# Patient Record
Sex: MALE | Race: WHITE | NOT HISPANIC OR LATINO | Employment: OTHER | URBAN - METROPOLITAN AREA
[De-identification: names, ages, dates, MRNs, and addresses within clinical notes are randomized per-mention and may not be internally consistent; named-entity substitution may affect disease eponyms.]

---

## 2020-06-23 ENCOUNTER — TRANSCRIBE ORDERS (OUTPATIENT)
Dept: ADMINISTRATIVE | Facility: HOSPITAL | Age: 75
End: 2020-06-23

## 2020-06-23 DIAGNOSIS — J44.9 OBSTRUCTIVE CHRONIC BRONCHITIS WITHOUT EXACERBATION (HCC): Primary | ICD-10-CM

## 2020-07-07 ENCOUNTER — OFFICE VISIT (OUTPATIENT)
Dept: INTERNAL MEDICINE CLINIC | Facility: CLINIC | Age: 75
End: 2020-07-07
Payer: MEDICARE

## 2020-07-07 VITALS
HEIGHT: 70 IN | OXYGEN SATURATION: 98 % | BODY MASS INDEX: 23.77 KG/M2 | WEIGHT: 166 LBS | HEART RATE: 80 BPM | TEMPERATURE: 98.9 F | DIASTOLIC BLOOD PRESSURE: 60 MMHG | SYSTOLIC BLOOD PRESSURE: 132 MMHG | RESPIRATION RATE: 18 BRPM

## 2020-07-07 DIAGNOSIS — I48.21 PERMANENT ATRIAL FIBRILLATION (HCC): Primary | ICD-10-CM

## 2020-07-07 DIAGNOSIS — I42.0 DILATED CARDIOMYOPATHY (HCC): ICD-10-CM

## 2020-07-07 DIAGNOSIS — I10 ESSENTIAL HYPERTENSION: ICD-10-CM

## 2020-07-07 DIAGNOSIS — E78.2 MIXED HYPERLIPIDEMIA: ICD-10-CM

## 2020-07-07 DIAGNOSIS — I34.0 NONRHEUMATIC MITRAL VALVE REGURGITATION: ICD-10-CM

## 2020-07-07 PROCEDURE — 99214 OFFICE O/P EST MOD 30 MIN: CPT | Performed by: INTERNAL MEDICINE

## 2020-07-07 PROCEDURE — 3008F BODY MASS INDEX DOCD: CPT | Performed by: INTERNAL MEDICINE

## 2020-07-07 PROCEDURE — 1160F RVW MEDS BY RX/DR IN RCRD: CPT | Performed by: INTERNAL MEDICINE

## 2020-07-07 RX ORDER — BUDESONIDE AND FORMOTEROL FUMARATE DIHYDRATE 80; 4.5 UG/1; UG/1
2 AEROSOL RESPIRATORY (INHALATION) 2 TIMES DAILY
COMMUNITY
End: 2020-08-12 | Stop reason: SDUPTHER

## 2020-07-07 RX ORDER — APIXABAN 5 MG/1
5 TABLET, FILM COATED ORAL DAILY
COMMUNITY
Start: 2020-05-18 | End: 2020-08-08 | Stop reason: HOSPADM

## 2020-07-07 RX ORDER — POTASSIUM CHLORIDE 750 MG/1
10 TABLET, FILM COATED, EXTENDED RELEASE ORAL DAILY
COMMUNITY
Start: 2020-05-18 | End: 2021-09-10 | Stop reason: SDUPTHER

## 2020-07-07 RX ORDER — TAMSULOSIN HYDROCHLORIDE 0.4 MG/1
0.4 CAPSULE ORAL DAILY
COMMUNITY
Start: 2020-05-18 | End: 2021-09-10 | Stop reason: SDUPTHER

## 2020-07-07 RX ORDER — LISINOPRIL 2.5 MG/1
2.5 TABLET ORAL DAILY
COMMUNITY
Start: 2020-05-22 | End: 2020-08-10 | Stop reason: SDUPTHER

## 2020-07-07 RX ORDER — FUROSEMIDE 40 MG/1
40 TABLET ORAL DAILY
COMMUNITY
Start: 2020-06-19 | End: 2020-08-25 | Stop reason: SDUPTHER

## 2020-07-07 NOTE — PROGRESS NOTES
Assessment/Plan:  Nonischemic cardiomyopathy  Ejection fraction is in the teens  He does not have ICD  He called his cardiologist take OTC him  Chronic atrial fibrillation  Essential hypertension  Hyperlipidemia  Mitral regurgitation  History of congestive heart failure  History of chronic obstructive lung disease his nebulizers  Benign prostatic hypertrophy on tamsulosin  Meds and labs reviewed due for blood work prescription given for the same  Will see him back after the blood work and a free sees cardiologist       Problem List Items Addressed This Visit     None      Visit Diagnoses     Permanent atrial fibrillation ([facility] )    -  Primary    Relevant Medications    metoprolol tartrate (LOPRESSOR) 25 mg tablet    Other Relevant Orders    TSH, 3rd generation with Free T4 reflex    Essential hypertension        Relevant Medications    furosemide (LASIX) 40 mg tablet    lisinopril (ZESTRIL) 2 5 mg tablet    metoprolol tartrate (LOPRESSOR) 25 mg tablet    Other Relevant Orders    CBC and differential    Comprehensive metabolic panel    UA w Reflex to Microscopic w Reflex to Culture - Clinic Collect    Dilated cardiomyopathy ([facility] )        Relevant Medications    metoprolol tartrate (LOPRESSOR) 25 mg tablet    Other Relevant Orders    HEMOGLOBIN A1C W/ EAG ESTIMATION    Nonrheumatic mitral valve regurgitation        Relevant Medications    metoprolol tartrate (LOPRESSOR) 25 mg tablet    Mixed hyperlipidemia        Relevant Orders    Lipid panel            Subjective:      Patient ID: Marbella Hsu is a 69 y o  male  Mr  Fernanda Castellanos is here for follow-up    He has history of                                                    Essential hypertension                                                     Cardiomyopathy/nonischemic                                                    Chronic atrial fibrillation                                                    Mitral regurgitation Hyperlipidemia                                                    Chronic obstructive lung disease                                                    Tobacco dependence  Has drastically cut down on smoking                                                     BPH, history of urinary retention                                                     Impaired fasting glucose  He is overall doing well with his hip he gets tired when he walks, shortness of breath when he walks  No orthopnea or PND  Occasional peripheral edema  No palpitations  No chest pains  No dysphagia or heartburn  He gets lightheaded when he gets out of bed  No falls no syncopal attacks      The following portions of the patient's history were reviewed and updated as appropriate:   He has a past medical history of Anxiety disorder, Atrial fibrillation ([facility] ), Depression, and Hepatitis C ,  does not have a problem list on file  ,   has a past surgical history that includes Cardiac catheterization (07/09/2018)  ,  family history includes Coronary artery disease in his father and mother; Diabetes in his father; Hypertension in his father and mother  ,   reports that he has been smoking  He has a 14 25 pack-year smoking history  He has never used smokeless tobacco  He reports that he does not drink alcohol or use drugs  ,  has No Known Allergies     Current Outpatient Medications   Medication Sig Dispense Refill    budesonide-formoterol (SYMBICORT) 80-4 5 MCG/ACT inhaler Inhale 2 puffs 2 (two) times a day Rinse mouth after use        ELIQUIS 5 MG Take 5 mg by mouth daily      furosemide (LASIX) 40 mg tablet Take 40 mg by mouth daily      lisinopril (ZESTRIL) 2 5 mg tablet Take 2 5 mg by mouth daily      metoprolol tartrate (LOPRESSOR) 25 mg tablet Take 25 mg by mouth daily      potassium chloride (K-DUR) 10 mEq tablet Take 10 mEq by mouth daily      tamsulosin (FLOMAX) 0 4 mg Take 0 4 mg by mouth daily       No current facility-administered medications for this visit  Review of Systems   Constitutional: Negative  HENT: Negative  Eyes: Negative  Respiratory: Negative  Cardiovascular: Negative  Gastrointestinal: Negative  Endocrine: Negative  Genitourinary: Negative  Skin: Negative  Allergic/Immunologic: Negative  Neurological: Negative  Hematological: Negative  Psychiatric/Behavioral: Negative  Objective:  Vitals:    07/07/20 1552   BP: 132/60   BP Location: Left arm   Patient Position: Sitting   Cuff Size: Standard   Pulse: 80   Resp: 18   Temp: 98 9 °F (37 2 °C)   TempSrc: Tympanic   SpO2: 98%   Weight: 75 3 kg (166 lb)   Height: 5' 10" (1 778 m)     Body mass index is 23 82 kg/m²  Physical Exam   Constitutional: He is oriented to person, place, and time  He appears well-developed and well-nourished  HENT:   Head: Normocephalic and atraumatic  Right Ear: External ear normal    Left Ear: External ear normal    Nose: Nose normal    Eyes: Pupils are equal, round, and reactive to light  Conjunctivae and EOM are normal  No scleral icterus  Neck: Normal range of motion  Neck supple  No thyromegaly present  Cardiovascular: Normal rate  Exam reveals no gallop  Murmur heard  Irregularly irregular/atrial fibrillation   Pulmonary/Chest: Effort normal  He has no rales  Diminished breath sounds   Abdominal: Soft  Bowel sounds are normal  He exhibits no mass  No hernia  Musculoskeletal: Normal range of motion  He exhibits no edema  Lymphadenopathy:     He has no cervical adenopathy  Neurological: He is alert and oriented to person, place, and time  Skin: Skin is warm and dry  Psychiatric: He has a normal mood and affect  Thought content normal    Nursing note and vitals reviewed

## 2020-08-03 ENCOUNTER — TELEPHONE (OUTPATIENT)
Dept: INTERNAL MEDICINE CLINIC | Facility: CLINIC | Age: 75
End: 2020-08-03

## 2020-08-03 ENCOUNTER — HOSPITAL ENCOUNTER (INPATIENT)
Facility: HOSPITAL | Age: 75
LOS: 5 days | Discharge: HOME HEALTH | DRG: 812 | End: 2020-08-08
Admitting: INTERNAL MEDICINE
Payer: MEDICARE

## 2020-08-03 DIAGNOSIS — D50.9 MICROCYTIC ANEMIA: ICD-10-CM

## 2020-08-03 DIAGNOSIS — I95.1 ORTHOSTATIC HYPOTENSION: ICD-10-CM

## 2020-08-03 DIAGNOSIS — F17.200 TOBACCO USE DISORDER: ICD-10-CM

## 2020-08-03 DIAGNOSIS — R53.1 WEAKNESS: ICD-10-CM

## 2020-08-03 DIAGNOSIS — I48.11 LONGSTANDING PERSISTENT ATRIAL FIBRILLATION (HCC): ICD-10-CM

## 2020-08-03 DIAGNOSIS — D64.9 ANEMIA, UNSPECIFIED TYPE: Primary | ICD-10-CM

## 2020-08-03 PROBLEM — I48.91 A-FIB (HCC): Status: ACTIVE | Noted: 2020-08-03

## 2020-08-03 PROBLEM — I42.8 NON-ISCHEMIC CARDIOMYOPATHY (HCC): Status: ACTIVE | Noted: 2020-08-03

## 2020-08-03 LAB
ABO GROUP BLD: NORMAL
ABO GROUP BLD: NORMAL
ALBUMIN SERPL BCP-MCNC: 4 G/DL (ref 3.4–4.8)
ALP SERPL-CCNC: 47.1 U/L (ref 10–129)
ALT SERPL W P-5'-P-CCNC: 12 U/L (ref 5–63)
ANION GAP SERPL CALCULATED.3IONS-SCNC: 6 MMOL/L (ref 4–13)
AST SERPL W P-5'-P-CCNC: 13 U/L (ref 15–41)
BASOPHILS # BLD AUTO: 0.05 THOUSANDS/ΜL (ref 0–0.1)
BASOPHILS NFR BLD AUTO: 1 % (ref 0–1)
BILIRUB SERPL-MCNC: 0.59 MG/DL (ref 0.3–1.2)
BLD GP AB SCN SERPL QL: NEGATIVE
BNP SERPL-MCNC: 387 PG/ML (ref 1–100)
BUN SERPL-MCNC: 17 MG/DL (ref 6–20)
CALCIUM SERPL-MCNC: 8.9 MG/DL (ref 8.4–10.2)
CHLORIDE SERPL-SCNC: 105 MMOL/L (ref 96–108)
CO2 SERPL-SCNC: 30 MMOL/L (ref 22–33)
CREAT SERPL-MCNC: 1.05 MG/DL (ref 0.5–1.2)
DIGOXIN SERPL-MCNC: <0.2 NG/ML (ref 0.8–2)
EOSINOPHIL # BLD AUTO: 0.15 THOUSAND/ΜL (ref 0–0.61)
EOSINOPHIL NFR BLD AUTO: 2 % (ref 0–6)
ERYTHROCYTE [DISTWIDTH] IN BLOOD BY AUTOMATED COUNT: 20.8 % (ref 11.6–15.1)
GFR SERPL CREATININE-BSD FRML MDRD: 71 ML/MIN/1.73SQ M
GLUCOSE SERPL-MCNC: 123 MG/DL (ref 65–140)
HCT VFR BLD AUTO: 24.9 % (ref 36.5–49.3)
HEMOCCULT STL QL IA: NEGATIVE
HGB BLD-MCNC: 6.3 G/DL (ref 12–17)
IMM GRANULOCYTES # BLD AUTO: 0.08 THOUSAND/UL (ref 0–0.2)
IMM GRANULOCYTES NFR BLD AUTO: 1 % (ref 0–2)
LYMPHOCYTES # BLD AUTO: 1.16 THOUSANDS/ΜL (ref 0.6–4.47)
LYMPHOCYTES NFR BLD AUTO: 14 % (ref 14–44)
MCH RBC QN AUTO: 17.5 PG (ref 26.8–34.3)
MCHC RBC AUTO-ENTMCNC: 25.3 G/DL (ref 31.4–37.4)
MCV RBC AUTO: 69 FL (ref 82–98)
MONOCYTES # BLD AUTO: 1 THOUSAND/ΜL (ref 0.17–1.22)
MONOCYTES NFR BLD AUTO: 12 % (ref 4–12)
NEUTROPHILS # BLD AUTO: 6.13 THOUSANDS/ΜL (ref 1.85–7.62)
NEUTS SEG NFR BLD AUTO: 70 % (ref 43–75)
PLATELET # BLD AUTO: 539 THOUSANDS/UL (ref 149–390)
PMV BLD AUTO: 9.1 FL (ref 8.9–12.7)
POTASSIUM SERPL-SCNC: 3.6 MMOL/L (ref 3.5–5)
PROT SERPL-MCNC: 6 G/DL (ref 6.4–8.3)
RBC # BLD AUTO: 3.61 MILLION/UL (ref 3.88–5.62)
RETICS # AUTO: ABNORMAL 10*3/UL (ref 14356–105094)
RETICS # CALC: 2.31 % (ref 0.37–1.87)
RH BLD: POSITIVE
RH BLD: POSITIVE
SODIUM SERPL-SCNC: 141 MMOL/L (ref 133–145)
SPECIMEN EXPIRATION DATE: NORMAL
TSH SERPL DL<=0.05 MIU/L-ACNC: 0.85 UIU/ML (ref 0.34–5.6)
WBC # BLD AUTO: 8.57 THOUSAND/UL (ref 4.31–10.16)

## 2020-08-03 PROCEDURE — 99285 EMERGENCY DEPT VISIT HI MDM: CPT

## 2020-08-03 PROCEDURE — 85025 COMPLETE CBC W/AUTO DIFF WBC: CPT | Performed by: PHYSICIAN ASSISTANT

## 2020-08-03 PROCEDURE — P9016 RBC LEUKOCYTES REDUCED: HCPCS

## 2020-08-03 PROCEDURE — 1124F ACP DISCUSS-NO DSCNMKR DOCD: CPT | Performed by: INTERNAL MEDICINE

## 2020-08-03 PROCEDURE — 86850 RBC ANTIBODY SCREEN: CPT | Performed by: PHYSICIAN ASSISTANT

## 2020-08-03 PROCEDURE — 93005 ELECTROCARDIOGRAM TRACING: CPT

## 2020-08-03 PROCEDURE — 96360 HYDRATION IV INFUSION INIT: CPT

## 2020-08-03 PROCEDURE — 83550 IRON BINDING TEST: CPT | Performed by: INTERNAL MEDICINE

## 2020-08-03 PROCEDURE — 82728 ASSAY OF FERRITIN: CPT | Performed by: INTERNAL MEDICINE

## 2020-08-03 PROCEDURE — 83880 ASSAY OF NATRIURETIC PEPTIDE: CPT | Performed by: INTERNAL MEDICINE

## 2020-08-03 PROCEDURE — 86920 COMPATIBILITY TEST SPIN: CPT

## 2020-08-03 PROCEDURE — 83540 ASSAY OF IRON: CPT | Performed by: INTERNAL MEDICINE

## 2020-08-03 PROCEDURE — 86901 BLOOD TYPING SEROLOGIC RH(D): CPT | Performed by: PHYSICIAN ASSISTANT

## 2020-08-03 PROCEDURE — 80053 COMPREHEN METABOLIC PANEL: CPT | Performed by: PHYSICIAN ASSISTANT

## 2020-08-03 PROCEDURE — 84443 ASSAY THYROID STIM HORMONE: CPT | Performed by: PHYSICIAN ASSISTANT

## 2020-08-03 PROCEDURE — 99222 1ST HOSP IP/OBS MODERATE 55: CPT | Performed by: INTERNAL MEDICINE

## 2020-08-03 PROCEDURE — 80162 ASSAY OF DIGOXIN TOTAL: CPT | Performed by: PHYSICIAN ASSISTANT

## 2020-08-03 PROCEDURE — 83625 ASSAY OF LDH ENZYMES: CPT | Performed by: INTERNAL MEDICINE

## 2020-08-03 PROCEDURE — 83615 LACTATE (LD) (LDH) ENZYME: CPT | Performed by: INTERNAL MEDICINE

## 2020-08-03 PROCEDURE — 99285 EMERGENCY DEPT VISIT HI MDM: CPT | Performed by: PHYSICIAN ASSISTANT

## 2020-08-03 PROCEDURE — G0328 FECAL BLOOD SCRN IMMUNOASSAY: HCPCS | Performed by: INTERNAL MEDICINE

## 2020-08-03 PROCEDURE — 36415 COLL VENOUS BLD VENIPUNCTURE: CPT | Performed by: PHYSICIAN ASSISTANT

## 2020-08-03 PROCEDURE — 36430 TRANSFUSION BLD/BLD COMPNT: CPT

## 2020-08-03 PROCEDURE — 30233N1 TRANSFUSION OF NONAUTOLOGOUS RED BLOOD CELLS INTO PERIPHERAL VEIN, PERCUTANEOUS APPROACH: ICD-10-PCS

## 2020-08-03 PROCEDURE — 85045 AUTOMATED RETICULOCYTE COUNT: CPT | Performed by: INTERNAL MEDICINE

## 2020-08-03 PROCEDURE — 86900 BLOOD TYPING SEROLOGIC ABO: CPT | Performed by: PHYSICIAN ASSISTANT

## 2020-08-03 RX ORDER — BUDESONIDE AND FORMOTEROL FUMARATE DIHYDRATE 80; 4.5 UG/1; UG/1
2 AEROSOL RESPIRATORY (INHALATION) 2 TIMES DAILY
Status: DISCONTINUED | OUTPATIENT
Start: 2020-08-04 | End: 2020-08-08 | Stop reason: HOSPADM

## 2020-08-03 RX ORDER — TAMSULOSIN HYDROCHLORIDE 0.4 MG/1
0.4 CAPSULE ORAL DAILY
Status: DISCONTINUED | OUTPATIENT
Start: 2020-08-04 | End: 2020-08-04

## 2020-08-03 RX ORDER — FUROSEMIDE 40 MG/1
40 TABLET ORAL DAILY
Status: DISCONTINUED | OUTPATIENT
Start: 2020-08-04 | End: 2020-08-07

## 2020-08-03 RX ORDER — POTASSIUM CHLORIDE 750 MG/1
10 TABLET, FILM COATED, EXTENDED RELEASE ORAL DAILY
Status: DISCONTINUED | OUTPATIENT
Start: 2020-08-04 | End: 2020-08-08 | Stop reason: HOSPADM

## 2020-08-03 RX ORDER — LISINOPRIL 2.5 MG/1
2.5 TABLET ORAL DAILY
Status: DISCONTINUED | OUTPATIENT
Start: 2020-08-04 | End: 2020-08-08 | Stop reason: HOSPADM

## 2020-08-03 RX ORDER — DIGOXIN 125 MCG
250 TABLET ORAL DAILY
Status: DISCONTINUED | OUTPATIENT
Start: 2020-08-04 | End: 2020-08-08 | Stop reason: HOSPADM

## 2020-08-03 RX ORDER — DIGOXIN 250 MCG
250 TABLET ORAL DAILY
COMMUNITY
End: 2020-08-10 | Stop reason: SDUPTHER

## 2020-08-03 RX ADMIN — SODIUM CHLORIDE 1000 ML: 0.9 INJECTION, SOLUTION INTRAVENOUS at 15:53

## 2020-08-03 NOTE — H&P
H&P- Jaci Jones 1945, 69 y o  male MRN: [id number]    Unit/Bed#: ED 04 Encounter: [id number]    Primary Care Provider: Ion Jerez MD   Date and time admitted to hospital: 8/3/2020  3:20 PM        A-fib [facility])  Assessment & Plan  Patient has a history of atrial fibrillation managed with Eliquis and metoprolol  - currently rate controlled hemodynamically stable  - hold Eliquis given workup for acute bleed potentially  - continue metoprolol  Non-ischemic cardiomyopathy [facility])  Assessment & Plan  History of nonischemic cardiomyopathy  Ejection fraction unclear> will request medical records  Continue metoprolol 25 mg daily and digoxin 0 25 mg daily    Microcytic anemia  Assessment & Plan  Patient presents with hemoglobin of 6 3  Baseline hemoglobin not known  Will transfuse with 1 unit of blood  Stool occult is negative  Will trend hemoglobin  Obtain recheck count, iron studies, haptoglobin, LDH, PT/INR  VTE Prophylaxis: Holding pharmacological anticoagulation due to assessment for bleed  / sequential compression device   Code Status:  DNR/DNI  POLST: POLST form is not discussed and not completed at this time  Discussion with family:  None  Anticipated Length of Stay:  Patient will be admitted on an Inpatient basis with an anticipated length of stay of  less than 2 midnights  Justification for Hospital Stay:  Correction of anemia and anemia workup  Total Time for Visit, including Counseling / Coordination of Care: 30 minutes  Greater than 50% of this total time spent on direct patient counseling and coordination of care  Chief Complaint:  Progressive Fatigue  History of Present Illness:    Jaci Jones is a 69 y o  male with a past medical history significant for CHF on Eliquis, CAD, AMI, anxiety and depression who presents with progressive fatigue and somnolence for 1 day's duration    Wife deny that he has been progressively seemed more tired a day-to-day basis but more significant as of today  Patient denies any headache blurry vision, chest pain, palpitations, abdominal pain, nausea, vomiting, weight loss, changes in appetite, bowel or bladder patterns  ED course:  Preliminary workup in the emergency department was remarkable for hemoglobin of 6 3 an hematocrit of 24 7 with platelets of 359  EKG showed no acute ischemic changes  FOBT showed no occult blood  Patient's vitals were stable  Patient to be admitted to the unit for management of hemoglobin with plan for blood transfusion-cross match revealed AB +  Digoxin levels pending  Review of Systems:    Review of Systems   Gastrointestinal: Positive for diarrhea  Patient mentions occasional loose bowel movements as of recently  All other systems reviewed and are negative  Past Medical and Surgical History:     Past Medical History:   Diagnosis Date    Anxiety disorder     Atrial fibrillation (HCC)     Coronary artery disease     Depression     Hepatitis C     screening negative in 1/2017    MI (myocardial infarction) [facility])        Past Surgical History:   Procedure Laterality Date    CARDIAC CATHETERIZATION  07/09/2018       Meds/Allergies:    Prior to Admission medications    Medication Sig Start Date End Date Taking? Authorizing Provider   budesonide-formoterol (SYMBICORT) 80-4 5 MCG/ACT inhaler Inhale 2 puffs 2 (two) times a day Rinse mouth after use     Yes Historical Provider, MD   digoxin (LANOXIN) 0 25 mg tablet Take 250 mcg by mouth daily   Yes Historical Provider, MD   ELIQUIS 5 MG Take 5 mg by mouth daily 5/18/20  Yes Historical Provider, MD   furosemide (LASIX) 40 mg tablet Take 40 mg by mouth daily 6/19/20  Yes Historical Provider, MD   lisinopril (ZESTRIL) 2 5 mg tablet Take 2 5 mg by mouth daily 5/22/20  Yes Historical Provider, MD   metoprolol tartrate (LOPRESSOR) 25 mg tablet Take 25 mg by mouth daily 6/25/20  Yes Historical Provider, MD   potassium chloride (K-DUR) 10 mEq tablet Take 10 mEq by mouth daily 5/18/20   Historical Provider, MD   tamsulosin (FLOMAX) 0 4 mg Take 0 4 mg by mouth daily 5/18/20   Historical Provider, MD     I have reviewed home medications with patient personally  Allergies: No Known Allergies    Social History:     Marital Status: /Civil Union   Occupation: unemployed  Patient Pre-hospital Living Situation: home  Patient Pre-hospital Level of Mobility: independent  Patient Pre-hospital Diet Restrictions: none  Substance Use History:   Social History     Substance and Sexual Activity   Alcohol Use Never    Frequency: Never     Social History     Tobacco Use   Smoking Status Current Every Day Smoker    Packs/day: 0 25    Years: 57 00    Pack years: 14 25   Smokeless Tobacco Never Used   Tobacco Comment    since age 17; Has history of smoking for over 54 years  He has been smoking more than packet daily in the past however he has been smokes about half a pack a day lately and stopped smoking on 7/1/2018 when he was admitted to the hospital       Social History     Substance and Sexual Activity   Drug Use Never       Family History:    non-contributory    Physical Exam:     Vitals:   Blood Pressure: 138/74 (08/03/20 1829)  Pulse: 88 (08/03/20 1829)  Temperature: 98 2 °F (36 8 °C) (08/03/20 1829)  Temp Source: Oral (08/03/20 1829)  Respirations: 18 (08/03/20 1829)  Weight - Scale: 75 3 kg (166 lb) (08/03/20 1523)  SpO2: 100 % (08/03/20 1829)    Physical Exam   Constitutional: He is oriented to person, place, and time  HENT:   Head: Normocephalic and atraumatic  Mouth/Throat: Mucous membranes are dry  Eyes: Pupils are equal, round, and reactive to light  Conjunctivae are normal    Neck: Normal range of motion  Neck supple  Cardiovascular: Normal rate, S1 normal and S2 normal  An irregular rhythm present  Pulmonary/Chest: Effort normal and breath sounds normal    Abdominal: Soft  Normal appearance     Musculoskeletal: Normal range of motion  Neurological: He is alert and oriented to person, place, and time  Skin: Skin is warm and dry  Capillary refill takes less than 2 seconds  Additional Data:     Lab Results: I have personally reviewed pertinent reports  Results from last 7 days   Lab Units 08/03/20  1552   WBC Thousand/uL 8 57   HEMOGLOBIN g/dL 6 3*   HEMATOCRIT % 24 9*   PLATELETS Thousands/uL 539*   NEUTROS PCT % 70   LYMPHS PCT % 14   MONOS PCT % 12   EOS PCT % 2     Results from last 7 days   Lab Units 08/03/20  1552   SODIUM mmol/L 141   POTASSIUM mmol/L 3 6   CHLORIDE mmol/L 105   CO2 mmol/L 30   BUN mg/dL 17   CREATININE mg/dL 1 05   ANION GAP mmol/L 6   CALCIUM mg/dL 8 9   ALBUMIN g/dL 4 0   TOTAL BILIRUBIN mg/dL 0 59   ALK PHOS U/L 47 1   ALT U/L 12   AST U/L 13*   GLUCOSE RANDOM mg/dL 123                       Imaging: I have personally reviewed pertinent reports  No orders to display       EKG, Pathology, and Other Studies Reviewed on Admission:   · EKG:  Irregularly regular rhythm  No acute ischemic changes noted  [facility]: Yes     ** Please Note: This note has been constructed using a voice recognition system   **

## 2020-08-03 NOTE — TELEPHONE ENCOUNTER
Patients wife called stating he's extremely fatigued, sleeping more than 10 hours a day  She said when he wakes up he's lethargic with slight vertigo  She was very concerned so we advised her to get him to the hospital to be examined, she understood and was calling the squad

## 2020-08-03 NOTE — ASSESSMENT & PLAN NOTE
History of nonischemic cardiomyopathy  Ejection fraction unclear> will request medical records    Continue metoprolol 25 mg daily and digoxin 0 25 mg daily

## 2020-08-03 NOTE — ED PROVIDER NOTES
History  Chief Complaint   Patient presents with    Fatigue     pt reports, "my wife thinks I've been a little more lethargic, but put me infront of an AC and i'll feel great  I am a little tired in the morning and I take a nap during the day, but I feel fine"      Pt with Past Medical History: Anxiety disorder/Depression, Atrial fibrillation (HCC) on Eliquis, Coronary artery disease, Hepatitis C, MI presents to ED c/o being more tired and lethargic, that he thinks is "from the heat", sleeping more, for which wife called 911  Pt denies pain, no fever/chills, no cough, no abd pain, no NV, no bowel changes, no BRBPR or melena  Prior to Admission Medications   Prescriptions Last Dose Informant Patient Reported? Taking? ELIQUIS 5 MG 8/3/2020 at Unknown time  Yes Yes   Sig: Take 5 mg by mouth daily   budesonide-formoterol (SYMBICORT) 80-4 5 MCG/ACT inhaler 8/3/2020 at Unknown time  Yes Yes   Sig: Inhale 2 puffs 2 (two) times a day Rinse mouth after use     digoxin (LANOXIN) 0 25 mg tablet   Yes Yes   Sig: Take 250 mcg by mouth daily   furosemide (LASIX) 40 mg tablet 8/3/2020 at Unknown time  Yes Yes   Sig: Take 40 mg by mouth daily   lisinopril (ZESTRIL) 2 5 mg tablet 8/3/2020 at Unknown time  Yes Yes   Sig: Take 2 5 mg by mouth daily   metoprolol tartrate (LOPRESSOR) 25 mg tablet 8/3/2020 at Unknown time  Yes Yes   Sig: Take 25 mg by mouth daily   potassium chloride (K-DUR) 10 mEq tablet Not Taking at Unknown time  Yes No   Sig: Take 10 mEq by mouth daily   tamsulosin (FLOMAX) 0 4 mg Not Taking at Unknown time  Yes No   Sig: Take 0 4 mg by mouth daily      Facility-Administered Medications: None             Past Surgical History:   Procedure Laterality Date    CARDIAC CATHETERIZATION  07/09/2018       Family History   Problem Relation Age of Onset    Hypertension Mother     Coronary artery disease Mother         premature    Hypertension Father     Coronary artery disease Father         premature    Diabetes Father      I have reviewed and agree with the history as documented  E-Cigarette/Vaping    E-Cigarette Use Never User      E-Cigarette/Vaping Substances    Nicotine No     THC No     CBD No     Flavoring No     Other No     Unknown No      Social History     Tobacco Use    Smoking status: Current Every Day Smoker     Packs/day: 0 25     Years: 57 00     Pack years: 14 25    Smokeless tobacco: Never Used    Tobacco comment: since age 17; Has history of smoking for over 55 years  He has been smoking more than packet daily in the past however he has been smokes about half a pack a day lately and stopped smoking on 7/1/2018 when he was admitted to the hospital     Substance Use Topics    Alcohol use: Never     Frequency: Never    Drug use: Never       Review of Systems   Constitutional: Positive for appetite change and fatigue  Negative for chills and fever  HENT: Negative for ear discharge, ear pain, hearing loss, sore throat and trouble swallowing  Eyes: Negative for pain and visual disturbance  Respiratory: Negative for cough, chest tightness and shortness of breath  Cardiovascular: Negative for chest pain and leg swelling  Gastrointestinal: Negative for abdominal pain, constipation, diarrhea, nausea and vomiting  Genitourinary: Negative for discharge, dysuria and frequency  Musculoskeletal: Negative for arthralgias and myalgias  Skin: Negative for color change and pallor  Neurological: Positive for weakness  Negative for dizziness and headaches  Psychiatric/Behavioral: Negative for behavioral problems, self-injury and sleep disturbance  Physical Exam  Physical Exam  Vitals signs and nursing note reviewed  Constitutional:       General: He is not in acute distress  Appearance: Normal appearance  He is well-developed and normal weight  He is not ill-appearing  HENT:      Head: Normocephalic and atraumatic        Right Ear: External ear normal       Left Ear: External ear normal       Mouth/Throat:      Mouth: Mucous membranes are moist       Pharynx: Oropharynx is clear  Eyes:      Conjunctiva/sclera: Conjunctivae normal    Neck:      Musculoskeletal: Normal range of motion  Cardiovascular:      Rate and Rhythm: Normal rate  Rhythm irregular  Pulmonary:      Effort: Pulmonary effort is normal  No respiratory distress  Breath sounds: Normal breath sounds  Abdominal:      General: Bowel sounds are normal       Palpations: Abdomen is soft  There is mass  Tenderness: There is no abdominal tenderness  Genitourinary:     Rectum: Normal  Guaiac result negative  Comments: Light brown/yellow stool, non bloody stool  Musculoskeletal: Normal range of motion  Right lower leg: No edema  Left lower leg: No edema  Lymphadenopathy:      Cervical: No cervical adenopathy  Skin:     General: Skin is warm and dry  Findings: No lesion or rash  Neurological:      Mental Status: He is alert and oriented to person, place, and time  Motor: No weakness     Psychiatric:         Mood and Affect: Mood normal          Behavior: Behavior normal          Vital Signs  ED Triage Vitals   Temperature Pulse Respirations Blood Pressure SpO2   08/03/20 1530 08/03/20 1523 08/03/20 1523 08/03/20 1523 08/03/20 1523   98 2 °F (36 8 °C) 58 18 114/70 96 %      Temp Source Heart Rate Source Patient Position - Orthostatic VS BP Location FiO2 (%)   08/03/20 1530 08/03/20 1523 08/03/20 1523 08/03/20 1523 --   Oral Monitor Lying Right arm       Pain Score       08/03/20 1523       No Pain           Vitals:    08/03/20 1523 08/03/20 1642   BP: 114/70 109/69   Pulse: 58 91   Patient Position - Orthostatic VS: Lying Lying         Visual Acuity      ED Medications  Medications   sodium chloride 0 9 % bolus 1,000 mL (1,000 mL Intravenous New Bag 8/3/20 1553)       Diagnostic Studies  Results Reviewed     Procedure Component Value Units Date/Time    Occult Bloood,Fecal Immunochemical [[id number]]  (Normal) Collected:  08/03/20 1625    Lab Status:  Final result Specimen:  Stool from Per Rectum Updated:  08/03/20 1705     OCCULT BLD, FECAL IMMUNOLOGICAL Negative    Narrative:         Performed by Fecal Immunochemical Test     Digoxin level [[id number]]     Lab Status:  No result Specimen:  Blood     Comprehensive metabolic panel [[id number]]  (Abnormal) Collected:  08/03/20 1552    Lab Status:  Final result Specimen:  Blood from Arm, Left Updated:  08/03/20 1624     Sodium 141 mmol/L      Potassium 3 6 mmol/L      Chloride 105 mmol/L      CO2 30 mmol/L      ANION GAP 6 mmol/L      BUN 17 mg/dL      Creatinine 1 05 mg/dL      Glucose 123 mg/dL      Calcium 8 9 mg/dL      AST 13 U/L      ALT 12 U/L      Alkaline Phosphatase 47 1 U/L      Total Protein 6 0 g/dL      Albumin 4 0 g/dL      Total Bilirubin 0 59 mg/dL      eGFR 71 ml/min/1 73sq m     Narrative:       National Kidney Disease Foundation guidelines for Chronic Kidney Disease (CKD):     Stage 1 with normal or high GFR (GFR > 90 mL/min/1 73 square meters)    Stage 2 Mild CKD (GFR = 60-89 mL/min/1 73 square meters)    Stage 3A Moderate CKD (GFR = 45-59 mL/min/1 73 square meters)    Stage 3B Moderate CKD (GFR = 30-44 mL/min/1 73 square meters)    Stage 4 Severe CKD (GFR = 15-29 mL/min/1 73 square meters)    Stage 5 End Stage CKD (GFR <15 mL/min/1 73 square meters)  Note: GFR calculation is accurate only with a steady state creatinine    CBC and differential [[id number]]  (Abnormal) Collected:  08/03/20 1552    Lab Status:  Final result Specimen:  Blood from Arm, Left Updated:  08/03/20 1618     WBC 8 57 Thousand/uL      RBC 3 61 Million/uL      Hemoglobin 6 3 g/dL      Hematocrit 24 9 %      MCV 69 fL      MCH 17 5 pg      MCHC 25 3 g/dL      RDW 20 8 %      MPV 9 1 fL      Platelets 863 Thousands/uL      Neutrophils Relative 70 %      Immat GRANS % 1 %      Lymphocytes Relative 14 %      Monocytes Relative 12 % Eosinophils Relative 2 %      Basophils Relative 1 %      Neutrophils Absolute 6 13 Thousands/µL      Immature Grans Absolute 0 08 Thousand/uL      Lymphocytes Absolute 1 16 Thousands/µL      Monocytes Absolute 1 00 Thousand/µL      Eosinophils Absolute 0 15 Thousand/µL      Basophils Absolute 0 05 Thousands/µL     TSH [[id number]]     Lab Status:  No result Specimen:  Blood     UA w Reflex to Microscopic w Reflex to Culture [[id number]]     Lab Status:  No result Specimen:  Urine                  No orders to display              Procedures  ECG 12 Lead Documentation Only    Date/Time: 8/3/2020 5:12 PM  Performed by: Priscilla Garcia PA-C  Authorized by: Priscilla Garcia PA-C     ECG reviewed by me, the ED Provider: yes    Patient location:  ED  Rate:     ECG rate:  83    ECG rate assessment: normal    Rhythm:     Rhythm: atrial fibrillation    Comments:      Afib 83, no acute ischemic changes             ED Course  ED Course as of Aug 03 1715   Mon Aug 03, 2020   1651 Spoke to [place] re admission          US AUDIT      Most Recent Value   Initial Alcohol Screen:  AUDIT-C    1  How often do you have a drink containing alcohol?  0 Filed at: 08/03/2020 1524   Audit-C Score  0 Filed at: 08/03/2020 1524                Stroke Assessment     Row Name 08/03/20 1701             NIH Stroke Scale    Interval  Baseline done at time of initial exam      Level of Consciousness (1a )  0      LOC Questions (1b )  0      LOC Commands (1c )  0      Best Gaze (2 )  0      Visual (3 )  0      Facial Palsy (4 )  0      Motor Arm, Left (5a )  0      Motor Arm, Right (5b )  0      Motor Leg, Left (6a )  0      Motor Leg, Right (6b )  0      Limb Ataxia (7 )  0      Sensory (8 )  0      Best Language (9 )  0      Dysarthria (10 )  0      Extinction and Inattention (11 ) (Formerly Neglect)  0      Total  0          JOHN/DAST-10      Most Recent Value   How many times in the past year have you       Used an illegal drug or used a prescription medication for non-medical reasons? Never Filed at: 08/03/2020 1524                                MDM      Disposition  Final diagnoses:   Anemia, unspecified type   Weakness     Time reflects when diagnosis was documented in both MDM as applicable and the Disposition within this note     Time User Action Codes Description Comment    8/3/2020  5:02 PM Franky Dhaliwal Add [D64 9] Anemia, unspecified type     8/3/2020  5:02 PM Franky Dhaliwal Add [R53 1] Weakness       ED Disposition     ED Disposition Condition Date/Time Comment    Admit Stable Mon Aug 3, 2020  5:04 PM Case was discussed with Gely Villela and the patient's admission status was agreed to be Admission Status: inpatient status to the service of Dr Gely Villela   Follow-up Information    None         Patient's Medications   Discharge Prescriptions    No medications on file     No discharge procedures on file      PDMP Review     None          ED Provider  Electronically Signed by           Cedric Talavera PA-C  08/03/20 7510

## 2020-08-03 NOTE — ASSESSMENT & PLAN NOTE
Patient presents with hemoglobin of 6 3  Baseline hemoglobin not known  Will transfuse with 1 unit of blood  Stool occult is negative  Will trend hemoglobin  Obtain recheck count, iron studies, haptoglobin, LDH, PT/INR

## 2020-08-03 NOTE — ED NOTES
First unit of RBC started at this time  Verified with Brown Rdz and Debra Hobbs RN  Vital signs documented        Rosa Thacker RN  08/03/20 0290

## 2020-08-03 NOTE — ASSESSMENT & PLAN NOTE
Patient has a history of atrial fibrillation managed with Eliquis and metoprolol  - currently rate controlled hemodynamically stable  - hold Eliquis given workup for acute bleed potentially  - continue metoprolol

## 2020-08-04 LAB
ALBUMIN SERPL BCP-MCNC: 3.6 G/DL (ref 3.4–4.8)
ALP SERPL-CCNC: 41 U/L (ref 10–129)
ALT SERPL W P-5'-P-CCNC: 15 U/L (ref 5–63)
ANION GAP SERPL CALCULATED.3IONS-SCNC: 7 MMOL/L (ref 4–13)
ANISOCYTOSIS BLD QL SMEAR: PRESENT
AST SERPL W P-5'-P-CCNC: 15 U/L (ref 15–41)
BASOPHILS # BLD MANUAL: 0 THOUSAND/UL (ref 0–0.1)
BASOPHILS NFR MAR MANUAL: 0 % (ref 0–1)
BILIRUB SERPL-MCNC: 2.84 MG/DL (ref 0.3–1.2)
BILIRUB UR QL STRIP: NEGATIVE
BUN SERPL-MCNC: 16 MG/DL (ref 6–20)
CALCIUM SERPL-MCNC: 8.4 MG/DL (ref 8.4–10.2)
CHLORIDE SERPL-SCNC: 108 MMOL/L (ref 96–108)
CLARITY UR: CLEAR
CO2 SERPL-SCNC: 26 MMOL/L (ref 22–33)
COLOR UR: YELLOW
CREAT SERPL-MCNC: 0.89 MG/DL (ref 0.5–1.2)
EOSINOPHIL # BLD MANUAL: 0.32 THOUSAND/UL (ref 0–0.4)
EOSINOPHIL NFR BLD MANUAL: 3 % (ref 0–6)
ERYTHROCYTE [DISTWIDTH] IN BLOOD BY AUTOMATED COUNT: 22.8 % (ref 11.6–15.1)
FERRITIN SERPL-MCNC: 3 NG/ML (ref 8–388)
GFR SERPL CREATININE-BSD FRML MDRD: 85 ML/MIN/1.73SQ M
GLUCOSE SERPL-MCNC: 102 MG/DL (ref 65–140)
GLUCOSE UR STRIP-MCNC: NEGATIVE MG/DL
HCT VFR BLD AUTO: 27.3 % (ref 36.5–49.3)
HGB BLD-MCNC: 7.7 G/DL (ref 12–17)
HGB UR QL STRIP.AUTO: NEGATIVE
INR PPP: 1.07 (ref 0.9–1.1)
IRON SATN MFR SERPL: 1 %
IRON SERPL-MCNC: 7 UG/DL (ref 65–175)
KETONES UR STRIP-MCNC: NEGATIVE MG/DL
LEUKOCYTE ESTERASE UR QL STRIP: NEGATIVE
LYMPHOCYTES # BLD AUTO: 1.39 THOUSAND/UL (ref 0.6–4.47)
LYMPHOCYTES # BLD AUTO: 13 % (ref 14–44)
MCH RBC QN AUTO: 20.5 PG (ref 26.8–34.3)
MCHC RBC AUTO-ENTMCNC: 28.2 G/DL (ref 31.4–37.4)
MCV RBC AUTO: 73 FL (ref 82–98)
METAMYELOCYTES NFR BLD MANUAL: 1 % (ref 0–1)
MONOCYTES # BLD AUTO: 0.53 THOUSAND/UL (ref 0–1.22)
MONOCYTES NFR BLD: 5 % (ref 4–12)
MYELOCYTES NFR BLD MANUAL: 1 % (ref 0–1)
NEUTROPHILS # BLD MANUAL: 8.12 THOUSAND/UL (ref 1.85–7.62)
NEUTS BAND NFR BLD MANUAL: 9 % (ref 0–8)
NEUTS SEG NFR BLD AUTO: 67 % (ref 43–75)
NITRITE UR QL STRIP: NEGATIVE
NRBC BLD AUTO-RTO: 2 /100 WBC (ref 0–2)
OVALOCYTES BLD QL SMEAR: PRESENT
PH UR STRIP.AUTO: 5.5 [PH]
PLATELET # BLD AUTO: 496 THOUSANDS/UL (ref 149–390)
PLATELET BLD QL SMEAR: ABNORMAL
PMV BLD AUTO: 10.2 FL (ref 8.9–12.7)
POIKILOCYTOSIS BLD QL SMEAR: PRESENT
POLYCHROMASIA BLD QL SMEAR: PRESENT
POTASSIUM SERPL-SCNC: 3.7 MMOL/L (ref 3.5–5)
PROT SERPL-MCNC: 5.4 G/DL (ref 6.4–8.3)
PROT UR STRIP-MCNC: NEGATIVE MG/DL
PROTHROMBIN TIME: 11.3 SECONDS (ref 9.5–12.1)
RBC # BLD AUTO: 3.75 MILLION/UL (ref 3.88–5.62)
RBC MORPH BLD: PRESENT
SODIUM SERPL-SCNC: 141 MMOL/L (ref 133–145)
SP GR UR STRIP.AUTO: >=1.03 (ref 1–1.03)
TIBC SERPL-MCNC: 476 UG/DL (ref 250–450)
TOTAL CELLS COUNTED SPEC: 100
UROBILINOGEN UR QL STRIP.AUTO: 0.2 E.U./DL
VARIANT LYMPHS # BLD AUTO: 1 %
WBC # BLD AUTO: 10.68 THOUSAND/UL (ref 4.31–10.16)

## 2020-08-04 PROCEDURE — 85610 PROTHROMBIN TIME: CPT | Performed by: INTERNAL MEDICINE

## 2020-08-04 PROCEDURE — 99233 SBSQ HOSP IP/OBS HIGH 50: CPT | Performed by: INTERNAL MEDICINE

## 2020-08-04 PROCEDURE — 94640 AIRWAY INHALATION TREATMENT: CPT

## 2020-08-04 PROCEDURE — 80053 COMPREHEN METABOLIC PANEL: CPT | Performed by: INTERNAL MEDICINE

## 2020-08-04 PROCEDURE — 85007 BL SMEAR W/DIFF WBC COUNT: CPT | Performed by: INTERNAL MEDICINE

## 2020-08-04 PROCEDURE — 81003 URINALYSIS AUTO W/O SCOPE: CPT | Performed by: PHYSICIAN ASSISTANT

## 2020-08-04 PROCEDURE — 97163 PT EVAL HIGH COMPLEX 45 MIN: CPT

## 2020-08-04 PROCEDURE — 94760 N-INVAS EAR/PLS OXIMETRY 1: CPT

## 2020-08-04 PROCEDURE — 94664 DEMO&/EVAL PT USE INHALER: CPT

## 2020-08-04 PROCEDURE — 97110 THERAPEUTIC EXERCISES: CPT

## 2020-08-04 PROCEDURE — 85027 COMPLETE CBC AUTOMATED: CPT | Performed by: INTERNAL MEDICINE

## 2020-08-04 RX ORDER — TAMSULOSIN HYDROCHLORIDE 0.4 MG/1
0.4 CAPSULE ORAL EVERY EVENING
Status: DISCONTINUED | OUTPATIENT
Start: 2020-08-04 | End: 2020-08-06

## 2020-08-04 RX ORDER — ACETAMINOPHEN 325 MG/1
650 TABLET ORAL EVERY 6 HOURS PRN
Status: DISCONTINUED | OUTPATIENT
Start: 2020-08-04 | End: 2020-08-08 | Stop reason: HOSPADM

## 2020-08-04 RX ORDER — POTASSIUM CHLORIDE 750 MG/1
10 TABLET, EXTENDED RELEASE ORAL ONCE
Status: COMPLETED | OUTPATIENT
Start: 2020-08-04 | End: 2020-08-04

## 2020-08-04 RX ORDER — LANOLIN ALCOHOL/MO/W.PET/CERES
400 CREAM (GRAM) TOPICAL DAILY
Status: DISCONTINUED | OUTPATIENT
Start: 2020-08-04 | End: 2020-08-08 | Stop reason: HOSPADM

## 2020-08-04 RX ORDER — FERROUS SULFATE 325(65) MG
325 TABLET ORAL
Status: DISCONTINUED | OUTPATIENT
Start: 2020-08-04 | End: 2020-08-08 | Stop reason: HOSPADM

## 2020-08-04 RX ADMIN — POTASSIUM CHLORIDE 10 MEQ: 750 TABLET, EXTENDED RELEASE ORAL at 18:08

## 2020-08-04 RX ADMIN — BUDESONIDE AND FORMOTEROL FUMARATE DIHYDRATE 2 PUFF: 80; 4.5 AEROSOL RESPIRATORY (INHALATION) at 18:18

## 2020-08-04 RX ADMIN — FERROUS SULFATE TAB 325 MG (65 MG ELEMENTAL FE) 325 MG: 325 (65 FE) TAB at 09:31

## 2020-08-04 RX ADMIN — TAMSULOSIN HYDROCHLORIDE 0.4 MG: 0.4 CAPSULE ORAL at 18:08

## 2020-08-04 RX ADMIN — LISINOPRIL 2.5 MG: 2.5 TABLET ORAL at 09:30

## 2020-08-04 RX ADMIN — ACETAMINOPHEN 650 MG: 325 TABLET, FILM COATED ORAL at 01:57

## 2020-08-04 RX ADMIN — METOPROLOL TARTRATE 25 MG: 25 TABLET, FILM COATED ORAL at 09:31

## 2020-08-04 RX ADMIN — DIGOXIN 250 MCG: 125 TABLET ORAL at 09:31

## 2020-08-04 RX ADMIN — BUDESONIDE AND FORMOTEROL FUMARATE DIHYDRATE 2 PUFF: 80; 4.5 AEROSOL RESPIRATORY (INHALATION) at 08:28

## 2020-08-04 RX ADMIN — FUROSEMIDE 40 MG: 40 TABLET ORAL at 09:32

## 2020-08-04 RX ADMIN — FOLIC ACID TAB 400 MCG 400 MCG: 400 TAB at 09:32

## 2020-08-04 RX ADMIN — NICOTINE 1 PATCH: 7 PATCH TRANSDERMAL at 09:32

## 2020-08-04 NOTE — UTILIZATION REVIEW
Initial Clinical Review    Admission: Date/Time/Statement:   Admission Orders (From admission, onward)     Ordered        08/03/20 1705  Inpatient Admission (expected length of stay for this patient Order details is greater than two midnights)  Once                   Orders Placed This Encounter   Procedures    Inpatient Admission (expected length of stay for this patient Order details is greater than two midnights)     Standing Status:   Standing     Number of Occurrences:   1     Order Specific Question:   Admitting Physician     Answer:   Laura Srivastava [[id number]]     Order Specific Question:   Level of Care     Answer:   Med Surg [16]     Order Specific Question:   Bed request comments     Answer:   Tele monitoring please     Order Specific Question:   Estimated length of stay     Answer:   More than 2 Midnights     Order Specific Question:   Certification     Answer:   I certify that inpatient services are medically necessary for this patient for a duration of greater than two midnights  See H&P and MD Progress Notes for additional information about the patient's course of treatment  ED Arrival Information     Expected Arrival Acuity Means of Arrival Escorted By Service Admission Type    - 8/3/2020 15:20 Urgent Ambulance [facility] Urgent    Arrival Complaint    -        Chief Complaint   Patient presents with    Fatigue     pt reports, "my wife thinks I've been a little more lethargic, but put me infront of an [facility] and i'll feel great  I am a little tired in the morning and I take a nap during the day, but I feel fine"      Assessment/Plan: 68 yo m with a pmh of CHF on Eliquis, Cad, AMI, anxiety  & depression  He presents to the Ed  With complaint of progressive fatigue ad somnolence x 1 day  He was found to have a Hg of 6 3 Hct 24 7 ,Plt 539  Suki Segura  EKG showed no acute changes , He is admitted inpatient  for symptomatic microcytic  anemia, with a plan for blood transfusion, 1 unit of PRBC's  FOBT showed no occult blood       Gastroenterology Consult - pending    ED Triage Vitals   Temperature Pulse Respirations Blood Pressure SpO2   08/03/20 1530 08/03/20 1523 08/03/20 1523 08/03/20 1523 08/03/20 1523   98 2 °F (36 8 °C) 58 18 114/70 96 %      Temp Source Heart Rate Source Patient Position - Orthostatic VS BP Location FiO2 (%)   08/03/20 1530 08/03/20 1523 08/03/20 1523 08/03/20 1523 --   Oral Monitor Lying Right arm       Pain Score       08/03/20 1523       No Pain          Wt Readings from Last 1 Encounters:   08/04/20 72 8 kg (160 lb 8 oz)     Additional Vital Signs:   Date/Time   Temp   Pulse   Resp   BP   SpO2   O2 Device   Patient Position - Orthostatic VS    08/04/20 0140   100 6 °F (38 1 °C)Abnormal     103   18   143/91             08/04/20 0100   100 °F (37 8 °C)   96   18   132/84             08/04/20 0040   99 9 °F (37 7 °C)   94   18   136/81             08/03/20 2338   99 8 °F (37 7 °C)                      08/03/20 2328   100 2 °F (37 9 °C)   94   18   103/57         Lying    08/03/20 2235   99 7 °F (37 6 °C)   90   18   132/66         Lying    08/03/20 2220   99 3 °F (37 4 °C)   90   18   118/62         Lying    08/03/20 2124   98 1 °F (36 7 °C)   85   18   100/56   92 %   None (Room air)       08/03/20 1942   97 8 °F (36 6 °C)   94   18   119/64   99 %   None (Room air)       08/03/20 1901   98 °F (36 7 °C)      18   137/75   98 %   None (Room air)       08/03/20 1835   98 2 °F (36 8 °C)   92   18   127/81   99 %   None (Room air)   Lying    08/03/20 1829   98 2 °F (36 8 °C)   88   18   138/74   100 %   None (Room air)   Lying    08/03/20 1814   97 7 °F (36 5 °C)   95   18   100/60   100 %   None (Room air)   Lying        Pertinent Labs/Diagnostic Test Results:       Results from last 7 days   Lab Units 08/04/20  0444 08/03/20  1552   WBC Thousand/uL 10 68* 8 57   HEMOGLOBIN g/dL 7 7* 6 3*   HEMATOCRIT % 27 3* 24 9*   PLATELETS Thousands/uL 496* 539*   NEUTROS ABS Thousands/µL  --  6 13     Results from last 7 days   Lab Units 08/03/20  1552   RETIC CT ABS  81,500   RETIC CT PCT % 2 31*     Results from last 7 days   Lab Units 08/04/20  0444 08/03/20  1552   SODIUM mmol/L 141 141   POTASSIUM mmol/L 3 7 3 6   CHLORIDE mmol/L 108 105   CO2 mmol/L 26 30   ANION GAP mmol/L 7 6   BUN mg/dL 16 17   CREATININE mg/dL 0 89 1 05   EGFR ml/min/1 73sq m 85 71   CALCIUM mg/dL 8 4 8 9     Results from last 7 days   Lab Units 08/04/20  0444 08/03/20  1552   AST U/L 15 13*   ALT U/L 15 12   ALK PHOS U/L 41 0 47 1   TOTAL PROTEIN g/dL 5 4* 6 0*   ALBUMIN g/dL 3 6 4 0   TOTAL BILIRUBIN mg/dL 2 84* 0 59     Results from last 7 days   Lab Units 08/04/20  0444 08/03/20  1552   GLUCOSE RANDOM mg/dL 102 123     Results from last 7 days   Lab Units 08/04/20  0444   PROTIME seconds 11 3   INR  1 07     Results from last 7 days   Lab Units 08/03/20  1552   TSH 3RD GENERATON uIU/mL 0 846       Results from last 7 days   Lab Units 08/03/20  1552   DIGOXIN LVL ng/mL <0 2*     Results from last 7 days   Lab Units 08/03/20  1552   BNP pg/mL 387*     Results from last 7 days   Lab Units 08/03/20  1552   FERRITIN ng/mL 3*     Results from last 7 days   Lab Units 08/04/20  0124   CLARITY UA  Clear   COLOR UA  Yellow   SPEC GRAV UA  >=1 030   PH UA  5 5   GLUCOSE UA mg/dl Negative   KETONES UA mg/dl Negative   BLOOD UA  Negative   PROTEIN UA mg/dl Negative   NITRITE UA  Negative   BILIRUBIN UA  Negative   UROBILINOGEN UA E U /dl 0 2   LEUKOCYTES UA  Negative     No radiology studies   ECG - 8/3 - Afib - no ischemic changes     ED Treatment:   Medication Administration from 08/03/2020 1520 to 08/03/2020 1854       Date/Time Order Dose Route Action     08/03/2020 1553 sodium chloride 0 9 % bolus 1,000 mL 1,000 mL Intravenous New Bag        Past Medical History:   Diagnosis Date    Anxiety disorder     Atrial fibrillation (HCC)     Coronary artery disease     Depression     Hepatitis C screening negative in 1/2017    [facility])      Present on Admission:  **None**      Admitting Diagnosis: Microcytic anemia [D50 9]  Fatigue [R53 83]  Weakness [R53 1]  Anemia, unspecified type [D64 9]  Age/Sex: 69 y o  male  Admission Orders:  Scheduled Medications:  budesonide-formoterol, 2 puff, Inhalation, BID  digoxin, 250 mcg, Oral, Daily  furosemide, 40 mg, Oral, Daily  lisinopril, 2 5 mg, Oral, Daily  metoprolol tartrate, 25 mg, Oral, Daily  nicotine, 1 patch, Transdermal, Daily  potassium chloride, 10 mEq, Oral, Daily  tamsulosin, 0 4 mg, Oral, Daily  PRBC - 1 unit on 8/3/2020    Continuous IV Infusions:     PRN Meds:  acetaminophen, 650 mg, Oral, Q6H PRN            Network Utilization Review Department  [email or web address]  ATTENTION: Please call with any questions or concerns to [phone] and carefully listen to the prompts so that you are directed to the right person  All voicemails are confidential   Negro Gann all requests for admission clinical reviews, approved or denied determinations and any other requests to dedicated fax number below belonging to the campus where the patient is receiving treatment   List of dedicated fax numbers for the Facilities:  [address] [place] [address] DENIALS (Administrative/Medical Necessity) [phone]   [address] (Maternity/NICU/Pediatrics) [phone]   [facility] [phone]   Teofilo Orourke [phone]   [facility] [phone]   Mario Flynn [phone]   [address] [phone]   [facility]  [phone]   [address] [facility] [address]  [address] [facility] [address] [facility] [phone]

## 2020-08-04 NOTE — PHYSICAL THERAPY NOTE
PT EVALUATION     08/04/20 1020   Note Type   Note type Eval/Treat   Pain Assessment   Pain Assessment Tool Pain Assessment not indicated - pt denies pain   Home Living   Type of Home Apartment  ([address] with rail;2nd floor apt)   Home Layout One level   Home Equipment Cane   Additional Comments Pt uses public transportation for groceries  Prior Function   Level of [place] Independent with ADLs and functional mobility   Lives With Spouse   Receives Help From Family   ADL Assistance Independent   Comments Pt normally does not use an AD for ambulation but has been using a cane x 1 month   Restrictions/Precautions   Other Precautions Fall Risk;Bed Alarm; Chair Alarm   General   Additional Pertinent History Pt adm with progressive fatigue and somnolence x 1 day  Family/Caregiver Present No   Cognition   Overall Cognitive Status WFL   Arousal/Participation Cooperative   Orientation Level Oriented X4   Following Commands Follows all commands and directions without difficulty   RLE Assessment   RLE Assessment WFL  (3+ to 4-/5)   LLE Assessment   LLE Assessment WFL  (3+ to 4-/5)   Bed Mobility   Supine to Sit 7  Independent   Sit to Supine 7  Independent   Transfers   Sit to Stand   (S/I)   Stand to Sit   (S/I)   Ambulation/Elevation   Gait pattern   (mild generalized unsteadiness;quick khanh)   Gait Assistance   (min A/S)   Additional items Assist x 1;Verbal cues; Tactile cues   Assistive Device SPC   Distance 80 feet with change in direction;pt also stood with urinal to urinated   Balance   Static Sitting Good   Static Standing Fair +   Dynamic Standing Fair   Ambulatory Fair -   Activity Tolerance   Activity Tolerance Patient limited by fatigue   Assessment   Problem List Decreased strength;Decreased range of motion;Decreased endurance; Impaired balance;Decreased mobility; Decreased coordination;Decreased safety awareness   Assessment Patient seen for Physical Therapy evaluation   Patient admitted with Microcytic anemia  Comorbidities affecting patient's physical performance include: A-Fib, CAD, Hep C, MI   Personal factors affecting patient at time of initial evaluation include: lives in one story house, ambulating with assistive device, stairs to enter home, inability to navigate community distances, inability to navigate level surfaces without external assistance, inability to perform dynamic tasks in community and positive fall history  Prior to admission, patient was independent with functional mobility with cane, independent with functional mobility without assistive device, independent with ADLS, living with spouse in a one level home with 13-14 steps to enter, ambulating household distance and ambulating community distances  Please find objective findings from Physical Therapy assessment regarding body systems outlined above with impairments and limitations including weakness, decreased ROM, impaired balance, decreased endurance, impaired coordination, gait deviations, decreased activity tolerance, decreased functional mobility tolerance, decreased safety awareness and fall risk  The Barthel Index was used as a functional outcome tool presenting with a score of 50 today indicating marked limitations of functional mobility and ADLS  Patient's clinical presentation is currently unstable/unpredictable as seen in patient's presentation of vital sign response, increased fall risk, new onset of impairment of functional mobility, decreased endurance and new onset of weakness  Pt would benefit from continued Physical Therapy treatment to address deficits as defined above and maximize level of functional mobility  As demonstrated by objective findings, the assigned level of complexity for this evaluation is high     Goals   Patient Goals "get stronger and go home"   [facility] Expiration Date 08/11/20   Short Term Goal #1 trans - I; pt will amb with cane functional household distances - S   Short Term Goal #2 balance with cane - F/F+ for safe mobility and to decrease fall risk; up/down full flight of steps - S with rail so pt can enter/exit his apt   LTG Expiration Date 08/18/20   Long Term Goal #1 Pt will return to independent functional mobility, home and community, levels and unlevels w/wout cane   Long Term Goal #2 balance - F+/G for safe mobility and to decrease fall risk; strength LE - 4- to 4/5   Plan   Treatment/Interventions ADL retraining;Functional transfer training;LE strengthening/ROM; Elevations; Therapeutic exercise; Endurance training;Patient/family training;Equipment eval/education; Bed mobility;Gait training; Compensatory technique education   PT Frequency 5x/wk   Recommendation   PT Discharge Recommendation Home with skilled therapy   Equipment Recommended   (pt has a cane)   Barthel Index   Feeding 10   Bathing 0   Grooming Score 0   Dressing Score 5   Bladder Score 10   Bowels Score 10   Toilet Use Score 5   Transfers (Bed/Chair) Score 10   Mobility (Level Surface) Score 0   Stairs Score 0   Barthel Index Score 48   Licensure   [place] License Number  [place]o [place] [id number]         Time In:1020  Time Out:1030  Total Time: 10 mins      S:  "I tire out easily"  O:  Pt trans sit to stand - S/I  Pt amb with cane x 60 feet with change in direction with min A/S  Pt trans stand to sit with S   A:  Pt fatigues easily and mildly unsteady with cane  Pt will cont to benefit from skilled PT services to increase pt's strength and mobility  Elen Dunham left in pt's room so pt can amb with nursing staff later today  P:  Cont per PT POC  DCP - home PT      Elida Pérez   [phone]

## 2020-08-04 NOTE — ASSESSMENT & PLAN NOTE
Patient has a history of atrial fibrillation managed with Eliquis and metoprolol  - currently rate controlled hemodynamically stable  - continue to hold Eliquis until GI evaluation  - continue metoprolol

## 2020-08-04 NOTE — ASSESSMENT & PLAN NOTE
History of nonischemic cardiomyopathy  Ejection fraction unclear  Hemodynamically stable with no signs or symptoms of fluid overload  - Continue metoprolol 25 mg daily and digoxin 0 25 mg daily  - digoxin level <0 2  Did not receive digoxin yesterday  Will received digoxin 0 25 mg x 1 po qd from today

## 2020-08-04 NOTE — CONSULTS
Consultation - GI   Michael Yo 69 y o  male MRN: [id number]  Unit/Bed#: -01 Encounter: [id number]      Assessment/Plan     Assessment:  Microcytic anemia  This may well represent slow gastrointestinal blood loss and we discussed further evaluation with outpatient colonoscopy and upper endoscopy, and capsule endoscopy if these are negative  Patient has received transfusion and there is no suggestion of hemodynamically significant active bleeding at this time  Plan:  Outpatient colonoscopy and upper endoscopy as above  History of Present Illness   Physician Requesting Consult: Naren Alegria MD  Reason for Consult / Principal Problem:  Microcytic anemia  Hx and PE limited by:   HPI: Michael Yo is a 69y o  year old male who presents with fatigue and shortness of breath was noted to have microcytic anemia with hemoglobin 7 7 and MCV 73  He has had increasing fatigue and dyspnea on exertion over the past several months with decreased exercise tolerance  Patient has seen no blood in his stool, no bruising or bleeding, no epistaxis  Has no reported history of prior anemia  No family history of gastrointestinal disease  He has no other specific complaints, mainly no nausea or vomiting, abdominal pain, change in bowel habits, unexplained weight loss, fever or chills, jaundice or rash  Has never had a colonoscopy or upper endoscopy  Consults    Review of Systems   Constitutional: Positive for activity change and fatigue  Respiratory: Positive for shortness of breath  All other systems reviewed and are negative        Historical Information   Past Medical History:   Diagnosis Date    Anxiety disorder     Atrial fibrillation (HCC)     Coronary artery disease     Depression     Hepatitis C     screening negative in 1/2017    [facility])      Past Surgical History:   Procedure Laterality Date    CARDIAC CATHETERIZATION  07/09/2018     Social History   Social History     Substance and Sexual Activity   Alcohol Use Never    Frequency: Never    Drinks per session: Patient refused    Binge frequency: Never     Social History     Substance and Sexual Activity   Drug Use Never     E-Cigarette/Vaping    E-Cigarette Use Never User      E-Cigarette/Vaping Substances    Nicotine No     THC No     CBD No     Flavoring No     Other No     Unknown No      Social History     Tobacco Use   Smoking Status Current Every Day Smoker    Packs/day: 0 25    Years: 57 00    Pack years: 14 25    Types: Cigarettes   Smokeless Tobacco Never Used   Tobacco Comment    since age 17; Has history of smoking for over 55 years  He has been smoking more than packet daily in the past however he has been smokes about half a pack a day lately and stopped smoking on 7/1/2018 when he was admitted to the hospital       Family History: non-contributory    Meds/Allergies   all current active meds have been reviewed    No Known Allergies    Objective       Intake/Output Summary (Last 24 hours) at 8/4/2020 1521  Last data filed at 8/4/2020 1401  Gross per 24 hour   Intake 1900 ml   Output 1600 ml   Net 300 ml       Invasive Devices:   Peripheral IV 08/03/20 Left Antecubital (Active)   Site Assessment Clean;Dry; Intact 08/04/20 1400   Dressing Type Transparent 08/04/20 1400   Line Status Capped 08/04/20 1400   Dressing Status Dry; Old drainage 08/04/20 1100   Dressing Intervention Dressing changed 08/04/20 1400       Peripheral IV 08/03/20 Dorsal (posterior); Right Wrist (Active)   Site Assessment Clean;Dry; Intact 08/04/20 1100   Dressing Type Transparent 08/04/20 1100   Line Status Capped 08/04/20 1100   Dressing Status Clean;Dry; Intact 08/04/20 1100       Physical Exam  Vitals signs reviewed  Constitutional:       Appearance: Normal appearance  He is normal weight  HENT:      Head: Normocephalic and atraumatic        Nose: Nose normal       Mouth/Throat:      Mouth: Mucous membranes are moist  Pharynx: Oropharynx is clear  Eyes:      Conjunctiva/sclera: Conjunctivae normal       Pupils: Pupils are equal, round, and reactive to light  Neck:      Musculoskeletal: Normal range of motion and neck supple  Cardiovascular:      Rate and Rhythm: Normal rate and regular rhythm  Abdominal:      General: Bowel sounds are normal  There is no distension  Tenderness: There is no abdominal tenderness  There is no guarding or rebound  Musculoskeletal: Normal range of motion  Skin:     General: Skin is warm and dry  Neurological:      General: No focal deficit present  Mental Status: He is alert and oriented to person, place, and time  Psychiatric:         Mood and Affect: Mood normal          Behavior: Behavior normal          Judgment: Judgment normal          Lab Results: I have personally reviewed pertinent reports  Imaging Studies: I have personally reviewed pertinent reports  EKG, Pathology, and Other Studies: I have personally reviewed pertinent reports

## 2020-08-04 NOTE — ASSESSMENT & PLAN NOTE
Patient presented a hemoglobin of 6 3  Hematocrit of 24 9  MCV of 73  FOBT negative  Status post transfusion of 2 L Blood cells last night   AB+  Reports of some mild fever status post transfusion possible transfusion reaction  Antibody screen negative  Today's hemoglobin 7 7  Hematocrit 27 3  Results from iron studies:  Fe = 7  TIBC equal 476  Ferritin equals 3  Rechecks:  2 31  LDH pending  Haptoglobin pending     - Follow-up LDH  Consider peripheral smear   - Iron replacement:  FeSO4 325 mg p o  Q d   - Folate supplemented with 400 mcg x 1 Q d   - Follow GI consult for possible sources of microcytic anemia  Unknown history as to completion of routine screening exams such as colonoscopy

## 2020-08-04 NOTE — PROGRESS NOTES
Progress Note - Marbella Hsu 1945, 69 y o  male MRN: [id number]    Unit/Bed#: -01 Encounter: [id number]    Primary Care Provider: Shawn Brizuela MD   Date and time admitted to hospital: 8/3/2020  3:20 PM        A-fib [facility])  Assessment & Plan  Patient has a history of atrial fibrillation managed with Eliquis and metoprolol  - currently rate controlled hemodynamically stable  - continue to hold Eliquis until GI evaluation  - continue metoprolol  Non-ischemic cardiomyopathy [facility])  Assessment & Plan  History of nonischemic cardiomyopathy  Ejection fraction unclear  Hemodynamically stable with no signs or symptoms of fluid overload  - Continue metoprolol 25 mg daily and digoxin 0 25 mg daily  - digoxin level <0 2  Did not receive digoxin yesterday  Will received digoxin 0 25 mg x 1 po qd from today  * Microcytic anemia  Assessment & Plan  Patient presented a hemoglobin of 6 3  Hematocrit of 24 9  MCV of 73  FOBT negative  Status post transfusion of 2 L Blood cells last night   AB+  Reports of some mild fever status post transfusion possible transfusion reaction  Antibody screen negative  Today's hemoglobin 7 7  Hematocrit 27 3  Results from iron studies:  Fe = 7  TIBC equal 476  Ferritin equals 3  Rechecks:  2 31  LDH pending  Haptoglobin pending     - Follow-up LDH  Consider peripheral smear   - Iron replacement:  FeSO4 325 mg p o  Q d   - Folate supplemented with 400 mcg x 1 Q d   - Follow GI consult for possible sources of microcytic anemia  Unknown history as to completion of routine screening exams such as colonoscopy  VTE Pharmacologic Prophylaxis:   Pharmacologic: Pharmacological anticoagulation held due to concern for acute bleed given hemoglobin and hematocrit levels  Mechanical VTE Prophylaxis in Place: Yes    Patient Centered Rounds: I have performed bedside rounds with nursing staff today      Discussions with Specialists or Other Care Team Provider:  GI consult pending  Education and Discussions with Family / Patient:  None  Time Spent for Care: 30 minutes  More than 50% of total time spent on counseling and coordination of care as described above  Current Length of Stay: 1 day(s)    Current Patient Status: Inpatient   Certification Statement: The patient will continue to require additional inpatient hospital stay due to Need for further evaluation of microcytic anemia  Discharge Plan / Estimated Discharge Date:  Patient needs to undergo are evaluation for source of microcytic anemia/estimated discharge date on artery for 2020  Code Status: Level 3 - DNAR and DNI      Subjective:   Patient seen at bedside today  Patient was resting at the time of interview  Patient reports that he feels a little dizzy since morning after receiving 2 L PRBCs  Fusion denies any headaches or blurry vision clear wound chest pain, palpitations, dyspnea, abdominal pain, changes in bowel or bladder function or generalized weakness  Objective:     Vitals:   Temp (24hrs), Av 1 °F (37 3 °C), Min:97 7 °F (36 5 °C), Max:100 6 °F (38 1 °C)    Temp:  [97 7 °F (36 5 °C)-100 6 °F (38 1 °C)] 99 4 °F (37 4 °C)  HR:  [] 100  Resp:  [18] 18  BP: (100-143)/(56-91) 138/91  SpO2:  [92 %-100 %] 98 %  Body mass index is 23 03 kg/m²  Input and Output Summary (last 24 hours): Intake/Output Summary (Last 24 hours) at 2020 1409  Last data filed at 2020 1101  Gross per 24 hour   Intake 1900 ml   Output 800 ml   Net 1100 ml       Physical Exam:     Physical Exam   Constitutional: He is oriented to person, place, and time  HENT:   Head: Normocephalic and atraumatic  Mouth/Throat: Mucous membranes are dry  Eyes: Conjunctivae are normal    Neck: Neck supple  JVD present  Cardiovascular: Normal rate and normal pulses  An irregular rhythm present  Pulmonary/Chest: Effort normal and breath sounds normal    Abdominal: Soft   Normal appearance  Musculoskeletal: Normal range of motion  Neurological: He is alert and oriented to person, place, and time  Skin: Skin is warm and dry  Additional Data:     Labs:    Results from last 7 days   Lab Units 08/04/20 0444 08/03/20  1552   WBC Thousand/uL 10 68* 8 57   HEMOGLOBIN g/dL 7 7* 6 3*   HEMATOCRIT % 27 3* 24 9*   PLATELETS Thousands/uL 496* 539*   NEUTROS PCT %  --  70   LYMPHS PCT %  --  14   LYMPHO PCT % 13*  --    MONOS PCT %  --  12   MONO PCT % 5  --    EOS PCT % 3 2     Results from last 7 days   Lab Units 08/04/20  0444   POTASSIUM mmol/L 3 7   CHLORIDE mmol/L 108   CO2 mmol/L 26   BUN mg/dL 16   CREATININE mg/dL 0 89   CALCIUM mg/dL 8 4   ALK PHOS U/L 41 0   ALT U/L 15   AST U/L 15     Results from last 7 days   Lab Units 08/04/20  0444   INR  1 07       * I Have Reviewed All Lab Data Listed Above  * Additional Pertinent Lab Tests Reviewed: All Labs For Current Hospital Admission Reviewed    Imaging:    Imaging Reports Reviewed Today Include:  None  Imaging Personally Reviewed by Myself Includes:  None  Recent Cultures (last 7 days):  None  Last 24 Hours Medication List:   acetaminophen, 650 mg, Oral, Q6H PRN, Rizwan Gibson MD  budesonide-formoterol, 2 puff, Inhalation, BID, Frankie Lemos MD  digoxin, 250 mcg, Oral, Daily, Frankie Lemos MD  ferrous sulfate, 325 mg, Oral, Daily With Breakfast, Klarissa Gonzalez MD  folic acid, 322 mcg, Oral, Daily, Klarissa Gonzalez MD  furosemide, 40 mg, Oral, Daily, Frankie Lemos MD  lisinopril, 2 5 mg, Oral, Daily, Frankie Lemos MD  metoprolol tartrate, 25 mg, Oral, Daily, Frankie Lemos MD  nicotine, 1 patch, Transdermal, Daily, Frankie Lemos MD  potassium chloride, 10 mEq, Oral, Daily, Frankie Lemos MD  tamsulosin, 0 4 mg, Oral, Daily, Frankie Lemos MD         Today, Patient Was Seen By: Klarissa Gonzalez MD    ** Please Note: Dragon 360 Dictation voice to text software may have been used in the creation of this document  **

## 2020-08-05 PROBLEM — F17.200 TOBACCO USE DISORDER: Status: ACTIVE | Noted: 2020-08-05

## 2020-08-05 LAB
ANION GAP SERPL CALCULATED.3IONS-SCNC: 5 MMOL/L (ref 4–13)
ANISOCYTOSIS BLD QL SMEAR: PRESENT
BASOPHILS # BLD MANUAL: 0.11 THOUSAND/UL (ref 0–0.1)
BASOPHILS NFR MAR MANUAL: 1 % (ref 0–1)
BUN SERPL-MCNC: 13 MG/DL (ref 6–20)
CALCIUM SERPL-MCNC: 8.8 MG/DL (ref 8.4–10.2)
CHLORIDE SERPL-SCNC: 103 MMOL/L (ref 96–108)
CO2 SERPL-SCNC: 31 MMOL/L (ref 22–33)
CREAT SERPL-MCNC: 0.92 MG/DL (ref 0.5–1.2)
DACRYOCYTES BLD QL SMEAR: PRESENT
EOSINOPHIL # BLD MANUAL: 0.34 THOUSAND/UL (ref 0–0.4)
EOSINOPHIL NFR BLD MANUAL: 3 % (ref 0–6)
ERYTHROCYTE [DISTWIDTH] IN BLOOD BY AUTOMATED COUNT: 23.2 % (ref 11.6–15.1)
GFR SERPL CREATININE-BSD FRML MDRD: 83 ML/MIN/1.73SQ M
GIANT PLATELETS BLD QL SMEAR: PRESENT
GLUCOSE SERPL-MCNC: 108 MG/DL (ref 65–140)
HCT VFR BLD AUTO: 28.5 % (ref 36.5–49.3)
HGB BLD-MCNC: 8 G/DL (ref 12–17)
LDH SERPL-CCNC: 188 IU/L (ref 121–224)
LDH1 CFR SERPL ELPH: 27 % (ref 17–32)
LDH2 CFR SERPL ELPH: 34 % (ref 25–40)
LDH3 CFR SERPL ELPH: 22 % (ref 17–27)
LDH4 CFR SERPL ELPH: 8 % (ref 5–13)
LDH5 CFR SERPL ELPH: 9 % (ref 4–20)
LYMPHOCYTES # BLD AUTO: 1.79 THOUSAND/UL (ref 0.6–4.47)
LYMPHOCYTES # BLD AUTO: 16 % (ref 14–44)
MCH RBC QN AUTO: 20.4 PG (ref 26.8–34.3)
MCHC RBC AUTO-ENTMCNC: 28.1 G/DL (ref 31.4–37.4)
MCV RBC AUTO: 73 FL (ref 82–98)
MONOCYTES # BLD AUTO: 0.56 THOUSAND/UL (ref 0–1.22)
MONOCYTES NFR BLD: 5 % (ref 4–12)
NEUTROPHILS # BLD MANUAL: 8.4 THOUSAND/UL (ref 1.85–7.62)
NEUTS SEG NFR BLD AUTO: 75 % (ref 43–75)
NRBC BLD AUTO-RTO: 1 /100 WBC (ref 0–2)
OVALOCYTES BLD QL SMEAR: PRESENT
PLATELET # BLD AUTO: 561 THOUSANDS/UL (ref 149–390)
PLATELET BLD QL SMEAR: ABNORMAL
PMV BLD AUTO: 9.6 FL (ref 8.9–12.7)
POIKILOCYTOSIS BLD QL SMEAR: PRESENT
POLYCHROMASIA BLD QL SMEAR: PRESENT
POTASSIUM SERPL-SCNC: 3.5 MMOL/L (ref 3.5–5)
RBC # BLD AUTO: 3.92 MILLION/UL (ref 3.88–5.62)
RBC MORPH BLD: PRESENT
SODIUM SERPL-SCNC: 139 MMOL/L (ref 133–145)
TOTAL CELLS COUNTED SPEC: 100
WBC # BLD AUTO: 11.2 THOUSAND/UL (ref 4.31–10.16)

## 2020-08-05 PROCEDURE — 85027 COMPLETE CBC AUTOMATED: CPT | Performed by: INTERNAL MEDICINE

## 2020-08-05 PROCEDURE — 94760 N-INVAS EAR/PLS OXIMETRY 1: CPT

## 2020-08-05 PROCEDURE — 80048 BASIC METABOLIC PNL TOTAL CA: CPT | Performed by: INTERNAL MEDICINE

## 2020-08-05 PROCEDURE — 30233N1 TRANSFUSION OF NONAUTOLOGOUS RED BLOOD CELLS INTO PERIPHERAL VEIN, PERCUTANEOUS APPROACH: ICD-10-PCS | Performed by: INTERNAL MEDICINE

## 2020-08-05 PROCEDURE — 86920 COMPATIBILITY TEST SPIN: CPT

## 2020-08-05 PROCEDURE — 99233 SBSQ HOSP IP/OBS HIGH 50: CPT | Performed by: INTERNAL MEDICINE

## 2020-08-05 PROCEDURE — 85007 BL SMEAR W/DIFF WBC COUNT: CPT | Performed by: INTERNAL MEDICINE

## 2020-08-05 PROCEDURE — P9016 RBC LEUKOCYTES REDUCED: HCPCS

## 2020-08-05 PROCEDURE — 97116 GAIT TRAINING THERAPY: CPT

## 2020-08-05 PROCEDURE — 94640 AIRWAY INHALATION TREATMENT: CPT

## 2020-08-05 RX ORDER — PANTOPRAZOLE SODIUM 40 MG/1
40 TABLET, DELAYED RELEASE ORAL
Status: DISCONTINUED | OUTPATIENT
Start: 2020-08-06 | End: 2020-08-08 | Stop reason: HOSPADM

## 2020-08-05 RX ORDER — FERROUS SULFATE 325(65) MG
325 TABLET ORAL
Qty: 30 TABLET | Refills: 0 | Status: SHIPPED | OUTPATIENT
Start: 2020-08-06 | End: 2021-09-10 | Stop reason: HOSPADM

## 2020-08-05 RX ORDER — LANOLIN ALCOHOL/MO/W.PET/CERES
400 CREAM (GRAM) TOPICAL DAILY
Qty: 30 TABLET | Refills: 0 | Status: SHIPPED | OUTPATIENT
Start: 2020-08-06 | End: 2021-09-10 | Stop reason: SDUPTHER

## 2020-08-05 RX ORDER — PANTOPRAZOLE SODIUM 40 MG/1
40 TABLET, DELAYED RELEASE ORAL 2 TIMES DAILY
Qty: 30 TABLET | Refills: 0 | Status: SHIPPED | OUTPATIENT
Start: 2020-08-05 | End: 2020-08-08 | Stop reason: SDUPTHER

## 2020-08-05 RX ADMIN — BUDESONIDE AND FORMOTEROL FUMARATE DIHYDRATE 2 PUFF: 80; 4.5 AEROSOL RESPIRATORY (INHALATION) at 17:33

## 2020-08-05 RX ADMIN — METOPROLOL TARTRATE 25 MG: 25 TABLET, FILM COATED ORAL at 08:59

## 2020-08-05 RX ADMIN — LISINOPRIL 2.5 MG: 2.5 TABLET ORAL at 08:59

## 2020-08-05 RX ADMIN — FERROUS SULFATE TAB 325 MG (65 MG ELEMENTAL FE) 325 MG: 325 (65 FE) TAB at 08:59

## 2020-08-05 RX ADMIN — FUROSEMIDE 40 MG: 40 TABLET ORAL at 08:59

## 2020-08-05 RX ADMIN — NICOTINE 1 PATCH: 7 PATCH TRANSDERMAL at 09:03

## 2020-08-05 RX ADMIN — BUDESONIDE AND FORMOTEROL FUMARATE DIHYDRATE 2 PUFF: 80; 4.5 AEROSOL RESPIRATORY (INHALATION) at 08:00

## 2020-08-05 RX ADMIN — TAMSULOSIN HYDROCHLORIDE 0.4 MG: 0.4 CAPSULE ORAL at 17:32

## 2020-08-05 RX ADMIN — DIGOXIN 250 MCG: 125 TABLET ORAL at 08:59

## 2020-08-05 RX ADMIN — APIXABAN 2.5 MG: 2.5 TABLET, FILM COATED ORAL at 17:32

## 2020-08-05 RX ADMIN — POTASSIUM CHLORIDE 10 MEQ: 750 TABLET, FILM COATED, EXTENDED RELEASE ORAL at 09:03

## 2020-08-05 RX ADMIN — FOLIC ACID TAB 400 MCG 400 MCG: 400 TAB at 08:59

## 2020-08-05 NOTE — ASSESSMENT & PLAN NOTE
Patient hemoglobin 8 0 today hematocrit 28 5  Patient continues to deny any melena or hematemesis  Patient has remained afebrile with no current issues reported  Review of symptoms unremarkable  - patient transfused 1 unit of packed red blood cells today  Will follow-up CBC in the [facility] - Patient will follow-up with Dr Gibson Harris on outpatient basis for further investigation into his source of microcytic anemia   - Patient to repeat CBC in 1 week after discharge  - Eliquis restarted:  Patient to hold Eliquis 2 days prior to planned endoscopy  - continue iron and foltate supplementation

## 2020-08-05 NOTE — ASSESSMENT & PLAN NOTE
History of nonischemic cardiomyopathy  Ejection fraction unclear  Hemodynamically stable with no signs or symptoms of fluid overload  - no signs or symptoms of fluid overload or hemodynamic compromise  - continue metoprolol and digoxin    - follow-up with cardiologist

## 2020-08-05 NOTE — PLAN OF CARE
Problem: Potential for Falls  Goal: Patient will remain free of falls  Description: INTERVENTIONS:  - Assess patient frequently for physical needs  -  Identify cognitive and physical deficits and behaviors that affect risk of falls    -  [place] fall precautions as indicated by assessment   - Educate patient/family on patient safety including physical limitations  - Instruct patient to call for assistance with activity based on assessment  - Modify environment to reduce risk of injury  - Consider OT/PT consult to assist with strengthening/mobility  Outcome: Progressing     Problem: PAIN - ADULT  Goal: Verbalizes/displays adequate comfort level or baseline comfort level  Description: Interventions:  - Encourage patient to monitor pain and request assistance  - Assess pain using appropriate pain scale  - Administer analgesics based on type and severity of pain and evaluate response  - Implement non-pharmacological measures as appropriate and evaluate response  - Consider cultural and social influences on pain and pain management  - Notify physician/advanced practitioner if interventions unsuccessful or patient reports new pain  Outcome: Progressing

## 2020-08-05 NOTE — PHYSICAL THERAPY NOTE
PHYSICAL THERAPY TREATMENT    NAME:  Fabian Philippe  DATE: 08/05/20    AGE:   69 y o  Mrn:   [id number]  Length Of Stay: 2    ADMIT DX:  Microcytic anemia [D50 9]  Fatigue [R53 83]  Weakness [R53 1]  Anemia, unspecified type [D64 9]    Past Medical History:   Diagnosis Date    Anxiety disorder     Atrial fibrillation (HCC)     Coronary artery disease     Depression     Hepatitis C     screening negative in 1/2017    MI (myocardial infarction) [facility])      Past Surgical History:   Procedure Laterality Date    CARDIAC CATHETERIZATION  07/09/2018       Performed at least 2 patient identifiers during session: Name and ID bracelet       08/05/20 0924   Pain Assessment   Pain Assessment Tool 0-10   Pain Score No Pain   Effect of Pain on Daily Activities n/a   Patient's Stated Pain Goal No pain   Hospital Pain Intervention(s) Medication (See MAR); Ambulation/increased activity   Restrictions/Precautions   Weight Bearing Precautions Per Order No   Other Precautions Pain; Fall Risk;Telemetry   General   Chart Reviewed Yes   Additional Pertinent History No significant change in status since previous session  Response to Previous Treatment Patient with no complaints from previous session  Family/Caregiver Present No   Cognition   Overall Cognitive Status WFL   Arousal/Participation Alert; Cooperative   Attention Attends with cues to redirect   Orientation Level Oriented X4   Following Commands Follows one step commands without difficulty   Subjective   Subjective "I even got up last night to try to walk by myself without the cane "   Bed Mobility   Supine to Sit 7  Independent   Transfers   Sit to Stand 5  Supervision   Additional items Impulsive;Verbal cues; Other  (SPC)   Stand to Sit 5  Supervision   Additional items Impulsive;Verbal cues; Other  (SPC)   Stand pivot 5  Supervision   Additional items Impulsive;Verbal cues; Other  [facility])   Ambulation/Elevation   Gait pattern Narrow VERNON; Decreased foot clearance; Short stride; Inconsistent khanh  (rapid khanh; shuffling of R foot with fatigue )   Gait Assistance 5  Supervision   Additional items Verbal cues   Assistive Device [facility]   Distance 110ft x1 (including change of direction)    Stair Management Assistance Not tested  (Pt refused to complete; agreeable next visit)   Balance   Static Sitting Good   Dynamic Sitting Good   Static Standing Fair   Dynamic Standing Fair   Ambulatory Fair   Endurance Deficit   Endurance Deficit Yes   Endurance Deficit Description Pt easily fatigued with ambulation over even surfaces, mild SOB post ambulation (pt attributes to wearing mask)  Activity Tolerance   Activity Tolerance Patient limited by fatigue   Assessment   Prognosis Fair   Problem List Decreased strength;Decreased endurance; Impaired balance;Decreased mobility; Decreased safety awareness   Assessment Pt cleared by JOHANNY Coronado for PT treatment today, with focus on gait training and stair negotiation  Pt presents semi-supine in bed, denies pain and is agreeable to participate in session  Pt difficult to redirect to therapy task as times as pt hyperverbal t/o session, however is cooperative and pleasant  Pt completes bed mobility independently  Transfers with SPC and S, initially with LOB posteriorly resulting on return to sit EOB, second attempt pt much more stable  Pt then ambulates 110ft with SPC and S, see above for gait details  Pt refuses to complete stair negotiation on this date, however is agreeable for next session  Pt was left seated EOB with all needs in place, RN aware of pt status  Recommend frequent bouts of in room mobility to combat hospital related deconditioning  Continue PT POC as able to address continued impairments   Recommend HHPT upon d/c    Barriers to Discharge Inaccessible home environment  (Pt has stairs to enter his home, not yet assessed )   Goals   Patient Goals "to get that test done and go home"    STG Expiration Date 08/11/20   Short Term Goal #1 STG initiated 8/4/2020: Transfers Independently; pt will ambulate household distances with SPC at S; balance with cane - F/F+ for safe mobility and to decrease fall risk; up/down full flight of steps - S with rail so pt can enter/exit his apt  LTG Expiration Date 08/18/20   Long Term Goal #1 LTGs initiated 8/4/2020: Pt will return to independent functional mobility, home and community, levels and unlevels with/without cane;balance - F+/G for safe mobility and to decrease fall risk; strength LE - 4- to 4/5    PT Treatment Day 1   Plan   Treatment/Interventions Functional transfer training;LE strengthening/ROM; Elevations; Therapeutic exercise; Endurance training;Gait training;Spoke to nursing;Spoke to case management;OT   Progress Progressing toward goals   PT Frequency 5x/wk   Recommendation   PT Discharge Recommendation Home with skilled therapy   Equipment Recommended Other (Comment)  (pt owns [facility]; recommend use of SPC )       Time in: 0900  Time out: 0924  Total treatment time: 24 minutes      Kay Chadwick, PT, DPT  8/5/2020

## 2020-08-05 NOTE — PROGRESS NOTES
Progress Note - Destiny Bush 1945, 69 y o  male MRN: [id number]    Unit/Bed#: -Jocelyn Encounter: [id number]    Primary Care Provider: Omar Vincent MD   Date and time admitted to hospital: 8/3/2020  3:20 PM        * Microcytic anemia  Assessment & Plan  Patient hemoglobin 8 0 today hematocrit 28 5  Patient continues to deny any melena or hematemesis  Patient has remained afebrile with no current issues reported  Review of symptoms unremarkable  - patient transfused 1 unit of packed red blood cells today  Will follow-up CBC in the [facility] - Patient will follow-up with Dr John Trammell on outpatient basis for further investigation into his source of microcytic anemia   - Patient to repeat CBC in 1 week after discharge  - Eliquis restarted:  Patient to hold Eliquis 2 days prior to planned endoscopy  - continue iron and foltate supplementation  A-fib [facility])  Assessment & Plan  Patient has a history of atrial fibrillation managed with Eliquis and metoprolol  - currently rate controlled hemodynamically stable  - Eliquis to restarted: Will hold 2 days prior to plan endoscopy  - continue metoprolol  Non-ischemic cardiomyopathy [facility])  Assessment & Plan  History of nonischemic cardiomyopathy  Ejection fraction unclear  Hemodynamically stable with no signs or symptoms of fluid overload  - no signs or symptoms of fluid overload or hemodynamic compromise  - continue metoprolol and digoxin  - follow-up with cardiologist     Tobacco use disorder  Assessment & Plan  Patient counseled on the adverse effects from chronic tobacco use  VTE Pharmacologic Prophylaxis:   Pharmacologic: Apixaban (Eliquis)  Mechanical VTE Prophylaxis in Place: Yes    Patient Centered Rounds: I have performed bedside rounds with nursing staff today  Discussions with Specialists or Other Care Team Provider:  Gastroenterology consult appreciated      Education and Discussions with Family / Patient:  Patient made aware of current plan for GI intervention  Time Spent for Care: 30 minutes  More than 50% of total time spent on counseling and coordination of care as described above  Current Length of Stay: 2 day(s)    Current Patient Status: Inpatient   Certification Statement: The patient will continue to require additional inpatient hospital stay due to Will re-evaluate CBC tomorrow to trend H&H in the setting of microcytic anemia in elderly male of unknown origin  Discharge Plan / Estimated Discharge Date: Will follow-up CBC in the morning/estimated discharge date 2020      Code Status: Level 3 - DNAR and DNI      Subjective:   Patient seen today at the bedside  During today's interview patient indicates his desire to have definitive answers to his source of anemia prior to leaving the hospital   Patient was made aware of the limitations with regards to this, at our facility  Patient denies any headaches, dizziness, chest pain, palpitations, shortness of breath, abdominal pain, blood in stools, hematemesis or changes in bowel or bladder patterns  Objective:     Vitals:   Temp (24hrs), Av 8 °F (36 6 °C), Min:97 5 °F (36 4 °C), Max:97 9 °F (36 6 °C)    Temp:  [97 5 °F (36 4 °C)-97 9 °F (36 6 °C)] 97 8 °F (36 6 °C)  HR:  [84-99] 84  Resp:  [18] 18  BP: (120-133)/(66-94) 124/66  SpO2:  [95 %-99 %] 97 %  Body mass index is 21 13 kg/m²  Input and Output Summary (last 24 hours): Intake/Output Summary (Last 24 hours) at 2020 1656  Last data filed at 2020 0301  Gross per 24 hour   Intake 850 ml   Output 750 ml   Net 100 ml       Physical Exam:     Physical Exam   Constitutional: He is oriented to person, place, and time  HENT:   Head: Normocephalic and atraumatic  Mouth/Throat: Mucous membranes are dry  Eyes: Conjunctivae are normal    Neck: Neck supple  Cardiovascular: Normal rate, S1 normal and S2 normal  An irregular rhythm present     Pulmonary/Chest: Effort normal and breath sounds normal    Abdominal: Soft  Normal appearance  Musculoskeletal: Normal range of motion  Neurological: He is alert and oriented to person, place, and time  Skin: Skin is warm and dry  Additional Data:     Labs:    Results from last 7 days   Lab Units 08/05/20  0524  08/03/20  1552   WBC Thousand/uL 11 20*   < > 8 57   HEMOGLOBIN g/dL 8 0*   < > 6 3*   HEMATOCRIT % 28 5*   < > 24 9*   PLATELETS Thousands/uL 561*   < > 539*   NEUTROS PCT %  --   --  70   LYMPHS PCT %  --   --  14   LYMPHO PCT % 16   < >  --    MONOS PCT %  --   --  12   MONO PCT % 5   < >  --    EOS PCT % 3   < > 2    < > = values in this interval not displayed  Results from last 7 days   Lab Units 08/05/20  0525 08/04/20  0444   POTASSIUM mmol/L 3 5 3 7   CHLORIDE mmol/L 103 108   CO2 mmol/L 31 26   BUN mg/dL 13 16   CREATININE mg/dL 0 92 0 89   CALCIUM mg/dL 8 8 8 4   ALK PHOS U/L  --  41 0   ALT U/L  --  15   AST U/L  --  15     Results from last 7 days   Lab Units 08/04/20  0444   INR  1 07       * I Have Reviewed All Lab Data Listed Above  * Additional Pertinent Lab Tests Reviewed: All Labs Within Last 24 Hours Reviewed    Imaging:    Imaging Reports Reviewed Today Include:  None  Imaging Personally Reviewed by Myself Includes:  None        Recent Cultures (last 7 days):           Last 24 Hours Medication List:   acetaminophen, 650 mg, Oral, Q6H PRN, Lilian Car MD  budesonide-formoterol, 2 puff, Inhalation, BID, Bradford Trevino MD  digoxin, 250 mcg, Oral, Daily, Bradford Trevino MD  ferrous sulfate, 325 mg, Oral, Daily With Breakfast, Enrique Colby MD  folic acid, 623 mcg, Oral, Daily, Enrique Colby MD  furosemide, 40 mg, Oral, Daily, Bradford Trevino MD  lisinopril, 2 5 mg, Oral, Daily, Bradford Trevino MD  metoprolol tartrate, 25 mg, Oral, Daily, Bradford Trevino MD  nicotine, 1 patch, Transdermal, Daily, Bradford Trevino MD  potassium chloride, 10 mEq, Oral, Daily, Bradford Trevino MD  tamsulosin, 0 4 mg, Oral, QPM, Enrique Colby, MD         Today, Patient Was Seen By: Elizabeth Lemon MD    ** Please Note: Dragon 360 Dictation voice to text software may have been used in the creation of this document   **

## 2020-08-05 NOTE — ASSESSMENT & PLAN NOTE
Patient has a history of atrial fibrillation managed with Eliquis and metoprolol  - currently rate controlled hemodynamically stable  - Eliquis to restarted: Will hold 2 days prior to plan endoscopy  - continue metoprolol

## 2020-08-05 NOTE — PLAN OF CARE
Problem: PHYSICAL THERAPY ADULT  Goal: Performs mobility at highest level of function for planned discharge setting  See evaluation for individualized goals  Description: Treatment/Interventions: Functional transfer training, LE strengthening/ROM, Elevations, Therapeutic exercise, Endurance training, Gait training, Spoke to nursing, Spoke to case management, OT  Equipment Recommended: Other (Comment)(pt owns [facility]; recommend use of SPC )       See flowsheet documentation for full assessment, interventions and recommendations  Outcome: Progressing  Note: Prognosis: Fair  Problem List: Decreased strength, Decreased endurance, Impaired balance, Decreased mobility, Decreased safety awareness  Assessment: Pt cleared by JOHANNY Pereira for PT treatment today, with focus on gait training and stair negotiation  Pt presents semi-supine in bed, denies pain and is agreeable to participate in session  Pt difficult to redirect to therapy task as times as pt hyperverbal t/o session, however is cooperative and pleasant  Pt completes bed mobility independently  Transfers with SPC and S, initially with LOB posteriorly resulting on return to sit EOB, second attempt pt much more stable  Pt then ambulates 110ft with SPC and S, see above for gait details  Pt refuses to complete stair negotiation on this date, however is agreeable for next session  Pt was left seated EOB with all needs in place, RN aware of pt status  Recommend frequent bouts of in room mobility to combat hospital related deconditioning  Continue PT POC as able to address continued impairments  Recommend HHPT upon d/c   Barriers to Discharge: Inaccessible home environment(Pt has stairs to enter his home, not yet assessed )     PT Discharge Recommendation: Home with skilled therapy          See flowsheet documentation for full assessment

## 2020-08-06 PROBLEM — I95.1 ORTHOSTATIC HYPOTENSION: Status: ACTIVE | Noted: 2020-08-06

## 2020-08-06 LAB
ANION GAP SERPL CALCULATED.3IONS-SCNC: 5 MMOL/L (ref 4–13)
ANISOCYTOSIS BLD QL SMEAR: PRESENT
BASOPHILS # BLD MANUAL: 0 THOUSAND/UL (ref 0–0.1)
BASOPHILS # BLD MANUAL: 0.26 THOUSAND/UL (ref 0–0.1)
BASOPHILS NFR MAR MANUAL: 0 % (ref 0–1)
BASOPHILS NFR MAR MANUAL: 2 % (ref 0–1)
BUN SERPL-MCNC: 14 MG/DL (ref 6–20)
CALCIUM SERPL-MCNC: 8.6 MG/DL (ref 8.4–10.2)
CHLORIDE SERPL-SCNC: 103 MMOL/L (ref 96–108)
CO2 SERPL-SCNC: 31 MMOL/L (ref 22–33)
CREAT SERPL-MCNC: 0.93 MG/DL (ref 0.5–1.2)
DACRYOCYTES BLD QL SMEAR: PRESENT
EOSINOPHIL # BLD MANUAL: 0.33 THOUSAND/UL (ref 0–0.4)
EOSINOPHIL # BLD MANUAL: 0.4 THOUSAND/UL (ref 0–0.4)
EOSINOPHIL NFR BLD MANUAL: 3 % (ref 0–6)
EOSINOPHIL NFR BLD MANUAL: 3 % (ref 0–6)
ERYTHROCYTE [DISTWIDTH] IN BLOOD BY AUTOMATED COUNT: 26 % (ref 11.6–15.1)
ERYTHROCYTE [DISTWIDTH] IN BLOOD BY AUTOMATED COUNT: 26.1 % (ref 11.6–15.1)
GFR SERPL CREATININE-BSD FRML MDRD: 82 ML/MIN/1.73SQ M
GIANT PLATELETS BLD QL SMEAR: PRESENT
GLUCOSE SERPL-MCNC: 103 MG/DL (ref 65–140)
HCT VFR BLD AUTO: 32.7 % (ref 36.5–49.3)
HCT VFR BLD AUTO: 32.8 % (ref 36.5–49.3)
HGB BLD-MCNC: 9.4 G/DL (ref 12–17)
HGB BLD-MCNC: 9.5 G/DL (ref 12–17)
HYPERCHROMIA BLD QL SMEAR: PRESENT
HYPERCHROMIA BLD QL SMEAR: PRESENT
LYMPHOCYTES # BLD AUTO: 19 % (ref 14–44)
LYMPHOCYTES # BLD AUTO: 2.32 THOUSAND/UL (ref 0.6–4.47)
LYMPHOCYTES # BLD AUTO: 2.51 THOUSAND/UL (ref 0.6–4.47)
LYMPHOCYTES # BLD AUTO: 21 % (ref 14–44)
MACROCYTES BLD QL AUTO: PRESENT
MCH RBC QN AUTO: 21.7 PG (ref 26.8–34.3)
MCH RBC QN AUTO: 21.9 PG (ref 26.8–34.3)
MCHC RBC AUTO-ENTMCNC: 28.7 G/DL (ref 31.4–37.4)
MCHC RBC AUTO-ENTMCNC: 29 G/DL (ref 31.4–37.4)
MCV RBC AUTO: 75 FL (ref 82–98)
MCV RBC AUTO: 76 FL (ref 82–98)
MICROCYTES BLD QL AUTO: PRESENT
MICROCYTES BLD QL AUTO: PRESENT
MONOCYTES # BLD AUTO: 0.66 THOUSAND/UL (ref 0–1.22)
MONOCYTES # BLD AUTO: 0.66 THOUSAND/UL (ref 0–1.22)
MONOCYTES NFR BLD: 5 % (ref 4–12)
MONOCYTES NFR BLD: 6 % (ref 4–12)
MYELOCYTES NFR BLD MANUAL: 1 % (ref 0–1)
NEUTROPHILS # BLD MANUAL: 7.73 THOUSAND/UL (ref 1.85–7.62)
NEUTROPHILS # BLD MANUAL: 9.26 THOUSAND/UL (ref 1.85–7.62)
NEUTS BAND NFR BLD MANUAL: 2 % (ref 0–8)
NEUTS BAND NFR BLD MANUAL: 3 % (ref 0–8)
NEUTS SEG NFR BLD AUTO: 67 % (ref 43–75)
NEUTS SEG NFR BLD AUTO: 68 % (ref 43–75)
OVALOCYTES BLD QL SMEAR: PRESENT
OVALOCYTES BLD QL SMEAR: PRESENT
PLATELET # BLD AUTO: 594 THOUSANDS/UL (ref 149–390)
PLATELET # BLD AUTO: 632 THOUSANDS/UL (ref 149–390)
PLATELET BLD QL SMEAR: ABNORMAL
PLATELET BLD QL SMEAR: ABNORMAL
PMV BLD AUTO: 10.2 FL (ref 8.9–12.7)
PMV BLD AUTO: 9.5 FL (ref 8.9–12.7)
POIKILOCYTOSIS BLD QL SMEAR: PRESENT
POIKILOCYTOSIS BLD QL SMEAR: PRESENT
POLYCHROMASIA BLD QL SMEAR: PRESENT
POTASSIUM SERPL-SCNC: 3.6 MMOL/L (ref 3.5–5)
RBC # BLD AUTO: 4.29 MILLION/UL (ref 3.88–5.62)
RBC # BLD AUTO: 4.37 MILLION/UL (ref 3.88–5.62)
RBC MORPH BLD: PRESENT
RBC MORPH BLD: PRESENT
SCHISTOCYTES BLD QL SMEAR: PRESENT
SODIUM SERPL-SCNC: 139 MMOL/L (ref 133–145)
TOTAL CELLS COUNTED SPEC: 100
TOTAL CELLS COUNTED SPEC: 100
WBC # BLD AUTO: 11.04 THOUSAND/UL (ref 4.31–10.16)
WBC # BLD AUTO: 13.23 THOUSAND/UL (ref 4.31–10.16)

## 2020-08-06 PROCEDURE — 99233 SBSQ HOSP IP/OBS HIGH 50: CPT | Performed by: INTERNAL MEDICINE

## 2020-08-06 PROCEDURE — 85007 BL SMEAR W/DIFF WBC COUNT: CPT | Performed by: INTERNAL MEDICINE

## 2020-08-06 PROCEDURE — 85027 COMPLETE CBC AUTOMATED: CPT | Performed by: INTERNAL MEDICINE

## 2020-08-06 PROCEDURE — 94760 N-INVAS EAR/PLS OXIMETRY 1: CPT

## 2020-08-06 PROCEDURE — 94640 AIRWAY INHALATION TREATMENT: CPT

## 2020-08-06 PROCEDURE — 80048 BASIC METABOLIC PNL TOTAL CA: CPT | Performed by: INTERNAL MEDICINE

## 2020-08-06 PROCEDURE — 97116 GAIT TRAINING THERAPY: CPT

## 2020-08-06 PROCEDURE — 97167 OT EVAL HIGH COMPLEX 60 MIN: CPT

## 2020-08-06 RX ADMIN — PANTOPRAZOLE SODIUM 40 MG: 40 TABLET, DELAYED RELEASE ORAL at 06:37

## 2020-08-06 RX ADMIN — APIXABAN 2.5 MG: 2.5 TABLET, FILM COATED ORAL at 08:26

## 2020-08-06 RX ADMIN — METOPROLOL TARTRATE 25 MG: 25 TABLET, FILM COATED ORAL at 08:26

## 2020-08-06 RX ADMIN — FUROSEMIDE 40 MG: 40 TABLET ORAL at 08:26

## 2020-08-06 RX ADMIN — POTASSIUM CHLORIDE 10 MEQ: 750 TABLET, FILM COATED, EXTENDED RELEASE ORAL at 09:18

## 2020-08-06 RX ADMIN — SODIUM CHLORIDE, SODIUM LACTATE, POTASSIUM CHLORIDE, AND CALCIUM CHLORIDE 500 ML: .6; .31; .03; .02 INJECTION, SOLUTION INTRAVENOUS at 11:37

## 2020-08-06 RX ADMIN — FOLIC ACID TAB 400 MCG 400 MCG: 400 TAB at 08:26

## 2020-08-06 RX ADMIN — NICOTINE 1 PATCH: 7 PATCH TRANSDERMAL at 08:27

## 2020-08-06 RX ADMIN — BUDESONIDE AND FORMOTEROL FUMARATE DIHYDRATE 2 PUFF: 80; 4.5 AEROSOL RESPIRATORY (INHALATION) at 09:48

## 2020-08-06 RX ADMIN — FERROUS SULFATE TAB 325 MG (65 MG ELEMENTAL FE) 325 MG: 325 (65 FE) TAB at 08:26

## 2020-08-06 RX ADMIN — APIXABAN 2.5 MG: 2.5 TABLET, FILM COATED ORAL at 17:30

## 2020-08-06 RX ADMIN — LISINOPRIL 2.5 MG: 2.5 TABLET ORAL at 08:26

## 2020-08-06 RX ADMIN — DIGOXIN 250 MCG: 125 TABLET ORAL at 08:25

## 2020-08-06 RX ADMIN — BUDESONIDE AND FORMOTEROL FUMARATE DIHYDRATE 2 PUFF: 80; 4.5 AEROSOL RESPIRATORY (INHALATION) at 17:44

## 2020-08-06 NOTE — ASSESSMENT & PLAN NOTE
Patient hemoglobin 9 5 today hematocrit 32 8  Status post transfusion of 1 L P RBC  We improve  Patient continues to deny any melena or hematemesis  Patient noted with orthostatic hypotension inhibiting discharge  - will repeat CBC this evening   - continue to trend CBC routinely until discharge  Plan for discharge:  - Patient will follow-up with Dr Emily Garcia on outpatient basis for further investigation into his source of microcytic anemia   - Patient to repeat CBC in 1 week after discharge  - Eliquis restarted:  Patient to hold Eliquis 2 days prior to planned endoscopy  - continue iron and foltate supplementation

## 2020-08-06 NOTE — SOCIAL WORK
SW notified by residents that pt is anticipated to be discharged home today with OP GI f/u for colonoscopy  SW met with pt to advise that [facility] accepts Medicare/ and will accept referral for SN/PT services  Pt in agreement with plan  IMM#2 discussed with pt  Pt gave verbal understanding, signed, and was provided a copy  Original placed in scan bin  SW to f/u with pt re any transport needs  Update: STEPHANIE advised by Attending that pt is orthostatic today  Will not be medically cleared for discharge

## 2020-08-06 NOTE — PLAN OF CARE
Problem: Potential for Falls  Goal: Patient will remain free of falls  Description: INTERVENTIONS:  - Assess patient frequently for physical needs  -  Identify cognitive and physical deficits and behaviors that affect risk of falls    -  [place] fall precautions as indicated by assessment   - Educate patient/family on patient safety including physical limitations  - Instruct patient to call for assistance with activity based on assessment  - Modify environment to reduce risk of injury  - Consider OT/PT consult to assist with strengthening/mobility  Outcome: Progressing   Call bell, frequent rounding, nursing assistance, cane  Problem: DISCHARGE PLANNING  Goal: Discharge to home or other facility with appropriate resources  Description: INTERVENTIONS:  - Identify barriers to discharge w/patient and caregiver  - Arrange for needed discharge resources and transportation as appropriate  - Identify discharge learning needs (meds, wound care, etc )  - Arrange for interpretive services to assist at discharge as needed  - Refer to Case Management Department for coordinating discharge planning if the patient needs post-hospital services based on physician/advanced practitioner order or complex needs related to functional status, cognitive ability, or social support system  Outcome: Progressing   Case management needs, discharge needs, education on AVS

## 2020-08-06 NOTE — PLAN OF CARE
Problem: PHYSICAL THERAPY ADULT  Goal: Performs mobility at highest level of function for planned discharge setting  See evaluation for individualized goals  Description: Treatment/Interventions: Functional transfer training, LE strengthening/ROM, Elevations, Therapeutic exercise, Endurance training, Gait training, Spoke to MD, Spoke to nursing, Spoke to case management, OT  Equipment Recommended: Cane(pt owns [facility])       See flowsheet documentation for full assessment, interventions and recommendations  Outcome: Progressing  Note: Prognosis: Fair  Problem List: Decreased strength, Decreased endurance, Impaired balance, Decreased mobility, Impaired judgement, Decreased safety awareness(unstable vitals )  Assessment: Pt cleared by JOHANNY Mitchell for PT treatment today, with focus on gait training and elevations training as pt has ~13 stairs to enter his 2nd floor apartment and pt with tentative d/c today  Pt presents semi-supine in bed, initially refuses to participate in therapy due to generalized fatigue and desire to sleep, however with therapist encouragement and education pt agreeable  Pt independently completes bed mobility, transfers with S and use of SPC  Pt then ambulates 80ft with SPC and S, moderately unsteady however demonstrates appropriate reactive stepping to prevent fall and regain stability  Pt ascends 9 stairs with SPC and 1HR without issue  Pt then descends 4 stairs and c/o onset of lightheadedness, pt with blank stare and decreased arousal  Therapist assisted pt to safely sit on stair, BP taken and 116/59, HR 97bpm  RN present  Pt reports lightheadedness resolved with ~4 minutes of functional seated rest  Once lightheadedness resolved, pt achieved standing with LEA and ambulated 80ft back to room with SPC and S  Upon return to room, orthostatic blood pressures taken and are as follows - Supine: BP 91/61, HR 93bpm, SPO2 91%RA  Sitting: BP 75/54, HR 93bpm, SPO2 92% RA   Standing: BP 70/39, HR 86bpm, SPO2 96% RA  Pt's functional mobility overall limited due to unstable vitals, impaired balance, and overall general deconditioning  Pt was left semi-supine in bed, bed alarm engaged and needs within reach  RN and MD made aware of orthostatic blood pressure readings  Continue to recommend HHPT services upon d/c in order to maximize functional outcomes and return to PLOF  Will continue skilled PT POC as able in the acute care setting  Barriers to Discharge: Inaccessible home environment, Decreased caregiver support     PT Discharge Recommendation: Home with skilled therapy     PT - OK to Discharge: No(See full assessment for detail )    See flowsheet documentation for full assessment

## 2020-08-06 NOTE — PHYSICAL THERAPY NOTE
PHYSICAL THERAPY TREATMENT    NAME:  Robin Eastman  DATE: 08/06/20    AGE:   69 y o  Mrn:   [id number]  Length Of Stay: 3    ADMIT DX:  Microcytic anemia [D50 9]  Fatigue [R53 83]  Weakness [R53 1]  Anemia, unspecified type [D64 9]    Past Medical History:   Diagnosis Date    Anxiety disorder     Atrial fibrillation ([address] )     Coronary artery disease     Depression     Hepatitis C     screening negative in 1/2017    MI [facility]myocardial infarction[facility])      Past Surgical History:   Procedure Laterality Date    CARDIAC CATHETERIZATION  07/09/2018 08/06/20 0943   Pain Assessment   Pain Assessment Tool 0-10   Pain Score No Pain   Effect of Pain on Daily Activities n/a   Patient's Stated Pain Goal No pain   Hospital Pain Intervention(s) Medication (See MAR); Ambulation/increased activity   Restrictions/Precautions   Weight Bearing Precautions Per Order No   Other Precautions Chair Alarm; Bed Alarm;Telemetry; Fall Risk;Pain  (+orthostatic hypotension )   General   Chart Reviewed Yes   Additional Pertinent History Pt planned for tentative d/c today  Response to Previous Treatment Patient with no complaints from previous session  Family/Caregiver Present No   Cognition   Overall Cognitive Status WFL   Arousal/Participation Alert; Cooperative   Attention Attends with cues to redirect   Orientation Level Oriented X4   Following Commands Follows one step commands without difficulty   Subjective   Subjective "I would feel more comfortable if I stay at least until tomorrow  I'd like to stay until monday though "    Bed Mobility   Rolling R 7  Independent   Rolling L 7  Independent   Supine to Sit 7  Independent   Sit to Supine 7  Independent   Transfers   Sit to Stand 5  Supervision   Additional items Impulsive; Other  (SPC)   Stand to Sit 5  Supervision   Additional items Impulsive; Other  [facility])   Ambulation/Elevation   Gait pattern Narrow VERNON; Decreased foot clearance; Inconsistent khanh; Short stride  (rapid cadance )   Gait Assistance 5  Supervision   Additional items Verbal cues; Assist x 1   Assistive Device SPC   Distance 80ft x2   Stair Management Assistance 4  Minimal assist  (SEE ASSESSMENT FOR DETAILS OF STAIR TRAINING )   Additional items Assist x 1;Verbal cues; Tactile cues   Stair Management Technique One rail L;Reciprocal;With cane   Number of Stairs 9   Balance   Static Sitting Good   Dynamic Sitting Fair   Static Standing Fair   Dynamic Standing Fair   Ambulatory Fair   Endurance Deficit   Endurance Deficit Yes   Activity Tolerance   Activity Tolerance Treatment limited secondary to medical complications (Comment)  (+orthostatic hypotension, see full assessment for detail)   Assessment   Prognosis Fair   Problem List Decreased strength;Decreased endurance; Impaired balance;Decreased mobility; Impaired judgement;Decreased safety awareness  (unstable vitals )   Assessment Pt cleared by JOHANNY Dang for PT treatment today, with focus on gait training and elevations training as pt has ~13 stairs to enter his 2nd floor apartment and pt with tentative d/c today  Pt presents semi-supine in bed, initially refuses to participate in therapy due to generalized fatigue and desire to sleep, however with therapist encouragement and education pt agreeable  Pt independently completes bed mobility, transfers with S and use of SPC  Pt then ambulates 80ft with SPC and S, moderately unsteady however demonstrates appropriate reactive stepping to prevent fall and regain stability  Pt ascends 9 stairs with SPC and 1HR without issue  Pt then descends 4 stairs and c/o onset of lightheadedness, pt with blank stare and decreased arousal  Therapist assisted pt to safely sit on stair, BP taken and 116/59, HR 97bpm  RN present  Pt reports lightheadedness resolved with ~4 minutes of functional seated rest  Once lightheadedness resolved, pt achieved standing with LEA and ambulated 80ft back to room with SPC and S   Upon return to room, orthostatic blood pressures taken and are as follows - Supine: BP 91/61, HR 93bpm, SPO2 91%RA  Sitting: BP 75/54, HR 93bpm, SPO2 92% RA  Standing: BP 70/39, HR 86bpm, SPO2 96% RA  Pt's functional mobility overall limited due to unstable vitals, impaired balance, and overall general deconditioning  Pt was left semi-supine in bed, bed alarm engaged and needs within reach  RN and MD made aware of orthostatic blood pressure readings  Continue to recommend HHPT services upon d/c in order to maximize functional outcomes and return to PLOF  Will continue skilled PT POC as able in the acute care setting  Barriers to Discharge Inaccessible home environment;Decreased caregiver support   Goals   Patient Goals "to stay another couple days here until i feel more comfortable going home "    STG Expiration Date 08/11/20   Short Term Goal #1 STG initiated 8/4/2020: Transfers Independently; pt will ambulate household distances with SPC at S; balance with cane - F/F+ for safe mobility and to decrease fall risk; up/down full flight of steps - S with rail so pt can enter/exit his apt  LTG Expiration Date 08/18/20   Long Term Goal #1 LTGs initiated 8/4/2020: Pt will return to independent functional mobility, home and community, levels and unlevels with/without cane;balance - F+/G for safe mobility and to decrease fall risk; strength LE - 4- to 4/5    PT Treatment Day 2   Plan   Treatment/Interventions Functional transfer training;LE strengthening/ROM; Elevations; Therapeutic exercise; Endurance training;Gait training;Spoke to MD;Spoke to nursing;Spoke to case management;OT   Progress Slow progress, medical status limitations   PT Frequency 5x/wk   Recommendation   PT Discharge Recommendation Home with skilled therapy   Equipment Recommended Cane  (pt owns [facility])   PT - OK to Discharge No  (See full assessment for detail )       Time in: 0916  Time out: 0943  Total treatment time: 27 minutes    Birtha Skiff, PT, DPT  8/6/2020

## 2020-08-06 NOTE — PROGRESS NOTES
Progress Note - Aishwarya Urban 1945, 69 y o  male MRN: [id number]    Unit/Bed#: -01 Encounter: [id number]    Primary Care Provider: Malena Priest MD   Date and time admitted to hospital: 8/3/2020  3:20 PM        * Microcytic anemia  Assessment & Plan  Patient hemoglobin 9 5 today hematocrit 32 8  Status post transfusion of 1 L P RBC  We improve  Patient continues to deny any melena or hematemesis  Patient noted with orthostatic hypotension inhibiting discharge  - will repeat CBC this evening   - continue to trend CBC routinely until discharge  Plan for discharge:  - Patient will follow-up with Dr Hillary Sanchez on outpatient basis for further investigation into his source of microcytic anemia   - Patient to repeat CBC in 1 week after discharge  - Eliquis restarted:  Patient to hold Eliquis 2 days prior to planned endoscopy  - continue iron and foltate supplementation  Non-ischemic cardiomyopathy [facility])  Assessment & Plan  History of nonischemic cardiomyopathy  Ejection fraction unclear  Hemodynamically stable with no signs or symptoms of fluid overload  - no signs or symptoms of fluid overload or hemodynamic compromise  - continue metoprolol and digoxin  - follow-up with cardiologist     A-fib [facility])  Assessment & Plan  Patient has a history of atrial fibrillation managed with Eliquis and metoprolol  - currently rate controlled hemodynamically stable  - Eliquis to restarted: Will hold 2 days prior to plan endoscopy  - continue metoprolol  Tobacco use disorder  Assessment & Plan  Patient counseled on the adverse effects from chronic tobacco use  VTE Pharmacologic Prophylaxis:   Pharmacologic: Apixaban (Eliquis)  Mechanical VTE Prophylaxis in Place: Yes    Patient Centered Rounds: I have performed bedside rounds with nursing staff today  Discussions with Specialists or Other Care Team Provider:  None      Education and Discussions with Family / Patient: Patient informed that based on his drop in blood pressure and dizziness, his discharge will be delayed  Time Spent for Care: 30 minutes  More than 50% of total time spent on counseling and coordination of care as described above  Current Length of Stay: 3 day(s)    Current Patient Status: Inpatient   Certification Statement: The patient will continue to require additional inpatient hospital stay due to Management of orthostatic hypotension  Discharge Plan / Estimated Discharge Date:  Patient re-evaluated tomorrow for orthostatic hypotension/estimated discharge date 8  Code Status: Level 3 - DNAR and DNI      Subjective:   Patient seen today at the bedside  Patient was asleep upon initiation of the interview  Patient reports that he did experience some dizziness during ambulation with rehabilitative services yesterday  During orthostatic vitals assessment, patient reports some dizziness requiring him to sit prior to completion  Patient denied any chest pain, palpitations, abdominal pain,nor blood in his stools or urine  Objective:     Vitals:   Temp (24hrs), Av 4 °F (36 9 °C), Min:98 2 °F (36 8 °C), Max:98 6 °F (37 °C)    Temp:  [98 2 °F (36 8 °C)-98 6 °F (37 °C)] 98 3 °F (36 8 °C)  HR:  [78-83] 80  Resp:  [16-18] 16  BP: ()/(39-73) 70/39  SpO2:  [93 %-98 %] 93 %  Body mass index is 21 13 kg/m²  Input and Output Summary (last 24 hours): Intake/Output Summary (Last 24 hours) at 2020 1725  Last data filed at 2020 1546  Gross per 24 hour   Intake 740 ml   Output 550 ml   Net 190 ml       Physical Exam:     Physical Exam   Constitutional: He is oriented to person, place, and time  HENT:   Head: Normocephalic and atraumatic  Mouth/Throat: Mucous membranes are dry  Eyes: Conjunctivae are normal    Neck: Normal range of motion  Cardiovascular: Normal rate, S1 normal and S2 normal  An irregularly irregular rhythm present     Patient reports dizziness during orthostatic blood pressure assessment  Pulmonary/Chest: Effort normal and breath sounds normal    Abdominal: Soft  Normal appearance and bowel sounds are normal    Neurological: He is alert and oriented to person, place, and time  Skin: Skin is warm and dry  Psychiatric: Mood normal    Nursing note and vitals reviewed  Additional Data:     Labs:    Results from last 7 days   Lab Units 08/06/20  0517  08/03/20  1552   WBC Thousand/uL 13 23*   < > 8 57   HEMOGLOBIN g/dL 9 5*   < > 6 3*   HEMATOCRIT % 32 8*   < > 24 9*   PLATELETS Thousands/uL 594*   < > 539*   NEUTROS PCT %  --   --  70   LYMPHS PCT %  --   --  14   LYMPHO PCT % 19   < >  --    MONOS PCT %  --   --  12   MONO PCT % 5   < >  --    EOS PCT % 3   < > 2    < > = values in this interval not displayed  Results from last 7 days   Lab Units 08/06/20 0517  08/04/20  0444   POTASSIUM mmol/L 3 6   < > 3 7   CHLORIDE mmol/L 103   < > 108   CO2 mmol/L 31   < > 26   BUN mg/dL 14   < > 16   CREATININE mg/dL 0 93   < > 0 89   CALCIUM mg/dL 8 6   < > 8 4   ALK PHOS U/L  --   --  41 0   ALT U/L  --   --  15   AST U/L  --   --  15    < > = values in this interval not displayed  Results from last 7 days   Lab Units 08/04/20  0444   INR  1 07       * I Have Reviewed All Lab Data Listed Above  * Additional Pertinent Lab Tests Reviewed: All Labs Within Last 24 Hours Reviewed    Imaging:    Imaging Reports Reviewed Today Include:  None  Imaging Personally Reviewed by Myself Includes:  None        Recent Cultures (last 7 days):           Last 24 Hours Medication List:   acetaminophen, 650 mg, Oral, Q6H PRN, Joanne Woods MD  apixaban, 2 5 mg, Oral, BID, Marycarmen Alston MD  budesonide-formoterol, 2 puff, Inhalation, BID, Candance Gala, MD  digoxin, 250 mcg, Oral, Daily, Candance Gala, MD  ferrous sulfate, 325 mg, Oral, Daily With Breakfast, Marycarmen Alston MD  folic acid, 637 mcg, Oral, Daily, Marycarmen Alston MD  furosemide, 40 mg, Oral, Daily, Sebastian Dao MD  lisinopril, 2 5 mg, Oral, Daily, Sebastian Dao MD  metoprolol tartrate, 25 mg, Oral, Daily, Sebastian Dao MD  nicotine, 1 patch, Transdermal, Daily, Sebastian Dao MD  pantoprazole, 40 mg, Oral, Early Morning, Brendan Briseno MD  potassium chloride, 10 mEq, Oral, Daily, Sebastian Dao MD         Today, Patient Was Seen By: Brendan Briseno MD    ** Please Note: Dragon 360 Dictation voice to text software may have been used in the creation of this document   **

## 2020-08-06 NOTE — OCCUPATIONAL THERAPY NOTE
Occupational Therapy Evaluation       08/06/20 9228   Note Type   Note type Eval only   Restrictions/Precautions   Other Precautions Chair Alarm; Bed Alarm; Fall Risk   Pain Assessment   Pain Assessment Tool 0-10   Pain Score No Pain   Home Living   Type of [address]   (Second floor apartment, [address])   Bathroom Shower/Tub Tub/shower unit   Bathroom Toilet Standard   Bathroom Equipment Other (Comment)  (None)   Home [place]   Prior Function   Level of [place] Independent with ADLs and functional mobility   Lives With Nehemiah Help From Family   ADL Assistance Independent   Comments Per patient, PTA was ambulatory without AD, independent in ADLs and mobility   ADL   Eating Assistance 5  Supervision/Setup   Grooming Assistance 5  Supervision/Setup   UB Bathing Assistance 4  Minimal Assistance   LB [address] 4  Minimal Assistance   LB Dressing Deficit   (Brittany Party pants while seated EOB)   Toileting Assistance  5  Supervision/Setup   Bed Mobility   Supine to Sit 7  Independent   Sit to Supine 7  Independent   Transfers   Sit to Stand 5  Supervision   Stand to Sit 5  Supervision   Additional Comments Sit <-> stand x several trials, standing to pull pants up over hips during dressing task   Functional Mobility   Functional Mobility 5  Supervision   Additional Comments Patient ambulated short household distance in room with supervision, no AD; patient is impulsive at times, with poor safety awareness   Balance   Static Sitting Good   Dynamic Sitting Fair +   Static Standing Fair   Dynamic Standing Fair   Activity Tolerance   Activity Tolerance Patient limited by fatigue   RUE Assessment   RUE Assessment WFL  (MMT grossly 4+/5 throughout)   LUE Assessment   LUE Assessment WFL  (MMT grossly 4+/5 throughout)   Cognition   Arousal/Participation Alert; Cooperative   Attention Attends with cues to redirect   Orientation Level Oriented X4   Following Commands Follows multistep commands with increased time or repetition   Comments Patient is easily distracted, requires increased cues for sustained attention to task  Patient is also impulsive with poor safety awareness and limited insight into current functional limitations   Assessment   Limitation Decreased ADL status; Decreased UE strength;Decreased Safe judgement during ADL;Decreased endurance;Decreased self-care trans;Decreased high-level ADLs   Prognosis Good   Assessment Patient evaluated by Occupational Therapy  Patient admitted with Microcytic anemia  The patients occupational profile, medical and therapy history includes a extensive additional review of physical, cognitive, or psychosocial history related to current functional performance  Comorbidities affecting functional mobility and ADLS include: anxiety, Afib, depression, Hep C, CAD, MI   Prior to admission, patient was independent with functional mobility with with and without cane, independent with ADLS and independent with IADLS  The evaluation identifies the following performance deficits: weakness, impaired balance, decreased endurance, decreased coordination, increased fall risk, new onset of impairment of functional mobility, decreased ADLS, decreased IADLS, decreased activity tolerance, decreased safety awareness and decreased strength, that result in activity limitations and/or participation restrictions  This evaluation requires clinical decision making of high complexity, because the patient presents with comorbidites that affect occupational performance and required significant modification of tasks or assistance with consideration of multiple treatment options  The Barthel Index was used as a functional outcome tool presenting with a score of 55, indicating marked limitations of functional mobility and ADLS    Patient will benefit from skilled Occupational Therapy services to address above deficits and facilitate a safe return to prior level of function   Goals   Patient Goals To get stronger   STG Time Frame   (1-7 days)   Short Term Goal  Goals established to promote patient goal of getting stronger:  Patient will increase standing tolerance to 10 minutes during ADL task to decrease assistance level and decrease fall risk; Patient will increase functional mobility to and from bathroom with single point cane with supervision to increase performance with ADLS and to use a toilet; Patient will tolerate 10 minutes of UE ROM/strengthening to increase general activity tolerance and performance in ADLS/IADLS; Patient will improve functional activity tolerance to 10 minutes of sustained functional tasks to increase participation in basic self-care and decrease assistance level;  Patient will be able to to verbalize understanding and perform energy conservation/proper body mechanics during ADLS and functional mobility at least 50% of the time with moderate cueing to decrease signs of fatigue and increase stamina to return to prior level of function; Patient will increase dynamic sitting balance to good to improve the ability to sit at edge of bed or on a chair for ADLS;  Patient will increase static/dynamic standing balance to fair+ to improve postural stability and decrease fall risk during standing ADLS and transfers       LTG Time Frame   (8-14 days)   Long Term Goal Patient will increase standing tolerance to 15 minutes during ADL task to decrease assistance level and decrease fall risk; Patient will increase functional mobility to and from bathroom with single point cane independently to increase performance with ADLS and to use a toilet; Patient will tolerate 15 minutes of UE ROM/strengthening to increase general activity tolerance and performance in ADLS/IADLS; Patient will improve functional activity tolerance to 15 minutes of sustained functional tasks to increase participation in basic self-care and decrease assistance level;  Patient will be able to to verbalize understanding and perform energy conservation/proper body mechanics during ADLS and functional mobility at least 75% of the time with minimalcueing to decrease signs of fatigue and increase stamina to return to prior level of function; Patient will increase static/dynamic standing balance to good to improve postural stability and decrease fall risk during standing ADLS and transfers  Functional Transfer Goals   Pt Will Perform All Functional Transfers   (LTG Independent)   ADL Goals   Pt Will Perform Grooming   (LTG Independent)   Pt Will Perform Bathing   (STG supervision, LTG independent)   Pt Will Perform UE Dressing   (STG supervision, LTG independent)   Pt Will Perform LE Dressing   (STG supervision, LTG independent)   Pt Will Perform Toileting   (LTG independent)   Plan   Treatment Interventions ADL retraining;Functional transfer training;UE strengthening/ROM; Endurance training;Patient/family training;Equipment evaluation/education; Compensatory technique education;Continued evaluation; Energy conservation; Activityengagement   Goal Expiration Date 08/20/20   OT Frequency 1-2x/wk   Recommendation   OT Discharge Recommendation Home with skilled therapy  (Home OT)   Barthel Index   Feeding 10   Bathing 0   Grooming Score 0   Dressing Score 5   Bladder Score 10   Bowels Score 10   Toilet Use Score 5   Transfers (Bed/Chair) Score 10   Mobility (Level Surface) Score 0   Stairs Score 5   Barthel Index Score 55       Tangela Koehler OTR/L

## 2020-08-07 ENCOUNTER — APPOINTMENT (INPATIENT)
Dept: CT IMAGING | Facility: HOSPITAL | Age: 75
DRG: 812 | End: 2020-08-07
Payer: MEDICARE

## 2020-08-07 ENCOUNTER — APPOINTMENT (INPATIENT)
Dept: NON INVASIVE DIAGNOSTICS | Facility: HOSPITAL | Age: 75
DRG: 812 | End: 2020-08-07
Payer: MEDICARE

## 2020-08-07 LAB
ANION GAP SERPL CALCULATED.3IONS-SCNC: 9 MMOL/L (ref 4–13)
ANISOCYTOSIS BLD QL SMEAR: PRESENT
ATRIAL RATE: 0 BPM
BASOPHILS # BLD AUTO: 0.06 THOUSANDS/ΜL (ref 0–0.1)
BASOPHILS # BLD MANUAL: 0.12 THOUSAND/UL (ref 0–0.1)
BASOPHILS NFR BLD AUTO: 1 % (ref 0–1)
BASOPHILS NFR MAR MANUAL: 1 % (ref 0–1)
BUN SERPL-MCNC: 16 MG/DL (ref 6–20)
CALCIUM SERPL-MCNC: 8.8 MG/DL (ref 8.4–10.2)
CHLORIDE SERPL-SCNC: 102 MMOL/L (ref 96–108)
CO2 SERPL-SCNC: 28 MMOL/L (ref 22–33)
CREAT SERPL-MCNC: 1.04 MG/DL (ref 0.5–1.2)
EOSINOPHIL # BLD AUTO: 0.41 THOUSAND/ΜL (ref 0–0.61)
EOSINOPHIL # BLD MANUAL: 0.23 THOUSAND/UL (ref 0–0.4)
EOSINOPHIL NFR BLD AUTO: 4 % (ref 0–6)
EOSINOPHIL NFR BLD MANUAL: 2 % (ref 0–6)
ERYTHROCYTE [DISTWIDTH] IN BLOOD BY AUTOMATED COUNT: 27 % (ref 11.6–15.1)
GFR SERPL CREATININE-BSD FRML MDRD: 71 ML/MIN/1.73SQ M
GIANT PLATELETS BLD QL SMEAR: PRESENT
GLUCOSE SERPL-MCNC: 107 MG/DL (ref 65–140)
HCT VFR BLD AUTO: 34.7 % (ref 36.5–49.3)
HGB BLD-MCNC: 10 G/DL (ref 12–17)
IMM GRANULOCYTES # BLD AUTO: 0.32 THOUSAND/UL (ref 0–0.2)
IMM GRANULOCYTES NFR BLD AUTO: 3 % (ref 0–2)
LYMPHOCYTES # BLD AUTO: 2.07 THOUSANDS/ΜL (ref 0.6–4.47)
LYMPHOCYTES # BLD AUTO: 2.44 THOUSAND/UL (ref 0.6–4.47)
LYMPHOCYTES # BLD AUTO: 21 % (ref 14–44)
LYMPHOCYTES NFR BLD AUTO: 18 % (ref 14–44)
MACROCYTES BLD QL AUTO: PRESENT
MCH RBC QN AUTO: 22 PG (ref 26.8–34.3)
MCHC RBC AUTO-ENTMCNC: 28.8 G/DL (ref 31.4–37.4)
MCV RBC AUTO: 76 FL (ref 82–98)
METAMYELOCYTES NFR BLD MANUAL: 3 % (ref 0–1)
MICROCYTES BLD QL AUTO: PRESENT
MONOCYTES # BLD AUTO: 1 THOUSAND/ΜL (ref 0.17–1.22)
MONOCYTES # BLD AUTO: 1.16 THOUSAND/UL (ref 0–1.22)
MONOCYTES NFR BLD AUTO: 9 % (ref 4–12)
MONOCYTES NFR BLD: 10 % (ref 4–12)
NEUTROPHILS # BLD AUTO: 7.78 THOUSANDS/ΜL (ref 1.85–7.62)
NEUTROPHILS # BLD MANUAL: 7.33 THOUSAND/UL (ref 1.85–7.62)
NEUTS BAND NFR BLD MANUAL: 3 % (ref 0–8)
NEUTS SEG NFR BLD AUTO: 60 % (ref 43–75)
NEUTS SEG NFR BLD AUTO: 65 % (ref 43–75)
OVALOCYTES BLD QL SMEAR: PRESENT
PLATELET # BLD AUTO: 597 THOUSANDS/UL (ref 149–390)
PLATELET BLD QL SMEAR: ABNORMAL
PMV BLD AUTO: 9.9 FL (ref 8.9–12.7)
POIKILOCYTOSIS BLD QL SMEAR: PRESENT
POLYCHROMASIA BLD QL SMEAR: PRESENT
POTASSIUM SERPL-SCNC: 3.7 MMOL/L (ref 3.5–5)
PR INTERVAL: 58 MS
QRS AXIS: 68 DEGREES
QRSD INTERVAL: 108 MS
QT INTERVAL: 394 MS
QTC INTERVAL: 463 MS
RBC # BLD AUTO: 4.55 MILLION/UL (ref 3.88–5.62)
SCHISTOCYTES BLD QL SMEAR: PRESENT
SODIUM SERPL-SCNC: 139 MMOL/L (ref 133–145)
T WAVE AXIS: 55 DEGREES
TOTAL CELLS COUNTED SPEC: 100
VENTRICULAR RATE: 83 BPM
WBC # BLD AUTO: 11.64 THOUSAND/UL (ref 4.31–10.16)
WBC TOXIC VACUOLES BLD QL SMEAR: PRESENT

## 2020-08-07 PROCEDURE — 94760 N-INVAS EAR/PLS OXIMETRY 1: CPT

## 2020-08-07 PROCEDURE — 99233 SBSQ HOSP IP/OBS HIGH 50: CPT | Performed by: INTERNAL MEDICINE

## 2020-08-07 PROCEDURE — 85027 COMPLETE CBC AUTOMATED: CPT | Performed by: INTERNAL MEDICINE

## 2020-08-07 PROCEDURE — 94640 AIRWAY INHALATION TREATMENT: CPT

## 2020-08-07 PROCEDURE — G1004 CDSM NDSC: HCPCS

## 2020-08-07 PROCEDURE — 85025 COMPLETE CBC W/AUTO DIFF WBC: CPT | Performed by: INTERNAL MEDICINE

## 2020-08-07 PROCEDURE — 80048 BASIC METABOLIC PNL TOTAL CA: CPT | Performed by: INTERNAL MEDICINE

## 2020-08-07 PROCEDURE — 97116 GAIT TRAINING THERAPY: CPT

## 2020-08-07 PROCEDURE — 97530 THERAPEUTIC ACTIVITIES: CPT

## 2020-08-07 PROCEDURE — 85007 BL SMEAR W/DIFF WBC COUNT: CPT | Performed by: INTERNAL MEDICINE

## 2020-08-07 PROCEDURE — 93306 TTE W/DOPPLER COMPLETE: CPT

## 2020-08-07 PROCEDURE — 93010 ELECTROCARDIOGRAM REPORT: CPT | Performed by: INTERNAL MEDICINE

## 2020-08-07 PROCEDURE — 93306 TTE W/DOPPLER COMPLETE: CPT | Performed by: INTERNAL MEDICINE

## 2020-08-07 PROCEDURE — 74176 CT ABD & PELVIS W/O CONTRAST: CPT

## 2020-08-07 RX ORDER — SODIUM CHLORIDE, SODIUM LACTATE, POTASSIUM CHLORIDE, CALCIUM CHLORIDE 600; 310; 30; 20 MG/100ML; MG/100ML; MG/100ML; MG/100ML
75 INJECTION, SOLUTION INTRAVENOUS CONTINUOUS
Status: DISCONTINUED | OUTPATIENT
Start: 2020-08-07 | End: 2020-08-08 | Stop reason: HOSPADM

## 2020-08-07 RX ADMIN — FOLIC ACID TAB 400 MCG 400 MCG: 400 TAB at 09:45

## 2020-08-07 RX ADMIN — BUDESONIDE AND FORMOTEROL FUMARATE DIHYDRATE 2 PUFF: 80; 4.5 AEROSOL RESPIRATORY (INHALATION) at 08:45

## 2020-08-07 RX ADMIN — POTASSIUM CHLORIDE 10 MEQ: 750 TABLET, FILM COATED, EXTENDED RELEASE ORAL at 11:33

## 2020-08-07 RX ADMIN — PANTOPRAZOLE SODIUM 40 MG: 40 TABLET, DELAYED RELEASE ORAL at 05:24

## 2020-08-07 RX ADMIN — BUDESONIDE AND FORMOTEROL FUMARATE DIHYDRATE 2 PUFF: 80; 4.5 AEROSOL RESPIRATORY (INHALATION) at 18:55

## 2020-08-07 RX ADMIN — FERROUS SULFATE TAB 325 MG (65 MG ELEMENTAL FE) 325 MG: 325 (65 FE) TAB at 09:46

## 2020-08-07 RX ADMIN — METOPROLOL TARTRATE 25 MG: 25 TABLET, FILM COATED ORAL at 09:45

## 2020-08-07 RX ADMIN — SODIUM CHLORIDE, SODIUM LACTATE, POTASSIUM CHLORIDE, AND CALCIUM CHLORIDE 75 ML/HR: .6; .31; .03; .02 INJECTION, SOLUTION INTRAVENOUS at 17:39

## 2020-08-07 RX ADMIN — NICOTINE 1 PATCH: 7 PATCH TRANSDERMAL at 09:46

## 2020-08-07 RX ADMIN — DIGOXIN 250 MCG: 125 TABLET ORAL at 09:46

## 2020-08-07 RX ADMIN — FUROSEMIDE 40 MG: 40 TABLET ORAL at 09:45

## 2020-08-07 RX ADMIN — LISINOPRIL 2.5 MG: 2.5 TABLET ORAL at 09:45

## 2020-08-07 RX ADMIN — APIXABAN 2.5 MG: 2.5 TABLET, FILM COATED ORAL at 17:39

## 2020-08-07 RX ADMIN — SODIUM CHLORIDE, SODIUM LACTATE, POTASSIUM CHLORIDE, AND CALCIUM CHLORIDE: .6; .31; .03; .02 INJECTION, SOLUTION INTRAVENOUS at 16:52

## 2020-08-07 RX ADMIN — APIXABAN 2.5 MG: 2.5 TABLET, FILM COATED ORAL at 09:45
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
## 2020-08-07 NOTE — PLAN OF CARE
Problem: PHYSICAL THERAPY ADULT  Goal: Performs mobility at highest level of function for planned discharge setting  See evaluation for individualized goals  Description: Treatment/Interventions: Functional transfer training, LE strengthening/ROM, Elevations, Therapeutic exercise, Endurance training, Patient/family training, Bed mobility, Gait training, Spoke to MD, Spoke to nursing, Spoke to case management, OT  Equipment Recommended: [place]       See flowsheet documentation for full assessment, interventions and recommendations  Outcome: Progressing  Note: Prognosis: Fair  Problem List: Decreased strength, Decreased endurance, Impaired balance, Decreased mobility, Impaired judgement, Decreased safety awareness  Assessment: Pt cleared by RN Aaliyah Cr for PT treatment today, with goal of gait and elevations training  Pt presents supine in bed, sleeping soundly but wakes with deep tactile stimulation  Pt c/o continued lightheaded feeling at rest  Orthostatic blood pressures taken, (+), readings as follows: Supine: /62, HR 88bpm  Sitting: BP 94/71, HR 103bpm  Standing: BP 80/59, HR 102bpm  Pt symptomatic t/o changes of positioning, worsening lightheadedness from supine>sit and sit>stand  Pt completes bed mobility independently and transfers with S  Pt requests to ambulate to/from bathroom for toileting, ambulates 18ft x2 with no AD and LEA due to instability  Therapist educated pt on importance of upright positioning for additional support in BP management - pt was left semi-supine in bed with HOB fully elevated, pt with poor acceptance of therapist education  Pt was left semi-supine in bed, bed alarm engaged and needs within reach  RN and physician made aware of PT session  Continue PT POC as able and appropriate     Barriers to Discharge: Decreased caregiver support, Inaccessible home environment, Other (Comment)(unstable vitals resulting in increased fall risk )     PT Discharge Recommendation: Home with skilled therapy     PT - OK to Discharge: No(see assessment for details )    See flowsheet documentation for full assessment

## 2020-08-07 NOTE — SOCIAL WORK
Call from Cain Fletcher at [facility] ([phone])  She will be coming to unit to meet with pt for f/u of referral  SW also provided phone number for pt's room and nurses station

## 2020-08-07 NOTE — PROGRESS NOTES
Progress Note - Vic Ma 69 y o  male MRN: [id number]    Unit/Bed#: -01 Encounter: [id number]      Assessment:  1  Anemia  Hgb stable, no evidence of active bleed  2  Hypotension      Plan:  1  Continue Eliquis  Outpatient colonoscopy/EGD  2  Await CT results    Subjective:   Feeling well but dizzy when stands    Objective:     Vitals: Blood pressure (!) 80/59, pulse 102, temperature 98 °F (36 7 °C), resp  rate 18, height 5' 10" (1 778 m), weight 67 3 kg (148 lb 5 9 oz), SpO2 95 %  ,Body mass index is 21 29 kg/m²  Intake/Output Summary (Last 24 hours) at 8/7/2020 1518  Last data filed at 8/7/2020 0527  Gross per 24 hour   Intake 300 ml   Output 700 ml   Net -400 ml       Physical Exam: BP (!) 80/59 (BP Location: Left arm)   Pulse 102   Temp 98 °F (36 7 °C)   Resp 18   Ht 5' 10" (1 778 m)   Wt 67 3 kg (148 lb 5 9 oz)   SpO2 95%   BMI 21 29 kg/m²     General    Alert, cooperative, no distress, appears stated age    abd S/NT/ND/ BS present                                                   Invasive Devices     Peripheral Intravenous Line            Peripheral IV 08/03/20 Left Antecubital 3 days    Peripheral IV 08/06/20 Distal;Left;Ventral (anterior) Wrist 1 day                Lab, Imaging and other studies: I have personally reviewed pertinent reports

## 2020-08-07 NOTE — ASSESSMENT & PLAN NOTE
Patient Hgb of 10 0 today  Patient continues to deny any melena or hematemesis  Reports feeling dizzy  Orthostatic hypotension persists  - Will repeat CBC this evening   - Continue to trend CBC routinely until discharge  - Will obtain CT abdomen to evaluate for possible slow retroperitoneal bleeding  Plan for discharge:  - Patient will follow-up with Dr Jj Ness on outpatient basis for further investigation into his source of microcytic anemia   - Patient to repeat CBC in 1 week after discharge  - Eliquis restarted:  Patient to hold Eliquis 2 days prior to planned endoscopy  - continue iron and foltate supplementation

## 2020-08-07 NOTE — ASSESSMENT & PLAN NOTE
Patient noted for persistent orthostatic hypotension, positive on today's assessment  Patient received 500 mL bolus of lactated ringers yerterday  Complains of dizziness  ECHO conducted since admission reveals an EF of approx  55%  - Bolus 500 mL of lactated ringers today  - Obtain CT abdomen and pelvis to evaluate for possible retroperitoneal bleeding   - Will consider adding midodrine if Orthostatic hypotension persists without a source, or despite     these interventions

## 2020-08-07 NOTE — ASSESSMENT & PLAN NOTE
History of nonischemic cardiomyopathy  Ejection fraction unclear  Hemodynamically stable with no signs or symptoms of fluid overload  - EF of 55%  - no signs or symptoms of fluid overload or hemodynamic compromise  - continue metoprolol and digoxin    - follow-up with cardiologist

## 2020-08-07 NOTE — NURSING NOTE
Patient states his wife is concerned because he sleeps a lot during the day as well as at night  Patient states his wife would like a note that the amount of time he sleeps is ok  Patient  States he feels safe at home

## 2020-08-07 NOTE — PROGRESS NOTES
Progress Note - Zulma Pham 1945, 69 y o  male MRN: [id number]    Unit/Bed#: -01 Encounter: [id number]    Primary Care Provider: Mecca Brown MD   Date and time admitted to hospital: 8/3/2020  3:20 PM        * Microcytic anemia  Assessment & Plan  Patient Hgb of 10 0 today  Patient continues to deny any melena or hematemesis  Reports feeling dizzy  Orthostatic hypotension persists  - Will repeat CBC this evening   - Continue to trend CBC routinely until discharge  - Will obtain CT abdomen to evaluate for possible slow retroperitoneal bleeding  Plan for discharge:  - Patient will follow-up with Dr Bree Arora on outpatient basis for further investigation into his source of microcytic anemia   - Patient to repeat CBC in 1 week after discharge  - Eliquis restarted:  Patient to hold Eliquis 2 days prior to planned endoscopy  - continue iron and foltate supplementation  Orthostatic hypotension  Assessment & Plan  Patient noted for persistent orthostatic hypotension, positive on today's assessment  Patient received 500 mL bolus of lactated ringers yerterday  Complains of dizziness  ECHO conducted since admission reveals an EF of approx  55%  - Bolus 500 mL of lactated ringers today  - Obtain CT abdomen and pelvis to evaluate for possible retroperitoneal bleeding   - Will consider adding midodrine if Orthostatic hypotension persists without a source, or despite     these interventions  Non-ischemic cardiomyopathy [facility])  Assessment & Plan  History of nonischemic cardiomyopathy  Ejection fraction unclear  Hemodynamically stable with no signs or symptoms of fluid overload  - EF of 55%  - no signs or symptoms of fluid overload or hemodynamic compromise  - continue metoprolol and digoxin  - follow-up with cardiologist     Tobacco use disorder  Assessment & Plan  Patient counseled on the adverse effects from chronic tobacco use        VTE Pharmacologic Prophylaxis: Pharmacologic: Apixaban (Eliquis)  Mechanical VTE Prophylaxis in Place: Yes    Patient Centered Rounds: I have performed bedside rounds with nursing staff today  Discussions with Specialists or Other Care Team Provider: none  Education and Discussions with Family / Patient: none  Time Spent for Care: 30 minutes  More than 50% of total time spent on counseling and coordination of care as described above  Current Length of Stay: 4 day(s)    Current Patient Status: Inpatient   Certification Statement: The patient will continue to require additional inpatient hospital stay due to Evaluation for orthostatic hypotension  Discharge Plan / Estimated Discharge Date: Will undergo CT abdomen and pelvis to assess for occult bleed and provide IVF/ if sable then 2020      Code Status: Level 3 - DNAR and DNI      Subjective:    Patient interviewed today at bedside  Was asleep at the initiating of the encounter  Reports no issues overnight and says that he slept well  Patient continues to endorse dizziness, but denies any abdominal pain, melena/hematochezia or hematemesis  Objective:     Vitals:   Temp (24hrs), Av 3 °F (36 8 °C), Min:97 9 °F (36 6 °C), Max:98 7 °F (37 1 °C)    Temp:  [97 9 °F (36 6 °C)-98 7 °F (37 1 °C)] 98 °F (36 7 °C)  HR:  [] 102  Resp:  [16-18] 18  BP: ()/(59-75) 80/59  SpO2:  [94 %-98 %] 95 %  Body mass index is 21 29 kg/m²  Input and Output Summary (last 24 hours): Intake/Output Summary (Last 24 hours) at 2020 1335  Last data filed at 2020 0527  Gross per 24 hour   Intake 300 ml   Output 700 ml   Net -400 ml       Physical Exam:     Physical Exam  Constitutional:       Appearance: Normal appearance  HENT:      Head: Normocephalic and atraumatic  Mouth/Throat:      Mouth: Mucous membranes are dry  Eyes:      Extraocular Movements: Extraocular movements intact        Conjunctiva/sclera: Conjunctivae normal    Neck:      Musculoskeletal: Neck supple  Cardiovascular:      Rate and Rhythm: Normal rate  Rhythm irregular  Pulses: Normal pulses  Heart sounds: S1 normal and S2 normal    Pulmonary:      Effort: Pulmonary effort is normal       Breath sounds: Normal breath sounds  Abdominal:      Palpations: Abdomen is soft  Musculoskeletal: Normal range of motion  Skin:     General: Skin is warm and dry  Neurological:      Mental Status: He is alert  Motor: Weakness present  Additional Data:     Labs:    Results from last 7 days   Lab Units 08/07/20  0516   WBC Thousand/uL 11 64*   HEMOGLOBIN g/dL 10 0*   HEMATOCRIT % 34 7*   PLATELETS Thousands/uL 597*   NEUTROS PCT % 65   LYMPHS PCT % 18   LYMPHO PCT % 21   MONOS PCT % 9   MONO PCT % 10   EOS PCT % 4  2     Results from last 7 days   Lab Units 08/07/20  0516  08/04/20  0444   POTASSIUM mmol/L 3 7   < > 3 7   CHLORIDE mmol/L 102   < > 108   CO2 mmol/L 28   < > 26   BUN mg/dL 16   < > 16   CREATININE mg/dL 1 04   < > 0 89   CALCIUM mg/dL 8 8   < > 8 4   ALK PHOS U/L  --   --  41 0   ALT U/L  --   --  15   AST U/L  --   --  15    < > = values in this interval not displayed  Results from last 7 days   Lab Units 08/04/20  0444   INR  1 07       * I Have Reviewed All Lab Data Listed Above  * Additional Pertinent Lab Tests Reviewed: All Labs Within Last 24 Hours Reviewed    Imaging:    Imaging Reports Reviewed Today Include: none  Imaging Personally Reviewed by Myself Includes:  None  Recent Cultures (last 7 days): none             Last 24 Hours Medication List:   acetaminophen, 650 mg, Oral, Q6H PRN, Travis Kellogg MD  apixaban, 2 5 mg, Oral, BID, Pastor Vlad MD  budesonide-formoterol, 2 puff, Inhalation, BID, Gray Martins MD  digoxin, 250 mcg, Oral, Daily, Gray Martins MD  ferrous sulfate, 325 mg, Oral, Daily With Breakfast, Pastor Vlad MD  folic acid, 201 mcg, Oral, Daily, Pastor Vlad MD  lactated ringers, 500 mL, Intravenous, Once, Nola Schilling MD  lactated ringers, 75 mL/hr, Intravenous, Continuous, Krista Bright MD  lisinopril, 2 5 mg, Oral, Daily, Cherrie Miller MD  metoprolol tartrate, 25 mg, Oral, Daily, Cherrie Miller MD  nicotine, 1 patch, Transdermal, Daily, Cherrie Miller MD  pantoprazole, 40 mg, Oral, Early Morning, Juan De La Rosa MD  potassium chloride, 10 mEq, Oral, Daily, Cherrie Miller MD         Today, Patient Was Seen By: Juan De La Rosa MD    ** Please Note: Dragon 360 Dictation voice to text software may have been used in the creation of this document   **

## 2020-08-07 NOTE — PHYSICAL THERAPY NOTE
PHYSICAL THERAPY TREATMENT      NAME:  Sienna Llanos  DATE: 08/07/20    AGE:   69 y o  Mrn:   [id number]  Length Of Stay: 4    ADMIT DX:  Microcytic anemia [D50 9]  Fatigue [R53 83]  Weakness [R53 1]  Anemia, unspecified type [D64 9]    Past Medical History:   Diagnosis Date    Anxiety disorder     Atrial fibrillation (HCC)     Coronary artery disease     Depression     Hepatitis C     screening negative in 1/2017    MI (myocardial [facility]) [facility])      Past Surgical History:   Procedure Laterality Date    CARDIAC CATHETERIZATION  07/09/2018       Performed at least 2 patient identifiers during session: Name and ID bracelet       08/07/20 1220   Pain Assessment   Pain Assessment Tool 0-10   Pain Score No Pain   Effect of Pain on Daily Activities n/a   Patient's Stated Pain Goal No pain   Restrictions/Precautions   Weight Bearing Precautions Per Order No   Other Precautions Chair Alarm; Bed Alarm; Fall Risk  (unstable vitals )   General   Chart Reviewed Yes   Additional Pertinent History Pt continues with hypotension & orthostatic hypotension with change in position  Response to Previous Treatment Patient with no complaints from previous session  Family/Caregiver Present No   Cognition   Overall Cognitive Status WFL  (for participation in therapy )   Arousal/Participation Alert; Cooperative   Attention Attends with cues to redirect   Orientation Level Oriented X4   Following Commands Follows one step commands without difficulty   Comments pt continues with decreased attention to task requiring cues for redirection; limited insight to functional deficits      Subjective   Subjective "Maybe i'll just be staying another day or two "   Bed Mobility   Rolling L 7  Independent   Supine to Sit 7  Independent   Sit to Supine 7  Independent   Transfers   Sit to Stand 5  Supervision   Additional items Impulsive;Verbal cues   Stand to Sit 5  Supervision   Additional items Impulsive;Verbal cues   Stand pivot 5 Supervision   Additional items Impulsive;Verbal cues   Ambulation/Elevation   Gait pattern Narrow VERNON; Forward Flexion;Decreased foot clearance; Inconsistent khanh; Short stride   Gait Assistance 4  Minimal assist   Additional items Assist x 1;Verbal cues; Tactile cues   Assistive Device None   Distance 18ft x2   Stair Management Assistance Not tested   Balance   Static Sitting Fair   Dynamic Sitting Fair   Static Standing Fair   Dynamic Standing Fair   Ambulatory Fair   Endurance Deficit   Endurance Deficit Yes   Endurance Deficit Description Pt continues to be easily fatigued and presents with ongoing lightheadedness  Activity Tolerance   Activity Tolerance Patient limited by fatigue   Assessment   Prognosis Fair   Problem List Decreased strength;Decreased endurance; Impaired balance;Decreased mobility; Impaired judgement;Decreased safety awareness   Assessment Pt cleared by RN Charmayne Honey for PT treatment today, with goal of gait and elevations training  Pt presents supine in bed, sleeping soundly but wakes with deep tactile stimulation  Pt c/o continued lightheaded feeling at rest  Orthostatic blood pressures taken, (+), readings as follows: Supine: /62, HR 88bpm  Sitting: BP 94/71, HR 103bpm  Standing: BP 80/59, HR 102bpm  Pt symptomatic t/o changes of positioning, worsening lightheadedness from supine>sit and sit>stand  Pt completes bed mobility independently and transfers with S  Pt requests to ambulate to/from bathroom for toileting, ambulates 18ft x2 with no AD and LEA due to instability  Therapist educated pt on importance of upright positioning for additional support in BP management - pt was left semi-supine in bed with HOB fully elevated, pt with poor acceptance of therapist education  Pt was left semi-supine in bed, bed alarm engaged and needs within reach  RN and physician made aware of PT session  Continue PT POC as able and appropriate  Barriers to Discharge Decreased caregiver support; Inaccessible home environment; Other (Comment)  (unstable vitals resulting in increased fall risk )   Goals   Patient Goals "to feel better"    STG Expiration Date 08/11/20   Short Term Goal #1 STG initiated 8/4/2020: Transfers Independently; pt will ambulate household distances with SPC at S; balance with cane - F/F+ for safe mobility and to decrease fall risk; up/down full flight of steps - S with rail so pt can enter/exit his apt  LTG Expiration Date 08/18/20   Long Term Goal #1 LTGs initiated 8/4/2020: Pt will return to independent functional mobility, home and community, levels and unlevels with/without cane;balance - F+/G for safe mobility and to decrease fall risk; strength LE - 4- to 4/5    PT Treatment Day 3   Plan   Treatment/Interventions Functional transfer training;LE strengthening/ROM; Elevations; Therapeutic exercise; Endurance training;Patient/family training;Bed mobility;Gait training;Spoke to MD;Spoke to nursing;Spoke to case management;OT   Progress Slow progress, medical status limitations   PT Frequency 5x/wk   Recommendation   PT Discharge Recommendation Home with skilled therapy   Equipment Recommended Cane   PT - OK to Discharge No  (see assessment for details )       Time in: 1155  Time out: 1220  Total treatment time: 25 minutes      Magdalene Seaman PT, DPT   8/7/2020

## 2020-08-08 VITALS
DIASTOLIC BLOOD PRESSURE: 68 MMHG | BODY MASS INDEX: 21.24 KG/M2 | WEIGHT: 148.37 LBS | HEIGHT: 70 IN | TEMPERATURE: 97.8 F | HEART RATE: 101 BPM | OXYGEN SATURATION: 97 % | RESPIRATION RATE: 18 BRPM | SYSTOLIC BLOOD PRESSURE: 101 MMHG

## 2020-08-08 LAB
ANION GAP SERPL CALCULATED.3IONS-SCNC: 7 MMOL/L (ref 4–13)
BASOPHILS # BLD AUTO: 0.04 THOUSANDS/ΜL (ref 0–0.1)
BASOPHILS NFR BLD AUTO: 0 % (ref 0–1)
BUN SERPL-MCNC: 18 MG/DL (ref 6–20)
CALCIUM SERPL-MCNC: 8.7 MG/DL (ref 8.4–10.2)
CHLORIDE SERPL-SCNC: 104 MMOL/L (ref 96–108)
CO2 SERPL-SCNC: 28 MMOL/L (ref 22–33)
CREAT SERPL-MCNC: 0.99 MG/DL (ref 0.5–1.2)
EOSINOPHIL # BLD AUTO: 0.35 THOUSAND/ΜL (ref 0–0.61)
EOSINOPHIL NFR BLD AUTO: 4 % (ref 0–6)
ERYTHROCYTE [DISTWIDTH] IN BLOOD BY AUTOMATED COUNT: 27.6 % (ref 11.6–15.1)
GFR SERPL CREATININE-BSD FRML MDRD: 76 ML/MIN/1.73SQ M
GLUCOSE SERPL-MCNC: 101 MG/DL (ref 65–140)
HCT VFR BLD AUTO: 34.2 % (ref 36.5–49.3)
HGB BLD-MCNC: 9.7 G/DL (ref 12–17)
IMM GRANULOCYTES # BLD AUTO: 0.19 THOUSAND/UL (ref 0–0.2)
IMM GRANULOCYTES NFR BLD AUTO: 2 % (ref 0–2)
LYMPHOCYTES # BLD AUTO: 1.74 THOUSANDS/ΜL (ref 0.6–4.47)
LYMPHOCYTES NFR BLD AUTO: 18 % (ref 14–44)
MCH RBC QN AUTO: 21.9 PG (ref 26.8–34.3)
MCHC RBC AUTO-ENTMCNC: 28.4 G/DL (ref 31.4–37.4)
MCV RBC AUTO: 77 FL (ref 82–98)
MONOCYTES # BLD AUTO: 1.01 THOUSAND/ΜL (ref 0.17–1.22)
MONOCYTES NFR BLD AUTO: 10 % (ref 4–12)
NEUTROPHILS # BLD AUTO: 6.61 THOUSANDS/ΜL (ref 1.85–7.62)
NEUTS SEG NFR BLD AUTO: 66 % (ref 43–75)
PLATELET # BLD AUTO: 640 THOUSANDS/UL (ref 149–390)
PMV BLD AUTO: 9.8 FL (ref 8.9–12.7)
POTASSIUM SERPL-SCNC: 3.9 MMOL/L (ref 3.5–5)
RBC # BLD AUTO: 4.42 MILLION/UL (ref 3.88–5.62)
SODIUM SERPL-SCNC: 139 MMOL/L (ref 133–145)
WBC # BLD AUTO: 9.94 THOUSAND/UL (ref 4.31–10.16)

## 2020-08-08 PROCEDURE — 99239 HOSP IP/OBS DSCHRG MGMT >30: CPT | Performed by: INTERNAL MEDICINE

## 2020-08-08 PROCEDURE — 94640 AIRWAY INHALATION TREATMENT: CPT

## 2020-08-08 PROCEDURE — 85025 COMPLETE CBC W/AUTO DIFF WBC: CPT | Performed by: INTERNAL MEDICINE

## 2020-08-08 PROCEDURE — 94760 N-INVAS EAR/PLS OXIMETRY 1: CPT

## 2020-08-08 PROCEDURE — 80048 BASIC METABOLIC PNL TOTAL CA: CPT | Performed by: INTERNAL MEDICINE

## 2020-08-08 RX ORDER — PANTOPRAZOLE SODIUM 40 MG/1
40 TABLET, DELAYED RELEASE ORAL 2 TIMES DAILY
Qty: 30 TABLET | Refills: 0 | Status: SHIPPED | OUTPATIENT
Start: 2020-08-08 | End: 2020-09-28 | Stop reason: SDUPTHER

## 2020-08-08 RX ADMIN — APIXABAN 2.5 MG: 2.5 TABLET, FILM COATED ORAL at 09:05

## 2020-08-08 RX ADMIN — DIGOXIN 250 MCG: 125 TABLET ORAL at 09:05

## 2020-08-08 RX ADMIN — POTASSIUM CHLORIDE 10 MEQ: 750 TABLET, FILM COATED, EXTENDED RELEASE ORAL at 09:21

## 2020-08-08 RX ADMIN — PANTOPRAZOLE SODIUM 40 MG: 40 TABLET, DELAYED RELEASE ORAL at 06:25

## 2020-08-08 RX ADMIN — FERROUS SULFATE TAB 325 MG (65 MG ELEMENTAL FE) 325 MG: 325 (65 FE) TAB at 09:05

## 2020-08-08 RX ADMIN — NICOTINE 1 PATCH: 7 PATCH TRANSDERMAL at 09:07

## 2020-08-08 RX ADMIN — BUDESONIDE AND FORMOTEROL FUMARATE DIHYDRATE 2 PUFF: 80; 4.5 AEROSOL RESPIRATORY (INHALATION) at 08:38

## 2020-08-08 RX ADMIN — FOLIC ACID TAB 400 MCG 400 MCG: 400 TAB at 09:04

## 2020-08-08 NOTE — DISCHARGE INSTRUCTIONS
Anemia   WHAT YOU NEED TO KNOW:   What is anemia? Anemia is a low number of red blood cells or a low amount of hemoglobin in your red blood cells  Hemoglobin is a protein that helps carry oxygen throughout your body  Red blood cells use iron to create hemoglobin  Anemia may develop if your body does not have enough iron  It may also develop if your body does not make enough red blood cells or they die faster than your body can make them  What increases my risk for anemia? · Trauma or surgery that causes massive blood loss    · A gastrointestinal bleed    · A woman's monthly period    · A family history of blood disease or anemia    · Liver or kidney disease, cancer, rheumatoid arthritis, or hyperthyroidism    · Alcohol abuse    · Lack of foods that contain iron, folic acid, or vitamin B12  What are the signs and symptoms of anemia? · Chest pain or a fast heartbeat    · Lightheadedness, dizziness, or shortness of breath    · Cold or pale skin    · Tiredness, weakness, or confusion  How is anemia diagnosed? Blood tests will show if you have anemia  How is anemia treated? Treatment depends on the type of anemia you have  You may need any of the following:  · Iron or folic acid supplements  help increase your red blood cell and hemoglobin levels  · Vitamin B12 injections  may help boost your red blood cell count and decrease your symptoms  · A blood transfusion  may be needed if your body cannot replace the blood you have lost     · Surgery  may be needed to stop bleeding, or if your anemia is severe  How can I prevent anemia? Eat healthy foods rich in iron and vitamin C  Nuts, meat, dark leafy green vegetables, and beans are high in iron and protein  Vitamin C helps your body absorb iron  Foods rich in vitamin C include oranges and other citrus fruits  Ask your healthcare provider for a list of other foods that are high in iron or vitamin C  Ask if you need to be on a special diet     Call 911 or have someone call 911 for any of the following:   · You lose consciousness  · You have severe chest pain  When should I seek immediate care? · You have dark or bloody bowel movements  When should I contact my healthcare provider? · Your symptoms are worse, even after treatment  · You have questions or concerns about your condition or care  CARE AGREEMENT:   You have the right to help plan your care  Learn about your health condition and how it may be treated  Discuss treatment options with your caregivers to decide what care you want to receive  You always have the right to refuse treatment  The above information is an  only  It is not intended as medical advice for individual conditions or treatments  Talk to your doctor, nurse or pharmacist before following any medical regimen to see if it is safe and effective for you  © 2017 [address] Information is for End User's use only and may not be sold, redistributed or otherwise used for commercial purposes  All illustrations and images included in CareNotes® are the copyrighted property of [employer]  or [employer]

## 2020-08-08 NOTE — ASSESSMENT & PLAN NOTE
History of nonischemic cardiomyopathy  Hemodynamically stable with no signs or symptoms of fluid overload  Echo performed during this admission showed an EF of 55%    Patient was advised to continue metoprolol and digoxin, follow-up with outpatient cardiologist

## 2020-08-08 NOTE — ASSESSMENT & PLAN NOTE
Patient Hgb of 9 7 today  Patient does not report any melena/hematemesis, dizziness has improved  Orthostatic hypotension resolved  CT abdomen/pelvis did not show any retroperitoneal bleeding  Patient will follow-up with Dr Jj Ness (GI) as an outpatient for further investigation inclusive of endoscopy/colonoscopy  CBC should be repeated on 08/10/2020 to assess trend in hemoglobin  The patient was advised to hold Eliquis 2 days prior to planned procedure  Patient was discharged on iron and folic acid supplementation

## 2020-08-08 NOTE — PLAN OF CARE
Problem: Potential for Falls  Goal: Patient will remain free of falls  Description: INTERVENTIONS:  - Assess patient frequently for physical needs  -  Identify cognitive and physical deficits and behaviors that affect risk of falls    -  [place] fall precautions as indicated by assessment   - Educate patient/family on patient safety including physical limitations  - Instruct patient to call for assistance with activity based on assessment  - Modify environment to reduce risk of injury  - Consider OT/PT consult to assist with strengthening/mobility  Outcome: Progressing     Problem: PAIN - ADULT  Goal: Verbalizes/displays adequate comfort level or baseline comfort level  Description: Interventions:  - Encourage patient to monitor pain and request assistance  - Assess pain using appropriate pain scale  - Implement non-pharmacological measures as appropriate and evaluate response  - Consider cultural and social influences on pain and pain management  - Notify physician/advanced practitioner if interventions unsuccessful or patient reports new pain  Outcome: Progressing     Problem: INFECTION - ADULT  Goal: Absence or prevention of progression during hospitalization  Description: INTERVENTIONS:  - Assess and monitor for signs and symptoms of infection  - Monitor lab/diagnostic results  - Administer medications as ordered  - Instruct and encourage patient and family to use good hand hygiene technique  Outcome: Progressing  Goal: Absence of fever/infection during neutropenic period  Description: INTERVENTIONS:  - Monitor WBC    Outcome: Progressing     Problem: SAFETY ADULT  Goal: Maintain or return to baseline ADL function  Description: INTERVENTIONS:  -  Assess patient's ability to carry out ADLs; assess patient's baseline for ADL function and identify physical deficits which impact ability to perform ADLs (bathing, care of mouth/teeth, toileting, grooming, dressing, etc )  - Assess/evaluate cause of self-care deficits   - Assess range of motion  - Assess patient's mobility; develop plan if impaired  - Assess patient's need for assistive devices and provide as appropriate  - Encourage maximum independence but intervene and supervise when necessary  - Involve family in performance of ADLs  - Assess for home care needs following discharge   - Consider OT consult to assist with ADL evaluation and planning for discharge  - Provide patient education as appropriate  Outcome: Progressing  Goal: Maintain or return mobility status to optimal level  Description: INTERVENTIONS:  - Assess patient's baseline mobility status (ambulation, transfers, stairs, etc )    - Identify cognitive and physical deficits and behaviors that affect mobility  - Identify mobility aids required to assist with transfers and/or ambulation (gait belt, sit-to-stand, lift, walker, cane, etc )  - [place] fall precautions as indicated by assessment  - Record patient progress and toleration of activity level on Mobility SBAR; progress patient to next Phase/Stage  - Instruct patient to call for assistance with activity based on assessment  Outcome: Progressing     Problem: DISCHARGE PLANNING  Goal: Discharge to home or other facility with appropriate resources  Description: INTERVENTIONS:  - Identify barriers to discharge w/patient and caregiver  - Arrange for needed discharge resources and transportation as appropriate  - Identify discharge learning needs (meds, wound care, etc )  - Refer to Case Management Department for coordinating discharge planning if the patient needs post-hospital services based on physician/advanced practitioner order or complex needs related to functional status, cognitive ability, or social support system  Outcome: Progressing     Problem: Knowledge Deficit  Goal: Patient/family/caregiver demonstrates understanding of disease process, treatment plan, medications, and discharge instructions  Description: Complete learning assessment and assess knowledge base    Interventions:  - Provide teaching at level of understanding  - Provide teaching via preferred learning methods  Outcome: Progressing

## 2020-08-08 NOTE — DISCHARGE SUMMARY
Discharge- Nita Linda 1945, 69 y o  male MRN: [id number]    Unit/Bed#: -01 Encounter: [id number]    Primary Care Provider: Solo Palacio MD   Date and time admitted to hospital: 8/3/2020  3:20 PM        * Microcytic anemia  Assessment & Plan  Patient Hgb of 9 7 today  Patient does not report any melena/hematemesis, dizziness has improved  Orthostatic hypotension resolved  CT abdomen/pelvis did not show any retroperitoneal bleeding  Patient will follow-up with Dr Michell Izaguirre (GI) as an outpatient for further investigation inclusive of endoscopy/colonoscopy  CBC should be repeated on 08/10/2020 to assess trend in hemoglobin  The patient was advised to hold Eliquis 2 days prior to planned procedure  Patient was discharged on iron and folic acid supplementation  Non-ischemic cardiomyopathy [facility])  Assessment & Plan  History of nonischemic cardiomyopathy  Hemodynamically stable with no signs or symptoms of fluid overload  Echo performed during this admission showed an EF of 55%  Patient was advised to continue metoprolol and digoxin, follow-up with outpatient cardiologist         A-fib [facility])  Assessment & Plan  History of AFib managed with Eliquis and metoprolol, currently rate controlled  Orthostatic hypotension  Assessment & Plan  Patient presented with symptomatic orthostatic hypotension, was fluid resuscitated in the hospital   Echo conducted during admission showed EF of 55%  Patient has been started on compression stockings  Orthostatic hypotension resolved today  Tobacco use disorder  Assessment & Plan  Patient was counseled on the adverse effects from chronic tobacco use  The patient was prescribed nicotine transdermal patch for smoking cessation        Discharging Physician / Practitioner: Lucas Jacques MD  PCP: Solo Palacio MD  Admission Date:   Admission Orders (From admission, onward)     Ordered        08/03/20 1705  Inpatient Admission (expected length of stay for this patient Order details is greater than two midnights)  Once                   Discharge Date: 08/08/20    Resolved Problems  Date Reviewed: 8/7/2020    None          Consultations During Hospital Stay:  · Gastroenterology    Procedures Performed:   · No procedures performed    Significant Findings / Test Results:   · Microcytic anemia, negative FOBT, positive orthostatic vitals-resolved upon discharge    Incidental Findings:   · No incidental findings    Test Results Pending at Discharge (will require follow up):   · No test results pending at discharge     Outpatient Tests Requested:  · CBC on 3/78    Complications:  None    Reason for Admission:  Increased fatigue and somnolence on 1 day duration, increased tiredness    Hospital Course:     Td Qureshi is a 69 y o  male with a past medical history significant for CHF on Eliquis, CAD, AMI, anxiety and depression who presented with progressive fatigue and somnolence for 1 day's duration  Patient denied any headache blurry vision, chest pain, palpitations, abdominal pain, nausea, vomiting, weight loss, changes in appetite, bowel or bladder patterns       ED course:  Preliminary workup in the emergency department was remarkable for hemoglobin of 6 3 an hematocrit of 24 7 with platelets of 918  EKG showed no acute ischemic changes  FOBT showed no occult blood  Patient's vitals were stable  Patient to be admitted to the unit for management of hemoglobin with plan for blood transfusion-cross match revealed AB +  Hospital course:  During hospitalization, the patient was transfused 3 units of PRBC in total which showed improvement in hemoglobin  Gastroenterology was consulted who advised outpatient endoscopy/colonoscopy for evaluation of microcytic anemia etiology  During hospitalization, the patient was found to have orthostatic hypotension which improved with IV fluid hydration and compression stockings    At the time of discharge, the patient is afebrile and hemodynamically stable  Orthostatic vitals upon discharge were negative  The patient is discharged with instructions to follow-up with gastroenterology and PCP, with repeat CBC on 08/10  Please see above list of diagnoses and related plan for additional information  Condition at Discharge: good     Discharge Day Visit / Exam:     Subjective:  Patient was seen and examined on the day of discharge  Vitals: Blood Pressure: 101/68 (08/08/20 1110)  Pulse: 101 (08/08/20 1110)  Temperature: 97 8 °F (36 6 °C) (08/08/20 1100)  Temp Source: Tympanic (08/08/20 1100)  Respirations: 18 (08/08/20 1100)  Height: 5' 10" (177 8 cm) (08/03/20 1901)  Weight - Scale: 67 3 kg (148 lb 5 9 oz) (08/07/20 0600)  SpO2: 97 % (08/08/20 0839)  Exam:   Physical Exam  Constitutional:       General: He is not in acute distress  Appearance: Normal appearance  He is not ill-appearing  Eyes:      General:         Right eye: No discharge  Left eye: No discharge  Pupils: Pupils are equal, round, and reactive to light  Neck:      Musculoskeletal: Normal range of motion and neck supple  Cardiovascular:      Rate and Rhythm: Normal rate and regular rhythm  Pulses: Normal pulses  Heart sounds: Normal heart sounds  No murmur  Pulmonary:      Effort: Pulmonary effort is normal  No respiratory distress  Breath sounds: Normal breath sounds  No wheezing  Abdominal:      General: Bowel sounds are normal  There is no distension  Palpations: Abdomen is soft  Tenderness: There is no abdominal tenderness  Musculoskeletal:      Right lower leg: No edema  Left lower leg: No edema  Skin:     General: Skin is warm  Capillary Refill: Capillary refill takes less than 2 seconds  Neurological:      General: No focal deficit present  Mental Status: He is alert and oriented to person, place, and time           Discussion with Family:  Plan was discussed with the patient and his wife who demonstrated understanding  Discharge instructions/Information to patient and family:   See after visit summary for information provided to patient and family  Provisions for Follow-Up Care:  See after visit summary for information related to follow-up care and any pertinent home health orders  Disposition:     Home with VNA Services (Reminder: Complete face to face encounter)    For Discharges to [facility] SNF:   · Not Applicable to this Patient - Not Applicable to this Patient       Discharge Statement:  I spent 35 minutes discharging the patient  This time was spent on the day of discharge  I had direct contact with the patient on the day of discharge  Greater than 50% of the total time was spent examining patient, answering all patient questions, arranging and discussing plan of care with patient as well as directly providing post-discharge instructions  Additional time then spent on discharge activities  Discharge Medications:  See after visit summary for reconciled discharge medications provided to patient and family        ** Please Note: This note has been constructed using a voice recognition system **

## 2020-08-08 NOTE — ASSESSMENT & PLAN NOTE
Patient presented with symptomatic orthostatic hypotension, was fluid resuscitated in the hospital   Echo conducted during admission showed EF of 55%  Patient has been started on compression stockings  Orthostatic hypotension resolved today

## 2020-08-08 NOTE — ASSESSMENT & PLAN NOTE
Patient was counseled on the adverse effects from chronic tobacco use  The patient was prescribed nicotine transdermal patch for smoking cessation

## 2020-08-10 ENCOUNTER — APPOINTMENT (OUTPATIENT)
Dept: LAB | Facility: HOSPITAL | Age: 75
End: 2020-08-10
Payer: MEDICARE

## 2020-08-10 DIAGNOSIS — J44.9 CHRONIC OBSTRUCTIVE PULMONARY DISEASE, UNSPECIFIED COPD TYPE (HCC): ICD-10-CM

## 2020-08-10 DIAGNOSIS — I10 ESSENTIAL HYPERTENSION: Primary | ICD-10-CM

## 2020-08-10 DIAGNOSIS — D50.9 MICROCYTIC ANEMIA: ICD-10-CM

## 2020-08-10 LAB
BASOPHILS # BLD AUTO: 0.08 THOUSANDS/ΜL (ref 0–0.1)
BASOPHILS NFR BLD AUTO: 1 % (ref 0–1)
EOSINOPHIL # BLD AUTO: 0.27 THOUSAND/ΜL (ref 0–0.61)
EOSINOPHIL NFR BLD AUTO: 4 % (ref 0–6)
ERYTHROCYTE [DISTWIDTH] IN BLOOD BY AUTOMATED COUNT: 28 % (ref 11.6–15.1)
HCT VFR BLD AUTO: 36.8 % (ref 36.5–49.3)
HGB BLD-MCNC: 10.3 G/DL (ref 12–17)
IMM GRANULOCYTES # BLD AUTO: 0.07 THOUSAND/UL (ref 0–0.2)
IMM GRANULOCYTES NFR BLD AUTO: 1 % (ref 0–2)
LYMPHOCYTES # BLD AUTO: 1.51 THOUSANDS/ΜL (ref 0.6–4.47)
LYMPHOCYTES NFR BLD AUTO: 19 % (ref 14–44)
MCH RBC QN AUTO: 22.2 PG (ref 26.8–34.3)
MCHC RBC AUTO-ENTMCNC: 28 G/DL (ref 31.4–37.4)
MCV RBC AUTO: 79 FL (ref 82–98)
MONOCYTES # BLD AUTO: 0.8 THOUSAND/ΜL (ref 0.17–1.22)
MONOCYTES NFR BLD AUTO: 10 % (ref 4–12)
NEUTROPHILS # BLD AUTO: 5.05 THOUSANDS/ΜL (ref 1.85–7.62)
NEUTS SEG NFR BLD AUTO: 65 % (ref 43–75)
PLATELET # BLD AUTO: 603 THOUSANDS/UL (ref 149–390)
PMV BLD AUTO: 9.6 FL (ref 8.9–12.7)
RBC # BLD AUTO: 4.65 MILLION/UL (ref 3.88–5.62)
WBC # BLD AUTO: 7.78 THOUSAND/UL (ref 4.31–10.16)

## 2020-08-10 PROCEDURE — 85025 COMPLETE CBC W/AUTO DIFF WBC: CPT

## 2020-08-10 PROCEDURE — 36415 COLL VENOUS BLD VENIPUNCTURE: CPT

## 2020-08-10 RX ORDER — BUDESONIDE AND FORMOTEROL FUMARATE DIHYDRATE 80; 4.5 UG/1; UG/1
2 AEROSOL RESPIRATORY (INHALATION) 2 TIMES DAILY
Qty: 4 INHALER | Refills: 3 | Status: CANCELLED | OUTPATIENT
Start: 2020-08-10

## 2020-08-13 RX ORDER — DIGOXIN 250 MCG
0.12 TABLET ORAL DAILY
Qty: 90 TABLET | Refills: 3 | Status: SHIPPED | OUTPATIENT
Start: 2020-08-13 | End: 2020-09-28 | Stop reason: SDUPTHER

## 2020-08-13 RX ORDER — LISINOPRIL 2.5 MG/1
2.5 TABLET ORAL DAILY
Qty: 90 TABLET | Refills: 3 | Status: SHIPPED | OUTPATIENT
Start: 2020-08-13 | End: 2020-11-10 | Stop reason: SDUPTHER

## 2020-08-18 DIAGNOSIS — I10 ESSENTIAL HYPERTENSION: ICD-10-CM

## 2020-08-18 RX ORDER — DIGOXIN 125 MCG
TABLET ORAL
Qty: 78 TABLET | Refills: 1 | Status: CANCELLED | OUTPATIENT
Start: 2020-08-18

## 2020-08-21 ENCOUNTER — TRANSITIONAL CARE MANAGEMENT (OUTPATIENT)
Dept: FAMILY MEDICINE CLINIC | Facility: CLINIC | Age: 75
End: 2020-08-21

## 2020-08-25 DIAGNOSIS — I10 ESSENTIAL HYPERTENSION: Primary | ICD-10-CM

## 2020-08-25 RX ORDER — FUROSEMIDE 40 MG/1
40 TABLET ORAL DAILY
Qty: 90 TABLET | Refills: 0 | Status: SHIPPED | OUTPATIENT
Start: 2020-08-25 | End: 2020-09-28 | Stop reason: SDUPTHER

## 2020-08-26 ENCOUNTER — TRANSITIONAL CARE MANAGEMENT (OUTPATIENT)
Dept: FAMILY MEDICINE CLINIC | Facility: CLINIC | Age: 75
End: 2020-08-26

## 2020-08-31 ENCOUNTER — OFFICE VISIT (OUTPATIENT)
Dept: FAMILY MEDICINE CLINIC | Facility: CLINIC | Age: 75
End: 2020-08-31
Payer: MEDICARE

## 2020-08-31 VITALS
OXYGEN SATURATION: 99 % | DIASTOLIC BLOOD PRESSURE: 60 MMHG | RESPIRATION RATE: 16 BRPM | BODY MASS INDEX: 23.48 KG/M2 | WEIGHT: 164 LBS | SYSTOLIC BLOOD PRESSURE: 128 MMHG | TEMPERATURE: 97 F | HEART RATE: 78 BPM | HEIGHT: 70 IN

## 2020-08-31 DIAGNOSIS — D50.0 IRON DEFICIENCY ANEMIA DUE TO CHRONIC BLOOD LOSS: ICD-10-CM

## 2020-08-31 DIAGNOSIS — I10 ESSENTIAL HYPERTENSION: Primary | ICD-10-CM

## 2020-08-31 DIAGNOSIS — J44.9 CHRONIC OBSTRUCTIVE PULMONARY DISEASE, UNSPECIFIED COPD TYPE (HCC): ICD-10-CM

## 2020-08-31 PROCEDURE — 99214 OFFICE O/P EST MOD 30 MIN: CPT | Performed by: INTERNAL MEDICINE

## 2020-08-31 PROCEDURE — 3008F BODY MASS INDEX DOCD: CPT | Performed by: INTERNAL MEDICINE

## 2020-08-31 PROCEDURE — 1160F RVW MEDS BY RX/DR IN RCRD: CPT | Performed by: INTERNAL MEDICINE

## 2020-08-31 PROCEDURE — 1111F DSCHRG MED/CURRENT MED MERGE: CPT | Performed by: INTERNAL MEDICINE

## 2020-08-31 RX ORDER — PANTOPRAZOLE SODIUM 40 MG/1
40 TABLET, DELAYED RELEASE ORAL
Qty: 30 TABLET | Refills: 5 | Status: SHIPPED | OUTPATIENT
Start: 2020-08-31 | End: 2020-09-17 | Stop reason: SDUPTHER

## 2020-08-31 RX ORDER — BUDESONIDE AND FORMOTEROL FUMARATE DIHYDRATE 80; 4.5 UG/1; UG/1
2 AEROSOL RESPIRATORY (INHALATION) 2 TIMES DAILY
COMMUNITY
End: 2020-11-23

## 2020-08-31 RX ORDER — BUDESONIDE AND FORMOTEROL FUMARATE DIHYDRATE 80; 4.5 UG/1; UG/1
2 AEROSOL RESPIRATORY (INHALATION) 2 TIMES DAILY
Qty: 1 INHALER | Refills: 0 | Status: SHIPPED | OUTPATIENT
Start: 2020-08-31 | End: 2020-11-30 | Stop reason: ALTCHOICE

## 2020-08-31 NOTE — PROGRESS NOTES
Assessment/Plan:  Post hospital follow-up  He was in a hospital because of severe anemia cause unknown he was transfused and discharged  He has history of congestive heart failure nonischemic cardiomyopathy and atrial fibrillation is on chronic anticoagulation with Eliquis  Stools were negative for occult blood so he is continuing to take Eliquis advised to get a CBC and CMP to make sure his hemoglobin is remaining stable  Will see if he is iron deficient may have to give him iron  Cardiomyopathy has resolved  Chronic atrial fibrillation rate is under control  Chronic obstructive lung disease on Symbicort  Continue that  Continue pantoprazole pending upper endoscopy findings       Problem List Items Addressed This Visit     None      Visit Diagnoses     Essential hypertension    -  Primary    Relevant Orders    Comprehensive metabolic panel    Iron deficiency anemia due to chronic blood loss        Relevant Medications    pantoprazole (PROTONIX) 40 mg tablet    Other Relevant Orders    CBC and differential    Chronic obstructive pulmonary disease, unspecified COPD type (HCC)        Relevant Medications    budesonide-formoterol (SYMBICORT) 80-4 5 MCG/ACT inhaler    budesonide-formoterol (SYMBICORT) 80-4 5 MCG/ACT inhaler            Subjective:      Patient ID: Suellen Blount is a 69 y o  male  Magda Pickett was recently in the hospital   He went to the emergency room because he had general and shortness of breath  He was noted to have hemoglobin of 6  He was transfused 2 units of blood on admission and 1 unit next day  Eight hemoglobin came about weight  She has stools were negative for occult blood  Etiology of anemia was unclear he was discharged home on pantoprazole and advised to see gastroenterologist for upper and lower endoscopy  He is feeling better since he is home  no shortness of breath  No weakness  No chest pains  No orthopnea or PND  Shortness of breath also has improved No dizziness    No lightheadedness no palpitations  No black stools or blood in stools  No joint pains, bone pains, muscle aches  History of COPD  Ki a myopathy and CHF however recent ejection fraction was 55 in the nonischemic cardiomyopathy probably was viral and has resolved  He digestion facts night at time of diagnosis of cardiomyopathy was 10%  Cardiac catheterization did not show any obstructive coronary artery disease  He was also in atrial fibrillation at the time  He is on chronic anticoagulation well Eliquis  He did not want to have any upper or lower endoscopy  However he was told there is potential that he had an ulcer or cancer in the right colon that can cause low-grade bleeding and eventually lead to anemia  He did agree eat to go for endoscopy he is going to see a gastroenterologist      The following portions of the patient's history were reviewed and updated as appropriate:   He has a past medical history of Anxiety disorder, Atrial fibrillation ([facility] [address] ), Coronary artery disease, Depression, Hepatitis C, and MI (myocardial infarction) ([facility] [address] )  ,  does not have any pertinent problems on file  ,   has a past surgical history that includes Cardiac catheterization (07/09/2018)  ,  family history includes Coronary artery disease in his father and mother; Diabetes in his father; Hypertension in his father and mother  ,   reports that he has been smoking cigarettes  He has a 14 25 pack-year smoking history  He has never used smokeless tobacco  He reports that he does not drink alcohol or use drugs  ,  has No Known Allergies     Current Outpatient Medications   Medication Sig Dispense Refill    budesonide-formoterol (SYMBICORT) 80-4 5 MCG/ACT inhaler Inhale 2 puffs 2 (two) times a day Rinse mouth after use   apixaban (ELIQUIS) 2 5 mg Take 1 tablet (2 5 mg total) by mouth 2 (two) times a day 60 tablet 1    budesonide-formoterol (SYMBICORT) 80-4 5 MCG/ACT inhaler Inhale 2 puffs 2 (two) times a day Rinse mouth after use   1 Inhaler 0    digoxin (LANOXIN) 0 25 mg tablet Take 0 5 tablets (0 125 mg total) by mouth daily 90 tablet 3    ferrous sulfate 325 (65 Fe) mg tablet Take 1 tablet (325 mg total) by mouth daily with breakfast 30 tablet 0    fluticasone-salmeterol (Advair Diskus) 100-50 mcg/dose inhaler Inhale 1 puff 2 (two) times a day Rinse mouth after use  3 each 1    folic acid (FOLVITE) 550 mcg tablet Take 1 tablet (400 mcg total) by mouth daily 30 tablet 0    furosemide (LASIX) 40 mg tablet Take 1 tablet (40 mg total) by mouth daily 90 tablet 0    lisinopril (ZESTRIL) 2 5 mg tablet Take 1 tablet (2 5 mg total) by mouth daily 90 tablet 3    metoprolol tartrate (LOPRESSOR) 25 mg tablet Take 25 mg by mouth daily      nicotine (NICODERM CQ) 7 mg/24hr TD 24 hr patch Place 1 patch on the skin daily 28 patch 0    pantoprazole (PROTONIX) 40 mg tablet Take 1 tablet (40 mg total) by mouth 2 (two) times a day 30 tablet 0    pantoprazole (PROTONIX) 40 mg tablet Take 1 tablet (40 mg total) by mouth daily before breakfast 30 tablet 5    potassium chloride (K-DUR) 10 mEq tablet Take 10 mEq by mouth daily      tamsulosin (FLOMAX) 0 4 mg Take 0 4 mg by mouth daily       No current facility-administered medications for this visit  Review of Systems   All other systems reviewed and are negative  Objective:  Vitals:    08/31/20 0848   BP: 128/60   BP Location: Left arm   Patient Position: Sitting   Cuff Size: Adult   Pulse: 78   Resp: 16   Temp: (!) 97 °F (36 1 °C)   TempSrc: Tympanic   SpO2: 99%   Weight: 74 4 kg (164 lb)   Height: 5' 10" (1 778 m)     Body mass index is 23 53 kg/m²  Physical Exam  Vitals signs and nursing note reviewed  Constitutional:       Appearance: Normal appearance  He is normal weight  HENT:      Head: Normocephalic and atraumatic  Nose: Nose normal       Mouth/Throat:      Mouth: Mucous membranes are moist    Eyes:      General: No scleral icterus       Extraocular Movements: Extraocular movements intact  Pupils: Pupils are equal, round, and reactive to light  Neck:      Musculoskeletal: Normal range of motion and neck supple  Cardiovascular:      Rate and Rhythm: Normal rate and regular rhythm  Pulses: Normal pulses  Heart sounds: Normal heart sounds  No murmur  Pulmonary:      Effort: Pulmonary effort is normal       Comments: Diminished breath sounds  Abdominal:      General: Abdomen is flat  There is no distension  Palpations: Abdomen is soft  There is no mass  Hernia: No hernia is present  Musculoskeletal: Normal range of motion  Right lower leg: No edema  Left lower leg: No edema  Skin:     General: Skin is warm and dry  Neurological:      General: No focal deficit present  Mental Status: He is alert and oriented to person, place, and time  Psychiatric:         Mood and Affect: Mood normal          Behavior: Behavior normal          TCM Call (since 7/31/2020)     Date and time call was made  8/21/2020  1:51 PM    Patient was hospitialized at  [facility]    Date of Admission  08/03/20    Date of discharge  08/08/20    Diagnosis  I42 8    Disposition  Home    Current Symptoms  Dizziness    Dizziness severity  Mild    Quality Character  Lightheadedness    Episode pattern  Daytime    How long does the episode last for  less than 2 mins    Cause  No known event    What makes the symptoms better  Rest    What makes symptoms worse  Standing    Clinical progress  Improving      TCM Call (since 7/31/2020)     Post hospital issues  None    Should patient be enrolled in anticoag monitoring? No    Scheduled for follow up?   Yes    Did you obtain your prescribed medications  Yes    Do you need help managing your prescriptions or medications  No    Is transportation to your appointment needed  No    I have advised the patient to call PCP with any new or worsening symptoms  [address] or Beny other    Support System  Partner    The type of support provided  Physical    Do you have social support  Yes, as much as I need    Are you recieving any outpatient services  No    Are you recieving home care services  No    Are you using any community resources  No    Current waiver services  No    Have you fallen in the last 12 months  No    Interperter language line needed  No    Counseling topics  Prognosis

## 2020-09-17 DIAGNOSIS — D50.0 IRON DEFICIENCY ANEMIA DUE TO CHRONIC BLOOD LOSS: ICD-10-CM

## 2020-09-17 RX ORDER — PANTOPRAZOLE SODIUM 40 MG/1
40 TABLET, DELAYED RELEASE ORAL
Qty: 30 TABLET | Refills: 5 | Status: SHIPPED | OUTPATIENT
Start: 2020-09-17 | End: 2020-09-28 | Stop reason: SDUPTHER

## 2020-09-25 DIAGNOSIS — I48.11 LONGSTANDING PERSISTENT ATRIAL FIBRILLATION (HCC): ICD-10-CM

## 2020-09-28 ENCOUNTER — OFFICE VISIT (OUTPATIENT)
Dept: FAMILY MEDICINE CLINIC | Facility: CLINIC | Age: 75
End: 2020-09-28
Payer: MEDICARE

## 2020-09-28 VITALS
HEART RATE: 72 BPM | OXYGEN SATURATION: 96 % | SYSTOLIC BLOOD PRESSURE: 110 MMHG | WEIGHT: 153 LBS | BODY MASS INDEX: 21.9 KG/M2 | RESPIRATION RATE: 18 BRPM | TEMPERATURE: 97.5 F | DIASTOLIC BLOOD PRESSURE: 60 MMHG | HEIGHT: 70 IN

## 2020-09-28 DIAGNOSIS — I10 ESSENTIAL HYPERTENSION: ICD-10-CM

## 2020-09-28 DIAGNOSIS — I48.11 LONGSTANDING PERSISTENT ATRIAL FIBRILLATION (HCC): ICD-10-CM

## 2020-09-28 DIAGNOSIS — D64.9 ANEMIA, UNSPECIFIED TYPE: ICD-10-CM

## 2020-09-28 DIAGNOSIS — D50.0 IRON DEFICIENCY ANEMIA DUE TO CHRONIC BLOOD LOSS: ICD-10-CM

## 2020-09-28 DIAGNOSIS — Z23 FLU VACCINE NEED: Primary | ICD-10-CM

## 2020-09-28 DIAGNOSIS — E78.5 DYSLIPIDEMIA: ICD-10-CM

## 2020-09-28 DIAGNOSIS — J43.9 PULMONARY EMPHYSEMA, UNSPECIFIED EMPHYSEMA TYPE (HCC): ICD-10-CM

## 2020-09-28 PROCEDURE — 99214 OFFICE O/P EST MOD 30 MIN: CPT | Performed by: INTERNAL MEDICINE

## 2020-09-28 PROCEDURE — 90662 IIV NO PRSV INCREASED AG IM: CPT

## 2020-09-28 PROCEDURE — G0008 ADMIN INFLUENZA VIRUS VAC: HCPCS

## 2020-09-28 RX ORDER — PANTOPRAZOLE SODIUM 40 MG/1
40 TABLET, DELAYED RELEASE ORAL
Qty: 30 TABLET | Refills: 5 | Status: SHIPPED | OUTPATIENT
Start: 2020-09-28 | End: 2020-11-10 | Stop reason: SDUPTHER

## 2020-09-28 RX ORDER — DIGOXIN 250 MCG
0.12 TABLET ORAL DAILY
Qty: 90 TABLET | Refills: 3 | Status: SHIPPED | OUTPATIENT
Start: 2020-09-28 | End: 2020-11-10 | Stop reason: SDUPTHER

## 2020-09-28 RX ORDER — FUROSEMIDE 40 MG/1
40 TABLET ORAL DAILY
Qty: 90 TABLET | Refills: 0 | Status: SHIPPED | OUTPATIENT
Start: 2020-09-28 | End: 2020-11-10 | Stop reason: SDUPTHER

## 2020-09-28 RX ORDER — PANTOPRAZOLE SODIUM 40 MG/1
40 TABLET, DELAYED RELEASE ORAL DAILY
Qty: 90 TABLET | Refills: 1 | Status: SHIPPED | OUTPATIENT
Start: 2020-09-28 | End: 2020-11-10 | Stop reason: SDUPTHER

## 2020-09-28 RX ORDER — IPRATROPIUM BROMIDE AND ALBUTEROL SULFATE 2.5; .5 MG/3ML; MG/3ML
3 SOLUTION RESPIRATORY (INHALATION) 4 TIMES DAILY
Qty: 1 VIAL | Refills: 30 | Status: SHIPPED | OUTPATIENT
Start: 2020-09-28 | End: 2021-09-10 | Stop reason: SDUPTHER

## 2020-09-28 NOTE — PROGRESS NOTES
Assessment/Plan:    HTN - lisinopril , well controlled ,110/70 in office     CHF - EF 55 % , on lasix , no leg edema     COPD - on duoneb , still smoking 3 cig a day , follow up with Dr Rodrigo Kwan     Afib - on metoprolol, digoxin , eliquis,check dig level      Iron def anemia - on eliquis , HB 10 3 , will repeat CBC , on ferous sulphate     GERD- on pantoprazole         No problem-specific Assessment & Plan notes found for this encounter  Diagnoses and all orders for this visit:    Flu vaccine need  -     influenza vaccine, high-dose, PF 0 7 mL (FLUZONE HIGH-DOSE)    Longstanding persistent atrial fibrillation (HCC)  -     apixaban (ELIQUIS) 5 mg; Take 1 tablet (5 mg total) by mouth 2 (two) times a day    Essential hypertension  -     furosemide (LASIX) 40 mg tablet; Take 1 tablet (40 mg total) by mouth daily  -     digoxin (LANOXIN) 0 25 mg tablet; Take 0 5 tablets (0 125 mg total) by mouth daily    Anemia, unspecified type  -     pantoprazole (PROTONIX) 40 mg tablet; Take 1 tablet (40 mg total) by mouth daily    Iron deficiency anemia due to chronic blood loss  -     pantoprazole (PROTONIX) 40 mg tablet; Take 1 tablet (40 mg total) by mouth daily before breakfast    Pulmonary emphysema, unspecified emphysema type ([facility] [address] )  -     ipratropium-albuterol (DUO-NEB) 0 5-2 5 mg/3 mL nebulizer solution; Take 1 vial (3 mL total) by nebulization 4 (four) times a day          Subjective:      Patient ID: Td Qureshi is a 69 y o  male  Here for follow up of HTN , Afib , COPD  Denied any complains        The following portions of the patient's history were reviewed and updated as appropriate: allergies, current medications, past family history, past medical history, past social history, past surgical history and problem list     Review of Systems      Objective:      /60 (BP Location: Left arm, Patient Position: Sitting, Cuff Size: Standard)   Pulse 72   Temp 97 5 °F (36 4 °C) (Tympanic)   Resp 18   Ht 5' 10" (1 778 m)   Wt 69 4 kg (153 lb)   SpO2 96%   BMI 21 95 kg/m²          Physical Exam  Constitutional:       Appearance: Normal appearance  HENT:      Head: Normocephalic and atraumatic  Nose: Nose normal    Eyes:      Conjunctiva/sclera: Conjunctivae normal       Pupils: Pupils are equal, round, and reactive to light  Neck:      Musculoskeletal: Normal range of motion and neck supple  Cardiovascular:      Rate and Rhythm: Normal rate  Rhythm irregular  Pulmonary:      Effort: Pulmonary effort is normal  No respiratory distress  Breath sounds: Normal breath sounds  No wheezing  Abdominal:      General: Abdomen is flat  Bowel sounds are normal       Palpations: Abdomen is soft  Tenderness: There is no abdominal tenderness  Musculoskeletal: Normal range of motion  General: No swelling  Right lower leg: No edema  Left lower leg: No edema  Skin:     General: Skin is warm  Capillary Refill: Capillary refill takes less than 2 seconds  Coloration: Skin is not jaundiced or pale  Findings: No erythema or rash  Neurological:      General: No focal deficit present  Mental Status: He is alert  Cranial Nerves: No cranial nerve deficit  Sensory: No sensory deficit        Deep Tendon Reflexes: Reflexes normal    Psychiatric:         Mood and Affect: Mood normal          Behavior: Behavior normal

## 2020-11-10 DIAGNOSIS — I48.11 LONGSTANDING PERSISTENT ATRIAL FIBRILLATION (HCC): ICD-10-CM

## 2020-11-10 DIAGNOSIS — D50.0 IRON DEFICIENCY ANEMIA DUE TO CHRONIC BLOOD LOSS: ICD-10-CM

## 2020-11-10 DIAGNOSIS — I10 ESSENTIAL HYPERTENSION: ICD-10-CM

## 2020-11-10 RX ORDER — LISINOPRIL 2.5 MG/1
2.5 TABLET ORAL DAILY
Qty: 90 TABLET | Refills: 3 | Status: SHIPPED | OUTPATIENT
Start: 2020-11-10 | End: 2021-09-10 | Stop reason: SDUPTHER

## 2020-11-10 RX ORDER — DIGOXIN 250 MCG
0.12 TABLET ORAL DAILY
Qty: 90 TABLET | Refills: 3 | Status: SHIPPED | OUTPATIENT
Start: 2020-11-10 | End: 2021-06-30 | Stop reason: HOSPADM

## 2020-11-10 RX ORDER — PANTOPRAZOLE SODIUM 40 MG/1
40 TABLET, DELAYED RELEASE ORAL
Qty: 90 TABLET | Refills: 3 | Status: SHIPPED | OUTPATIENT
Start: 2020-11-10 | End: 2021-09-10 | Stop reason: SDUPTHER

## 2020-11-10 RX ORDER — FUROSEMIDE 40 MG/1
40 TABLET ORAL DAILY
Qty: 90 TABLET | Refills: 3 | Status: ON HOLD | OUTPATIENT
Start: 2020-11-10 | End: 2021-06-30

## 2020-11-24 ENCOUNTER — TRANSCRIBE ORDERS (OUTPATIENT)
Dept: URGENT CARE | Facility: CLINIC | Age: 75
End: 2020-11-24

## 2020-11-24 DIAGNOSIS — Z20.822 COVID-19 RULED OUT BY LABORATORY TESTING: ICD-10-CM

## 2020-11-24 DIAGNOSIS — Z13.9 SCREENING FOR UNSPECIFIED CONDITION: Primary | ICD-10-CM

## 2020-11-24 PROCEDURE — U0003 INFECTIOUS AGENT DETECTION BY NUCLEIC ACID (DNA OR RNA); SEVERE ACUTE RESPIRATORY SYNDROME CORONAVIRUS 2 (SARS-COV-2) (CORONAVIRUS DISEASE [COVID-19]), AMPLIFIED PROBE TECHNIQUE, MAKING USE OF HIGH THROUGHPUT TECHNOLOGIES AS DESCRIBED BY CMS-2020-01-R: HCPCS | Performed by: INTERNAL MEDICINE

## 2020-11-25 LAB — SARS-COV-2 RNA SPEC QL NAA+PROBE: NOT DETECTED

## 2020-11-30 ENCOUNTER — ANESTHESIA EVENT (OUTPATIENT)
Dept: GASTROENTEROLOGY | Facility: AMBULARY SURGERY CENTER | Age: 75
End: 2020-11-30

## 2020-11-30 ENCOUNTER — HOSPITAL ENCOUNTER (OUTPATIENT)
Dept: GASTROENTEROLOGY | Facility: AMBULARY SURGERY CENTER | Age: 75
Setting detail: OUTPATIENT SURGERY
Discharge: HOME | End: 2020-11-30
Attending: INTERNAL MEDICINE | Admitting: INTERNAL MEDICINE
Payer: MEDICARE

## 2020-11-30 ENCOUNTER — ANESTHESIA (OUTPATIENT)
Dept: GASTROENTEROLOGY | Facility: AMBULARY SURGERY CENTER | Age: 75
End: 2020-11-30

## 2020-11-30 VITALS
BODY MASS INDEX: 20.99 KG/M2 | WEIGHT: 155 LBS | SYSTOLIC BLOOD PRESSURE: 122 MMHG | DIASTOLIC BLOOD PRESSURE: 73 MMHG | RESPIRATION RATE: 18 BRPM | HEIGHT: 72 IN | OXYGEN SATURATION: 97 % | HEART RATE: 60 BPM | TEMPERATURE: 97.6 F

## 2020-11-30 VITALS — HEART RATE: 56 BPM

## 2020-11-30 DIAGNOSIS — D50.9 IRON DEFICIENCY ANEMIA, UNSPECIFIED: ICD-10-CM

## 2020-11-30 DIAGNOSIS — Z20.822 COVID-19 RULED OUT BY LABORATORY TESTING: Primary | ICD-10-CM

## 2020-11-30 PROCEDURE — 88305 TISSUE EXAM BY PATHOLOGIST: CPT | Performed by: PATHOLOGY

## 2020-11-30 PROCEDURE — 88342 IMHCHEM/IMCYTCHM 1ST ANTB: CPT | Performed by: PATHOLOGY

## 2020-11-30 RX ORDER — LIDOCAINE HYDROCHLORIDE 10 MG/ML
INJECTION, SOLUTION EPIDURAL; INFILTRATION; INTRACAUDAL; PERINEURAL AS NEEDED
Status: DISCONTINUED | OUTPATIENT
Start: 2020-11-30 | End: 2020-11-30

## 2020-11-30 RX ORDER — PROPOFOL 10 MG/ML
INJECTION, EMULSION INTRAVENOUS AS NEEDED
Status: DISCONTINUED | OUTPATIENT
Start: 2020-11-30 | End: 2020-11-30

## 2020-11-30 RX ORDER — SODIUM CHLORIDE, SODIUM LACTATE, POTASSIUM CHLORIDE, CALCIUM CHLORIDE 600; 310; 30; 20 MG/100ML; MG/100ML; MG/100ML; MG/100ML
125 INJECTION, SOLUTION INTRAVENOUS CONTINUOUS
Status: DISCONTINUED | OUTPATIENT
Start: 2020-11-30 | End: 2020-12-04 | Stop reason: HOSPADM

## 2020-11-30 RX ORDER — SODIUM CHLORIDE, SODIUM LACTATE, POTASSIUM CHLORIDE, CALCIUM CHLORIDE 600; 310; 30; 20 MG/100ML; MG/100ML; MG/100ML; MG/100ML
INJECTION, SOLUTION INTRAVENOUS CONTINUOUS PRN
Status: DISCONTINUED | OUTPATIENT
Start: 2020-11-30 | End: 2020-11-30

## 2020-11-30 RX ADMIN — PROPOFOL 20 MG: 10 INJECTION, EMULSION INTRAVENOUS at 08:53

## 2020-11-30 RX ADMIN — PROPOFOL 20 MG: 10 INJECTION, EMULSION INTRAVENOUS at 08:42

## 2020-11-30 RX ADMIN — PROPOFOL 20 MG: 10 INJECTION, EMULSION INTRAVENOUS at 08:30

## 2020-11-30 RX ADMIN — PHENYLEPHRINE HYDROCHLORIDE 100 MCG: 10 INJECTION INTRAVENOUS at 08:47

## 2020-11-30 RX ADMIN — PROPOFOL 30 MG: 10 INJECTION, EMULSION INTRAVENOUS at 08:34

## 2020-11-30 RX ADMIN — PROPOFOL 20 MG: 10 INJECTION, EMULSION INTRAVENOUS at 08:20

## 2020-11-30 RX ADMIN — PROPOFOL 50 MG: 10 INJECTION, EMULSION INTRAVENOUS at 08:12

## 2020-11-30 RX ADMIN — PROPOFOL 40 MG: 10 INJECTION, EMULSION INTRAVENOUS at 08:32

## 2020-11-30 RX ADMIN — LIDOCAINE HYDROCHLORIDE 50 MG: 10 INJECTION, SOLUTION EPIDURAL; INFILTRATION; INTRACAUDAL; PERINEURAL at 08:09

## 2020-11-30 RX ADMIN — PROPOFOL 30 MG: 10 INJECTION, EMULSION INTRAVENOUS at 08:37

## 2020-11-30 RX ADMIN — SODIUM CHLORIDE, SODIUM LACTATE, POTASSIUM CHLORIDE, AND CALCIUM CHLORIDE: .6; .31; .03; .02 INJECTION, SOLUTION INTRAVENOUS at 08:07

## 2020-11-30 RX ADMIN — PROPOFOL 20 MG: 10 INJECTION, EMULSION INTRAVENOUS at 08:28

## 2020-11-30 RX ADMIN — PROPOFOL 20 MG: 10 INJECTION, EMULSION INTRAVENOUS at 08:46

## 2020-11-30 RX ADMIN — PROPOFOL 20 MG: 10 INJECTION, EMULSION INTRAVENOUS at 08:40

## 2020-11-30 RX ADMIN — PROPOFOL 20 MG: 10 INJECTION, EMULSION INTRAVENOUS at 08:18

## 2020-11-30 RX ADMIN — PROPOFOL 20 MG: 10 INJECTION, EMULSION INTRAVENOUS at 08:59

## 2020-11-30 RX ADMIN — PROPOFOL 30 MG: 10 INJECTION, EMULSION INTRAVENOUS at 08:25

## 2020-11-30 RX ADMIN — PROPOFOL 100 MG: 10 INJECTION, EMULSION INTRAVENOUS at 08:09

## 2020-11-30 RX ADMIN — PROPOFOL 20 MG: 10 INJECTION, EMULSION INTRAVENOUS at 08:44

## 2020-11-30 RX ADMIN — PROPOFOL 30 MG: 10 INJECTION, EMULSION INTRAVENOUS at 08:15

## 2020-11-30 RX ADMIN — PROPOFOL 30 MG: 10 INJECTION, EMULSION INTRAVENOUS at 08:26

## 2020-11-30 RX ADMIN — PROPOFOL 30 MG: 10 INJECTION, EMULSION INTRAVENOUS at 08:24

## 2020-11-30 RX ADMIN — PROPOFOL 20 MG: 10 INJECTION, EMULSION INTRAVENOUS at 08:50

## 2020-11-30 RX ADMIN — PROPOFOL 20 MG: 10 INJECTION, EMULSION INTRAVENOUS at 08:56

## 2021-01-29 DIAGNOSIS — A04.8 H. PYLORI INFECTION: Primary | ICD-10-CM

## 2021-01-29 RX ORDER — LANSOPRAZOLE, AMOXICILLIN, CLARITHROMYCIN 30-500-500
KIT ORAL 2 TIMES DAILY
Qty: 1 KIT | Refills: 0 | Status: SHIPPED | OUTPATIENT
Start: 2021-01-29 | End: 2021-06-30 | Stop reason: HOSPADM

## 2021-03-09 ENCOUNTER — TELEPHONE (OUTPATIENT)
Dept: GASTROENTEROLOGY | Facility: CLINIC | Age: 76
End: 2021-03-09

## 2021-03-09 NOTE — TELEPHONE ENCOUNTER
I made several attempts to reach pt with no return calls  Please send pt a registered letter  Thank You!

## 2021-04-06 DIAGNOSIS — J44.9 CHRONIC OBSTRUCTIVE PULMONARY DISEASE, UNSPECIFIED COPD TYPE (HCC): ICD-10-CM

## 2021-04-06 DIAGNOSIS — I10 ESSENTIAL HYPERTENSION: ICD-10-CM

## 2021-04-09 ENCOUNTER — TELEPHONE (OUTPATIENT)
Dept: FAMILY MEDICINE CLINIC | Facility: CLINIC | Age: 76
End: 2021-04-09

## 2021-04-29 NOTE — TELEPHONE ENCOUNTER
Called, number not working, unable to leave Richard Oleary
Called, number not working, unable to leave msg to follow up
yes

## 2021-06-28 ENCOUNTER — APPOINTMENT (EMERGENCY)
Dept: CT IMAGING | Facility: HOSPITAL | Age: 76
DRG: 683 | End: 2021-06-28
Payer: MEDICARE

## 2021-06-28 ENCOUNTER — APPOINTMENT (EMERGENCY)
Dept: RADIOLOGY | Facility: HOSPITAL | Age: 76
DRG: 683 | End: 2021-06-28
Payer: MEDICARE

## 2021-06-28 ENCOUNTER — HOSPITAL ENCOUNTER (INPATIENT)
Facility: HOSPITAL | Age: 76
LOS: 2 days | Discharge: HOME | DRG: 683 | End: 2021-06-30
Admitting: INTERNAL MEDICINE
Payer: MEDICARE

## 2021-06-28 DIAGNOSIS — R55 NEAR SYNCOPE: ICD-10-CM

## 2021-06-28 DIAGNOSIS — I48.91 ATRIAL FIBRILLATION (HCC): ICD-10-CM

## 2021-06-28 DIAGNOSIS — R42 DIZZINESS: Primary | ICD-10-CM

## 2021-06-28 DIAGNOSIS — R00.1 BRADYCARDIA: ICD-10-CM

## 2021-06-28 DIAGNOSIS — I48.21 PERMANENT ATRIAL FIBRILLATION (HCC): ICD-10-CM

## 2021-06-28 DIAGNOSIS — N17.0 ACUTE KIDNEY INJURY (AKI) WITH ACUTE TUBULAR NECROSIS (ATN) (HCC): ICD-10-CM

## 2021-06-28 DIAGNOSIS — I48.11 LONGSTANDING PERSISTENT ATRIAL FIBRILLATION (HCC): ICD-10-CM

## 2021-06-28 DIAGNOSIS — I10 ESSENTIAL HYPERTENSION: ICD-10-CM

## 2021-06-28 DIAGNOSIS — N17.9 ACUTE RENAL INJURY (HCC): ICD-10-CM

## 2021-06-28 PROBLEM — J44.9 COPD (CHRONIC OBSTRUCTIVE PULMONARY DISEASE) (HCC): Status: ACTIVE | Noted: 2021-06-28

## 2021-06-28 PROBLEM — D12.6 COLONIC ADENOMA: Status: ACTIVE | Noted: 2021-06-28

## 2021-06-28 PROBLEM — K21.9 GERD (GASTROESOPHAGEAL REFLUX DISEASE): Status: ACTIVE | Noted: 2021-06-28

## 2021-06-28 PROBLEM — I42.0 CONGESTIVE CARDIOMYOPATHY (HCC): Status: ACTIVE | Noted: 2020-08-03

## 2021-06-28 PROBLEM — R26.2 AMBULATORY DYSFUNCTION: Status: ACTIVE | Noted: 2021-06-28

## 2021-06-28 PROBLEM — E86.0 DEHYDRATION: Status: RESOLVED | Noted: 2021-06-28 | Resolved: 2021-06-28

## 2021-06-28 PROBLEM — E86.0 DEHYDRATION: Status: ACTIVE | Noted: 2021-06-28

## 2021-06-28 LAB
ALBUMIN SERPL BCP-MCNC: 3.7 G/DL (ref 3.4–4.8)
ALP SERPL-CCNC: 50.4 U/L (ref 10–129)
ALT SERPL W P-5'-P-CCNC: 7 U/L (ref 5–63)
AMMONIA PLAS-SCNC: 35.85 UMOL/L
ANION GAP SERPL CALCULATED.3IONS-SCNC: 6 MMOL/L (ref 4–13)
APTT PPP: 23 SECONDS (ref 23–31)
AST SERPL W P-5'-P-CCNC: 9 U/L (ref 15–41)
BASOPHILS # BLD AUTO: 0.04 THOUSANDS/ΜL (ref 0–0.1)
BASOPHILS NFR BLD AUTO: 0 % (ref 0–1)
BILIRUB SERPL-MCNC: 0.76 MG/DL (ref 0.3–1.2)
BUN SERPL-MCNC: 25 MG/DL (ref 6–20)
CALCIUM SERPL-MCNC: 8.5 MG/DL (ref 8.4–10.2)
CHLORIDE SERPL-SCNC: 105 MMOL/L (ref 96–108)
CO2 SERPL-SCNC: 30 MMOL/L (ref 22–33)
CREAT SERPL-MCNC: 1.86 MG/DL (ref 0.5–1.2)
DIGOXIN SERPL-MCNC: 1.8 NG/ML (ref 0.8–2)
EOSINOPHIL # BLD AUTO: 0.04 THOUSAND/ΜL (ref 0–0.61)
EOSINOPHIL NFR BLD AUTO: 0 % (ref 0–6)
ERYTHROCYTE [DISTWIDTH] IN BLOOD BY AUTOMATED COUNT: 15.2 % (ref 11.6–15.1)
GFR SERPL CREATININE-BSD FRML MDRD: 35 ML/MIN/1.73SQ M
GLUCOSE SERPL-MCNC: 127 MG/DL (ref 65–140)
HCT VFR BLD AUTO: 30.7 % (ref 36.5–49.3)
HGB BLD-MCNC: 8.8 G/DL (ref 12–17)
IMM GRANULOCYTES # BLD AUTO: 0.04 THOUSAND/UL (ref 0–0.2)
IMM GRANULOCYTES NFR BLD AUTO: 0 % (ref 0–2)
INR PPP: 1.11 (ref 0.9–1.1)
LYMPHOCYTES # BLD AUTO: 0.81 THOUSANDS/ΜL (ref 0.6–4.47)
LYMPHOCYTES NFR BLD AUTO: 9 % (ref 14–44)
MAGNESIUM SERPL-MCNC: 2 MG/DL (ref 1.6–2.6)
MCH RBC QN AUTO: 22.5 PG (ref 26.8–34.3)
MCHC RBC AUTO-ENTMCNC: 28.7 G/DL (ref 31.4–37.4)
MCV RBC AUTO: 79 FL (ref 82–98)
MONOCYTES # BLD AUTO: 0.68 THOUSAND/ΜL (ref 0.17–1.22)
MONOCYTES NFR BLD AUTO: 7 % (ref 4–12)
NEUTROPHILS # BLD AUTO: 7.66 THOUSANDS/ΜL (ref 1.85–7.62)
NEUTS SEG NFR BLD AUTO: 84 % (ref 43–75)
PLATELET # BLD AUTO: 268 THOUSANDS/UL (ref 149–390)
PMV BLD AUTO: 9.5 FL (ref 8.9–12.7)
POTASSIUM SERPL-SCNC: 3.6 MMOL/L (ref 3.5–5)
PROT SERPL-MCNC: 5.7 G/DL (ref 6.4–8.3)
PROTHROMBIN TIME: 12.5 SECONDS (ref 9.5–12.1)
RBC # BLD AUTO: 3.91 MILLION/UL (ref 3.88–5.62)
SODIUM SERPL-SCNC: 141 MMOL/L (ref 133–145)
TROPONIN I SERPL-MCNC: <0.03 NG/ML (ref 0–0.07)
WBC # BLD AUTO: 9.27 THOUSAND/UL (ref 4.31–10.16)

## 2021-06-28 PROCEDURE — 96361 HYDRATE IV INFUSION ADD-ON: CPT

## 2021-06-28 PROCEDURE — 70450 CT HEAD/BRAIN W/O DYE: CPT

## 2021-06-28 PROCEDURE — 93005 ELECTROCARDIOGRAM TRACING: CPT

## 2021-06-28 PROCEDURE — 99285 EMERGENCY DEPT VISIT HI MDM: CPT | Performed by: PHYSICIAN ASSISTANT

## 2021-06-28 PROCEDURE — G1004 CDSM NDSC: HCPCS

## 2021-06-28 PROCEDURE — 96360 HYDRATION IV INFUSION INIT: CPT

## 2021-06-28 PROCEDURE — 74177 CT ABD & PELVIS W/CONTRAST: CPT

## 2021-06-28 PROCEDURE — 80162 ASSAY OF DIGOXIN TOTAL: CPT | Performed by: PHYSICIAN ASSISTANT

## 2021-06-28 PROCEDURE — 85730 THROMBOPLASTIN TIME PARTIAL: CPT | Performed by: PHYSICIAN ASSISTANT

## 2021-06-28 PROCEDURE — 85610 PROTHROMBIN TIME: CPT | Performed by: PHYSICIAN ASSISTANT

## 2021-06-28 PROCEDURE — 71045 X-RAY EXAM CHEST 1 VIEW: CPT

## 2021-06-28 PROCEDURE — 36415 COLL VENOUS BLD VENIPUNCTURE: CPT | Performed by: PHYSICIAN ASSISTANT

## 2021-06-28 PROCEDURE — 85025 COMPLETE CBC W/AUTO DIFF WBC: CPT | Performed by: PHYSICIAN ASSISTANT

## 2021-06-28 PROCEDURE — 99285 EMERGENCY DEPT VISIT HI MDM: CPT

## 2021-06-28 PROCEDURE — 82140 ASSAY OF AMMONIA: CPT | Performed by: PHYSICIAN ASSISTANT

## 2021-06-28 PROCEDURE — 83735 ASSAY OF MAGNESIUM: CPT | Performed by: INTERNAL MEDICINE

## 2021-06-28 PROCEDURE — 84484 ASSAY OF TROPONIN QUANT: CPT | Performed by: PHYSICIAN ASSISTANT

## 2021-06-28 PROCEDURE — 80053 COMPREHEN METABOLIC PANEL: CPT | Performed by: PHYSICIAN ASSISTANT

## 2021-06-28 RX ORDER — SODIUM CHLORIDE 9 MG/ML
75 INJECTION, SOLUTION INTRAVENOUS CONTINUOUS
Status: DISPENSED | OUTPATIENT
Start: 2021-06-28 | End: 2021-06-29

## 2021-06-28 RX ORDER — LISINOPRIL 5 MG/1
2.5 TABLET ORAL DAILY
Status: CANCELLED | OUTPATIENT
Start: 2021-06-29

## 2021-06-28 RX ORDER — DIGOXIN 125 MCG
250 TABLET ORAL DAILY
Status: DISCONTINUED | OUTPATIENT
Start: 2021-06-29 | End: 2021-06-29

## 2021-06-28 RX ORDER — TAMSULOSIN HYDROCHLORIDE 0.4 MG/1
0.4 CAPSULE ORAL
Status: DISCONTINUED | OUTPATIENT
Start: 2021-06-29 | End: 2021-06-30 | Stop reason: HOSPADM

## 2021-06-28 RX ORDER — POTASSIUM CHLORIDE 750 MG/1
10 TABLET, EXTENDED RELEASE ORAL DAILY
Status: DISCONTINUED | OUTPATIENT
Start: 2021-06-29 | End: 2021-06-30 | Stop reason: HOSPADM

## 2021-06-28 RX ORDER — LEVALBUTEROL INHALATION SOLUTION 0.63 MG/3ML
0.63 SOLUTION RESPIRATORY (INHALATION) EVERY 8 HOURS PRN
Status: DISCONTINUED | OUTPATIENT
Start: 2021-06-28 | End: 2021-06-30 | Stop reason: HOSPADM

## 2021-06-28 RX ORDER — FERROUS SULFATE 325(65) MG
325 TABLET ORAL
Status: DISCONTINUED | OUTPATIENT
Start: 2021-06-29 | End: 2021-06-30 | Stop reason: HOSPADM

## 2021-06-28 RX ORDER — PANTOPRAZOLE SODIUM 40 MG/1
40 TABLET, DELAYED RELEASE ORAL
Status: DISCONTINUED | OUTPATIENT
Start: 2021-06-29 | End: 2021-06-30 | Stop reason: HOSPADM

## 2021-06-28 RX ORDER — SODIUM CHLORIDE 9 MG/ML
250 INJECTION, SOLUTION INTRAVENOUS CONTINUOUS
Status: DISCONTINUED | OUTPATIENT
Start: 2021-06-28 | End: 2021-06-28

## 2021-06-28 RX ORDER — LANOLIN ALCOHOL/MO/W.PET/CERES
400 CREAM (GRAM) TOPICAL DAILY
Status: DISCONTINUED | OUTPATIENT
Start: 2021-06-29 | End: 2021-06-30 | Stop reason: HOSPADM

## 2021-06-28 RX ADMIN — IOHEXOL 85 ML: 350 INJECTION, SOLUTION INTRAVENOUS at 19:45

## 2021-06-28 RX ADMIN — SODIUM CHLORIDE 1000 ML: 0.9 INJECTION, SOLUTION INTRAVENOUS at 18:48

## 2021-06-28 RX ADMIN — SODIUM CHLORIDE 125 ML/HR: 0.9 INJECTION, SOLUTION INTRAVENOUS at 23:25

## 2021-06-28 RX ADMIN — SODIUM CHLORIDE 250 ML/HR: 0.9 INJECTION, SOLUTION INTRAVENOUS at 19:35

## 2021-06-28 NOTE — Clinical Note
Case was discussed with ANNABELLA and the patient's admission status was agreed to be Admission Status: inpatient status to the service of Dr Joie Mcdaniel

## 2021-06-28 NOTE — ED PROVIDER NOTES
History  Chief Complaint   Patient presents with    Dizziness     pt has been outside walking for 2 hours and states he hasn't eaten in 3 days  complianing of dizziness     Pt with Past Medical History: Anxiety disorder, Atrial fibrillation on Dig and Eliquis, Coronary artery disease, Depression, Hepatitis C-screening negative in 1/2017, MI; PSH: cardiac cath  Presents to ED for further evaluation of dizziness, according wife/EMS/pt he was outside walking for 2 hours, has had decreased PO intake for the past 3 days, pt states hasn't eaten today because has no appetite, can't remember eating yesterday, report is he hasn't eaten in 3 days  Pt denies cp, no sob, no abd pain, no NVD, + decreased appetite            Prior to Admission Medications   Prescriptions Last Dose Informant Patient Reported? Taking?   amoxicillin-clarithromycin-lansoprazole (PREVPAC)   No No   Sig: Take by mouth 2 (two) times a day for 14 days Follow package directions  Hold pantoprazole while taking this medication, then may resume   apixaban (ELIQUIS) 5 mg   No No   Sig: Take 1 tablet (5 mg total) by mouth 2 (two) times a day   digoxin (LANOXIN) 0 25 mg tablet   No No   Sig: Take 0 5 tablets (0 125 mg total) by mouth daily   ferrous sulfate 325 (65 Fe) mg tablet   No No   Sig: Take 1 tablet (325 mg total) by mouth daily with breakfast   fluticasone-salmeterol (Advair Diskus) 100-50 mcg/dose inhaler   No No   Sig: Inhale 1 puff 2 (two) times a day Rinse mouth after use     folic acid (FOLVITE) 980 mcg tablet   No No   Sig: Take 1 tablet (400 mcg total) by mouth daily   furosemide (LASIX) 40 mg tablet   No No   Sig: Take 1 tablet (40 mg total) by mouth daily   ipratropium-albuterol (DUO-NEB) 0 5-2 5 mg/3 mL nebulizer solution   No No   Sig: Take 1 vial (3 mL total) by nebulization 4 (four) times a day   lisinopril (ZESTRIL) 2 5 mg tablet   No No   Sig: Take 1 tablet (2 5 mg total) by mouth daily   metoprolol tartrate (LOPRESSOR) 25 mg tablet No No   Sig: Take 1 tablet (25 mg total) by mouth 2 (two) times a day   pantoprazole (PROTONIX) 40 mg tablet   No No   Sig: Take 1 tablet (40 mg total) by mouth daily before breakfast   potassium chloride (K-DUR) 10 mEq tablet   Yes No   Sig: Take 10 mEq by mouth daily   tamsulosin (FLOMAX) 0 4 mg   Yes No   Sig: Take 0 4 mg by mouth daily      Facility-Administered Medications: None       Past Medical History:   Diagnosis Date    Anxiety disorder     Atrial fibrillation (HCC)     Coronary artery disease     Depression     Hepatitis C     screening negative in 1/2017    MI (myocardial infarction) [facility])        Past Surgical History:   Procedure Laterality Date    CARDIAC CATHETERIZATION  07/09/2018       Family History   Problem Relation Age of Onset    Hypertension Mother     Coronary artery disease Mother         premature    Hypertension Father     Coronary artery disease Father         premature    Diabetes Father      I have reviewed and agree with the history as documented  E-Cigarette/Vaping    E-Cigarette Use Never User      E-Cigarette/Vaping Substances    Nicotine No     THC No     CBD No     Flavoring No     Other No     Unknown No      Social History     Tobacco Use    Smoking status: Current Every Day Smoker     Packs/day: 0 25     Years: 57 00     Pack years: 14 25     Types: Cigarettes    Smokeless tobacco: Never Used    Tobacco comment: since age 17; Has history of smoking for over 54 years  He has been smoking more than packet daily in the past however he has been smokes about half a pack a day lately and stopped smoking on 7/1/2018 when he was admitted to the hospital     Vaping Use    Vaping Use: Never used   Substance Use Topics    Alcohol use: Never    Drug use: Never       Review of Systems   Constitutional: Positive for appetite change  Negative for activity change, chills and fever  HENT: Negative for hearing loss, sore throat and trouble swallowing      Eyes: Negative for visual disturbance  Respiratory: Negative for cough and shortness of breath  Cardiovascular: Negative for chest pain and leg swelling  Gastrointestinal: Negative for abdominal pain, diarrhea, nausea and vomiting  Genitourinary: Negative for difficulty urinating, dysuria and frequency  Musculoskeletal: Negative for arthralgias, gait problem and myalgias  Skin: Negative for color change, pallor and wound  Neurological: Positive for dizziness, weakness and light-headedness  Psychiatric/Behavioral: Negative for behavioral problems  All other systems reviewed and are negative  Physical Exam  Physical Exam  Vitals and nursing note reviewed  Constitutional:       General: He is in acute distress  Appearance: He is well-developed  HENT:      Head: Normocephalic and atraumatic  Right Ear: External ear normal       Left Ear: External ear normal       Nose: Nose normal       Mouth/Throat:      Mouth: Mucous membranes are moist       Pharynx: Oropharynx is clear  Eyes:      Conjunctiva/sclera: Conjunctivae normal    Cardiovascular:      Rate and Rhythm: Normal rate and regular rhythm  Pulmonary:      Effort: Pulmonary effort is normal  No respiratory distress  Breath sounds: Normal breath sounds  Abdominal:      General: Bowel sounds are normal       Palpations: Abdomen is soft  Tenderness: There is no abdominal tenderness  Musculoskeletal:         General: Normal range of motion  Cervical back: Normal range of motion  Right lower leg: No edema  Left lower leg: No edema  Lymphadenopathy:      Cervical: No cervical adenopathy  Skin:     General: Skin is warm and dry  Capillary Refill: Capillary refill takes less than 2 seconds  Findings: No rash  Neurological:      General: No focal deficit present  Mental Status: He is alert and oriented to person, place, and time  Motor: No weakness     Psychiatric:         Behavior: Behavior normal       Comments: Pleasant, but somewhat poor historian         Vital Signs  ED Triage Vitals [06/28/21 1814]   Temperature Pulse Respirations Blood Pressure SpO2   97 6 °F (36 4 °C) 76 16 (!) 80/41 99 %      Temp Source Heart Rate Source Patient Position - Orthostatic VS BP Location FiO2 (%)   Oral Monitor Lying Left arm --      Pain Score       --           Vitals:    06/28/21 1814 06/28/21 1924 06/28/21 1926   BP: (!) 80/41 92/61 96/62   Pulse: 76  60   Patient Position - Orthostatic VS: Lying  Lying         Visual Acuity      ED Medications  Medications   sodium chloride 0 9 % infusion (250 mL/hr Intravenous New Bag 6/28/21 1935)   sodium chloride 0 9 % bolus 1,000 mL (0 mL Intravenous Stopped 6/28/21 1935)   iohexol (OMNIPAQUE) 350 MG/ML injection (SINGLE-DOSE) 85 mL (85 mL Intravenous Given 6/28/21 1945)       Diagnostic Studies  Results Reviewed     Procedure Component Value Units Date/Time    Ammonia [[id number]] Collected: 06/28/21 2000    Lab Status: No result Specimen: Blood from Arm, Left Updated: 06/28/21 2000    Troponin I [[id number]]  (Normal) Collected: 06/28/21 1848    Lab Status: Final result Specimen: Blood from Arm, Left Updated: 06/28/21 1917     Troponin I <0 03 ng/mL     Protime-INR [[id number]]  (Abnormal) Collected: 06/28/21 1848    Lab Status: Final result Specimen: Blood from Arm, Left Updated: 06/28/21 1916     Protime 12 5 seconds      INR 1 11    Narrative:      INR Reference Ranges:  No Anticoagulant, Normal:           0 9-1 1  Standard Dose, Oral Anticoagulant:  2 0-3 0  High Dose, Oral Anticoagulant:      2 5-3 5    APTT [[id number]]  (Normal) Collected: 06/28/21 1848    Lab Status: Final result Specimen: Blood from Arm, Left Updated: 06/28/21 1916     PTT 23 seconds     Digoxin level [[id number]]  (Normal) Collected: 06/28/21 1849    Lab Status: Final result Specimen: Blood from Arm, Left Updated: 06/28/21 1913     Digoxin Lvl 1 8 ng/mL     Comprehensive metabolic panel [[id number]]  (Abnormal) Collected: 06/28/21 1848    Lab Status: Final result Specimen: Blood from Arm, Left Updated: 06/28/21 1913     Sodium 141 mmol/L      Potassium 3 6 mmol/L      Chloride 105 mmol/L      CO2 30 mmol/L      ANION GAP 6 mmol/L      BUN 25 mg/dL      Creatinine 1 86 mg/dL      Glucose 127 mg/dL      Calcium 8 5 mg/dL      AST 9 U/L      ALT 7 U/L      Alkaline Phosphatase 50 4 U/L      Total Protein 5 7 g/dL      Albumin 3 7 g/dL      Total Bilirubin 0 76 mg/dL      eGFR 35 ml/min/1 73sq m     Narrative:      [place] guidelines for Chronic Kidney Disease (CKD):     Stage 1 with normal or high GFR (GFR > 90 mL/min/1 73 square meters)    Stage 2 Mild CKD (GFR = 60-89 mL/min/1 73 square meters)    Stage 3A Moderate CKD (GFR = 45-59 mL/min/1 73 square meters)    Stage 3B Moderate CKD (GFR = 30-44 mL/min/1 73 square meters)    Stage 4 Severe CKD (GFR = 15-29 mL/min/1 73 square meters)    Stage 5 End Stage CKD (GFR <15 mL/min/1 73 square meters)  Note: GFR calculation is accurate only with a steady state creatinine    CBC and differential [[id number]]  (Abnormal) Collected: 06/28/21 1848    Lab Status: Final result Specimen: Blood from Arm, Left Updated: 06/28/21 1856     WBC 9 27 Thousand/uL      RBC 3 91 Million/uL      Hemoglobin 8 8 g/dL      Hematocrit 30 7 %      MCV 79 fL      MCH 22 5 pg      MCHC 28 7 g/dL      RDW 15 2 %      MPV 9 5 fL      Platelets 080 Thousands/uL      Neutrophils Relative 84 %      Immat GRANS % 0 %      Lymphocytes Relative 9 %      Monocytes Relative 7 %      Eosinophils Relative 0 %      Basophils Relative 0 %      Neutrophils Absolute 7 66 Thousands/µL      Immature Grans Absolute 0 04 Thousand/uL      Lymphocytes Absolute 0 81 Thousands/µL      Monocytes Absolute 0 68 Thousand/µL      Eosinophils Absolute 0 04 Thousand/µL      Basophils Absolute 0 04 Thousands/µL     UA w Reflex to Microscopic w Reflex to Culture [[id number]]     Lab Status: No result Specimen: Urine                  CT head without contrast   Final Result by Lexi Florentino MD (06/28 1937)      No acute intracranial abnormality  Workstation performed: [id number]         XR chest 1 view portable   ED Interpretation by Aparna Fulton PA-C (06/28 1900)   nad      CT abdomen pelvis with contrast    (Results Pending)              Procedures  ECG 12 Lead Documentation Only    Date/Time: 6/28/2021 6:27 PM  Performed by: Aparna Fulton PA-C  Authorized by: Aparna Fulton PA-C     Indications / Diagnosis:  Dizziness, h/o afib  ECG reviewed by me, the ED Provider: yes    Patient location:  ED  Previous ECG:     Previous ECG:  Compared to current    Comparison ECG info:  8/2020    Similarity:  Changes noted (slight st depressions inferior leads)    Comparison to cardiac monitor: Yes    Interpretation:     Interpretation: abnormal    Rate:     ECG rate:  70    ECG rate assessment: normal    Rhythm:     Rhythm: atrial fibrillation    Comments:      No acute ischemic changes             ED Course  ED Course as of Jun 28 2000 Mon Jun 28, 2021   1922 Acute renal injury    Slightly worse chronic anemia                                                MDM  Number of Diagnoses or Management Options  Diagnosis management comments: Care turned over to Lord CHANDNI/Attending Dr Suzy Dudley, pending admission       Amount and/or Complexity of Data Reviewed  Clinical lab tests: reviewed  Tests in the radiology section of CPT®: reviewed  Decide to obtain previous medical records or to obtain history from someone other than the patient: yes        Disposition  Final diagnoses:   Dizziness   Acute renal injury [facility])     Time reflects when diagnosis was documented in both MDM as applicable and the Disposition within this note     Time User Action Codes Description Comment    6/28/2021  8:00 PM Ceasar Bowden Add [R42] Dizziness     6/28/2021  8:00 PM Ceasar Bowden Add [N17 9] Acute renal injury [facility])       ED Disposition     None      Follow-up Information    None         Patient's Medications   Discharge Prescriptions    No medications on file     No discharge procedures on file      PDMP Review     None          ED Provider  Electronically Signed by           Mitzy Higgins PA-C  06/28/21 2001

## 2021-06-29 PROBLEM — R00.1 BRADYCARDIA: Status: ACTIVE | Noted: 2021-06-29

## 2021-06-29 LAB
ANION GAP SERPL CALCULATED.3IONS-SCNC: 7 MMOL/L (ref 4–13)
ATRIAL RATE: 0 BPM
BILIRUB UR QL STRIP: NEGATIVE
BUN SERPL-MCNC: 23 MG/DL (ref 6–20)
CALCIUM SERPL-MCNC: 8.5 MG/DL (ref 8.4–10.2)
CHLORIDE SERPL-SCNC: 108 MMOL/L (ref 96–108)
CLARITY UR: CLEAR
CO2 SERPL-SCNC: 27 MMOL/L (ref 22–33)
COLOR UR: YELLOW
CREAT SERPL-MCNC: 1.59 MG/DL (ref 0.5–1.2)
ERYTHROCYTE [DISTWIDTH] IN BLOOD BY AUTOMATED COUNT: 15.6 % (ref 11.6–15.1)
GFR SERPL CREATININE-BSD FRML MDRD: 42 ML/MIN/1.73SQ M
GLUCOSE SERPL-MCNC: 105 MG/DL (ref 65–140)
GLUCOSE UR STRIP-MCNC: NEGATIVE MG/DL
HCT VFR BLD AUTO: 29.1 % (ref 36.5–49.3)
HGB BLD-MCNC: 8.4 G/DL (ref 12–17)
HGB UR QL STRIP.AUTO: NEGATIVE
KETONES UR STRIP-MCNC: NEGATIVE MG/DL
LEUKOCYTE ESTERASE UR QL STRIP: NEGATIVE
MCH RBC QN AUTO: 22.5 PG (ref 26.8–34.3)
MCHC RBC AUTO-ENTMCNC: 28.9 G/DL (ref 31.4–37.4)
MCV RBC AUTO: 78 FL (ref 82–98)
NITRITE UR QL STRIP: NEGATIVE
PH UR STRIP.AUTO: 5.5 [PH]
PLATELET # BLD AUTO: 263 THOUSANDS/UL (ref 149–390)
PMV BLD AUTO: 10.9 FL (ref 8.9–12.7)
POTASSIUM SERPL-SCNC: 3.7 MMOL/L (ref 3.5–5)
PR INTERVAL: 164 MS
PROT UR STRIP-MCNC: NEGATIVE MG/DL
QRS AXIS: 72 DEGREES
QRSD INTERVAL: 95 MS
QT INTERVAL: 389 MS
QTC INTERVAL: 420 MS
RBC # BLD AUTO: 3.73 MILLION/UL (ref 3.88–5.62)
SODIUM SERPL-SCNC: 142 MMOL/L (ref 133–145)
SP GR UR STRIP.AUTO: 1.02 (ref 1–1.03)
T WAVE AXIS: 59 DEGREES
UROBILINOGEN UR QL STRIP.AUTO: 0.2 E.U./DL
VENTRICULAR RATE: 70 BPM
WBC # BLD AUTO: 9.28 THOUSAND/UL (ref 4.31–10.16)

## 2021-06-29 PROCEDURE — 80048 BASIC METABOLIC PNL TOTAL CA: CPT | Performed by: INTERNAL MEDICINE

## 2021-06-29 PROCEDURE — 99232 SBSQ HOSP IP/OBS MODERATE 35: CPT | Performed by: INTERNAL MEDICINE

## 2021-06-29 PROCEDURE — 81003 URINALYSIS AUTO W/O SCOPE: CPT | Performed by: PHYSICIAN ASSISTANT

## 2021-06-29 PROCEDURE — 97163 PT EVAL HIGH COMPLEX 45 MIN: CPT

## 2021-06-29 PROCEDURE — 93010 ELECTROCARDIOGRAM REPORT: CPT | Performed by: INTERNAL MEDICINE

## 2021-06-29 PROCEDURE — 85027 COMPLETE CBC AUTOMATED: CPT | Performed by: INTERNAL MEDICINE

## 2021-06-29 PROCEDURE — 99222 1ST HOSP IP/OBS MODERATE 55: CPT | Performed by: INTERNAL MEDICINE

## 2021-06-29 RX ADMIN — PANTOPRAZOLE SODIUM 40 MG: 40 TABLET, DELAYED RELEASE ORAL at 15:56

## 2021-06-29 RX ADMIN — PANTOPRAZOLE SODIUM 40 MG: 40 TABLET, DELAYED RELEASE ORAL at 05:36

## 2021-06-29 RX ADMIN — Medication 400 MCG: at 08:43

## 2021-06-29 RX ADMIN — APIXABAN 5 MG: 5 TABLET, FILM COATED ORAL at 18:01

## 2021-06-29 RX ADMIN — SODIUM CHLORIDE 75 ML/HR: 0.9 INJECTION, SOLUTION INTRAVENOUS at 14:17

## 2021-06-29 RX ADMIN — APIXABAN 5 MG: 5 TABLET, FILM COATED ORAL at 08:43

## 2021-06-29 RX ADMIN — SODIUM CHLORIDE 125 ML/HR: 0.9 INJECTION, SOLUTION INTRAVENOUS at 02:22

## 2021-06-29 RX ADMIN — POTASSIUM CHLORIDE 10 MEQ: 750 TABLET, EXTENDED RELEASE ORAL at 08:43

## 2021-06-29 RX ADMIN — TAMSULOSIN HYDROCHLORIDE 0.4 MG: 0.4 CAPSULE ORAL at 15:56

## 2021-06-29 RX ADMIN — FERROUS SULFATE TAB 325 MG (65 MG ELEMENTAL FE) 325 MG: 325 (65 FE) TAB at 08:43

## 2021-06-29 NOTE — CONSULTS
[facility]                                              Cardiology Consult  Kobe Hampton 70 y o  male   YOB: 1945 MRN: [id number]  Unit/Bed#: -Jocelyn Encounter: [id number]      Physician Requesting Consult: Reji Herbert MD  Reason for Consult / Principal Problem: Near syncope    Assessments  1  Near syncope  2  Chronic atrial fibrillation  · H/o BERNARDA-cardioversion in 2018, but then went back into afib soon after  3  Cardiomyopathy  · EF 10% in 2018  · LHC - 7/9/2018 - no significant CAD  · Echo - 8/7/2020 - LVEF 55%, mild-moderate MR, moderate TR, PASP 39  4  COPD/emphysema  5  Anemia  · Hb 8-10 over the past few months  6  AMbulatory dysfunction  · Uses cane    Outpatient cardiologist - Dr Hammond    Plan  Near syncope  · Bp low at presentation at 80/41, improved with IV hydration  · ECG - Afib, HR 70 bpm  · Had HERNÁN with cr of 1 86, possibly related to dehydration, which has improved slightly with hydration  · Home diuretic: lasix 40 daily  · Possibly heat syncope - related to dehydration, from being out in the hot sun for an extended period of time, while being on lasix  · Continue IV fluids - [email or web address]  · Discontinue lasix for now, and can possibly use PRN at discharge  · He does have bradycardia with HR in 40-60s, and is on digoxin+metoprolol  · Digoxin level was 1 8 at presentation  · Bradycardia may be contributing additionally  · Monitor on telemetry for now, while holding digoxin & metoprolol  · If heart rate and blood pressure improves, can consider resuming metoprolol alone tomorrow  · Consider outpatient cardiac monitor/event monitor    Chronic atrial fibrillation  · Rate controlled   · Home meds: Digoxin 0 125 mg daily, metoprolol 25 mg bid  · Digoxin and metoprolol are held due to bradycardia  · Continue anticoagulation with eliquis    ECG: Personally reviewed  Afib, rate controlled  Telemetry: Personally reviewed   Afib, HR 40-80    History of Present Illness HPI: Lita Cueto is a 70y o  year old male who presents with a near syncopal episode yesterday afternoon  He states that he [place] 2 [place] get [place], but due to certain changes in the bus route, he had to take a walk yesterday  He ate lunch around 1-2 p m , and then subsequently went out for a walk in the hot sun  He had been out walking for about an hour, when all of a sudden he got dizzy and dropped to the floor  He denies any associated chest pain, palpitations or any loss of consciousness  His wife asked him to stay there and called EMS  After being evaluated by EMS, he was brought to the ED for further evaluation  He was noted to be hypotensive at presentation blood pressure in 80/41 and was felt to be dehydrated  He has received IV fluids since presentation, and is overall doing better  He does not report any current complains of dizziness or lightheadedness, and walked to the restroom today without any symptoms        Past Medical History:   Diagnosis Date    Anxiety disorder     Atrial fibrillation (HCC)     Coronary artery disease     Depression     Hepatitis C     screening negative in 1/2017    [facility] infarction[facility]      Past Surgical History:   Procedure Laterality Date    CARDIAC CATHETERIZATION  07/09/2018     Family History   Problem Relation Age of Onset    Hypertension Mother     Coronary artery disease Mother         premature    Hypertension Father     Coronary artery disease Father         premature    Diabetes Father      Meds/Allergies   all current active meds have been reviewed and current meds:   Current Facility-Administered Medications   Medication Dose Route Frequency    apixaban (ELIQUIS) tablet 5 mg  5 mg Oral BID    ferrous sulfate tablet 325 mg  325 mg Oral Daily With Breakfast    folic acid (FOLVITE) tablet 400 mcg  400 mcg Oral Daily    levalbuterol (XOPENEX) inhalation solution 0 63 mg  0 63 mg Nebulization Q8H PRN    nicotine (NICODERM CQ) 7 mg/24hr TD 24 hr patch 1 patch  1 patch Transdermal Daily    pantoprazole (PROTONIX) EC tablet 40 mg  40 mg Oral BID AC    potassium chloride (K-DUR,KLOR-CON) CR tablet 10 mEq  10 mEq Oral Daily    sodium chloride 0 9 % infusion  75 mL/hr Intravenous Continuous    tamsulosin (FLOMAX) capsule 0 4 mg  0 4 mg Oral Daily With Dinner     Medications Prior to Admission   Medication    digoxin (LANOXIN) 0 25 mg tablet    ferrous sulfate 325 (65 Fe) mg tablet    folic acid (FOLVITE) 142 mcg tablet    lisinopril (ZESTRIL) 2 5 mg tablet    potassium chloride (K-DUR) 10 mEq tablet    tamsulosin (FLOMAX) 0 4 mg    amoxicillin-clarithromycin-lansoprazole (PREVPAC)    apixaban (ELIQUIS) 5 mg    fluticasone-salmeterol (Advair Diskus) 100-50 mcg/dose inhaler    furosemide (LASIX) 40 mg tablet    ipratropium-albuterol (DUO-NEB) 0 5-2 5 mg/3 mL nebulizer solution    metoprolol tartrate (LOPRESSOR) 25 mg tablet    pantoprazole (PROTONIX) 40 mg tablet     No Known Allergies  Social History     Socioeconomic History    Marital status: /Civil Union     Spouse name: None    Number of children: 3    Years of education: 12    Highest education level: 12th grade   Occupational History    Occupation: retired   Tobacco Use    Smoking status: Current Every Day Smoker     Packs/day: 0 25     Years: 57 00     Pack years: 14 25     Types: Cigarettes    Smokeless tobacco: Never Used    Tobacco comment: since age 17; Has history of smoking for over 55 years   He has been smoking more than packet daily in the past however he has been smokes about half a pack a day lately and stopped smoking on 7/1/2018 when he was admitted to the hospital     Vaping Use    Vaping Use: Never used   Substance and Sexual Activity    Alcohol use: Never    Drug use: Never    Sexual activity: Not Currently     Partners: Female     Birth control/protection: Condom Male   Other Topics Concern    None   Social History Narrative    History of Ultra Sound: 2016    History of Stress Test: 2018    History of ECHO: 2018    · Most recent tobacco use screenin2019      · Live alone or with others:   with others        · Diet:   Regular      · Caffeine intake: Moderate      · Guns present in home:   No      · Asbestos exposure:   No      · TB exposure:   No      · Environmental exposure:   No      · Animal exposure:   No      · Smoke alarm in home:   Yes     Social Determinants of Health     Financial Resource Strain:     Difficulty of Paying Living Expenses:    Food Insecurity:     Worried About Running Out of Food in the Last Year:     [address] in the Last Year:    Transportation Needs:     Lack of Transportation (Medical):  Lack of Transportation (Non-Medical):    Physical Activity:     Days of Exercise per Week:     Minutes of Exercise per Session:    Stress:     Feeling of Stress :    Social Connections:     Frequency of Communication with Friends and Family:     Frequency of Social Gatherings with Friends and Family:     Attends Religion Services:     Active Member of Clubs or Organizations:     Attends Club or Organization Meetings:     Marital Status:    Intimate Partner Violence:     Fear of Current or Ex-Partner:     Emotionally Abused:     Physically Abused:     Sexually Abused:          Review of Systems   All other systems reviewed and are negative        Vitals:    21 2340 21 0455 21 0553 21 0706   BP: 102/71 93/55  105/61   BP Location: Right arm Right arm  Right arm   Pulse: 86 78  63   Resp:  18  18   Temp: 97 8 °F (36 6 °C) 97 9 °F (36 6 °C)  97 6 °F (36 4 °C)   TempSrc:  Tympanic  Tympanic   SpO2:  95%  96%   Weight:   67 6 kg (149 lb 0 5 oz)    Height:         Orthostatic Blood Pressures      Most Recent Value   Blood Pressure  105/61 filed at 2021 0706   Patient Position - Orthostatic VS  Lying filed at 06/29/2021 0706        Body mass index is 20 21 kg/m²  Wt Readings from Last 5 Encounters:   06/29/21 67 6 kg (149 lb 0 5 oz)   11/30/20 70 3 kg (155 lb)   09/28/20 69 4 kg (153 lb)   08/31/20 74 4 kg (164 lb)   08/07/20 67 3 kg (148 lb 5 9 oz)     I/O last 3 completed shifts: In: 1868 8 [I V :868 8; IV Piggyback:1000]  Out: -       Physical Exam  Vitals and nursing note reviewed  Constitutional:       General: He is not in acute distress  Appearance: He is well-developed  He is not ill-appearing or diaphoretic  HENT:      Head: Normocephalic and atraumatic  Nose: No congestion  Eyes:      General: No scleral icterus  Conjunctiva/sclera: Conjunctivae normal    Neck:      Vascular: No carotid bruit or JVD  Cardiovascular:      Rate and Rhythm: Normal rate and regular rhythm  Heart sounds: Normal heart sounds  No murmur heard  No friction rub  No gallop  Pulmonary:      Effort: Pulmonary effort is normal  No respiratory distress  Breath sounds: Normal breath sounds  No wheezing or rales  Chest:      Chest wall: No tenderness  Abdominal:      General: There is no distension  Palpations: Abdomen is soft  Tenderness: There is no abdominal tenderness  Musculoskeletal:         General: No swelling, tenderness or deformity  Cervical back: Neck supple  No muscular tenderness  Right lower leg: No edema  Left lower leg: No edema  Skin:     General: Skin is warm  Neurological:      General: No focal deficit present  Mental Status: He is alert and oriented to person, place, and time  Mental status is at baseline  Psychiatric:         Mood and Affect: Mood normal          Behavior: Behavior normal          Thought Content:  Thought content normal              Labs:  Results from last 7 days   Lab Units 06/29/21  0507 06/28/21  1848   WBC Thousand/uL 9 28 9 27   HEMOGLOBIN g/dL 8 4* 8 8*   HEMATOCRIT % 29 1* 30 7*   RDW % 15 6* 15 2*   PLATELETS Thousands/uL 263 268     Results from last 7 days   Lab Units 06/29/21  0507 06/28/21  1848   POTASSIUM mmol/L 3 7 3 6   CHLORIDE mmol/L 108 105   CO2 mmol/L 27 30   MAGNESIUM mg/dL  --  2 0   BUN mg/dL 23* 25*   CREATININE mg/dL 1 59* 1 86*   CALCIUM mg/dL 8 5 8 5   AST U/L  --  9*   ALT U/L  --  7   ALK PHOS U/L  --  50 4     Results from last 7 days   Lab Units 06/28/21  1848   TROPONIN I ng/mL <0 03         Results from last 7 days   Lab Units 06/28/21  1848   INR  1 11*             Imaging: XR chest 1 view portable    Result Date: 6/29/2021  Narrative: CHEST INDICATION:   dizziness  COMPARISON:  Chest radiograph  image 8/7/2020 EXAM PERFORMED/VIEWS:  XR CHEST PORTABLE FINDINGS: Cardiomediastinal silhouette appears unremarkable  The lungs are clear  No pneumothorax or pleural effusion  Osseous structures appear within normal limits for patient age  Impression: No acute cardiopulmonary disease  Workstation performed: [id number]     CT head without contrast    Result Date: 6/28/2021  Narrative: CT BRAIN - WITHOUT CONTRAST INDICATION:   Dizziness, non-specific dizziness,  COMPARISON:  None  TECHNIQUE:  CT examination of the brain was performed  In addition to axial images, sagittal and coronal 2D reformatted images were created and submitted for interpretation  Radiation dose length product (DLP) for this visit:  766 mGy-cm   This examination, like all CT scans performed in the [facility], was performed utilizing techniques to minimize radiation dose exposure, including the use of iterative reconstruction and automated exposure control  IMAGE QUALITY:  Diagnostic  FINDINGS: PARENCHYMA:  No intracranial mass effect or midline shift  No CT signs of acute territorial infarction  No acute parenchymal hemorrhage  VENTRICLES AND EXTRA-AXIAL SPACES:  Unremarkable for patient's age  VISUALIZED ORBITS AND PARANASAL SINUSES:  Unremarkable   CALVARIUM AND EXTRACRANIAL SOFT TISSUES: Unremarkable  Impression: No acute intracranial abnormality  Workstation performed: [id number]     CT abdomen pelvis with contrast    Result Date: 6/28/2021  Narrative: CT ABDOMEN AND PELVIS WITH IV CONTRAST INDICATION:   Decreased by mouth intake and anemia  COMPARISON:  8/7/2020  TECHNIQUE:  CT examination of the abdomen and pelvis was performed  Axial, sagittal, and coronal 2D reformatted images were created from the source data and submitted for interpretation  Radiation dose length product (DLP) for this visit:  377 mGy-cm   This examination, like all CT scans performed in the [facility], was performed utilizing techniques to minimize radiation dose exposure, including the use of iterative reconstruction and automated exposure control  IV Contrast:  85 mL of iohexol (OMNIPAQUE) Enteric Contrast:  Enteric contrast was not administered  FINDINGS: ABDOMEN LOWER CHEST:  Clear lung bases  LIVER/BILIARY TREE:  Unremarkable  GALLBLADDER:  No calcified gallstones  No pericholecystic inflammatory change  SPLEEN:  Unremarkable  PANCREAS:  Unremarkable  ADRENAL GLANDS:  Unremarkable  KIDNEYS/URETERS:  Unremarkable  No hydronephrosis  STOMACH AND BOWEL:  No bowel obstruction, colitis or diverticulitis  APPENDIX:  Normal appendix  ABDOMINOPELVIC CAVITY:  No ascites  No pneumoperitoneum  No lymphadenopathy  VESSELS:  Patent portal, splenic mesenteric veins  PELVIS REPRODUCTIVE ORGANS:  Enlarged prostate  URINARY BLADDER:  Unremarkable  ABDOMINAL WALL/INGUINAL REGIONS:  Right inguinal hernia containing fat and a loop of bowel at the neck, without evidence of incarceration or obstruction  Hernia measures 5 5 x 2 6 x 2 7 cm OSSEOUS STRUCTURES:  No acute fracture or destructive osseous lesion  Impression: No evidence of acute intra-abdominal or pelvic process   Workstation performed: [id number]       Cardiac testing:   Results for orders placed during the hospital encounter of 08/03/20    Echo complete with contrast if indicated    Narrative  [employer]  [address], [address] [facility]    Transthoracic Echocardiogram  2D, M-mode, Doppler, and Color Doppler    Study date:  07-Aug-2020    Patient: Chivo Flaherty  MR number: [id number]  Account number: [de-identified]  : 1945  Age: 69 years  Gender: Male  Status: Inpatient  Location: [facility]  Height: 70 in  Weight: 148 lb  BP: 123/ 70 mmHg    Indications: Heart Failure    Diagnoses: I50 9 - Heart failure, unspecified    Sonographer:  NAN Mora  Referring Physician:  Gareth Burleson MD  Group:  [facility]  Interpreting Physician:  Miguelangel Lang MD    SUMMARY    LEFT VENTRICLE:  Systolic function was normal  Ejection fraction was estimated to be 55 %  There were no regional wall motion abnormalities  Wall thickness was at the upper limits of normal     LEFT ATRIUM:  The atrium was mildly dilated  RIGHT ATRIUM:  The atrium was mildly to moderately dilated  MITRAL VALVE:  There was mild annular calcification  There was mild to moderate regurgitation  TRICUSPID VALVE:  There was moderate regurgitation  Estimated peak PA pressure was 39 mmHg  PULMONIC VALVE:  There was mild regurgitation  HISTORY: PRIOR HISTORY: CAD, Anemia Congestive heart failure  Nonischemic cardiomyopathy  Atrial fibrillation  PROCEDURE: The study was performed in the [facility]  This was a routine study  The transthoracic approach was used  The study included complete 2D imaging, M-mode, complete spectral Doppler, and color Doppler  The heart rate was 80  bpm, at the start of the study  Images were obtained from the parasternal, apical, subcostal, and suprasternal notch acoustic windows  Image quality was adequate  LEFT VENTRICLE: Size was normal  Systolic function was normal  Ejection fraction was estimated to be 55 %  There were no regional wall motion abnormalities   Wall thickness was at the upper limits of normal  DOPPLER: Transmitral flow  pattern: atrial fibrillation  RIGHT VENTRICLE: The size was normal  Systolic function was normal  Wall thickness was normal     LEFT ATRIUM: The atrium was mildly dilated  RIGHT ATRIUM: The atrium was mildly to moderately dilated  MITRAL VALVE: There was mild annular calcification  Valve structure was normal  There was mild-moderate diffuse thickening  There was normal leaflet separation  DOPPLER: The transmitral velocity was within the normal range  There was no  evidence for stenosis  There was mild to moderate regurgitation  AORTIC VALVE: The valve was trileaflet  Leaflets exhibited mildly increased thickness, normal cuspal separation, and sclerosis  DOPPLER: Transaortic velocity was within the normal range  There was no evidence for stenosis  There was no  significant regurgitation  TRICUSPID VALVE: The valve structure was normal  There was normal leaflet separation  DOPPLER: The transtricuspid velocity was within the normal range  There was no evidence for stenosis  There was moderate regurgitation  Pulmonary artery  systolic pressure was mildly increased  Estimated peak PA pressure was 39 mmHg  PULMONIC VALVE: Leaflets exhibited normal thickness, no calcification, and normal cuspal separation  DOPPLER: The transpulmonic velocity was within the normal range  There was mild regurgitation  PERICARDIUM: There was no pericardial effusion  AORTA: The root exhibited normal size  SYSTEMIC VEINS: IVC: The inferior vena cava was normal in size   Respirophasic changes were normal     SYSTEM MEASUREMENT TABLES    2D  %FS: 34 29 %  Ao Diam: 3 52 cm  EDV(Teich): 151 99 ml  EF(Teich): 62 68 %  ESV(Teich): 56 72 ml  HR_4Ch_Q: 90 87 BPM  IVSd: 1 23 cm  LA Diam: 4 97 cm  LAAs A4C: 30 77 cm2  LAESV A-L A4C: 106 62 ml  LAESV MOD A4C: 99 72 ml  LALs A4C: 7 54 cm  LVCO_4Ch_Q: 4 61 L/min  LVEF_4Ch_Q: 56 72 %  LVIDd: 5 57 cm  LVIDs: 3 66 cm  LVLd_4Ch_Q: 7 96 cm  LVLs_4Ch_Q: 6 38 cm  LVPWd: 1 08 cm  LVSV_4Ch_Q: 50 7 ml  LVVED_4Ch_Q: 89 39 ml  LVVES_4Ch_Q: 38 69 ml  RAAs: 31 12 cm2  RAESV A-L: 112 8 ml  RAESV MOD: 108 5 ml  RALs: 7 29 cm  RVIDd: 3 86 cm  SV(Teich): 95 27 ml    CW  TR Vmax: 2 78 m/s  TR maxP 97 mmHg    MM  TAPSE: 1 68 cm    [facility]    Prepared and electronically signed by    Joanna Osgood, MD  Signed 07-Aug-2020 09:50:46    No results found for this or any previous visit  No results found for this or any previous visit  No results found for this or any previous visit

## 2021-06-29 NOTE — ASSESSMENT & PLAN NOTE
Patient presented after episode of weakness/lightheadedness when walking in [place] to whether for 2 hours  He said he was feeling a little lightheaded, short of breath than he leaned to the wall 1st and dense lead to the pavement  Patient reports history of feeling out of balance he walks with a cane  Per chart review orthostatic hypotension mentioned  He reports he did not lose consciousness, did not bumped his head  On admission with blood pressure 80/41 mm Hg  This has now improved with IV hydration  CT head was negative, CT abdomen/ pelvis was normal, chest x-ray was normal  Status post NS 1 L in ED    - fallow orthostatic vital --  negative  - hold home lisinopril, furosemide  - will continue on  cc/hour as patient has history of CHF --> will decrease to 75ml/hr

## 2021-06-29 NOTE — ASSESSMENT & PLAN NOTE
Patient presented after episode of weakness/lightheadedness when walking [place] to whether for 2 hours  He said he was feeling a little lightheaded, short of breath than he leaned to the wall 1st and dense lead to the pavement  Patient reports history of feeling out of balance he walks with a cane  Per chart review orthostatic hypotension mentioned  He reports he did not lose consciousness, did not bumped his head  On admission with blood pressure 80/41 mm Hg    This has now improved with IV hydration  CT head was negative, CT abdomen/ pelvis was normal, chest x-ray was normal  Status post NS 1 L in ED    - fallow orthostatic vital  - hold home lisinopril, furosemide  - will continue on  cc/hour as patient has history of CHF

## 2021-06-29 NOTE — ASSESSMENT & PLAN NOTE
Creatinine on admission 1 26, baseline 0 8-1 05  BUN 25  Patient does appear dehydrated on physical exam and reports poor oral intake for 3 days  Status post NS 1 L in ED    - continue IV hydration with NS 75 cc per hour

## 2021-06-29 NOTE — ASSESSMENT & PLAN NOTE
Patient ambulates with a cane at baseline  He reports this is due to unsteadiness on his feet    Per chart review history of orthostatic hypotension     - see plan for near syncope   - PT/OT

## 2021-06-29 NOTE — ASSESSMENT & PLAN NOTE
Patient with history of hospitalization for congestive heart failure in 2018  Cardiac catheterization at that time showed no obstruction in coronaries  Most likely secondary to AFib with RVR diagnosis at that movement  Last echo on 08/07/2020 showed preserved EF 91% normal diastolic function  Patient follows with cardiologist Dr Usman Jung  He is on Lasix 40 mg daily, metoprolol tartrate 25 mg daily, digoxin 250    - will hold Lasix for now in setting of HERNÁN  - cautious with hydration  - follow I/O  - cardiac diet

## 2021-06-29 NOTE — ASSESSMENT & PLAN NOTE
Atrial fibrillation diagnosed in 2018 presented with congestive heart failure  Rate is controlled on admission at 73bpm  He is on Eliquis 5 mg b i d  And metoprolol 25 mg daily  Per chart review has to take metoprolol 25 b i d  But wife reports once daily take  Also on digoxin 125 mcg daily  - hold metoprolol and digoxin in setting of bradycardia     - monitor on telemetry

## 2021-06-29 NOTE — ASSESSMENT & PLAN NOTE
· Noted to be bradycardic in low 50s on telemetry  · Digoxin level high normal at 1 8  Possible increased 2/2 HERNÁN  · Hold metoprolol and digoxin for now and continue to monitor on telemetry  Okay to give IV Lopressor PRN in case needed for rate control  · Possibly restart metoprolol tomorrow  May need event recorder or Zio patch at discharge for extended rhythm monitoring

## 2021-06-29 NOTE — PLAN OF CARE
Problem: NEUROSENSORY - ADULT  Goal: Achieves stable or improved neurological status  Description: INTERVENTIONS  - Monitor and report changes in neurological status  - Monitor vital signs such as temperature, blood pressure, glucose, and any other labs ordered   - Initiate measures to prevent increased intracranial pressure  - Monitor for seizure activity and implement precautions if appropriate      Outcome: Progressing  Goal: Remains free of injury related to seizures activity  Description: INTERVENTIONS  - Maintain airway, patient safety  and administer oxygen as ordered  - Monitor patient for seizure activity, document and report duration and description of seizure to physician/advanced practitioner  - If seizure occurs,  ensure patient safety during seizure  - Reorient patient post seizure  - Seizure pads on all 4 side rails  - Instruct patient/family to notify RN of any seizure activity including if an aura is experienced  - Instruct patient/family to call for assistance with activity based on nursing assessment  - Administer anti-seizure medications if ordered    Outcome: Progressing  Goal: Achieves maximal functionality and self care  Description: INTERVENTIONS  - Monitor swallowing and airway patency with patient fatigue and changes in neurological status  - Encourage and assist patient to increase activity and self care     - Encourage visually impaired, hearing impaired and aphasic patients to use assistive/communication devices  Outcome: Progressing     Problem: CARDIOVASCULAR - ADULT  Goal: Maintains optimal cardiac output and hemodynamic stability  Description: INTERVENTIONS:  - Monitor I/O, vital signs and rhythm  - Monitor for S/S and trends of decreased cardiac output  - Administer and titrate ordered vasoactive medications to optimize hemodynamic stability  - Assess quality of pulses, skin color and temperature  - Assess for signs of decreased coronary artery perfusion  - Instruct patient to report change in severity of symptoms  Outcome: Progressing  Goal: Absence of cardiac dysrhythmias or at baseline rhythm  Description: INTERVENTIONS:  - Continuous cardiac monitoring, vital signs, obtain 12 lead EKG if ordered  - Administer antiarrhythmic and heart rate control medications as ordered  - Monitor electrolytes and administer replacement therapy as ordered  Outcome: Progressing     Problem: RESPIRATORY - ADULT  Goal: Achieves optimal ventilation and oxygenation  Description: INTERVENTIONS:  - Assess for changes in respiratory status  - Assess for changes in mentation and behavior  - Position to facilitate oxygenation and minimize respiratory effort  - Oxygen administered by appropriate delivery if ordered  - Initiate smoking cessation education as indicated  - Encourage broncho-pulmonary hygiene including cough, deep breathe, Incentive Spirometry  - Assess the need for suctioning and aspirate as needed  - Assess and instruct to report SOB or any respiratory difficulty  - Respiratory Therapy support as indicated  Outcome: Progressing     Problem: MUSCULOSKELETAL - ADULT  Goal: Maintain or return mobility to safest level of function  Description: INTERVENTIONS:  - Assess patient's ability to carry out ADLs; assess patient's baseline for ADL function and identify physical deficits which impact ability to perform ADLs (bathing, care of mouth/teeth, toileting, grooming, dressing, etc )  - Assess/evaluate cause of self-care deficits   - Assess range of motion  - Assess patient's mobility  - Assess patient's need for assistive devices and provide as appropriate  - Encourage maximum independence but intervene and supervise when necessary  - Involve family in performance of ADLs  - Assess for home care needs following discharge   - Consider OT consult to assist with ADL evaluation and planning for discharge  - Provide patient education as appropriate  Outcome: Progressing  Goal: Maintain proper alignment of affected body part  Description: INTERVENTIONS:  - Support, maintain and protect limb and body alignment  - Provide patient/ family with appropriate education  Outcome: Progressing     Problem: Potential for Falls  Goal: Patient will remain free of falls  Description: INTERVENTIONS:  - Educate patient/family on patient safety including physical limitations  - Instruct patient to call for assistance with activity   - Consult OT/PT to assist with strengthening/mobility   - Keep Call bell within reach  - Keep bed low and locked with side rails adjusted as appropriate  - Keep care items and personal belongings within reach  - Initiate and maintain comfort rounds  - Make Fall Risk Sign visible to staff  - Offer Toileting every 2 Hours, in advance of need  - Initiate/Maintain alarm  - Obtain necessary fall risk management equipment:   - Apply yellow socks and bracelet for high fall risk patients  - Consider moving patient to room near nurses station  Outcome: Progressing     Problem: MOBILITY - ADULT  Goal: Maintain or return to baseline ADL function  Description: INTERVENTIONS:  -  Assess patient's ability to carry out ADLs; assess patient's baseline for ADL function and identify physical deficits which impact ability to perform ADLs (bathing, care of mouth/teeth, toileting, grooming, dressing, etc )  - Assess/evaluate cause of self-care deficits   - Assess range of motion  - Assess patient's mobility; develop plan if impaired  - Assess patient's need for assistive devices and provide as appropriate  - Encourage maximum independence but intervene and supervise when necessary  - Involve family in performance of ADLs  - Assess for home care needs following discharge   - Consider OT consult to assist with ADL evaluation and planning for discharge  - Provide patient education as appropriate  Outcome: Progressing  Goal: Maintains/Returns to pre admission functional level  Description: INTERVENTIONS:  - Perform BMAT or MOVE assessment daily    - Set and communicate daily mobility goal to care team and patient/family/caregiver  - Collaborate with rehabilitation services on mobility goals if consulted  - Perform Range of Motion 3 times a day  - Reposition patient every 2 hours    - Dangle patient  times a day  - Stand patient  times a day  - Ambulate patient  times a day  - Out of bed to chair  times a day   - Out of bed for meals times a day  - Out of bed for toileting  - Record patient progress and toleration of activity level   Outcome: Progressing

## 2021-06-29 NOTE — ASSESSMENT & PLAN NOTE
Chronic microcytic hypochromic anemia hemoglobin on presentation 8 8 baseline 9-10  Patient was supposed to take iron pill at home reported black stool with it  He says he stopped taking the p o   And stool was normal after that  Workup for anemia included colonoscopy in January that showed multiple colon  adenomas    - continue home ferrous sulfate 325 mg daily  - follow CBC  - outpatient follow-up with PCP  - colonoscopy as outpatient

## 2021-06-29 NOTE — ASSESSMENT & PLAN NOTE
Per chart review patient had colonoscopy in January 2021 which revealed multiple colon adenomas repeated colonoscopy was recommended in March/April    However patient did not follow-up

## 2021-06-29 NOTE — ASSESSMENT & PLAN NOTE
Patient with history of hospitalization for congestive heart failure in 2018 with EF as low as 10%    Cardiac catheterization at that time showed no obstruction in coronaries  Most likely secondary to AFib with RVR diagnosis at that movement  Last echo on 08/07/2020 showed improved EF 59% normal diastolic function  Patient follows with cardiologist Dr Alen Daniel  He is on Lasix 40 mg daily, metoprolol tartrate 25 mg daily, digoxin 125    - will hold Lasix for now in setting of HERNÁN  - cautious with hydration  - follow I/O  - cardiac diet

## 2021-06-29 NOTE — ASSESSMENT & PLAN NOTE
Currently not in exacerbation  Few scattered rales heard bilaterally  Patient reports not using inhalers at home  Patient does get short of breath with ambulating    - will provide nebulization p r n   For SOB and wheezing  - recommend outpatient follow-up with pulmonologist

## 2021-06-29 NOTE — PLAN OF CARE
Problem: NEUROSENSORY - ADULT  Goal: Achieves stable or improved neurological status  Description: INTERVENTIONS  - Monitor and report changes in neurological status  - Monitor vital signs such as temperature, blood pressure, glucose, and any other labs ordered   - Initiate measures to prevent increased intracranial pressure  - Monitor for seizure activity and implement precautions if appropriate      Outcome: Progressing     Problem: CARDIOVASCULAR - ADULT  Goal: Maintains optimal cardiac output and hemodynamic stability  Description: INTERVENTIONS:  - Monitor I/O, vital signs and rhythm  - Monitor for S/S and trends of decreased cardiac output  - Administer and titrate ordered vasoactive medications to optimize hemodynamic stability  - Assess quality of pulses, skin color and temperature  - Assess for signs of decreased coronary artery perfusion  - Instruct patient to report change in severity of symptoms  Outcome: Progressing     Problem: RESPIRATORY - ADULT  Goal: Achieves optimal ventilation and oxygenation  Description: INTERVENTIONS:  - Assess for changes in respiratory status  - Assess for changes in mentation and behavior  - Position to facilitate oxygenation and minimize respiratory effort  - Oxygen administered by appropriate delivery if ordered  - Initiate smoking cessation education as indicated  - Encourage broncho-pulmonary hygiene including cough, deep breathe, Incentive Spirometry  - Assess the need for suctioning and aspirate as needed  - Assess and instruct to report SOB or any respiratory difficulty  - Respiratory Therapy support as indicated  Outcome: Progressing     Problem: MUSCULOSKELETAL - ADULT  Goal: Maintain or return mobility to safest level of function  Description: INTERVENTIONS:  - Assess patient's ability to carry out ADLs; assess patient's baseline for ADL function and identify physical deficits which impact ability to perform ADLs (bathing, care of mouth/teeth, toileting, grooming, dressing, etc )  - Assess/evaluate cause of self-care deficits   - Assess range of motion  - Assess patient's mobility  - Assess patient's need for assistive devices and provide as appropriate  - Encourage maximum independence but intervene and supervise when necessary  - Involve family in performance of ADLs  - Assess for home care needs following discharge   - Consider OT consult to assist with ADL evaluation and planning for discharge  - Provide patient education as appropriate  Outcome: Progressing

## 2021-06-29 NOTE — ED CARE HANDOFF
Emergency Department Sign Out Note        Sign out and transfer of care from [facility]  See Separate Emergency Department note  The patient, Travis Justice, was evaluated by the previous provider for Dizziness  Workup Completed:  CBC showed worsening of chronic anemia; hemoglobin 8 8 and in, normally around 10  CMP showed acute renal injury; BUN 25, creatinine 1 86  PT 12 5 with INR 1 11  APTT within normal limits  Digoxin level within normal limits  Troponin negative  Chest x-ray showed no acute cardiopulmonary findings  CT head showed no acute intracranial abnormalities      ED Course / Workup Pending (followup):  Ammonia pending  UA pending  CT abdomen pending                                  ED Course as of Jun 28 2153   Mon Jun 28, 2021 2055 IMPRESSION:  No evidence of acute intra-abdominal or pelvic process  CT abdomen pelvis with contrast   2153 Ammonia: 35 85     Procedures  MDM  Number of Diagnoses or Management Options  Acute renal injury ([address] )  Dizziness  Diagnosis management comments: Patient is a 81-year-old male who presents to ED with complaint of dizziness may  CMP showed elevated BUN and creatinine consistent with HERNÁN  CBC showed hemoglobin 8 8, normally around 10, consistent with worsening of chronic anemia  HERNÁN likely due to decreased p o   Intake and severe dehydration  Discussed with hospitalist, Dr Pretty Ibarra, and agreed to admit for monitoring and IV fluid hydration       Amount and/or Complexity of Data Reviewed  Clinical lab tests: reviewed  Tests in the radiology section of CPT®: reviewed  Discuss the patient with other providers: yes [facility])  Independent visualization of images, tracings, or specimens: yes    Patient Progress  Patient progress: stable      Disposition  Final diagnoses:   Dizziness   Acute renal injury ([address] )     Time reflects when diagnosis was documented in both MDM as applicable and the Disposition within this note     Time User Action Codes Description Comment    6/28/2021  8:00 PM Luan Valera Add [R42] Dizziness     6/28/2021  8:00 PM Luan Valera Add [N17 9] Acute renal injury [facility])       ED Disposition     None      Follow-up Information    None       Patient's Medications   Discharge Prescriptions    No medications on file     No discharge procedures on file         ED Provider  Electronically Signed by     Tonny Gerard PA-C  06/28/21 6093

## 2021-06-29 NOTE — PROGRESS NOTES
Sarah U  68   Progress Note - Joel Maradiaga 1945, 70 y o  male MRN: [id number]  Unit/Bed#: -Jocelyn Encounter: [id number]  Primary Care Provider: Zander Vazquez MD   Date and time admitted to hospital: 6/28/2021  6:09 PM    * Near syncope  Assessment & Plan  Patient presented after episode of weakness/lightheadedness when walking in the [place] to whether for 2 hours  He said he was feeling a little lightheaded, short of breath than he leaned to the wall 1st and dense lead to the pavement  Patient reports history of feeling out of balance he walks with a cane  Per chart review orthostatic hypotension mentioned  He reports he did not lose consciousness, did not bumped his head  On admission with blood pressure 80/41 mm Hg  This has now improved with IV hydration  CT head was negative, CT abdomen/ pelvis was normal, chest x-ray was normal  Status post NS 1 L in ED    - fallow orthostatic vital --  negative  - hold home lisinopril, furosemide  - will continue on  cc/hour as patient has history of CHF --> will decrease to 75ml/hr  Bradycardia  Assessment & Plan  · Noted to be bradycardic in low 50s on telemetry  · Digoxin level high normal at 1 8  Possible increased 2/2 HERNÁN  · Hold metoprolol and digoxin for now and continue to monitor on telemetry  Okay to give IV Lopressor PRN in case needed for rate control  · Possibly restart metoprolol tomorrow  May need event recorder or Zio patch at discharge for extended rhythm monitoring  COPD (chronic obstructive pulmonary disease) ([facility] )  Assessment & Plan  Currently not in exacerbation  Few scattered rales heard bilaterally  Patient reports not using inhalers at home  Patient does get short of breath with ambulating    - will provide nebulization p r n   For SOB and wheezing  - recommend outpatient follow-up with pulmonologist    Acute kidney injury (HERNÁN) with acute tubular necrosis (ATN) ([facility] )  Assessment & Plan  Creatinine on admission 1 26, baseline 0 8-1 05  BUN 25  Patient does appear dehydrated on physical exam and reports poor oral intake for 3 days  Status post NS 1 L in ED    - continue IV hydration with NS 75 cc per hour    Ambulatory dysfunction  Assessment & Plan  Patient ambulates with a cane at baseline  He reports this is due to unsteadiness on his feet  Per chart review history of orthostatic hypotension     - see plan for near syncope   - PT/OT    Colonic adenoma  Assessment & Plan  Per chart review patient had colonoscopy in January 2021 which revealed multiple colon adenomas repeated colonoscopy was recommended in March/April  However patient did not follow-up    Tobacco use disorder  Assessment & Plan  Smokes few cigarettes a day  He is motivated    - provide nicotine patch  - provide smoking cessation education    Permanent atrial fibrillation [facility])  Assessment & Plan  Atrial fibrillation diagnosed in 2018 presented with congestive heart failure  Rate is controlled on admission at 73bpm  He is on Eliquis 5 mg b i d  And metoprolol 25 mg daily  Per chart review has to take metoprolol 25 b i d  But wife reports once daily take  Also on digoxin 125 mcg daily  - hold metoprolol and digoxin in setting of bradycardia  - monitor on telemetry      Congestive cardiomyopathy [facility])  Assessment & Plan  Patient with history of hospitalization for congestive heart failure in 2018 with EF as low as 10%    Cardiac catheterization at that time showed no obstruction in coronaries  Most likely secondary to AFib with RVR diagnosis at that movement  Last echo on 08/07/2020 showed improved EF 68% normal diastolic function  Patient follows with cardiologist Dr Marely Ness  He is on Lasix 40 mg daily, metoprolol tartrate 25 mg daily, digoxin 125    - will hold Lasix for now in setting of HERNÁN  - cautious with hydration  - follow I/O  - cardiac diet    Microcytic anemia  Assessment & Plan  Chronic microcytic hypochromic anemia hemoglobin on presentation 8 8 baseline 9-10  Patient was supposed to take iron pill at home reported black stool with it  He says he stopped taking the p o  And stool was normal after that  Workup for anemia included colonoscopy in January that showed multiple colon  adenomas    - continue home ferrous sulfate 325 mg daily  - follow CBC --> stable  - outpatient follow-up with PCP  - colonoscopy as outpatient        VTE Pharmacologic Prophylaxis:     Moderate Risk (Score 3-4) - Pharmacological DVT Prophylaxis Ordered: Apixaban (Eliquis)  Mechanical VTE Prophylaxis in Place: Yes    Patient Centered Rounds: I have performed bedside rounds with nursing staff today  Discussions with Specialists or Other Care Team Provider: Cardiology    Education and Discussions with Family / Patient: Updated  (wife) via phone  Current Length of Stay: 1 day(s)    Current Patient Status: Inpatient     Discharge Plan / Estimated Discharge Date: Anticipate discharge tomorrow to home  Code Status: Level 1 - Full Code      Subjective:   Patient was seen and examined by me this morning at bedside  Reports feeling better  Denies dizziness/lightheadedness  Walked through the hallway with PT without difficulty  Denies CP, SOB, abdominal pain, N/V/D  Objective:     Vitals:   Temp (24hrs), Av 6 °F (36 4 °C), Min:97 2 °F (36 2 °C), Max:97 9 °F (36 6 °C)    Temp:  [97 2 °F (36 2 °C)-97 9 °F (36 6 °C)] 97 2 °F (36 2 °C)  HR:  [60-86] 67  Resp:  [16-18] 18  BP: ()/(41-81) 122/74  SpO2:  [95 %-99 %] 98 %  Body mass index is 20 21 kg/m²  Input and Output Summary (last 24 hours): Intake/Output Summary (Last 24 hours) at 2021 1504  Last data filed at 2021 0222  Gross per 24 hour   Intake 1868 75 ml   Output --   Net 1868 75 ml       Physical Exam:     Physical Exam  Vitals and nursing note reviewed  Constitutional:       General: He is not in acute distress       Appearance: Normal appearance  He is not ill-appearing  HENT:      Head: Normocephalic and atraumatic  Mouth/Throat:      Mouth: Mucous membranes are moist       Pharynx: Oropharynx is clear  Eyes:      General: No scleral icterus  Conjunctiva/sclera: Conjunctivae normal    Cardiovascular:      Rate and Rhythm: Bradycardia present  Rhythm irregular  Heart sounds: Normal heart sounds  Pulmonary:      Effort: No respiratory distress  Breath sounds: No stridor  Wheezing present  No rhonchi or rales  Abdominal:      General: Abdomen is flat  Bowel sounds are normal       Palpations: Abdomen is soft  Tenderness: There is no abdominal tenderness  Musculoskeletal:      Cervical back: Normal range of motion and neck supple  Right lower leg: No edema  Left lower leg: No edema  Skin:     General: Skin is warm and dry  Neurological:      General: No focal deficit present  Mental Status: He is alert and oriented to person, place, and time     Psychiatric:         Mood and Affect: Mood normal          Behavior: Behavior normal          Additional Data:     Labs:  Results from last 7 days   Lab Units 06/29/21  0507 06/28/21  1848   WBC Thousand/uL 9 28 9 27   HEMOGLOBIN g/dL 8 4* 8 8*   HEMATOCRIT % 29 1* 30 7*   PLATELETS Thousands/uL 263 268   NEUTROS PCT %  --  84*   LYMPHS PCT %  --  9*   MONOS PCT %  --  7   EOS PCT %  --  0     Results from last 7 days   Lab Units 06/29/21  0507 06/28/21  1848   SODIUM mmol/L 142 141   POTASSIUM mmol/L 3 7 3 6   CHLORIDE mmol/L 108 105   CO2 mmol/L 27 30   BUN mg/dL 23* 25*   CREATININE mg/dL 1 59* 1 86*   ANION GAP mmol/L 7 6   CALCIUM mg/dL 8 5 8 5   ALBUMIN g/dL  --  3 7   TOTAL BILIRUBIN mg/dL  --  0 76   ALK PHOS U/L  --  50 4   ALT U/L  --  7   AST U/L  --  9*   GLUCOSE RANDOM mg/dL 105 127     Results from last 7 days   Lab Units 06/28/21  1848   INR  1 11*                   Imaging: Reviewed radiology reports from this admission including: abdominal/pelvic CT scan and CT head    Recent Cultures (last 7 days):           Lines/Drains:  Invasive Devices     Peripheral Intravenous Line            Peripheral IV 06/28/21 Distal;Left;Upper;Ventral (anterior) Arm <1 day                Telemetry: Atrial fibrillation 40-60 bpm         Last 24 Hours Medication List:   Current Facility-Administered Medications   Medication Dose Route Frequency Provider Last Rate    apixaban  5 mg Oral BID Hilda Penn MD      ferrous sulfate  325 mg Oral Daily With Breakfast Bobby Cooper MD      folic acid  450 mcg Oral Daily Bobby Cooper MD      levalbuterol  0 63 mg Nebulization Q8H PRN Bobby Cooper MD      nicotine  1 patch Transdermal Daily Bobby Cooper MD      pantoprazole  40 mg Oral BID AC Hilda Penn MD      potassium chloride  10 mEq Oral Daily Bobby Cooper MD      sodium chloride  75 mL/hr Intravenous Continuous Carey Sethi MD 75 mL/hr (06/29/21 1417)    tamsulosin  0 4 mg Oral Daily With Queenie Gonzalez MD          Today, Patient Was Seen By: Carey Sethi MD    ** Please Note: This note has been constructed using a voice recognition system   **

## 2021-06-29 NOTE — PHYSICAL THERAPY NOTE
PHYSICAL THERAPY EVALUATION  Time: 6097-5402    NAME:  Steven Rodriguez  DATE: 06/29/21    AGE:   70 y o  Mrn:   [id number]  Length Of Stay: 1    ADMIT DX:  Dizziness [R42]  Acute renal injury ([facility] [address] ) [N17 9]    Past Medical History:   Diagnosis Date    Anxiety disorder     [facility] ([email or web address] )     Coronary artery disease     Depression     Hepatitis C     screening negative in 1/2017    MI (myocardial infarction) [facility])      Past Surgical History:   Procedure Laterality Date    CARDIAC CATHETERIZATION  07/09/2018       Performed at least 2 patient identifiers during session: Name, Cali Thompson and ID bracelet        06/29/21 0847   PT Last Visit   PT Visit Date 06/29/21   Note Type   Note type Evaluation   Pain Assessment   Pain Assessment Tool 0-10   Pain Score No Pain   Effect of Pain on Daily Activities n/a   Hospital Pain Intervention(s) Ambulation/increased activity   Multiple Pain Sites No   Home Living   Type of Home Apartment  (2nd floor apartment)   Home Layout Stairs to enter with rails; Able to live on main level with bedroom/bathroom; Performs ADLs on one level  (15 KAL with HR)   Home Equipment Cane   Prior Function   Level of [place] Independent with ADLs and functional mobility  (Indep with IADLs)   Lives With Spouse   Receives Help From Family   ADL Assistance Independent   IADLs Independent   Falls in the last 6 months 1 to 4  (pt reports only syncopal episodes)   Vocational Retired   Comments Patient reports living at home with his wife in a 2nd floor apartment with 15 steps to enter  Patient reports being independent with all ADLs/IADLs and functional mobility  Patient ambulates with single-point cane for household and community distances  Patient reports using public transportation or walking to all community needs  Restrictions/Precautions   Weight Bearing Precautions Per Order No   Other Precautions Impulsive; Bed Alarm;Multiple lines;Telemetry; Fall Risk;Hard of hearing   General Additional Pertinent History Patient admitted 06/28/2021 with complaint of dizziness, decreased PO intake x3 days, ambulating outside in the heat x2 hours PTA  Dx: near syncope  Family/Caregiver Present No   Cognition   Overall Cognitive Status WFL   Arousal/Participation Alert   Orientation Level Oriented X4   Memory Within functional limits   Following Commands Follows multistep commands without difficulty   Comments Patient very impulsive, tangential at times, difficult to redirect at times  RUE Assessment   RUE Assessment WFL   LUE Assessment   LUE Assessment WFL   RLE Assessment   RLE Assessment WFL   LLE Assessment   LLE Assessment WFL   Coordination   Movements are Fluid and Coordinated 1   Sensation WFL   Light Touch   RLE Light Touch Grossly intact   LLE Light Touch Grossly intact   Bed Mobility   Supine to Sit 6  Modified independent   Additional items HOB elevated   Sit to Supine 6  Modified independent   Additional items HOB elevated   Transfers   Sit to Stand 6  Modified independent   Additional items Impulsive  (SPC)   Stand to Sit 6  Modified independent   Additional items Impulsive  (SPC)   Stand pivot 6  Modified independent   Additional items Impulsive  (SPC)   Ambulation/Elevation   Gait pattern WNL  (rapid cadance, impulsive, dec safety insight)   Gait Assistance 5  Supervision   Additional items Assist x 1;Verbal cues  (Therapist management of IV pole)   Assistive Device SPC  (Patient own SPC)   Distance 120 ft x 1 including change in direction [facility]   Stair Management Assistance Not tested  (NT d/t patient fatigue and shortness of breath)   Balance   Static Sitting Good   Dynamic Sitting Fair +   Static Standing Fair +   Dynamic Standing Fair +   Ambulatory Fair +   Endurance Deficit   Endurance Deficit Yes   Endurance Deficit Description Patient with moderate degree of shortness of breath during and after ambulation training    Patient reports this is his baseline degree of shortness of breath  Activity Tolerance   Activity Tolerance Patient limited by fatigue   Medical Staff Made Aware Spoke with [address] with JOHANNY Vargas   Assessment   Prognosis Good   Problem List Decreased endurance; Impaired balance;Decreased mobility; Decreased safety awareness   Assessment Pt seen for PT evaluation, cleared by JOHANNY Vargas, activity orders of "up with assist"  Pt admitted 6/28/2021 with complaint of dizziness and decreased p o  intake x3 days, ambulating outside x2 hours PTA, dx Near syncope  Comorbidities affecting pt's fnxl performance include: Afib, anemia, anxiety, CAD, COPD, depression, falls and MI, Hep C, colonic adenmoa  Personal factors affecting pt at time of PT IE include: ambulating with assistive device, stairs to enter home, inability to navigate community distances, positive fall history, impulsivity and limited insight into impairments  PTA, pt was independent with functional mobility with SPC, independent with ADLS, independent with IADLS and living with his wife in a 1 level home with 15 steps to enter  Pt scores 21/24 on AM-PAC objective tool w/ a standardized score of 45 55, indicating pt demonstrates functional capacity for return to home self care vs with increased supports upon d/c  The Barthel Index outcome tool was used & pt scores 80 indicating moderate limitations of fnxl mobility/ADLS  Pt is at risk of falls d/t impulsivity, history of previous falls, impaired balance, impaired insight/safety awareness, use of assistive device and ongoing medical treatment of primary diagnosis  Pt's clinical presentation is currently unstable/unpredictable seen in pt's presentation of vital sign response, increased fall risk, new onset of impairment of functional mobility, decreased endurance and new onset of weakness   This PT IE requires high complexity clinical decision making, based on multiple medical/physiological/fnxl/social factors which affect pt's performance w/ evaluation & therapist judgement for disposition recommendations  Pt will benefit from continued PT treatment in order to address impairments, decrease risk of falls, maximize independence w/ fnxl mobility, & ensure safety w/ mobility for transition to next level of care  Based on pt presentation & impairments, pt anticipated to demonstrate ability to return to home with family support upon d/c     Barriers to Discharge Inaccessible home environment  (pt has 15 KAL his home, not yet assessed)   Goals   Patient Goals "to go home tomorrow"   STG Expiration Date 07/09/21   Short Term Goal #1 Patient PT goals established in order to address patient self reported goal of "to go home tomorrow"  Pt will ambulate > 250 ft with SPC at VITO level in order to increase safety with community distance functional mobility  Pt will negotiate 15 stairs with HR assist and SPC at VITO level in order to demonstrate ability to enter/exit his home  Pt will improve AM-PAC score to >/= 24/24 in order to increase independence with mobility and decrease burden of care  Pt will improve Barthel Index score to >/= 90/100 in order to increase independence and decrease risk of falls  Pt will improve ambulatory balance to >/= GOOD grade in order to promote safety and increased independence with mobility  Pt will tolerate > 20 minutes of functional mobility training with good endurance evidenced by no significant change in vitals or shortness of breath  PT Treatment Day 0   Plan   Treatment/Interventions Elevations; Endurance training;Spoke to MD;Spoke to nursing; Patient/family training;Gait training   PT Frequency 2-3x/wk   Recommendation   PT Discharge Recommendation No rehabilitation needs   AM-PAC Basic Mobility Inpatient   Turning in Bed Without Bedrails 4   Lying on Back to Sitting on Edge of Flat Bed 3   Moving Bed to Chair 4   Standing Up From Chair 4   Walk in Room 3   Climb 3-5 Stairs 3   Basic Mobility Inpatient Raw Score 21   Basic Mobility Standardized Score 45 55   Modified Candler Scale   Modified Mariella Scale 2   Barthel Index   Feeding 10   Bathing 0   Grooming Score 5   Dressing Score 5   Bladder Score 10   Bowels Score 10   Toilet Use Score 10   Transfers (Bed/Chair) Score 15   Mobility (Level Surface) Score 10   Stairs Score 5   Barthel Index Score 80       The patient's AM-PAC Basic Mobility Inpatient Short Form Raw Score is 21, Standardized Score is 45 55  A standardized score greater than 42 9 suggests the patient may benefit from discharge to home  Please also refer to the recommendation of the Physical Therapist for safe discharge planning          Joann Flores PT, DPT   Available via [employer]  NPI # [id number]  [place] License - [id number]  [place] License - [id number]  6/29/2021

## 2021-06-29 NOTE — ASSESSMENT & PLAN NOTE
Creatinine on admission 1 26, baseline 0 8-1 05  BUN 25  Patient does appear dehydrated on physical exam and reports poor oral intake for 3 days  Status post NS 1 L in ED    - continue IV hydration with  cc per

## 2021-06-29 NOTE — ASSESSMENT & PLAN NOTE
Chronic microcytic hypochromic anemia hemoglobin on presentation 8 8 baseline 9-10  Patient was supposed to take iron pill at home reported black stool with it  He says he stopped taking the p o   And stool was normal after that  Workup for anemia included colonoscopy in January that showed multiple colon  adenomas    - continue home ferrous sulfate 325 mg daily  - follow CBC --> stable  - outpatient follow-up with PCP  - colonoscopy as outpatient

## 2021-06-29 NOTE — ASSESSMENT & PLAN NOTE
Atrial fibrillation diagnosed in 2018 presented with congestive heart failure  Rate is controlled on admission at 73bpm  He is on Eliquis 5 mg b i d  And metoprolol 25 mg daily  Per chart review has to take metoprolol 25 b i d  But wife reports once daily take      - continue home medication  - monitor on telemetry

## 2021-06-29 NOTE — ASSESSMENT & PLAN NOTE
Smokes few cigarettes a day  He is motivated    - provide nicotine patch  - provide smoking cessation education

## 2021-06-29 NOTE — H&P
Sarah U  68   H&P- Judith Tillman 1945, 70 y o  male MRN: [id number]  Unit/Bed#: -Jocelyn Encounter: [id number]  Primary Care Provider: Ashkan Spangler MD   Date and time admitted to hospital: 6/28/2021  6:09 PM    * Near syncope  Assessment & Plan  Patient presented after episode of weakness/lightheadedness when walking in the [place] to whether for 2 hours  He said he was feeling a little lightheaded, short of breath than he leaned to the wall 1st and dense lead to the pavement  Patient reports history of feeling out of balance he walks with a cane  Per chart review orthostatic hypotension mentioned  He reports he did not lose consciousness, did not bumped his head  On admission with blood pressure 80/41 mm Hg  This has now improved with IV hydration  CT head was negative, CT abdomen/ pelvis was normal, chest x-ray was normal  Status post NS 1 L in ED    - fallow orthostatic vital  - hold home lisinopril, furosemide  - will continue on  cc/hour as patient has history of CHF    Acute kidney injury (HERN[facility]N) with acute tubular necrosis (ATN) ([facility])  Assessment & Plan  Creatinine on admission 1 26, baseline 0 8-1 05  BUN 25  Patient does appear dehydrated on physical exam and reports poor oral intake for 3 days  Status post NS 1 L in ED    - continue IV hydration with  cc per hour    Permanent atrial fibrillation (HCC)  Assessment & Plan  Atrial fibrillation diagnosed in 2018 presented with congestive heart failure  Rate is controlled on admission at 73bpm  He is on Eliquis 5 mg b i d  And metoprolol 25 mg daily  Per chart review has to take metoprolol 25 b i d  But wife reports once daily take  - continue home medication  - monitor on telemetry      Microcytic anemia  Assessment & Plan  Chronic microcytic hypochromic anemia hemoglobin on presentation 8 8 baseline 9-10  Patient was supposed to take iron pill at home reported black stool with it    He says he stopped taking the p o  And stool was normal after that  Workup for anemia included colonoscopy in January that showed multiple colon  adenomas    - continue home ferrous sulfate 325 mg daily  - follow CBC  - outpatient follow-up with PCP  - colonoscopy as outpatient    Congestive cardiomyopathy [facility])  Assessment & Plan  Patient with history of hospitalization for congestive heart failure in 2018  Cardiac catheterization at that time showed no obstruction in coronaries  Most likely secondary to AFib with RVR diagnosis at that movement  Last echo on 08/07/2020 showed preserved EF 22% normal diastolic function  Patient follows with cardiologist Dr Ian Philip  He is on Lasix 40 mg daily, metoprolol tartrate 25 mg daily, digoxin 250    - will hold Lasix for now in setting of HERNÁN  - cautious with hydration  - follow I/O  - cardiac diet    COPD (chronic obstructive pulmonary disease) ([facility] [address] )  Assessment & Plan  Currently not in exacerbation  Few scattered rales heard bilaterally  Patient reports not using inhalers at home  Patient does get short of breath with ambulating    - will provide nebulization p r n  For SOB and wheezing  - recommend outpatient follow-up with pulmonologist    Ambulatory dysfunction  Assessment & Plan  Patient ambulates with a cane at baseline  He reports this is due to unsteadiness on his feet  Per chart review history of orthostatic hypotension     - see plan for near syncope   - PT/OT    Colonic adenoma  Assessment & Plan  Per chart review patient had colonoscopy in January 2021 which revealed multiple colon adenomas repeated colonoscopy was recommended in March/April    However patient did not follow-up    Tobacco use disorder  Assessment & Plan  Smokes few cigarettes a day  He is motivated    - provide nicotine patch  - provide smoking cessation education    VTE Prophylaxis: Apixaban (Eliquis)  / sequential compression device   Code Status:  Full code    Discussion with family:  Wife    Anticipated Length of Stay:  Patient will be admitted on an Inpatient basis with an anticipated length of stay of  less than 2 midnights  Justification for Hospital Stay:  HERN[place]N    Total Time for Visit, including Counseling / Coordination of Care: 30 minutes  Greater than 50% of this total time spent on direct patient counseling and coordination of care  Chief Complaint:   Lightheadedness, weakness    History of Present Illness:    Ivis Sethi is a 70 y o  male with past medical history of atrial fibrillation on Eliquis, nonischemic cardiomyopathy, COPD, anemia who was brought to ED by ambulance due to weakness and lightheadedness around 6:00 p m  He was walking with his wife on a hot weather today for about 2 hours, when he started to feel short of breath and lightheaded then very weak in his legs he had to lean to the wall and slid on the pavement  Patient reports feeling a little short of breath during the walk which he was the same measures he usually gets when walking  He did not lose consciousness or bumped his head  Patient reports he had poor oral intake for  2 days  He denies headache, visual changes, weakness, numbness, chest pain, leg swelling, abdominal pain, nausea, diarrhea, pain with urination, fever, chills  On admission was soft blood pressure 80/41 mm Hg this improved with hydration  Other vitals under control  Lab work remarkable for HERN[place]N 1 86 from baseline 1  Hemoglobin 8 8 which is at baseline  Imaging with CT head, CT abdomen/pelvis, CXR negative for acute pathology    Review of Systems:    Review of Systems   Constitutional: Positive for appetite change and fatigue  Negative for activity change, chills and fever  HENT: Negative  Eyes: Negative  Respiratory: Negative for cough, choking, chest tightness, shortness of breath and wheezing  Cardiovascular: Negative for chest pain, palpitations and leg swelling  Gastrointestinal: Negative for abdominal pain, diarrhea and nausea  Genitourinary: Negative for difficulty urinating and dysuria  Skin: Negative for rash  Neurological: Positive for light-headedness  Negative for dizziness, syncope, weakness, numbness and headaches  Hematological: Negative for adenopathy  Psychiatric/Behavioral: Negative  Negative for confusion  Past Medical and Surgical History:     Past Medical History:   Diagnosis Date    Anxiety disorder     Atrial fibrillation (HCC)     Coronary artery disease     Depression     Hepatitis C     screening negative in 1/2017    [facility])        Past Surgical History:   Procedure Laterality Date    CARDIAC CATHETERIZATION  07/09/2018       Meds/Allergies:    Prior to Admission medications    Medication Sig Start Date End Date Taking? Authorizing Provider   digoxin (LANOXIN) 0 25 mg tablet Take 0 5 tablets (0 125 mg total) by mouth daily 11/10/20  Yes Teresa Barrera MD   ferrous sulfate 325 (65 Fe) mg tablet Take 1 tablet (325 mg total) by mouth daily with breakfast 8/6/20  Yes Ramesh Pierce MD   folic acid (FOLVITE) 229 mcg tablet Take 1 tablet (400 mcg total) by mouth daily 8/6/20  Yes Ramesh Pierce MD   lisinopril (ZESTRIL) 2 5 mg tablet Take 1 tablet (2 5 mg total) by mouth daily 11/10/20  Yes Teresa Barrera MD   potassium chloride (K-DUR) 10 mEq tablet Take 10 mEq by mouth daily 5/18/20  Yes Historical Provider, MD   tamsulosin (FLOMAX) 0 4 mg Take 0 4 mg by mouth daily 5/18/20  Yes Historical Provider, MD   amoxicillin-clarithromycin-lansoprazole [facility]) Take by mouth 2 (two) times a day for 14 days Follow package directions   Hold pantoprazole while taking this medication, then may resume 1/29/21 2/12/21  Flores Coello MD   apixaban [facility]) 5 mg Take 1 tablet (5 mg total) by mouth 2 (two) times a day 11/10/20 2/8/21  Teresa Barrera MD   fluticasone-salmeterol (Advair Diskus) 100-50 mcg/dose inhaler Inhale 1 puff 2 (two) times a day Rinse mouth after use  8/13/20 11/23/20  Lico Garrison MD   furosemide (LASIX) 40 mg tablet Take 1 tablet (40 mg total) by mouth daily 11/10/20 2/8/21  Lico Garrison MD   ipratropium-albuterol (DUO-NEB) 0 5-2 5 mg/3 mL nebulizer solution Take 1 vial (3 mL total) by nebulization 4 (four) times a day  Patient not taking: Reported on 6/28/2021 9/28/20   Flaco Paez MD   metoprolol tartrate (LOPRESSOR) 25 mg tablet Take 1 tablet (25 mg total) by mouth 2 (two) times a day 11/10/20 2/8/21  Lico Garrison MD   pantoprazole (PROTONIX) 40 mg tablet Take 1 tablet (40 mg total) by mouth daily before breakfast 11/10/20 2/8/21  Lico Garrison MD     I have reviewed home medications with patient personally  Allergies: No Known Allergies    Social History:     Marital Status: /Civil Union   Occupation:  Retired  Patient Pre-hospital Living Situation:  Lives with his wife  Patient Pre-hospital Level of Mobility:  Walks with a cane  Patient Pre-hospital Diet Restrictions:  None  Substance Use History:   Social History     Substance and Sexual Activity   Alcohol Use Never     Social History     Tobacco Use   Smoking Status Current Every Day Smoker    Packs/day: 0 25    Years: 57 00    Pack years: 14 25    Types: Cigarettes   Smokeless Tobacco Never Used   Tobacco Comment    since age 17; Has history of smoking for over 54 years   He has been smoking more than packet daily in the past however he has been smokes about half a pack a day lately and stopped smoking on 7/1/2018 when he was admitted to the hospital       Social History     Substance and Sexual Activity   Drug Use Never           Physical Exam:     Vitals:   Blood Pressure: 121/81 (06/28/21 2256)  Pulse: 80 (06/28/21 2256)  Temperature: 97 6 °F (36 4 °C) (06/28/21 1814)  Temp Source: Oral (06/28/21 1814)  Respirations: 16 (06/28/21 2256)  Height: 6' (182 9 cm) (06/28/21 1814)  Weight - Scale: 68 kg (150 lb) (06/28/21 1814)  SpO2: 99 % (06/28/21 0738)    Physical Exam  Constitutional:       General: He is not in acute distress  Appearance: He is well-developed  He is not ill-appearing or toxic-appearing  HENT:      Head: Normocephalic and atraumatic  Right Ear: External ear normal       Left Ear: External ear normal       Nose: No congestion  Eyes:      Extraocular Movements: Extraocular movements intact  Conjunctiva/sclera: Conjunctivae normal       Pupils: Pupils are equal, round, and reactive to light  Neck:      Thyroid: No thyromegaly  Vascular: No carotid bruit or JVD  Cardiovascular:      Rate and Rhythm: Normal rate  Rhythm irregular  Heart sounds: Normal heart sounds  No murmur heard  No friction rub  No gallop  Pulmonary:      Effort: Pulmonary effort is normal       Breath sounds: Rhonchi present  No wheezing or rales  Abdominal:      General: Bowel sounds are normal  There is no distension  Palpations: Abdomen is soft  Tenderness: There is no abdominal tenderness  There is no guarding  Musculoskeletal:         General: No tenderness  Normal range of motion  Cervical back: Normal range of motion  No rigidity  Right lower leg: No edema  Left lower leg: No edema  Skin:     General: Skin is warm  Findings: No erythema or rash  Neurological:      General: No focal deficit present  Mental Status: He is alert and oriented to person, place, and time  Cranial Nerves: No cranial nerve deficit  Motor: Weakness (Weakness in legs bilaterally 4/5) present  Psychiatric:         Mood and Affect: Mood normal            Additional Data:     Lab Results: I have personally reviewed pertinent reports        Results from last 7 days   Lab Units 06/28/21  1848   WBC Thousand/uL 9 27   HEMOGLOBIN g/dL 8 8*   HEMATOCRIT % 30 7*   PLATELETS Thousands/uL 268   NEUTROS PCT % 84*   LYMPHS PCT % 9*   MONOS PCT % 7   EOS PCT % 0     Results from last 7 days   Lab Units 06/28/21  1848   SODIUM mmol/L 141   POTASSIUM mmol/L 3 6   CHLORIDE mmol/L 105   CO2 mmol/L 30   BUN mg/dL 25*   CREATININE mg/dL 1 86*   ANION GAP mmol/L 6   CALCIUM mg/dL 8 5   ALBUMIN g/dL 3 7   TOTAL BILIRUBIN mg/dL 0 76   ALK PHOS U/L 50 4   ALT U/L 7   AST U/L 9*   GLUCOSE RANDOM mg/dL 127     Results from last 7 days   Lab Units 06/28/21  1848   INR  1 11*                   Imaging: I have personally reviewed pertinent reports  CT abdomen pelvis with contrast   Final Result by Rachid Quiros MD (06/28 2039)      No evidence of acute intra-abdominal or pelvic process  Workstation performed: [id number]         CT head without contrast   Final Result by Melissa Prieto MD (06/28 1937)      No acute intracranial abnormality  Workstation performed: [id number]         XR chest 1 view portable   ED Interpretation by Eugene Oliva PA-C (06/28 1900)   nad          EKG, Pathology, and Other Studies Reviewed on Admission:   · EKG:  Atrial fibrillation, slight ST depression in inferior leads nonspecific stable from previous EKG    Allscripts / Epic Records Reviewed: Yes     ** Please Note: This note has been constructed using a voice recognition system   **

## 2021-06-29 NOTE — ASSESSMENT & PLAN NOTE
Patient reports poor oral 3 oral intake for 3 days    On physical exam dry mucous membranes, dry skin  Presents with HERNÁN    - continue IV

## 2021-06-29 NOTE — CASE MANAGEMENT
LOS 1 DAY  RISK OF UNPLANNED READMISSION SCORE 16  30 DAY READMISSION: NO  BUNDLE: NO    CM met with patient bedside  IMM reviewed, copy provided to patient  Patient signed copy for this CM, copy scanned into patient's EMR  Patient reports living with his wife in a 2nd floor apartment with 15 KAL  Patient reports that he uses a SPC at baseline for ambulation and reporting being IPTA with all ADLs  VNA Hx confirmed with [facility]  Patient denies any Hx of STR  Patient admits to being a current smoker  Patient admits to taking the bus to [facility] in [facility] to pan handle for money on the streets to afford cigarettes  Patient uninterested in smoking cessation material at this time  Patient admits to a Hx of anxiety, and states that he does not follow with any OP MH provider  Patient declined OP MH referral packet at this time  PCP: Dr Dudley Ocampo    Preferred Pharmacy: Patient states most of his Rx are sent to his home using [facility]  Patient states that he would use [facility] for any immediate Rx needs  Patient denies any barriers to obtaining Rx at this time  No POA identified  Decision-making would defer to patient's wife due to PA  should patient become unable to make decisions  CM reviewed name, role, discharge planning process, and encouraged follow-up with all recommended appointments and providers after discharge  Patient reports that he uses public transit for all needs  Discharge Plan: Home

## 2021-06-30 ENCOUNTER — TRANSITIONAL CARE MANAGEMENT (OUTPATIENT)
Dept: FAMILY MEDICINE CLINIC | Facility: CLINIC | Age: 76
End: 2021-06-30

## 2021-06-30 ENCOUNTER — TELEPHONE (OUTPATIENT)
Dept: FAMILY MEDICINE CLINIC | Facility: CLINIC | Age: 76
End: 2021-06-30

## 2021-06-30 VITALS
DIASTOLIC BLOOD PRESSURE: 72 MMHG | HEIGHT: 72 IN | SYSTOLIC BLOOD PRESSURE: 130 MMHG | TEMPERATURE: 96.7 F | WEIGHT: 154 LBS | HEART RATE: 70 BPM | RESPIRATION RATE: 17 BRPM | OXYGEN SATURATION: 98 % | BODY MASS INDEX: 20.86 KG/M2

## 2021-06-30 PROBLEM — R55 NEAR SYNCOPE: Status: RESOLVED | Noted: 2021-06-28 | Resolved: 2021-06-30

## 2021-06-30 PROBLEM — N17.0 ACUTE KIDNEY INJURY (AKI) WITH ACUTE TUBULAR NECROSIS (ATN) (HCC): Status: RESOLVED | Noted: 2021-06-28 | Resolved: 2021-06-30

## 2021-06-30 LAB
ANION GAP SERPL CALCULATED.3IONS-SCNC: 5 MMOL/L (ref 4–13)
ATRIAL RATE: 0 BPM
ATRIAL RATE: 0 BPM
BASOPHILS # BLD AUTO: 0.05 THOUSANDS/ΜL (ref 0–0.1)
BASOPHILS NFR BLD AUTO: 1 % (ref 0–1)
BUN SERPL-MCNC: 15 MG/DL (ref 6–20)
CALCIUM SERPL-MCNC: 8.3 MG/DL (ref 8.4–10.2)
CHLORIDE SERPL-SCNC: 110 MMOL/L (ref 96–108)
CO2 SERPL-SCNC: 28 MMOL/L (ref 22–33)
CREAT SERPL-MCNC: 1.19 MG/DL (ref 0.5–1.2)
EOSINOPHIL # BLD AUTO: 0.31 THOUSAND/ΜL (ref 0–0.61)
EOSINOPHIL NFR BLD AUTO: 4 % (ref 0–6)
ERYTHROCYTE [DISTWIDTH] IN BLOOD BY AUTOMATED COUNT: 15.5 % (ref 11.6–15.1)
GFR SERPL CREATININE-BSD FRML MDRD: 60 ML/MIN/1.73SQ M
GLUCOSE SERPL-MCNC: 108 MG/DL (ref 65–140)
HCT VFR BLD AUTO: 27.7 % (ref 36.5–49.3)
HGB BLD-MCNC: 8 G/DL (ref 12–17)
IMM GRANULOCYTES # BLD AUTO: 0.03 THOUSAND/UL (ref 0–0.2)
IMM GRANULOCYTES NFR BLD AUTO: 0 % (ref 0–2)
LYMPHOCYTES # BLD AUTO: 1.95 THOUSANDS/ΜL (ref 0.6–4.47)
LYMPHOCYTES NFR BLD AUTO: 23 % (ref 14–44)
MCH RBC QN AUTO: 22.7 PG (ref 26.8–34.3)
MCHC RBC AUTO-ENTMCNC: 28.9 G/DL (ref 31.4–37.4)
MCV RBC AUTO: 79 FL (ref 82–98)
MONOCYTES # BLD AUTO: 0.77 THOUSAND/ΜL (ref 0.17–1.22)
MONOCYTES NFR BLD AUTO: 9 % (ref 4–12)
NEUTROPHILS # BLD AUTO: 5.25 THOUSANDS/ΜL (ref 1.85–7.62)
NEUTS SEG NFR BLD AUTO: 63 % (ref 43–75)
P AXIS: -89 DEGREES
PLATELET # BLD AUTO: 251 THOUSANDS/UL (ref 149–390)
PMV BLD AUTO: 10.7 FL (ref 8.9–12.7)
POTASSIUM SERPL-SCNC: 3.9 MMOL/L (ref 3.5–5)
PR INTERVAL: 213 MS
PR INTERVAL: 252 MS
QRS AXIS: 69 DEGREES
QRS AXIS: 71 DEGREES
QRSD INTERVAL: 101 MS
QRSD INTERVAL: 105 MS
QT INTERVAL: 372 MS
QT INTERVAL: 387 MS
QTC INTERVAL: 410 MS
QTC INTERVAL: 418 MS
RBC # BLD AUTO: 3.53 MILLION/UL (ref 3.88–5.62)
SODIUM SERPL-SCNC: 143 MMOL/L (ref 133–145)
T WAVE AXIS: 59 DEGREES
T WAVE AXIS: 64 DEGREES
VENTRICULAR RATE: 70 BPM
VENTRICULAR RATE: 73 BPM
WBC # BLD AUTO: 8.36 THOUSAND/UL (ref 4.31–10.16)

## 2021-06-30 PROCEDURE — 97116 GAIT TRAINING THERAPY: CPT

## 2021-06-30 PROCEDURE — 93005 ELECTROCARDIOGRAM TRACING: CPT

## 2021-06-30 PROCEDURE — 99239 HOSP IP/OBS DSCHRG MGMT >30: CPT | Performed by: INTERNAL MEDICINE

## 2021-06-30 PROCEDURE — 85025 COMPLETE CBC W/AUTO DIFF WBC: CPT | Performed by: INTERNAL MEDICINE

## 2021-06-30 PROCEDURE — 80048 BASIC METABOLIC PNL TOTAL CA: CPT | Performed by: INTERNAL MEDICINE

## 2021-06-30 PROCEDURE — 99232 SBSQ HOSP IP/OBS MODERATE 35: CPT | Performed by: INTERNAL MEDICINE

## 2021-06-30 PROCEDURE — 93010 ELECTROCARDIOGRAM REPORT: CPT | Performed by: INTERNAL MEDICINE

## 2021-06-30 RX ORDER — METOPROLOL SUCCINATE 25 MG/1
25 TABLET, EXTENDED RELEASE ORAL DAILY
Status: DISCONTINUED | OUTPATIENT
Start: 2021-06-30 | End: 2021-06-30 | Stop reason: HOSPADM

## 2021-06-30 RX ORDER — METOPROLOL SUCCINATE 25 MG/1
25 TABLET, EXTENDED RELEASE ORAL DAILY
Qty: 30 TABLET | Refills: 0 | Status: CANCELLED | OUTPATIENT
Start: 2021-06-30 | End: 2021-07-30

## 2021-06-30 RX ORDER — FUROSEMIDE 40 MG/1
20 TABLET ORAL
Refills: 0
Start: 2021-06-30 | End: 2021-09-10 | Stop reason: SDUPTHER

## 2021-06-30 RX ORDER — METOPROLOL SUCCINATE 25 MG/1
25 TABLET, EXTENDED RELEASE ORAL DAILY
Qty: 30 TABLET | Refills: 0 | Status: SHIPPED | OUTPATIENT
Start: 2021-06-30 | End: 2021-09-10 | Stop reason: SDUPTHER

## 2021-06-30 RX ADMIN — PANTOPRAZOLE SODIUM 40 MG: 40 TABLET, DELAYED RELEASE ORAL at 05:37

## 2021-06-30 RX ADMIN — APIXABAN 5 MG: 5 TABLET, FILM COATED ORAL at 09:04

## 2021-06-30 RX ADMIN — Medication 400 MCG: at 09:04

## 2021-06-30 RX ADMIN — FERROUS SULFATE TAB 325 MG (65 MG ELEMENTAL FE) 325 MG: 325 (65 FE) TAB at 09:04

## 2021-06-30 RX ADMIN — POTASSIUM CHLORIDE 10 MEQ: 750 TABLET, EXTENDED RELEASE ORAL at 09:04

## 2021-06-30 NOTE — TELEPHONE ENCOUNTER
Completed TCM call and scheduled for 7-7-2021 at 3 pm Spoke and scheduled with wife Isabelle Tate, patient was sleeping     Jack Sims

## 2021-06-30 NOTE — DISCHARGE INSTR - AVS FIRST PAGE
· Please follow-up closely with your PCP and cardiologist as stated below  · Please go for blood work (BMP)within one week to monitor your kidney function and follow-up with your PCP for the results  Medication changes:  · STOP taking your digoxin  · Metoprolol tartrate has been changed to metoprolol succinate (Toprol XL) 25 mg daily  · Furosemide (Lasix) dose has been changed to 20 mg EVERY OTHER DAY

## 2021-06-30 NOTE — ASSESSMENT & PLAN NOTE
Currently not in exacerbation  Few scattered rales heard bilaterally  Patient reports not using inhalers at home  Patient does get short of breath with ambulating    - will provide nebulization p r n   For SOB and wheezing

## 2021-06-30 NOTE — PROGRESS NOTES
General Cardiology Progress Note   Travis Number 70 y o  male MRN: [id number]  Unit/Bed#: -01 Encounter: [id number]      Assessment:  Active Problems:    Microcytic anemia    Congestive cardiomyopathy (HCC)    Permanent atrial fibrillation (HCC)    Tobacco use disorder    Colonic adenoma    Ambulatory dysfunction    COPD (chronic obstructive pulmonary disease) (HCC)    Bradycardia      Impression:     History of recovered cardiomyopathy   Chronic atrial fibrillation with slow ventricular response in the setting of acute kidney injury, metoprolol and digoxin retention and a digit level of 1 8   Hypovolemia in the setting of heat wave and daily diuretic      Plan:     With his fully recovered left ventricular function, I doubt he will need as aggressive a diuretic regimen, suggest 20 mg furosemide every other day, and close follow-up with his primary cardiologist in 1 week   No digoxin upon discharge   Metoprolol succinate 25 mg daily for discharge   Stable from my standpoint    Subjective:   Patient seen and examined  Seems forgetful, was unsure the name of his primary cardiologist, meds have been clarified by the primary team with his wife at home  Bradycardia overnight, but heart rate 90 when standing with me  Walk down the hallway with physical therapy without lightheadedness    Review of Systems   Constitutional: Negative for diaphoresis, fever, malaise/fatigue and night sweats  Cardiovascular: Negative for chest pain, dyspnea on exertion, leg swelling, orthopnea, palpitations and syncope  Respiratory: Negative for cough, shortness of breath, sputum production and wheezing  Skin: Negative for itching and rash  Gastrointestinal: Negative for abdominal pain, change in bowel habit and diarrhea  Neurological: Negative for dizziness, focal weakness, light-headedness and weakness         Objective   Vitals: Blood pressure 113/60, pulse 60, temperature 98 9 °F (37 2 °C), temperature source Tympanic, resp  rate 17, height 6' (1 829 m), weight 69 9 kg (154 lb), SpO2 97 %  , Body mass index is 20 89 kg/m² , I/O last 3 completed shifts: In: 3750 8 [P O :840; I V :1910 8; IV Piggyback:1000]  Out: 650 [Urine:650]  I/O this shift:  In: 240 [P O :240]  Out: 300 [Urine:300]  Wt Readings from Last 3 Encounters:   06/30/21 69 9 kg (154 lb)   11/30/20 70 3 kg (155 lb)   09/28/20 69 4 kg (153 lb)       Intake/Output Summary (Last 24 hours) at 6/30/2021 1049  Last data filed at 6/30/2021 0902  Gross per 24 hour   Intake 2622 08 ml   Output 950 ml   Net 1672 08 ml     I/O last 3 completed shifts: In: 3750 8 [P O :840; I V :1910 8; IV Piggyback:1000]  Out: 650 [Urine:650]    Telemetry shows AFib, with slow ventricular response overnight    Physical Exam  Constitutional:       General: He is not in acute distress  Appearance: He is not diaphoretic  HENT:      Head: Normocephalic and atraumatic  Neck:      Vascular: No JVD  Cardiovascular:      Rate and Rhythm: Normal rate  Rhythm irregular  Heart sounds: Normal heart sounds  No murmur heard  Pulmonary:      Effort: Pulmonary effort is normal  No respiratory distress  Breath sounds: Normal breath sounds  No wheezing or rales  Abdominal:      General: Bowel sounds are normal  There is no distension  Palpations: Abdomen is soft  Tenderness: There is no abdominal tenderness  Musculoskeletal:      Cervical back: Normal range of motion and neck supple  Right lower leg: No edema  Left lower leg: No edema  Skin:     General: Skin is warm and dry  Neurological:      Mental Status: He is alert and oriented to person, place, and time           Meds/Allergies   No Known Allergies    Current Facility-Administered Medications:     apixaban (ELIQUIS) tablet 5 mg, 5 mg, Oral, BID, Mitch Sheridan MD, 5 mg at 06/30/21 0170    ferrous sulfate tablet 325 mg, 325 mg, Oral, Daily With Breakfast, Mitch Sheridan MD, 325 mg at 06/30/21 3319    folic acid (FOLVITE) tablet 400 mcg, 400 mcg, Oral, Daily, Batool Bryan MD, 400 mcg at 06/30/21 0904    levalbuterol (XOPENEX) inhalation solution 0 63 mg, 0 63 mg, Nebulization, Q8H PRN, Batool Bryan MD    nicotine (NICODERM CQ) 7 mg/24hr TD 24 hr patch 1 patch, 1 patch, Transdermal, Daily, Batool Bryan MD    pantoprazole (PROTONIX) EC tablet 40 mg, 40 mg, Oral, BID AC, Batool Bryan MD, 40 mg at 06/30/21 0537    potassium chloride (K-DUR,KLOR-CON) CR tablet 10 mEq, 10 mEq, Oral, Daily, Batool Bryan MD, 10 mEq at 06/30/21 0904    tamsulosin (FLOMAX) capsule 0 4 mg, 0 4 mg, Oral, Daily With Gabriel Newell MD, 0 4 mg at 06/29/21 1556    Laboratory Results:  Results from last 7 days   Lab Units 06/28/21  1848   TROPONIN I ng/mL <0 03       CBC with diff:   Results from last 7 days   Lab Units 06/30/21  0546 06/29/21  0507 06/28/21  1848   WBC Thousand/uL 8 36 9 28 9 27   HEMOGLOBIN g/dL 8 0* 8 4* 8 8*   HEMATOCRIT % 27 7* 29 1* 30 7*   MCV fL 79* 78* 79*   PLATELETS Thousands/uL 251 263 268   MCH pg 22 7* 22 5* 22 5*   MCHC g/dL 28 9* 28 9* 28 7*   RDW % 15 5* 15 6* 15 2*   MPV fL 10 7 10 9 9 5       CMP:  Results from last 7 days   Lab Units 06/30/21  0546 06/29/21  0507 06/28/21  1848   SODIUM mmol/L 143 142 141   POTASSIUM mmol/L 3 9 3 7 3 6   CHLORIDE mmol/L 110* 108 105   CO2 mmol/L 28 27 30   BUN mg/dL 15 23* 25*   CREATININE mg/dL 1 19 1 59* 1 86*   CALCIUM mg/dL 8 3* 8 5 8 5   AST U/L  --   --  9*   ALT U/L  --   --  7   ALK PHOS U/L  --   --  50 4   EGFR ml/min/1 73sq m 60 42 35       BMP:  Results from last 7 days   Lab Units 06/30/21  0546 06/29/21  0507 06/28/21  1848   SODIUM mmol/L 143 142 141   POTASSIUM mmol/L 3 9 3 7 3 6   CHLORIDE mmol/L 110* 108 105   CO2 mmol/L 28 27 30   BUN mg/dL 15 23* 25*   CREATININE mg/dL 1 19 1 59* 1 86*   CALCIUM mg/dL 8 3* 8 5 8 5       NT-proBNP: No results for input(s): NTBNP in the last 72 hours       Magnesium:   Results from last 7 days Lab Units 21  1848   MAGNESIUM mg/dL 2 0       Coags:   Results from last 7 days   Lab Units 21  1848   PTT seconds 23   INR  1 11*       TSH:        Hemoglobin A1C )      Lipid Profile:   No results found for: CHOL  No results found for: HDL  No results found for: LDLCALC  No results found for: LDLDIRECT  No results found for: TRIG    Cardiac testing:   EKG personally reviewed by Marbella Beaulieu MD      Results for orders placed during the hospital encounter of 20    Echo complete with contrast if indicated    [facility]  [facility]  [address] [facility], [address] [facility]    Transthoracic Echocardiogram  2D, M-mode, Doppler, and Color Doppler    Study date:  07-Aug-2020    Patient: Ray Rock  MR number: [id number]  Account number: [de-identified]  : 1945  Age: 69 years  Gender: Male  Status: Inpatient  Location: [facility]  Height: 70 in  Weight: 148 lb  BP: 123/ 70 mmHg    Indications: Heart Failure    Diagnoses: I50 9 - Heart failure, unspecified    Sonographer:  NAN Galvan  Referring Physician:  Olga Webster MD  Group:  [facility]  Interpreting Physician:  Abi Paige MD    SUMMARY    LEFT VENTRICLE:  Systolic function was normal  Ejection fraction was estimated to be 55 %  There were no regional wall motion abnormalities  Wall thickness was at the upper limits of normal     LEFT ATRIUM:  The atrium was mildly dilated  RIGHT ATRIUM:  The atrium was mildly to moderately dilated  MITRAL VALVE:  There was mild annular calcification  There was mild to moderate regurgitation  TRICUSPID VALVE:  There was moderate regurgitation  Estimated peak PA pressure was 39 mmHg  PULMONIC VALVE:  There was mild regurgitation  HISTORY: PRIOR HISTORY: CAD, Anemia Congestive heart failure  Nonischemic cardiomyopathy  Atrial fibrillation      PROCEDURE: The study was performed in the [facility]  This was a routine study  The transthoracic approach was used  The study included complete 2D imaging, M-mode, complete spectral Doppler, and color Doppler  The heart rate was 80  bpm, at the start of the study  Images were obtained from the parasternal, apical, subcostal, and suprasternal notch acoustic windows  Image quality was adequate  LEFT VENTRICLE: Size was normal  Systolic function was normal  Ejection fraction was estimated to be 55 %  There were no regional wall motion abnormalities  Wall thickness was at the upper limits of normal  DOPPLER: Transmitral flow  pattern: atrial fibrillation  RIGHT VENTRICLE: The size was normal  Systolic function was normal  Wall thickness was normal     LEFT ATRIUM: The atrium was mildly dilated  RIGHT ATRIUM: The atrium was mildly to moderately dilated  MITRAL VALVE: There was mild annular calcification  Valve structure was normal  There was mild-moderate diffuse thickening  There was normal leaflet separation  DOPPLER: The transmitral velocity was within the normal range  There was no  evidence for stenosis  There was mild to moderate regurgitation  AORTIC VALVE: The valve was trileaflet  Leaflets exhibited mildly increased thickness, normal cuspal separation, and sclerosis  DOPPLER: Transaortic velocity was within the normal range  There was no evidence for stenosis  There was no  significant regurgitation  TRICUSPID VALVE: The valve structure was normal  There was normal leaflet separation  DOPPLER: The transtricuspid velocity was within the normal range  There was no evidence for stenosis  There was moderate regurgitation  Pulmonary artery  systolic pressure was mildly increased  Estimated peak PA pressure was 39 mmHg  PULMONIC VALVE: Leaflets exhibited normal thickness, no calcification, and normal cuspal separation  DOPPLER: The transpulmonic velocity was within the normal range  There was mild regurgitation      PERICARDIUM: There was no pericardial effusion  AORTA: The root exhibited normal size  SYSTEMIC VEINS: IVC: The inferior vena cava was normal in size  Respirophasic changes were normal     SYSTEM MEASUREMENT TABLES    2D  %FS: 34 29 %  Ao Diam: 3 52 cm  EDV(Teich): 151 99 ml  EF(Teich): 62 68 %  ESV(Teich): 56 72 ml  HR_4Ch_Q: 90 87 BPM  IVSd: 1 23 cm  LA Diam: 4 97 cm  LAAs A4C: 30 77 cm2  LAESV A-L A4C: 106 62 ml  LAESV MOD A4C: 99 72 ml  LALs A4C: 7 54 cm  LVCO_4Ch_Q: 4 61 L/min  LVEF_4Ch_Q: 56 72 %  LVIDd: 5 57 cm  LVIDs: 3 66 cm  LVLd_4Ch_Q: 7 96 cm  LVLs_4Ch_Q: 6 38 cm  LVPWd: 1 08 cm  LVSV_4Ch_Q: 50 7 ml  LVVED_4Ch_Q: 89 39 ml  LVVES_4Ch_Q: 38 69 ml  RAAs: 31 12 cm2  RAESV A-L: 112 8 ml  RAESV MOD: 108 5 ml  RALs: 7 29 cm  RVIDd: 3 86 cm  SV(Teich): 95 27 ml    CW  TR Vmax: 2 78 m/s  TR maxP 97 mmHg    MM  TAPSE: 1 68 cm    [employer] Accredited [facility]    Prepared and electronically signed by    Leonila Henning MD  Signed 07-Aug-2020 09:50:46    No results found for this or any previous visit  No results found for this or any previous visit  No results found for this or any previous visit  No results found for this or any previous visit  No results found for this or any previous visit  Trude Fothergill, MD    Portions of the record may have been created with voice recognition software  Occasional wrong word or "sound a like" substitutions may have occurred due to the inherent limitations of voice recognition software  Read the chart carefully and recognize, using context, where substitutions have occurred

## 2021-06-30 NOTE — ASSESSMENT & PLAN NOTE
Atrial fibrillation diagnosed in 2018 presented with congestive heart failure  Rate is controlled on admission at 73bpm  He is on Eliquis 5 mg b i d  And metoprolol 25 mg daily  Per chart review has to take metoprolol 25 b i d  But wife reports once daily take  Also on digoxin 125 mcg daily  - hold metoprolol and digoxin in setting of bradycardia  - monitor on telemetry    6/30: Bradycardia overnight but HR increased appropriately while ambulating with PT  Will discharge on 25 mg Toprol XL daily and close follow-up with outpatient cardiologist, Dr Tony Rodriguez

## 2021-06-30 NOTE — ASSESSMENT & PLAN NOTE
Patient with history of hospitalization for congestive heart failure in 2018 with EF as low as 10%  Cardiac catheterization at that time showed no obstruction in coronaries  Most likely secondary to AFib with RVR diagnosis at that movement  Last echo on 08/07/2020 showed improved EF 59% normal diastolic function  Patient follows with cardiologist Dr Lauren Robledo  He is on Lasix 40 mg daily, metoprolol tartrate 25 mg daily, digoxin 125    - will hold Lasix for now in setting of HERNÁN  - cautious with hydration  - follow I/O  - cardiac diet    6/30 Restart Lasix at 20 mg Q48H at discharge  BMP in one week  Close follow-up with PCP

## 2021-06-30 NOTE — DISCHARGE SUMMARY
Sarah U  68   Discharge- Selam Lopez 1945, 70 y o  male MRN: [id number]  Unit/Bed#: -01 Encounter: [id number]  Primary Care Provider: Charlene Browne MD   Date and time admitted to hospital: 6/28/2021  6:09 PM    * Near syncope-resolved as of 6/30/2021  Assessment & Plan  Patient presented after episode of weakness/lightheadedness when walking in the [place] to whether for 2 hours  He said he was feeling a little lightheaded, short of breath than he leaned to the wall 1st and dense lead to the pavement  Patient reports history of feeling out of balance he walks with a cane  Per chart review orthostatic hypotension mentioned  He reports he did not lose consciousness, did not bumped his head  On admission with blood pressure 80/41 mm Hg  This has now improved with IV hydration  CT head was negative, CT abdomen/ pelvis was normal, chest x-ray was normal  Status post NS 1 L in ED    - fallow orthostatic vital --  negative  - hold home lisinopril, furosemide  - Eating and drinking well --> discontinue IVF  Bradycardia  Assessment & Plan  · Noted to be bradycardic in low 50s on telemetry  · Digoxin level high normal at 1 8  Possible increased 2/2 HERNÁN  · Hold metoprolol and digoxin for now and continue to monitor on telemetry  Okay to give IV Lopressor PRN in case needed for rate control  · Possibly restart metoprolol tomorrow  May need event recorder or Zio patch at discharge for extended rhythm monitoring  COPD (chronic obstructive pulmonary disease) ([facility] [address] )  Assessment & Plan  Currently not in exacerbation  Few scattered rales heard bilaterally  Patient reports not using inhalers at home  Patient does get short of breath with ambulating    - will provide nebulization p r n  For SOB and wheezing      Ambulatory dysfunction  Assessment & Plan  Patient ambulates with a cane at baseline  He reports this is due to unsteadiness on his feet    Per chart review history of orthostatic hypotension     - see plan for near syncope   - PT/OT    Colonic adenoma  Assessment & Plan  Per chart review patient had colonoscopy in January 2021 which revealed multiple colon adenomas repeated colonoscopy was recommended in March/April  However patient did not follow-up    Tobacco use disorder  Assessment & Plan  Smokes few cigarettes a day  He is motivated    - provide nicotine patch  - provide smoking cessation education    Permanent atrial fibrillation [facility])  Assessment & Plan  Atrial fibrillation diagnosed in 2018 presented with congestive heart failure  Rate is controlled on admission at 73bpm  He is on Eliquis 5 mg b i d  And metoprolol 25 mg daily  Per chart review has to take metoprolol 25 b i d  But wife reports once daily take  Also on digoxin 125 mcg daily  - hold metoprolol and digoxin in setting of bradycardia  - monitor on telemetry    6/30: Bradycardia overnight but HR increased appropriately while ambulating with PT  Will discharge on 25 mg Toprol XL daily and close follow-up with outpatient cardiologist, Dr Mike Hurt  Congestive cardiomyopathy [facility])  Assessment & Plan  Patient with history of hospitalization for congestive heart failure in 2018 with EF as low as 10%  Cardiac catheterization at that time showed no obstruction in coronaries  Most likely secondary to AFib with RVR diagnosis at that movement  Last echo on 08/07/2020 showed improved EF 02% normal diastolic function  Patient follows with cardiologist Dr Mike Hurt  He is on Lasix 40 mg daily, metoprolol tartrate 25 mg daily, digoxin 125    - will hold Lasix for now in setting of HERNÁN  - cautious with hydration  - follow I/O  - cardiac diet    6/30 Restart Lasix at 20 mg Q48H at discharge  BMP in one week  Close follow-up with PCP       Microcytic anemia  Assessment & Plan  Chronic microcytic hypochromic anemia hemoglobin on presentation 8 8 baseline 9-10  Patient was supposed to take iron pill at home reported black stool with it  He says he stopped taking the p o  And stool was normal after that  Workup for anemia included colonoscopy in January that showed multiple colon  adenomas    - continue home ferrous sulfate 325 mg daily  - follow CBC --> stable  - outpatient follow-up with PCP  - colonoscopy as outpatient    Acute kidney injury (HERNÁN) with acute tubular necrosis (ATN) ([facility])-resolved as of 6/30/2021  Assessment & Plan  Creatinine on admission 1 26, baseline 0 8-1 05  BUN 25  Patient does appear dehydrated on physical exam and reports poor oral intake for 3 days  Status post NS 1 L in ED    Resolved with IVF  Counseled regarding adequate p o  fluid intake and Lasix dose reduced to 20 mg Q48H on discharge  Discharging Physician / Practitioner: Liam Kohli MD  PCP: Ameya Parker MD  Admission Date:   Admission Orders (From admission, onward)     Ordered        06/28/21 2151  INPATIENT ADMISSION  Once                   Discharge Date: 06/30/21    Medical Problems     Resolved Problems  Date Reviewed: 6/28/2021        Resolved    * (Principal) Near syncope 6/30/2021     Resolved by  Liam Kohil MD    Acute kidney injury (HERNÁN) with acute tubular necrosis (ATN) ([facility] [address] ) 6/30/2021     Resolved by  Liam Kohli MD                Consultations During Hospital Stay:  · Cardiology    Procedures Performed:   · None    Significant Findings / Test Results:   · CT abdomen/pelvis 6/28/21: No evidence of acute intra-abdominal or pelvic process  · CT head 6/28/21: No acute intracranial pathology  · Chest x-ray 6/28/21: No acute cardiopulmonary disease  Incidental Findings:   · None    Test Results Pending at Discharge (will require follow up): · None     Outpatient Tests Requested:  · BMP in one week to monitor creatinine       Complications:  None    Reason for Admission: Near syncope    Hospital Course:     Macho Ness is a 70 y o  male patient with past medical history of atrial fibrillation on Eliquis, nonischemic cardiomyopathy, COPD, and anemia, who originally presented to the hospital on 6/28/2021 due to weakness and lightheadedness  He was walking with his wife in the hot weather for about 2 hours, when he started to feel short of breath and lightheaded then very weak in his legs  He had to lean to the wall and slid on the pavement  He did not lose consciousness or bumped his head  Patient reports he had poor oral intake for the past 2 days  On presentation vitals revealed a soft blood pressure 80/41 which improved with fluid bolus  Blood work was significant for HERNÁN with a creatinine of 1 86 up from a baseline of 1 00  Hemoglobin was at baseline  Imaging including CT head, CT abdomen/pelvis and chest x-ray were negative for acute pathology  Patient was admitted to the medical floor and received IV hydration  Creatinine trended down to 1 19 at time of discharge  Lisinopril and Lasix were held temporarily in setting of HERNÁN  Metoprolol and digoxin were held as well because of atrial fibrillation with slow ventricular rate noted on telemetry  Digoxin level was at the higher end of the therapeutic window at 1 8 at time of admission  Likely in setting of HERNÁN  Cardiology consult was obtained and recommendation was made to resume metoprolol succinate at 25 mg daily and to discontinue digoxin  Lasix dose was decreased to 20 mg every other day  PT worked with patient on day of discharge and HR went up appropriately while walking down the hallway  Patient is advised to follow-up closely with his outpatient cardiologist, Dr Cash Listen  On 6/30 patient was clinically and hemodynamically stable for discharge to home  He will go for BMP in one week to monitor his creatinine and follow-up with his PCP for the results and after hospitalization care  Please see above list of diagnoses and related plan for additional information       Condition at Discharge: good     Discharge Day Visit / Exam:     Subjective:  Patient was seen and examined by me at bedside  He reports feeling much improved  Denies any dizziness or lightheadedness  Feels ready to go home  Vitals: Blood Pressure: 130/72 (06/30/21 1057)  Pulse: 70 (06/30/21 1057)  Temperature: (!) 96 7 °F (35 9 °C) (06/30/21 1057)  Temp Source: Tympanic (06/30/21 1057)  Respirations: 17 (06/30/21 1057)  Height: 6' (182 9 cm) (06/28/21 1814)  Weight - Scale: 69 9 kg (154 lb) (06/30/21 0547)  SpO2: 98 % (06/30/21 1057)  Exam:   Physical Exam  Vitals and nursing note reviewed  Constitutional:       General: He is not in acute distress  Appearance: Normal appearance  He is not ill-appearing  HENT:      Head: Normocephalic and atraumatic  Mouth/Throat:      Mouth: Mucous membranes are moist       Pharynx: Oropharynx is clear  Eyes:      General: No scleral icterus  Conjunctiva/sclera: Conjunctivae normal    Cardiovascular:      Rate and Rhythm: Normal rate  Rhythm irregular  Heart sounds: Normal heart sounds  Pulmonary:      Breath sounds: Normal breath sounds  Abdominal:      General: Bowel sounds are normal       Palpations: Abdomen is soft  Tenderness: There is no abdominal tenderness  Musculoskeletal:      Cervical back: Normal range of motion and neck supple  Right lower leg: No edema  Left lower leg: No edema  Skin:     General: Skin is warm and dry  Neurological:      General: No focal deficit present  Mental Status: He is alert and oriented to person, place, and time  Psychiatric:         Mood and Affect: Mood normal        Discussion with Family: Wife was updated by phone  Discharge instructions/Information to patient and family:   See after visit summary for information provided to patient and family  Provisions for Follow-Up Care:  See after visit summary for information related to follow-up care and any pertinent home health orders        Disposition:     Home    For Discharges to [facility] SNF: · Not Applicable to this Patient - Not Applicable to this Patient    Planned Readmission: No     Discharge Statement:  I spent 25 minutes discharging the patient  This time was spent on the day of discharge  I had direct contact with the patient on the day of discharge  Greater than 50% of the total time was spent examining patient, answering all patient questions, arranging and discussing plan of care with patient as well as directly providing post-discharge instructions  Additional time then spent on discharge activities  Discharge Medications:  See after visit summary for reconciled discharge medications provided to patient and family        ** Please Note: This note has been constructed using a voice recognition system **

## 2021-06-30 NOTE — DISCHARGE INSTRUCTIONS
Acute Kidney Injury, Ambulatory Care   GENERAL INFORMATION:   Acute kidney injury  happens when your kidneys suddenly stop working correctly  Normally, the kidneys turn fluid, chemicals, and waste from your blood into urine  In acute kidney injury, your kidneys can no longer do this  In most cases, it is temporary, but it may become a chronic kidney condition  Common symptoms include the following:   · Decreased urination or dark-colored urine    · Swelling in your arms, legs, or feet     · Abdominal or low back pain    · Vomiting, diarrhea, or loss of appetite    · Fatigue     · Skin rash  Seek immediate care for the following symptoms:   · Heart beating faster than normal for you    · Sudden chest pain or trouble breathing    · Seizure  Treatment for acute kidney injury:  Treatment depends upon the cause of your acute kidney injury and how severe it is  Medicines may be given to increase blood flow to your kidneys and protect your kidneys  You may also need medicine to decrease inflammation in your kidneys  You may be given IV fluids to replenish fluids and help your heart pump blood  Dialysis may be needed to remove chemicals and waste from your blood when your kidneys cannot  Manage acute kidney injury:   · Manage other health conditions  Care for your diabetes, high blood pressure, or heart disease  These conditions increase your risk for acute kidney injury  · Talk to your healthcare provider before you take over-the-counter-medicine  NSAIDs, stomach medicine, or laxatives may harm your kidneys and increase your risk for acute kidney injury  Follow up with your healthcare provider as directed:  Write down your questions so you remember to ask them during your visits  CARE AGREEMENT:   You have the right to help plan your care  Learn about your health condition and how it may be treated  Discuss treatment options with your caregivers to decide what care you want to receive   You always have the right to refuse treatment  The above information is an  only  It is not intended as medical advice for individual conditions or treatments  Talk to your doctor, nurse or pharmacist before following any medical regimen to see if it is safe and effective for you  © 2014 [address] is for End User's use only and may not be sold, redistributed or otherwise used for commercial purposes  All illustrations and images included in CareNotes® are the copyrighted property of A D A M , Inc  or Richard Oleary

## 2021-06-30 NOTE — PHYSICAL THERAPY NOTE
PHYSICAL THERAPY TREATMENT  Time: 1040-1100    NAME:  Janice Painter  DATE: 06/30/21    AGE:   70 y o  Mrn:   [id number]  Length Of Stay: 2    ADMIT DX:  Dizziness [R42]  Acute renal injury ([facility] [address] ) [N17 9]    Past Medical History:   Diagnosis Date    Anxiety disorder     [facility]     Coronary artery disease     Depression     Hepatitis C     screening negative in 1/2017    MI [facility]myocardial infarction[facility])      Past Surgical History:   Procedure Laterality Date    CARDIAC CATHETERIZATION  07/09/2018       Performed at least 2 patient identifiers during session: Name and ID bracelet        06/30/21 1101   PT Last Visit   PT Visit Date 06/30/21   Note Type   Note Type Treatment   Pain Assessment   Pain Assessment Tool 0-10   Pain Score No Pain   Effect of Pain on Daily Activities n/a   Hospital Pain Intervention(s) Ambulation/increased activity   Multiple Pain Sites No   Restrictions/Precautions   Weight Bearing Precautions Per Order No   Other Precautions Bed Alarm; Impulsive;Hard of hearing   General   Chart Reviewed Yes   Additional Pertinent History Patient admitted 06/28/2021 with complaint of dizziness, decreased PO intake x3 days, ambulating outside in the heat x2 hours PTA  Dx: near syncope  Response to Previous Treatment Patient with no complaints from previous session     Family/Caregiver Present No   Cognition   Overall Cognitive Status WFL   Arousal/Participation Cooperative   Attention Attends with cues to redirect   Orientation Level Oriented X4   Memory Within functional limits   Following Commands Follows multistep commands without difficulty   Subjective   Subjective I'd like to lay back down and get some sleep before I go home    Bed Mobility   Supine to Sit 7  Independent   Sit to Supine 7  Independent   Transfers   Sit to Stand 6  Modified independent   Additional items Impulsive  (SPC)   Stand to Sit 6  Modified independent   Additional items Impulsive  (SPC) Ambulation/Elevation   Gait pattern WNL; Inconsistent khanh  (rapid khanh)   Gait Assistance 5  Supervision   Assistive Device Straight cane   Distance 200ft x1 including change in direction   Stair Management Assistance Not tested  (pt declines)   Balance   Static Sitting Good   Dynamic Sitting Good   Static Standing Good   Dynamic Standing Fair +   Ambulatory Fair +   Endurance Deficit   Endurance Deficit Yes   Endurance Deficit Description Patient continues with moderate degree of shortness of breath during and after ambulation training  Patient reports this is his baseline degree of shortness of breath   Activity Tolerance   Activity Tolerance Patient limited by fatigue   Medical Staff Made Aware Spoke with SLIM and CM   Nurse Made Aware Spoke with JOHANNY Bowden   Assessment   Prognosis Good   Problem List Decreased range of motion;Decreased mobility; Decreased safety awareness; Impaired hearing   Assessment Patient seen for PT treatment today per physician request   Patient presents semi supine in bed sleeping soundly, HR at 59bpm  Pt denies pain and is agreeable to participate in session  Cardiologist present during partial session, reports not being concerned about bradycardia during sleeping for this patient  Requires increased time to wake up from sleeping  Patient completes bed mobility independently and transfers at [facility] with [facility], mildly impulsive  Patient then ambulates 200 ft with SPC and supervision, rapid khanh and mildly impulsive  No evidence of instability or loss of balance on this date  During ambulation HR increased to 90-110bpm, patient with moderate degree of shortness of breath however reports this is baseline  Shortness of breath recovers quickly with functional seated rest break post ambulation    Patient denies concerns about completion of stairs negotiation upon return to home, does not wish to complete on this date as patient is planned for discharge today and will have to complete when he gets home  At end of session patient was left as found needs within reach and RN aware of pt status  Goals   Patient Goals "to go home"   [facility] Expiration Date 07/09/21   Short Term Goal #1 Patient PT goals established in order to address patient self reported goal of "to go home tomorrow"  Pt will ambulate > 250 ft with SPC at VITO level in order to increase safety with community distance functional mobility  Pt will negotiate 15 stairs with HR assist and SPC at VITO level in order to demonstrate ability to enter/exit his home  Pt will improve AM-PAC score to >/= 24/24 in order to increase independence with mobility and decrease burden of care  Pt will improve Barthel Index score to >/= 90/100 in order to increase independence and decrease risk of falls  Pt will improve ambulatory balance to >/= GOOD grade in order to promote safety and increased independence with mobility  Pt will tolerate > 20 minutes of functional mobility training with good endurance evidenced by no significant change in vitals or shortness of breath  PT Treatment Day 1   Plan   Treatment/Interventions Elevations; Endurance training;Gait training;Spoke to MD;Spoke to nursing;Spoke to case management   Progress Progressing toward goals   PT Frequency 2-3x/wk   Recommendation   PT Discharge Recommendation No rehabilitation needs   AM-PAC Basic Mobility Inpatient   Turning in Bed Without Bedrails 4   Lying on Back to Sitting on Edge of Flat Bed 4   Moving Bed to Chair 4   Standing Up From Chair 4   Walk in Room 3   Climb 3-5 Stairs 3   Basic Mobility Inpatient Raw Score 22   Basic Mobility Standardized Score 47 4       The patient's AM-PAC Basic Mobility Inpatient Short Form Raw Score is 22, Standardized Score is 47 4  A standardized score greater than 42 9 suggests the patient may benefit from discharge to home  Please also refer to the recommendation of the Physical Therapist for safe discharge planning          Kathie Hurt, PT, DPT Available via [facility] # [id number]  [place] License - [id number]  [address] [facility] - [id number]  6/30/2021

## 2021-06-30 NOTE — PLAN OF CARE
Problem: PHYSICAL THERAPY ADULT  Goal: Performs mobility at highest level of function for planned discharge setting  See evaluation for individualized goals  Description: Treatment/Interventions: Elevations, Endurance training, Spoke to MD, Spoke to nursing, Patient/family training, Gait training     See flowsheet documentation for full assessment, interventions and recommendations  Outcome: Progressing  Note: Prognosis: Good  Problem List: Decreased range of motion, Decreased mobility, Decreased safety awareness, Impaired hearing  Assessment: Patient seen for PT treatment today per physician request   Patient presents semi supine in bed sleeping soundly, HR at 59bpm  Pt denies pain and is agreeable to participate in session  Cardiologist present during partial session, reports not being concerned about bradycardia during sleeping for this patient  Requires increased time to wake up from sleeping  Patient completes bed mobility independently and transfers at [facility] with [facility], mildly impulsive  Patient then ambulates 200 ft with SPC and supervision, rapid khanh and mildly impulsive  No evidence of instability or loss of balance on this date  During ambulation HR increased to 90-110bpm, patient with moderate degree of shortness of breath however reports this is baseline  Shortness of breath recovers quickly with functional seated rest break post ambulation  Patient denies concerns about completion of stairs negotiation upon return to home, does not wish to complete on this date as patient is planned for discharge today and will have to complete when he gets home  At end of session patient was left as found needs within reach and RN aware of pt status  Barriers to Discharge: Inaccessible home environment (pt has 15 KAL his home, not yet assessed)     PT Discharge Recommendation: No rehabilitation needs     See flowsheet documentation for full assessment

## 2021-06-30 NOTE — ASSESSMENT & PLAN NOTE
Creatinine on admission 1 26, baseline 0 8-1 05  BUN 25  Patient does appear dehydrated on physical exam and reports poor oral intake for 3 days  Status post NS 1 L in ED    Resolved with IVF  Counseled regarding adequate p o  fluid intake and Lasix dose reduced to 20 mg Q48H on discharge

## 2021-06-30 NOTE — PLAN OF CARE
Problem: CARDIOVASCULAR - ADULT  Goal: Absence of cardiac dysrhythmias or at baseline rhythm  Description: INTERVENTIONS:  - Continuous cardiac monitoring, vital signs  - Administer antiarrhythmic and heart rate control medications as ordered  - Monitor electrolytes and administer replacement therapy as ordered  Outcome: Not Progressing     Problem: Potential for Falls  Goal: Patient will remain free of falls  Description: INTERVENTIONS:  - Educate patient/family on patient safety including physical limitations  - Instruct patient to call for assistance with activity   - Consult OT/PT to assist with strengthening/mobility   - Keep Call bell within reach  - Keep bed low and locked with side rails adjusted as appropriate  - Keep care items and personal belongings within reach  - Initiate and maintain comfort rounds  - Make Fall Risk Sign visible to staff  - Offer Toileting every  Hourly, in advance of need  - Initiate/Maintain bed alarm  - Apply yellow socks and bracelet for high fall risk patients  - Consider moving patient to room near nurses station  Outcome: Progressing

## 2021-06-30 NOTE — ASSESSMENT & PLAN NOTE
Patient presented after episode of weakness/lightheadedness when walking in the [place] to whether for 2 hours  He said he was feeling a little lightheaded, short of breath than he leaned to the wall 1st and dense lead to the pavement  Patient reports history of feeling out of balance he walks with a cane  Per chart review orthostatic hypotension mentioned  He reports he did not lose consciousness, did not bumped his head  On admission with blood pressure 80/41 mm Hg  This has now improved with IV hydration  CT head was negative, CT abdomen/ pelvis was normal, chest x-ray was normal  Status post NS 1 L in ED    - fallow orthostatic vital --  negative  - hold home lisinopril, furosemide  - Eating and drinking well --> discontinue IVF

## 2021-07-12 ENCOUNTER — OFFICE VISIT (OUTPATIENT)
Dept: FAMILY MEDICINE CLINIC | Facility: CLINIC | Age: 76
End: 2021-07-12
Payer: MEDICARE

## 2021-07-12 VITALS
TEMPERATURE: 98 F | OXYGEN SATURATION: 96 % | SYSTOLIC BLOOD PRESSURE: 124 MMHG | HEART RATE: 56 BPM | HEIGHT: 72 IN | RESPIRATION RATE: 16 BRPM | WEIGHT: 151 LBS | DIASTOLIC BLOOD PRESSURE: 64 MMHG | BODY MASS INDEX: 20.45 KG/M2

## 2021-07-12 DIAGNOSIS — D64.9 ANEMIA, UNSPECIFIED TYPE: Primary | ICD-10-CM

## 2021-07-12 PROCEDURE — 1124F ACP DISCUSS-NO DSCNMKR DOCD: CPT | Performed by: INTERNAL MEDICINE

## 2021-07-12 PROCEDURE — 99214 OFFICE O/P EST MOD 30 MIN: CPT | Performed by: INTERNAL MEDICINE

## 2021-07-12 NOTE — PROGRESS NOTES
Assessment/Plan:  1  Hypertension under control  2  Paroxysmal atrial fibrillation on chronic anticoagulation  3  Nonischemic cardiomyopathy has resolved ejection fraction is back to normal  4  Chronic obstructive lung disease  5  Iron deficiency anemia, will repeat CBC and iron panel  Meanwhile continue iron supplements         Problem List Items Addressed This Visit     None      Visit Diagnoses     Anemia, unspecified type    -  Primary    Relevant Orders    CBC and differential    Iron Panel (Includes Ferritin, Iron Sat%, Iron, and TIBC)            Subjective:      Patient ID: Janice Painter is a 70 y o  male  Mr Jer Eubanks is here for follow-up  He has history of                                                    Essential hypertension                                                     Cardiomyopathy/nonischemic                                                    Chronic atrial fibrillation                                                    Mitral regurgitation                                                    Hyperlipidemia                                                    Chronic obstructive lung disease                                                    Tobacco dependence  Has drastically cut down on smoking                                                     BPH, history of urinary retention                                                     Impaired fasting glucose  He is overall doing well, He has shortness of breath when he walks  No orthopnea or PND  Occasional peripheral edema  No palpitations  No chest pains  No dysphagia or heartburn  No falls, no syncopal attacks, appetite is good weight is stable  He said he had upper and lower endoscopy because of his iron deficiency anemia  We do not have any results however he said they did not find any bleeding  Spots          The following portions of the patient's history were reviewed and updated as appropriate:   Past Medical History:  He has a past medical history of Anxiety disorder, Atrial fibrillation ([facility] ), Coronary artery disease, Depression, Hepatitis C, and MI (myocardial infarction) ([facility] )  ,  _______________________________________________________________________  Medical Problems:  does not have any pertinent problems on file ,  _______________________________________________________________________  Past Surgical History:   has a past surgical history that includes Cardiac catheterization (07/09/2018)  ,  _______________________________________________________________________  Family History:  family history includes Coronary artery disease in his father and mother; Diabetes in his father; Hypertension in his father and mother ,  _______________________________________________________________________  Social History:   reports that he has been smoking cigarettes  He has a 14 25 pack-year smoking history  He has never used smokeless tobacco  He reports that he does not drink alcohol and does not use drugs  ,  _______________________________________________________________________  Allergies:  has No Known Allergies     _______________________________________________________________________  Current Outpatient Medications   Medication Sig Dispense Refill    apixaban (ELIQUIS) 5 mg Take 1 tablet (5 mg total) by mouth 2 (two) times a day 180 tablet 3    ferrous sulfate 325 (65 Fe) mg tablet Take 1 tablet (325 mg total) by mouth daily with breakfast 30 tablet 0    fluticasone-salmeterol (Advair Diskus) 100-50 mcg/dose inhaler Inhale 1 puff 2 (two) times a day Rinse mouth after use   3 each 1    folic acid (FOLVITE) 011 mcg tablet Take 1 tablet (400 mcg total) by mouth daily 30 tablet 0    furosemide (LASIX) 40 mg tablet Take 0 5 tablets (20 mg total) by mouth every other day  0    lisinopril (ZESTRIL) 2 5 mg tablet Take 1 tablet (2 5 mg total) by mouth daily 90 tablet 3    metoprolol succinate (TOPROL-XL) 25 mg 24 hr tablet Take 1 tablet (25 mg total) by mouth daily 30 tablet 0    pantoprazole (PROTONIX) 40 mg tablet Take 1 tablet (40 mg total) by mouth daily before breakfast 90 tablet 3    potassium chloride (K-DUR) 10 mEq tablet Take 10 mEq by mouth daily      tamsulosin (FLOMAX) 0 4 mg Take 0 4 mg by mouth daily      ipratropium-albuterol (DUO-NEB) 0 5-2 5 mg/3 mL nebulizer solution Take 1 vial (3 mL total) by nebulization 4 (four) times a day (Patient not taking: Reported on 6/28/2021) 1 vial 30     No current facility-administered medications for this visit      _______________________________________________________________________  Review of Systems   All other systems reviewed and are negative  Objective:  Vitals:    07/12/21 0901   BP: 124/64   BP Location: Left arm   Patient Position: Sitting   Cuff Size: Standard   Pulse: 56   Resp: 16   Temp: 98 °F (36 7 °C)   TempSrc: Tympanic   SpO2: 96%   Weight: 68 5 kg (151 lb)   Height: 6' (1 829 m)     Body mass index is 20 48 kg/m²  Physical Exam  Vitals and nursing note reviewed  Constitutional:       Appearance: Normal appearance  He is normal weight  HENT:      Head: Normocephalic  Right Ear: External ear normal       Left Ear: External ear normal       Nose: Nose normal       Mouth/Throat:      Mouth: Mucous membranes are moist    Eyes:      General: No scleral icterus  Extraocular Movements: Extraocular movements intact  Conjunctiva/sclera: Conjunctivae normal       Pupils: Pupils are equal, round, and reactive to light  Neck:      Vascular: No carotid bruit  Cardiovascular:      Rate and Rhythm: Normal rate and regular rhythm  Pulses: Normal pulses  Heart sounds: Normal heart sounds  No murmur heard  Pulmonary:      Effort: Pulmonary effort is normal       Breath sounds: Normal breath sounds  Abdominal:      General: Abdomen is flat  Bowel sounds are normal  There is no distension  Palpations: Abdomen is soft  There is no mass  Tenderness: There is no abdominal tenderness  Hernia: No hernia is present  Musculoskeletal:         General: Normal range of motion  Cervical back: Normal range of motion and neck supple  Right lower leg: No edema  Left lower leg: No edema  Lymphadenopathy:      Cervical: No cervical adenopathy  Skin:     General: Skin is warm and dry  Neurological:      General: No focal deficit present  Mental Status: He is alert and oriented to person, place, and time     Psychiatric:         Mood and Affect: Mood normal          Behavior: Behavior normal

## 2021-07-16 LAB
ATRIAL RATE: 0 BPM
PR INTERVAL: 156 MS
QRS AXIS: 72 DEGREES
QRSD INTERVAL: 98 MS
QT INTERVAL: 383 MS
QTC INTERVAL: 417 MS
T WAVE AXIS: 64 DEGREES
VENTRICULAR RATE: 71 BPM

## 2021-07-16 PROCEDURE — 93010 ELECTROCARDIOGRAM REPORT: CPT | Performed by: INTERNAL MEDICINE

## 2021-07-27 DIAGNOSIS — I48.11 LONGSTANDING PERSISTENT ATRIAL FIBRILLATION (HCC): ICD-10-CM

## 2021-07-27 RX ORDER — METOPROLOL SUCCINATE 25 MG/1
25 TABLET, EXTENDED RELEASE ORAL DAILY
Qty: 90 TABLET | Refills: 3 | Status: SHIPPED | OUTPATIENT
Start: 2021-07-27 | End: 2022-08-10 | Stop reason: SDUPTHER

## 2021-08-09 PROCEDURE — 93010 ELECTROCARDIOGRAM REPORT: CPT | Performed by: INTERNAL MEDICINE

## 2021-09-07 ENCOUNTER — APPOINTMENT (EMERGENCY)
Dept: RADIOLOGY | Facility: HOSPITAL | Age: 76
DRG: 914 | End: 2021-09-07
Payer: MEDICARE

## 2021-09-07 ENCOUNTER — APPOINTMENT (EMERGENCY)
Dept: CT IMAGING | Facility: HOSPITAL | Age: 76
DRG: 914 | End: 2021-09-07
Payer: MEDICARE

## 2021-09-07 ENCOUNTER — HOSPITAL ENCOUNTER (INPATIENT)
Facility: HOSPITAL | Age: 76
LOS: 2 days | Discharge: HOME | DRG: 914 | End: 2021-09-10
Attending: EMERGENCY MEDICINE | Admitting: INTERNAL MEDICINE
Payer: MEDICARE

## 2021-09-07 DIAGNOSIS — S00.91XA ABRASION OF HEAD: ICD-10-CM

## 2021-09-07 DIAGNOSIS — R55 SYNCOPE: Primary | ICD-10-CM
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
## 2021-09-07 DIAGNOSIS — D50.9 MICROCYTIC ANEMIA: ICD-10-CM

## 2021-09-07 PROBLEM — D75.839 THROMBOCYTOSIS: Status: ACTIVE | Noted: 2021-09-07

## 2021-09-07 LAB
ANION GAP SERPL CALCULATED.3IONS-SCNC: 7 MMOL/L (ref 4–13)
BASOPHILS # BLD AUTO: 0.03 THOUSANDS/ΜL (ref 0–0.1)
BASOPHILS NFR BLD AUTO: 0 % (ref 0–1)
BILIRUB UR QL STRIP: NEGATIVE
BUN SERPL-MCNC: 24 MG/DL (ref 6–20)
CALCIUM SERPL-MCNC: 9.3 MG/DL (ref 8.4–10.2)
CHLORIDE SERPL-SCNC: 103 MMOL/L (ref 96–108)
CLARITY UR: CLEAR
CO2 SERPL-SCNC: 31 MMOL/L (ref 22–33)
COLOR UR: ABNORMAL
CREAT SERPL-MCNC: 1.17 MG/DL (ref 0.5–1.2)
EOSINOPHIL # BLD AUTO: 0.04 THOUSAND/ΜL (ref 0–0.61)
EOSINOPHIL NFR BLD AUTO: 0 % (ref 0–6)
ERYTHROCYTE [DISTWIDTH] IN BLOOD BY AUTOMATED COUNT: 17.6 % (ref 11.6–15.1)
GFR SERPL CREATININE-BSD FRML MDRD: 61 ML/MIN/1.73SQ M
GLUCOSE SERPL-MCNC: 118 MG/DL (ref 65–140)
GLUCOSE UR STRIP-MCNC: NEGATIVE MG/DL
HCT VFR BLD AUTO: 30.2 % (ref 36.5–49.3)
HGB BLD-MCNC: 8.3 G/DL (ref 12–17)
HGB UR QL STRIP.AUTO: NEGATIVE
IMM GRANULOCYTES # BLD AUTO: 0.03 THOUSAND/UL (ref 0–0.2)
IMM GRANULOCYTES NFR BLD AUTO: 0 % (ref 0–2)
KETONES UR STRIP-MCNC: NEGATIVE MG/DL
LEUKOCYTE ESTERASE UR QL STRIP: NEGATIVE
LYMPHOCYTES # BLD AUTO: 0.91 THOUSANDS/ΜL (ref 0.6–4.47)
LYMPHOCYTES NFR BLD AUTO: 9 % (ref 14–44)
MCH RBC QN AUTO: 20.3 PG (ref 26.8–34.3)
MCHC RBC AUTO-ENTMCNC: 27.5 G/DL (ref 31.4–37.4)
MCV RBC AUTO: 74 FL (ref 82–98)
MONOCYTES # BLD AUTO: 0.79 THOUSAND/ΜL (ref 0.17–1.22)
MONOCYTES NFR BLD AUTO: 8 % (ref 4–12)
NEUTROPHILS # BLD AUTO: 8.14 THOUSANDS/ΜL (ref 1.85–7.62)
NEUTS SEG NFR BLD AUTO: 83 % (ref 43–75)
NITRITE UR QL STRIP: NEGATIVE
PH UR STRIP.AUTO: 7 [PH]
PLATELET # BLD AUTO: 521 THOUSANDS/UL (ref 149–390)
PMV BLD AUTO: 9.1 FL (ref 8.9–12.7)
POTASSIUM SERPL-SCNC: 3.9 MMOL/L (ref 3.5–5)
PROT UR STRIP-MCNC: NEGATIVE MG/DL
RBC # BLD AUTO: 4.08 MILLION/UL (ref 3.88–5.62)
SODIUM SERPL-SCNC: 141 MMOL/L (ref 133–145)
SP GR UR STRIP.AUTO: 1.01 (ref 1–1.03)
TROPONIN I SERPL-MCNC: <0.03 NG/ML (ref 0–0.07)
UROBILINOGEN UR QL STRIP.AUTO: 0.2 E.U./DL
WBC # BLD AUTO: 9.94 THOUSAND/UL (ref 4.31–10.16)

## 2021-09-07 PROCEDURE — 84484 ASSAY OF TROPONIN QUANT: CPT | Performed by: EMERGENCY MEDICINE

## 2021-09-07 PROCEDURE — 73030 X-RAY EXAM OF SHOULDER: CPT

## 2021-09-07 PROCEDURE — 93005 ELECTROCARDIOGRAM TRACING: CPT

## 2021-09-07 PROCEDURE — 80048 BASIC METABOLIC PNL TOTAL CA: CPT | Performed by: EMERGENCY MEDICINE

## 2021-09-07 PROCEDURE — 99285 EMERGENCY DEPT VISIT HI MDM: CPT | Performed by: EMERGENCY MEDICINE

## 2021-09-07 PROCEDURE — 85025 COMPLETE CBC W/AUTO DIFF WBC: CPT | Performed by: EMERGENCY MEDICINE

## 2021-09-07 PROCEDURE — 70450 CT HEAD/BRAIN W/O DYE: CPT

## 2021-09-07 PROCEDURE — 36415 COLL VENOUS BLD VENIPUNCTURE: CPT | Performed by: EMERGENCY MEDICINE

## 2021-09-07 PROCEDURE — 81003 URINALYSIS AUTO W/O SCOPE: CPT | Performed by: EMERGENCY MEDICINE

## 2021-09-07 PROCEDURE — 70480 CT ORBIT/EAR/FOSSA W/O DYE: CPT

## 2021-09-07 PROCEDURE — 71045 X-RAY EXAM CHEST 1 VIEW: CPT

## 2021-09-07 PROCEDURE — 72125 CT NECK SPINE W/O DYE: CPT

## 2021-09-07 PROCEDURE — 99219 PR INITIAL OBSERVATION CARE/DAY 50 MINUTES: CPT | Performed by: INTERNAL MEDICINE

## 2021-09-07 PROCEDURE — 99285 EMERGENCY DEPT VISIT HI MDM: CPT

## 2021-09-07 RX ORDER — SODIUM CHLORIDE 9 MG/ML
3 INJECTION INTRAVENOUS
Status: DISCONTINUED | OUTPATIENT
Start: 2021-09-07 | End: 2021-09-10 | Stop reason: HOSPADM

## 2021-09-07 RX ORDER — SODIUM CHLORIDE, SODIUM LACTATE, POTASSIUM CHLORIDE, CALCIUM CHLORIDE 600; 310; 30; 20 MG/100ML; MG/100ML; MG/100ML; MG/100ML
75 INJECTION, SOLUTION INTRAVENOUS CONTINUOUS
Status: DISCONTINUED | OUTPATIENT
Start: 2021-09-07 | End: 2021-09-09

## 2021-09-07 RX ORDER — ACETAMINOPHEN 325 MG/1
650 TABLET ORAL EVERY 6 HOURS PRN
Status: DISCONTINUED | OUTPATIENT
Start: 2021-09-07 | End: 2021-09-10 | Stop reason: HOSPADM

## 2021-09-07 RX ORDER — LANOLIN ALCOHOL/MO/W.PET/CERES
400 CREAM (GRAM) TOPICAL DAILY
Status: DISCONTINUED | OUTPATIENT
Start: 2021-09-08 | End: 2021-09-10 | Stop reason: HOSPADM

## 2021-09-07 RX ORDER — FERROUS SULFATE 325(65) MG
325 TABLET ORAL
Status: DISCONTINUED | OUTPATIENT
Start: 2021-09-08 | End: 2021-09-08

## 2021-09-07 RX ORDER — METOPROLOL SUCCINATE 25 MG/1
25 TABLET, EXTENDED RELEASE ORAL DAILY
Status: DISCONTINUED | OUTPATIENT
Start: 2021-09-08 | End: 2021-09-10 | Stop reason: HOSPADM

## 2021-09-07 RX ORDER — TAMSULOSIN HYDROCHLORIDE 0.4 MG/1
0.4 CAPSULE ORAL DAILY
Status: DISCONTINUED | OUTPATIENT
Start: 2021-09-08 | End: 2021-09-10 | Stop reason: HOSPADM

## 2021-09-07 RX ADMIN — ACETAMINOPHEN 650 MG: 325 TABLET, FILM COATED ORAL at 16:43

## 2021-09-07 RX ADMIN — APIXABAN 5 MG: 5 TABLET, FILM COATED ORAL at 17:12

## 2021-09-07 RX ADMIN — ACETAMINOPHEN 650 MG: 325 TABLET, FILM COATED ORAL at 21:09

## 2021-09-07 RX ADMIN — SODIUM CHLORIDE, SODIUM LACTATE, POTASSIUM CHLORIDE, AND CALCIUM CHLORIDE 50 ML/HR: .6; .31; .03; .02 INJECTION, SOLUTION INTRAVENOUS at 18:17

## 2021-09-07 NOTE — ASSESSMENT & PLAN NOTE
Patient presented with thrombocytosis with a platelet count of 867  And platelet of 296 today  Could be reactionary to dehydration  Will monitor closely  Recommend outpatient Hematology follow-up

## 2021-09-07 NOTE — ASSESSMENT & PLAN NOTE
Significant worsening of bilateral racoon's eyes  Orthostatic vital signs negative  Hemoglobin of 7 3 today  Given history of chronic symptomatic anemia will benefit PRBC transfusion  Will obtain a repeat CT head wo contrast   Continue gentle IV fluid hydration  PT recommended patient be discharged home with home rehab  Patient with a history of AFib, reports to have fallen with a head strike  Noted to have had a history of orthostatic hypotension  Blood pressure on presentation was 128/68 mmHg, HR= 89    CTH negative for any acute intracranial abnormality however revealed Bifrontal extracranial soft tissue swelling extending inferiorly into the superior preseptal soft tissues and lateral to the right orbit, suggestive of soft tissue contusion status post fall  No calvarial fracture identified    Denies any presyncopal events  No eye-witness documentation noted  Denies loss of bowel or bladder control  Syncope most likely orthostatic as event happened after he stood up from sitting position in the setting of possible dehydration given elevated BUN    · Will obtain orthostatic vital signs   · monitor on telemetry  · Will give gentle IV hydration- LR 75cc/hr  · Obtain 12 lead EKG stat  · PT/OT consult

## 2021-09-07 NOTE — ASSESSMENT & PLAN NOTE
Patient smokes few sticks of cigarettes a day  Reports reduction in cigarettes sticks smokes per day  Will provide nicotine patch  Tobacco cessation education provided

## 2021-09-07 NOTE — ED NOTES
Pt stood at bedside and voided in urinal without difficulty        Dieudonne Payne RN  09/07/21 4188

## 2021-09-07 NOTE — ED NOTES
Pt transported to floor on cardiac monitor by [facility] RN with all belongings  VSS on RA, Iv in RAC patent        Sowmya Montano RN  09/07/21 6049

## 2021-09-07 NOTE — H&P
INTERNAL MEDICINE RESIDENCY ADMISSION H&P     Name: Janice Painter   Age & Sex: 70 y o  male   MRN: [id number]  Unit/Bed#: -01   Encounter: [id number]  Primary Care Provider: Mike Gloria MD    Code Status: Level 1 - Full Code  Admission Status: OBSERVATION  Disposition: Patient requires Med/Surg with Telemetry    Admit to team: SLIM    ASSESSMENT/PLAN     Principal Problem:    Syncope  Active Problems:    Microcytic anemia    Permanent atrial fibrillation (HCC)    Tobacco use disorder    Thrombocytosis ([facility] )      * Syncope  Assessment & Plan  Patient with a history of AFib, reports to have fallen with a head strike  Noted to have had a history of orthostatic hypotension  Blood pressure on presentation was 128/68 mmHg, HR= 89    CTH negative for any acute intracranial abnormality however revealed Bifrontal extracranial soft tissue swelling extending inferiorly into the superior preseptal soft tissues and lateral to the right orbit, suggestive of soft tissue contusion status post fall  No calvarial fracture identified    Denies any presyncopal events  No eye-witness documentation noted  Denies loss of bowel or bladder control  Syncope most likely orthostatic as event happened after he stood up from sitting position in the setting of possible dehydration given elevated BUN    · Will obtain orthostatic vital signs   · monitor on telemetry  · Will give gentle IV hydration- LR 75cc/hr  · Obtain 12 lead EKG stat    Thrombocytosis (HCC)  Assessment & Plan  Platelet count of 756  Per review of medical record, noted to be in the 250-260 range about 2 months ago  Unclear if this is reactive vs myeloproliferative  Will provide outpatient follow-up with hematologist     Tobacco use disorder  Assessment & Plan  Patient smokes few sticks of cigarettes a day  Reports reduction in cigarettes sticks smokes per day  Will provide nicotine patch  Tobacco cessation education provided      Permanent atrial fibrillation [facility])  Assessment & Plan  Patient with a history of atrial fibrillation  Rate controlled  Takes metoprolol 25 mg daily and anticoagulated with Eliquis 5 mg b i d  Will continue home medications    Microcytic anemia  Assessment & Plan  0   Lab Value Date/Time    HGB 8 3 (L) 09/07/2021 1330    HGB 8 0 (L) 06/30/2021 0546    HGB 8 4 (L) 06/29/2021 0507    HGB 8 8 (L) 06/28/2021 1848    HGB 10 3 (L) 08/10/2020 1046    HGB 9 7 (L) 08/08/2020 0542    HGB 10 0 (L) 08/07/2020 0516    HGB 9 4 (L) 08/06/2020 1741    HGB 9 5 (L) 08/06/2020 0517    HGB 8 0 (L) 08/05/2020 0524    HGB 7 7 (L) 08/04/2020 0444    HGB 6 3 (LL) 08/03/2020 1552     Microcytic anemia  EGD and colonoscopy done 09/11/2020 - showed no active ulceration  Colonoscopy revealed polyps which were biopsied  Biopsy revealed tubular and serrated adenoma  Continue iron and folic acid supplementation      VTE Pharmacologic Prophylaxis: Reason for no pharmacologic prophylaxis On Eliquis  VTE Mechanical Prophylaxis: sequential compression device    CHIEF COMPLAINT     Syncope     HISTORY OF PRESENT ILLNESS     Steven Rodriguez is a 70 y o  male with a past medical history significant for atrial fibrillation on Eliquis, hypertension and microcytic anemia who presents following a fall  He had apparently been in normal state of health [place] [facility] [place] he sat down for a bit before getting to his destination  On rising from sitting position, he reports to have passed out however regained consciousness before he hit the ground  He denies any recollection of the event before hitting the ground  Denies any bowel or bladder incontinence  Denies jerking or shaking movements  There was no tongue or lip bites  Following the event, he reported right shoulder pain and facial pain  He denies any post event tiredness, fatigue or lethargy  He was able [place] ambulate to the bus station, catch the bus home    Reports someone must have called the EMS who subsequently picked him up and brought him to the ER for evaluation  In the ED, vital signs were stable  CT head was negative for any intracranial abnormality CT, there was notable bilateral soft tissue swelling anterior to the paramedial frontal bone  There was no fluid loculation  X-ray of the chest was unremarkable  X-ray of the left and right shoulder showed no acute fracture  CT cervical spine revealed an indeterminate C5 anterior inferior endplate fracture  CBC with anemia, and thrombocytosis  CMP was unremarkable  Troponin negative  REVIEW OF SYSTEMS     Review of Systems   Constitutional: Negative  Respiratory: Negative  Cardiovascular: Negative  Gastrointestinal: Negative  Genitourinary: Negative  Musculoskeletal: Positive for arthralgias (Rt shoulder)  Neurological: Positive for headaches  OBJECTIVE     Vitals:    21 1309 21 1325 21 1542 21 1602   BP: 123/68  144/80 139/73   BP Location: Left arm  Right arm Right arm   Pulse: 89  78 98   Resp: 18  18 18   Temp:  98 6 °F (37 °C)  97 6 °F (36 4 °C)   TempSrc:    Tympanic   SpO2: 96%  99% 100%   Weight:    66 kg (145 lb 8 1 oz)   Height:    6' (1 829 m)      Temperature:   Temp (24hrs), Av 1 °F (36 7 °C), Min:97 6 °F (36 4 °C), Max:98 6 °F (37 °C)    Temperature: 97 6 °F (36 4 °C)  Intake & Output:  I/O        07 -  0700  07 -  0700  07 -  0700    Urine (mL/kg/hr)   1200    Total Output   1200    Net   -1200               Weights:   IBW (Ideal Body Weight): 77 6 kg    Body mass index is 19 73 kg/m²  Weight (last 2 days)     Date/Time   Weight    21 1602   66 (145 5)            Physical Exam  Vitals and nursing note reviewed  Constitutional:       General: He is not in acute distress  Appearance: He is not toxic-appearing     HENT:      Head:      Comments: Notable bilateral periorbital bruising     Mouth/Throat:      Mouth: Mucous membranes are dry  Eyes:      Pupils: Pupils are equal, round, and reactive to light  Cardiovascular:      Rate and Rhythm: Normal rate  Rhythm irregular  Pulses: Normal pulses  Heart sounds: Normal heart sounds  Pulmonary:      Effort: No respiratory distress  Breath sounds: Normal breath sounds  No wheezing  Abdominal:      General: Bowel sounds are normal  There is no distension  Palpations: Abdomen is soft  Tenderness: There is no abdominal tenderness  Musculoskeletal:      Right lower leg: No edema  Left lower leg: No edema  Neurological:      General: No focal deficit present  Mental Status: He is alert and oriented to person, place, and time  Motor: No weakness  PAST MEDICAL HISTORY     Past Medical History:   Diagnosis Date    Anxiety disorder     Atrial fibrillation ([facility] )     Coronary artery disease     Depression     Hepatitis C     screening negative in 1/2017    MI (myocardial infarction) ([facility] )      PAST SURGICAL HISTORY     Past Surgical History:   Procedure Laterality Date    CARDIAC CATHETERIZATION  07/09/2018     SOCIAL & FAMILY HISTORY     Social History     Substance and Sexual Activity   Alcohol Use Never     Substance and Sexual Activity   Alcohol Use Never        Substance and Sexual Activity   Drug Use Never     Social History     Tobacco Use   Smoking Status Current Every Day Smoker    Packs/day: 0 25    Years: 57 00    Pack years: 14 25    Types: Cigarettes   Smokeless Tobacco Never Used   Tobacco Comment    since age 17; Has history of smoking for over 54 years   He has been smoking more than packet daily in the past however he has been smokes about half a pack a day lately and stopped smoking on 7/1/2018 when he was admitted to the hospital       Family History   Problem Relation Age of Onset    Hypertension Mother     Coronary artery disease Mother         premature    Hypertension Father     Coronary artery disease Father         premature    Diabetes Father      LABORATORY DATA     Labs: I have personally reviewed pertinent reports  Results from last 7 days   Lab Units 09/07/21  1330   WBC Thousand/uL 9 94   HEMOGLOBIN g/dL 8 3*   HEMATOCRIT % 30 2*   PLATELETS Thousands/uL 521*   NEUTROS PCT % 83*   MONOS PCT % 8      Results from last 7 days   Lab Units 09/07/21  1330   POTASSIUM mmol/L 3 9   CHLORIDE mmol/L 103   CO2 mmol/L 31   BUN mg/dL 24*   CREATININE mg/dL 1 17   CALCIUM mg/dL 9 3                      Results from last 7 days   Lab Units 09/07/21  1330   TROPONIN I ng/mL <0 03     Micro:  No results found for: Janus Jhon, WOUNDCULT, SPUTUMCULTUR  IMAGING & DIAGNOSTIC TESTS     Imaging: I have personally reviewed pertinent reports  CT head without contrast    Result Date: 9/7/2021  Impression: No acute intracranial abnormality  Bifrontal extracranial soft tissue swelling extending inferiorly into the superior preseptal soft tissues and lateral to the right orbit, suggestive of soft tissue contusion status post fall  No calvarial fracture identified  Workstation performed: [id number]     CT spine cervical without contrast    Result Date: 9/7/2021  Impression: There is a focal cortical irregularity identified within the anterior inferior aspect of the C5 inferior endplate, seen on sagittal imaging series 304 images 94 through 97  This is of indeterminate age and may represent an acute or chronic endplate fracture  No other fractures identified  Advanced cervical degenerative change C3-4, C4-5 and C5-6 with severe canal stenosis and moderately severe bilateral foraminal narrowing  Correlate for signs of cervical myelopathy/radiculopathy  MRI of the cervical spine may help determine the age of the C5 anterior inferior endplate fracture and would better evaluate the cervical degenerative changes with regard to cervical cord compression   This examination was marked "immediate notification" in Epic in order to begin the standard process by which the radiology reading room liaison alerts the referring practitioner  Workstation performed: [id number]     CT orbits/temporal bones/skull base wo contrast    Result Date: 9/7/2021  Impression: Bilateral soft tissue swelling anterior to the paramedian frontal bone extending inferiorly into the preseptal soft tissues of the upper lid  This is most suggestive of soft tissue contusion  No retrobulbar orbital pathology  No orbital fracture identified  Workstation performed: [id number]     EKG, Pathology, and Other Studies: I have personally reviewed pertinent reports  ALLERGIES   No Known Allergies  MEDICATIONS PRIOR TO ARRIVAL     Prior to Admission medications    Medication Sig Start Date End Date Taking? Authorizing Provider   apixaban (ELIQUIS) 5 mg Take 1 tablet (5 mg total) by mouth 2 (two) times a day 11/10/20 9/7/21 Yes Mike Gloria MD   furosemide (LASIX) 40 mg tablet Take 0 5 tablets (20 mg total) by mouth every other day 6/30/21  Yes Maritza Smith MD   lisinopril (ZESTRIL) 2 5 mg tablet Take 1 tablet (2 5 mg total) by mouth daily 11/10/20  Yes Mike Gloria MD   metoprolol succinate (Toprol XL) 25 mg 24 hr tablet Take 1 tablet (25 mg total) by mouth daily 7/27/21  Yes Mike Gloria MD   ferrous sulfate 325 (65 Fe) mg tablet Take 1 tablet (325 mg total) by mouth daily with breakfast 8/6/20   Jeffery Loyd MD   fluticasone-salmeterol (Advair Diskus) 100-50 mcg/dose inhaler Inhale 1 puff 2 (two) times a day Rinse mouth after use   8/13/20 7/12/21  Mike Gloria MD   folic acid (FOLVITE) 588 mcg tablet Take 1 tablet (400 mcg total) by mouth daily 8/6/20   Jeffery Loyd MD   ipratropium-albuterol (DUO-NEB) 0 5-2 5 mg/3 mL nebulizer solution Take 1 vial (3 mL total) by nebulization 4 (four) times a day  Patient not taking: Reported on 6/28/2021 9/28/20   Brittany Cleary MD   metoprolol succinate (TOPROL-XL) 25 mg 24 hr tablet Take 1 tablet (25 mg total) by mouth daily 6/30/21 7/30/21  Lilly Gonzalez MD   pantoprazole (PROTONIX) 40 mg tablet Take 1 tablet (40 mg total) by mouth daily before breakfast 11/10/20 7/12/21  Yanely Allred MD   potassium chloride (K-DUR) 10 mEq tablet Take 10 mEq by mouth daily 5/18/20   Historical Provider, MD   tamsulosin (FLOMAX) 0 4 mg Take 0 4 mg by mouth daily 5/18/20   Historical Provider, MD     MEDICATIONS ADMINISTERED IN LAST 24 HOURS     Medication Administration - last 24 hours from 09/06/2021 1707 to 09/07/2021 1707       Date/Time Order Dose Route Action Action by     09/07/2021 1643 acetaminophen (TYLENOL) tablet 650 mg 650 mg Oral Given Levi Gant RN        CURRENT MEDICATIONS     Current Facility-Administered Medications   Medication Dose Route Frequency Provider Last Rate    acetaminophen  650 mg Oral Q6H PRN Jaymie Hicks MD      apixaban  5 mg Oral BID Jaymie Hicks MD      [START ON 9/8/2021] ferrous sulfate  325 mg Oral Daily With Breakfast MD Claudia Peraza Lenora ON 4/0/5946] folic acid  022 mcg Oral Daily Jaymie Hicks MD      lactated ringers  50 mL/hr Intravenous Continuous Jaymie Hicks MD      [START ON 9/8/2021] metoprolol succinate  25 mg Oral Daily Jaymie Hicks MD      morphine injection  2 mg Intravenous Q6H PRN Jaymie Hicks MD      [START ON 9/8/2021] nicotine  1 patch Transdermal Daily Stan Wise MD      sodium chloride (PF)  3 mL Intravenous Q1H PRN Víctor Hwang DO      [START ON 9/8/2021] tamsulosin  0 4 mg Oral Daily Stan Wise MD       lactated ringers, 50 mL/hr      acetaminophen, 650 mg, Q6H PRN  morphine injection, 2 mg, Q6H PRN  sodium chloride (PF), 3 mL, Q1H PRN        Admission Time  I spent 45 minutes admitting the patient    This involved direct patient contact where I performed a full history and physical, reviewing previous records, and reviewing laboratory and other diagnostic studies  Portions of the record may have been created with voice recognition software  Occasional wrong word or "sound a like" substitutions may have occurred due to the inherent limitations of voice recognition software    Read the chart carefully and recognize, using context, where substitutions have occurred     ==  Elvis Snellen, MD  [address]  Internal Medicine Residency PGY-2

## 2021-09-07 NOTE — PLAN OF CARE
Problem: Potential for Falls  Goal: Patient will remain free of falls  Description: INTERVENTIONS:  - Educate patient/family on patient safety including physical limitations  - Instruct patient to call for assistance with activity   - Consult OT/PT to assist with strengthening/mobility   - Keep Call bell within reach  - Keep bed low and locked with side rails adjusted as appropriate  - Keep care items and personal belongings within reach  - Initiate and maintain comfort rounds  - Make Fall Risk Sign visible to staff  - Offer Toileting every 2 Hours, in advance of need  - Initiate/Maintain bed alarm  - Obtain necessary fall risk management equipment: bed alarm  - Apply yellow socks and bracelet for high fall risk patients  - Consider moving patient to room near nurses station  Outcome: Progressing     Problem: CARDIOVASCULAR - ADULT  Goal: Maintains optimal cardiac output and hemodynamic stability  Description: INTERVENTIONS:  - Monitor I/O, vital signs and rhythm  - Monitor for S/S and trends of decreased cardiac output  - Administer and titrate ordered vasoactive medications to optimize hemodynamic stability  - Assess quality of pulses, skin color and temperature  - Assess for signs of decreased coronary artery perfusion  - Instruct patient to report change in severity of symptoms  Outcome: Progressing  Goal: Absence of cardiac dysrhythmias or at baseline rhythm  Description: INTERVENTIONS:  - Continuous cardiac monitoring, vital signs, obtain 12 lead EKG if ordered  - Administer antiarrhythmic and heart rate control medications as ordered  - Monitor electrolytes and administer replacement therapy as ordered  Outcome: Progressing

## 2021-09-07 NOTE — ASSESSMENT & PLAN NOTE
Patient with a history of atrial fibrillation  Rate controlled  Takes metoprolol 25 mg daily and anticoagulated with Eliquis 5 mg b i d    Will continue home medications

## 2021-09-07 NOTE — ASSESSMENT & PLAN NOTE
0   Lab Value Date/Time    HGB 7 3 (L) 09/08/2021 0836    HGB 8 3 (L) 09/07/2021 1330    HGB 8 0 (L) 06/30/2021 0546    HGB 8 4 (L) 06/29/2021 0507    HGB 8 8 (L) 06/28/2021 1848    HGB 10 3 (L) 08/10/2020 1046    HGB 9 7 (L) 08/08/2020 0542    HGB 10 0 (L) 08/07/2020 0516    HGB 9 4 (L) 08/06/2020 1741    HGB 9 5 (L) 08/06/2020 0517    HGB 8 0 (L) 08/05/2020 0524    HGB 7 7 (L) 08/04/2020 0444    HGB 6 3 (LL) 08/03/2020 1552     Microcytic anemia  EGD and colonoscopy done 09/11/2020 - showed no active ulceration  Colonoscopy revealed polyps which were biopsied  Biopsy revealed tubular and serrated adenoma  · Continue twice daily iron supplementation  Continue daily folic acid supplementation  · History of transfusion in last hospitalization  · Type and screen  Transfuse 1 unit PRBC  · Consent obtained

## 2021-09-07 NOTE — ED PROVIDER NOTES
History  Chief Complaint   Patient presents with    Fall     EMS called that [place] fell [place] at home  pt doesn't remember how he fell or what happened  + for thinners     21-year-old male with previous history of atrial fibrillation on Eliquis, anxiety, hepatitis-C, myocardial infarction presents with syncope with head strike  Patient was walking downtown  Next thing he woke on the ground  Notes pain in the bilateral shoulders  Otherwise no pain  Was able get up and walk after initially lying on the ground calling for help  A bystander called EMS  Patient had left the area when EMS arrived  They were then called an hour later and went to the patient's home  Patient denies any symptoms he on shoulder pain  No chest pain shortness of breath prior to syncopal episode  Syncope  Episode history:  Single  Most recent episode: Today  Timing:  Rare  Progression:  Unchanged  Chronicity:  New  Witnessed: no    Relieved by:  None tried  Worsened by:  Nothing  Ineffective treatments:  None tried      Prior to Admission Medications   Prescriptions Last Dose Informant Patient Reported? Taking? apixaban (ELIQUIS) 5 mg 9/7/2021 at Unknown time  No Yes   Sig: Take 1 tablet (5 mg total) by mouth 2 (two) times a day   ferrous sulfate 325 (65 Fe) mg tablet 9/7/2021 at Unknown time  No Yes   Sig: Take 1 tablet (325 mg total) by mouth daily with breakfast   fluticasone-salmeterol (Advair Diskus) 100-50 mcg/dose inhaler   No No   Sig: Inhale 1 puff 2 (two) times a day Rinse mouth after use     folic acid (FOLVITE) 186 mcg tablet   No No   Sig: Take 1 tablet (400 mcg total) by mouth daily   furosemide (LASIX) 40 mg tablet 9/7/2021 at Unknown time  No Yes   Sig: Take 0 5 tablets (20 mg total) by mouth every other day   ipratropium-albuterol (DUO-NEB) 0 5-2 5 mg/3 mL nebulizer solution   No No   Sig: Take 1 vial (3 mL total) by nebulization 4 (four) times a day   Patient not taking: Reported on 6/28/2021   lisinopril (ZESTRIL) 2 5 mg tablet 9/7/2021 at Unknown time  No Yes   Sig: Take 1 tablet (2 5 mg total) by mouth daily   metoprolol succinate (TOPROL-XL) 25 mg 24 hr tablet   No No   Sig: Take 1 tablet (25 mg total) by mouth daily   metoprolol succinate (Toprol XL) 25 mg 24 hr tablet 9/7/2021 at Unknown time  No Yes   Sig: Take 1 tablet (25 mg total) by mouth daily   pantoprazole (PROTONIX) 40 mg tablet   No No   Sig: Take 1 tablet (40 mg total) by mouth daily before breakfast   potassium chloride (K-DUR) 10 mEq tablet   Yes No   Sig: Take 10 mEq by mouth daily   tamsulosin (FLOMAX) 0 4 mg   Yes No   Sig: Take 0 4 mg by mouth daily      Facility-Administered Medications: None       Past Medical History:   Diagnosis Date    Anxiety disorder     Atrial fibrillation (HCC)     Coronary artery disease     Depression     Hepatitis C     screening negative in 1/2017    [facility])        Past Surgical History:   Procedure Laterality Date    CARDIAC CATHETERIZATION  07/09/2018       Family History   Problem Relation Age of Onset    Hypertension Mother     Coronary artery disease Mother         premature    Hypertension Father     Coronary artery disease Father         premature    Diabetes Father      I have reviewed and agree with the history as documented  E-Cigarette/Vaping    E-Cigarette Use Never User      E-Cigarette/Vaping Substances    Nicotine No     THC No     CBD No     Flavoring No     Other No     Unknown No      Social History     Tobacco Use    Smoking status: Current Every Day Smoker     Packs/day: 0 25     Years: 57 00     Pack years: 14 25     Types: Cigarettes    Smokeless tobacco: Never Used    Tobacco comment: since age 17; Has history of smoking for over 2500 [employer] years   He has been smoking more than packet daily in the past however he has been smokes about half a pack a day lately and stopped smoking on 7/1/2018 when he was admitted to the hospital     Vaping Use    Vaping Use: Never used   Substance Use Topics    Alcohol use: Never    Drug use: Never       Review of Systems   Cardiovascular: Positive for syncope  Skin: Positive for wound  All other systems reviewed and are negative  Physical Exam  Physical Exam  Vitals and nursing note reviewed  Constitutional:       General: He is not in acute distress  Appearance: He is well-developed  He is not diaphoretic  HENT:      Head: Normocephalic and atraumatic  Comments: Frontal abrasion  Echymosis above right eyelid  No racoon eyes, mccormack sign, or blood behind TMs  Right Ear: External ear normal       Left Ear: External ear normal    Eyes:      Conjunctiva/sclera: Conjunctivae normal    Neck:      Vascular: No JVD  Trachea: No tracheal deviation  Cardiovascular:      Rate and Rhythm: Normal rate and regular rhythm  Heart sounds: Normal heart sounds  No murmur heard  Pulmonary:      Effort: No respiratory distress  Breath sounds: Normal breath sounds  No stridor  No wheezing or rales  Abdominal:      General: Bowel sounds are normal  There is no distension  Palpations: Abdomen is soft  There is no mass  Tenderness: There is no abdominal tenderness  There is no guarding or rebound  Genitourinary:     Comments: Deferred  Musculoskeletal:         General: No tenderness or deformity  Skin:     General: Skin is warm and dry  Capillary Refill: Capillary refill takes less than 2 seconds  Coloration: Skin is not pale  Findings: No erythema or rash  Comments: Abrasion b/l LE  Neurological:      Motor: No abnormal muscle tone  Coordination: Coordination normal    Psychiatric:         Behavior: Behavior normal          Thought Content:  Thought content normal          Judgment: Judgment normal          Vital Signs  ED Triage Vitals   Temperature Pulse Respirations Blood Pressure SpO2 09/07/21 1325 09/07/21 1309 09/07/21 1309 09/07/21 1309 09/07/21 1309   98 6 °F (37 °C) 89 18 123/68 96 %      Temp Source Heart Rate Source Patient Position - Orthostatic VS BP Location FiO2 (%)   09/07/21 1602 09/07/21 1309 09/07/21 1309 09/07/21 1309 --   Tympanic Monitor Lying Left arm       Pain Score       09/07/21 1608       6           Vitals:    09/07/21 1309 09/07/21 1542 09/07/21 1602   BP: 123/68 144/80 139/73   Pulse: 89 78 98   Patient Position - Orthostatic VS: Lying Lying Lying         Visual Acuity      ED Medications  Medications   sodium chloride (PF) 0 9 % injection 3 mL (has no administration in time range)   apixaban (ELIQUIS) tablet 5 mg (5 mg Oral Given 9/7/21 1712)   ferrous sulfate tablet 325 mg (has no administration in time range)   folic acid (FOLVITE) tablet 400 mcg (has no administration in time range)   tamsulosin (FLOMAX) capsule 0 4 mg (has no administration in time range)   nicotine (NICODERM CQ) 7 mg/24hr TD 24 hr patch 1 patch (has no administration in time range)   metoprolol succinate (TOPROL-XL) 24 hr tablet 25 mg (has no administration in time range)   acetaminophen (TYLENOL) tablet 650 mg (650 mg Oral Given 9/7/21 1643)   morphine injection 2 mg (has no administration in time range)   lactated ringers infusion (has no administration in time range)       Diagnostic Studies  Results Reviewed     Procedure Component Value Units Date/Time    Troponin I [[id number]]  (Normal) Collected: 09/07/21 1330    Lab Status: Final result Specimen: Blood from Arm, Right Updated: 09/07/21 1357     Troponin I <0 03 ng/mL     Basic metabolic panel [[id number]]  (Abnormal) Collected: 09/07/21 1330    Lab Status: Final result Specimen: Blood from Arm, Right Updated: 09/07/21 1354     Sodium 141 mmol/L      Potassium 3 9 mmol/L      Chloride 103 mmol/L      CO2 31 mmol/L      ANION GAP 7 mmol/L      BUN 24 mg/dL      Creatinine 1 17 mg/dL      Glucose 118 mg/dL      Calcium 9 3 mg/dL      eGFR 61 ml/min/1 73sq m     Narrative:      National Kid[employer] Disease Foundation guidelines for Chronic Kidney Disease (CKD):     Stage 1 with normal or high GFR (GFR > 90 mL/min/1 73 square meters)    Stage 2 Mild CKD (GFR = 60-89 mL/min/1 73 square meters)    Stage 3A Moderate CKD (GFR = 45-59 mL/min/1 73 square meters)    Stage 3B Moderate CKD (GFR = 30-44 mL/min/1 73 square meters)    Stage 4 Severe CKD (GFR = 15-29 mL/min/1 73 square meters)    Stage 5 End Stage CKD (GFR <15 mL/min/1 73 square meters)  Note: GFR calculation is accurate only with a steady state creatinine    CBC and differential [[id number]]  (Abnormal) Collected: 09/07/21 1330    Lab Status: Final result Specimen: Blood from Arm, Right Updated: 09/07/21 1349     WBC 9 94 Thousand/uL      RBC 4 08 Million/uL      Hemoglobin 8 3 g/dL      Hematocrit 30 2 %      MCV 74 fL      MCH 20 3 pg      MCHC 27 5 g/dL      RDW 17 6 %      MPV 9 1 fL      Platelets 414 Thousands/uL      Neutrophils Relative 83 %      Immat GRANS % 0 %      Lymphocytes Relative 9 %      Monocytes Relative 8 %      Eosinophils Relative 0 %      Basophils Relative 0 %      Neutrophils Absolute 8 14 Thousands/µL      Immature Grans Absolute 0 03 Thousand/uL      Lymphocytes Absolute 0 91 Thousands/µL      Monocytes Absolute 0 79 Thousand/µL      Eosinophils Absolute 0 04 Thousand/µL      Basophils Absolute 0 03 Thousands/µL     UA w Reflex to Microscopic w Reflex to Culture [[id number]]  (Abnormal) Collected: 09/07/21 1331    Lab Status: Final result Specimen: Urine, Clean Catch Updated: 09/07/21 1342     Color, UA Straw     Clarity, UA Clear     Specific Gravity, UA 1 015     pH, UA 7 0     Leukocytes, UA Negative     Nitrite, UA Negative     Protein, UA Negative mg/dl      Glucose, UA Negative mg/dl      Ketones, UA Negative mg/dl      Urobilinogen, UA 0 2 E U /dl      Bilirubin, UA Negative     Blood, UA Negative                 X-ray chest 1 view portable   ED Interpretation by Juan Antonio Spangler DO (09/07 1456)   No acute cardiopulmonary process  Final Result by Kentrell Mack MD (09/07 1654)      No acute cardiopulmonary disease  Workstation performed: [id number]         XR shoulder 2+ views RIGHT   ED Interpretation by Juan Antonio Spangler DO (09/07 1455)   No fracture  No dislocation  Final Result by Kentrell Mack MD (09/07 1656)      No acute osseous abnormality  Workstation performed: [id number]         XR shoulder 2+ views LEFT   ED Interpretation by Juan Antonio Spangler DO (09/07 1455)   No fracture  No dislocation  Final Result by Kentrell Mack MD (09/07 1654)      No acute osseous abnormality  Workstation performed: [id number]         CT orbits/temporal bones/skull base wo contrast   Final Result by Ita Ayon DO (09/07 1358)      Bilateral soft tissue swelling anterior to the paramedian frontal bone extending inferiorly into the preseptal soft tissues of the upper lid  This is most suggestive of soft tissue contusion  No retrobulbar orbital pathology  No orbital fracture identified  Workstation performed: [id number]         CT spine cervical without contrast   Final Result by Ita Ayon DO (09/07 1408)      There is a focal cortical irregularity identified within the anterior inferior aspect of the C5 inferior endplate, seen on sagittal imaging series 304 images 94 through 97  This is of indeterminate age and may represent an acute or chronic endplate    fracture  No other fractures identified  Advanced cervical degenerative change C3-4, C4-5 and C5-6 with severe canal stenosis and moderately severe bilateral foraminal narrowing  Correlate for signs of cervical myelopathy/radiculopathy  MRI of the cervical spine may help determine the age of the C5 anterior inferior endplate fracture and would better evaluate the cervical degenerative changes with regard to cervical cord compression  This examination was marked "immediate notification" in Epic in order to begin the standard process by which the radiology reading room liaison alerts the referring practitioner  Workstation performed: [id number]         CT head without contrast   Final Result by Cesia Jiménez DO (09/07 1400)      No acute intracranial abnormality  Bifrontal extracranial soft tissue swelling extending inferiorly into the superior preseptal soft tissues and lateral to the right orbit, suggestive of soft tissue contusion status post fall  No calvarial fracture identified  Workstation performed: [id number]                    Procedures  Procedures         ED Course  ED Course as of Sep 07 1748   Tue Sep 07, 2021   1442 At baseline     Hemoglobin(!): 8 3   1442 New thrombocytosis     Platelet Count(!): 218                             SBIRT 20yo+      Most Recent Value   SBIRT (23 yo +)   In order to provide better care to our patients, we are screening all of our patients for alcohol and drug use  Would it be okay to ask you these screening questions? No Filed at: 09/07/2021 1315                    MDM  Number of Diagnoses or Management Options  Abrasion of head: new and requires workup  Fall: new and requires workup  Syncope: new and requires workup  Diagnosis management comments: 71 y/o male presents with syncopal episode  Does not recall the event  Rohini Heidy to the ground  Will evaluate for ACS, arrhythmia, electrolyte abnormalities, intracranial bleed, cervical spine fracture  C-spine states chronic versus acute fracture of an endplate  No pain on palpation of the cervical spine  Not acute fracture  Patient also complains of bilateral shoulder pain  Will evaluate for fracture  Without fracture or dislocation per my read  Collar cleared by me  Will admit patient for syncopal episode         Amount and/or Complexity of Data Reviewed  Clinical lab tests: ordered and reviewed  Tests in the radiology section of CPT®: ordered and reviewed  Review and summarize past medical records: yes  Discuss the patient with other providers: yes    Risk of Complications, Morbidity, and/or Mortality  Presenting problems: high  Diagnostic procedures: high  Management options: high    Patient Progress  Patient progress: stable      Disposition  Final diagnoses:   Syncope   Fall   Abrasion of head     Time reflects when diagnosis was documented in both MDM as applicable and the Disposition within this note     Time User Action Codes Description Comment    9/7/2021  3:28 PM Shahana Stake Add [R55] Syncope     9/7/2021  3:28 PM Shahana Stake Add Rashida [place]  XXXA] Fall     9/7/2021  3:28 PM Marcos Hwang Husbands Add [V12 609S,  F50 89,  L84] Abrasions and calluses on knuckles due to self-induced vomiting     9/7/2021  3:28 PM Shaun Hwang Lobe [X57 065D,  F50 89,  L84] Abrasions and calluses on knuckles due to self-induced vomiting     9/7/2021  3:28 PM Marcos Hwang Husbands Add [S00 91XA] Abrasion of head       ED Disposition     ED Disposition Condition Date/Time Comment    Admit Stable Tue Sep 7, 2021  3:28 PM Case was discussed with ephraim and the patient's admission status was agreed to be Admission Status: observation status to the service of Dr James Irwin           Follow-up Information    None         Current Discharge Medication List      CONTINUE these medications which have NOT CHANGED    Details   apixaban (ELIQUIS) 5 mg Take 1 tablet (5 mg total) by mouth 2 (two) times a day  Qty: 180 tablet, Refills: 3    Associated Diagnoses: Longstanding persistent atrial fibrillation (HCC)      ferrous sulfate 325 (65 Fe) mg tablet Take 1 tablet (325 mg total) by mouth daily with breakfast  Qty: 30 tablet, Refills: 0    Associated Diagnoses: Microcytic anemia      furosemide (LASIX) 40 mg tablet Take 0 5 tablets (20 mg total) by mouth every other day  Refills: 0    Associated Diagnoses: Essential hypertension      lisinopril (ZESTRIL) 2 5 mg tablet Take 1 tablet (2 5 mg total) by mouth daily  Qty: 90 tablet, Refills: 3    Associated Diagnoses: Essential hypertension      metoprolol succinate (Toprol XL) 25 mg 24 hr tablet Take 1 tablet (25 mg total) by mouth daily  Qty: 90 tablet, Refills: 3    Associated Diagnoses: Longstanding persistent atrial fibrillation (HCC)      fluticasone-salmeterol (Advair Diskus) 100-50 mcg/dose inhaler Inhale 1 puff 2 (two) times a day Rinse mouth after use  Qty: 3 each, Refills: 1    Comments: Substitution to a formulary equivalent within the same pharmaceutical class is authorized  Associated Diagnoses: Essential hypertension; Chronic obstructive pulmonary disease, unspecified COPD type ([facility])      folic acid (FOLVITE) 594 mcg tablet Take 1 tablet (400 mcg total) by mouth daily  Qty: 30 tablet, Refills: 0    Associated Diagnoses: Microcytic anemia      ipratropium-albuterol (DUO-NEB) 0 5-2 5 mg/3 mL nebulizer solution Take 1 vial (3 mL total) by nebulization 4 (four) times a day  Qty: 1 vial, Refills: 30    Associated Diagnoses: Pulmonary emphysema, unspecified emphysema type ([facility])      pantoprazole (PROTONIX) 40 mg tablet Take 1 tablet (40 mg total) by mouth daily before breakfast  Qty: 90 tablet, Refills: 3    Associated Diagnoses: Iron deficiency anemia due to chronic blood loss      potassium chloride (K-DUR) 10 mEq tablet Take 10 mEq by mouth daily      tamsulosin (FLOMAX) 0 4 mg Take 0 4 mg by mouth daily           No discharge procedures on file      PDMP Review     None          ED Provider  Electronically Signed by           Soraya Amador DO  09/07/21 2408

## 2021-09-08 ENCOUNTER — APPOINTMENT (OUTPATIENT)
Dept: CT IMAGING | Facility: HOSPITAL | Age: 76
DRG: 914 | End: 2021-09-08
Payer: MEDICARE

## 2021-09-08 PROBLEM — S09.90XA HEAD TRAUMA: Status: ACTIVE | Noted: 2021-09-08

## 2021-09-08 LAB
ABO GROUP BLD: NORMAL
ANION GAP SERPL CALCULATED.3IONS-SCNC: 7 MMOL/L (ref 4–13)
ATRIAL RATE: 0 BPM
ATRIAL RATE: 160 BPM
BASOPHILS # BLD AUTO: 0.04 THOUSANDS/ΜL (ref 0–0.1)
BASOPHILS NFR BLD AUTO: 1 % (ref 0–1)
BLD GP AB SCN SERPL QL: NEGATIVE
BUN SERPL-MCNC: 20 MG/DL (ref 6–20)
CALCIUM SERPL-MCNC: 8.5 MG/DL (ref 8.4–10.2)
CHLORIDE SERPL-SCNC: 105 MMOL/L (ref 96–108)
CO2 SERPL-SCNC: 30 MMOL/L (ref 22–33)
CREAT SERPL-MCNC: 1.08 MG/DL (ref 0.5–1.2)
EOSINOPHIL # BLD AUTO: 0.13 THOUSAND/ΜL (ref 0–0.61)
EOSINOPHIL NFR BLD AUTO: 2 % (ref 0–6)
ERYTHROCYTE [DISTWIDTH] IN BLOOD BY AUTOMATED COUNT: 17.5 % (ref 11.6–15.1)
GFR SERPL CREATININE-BSD FRML MDRD: 68 ML/MIN/1.73SQ M
GLUCOSE P FAST SERPL-MCNC: 105 MG/DL (ref 70–105)
GLUCOSE SERPL-MCNC: 105 MG/DL (ref 65–140)
HCT VFR BLD AUTO: 26 % (ref 36.5–49.3)
HGB BLD-MCNC: 7.3 G/DL (ref 12–17)
IMM GRANULOCYTES # BLD AUTO: 0.02 THOUSAND/UL (ref 0–0.2)
IMM GRANULOCYTES NFR BLD AUTO: 0 % (ref 0–2)
LYMPHOCYTES # BLD AUTO: 1.45 THOUSANDS/ΜL (ref 0.6–4.47)
LYMPHOCYTES NFR BLD AUTO: 17 % (ref 14–44)
MCH RBC QN AUTO: 20.9 PG (ref 26.8–34.3)
MCHC RBC AUTO-ENTMCNC: 28.1 G/DL (ref 31.4–37.4)
MCV RBC AUTO: 75 FL (ref 82–98)
MONOCYTES # BLD AUTO: 1.01 THOUSAND/ΜL (ref 0.17–1.22)
MONOCYTES NFR BLD AUTO: 12 % (ref 4–12)
NEUTROPHILS # BLD AUTO: 5.86 THOUSANDS/ΜL (ref 1.85–7.62)
NEUTS SEG NFR BLD AUTO: 68 % (ref 43–75)
P AXIS: 0 DEGREES
PLATELET # BLD AUTO: 441 THOUSANDS/UL (ref 149–390)
PMV BLD AUTO: 10.7 FL (ref 8.9–12.7)
POTASSIUM SERPL-SCNC: 3.7 MMOL/L (ref 3.5–5)
PR INTERVAL: 111 MS
PR INTERVAL: 145 MS
QRS AXIS: 64 DEGREES
QRS AXIS: 65 DEGREES
QRSD INTERVAL: 100 MS
QRSD INTERVAL: 115 MS
QT INTERVAL: 366 MS
QT INTERVAL: 373 MS
QTC INTERVAL: 433 MS
QTC INTERVAL: 477 MS
RBC # BLD AUTO: 3.49 MILLION/UL (ref 3.88–5.62)
RH BLD: POSITIVE
SODIUM SERPL-SCNC: 142 MMOL/L (ref 133–145)
SPECIMEN EXPIRATION DATE: NORMAL
T WAVE AXIS: -32 DEGREES
T WAVE AXIS: 57 DEGREES
VENTRICULAR RATE: 102 BPM
VENTRICULAR RATE: 81 BPM
WBC # BLD AUTO: 8.51 THOUSAND/UL (ref 4.31–10.16)

## 2021-09-08 PROCEDURE — 70450 CT HEAD/BRAIN W/O DYE: CPT

## 2021-09-08 PROCEDURE — 86920 COMPATIBILITY TEST SPIN: CPT

## 2021-09-08 PROCEDURE — 97116 GAIT TRAINING THERAPY: CPT

## 2021-09-08 PROCEDURE — 86850 RBC ANTIBODY SCREEN: CPT | Performed by: INTERNAL MEDICINE

## 2021-09-08 PROCEDURE — 97163 PT EVAL HIGH COMPLEX 45 MIN: CPT

## 2021-09-08 PROCEDURE — P9016 RBC LEUKOCYTES REDUCED: HCPCS

## 2021-09-08 PROCEDURE — 86900 BLOOD TYPING SEROLOGIC ABO: CPT | Performed by: INTERNAL MEDICINE

## 2021-09-08 PROCEDURE — 93010 ELECTROCARDIOGRAM REPORT: CPT | Performed by: INTERNAL MEDICINE

## 2021-09-08 PROCEDURE — G1004 CDSM NDSC: HCPCS

## 2021-09-08 PROCEDURE — 86901 BLOOD TYPING SEROLOGIC RH(D): CPT | Performed by: INTERNAL MEDICINE

## 2021-09-08 PROCEDURE — 85025 COMPLETE CBC W/AUTO DIFF WBC: CPT | Performed by: INTERNAL MEDICINE

## 2021-09-08 PROCEDURE — 30233N1 TRANSFUSION OF NONAUTOLOGOUS RED BLOOD CELLS INTO PERIPHERAL VEIN, PERCUTANEOUS APPROACH: ICD-10-PCS | Performed by: INTERNAL MEDICINE

## 2021-09-08 PROCEDURE — 80048 BASIC METABOLIC PNL TOTAL CA: CPT | Performed by: INTERNAL MEDICINE

## 2021-09-08 PROCEDURE — 99232 SBSQ HOSP IP/OBS MODERATE 35: CPT | Performed by: INTERNAL MEDICINE

## 2021-09-08 RX ORDER — FERROUS SULFATE 325(65) MG
325 TABLET ORAL 2 TIMES DAILY WITH MEALS
Status: DISCONTINUED | OUTPATIENT
Start: 2021-09-08 | End: 2021-09-10 | Stop reason: HOSPADM

## 2021-09-08 RX ORDER — FERROUS SULFATE TAB EC 324 MG (65 MG FE EQUIVALENT) 324 (65 FE) MG
324 TABLET DELAYED RESPONSE ORAL
Qty: 180 TABLET | Refills: 0 | Status: CANCELLED | OUTPATIENT
Start: 2021-09-08 | End: 2021-12-07

## 2021-09-08 RX ADMIN — SODIUM CHLORIDE, SODIUM LACTATE, POTASSIUM CHLORIDE, AND CALCIUM CHLORIDE 75 ML/HR: .6; .31; .03; .02 INJECTION, SOLUTION INTRAVENOUS at 09:26

## 2021-09-08 RX ADMIN — ACETAMINOPHEN 650 MG: 325 TABLET, FILM COATED ORAL at 20:03

## 2021-09-08 RX ADMIN — TAMSULOSIN HYDROCHLORIDE 0.4 MG: 0.4 CAPSULE ORAL at 09:25

## 2021-09-08 RX ADMIN — FERROUS SULFATE TAB 325 MG (65 MG ELEMENTAL FE) 325 MG: 325 (65 FE) TAB at 16:02

## 2021-09-08 RX ADMIN — FERROUS SULFATE TAB 325 MG (65 MG ELEMENTAL FE) 325 MG: 325 (65 FE) TAB at 09:25

## 2021-09-08 RX ADMIN — FOLIC ACID TAB 400 MCG 400 MCG: 400 TAB at 09:25

## 2021-09-08 RX ADMIN — METOPROLOL SUCCINATE 25 MG: 25 TABLET, EXTENDED RELEASE ORAL at 09:25

## 2021-09-08 RX ADMIN — APIXABAN 5 MG: 5 TABLET, FILM COATED ORAL at 09:25

## 2021-09-08 RX ADMIN — APIXABAN 5 MG: 5 TABLET, FILM COATED ORAL at 16:02

## 2021-09-08 NOTE — ASSESSMENT & PLAN NOTE
Head trauma sustained following a fall  Patient takes anticoagulation  CT head done on presentation showed soft tissue contusion  On exam today, significant bilateral periorbital swelling noted which happens to be increased from yesterday  Hemoglobin dropped from 8 3-7 3  At risk for SDH  Orthostatic were negative however still appears symptomatic  Will obtain a stat CT head  Type and screen  Will give patient 1 unit PRBC  Obtain transfusion consent

## 2021-09-08 NOTE — PROGRESS NOTES
Sarah U  68   Progress Note - Marlee Snare 1945, 70 y o  male MRN: [id number]  Unit/Bed#: -01 Encounter: [id number]  Primary Care Provider: Mateus Mars MD   Date and time admitted to hospital: 9/7/2021  1:06 PM    * Syncope  Assessment & Plan  Significant worsening of bilateral racoon's eyes  Orthostatic vital signs negative  Hemoglobin of 7 3 today  Given history of chronic symptomatic anemia will benefit PRBC transfusion  Will obtain a repeat CT head wo contrast  -      Repeat CT head shows improvement in soft tissue swelling  Continue gentle IV fluid hydration  PT recommended patient be discharged home with home rehab  Patient with a history of AFib, reports to have fallen with a head strike  Noted to have had a history of orthostatic hypotension  Blood pressure on presentation was 128/68 mmHg, HR= 89    CTH negative for any acute intracranial abnormality however revealed Bifrontal extracranial soft tissue swelling extending inferiorly into the superior preseptal soft tissues and lateral to the right orbit, suggestive of soft tissue contusion status post fall  No calvarial fracture identified    Denies any presyncopal events  No eye-witness documentation noted  Denies loss of bowel or bladder control  Syncope most likely orthostatic as event happened after he stood up from sitting position in the setting of possible dehydration given elevated BUN    · Will obtain orthostatic vital signs   · monitor on telemetry  · Will give gentle IV hydration- LR 75cc/hr  · Obtain 12 lead EKG stat  · PT/OT consult  Head trauma  Assessment & Plan  Head trauma sustained following a fall  Patient takes anticoagulation  CT head done on presentation showed soft tissue contusion  On exam today, significant bilateral periorbital swelling noted which happens to be increased from yesterday  Hemoglobin dropped from 8 3-7 3  At risk for SDH    Orthostatic were negative however still appears symptomatic  Will obtain a stat CT head -   Repeat CTH showed no intracranial bleeds  Reports improvement in soft tissue swelling  Type and screen  Will give patient 1 unit PRBC  Obtain transfusion consent  Thrombocytosis ([facility] [address] )  Assessment & Plan  Patient presented with thrombocytosis with a platelet count of 958  And platelet of 291 today  Could be reactionary to dehydration  Will monitor closely  Recommend outpatient Hematology follow-up  Tobacco use disorder  Assessment & Plan  Patient smokes few sticks of cigarettes a day  Reports reduction in cigarettes sticks smokes per day  Will provide nicotine patch  Tobacco cessation education provided  Permanent atrial fibrillation [facility])  Assessment & Plan  Patient with a history of atrial fibrillation  Rate controlled  Takes metoprolol 25 mg daily and anticoagulated with Eliquis 5 mg b i d  Will continue home medications    Symptomatic anemia  Assessment & Plan  0   Lab Value Date/Time    HGB 7 3 (L) 09/08/2021 0836    HGB 8 3 (L) 09/07/2021 1330    HGB 8 0 (L) 06/30/2021 0546    HGB 8 4 (L) 06/29/2021 0507    HGB 8 8 (L) 06/28/2021 1848    HGB 10 3 (L) 08/10/2020 1046    HGB 9 7 (L) 08/08/2020 0542    HGB 10 0 (L) 08/07/2020 0516    HGB 9 4 (L) 08/06/2020 1741    HGB 9 5 (L) 08/06/2020 0517    HGB 8 0 (L) 08/05/2020 0524    HGB 7 7 (L) 08/04/2020 0444    HGB 6 3 (LL) 08/03/2020 1552     Microcytic anemia  EGD and colonoscopy done 09/11/2020 - showed no active ulceration  Colonoscopy revealed polyps which were biopsied  Biopsy revealed tubular and serrated adenoma  · Continue twice daily iron supplementation  Continue daily folic acid supplementation  · History of transfusion in last hospitalization  · Type and screen  Transfuse 1 unit PRBC  · Consent obtained  VTE Pharmacologic Prophylaxis:   VTE Score: 2 High Risk (Score >/= 5) - Pharmacological DVT Prophylaxis Ordered: Apixaban (Eliquis)   Sequential Compression Devices Ordered  Mechanical VTE Prophylaxis in Place: Yes    Patient Centered Rounds: I have performed bedside rounds with nursing staff today  Discussions with Specialists or Other Care Team Provider: Attending    Education and Discussions with Family / Patient: Patient    Current Length of Stay: 0 day(s)    Current Patient Status: Inpatient     Discharge Plan / Estimated Discharge Date: Anticipate discharge tomorrow to home  Code Status: Level 1 - Full Code      Subjective:   Patient seen and examined at bedside  Reports mild pain in his left knee on right shoulder  However pain is much improved compared to yesterday  Significantly in increased bilateral periorbital swelling    Will obtain a stat CTH for increased perioperative bruising and risks for subdural hematoma  Patient with Afib - at increase risk for thromboembolic event however will continue anticoagulation for now  Objective:     Vitals:   Temp (24hrs), Av 6 °F (37 °C), Min:97 6 °F (36 4 °C), Max:99 4 °F (37 4 °C)    Temp:  [97 6 °F (36 4 °C)-99 4 °F (37 4 °C)] 99 °F (37 2 °C)  HR:  [] 87  Resp:  [18] 18  BP: ()/(57-81) 114/72  SpO2:  [95 %-100 %] 95 %  Body mass index is 19 73 kg/m²  Input and Output Summary (last 24 hours): Intake/Output Summary (Last 24 hours) at 2021 1515  Last data filed at 2021 1434  Gross per 24 hour   Intake 1000 ml   Output 1300 ml   Net -300 ml       Physical Exam:     Physical Exam  Vitals and nursing note reviewed  Constitutional:       General: He is not in acute distress  Appearance: He is not toxic-appearing  HENT:      Head:      Comments: Bilaterally puffy periorbital swelling  Minimal palpable tenderness  Right Ear: Ear canal normal       Left Ear: Ear canal normal       Mouth/Throat:      Mouth: Mucous membranes are moist    Cardiovascular:      Rate and Rhythm: Normal rate  Rhythm irregular  Pulses: Normal pulses        Heart sounds: Normal heart sounds  No murmur heard  Pulmonary:      Effort: No respiratory distress  Breath sounds: Normal breath sounds  No wheezing  Abdominal:      General: Bowel sounds are normal  There is no distension  Palpations: Abdomen is soft  Tenderness: There is no abdominal tenderness  Musculoskeletal:      Right lower leg: No edema  Left lower leg: No edema  Skin:     Capillary Refill: Capillary refill takes less than 2 seconds  Coloration: Skin is pale  Skin is not jaundiced  Neurological:      General: No focal deficit present  Mental Status: He is alert and oriented to person, place, and time  Mental status is at baseline  Motor: No weakness            Additional Data:     Labs:  Results from last 7 days   Lab Units 09/08/21  0836   WBC Thousand/uL 8 51   HEMOGLOBIN g/dL 7 3*   HEMATOCRIT % 26 0*   PLATELETS Thousands/uL 441*   NEUTROS PCT % 68   LYMPHS PCT % 17   MONOS PCT % 12   EOS PCT % 2     Results from last 7 days   Lab Units 09/08/21  0550   SODIUM mmol/L 142   POTASSIUM mmol/L 3 7   CHLORIDE mmol/L 105   CO2 mmol/L 30   BUN mg/dL 20   CREATININE mg/dL 1 08   ANION GAP mmol/L 7   CALCIUM mg/dL 8 5   GLUCOSE RANDOM mg/dL 105                       Imaging: Reviewed radiology reports from this admission including: CT head and xray(s)    Recent Cultures (last 7 days):           Lines/Drains:  Invasive Devices     Peripheral Intravenous Line            Peripheral IV 09/07/21 Right Antecubital 1 day                Telemetry:        Last 24 Hours Medication List:   Current Facility-Administered Medications   Medication Dose Route Frequency Provider Last Rate    acetaminophen  650 mg Oral Q6H PRN Jayme Gardner MD      apixaban  5 mg Oral BID Jayme Gardner MD      ferrous sulfate  325 mg Oral BID With Meals Jayme Garnder MD      folic acid  759 mcg Oral Daily Stan Wise MD      lactated ringers  75 mL/hr Intravenous Continuous Stan MD Billie 75 mL/hr (09/08/21 3598)    metoprolol succinate  25 mg Oral Daily Stan Wise MD      morphine injection  2 mg Intravenous Q6H PRN Oly Christianson MD      nicotine  1 patch Transdermal Daily Stan Wise MD      sodium chloride (PF)  3 mL Intravenous Q1H PRN Sarah Hwang DO      tamsulosin  0 4 mg Oral Daily Oly Christianson MD          Today, Patient Was Seen By: Oly Christianson MD    ** Please Note: This note has been constructed using a voice recognition system   **

## 2021-09-08 NOTE — ASSESSMENT & PLAN NOTE
0   Lab Value Date/Time    HGB 8 5 (L) 09/09/2021 0900    HGB 7 3 (L) 09/08/2021 0836    HGB 8 3 (L) 09/07/2021 1330    HGB 8 0 (L) 06/30/2021 0546    HGB 8 4 (L) 06/29/2021 0507    HGB 8 8 (L) 06/28/2021 1848    HGB 10 3 (L) 08/10/2020 1046    HGB 9 7 (L) 08/08/2020 0542    HGB 10 0 (L) 08/07/2020 0516    HGB 9 4 (L) 08/06/2020 1741    HGB 9 5 (L) 08/06/2020 0517    HGB 8 0 (L) 08/05/2020 0524    HGB 7 7 (L) 08/04/2020 0444    HGB 6 3 (LL) 08/03/2020 1552     Microcytic anemia  EGD and colonoscopy done 09/11/2020 - showed no active ulceration  Colonoscopy revealed polyps which were biopsied  Biopsy revealed tubular and serrated adenoma  · Continue twice daily iron supplementation  Continue daily folic acid supplementation  · History of transfusion in last hospitalization  · Type and screen  Transfuse 1 unit PRBC  · Consent obtained    · Posttransfusion HGB of 8 5

## 2021-09-08 NOTE — ASSESSMENT & PLAN NOTE
Head trauma sustained following a fall  Patient takes anticoagulation  CT head done on presentation showed soft tissue contusion  On exam today, significant bilateral periorbital swelling noted which happens to be increased from yesterday  Hemoglobin dropped from 8 3-7 3  At risk for SDH  Orthostatic were negative however still appears symptomatic  Will obtain a stat CT head -   Repeat CTH showed no intracranial bleeds  Reports improvement in soft tissue swelling      Status post 1 unit PRBC transfused on 09/08/ 2021

## 2021-09-08 NOTE — CASE MANAGEMENT
LOS: 0 days   Bundle: No  Readmission: No  Unplanned Readmission Score: N/A    Patient admitted to [facility] for Syncope today  CM Met with patient today to introduce CM, review role, complete initial assessment and discuss DCP  Lives [place]: [place] [phone]  Lives with: Wife   Home Type & Entry: Apartment   DME: uses cane and back brace from [facility] [employer]   ADLs: independent   VNA history:No  STR history: No  Pharmacy Preference & Coverage: [facility] patient preferred [facility]  According to patient he has prescription insurance and can normally afford prescriptions  Mental Health/Drug/ETOH history: None  Dialysis history: No  POA/AD/LW: No and patient denies wanting information about advanced directives at this time  Income/Employment: SSI  Transportation: Patient needs Lyft at DC  PCP: Karyn LANIER  Transport Home: Lyft ride     DCP: Home with no needs  CM spoke about PT home with home health recommendation with patient at this time  Patient stated he does not believe he needs home health  CM advised patient of risks associated with no follow PT recommendation  Patient verbalized understanding and stated he would like to go home without home PT  CM reviewed discharge planning process including the following: identifying caregivers at home, preference for d/c planning needs,   availability of treatment team to discuss questions or concerns patient and/or family may have regarding diagnosis, plan of care, old or new medications and discharge planning   CM will continue to follow for care coordination and update assessment as appropriate

## 2021-09-08 NOTE — PHYSICAL THERAPY NOTE
PHYSICAL THERAPY EVALUATION & TREATMENT   TIME: 1648-7166    NAME:  Brendon Haji  DATE: 09/08/21    AGE:   70 y o  Mrn:   [id number]  Length Of Stay: 0    ADMIT DX:  Head pain [R51 9]  Syncope [R55]  Abrasion of head [S00 91XA]    Past Medical History:   Diagnosis Date    Anxiety disorder     Atrial fibrillation (HCC)     Coronary artery disease     Depression     Hepatitis C     screening negative in 1/2017    MI [facility]ocardial infarction[facility])      Past Surgical History:   Procedure Laterality Date    CARDIAC CATHETERIZATION  07/09/2018       Performed at least 2 patient identifiers during session: Name, Srinath Palacio and ID [email or web address]        09/08/21 1103   PT Last Visit   PT Visit Date 09/08/21   Note Type   Note type Evaluation  (& treatment)   Pain Assessment   Pain Assessment Tool 0-10   Pain Score 3   Pain Location/Orientation Orientation: Bilateral;Location: Head   Pain Radiating Towards non radiating   Pain Onset/Description Onset: Ongoing; Descriptor: Headache   Effect of Pain on Daily Activities limits activity tolerance   Patient's Stated Pain Goal No pain   Hospital Pain Intervention(s) Ambulation/increased activity;Repositioned   Multiple Pain Sites No   Home Living   Type of Home Apartment   Home Layout Stairs to enter with rails; One level;Performs ADLs on one level; Able to live on main level with bedroom/bathroom  (15 KAL with HR, then single level living)   [address] [facility]   Prior Function   Level of [place] Independent with ADLs and functional mobility  (Independent with IADLs)   Lives With Spouse   Receives Help From Family   ADL Assistance Independent   IADLs Independent   Falls in the last 6 months 1 to 4   Vocational Retired   Comments Patient reports living at home with his wife in a 2nd floor apartment with 15 steps to enter  Patient reports being independent with all ADLs/IADLs and functional mobility    Patient ambulates with single-point cane PRN for household and community distances  Patient reports using public transportation or walking to all community needs  Restrictions/Precautions   Weight Bearing Precautions Per Order No   Other Precautions Chair Alarm; Bed Alarm;Telemetry;Multiple lines; Fall Risk;Pain;Hard of hearing   General   Additional Pertinent History Pt admitted under observation 9/7/21 s/p fall in community with +headstrike, decreased memory of events, low hgb  Dx: Syncope  Family/Caregiver Present No   Cognition   Overall Cognitive Status WFL   Arousal/Participation Cooperative   Orientation Level Oriented X4   Memory Decreased recall of recent events   Following Commands Follows multistep commands with increased time or repetition   RUE Assessment   RUE Assessment WFL   LUE Assessment   LUE Assessment WFL   RLE Assessment   RLE Assessment WFL   LLE Assessment   LLE Assessment WFL   Coordination   Movements are Fluid and Coordinated 1   Sensation WFL   Light Touch   RLE Light Touch Grossly intact   LLE Light Touch Grossly intact   Bed Mobility   Supine to Sit 5  Supervision   Additional items Assist x 1; Increased time required;Verbal cues   Sit to Supine 5  Supervision   Additional items Assist x 1; Increased time required;Verbal cues   Transfers   Sit to Stand 5  Supervision   Additional items Assist x 1; Increased time required;Verbal cues   Stand to Sit 5  Supervision   Additional items Assist x 1;Verbal cues   Ambulation/Elevation   Gait pattern Decreased foot clearance  (+instability, pathway deviation)   Gait Assistance 4  Minimal assist   Additional items Assist x 1   Assistive Device None   Distance 5ft fwd and back   Stair Management Assistance Not tested   Balance   Static Sitting Good   Dynamic Sitting Fair +   Static Standing Fair   Dynamic Standing Fair -   Ambulatory Fair -   Endurance Deficit   Endurance Deficit Yes   Endurance Deficit Description Orthostatic BPs taken  Negative results, but pt symptomatic between each change in position   Supine: BP 121/69, MAP 89, HR93  Sitting: /73, MAP 89,   Standing: /57, MAP 75,   Activity Tolerance   Activity Tolerance Patient limited by fatigue; Other (Comment)  (dizziness & instability)   Medical Staff Made Aware Spoke with SLIM and CM in pt rounds   Nurse Made Aware Spoke with JOHANNY Ortiz pre/post session   Assessment   Prognosis Good   Problem List Decreased endurance; Impaired balance;Decreased mobility; Decreased safety awareness; Impaired hearing;Pain;Decreased skin integrity   Assessment Pt seen for PT evaluation, cleared by JOHANNY Ortiz  Pt admitted 9/7/2021 s/p fall with headstrike, decreased recall of event, dx Syncope  Comorbidities affecting pt's fnxl performance include: Afib, anxiety, CAD, depression, falls and MI, hepatitis C  Personal factors affecting pt at time of PT IE include: stairs to enter home, inability to ambulate household distances, limited home support, positive fall history and limited insight into impairments  PTA, pt was independent with functional mobility with SPC prn, independent with ADLS, independent with IADLS and living with his wife in a 1 level home with 15 steps to enter  Pt scores 20/24 on AM-PAC objective tool w/ a standardized score of 43 99, indicating pt demonstrates functional capacity for return to home self care vs with increased supports upon d/c  The Barthel Index outcome tool was used & pt scores 65 indicating moderate limitations of fnxl mobility/ADLS  Pt is at risk of falls d/t impulsivity, h/o falls, impaired balance, impaired insight/safety awareness, varying levels of pain , acuity of medical illness and ongoing medical treatment of primary dx  Pt's clinical presentation is currently unstable/unpredictable seen in pt's presentation of vital sign response, changing level of pain, increased fall risk, new onset of impairment of functional mobility, decreased endurance and new onset of weakness   This PT IE requires high complexity clinical decision making, based on multiple medical/physiological/fnxl/social factors which affect pt's performance w/ evaluation & therapist judgement for disposition recommendations  Pt will benefit from continued PT treatment in order to address impairments, decrease risk of falls, maximize independence w/ fnxl mobility, & ensure safety w/ mobility for transition to next level of care  Based on pt presentation & impairments, pt would most appropriately benefit from return to home with home health PT services upon d/c  Pt anticipated to make significant improvement in functional mobility with medical optimization  Barriers to Discharge Inaccessible home environment;Decreased caregiver support   Goals   Patient Goals "to feel better and go home tomorrow"   STG Expiration Date 09/18/21   Short Term Goal #1 Patient PT goals established in order to address patient self reported goal of "to go home tomorrow"  Pt will ambulate > 250 ft with SPC at VITO level in order to increase safety with community distance functional mobility  Pt will negotiate 15 stairs with HR assist and SPC at VITO level in order to demonstrate ability to enter/exit his home  Pt will improve AM-PAC score to >/= 24/24 in order to increase independence with mobility and decrease burden of care  Pt will improve Barthel Index score to >/= 75/100 in order to increase independence and decrease risk of falls  Pt will improve ambulatory balance to >/= GOOD grade in order to promote safety and increased independence with mobility  Pt will tolerate > 20 minutes of functional mobility training with good endurance evidenced by no significant change in vitals or shortness of breath  PT Treatment Day 1   Plan   Treatment/Interventions Functional transfer training;Elevations;LE strengthening/ROM; Endurance training;Patient/family training;Gait training;Spoke to MD;Spoke to nursing;Spoke to case management   PT Frequency Other (Comment)  (3-5x/wk)   Recommendation   PT Discharge Recommendation Home with home health rehabilitation   Additional Comments PHYSICAL THERAPY TREATMENT NOTE:    TIME IN: 1053   TIME OUT: 1103   TOTAL TREATMENT TIME: 10 minutes  Pt is agreeable to participate in a trial of further ambulation after IE  Pt transfers with S, requires ~10seconds of steadying prior to ambulation  Pt then ambulates 30ft with no AD and LEA, pt with significant pathway deviation, downward gaze to floor, anterior body lean, +instability  Further ambulation deferred at this time for pt safety due to onset of dizziness  At end of session pt was left semi-supine in bed with bed alarm engaged and needs within reach, care returned to RN  Recommend pt have hospital staff member with him at all times of ambulation during remainder of his stay  Recommend return to home with home health PT services once medically cleared for d/c from the acute care setting  Pt's functional status anticipated to improve with medical optimization  Will continue skilled PT POC as able and appropriate to address functional impairments and progress towards therapy goals  AM-PAC Basic Mobility Inpatient   Turning in Bed Without Bedrails 4   Lying on Back to Sitting on Edge of Flat Bed 4   Moving Bed to Chair 3   Standing Up From Chair 3   Walk in Room 3   Climb 3-5 Stairs 3   Basic Mobility Inpatient Raw Score 20   Basic Mobility Standardized Score 43 99   Modified [place] Scale   Modified [place] Scale 4   Barthel Index   Feeding 10   Bathing 0   Grooming Score 5   Dressing Score 10   Bladder Score 10   Bowels Score 10   Toilet Use Score 5   Transfers (Bed/Chair) Score 10   Mobility (Level Surface) Score 0   Stairs Score 5   Barthel Index Score 65       The patient's AM-PAC Basic Mobility Inpatient Short Form Raw Score is 20, Standardized Score is 43 99  A standardized score greater than 42 9 suggests the patient may benefit from discharge to home   Please also refer to the recommendation of the Physical Therapist for safe discharge planning          Joshua Armas PT, DPT   Available via [employer]  [facility] # [id number]  [place] License - [id number]  1/5/3221

## 2021-09-08 NOTE — PLAN OF CARE
Problem: CARDIOVASCULAR - ADULT  Goal: Maintains optimal cardiac output and hemodynamic stability  Description: INTERVENTIONS:  - Monitor I/O, vital signs and rhythm  - Monitor for S/S and trends of decreased cardiac output  - Administer and titrate ordered vasoactive medications to optimize hemodynamic stability  - Assess quality of pulses, skin color and temperature  - Assess for signs of decreased coronary artery perfusion  - Instruct patient to report change in severity of symptoms  Outcome: Progressing  Goal: Absence of cardiac dysrhythmias or at baseline rhythm  Description: INTERVENTIONS:  - Continuous cardiac monitoring, vital signs, obtain 12 lead EKG if ordered  - Administer antiarrhythmic and heart rate control medications as ordered  - Monitor electrolytes and administer replacement therapy as ordered  Outcome: Progressing     Problem: Potential for Falls  Goal: Patient will remain free of falls  Description: INTERVENTIONS:  - Educate patient/family on patient safety including physical limitations  - Instruct patient to call for assistance with activity   - Consult OT/PT to assist with strengthening/mobility   - Keep Call bell within reach  - Keep bed low and locked with side rails adjusted as appropriate  - Keep care items and personal belongings within reach  - Initiate and maintain comfort rounds  - Make Fall Risk Sign visible to staff  - Offer Toileting every 1 Hours, in advance of need  - Initiate/Maintain bedalarm  - Obtain necessary fall risk management equipment: fall mat  - Apply yellow socks and bracelet for high fall risk patients  - Consider moving patient to room near nurses station  Outcome: Progressing

## 2021-09-08 NOTE — PLAN OF CARE
Problem: PHYSICAL THERAPY ADULT  Goal: Performs mobility at highest level of function for planned discharge setting  See evaluation for individualized goals  Description: Treatment/Interventions: Functional transfer training, Elevations, LE strengthening/ROM, Endurance training, Patient/family training, Gait training, Spoke to MD, Spoke to nursing, Spoke to case management     See flowsheet documentation for full assessment, interventions and recommendations  Outcome: Progressing  Note: Prognosis: Good  Problem List: Decreased endurance, Impaired balance, Decreased mobility, Decreased safety awareness, Impaired hearing, Pain, Decreased skin integrity  Assessment: Pt seen for PT evaluation, cleared by JOHANNY Ruffin  Pt admitted 9/7/2021 s/p fall with headstrike, decreased recall of event, dx Syncope  Comorbidities affecting pt's fnxl performance include: Afib, anxiety, CAD, depression, falls and MI, hepatitis C  Personal factors affecting pt at time of PT IE include: stairs to enter home, inability to ambulate household distances, limited home support, positive fall history and limited insight into impairments  PTA, pt was independent with functional mobility with SPC prn, independent with ADLS, independent with IADLS and living with his wife in a 1 level home with 15 steps to enter  Pt scores 20/24 on AM-PAC objective tool w/ a standardized score of 43 99, indicating pt demonstrates functional capacity for return to home self care vs with increased supports upon d/c  The Barthel Index outcome tool was used & pt scores 65 indicating moderate limitations of fnxl mobility/ADLS  Pt is at risk of falls d/t impulsivity, h/o falls, impaired balance, impaired insight/safety awareness, varying levels of pain , acuity of medical illness and ongoing medical treatment of primary dx   Pt's clinical presentation is currently unstable/unpredictable seen in pt's presentation of vital sign response, changing level of pain, increased fall risk, new onset of impairment of functional mobility, decreased endurance and new onset of weakness  This PT IE requires high complexity clinical decision making, based on multiple medical/physiological/fnxl/social factors which affect pt's performance w/ evaluation & therapist judgement for disposition recommendations  Pt will benefit from continued PT treatment in order to address impairments, decrease risk of falls, maximize independence w/ fnxl mobility, & ensure safety w/ mobility for transition to next level of care  Based on pt presentation & impairments, pt would most appropriately benefit from return to home with home health PT services upon d/c  Pt anticipated to make significant improvement in functional mobility with medical optimization  Barriers to Discharge: Inaccessible home environment, Decreased caregiver support        PT Discharge Recommendation: Home with home health rehabilitation     See flowsheet documentation for full assessment

## 2021-09-08 NOTE — ASSESSMENT & PLAN NOTE
Patient presented with thrombocytosis with a platelet count of 659  And platelet of 114 today  Could be reactionary to dehydration  Will monitor closely  Recommend outpatient Hematology follow-up

## 2021-09-08 NOTE — ASSESSMENT & PLAN NOTE
Significant worsening of bilateral racoon's eyes  Orthostatic vital signs negative  Hemoglobin of 7 3 today  Given history of chronic symptomatic anemia will benefit PRBC transfusion  Will obtain a repeat CT head wo contrast  -      Repeat CT head shows improvement in soft tissue swelling  Continue gentle IV fluid hydration  PT recommended patient be discharged home with home rehab  Patient with a history of AFib, reports to have fallen with a head strike  Noted to have had a history of orthostatic hypotension  Blood pressure on presentation was 128/68 mmHg, HR= 89    CTH negative for any acute intracranial abnormality however revealed Bifrontal extracranial soft tissue swelling extending inferiorly into the superior preseptal soft tissues and lateral to the right orbit, suggestive of soft tissue contusion status post fall  No calvarial fracture identified    Denies any presyncopal events  No eye-witness documentation noted  Denies loss of bowel or bladder control  Syncope most likely orthostatic as event happened after he stood up from sitting position in the setting of possible dehydration given elevated BUN    · Will obtain orthostatic vital signs   · monitor on telemetry  · Will give gentle IV hydration- NS 75cc/hr  · Obtain 12 lead EKG stat - -rate controlled AFib  · PT/OT consult

## 2021-09-09 LAB
ABO GROUP BLD BPU: NORMAL
BPU ID: NORMAL
CROSSMATCH: NORMAL
HCT VFR BLD AUTO: 30 % (ref 36.5–49.3)
HGB BLD-MCNC: 8.5 G/DL (ref 12–17)
UNIT DISPENSE STATUS: NORMAL
UNIT PRODUCT CODE: NORMAL
UNIT PRODUCT VOLUME: 350 ML
UNIT RH: NORMAL

## 2021-09-09 PROCEDURE — 85014 HEMATOCRIT: CPT | Performed by: INTERNAL MEDICINE

## 2021-09-09 PROCEDURE — 85018 HEMOGLOBIN: CPT | Performed by: INTERNAL MEDICINE

## 2021-09-09 PROCEDURE — 99232 SBSQ HOSP IP/OBS MODERATE 35: CPT | Performed by: INTERNAL MEDICINE

## 2021-09-09 PROCEDURE — 97116 GAIT TRAINING THERAPY: CPT

## 2021-09-09 RX ORDER — SODIUM CHLORIDE 9 MG/ML
100 INJECTION, SOLUTION INTRAVENOUS CONTINUOUS
Status: DISPENSED | OUTPATIENT
Start: 2021-09-09 | End: 2021-09-10

## 2021-09-09 RX ORDER — FERROUS SULFATE 325(65) MG
325 TABLET ORAL 2 TIMES DAILY WITH MEALS
Qty: 180 TABLET | Refills: 0 | Status: SHIPPED | OUTPATIENT
Start: 2021-09-09 | End: 2021-09-10 | Stop reason: HOSPADM

## 2021-09-09 RX ORDER — DOCUSATE SODIUM 100 MG/1
100 CAPSULE, LIQUID FILLED ORAL 2 TIMES DAILY
Qty: 180 CAPSULE | Refills: 0 | Status: SHIPPED | OUTPATIENT
Start: 2021-09-09 | End: 2021-09-10 | Stop reason: SDUPTHER

## 2021-09-09 RX ADMIN — APIXABAN 5 MG: 5 TABLET, FILM COATED ORAL at 17:32

## 2021-09-09 RX ADMIN — SODIUM CHLORIDE, SODIUM LACTATE, POTASSIUM CHLORIDE, AND CALCIUM CHLORIDE 75 ML/HR: .6; .31; .03; .02 INJECTION, SOLUTION INTRAVENOUS at 02:54

## 2021-09-09 RX ADMIN — SODIUM CHLORIDE 100 ML/HR: 0.9 INJECTION, SOLUTION INTRAVENOUS at 12:53

## 2021-09-09 RX ADMIN — FERROUS SULFATE TAB 325 MG (65 MG ELEMENTAL FE) 325 MG: 325 (65 FE) TAB at 17:32

## 2021-09-09 RX ADMIN — FERROUS SULFATE TAB 325 MG (65 MG ELEMENTAL FE) 325 MG: 325 (65 FE) TAB at 09:17

## 2021-09-09 RX ADMIN — APIXABAN 5 MG: 5 TABLET, FILM COATED ORAL at 09:11

## 2021-09-09 RX ADMIN — TAMSULOSIN HYDROCHLORIDE 0.4 MG: 0.4 CAPSULE ORAL at 09:10

## 2021-09-09 RX ADMIN — FOLIC ACID TAB 400 MCG 400 MCG: 400 TAB at 09:10

## 2021-09-09 RX ADMIN — ACETAMINOPHEN 650 MG: 325 TABLET, FILM COATED ORAL at 20:18

## 2021-09-09 RX ADMIN — SODIUM CHLORIDE 100 ML/HR: 0.9 INJECTION, SOLUTION INTRAVENOUS at 22:31

## 2021-09-09 NOTE — PROGRESS NOTES
Sarah U  68   Progress Note - Sherren Adie 1945, 70 y o  male MRN: [id number]  Unit/Bed#: -Jocelyn Encounter: [id number]  Primary Care Provider: Margarito Gant MD   Date and time admitted to hospital: 9/7/2021  1:06 PM    * Syncope  Assessment & Plan  Significant worsening of bilateral racoon's eyes  Orthostatic vital signs negative  Hemoglobin of 7 3 today  Given history of chronic symptomatic anemia will benefit PRBC transfusion  Will obtain a repeat CT head wo contrast  -      Repeat CT head shows improvement in soft tissue swelling  Continue gentle IV fluid hydration  PT recommended patient be discharged home with home rehab  Patient with a history of AFib, reports to have fallen with a head strike  Noted to have had a history of orthostatic hypotension  Blood pressure on presentation was 128/68 mmHg, HR= 89    CTH negative for any acute intracranial abnormality however revealed Bifrontal extracranial soft tissue swelling extending inferiorly into the superior preseptal soft tissues and lateral to the right orbit, suggestive of soft tissue contusion status post fall  No calvarial fracture identified    Denies any presyncopal events  No eye-witness documentation noted  Denies loss of bowel or bladder control  Syncope most likely orthostatic as event happened after he stood up from sitting position in the setting of possible dehydration given elevated BUN    · Will obtain orthostatic vital signs   · monitor on telemetry  · Will give gentle IV hydration- NS 75cc/hr  · Obtain 12 lead EKG stat - -rate controlled AFib  · PT/OT consult  Head trauma  Assessment & Plan  Head trauma sustained following a fall  Patient takes anticoagulation  CT head done on presentation showed soft tissue contusion  On exam today, significant bilateral periorbital swelling noted which happens to be increased from yesterday  Hemoglobin dropped from 8 3-7 3    At risk for SDH   Orthostatic were negative however still appears symptomatic  Will obtain a stat CT head -   Repeat CTH showed no intracranial bleeds  Reports improvement in soft tissue swelling  Status post 1 unit PRBC transfused on 09/08/ 2021    Thrombocytosis [facility])  Assessment & Plan  Patient presented with thrombocytosis with a platelet count of 181  And platelet of 125 today  Could be reactionary to dehydration  Will monitor closely  Recommend outpatient Hematology follow-up  Tobacco use disorder  Assessment & Plan  Patient smokes few sticks of cigarettes a day  Reports reduction in cigarettes sticks smokes per day  Will provide nicotine patch  Tobacco cessation education provided  Permanent atrial fibrillation [facility])  Assessment & Plan  Patient with a history of atrial fibrillation  Rate controlled  Takes metoprolol 25 mg daily and anticoagulated with Eliquis 5 mg b i d  Will continue home medications    Symptomatic anemia  Assessment & Plan  0   Lab Value Date/Time    HGB 8 5 (L) 09/09/2021 0900    HGB 7 3 (L) 09/08/2021 0836    HGB 8 3 (L) 09/07/2021 1330    HGB 8 0 (L) 06/30/2021 0546    HGB 8 4 (L) 06/29/2021 0507    HGB 8 8 (L) 06/28/2021 1848    HGB 10 3 (L) 08/10/2020 1046    HGB 9 7 (L) 08/08/2020 0542    HGB 10 0 (L) 08/07/2020 0516    HGB 9 4 (L) 08/06/2020 1741    HGB 9 5 (L) 08/06/2020 0517    HGB 8 0 (L) 08/05/2020 0524    HGB 7 7 (L) 08/04/2020 0444    HGB 6 3 (LL) 08/03/2020 1552     Microcytic anemia  EGD and colonoscopy done 09/11/2020 - showed no active ulceration  Colonoscopy revealed polyps which were biopsied  Biopsy revealed tubular and serrated adenoma  · Continue twice daily iron supplementation  Continue daily folic acid supplementation  · History of transfusion in last hospitalization  · Type and screen  Transfuse 1 unit PRBC  · Consent obtained    · Posttransfusion HGB of 8 5      VTE Pharmacologic Prophylaxis:   VTE Score: 2 High Risk (Score >/= 5) - Pharmacological DVT Prophylaxis Ordered: Apixaban (Eliquis)  Sequential Compression Devices Ordered  Mechanical VTE Prophylaxis in Place: Yes    Patient Centered Rounds: I have performed bedside rounds with nursing staff today  Discussions with Specialists or Other Care Team Provider: Attending    Education and Discussions with Family / Patient: Patient    Current Length of Stay: 1 day(s)    Current Patient Status: Inpatient     Discharge Plan / Estimated Discharge Date: Anticipate discharge tomorrow to home  Code Status: Level 1 - Full Code      Subjective:   Patient seen and examined at bedside  Denies any overnight complaints  Status post 1 unit PRBCs transfused overnight  On physical therapy, noted to be a bit unsteady  Will continue IV hydration  Possible discharge tomorrow  Objective:     Vitals:   Temp (24hrs), Av 9 °F (37 2 °C), Min:97 3 °F (36 3 °C), Max:99 4 °F (37 4 °C)    Temp:  [97 3 °F (36 3 °C)-99 4 °F (37 4 °C)] 97 3 °F (36 3 °C)  HR:  [] 84  Resp:  [18] 18  BP: (108-131)/(68-89) 108/75  SpO2:  [95 %-98 %] 96 %  Body mass index is 19 73 kg/m²  Input and Output Summary (last 24 hours): Intake/Output Summary (Last 24 hours) at 2021 1404  Last data filed at 2021 1253  Gross per 24 hour   Intake 986 ml   Output 970 ml   Net 16 ml       Physical Exam:     Physical Exam  Vitals and nursing note reviewed  Constitutional:       General: He is not in acute distress  Appearance: He is not toxic-appearing  HENT:      Head:      Comments: Bilaterally puffy periorbital swelling  Minimal palpable tenderness  Right Ear: Ear canal normal       Left Ear: Ear canal normal       Mouth/Throat:      Mouth: Mucous membranes are moist    Cardiovascular:      Rate and Rhythm: Normal rate  Rhythm irregular  Pulses: Normal pulses  Heart sounds: Normal heart sounds  No murmur heard  Pulmonary:      Effort: No respiratory distress  Breath sounds: Normal breath sounds  No wheezing  Abdominal:      General: Bowel sounds are normal  There is no distension  Palpations: Abdomen is soft  Tenderness: There is no abdominal tenderness  Musculoskeletal:      Right lower leg: No edema  Left lower leg: No edema  Skin:     Capillary Refill: Capillary refill takes less than 2 seconds  Coloration: Skin is pale  Skin is not jaundiced  Neurological:      General: No focal deficit present  Mental Status: He is alert and oriented to person, place, and time  Mental status is at baseline  Motor: No weakness            Additional Data:     Labs:  Results from last 7 days   Lab Units 09/09/21  0900 09/08/21  0836   WBC Thousand/uL  --  8 51   HEMOGLOBIN g/dL 8 5* 7 3*   HEMATOCRIT % 30 0* 26 0*   PLATELETS Thousands/uL  --  441*   NEUTROS PCT %  --  68   LYMPHS PCT %  --  17   MONOS PCT %  --  12   EOS PCT %  --  2     Results from last 7 days   Lab Units 09/08/21  0550   SODIUM mmol/L 142   POTASSIUM mmol/L 3 7   CHLORIDE mmol/L 105   CO2 mmol/L 30   BUN mg/dL 20   CREATININE mg/dL 1 08   ANION GAP mmol/L 7   CALCIUM mg/dL 8 5   GLUCOSE RANDOM mg/dL 105                       Imaging: Reviewed radiology reports from this admission including: CT head and xray(s)    Recent Cultures (last 7 days):           Lines/Drains:  Invasive Devices     Peripheral Intravenous Line            Peripheral IV 09/07/21 Right Antecubital 2 days                Telemetry:        Last 24 Hours Medication List:   Current Facility-Administered Medications   Medication Dose Route Frequency Provider Last Rate    acetaminophen  650 mg Oral Q6H PRN Roberto Carlos Conley MD      apixaban  5 mg Oral BID Roberto Carlos Conley MD      ferrous sulfate  325 mg Oral BID With Meals Roberto Carlos Conley MD      folic acid  113 mcg Oral Daily Roberto Carlos Conley MD      metoprolol succinate  25 mg Oral Daily Roberto Carlos Conley MD      morphine injection  2 mg Intravenous Q6H PRN MD Yue Collins nicotine  1 patch Transdermal Daily Stan Wise MD      sodium chloride (PF)  3 mL Intravenous Q1H PRN Víctor Hwang DO      sodium chloride  100 mL/hr Intravenous Continuous Javi Sterling  mL/hr (09/09/21 1253)    tamsulosin  0 4 mg Oral Daily Javi Sterling MD          Today, Patient Was Seen By: Javi Sterling MD    ** Please Note: This note has been constructed using a voice recognition system   **

## 2021-09-09 NOTE — CASE MANAGEMENT
BENNY informed by SLIM during 11am rounds that patient is medically cleared for DCl  CM called patient and confirmed his address prior to arranging Lyft  Patient confirmed he is going to home address listed on file  BENNY then called [facility] and spoke with Ed to arrange a Lyft for patient for 2pm per patient request  BENNY then informed Nurse Leola Moreno of 2pm  time via [employer]  At 12:32pm  CM informed by Nurse Leola Moreno that patient DC is being cancelled  CM called [facility] and spoke with Marion Christine to cancel Lyft for patient  Marion Christine stated she did not see the Lyft request on her end and put CM on hold so Ed could cancel the the Lyft request  After 10 minute hold CM called [facility] again and spoke with Ed who cancelled the Lyft request for patient  Nurse Leola Moreno notified via [address]      DCP: Home with no needs per patient request

## 2021-09-09 NOTE — PHYSICAL THERAPY NOTE
PHYSICAL THERAPY TREATMENT  TIME: 4173-1155    NAME:  Dannielle Sim  DATE: 09/09/21    AGE:   70 y o  Mrn:   [id number]  Length Of Stay: 1    ADMIT DX:  Head pain [R51 9]  Syncope [R55]  Abrasion of head [S00 91XA]    Past Medical History:   Diagnosis Date    Anxiety disorder     Atrial fibrillation ([address] )     Coronary artery disease     Depression     Hepatitis C     screening negative in 1/2017    [facility])      Past Surgical History:   Procedure Laterality Date    CARDIAC CATHETERIZATION  07/09/2018       Performed at least 2 patient identifiers during session: Name and ID steve        09/09/21 1231   PT Last Visit   PT Visit Date 09/09/21   Note Type   Note Type Treatment   Pain Assessment   Pain Assessment Tool 0-10   Pain Score 4   Pain Location/Orientation Location: Head   Pain Onset/Description Descriptor: Headache   Effect of Pain on Daily Activities limits comfort   Patient's Stated Pain Goal No pain   Hospital Pain Intervention(s) Ambulation/increased activity   Multiple Pain Sites No   Restrictions/Precautions   Weight Bearing Precautions Per Order No   Other Precautions Fall Risk;Hard of hearing;Pain; Chair Alarm; Bed Alarm   General   Chart Reviewed Yes   Additional Pertinent History Pt admitted under observation 9/7/21 s/p fall in community with +headstrike, decreased memory of events, low hgb  Dx: Syncope  Pt planned for d/c today however after speaking with physician pt will stay 1 additional day  Response to Previous Treatment Patient reporting fatigue but able to participate     Family/Caregiver Present No   Cognition   Overall Cognitive Status WFL   Arousal/Participation Cooperative   Attention Attends with cues to redirect   Orientation Level Oriented X4   Memory Decreased recall of recent events   Following Commands Follows multistep commands with increased time or repetition  (due to hearing impairment)   Subjective   Subjective "I just don't feel ready to go home today  One more day I think "   Bed Mobility   Supine to Sit 7  Independent   Sit to Supine 7  Independent   Transfers   Sit to Stand 5  Supervision   Additional items Assist x 1; Impulsive;Verbal cues   Stand to Sit 5  Supervision   Additional items Assist x 1; Impulsive;Verbal cues   Ambulation/Elevation   Gait pattern Narrow VERNON; Decreased foot clearance; Inconsistent khanh  (significant pathway deviation/marked sway)   Gait Assistance 4  Minimal assist  (variable S to [facility])   Additional items Assist x 1   Assistive Device Straight cane   Distance 116ft x2 with functional seated rest break required between due to lightheadedness/dizziness and instability  Stair Management Assistance Not tested  (stair training deferred due to pt onset of symptoms)   Balance   Static Sitting Good   Dynamic Sitting Fair +   Static Standing Fair   Dynamic Standing Fair -   Ambulatory Fair -   Endurance Deficit   Endurance Deficit Yes   Endurance Deficit Description after 116ft ambulation in sitting due to severe lightheadedness: /89, HR 96  In standing after rest: /78,   At end of session in sitting: /86,   Activity Tolerance   Activity Tolerance Treatment limited secondary to medical complications (Comment)  (dizziness/lightheadedness/instability)   Medical Staff Made Aware Spoke with [address] [place] [address] with JOHANNY Lee   Assessment   Prognosis Good   Problem List Decreased strength;Decreased endurance; Impaired balance;Decreased mobility; Impaired judgement;Decreased safety awareness; Impaired hearing;Pain;Decreased skin integrity   Assessment Pt seen for PT treatment today with focus on gait training and stair training  Pt presents semi-supine in bed, reports tentative d/c home today, pt refusing home care services  Pt independently completes bed mobility, transfers with [facility] and S due to impulsivity   Pt then ambulates 116ft x2 trials with SPC and variable S-LEA, functional seated rest break between trials due to onset of severe lightheadedness and dizziness (vitals stable)  During ambulation, pt with marked pathway deviation, inconsistent cadance, multiple episodes of instability requiring therapist assistance to regain stability  Elevations assessment deferred at this time for pt and therapist safety  At end of session pt was left semi-supine in bed with bed alarm engaged, RN and MD made aware of pt's status  Continue to recommend return to home with HHPT services  Pt demonstrating improved activity tolerance as compared to previous session however continues to be limited due to instability/lightheadedness, placing him at a high risk of falls and hospital readmission d/t falls  Barriers to Discharge Inaccessible home environment;Decreased caregiver support  (pt has [address] his apartment)   Goals   Patient Goals "to feel better and go home tomorrow"   STG Expiration Date 09/18/21   Short Term Goal #1 Patient PT goals established in order to address patient self reported goal of "to go home tomorrow"  Pt will ambulate > 250 ft with SPC at VITO level in order to increase safety with community distance functional mobility  Pt will negotiate 15 stairs with HR assist and SPC at VITO level in order to demonstrate ability to enter/exit his home  Pt will improve AM-PAC score to >/= 24/24 in order to increase independence with mobility and decrease burden of care  Pt will improve Barthel Index score to >/= 75/100 in order to increase independence and decrease risk of falls  Pt will improve ambulatory balance to >/= GOOD grade in order to promote safety and increased independence with mobility  Pt will tolerate > 20 minutes of functional mobility training with good endurance evidenced by no significant change in vitals or shortness of breath  PT Treatment Day 2   Plan   Treatment/Interventions Elevations; Therapeutic exercise; Endurance training;LE strengthening/ROM; Patient/family training;Gait training;Spoke to nursing;Spoke to MD   Progress Slow progress, medical status limitations   PT Frequency Other (Comment)  (3-5x/wk)   Recommendation   PT Discharge Recommendation Home with home health rehabilitation   Nitesh [address]   Turning in Bed Without Bedrails 4   Lying on Back to Sitting on Edge of Flat Bed 4   Moving Bed to Chair 3   Standing Up From Chair 3   Walk in Room 3   Climb 3-5 Stairs 3   Basic Mobility Inpatient Raw Score 20   Basic Mobility Standardized Score 43 99       The patient's AM-PAC Basic Mobility Inpatient Short Form Raw Score is 20, Standardized Score is 43 99  A standardized score greater than 42 9 suggests the patient may benefit from discharge to home  Please also refer to the recommendation of the Physical Therapist for safe discharge planning          Elena Johnson, PT, DPT   Available via [employer]  [facility] # [id number]  [place] License - [id number]  2/0/3461

## 2021-09-09 NOTE — DISCHARGE SUMMARY
Sarah U  68   Discharge- [facility] [place] 1945, 70 y o  male MRN: [id number]  Unit/Bed#: -01 Encounter: [id number]  Primary Care Provider: Lupe Chakraborty MD   Date and time admitted to hospital: 9/7/2021  1:06 PM    * Syncope  Assessment & Plan  Significant worsening of bilateral racoon's eyes  Orthostatic vital signs negative  Hemoglobin of 7 3 today  Given history of chronic symptomatic anemia will benefit PRBC transfusion  Will obtain a repeat CT head wo contrast  -      Repeat CT head shows improvement in soft tissue swelling  Continue gentle IV fluid hydration  PT recommended patient be discharged home with home rehab  Patient with a history of AFib, reports to have fallen with a head strike  Noted to have had a history of orthostatic hypotension  Blood pressure on presentation was 128/68 mmHg, HR= 89    CTH negative for any acute intracranial abnormality however revealed Bifrontal extracranial soft tissue swelling extending inferiorly into the superior preseptal soft tissues and lateral to the right orbit, suggestive of soft tissue contusion status post fall  No calvarial fracture identified    Denies any presyncopal events  No eye-witness documentation noted  Denies loss of bowel or bladder control  Syncope most likely orthostatic as event happened after he stood up from sitting position in the setting of possible dehydration given elevated BUN    · Will obtain orthostatic vital signs   · monitor on telemetry  · Will give gentle IV hydration- NS 75cc/hr  · Obtain 12 lead EKG stat - -rate controlled AFib  · PT/OT consult  Head trauma  Assessment & Plan  Head trauma sustained following a fall  Patient takes anticoagulation  CT head done on presentation showed soft tissue contusion  On exam today, significant bilateral periorbital swelling noted which happens to be increased from yesterday  Hemoglobin dropped from 8 3-7 3    At risk for SDH   Orthostatic were negative however still appears symptomatic  Will obtain a stat CT head -   Repeat CTH showed no intracranial bleeds  Reports improvement in soft tissue swelling  Status post 1 unit PRBC transfused on 09/08/ 2021    Thrombocytosis [facility])  Assessment & Plan  Patient presented with thrombocytosis with a platelet count of 727  And platelet of 610 today  Could be reactionary to dehydration  Will monitor closely  Recommend outpatient Hematology follow-up  Tobacco use disorder  Assessment & Plan  Patient smokes few sticks of cigarettes a day  Reports reduction in cigarettes sticks smokes per day  Will provide nicotine patch  Tobacco cessation education provided  Permanent atrial fibrillation [facility])  Assessment & Plan  Patient with a history of atrial fibrillation  Rate controlled  Takes metoprolol 25 mg daily and anticoagulated with Eliquis 5 mg b i d  Will continue home medications    Symptomatic anemia  Assessment & Plan  0   Lab Value Date/Time    HGB 8 5 (L) 09/09/2021 0900    HGB 7 3 (L) 09/08/2021 0836    HGB 8 3 (L) 09/07/2021 1330    HGB 8 0 (L) 06/30/2021 0546    HGB 8 4 (L) 06/29/2021 0507    HGB 8 8 (L) 06/28/2021 1848    HGB 10 3 (L) 08/10/2020 1046    HGB 9 7 (L) 08/08/2020 0542    HGB 10 0 (L) 08/07/2020 0516    HGB 9 4 (L) 08/06/2020 1741    HGB 9 5 (L) 08/06/2020 0517    HGB 8 0 (L) 08/05/2020 0524    HGB 7 7 (L) 08/04/2020 0444    HGB 6 3 (LL) 08/03/2020 1552     Microcytic anemia  EGD and colonoscopy done 09/11/2020 - showed no active ulceration  Colonoscopy revealed polyps which were biopsied  Biopsy revealed tubular and serrated adenoma  · Continue twice daily iron supplementation  Continue daily folic acid supplementation  · History of transfusion in last hospitalization  · Type and screen  Transfuse 1 unit PRBC  · Consent obtained    · Posttransfusion HGB of 8 5    Discharging Resident Physician: Reinaldo Cr MD  Attending: No att  providers found  PCP: Lupis Lopez MD  Admission Date: 9/7/2021  Admission Date:   Admission Orders (From admission, onward)     Ordered        09/08/21 1358  Inpatient Admission  Once         09/07/21 1529  Place in Observation  Once                   Discharge Date: 09/10/21    Disposition:     Other: Home with home PT    Reason for Admission:  Syncope    Consultations During Hospital Stay:  · Physical therapy    Procedures Performed:     No orders of the defined types were placed in this encounter  Diagnosis:    Medical Problems     Resolved Problems  Date Reviewed: 9/10/2021    None                Principal Problem:    Syncope  Active Problems:    Symptomatic anemia    Permanent atrial fibrillation (HCC)    Tobacco use disorder    Thrombocytosis (HCC)    Head trauma      Significant Findings / Test Results:     · None    Incidental Findings:   · None     Test Results Pending at Discharge (will require follow up): · None     Outpatient Tests Requested:  · None    Complications:  None    Hospital Course:     Dain Calvin is a 70 y o  male patient who originally presented to the hospital on 9/7/2021 due to syncope  CT head on presentation was negative for any acute intracranial abnormality  He sustained periorbital bruising  Due to concern for rebleeding/SDH repeat CTH was done which was negative  Anticoagulation was not discontinued  Telemetry monitor was unremarkable  Noted to be symptomaticaly anemic  Received 1 unit PRBC with posttransfusion HGB of 8 5  PT/OT recommended patient be discharged home with home rehab  The time of discharge, patient appears hemodynamically and clinically stable  He does not report dizziness at this time  He reported not to be taking his iron pills  Iron pills b i d  Ordered and patient advised to take as recommended  Advised to follow-up with his PCP regarding recent hospitalization      Condition at Discharge: stable     Discharge Day Visit / Exam:     Subjective: Patient seen and examined by me at bedside  Reports no new complaints  Feels much better when compared to presentation status  Denies cough, nausea, vomiting, dysuria and leg swelling or pain  Vitals: Blood Pressure: 125/80 (09/10/21 0950)  Pulse: 90 (09/10/21 0950)  Temperature: 97 6 °F (36 4 °C) (09/10/21 0739)  Temp Source: Oral (09/10/21 0739)  Respirations: 15 (09/10/21 0739)  Height: 6' (182 9 cm) (09/07/21 1602)  Weight - Scale: 66 kg (145 lb 8 1 oz) (09/07/21 1602)  SpO2: 97 % (09/10/21 0950)  Exam:   Physical Exam  Vitals reviewed  Constitutional:       General: He is not in acute distress  Appearance: He is not toxic-appearing  HENT:      Head:      Comments: Bilateral periorbital bruising     Mouth/Throat:      Mouth: Mucous membranes are moist    Cardiovascular:      Rate and Rhythm: Normal rate  Rhythm irregular  Pulses: Normal pulses  Heart sounds: No murmur heard  No gallop  Pulmonary:      Effort: Pulmonary effort is normal  No respiratory distress  Breath sounds: Normal breath sounds  No wheezing  Abdominal:      General: Bowel sounds are normal  There is no distension  Palpations: Abdomen is soft  Tenderness: There is no abdominal tenderness  Musculoskeletal:      Right lower leg: No edema  Left lower leg: No edema  Skin:     Coloration: Skin is not jaundiced  Findings: Bruising present  Neurological:      General: No focal deficit present  Mental Status: He is alert and oriented to person, place, and time  Mental status is at baseline  Psychiatric:         Mood and Affect: Mood normal          Behavior: Behavior normal        Discussion with Family:  Talked to patient  All questions/concerns were answered  agrees with the plan  Discharge instructions/Information to patient and family:   See after visit summary for information provided to patient and family        Provisions for Follow-Up Care:  See after visit summary for information related to follow-up care and any pertinent home health orders  Planned Readmission:  No     Discharge Medications:  See after visit summary for reconciled discharge medications provided to patient and family  Discharge Statement :  I spent 25 minutes discharging the patient  This time was spent on the day of discharge  I had direct contact with the patient on the day of discharge  Additional documentation is required if more than 30 minutes were spent on discharge           ** Please Note: This note has been constructed using a voice recognition system **

## 2021-09-09 NOTE — INCIDENTAL FINDINGS
The following findings require follow up:  Radiographic finding   Finding:      CT spine cervical without contrast: There is a focal cortical irregularity identified within the anterior inferior aspect of the C5 inferior endplate, seen on sagittal imaging series 304 images 94 through 97  This is of indeterminate age and may represent an acute or chronic endplate , fracture , No other fractures identified , Advanced cervical degenerative change C3-4, C4-5 and C5-6 with severe canal stenosis and moderately severe bilateral foraminal narrowing  Correlate for signs of cervical myelopathy/radiculopathy , MRI of the cervical spine may help determine the age of the C5 anterior inferior endplate fracture and would better evaluate the cervical degenerative changes with regard to cervical cord compression     Follow up required:  PCP/ Ortho   Follow up should be done within 1 week(s)    Please notify the following clinician to assist with the follow up:   Dr HAYS

## 2021-09-09 NOTE — PLAN OF CARE
Problem: PHYSICAL THERAPY ADULT  Goal: Performs mobility at highest level of function for planned discharge setting  See evaluation for individualized goals  Description: Treatment/Interventions: Functional transfer training, Elevations, LE strengthening/ROM, Endurance training, Patient/family training, Gait training, Spoke to MD, Spoke to nursing, Spoke to case management     See flowsheet documentation for full assessment, interventions and recommendations  Outcome: Progressing  Note: Prognosis: Good  Problem List: Decreased strength, Decreased endurance, Impaired balance, Decreased mobility, Impaired judgement, Decreased safety awareness, Impaired hearing, Pain, Decreased skin integrity  Assessment: Pt seen for PT treatment today with focus on gait training and stair training  Pt presents semi-supine in bed, reports tentative d/c home today, pt refusing home care services  Pt independently completes bed mobility, transfers with [facility] and S due to impulsivity  Pt then ambulates 116ft x2 trials with SPC and variable S-LEA, functional seated rest break between trials due to onset of severe lightheadedness and dizziness (vitals stable)  During ambulation, pt with marked pathway deviation, inconsistent cadance, multiple episodes of instability requiring therapist assistance to regain stability  Elevations assessment deferred at this time for pt and therapist safety  At end of session pt was left semi-supine in bed with bed alarm engaged, RN and MD made aware of pt's status  Continue to recommend return to home with HHPT services  Pt demonstrating improved activity tolerance as compared to previous session however continues to be limited due to instability/lightheadedness, placing him at a high risk of falls and hospital readmission d/t falls     Barriers to Discharge: Inaccessible home environment, Decreased caregiver support (pt has [address] his apartment)     PT Discharge Recommendation: Home with home health rehabilitation     See flowsheet documentation for full assessment

## 2021-09-09 NOTE — DISCHARGE INSTR - AVS FIRST PAGE
DISCHARGE INSTRUCTIONS   1  Follow up with Dr Sujit Sheffield MD in one week  in regards to recent hospitalization  2  Take medications regularly   Please take iron tablets twice daily for the next 3 months  3  Come back to the ER if symptoms recur or worsen   4  Activity as tolerated  5   Diet :  Regular

## 2021-09-09 NOTE — PLAN OF CARE
Problem: Potential for Falls  Goal: Patient will remain free of falls  Description: INTERVENTIONS:  - Educate patient/family on patient safety including physical limitations  - Instruct patient to call for assistance with activity   - Consult OT/PT to assist with strengthening/mobility   - Keep Call bell within reach  - Keep bed low and locked with side rails adjusted as appropriate  - Keep care items and personal belongings within reach  - Initiate and maintain comfort rounds  - Make Fall Risk Sign visible to staff  - Initiate/Maintain bed alarm  - Apply yellow socks and bracelet for high fall risk patients  - Consider moving patient to room near nurses station  Outcome: Progressing

## 2021-09-10 ENCOUNTER — TRANSITIONAL CARE MANAGEMENT (OUTPATIENT)
Dept: FAMILY MEDICINE CLINIC | Facility: CLINIC | Age: 76
End: 2021-09-10

## 2021-09-10 VITALS
WEIGHT: 145.5 LBS | TEMPERATURE: 97.6 F | SYSTOLIC BLOOD PRESSURE: 125 MMHG | BODY MASS INDEX: 19.71 KG/M2 | DIASTOLIC BLOOD PRESSURE: 80 MMHG | OXYGEN SATURATION: 97 % | RESPIRATION RATE: 15 BRPM | HEIGHT: 72 IN | HEART RATE: 90 BPM

## 2021-09-10 DIAGNOSIS — J44.9 CHRONIC OBSTRUCTIVE PULMONARY DISEASE, UNSPECIFIED COPD TYPE (HCC): ICD-10-CM

## 2021-09-10 DIAGNOSIS — D50.9 MICROCYTIC ANEMIA: ICD-10-CM

## 2021-09-10 DIAGNOSIS — R26.2 AMBULATORY DYSFUNCTION: ICD-10-CM

## 2021-09-10 DIAGNOSIS — I42.0 CONGESTIVE CARDIOMYOPATHY (HCC): Primary | ICD-10-CM

## 2021-09-10 DIAGNOSIS — D50.0 IRON DEFICIENCY ANEMIA DUE TO CHRONIC BLOOD LOSS: ICD-10-CM

## 2021-09-10 DIAGNOSIS — I48.11 LONGSTANDING PERSISTENT ATRIAL FIBRILLATION (HCC): ICD-10-CM

## 2021-09-10 DIAGNOSIS — I10 ESSENTIAL HYPERTENSION: ICD-10-CM

## 2021-09-10 DIAGNOSIS — J43.9 PULMONARY EMPHYSEMA, UNSPECIFIED EMPHYSEMA TYPE (HCC): ICD-10-CM

## 2021-09-10 PROCEDURE — 97116 GAIT TRAINING THERAPY: CPT

## 2021-09-10 PROCEDURE — 99239 HOSP IP/OBS DSCHRG MGMT >30: CPT | Performed by: INTERNAL MEDICINE

## 2021-09-10 RX ORDER — FUROSEMIDE 40 MG/1
20 TABLET ORAL
Qty: 30 TABLET | Refills: 0 | Status: SHIPPED | OUTPATIENT
Start: 2021-09-10 | End: 2021-11-22

## 2021-09-10 RX ORDER — FERROUS SULFATE TAB EC 324 MG (65 MG FE EQUIVALENT) 324 (65 FE) MG
324 TABLET DELAYED RESPONSE ORAL
Qty: 180 TABLET | Refills: 0 | Status: SHIPPED | OUTPATIENT
Start: 2021-09-10 | End: 2022-04-27 | Stop reason: SDUPTHER

## 2021-09-10 RX ORDER — LISINOPRIL 2.5 MG/1
2.5 TABLET ORAL DAILY
Qty: 90 TABLET | Refills: 3 | Status: SHIPPED | OUTPATIENT
Start: 2021-09-10 | End: 2022-03-04 | Stop reason: HOSPADM

## 2021-09-10 RX ORDER — POTASSIUM CHLORIDE 750 MG/1
10 TABLET, FILM COATED, EXTENDED RELEASE ORAL DAILY
Qty: 30 TABLET | Refills: 2 | Status: SHIPPED | OUTPATIENT
Start: 2021-09-10 | End: 2022-02-22

## 2021-09-10 RX ORDER — TAMSULOSIN HYDROCHLORIDE 0.4 MG/1
0.4 CAPSULE ORAL DAILY
Qty: 30 CAPSULE | Refills: 2 | Status: SHIPPED | OUTPATIENT
Start: 2021-09-10

## 2021-09-10 RX ORDER — FERROUS SULFATE TAB EC 324 MG (65 MG FE EQUIVALENT) 324 (65 FE) MG
324 TABLET DELAYED RESPONSE ORAL
Qty: 180 TABLET | Refills: 0 | Status: SHIPPED | OUTPATIENT
Start: 2021-09-10 | End: 2021-09-10 | Stop reason: SDUPTHER

## 2021-09-10 RX ORDER — IPRATROPIUM BROMIDE AND ALBUTEROL SULFATE 2.5; .5 MG/3ML; MG/3ML
3 SOLUTION RESPIRATORY (INHALATION) 4 TIMES DAILY
Qty: 120 ML | Refills: 2 | Status: SHIPPED | OUTPATIENT
Start: 2021-09-10 | End: 2021-10-12 | Stop reason: SDUPTHER

## 2021-09-10 RX ORDER — DOCUSATE SODIUM 100 MG/1
100 CAPSULE, LIQUID FILLED ORAL 2 TIMES DAILY
Qty: 180 CAPSULE | Refills: 0 | Status: SHIPPED | OUTPATIENT
Start: 2021-09-10 | End: 2022-08-09

## 2021-09-10 RX ORDER — PANTOPRAZOLE SODIUM 40 MG/1
40 TABLET, DELAYED RELEASE ORAL
Qty: 90 TABLET | Refills: 3 | Status: SHIPPED | OUTPATIENT
Start: 2021-09-10 | End: 2022-08-02

## 2021-09-10 RX ORDER — LANOLIN ALCOHOL/MO/W.PET/CERES
400 CREAM (GRAM) TOPICAL DAILY
Qty: 30 TABLET | Refills: 0 | Status: SHIPPED | OUTPATIENT
Start: 2021-09-10

## 2021-09-10 RX ADMIN — FOLIC ACID TAB 400 MCG 400 MCG: 400 TAB at 08:28

## 2021-09-10 RX ADMIN — TAMSULOSIN HYDROCHLORIDE 0.4 MG: 0.4 CAPSULE ORAL at 08:28

## 2021-09-10 RX ADMIN — METOPROLOL SUCCINATE 25 MG: 25 TABLET, EXTENDED RELEASE ORAL at 08:28

## 2021-09-10 RX ADMIN — FERROUS SULFATE TAB 325 MG (65 MG ELEMENTAL FE) 325 MG: 325 (65 FE) TAB at 08:28

## 2021-09-10 RX ADMIN — APIXABAN 5 MG: 5 TABLET, FILM COATED ORAL at 08:28

## 2021-09-10 NOTE — PLAN OF CARE
Problem: PHYSICAL THERAPY ADULT  Goal: Performs mobility at highest level of function for planned discharge setting  See evaluation for individualized goals  Description: Treatment/Interventions: Functional transfer training, Elevations, LE strengthening/ROM, Endurance training, Patient/family training, Gait training, Spoke to MD, Spoke to nursing, Spoke to case management     See flowsheet documentation for full assessment, interventions and recommendations  Outcome: Adequate for Discharge  Note: Prognosis: Good  Problem List: Decreased endurance, Impaired balance, Decreased mobility, Impaired judgement, Decreased safety awareness, Impaired hearing, Decreased skin integrity  Assessment: Pt seen for PT treatment today with focus on gait training and stair negotiation as pt planned for d/c home today  Pt presents sleeping soundly, woke with moderate verbal/tactile stimuli  Pt denies pain  Pt completes bed mobility and transfers independently, ambulates 260ft x1 with SPC and S  Pt declines stair negotiation as pt reports "I'll have to do them when I get home, I might as well save my energy " Pt demonstrates continued marked pathway deviation, however no overt LOB noted on this date  Pt impulsive with ambulation, demonstrating a decreased level of safety awareness, however appears to be baseline  Post ambulation training: /80, HR 90bpm, SPO2 97% RA  At end of session pt was left as found, needs within reach  Spoke with MD and RN regarding pt progress and recommendations  Continue to recommend return to home with HHPT upon d/c  If pt remains admitted, next session plan for stair training  Barriers to Discharge: Inaccessible home environment, Decreased caregiver support (pt has 15 [address] his apartment)     PT Discharge Recommendation: Home with home health rehabilitation     See flowsheet documentation for full assessment

## 2021-09-10 NOTE — PHYSICAL THERAPY NOTE
PHYSICAL THERAPY TREATMENT  TIME: 8737-9421    NAME:  Janice Painter  DATE: 09/10/21    AGE:   70 y o  Mrn:   [id number]  Length Of Stay: 2    ADMIT DX:  Head pain [R51 9]  Syncope [R55]  Abrasion of head [S00 91XA]    Past Medical History:   Diagnosis Date    Anxiety disorder     Atrial fibrillation (HCC)     Coronary artery disease     Depression     Hepatitis C     screening negative in 1/2017    MI (myocardial infarction) [facility])      Past Surgical History:   Procedure Laterality Date    CARDIAC CATHETERIZATION  07/09/2018       Performed at least 2 patient identifiers during session: Name and ID [email or web address]        09/10/21 0956   PT Last Visit   PT Visit Date 09/10/21   Note Type   Note Type Treatment   Pain Assessment   Pain Assessment Tool 0-10   Pain Score No Pain   Restrictions/Precautions   Weight Bearing Precautions Per Order No   Other Precautions Fall Risk;Hard of hearing; Impulsive   General   Chart Reviewed Yes   Additional Pertinent History Pt admitted under observation 9/7/21 s/p fall in community with +headstrike, decreased memory of events, low hgb  Dx: Syncope  Response to Previous Treatment Patient with no complaints from previous session  Family/Caregiver Present No   Cognition   Overall Cognitive Status WFL   Arousal/Participation Cooperative   Attention Attends with cues to redirect   Orientation Level Oriented X4   Memory Decreased recall of recent events   Following Commands Follows multistep commands with increased time or repetition  (due to hearing impairment)   Subjective   Subjective "I feel better but still a littleeeee dizzy "   Bed Mobility   Rolling R 7  Independent   Supine to Sit 7  Independent   Sit to Supine 7  Independent   Transfers   Sit to Stand 7  Independent   Stand to Sit 7  Independent   Stand pivot 7  Independent   Ambulation/Elevation   Gait pattern Narrow VERNON; Decreased foot clearance; Inconsistent khanh  (marked pathway deviation, impulsive)   Gait Assistance 6  Modified independent   Additional items Assist x 1;Verbal cues   Assistive Device Straight cane   Distance 260ft x1   Stair Management Assistance Not tested  (pt declined)   Balance   Static Sitting Good   Dynamic Sitting Good   Static Standing Fair +   Dynamic Standing Fair +   Ambulatory Fair +   Endurance Deficit   Endurance Deficit Yes   Endurance Deficit Description After ambulation training: /80, HR 90, SPO2 97% RA  Activity Tolerance   Activity Tolerance Patient limited by fatigue   Medical Staff Made Aware Spoke with SLIM and CM   Nurse Made Aware Spoke with JOHANNY Downs Records pre/post session  Assessment   Prognosis Good   Problem List Decreased endurance; Impaired balance;Decreased mobility; Impaired judgement;Decreased safety awareness; Impaired hearing;Decreased skin integrity   Assessment Pt seen for PT treatment today with focus on gait training and stair negotiation as pt planned for d/c home today  Pt presents sleeping soundly, woke with moderate verbal/tactile stimuli  Pt denies pain  Pt completes bed mobility and transfers independently, ambulates 260ft x1 with SPC and S  Pt declines stair negotiation as pt reports "I'll have to do them when I get home, I might as well save my energy " Pt demonstrates continued marked pathway deviation, however no overt LOB noted on this date  Pt impulsive with ambulation, demonstrating a decreased level of safety awareness, however appears to be baseline  Post ambulation training: /80, HR 90bpm, SPO2 97% RA  At end of session pt was left as found, needs within reach  Spoke with MD and RN regarding pt progress and recommendations  Continue to recommend return to home with HHPT upon d/c  If pt remains admitted, next session plan for stair training      Goals   Patient Goals "to feel better and go home tomorrow"   STG Expiration Date 09/18/21   Short Term Goal #1 Patient PT goals established in order to address patient self reported goal of "to go home tomorrow"  Pt will ambulate > 250 ft with SPC at VITO level in order to increase safety with community distance functional mobility  Pt will negotiate 15 stairs with HR assist and SPC at VITO level in order to demonstrate ability to enter/exit his home  Pt will improve AM-PAC score to >/= 24/24 in order to increase independence with mobility and decrease burden of care  Pt will improve Barthel Index score to >/= 75/100 in order to increase independence and decrease risk of falls  Pt will improve ambulatory balance to >/= GOOD grade in order to promote safety and increased independence with mobility  Pt will tolerate > 20 minutes of functional mobility training with good endurance evidenced by no significant change in vitals or shortness of breath  PT Treatment Day 3   Plan   Treatment/Interventions Elevations;Gait training;Spoke to MD;Spoke to nursing;Spoke to case management   Progress Progressing toward goals   PT Frequency Other (Comment)  (3-5x/wk)   Recommendation   PT Discharge Recommendation Home with home health rehabilitation   Alanna 8 in Bed Without Bedrails 4   Lying on Back to Sitting on Edge of Flat Bed 4   Moving Bed to Chair 4   Standing Up From Chair 4   Walk in Room 3   Climb 3-5 Stairs 3   Basic Mobility Inpatient Raw Score 22   Basic Mobility Standardized Score 47 4       The patient's AM-PAC Basic Mobility Inpatient Short Form Raw Score is 22, Standardized Score is 47 4  A standardized score greater than 42 9 suggests the patient may benefit from discharge to home  Please also refer to the recommendation of the Physical Therapist for safe discharge planning          Anayeli Garcia, PT, DPT   Available via [employer]  NPI # [id number]  [place] License - [id number]  2/87/2994

## 2021-09-10 NOTE — DISCHARGE INSTRUCTIONS
Anemia   WHAT YOU NEED TO KNOW:   Anemia is a low number of red blood cells or a low amount of hemoglobin in your red blood cells  Hemoglobin is a protein that helps carry oxygen throughout your body  Red blood cells use iron to create hemoglobin  Anemia may develop if your body does not have enough iron  It may also develop if your body does not make enough red blood cells or they die faster than your body can make them  DISCHARGE INSTRUCTIONS:   Call 911 or have someone call 911 for any of the following:   · You lose consciousness  · You have severe chest pain  Seek care immediately if:   · You have dark or bloody bowel movements  Contact your healthcare provider if:   · Your symptoms are worse, even after treatment  · You have questions or concerns about your condition or care  Medicines:   · Iron or folic acid supplements  help increase your red blood cell and hemoglobin levels  · Vitamin B12 injections  may help boost your red blood cell level and decrease your symptoms  Ask your healthcare provider how to inject B12  · Take your medicine as directed  Contact your healthcare provider if you think your medicine is not helping or if you have side effects  Tell him of her if you are allergic to any medicine  Keep a list of the medicines, vitamins, and herbs you take  Include the amounts, and when and why you take them  Bring the list or the pill bottles to follow-up visits  Carry your medicine list with you in case of an emergency  Prevent anemia:  Eat healthy foods rich in iron and vitamin C  Nuts, meat, dark leafy green vegetables, and beans are high in iron and protein  Vitamin C helps your body absorb iron  Foods rich in vitamin C include oranges and other citrus fruits  Ask your healthcare provider for a list of other foods that are high in iron or vitamin C  Ask if you need to be on a special diet          Follow up with your healthcare provider as directed:  Write down your questions so you remember to ask them during your visits  © Copyright [employer] 2021 Information is for End User's use only and may not be sold, redistributed or otherwise used for commercial purposes  All illustrations and images included in CareNotes® are the copyrighted property of A D A M , Inc  or Antonia Bowden  The above information is an  only  It is not intended as medical advice for individual conditions or treatments  Talk to your doctor, nurse or pharmacist before following any medical regimen to see if it is safe and effective for you  Syncope   WHAT YOU NEED TO KNOW:   Syncope is also called fainting or passing out  Syncope is a sudden, temporary loss of consciousness, followed by a fall from a standing or sitting position  Syncope ranges from not serious to a sign of a more serious condition that needs to be treated  You can control some health conditions that cause syncope  Your healthcare providers can help you create a plan to manage syncope and prevent episodes  DISCHARGE INSTRUCTIONS:   Seek care immediately if:   · You are bleeding because you hit your head when you fainted  · You suddenly have double vision, difficulty speaking, numbness, and cannot move your arms or legs  · You have chest pain and trouble breathing  · You vomit blood or material that looks like coffee grounds  · You see blood in your bowel movement  Contact your healthcare provider if:   · You have new or worsening symptoms  · You have another syncope episode  · You have a headache, fast heartbeat, or feel too dizzy to stand up  · You have questions or concerns about your condition or care  Medicines:   · Medicines  may be needed to help your heart pump strongly and regularly  Your healthcare provider may also make changes to any medicines that are causing syncope  · Take your medicine as directed    Contact your healthcare provider if you think your medicine is not helping or if you have side effects  Tell him or her if you are allergic to any medicine  Keep a list of the medicines, vitamins, and herbs you take  Include the amounts, and when and why you take them  Bring the list or the pill bottles to follow-up visits  Carry your medicine list with you in case of an emergency  Follow up with your healthcare provider as directed:  Write down your questions so you remember to ask them during your visits  Manage syncope:   · Keep a record of your syncope episodes  Include your symptoms and your activity before and after the episode  The record can help your healthcare provider find the cause of your syncope and help you manage episodes  · Sit or lie down when needed  This includes when you feel dizzy, your throat is getting tight, and your vision changes  Raise your legs above the level of your heart  · Take slow, deep breaths if you start to breathe faster with anxiety or fear  This can help decrease dizziness and the feeling that you might faint  · Check your blood pressure often  This is important if you take medicine to lower your blood pressure  Check your blood pressure when you are lying down and when you are standing  Ask how often to check during the day  Keep a record of your blood pressure numbers  Your healthcare provider may use the record to help plan your treatment  Prevent a syncope episode:   · Move slowly and let yourself get used to one position before you move to another position  This is very important when you change from a lying or sitting position to a standing position  Take some deep breaths before you stand up from a lying position  Stand up slowly  Sudden movements may cause a fainting spell  Sit on the side of the bed or couch for a few minutes before you stand up  If you are on bedrest, try to be upright for about 2 hours each day, or as directed  Do not lock your legs if you are standing for a long period of time   Move your legs and bend your knees to keep blood flowing  · Follow your healthcare provider's recommendations  Your provider may  recommend that you drink more liquids to prevent dehydration  You may also need to have more salt to keep your blood pressure from dropping too low and causing syncope  Your provider will tell you how much liquid and sodium to have each day  He or she will also tell you how much physical activity is safe for you  This will depend on what is causing your syncope  · Watch for signs of low blood sugar  These include hunger, nervousness, sweating, and fast or fluttery heartbeats  Talk with your healthcare provider about ways to keep your blood sugar level steady  · Do not strain if you are constipated  You may faint if you strain to have a bowel movement  Walking is the best way to get your bowels moving  Eat foods high in fiber to make it easier to have a bowel movement  Good examples are high-fiber cereals, beans, vegetables, and whole-grain breads  Prune juice may help make bowel movements softer  · Be careful in hot weather  Heat can cause a syncope episode  Limit activity done outside on hot days  Physical activity in hot weather can lead to dehydration  This can cause an episode  © Copyright [employer] 2021 Information is for End User's use only and may not be sold, redistributed or otherwise used for commercial purposes  All illustrations and images included in CareNotes® are the copyrighted property of A [employer] Inc  or [facility] Gianni French   The above information is an  only  It is not intended as medical advice for individual conditions or treatments  Talk to your doctor, nurse or pharmacist before following any medical regimen to see if it is safe and effective for you  Iron Supplements (By mouth)   Treats low blood iron or anemia by helping your body make red blood cells     Brand Name(s): Kiera Lee, Beef/Iron/Wine, Bifera, BiferaRx, Corvite FE, Duofer, EZFE 200, Enfamil Shawn-In-Sol, Fe-20, Femcon Fe, Feosol, Feosol [facility], [facility]   There may be other brand names for this medicine  When This Medicine Should Not Be Used: You should not use this medicine if you have had an allergic reaction to iron supplements, or if you have a condition called hemachromatosis (iron overload disease) or hemosiderosis (iron in the lungs), or any type of anemia that is not caused by iron deficiency  How to Use This Medicine:   Liquid Filled Capsule, Coated Tablet, Tablet, Capsule, Chewable Tablet, Liquid, Long Acting Capsule, Long Acting Tablet  · Your doctor will tell you how much of this medicine to take and how often  Do not take more medicine or take it more often than your doctor tells you to  Carefully follow your doctor's instructions about any special diet  · It is best to take this medicine on an empty stomach, 1 hour before or 2 hours after a meal  Take the medicine with a full glass or water or fruit juice  If the medicine upsets your stomach, you may take it with food  · The chewable tablet must be chewed or crushed before you swallow it  · Measure the oral liquid medicine with a marked measuring spoon or medicine cup  · The oral liquid may stain your teeth  These stains can be prevented by mixing the medicine with water or other liquids (such as fruit juice, tomato juice), and drinking the medicine with a straw  To remove any iron stains, brush your teeth with baking soda or peroxide  If a dose is missed:   · If you miss a dose or forget to take your medicine, take it as soon as you can  If it is almost time for your next dose, wait until then to take the medicine and skip the missed dose  · Do not use extra medicine to make up for a missed dose  How to Store and Dispose of This Medicine:   · Store the medicine at room temperature, away from heat, moisture, and direct light    · Keep all medicine away from children, and never share your medicine with anyone  Drugs and Foods to Avoid:   Ask your doctor or pharmacist before using any other medicine, including over-the-counter medicines, vitamins, and herbal products  · Do not take iron supplements by mouth if you are also receiving iron injections  · Make sure your doctor knows if you are also using phenytoin (Dilantin®), acetohydroxamic acid (Lithostat®), or antibiotics such as demeclocycline, doxycycline (Vibramycin®), Cipro®, Levaquin®, minocycline, moxifloxacin (Avelox®), Tequin®, or tetracycline  · Tell your doctor if you are using antacids (such as Maalox® or Mylanta®)  · Avoid the following foods, or eat them in small amounts at least 1 hour before or 2 hours after taking your iron: eggs, milk, cheese, yogurt, tea or coffee, whole-grain cereals, and breads  Warnings While Using This Medicine:   · Make sure your doctor knows if you are pregnant or breastfeeding, or if you have stomach or intestinal problems, an active infection, diabetes, porphyria, or other medical problems  · Make sure any doctor or dentist who treats you knows that you are using this medicine  Iron may affect the results of certain medical tests  · Iron can cause your stools to be darker in color  This is normal and is not a cause for concern  Possible Side Effects While Using This Medicine:   Call your doctor right away if you notice any of these side effects:  · Bloody diarrhea  · Bluish-colored lips, hands, or fingernails  · Chest pain  · Fever  · Pale or clammy skin  · Severe or continuing stomach cramps, vomiting (with or without blood)  · Shallow breathing, weakness, weak but fast heartbeat  If you notice these less serious side effects, talk with your doctor:   · Constipation, diarrhea, nausea  · Dark-colored urine  · Leg cramps  If you notice other side effects that you think are caused by this medicine, tell your doctor  Call your doctor for medical advice about side effects   You may report side effects to FDA at 3-402-FDA-2917  © Copyright [address] 2021 Information is for End User's use only and may not be sold, redistributed or otherwise used for commercial purposes  The above information is an  only  It is not intended as medical advice for individual conditions or treatments  Talk to your doctor, nurse or pharmacist before following any medical regimen to see if it is safe and effective for you

## 2021-09-16 ENCOUNTER — OFFICE VISIT (OUTPATIENT)
Dept: FAMILY MEDICINE CLINIC | Facility: CLINIC | Age: 76
End: 2021-09-16
Payer: MEDICARE

## 2021-09-16 VITALS
SYSTOLIC BLOOD PRESSURE: 122 MMHG | HEART RATE: 88 BPM | OXYGEN SATURATION: 96 % | TEMPERATURE: 98.9 F | BODY MASS INDEX: 19.23 KG/M2 | WEIGHT: 142 LBS | DIASTOLIC BLOOD PRESSURE: 78 MMHG | HEIGHT: 72 IN | RESPIRATION RATE: 16 BRPM

## 2021-09-16 DIAGNOSIS — D50.0 IRON DEFICIENCY ANEMIA DUE TO CHRONIC BLOOD LOSS: Primary | ICD-10-CM

## 2021-09-16 PROCEDURE — 99496 TRANSJ CARE MGMT HIGH F2F 7D: CPT | Performed by: INTERNAL MEDICINE

## 2021-09-19 NOTE — PROGRESS NOTES
Assessment/Plan:  1 post hospital visit  Had a fall, felt to be syncopal   No orthostasis  BUN was slightly elevated suggesting he may have has been some volume depletion  He was also severely anemic requiring blood transfusion  Cardiac workup did not show any new issues he has history of cardiomyopathy and chronic atrial fibrillation  2  Chronic atrial fibrillation  3  Cardiomyopathy  4  Mitral regurgitation  5  Chronic obstructive lung disease  6  BPH  7  Nicotine dependence she was counseled about it is working on stopping it  8  Iron deficiency anemia  Continue iron supple  Advised to Follow-up with Gastroenterology   be will repeat CBC  Hospital records were reviewed       Problem List Items Addressed This Visit     None            Subjective:      Patient ID: Neville López is a 70 y o  male  Urmila Pope is here for follow-up post hospital visit  He was in the hospital because of fall well to be syncope  He said [place] when he June 2 left to all why it person walking in front as he turned he fell down  He has not sure as to what happened  He passed out and was brought to the hospital   He had head trauma with orbital swelling  CT scan of the brain did not show any intracranial hemorrhage  He is on chronic anticoagulation because of his atrial fibrillation  CT scan was repeated during hospitalization to make sure there is no delayed bleeding  That was negative   he was noted to have iron deficiency anemia hemoglobin was down to 7  He was therefore transfused unit of RBCs  He had upper and lower endoscopy which was negative  He was suggested to have a shoulder endoscopy as outpatient   he is feeling better now no headaches or dizziness  No ear nose throat eye problems  No chest pains or shortness of breath  No orthopnea or PND  No palpitation  No nausea vomiting diarrhea abdominal pain dysphagia or heartburn    No history of black stools are blood in stools  No dysuria or hematuria  No joint pains bone pains or muscle aches   he has chronic obstructive lung disease  He is still smoking given though he is cutting back on cigarettes hoping to get off completely      The following portions of the patient's history were reviewed and updated as appropriate:   Past Medical History:  He has a past medical history of Anxiety disorder, Atrial fibrillation ([facility] ), Coronary artery disease, Depression, Hepatitis C, and MI (myocardial infarction) ([facility] )  ,  _______________________________________________________________________  Medical Problems:  does not have any pertinent problems on file ,  _______________________________________________________________________  Past Surgical History:   has a past surgical history that includes Cardiac catheterization (07/09/2018)  ,  _______________________________________________________________________  Family History:  family history includes Coronary artery disease in his father and mother; Diabetes in his father; Hypertension in his father and mother ,  _______________________________________________________________________  Social History:   reports that he has been smoking cigarettes  He has a 14 25 pack-year smoking history  He has never used smokeless tobacco  He reports that he does not drink alcohol and does not use drugs  ,  _______________________________________________________________________  Allergies:  has No Known Allergies     _______________________________________________________________________  Current Outpatient Medications   Medication Sig Dispense Refill    apixaban (ELIQUIS) 5 mg Take 1 tablet (5 mg total) by mouth 2 (two) times a day 180 tablet 3    docusate sodium (COLACE) 100 mg capsule Take 1 capsule (100 mg total) by mouth 2 (two) times a day 180 capsule 0    ferrous sulfate 324 (65 Fe) mg Take 1 tablet (324 mg total) by mouth 2 (two) times a day before meals 180 tablet 0    fluticasone-salmeterol (Advair Diskus) 100-50 mcg/dose inhaler Inhale 1 puff 2 (two) times a day Rinse mouth after use  60 blister 2    folic acid (FOLVITE) 388 mcg tablet Take 1 tablet (400 mcg total) by mouth daily 30 tablet 0    furosemide (LASIX) 40 mg tablet Take 0 5 tablets (20 mg total) by mouth every other day 30 tablet 0    ipratropium-albuterol (DUO-NEB) 0 5-2 5 mg/3 mL nebulizer solution Take 3 mL by nebulization 4 (four) times a day 120 mL 2    lisinopril (ZESTRIL) 2 5 mg tablet Take 1 tablet (2 5 mg total) by mouth daily 90 tablet 3    metoprolol succinate (Toprol XL) 25 mg 24 hr tablet Take 1 tablet (25 mg total) by mouth daily 90 tablet 3    pantoprazole (PROTONIX) 40 mg tablet Take 1 tablet (40 mg total) by mouth daily before breakfast 90 tablet 3    potassium chloride (Klor-Con) 10 mEq tablet Take 1 tablet (10 mEq total) by mouth daily 30 tablet 2    tamsulosin (FLOMAX) 0 4 mg Take 1 capsule (0 4 mg total) by mouth daily 30 capsule 2     No current facility-administered medications for this visit      _______________________________________________________________________  Review of Systems   All other systems reviewed and are negative  Objective:  Vitals:    09/16/21 1455   BP: 122/78   BP Location: Left arm   Patient Position: Sitting   Cuff Size: Standard   Pulse: 88   Resp: 16   Temp: 98 9 °F (37 2 °C)   TempSrc: Tympanic   SpO2: 96%   Weight: 64 4 kg (142 lb)   Height: 6' (1 829 m)     Body mass index is 19 26 kg/m²  Physical Exam  Vitals and nursing note reviewed  Constitutional:       General: He is not in acute distress  Appearance: Normal appearance  HENT:      Head: Normocephalic and atraumatic  Right Ear: External ear normal       Left Ear: External ear normal       Nose: Nose normal       Mouth/Throat:      Mouth: Mucous membranes are moist    Eyes:      General: No scleral icterus  Extraocular Movements: Extraocular movements intact        Conjunctiva/sclera: Conjunctivae normal       Pupils: Pupils are equal, round, and reactive to light  Neck:      Vascular: No carotid bruit  Cardiovascular:      Rate and Rhythm: Normal rate  Rhythm irregular  Pulses: Normal pulses  Heart sounds: Normal heart sounds  Pulmonary:      Effort: Pulmonary effort is normal       Breath sounds: Normal breath sounds  Abdominal:      General: Bowel sounds are normal       Palpations: Abdomen is soft  There is no mass  Tenderness: There is no abdominal tenderness  Hernia: No hernia is present  Musculoskeletal:         General: Normal range of motion  Cervical back: Normal range of motion and neck supple  Right lower leg: No edema  Left lower leg: No edema  Lymphadenopathy:      Cervical: No cervical adenopathy  Skin:     General: Skin is warm and dry  Capillary Refill: Capillary refill takes 2 to 3 seconds  Findings: No rash  Neurological:      General: No focal deficit present  Mental Status: He is alert and oriented to person, place, and time  Cranial Nerves: No cranial nerve deficit  Sensory: No sensory deficit  Motor: No weakness        Coordination: Coordination normal       Gait: Gait normal       Deep Tendon Reflexes: Reflexes normal    Psychiatric:         Mood and Affect: Mood normal          Behavior: Behavior normal

## 2021-10-12 ENCOUNTER — OFFICE VISIT (OUTPATIENT)
Dept: FAMILY MEDICINE CLINIC | Facility: CLINIC | Age: 76
End: 2021-10-12
Payer: MEDICARE

## 2021-10-12 VITALS
HEIGHT: 72 IN | SYSTOLIC BLOOD PRESSURE: 128 MMHG | WEIGHT: 144 LBS | OXYGEN SATURATION: 97 % | BODY MASS INDEX: 19.5 KG/M2 | HEART RATE: 74 BPM | DIASTOLIC BLOOD PRESSURE: 74 MMHG | TEMPERATURE: 98.9 F | RESPIRATION RATE: 18 BRPM

## 2021-10-12 DIAGNOSIS — J43.9 PULMONARY EMPHYSEMA, UNSPECIFIED EMPHYSEMA TYPE (HCC): ICD-10-CM

## 2021-10-12 DIAGNOSIS — Z12.5 PROSTATE CANCER SCREENING: Primary | ICD-10-CM

## 2021-10-12 DIAGNOSIS — D50.0 IRON DEFICIENCY ANEMIA DUE TO CHRONIC BLOOD LOSS: ICD-10-CM

## 2021-10-12 DIAGNOSIS — Z00.00 MEDICARE ANNUAL WELLNESS VISIT, SUBSEQUENT: ICD-10-CM

## 2021-10-12 DIAGNOSIS — D64.9 ANEMIA, UNSPECIFIED TYPE: ICD-10-CM

## 2021-10-12 DIAGNOSIS — Z23 FLU VACCINE NEED: ICD-10-CM

## 2021-10-12 DIAGNOSIS — J44.9 CHRONIC OBSTRUCTIVE PULMONARY DISEASE, UNSPECIFIED COPD TYPE (HCC): ICD-10-CM

## 2021-10-12 PROCEDURE — G0008 ADMIN INFLUENZA VIRUS VAC: HCPCS

## 2021-10-12 PROCEDURE — G0439 PPPS, SUBSEQ VISIT: HCPCS | Performed by: INTERNAL MEDICINE

## 2021-10-12 PROCEDURE — 99214 OFFICE O/P EST MOD 30 MIN: CPT | Performed by: INTERNAL MEDICINE

## 2021-10-12 PROCEDURE — 90662 IIV NO PRSV INCREASED AG IM: CPT

## 2021-10-12 RX ORDER — IPRATROPIUM BROMIDE AND ALBUTEROL SULFATE 2.5; .5 MG/3ML; MG/3ML
3 SOLUTION RESPIRATORY (INHALATION) 4 TIMES DAILY
Qty: 120 ML | Refills: 2 | Status: SHIPPED | OUTPATIENT
Start: 2021-10-12 | End: 2021-12-06 | Stop reason: SDUPTHER

## 2021-10-14 ENCOUNTER — APPOINTMENT (EMERGENCY)
Dept: CT IMAGING | Facility: HOSPITAL | Age: 76
End: 2021-10-14
Payer: MEDICARE

## 2021-10-14 ENCOUNTER — HOSPITAL ENCOUNTER (EMERGENCY)
Facility: HOSPITAL | Age: 76
Discharge: HOME | End: 2021-10-14
Attending: EMERGENCY MEDICINE | Admitting: EMERGENCY MEDICINE
Payer: MEDICARE

## 2021-10-14 ENCOUNTER — APPOINTMENT (EMERGENCY)
Dept: RADIOLOGY | Facility: HOSPITAL | Age: 76
End: 2021-10-14
Payer: MEDICARE

## 2021-10-14 VITALS
DIASTOLIC BLOOD PRESSURE: 100 MMHG | HEART RATE: 99 BPM | SYSTOLIC BLOOD PRESSURE: 134 MMHG | OXYGEN SATURATION: 97 % | TEMPERATURE: 97.4 F | RESPIRATION RATE: 20 BRPM

## 2021-10-14 DIAGNOSIS — S82.092A PATELLAR SLEEVE FRACTURE, LEFT, CLOSED, INITIAL ENCOUNTER: Primary | ICD-10-CM

## 2021-10-14 PROCEDURE — 73564 X-RAY EXAM KNEE 4 OR MORE: CPT

## 2021-10-14 PROCEDURE — 99285 EMERGENCY DEPT VISIT HI MDM: CPT | Performed by: EMERGENCY MEDICINE

## 2021-10-14 PROCEDURE — 99284 EMERGENCY DEPT VISIT MOD MDM: CPT

## 2021-10-14 PROCEDURE — 73700 CT LOWER EXTREMITY W/O DYE: CPT

## 2021-10-14 RX ORDER — TRAMADOL HYDROCHLORIDE 50 MG/1
50 TABLET ORAL EVERY 6 HOURS PRN
Qty: 20 TABLET | Refills: 0 | Status: SHIPPED | OUTPATIENT
Start: 2021-10-14 | End: 2021-10-19

## 2021-10-14 RX ORDER — IBUPROFEN 400 MG/1
400 TABLET ORAL EVERY 6 HOURS PRN
Qty: 20 TABLET | Refills: 0 | Status: SHIPPED | OUTPATIENT
Start: 2021-10-14 | End: 2022-02-06 | Stop reason: HOSPADM

## 2021-10-14 RX ORDER — IBUPROFEN 600 MG/1
600 TABLET ORAL ONCE
Status: COMPLETED | OUTPATIENT
Start: 2021-10-14 | End: 2021-10-14

## 2021-10-14 RX ADMIN — IBUPROFEN 600 MG: 600 TABLET ORAL at 08:44

## 2021-10-21 ENCOUNTER — TELEPHONE (OUTPATIENT)
Dept: OBGYN CLINIC | Facility: MEDICAL CENTER | Age: 76
End: 2021-10-21

## 2021-10-30 ENCOUNTER — HOSPITAL ENCOUNTER (EMERGENCY)
Facility: HOSPITAL | Age: 76
Discharge: HOME | End: 2021-10-30
Attending: STUDENT IN AN ORGANIZED HEALTH CARE EDUCATION/TRAINING PROGRAM | Admitting: STUDENT IN AN ORGANIZED HEALTH CARE EDUCATION/TRAINING PROGRAM
Payer: MEDICARE

## 2021-10-30 ENCOUNTER — APPOINTMENT (EMERGENCY)
Dept: RADIOLOGY | Facility: HOSPITAL | Age: 76
End: 2021-10-30
Payer: MEDICARE

## 2021-10-30 VITALS
TEMPERATURE: 97.7 F | SYSTOLIC BLOOD PRESSURE: 113 MMHG | HEIGHT: 72 IN | RESPIRATION RATE: 16 BRPM | HEART RATE: 89 BPM | WEIGHT: 145.5 LBS | OXYGEN SATURATION: 100 % | DIASTOLIC BLOOD PRESSURE: 71 MMHG | BODY MASS INDEX: 19.71 KG/M2

## 2021-10-30 DIAGNOSIS — S82.042A: Primary | ICD-10-CM

## 2021-10-30 PROCEDURE — 99284 EMERGENCY DEPT VISIT MOD MDM: CPT | Performed by: STUDENT IN AN ORGANIZED HEALTH CARE EDUCATION/TRAINING PROGRAM

## 2021-10-30 PROCEDURE — 73564 X-RAY EXAM KNEE 4 OR MORE: CPT

## 2021-10-30 PROCEDURE — 99284 EMERGENCY DEPT VISIT MOD MDM: CPT

## 2021-10-30 RX ORDER — ACETAMINOPHEN 325 MG/1
650 TABLET ORAL ONCE
Status: COMPLETED | OUTPATIENT
Start: 2021-10-30 | End: 2021-10-30

## 2021-10-30 RX ORDER — IBUPROFEN 600 MG/1
600 TABLET ORAL ONCE
Status: COMPLETED | OUTPATIENT
Start: 2021-10-30 | End: 2021-10-30

## 2021-10-30 RX ADMIN — ACETAMINOPHEN 650 MG: 325 TABLET, FILM COATED ORAL at 15:09

## 2021-10-30 RX ADMIN — IBUPROFEN 600 MG: 600 TABLET ORAL at 15:10

## 2021-11-08 ENCOUNTER — OFFICE VISIT (OUTPATIENT)
Dept: OBGYN CLINIC | Facility: CLINIC | Age: 76
End: 2021-11-08
Payer: MEDICARE

## 2021-11-08 ENCOUNTER — APPOINTMENT (OUTPATIENT)
Dept: RADIOLOGY | Facility: AMBULARY SURGERY CENTER | Age: 76
End: 2021-11-08
Attending: ORTHOPAEDIC SURGERY
Payer: MEDICARE

## 2021-11-08 VITALS — BODY MASS INDEX: 19.64 KG/M2 | HEIGHT: 72 IN | WEIGHT: 145 LBS

## 2021-11-08 DIAGNOSIS — S82.045A CLOSED NONDISPLACED COMMINUTED FRACTURE OF LEFT PATELLA, INITIAL ENCOUNTER: ICD-10-CM

## 2021-11-08 DIAGNOSIS — S82.045A CLOSED NONDISPLACED COMMINUTED FRACTURE OF LEFT PATELLA, INITIAL ENCOUNTER: Primary | ICD-10-CM

## 2021-11-08 PROCEDURE — 73560 X-RAY EXAM OF KNEE 1 OR 2: CPT

## 2021-11-08 PROCEDURE — 99204 OFFICE O/P NEW MOD 45 MIN: CPT | Performed by: ORTHOPAEDIC SURGERY

## 2021-11-20 DIAGNOSIS — I10 ESSENTIAL HYPERTENSION: ICD-10-CM

## 2021-11-22 RX ORDER — FUROSEMIDE 40 MG/1
TABLET ORAL
Qty: 30 TABLET | Refills: 3 | Status: SHIPPED | OUTPATIENT
Start: 2021-11-22 | End: 2022-03-04 | Stop reason: HOSPADM

## 2021-12-06 DIAGNOSIS — J43.9 PULMONARY EMPHYSEMA, UNSPECIFIED EMPHYSEMA TYPE (HCC): ICD-10-CM

## 2021-12-06 RX ORDER — IPRATROPIUM BROMIDE AND ALBUTEROL SULFATE 2.5; .5 MG/3ML; MG/3ML
3 SOLUTION RESPIRATORY (INHALATION) 4 TIMES DAILY
Qty: 120 ML | Refills: 2 | Status: SHIPPED | OUTPATIENT
Start: 2021-12-06 | End: 2022-01-13 | Stop reason: SDUPTHER

## 2021-12-09 ENCOUNTER — APPOINTMENT (OUTPATIENT)
Dept: RADIOLOGY | Facility: AMBULARY SURGERY CENTER | Age: 76
End: 2021-12-09
Attending: ORTHOPAEDIC SURGERY
Payer: MEDICARE

## 2021-12-09 ENCOUNTER — OFFICE VISIT (OUTPATIENT)
Dept: OBGYN CLINIC | Facility: CLINIC | Age: 76
End: 2021-12-09
Payer: MEDICARE

## 2021-12-09 VITALS — BODY MASS INDEX: 19.64 KG/M2 | HEIGHT: 72 IN | WEIGHT: 145 LBS

## 2021-12-09 DIAGNOSIS — S82.045D CLOSED NONDISPLACED COMMINUTED FRACTURE OF LEFT PATELLA WITH ROUTINE HEALING, SUBSEQUENT ENCOUNTER: Primary | ICD-10-CM

## 2021-12-09 DIAGNOSIS — S82.045A CLOSED NONDISPLACED COMMINUTED FRACTURE OF LEFT PATELLA, INITIAL ENCOUNTER: ICD-10-CM

## 2021-12-09 PROCEDURE — 99213 OFFICE O/P EST LOW 20 MIN: CPT | Performed by: ORTHOPAEDIC SURGERY

## 2021-12-09 PROCEDURE — 73560 X-RAY EXAM OF KNEE 1 OR 2: CPT

## 2022-01-13 ENCOUNTER — OFFICE VISIT (OUTPATIENT)
Dept: FAMILY MEDICINE CLINIC | Facility: CLINIC | Age: 77
End: 2022-01-13
Payer: MEDICARE

## 2022-01-13 VITALS
SYSTOLIC BLOOD PRESSURE: 130 MMHG | TEMPERATURE: 97 F | HEART RATE: 100 BPM | BODY MASS INDEX: 22.7 KG/M2 | WEIGHT: 167.6 LBS | RESPIRATION RATE: 16 BRPM | DIASTOLIC BLOOD PRESSURE: 80 MMHG | HEIGHT: 72 IN | OXYGEN SATURATION: 97 %

## 2022-01-13 DIAGNOSIS — I42.0 DILATED CARDIOMYOPATHY (HCC): ICD-10-CM

## 2022-01-13 DIAGNOSIS — D47.3 ESSENTIAL (HEMORRHAGIC) THROMBOCYTHEMIA (HCC): Primary | ICD-10-CM

## 2022-01-13 DIAGNOSIS — I48.21 PERMANENT ATRIAL FIBRILLATION (HCC): ICD-10-CM

## 2022-01-13 DIAGNOSIS — I10 ESSENTIAL HYPERTENSION: ICD-10-CM

## 2022-01-13 DIAGNOSIS — J43.9 PULMONARY EMPHYSEMA, UNSPECIFIED EMPHYSEMA TYPE (HCC): ICD-10-CM

## 2022-01-13 PROCEDURE — 99214 OFFICE O/P EST MOD 30 MIN: CPT | Performed by: INTERNAL MEDICINE

## 2022-01-13 RX ORDER — IPRATROPIUM BROMIDE AND ALBUTEROL SULFATE 2.5; .5 MG/3ML; MG/3ML
3 SOLUTION RESPIRATORY (INHALATION) 4 TIMES DAILY
Qty: 360 ML | Refills: 2 | Status: SHIPPED | OUTPATIENT
Start: 2022-01-13 | End: 2022-04-13

## 2022-01-13 NOTE — PROGRESS NOTES
Assessment/Plan:  1  Chronic obstructive lung disease appears stable however unfortunately he is still smoking  2  Nicotine dependence was counseled does not want any aids  3  Nonischemic cardiomyopathy stable no CHF  4  Chronic atrial fibrillation rate under control  5  Anemia, normochromic normocytic  He has not had a repeat CBC if he is still anemic will need a Hematology consult  Endoscopies were normal no  bleeding ulcers or colonic pathology  6  Essential hypertension under control  7  Hyperlipidemia continue statin  8 mitral regurgitation  9  BPH continue tamsulosin  10  Impaired fasting glucose  Meds and labs reviewed           Problem List Items Addressed This Visit        Respiratory    COPD (chronic obstructive pulmonary disease) ([facility] )    Relevant Medications    ipratropium-albuterol (DUO-NEB) 0 5-2 5 mg/3 mL nebulizer solution       Cardiovascular and Mediastinum    Permanent atrial fibrillation ([facility] )    Relevant Orders    Comprehensive metabolic panel       Hematopoietic and Hemostatic    Essential (hemorrhagic) thrombocythemia ([facility] ) - Primary      Other Visit Diagnoses     Dilated cardiomyopathy ([facility] )        Relevant Orders    Comprehensive metabolic panel    Essential hypertension        Relevant Orders    Comprehensive metabolic panel            Subjective:      Patient ID: Rick Guzmán is a 78 y o  male  Mr  Melani Diop is here for follow-up  He has history of                                                    Essential hypertension                                                     Cardiomyopathy/nonischemic                                                    Chronic atrial fibrillation                                                    Mitral regurgitation                                                    Hyperlipidemia                                                    Chronic obstructive lung disease                                                    Tobacco dependence    Has drastically cut down on smoking                                                     BPH, history of urinary retention                                                     Impaired fasting glucose  He is overall doing well, He has shortness of breath when he walks  No orthopnea or PND  Occasional peripheral edema  No palpitations  No chest pains  No dysphagia or heartburn  no syncopal attacks, appetite is good weight is stable  He had a fall and fracture left patella  he is still using a brace  He has stop smoking but started again  He said he has was very upset when his wife was admitted to the hospital  that made him go back to smoking   He said nebulizer is helping is COPD well  He has severe anemia upper and lower endoscopy was unremarkable  He was supposed to go for back for CBC but he has not done so advised to repeat CBC if he still anemic we may have to get a Hematology consult he is on iron and folic acid       The following portions of the patient's history were reviewed and updated as appropriate:   Past Medical History:  He has a past medical history of Anxiety disorder, Atrial fibrillation ([facility] ), Coronary artery disease, Depression, Hepatitis C, and MI (myocardial infarction) ([facility] )  ,  _______________________________________________________________________  Medical Problems:  does not have any pertinent problems on file ,  _______________________________________________________________________  Past Surgical History:   has a past surgical history that includes Cardiac catheterization (07/09/2018)  ,  _______________________________________________________________________  Family History:  family history includes Coronary artery disease in his father and mother; Diabetes in his father; Hypertension in his father and mother ,  _______________________________________________________________________  Social History:   reports that he has been smoking cigarettes  He has a 28 50 pack-year smoking history   He has never used smokeless tobacco  He reports that he does not drink alcohol and does not use drugs  ,  _______________________________________________________________________  Allergies:  has No Known Allergies     _______________________________________________________________________  Current Outpatient Medications   Medication Sig Dispense Refill    apixaban (ELIQUIS) 5 mg Take 1 tablet (5 mg total) by mouth 2 (two) times a day 180 tablet 3    docusate sodium (COLACE) 100 mg capsule Take 1 capsule (100 mg total) by mouth 2 (two) times a day 180 capsule 0    ferrous sulfate 324 (65 Fe) mg Take 1 tablet (324 mg total) by mouth 2 (two) times a day before meals 180 tablet 0    fluticasone-salmeterol (Advair Diskus) 100-50 mcg/dose inhaler Inhale 1 puff 2 (two) times a day Rinse mouth after use  60 blister 2    folic acid (FOLVITE) 225 mcg tablet Take 1 tablet (400 mcg total) by mouth daily 30 tablet 0    furosemide (LASIX) 40 mg tablet TAKE ONE-HALF (1/2) TABLET EVERY OTHER DAY 30 tablet 3    ibuprofen (MOTRIN) 400 mg tablet Take 1 tablet (400 mg total) by mouth every 6 (six) hours as needed for mild pain for up to 5 days 20 tablet 0    ipratropium-albuterol (DUO-NEB) 0 5-2 5 mg/3 mL nebulizer solution Take 3 mL by nebulization 4 (four) times a day 360 mL 2    lisinopril (ZESTRIL) 2 5 mg tablet Take 1 tablet (2 5 mg total) by mouth daily 90 tablet 3    metoprolol succinate (Toprol XL) 25 mg 24 hr tablet Take 1 tablet (25 mg total) by mouth daily 90 tablet 3    potassium chloride (Klor-Con) 10 mEq tablet Take 1 tablet (10 mEq total) by mouth daily 30 tablet 2    tamsulosin (FLOMAX) 0 4 mg Take 1 capsule (0 4 mg total) by mouth daily 30 capsule 2    pantoprazole (PROTONIX) 40 mg tablet Take 1 tablet (40 mg total) by mouth daily before breakfast (Patient not taking: Reported on 10/14/2021) 90 tablet 3     No current facility-administered medications for this visit  _______________________________________________________________________  Review of Systems   All other systems reviewed and are negative  Objective:  Vitals:    01/13/22 0912   BP: 130/80   BP Location: Left arm   Patient Position: Sitting   Cuff Size: Standard   Pulse: 100   Resp: 16   Temp: (!) 97 °F (36 1 °C)   TempSrc: Temporal   SpO2: 97%   Weight: 76 kg (167 lb 9 6 oz)   Height: 6' (1 829 m)     Body mass index is 22 73 kg/m²  Physical Exam  Vitals and nursing note reviewed  Constitutional:       General: He is not in acute distress  Appearance: Normal appearance  HENT:      Head: Normocephalic and atraumatic  Nose: Nose normal       Mouth/Throat:      Mouth: Mucous membranes are moist    Eyes:      General: No scleral icterus  Extraocular Movements: Extraocular movements intact  Conjunctiva/sclera: Conjunctivae normal       Pupils: Pupils are equal, round, and reactive to light  Neck:      Vascular: No carotid bruit  Cardiovascular:      Rate and Rhythm: Normal rate and regular rhythm  Pulses: Normal pulses  Heart sounds: Normal heart sounds  Pulmonary:      Effort: Pulmonary effort is normal       Breath sounds: Normal breath sounds  Abdominal:      General: Bowel sounds are normal       Palpations: Abdomen is soft  There is no mass  Tenderness: There is no abdominal tenderness  Hernia: No hernia is present  Musculoskeletal:         General: Normal range of motion  Cervical back: Normal range of motion and neck supple  Right lower leg: No edema  Left lower leg: No edema  Lymphadenopathy:      Cervical: No cervical adenopathy  Skin:     General: Skin is warm and dry  Capillary Refill: Capillary refill takes 2 to 3 seconds  Findings: No rash  Neurological:      General: No focal deficit present  Mental Status: He is alert and oriented to person, place, and time     Psychiatric:         Mood and Affect: Mood normal          Behavior: Behavior normal

## 2022-01-31 ENCOUNTER — HOSPITAL ENCOUNTER (INPATIENT)
Facility: HOSPITAL | Age: 77
LOS: 6 days | Discharge: HOME HEALTH | DRG: 812 | End: 2022-02-06
Attending: EMERGENCY MEDICINE | Admitting: INTERNAL MEDICINE
Payer: MEDICARE

## 2022-01-31 ENCOUNTER — APPOINTMENT (EMERGENCY)
Dept: CT IMAGING | Facility: HOSPITAL | Age: 77
DRG: 812 | End: 2022-01-31
Payer: MEDICARE

## 2022-01-31 ENCOUNTER — HOSPITAL ENCOUNTER (EMERGENCY)
Facility: HOSPITAL | Age: 77
Discharge: HOME | DRG: 812 | End: 2022-01-31
Attending: INTERNAL MEDICINE | Admitting: INTERNAL MEDICINE
Payer: MEDICARE

## 2022-01-31 VITALS
HEART RATE: 118 BPM | SYSTOLIC BLOOD PRESSURE: 98 MMHG | TEMPERATURE: 98.3 F | RESPIRATION RATE: 18 BRPM | HEIGHT: 72 IN | DIASTOLIC BLOOD PRESSURE: 60 MMHG | OXYGEN SATURATION: 100 % | BODY MASS INDEX: 22.35 KG/M2 | WEIGHT: 165 LBS

## 2022-01-31 DIAGNOSIS — R42 DIZZINESS: ICD-10-CM

## 2022-01-31 DIAGNOSIS — I48.11 LONGSTANDING PERSISTENT ATRIAL FIBRILLATION (HCC): ICD-10-CM

## 2022-01-31 DIAGNOSIS — R33.9 URINARY RETENTION: ICD-10-CM

## 2022-01-31 DIAGNOSIS — R26.2 AMBULATORY DYSFUNCTION: ICD-10-CM

## 2022-01-31 DIAGNOSIS — R93.3 ABNORMAL COLONOSCOPY: ICD-10-CM

## 2022-01-31 DIAGNOSIS — D64.9 ANEMIA: Primary | ICD-10-CM

## 2022-01-31 DIAGNOSIS — R33.9 URINARY RETENTION: Primary | ICD-10-CM

## 2022-01-31 DIAGNOSIS — D50.9 MICROCYTIC ANEMIA: ICD-10-CM

## 2022-01-31 DIAGNOSIS — K29.70 HELICOBACTER PYLORI GASTRITIS: ICD-10-CM

## 2022-01-31 DIAGNOSIS — B96.81 HELICOBACTER PYLORI GASTRITIS: ICD-10-CM

## 2022-01-31 DIAGNOSIS — N17.9 AKI (ACUTE KIDNEY INJURY) (HCC): ICD-10-CM

## 2022-01-31 LAB
2HR DELTA HS TROPONIN: 1 NG/L
ABO GROUP BLD: NORMAL
ALBUMIN SERPL BCP-MCNC: 3.7 G/DL (ref 3.4–4.8)
ALP SERPL-CCNC: 83.8 U/L (ref 10–129)
ALT SERPL W P-5'-P-CCNC: 9 U/L (ref 5–63)
ANION GAP SERPL CALCULATED.3IONS-SCNC: 7 MMOL/L (ref 4–13)
AST SERPL W P-5'-P-CCNC: 14 U/L (ref 15–41)
BACTERIA UR QL AUTO: ABNORMAL /HPF
BASOPHILS # BLD AUTO: 0.03 THOUSANDS/ΜL (ref 0–0.1)
BASOPHILS NFR BLD AUTO: 0 % (ref 0–1)
BILIRUB SERPL-MCNC: 1.04 MG/DL (ref 0.3–1.2)
BILIRUB UR QL STRIP: NEGATIVE
BLD GP AB SCN SERPL QL: NEGATIVE
BUN SERPL-MCNC: 13 MG/DL (ref 6–20)
CALCIUM SERPL-MCNC: 8.7 MG/DL (ref 8.4–10.2)
CARDIAC TROPONIN I PNL SERPL HS: 10 NG/L
CARDIAC TROPONIN I PNL SERPL HS: 11 NG/L
CHLORIDE SERPL-SCNC: 101 MMOL/L (ref 96–108)
CLARITY UR: CLEAR
CO2 SERPL-SCNC: 29 MMOL/L (ref 22–33)
COLOR UR: YELLOW
CREAT SERPL-MCNC: 1.46 MG/DL (ref 0.5–1.2)
EOSINOPHIL # BLD AUTO: 0.12 THOUSAND/ΜL (ref 0–0.61)
EOSINOPHIL NFR BLD AUTO: 1 % (ref 0–6)
ERYTHROCYTE [DISTWIDTH] IN BLOOD BY AUTOMATED COUNT: 19.4 % (ref 11.6–15.1)
ETHANOL SERPL-MCNC: <10 MG/DL
GFR SERPL CREATININE-BSD FRML MDRD: 46 ML/MIN/1.73SQ M
GLUCOSE SERPL-MCNC: 92 MG/DL (ref 65–140)
GLUCOSE SERPL-MCNC: 94 MG/DL (ref 65–140)
GLUCOSE UR STRIP-MCNC: NEGATIVE MG/DL
HCT VFR BLD AUTO: 23.8 % (ref 36.5–49.3)
HEMOCCULT STL QL IA: NEGATIVE
HGB BLD-MCNC: 6.4 G/DL (ref 12–17)
HGB UR QL STRIP.AUTO: ABNORMAL
IMM GRANULOCYTES # BLD AUTO: 0.03 THOUSAND/UL (ref 0–0.2)
IMM GRANULOCYTES NFR BLD AUTO: 0 % (ref 0–2)
KETONES UR STRIP-MCNC: NEGATIVE MG/DL
LEUKOCYTE ESTERASE UR QL STRIP: NEGATIVE
LYMPHOCYTES # BLD AUTO: 0.77 THOUSANDS/ΜL (ref 0.6–4.47)
LYMPHOCYTES NFR BLD AUTO: 7 % (ref 14–44)
MCH RBC QN AUTO: 17.9 PG (ref 26.8–34.3)
MCHC RBC AUTO-ENTMCNC: 26.9 G/DL (ref 31.4–37.4)
MCV RBC AUTO: 67 FL (ref 82–98)
MONOCYTES # BLD AUTO: 1.04 THOUSAND/ΜL (ref 0.17–1.22)
MONOCYTES NFR BLD AUTO: 9 % (ref 4–12)
NEUTROPHILS # BLD AUTO: 9.4 THOUSANDS/ΜL (ref 1.85–7.62)
NEUTS SEG NFR BLD AUTO: 83 % (ref 43–75)
NITRITE UR QL STRIP: NEGATIVE
NON-SQ EPI CELLS URNS QL MICRO: ABNORMAL /HPF
NRBC BLD AUTO-RTO: 0 /100 WBCS
PH UR STRIP.AUTO: 5.5 [PH]
PLATELET # BLD AUTO: 301 THOUSANDS/UL (ref 149–390)
PMV BLD AUTO: 9 FL (ref 8.9–12.7)
POTASSIUM SERPL-SCNC: 3.9 MMOL/L (ref 3.5–5)
PROT SERPL-MCNC: 6.2 G/DL (ref 6.4–8.3)
PROT UR STRIP-MCNC: NEGATIVE MG/DL
RBC # BLD AUTO: 3.57 MILLION/UL (ref 3.88–5.62)
RBC #/AREA URNS AUTO: ABNORMAL /HPF
RH BLD: POSITIVE
SODIUM SERPL-SCNC: 137 MMOL/L (ref 133–145)
SP GR UR STRIP.AUTO: 1.01 (ref 1–1.03)
SPECIMEN EXPIRATION DATE: NORMAL
UROBILINOGEN UR QL STRIP.AUTO: 0.2 E.U./DL
WBC # BLD AUTO: 11.39 THOUSAND/UL (ref 4.31–10.16)
WBC #/AREA URNS AUTO: ABNORMAL /HPF

## 2022-01-31 PROCEDURE — 85025 COMPLETE CBC W/AUTO DIFF WBC: CPT | Performed by: EMERGENCY MEDICINE

## 2022-01-31 PROCEDURE — 86850 RBC ANTIBODY SCREEN: CPT | Performed by: EMERGENCY MEDICINE

## 2022-01-31 PROCEDURE — 36415 COLL VENOUS BLD VENIPUNCTURE: CPT | Performed by: EMERGENCY MEDICINE

## 2022-01-31 PROCEDURE — 82728 ASSAY OF FERRITIN: CPT

## 2022-01-31 PROCEDURE — 83550 IRON BINDING TEST: CPT

## 2022-01-31 PROCEDURE — 99285 EMERGENCY DEPT VISIT HI MDM: CPT

## 2022-01-31 PROCEDURE — G0328 FECAL BLOOD SCRN IMMUNOASSAY: HCPCS | Performed by: EMERGENCY MEDICINE

## 2022-01-31 PROCEDURE — 70450 CT HEAD/BRAIN W/O DYE: CPT

## 2022-01-31 PROCEDURE — G1004 CDSM NDSC: HCPCS

## 2022-01-31 PROCEDURE — 81001 URINALYSIS AUTO W/SCOPE: CPT | Performed by: INTERNAL MEDICINE

## 2022-01-31 PROCEDURE — 82272 OCCULT BLD FECES 1-3 TESTS: CPT

## 2022-01-31 PROCEDURE — 86901 BLOOD TYPING SEROLOGIC RH(D): CPT | Performed by: EMERGENCY MEDICINE

## 2022-01-31 PROCEDURE — 84484 ASSAY OF TROPONIN QUANT: CPT | Performed by: EMERGENCY MEDICINE

## 2022-01-31 PROCEDURE — 82948 REAGENT STRIP/BLOOD GLUCOSE: CPT

## 2022-01-31 PROCEDURE — P9016 RBC LEUKOCYTES REDUCED: HCPCS

## 2022-01-31 PROCEDURE — 99291 CRITICAL CARE FIRST HOUR: CPT | Performed by: EMERGENCY MEDICINE

## 2022-01-31 PROCEDURE — 93005 ELECTROCARDIOGRAM TRACING: CPT

## 2022-01-31 PROCEDURE — 82607 VITAMIN B-12: CPT

## 2022-01-31 PROCEDURE — 30233N1 TRANSFUSION OF NONAUTOLOGOUS RED BLOOD CELLS INTO PERIPHERAL VEIN, PERCUTANEOUS APPROACH: ICD-10-PCS | Performed by: INTERNAL MEDICINE

## 2022-01-31 PROCEDURE — 99223 1ST HOSP IP/OBS HIGH 75: CPT

## 2022-01-31 PROCEDURE — 86920 COMPATIBILITY TEST SPIN: CPT

## 2022-01-31 PROCEDURE — 80053 COMPREHEN METABOLIC PANEL: CPT | Performed by: EMERGENCY MEDICINE

## 2022-01-31 PROCEDURE — 82077 ASSAY SPEC XCP UR&BREATH IA: CPT | Performed by: EMERGENCY MEDICINE

## 2022-01-31 PROCEDURE — 99284 EMERGENCY DEPT VISIT MOD MDM: CPT | Performed by: EMERGENCY MEDICINE

## 2022-01-31 PROCEDURE — 86900 BLOOD TYPING SEROLOGIC ABO: CPT | Performed by: EMERGENCY MEDICINE

## 2022-01-31 PROCEDURE — 82746 ASSAY OF FOLIC ACID SERUM: CPT

## 2022-01-31 PROCEDURE — 83540 ASSAY OF IRON: CPT

## 2022-01-31 PROCEDURE — 99283 EMERGENCY DEPT VISIT LOW MDM: CPT

## 2022-01-31 RX ORDER — DOCUSATE SODIUM 100 MG/1
100 CAPSULE, LIQUID FILLED ORAL 2 TIMES DAILY
Status: DISCONTINUED | OUTPATIENT
Start: 2022-02-01 | End: 2022-02-06 | Stop reason: HOSPADM

## 2022-01-31 RX ORDER — METOPROLOL SUCCINATE 25 MG/1
25 TABLET, EXTENDED RELEASE ORAL DAILY
Status: DISCONTINUED | OUTPATIENT
Start: 2022-01-31 | End: 2022-02-06 | Stop reason: HOSPADM

## 2022-01-31 RX ORDER — TAMSULOSIN HYDROCHLORIDE 0.4 MG/1
0.4 CAPSULE ORAL DAILY
Status: DISCONTINUED | OUTPATIENT
Start: 2022-02-01 | End: 2022-02-06 | Stop reason: HOSPADM

## 2022-01-31 RX ORDER — METOPROLOL SUCCINATE 25 MG/1
25 TABLET, EXTENDED RELEASE ORAL DAILY
Status: DISCONTINUED | OUTPATIENT
Start: 2022-02-01 | End: 2022-01-31

## 2022-01-31 RX ORDER — FERROUS SULFATE 325(65) MG
325 TABLET ORAL
Status: DISCONTINUED | OUTPATIENT
Start: 2022-02-01 | End: 2022-02-02

## 2022-01-31 RX ORDER — LANOLIN ALCOHOL/MO/W.PET/CERES
400 CREAM (GRAM) TOPICAL DAILY
Status: DISCONTINUED | OUTPATIENT
Start: 2022-02-01 | End: 2022-02-06 | Stop reason: HOSPADM

## 2022-01-31 NOTE — DISCHARGE INSTRUCTIONS
Follow up with urology associates     Don't remove catheter until you see the urologist   Labs Reviewed   UA W REFLEX TO MICROSCOPIC WITH REFLEX TO CULTURE - Abnormal       Result Value Ref Range Status    Color, UA Yellow  Yellow Final    Clarity, UA Clear  Clear Final    Specific Gravity, UA 1 015  1 001 - 1 030 Final    pH, UA 5 5  5 0, 5 5, 6 0, 6 5, 7 0, 7 5, 8 0 Final    Leukocytes, UA Negative  Negative Final    Nitrite, UA Negative  Negative Final    Protein, UA Negative  Negative, Interference- unable to analyze mg/dl Final    Glucose, UA Negative  Negative mg/dl Final    Ketones, UA Negative  Negative mg/dl Final    Urobilinogen, UA 0 2  0 2, 1 0 E U /dl E U /dl Final    Bilirubin, UA Negative  Negative Final    Blood, UA Trace-Intact (*) Negative Final   URINE MICROSCOPIC - Abnormal    RBC, UA 4-10 (*) None Seen, 0-1, 1-2, 2-4, 0-5 /hpf Final    WBC, UA 1-2  None Seen, 0-1, 1-2, 0-5, 2-4 /hpf Final    Epithelial Cells Occasional  None Seen, Occasional /hpf Final    Bacteria, UA Occasional  None Seen, Occasional /hpf Final

## 2022-01-31 NOTE — ED NOTES
Pt given leg bag  Instructed on Cloud care, pt returned demonstration and verbalized understanding  Told if he has any question or concerns after DC, he is welcome to call ROXIE Crooks RN  01/31/22 7757

## 2022-01-31 NOTE — ED PROVIDER NOTES
History  Chief Complaint   Patient presents with    Urinary Retention     arrived via [place] [address] [facility] with reports of inablity to urinate when he "wants to"      This is a 78years old came for having urinary tension  Patient stated that he cannot pass urine and all what he passes this morning is dribbling of urine  Patient sometimes having urinary incontinence  Patient has history of BPH and he is on Flomax  Patient has history of CAD and he is on Eliquis  Patient has history of COPD and is on medication for it  Patient denies any abdominal pain  Patient has no nausea vomiting  Patient has no fever  Patient stated that he has urinary catheter in the past before  Prior to Admission Medications   Prescriptions Last Dose Informant Patient Reported? Taking? apixaban (ELIQUIS) 5 mg 1/31/2022 at Unknown time  No Yes   Sig: Take 1 tablet (5 mg total) by mouth 2 (two) times a day   docusate sodium (COLACE) 100 mg capsule   No No   Sig: Take 1 capsule (100 mg total) by mouth 2 (two) times a day   ferrous sulfate 324 (65 Fe) mg   No No   Sig: Take 1 tablet (324 mg total) by mouth 2 (two) times a day before meals   fluticasone-salmeterol (Advair Diskus) 100-50 mcg/dose inhaler   No No   Sig: Inhale 1 puff 2 (two) times a day Rinse mouth after use     folic acid (FOLVITE) 576 mcg tablet   No No   Sig: Take 1 tablet (400 mcg total) by mouth daily   furosemide (LASIX) 40 mg tablet   No No   Sig: TAKE ONE-HALF (1/2) TABLET EVERY OTHER DAY   ibuprofen (MOTRIN) 400 mg tablet   No No   Sig: Take 1 tablet (400 mg total) by mouth every 6 (six) hours as needed for mild pain for up to 5 days   ipratropium-albuterol (DUO-NEB) 0 5-2 5 mg/3 mL nebulizer solution   No No   Sig: Take 3 mL by nebulization 4 (four) times a day   lisinopril (ZESTRIL) 2 5 mg tablet 1/31/2022 at Unknown time  No Yes   Sig: Take 1 tablet (2 5 mg total) by mouth daily   metoprolol succinate (Toprol XL) 25 mg 24 hr tablet   No No   Sig: Take 1 tablet (25 mg total) by mouth daily   pantoprazole (PROTONIX) 40 mg tablet   No No   Sig: Take 1 tablet (40 mg total) by mouth daily before breakfast   Patient not taking: Reported on 10/14/2021   potassium chloride (Klor-Con) 10 mEq tablet   No No   Sig: Take 1 tablet (10 mEq total) by mouth daily   tamsulosin (FLOMAX) 0 4 mg 1/31/2022 at Unknown time  No Yes   Sig: Take 1 capsule (0 4 mg total) by mouth daily      Facility-Administered Medications: None       Past Medical History:   Diagnosis Date    Anxiety disorder     Atrial fibrillation (HCC)     Coronary artery disease     Depression     Hepatitis C     screening negative in 1/2017    [facility]        Past Surgical History:   Procedure Laterality Date    CARDIAC CATHETERIZATION  07/09/2018       Family History   Problem Relation Age of Onset    Hypertension Mother     Coronary artery disease Mother         premature    Hypertension Father     Coronary artery disease Father         premature    Diabetes Father      I have reviewed and agree with the history as documented  E-Cigarette/Vaping    E-Cigarette Use Never User      E-Cigarette/Vaping Substances    Nicotine No     THC No     CBD No     Flavoring No     Other No     Unknown No      Social History     Tobacco Use    Smoking status: Current Every Day Smoker     Packs/day: 0 50     Years: 57 00     Pack years: 28 50     Types: Cigarettes    Smokeless tobacco: Never Used    Tobacco comment: since age 17; Has history of smoking for over 54 years  He has been smoking more than packet daily in the past however he has been smokes about half a pack a day lately and stopped smoking on 7/1/2018 when he was admitted to the hospital     Vaping Use    Vaping Use: Never used   Substance Use Topics    Alcohol use: Never    Drug use: Never       Review of Systems   Constitutional: Negative for diaphoresis, fatigue and fever     HENT: Negative for sinus pressure, sinus pain, sneezing, sore throat, tinnitus and trouble swallowing  Respiratory: Negative for cough, chest tightness and shortness of breath  Cardiovascular: Negative for chest pain, palpitations and leg swelling  Gastrointestinal: Negative for abdominal pain, diarrhea, nausea and vomiting  Genitourinary: Positive for decreased urine volume and difficulty urinating  Negative for dysuria, flank pain, hematuria, penile discharge, penile pain, penile swelling, scrotal swelling, testicular pain and urgency  Musculoskeletal: Negative for arthralgias, back pain, gait problem, neck pain and neck stiffness  Skin: Negative for color change, pallor and rash  Neurological: Negative for dizziness, light-headedness and headaches  Hematological: Negative for adenopathy  Does not bruise/bleed easily  Psychiatric/Behavioral: Negative for agitation and behavioral problems  Physical Exam  Physical Exam  Vitals and nursing note reviewed  Constitutional:       General: He is not in acute distress  Appearance: Normal appearance  He is well-developed  He is not ill-appearing, toxic-appearing or diaphoretic  HENT:      Head: Normocephalic and atraumatic  Right Ear: Ear canal normal       Left Ear: Ear canal normal       Nose: Nose normal  No congestion or rhinorrhea  Mouth/Throat:      Mouth: Mucous membranes are moist       Pharynx: No oropharyngeal exudate or posterior oropharyngeal erythema  Eyes:      Extraocular Movements: Extraocular movements intact  Pupils: Pupils are equal, round, and reactive to light  Neck:      Vascular: No carotid bruit  Cardiovascular:      Rate and Rhythm: Normal rate and regular rhythm  Heart sounds: Normal heart sounds  No murmur heard  No friction rub  No gallop  Pulmonary:      Effort: Pulmonary effort is normal  No respiratory distress  Breath sounds: Normal breath sounds  No stridor  No rhonchi  Chest:      Chest wall: No tenderness  Abdominal:      General: Bowel sounds are normal  There is no distension  Palpations: Abdomen is soft  There is no mass  Tenderness: There is no abdominal tenderness  There is no right CVA tenderness, left CVA tenderness, guarding or rebound  Hernia: No hernia is present  Comments: Has suprapubic fullness, up to umblicus  Genitourinary:     Penis: Normal        Testes: Normal    Musculoskeletal:         General: No swelling, tenderness, deformity or signs of injury  Normal range of motion  Cervical back: Normal range of motion and neck supple  No rigidity or tenderness  Right lower leg: No edema  Left lower leg: No edema  Lymphadenopathy:      Cervical: No cervical adenopathy  Skin:     General: Skin is warm and dry  Neurological:      Mental Status: He is alert and oriented to person, place, and time     Psychiatric:         Behavior: Behavior normal          Vital Signs  ED Triage Vitals   Temperature Pulse Respirations Blood Pressure SpO2   01/31/22 1016 01/31/22 1016 01/31/22 1016 01/31/22 1016 01/31/22 1016   98 3 °F (36 8 °C) (!) 118 18 98/60 100 %      Temp Source Heart Rate Source Patient Position - Orthostatic VS BP Location FiO2 (%)   01/31/22 1016 -- -- -- --   Oral          Pain Score       01/31/22 1014       No Pain           Vitals:    01/31/22 1016   BP: 98/60   Pulse: (!) 118         Visual Acuity      ED Medications  Medications - No data to display    Diagnostic Studies  Results Reviewed     Procedure Component Value Units Date/Time    Urine Microscopic [[id number]]  (Abnormal) Collected: 01/31/22 1034    Lab Status: Final result Specimen: Urine, Indwelling Cloud Catheter Updated: 01/31/22 1107     RBC, UA 4-10 /hpf      WBC, UA 1-2 /hpf      Epithelial Cells Occasional /hpf      Bacteria, UA Occasional /hpf     UA w Reflex to Microscopic w Reflex to Culture [[id number]]  (Abnormal) Collected: 01/31/22 1034    Lab Status: Final result Specimen: Urine, Indwelling Cloud Catheter Updated: 01/31/22 1039     Color, UA Yellow     Clarity, UA Clear     Specific Gravity, UA 1 015     pH, UA 5 5     Leukocytes, UA Negative     Nitrite, UA Negative     Protein, UA Negative mg/dl      Glucose, UA Negative mg/dl      Ketones, UA Negative mg/dl      Urobilinogen, UA 0 2 E U /dl      Bilirubin, UA Negative     Blood, UA Trace-Intact                 No orders to display              Procedures  Procedures         ED Course  ED Course as of 02/01/22 0907   [facility] Jan 31, 2022   1054 Bladder scan done and shows urine >1000cc   1055 Cloud's catheter inserted and 1300cc of clear urine comes out  MDM  Number of Diagnoses or Management Options  Diagnosis management comments: A this is a 78years old came for having urinary retention  Patient has what looks like a bulge ROM is from the suprapubic area all the way to the umbilical area  Bladder scan shows more than 1000 cc of urine  Cloud catheter inserted and about 1300 cc of clear urine comes out  Patient has history of BPH and is on Flomax  Patient discharged home to continue his Flomax and follow-up with urologist   We give him any move on with the urologist for follow-up  All question concerns of the patient addressed     Patient felt much better after the castor  Patient is going to be discharged on a leg bag  UA was done and came back negative         Amount and/or Complexity of Data Reviewed  Clinical lab tests: ordered and reviewed    Risk of Complications, Morbidity, and/or Mortality  Presenting problems: moderate  Diagnostic procedures: moderate  Management options: low        Disposition  Final diagnoses:   Urinary retention     Time reflects when diagnosis was documented in both MDM as applicable and the Disposition within this note     Time User Action Codes Description Comment    1/31/2022 11:15 AM Nati Rowe Add [R33 9] Urinary retention       ED Disposition     ED Disposition Condition Date/Time Comment    Discharge Stable Mon Jan 31, 2022 11:15 AM Robin Eastman discharge to home/self care              Follow-up Information     Follow up With Specialties Details Why Contact Info    [facility] In 2 days  Fernanda Lewis [address] [place], [place] [address] [address]  [phone]            Discharge Medication List as of 1/31/2022 11:18 AM      CONTINUE these medications which have NOT CHANGED    Details   apixaban (ELIQUIS) 5 mg Take 1 tablet (5 mg total) by mouth 2 (two) times a day, Starting Fri 9/10/2021, Until Mon 1/31/2022, Normal      lisinopril (ZESTRIL) 2 5 mg tablet Take 1 tablet (2 5 mg total) by mouth daily, Starting Fri 9/10/2021, Normal      tamsulosin (FLOMAX) 0 4 mg Take 1 capsule (0 4 mg total) by mouth daily, Starting Fri 9/10/2021, Normal      docusate sodium (COLACE) 100 mg capsule Take 1 capsule (100 mg total) by mouth 2 (two) times a day, Starting Fri 9/10/2021, Until Thu 1/13/2022, Normal      ferrous sulfate 324 (65 Fe) mg Take 1 tablet (324 mg total) by mouth 2 (two) times a day before meals, Starting Fri 9/10/2021, Until Thu 1/13/2022, Normal      fluticasone-salmeterol (Advair Diskus) 100-50 mcg/dose inhaler Inhale 1 puff 2 (two) times a day Rinse mouth after use , Starting Fri 9/10/2021, Until Thu 6/83/4867, Normal      folic acid (FOLVITE) 686 mcg tablet Take 1 tablet (400 mcg total) by mouth daily, Starting Fri 9/10/2021, Normal      furosemide (LASIX) 40 mg tablet TAKE ONE-HALF (1/2) TABLET EVERY OTHER DAY, Normal      ibuprofen (MOTRIN) 400 mg tablet Take 1 tablet (400 mg total) by mouth every 6 (six) hours as needed for mild pain for up to 5 days, Starting Thu 10/14/2021, Until Thu 1/13/2022 at 2359, Normal      ipratropium-albuterol (DUO-NEB) 0 5-2 5 mg/3 mL nebulizer solution Take 3 mL by nebulization 4 (four) times a day, Starting u 1/13/2022, Until Wed 4/13/2022, Normal      metoprolol succinate (Toprol XL) 25 mg 24 hr tablet Take 1 tablet (25 mg total) by mouth daily, Starting Tue 7/27/2021, Normal      pantoprazole (PROTONIX) 40 mg tablet Take 1 tablet (40 mg total) by mouth daily before breakfast, Starting Fri 9/10/2021, Until Thu 12/9/2021, Normal      potassium chloride (Klor-Con) 10 mEq tablet Take 1 tablet (10 mEq total) by mouth daily, Starting Fri 9/10/2021, Normal             No discharge procedures on file      PDMP Review     None          ED Provider  Electronically Signed by           Phyllis Quinonez MD  02/01/22 1849

## 2022-01-31 NOTE — ED NOTES
Patient educated on use and drainage of miller bag, able to demonstrate procedure        Shelley Delgado RN  01/31/22 8459

## 2022-01-31 NOTE — ED PROVIDER NOTES
History  Chief Complaint   Patient presents with    Urinary Catheter Problem     brought in by EMS, catheter bag fell off, "I feel weak and dizzy is there any way you can admit me?"     This is a 56-year-old male who presents to the emergency department complaining of his urinary catheter falling off  Patient was found to be in urinary retention this morning and was given a leg bag and an overnight bag  He states he attempted to change the bag but could not figure out how to put the bag back onto the catheter  He states it has been leaking since then  He also states that he is very dizzy  He states when he gets up to stand he begins to be dizzy  As he is walking he has morbid dizzy  He has had no chest pain or trouble breathing  He denies weakness in his arms or his legs  He denies any recent fever  He denies a cough  Prior to Admission Medications   Prescriptions Last Dose Informant Patient Reported? Taking? apixaban (ELIQUIS) 5 mg   No No   Sig: Take 1 tablet (5 mg total) by mouth 2 (two) times a day   docusate sodium (COLACE) 100 mg capsule   No No   Sig: Take 1 capsule (100 mg total) by mouth 2 (two) times a day   ferrous sulfate 324 (65 Fe) mg   No No   Sig: Take 1 tablet (324 mg total) by mouth 2 (two) times a day before meals   fluticasone-salmeterol (Advair Diskus) 100-50 mcg/dose inhaler   No No   Sig: Inhale 1 puff 2 (two) times a day Rinse mouth after use     folic acid (FOLVITE) 345 mcg tablet   No No   Sig: Take 1 tablet (400 mcg total) by mouth daily   furosemide (LASIX) 40 mg tablet   No No   Sig: TAKE ONE-HALF (1/2) TABLET EVERY OTHER DAY   ibuprofen (MOTRIN) 400 mg tablet   No No   Sig: Take 1 tablet (400 mg total) by mouth every 6 (six) hours as needed for mild pain for up to 5 days   ipratropium-albuterol (DUO-NEB) 0 5-2 5 mg/3 mL nebulizer solution   No No   Sig: Take 3 mL by nebulization 4 (four) times a day   lisinopril (ZESTRIL) 2 5 mg tablet   No No   Sig: Take 1 tablet (2 5 mg total) by mouth daily   metoprolol succinate (Toprol XL) 25 mg 24 hr tablet   No No   Sig: Take 1 tablet (25 mg total) by mouth daily   pantoprazole (PROTONIX) 40 mg tablet   No No   Sig: Take 1 tablet (40 mg total) by mouth daily before breakfast   Patient not taking: Reported on 10/14/2021   potassium chloride (Klor-Con) 10 mEq tablet   No No   Sig: Take 1 tablet (10 mEq total) by mouth daily   tamsulosin (FLOMAX) 0 4 mg   No No   Sig: Take 1 capsule (0 4 mg total) by mouth daily      Facility-Administered Medications: None       Past Medical History:   Diagnosis Date    Anxiety disorder     Atrial fibrillation (HCC)     Coronary artery disease     Depression     Hepatitis C     screening negative in 1/2017    [facility] ([facility] infarction[facility])        Past Surgical History:   Procedure Laterality Date    CARDIAC CATHETERIZATION  07/09/2018       Family History   Problem Relation Age of Onset    Hypertension Mother     Coronary artery disease Mother         premature    Hypertension Father     Coronary artery disease Father         premature    Diabetes Father      I have reviewed and agree with the history as documented  E-Cigarette/Vaping    E-Cigarette Use Never User      E-Cigarette/Vaping Substances    Nicotine No     THC No     CBD No     Flavoring No     Other No     Unknown No      Social History     Tobacco Use    Smoking status: Current Every Day Smoker     Packs/day: 0 50     Years: 57 00     Pack years: 28 50     Types: Cigarettes    Smokeless tobacco: Never Used    Tobacco comment: since age 17; Has history of smoking for over 54 years   He has been smoking more than packet daily in the past however he has been smokes about half a pack a day lately and stopped smoking on 7/1/2018 when he was admitted to the hospital     Vaping Use    Vaping Use: Never used   Substance Use Topics    Alcohol use: Never    Drug use: Never       Review of Systems   All other systems reviewed and are negative        Physical Exam  Physical Exam  Constitutional:  Vital signs reviewed, patient appears nontoxic, no acute distress  Eyes: Pupils equal round reactive to light and accommodation, extraocular muscles intact  HEENT: trachea midline, no JVD, moist mucous membranes  Respiratory: lung sounds clear throughout, no rhonchi, no rales  Cardiovascular: regular rate rhythm, no murmurs or rubs  Abdomen: soft, nontender, nondistended, no rebound or guarding  Back: no CVA tenderness, normal inspection  :  Cloud in place, with no bag attached, leaking urine  Extremities: no edema, pulses equal in all 4 extremities  Neuro: awake, alert, oriented, no focal weakness  Skin: warm, dry, intact, no rashes noted    Vital Signs  ED Triage Vitals   Temperature Pulse Respirations Blood Pressure SpO2   01/31/22 1832 01/31/22 1834 01/31/22 1834 01/31/22 1834 01/31/22 1834   98 2 °F (36 8 °C) (!) 112 20 125/66 100 %      Temp Source Heart Rate Source Patient Position - Orthostatic VS BP Location FiO2 (%)   01/31/22 1832 01/31/22 1834 01/31/22 1834 01/31/22 1834 --   Oral Monitor Sitting Right arm       Pain Score       01/31/22 1832       No Pain           Vitals:    01/31/22 1834 01/31/22 1919 01/31/22 2110 01/31/22 2133   BP: 125/66 98/64 131/86 113/77   Pulse: (!) 112 105 (!) 124 (!) 114   Patient Position - Orthostatic VS: Sitting Lying           Visual Acuity      ED Medications  Medications - No data to display    Diagnostic Studies  Results Reviewed     Procedure Component Value Units Date/Time    HS Troponin I 2hr [[id number]] Collected: 01/31/22 2141    Lab Status: No result Specimen: Blood from Arm, Left     Occult Bloood,Fecal Immunochemical [[id number]]  (Normal) Collected: 01/31/22 2003    Lab Status: Final result Specimen: Stool from Per Rectum Updated: 01/31/22 2014     OCCULT BLD, FECAL IMMUNOLOGICAL Negative    Narrative:        Performed by Fecal Immunochemical Test     HS Troponin I 4hr [[id number]] Lab Status: No result Specimen: Blood     HS Troponin 0hr (reflex protocol) [[id number]]  (Normal) Collected: 01/31/22 1921    Lab Status: Final result Specimen: Blood from Arm, Left Updated: 01/31/22 1952     hs TnI 0hr 10 ng/L     Comprehensive metabolic panel [[id number]]  (Abnormal) Collected: 01/31/22 1921    Lab Status: Final result Specimen: Blood from Arm, Left Updated: 01/31/22 1950     Sodium 137 mmol/L      Potassium 3 9 mmol/L      Chloride 101 mmol/L      CO2 29 mmol/L      ANION GAP 7 mmol/L      BUN 13 mg/dL      Creatinine 1 46 mg/dL      Glucose 94 mg/dL      Calcium 8 7 mg/dL      AST 14 U/L      ALT 9 U/L      Alkaline Phosphatase 83 8 U/L      Total Protein 6 2 g/dL      Albumin 3 7 g/dL      Total Bilirubin 1 04 mg/dL      eGFR 46 ml/min/1 73sq m     Narrative:      [place] guidelines for Chronic Kidney Disease (CKD):     Stage 1 with normal or high GFR (GFR > 90 mL/min/1 73 square meters)    Stage 2 Mild CKD (GFR = 60-89 mL/min/1 73 square meters)    Stage 3A Moderate CKD (GFR = 45-59 mL/min/1 73 square meters)    Stage 3B Moderate CKD (GFR = 30-44 mL/min/1 73 square meters)    Stage 4 Severe CKD (GFR = 15-29 mL/min/1 73 square meters)    Stage 5 End Stage CKD (GFR <15 mL/min/1 73 square meters)  Note: GFR calculation is accurate only with a steady state creatinine    Ethanol [[id number]]  (Normal) Collected: 01/31/22 1921    Lab Status: Final result Specimen: Blood from Arm, Left Updated: 01/31/22 1950     Ethanol Lvl <10 mg/dL     CBC and differential [[id number]]  (Abnormal) Collected: 01/31/22 1921    Lab Status: Final result Specimen: Blood from Arm, Left Updated: 01/31/22 1934     WBC 11 39 Thousand/uL      RBC 3 57 Million/uL      Hemoglobin 6 4 g/dL      Hematocrit 23 8 %      MCV 67 fL      MCH 17 9 pg      MCHC 26 9 g/dL      RDW 19 4 %      MPV 9 0 fL      Platelets 335 Thousands/uL      nRBC 0 /100 WBCs      Neutrophils Relative 83 %      Immat GRANS % 0 %      Lymphocytes Relative 7 %      Monocytes Relative 9 %      Eosinophils Relative 1 %      Basophils Relative 0 %      Neutrophils Absolute 9 40 Thousands/µL      Immature Grans Absolute 0 03 Thousand/uL      Lymphocytes Absolute 0 77 Thousands/µL      Monocytes Absolute 1 04 Thousand/µL      Eosinophils Absolute 0 12 Thousand/µL      Basophils Absolute 0 03 Thousands/µL     Fingerstick Glucose (POCT) [[id number]]  (Normal) Collected: 01/31/22 1917    Lab Status: Final result Updated: 01/31/22 1919     POC Glucose 92 mg/dl                  CT head without contrast   Final Result by Yomi Sandhu MD (01/31 2021)      No acute intracranial abnormality                    Workstation performed: [id number]                    Procedures  ECG 12 Lead Documentation Only    Date/Time: 1/31/2022 9:35 PM  Performed by: Génesis Nichols DO  Authorized by: Génesis Nichols DO     ECG reviewed by me, the ED Provider: yes    Patient location:  ED  Comments:      AFib, rate 111, irregular ME, normal QTC, no STEMI  CriticalCare Time  Performed by: Génesis Nichols DO  Authorized by: Génesis Nichols DO     Critical care provider statement:     Critical care time (minutes):  30    Critical care time was exclusive of:  Separately billable procedures and treating other patients and teaching time    Critical care was necessary to treat or prevent imminent or life-threatening deterioration of the following conditions:  Circulatory failure    Critical care was time spent personally by me on the following activities:  Obtaining history from patient or surrogate, development of treatment plan with patient or surrogate, discussions with consultants, evaluation of patient's response to treatment, examination of patient, ordering and performing treatments and interventions, ordering and review of laboratory studies, ordering and review of radiographic studies, re-evaluation of patient's condition and review of old charts  Comments: Acute symptomatic anemia causing tachycardia and requiring transfusion of packed red blood cells  ED Course                                             MDM  Number of Diagnoses or Management Options  HERNÁN (acute kidney injury) ([facility] )  Anemia  Urinary retention  Diagnosis management comments: This is a 79-year-old male presents to the emergency department complaining of dizziness as well as resolved urinary retention due to Cloud catheter  The patient had education done on his Cloud catheter  For the patient's dizziness I considered CVA, anemia, electrolyte abnormality  These and other diagnoses were considered  Amount and/or Complexity of Data Reviewed  Clinical lab tests: reviewed and ordered  Tests in the radiology section of CPT®: reviewed and ordered  Decide to obtain previous medical records or to obtain history from someone other than the patient: yes  Review and summarize past medical records: yes  Discuss the patient with other providers: yes (Slim)        Disposition  Final diagnoses:   Anemia   HERNÁN (acute kidney injury) ([facility] )   Urinary retention     Time reflects when diagnosis was documented in both MDM as applicable and the Disposition within this note     Time User Action Codes Description Comment    1/31/2022  8:14 PM Zohreh Shy Add [D64 9] Anemia     1/31/2022  8:14 PM Zohreh Shy Add [N17 9] HERNÁN (acute kidney injury) ([facility] )     1/31/2022  8:14 PM Zohreh Shy Add [R33 9] Urinary retention       ED Disposition     ED Disposition Condition Date/Time Comment    Admit Stable Mon Jan 31, 2022  8:13 PM Case was discussed with Baltazar Rivero and the patient's admission status was agreed to be Admission Status: inpatient status to the service of Dr Anthony Dinh   Follow-up Information    None         Patient's Medications   Discharge Prescriptions    No medications on file       No discharge procedures on file      PDMP Review     None          ED Provider  Electronically Signed by           Eunice Pandey,   01/31/22 0485

## 2022-02-01 PROBLEM — R93.3 ABNORMAL COLONOSCOPY: Status: ACTIVE | Noted: 2022-02-01

## 2022-02-01 LAB
4HR DELTA HS TROPONIN: 0 NG/L
ANION GAP SERPL CALCULATED.3IONS-SCNC: 6 MMOL/L (ref 4–13)
ATRIAL RATE: 111 BPM
BASOPHILS # BLD AUTO: 0.05 THOUSANDS/ΜL (ref 0–0.1)
BASOPHILS NFR BLD AUTO: 1 % (ref 0–1)
BUN SERPL-MCNC: 14 MG/DL (ref 6–20)
CALCIUM SERPL-MCNC: 8.2 MG/DL (ref 8.4–10.2)
CARDIAC TROPONIN I PNL SERPL HS: 10 NG/L
CHLORIDE SERPL-SCNC: 103 MMOL/L (ref 96–108)
CO2 SERPL-SCNC: 29 MMOL/L (ref 22–33)
CREAT SERPL-MCNC: 1.4 MG/DL (ref 0.5–1.2)
EOSINOPHIL # BLD AUTO: 0.43 THOUSAND/ΜL (ref 0–0.61)
EOSINOPHIL NFR BLD AUTO: 5 % (ref 0–6)
ERYTHROCYTE [DISTWIDTH] IN BLOOD BY AUTOMATED COUNT: 20.4 % (ref 11.6–15.1)
FERRITIN SERPL-MCNC: 5 NG/ML (ref 8–388)
FOLATE SERPL-MCNC: >20 NG/ML (ref 3.1–17.5)
GFR SERPL CREATININE-BSD FRML MDRD: 49 ML/MIN/1.73SQ M
GLUCOSE SERPL-MCNC: 92 MG/DL (ref 65–140)
HCT VFR BLD AUTO: 24.5 % (ref 36.5–49.3)
HGB BLD-MCNC: 7 G/DL (ref 12–17)
IMM GRANULOCYTES # BLD AUTO: 0.03 THOUSAND/UL (ref 0–0.2)
IMM GRANULOCYTES NFR BLD AUTO: 0 % (ref 0–2)
IRON SATN MFR SERPL: 3 % (ref 20–50)
IRON SERPL-MCNC: 13 UG/DL (ref 65–175)
LYMPHOCYTES # BLD AUTO: 1.29 THOUSANDS/ΜL (ref 0.6–4.47)
LYMPHOCYTES NFR BLD AUTO: 13 % (ref 14–44)
MCH RBC QN AUTO: 19.4 PG (ref 26.8–34.3)
MCHC RBC AUTO-ENTMCNC: 28.6 G/DL (ref 31.4–37.4)
MCV RBC AUTO: 68 FL (ref 82–98)
MONOCYTES # BLD AUTO: 1.03 THOUSAND/ΜL (ref 0.17–1.22)
MONOCYTES NFR BLD AUTO: 11 % (ref 4–12)
NEUTROPHILS # BLD AUTO: 6.79 THOUSANDS/ΜL (ref 1.85–7.62)
NEUTS SEG NFR BLD AUTO: 70 % (ref 43–75)
NRBC BLD AUTO-RTO: 0 /100 WBCS
PLATELET # BLD AUTO: 287 THOUSANDS/UL (ref 149–390)
PMV BLD AUTO: 9.4 FL (ref 8.9–12.7)
POTASSIUM SERPL-SCNC: 3.9 MMOL/L (ref 3.5–5)
PR INTERVAL: 133 MS
QRS AXIS: 72 DEGREES
QRSD INTERVAL: 102 MS
QT INTERVAL: 330 MS
QTC INTERVAL: 449 MS
RBC # BLD AUTO: 3.6 MILLION/UL (ref 3.88–5.62)
SODIUM SERPL-SCNC: 138 MMOL/L (ref 133–145)
T WAVE AXIS: 60 DEGREES
TIBC SERPL-MCNC: 458 UG/DL (ref 250–450)
VENTRICULAR RATE: 111 BPM
VIT B12 SERPL-MCNC: 250 PG/ML (ref 100–900)
WBC # BLD AUTO: 9.62 THOUSAND/UL (ref 4.31–10.16)

## 2022-02-01 PROCEDURE — 85025 COMPLETE CBC W/AUTO DIFF WBC: CPT

## 2022-02-01 PROCEDURE — P9016 RBC LEUKOCYTES REDUCED: HCPCS

## 2022-02-01 PROCEDURE — 36415 COLL VENOUS BLD VENIPUNCTURE: CPT

## 2022-02-01 PROCEDURE — 93010 ELECTROCARDIOGRAM REPORT: CPT | Performed by: INTERNAL MEDICINE

## 2022-02-01 PROCEDURE — 97163 PT EVAL HIGH COMPLEX 45 MIN: CPT

## 2022-02-01 PROCEDURE — 80048 BASIC METABOLIC PNL TOTAL CA: CPT

## 2022-02-01 PROCEDURE — 99232 SBSQ HOSP IP/OBS MODERATE 35: CPT | Performed by: INTERNAL MEDICINE

## 2022-02-01 RX ORDER — ACETAMINOPHEN 325 MG/1
650 TABLET ORAL EVERY 6 HOURS PRN
Status: DISCONTINUED | OUTPATIENT
Start: 2022-02-01 | End: 2022-02-06 | Stop reason: HOSPADM

## 2022-02-01 RX ADMIN — APIXABAN 5 MG: 5 TABLET, FILM COATED ORAL at 18:25

## 2022-02-01 RX ADMIN — APIXABAN 5 MG: 5 TABLET, FILM COATED ORAL at 11:30

## 2022-02-01 RX ADMIN — FOLIC ACID TAB 400 MCG 400 MCG: 400 TAB at 11:30

## 2022-02-01 RX ADMIN — IRON SUCROSE 200 MG: 20 INJECTION, SOLUTION INTRAVENOUS at 13:27

## 2022-02-01 RX ADMIN — ACETAMINOPHEN 650 MG: 325 TABLET, FILM COATED ORAL at 04:09

## 2022-02-01 RX ADMIN — DOCUSATE SODIUM 100 MG: 100 CAPSULE, LIQUID FILLED ORAL at 11:30

## 2022-02-01 RX ADMIN — METOPROLOL SUCCINATE 25 MG: 25 TABLET, EXTENDED RELEASE ORAL at 11:30

## 2022-02-01 RX ADMIN — TAMSULOSIN HYDROCHLORIDE 0.4 MG: 0.4 CAPSULE ORAL at 11:30

## 2022-02-01 RX ADMIN — FERROUS SULFATE TAB 325 MG (65 MG ELEMENTAL FE) 325 MG: 325 (65 FE) TAB at 11:30

## 2022-02-01 RX ADMIN — METOPROLOL SUCCINATE 25 MG: 25 TABLET, EXTENDED RELEASE ORAL at 01:51

## 2022-02-01 RX ADMIN — DOCUSATE SODIUM 100 MG: 100 CAPSULE, LIQUID FILLED ORAL at 18:25

## 2022-02-01 NOTE — ASSESSMENT & PLAN NOTE
· Patient uses canes and braces for ambulation due to patella fracture  · Follows with Ortho Dr Caity Diaz  · Fall precautions  · PT/OT consult

## 2022-02-01 NOTE — H&P
Sarah U  68   H&P- Mónica Duke 1945, 78 y o  male MRN: [id number]  Unit/Bed#: ED 14 Encounter: [id number]  Primary Care Provider: Sade Barriga MD   Date and time admitted to hospital: 1/31/2022  6:33 PM    * Symptomatic anemia  Assessment & Plan  · Presents complaining of dizziness with ambulating and at rest   Denies associated chest pain, shortness of breath, weakness  Denies melena, hematochezia, hematemesis  FOBT negative  · Hgb 6 4  · Received 1 unit PRBCs in ED  · Continue to trend  · Baseline hemoglobin appears 7-8 due to iron deficiency in the past    · Check iron panel, folate, B12    Dizziness  Assessment & Plan  · Complains of dizziness starting this afternoon when standing up from table  Denies any loss of consciousness, head strike, syncope, chest pain, palpitation, weakness, dysarthria,   · Likely secondary to anemia  · Check orthostatics, has history of orthostasis in past  · Fall precautions, OOB with assistance   · Tele  · PT/OT    HERNÁN (acute kidney injury) ([facility] )  Assessment & Plan  · Cr 1 46  · Likely multifactorial prerenal/post renal in setting of acute urinary retention treated this morning with placement of Cloud catheter  · Received 1 unit PRBCs in ED  · Recheck in a m    Ambulatory dysfunction  Assessment & Plan  · Patient uses canes and braces for ambulation due to patella fracture  · Follows with Ortho Dr Sebastian Giles  · Fall precautions  · PT/OT consult    Permanent atrial fibrillation [facility])  Assessment & Plan  · Noted be in Afib with a rate of 111  Give home metoprolol now   · AC:  Eliquis  · Rate control:  Metoprolol    Urinary retention  Assessment & Plan  · Evaluated here earlier this morning and diagnosed with urinary retention  Sent home with Cloud catheter  Reports back to ER initially for full issues with changing Cloud bag    Patient was provided with education understands how to change bag   · I&Os  · Continue Flomax    VTE Pharmacologic Prophylaxis: VTE Score: 2 continue Eliquis  Code Status: Level 1 - Full Code   Discussion with family: Patient declined call to   Anticipated Length of Stay: Patient will be admitted on an inpatient basis with an anticipated length of stay of greater than 2 midnights secondary to Dizziness, symptomatic anemia, HERNÁN  Total Time for Visit, including Counseling / Coordination of Care: 60 minutes Greater than 50% of this total time spent on direct patient counseling and coordination of care  Chief Complaint:  Dizziness    History of Present Illness:  Chris Brock is a 78 y o  male with a PMH of CAD, atrial fibrillation on Eliquis  who presents with complaints of Cloud catheter bag dysfunction and dizziness  Patient was evaluated earlier today in the ED for acute urinary retention  Had a Cloud catheter placed and was discharged  Patient states that in the day he had difficulty changing the bag and needed assistance  UA obtained this a m  Without infection  Patient also complains of dizziness that started this afternoon when standing up from sitting at his table playing cards  Denies any chest pain, palpitations, syncope, loss of consciousness, head strike  He denies any stroke-like symptoms  Reports no changes in appetite  States he does drink a lot of soda and does not drink as much water as he should    In ED CT head negative for intracranial abnormality  Patient anemic with a hemoglobin of 6 4, baseline appears between 7-8  Consent obtained and 1 unit PRBCs given in ED  He denies any symptoms of active bleeding  FOBT negative  Creatinine 1 46  Review of Systems:  Review of Systems   Constitutional: Negative for appetite change, chills, fatigue and fever  HENT: Negative for congestion  Eyes: Negative for visual disturbance  Respiratory: Negative for apnea, chest tightness, shortness of breath and wheezing      Cardiovascular: Negative for chest pain, palpitations and leg swelling  Gastrointestinal: Negative for abdominal distention, diarrhea, nausea and vomiting  Endocrine: Negative for polyuria  Genitourinary: Negative for difficulty urinating, dysuria, hematuria and urgency  Musculoskeletal: Positive for gait problem  Skin: Negative for rash  Neurological: Positive for dizziness  Negative for syncope, facial asymmetry, weakness, light-headedness, numbness and headaches  Psychiatric/Behavioral: Negative for sleep disturbance  Past Medical and Surgical History:   Past Medical History:   Diagnosis Date    Anxiety disorder     Atrial fibrillation (HCC)     Coronary artery disease     Depression     Hepatitis C     screening negative in 1/2017    [facility]        Past Surgical History:   Procedure Laterality Date    CARDIAC CATHETERIZATION  07/09/2018       Meds/Allergies:  Prior to Admission medications    Medication Sig Start Date End Date Taking? Authorizing Provider   apixaban (ELIQUIS) 5 mg Take 1 tablet (5 mg total) by mouth 2 (two) times a day 9/10/21 1/31/22  Kolby Mtz DO   docusate sodium (COLACE) 100 mg capsule Take 1 capsule (100 mg total) by mouth 2 (two) times a day 9/10/21 1/13/22  Kolby Mtz DO   ferrous sulfate 324 (65 Fe) mg Take 1 tablet (324 mg total) by mouth 2 (two) times a day before meals 9/10/21 1/13/22  Kolby Mtz DO   fluticasone-salmeterol (Advair Diskus) 100-50 mcg/dose inhaler Inhale 1 puff 2 (two) times a day Rinse mouth after use   9/10/21 1/13/22  Kolby Mtz DO   folic acid (FOLVITE) 784 mcg tablet Take 1 tablet (400 mcg total) by mouth daily 9/10/21   Kolby Mtz DO   furosemide (LASIX) 40 mg tablet TAKE ONE-HALF (1/2) TABLET EVERY OTHER DAY 11/22/21   Kolby Mtz DO   ibuprofen (MOTRIN) 400 mg tablet Take 1 tablet (400 mg total) by mouth every 6 (six) hours as needed for mild pain for up to 5 days 10/14/21 1/13/22  Tejas Hilton MD   ipratropium-albuterol (Eladio Barry) 0 5-2 5 mg/3 mL nebulizer solution Take 3 mL by nebulization 4 (four) times a day 1/13/22 4/13/22  Malorie Driscoll MD   lisinopril (ZESTRIL) 2 5 mg tablet Take 1 tablet (2 5 mg total) by mouth daily 9/10/21   Belkis Abbasi DO   metoprolol succinate (Toprol XL) 25 mg 24 hr tablet Take 1 tablet (25 mg total) by mouth daily 7/27/21   Malorie Driscoll MD   pantoprazole (PROTONIX) 40 mg tablet Take 1 tablet (40 mg total) by mouth daily before breakfast  Patient not taking: Reported on 10/14/2021 9/10/21 12/9/21  Belkis Abbasi DO   potassium chloride (Klor-Con) 10 mEq tablet Take 1 tablet (10 mEq total) by mouth daily 9/10/21   Belkis Abbasi DO   tamsulosin (FLOMAX) 0 4 mg Take 1 capsule (0 4 mg total) by mouth daily 9/10/21   Belkis Abbasi DO     I have reviewed home medications with patient personally  Allergies: No Known Allergies    Social History:  Marital Status: /Civil Union   Occupation:   Patient Pre-hospital Living Situation: Home  Patient Pre-hospital Level of Mobility: walks with cane  Patient Pre-hospital Diet Restrictions:   Substance Use History:   Social History     Substance and Sexual Activity   Alcohol Use Never     Social History     Tobacco Use   Smoking Status Current Every Day Smoker    Packs/day: 0 50    Years: 57 00    Pack years: 28 50    Types: Cigarettes   Smokeless Tobacco Never Used   Tobacco Comment    since age 17; Has history of smoking for over 54 years   He has been smoking more than packet daily in the past however he has been smokes about half a pack a day lately and stopped smoking on 7/1/2018 when he was admitted to the hospital       Social History     Substance and Sexual Activity   Drug Use Never       Family History:  Family History   Problem Relation Age of Onset    Hypertension Mother     Coronary artery disease Mother         premature    Hypertension Father     Coronary artery disease Father         premature    Diabetes Father Physical Exam:     Vitals:   Blood Pressure: 121/82 (01/31/22 2337)  Pulse: (!) 121 (01/31/22 2337)  Temperature: 98 8 °F (37 1 °C) (01/31/22 2226)  Temp Source: Oral (01/31/22 2226)  Respirations: 16 (01/31/22 2337)  Height: 6' (182 9 cm) (01/31/22 1834)  Weight - Scale: 74 8 kg (165 lb) (01/31/22 1834)  SpO2: 98 % (01/31/22 2337)    Physical Exam  Vitals and nursing note reviewed  Constitutional:       General: He is not in acute distress  Appearance: Normal appearance  He is not toxic-appearing  HENT:      Head: Normocephalic and atraumatic  Mouth/Throat:      Mouth: Mucous membranes are dry  Eyes:      Extraocular Movements: Extraocular movements intact  Pupils: Pupils are equal, round, and reactive to light  Comments: Conjunctiva pale   Cardiovascular:      Rate and Rhythm: Tachycardia present  Rhythm irregular  Heart sounds: Normal heart sounds  No murmur heard  Comments: Tachycardic, irregularly irregular  Pulmonary:      Effort: Pulmonary effort is normal  No respiratory distress  Breath sounds: Normal breath sounds  No wheezing or rales  Abdominal:      General: Abdomen is flat  Bowel sounds are normal  There is no distension  Palpations: Abdomen is soft  Tenderness: There is no abdominal tenderness  Musculoskeletal:      Right lower leg: Edema (Trace) present  Left lower leg: Edema ( trace) present  Skin:     General: Skin is warm and dry  Neurological:      General: No focal deficit present  Mental Status: He is alert and oriented to person, place, and time  Cranial Nerves: Cranial nerves are intact  No cranial nerve deficit, dysarthria or facial asymmetry        Coordination: Finger-Nose-Finger Test normal    Psychiatric:         Mood and Affect: Mood normal          Behavior: Behavior normal           Additional Data:     Lab Results:  Results from last 7 days   Lab Units 01/31/22  1921   WBC Thousand/uL 11 39*   HEMOGLOBIN g/dL 6 4*   HEMATOCRIT % 23 8*   PLATELETS Thousands/uL 301   NEUTROS PCT % 83*   LYMPHS PCT % 7*   MONOS PCT % 9   EOS PCT % 1     Results from last 7 days   Lab Units 01/31/22  1921   SODIUM mmol/L 137   POTASSIUM mmol/L 3 9   CHLORIDE mmol/L 101   CO2 mmol/L 29   BUN mg/dL 13   CREATININE mg/dL 1 46*   ANION GAP mmol/L 7   CALCIUM mg/dL 8 7   ALBUMIN g/dL 3 7   TOTAL BILIRUBIN mg/dL 1 04   ALK PHOS U/L 83 8   ALT U/L 9   AST U/L 14*   GLUCOSE RANDOM mg/dL 94         Results from last 7 days   Lab Units 01/31/22  1917   POC GLUCOSE mg/dl 92               Imaging: Reviewed radiology reports from this admission including: CT head  CT head without contrast   Final Result by Yina Young MD (01/31 2021)      No acute intracranial abnormality  Workstation performed: [id number]             EKG and Other Studies Reviewed on Admission:   · EKG: Atrial fibrillation    ** Please Note: This note has been constructed using a voice recognition system   **

## 2022-02-01 NOTE — ASSESSMENT & PLAN NOTE
· Cr 1 46  · Likely multifactorial prerenal/post renal in setting of acute urinary retention treated this morning with placement of Cloud catheter  · Received 1 unit PRBCs in ED  · Recheck in a m

## 2022-02-01 NOTE — ASSESSMENT & PLAN NOTE
· Had EGD/colonoscopy with Dr Queta Shook in September 2020  · Multiple adenomatous polyps were removed  Needed repeat colonoscopy in 3 months for removal of remaining polyps  However patient never followed up  Pathology came back as tubular adenomas, no high grade dysplasia or malignancy  Biopsies for EGD came back positive for H  Pylori  AB script for eradication was send to pharmacy however patient did not complete the treatment  · FOBT negative on admission and per history no S&S of acute GI bleed  However chronic bleeding focus in GI tract causing his chronic iron deficiency anemia is likely  · Will need close outpatient follow-up with GI   · Will give script for H  Pylori eradication course at discharge  · GI Following:  · Plan for colonoscopy on Today 2/4  Eliquis is on hold and clear liquid diet with NPO at midnight

## 2022-02-01 NOTE — PROGRESS NOTES
INTERNAL MEDICINE RESIDENCY PROGRESS NOTE     Name: Nita Linda   Age & Sex: 78 y o  male   MRN: [id number]  Unit/Bed#: -01   Encounter: [id number]    PATIENT INFORMATION     Name: Nita Linda   Age & Sex: 78 y o  male   MRN: [id number] Stay Days: 1    ASSESSMENT/PLAN     Principal Problem:    Symptomatic anemia  Active Problems:    Permanent atrial fibrillation [facility])    Ambulatory dysfunction    Dizziness    HERNÁN (acute kidney injury) ([facility] )    Urinary retention    Abnormal colonoscopy      * Symptomatic anemia  Assessment & Plan  · Presents complaining of dizziness with ambulating and at rest   Denies associated chest pain, shortness of breath, weakness  Denies melena, hematochezia, hematemesis  FOBT negative  · Hgb 6 4  · Received 1 unit PRBCs in ED  · Continue to trend  · Baseline hemoglobin appears 7-8 due to iron deficiency in the past    · Check iron panel, folate, B12    2/1: Hb improved to 7 0 after 1 unit pRBC  No signs of active bleeding  Will give 1 additional unit to get Hb closer to 8 because patient has CAD history  In addition will give IV Venofer since patient had significant iron deficiency in the past and has not been compliant with his iron supplement  Follow iron panel results  Abnormal colonoscopy  Assessment & Plan  · Had EGD/colonoscopy with Dr Michell Izaguirre in September 2020  · Multiple adenomatous polyps were removed  Needed repeat colonoscopy in 3 months for removal of remaining polyps  However patient never followed up  Pathology came back as tubular adenomas, no high grade dysplasia or malignancy  Biopsies for EGD came back positive for H  Pylori  AB script for eradication was send to pharmacy however patient did not complete the treatment  Plan:  · FOBT negative on admission and per history no S&S of acute GI bleed  However chronic bleeding focus in GI tract causing his chronic iron deficiency anemia is likely   He will need close outpatient follow-up with GI for repeat colonoscopy  Urinary retention  Assessment & Plan  · Evaluated here earlier this morning and diagnosed with urinary retention  Sent home with Cloud catheter  Reports back to ER initially for full issues with changing Cloud bag  Patient was provided with education understands how to change bag   · I&Os  · Continue Flomax    HERNÁN (acute kidney injury) ([facility] )  Assessment & Plan  · Cr 1 46  · Likely multifactorial prerenal/post renal in setting of acute urinary retention treated this morning with placement of Cloud catheter  · Received 1 unit PRBCs in ED  · Recheck in a m --> 1 40    2/1: Will give another unit for symptomatic anemia  Encourage oral hydration  Recheck tomorrow in AM      Dizziness  Assessment & Plan  · Complains of dizziness starting this afternoon when standing up from table  Denies any loss of consciousness, head strike, syncope, chest pain, palpitation, weakness, dysarthria,   · Likely secondary to anemia  · Check orthostatics, has history of orthostasis in past --> negative  · Fall precautions, OOB with assistance   · Tele  · PT/OT    Ambulatory dysfunction  Assessment & Plan  · Patient uses canes and braces for ambulation due to patella fracture  · Follows with Ortho Dr Finley Primer  · Fall precautions  · PT/OT consult    Permanent atrial fibrillation [facility])  Assessment & Plan  · Noted be in Afib with a rate of 111  Give home metoprolol now   · AC:  Eliquis  · Rate control:  Metoprolol    2/1: HR improved - currently in 90s        Disposition: Continue inpatient management for symptomatic anemia and HERNÁN  SUBJECTIVE     Patient seen and examined  No acute events overnight  No signs or symptoms of acute bleeding  States that he stopped his iron supplement months ago because he didn't like that it made his stools dark  States that he had a colonoscopy a year ago and thought that it was normal  Never followed up with GI afterwards       OBJECTIVE     Vitals:    02/01/22 0004 02/01/22 0149 22 0728 22 0859   BP: 125/74 118/79 106/59 125/72   BP Location: Right arm Right arm Right arm Right arm   Pulse:  98 95 96   Resp:  16     Temp:    97 5 °F (36 4 °C)   TempSrc:    Tympanic   SpO2:  98% 98% 99%   Weight:    74 7 kg (164 lb 10 9 oz)   Height:    6' (1 829 m)      Temperature:   Temp (24hrs), Av 4 °F (36 9 °C), Min:97 5 °F (36 4 °C), Max:98 8 °F (37 1 °C)    Temperature: 97 5 °F (36 4 °C)  Intake & Output:  I/O        07 07 07 07 07 07    Blood  350     Total Intake(mL/kg)  350 (4 7)     Net  +350                Weights:   IBW (Ideal Body Weight): 77 6 kg    Body mass index is 22 34 kg/m²  Weight (last 2 days)     Date/Time Weight    22 0859 74 7 (164 68)    22 1834 74 8 (165)        Physical Exam  Vitals and nursing note reviewed  Constitutional:       General: He is not in acute distress  Appearance: Normal appearance  He is not toxic-appearing  HENT:      Head: Normocephalic and atraumatic  Mouth/Throat:      Mouth: Mucous membranes are dry  Eyes:      General: No scleral icterus  Conjunctiva/sclera: Conjunctivae normal       Comments: Conjunctiva pale   Cardiovascular:      Rate and Rhythm: Normal rate  Rhythm irregular  Heart sounds: Normal heart sounds  No murmur heard  Comments: Tachycardic, irregularly irregular  Pulmonary:      Effort: Pulmonary effort is normal  No respiratory distress  Breath sounds: Normal breath sounds  No wheezing or rales  Abdominal:      General: Abdomen is flat  Bowel sounds are normal  There is no distension  Palpations: Abdomen is soft  Tenderness: There is no abdominal tenderness  There is no guarding or rebound  Musculoskeletal:      Cervical back: Normal range of motion and neck supple  Right lower leg: Edema (Trace) present  Left lower leg: Edema ( trace) present  Skin:     General: Skin is warm and dry     Neurological: General: No focal deficit present  Mental Status: He is alert and oriented to person, place, and time  Cranial Nerves: Cranial nerves are intact  No cranial nerve deficit, dysarthria or facial asymmetry  Coordination: Finger-Nose-Finger Test normal    Psychiatric:         Mood and Affect: Mood normal          Behavior: Behavior normal        LABORATORY DATA     Labs: I have personally reviewed pertinent reports  Results from last 7 days   Lab Units 02/01/22  0621 01/31/22  1921   WBC Thousand/uL 9 62 11 39*   HEMOGLOBIN g/dL 7 0* 6 4*   HEMATOCRIT % 24 5* 23 8*   PLATELETS Thousands/uL 287 301   NEUTROS PCT % 70 83*   MONOS PCT % 11 9      Results from last 7 days   Lab Units 02/01/22  0621 01/31/22  1921   POTASSIUM mmol/L 3 9 3 9   CHLORIDE mmol/L 103 101   CO2 mmol/L 29 29   BUN mg/dL 14 13   CREATININE mg/dL 1 40* 1 46*   CALCIUM mg/dL 8 2* 8 7   ALK PHOS U/L  --  83 8   ALT U/L  --  9   AST U/L  --  14*                            IMAGING & DIAGNOSTIC TESTING     Radiology Results: I have personally reviewed pertinent reports  CT head without contrast    Result Date: 1/31/2022  Impression: No acute intracranial abnormality  Workstation performed: [id number]     Other Diagnostic Testing: I have personally reviewed pertinent reports      ACTIVE MEDICATIONS     Current Facility-Administered Medications   Medication Dose Route Frequency    acetaminophen (TYLENOL) tablet 650 mg  650 mg Oral Q6H PRN    apixaban (ELIQUIS) tablet 5 mg  5 mg Oral BID    docusate sodium (COLACE) capsule 100 mg  100 mg Oral BID    ferrous sulfate tablet 325 mg  325 mg Oral Daily With Breakfast    folic acid (FOLVITE) tablet 400 mcg  400 mcg Oral Daily    iron sucrose (VENOFER) 200 mg in sodium chloride 0 9 % 100 mL IVPB  200 mg Intravenous Daily    metoprolol succinate (TOPROL-XL) 24 hr tablet 25 mg  25 mg Oral Daily    tamsulosin (FLOMAX) capsule 0 4 mg  0 4 mg Oral Daily       VTE Pharmacologic Prophylaxis: Reason for no pharmacologic prophylaxis on eliquis  VTE Mechanical Prophylaxis: sequential compression device    Portions of the record may have been created with voice recognition software  Occasional wrong word or "sound a like" substitutions may have occurred due to the inherent limitations of voice recognition software    Read the chart carefully and recognize, using context, where substitutions have occurred   ==  Arya Herring MD  [address]  Internal Medicine Residency PGY-3

## 2022-02-01 NOTE — ASSESSMENT & PLAN NOTE
· Cr 1 46 on admission  · Likely multifactorial prerenal/post renal in setting of acute urinary retention treated this morning with placement of Cloud catheter  · Baseline appears to be 0 8-1 1  · Most recent crt   Normalized at 0 99 today  · Received 1 unit PRBCs in ED  · S/P 2 units PRBCs for anemia  ·  S/P IV hydration   · Avoid/Limit nephrotoxins  · Avoid hypotension and fluctuations in BP  · Continue to monitor renal funciton

## 2022-02-01 NOTE — PHYSICAL THERAPY NOTE
PHYSICAL THERAPY EVALUATION    NAME:  Rockne Riedel  DATE: 02/01/22    AGE:   78 y o  Mrn:   [id number]  Length Of Stay: 1    ADMIT DX:  Urinary retention [R33 9]  Anemia [D64 9]  HERNÁN (acute kidney injury) ([facility] ) [N17 9]  Problem with urinary catheter ([facility] ) [C66  9XXA]    Past Medical History:   Diagnosis Date    Anxiety disorder     Atrial fibrillation (HCC)     Coronary artery disease     Depression     Hepatitis C     screening negative in 1/2017    MI [facility]myocardial infarction) [facility])      Past Surgical History:   Procedure Laterality Date    CARDIAC CATHETERIZATION  07/09/2018       Performed at least 2 patient identifiers during session: Name, [place] [facility] and ID bracelet        02/01/22 1032   PT Last Visit   PT Visit Date 02/01/22   Note Type   Note type Evaluation   Pain Assessment   Pain Assessment Tool 0-10   Pain Score No Pain   Effect of Pain on Daily Activities n/a   Hospital Pain Intervention(s) Repositioned; Ambulation/increased activity   Multiple Pain Sites No   Restrictions/Precautions   Weight Bearing Precautions Per Order Yes   LLE Weight Bearing Per Order WBAT  (w/ hinged knee brace in ext, s/p L patellar fx 10/14/2021)   Braces or Orthoses Other (Comment)  (L hinged knee brace)   Other Precautions Chair Alarm; Bed Alarm;Telemetry; Fall Risk;Hard of hearing;Visual impairment   Home Living   Type of Home Apartment   Home Layout Two level;Performs ADLs on one level; Able to live on main level with bedroom/bathroom;Stairs to enter with rails  (15 KAL w/ HR, single level living, has loft but does not use)   [address] [facility]   Prior Function   Level of [place] Independent with ADLs and functional mobility  (Independent with IADLs)   Lives With Spouse   Receives Help From Family   ADL Assistance Independent   IADLs Independent   Falls in the last 6 months 1 to 4   Vocational Retired   Comments Patient reports living at home with his wife in a 2nd floor apartment with 15 steps to enter   Patient reports being fully independent with all ADLs/IADLs and functional mobility  Patient ambulates with [facility] for household and community distances  Patient reports using public transportation or walking to all community needs  Pt reports frequently going out downtown to [place] in order to make extra money  General   Additional Pertinent History 12/9/2021 Dr Dominick Sanchez: "WBAT in T ROM brace locked in extension  Begin slow range of motion from 0-30 degrees active assist and passive to increase gradually by 10° each week as tolerated no strengthening yet  Family/Caregiver Present No   Cognition   Overall Cognitive Status WFL   Arousal/Participation Cooperative   Orientation Level Oriented X4   Memory Decreased recall of precautions   Following Commands Follows multistep commands with increased time or repetition   Comments Pt very impulsive with lack of insight to deficits, needs increased cues for safety  Subjective   Subjective "He told me I need to start not wearing the brace so that I don't rely on it all the time "   RUE Assessment   RUE Assessment WFL   LUE Assessment   LUE Assessment WFL   RLE Assessment   RLE Assessment WFL   LLE Assessment   LLE Assessment WFL  (at least 3/5 MMT t/o observed functionally, no resistance)   Vision-Basic Assessment   Current Vision Wears glasses all the time   Coordination   Movements are Fluid and Coordinated 1   Sensation WFL   Light Touch   RLE Light Touch Grossly intact   LLE Light Touch Grossly intact   Self Selected Walking Speed   SSWS Trial 1 (Seconds) 4 8 Seconds   SSWS Trial 2 (Seconds) 5 1 Seconds   SSWS Trial 3 (Seconds) 4 9 Seconds   SSWS Average Score (m/sec) 1 2 m/sec   Bed Mobility   Supine to Sit 6  Modified independent   Additional items HOB elevated   Sit to Supine 6  Modified independent   Additional items HOB elevated   Transfers   Sit to Stand 5  Supervision   Additional items Assist x 1; Impulsive;Verbal cues   Stand to Sit 5  Supervision   Additional items Assist x 1; Impulsive;Verbal cues   Ambulation/Elevation   Gait pattern Antalgic;Decreased foot clearance  (rapid cadance, LOB x4 towards R side)   Gait Assistance 4  Minimal assist  (S with LEA during LOBs)   Additional items Assist x 1;Verbal cues; Tactile cues   Assistive Device Straight cane   Distance 110ft x1   Stair Management Assistance Not tested   Ambulation/Elevation Additional Comments PATIENT REFUSES TO WEAR L HINGED KNEE BRACE FOR AMBULATION DESPITE ORTHO NOTES FROM 12/9/2021 INDICATING CONTINUED WEAR  Balance   Static Sitting Normal   Dynamic Sitting Good   Static Standing Fair +   Dynamic Standing Fair -   Ambulatory Fair -   Endurance Deficit   Endurance Deficit Yes   Activity Tolerance   Activity Tolerance Other (Comment)  (limited by SOB with ambulation, poor insight to deficits)   Medical Staff Made Aware Spoke with SLIM and CM   Nurse Made Aware Spoke with JOHANNY Villa pre/post session   Assessment   Prognosis Good   Problem List Decreased range of motion;Decreased endurance; Impaired balance;Decreased mobility;Orthopedic restrictions;Pain; Impaired judgement;Decreased safety awareness; Impaired vision; Impaired hearing   Assessment Pt seen for PT evaluation, cleared by JOHANNY Villa, activity orders of "up with assistance"  Pt admitted 1/31/2022 w/ c/o dizziness and urinary retention, issues with urinary catheter, dx Microcytic anemia  Comorbidities affecting pt's fnxl performance include: Afib, anxiety, CAD, falls, MI, HepC  Personal factors affecting pt include: ambulating with assistive device, stairs to enter home, inability to navigate level surfaces without external assistance, positive fall history, impulsivity and limited insight into impairments  PTA, pt was independent with functional mobility with SPC, independent with ADLS, independent with IADLS and living with his spouse in a 1 level home with 15 steps to enter   Pt scores 20/24 on AM-PAC objective tool w/ a standardized score of 43 99, indicating pt demonstrates functional capacity for return to home self care vs with increased supports upon d/c  Barthel Index outcome tool was used & pt scores 60 indicating moderate limitations of fnxl mobility/ADLS  Pt is at risk of falls d/t multiple comorbidities, impulsivity, h/o falls, impaired balance, impaired insight/safety awareness, use of ambulatory aid and acuity of medical illness  Pt's clinical presentation is currently unstable/unpredictable seen in pt's presentation of vital sign response, changing level of pain, varying levels of cognitive performance, increased fall risk, new onset of impairment of functional mobility and decreased endurance  PT IE requires high complexity clinical decision making, based on multiple factors which affect pt's performance w/ evaluation & therapist judgement for disposition recommendations  Pt will benefit from continued PT treatment in order to address impairments, decrease risk of falls, maximize independence w/ fnxl mobility, & ensure safety w/ mobility for transition to next level of care  Based on pt presentation & impairments, pt would most appropriately benefit from outpatient PT services  Goals   Patient Goals "to get out of here and go home"   [facility] Expiration Date 02/11/22   Short Term Goal #1 Patient PT goals established in order to address patient self reported goal of "to get out of here and go home"   Pt will: complete all transfers at rudy level with [facility] in order to increase safety with functional mobility; ambulate >150ft with SPC at rudy level in order to increase safety with household and short community functional mobility; negotiate 10-15 stairs with HR assist and S in order to access his home; improve ambulatory balance to >/= good grade in order to promote safety and increased independence with mobility; improve AM-PAC score to >/= 24/24 in order to increase independence with mobility and decrease burden of care; improve Barthel Index score to >/= 70/100 in order to increase independence and decrease risk of falls  PT Treatment Day 0   Plan   Treatment/Interventions Functional transfer training;Elevations; Patient/family training;Gait training;Spoke to nursing;Spoke to case management   PT Frequency 2-3x/wk   Recommendation   PT Discharge Recommendation Home with outpatient rehabilitation   AM-PAC Basic Mobility Inpatient   Turning in Bed Without Bedrails 4   Lying on Back to Sitting on Edge of Flat Bed 4   Moving Bed to Chair 3   Standing Up From Chair 3   Walk in Room 3   Climb 3-5 Stairs 3   Basic Mobility Inpatient Raw Score 20   Basic Mobility Standardized Score 43 99   Highest Level Of Mobility   JH-HLM Goal 6: Walk 10 steps or more   JH-HLM Highest Level of Mobility 7: Walk 25 feet or more   JH-HLM Goal Achieved Yes   Modified [place] Scale   Modified Mariella Scale 4   Barthel Index   Feeding 10   Bathing 0   Grooming Score 5   Dressing Score 5   Bladder Score 0   Bowels Score 10   Toilet Use Score 5   Transfers (Bed/Chair) Score 10   Mobility (Level Surface) Score 10   Stairs Score 5   Barthel Index Score 60   End of Consult   Patient Position at End of Consult Supine;Bed/Chair alarm activated; All needs within reach   End of Consult Comments Based on patient's [facility] Level of Mobility scores today, patient currently has a goal of JH-HLM Levels: 7: WALK 25 FEET OR MORE, to be completed with RN staffing each shift, in order to improve overall activity tolerance and mobility, combat hospital related deconditioning, and maximize outcomes for d/c from the acute care setting  The patient's AM-PAC Basic Mobility Inpatient Short Form Raw Score is 20  A Raw score of greater than 16 suggests the patient may benefit from discharge to home  Please also refer to the recommendation of the Physical Therapist for safe discharge planning          Gilda Trevino, PT, DPT   Available via [employer]  I # [id number]  [place] License - [id number]  8/6/5557 Skin slightly pale; incisions in groin with sutures, clean dry intact and nontender without erythema.

## 2022-02-01 NOTE — ASSESSMENT & PLAN NOTE
· Patient presented with complaints of dizziness with ambulating and at rest   Denies associated chest pain, shortness of breath, weakness  Denies melena, hematochezia, hematemesis  · FOBT negative  · Hgb 6 4  · S/P 1 unit PRBCs in ED  · Baseline hemoglobin appears 7-8 due to iron deficiency in the past    · (2/1): Hgb dropped to 7 0  S/P 2 units PRBCs  · Continue IV Venofer   · Follow iron panel results --> consistent with iron deficiency anemia with iron of 13, TIBC of 458     · Hgb remians stable with no active signs of bleeding  · GI following,  · Plan to Inpatient Colonoscopy today 2/4

## 2022-02-01 NOTE — ASSESSMENT & PLAN NOTE
· Complains of dizziness starting this afternoon when standing up from table    Denies any loss of consciousness, head strike, syncope, chest pain, palpitation, weakness, dysarthria,   · Likely secondary to anemia  · Check orthostatics, has history of orthostasis in past  · Fall precautions, OOB with assistance   · Tele  · PT/OT

## 2022-02-01 NOTE — ED NOTES
Blood transfusion order clarified with provider  Per Dr Landry Hope, hold 2nd unit RBCs unless active bleeding develops        Siomara Kohler RN  02/01/22 0676

## 2022-02-01 NOTE — ASSESSMENT & PLAN NOTE
· Complains of dizziness starting this afternoon when standing up from table    Denies any loss of consciousness, head strike, syncope, chest pain, palpitation, weakness, dysarthria,   · Likely secondary to anemia  · Check orthostatics, has history of orthostasis in past --> negative  · Fall precautions, OOB with assistance   · S/P IV hydration  · Continue NURYS stockings  · PT/OT: recommending STR  · Continue fall precautions

## 2022-02-01 NOTE — ASSESSMENT & PLAN NOTE
· Evaluated here earlier this morning and diagnosed with urinary retention  Sent home with Cloud catheter  Reports back to ER initially for full issues with changing Cloud bag  Patient was provided with education understands how to change bag   · I&Os  · Continue Flomax  · Will need outpatient follow-up with urology

## 2022-02-01 NOTE — ASSESSMENT & PLAN NOTE
· Chronic, Stable  · Noted be in Afib with HR of 111     · AC:  Eliquis  · Rate control:  Metoprolol

## 2022-02-01 NOTE — PLAN OF CARE
Problem: Potential for Falls  Goal: Patient will remain free of falls  Description: INTERVENTIONS:  - Educate patient/family on patient safety including physical limitations  - Instruct patient to call for assistance with activity   - Consult OT/PT to assist with strengthening/mobility   - Keep Call bell within reach  - Keep bed low and locked with side rails adjusted as appropriate  - Keep care items and personal belongings within reach  - Initiate and maintain comfort rounds    - Offer Toileting every 2 Hours, in advance of need  - Initiate/Maintain bed alarm  - Apply yellow socks and bracelet for high fall risk patients  - Consider moving patient to room near nurses station  Outcome: Progressing     Problem: MOBILITY - ADULT  Goal: Maintain or return to baseline ADL function  Description: INTERVENTIONS:  -  Assess patient's ability to carry out ADLs; assess patient's baseline for ADL function and identify physical deficits which impact ability to perform ADLs (bathing, care of mouth/teeth, toileting, grooming, dressing, etc )  - Assess/evaluate cause of self-care deficits   - Assess range of motion  - Assess patient's mobility; develop plan if impaired  - Assess patient's need for assistive devices and provide as appropriate  - Encourage maximum independence but intervene and supervise when necessary  - Involve family in performance of ADLs  - Assess for home care needs following discharge   - Consider OT consult to assist with ADL evaluation and planning for discharge  - Provide patient education as appropriate  Outcome: Progressing

## 2022-02-01 NOTE — ASSESSMENT & PLAN NOTE
· Noted be in Afib with a rate of 111   Give home metoprolol now   · AC:  Eliquis  · Rate control:  Metoprolol

## 2022-02-01 NOTE — ASSESSMENT & PLAN NOTE
· Presents complaining of dizziness with ambulating and at rest   Denies associated chest pain, shortness of breath, weakness  Denies melena, hematochezia, hematemesis    FOBT negative  · Hgb 6 4  · Received 1 unit PRBCs in ED  · Continue to trend  · Baseline hemoglobin appears 7-8 due to iron deficiency in the past    · Check iron panel, folate, B12

## 2022-02-01 NOTE — ASSESSMENT & PLAN NOTE
· Patient uses canes and braces for ambulation due to patella fracture  · Follows with Ortho Dr Bishop Kwon  · Fall precautions  · PT/OT evaluated: Recommending STR; patient is amiable if facility would be close to home (does not want to go to [facility])

## 2022-02-01 NOTE — ASSESSMENT & PLAN NOTE
· Evaluated here earlier this morning and diagnosed with urinary retention  Sent home with Cloud catheter  Reports back to ER initially for full issues with changing Cloud bag    Patient was provided with education understands how to change bag   · I&Os  · Continue Flomax

## 2022-02-02 LAB
ABO GROUP BLD BPU: NORMAL
ABO GROUP BLD BPU: NORMAL
ANION GAP SERPL CALCULATED.3IONS-SCNC: 9 MMOL/L (ref 4–13)
BPU ID: NORMAL
BPU ID: NORMAL
BUN SERPL-MCNC: 17 MG/DL (ref 6–20)
CALCIUM SERPL-MCNC: 8.4 MG/DL (ref 8.4–10.2)
CHLORIDE SERPL-SCNC: 102 MMOL/L (ref 96–108)
CO2 SERPL-SCNC: 27 MMOL/L (ref 22–33)
CREAT SERPL-MCNC: 1.22 MG/DL (ref 0.5–1.2)
CROSSMATCH: NORMAL
CROSSMATCH: NORMAL
ERYTHROCYTE [DISTWIDTH] IN BLOOD BY AUTOMATED COUNT: 21.4 % (ref 11.6–15.1)
GFR SERPL CREATININE-BSD FRML MDRD: 58 ML/MIN/1.73SQ M
GLUCOSE SERPL-MCNC: 82 MG/DL (ref 65–140)
HCT VFR BLD AUTO: 29.6 % (ref 36.5–49.3)
HGB BLD-MCNC: 8.2 G/DL (ref 12–17)
MCH RBC QN AUTO: 19.9 PG (ref 26.8–34.3)
MCHC RBC AUTO-ENTMCNC: 27.7 G/DL (ref 31.4–37.4)
MCV RBC AUTO: 72 FL (ref 82–98)
PLATELET # BLD AUTO: 290 THOUSANDS/UL (ref 149–390)
PMV BLD AUTO: 9.5 FL (ref 8.9–12.7)
POTASSIUM SERPL-SCNC: 4.2 MMOL/L (ref 3.5–5)
RBC # BLD AUTO: 4.13 MILLION/UL (ref 3.88–5.62)
SODIUM SERPL-SCNC: 138 MMOL/L (ref 133–145)
UNIT DISPENSE STATUS: NORMAL
UNIT DISPENSE STATUS: NORMAL
UNIT PRODUCT CODE: NORMAL
UNIT PRODUCT CODE: NORMAL
UNIT PRODUCT VOLUME: 300 ML
UNIT PRODUCT VOLUME: 350 ML
UNIT RH: NORMAL
UNIT RH: NORMAL
WBC # BLD AUTO: 9.82 THOUSAND/UL (ref 4.31–10.16)

## 2022-02-02 PROCEDURE — 85027 COMPLETE CBC AUTOMATED: CPT | Performed by: INTERNAL MEDICINE

## 2022-02-02 PROCEDURE — 97530 THERAPEUTIC ACTIVITIES: CPT

## 2022-02-02 PROCEDURE — 99233 SBSQ HOSP IP/OBS HIGH 50: CPT | Performed by: INTERNAL MEDICINE

## 2022-02-02 PROCEDURE — 97116 GAIT TRAINING THERAPY: CPT

## 2022-02-02 PROCEDURE — 80048 BASIC METABOLIC PNL TOTAL CA: CPT | Performed by: INTERNAL MEDICINE

## 2022-02-02 PROCEDURE — 99222 1ST HOSP IP/OBS MODERATE 55: CPT | Performed by: INTERNAL MEDICINE

## 2022-02-02 RX ORDER — SODIUM CHLORIDE 9 MG/ML
75 INJECTION, SOLUTION INTRAVENOUS CONTINUOUS
Status: DISCONTINUED | OUTPATIENT
Start: 2022-02-02 | End: 2022-02-04

## 2022-02-02 RX ADMIN — DOCUSATE SODIUM 100 MG: 100 CAPSULE, LIQUID FILLED ORAL at 19:02

## 2022-02-02 RX ADMIN — FOLIC ACID TAB 400 MCG 400 MCG: 400 TAB at 09:59

## 2022-02-02 RX ADMIN — METOPROLOL SUCCINATE 25 MG: 25 TABLET, EXTENDED RELEASE ORAL at 09:59

## 2022-02-02 RX ADMIN — APIXABAN 5 MG: 5 TABLET, FILM COATED ORAL at 09:59

## 2022-02-02 RX ADMIN — TAMSULOSIN HYDROCHLORIDE 0.4 MG: 0.4 CAPSULE ORAL at 09:59

## 2022-02-02 RX ADMIN — SODIUM CHLORIDE 75 ML/HR: 0.9 INJECTION, SOLUTION INTRAVENOUS at 14:09

## 2022-02-02 RX ADMIN — IRON SUCROSE 200 MG: 20 INJECTION, SOLUTION INTRAVENOUS at 09:59

## 2022-02-02 RX ADMIN — DOCUSATE SODIUM 100 MG: 100 CAPSULE, LIQUID FILLED ORAL at 09:59

## 2022-02-02 RX ADMIN — APIXABAN 5 MG: 5 TABLET, FILM COATED ORAL at 19:02

## 2022-02-02 RX ADMIN — FERROUS SULFATE TAB 325 MG (65 MG ELEMENTAL FE) 325 MG: 325 (65 FE) TAB at 09:59

## 2022-02-02 NOTE — PLAN OF CARE
Problem: PHYSICAL THERAPY ADULT  Goal: Performs mobility at highest level of function for planned discharge setting  See evaluation for individualized goals  Description: Treatment/Interventions: Functional transfer training,Elevations,Patient/family training,Gait training,Spoke to nursing,Spoke to case management     See flowsheet documentation for full assessment, interventions and recommendations  Outcome: Not Progressing  Note: Prognosis: Fair  Problem List: Decreased range of motion,Decreased endurance,Impaired balance,Decreased mobility,Impaired judgement,Decreased safety awareness,Impaired hearing,Impaired vision,Pain,Orthopedic restrictions  Assessment: Pt seen for PT treatment today with focus on gait training and stair negotiation  Pt reports plan for possible d/c today, confirmed with SLIM  Pt requires S and significantly increased time for bed mobility, MODA for transfers, MODA for in room distance mobility with SPC  Mobility tolerance severely limited due to dizziness/lightheadedness with changes in position  Pt very unsteady and displays a very impaired level of insight regarding deficits  Orthostatic BP readings taken, see vitals flowsheet for readings  Therapist with significant concerns regarding pt safety with mobility for d/c  Pt is at a high risk of falls and hospital readmission due to falls  Pt has a h/o multiple falls with resultant injury  Despite lack of safety with mobility, pt reports he will still be going into community and risk falling, therefore pt will not qualify for home care PT, however is unsafe for self transportation and mobility to OPPT  Recommend post acute STR for safety and to maximize indep and tolerance of mobility  PT Discharge Recommendation: Post acute rehabilitation services (pt unsafe for return to home at current level of mobility)     See flowsheet documentation for full assessment

## 2022-02-02 NOTE — CASE MANAGEMENT
Case Management Assessment & Discharge Planning Note    Patient name Suellen Blount  Location /-10 MRN [id number]  : 1945 Date 2022       Current Admission Date: 2022  Current Admission Diagnosis:Symptomatic anemia   Patient Active Problem List    Diagnosis Date Noted    Abnormal colonoscopy 2022    Dizziness 2022    HERNÁN (acute kidney injury) ([facility] [address] ) 2022    Urinary retention 2022    Essential (hemorrhagic) thrombocythemia ([facility] [address] ) 2022    Closed nondisplaced comminuted fracture of left patella 2021    Head trauma 2021    Thrombocytosis 2021    Bradycardia 2021    Syncope 2021    Colonic adenoma 2021    Ambulatory dysfunction 2021    COPD (chronic obstructive pulmonary disease) ([facility] [address] ) 2021    Orthostatic hypotension 2020    Tobacco use disorder 2020    Symptomatic anemia 2020    Congestive cardiomyopathy ([facility] [address] [facility] ) 2020    Permanent atrial fibrillation ([facility] [address] ) 2020      LOS (days): 2  Geometric Mean LOS (GMLOS) (days): 2 70  Days to GMLOS:1 2     OBJECTIVE:    Risk of Unplanned Readmission Score: 18         Current admission status: Inpatient       Preferred Pharmacy:   [address]  Phone: [phone] Fax: [address] [place] [address] 20, [address] [address]  Phone: [phone] Fax: [phone]    Primary Care Provider: Bard Joel MD    Primary Insurance: MEDICARE  Secondary Insurance:  FOR LIFE    ASSESSMENT:    Patient Information  Admitted from[de-identified] Home  Mental Status: Alert  During Assessment patient was accompanied by: Not accompanied during assessment  Assessment information provided by[de-identified] Patient  Primary Caregiver: Self  Support Systems: Spouse/significant Hollis 15 of Residence: [address] [facility] [address] do you live in?: [facility] entry access options   Select all that apply : Stairs  Number of steps to enter home : One Flight  Do the steps have railings?: Yes  Type of Current Residence: [address] [place] home  Upon entering residence, is there a bedroom on the main floor (no further steps)?: Yes  Upon entering residence, is there a bathroom on the main floor (no further steps)?: Yes  In the last 12 months, was there a time when you were not able to pay the mortgage or rent on time?: No  In the last 12 months, how many places have you lived?: 1  In the last 12 months, was there a time when you did not have a steady place to sleep or slept in a shelter (including now)?: No  Homeless/housing insecurity resource given?: No  Living Arrangements: Lives w/ Spouse/significant other  Is patient a ?: No    Activities of Daily Living Prior to Admission  Functional Status: Independent  Completes ADLs independently?: Yes  Ambulates independently?: Yes  Does patient use assisted devices?: Yes  Assisted Devices (DME) used: Straight Cane  Does patient currently own DME?: Yes  What DME does the patient currently own?: Straight Cane  Does patient have a history of Outpatient Therapy (PT/OT)?: No  Does the patient have a history of Short-Term Rehab?: No  Does patient have a history of HHC?: No  Does patient currently have [address]?: No    Patient Information Continued  Income Source: Pension/USP  Does patient have prescription coverage?: Yes  Within the past 12 months, you worried that your food would run out before you got the money to buy more : Never true  Within the past 12 months, the food you bought just didnt last and you didnt have money to get more : Never true  Food insecurity resource given?: No  Does patient receive dialysis treatments?: No  Does patient have a history of substance abuse?: No  Does patient have a history of Mental Health Diagnosis?: No    PHQ 2/9 Screening Reviewed PHQ 2/9 Depression Screening Score?: No    Means of Transportation  Means of Transport to Appts[de-identified] [employer] - Bus  In the past 12 months, has lack of transportation kept you from medical appointments or from getting medications?: No  In the past 12 months, has lack of transportation kept you from meetings, work, or from getting things needed for daily living?: No  Was application for public transport provided?: No    DISCHARGE DETAILS:    Discharge planning discussed with[de-identified] Patient  Freedom of Choice: Yes  Comments - Freedom of Choice: Patient chose is to return home @ D/C    CM contacted family/caregiver?: No- see comments (DECLINED)  Were Treatment Team discharge recommendations reviewed with patient/caregiver?: Yes  Did patient/caregiver verbalize understanding of patient care needs?: Yes  Were patient/caregiver advised of the risks associated with not following Treatment Team discharge recommendations?: Yes    Requested [address] [place] [facility]         Is the patient interested in [place] [address] at discharge?: No    DME Referral Provided  Referral made for DME?: No    Treatment Team Recommendation: Home  Discharge Destination Plan[de-identified] Home  Transport at Discharge : Ride Share ([address] @ D/C )

## 2022-02-02 NOTE — CONSULTS
Consultation - Gastroenterology   Zulma Pham 78 y o  male MRN: [id number]  Unit/Bed#: -01 Encounter: [id number]        Inpatient consult to gastroenterology  Consult performed by: GABRIELA Quintana  Consult ordered by: Segundo Miller MD          Reason for Consult / Principal Problem:     Anemia/abnormal colonoscopy      ASSESSMENT AND PLAN:        1  Symptomatic iron deficiency anemia  2  H pylori gastritis  3  History of tubular adenoma/sessile serrated colon polyps  This is a 78 y o  male with history of COPD, BPH,  AFib on Eliquis, CAD, tobacco use,  iron deficiency anemia, and L patellar fracture in October who presented to the hospital 1/31 due to complaint of dizziness with ambulation and at rest, and was found to have hemoglobin of 6 4 with previous baseline around 8 and evidence of HERNÁN on labs  Hemeoccult was negative and he didn't appear to have any active GI blood loss so Eliquis was continued  He is s/p 2 units of blood Hgb now stable at 8 2  Previous workup for anemia in November 2020 w/ EGD/Colonoscopy showed H pylori gastritis and multiple large colon polyps requiring clips/tattoo, not all were removed, pt was recommended to repeat colonoscopy in 3 months but never followed up and H pylori never treated  He was on iron supplementation in the past, but stopped taking this because his stool turned dark  Symptomatic anemia may be secondary to occult GI bleeding from H pylori/NSAID induced PUD, AVMs, or lesion      -Given patient's history of poor follow-up, and need for anticoagulation for history of atrial fibrillation will tentatively plan for EGD/colonoscopy on Friday  Clear liquid diet ordered for tomorrow   -Please hold Eliquis tomorrow   -Hold oral iron supplementation for now, as this can obscure visualization with colonoscopy  -Monitor CBC  -Transfuse blood products as needed  -Further recommendations pending course, he will need treatment for H pylori   Discussed importance of GI follow up  Thank you for consultation  Case will be discussed with Dr Vazquez and final recommendations will be made by him     ______________________________________________________________________    HPI:  Nita Linda is a 78 y o  male with history of COPD, BPH,  AFib on Eliquis, CAD, tobacco use,  iron deficiency anemia, and L patellar fracture in October who presented to the hospital 1/31 due to complaint of dizziness with ambulation and at rest, and was found to have hemoglobin of 6 4 with previous baseline around 8 and evidence of HERNÁN  In the ED was tachycardic with variable blood pressures, SBP   Hemoccult was negative  He was transfused 2 units of blood improvement to 8 2 this morning and kidney function is improving as well with IV hydration  He had GI workup for iron deficiency anemia in November 2020 and EGD/colonoscopy was notable for hiatal hernia, H pylori gastritis, 3 ascending colon polyps removed with snare cautery and hemostasis clips and area marked with tattoo, 2 medium transverse colon polyps removed with cold snare, and single large transverse colon polyp removed with endoscopic mucosal resection and subsequent mucosal defect clipped  Multiple additional polyps were not removed  He also had evidence of left-sided diverticulosis and internal hemorrhoids  Ascending colon biopsies consistent with tubular adenoma and sessile serrated adenoma, no high-grade dysplasia or carcinoma identified  Transverse colon polyps consistent with tubular adenoma, again with no high-grade dysplasia or carcinoma identified  It looks like our office tried to reach out to the patient several times and ended up sending a certified letter regarding biopsy results and need for H pylori treatment and repeat colonoscopy, but he did not follow up  Prevpac was sent but he never took it  He had been on oral iron supplementation twice daily at home, but stopped it because it turned his stools black    He moves bowels every 3 days without straining or pushing, denies use stool softeners or laxatives  Denies any heartburn, nausea/vomiting, abdominal pain dysphagia  He does not take anything for reflux  He reports he ibuprofen for a couple weeks when he broke his knee, however has not been on NSAIDs recently  Denies family history of colon cancer, or unintended weight loss  REVIEW OF SYSTEMS:    CONSTITUTIONAL: Denies any fever, chills, rigors, and weight loss  HEENT: No earache or tinnitus  Denies hearing loss or visual disturbances  CARDIOVASCULAR: No chest pain or palpitations  RESPIRATORY: Denies any cough, hemoptysis, shortness of breath or dyspnea on exertion  GASTROINTESTINAL: As noted in the History of Present Illness  GENITOURINARY: No problems with urination  Denies any hematuria or dysuria  NEUROLOGIC: No dizziness or vertigo, denies headaches  MUSCULOSKELETAL: Denies any muscle or joint pain  SKIN: Denies skin rashes or itching  ENDOCRINE: Denies excessive thirst  Denies intolerance to heat or cold  PSYCHOSOCIAL: Denies depression or anxiety  Denies any recent memory loss  Historical Information   Past Medical History:   Diagnosis Date    Anxiety disorder     Atrial fibrillation ([facility] [address] )     Coronary artery disease     Depression     Hepatitis C     screening negative in 1/2017    [facility])      Past Surgical History:   Procedure Laterality Date    CARDIAC CATHETERIZATION  07/09/2018     Social History   Social History     Substance and Sexual Activity   Alcohol Use Never     Social History     Substance and Sexual Activity   Drug Use Never     Social History     Tobacco Use   Smoking Status Current Every Day Smoker    Packs/day: 0 50    Years: 57 00    Pack years: 28 50    Types: Cigarettes   Smokeless Tobacco Never Used   Tobacco Comment    since age 17; Has history of smoking for over 54 years   He has been smoking more than packet daily in the past however he has been smokes about half a pack a day lately and stopped smoking on 7/1/2018 when he was admitted to the hospital       Family History   Problem Relation Age of Onset    Hypertension Mother     Coronary artery disease Mother         premature    Hypertension Father     Coronary artery disease Father         premature    Diabetes Father        Meds/Allergies     Medications Prior to Admission   Medication    apixaban (ELIQUIS) 5 mg    docusate sodium (COLACE) 100 mg capsule    fluticasone-salmeterol (Advair Diskus) 100-50 mcg/dose inhaler    ipratropium-albuterol (DUO-NEB) 0 5-2 5 mg/3 mL nebulizer solution    lisinopril (ZESTRIL) 2 5 mg tablet    metoprolol succinate (Toprol XL) 25 mg 24 hr tablet    potassium chloride (Klor-Con) 10 mEq tablet    tamsulosin (FLOMAX) 0 4 mg    ferrous sulfate 324 (65 Fe) mg    folic acid (FOLVITE) 596 mcg tablet    furosemide (LASIX) 40 mg tablet    ibuprofen (MOTRIN) 400 mg tablet    pantoprazole (PROTONIX) 40 mg tablet     Current Facility-Administered Medications   Medication Dose Route Frequency    acetaminophen (TYLENOL) tablet 650 mg  650 mg Oral Q6H PRN    apixaban (ELIQUIS) tablet 5 mg  5 mg Oral BID    docusate sodium (COLACE) capsule 100 mg  100 mg Oral BID    ferrous sulfate tablet 325 mg  325 mg Oral Daily With Breakfast    folic acid (FOLVITE) tablet 400 mcg  400 mcg Oral Daily    iron sucrose (VENOFER) 200 mg in sodium chloride 0 9 % 100 mL IVPB  200 mg Intravenous Daily    metoprolol succinate (TOPROL-XL) 24 hr tablet 25 mg  25 mg Oral Daily    sodium chloride 0 9 % infusion  75 mL/hr Intravenous Continuous    tamsulosin (FLOMAX) capsule 0 4 mg  0 4 mg Oral Daily       No Known Allergies        Objective     Blood pressure 124/82, pulse 103, temperature 98 3 °F (36 8 °C), temperature source Tympanic, resp  rate 18, height 6' (1 829 m), weight 74 7 kg (164 lb 10 9 oz), SpO2 97 %   Body mass index is 22 34 kg/m²  Intake/Output Summary (Last 24 hours) at 2/2/2022 1317  Last data filed at 2/2/2022 0400  Gross per 24 hour   Intake 868 75 ml   Output 1100 ml   Net -231 25 ml         PHYSICAL EXAM:      General Appearance:   Alert, cooperative, no distress   HEENT:   Normocephalic, atraumatic, anicteric  Neck:  Supple, symmetrical, trachea midline   Lungs:   Clear to auscultation bilaterally; no rales, rhonchi or wheezing; respirations unlabored    Heart[de-identified]   Regular rate and rhythm; no murmur, rub, or gallop     Abdomen:   Soft, non-tender, non-distended; normal bowel sounds; no masses, no organomegaly    Genitalia:   Deferred    Rectal:   Deferred    Extremities:  No cyanosis, clubbing or edema    Pulses:  2+ and symmetric all extremities    Skin:  No jaundice, rashes, or lesions    Lymph nodes:  No palpable cervical lymphadenopathy        Lab Results:   Admission on 01/31/2022   Component Date Value    WBC 01/31/2022 11 39*    RBC 01/31/2022 3 57*    Hemoglobin 01/31/2022 6 4*    Hematocrit 01/31/2022 23 8*    MCV 01/31/2022 67*    MCH 01/31/2022 17 9*    MCHC 01/31/2022 26 9*    RDW 01/31/2022 19 4*    MPV 01/31/2022 9 0     Platelets 70/37/7290 301     nRBC 01/31/2022 0     Neutrophils Relative 01/31/2022 83*    Immat GRANS % 01/31/2022 0     Lymphocytes Relative 01/31/2022 7*    Monocytes Relative 01/31/2022 9     Eosinophils Relative 01/31/2022 1     Basophils Relative 01/31/2022 0     Neutrophils Absolute 01/31/2022 9 40*    Immature Grans Absolute 01/31/2022 0 03     Lymphocytes Absolute 01/31/2022 0 77     Monocytes Absolute 01/31/2022 1 04     Eosinophils Absolute 01/31/2022 0 12     Basophils Absolute 01/31/2022 0 03     Sodium 01/31/2022 137     Potassium 01/31/2022 3 9     Chloride 01/31/2022 101     CO2 01/31/2022 29     ANION GAP 01/31/2022 7     BUN 01/31/2022 13     Creatinine 01/31/2022 1 46*    Glucose 01/31/2022 94     Calcium 01/31/2022 8 7     AST 01/31/2022 14*    ALT 01/31/2022 9     Alkaline Phosphatase 01/31/2022 83 8     Total Protein 01/31/2022 6 2*    Albumin 01/31/2022 3 7     Total Bilirubin 01/31/2022 1 04     eGFR 01/31/2022 46     hs TnI 0hr 01/31/2022 10     Ethanol Lvl 01/31/2022 <10     POC Glucose 01/31/2022 92     ABO Grouping 01/31/2022 AB     Rh Factor 01/31/2022 Positive     Antibody Screen 01/31/2022 Negative     Specimen Expiration Date 01/31/2022 [id number]     OCCULT BLD, FECAL IMMUNO* 01/31/2022 Negative     hs TnI 2hr 01/31/2022 11     Delta 2hr hsTnI 01/31/2022 1     hs TnI 4hr 01/31/2022 10     Delta 4hr hsTnI 01/31/2022 0     Unit Product Code 02/02/2022 [id number]     Unit Number 02/02/2022 [id number]     Unit ABO 02/02/2022 B     Unit [facility] 02/02/2022 POS     Crossmatch 02/02/2022 Compatible     Unit Dispense Status 02/02/2022 Presumed Trans     Unit Product Volume 02/02/2022 300     Unit Product Code 02/02/2022 [id number]     Unit Number 02/02/2022 [id number]     Unit ABO 02/02/2022 A     Unit [facility] 02/02/2022 POS     Crossmatch 02/02/2022 Compatible     Unit Dispense Status 02/02/2022 Presumed Trans     Unit Product Volume 02/02/2022 350     Vitamin B-12 01/31/2022 250     Folate 01/31/2022 >20 0*    Iron Saturation 01/31/2022 3*    TIBC 01/31/2022 458*    Iron 01/31/2022 13*    Ferritin 01/31/2022 5*    Sodium 02/01/2022 138     Potassium 02/01/2022 3 9     Chloride 02/01/2022 103     CO2 02/01/2022 29     ANION GAP 02/01/2022 6     BUN 02/01/2022 14     Creatinine 02/01/2022 1 40*    Glucose 02/01/2022 92     Calcium 02/01/2022 8 2*    eGFR 02/01/2022 49     WBC 02/01/2022 9 62     RBC 02/01/2022 3 60*    Hemoglobin 02/01/2022 7 0*    Hematocrit 02/01/2022 24 5*    MCV 02/01/2022 68*    MCH 02/01/2022 19 4*    MCHC 02/01/2022 28 6*    RDW 02/01/2022 20 4*    MPV 02/01/2022 9 4     Platelets 90/68/0101 287     nRBC 02/01/2022 0     Neutrophils Relative 02/01/2022 70     Immat GRANS % 02/01/2022 0     Lymphocytes Relative 02/01/2022 13*    Monocytes Relative 02/01/2022 11     Eosinophils Relative 02/01/2022 5     Basophils Relative 02/01/2022 1     Neutrophils Absolute 02/01/2022 6 79     Immature Grans Absolute 02/01/2022 0 03     Lymphocytes Absolute 02/01/2022 1 29     Monocytes Absolute 02/01/2022 1 03     Eosinophils Absolute 02/01/2022 0 43     Basophils Absolute 02/01/2022 0 05     Ventricular Rate 01/31/2022 111     Atrial Rate 01/31/2022 111     CA Interval 01/31/2022 133     QRSD Interval 01/31/2022 102     QT Interval 01/31/2022 330     QTC Interval 01/31/2022 449     QRS Axis 01/31/2022 72     T Wave Axis 01/31/2022 60     Sodium 02/02/2022 138     Potassium 02/02/2022 4 2     Chloride 02/02/2022 102     CO2 02/02/2022 27     ANION GAP 02/02/2022 9     BUN 02/02/2022 17     Creatinine 02/02/2022 1 22*    Glucose 02/02/2022 82     Calcium 02/02/2022 8 4     eGFR 02/02/2022 58     WBC 02/02/2022 9 82     RBC 02/02/2022 4 13     Hemoglobin 02/02/2022 8 2*    Hematocrit 02/02/2022 29 6*    MCV 02/02/2022 72*    MCH 02/02/2022 19 9*    MCHC 02/02/2022 27 7*    RDW 02/02/2022 21 4*    Platelets 37/88/2489 290     MPV 02/02/2022 9 5        Imaging Studies: I have personally reviewed pertinent imaging studies

## 2022-02-02 NOTE — CASE MANAGEMENT
Case Management Discharge Planning Note    Patient name Fabian Philippe  Location /-61 MRN [id number]  : 1945 Date 2022       Current Admission Date: 2022  Current Admission Diagnosis:Symptomatic anemia   Patient Active Problem List    Diagnosis Date Noted    Abnormal colonoscopy 2022    Dizziness 2022    HERNÁN (acute kidney injury) ([facility] [address] ) 2022    Urinary retention 2022    Essential (hemorrhagic) thrombocythemia ([facility] [address] ) 2022    Closed nondisplaced comminuted fracture of left patella 2021    Head trauma 2021    Thrombocytosis 2021    Bradycardia 2021    Syncope 2021    Colonic adenoma 2021    Ambulatory dysfunction 2021    COPD (chronic obstructive pulmonary disease) ([facility] [address] ) 2021    Orthostatic hypotension 2020    Tobacco use disorder 2020    Symptomatic anemia 2020    Congestive cardiomyopathy ([facility] [address] ) 2020    Permanent atrial fibrillation ([facility] [address] ) 2020      LOS (days): 2  Geometric Mean LOS (GMLOS) (days): 2 70  Days to GMLOS:1     OBJECTIVE:  Risk of Unplanned Readmission Score: 18         Current admission status: Inpatient   Preferred Pharmacy:   [address]  [address] [place] [address]  Phone: [phone] Fax: [address] [place] [address] 20, [address] [address]  Phone: [phone] Fax: [phone]    Primary Care Provider: Librado Ovalle MD    Primary Insurance: MEDICARE  Secondary Insurance:  FOR LIFE    DISCHARGE DETAILS:    Discharge planning discussed with[de-identified] Patient in person, wife by phone  Freedom of Choice: Yes  Comments - Freedom of Choice: Patient choosing to discharge home with VNA    CM contacted family/caregiver?: Yes  Were Treatment Team discharge recommendations reviewed with patient/caregiver?: Yes  Did patient/caregiver verbalize understanding of patient care needs?: Yes       Contacts  Patient Contacts: Gume Temple  Relationship to Patient[de-identified] Family  Contact Method: Phone  Phone Number: [phone]  Reason/Outcome: Continuity of [address]         Is the patient interested in [facility] Hughes at discharge?: Yes  [address] requested[de-identified] [address] [facility] [place] Name[de-identified] Other ([address] [place] Provider[de-identified] PCP  Home Health Services Needed[de-identified] COPD Management,Evaluate Functional Status and Safety,Gait/ADL Training,Strengthening/Theraputic Exercises to Improve Function,Urinary Incontinence Catheter Management  Homebound Criteria Met[de-identified] Uses an Assist Device (i e  cane, walker, etc),Requires the Assistance of Another Person for Safe Ambulation or to Leave the Home  Supporting Clincal Findings[de-identified] Fatigues Easliy in Short Distances,Limited Endurance    DME Referral Provided  Referral made for DME?: No (PATIENT REFUSING WALKER)    Other Referral/Resources/Interventions Provided:  Interventions: [facility]  Referral Comments: VNA REFERRALS OPENED VIA [employer]  Would you like to participate in our [facility] service program?  : No - Declined    Treatment Team Recommendation: Short Term Rehab (REFUSING STR PLACEMENT )  Discharge Destination Plan[de-identified] Home with [email or web address] (REFERRALS PENDING )      Additional Comments: CM s/w patient bedside to review STR recommendation  Patient adamantly refusing stating he is agreeable to return home with VNA services  CM s/w wife, Racquel Baker, by phone to review patient's needs for D/C  [facility] stating she is agreeable to discharge home with VNA services  No preference identified by patient or [facility] for VNA provider  Referrals pending at this time

## 2022-02-02 NOTE — PROGRESS NOTES
INTERNAL MEDICINE RESIDENCY PROGRESS NOTE     Name: Cecilia Frederick   Age & Sex: 78 y o  male   MRN: [id number]  Unit/Bed#: -01   Encounter: [id number]    PATIENT INFORMATION     Name: Cecilia Frederick   Age & Sex: 78 y o  male   MRN: [id number]  Hospital Stay Days: 2    ASSESSMENT/PLAN     Principal Problem:    Symptomatic anemia  Active Problems:    Permanent atrial fibrillation [facility])    Ambulatory dysfunction    Dizziness    HERNÁN (acute kidney injury) ([facility] )    Urinary retention    Abnormal colonoscopy      * Symptomatic anemia  Assessment & Plan  · Presents complaining of dizziness with ambulating and at rest   Denies associated chest pain, shortness of breath, weakness  Denies melena, hematochezia, hematemesis  FOBT negative  · Hgb 6 4  · Received 1 unit PRBCs in ED  · Continue to trend  · Baseline hemoglobin appears 7-8 due to iron deficiency in the past    · Check iron panel, folate, B12    2/1: Hb improved to 7 0 after 1 unit pRBC  No signs of active bleeding  Will give 1 additional unit to get Hb closer to 8 because patient has CAD history  In addition will give IV Venofer since patient had significant iron deficiency in the past and has not been compliant with his iron supplement  Follow iron panel results --> consistent with iron deficiency anemia with iron of 13, TIBC of 458  2/2: Hb improved to 8 2  No signs of active bleeding  Patient is still lightheaded when standing up  Repeat orthostats do show some drop in BP after standing for 3 minutes but jensen not meet criteria for orthostatic hypotension  HR is mildly elevated and patient appears dry on exam  Will give gentle hydration with NS at 75 ml/hr and give Chaim stockings  Abnormal colonoscopy  Assessment & Plan  · Had EGD/colonoscopy with Dr Radha Espitia in September 2020  · Multiple adenomatous polyps were removed  Needed repeat colonoscopy in 3 months for removal of remaining polyps  However patient never followed up   Pathology came back as tubular adenomas, no high grade dysplasia or malignancy  Biopsies for EGD came back positive for H  Pylori  AB script for eradication was send to pharmacy however patient did not complete the treatment  Plan:  · FOBT negative on admission and per history no S&S of acute GI bleed  However chronic bleeding focus in GI tract causing his chronic iron deficiency anemia is likely  He will need close outpatient follow-up with GI for repeat colonoscopy  · Will give script for H  Pylori eradication course at discharge  2/2: Will get inpatient GI consultation  Urinary retention  Assessment & Plan  · Evaluated here earlier this morning and diagnosed with urinary retention  Sent home with Cloud catheter  Reports back to ER initially for full issues with changing Cloud bag  Patient was provided with education understands how to change bag   · I&Os  · Continue Flomax    HERNÁN (acute kidney injury) ([facility] )  Assessment & Plan  · Cr 1 46  · Likely multifactorial prerenal/post renal in setting of acute urinary retention treated this morning with placement of Cloud catheter  · Received 1 unit PRBCs in ED  · Recheck in a m --> 1 40    2/1: Will give another unit for symptomatic anemia  Encourage oral hydration  Recheck tomorrow in AM    2/2: creatinine improved to 1 22  Continue to monitor  Dizziness  Assessment & Plan  · Complains of dizziness starting this afternoon when standing up from table  Denies any loss of consciousness, head strike, syncope, chest pain, palpitation, weakness, dysarthria,   · Likely secondary to anemia  · Check orthostatics, has history of orthostasis in past --> negative  · Fall precautions, OOB with assistance   · Tele  · PT/OT    2/2: see under symptomatic anemia  Will give gentle IVF today and monitor       Ambulatory dysfunction  Assessment & Plan  · Patient uses canes and braces for ambulation due to patella fracture  · Follows with Ortho Dr Pavan Roberts  · Fall precautions  · PT/OT consult    Permanent atrial fibrillation ([facility] )  Assessment & Plan  · Noted be in Afib with a rate of 111  Give home metoprolol now   · AC:  Eliquis  · Rate control:  Metoprolol    2/1: HR improved - currently in 90s  2/2: HR acceptable 90-100s  Disposition: Continue inpatient management for IV hydration and GI consultation  Patient may need STR  SUBJECTIVE     Patient seen and examined  No acute events overnight  Alert and oriented x3 but has difficulty staying on target during conversation  States that he still gets dizzy when standing up quickly  Denies any bloody stools, N/V/D, or abdominal discomfort  Denies CP, SOB, or palpitations  OBJECTIVE     Vitals:    22 1015 22 1018 22 1021 22 1026   BP: 140/85 133/84 129/83 124/82   BP Location: Right arm Right arm Right arm Right arm   Pulse: 95 97 93 103   Resp:       Temp:       TempSrc:       SpO2:       Weight:       Height:          Temperature:   Temp (24hrs), Av 7 °F (36 5 °C), Min:97 °F (36 1 °C), Max:98 3 °F (36 8 °C)    Temperature: 98 3 °F (36 8 °C)  Intake & Output:  I/O        0701   0700  0701   0700  0701  / 0700    P  O   400     Blood 350 468 8     Total Intake(mL/kg) 350 (4 7) 868 8 (11 6)     Urine (mL/kg/hr)  1100 (0 6)     Stool  0     Total Output  1100     Net +350 -231 3            Unmeasured Stool Occurrence  1 x         Weights:   IBW (Ideal Body Weight): 77 6 kg    Body mass index is 22 34 kg/m²  Weight (last 2 days)     Date/Time Weight    22 0859 74 7 (164 68)    22 1834 74 8 (165)        Physical Exam  Vitals reviewed  Constitutional:       General: He is not in acute distress  Appearance: Normal appearance  He is not ill-appearing, toxic-appearing or diaphoretic  HENT:      Head: Normocephalic and atraumatic  Mouth/Throat:      Mouth: Mucous membranes are dry  Pharynx: Oropharynx is clear  Eyes:      General: No scleral icterus  Conjunctiva/sclera: Conjunctivae normal       Comments: Conjunctiva pale   Cardiovascular:      Rate and Rhythm: Normal rate  Rhythm irregular  Heart sounds: Normal heart sounds  No murmur heard  No friction rub  No gallop  Comments: Tachycardic, irregularly irregular  Pulmonary:      Effort: Pulmonary effort is normal  No respiratory distress  Breath sounds: Normal breath sounds  No wheezing, rhonchi or rales  Abdominal:      General: Abdomen is flat  Bowel sounds are normal  There is no distension  Palpations: Abdomen is soft  Tenderness: There is no abdominal tenderness  There is no guarding or rebound  Musculoskeletal:      Cervical back: Normal range of motion and neck supple  Right lower leg: No edema  Left lower leg: No edema  Skin:     General: Skin is warm and dry  Neurological:      General: No focal deficit present  Mental Status: He is alert and oriented to person, place, and time  Cranial Nerves: Cranial nerves are intact  No dysarthria or facial asymmetry  Coordination: Finger-Nose-Finger Test normal    Psychiatric:         Mood and Affect: Mood normal          Behavior: Behavior normal        LABORATORY DATA     Labs: I have personally reviewed pertinent reports    Results from last 7 days   Lab Units 02/02/22 0454 02/01/22 0621 01/31/22  1921   WBC Thousand/uL 9 82 9 62 11 39*   HEMOGLOBIN g/dL 8 2* 7 0* 6 4*   HEMATOCRIT % 29 6* 24 5* 23 8*   PLATELETS Thousands/uL 290 287 301   NEUTROS PCT %  --  70 83*   MONOS PCT %  --  11 9      Results from last 7 days   Lab Units 02/02/22  0454 02/01/22 0621 01/31/22  1921   POTASSIUM mmol/L 4 2 3 9 3 9   CHLORIDE mmol/L 102 103 101   CO2 mmol/L 27 29 29   BUN mg/dL 17 14 13   CREATININE mg/dL 1 22* 1 40* 1 46*   CALCIUM mg/dL 8 4 8 2* 8 7   ALK PHOS U/L  --   --  83 8   ALT U/L  --   --  9   AST U/L  --   --  14*                            IMAGING & DIAGNOSTIC TESTING     Radiology Results: I have personally reviewed pertinent reports  CT head without contrast    Result Date: 1/31/2022  Impression: No acute intracranial abnormality  Workstation performed: [id number]     Other Diagnostic Testing: I have personally reviewed pertinent reports  ACTIVE MEDICATIONS     Current Facility-Administered Medications   Medication Dose Route Frequency    acetaminophen (TYLENOL) tablet 650 mg  650 mg Oral Q6H PRN    apixaban (ELIQUIS) tablet 5 mg  5 mg Oral BID    docusate sodium (COLACE) capsule 100 mg  100 mg Oral BID    ferrous sulfate tablet 325 mg  325 mg Oral Daily With Breakfast    folic acid (FOLVITE) tablet 400 mcg  400 mcg Oral Daily    iron sucrose (VENOFER) 200 mg in sodium chloride 0 9 % 100 mL IVPB  200 mg Intravenous Daily    metoprolol succinate (TOPROL-XL) 24 hr tablet 25 mg  25 mg Oral Daily    sodium chloride 0 9 % infusion  75 mL/hr Intravenous Continuous    tamsulosin (FLOMAX) capsule 0 4 mg  0 4 mg Oral Daily       VTE Pharmacologic Prophylaxis: Reason for no pharmacologic prophylaxis on eliquis  VTE Mechanical Prophylaxis: sequential compression device    Portions of the record may have been created with voice recognition software  Occasional wrong word or "sound a like" substitutions may have occurred due to the inherent limitations of voice recognition software    Read the chart carefully and recognize, using context, where substitutions have occurred   ==  Brittaney Valentino MD  [address]  Internal Medicine Residency PGY-3

## 2022-02-02 NOTE — H&P (VIEW-ONLY)
Consultation - Gastroenterology   Fabian Philippe 78 y o  male MRN: [id number]  Unit/Bed#: -01 Encounter: [id number]        Inpatient consult to gastroenterology  Consult performed by: GABRIELA Rueda  Consult ordered by: Jimena Guerra MD          Reason for Consult / Principal Problem:     Anemia/abnormal colonoscopy      ASSESSMENT AND PLAN:        1  Symptomatic iron deficiency anemia  2  H pylori gastritis  3  History of tubular adenoma/sessile serrated colon polyps  This is a 78 y o  male with history of COPD, BPH,  AFib on Eliquis, CAD, tobacco use,  iron deficiency anemia, and L patellar fracture in October who presented to the hospital 1/31 due to complaint of dizziness with ambulation and at rest, and was found to have hemoglobin of 6 4 with previous baseline around 8 and evidence of HERNÁN on labs  Hemeoccult was negative and he didn't appear to have any active GI blood loss so Eliquis was continued  He is s/p 2 units of blood Hgb now stable at 8 2  Previous workup for anemia in November 2020 w/ EGD/Colonoscopy showed H pylori gastritis and multiple large colon polyps requiring clips/tattoo, not all were removed, pt was recommended to repeat colonoscopy in 3 months but never followed up and H pylori never treated  He was on iron supplementation in the past, but stopped taking this because his stool turned dark  Symptomatic anemia may be secondary to occult GI bleeding from H pylori/NSAID induced PUD, AVMs, or lesion      -Given patient's history of poor follow-up, and need for anticoagulation for history of atrial fibrillation will tentatively plan for EGD/colonoscopy on Friday  Clear liquid diet ordered for tomorrow   -Please hold Eliquis tomorrow   -Hold oral iron supplementation for now, as this can obscure visualization with colonoscopy  -Monitor CBC  -Transfuse blood products as needed  -Further recommendations pending course, he will need treatment for H pylori   Discussed importance of GI follow up  Thank you for consultation  Case will be discussed with Dr Nisha Elliott and final recommendations will be made by him     ______________________________________________________________________    HPI:  Mirza Proctor is a 78 y o  male with history of COPD, BPH,  AFib on Eliquis, CAD, tobacco use,  iron deficiency anemia, and L patellar fracture in October who presented to the hospital 1/31 due to complaint of dizziness with ambulation and at rest, and was found to have hemoglobin of 6 4 with previous baseline around 8 and evidence of HERNÁN  In the ED was tachycardic with variable blood pressures, SBP   Hemoccult was negative  He was transfused 2 units of blood improvement to 8 2 this morning and kidney function is improving as well with IV hydration  He had GI workup for iron deficiency anemia in November 2020 and EGD/colonoscopy was notable for hiatal hernia, H pylori gastritis, 3 ascending colon polyps removed with snare cautery and hemostasis clips and area marked with tattoo, 2 medium transverse colon polyps removed with cold snare, and single large transverse colon polyp removed with endoscopic mucosal resection and subsequent mucosal defect clipped  Multiple additional polyps were not removed  He also had evidence of left-sided diverticulosis and internal hemorrhoids  Ascending colon biopsies consistent with tubular adenoma and sessile serrated adenoma, no high-grade dysplasia or carcinoma identified  Transverse colon polyps consistent with tubular adenoma, again with no high-grade dysplasia or carcinoma identified  It looks like our office tried to reach out to the patient several times and ended up sending a certified letter regarding biopsy results and need for H pylori treatment and repeat colonoscopy, but he did not follow up  Prevpac was sent but he never took it  He had been on oral iron supplementation twice daily at home, but stopped it because it turned his stools black    He moves bowels every 3 days without straining or pushing, denies use stool softeners or laxatives  Denies any heartburn, nausea/vomiting, abdominal pain dysphagia  He does not take anything for reflux  He reports he ibuprofen for a couple weeks when he broke his knee, however has not been on NSAIDs recently  Denies family history of colon cancer, or unintended weight loss  REVIEW OF SYSTEMS:    CONSTITUTIONAL: Denies any fever, chills, rigors, and weight loss  HEENT: No earache or tinnitus  Denies hearing loss or visual disturbances  CARDIOVASCULAR: No chest pain or palpitations  RESPIRATORY: Denies any cough, hemoptysis, shortness of breath or dyspnea on exertion  GASTROINTESTINAL: As noted in the History of Present Illness  GENITOURINARY: No problems with urination  Denies any hematuria or dysuria  NEUROLOGIC: No dizziness or vertigo, denies headaches  MUSCULOSKELETAL: Denies any muscle or joint pain  SKIN: Denies skin rashes or itching  ENDOCRINE: Denies excessive thirst  Denies intolerance to heat or cold  PSYCHOSOCIAL: Denies depression or anxiety  Denies any recent memory loss  Historical Information   Past Medical History:   Diagnosis Date    Anxiety disorder     Atrial fibrillation ([facility] )     Coronary artery disease     Depression     Hepatitis C     screening negative in 1/2017    [facility]ocardial [facility])      Past Surgical History:   Procedure Laterality Date    CARDIAC CATHETERIZATION  07/09/2018     Social History   Social History     Substance and Sexual Activity   Alcohol Use Never     Social History     Substance and Sexual Activity   Drug Use Never     Social History     Tobacco Use   Smoking Status Current Every Day Smoker    Packs/day: 0 50    Years: 57 00    Pack years: 28 50    Types: Cigarettes   Smokeless Tobacco Never Used   Tobacco Comment    since age 17; Has history of smoking for over 54 years   He has been smoking more than packet daily in the past however he has been smokes about half a pack a day lately and stopped smoking on 7/1/2018 when he was admitted to the hospital       Family History   Problem Relation Age of Onset    Hypertension Mother     Coronary artery disease Mother         premature    Hypertension Father     Coronary artery disease Father         premature    Diabetes Father        Meds/Allergies     Medications Prior to Admission   Medication    apixaban (ELIQUIS) 5 mg    docusate sodium (COLACE) 100 mg capsule    fluticasone-salmeterol (Advair Diskus) 100-50 mcg/dose inhaler    ipratropium-albuterol (DUO-NEB) 0 5-2 5 mg/3 mL nebulizer solution    lisinopril (ZESTRIL) 2 5 mg tablet    metoprolol succinate (Toprol XL) 25 mg 24 hr tablet    potassium chloride (Klor-Con) 10 mEq tablet    tamsulosin (FLOMAX) 0 4 mg    ferrous sulfate 324 (65 Fe) mg    folic acid (FOLVITE) 939 mcg tablet    furosemide (LASIX) 40 mg tablet    ibuprofen (MOTRIN) 400 mg tablet    pantoprazole (PROTONIX) 40 mg tablet     Current Facility-Administered Medications   Medication Dose Route Frequency    acetaminophen (TYLENOL) tablet 650 mg  650 mg Oral Q6H PRN    apixaban (ELIQUIS) tablet 5 mg  5 mg Oral BID    docusate sodium (COLACE) capsule 100 mg  100 mg Oral BID    ferrous sulfate tablet 325 mg  325 mg Oral Daily With Breakfast    folic acid (FOLVITE) tablet 400 mcg  400 mcg Oral Daily    iron sucrose (VENOFER) 200 mg in sodium chloride 0 9 % 100 mL IVPB  200 mg Intravenous Daily    metoprolol succinate (TOPROL-XL) 24 hr tablet 25 mg  25 mg Oral Daily    sodium chloride 0 9 % infusion  75 mL/hr Intravenous Continuous    tamsulosin (FLOMAX) capsule 0 4 mg  0 4 mg Oral Daily       No Known Allergies        Objective     Blood pressure 124/82, pulse 103, temperature 98 3 °F (36 8 °C), temperature source Tympanic, resp  rate 18, height 6' (1 829 m), weight 74 7 kg (164 lb 10 9 oz), SpO2 97 %   Body mass index is 22 34 kg/m²  Intake/Output Summary (Last 24 hours) at 2/2/2022 1317  Last data filed at 2/2/2022 0400  Gross per 24 hour   Intake 868 75 ml   Output 1100 ml   Net -231 25 ml         PHYSICAL EXAM:      General Appearance:   Alert, cooperative, no distress   HEENT:   Normocephalic, atraumatic, anicteric  Neck:  Supple, symmetrical, trachea midline   Lungs:   Clear to auscultation bilaterally; no rales, rhonchi or wheezing; respirations unlabored    Heart[de-identified]   Regular rate and rhythm; no murmur, rub, or gallop     Abdomen:   Soft, non-tender, non-distended; normal bowel sounds; no masses, no organomegaly    Genitalia:   Deferred    Rectal:   Deferred    Extremities:  No cyanosis, clubbing or edema    Pulses:  2+ and symmetric all extremities    Skin:  No jaundice, rashes, or lesions    Lymph nodes:  No palpable cervical lymphadenopathy        Lab Results:   Admission on 01/31/2022   Component Date Value    WBC 01/31/2022 11 39*    RBC 01/31/2022 3 57*    Hemoglobin 01/31/2022 6 4*    Hematocrit 01/31/2022 23 8*    MCV 01/31/2022 67*    MCH 01/31/2022 17 9*    MCHC 01/31/2022 26 9*    RDW 01/31/2022 19 4*    MPV 01/31/2022 9 0     Platelets 14/28/4840 301     nRBC 01/31/2022 0     Neutrophils Relative 01/31/2022 83*    Immat GRANS % 01/31/2022 0     Lymphocytes Relative 01/31/2022 7*    Monocytes Relative 01/31/2022 9     Eosinophils Relative 01/31/2022 1     Basophils Relative 01/31/2022 0     Neutrophils Absolute 01/31/2022 9 40*    Immature Grans Absolute 01/31/2022 0 03     Lymphocytes Absolute 01/31/2022 0 77     Monocytes Absolute 01/31/2022 1 04     Eosinophils Absolute 01/31/2022 0 12     Basophils Absolute 01/31/2022 0 03     Sodium 01/31/2022 137     Potassium 01/31/2022 3 9     Chloride 01/31/2022 101     CO2 01/31/2022 29     ANION GAP 01/31/2022 7     BUN 01/31/2022 13     Creatinine 01/31/2022 1 46*    Glucose 01/31/2022 94     Calcium 01/31/2022 8 7     AST 01/31/2022 14*    ALT 01/31/2022 9     Alkaline Phosphatase 01/31/2022 83 8     Total Protein 01/31/2022 6 2*    Albumin 01/31/2022 3 7     Total Bilirubin 01/31/2022 1 04     eGFR 01/31/2022 46     hs TnI 0hr 01/31/2022 10     Ethanol Lvl 01/31/2022 <10     POC Glucose 01/31/2022 92     ABO Grouping 01/31/2022 AB     Rh Factor 01/31/2022 Positive     Antibody Screen 01/31/2022 Negative     Specimen Expiration Date 01/31/2022 [id number]     OCCULT BLD, FECAL IMMUNO* 01/31/2022 Negative     hs TnI 2hr 01/31/2022 11     Delta 2hr hsTnI 01/31/2022 1     hs TnI 4hr 01/31/2022 10     Delta 4hr hsTnI 01/31/2022 0     Unit Product Code 02/02/2022 [id number]     Unit Number 02/02/2022 [id number]     Unit ABO 02/02/2022 B     Unit [facility] 02/02/2022 POS     Crossmatch 02/02/2022 Compatible     Unit Dispense Status 02/02/2022 Presumed Trans     Unit Product Volume 02/02/2022 300     Unit Product Code 02/02/2022 [id number]     Unit Number 02/02/2022 [id number]     Unit ABO 02/02/2022 A     Unit [facility] 02/02/2022 POS     Crossmatch 02/02/2022 Compatible     Unit Dispense Status 02/02/2022 Presumed Trans     Unit Product Volume 02/02/2022 350     Vitamin B-12 01/31/2022 250     Folate 01/31/2022 >20 0*    Iron Saturation 01/31/2022 3*    TIBC 01/31/2022 458*    Iron 01/31/2022 13*    Ferritin 01/31/2022 5*    Sodium 02/01/2022 138     Potassium 02/01/2022 3 9     Chloride 02/01/2022 103     CO2 02/01/2022 29     ANION GAP 02/01/2022 6     BUN 02/01/2022 14     Creatinine 02/01/2022 1 40*    Glucose 02/01/2022 92     Calcium 02/01/2022 8 2*    eGFR 02/01/2022 49     WBC 02/01/2022 9 62     RBC 02/01/2022 3 60*    Hemoglobin 02/01/2022 7 0*    Hematocrit 02/01/2022 24 5*    MCV 02/01/2022 68*    MCH 02/01/2022 19 4*    MCHC 02/01/2022 28 6*    RDW 02/01/2022 20 4*    MPV 02/01/2022 9 4     Platelets 03/54/9753 287     nRBC 02/01/2022 0     Neutrophils Relative 02/01/2022 70     Immat GRANS % 02/01/2022 0     Lymphocytes Relative 02/01/2022 13*    Monocytes Relative 02/01/2022 11     Eosinophils Relative 02/01/2022 5     Basophils Relative 02/01/2022 1     Neutrophils Absolute 02/01/2022 6 79     Immature Grans Absolute 02/01/2022 0 03     Lymphocytes Absolute 02/01/2022 1 29     Monocytes Absolute 02/01/2022 1 03     Eosinophils Absolute 02/01/2022 0 43     Basophils Absolute 02/01/2022 0 05     Ventricular Rate 01/31/2022 111     Atrial Rate 01/31/2022 111     OH Interval 01/31/2022 133     QRSD Interval 01/31/2022 102     QT Interval 01/31/2022 330     QTC Interval 01/31/2022 449     QRS Axis 01/31/2022 72     T Wave Axis 01/31/2022 60     Sodium 02/02/2022 138     Potassium 02/02/2022 4 2     Chloride 02/02/2022 102     CO2 02/02/2022 27     ANION GAP 02/02/2022 9     BUN 02/02/2022 17     Creatinine 02/02/2022 1 22*    Glucose 02/02/2022 82     Calcium 02/02/2022 8 4     eGFR 02/02/2022 58     WBC 02/02/2022 9 82     RBC 02/02/2022 4 13     Hemoglobin 02/02/2022 8 2*    Hematocrit 02/02/2022 29 6*    MCV 02/02/2022 72*    MCH 02/02/2022 19 9*    MCHC 02/02/2022 27 7*    RDW 02/02/2022 21 4*    Platelets 08/88/2422 290     MPV 02/02/2022 9 5        Imaging Studies: I have personally reviewed pertinent imaging studies

## 2022-02-02 NOTE — PHYSICAL THERAPY NOTE
PHYSICAL THERAPY TREATMENT    NAME:  Jaci Jones  DATE: 02/02/22    AGE:   78 y o  Mrn:   [id number]  Length Of Stay: 2    ADMIT DX:  Urinary retention [R33 9]  Anemia [D64 9]  HERNÁN (acute kidney injury) ([facility] [address] ) [N17 9]  Problem with urinary catheter ([facility] [address] ) [Y45  9XXA]    Past Medical History:   Diagnosis Date    Anxiety disorder     Atrial fibrillation (HCC)     Coronary artery disease     Depression     Hepatitis C     screening negative in 1/2017    MI (myocardial infarction) [facility])      Past Surgical History:   Procedure Laterality Date    CARDIAC CATHETERIZATION  07/09/2018       Performed at least 2 patient identifiers during session: Name and ID bracelet        02/02/22 1040   PT Last Visit   PT Visit Date 02/02/22   Note Type   Note Type Treatment   Pain Assessment   Pain Assessment Tool 0-10   Pain Score No Pain   Hospital Pain Intervention(s) Ambulation/increased activity;Repositioned   Multiple Pain Sites No   Restrictions/Precautions   Weight Bearing Precautions Per Order Yes   LLE Weight Bearing Per Order WBAT  (w/ hinged knee brace in ext, s/p L patellar fx 10/14/2021)   Braces or Orthoses Other (Comment)  (L hinged knee brace)   Other Precautions Impulsive; Chair Alarm; Bed Alarm; Fall Risk;Hard of hearing;Visual impairment;Multiple lines   General   Chart Reviewed Yes   Additional Pertinent History 12/9/2021 Dr Everett Liter: "WBAT in T ROM brace locked in extension  Begin slow range of motion from 0-30 degrees active assist and passive to increase gradually by 10° each week as tolerated no strengthening yet  Response to Previous Treatment Patient with no complaints from previous session     Family/Caregiver Present No   Cognition   Overall Cognitive Status WFL  (poor insight to deficits, impulsive)   Arousal/Participation Cooperative   Attention Difficulty attending to directions   Orientation Level Oriented to person;Oriented to place;Oriented to situation;Oriented to time   Memory Decreased recall of precautions   Following Commands Follows multistep commands with increased time or repetition   Comments Pt very impulsive with lack of insight to deficits, needs increased cues for safety  Subjective   Subjective "I'm supposed to go home today but I don't know when "   Bed Mobility   Supine to Sit 5  Supervision   Additional items Assist x 1; Increased time required   Sit to Supine 5  Supervision   Additional items Assist x 1; Increased time required   Transfers   Sit to Stand 3  Moderate assistance   Additional items Assist x 1; Increased time required; Impulsive;Verbal cues  (impulsive to initiate, increased time to complete)   Stand to Sit 4  Minimal assistance   Additional items Assist x 1; Impulsive;Verbal cues   Stand pivot 4  Minimal assistance   Additional items Assist x 1; Impulsive;Verbal cues  [facility])   Ambulation/Elevation   Gait pattern Antalgic;Decreased foot clearance  (SIGNIFICANT instability)   Gait Assistance 3  Moderate assist   Additional items Assist x 1;Verbal cues; Tactile cues   Assistive Device Straight cane   Distance 22ft x1 - additional ambulation deferred for pt safety d/t instability and lightheadedness   Stair Management Assistance Not tested   Ambulation/Elevation Additional Comments Pt continues to refuse to wear L hinged knee brace for ambulation, despite therapist education  Balance   Static Sitting Good   Dynamic Sitting Fair   Static Standing Fair -   Dynamic Standing Poor +   Ambulatory Poor +   Endurance Deficit   Endurance Deficit Yes   Activity Tolerance   Activity Tolerance Patient limited by fatigue; Other (Comment)  (poor insight to deficits, imparied balance, SOB)   Medical Staff Made Aware Spoke with SLIM and CM   Nurse Made Aware Spoke with JOHANNY Lambert pre/post session   Assessment   Prognosis Fair   Problem List Decreased range of motion;Decreased endurance; Impaired balance;Decreased mobility; Impaired judgement;Decreased safety awareness; Impaired hearing; Impaired vision;Pain;Orthopedic restrictions   Assessment Pt seen for PT treatment today with focus on gait training and stair negotiation  Pt reports plan for possible d/c today, confirmed with SLIM  Pt requires S and significantly increased time for bed mobility, MODA for transfers, MODA for in room distance mobility with SPC  Mobility tolerance severely limited due to dizziness/lightheadedness with changes in position  Pt very unsteady and displays a very impaired level of insight regarding deficits  Orthostatic BP readings taken, see vitals flowsheet for readings  Therapist with significant concerns regarding pt safety with mobility for d/c  Pt is at a high risk of falls and hospital readmission due to falls  Pt has a h/o multiple falls with resultant injury  Despite lack of safety with mobility, pt reports he will still be going into community and risk falling, therefore pt will not qualify for home care PT, however is unsafe for self transportation and mobility to OPPT  Recommend post acute STR for safety and to maximize indep and tolerance of mobility  Goals   Patient Goals "to get out of here and go home"   [facility] Expiration Date 02/11/22   Short Term Goal #1 Patient PT goals established in order to address patient self reported goal of "to get out of here and go home"  Pt will: complete all transfers at rudy level with [facility] in order to increase safety with functional mobility; ambulate >150ft with SPC at rudy level in order to increase safety with household and short community functional mobility; negotiate 10-15 stairs with HR assist and S in order to access his home; improve ambulatory balance to >/= good grade in order to promote safety and increased independence with mobility; improve AM-PAC score to >/= 24/24 in order to increase independence with mobility and decrease burden of care; improve Barthel Index score to >/= 70/100 in order to increase independence and decrease risk of falls     PT Treatment Day 1 Plan   Treatment/Interventions Functional transfer training;Elevations; Endurance training;Patient/family training;Bed mobility;Gait training;Spoke to MD;Spoke to nursing;Spoke to case management;OT   Progress Slow progress, medical status limitations   PT Frequency 2-3x/wk   Recommendation   PT Discharge Recommendation Post acute rehabilitation services  (pt unsafe for return to home at current level of mobility)   AM-PAC Basic Mobility Inpatient   Turning in Bed Without Bedrails 4   Lying on Back to Sitting on Edge of Flat Bed 3   Moving Bed to Chair 2   Standing Up From Chair 2   Walk in Room 2   Climb 3-5 Stairs 2   Basic Mobility Inpatient Raw Score 15   Basic Mobility Standardized Score 36 97   Highest Level Of Mobility   -HL Goal 4: Move to chair/commode   JH-HLM Highest Level of Mobility 6: Walk 10 steps or more   -HL Goal Achieved Yes   Education   Education Provided Mobility training;Assistive device   Patient Explanation/teachback used; Reinforcement needed  (poor acceptance/carry over of education)   End of Consult   Patient Position at End of Consult Supine;Bed/Chair alarm activated; All needs within reach   End of Consult Comments Based on patient's [facility] Level of Mobility scores today, patient currently has a goal of -[facility] Levels: 6: WALK 10 STEPS OR MORE, to be completed with RN staffing each shift, in order to improve overall activity tolerance and mobility, combat hospital related deconditioning, and maximize outcomes for d/c from the acute care setting  The patient's AM-PAC Basic Mobility Inpatient Short Form Raw Score is 15  A Raw score of less than or equal to 16 suggests the patient may benefit from discharge to post-acute rehabilitation services  Please also refer to the recommendation of the Physical Therapist for safe discharge planning        Ana María Lopez, PT, DPT   Available via [employer]  [facility] # [id number]  [place] License - [id number]  9/9/8707

## 2022-02-03 LAB
ANION GAP SERPL CALCULATED.3IONS-SCNC: 8 MMOL/L (ref 4–13)
BUN SERPL-MCNC: 17 MG/DL (ref 6–20)
CALCIUM SERPL-MCNC: 8.5 MG/DL (ref 8.4–10.2)
CHLORIDE SERPL-SCNC: 103 MMOL/L (ref 96–108)
CO2 SERPL-SCNC: 27 MMOL/L (ref 22–33)
CREAT SERPL-MCNC: 1.17 MG/DL (ref 0.5–1.2)
DME PARACHUTE DELIVERY DATE ACTUAL: NORMAL
DME PARACHUTE DELIVERY DATE REQUESTED: NORMAL
DME PARACHUTE ITEM DESCRIPTION: NORMAL
DME PARACHUTE ORDER STATUS: NORMAL
DME PARACHUTE SUPPLIER NAME: NORMAL
DME PARACHUTE SUPPLIER PHONE: NORMAL
ERYTHROCYTE [DISTWIDTH] IN BLOOD BY AUTOMATED COUNT: 22.8 % (ref 11.6–15.1)
GFR SERPL CREATININE-BSD FRML MDRD: 61 ML/MIN/1.73SQ M
GLUCOSE SERPL-MCNC: 77 MG/DL (ref 65–140)
HCT VFR BLD AUTO: 29.9 % (ref 36.5–49.3)
HGB BLD-MCNC: 8.2 G/DL (ref 12–17)
MCH RBC QN AUTO: 20 PG (ref 26.8–34.3)
MCHC RBC AUTO-ENTMCNC: 27.4 G/DL (ref 31.4–37.4)
MCV RBC AUTO: 73 FL (ref 82–98)
PLATELET # BLD AUTO: 292 THOUSANDS/UL (ref 149–390)
PMV BLD AUTO: 9.8 FL (ref 8.9–12.7)
POTASSIUM SERPL-SCNC: 3.9 MMOL/L (ref 3.5–5)
RBC # BLD AUTO: 4.11 MILLION/UL (ref 3.88–5.62)
SODIUM SERPL-SCNC: 138 MMOL/L (ref 133–145)
WBC # BLD AUTO: 11.34 THOUSAND/UL (ref 4.31–10.16)

## 2022-02-03 PROCEDURE — 94664 DEMO&/EVAL PT USE INHALER: CPT

## 2022-02-03 PROCEDURE — 94640 AIRWAY INHALATION TREATMENT: CPT

## 2022-02-03 PROCEDURE — 94760 N-INVAS EAR/PLS OXIMETRY 1: CPT

## 2022-02-03 PROCEDURE — 85027 COMPLETE CBC AUTOMATED: CPT | Performed by: INTERNAL MEDICINE

## 2022-02-03 PROCEDURE — 97116 GAIT TRAINING THERAPY: CPT

## 2022-02-03 PROCEDURE — 80048 BASIC METABOLIC PNL TOTAL CA: CPT | Performed by: INTERNAL MEDICINE

## 2022-02-03 PROCEDURE — 99232 SBSQ HOSP IP/OBS MODERATE 35: CPT | Performed by: INTERNAL MEDICINE

## 2022-02-03 PROCEDURE — 97530 THERAPEUTIC ACTIVITIES: CPT

## 2022-02-03 PROCEDURE — 99232 SBSQ HOSP IP/OBS MODERATE 35: CPT | Performed by: NURSE PRACTITIONER

## 2022-02-03 RX ORDER — IPRATROPIUM BROMIDE AND ALBUTEROL SULFATE 2.5; .5 MG/3ML; MG/3ML
3 SOLUTION RESPIRATORY (INHALATION) 4 TIMES DAILY
Status: DISCONTINUED | OUTPATIENT
Start: 2022-02-03 | End: 2022-02-03

## 2022-02-03 RX ORDER — LEVALBUTEROL 1.25 MG/.5ML
1.25 SOLUTION, CONCENTRATE RESPIRATORY (INHALATION)
Status: DISCONTINUED | OUTPATIENT
Start: 2022-02-03 | End: 2022-02-06 | Stop reason: HOSPADM

## 2022-02-03 RX ORDER — ALBUTEROL SULFATE 2.5 MG/3ML
2.5 SOLUTION RESPIRATORY (INHALATION) EVERY 4 HOURS PRN
Status: DISCONTINUED | OUTPATIENT
Start: 2022-02-03 | End: 2022-02-06 | Stop reason: HOSPADM

## 2022-02-03 RX ADMIN — DOCUSATE SODIUM 100 MG: 100 CAPSULE, LIQUID FILLED ORAL at 09:22

## 2022-02-03 RX ADMIN — METOPROLOL SUCCINATE 25 MG: 25 TABLET, EXTENDED RELEASE ORAL at 09:22

## 2022-02-03 RX ADMIN — IRON SUCROSE 200 MG: 20 INJECTION, SOLUTION INTRAVENOUS at 09:22

## 2022-02-03 RX ADMIN — IPRATROPIUM BROMIDE AND ALBUTEROL SULFATE 3 ML: 2.5; .5 SOLUTION RESPIRATORY (INHALATION) at 15:17

## 2022-02-03 RX ADMIN — IPRATROPIUM BROMIDE 0.5 MG: 0.5 SOLUTION RESPIRATORY (INHALATION) at 20:04

## 2022-02-03 RX ADMIN — TAMSULOSIN HYDROCHLORIDE 0.4 MG: 0.4 CAPSULE ORAL at 09:22

## 2022-02-03 RX ADMIN — FOLIC ACID TAB 400 MCG 400 MCG: 400 TAB at 09:22

## 2022-02-03 RX ADMIN — LEVALBUTEROL HYDROCHLORIDE 1.25 MG: 1.25 SOLUTION, CONCENTRATE RESPIRATORY (INHALATION) at 20:04

## 2022-02-03 RX ADMIN — BISACODYL 20 MG: 5 TABLET, COATED ORAL at 17:33

## 2022-02-03 RX ADMIN — POLYETHYLENE GLYCOL 3350, SODIUM SULFATE ANHYDROUS, SODIUM BICARBONATE, SODIUM CHLORIDE, POTASSIUM CHLORIDE 4000 ML: 236; 22.74; 6.74; 5.86; 2.97 POWDER, FOR SOLUTION ORAL at 17:33

## 2022-02-03 NOTE — PROGRESS NOTES
INTERNAL MEDICINE RESIDENCY PROGRESS NOTE     Name: Aishwarya Urban   Age & Sex: 78 y o  male   MRN: [id number]  Unit/Bed#: -01   Encounter: [id number]    PATIENT INFORMATION     Name: Aishwarya Urban   Age & Sex: 78 y o  male   MRN: [id number] Stay Days: 3    ASSESSMENT/PLAN     Principal Problem:    Symptomatic anemia  Active Problems:    Permanent atrial fibrillation [facility])    Ambulatory dysfunction    Dizziness    HERNÁN ([facility]) ([facility] )    Urinary retention    Abnormal colonoscopy      * Symptomatic anemia  Assessment & Plan  · Presents complaining of dizziness with ambulating and at rest   Denies associated chest pain, shortness of breath, weakness  Denies melena, hematochezia, hematemesis  FOBT negative  · Hgb 6 4  · Received 1 unit PRBCs in ED  · Continue to trend  · Baseline hemoglobin appears 7-8 due to iron deficiency in the past    · Check iron panel, folate, B12    2/1: Hb improved to 7 0 after 1 unit pRBC  No signs of active bleeding  Will give 1 additional unit to get Hb closer to 8 because patient has CAD history  In addition will give IV Venofer since patient had significant iron deficiency in the past and has not been compliant with his iron supplement  Follow iron panel results --> consistent with iron deficiency anemia with iron of 13, TIBC of 458  2/2: Hb improved to 8 2  No signs of active bleeding  Patient is still lightheaded when standing up  Repeat orthostats do show some drop in BP after standing for 3 minutes but jensen not meet criteria for orthostatic hypotension  HR is mildly elevated and patient appears dry on exam  Will give gentle hydration with NS at 75 ml/hr and give Chaim stockings  2/3: Hb stable at 8 2  Patient continues to be unsteady when walking with PT and is amiable for rehab  Abnormal colonoscopy  Assessment & Plan  · Had EGD/colonoscopy with Dr Hillary Sanchez in September 2020  · Multiple adenomatous polyps were removed   Needed repeat colonoscopy in 3 months for removal of remaining polyps  However patient never followed up  Pathology came back as tubular adenomas, no high grade dysplasia or malignancy  Biopsies for EGD came back positive for H  Pylori  AB script for eradication was send to pharmacy however patient did not complete the treatment  Plan:  · FOBT negative on admission and per history no S&S of acute GI bleed  However chronic bleeding focus in GI tract causing his chronic iron deficiency anemia is likely  He will need close outpatient follow-up with GI for repeat colonoscopy  · Will give script for H  Pylori eradication course at discharge  2/2: Will get inpatient GI consultation  2/3: GI input noted - plan for colonoscopy on Friday  Eliquis is on hold and clear liquid diet today, NPO after midnight  Urinary retention  Assessment & Plan  · Evaluated here earlier this morning and diagnosed with urinary retention  Sent home with Cloud catheter  Reports back to ER initially for full issues with changing Cloud bag  Patient was provided with education understands how to change bag   · I&Os  · Continue Flomax  · Will need outpatient follow-up with urology  HERNÁN (acute kidney injury) ([facility] [address] )  Assessment & Plan  · Cr 1 46  · Likely multifactorial prerenal/post renal in setting of acute urinary retention treated this morning with placement of Cloud catheter  · Received 1 unit PRBCs in ED  · Recheck in a m --> 1 40    2/1: Will give another unit for symptomatic anemia  Encourage oral hydration  Recheck tomorrow in AM    2/2: creatinine improved to 1 22  Continue to monitor  2/3: creatinine further improved to 1  17  Dizziness  Assessment & Plan  · Complains of dizziness starting this afternoon when standing up from table    Denies any loss of consciousness, head strike, syncope, chest pain, palpitation, weakness, dysarthria,   · Likely secondary to anemia  · Check orthostatics, has history of orthostasis in past --> negative  · Fall precautions, OOB with assistance   · Tele  · PT/OT    : see under symptomatic anemia  Will give gentle IVF today and monitor  Ambulatory dysfunction  Assessment & Plan  · Patient uses canes and braces for ambulation due to patella fracture  · Follows with Ortho Dr Bishop Kwon  · Fall precautions  · PT/OT consult --> recommending rehab; patient is amiable if facility would be close to home (does not want to go to [facility])  Permanent atrial fibrillation ([address] )  Assessment & Plan  · Noted be in Afib with a rate of 111  Give home metoprolol now   · AC:  Eliquis  · Rate control:  Metoprolol    : HR improved - currently in 90s  : HR acceptable 90-100s  Disposition: Continue inpatient management for symptomatic iron deficiency anemia and plan for colonoscopy tomorrow  SUBJECTIVE     Patient seen and examined  No acute events overnight  Had normal BM last night  Was again unsteady on his feet with PT this morning  Will try using a walker next time  No new acute complaints  OBJECTIVE     Vitals:    22 1021 22 1026 22 2300 22 0739   BP: 129/83 124/82 131/68 130/71   BP Location: Right arm Right arm Right arm Left arm   Pulse: 93 103 93 95   Resp:   18 18   Temp:   (!) 97 °F (36 1 °C) 98 9 °F (37 2 °C)   TempSrc:   Tympanic Tympanic   SpO2:   96% 97%   Weight:       Height:          Temperature:   Temp (24hrs), Av °F (36 7 °C), Min:97 °F (36 1 °C), Max:98 9 °F (37 2 °C)    Temperature: 98 9 °F (37 2 °C)  Intake & Output:  I/O        0701  / 0700 / 0701   0700 / 0701  / 0700    P  O  400      Blood 468 8      Total Intake(mL/kg) 868 8 (11 6)      Urine (mL/kg/hr) 1100 (0 6) 600 (0 3) 550 (1 2)    Stool 0      Total Output 1100 600 550    Net -231 3 -600 -550           Unmeasured Stool Occurrence 1 x          Weights:   IBW (Ideal Body Weight): 77 6 kg    Body mass index is 22 34 kg/m²    Weight (last 2 days)     Date/Time Weight 02/01/22 0859 74 7 (164 68)        Physical Exam  Vitals reviewed  Constitutional:       General: He is not in acute distress  Appearance: Normal appearance  He is not ill-appearing, toxic-appearing or diaphoretic  HENT:      Mouth/Throat:      Mouth: Mucous membranes are dry  Pharynx: Oropharynx is clear  Eyes:      General: No scleral icterus  Conjunctiva/sclera: Conjunctivae normal    Cardiovascular:      Rate and Rhythm: Normal rate  Rhythm irregular  Heart sounds: Normal heart sounds  Pulmonary:      Effort: No respiratory distress  Breath sounds: Normal breath sounds  No wheezing, rhonchi or rales  Musculoskeletal:      Cervical back: Normal range of motion and neck supple  Neurological:      Mental Status: He is alert  LABORATORY DATA     Labs: I have personally reviewed pertinent reports  Results from last 7 days   Lab Units 02/03/22 0447 02/02/22 0454 02/01/22 0621 01/31/22 1921 01/31/22  1921   WBC Thousand/uL 11 34* 9 82 9 62   < > 11 39*   HEMOGLOBIN g/dL 8 2* 8 2* 7 0*   < > 6 4*   HEMATOCRIT % 29 9* 29 6* 24 5*   < > 23 8*   PLATELETS Thousands/uL 292 290 287   < > 301   NEUTROS PCT %  --   --  70  --  83*   MONOS PCT %  --   --  11  --  9    < > = values in this interval not displayed  Results from last 7 days   Lab Units 02/03/22 0447 02/02/22 0454 02/01/22 0621 01/31/22 1921 01/31/22  1921   POTASSIUM mmol/L 3 9 4 2 3 9   < > 3 9   CHLORIDE mmol/L 103 102 103   < > 101   CO2 mmol/L 27 27 29   < > 29   BUN mg/dL 17 17 14   < > 13   CREATININE mg/dL 1 17 1 22* 1 40*   < > 1 46*   CALCIUM mg/dL 8 5 8 4 8 2*   < > 8 7   ALK PHOS U/L  --   --   --   --  83 8   ALT U/L  --   --   --   --  9   AST U/L  --   --   --   --  14*    < > = values in this interval not displayed  IMAGING & DIAGNOSTIC TESTING     Radiology Results: I have personally reviewed pertinent reports    CT head without contrast    Result Date: 1/31/2022  Impression: No acute intracranial abnormality  Workstation performed: [id number]     Other Diagnostic Testing: I have personally reviewed pertinent reports  ACTIVE MEDICATIONS     Current Facility-Administered Medications   Medication Dose Route Frequency    acetaminophen (TYLENOL) tablet 650 mg  650 mg Oral Q6H PRN    bisacodyl (DULCOLAX) EC tablet 20 mg  20 mg Oral Once    docusate sodium (COLACE) capsule 100 mg  100 mg Oral BID    folic acid (FOLVITE) tablet 400 mcg  400 mcg Oral Daily    metoprolol succinate (TOPROL-XL) 24 hr tablet 25 mg  25 mg Oral Daily    polyethylene glycol (GOLYTELY) bowel prep 4,000 mL  4,000 mL Oral Once    sodium chloride 0 9 % infusion  75 mL/hr Intravenous Continuous    tamsulosin (FLOMAX) capsule 0 4 mg  0 4 mg Oral Daily       VTE Pharmacologic Prophylaxis: Reason for no pharmacologic prophylaxis Eliquis on hold for colonoscopy  VTE Mechanical Prophylaxis: sequential compression device    Portions of the record may have been created with voice recognition software  Occasional wrong word or "sound a like" substitutions may have occurred due to the inherent limitations of voice recognition software    Read the chart carefully and recognize, using context, where substitutions have occurred   ==  Anyi Weiss MD  [address]  Internal Medicine Residency PGY-3

## 2022-02-03 NOTE — PLAN OF CARE
Problem: PHYSICAL THERAPY ADULT  Goal: Performs mobility at highest level of function for planned discharge setting  See evaluation for individualized goals  Description: Treatment/Interventions: Functional transfer training,Elevations,Patient/family training,Gait training,Spoke to nursing,Spoke to case management     See flowsheet documentation for full assessment, interventions and recommendations  Outcome: Progressing  Note: Prognosis: Fair  Problem List: Decreased range of motion,Decreased endurance,Impaired balance,Decreased mobility,Decreased cognition,Impaired judgement,Decreased safety awareness,Orthopedic restrictions,Pain  Assessment: Pt seen for PT treatment today with focus on gait training  Day prior pt was recommended for STR as pt unable to tolerate mobility training, severely limited due to dizziness  Pt with improved status today, c/o slight lightheadedness with changes in position and downward gaze while standing, however today it resolves more quickly whereas day prior it did not resolve  Pt needing increased time between changes in positioning  Pt also needing max redirection to therapy tasks as pt tangential and hyperverbal  Therapist provided extensive education regarding safety with mobility and AD/DME use  Pt appears to now be open to use of RW, will discuss with CM  Pt continues to display instability and impulsivity with ambulation, needing max cues for safety  At end of session pt was left semi-supine in bed, all needs in place and lines intact, care returned to RN  Pt displaying improved mobility today, is agreeable for RW use, recommend return to home with HHPT and RW  PT Discharge Recommendation: Home with home health rehabilitation    See flowsheet documentation for full assessment

## 2022-02-03 NOTE — PHYSICAL THERAPY NOTE
PHYSICAL THERAPY TREATMENT    NAME:  Lynnette De Leon  DATE: 02/03/22    AGE:   78 y o  Mrn:   [id number]  Length Of Stay: 3    ADMIT DX:  Urinary retention [R33 9]  Anemia [D64 9]  HERNÁN (acute kidney injury) ([facility] ) [N17 9]  Problem with urinary catheter ([facility] ) [Z41  9XXA]    Past Medical History:   Diagnosis Date    Anxiety disorder     Atrial fibrillation (HCC)     Coronary artery disease     Depression     Hepatitis C     screening negative in 1/2017    MI (myocardial infarction) [facility])      Past Surgical History:   Procedure Laterality Date    CARDIAC CATHETERIZATION  07/09/2018       Performed at least 2 patient identifiers during session: Name, Crystal Fraser and ID bracelet        02/03/22 0908   PT Last Visit   PT Visit Date 02/03/22   Note Type   Note Type Treatment   Pain Assessment   Pain Assessment Tool 0-10   Pain Score No Pain   Restrictions/Precautions   Weight Bearing Precautions Per Order Yes   LLE Weight Bearing Per Order WBAT  (w/ hinged knee brace in ext, s/p L patellar fx 10/14/2021)   Braces or Orthoses Other (Comment)  (L hinged knee brace)   Other Precautions Impulsive;Cognitive; Bed Alarm; Chair Alarm;Multiple lines; Fall Risk;Pain;Hard of hearing;Visual impairment   General   Chart Reviewed Yes   Additional Pertinent History 12/9/2021 Dr Romana Prose: "WBAT in T ROM brace locked in extension  Begin slow range of motion from 0-30 degrees active assist and passive to increase gradually by 10° each week as tolerated no strengthening yet  Response to Previous Treatment Patient with no complaints from previous session     Family/Caregiver Present No   Cognition   Overall Cognitive Status WFL  (poor insight to deficits, impulsive)   Arousal/Participation Cooperative   Attention Difficulty attending to directions  (high degree of redirection to task needed)   Orientation Level Oriented to person;Oriented to place;Oriented to situation  (intermittently confused, states he stopped at home yesterday)   Memory Decreased recall of recent events;Decreased recall of precautions   Following Commands Follows one step commands with increased time or repetition   Comments Pt very impulsive with lack of insight to deficits, needs increased cues for safety  Subjective   Subjective "I stopped by the house yesterday, I went home to drop off my clothes  I have to tell the wife to stop at the store and get that stuff I need to drink for my colonoscopy "   Bed Mobility   Supine to Sit 5  Supervision   Additional items Assist x 1;HOB elevated; Bedrails; Increased time required;Verbal cues   Sit to Supine 5  Supervision   Additional items Increased time required   Transfers   Sit to Stand 5  Supervision   Additional items Assist x 1;Verbal cues  (impulsive to initiate, increased time to complete)   Stand to Sit 5  Supervision   Additional items Assist x 1; Impulsive   Additional Comments Pt impulsive with all mobility, needs cues for hand placement as pt pulling up on RW  Ambulation/Elevation   Gait pattern Decreased foot clearance;Decreased heel strike;Decreased toe off  (decreased R knee flexion during swing; +instability)   Gait Assistance 4  Minimal assist   Additional items Assist x 1;Verbal cues   Assistive Device Rolling walker   Distance 140ft x1   Stair Management Assistance Not tested  (pt declines due to fatigue )   Ambulation/Elevation Additional Comments Pt agreeable to use of RW for ambulation on this date, after extensive education for safety  Pt demonstrates improved stability with use of RW however does remains slightly unsteady henrik during turning  Continues to require extensive cues for safety during ambulation  Pt with rapid cadance     Balance   Static Sitting Good   Dynamic Sitting Fair   Static Standing Fair  (w/ support on RW)   Dynamic Standing Fair -  (w/ support on RW)   Ambulatory Fair -  (w/ support on RW)   Endurance Deficit   Endurance Deficit Yes   Activity Tolerance   Activity Tolerance Patient limited by fatigue; Other (Comment)  (poor insight to deficits, imparied balance, SOB)   Medical Staff Made Aware Spoke with SLIM and CM   Nurse Made Aware Spoke with JOHANNY Singleton pre/post session   Assessment   Prognosis Fair   Problem List Decreased range of motion;Decreased endurance; Impaired balance;Decreased mobility; Decreased cognition; Impaired judgement;Decreased safety awareness;Orthopedic restrictions;Pain   Assessment Pt seen for PT treatment today with focus on gait training  Day prior pt was recommended for STR as pt unable to tolerate mobility training, severely limited due to dizziness  Pt with improved status today, c/o slight lightheadedness with changes in position and downward gaze while standing, however today it resolves more quickly whereas day prior it did not resolve  Pt needing increased time between changes in positioning  Pt also needing max redirection to therapy tasks as pt tangential and hyperverbal  Therapist provided extensive education regarding safety with mobility and AD/DME use  Pt appears to now be open to use of RW, will discuss with CM  Pt continues to display instability and impulsivity with ambulation, needing max cues for safety  At end of session pt was left semi-supine in bed, all needs in place and lines intact, care returned to RN  Pt displaying improved mobility today, is agreeable for RW use, recommend return to home with HHPT and RW  Goals   Patient Goals "to get out of here and go home"   STG Expiration Date 02/11/22   Short Term Goal #1 Patient PT goals established in order to address patient self reported goal of "to get out of here and go home"   Pt will: complete all transfers at rudy level with [facility] in order to increase safety with functional mobility; ambulate >150ft with SPC at rudy level in order to increase safety with household and short community functional mobility; negotiate 10-15 stairs with HR assist and S in order to access his home; improve ambulatory balance to >/= good grade in order to promote safety and increased independence with mobility; improve AM-PAC score to >/= 24/24 in order to increase independence with mobility and decrease burden of care; improve Barthel Index score to >/= 70/100 in order to increase independence and decrease risk of falls  PT Treatment Day 2   Plan   Treatment/Interventions Functional transfer training;Elevations; Endurance training;Patient/family training;Equipment eval/education; Bed mobility;Gait training;Spoke to MD;Spoke to nursing;Spoke to case management   Progress Progressing toward goals   PT Frequency 2-3x/wk   Recommendation   PT Discharge Recommendation Home with home health rehabilitation   Equipment Recommended [place] walker   Change/add to [employer]? No   AM-PAC Basic Mobility Inpatient   Turning in Bed Without Bedrails 4   Lying on Back to Sitting on Edge of Flat Bed 3   Moving Bed to Chair 3   Standing Up From Chair 3   Walk in Room 3   Climb 3-5 Stairs 2   Basic Mobility Inpatient Raw Score 18   Basic Mobility Standardized Score 41 05   Highest Level Of Mobility   JH-HLM Goal 6: Walk 10 steps or more   JH-HLM Highest Level of Mobility 7: Walk 25 feet or more   JH-HLM Goal Achieved Yes   Education   Education Provided Mobility training;Assistive device   Patient Explanation/teachback used;Demonstrates verbal understanding;Reinforcement needed   End of Consult   Patient Position at End of Consult Supine;Bed/Chair alarm activated; All needs within reach   End of Consult Comments Based on patient's [facility] Level of Mobility scores today, patient currently has a goal of JH-HLM Levels: 6: WALK 10 STEPS OR MORE, to be completed with RN staffing each shift, in order to improve overall activity tolerance and mobility, combat hospital related deconditioning, and maximize outcomes for d/c from the acute care setting          The patient's AM-PAC Basic Mobility Inpatient Short Form Raw Score is 18  A Raw score of greater than 16 suggests the patient may benefit from discharge to home  Please also refer to the recommendation of the Physical Therapist for safe discharge planning          Merari Law PT, DPT   Available via [employer]  [facility] # [id number]  [place] License - [id number]  2/5/2937

## 2022-02-03 NOTE — CASE MANAGEMENT
Case Management Discharge Planning Note    Patient name Rick Guzmán  Location /-73 MRN [id number]  : 1945 Date 2/3/2022       Current Admission Date: 2022  Current Admission Diagnosis:Symptomatic anemia   Patient Active Problem List    Diagnosis Date Noted    Abnormal colonoscopy 2022    Dizziness 2022    [facility]N [facility] 2022    Urinary retention 2022    Essential (hemorrhagic) thrombocythemia ([facility] ) 2022    Closed nondisplaced comminuted fracture of left patella 2021    Head trauma 2021    Thrombocytosis 2021    Bradycardia 2021    Syncope 2021    Colonic adenoma 2021    Ambulatory dysfunction 2021    COPD (chronic obstructive pulmonary disease) ([facility] [address] ) 2021    Orthostatic hypotension 2020    Tobacco use disorder 2020    Symptomatic anemia 2020    Congestive cardiomyopathy ([facility] ) 2020    Permanent atrial fibrillation ([facility] ) 2020      LOS (days): 3  Geometric Mean LOS (GMLOS) (days): 2 70  Days to GMLOS:0 1     OBJECTIVE:  Risk of Unplanned Readmission Score: 16         Current admission status: Inpatient   Preferred Pharmacy:   [address]  [address] [place] [address]  Phone: [phone] Fax: [phone] [facility] [place] [address]  Phone: [phone] Fax: [phone]    Primary Care Provider: Christy Cruz MD    Primary Insurance: MEDICARE  Secondary Insurance:  FOR LIFE    DISCHARGE DETAILS:       Freedom of Choice: Yes  Comments - Freedom of Choice: Chose [employer] as DME provider  [address] SHAYLEEA chosen as [facility] provider                  Contacts  Patient Contacts: Cheryle Rubio  Contact Method: Phone  Phone Number: [phone]    DME Referral Provided  Referral made for DME?: Yes (Per PT, patient agreeable to RW  Ordered via [place] and delivered bedside )  DME referral completed for the following items[de-identified] Rodgers Cranker  DME Supplier Name[de-identified] [employer]    Other Referral/Resources/Interventions Provided:  Interventions: DME,C  Referral Comments: [facility] CONFIRMED FOR [facility] UPDATED  IMM Given (Date):: 02/03/22  IMM Given to[de-identified] Family   IMM reviewed with patient's wife, patient's wife agrees with discharge determination

## 2022-02-03 NOTE — PROGRESS NOTES
Progress Note- Pat Oscar 78 y o  male MRN: [id number]    Unit/Bed#: -01 Encounter: [id number]      Assessment and Plan: This is a 78 y o  male with history of COPD, BPH,  AFib on Eliquis, CAD, tobacco use,  iron deficiency anemia, and L patellar fracture in October who presented to the hospital 1/31 due to complaint of dizziness with ambulation and at rest, and was found to have hemoglobin of 6 4 with previous baseline around 8 and evidence of HERNÁN on labs  Hemeoccult was negative and he didn't appear to have any active GI blood loss so Eliquis was continued  He is s/p 2 units of blood Hgb now stable at 8 2      1  Symptomatic iron deficiency anemia  2  H pylori gastritis  3  History of tubular adenoma/sessile serrated colon polyps  Previous workup for anemia in November 2020 w/ EGD/Colonoscopy showed H pylori gastritis and multiple large colon polyps requiring clips/tattoo, not all were removed, and pt was recommended to repeat colonoscopy in 3 months but never followed up and H pylori never treated  He was on iron supplementation in the past, but stopped taking this because his stool turned dark  Symptomatic anemia may be secondary to occult GI bleed, malabsorption, bone marrow suppression  Hgb remains stable at 8 2 today and patient reports black bowel movement yesterday and he was recently on iron       -Given patient's history of poor follow-up, and need for anticoagulation for history of atrial fibrillation will plan for EGD/colonoscopy tomorrow while inpatient  Prep and procedure explained  -continue clear liquid diet today, NPO past midnight    -Eliquis on hold for procedure, last dose 2/2 PM  -continue to hold oral iron supplementation for now, as this can obscure visualization with colonoscopy  -Monitor CBC and transfuse blood products as needed  -Further recommendations pending course, he will need treatment for H pylori if still positive   Discussed importance of GI follow up after hospitalization and pt stated understanding     ______________________________________________________________________    Subjective:     Reports black bowel movement yesterday  Hemoglobin stable today  Denies any abdominal pain, heartburn, nausea/vomiting, or bright red blood in his stool  Medication Administration - last 24 hours from 02/02/2022 0910 to 02/03/2022 7319       Date/Time Order Dose Route Action Action by     02/02/2022 1902 apixaban (ELIQUIS) tablet 5 mg 5 mg Oral Given Allison Spearing, RN     02/02/2022 6498 apixaban (ELIQUIS) tablet 5 mg 5 mg Oral Given Allison Spearing, RN     02/02/2022 1902 docusate sodium (COLACE) capsule 100 mg 100 mg Oral Given Allison Spearing, RN     02/02/2022 1034 docusate sodium (COLACE) capsule 100 mg 100 mg Oral Given Allison Spearing, RN     02/02/2022 7816 ferrous sulfate tablet 325 mg 325 mg Oral Given Allison Spearing, RN     15/78/7209 7988 folic acid (FOLVITE) tablet 400 mcg 400 mcg Oral Given Allison Spearing, RN     02/02/2022 7361 tamsulosin (FLOMAX) capsule 0 4 mg 0 4 mg Oral Given Allison Spearing, RN     02/02/2022 2634 metoprolol succinate (TOPROL-XL) 24 hr tablet 25 mg 25 mg Oral Given Allison Spearing, RN     02/02/2022 0664 iron sucrose (VENOFER) 200 mg in sodium chloride 0 9 % 100 mL IVPB 200 mg Intravenous [address] Allison Spearing, RN     02/02/2022 1409 sodium chloride 0 9 % infusion 75 mL/hr Intravenous New Bag Allison Spearing, RN          Objective:     Vitals: Blood pressure 130/71, pulse 95, temperature 98 9 °F (37 2 °C), temperature source Tympanic, resp  rate 18, height 6' (1 829 m), weight 74 7 kg (164 lb 10 9 oz), SpO2 97 %  ,Body mass index is 22 34 kg/m²        Intake/Output Summary (Last 24 hours) at 2/3/2022 0910  Last data filed at 2/3/2022 0300  Gross per 24 hour   Intake --   Output 600 ml   Net -600 ml       Physical Exam:   General Appearance: Awake and alert, in no acute distress  Abdomen: Soft, non-tender, non-distended; bowel sounds normal; no masses or no organomegaly    Invasive Devices  Report    Peripheral Intravenous Line            Peripheral IV 01/31/22 Left Antecubital 2 days    Peripheral IV 01/31/22 Left Arm 2 days          Drain            Urethral Catheter Straight-tip 18 Fr  2 days                Lab Results:  Admission on 01/31/2022   Component Date Value    WBC 01/31/2022 11 39*    RBC 01/31/2022 3 57*    Hemoglobin 01/31/2022 6 4*    Hematocrit 01/31/2022 23 8*    MCV 01/31/2022 67*    MCH 01/31/2022 17 9*    MCHC 01/31/2022 26 9*    RDW 01/31/2022 19 4*    MPV 01/31/2022 9 0     Platelets 06/57/1394 301     nRBC 01/31/2022 0     Neutrophils Relative 01/31/2022 83*    Immat GRANS % 01/31/2022 0     Lymphocytes Relative 01/31/2022 7*    Monocytes Relative 01/31/2022 9     Eosinophils Relative 01/31/2022 1     Basophils Relative 01/31/2022 0     Neutrophils Absolute 01/31/2022 9 40*    Immature Grans Absolute 01/31/2022 0 03     Lymphocytes Absolute 01/31/2022 0 77     Monocytes Absolute 01/31/2022 1 04     Eosinophils Absolute 01/31/2022 0 12     Basophils Absolute 01/31/2022 0 03     Sodium 01/31/2022 137     Potassium 01/31/2022 3 9     Chloride 01/31/2022 101     CO2 01/31/2022 29     ANION GAP 01/31/2022 7     BUN 01/31/2022 13     Creatinine 01/31/2022 1 46*    Glucose 01/31/2022 94     Calcium 01/31/2022 8 7     AST 01/31/2022 14*    ALT 01/31/2022 9     Alkaline Phosphatase 01/31/2022 83 8     Total Protein 01/31/2022 6 2*    Albumin 01/31/2022 3 7     Total Bilirubin 01/31/2022 1 04     eGFR 01/31/2022 46     hs TnI 0hr 01/31/2022 10     Ethanol Lvl 01/31/2022 <10     POC Glucose 01/31/2022 92     ABO Grouping 01/31/2022 AB     Rh Factor 01/31/2022 Positive     Antibody Screen 01/31/2022 Negative     Specimen Expiration Date 01/31/2022 [id number]     OCCULT BLD, FECAL IMMUNO* 01/31/2022 Negative     hs TnI 2hr 01/31/2022 11     Delta 2hr hsTnI 01/31/2022 1     hs TnI 4hr 01/31/2022 10     Delta 4hr hsTnI 01/31/2022 0     Unit Product Code 02/02/2022 [id number]     Unit Number 02/02/2022 [id number]     Unit ABO 02/02/2022 B     Unit [facility] 02/02/2022 POS     Crossmatch 02/02/2022 Compatible     Unit Dispense Status 02/02/2022 Presumed Trans     Unit Product Volume 02/02/2022 300     Unit Product Code 02/02/2022 [id number]     Unit Number 02/02/2022 [id number]     Unit ABO 02/02/2022 A     Unit [facility] 02/02/2022 POS     Crossmatch 02/02/2022 Compatible     Unit Dispense Status 02/02/2022 Presumed Trans     Unit Product Volume 02/02/2022 350     Vitamin B-12 01/31/2022 250     Folate 01/31/2022 >20 0*    Iron Saturation 01/31/2022 3*    TIBC 01/31/2022 458*    Iron 01/31/2022 13*    Ferritin 01/31/2022 5*    Sodium 02/01/2022 138     Potassium 02/01/2022 3 9     Chloride 02/01/2022 103     CO2 02/01/2022 29     ANION GAP 02/01/2022 6     BUN 02/01/2022 14     Creatinine 02/01/2022 1 40*    Glucose 02/01/2022 92     Calcium 02/01/2022 8 2*    eGFR 02/01/2022 49     WBC 02/01/2022 9 62     RBC 02/01/2022 3 60*    Hemoglobin 02/01/2022 7 0*    Hematocrit 02/01/2022 24 5*    MCV 02/01/2022 68*    MCH 02/01/2022 19 4*    MCHC 02/01/2022 28 6*    RDW 02/01/2022 20 4*    MPV 02/01/2022 9 4     Platelets 56/67/9132 287     nRBC 02/01/2022 0     Neutrophils Relative 02/01/2022 70     Immat GRANS % 02/01/2022 0     Lymphocytes Relative 02/01/2022 13*    Monocytes Relative 02/01/2022 11     Eosinophils Relative 02/01/2022 5     Basophils Relative 02/01/2022 1     Neutrophils Absolute 02/01/2022 6 79     Immature Grans Absolute 02/01/2022 0 03     Lymphocytes Absolute 02/01/2022 1 29     Monocytes Absolute 02/01/2022 1 03     Eosinophils Absolute 02/01/2022 0 43     Basophils Absolute 02/01/2022 0 05     Ventricular Rate 01/31/2022 111     Atrial Rate 01/31/2022 111     WV Interval 01/31/2022 133     QRSD Interval 01/31/2022 102     QT Interval 01/31/2022 330     QTC Interval 01/31/2022 449  QRS Axis 01/31/2022 72     T Wave Axis 01/31/2022 60     Sodium 02/02/2022 138     Potassium 02/02/2022 4 2     Chloride 02/02/2022 102     CO2 02/02/2022 27     ANION GAP 02/02/2022 9     BUN 02/02/2022 17     Creatinine 02/02/2022 1 22*    Glucose 02/02/2022 82     Calcium 02/02/2022 8 4     eGFR 02/02/2022 58     WBC 02/02/2022 9 82     RBC 02/02/2022 4 13     Hemoglobin 02/02/2022 8 2*    Hematocrit 02/02/2022 29 6*    MCV 02/02/2022 72*    MCH 02/02/2022 19 9*    MCHC 02/02/2022 27 7*    RDW 02/02/2022 21 4*    Platelets 48/78/6891 290     MPV 02/02/2022 9 5     Sodium 02/03/2022 138     Potassium 02/03/2022 3 9     Chloride 02/03/2022 103     CO2 02/03/2022 27     ANION GAP 02/03/2022 8     BUN 02/03/2022 17     Creatinine 02/03/2022 1 17     Glucose 02/03/2022 77     Calcium 02/03/2022 8 5     eGFR 02/03/2022 61     WBC 02/03/2022 11 34*    RBC 02/03/2022 4 11     Hemoglobin 02/03/2022 8 2*    Hematocrit 02/03/2022 29 9*    MCV 02/03/2022 73*    MCH 02/03/2022 20 0*    MCHC 02/03/2022 27 4*    RDW 02/03/2022 22 8*    Platelets 42/68/4280 292     MPV 02/03/2022 9 8        Imaging Studies: I have personally reviewed pertinent imaging studies

## 2022-02-04 ENCOUNTER — ANESTHESIA EVENT (INPATIENT)
Dept: GASTROENTEROLOGY | Facility: HOSPITAL | Age: 77
DRG: 812 | End: 2022-02-04
Payer: MEDICARE

## 2022-02-04 ENCOUNTER — APPOINTMENT (INPATIENT)
Dept: GASTROENTEROLOGY | Facility: HOSPITAL | Age: 77
DRG: 812 | End: 2022-02-04
Payer: MEDICARE

## 2022-02-04 ENCOUNTER — ANESTHESIA (INPATIENT)
Dept: GASTROENTEROLOGY | Facility: HOSPITAL | Age: 77
DRG: 812 | End: 2022-02-04
Payer: MEDICARE

## 2022-02-04 LAB
ANION GAP SERPL CALCULATED.3IONS-SCNC: 6 MMOL/L (ref 4–13)
ATRIAL RATE: 111 BPM
BUN SERPL-MCNC: 9 MG/DL (ref 6–20)
CALCIUM SERPL-MCNC: 8.3 MG/DL (ref 8.4–10.2)
CHLORIDE SERPL-SCNC: 104 MMOL/L (ref 96–108)
CO2 SERPL-SCNC: 29 MMOL/L (ref 22–33)
CREAT SERPL-MCNC: 0.99 MG/DL (ref 0.5–1.2)
ERYTHROCYTE [DISTWIDTH] IN BLOOD BY AUTOMATED COUNT: 24.2 % (ref 11.6–15.1)
GFR SERPL CREATININE-BSD FRML MDRD: 74 ML/MIN/1.73SQ M
GLUCOSE SERPL-MCNC: 88 MG/DL (ref 65–140)
HCT VFR BLD AUTO: 30.6 % (ref 36.5–49.3)
HGB BLD-MCNC: 8.4 G/DL (ref 12–17)
MCH RBC QN AUTO: 20.4 PG (ref 26.8–34.3)
MCHC RBC AUTO-ENTMCNC: 27.5 G/DL (ref 31.4–37.4)
MCV RBC AUTO: 75 FL (ref 82–98)
PLATELET # BLD AUTO: 282 THOUSANDS/UL (ref 149–390)
PMV BLD AUTO: 9.5 FL (ref 8.9–12.7)
POTASSIUM SERPL-SCNC: 3.6 MMOL/L (ref 3.5–5)
PR INTERVAL: 133 MS
QRS AXIS: 72 DEGREES
QRSD INTERVAL: 102 MS
QT INTERVAL: 330 MS
QTC INTERVAL: 449 MS
RBC # BLD AUTO: 4.11 MILLION/UL (ref 3.88–5.62)
SODIUM SERPL-SCNC: 139 MMOL/L (ref 133–145)
T WAVE AXIS: 60 DEGREES
VENTRICULAR RATE: 111 BPM
WBC # BLD AUTO: 8.87 THOUSAND/UL (ref 4.31–10.16)

## 2022-02-04 PROCEDURE — 85027 COMPLETE CBC AUTOMATED: CPT | Performed by: INTERNAL MEDICINE

## 2022-02-04 PROCEDURE — 88305 TISSUE EXAM BY PATHOLOGIST: CPT | Performed by: PATHOLOGY

## 2022-02-04 PROCEDURE — 0DBM8ZZ EXCISION OF DESCENDING COLON, VIA NATURAL OR ARTIFICIAL OPENING ENDOSCOPIC: ICD-10-PCS | Performed by: INTERNAL MEDICINE

## 2022-02-04 PROCEDURE — 0DB78ZX EXCISION OF STOMACH, PYLORUS, VIA NATURAL OR ARTIFICIAL OPENING ENDOSCOPIC, DIAGNOSTIC: ICD-10-PCS | Performed by: INTERNAL MEDICINE

## 2022-02-04 PROCEDURE — 0DBL8ZZ EXCISION OF TRANSVERSE COLON, VIA NATURAL OR ARTIFICIAL OPENING ENDOSCOPIC: ICD-10-PCS | Performed by: INTERNAL MEDICINE

## 2022-02-04 PROCEDURE — 80048 BASIC METABOLIC PNL TOTAL CA: CPT | Performed by: INTERNAL MEDICINE

## 2022-02-04 PROCEDURE — 94640 AIRWAY INHALATION TREATMENT: CPT

## 2022-02-04 PROCEDURE — 99232 SBSQ HOSP IP/OBS MODERATE 35: CPT

## 2022-02-04 PROCEDURE — 93010 ELECTROCARDIOGRAM REPORT: CPT | Performed by: INTERNAL MEDICINE

## 2022-02-04 PROCEDURE — 45385 COLONOSCOPY W/LESION REMOVAL: CPT | Performed by: INTERNAL MEDICINE

## 2022-02-04 PROCEDURE — 43239 EGD BIOPSY SINGLE/MULTIPLE: CPT | Performed by: INTERNAL MEDICINE

## 2022-02-04 PROCEDURE — 0DB38ZX EXCISION OF LOWER ESOPHAGUS, VIA NATURAL OR ARTIFICIAL OPENING ENDOSCOPIC, DIAGNOSTIC: ICD-10-PCS | Performed by: INTERNAL MEDICINE

## 2022-02-04 PROCEDURE — 94760 N-INVAS EAR/PLS OXIMETRY 1: CPT

## 2022-02-04 RX ORDER — EPINEPHRINE 0.1 MG/ML
SYRINGE (ML) INJECTION CODE/TRAUMA/SEDATION MEDICATION
Status: COMPLETED | OUTPATIENT
Start: 2022-02-04 | End: 2022-02-04

## 2022-02-04 RX ORDER — PROPOFOL 10 MG/ML
INJECTION, EMULSION INTRAVENOUS AS NEEDED
Status: DISCONTINUED | OUTPATIENT
Start: 2022-02-04 | End: 2022-02-04

## 2022-02-04 RX ORDER — LIDOCAINE HYDROCHLORIDE 10 MG/ML
INJECTION, SOLUTION EPIDURAL; INFILTRATION; INTRACAUDAL; PERINEURAL AS NEEDED
Status: DISCONTINUED | OUTPATIENT
Start: 2022-02-04 | End: 2022-02-04

## 2022-02-04 RX ORDER — SODIUM CHLORIDE, SODIUM LACTATE, POTASSIUM CHLORIDE, CALCIUM CHLORIDE 600; 310; 30; 20 MG/100ML; MG/100ML; MG/100ML; MG/100ML
INJECTION, SOLUTION INTRAVENOUS CONTINUOUS PRN
Status: DISCONTINUED | OUTPATIENT
Start: 2022-02-04 | End: 2022-02-04

## 2022-02-04 RX ADMIN — PROPOFOL 20 MG: 10 INJECTION, EMULSION INTRAVENOUS at 12:28

## 2022-02-04 RX ADMIN — PROPOFOL 20 MG: 10 INJECTION, EMULSION INTRAVENOUS at 12:11

## 2022-02-04 RX ADMIN — PROPOFOL 20 MG: 10 INJECTION, EMULSION INTRAVENOUS at 12:19

## 2022-02-04 RX ADMIN — SODIUM CHLORIDE, SODIUM LACTATE, POTASSIUM CHLORIDE, AND CALCIUM CHLORIDE: .6; .31; .03; .02 INJECTION, SOLUTION INTRAVENOUS at 11:42

## 2022-02-04 RX ADMIN — PROPOFOL 20 MG: 10 INJECTION, EMULSION INTRAVENOUS at 12:04

## 2022-02-04 RX ADMIN — PROPOFOL 20 MG: 10 INJECTION, EMULSION INTRAVENOUS at 12:26

## 2022-02-04 RX ADMIN — LEVALBUTEROL HYDROCHLORIDE 1.25 MG: 1.25 SOLUTION, CONCENTRATE RESPIRATORY (INHALATION) at 07:32

## 2022-02-04 RX ADMIN — IPRATROPIUM BROMIDE 0.5 MG: 0.5 SOLUTION RESPIRATORY (INHALATION) at 19:21

## 2022-02-04 RX ADMIN — IPRATROPIUM BROMIDE 0.5 MG: 0.5 SOLUTION RESPIRATORY (INHALATION) at 07:32

## 2022-02-04 RX ADMIN — TAMSULOSIN HYDROCHLORIDE 0.4 MG: 0.4 CAPSULE ORAL at 14:08

## 2022-02-04 RX ADMIN — PROPOFOL 100 MG: 10 INJECTION, EMULSION INTRAVENOUS at 11:47

## 2022-02-04 RX ADMIN — PROPOFOL 20 MG: 10 INJECTION, EMULSION INTRAVENOUS at 12:47

## 2022-02-04 RX ADMIN — PROPOFOL 20 MG: 10 INJECTION, EMULSION INTRAVENOUS at 12:15

## 2022-02-04 RX ADMIN — PROPOFOL 20 MG: 10 INJECTION, EMULSION INTRAVENOUS at 12:02

## 2022-02-04 RX ADMIN — LEVALBUTEROL HYDROCHLORIDE 1.25 MG: 1.25 SOLUTION, CONCENTRATE RESPIRATORY (INHALATION) at 19:21

## 2022-02-04 RX ADMIN — PROPOFOL 20 MG: 10 INJECTION, EMULSION INTRAVENOUS at 12:43

## 2022-02-04 RX ADMIN — FOLIC ACID TAB 400 MCG 400 MCG: 400 TAB at 14:08

## 2022-02-04 RX ADMIN — PROPOFOL 20 MG: 10 INJECTION, EMULSION INTRAVENOUS at 12:24

## 2022-02-04 RX ADMIN — PROPOFOL 40 MG: 10 INJECTION, EMULSION INTRAVENOUS at 11:50

## 2022-02-04 RX ADMIN — PROPOFOL 40 MG: 10 INJECTION, EMULSION INTRAVENOUS at 11:59

## 2022-02-04 RX ADMIN — DOCUSATE SODIUM 100 MG: 100 CAPSULE, LIQUID FILLED ORAL at 17:42

## 2022-02-04 RX ADMIN — PROPOFOL 20 MG: 10 INJECTION, EMULSION INTRAVENOUS at 12:08

## 2022-02-04 RX ADMIN — EPINEPHRINE 0.02 MG: 0.1 INJECTION, SOLUTION ENDOTRACHEAL; INTRACARDIAC; INTRAVENOUS at 12:44

## 2022-02-04 RX ADMIN — PROPOFOL 20 MG: 10 INJECTION, EMULSION INTRAVENOUS at 12:17

## 2022-02-04 RX ADMIN — PROPOFOL 20 MG: 10 INJECTION, EMULSION INTRAVENOUS at 12:06

## 2022-02-04 RX ADMIN — LIDOCAINE HYDROCHLORIDE 50 MG: 10 INJECTION, SOLUTION EPIDURAL; INFILTRATION; INTRACAUDAL; PERINEURAL at 11:47

## 2022-02-04 RX ADMIN — METOPROLOL SUCCINATE 25 MG: 25 TABLET, EXTENDED RELEASE ORAL at 09:35

## 2022-02-04 RX ADMIN — PROPOFOL 20 MG: 10 INJECTION, EMULSION INTRAVENOUS at 12:55

## 2022-02-04 RX ADMIN — PROPOFOL 20 MG: 10 INJECTION, EMULSION INTRAVENOUS at 12:39

## 2022-02-04 RX ADMIN — PROPOFOL 40 MG: 10 INJECTION, EMULSION INTRAVENOUS at 11:53

## 2022-02-04 RX ADMIN — PROPOFOL 20 MG: 10 INJECTION, EMULSION INTRAVENOUS at 12:51

## 2022-02-04 RX ADMIN — PROPOFOL 20 MG: 10 INJECTION, EMULSION INTRAVENOUS at 12:31

## 2022-02-04 RX ADMIN — PROPOFOL 40 MG: 10 INJECTION, EMULSION INTRAVENOUS at 11:56

## 2022-02-04 RX ADMIN — PROPOFOL 20 MG: 10 INJECTION, EMULSION INTRAVENOUS at 12:13

## 2022-02-04 RX ADMIN — PROPOFOL 20 MG: 10 INJECTION, EMULSION INTRAVENOUS at 12:35

## 2022-02-04 RX ADMIN — PROPOFOL 20 MG: 10 INJECTION, EMULSION INTRAVENOUS at 12:22

## 2022-02-04 NOTE — ANESTHESIA PREPROCEDURE EVALUATION
Procedure:  EGD  COLONOSCOPY    Relevant Problems   CARDIO   (+) Permanent atrial fibrillation (HCC)      /RENAL   (+) HERNÁN (acute kidney injury) ([facility] )      HEMATOLOGY   (+) Symptomatic anemia      PULMONARY   (+) COPD (chronic obstructive pulmonary disease) ([facility])        Physical Exam    Airway    Mallampati score: III  TM Distance: >3 FB  Neck ROM: full     Dental   upper dentures and lower dentures,     Cardiovascular  Rhythm: regular, Rate: normal, Murmur, Cardiovascular exam normal    Pulmonary  Pulmonary exam normal Breath sounds clear to auscultation, No wheezes,     Other Findings  EF=55%, mild-moderate MR & TR  Chronic atrial fibrillation  History of MI "3 years ago"        Anesthesia Plan  ASA Score- 3     Anesthesia Type- IV sedation with anesthesia with ASA Monitors  Additional Monitors:   Airway Plan:           Plan Factors-    Chart reviewed  EKG reviewed  Imaging results reviewed  Existing labs reviewed  Patient is a current smoker  Patient instructed to abstain from smoking on day of procedure  Patient did not smoke on day of surgery  There is medical exclusion for perioperative obstructive sleep apnea risk education  Induction- intravenous  Postoperative Plan-     Informed Consent- Anesthetic plan and risks discussed with patient  I personally reviewed this patient with the CRNA  Discussed and agreed on the Anesthesia Plan with the SEBASTIAN Antoine

## 2022-02-04 NOTE — PLAN OF CARE
Problem: Potential for Falls  Goal: Patient will remain free of falls  Description: INTERVENTIONS:  - Educate patient/family on patient safety including physical limitations  - Instruct patient to call for assistance with activity   - Consult OT/PT to assist with strengthening/mobility   - Keep Call bell within reach  - Keep bed low and locked with side rails adjusted as appropriate  - Keep care items and personal belongings within reach  - Initiate and maintain comfort rounds  - Make Fall Risk Sign visible to staff  - Offer Toileting every 3 Hours, in advance of need  - Initiate/Maintain bed alarm  - Obtain necessary fall risk management equipment:   - Apply yellow socks and bracelet for high fall risk patients  - Consider moving patient to room near nurses station  2/4/2022 1150 by Veronica Tadeo RN  Outcome: Progressing  2/4/2022 1149 by Veronica Tadeo RN  Outcome: Progressing  2/4/2022 1149 by Veronica Tadeo RN  Outcome: Progressing     Problem: MOBILITY - ADULT  Goal: Maintain or return to baseline ADL function  Description: INTERVENTIONS:  -  Assess patient's ability to carry out ADLs; assess patient's baseline for ADL function and identify physical deficits which impact ability to perform ADLs (bathing, care of mouth/teeth, toileting, grooming, dressing, etc )  - Assess/evaluate cause of self-care deficits   - Assess range of motion  - Assess patient's mobility; develop plan if impaired  - Assess patient's need for assistive devices and provide as appropriate  - Encourage maximum independence but intervene and supervise when necessary  - Involve family in performance of ADLs  - Assess for home care needs following discharge   - Consider OT consult to assist with ADL evaluation and planning for discharge  - Provide patient education as appropriate  2/4/2022 1150 by Veronica Tadeo RN  Outcome: Progressing  2/4/2022 1149 by Veronica Tadeo RN  Outcome: Progressing  2/4/2022 1149 by Travis Perdue JOHANNY Camp  Outcome: Progressing     Problem: HEMATOLOGIC - ADULT  Goal: Maintains hematologic stability  Description: INTERVENTIONS  - Assess for signs and symptoms of bleeding or hemorrhage  - Monitor labs  - Administer supportive blood products/factors as ordered and appropriate  Outcome: Progressing     Problem: SAFETY ADULT  Goal: Patient will remain free of falls  Description: INTERVENTIONS:  - Educate patient/family on patient safety including physical limitations  - Instruct patient to call for assistance with activity   - Consult OT/PT to assist with strengthening/mobility   - Keep Call bell within reach  - Keep bed low and locked with side rails adjusted as appropriate  - Keep care items and personal belongings within reach  - Initiate and maintain comfort rounds  - Make Fall Risk Sign visible to staff  - Offer Toileting every   3 Hours, in advance of need  - Initiate/Maintain bed alarm  - Obtain necessary fall risk management equipment:   - Apply yellow socks and bracelet for high fall risk patients  - Consider moving patient to room near nurses station  2/4/2022 1150 by Steven Perdue RN  Outcome: Progressing  2/4/2022 1149 by Steven Perdue RN  Outcome: Progressing  2/4/2022 1149 by Steven Perdue RN  Outcome: Progressing     Problem: DISCHARGE PLANNING  Goal: Discharge to home or other facility with appropriate resources  Description: INTERVENTIONS:  - Identify barriers to discharge w/patient and caregiver  - Arrange for needed discharge resources and transportation as appropriate  - Identify discharge learning needs (meds, wound care, etc )  - Arrange for interpretive services to assist at discharge as needed  - Refer to Case Management Department for coordinating discharge planning if the patient needs post-hospital services based on physician/advanced practitioner order or complex needs related to functional status, cognitive ability, or social support system  Outcome: Progressing

## 2022-02-04 NOTE — ANESTHESIA POSTPROCEDURE EVALUATION
Post-Op Assessment Note    CV Status:  Stable  Pain Score: 0    Pain management: adequate     Mental Status:  Alert and awake   Hydration Status:  Euvolemic   PONV Controlled:  Controlled   Airway Patency:  Patent      Post Op Vitals Reviewed: Yes      Staff: CRNA         No complications documented      BP (P) 111/67 (02/04/22 1304)    Temp (P) 98 3 °F (36 8 °C) (02/04/22 1304)    Pulse (P) 96 (02/04/22 1304)   Resp (P) 16 (02/04/22 1304)    SpO2 (P) 100 % (02/04/22 1304)

## 2022-02-04 NOTE — PROGRESS NOTES
[address]  Progress Note - Jaci Jones 1945, 78 y o  male MRN: [id number]  Unit/Bed#: -Jocelyn Encounter: [id number]  Primary Care Provider: Ion Jerez MD   Date and time admitted to hospital: 1/31/2022  6:33 PM    * Symptomatic anemia  Assessment & Plan  · Patient presented with complaints of dizziness with ambulating and at rest   Denies associated chest pain, shortness of breath, weakness  Denies melena, hematochezia, hematemesis  · FOBT negative  · Hgb 6 4  · S/P 1 unit PRBCs in ED  · Baseline hemoglobin appears 7-8 due to iron deficiency in the past    · (2/1): Hgb dropped to 7 0  S/P 2 units PRBCs  · Continue IV Venofer   · Follow iron panel results --> consistent with iron deficiency anemia with iron of 13, TIBC of 458  · Hgb remians stable with no active signs of bleeding  · GI following,  · Plan to Inpatient Colonoscopy today 2/4    HERNÁN (acute kidney injury) ([address] )  Assessment & Plan  · Cr 1 46 on admission  · Likely multifactorial prerenal/post renal in setting of acute urinary retention treated this morning with placement of Cloud catheter  · Baseline appears to be 0 8-1 1  · Most recent crt  Normalized at 0 99 today  · Received 1 unit PRBCs in ED  · S/P 2 units PRBCs for anemia  ·  S/P IV hydration   · Avoid/Limit nephrotoxins  · Avoid hypotension and fluctuations in BP  · Continue to monitor renal funciton      Abnormal colonoscopy  Assessment & Plan  · Had EGD/colonoscopy with Dr Nadege Saini in September 2020  · Multiple adenomatous polyps were removed  Needed repeat colonoscopy in 3 months for removal of remaining polyps  However patient never followed up  Pathology came back as tubular adenomas, no high grade dysplasia or malignancy  Biopsies for EGD came back positive for H  Pylori  AB script for eradication was send to pharmacy however patient did not complete the treatment  · FOBT negative on admission and per history no S&S of acute GI bleed  However chronic bleeding focus in GI tract causing his chronic iron deficiency anemia is likely  · Will need close outpatient follow-up with GI   · Will give script for H  Pylori eradication course at discharge  · GI Following:  · Plan for colonoscopy on Today 2/4  Eliquis is on hold and clear liquid diet with NPO at midnight  Urinary retention  Assessment & Plan  · Evaluated here earlier this morning and diagnosed with urinary retention  Sent home with Cloud catheter  Reports back to ER initially for full issues with changing Cloud bag  Patient was provided with education understands how to change bag   · I&Os  · Continue Flomax  · Will need outpatient follow-up with urology  Dizziness  Assessment & Plan  · Complains of dizziness starting this afternoon when standing up from table  Denies any loss of consciousness, head strike, syncope, chest pain, palpitation, weakness, dysarthria,   · Likely secondary to anemia  · Check orthostatics, has history of orthostasis in past --> negative  · Fall precautions, OOB with assistance   · S/P IV hydration  · Continue NURYS stockings  · PT/OT: recommending STR  · Continue fall precautions      Ambulatory dysfunction  Assessment & Plan  · Patient uses canes and braces for ambulation due to patella fracture  · Follows with Ortho Dr Marguerite Rich  · Fall precautions  · PT/OT evaluated: Recommending STR; patient is amiable if facility would be close to home (does not want to go to [facility])  Permanent atrial fibrillation (HCC)  Assessment & Plan  · Chronic, Stable  · Noted be in Afib with HR of 111  · AC:  Eliquis  · Rate control:  Metoprolol          VTE Pharmacologic Prophylaxis: VTE Score: 2 Eliquis on hold due to EGD today 2/4  Patient Centered Rounds: I performed bedside rounds with nursing staff today    Discussions with Specialists or Other Care Team Provider:  Gastroenterology    Education and Discussions with Family / Patient: Updated  (wife) via phone  Time Spent for Care: 30 minutes  More than 50% of total time spent on counseling and coordination of care as described above  Current Length of Stay: 4 day(s)  Current Patient Status: Inpatient   Certification Statement: The patient will continue to require additional inpatient hospital stay due to Requiring EGD and potential placement to the [facility]  Discharge Plan: Anticipate discharge in 24-48 hrs to rehab facility  Code Status: Level 1 - Full Code    Subjective:   Patient states he feels all right  Patient refusing to go home  The patient denies any active chest pain, worsening shortness of breath, abdominal pain, nausea, vomiting, or diarrhea    Objective:     Vitals:   Temp (24hrs), Av 5 °F (36 4 °C), Min:97 3 °F (36 3 °C), Max:97 7 °F (36 5 °C)    Temp:  [97 3 °F (36 3 °C)-97 7 °F (36 5 °C)] 97 7 °F (36 5 °C)  HR:  [] 108  Resp:  [18-20] 20  BP: (122-145)/(74-96) 145/74  SpO2:  [95 %-98 %] 96 %  Body mass index is 22 34 kg/m²  Input and Output Summary (last 24 hours):   No intake or output data in the 24 hours ending 22 1029    Physical Exam:   Physical Exam  Vitals and nursing note reviewed  Constitutional:       General: He is not in acute distress  Appearance: Normal appearance  He is ill-appearing (Chronically)  HENT:      Head: Normocephalic  Nose: Nose normal  No congestion  Mouth/Throat:      Mouth: Mucous membranes are moist       Pharynx: Oropharynx is clear  Cardiovascular:      Rate and Rhythm: Normal rate  Rhythm irregular  Pulses: Normal pulses  Pulmonary:      Effort: Pulmonary effort is normal  No respiratory distress  Breath sounds: Decreased breath sounds present  Abdominal:      General: Bowel sounds are normal  There is no distension  Palpations: Abdomen is soft  Tenderness: There is no abdominal tenderness  Musculoskeletal:         General: Normal range of motion        Cervical back: Normal range of motion  Right lower leg: No edema  Left lower leg: No edema  Skin:     General: Skin is warm and dry  Capillary Refill: Capillary refill takes less than 2 seconds  Neurological:      Mental Status: He is alert and oriented to person, place, and time  Mental status is at baseline  Motor: Weakness (B/L LE) present  Psychiatric:         Mood and Affect: Mood normal          Speech: Speech normal          Behavior: Behavior normal  Behavior is cooperative  Thought Content: Thought content normal          Judgment: Judgment is impulsive  Additional Data:     Labs:  Results from last 7 days   Lab Units 02/04/22  0729 02/02/22  0454 02/01/22  0621   WBC Thousand/uL 8 87   < > 9 62   HEMOGLOBIN g/dL 8 4*   < > 7 0*   HEMATOCRIT % 30 6*   < > 24 5*   PLATELETS Thousands/uL 282   < > 287   NEUTROS PCT %  --   --  70   LYMPHS PCT %  --   --  13*   MONOS PCT %  --   --  11   EOS PCT %  --   --  5    < > = values in this interval not displayed  Results from last 7 days   Lab Units 02/04/22  0729 02/01/22  0621 01/31/22  1921   SODIUM mmol/L 139   < > 137   POTASSIUM mmol/L 3 6   < > 3 9   CHLORIDE mmol/L 104   < > 101   CO2 mmol/L 29   < > 29   BUN mg/dL 9   < > 13   CREATININE mg/dL 0 99   < > 1 46*   ANION GAP mmol/L 6   < > 7   CALCIUM mg/dL 8 3*   < > 8 7   ALBUMIN g/dL  --   --  3 7   TOTAL BILIRUBIN mg/dL  --   --  1 04   ALK PHOS U/L  --   --  83 8   ALT U/L  --   --  9   AST U/L  --   --  14*   GLUCOSE RANDOM mg/dL 88   < > 94    < > = values in this interval not displayed           Results from last 7 days   Lab Units 01/31/22  1917   POC GLUCOSE mg/dl 92               Lines/Drains:  Invasive Devices  Report    Peripheral Intravenous Line            Peripheral IV 02/03/22 Right;Ventral (anterior) Forearm <1 day          Drain            Urethral Catheter Straight-tip 18 Fr  3 days              Urinary Catheter:  Goal for removal: N/A - Chronic Cloud               Imaging: No pertinent imaging reviewed  Recent Cultures (last 7 days):         Last 24 Hours Medication List:   Current Facility-Administered Medications   Medication Dose Route Frequency Provider Last Rate    acetaminophen  650 mg Oral Q6H PRN Yaritza Blackman PA-C      albuterol  2 5 mg Nebulization Q4H PRN Harris Aranda MD      docusate sodium  100 mg Oral BID Yaritza Blackman PA-C      folic acid  899 mcg Oral Daily Yaritza Blackman PA-C      ipratropium  0 5 mg Nebulization TID Harris Aranda MD      levalbuterol  1 25 mg Nebulization TID Harris Aranda MD      metoprolol succinate  25 mg Oral Daily Yaritza Blackman PA-C      sodium chloride  75 mL/hr Intravenous Continuous Gera Andersen MD 75 mL/hr (02/02/22 1409)    tamsulosin  0 4 mg Oral Daily Yaritza Blackman PA-C          Today, Patient Was Seen By: GABRIELA Ricketts    **Please Note: This note may have been constructed using a voice recognition system  **

## 2022-02-04 NOTE — INTERVAL H&P NOTE
H&P reviewed  After examining the patient I find no changes in the patients condition since the H&P had been written      Vitals:    02/04/22 1045   BP: 121/75   Pulse: 104   Resp: 15   Temp: 98 3 °F (36 8 °C)   SpO2: 95%

## 2022-02-05 LAB
ANION GAP SERPL CALCULATED.3IONS-SCNC: 8 MMOL/L (ref 4–13)
BASOPHILS # BLD AUTO: 0.05 THOUSANDS/ΜL (ref 0–0.1)
BASOPHILS NFR BLD AUTO: 0 % (ref 0–1)
BUN SERPL-MCNC: 5 MG/DL (ref 6–20)
CALCIUM SERPL-MCNC: 8.5 MG/DL (ref 8.4–10.2)
CHLORIDE SERPL-SCNC: 104 MMOL/L (ref 96–108)
CO2 SERPL-SCNC: 26 MMOL/L (ref 22–33)
CREAT SERPL-MCNC: 1.02 MG/DL (ref 0.5–1.2)
EOSINOPHIL # BLD AUTO: 0.12 THOUSAND/ΜL (ref 0–0.61)
EOSINOPHIL NFR BLD AUTO: 1 % (ref 0–6)
ERYTHROCYTE [DISTWIDTH] IN BLOOD BY AUTOMATED COUNT: 25.5 % (ref 11.6–15.1)
GFR SERPL CREATININE-BSD FRML MDRD: 72 ML/MIN/1.73SQ M
GLUCOSE SERPL-MCNC: 94 MG/DL (ref 65–140)
HCT VFR BLD AUTO: 32.5 % (ref 36.5–49.3)
HGB BLD-MCNC: 9 G/DL (ref 12–17)
IMM GRANULOCYTES # BLD AUTO: 0.1 THOUSAND/UL (ref 0–0.2)
IMM GRANULOCYTES NFR BLD AUTO: 1 % (ref 0–2)
LYMPHOCYTES # BLD AUTO: 1.11 THOUSANDS/ΜL (ref 0.6–4.47)
LYMPHOCYTES NFR BLD AUTO: 8 % (ref 14–44)
MCH RBC QN AUTO: 20.6 PG (ref 26.8–34.3)
MCHC RBC AUTO-ENTMCNC: 27.7 G/DL (ref 31.4–37.4)
MCV RBC AUTO: 75 FL (ref 82–98)
MONOCYTES # BLD AUTO: 1.02 THOUSAND/ΜL (ref 0.17–1.22)
MONOCYTES NFR BLD AUTO: 7 % (ref 4–12)
NEUTROPHILS # BLD AUTO: 11.67 THOUSANDS/ΜL (ref 1.85–7.62)
NEUTS SEG NFR BLD AUTO: 83 % (ref 43–75)
NRBC BLD AUTO-RTO: 0 /100 WBCS
PLATELET # BLD AUTO: 288 THOUSANDS/UL (ref 149–390)
PMV BLD AUTO: 9.1 FL (ref 8.9–12.7)
POTASSIUM SERPL-SCNC: 4.1 MMOL/L (ref 3.5–5)
RBC # BLD AUTO: 4.36 MILLION/UL (ref 3.88–5.62)
SODIUM SERPL-SCNC: 138 MMOL/L (ref 133–145)
WBC # BLD AUTO: 14.07 THOUSAND/UL (ref 4.31–10.16)

## 2022-02-05 PROCEDURE — 99232 SBSQ HOSP IP/OBS MODERATE 35: CPT

## 2022-02-05 PROCEDURE — 80048 BASIC METABOLIC PNL TOTAL CA: CPT

## 2022-02-05 PROCEDURE — 85025 COMPLETE CBC W/AUTO DIFF WBC: CPT

## 2022-02-05 PROCEDURE — 94640 AIRWAY INHALATION TREATMENT: CPT

## 2022-02-05 PROCEDURE — 94760 N-INVAS EAR/PLS OXIMETRY 1: CPT

## 2022-02-05 RX ORDER — ONDANSETRON 2 MG/ML
4 INJECTION INTRAMUSCULAR; INTRAVENOUS EVERY 6 HOURS PRN
Status: DISCONTINUED | OUTPATIENT
Start: 2022-02-05 | End: 2022-02-06 | Stop reason: HOSPADM

## 2022-02-05 RX ADMIN — LEVALBUTEROL HYDROCHLORIDE 1.25 MG: 1.25 SOLUTION, CONCENTRATE RESPIRATORY (INHALATION) at 20:48

## 2022-02-05 RX ADMIN — LEVALBUTEROL HYDROCHLORIDE 1.25 MG: 1.25 SOLUTION, CONCENTRATE RESPIRATORY (INHALATION) at 14:19

## 2022-02-05 RX ADMIN — IPRATROPIUM BROMIDE 0.5 MG: 0.5 SOLUTION RESPIRATORY (INHALATION) at 07:43

## 2022-02-05 RX ADMIN — DOCUSATE SODIUM 100 MG: 100 CAPSULE, LIQUID FILLED ORAL at 09:08

## 2022-02-05 RX ADMIN — IPRATROPIUM BROMIDE 0.5 MG: 0.5 SOLUTION RESPIRATORY (INHALATION) at 14:19

## 2022-02-05 RX ADMIN — LEVALBUTEROL HYDROCHLORIDE 1.25 MG: 1.25 SOLUTION, CONCENTRATE RESPIRATORY (INHALATION) at 07:43

## 2022-02-05 RX ADMIN — TAMSULOSIN HYDROCHLORIDE 0.4 MG: 0.4 CAPSULE ORAL at 09:08

## 2022-02-05 RX ADMIN — FOLIC ACID TAB 400 MCG 400 MCG: 400 TAB at 09:08

## 2022-02-05 RX ADMIN — IPRATROPIUM BROMIDE 0.5 MG: 0.5 SOLUTION RESPIRATORY (INHALATION) at 20:48

## 2022-02-05 RX ADMIN — ONDANSETRON 4 MG: 2 INJECTION INTRAMUSCULAR; INTRAVENOUS at 17:45

## 2022-02-05 RX ADMIN — METOPROLOL SUCCINATE 25 MG: 25 TABLET, EXTENDED RELEASE ORAL at 09:08

## 2022-02-05 NOTE — NURSING NOTE
Patient rang call bell and upon entering room noticed patient vomited a large amount of red emesis  Patient reported eating red jello, ham and cheese sandwich, and soda  Patient is reporting feeling lightheaded at this time  VSS  Dr Eugene Gutierrez at bedside and discharge cancelled at this time  To administer Zofran and will continue to monitor

## 2022-02-05 NOTE — ASSESSMENT & PLAN NOTE
· Cr 1 46 on admission  · Likely multifactorial prerenal/post renal in setting of acute urinary retention treated this morning with placement of Cloud catheter  · Baseline appears to be 0 8-1 1  · Creatinine back to baseline  Most recent  · Received 1 unit PRBCs in ED  · S/P 2 units PRBCs for anemia  · S/P IV hydration   · Home lisinopril and furosemide held in the setting of HERNÁN  · Can restart home medications upon discharge  · Recommend repeat BMP within 1 week of discharge

## 2022-02-05 NOTE — DISCHARGE SUMMARY
[address]  Discharge- Murriel Gamma 1945, 78 y o  male MRN: [id number]  Unit/Bed#: -01 Encounter: [id number]  Primary Care Provider: Siomara Isidro MD   Date and time admitted to hospital: 1/31/2022  6:33 PM    * Symptomatic anemia  Assessment & Plan  · Patient presented with complaints of dizziness with ambulating and at rest   Denies associated chest pain, shortness of breath, weakness  Denies melena, hematochezia, hematemesis  · FOBT negative  · Hgb 6 4  · S/P 1 unit PRBCs in ED  · Baseline hemoglobin appears 7-8 due to iron deficiency in the past    · (2/1): Hgb dropped to 7 0  S/P 2 units PRBCs  · Continue IV Venofer while admitted  Will restart oral supplementation upon discharge   · Follow iron panel results --> consistent with iron deficiency anemia with iron of 13, TIBC of 458  · S/P colonoscopy from 02/04  · Hgb remians stable with no active signs of bleeding  · Discussed with GI, okay to resume Eliquis sooner than 3 days  Patient with higher risk of bleeding  · Patient will be restarted on Eliquis tomorrow morning 2/6  · GI following:  · S/p colonoscopy from 02/04  Patient with identified polyps , samples were sent for biopsy  Plan to hold oral anticoagulation for 3 days post procedure  HERNÁN (acute kidney injury) ([facility] [address] )  Assessment & Plan  · Cr 1 46 on admission  · Likely multifactorial prerenal/post renal in setting of acute urinary retention treated this morning with placement of Cloud catheter  · Baseline appears to be 0 8-1 1  · Creatinine back to baseline  Most recent  · Received 1 unit PRBCs in ED  · S/P 2 units PRBCs for anemia  · S/P IV hydration   · Home lisinopril and furosemide held in the setting of HERNNÁ  · Can restart home medications upon discharge  · Recommend repeat BMP within 1 week of discharge  Abnormal colonoscopy  Assessment & Plan  · Had EGD/colonoscopy with Dr Nora Reyes in September 2020     · Multiple adenomatous polyps were removed  Needed repeat colonoscopy in 3 months for removal of remaining polyps  However patient never followed up  Pathology came back as tubular adenomas, no high grade dysplasia or malignancy  Biopsies for EGD came back positive for H  Pylori  AB script for eradication was send to pharmacy however patient did not complete the treatment  · FOBT negative on admission and per history no S&S of acute GI bleed  However chronic bleeding focus in GI tract causing his chronic iron deficiency anemia is likely  · Will need close outpatient follow-up with GI   · Discussed with GI via TT, will wait for colonoscopy results prior to prescribing H  Pylori treatment  GI will follow up with tissue results  · GI Following:  · S/p colonoscopy from 02/04  Patient with identified polyps, samples sent for biopsy  Oral anticoagulation will be held postprocedure for 3 days  Patient will need to restart Eliquis on Monday 2/7  Patient will need outpatient follow-up with Gastroenterology  Urinary retention  Assessment & Plan  · Evaluated here earlier this morning and diagnosed with urinary retention  Sent home with Cloud catheter  Reports back to ER initially for full issues with changing Cloud bag  Patient was provided with education understands how to change bag   · I&Os  · Continue home medication:  · Flomax  · Will need outpatient follow-up with urology  Dizziness  Assessment & Plan  · Complains of dizziness starting this afternoon when standing up from table  Denies any loss of consciousness, head strike, syncope, chest pain, palpitation, weakness, dysarthria,   · Likely secondary to anemia vs Afib RVR  · Orthostatics --> negative  · OOB with assistance   · S/P IV hydration  · Continue NURYS stockings  · PT/OT: recommending STR  Patient refusing  Plan for home today 2/5 with [facility]        Ambulatory dysfunction  Assessment & Plan  · Patient uses canes and braces for ambulation due to patella fracture  · Follows with Ortho Dr Pavan Roberts  · Fall precautions  · PT/OT evaluated: Recommending STR; patient refusing STR  Patient will be discharged home with home health care  Permanent atrial fibrillation (HCC)  Assessment & Plan  · Chronic, Stable  · Continue home medication:  · Metoprolol  · Plan to restart home A/C with Eliquis on Sunday 2/6      Medical Problems             Resolved Problems  Date Reviewed: 2/4/2022    None              Discharging Physician / Practitioner: GABRIELA Gonzalez  PCP: Katie Paige MD  Admission Date:   Admission Orders (From admission, onward)     Ordered        01/31/22 2036  Inpatient Admission  Once                      Discharge Date: 2/5/2022    Consultations During Hospital Stay:  · Gastroenterology    Procedures Performed:   · (2/4):  Colonoscopy    Significant Findings / Test Results:   CT head without contrast   Final Result by Shannan Proctor MD (01/31 2021)      No acute intracranial abnormality  Workstation performed: [id number]             Incidental Findings:   · None    Test Results Pending at Discharge (will require follow up): · Follow-up biopsy results from colonoscopy  Outpatient Tests Requested:  · Patient will need outpatient follow-up with PCP  · Patient will need outpatient follow-up with Gastroenterology    Complications:  None    Reason for Admission:  Symptomatic anemia    Hospital Course:   Keyshawn Caceres is a 78 y o  male patient who originally presented to the hospital on 1/31/2022 due to symptomatic anemia  Patient with PMH of CAD, AFib on Eliquis, anemia, and urinary retention with chronic Cloud catheter who presented to the ED for Cloud catheter bag dysfunction and dizziness  Patient was evaluated in the ED earlier today for urinary retention work, urinary catheter was placed and patient was discharged home  Patient returned to the ED after having difficulty changing Cloud catheter bag while at home    UA on admission with negative  Patient also complained of dizziness  CT head was negative  Patient was found to be anemic with HGB 6 4  Patient had persistent anemia during admission receiving a total of 3 PRBCs during hospital stay  FOBT was negative  Gastroenterology was consulted  Patient was previously admitted in September 2021 and underwent GI workup resulting in colonoscopy and polyp removal   Patient was supposed to follow-up outpatient for repeat colonoscopy and polyp removal but patient never followed up  After discussion, patient remained admitted for repeat colonoscopy on 02/04  During procedure, multiple polyps were identified and samples sent for biopsy  GI instructed to hold anticoagulation for 3 days following procedure  Discussed with GI today 2/5, Okay to start  a/C sooner, but patient may be high risk for bleeding  Patient will resume home Eliquis on 02/06  Patient will need outpatient follow-up with Gastroenterology  Patient also need follow-up for tissue results  Patient will also need outpatient follow-up with PCP  During admission, patient had elevation in his creatinine  Patient received IV hydration with crt  normalizing  Plan to resume home medications upon discharge  Recommending repeat BMP within 1 week of discharge  Patient will need follow-up with PCP for evaluation of renal function and electrolytes  Please see above list of diagnoses and related plan for additional information  Condition at Discharge: stable    Discharge Day Visit / Exam:   Subjective:  Patient states he is feeling a little dizzy today  Patient denies any active chest pain, worsening shortness of breath, abdominal pain, nausea, vomiting, or diarrhea      Vitals: Blood Pressure: 138/76 (02/05/22 0804)  Pulse: (!) 115 (02/05/22 0804)  Temperature: 98 5 °F (36 9 °C) (02/05/22 0804)  Temp Source: Oral (02/05/22 0804)  Respirations: 18 (02/05/22 0804)  Height: 6' (182 9 cm) (02/04/22 1045)  Weight - Scale: 74 4 kg (164 lb) (02/04/22 1045)  SpO2: 95 % (02/05/22 0804)  Exam:   Physical Exam  Vitals and nursing note reviewed  Constitutional:       General: He is not in acute distress  Appearance: He is ill-appearing (Chronically)  HENT:      Head: Normocephalic  Nose: Nose normal  No congestion  Mouth/Throat:      Mouth: Mucous membranes are moist       Pharynx: Oropharynx is clear  Cardiovascular:      Rate and Rhythm: Tachycardia present  Rhythm irregular  Pulses: Normal pulses  Heart sounds: Normal heart sounds  No murmur heard  Pulmonary:      Effort: Pulmonary effort is normal  No respiratory distress  Breath sounds: Decreased breath sounds present  Abdominal:      General: Bowel sounds are normal  There is no distension  Palpations: Abdomen is soft  Tenderness: There is no abdominal tenderness  Musculoskeletal:         General: Normal range of motion  Cervical back: Normal range of motion  Right lower leg: No edema  Left lower leg: No edema  Skin:     General: Skin is warm and dry  Capillary Refill: Capillary refill takes less than 2 seconds  Neurological:      General: No focal deficit present  Mental Status: He is alert and oriented to person, place, and time  Mental status is at baseline  Motor: Weakness (B/L LE) present  Psychiatric:         Mood and Affect: Mood is anxious  Speech: Speech normal          Behavior: Behavior is cooperative  Judgment: Judgment normal          Discussion with Family: Attempted to update  (wife) via phone  Left voicemail  Discharge instructions/Information to patient and family:   See after visit summary for information provided to patient and family  Provisions for Follow-Up Care:  See after visit summary for information related to follow-up care and any pertinent home health orders         Disposition:   Home with [facility]NA Services (Reminder: Complete face to face encounter)    Planned Readmission:  No     Discharge Statement:  I spent 45 minutes discharging the patient  This time was spent on the day of discharge  I had direct contact with the patient on the day of discharge  Greater than 50% of the total time was spent examining patient, answering all patient questions, arranging and discussing plan of care with patient as well as directly providing post-discharge instructions  Additional time then spent on discharge activities  Discharge Medications:  See after visit summary for reconciled discharge medications provided to patient and/or family        **Please Note: This note may have been constructed using a voice recognition system**

## 2022-02-05 NOTE — ASSESSMENT & PLAN NOTE
· Evaluated here earlier this morning and diagnosed with urinary retention  Sent home with Cloud catheter  Reports back to ER initially for full issues with changing Cloud bag  Patient was provided with education understands how to change bag   · I&Os  · Continue home medication:  · Flomax  · Will need outpatient follow-up with urology

## 2022-02-05 NOTE — ASSESSMENT & PLAN NOTE
· Complains of dizziness starting this afternoon when standing up from table  Denies any loss of consciousness, head strike, syncope, chest pain, palpitation, weakness, dysarthria,   · Likely secondary to anemia vs Afib RVR  · Orthostatics --> negative  · OOB with assistance   · S/P IV hydration  · Continue NURYS stockings  · PT/OT: recommending STR  Patient refusing  Plan for home today 2/5 with [facility]

## 2022-02-05 NOTE — ASSESSMENT & PLAN NOTE
· Patient presented with complaints of dizziness with ambulating and at rest   Denies associated chest pain, shortness of breath, weakness  Denies melena, hematochezia, hematemesis  · FOBT negative  · Hgb 6 4  · S/P 1 unit PRBCs in ED  · Baseline hemoglobin appears 7-8 due to iron deficiency in the past    · (2/1): Hgb dropped to 7 0  S/P 2 units PRBCs  · Continue IV Venofer while admitted  Will restart oral supplementation upon discharge   · Follow iron panel results --> consistent with iron deficiency anemia with iron of 13, TIBC of 458  · S/P colonoscopy from 02/04  · Hgb remians stable with no active signs of bleeding  · Discussed with GI, okay to resume Eliquis sooner than 3 days  Patient with higher risk of bleeding  · Patient will be restarted on Eliquis tomorrow morning 2/6  · GI following:  · S/p colonoscopy from 02/04  Patient with identified polyps , samples were sent for biopsy  Plan to hold oral anticoagulation for 3 days post procedure

## 2022-02-05 NOTE — CASE MANAGEMENT
Case Management Discharge Planning Note    Patient name Natanael Cruz  Location /-50 MRN [id number]  : 1945 Date 2022       Current Admission Date: 2022  Current Admission Diagnosis:Symptomatic anemia   Patient Active Problem List    Diagnosis Date Noted    Abnormal colonoscopy 2022    Dizziness 2022    HERNÁN (acute kidney injury) ([facility] [address] ) 2022    Urinary retention 2022    Essential (hemorrhagic) thrombocythemia ([facility] [address] ) 2022    Closed nondisplaced comminuted fracture of left patella 2021    Head trauma 2021    Thrombocytosis 2021    Bradycardia 2021    Syncope 2021    Colonic adenoma 2021    Ambulatory dysfunction 2021    COPD (chronic obstructive pulmonary disease) ([facility] [address] ) 2021    Orthostatic hypotension 2020    Tobacco use disorder 2020    Symptomatic anemia 2020    Congestive cardiomyopathy ([facility] [address] ) 2020    Permanent atrial fibrillation ([facility] [address] ) 2020      LOS (days): 5  Geometric Mean LOS (GMLOS) (days): 2 70  Days to GMLOS:-2     OBJECTIVE:  Risk of Unplanned Readmission Score: 18         Current admission status: Inpatient   Preferred Pharmacy:   [facility] [address] [place] [address]  Phone: [phone] Fax: [address] [place] [address] 20, [address] [address]  Phone: [phone] Fax: [phone]    Primary Care Provider: Malorie Driscoll MD    Primary Insurance: MEDICARE  Secondary Insurance:  FOR LIFE    DISCHARGE DETAILS:    Transport at Discharge : Wheelchair [facility]     Number/Name of Dispatcher: SLETS  Transported by [employer] and Unit #): TBD  ETA of Transport (Date): 22  ETA of Transport (Time):  (TBD)    SW notified by unit nurse that pt is now requesting a w/c [facility]       Request submitted through [facility]  Awaiting confirmation of pickup time

## 2022-02-05 NOTE — PLAN OF CARE
Problem: GENITOURINARY - ADULT  Goal: Absence of urinary retention  Description: INTERVENTIONS:  - Assess patients ability to void and empty bladder  - Monitor I/O  - Bladder scan as needed  - Discuss with physician/AP medications to alleviate retention as needed  - Discuss catheterization for long term situations as appropriate  Outcome: Progressing     Problem: GENITOURINARY - ADULT  Goal: Urinary catheter remains patent  Description: INTERVENTIONS:  - Assess patency of urinary catheter  - If patient has a chronic miller, consider changing catheter if non-functioning  - Follow guidelines for intermittent irrigation of non-functioning urinary catheter  Outcome: Progressing     Problem: HEMATOLOGIC - ADULT  Goal: Maintains hematologic stability  Description: INTERVENTIONS  - Assess for signs and symptoms of bleeding or hemorrhage  - Monitor labs  - Administer supportive blood products/factors as ordered and appropriate  Outcome: Progressing     Problem: PAIN - ADULT  Goal: Verbalizes/displays adequate comfort level or baseline comfort level  Description: Interventions:  - Encourage patient to monitor pain and request assistance  - Assess pain using appropriate pain scale  - Administer analgesics based on type and severity of pain and evaluate response  - Implement non-pharmacological measures as appropriate and evaluate response  - Consider cultural and social influences on pain and pain management  - Notify physician/advanced practitioner if interventions unsuccessful or patient reports new pain  Outcome: Progressing

## 2022-02-05 NOTE — ASSESSMENT & PLAN NOTE
· Patient uses canes and braces for ambulation due to patella fracture  · Follows with Ortho Dr Faizan Fyae  · Fall precautions  · PT/OT evaluated: Recommending STR; patient refusing STR  Patient will be discharged home with home health care

## 2022-02-05 NOTE — ASSESSMENT & PLAN NOTE
· Chronic, Stable  · Continue home medication:  · Metoprolol  · Plan to restart home A/C with Eliquis on Sunday 2/6

## 2022-02-05 NOTE — DISCHARGE INSTRUCTIONS
Anemia   WHAT YOU NEED TO KNOW:   Anemia is a low number of red blood cells or a low amount of hemoglobin in your red blood cells  Hemoglobin is a protein that helps carry oxygen throughout your body  Red blood cells use iron to create hemoglobin  Anemia may develop if your body does not have enough iron  It may also develop if your body does not make enough red blood cells or they die faster than your body can make them  DISCHARGE INSTRUCTIONS:   Call 911 or have someone call 911 for any of the following:   · You lose consciousness  · You have severe chest pain  Seek care immediately if:   · You have dark or bloody bowel movements  Contact your healthcare provider if:   · Your symptoms are worse, even after treatment  · You have questions or concerns about your condition or care  Medicines:   · Iron or folic acid supplements  help increase your red blood cell and hemoglobin levels  · Vitamin B12 injections  may help boost your red blood cell level and decrease your symptoms  Ask your healthcare provider how to inject B12  · Take your medicine as directed  Contact your healthcare provider if you think your medicine is not helping or if you have side effects  Tell him of her if you are allergic to any medicine  Keep a list of the medicines, vitamins, and herbs you take  Include the amounts, and when and why you take them  Bring the list or the pill bottles to follow-up visits  Carry your medicine list with you in case of an emergency  Prevent anemia:  Eat healthy foods rich in iron and vitamin C  Nuts, meat, dark leafy green vegetables, and beans are high in iron and protein  Vitamin C helps your body absorb iron  Foods rich in vitamin C include oranges and other citrus fruits  Ask your healthcare provider for a list of other foods that are high in iron or vitamin C  Ask if you need to be on a special diet            Follow up with your doctor as directed:  Write down your questions so you remember to ask them during your visits  © Copyright [employer] 2021 Information is for End User's use only and may not be sold, redistributed or otherwise used for commercial purposes  All illustrations and images included in CareNotes® are the copyrighted property of A D A M , Inc  or Antonia Bowden  The above information is an  only  It is not intended as medical advice for individual conditions or treatments  Talk to your doctor, nurse or pharmacist before following any medical regimen to see if it is safe and effective for you  Colorectal Polyps   WHAT YOU NEED TO KNOW:   Colorectal polyps are small growths of tissue in the lining of the colon and rectum  Most polyps are hyperplastic polyps and are usually benign (noncancerous)  Certain types of polyps, called adenomatous polyps, may turn into cancer  DISCHARGE INSTRUCTIONS:   Follow up with your healthcare provider or gastroenterologist as directed: You may need to return for more tests, such as another colonoscopy  Write down your questions so you remember to ask them during your visits  Reduce your risk for colorectal polyps:   · Eat a variety of healthy foods:  Healthy foods include fruit, vegetables, whole-grain breads, low-fat dairy products, beans, lean meat, and fish  Ask if you need to be on a special diet  · Maintain a healthy weight:  Ask your healthcare provider if you need to lose weight and how much you need to lose  Ask for help with a weight loss program     · Exercise:  Begin to exercise slowly and do more as you get stronger  Talk with your healthcare provider before you start an exercise program      · Limit alcohol:  Your risk for polyps increases the more you drink  · Do not smoke: If you smoke, it is never too late to quit  Ask for information about how to stop      For support and more information:   · Magalis Renae (NDDIC)  [address] [place] [address]  Phone: 5- 429 - [phone]  Web Address: www digestive  niddk nih gov    Contact your healthcare provider or gastroenterologist if:   · You have a fever  · You have chills, a cough, or feel weak and achy  · You have abdominal pain that does not go away or gets worse after you take medicine  · Your abdomen is swollen  · You are losing weight without trying  · You have questions or concerns about your condition or care  Seek care immediately or call 911 if:   · You have sudden shortness of breath  · You have a fast heart rate, fast breathing, or are too dizzy to stand up  · You have severe abdominal pain  · You see blood in your bowel movement  © Copyright [address] Information is for End User's use only and may not be sold, redistributed or otherwise used for commercial purposes  All illustrations and images included in CareNotes® are the copyrighted property of A D A M , Inc  or [employer]   The above information is an  only  It is not intended as medical advice for individual conditions or treatments  Talk to your doctor, nurse or pharmacist before following any medical regimen to see if it is safe and effective for you  GI (Gastrointestinal) Soft Diet   WHAT YOU NEED TO KNOW:   A GI soft diet is prescribed by your healthcare provider to allow your intestines (bowels) to heal  Your bowels may need it before a procedure, after surgery, or because of a medical condition  The food in a GI soft diet keeps your bowels from working too hard  The diet gives you nutrition while allowing your bowels time to heal or rest    DISCHARGE INSTRUCTIONS:   Foods to eat on a GI soft diet:  Your healthcare provider may tell you to keep your fiber intake to less than 10 grams each day  · Grains:  Choose grains that have less than 2 grams of fiber in each serving  Examples include the following:     ? Cream of wheat and finely ground grits    ?  Dry cereal made from rice     ? White bread, white pasta, and white rice    ? Crackers, bagels, and rolls made from white or refined flour    · Fruits and vegetables:      ? Canned and well-cooked fruit without skins or seeds, and juice without pulp    ? Ripe bananas and soft melon    ? Canned and well-cooked vegetables without skins or seeds, and strained vegetable juice    ? Potatoes without skin    · Dairy:     ? Cow's milk, lactose-free milk, soy milk, and rice milk    ? Cottage cheese and yogurt without nuts, fruit, or granola    · Protein:      ? Eggs, fish, and tender, well-cooked poultry (such as chicken and turkey) and beef     ? Tofu and smooth peanut butter    Foods to avoid: The following foods are hard to digest:  · Breads, cereals, crackers, and pasta made with whole wheat or whole grains (such as whole oats)    · [employer]ozano [employer], wild rice, quinoa, kasha, and barley    · All fresh fruit with skin, except banana and melons     · Dried fruits and fruit juice with pulp    · Canned and fresh pineapple    · Raw vegetables    · Nuts, seeds, and popcorn    · Beans, nuts, peas, and lentils    · Tough meats    · Coconut and avocado    What else you need to know:  A GI soft diet can decrease the amount of bowel movements you have  Drink liquids as directed to avoid constipation  Ask how much liquid to drink each day and which liquids are best for you  Do not  drink liquids with your meal  Wait until 30 minutes after your meal to drink liquids  © Copyright [employer] 2021 Information is for End User's use only and may not be sold, redistributed or otherwise used for commercial purposes  All illustrations and images included in CareNotes® are the copyrighted property of [employer]  or [facility] Gianni French   The above information is an  only  It is not intended as medical advice for individual conditions or treatments   Talk to your doctor, nurse or pharmacist before following any medical regimen to see if it is safe and effective for you

## 2022-02-05 NOTE — ASSESSMENT & PLAN NOTE
· Had EGD/colonoscopy with Dr Queta Shook in September 2020  · Multiple adenomatous polyps were removed  Needed repeat colonoscopy in 3 months for removal of remaining polyps  However patient never followed up  Pathology came back as tubular adenomas, no high grade dysplasia or malignancy  Biopsies for EGD came back positive for H  Pylori  AB script for eradication was send to pharmacy however patient did not complete the treatment  · FOBT negative on admission and per history no S&S of acute GI bleed  However chronic bleeding focus in GI tract causing his chronic iron deficiency anemia is likely  · Will need close outpatient follow-up with GI   · Discussed with GI via TT, will wait for colonoscopy results prior to prescribing H  Pylori treatment  GI will follow up with tissue results  · GI Following:  · S/p colonoscopy from 02/04  Patient with identified polyps, samples sent for biopsy  Oral anticoagulation will be held postprocedure for 3 days  Patient will need to restart Eliquis on Monday 2/7  Patient will need outpatient follow-up with Gastroenterology

## 2022-02-06 VITALS
HEART RATE: 93 BPM | TEMPERATURE: 98.2 F | DIASTOLIC BLOOD PRESSURE: 86 MMHG | HEIGHT: 72 IN | WEIGHT: 164 LBS | OXYGEN SATURATION: 100 % | BODY MASS INDEX: 22.21 KG/M2 | RESPIRATION RATE: 16 BRPM | SYSTOLIC BLOOD PRESSURE: 124 MMHG

## 2022-02-06 PROCEDURE — 94760 N-INVAS EAR/PLS OXIMETRY 1: CPT

## 2022-02-06 PROCEDURE — 99239 HOSP IP/OBS DSCHRG MGMT >30: CPT

## 2022-02-06 PROCEDURE — 94640 AIRWAY INHALATION TREATMENT: CPT

## 2022-02-06 RX ADMIN — FOLIC ACID TAB 400 MCG 400 MCG: 400 TAB at 08:38

## 2022-02-06 RX ADMIN — TAMSULOSIN HYDROCHLORIDE 0.4 MG: 0.4 CAPSULE ORAL at 08:38

## 2022-02-06 RX ADMIN — DOCUSATE SODIUM 100 MG: 100 CAPSULE, LIQUID FILLED ORAL at 08:38

## 2022-02-06 RX ADMIN — LEVALBUTEROL HYDROCHLORIDE 1.25 MG: 1.25 SOLUTION, CONCENTRATE RESPIRATORY (INHALATION) at 14:18

## 2022-02-06 RX ADMIN — IPRATROPIUM BROMIDE 0.5 MG: 0.5 SOLUTION RESPIRATORY (INHALATION) at 14:18

## 2022-02-06 RX ADMIN — IPRATROPIUM BROMIDE 0.5 MG: 0.5 SOLUTION RESPIRATORY (INHALATION) at 07:47

## 2022-02-06 RX ADMIN — LEVALBUTEROL HYDROCHLORIDE 1.25 MG: 1.25 SOLUTION, CONCENTRATE RESPIRATORY (INHALATION) at 07:47

## 2022-02-06 NOTE — NURSING NOTE
Patient and patient's wife Harini Jean-Baptiste educated on indwelling catheter care  Both verbalized understanding of education  Patient will follow up with urology outpatient  Mikey called for the patient to transport him home

## 2022-02-06 NOTE — ASSESSMENT & PLAN NOTE
· Evaluated here earlier this morning and diagnosed with urinary retention  Sent home with Cloud catheter  Reports back to ER initially for full issues with changing Cloud bag  Patient was provided with education understands how to change bag   · Patient will be discharged home with chronic Cloud  · Continue home medication:  · Flomax  · Will need outpatient follow-up with urology

## 2022-02-06 NOTE — ASSESSMENT & PLAN NOTE
· Patient uses canes and braces for ambulation due to patella fracture  · Follows with Ortho Dr Kandi Campos  · Fall precautions  · PT/OT evaluated: Recommending STR; patient refusing STR  Patient will be discharged home with home health care

## 2022-02-06 NOTE — PLAN OF CARE
Problem: Potential for Falls  Goal: Patient will remain free of falls  Description: INTERVENTIONS:  - Educate patient/family on patient safety including physical limitations  - Instruct patient to call for assistance with activity   - Consult OT/PT to assist with strengthening/mobility   - Keep Call bell within reach  - Keep bed low and locked with side rails adjusted as appropriate  - Keep care items and personal belongings within reach  - Initiate and maintain comfort rounds  - Make Fall Risk Sign visible to staff  - Offer Toileting every 2 Hours, in advance of need  - Initiate/Maintain bed alarm  - Obtain necessary fall risk management equipment:   - Apply yellow socks and bracelet for high fall risk patients  - Consider moving patient to room near nurses station  Outcome: Progressing     Problem: MOBILITY - ADULT  Goal: Maintain or return to baseline ADL function  Description: INTERVENTIONS:  -  Assess patient's ability to carry out ADLs; assess patient's baseline for ADL function and identify physical deficits which impact ability to perform ADLs (bathing, care of mouth/teeth, toileting, grooming, dressing, etc )  - Assess/evaluate cause of self-care deficits   - Assess range of motion  - Assess patient's mobility; develop plan if impaired  - Assess patient's need for assistive devices and provide as appropriate  - Encourage maximum independence but intervene and supervise when necessary  - Involve family in performance of ADLs  - Assess for home care needs following discharge   - Consider OT consult to assist with ADL evaluation and planning for discharge  - Provide patient education as appropriate  Outcome: Progressing  Goal: Maintains/Returns to pre admission functional level  Description: INTERVENTIONS:  - Perform BMAT or MOVE assessment daily    - Set and communicate daily mobility goal to care team and patient/family/caregiver     - Collaborate with rehabilitation services on mobility goals if consulted  - Perform Range of Motion 2 times a day  - Reposition patient every 2 hours    - Dangle patient 2 times a day  - Stand patient 2 times a day  - Ambulate patient 2 times a day  - Out of bed to chair 2 times a day   - Out of bed for meals 2 times a day  - Out of bed for toileting  - Record patient progress and toleration of activity level   Outcome: Progressing     Problem: GENITOURINARY - ADULT  Goal: Absence of urinary retention  Description: INTERVENTIONS:  - Assess patients ability to void and empty bladder  - Monitor I/O  - Bladder scan as needed  - Discuss with physician/AP medications to alleviate retention as needed  - Discuss catheterization for long term situations as appropriate  Outcome: Progressing  Goal: Urinary catheter remains patent  Description: INTERVENTIONS:  - Assess patency of urinary catheter  - If patient has a chronic miller, consider changing catheter if non-functioning  - Follow guidelines for intermittent irrigation of non-functioning urinary catheter  Outcome: Progressing     Problem: HEMATOLOGIC - ADULT  Goal: Maintains hematologic stability  Description: INTERVENTIONS  - Assess for signs and symptoms of bleeding or hemorrhage  - Monitor labs  - Administer supportive blood products/factors as ordered and appropriate  Outcome: Progressing     Problem: PAIN - ADULT  Goal: Verbalizes/displays adequate comfort level or baseline comfort level  Description: Interventions:  - Encourage patient to monitor pain and request assistance  - Assess pain using appropriate pain scale  - Administer analgesics based on type and severity of pain and evaluate response  - Implement non-pharmacological measures as appropriate and evaluate response  - Consider cultural and social influences on pain and pain management  - Notify physician/advanced practitioner if interventions unsuccessful or patient reports new pain  Outcome: Progressing     Problem: SAFETY ADULT  Goal: Patient will remain free of falls  Description: INTERVENTIONS:  - Educate patient/family on patient safety including physical limitations  - Instruct patient to call for assistance with activity   - Consult OT/PT to assist with strengthening/mobility   - Keep Call bell within reach  - Keep bed low and locked with side rails adjusted as appropriate  - Keep care items and personal belongings within reach  - Initiate and maintain comfort rounds  - Make Fall Risk Sign visible to staff  - Offer Toileting every 2 Hours, in advance of need  - Initiate/Maintain bed alarm  - Obtain necessary fall risk management equipment:   - Apply yellow socks and bracelet for high fall risk patients  - Consider moving patient to room near nurses station  Outcome: Progressing  Goal: Maintain or return to baseline ADL function  Description: INTERVENTIONS:  -  Assess patient's ability to carry out ADLs; assess patient's baseline for ADL function and identify physical deficits which impact ability to perform ADLs (bathing, care of mouth/teeth, toileting, grooming, dressing, etc )  - Assess/evaluate cause of self-care deficits   - Assess range of motion  - Assess patient's mobility; develop plan if impaired  - Assess patient's need for assistive devices and provide as appropriate  - Encourage maximum independence but intervene and supervise when necessary  - Involve family in performance of ADLs  - Assess for home care needs following discharge   - Consider OT consult to assist with ADL evaluation and planning for discharge  - Provide patient education as appropriate  Outcome: Progressing  Goal: Maintains/Returns to pre admission functional level  Description: INTERVENTIONS:  - Perform BMAT or MOVE assessment daily    - Set and communicate daily mobility goal to care team and patient/family/caregiver  - Collaborate with rehabilitation services on mobility goals if consulted  - Perform Range of Motion 2 times a day  - Reposition patient every 2 hours    - Dangle patient 2 times a day  - Stand patient 2 times a day  - Ambulate patient 2 times a day  - Out of bed to chair 2 times a day   - Out of bed for meals 2 times a day  - Out of bed for toileting  - Record patient progress and toleration of activity level   Outcome: Progressing     Problem: DISCHARGE PLANNING  Goal: Discharge to home or other facility with appropriate resources  Description: INTERVENTIONS:  - Identify barriers to discharge w/patient and caregiver  - Arrange for needed discharge resources and transportation as appropriate  - Identify discharge learning needs (meds, wound care, etc )  - Arrange for interpretive services to assist at discharge as needed  - Refer to Case Management Department for coordinating discharge planning if the patient needs post-hospital services based on physician/advanced practitioner order or complex needs related to functional status, cognitive ability, or social support system  Outcome: Progressing     Problem: Prexisting or High Potential for Compromised Skin Integrity  Goal: Skin integrity is maintained or improved  Description: INTERVENTIONS:  - Identify patients at risk for skin breakdown  - Assess and monitor skin integrity  - Assess and monitor nutrition and hydration status  - Monitor labs   - Assess for incontinence   - Turn and reposition patient  - Assist with mobility/ambulation  - Relieve pressure over bony prominences  - Avoid friction and shearing  - Provide appropriate hygiene as needed including keeping skin clean and dry  - Evaluate need for skin moisturizer/barrier cream  - Collaborate with interdisciplinary team   - Patient/family teaching  - Consider wound care consult   Outcome: Progressing

## 2022-02-06 NOTE — PROGRESS NOTES
Sarah U  68   Progress Note - Aishwarya Urban 1945, 78 y o  male MRN: [id number]  Unit/Bed#: -Jocelyn Encounter: [id number]  Primary Care Provider: Malena Priest MD   Date and time admitted to hospital: 1/31/2022  6:33 PM    * Symptomatic anemia  Assessment & Plan  Patient with anticipated discharge yesterday 2/5  Patient unable to safely return home secondary to nausea/vomting and unable to tolerate diet  Patient with improvement in symptoms  Patient will be D/C'd home today 2/6  · Patient presented with complaints of dizziness with ambulating and at rest   Denies associated chest pain, shortness of breath, weakness  Denies melena, hematochezia, hematemesis  · FOBT negative  · Hgb 6 4  · S/P 1 unit PRBCs in ED  · Baseline hemoglobin appears 7-8 due to iron deficiency in the past    · (2/1): Hgb dropped to 7 0  S/P 2 units PRBCs  · Continue IV Venofer while admitted  Will restart oral supplementation upon discharge   · Follow iron panel results --> consistent with iron deficiency anemia with iron of 13, TIBC of 458  · S/P colonoscopy from 02/04  · Hgb remians stable with no active signs of bleeding  · Discussed with GI, okay to resume Eliquis sooner than 3 days  Patient with higher risk of bleeding  · Patient will be restarted on Eliquis 2/6  · GI following:  · S/p colonoscopy from 02/04  Patient with identified polyps , samples were sent for biopsy  Plan to hold oral anticoagulation for 3 days post procedure  HERN[place]N (acute kidney injury) ([facility] [address] )  Assessment & Plan  · Cr 1 46 on admission  · Likely multifactorial prerenal/post renal in setting of acute urinary retention treated this morning with placement of Cloud catheter  · Baseline appears to be 0 8-1 1  · Creatinine back to baseline  Most recent  · Received 1 unit PRBCs in ED  · S/P 2 units PRBCs for anemia  · S/P IV hydration   · Home lisinopril and furosemide held in the setting of HERNÁN    · Restart home medications upon discharge  · Recommend repeat BMP within 1 week of discharge  Abnormal colonoscopy  Assessment & Plan  · Had EGD/colonoscopy with Dr Cr Yañez in September 2020  · Multiple adenomatous polyps were removed  Needed repeat colonoscopy in 3 months for removal of remaining polyps  However patient never followed up  Pathology came back as tubular adenomas, no high grade dysplasia or malignancy  Biopsies for EGD came back positive for H  Pylori  AB script for eradication was send to pharmacy however patient did not complete the treatment  · FOBT negative on admission and per history no S&S of acute GI bleed  However chronic bleeding focus in GI tract causing his chronic iron deficiency anemia is likely  · Will need close outpatient follow-up with GI   · Discussed with GI via TT, will wait for colonoscopy results prior to prescribing H  Pylori treatment  · GI will follow up with tissue results  · GI Following:  · S/p colonoscopy from 02/04  Patient with identified polyps, samples sent for biopsy  Oral anticoagulation will be held postprocedure for 3 days  Patient will need to restart Eliquis on Monday 2/7  Patient will need outpatient follow-up with Gastroenterology  Urinary retention  Assessment & Plan  · Evaluated here earlier this morning and diagnosed with urinary retention  Sent home with Cloud catheter  Reports back to ER initially for full issues with changing Cloud bag  Patient was provided with education understands how to change bag   · Patient will be discharged home with chronic Cloud  · Continue home medication:  · Flomax  · Will need outpatient follow-up with urology  Dizziness  Assessment & Plan  · Complains of dizziness starting this afternoon when standing up from table    Denies any loss of consciousness, head strike, syncope, chest pain, palpitation, weakness, dysarthria,   · Likely secondary to anemia vs Afib RVR  · Orthostatics --> negative  · OOB with assistance   · S/P IV hydration  · Continue NURYS stockings  · PT/OT: recommending STR  Patient refusing  Patient will be discharged home today  with Gregory Hughes      Ambulatory dysfunction  Assessment & Plan  · Patient uses canes and braces for ambulation due to patella fracture  · Follows with Ortho Dr Luis Eduardo Zarco  · Fall precautions  · PT/OT evaluated: Recommending STR; patient refusing STR  Patient will be discharged home with home health care  Permanent atrial fibrillation (HCC)  Assessment & Plan  · Chronic, Stable  · Continue home medication:  · Metoprolol  · A/C with Eliquis restarted           VTE Pharmacologic Prophylaxis: VTE Score: 2 Restarted on home Eliquis today     Patient Centered Rounds: I performed bedside rounds with nursing staff today  Discussions with Specialists or Other Care Team Provider:  None    Education and Discussions with Family / Patient: Updated  (wife) via phone  Time Spent for Care: 30 minutes  More than 50% of total time spent on counseling and coordination of care as described above  Current Length of Stay: 6 day(s)  Current Patient Status: Inpatient   Certification Statement: Patient with anticipated discharge yesterday  patient unable to return home at that time  Plan for discharge home today  with [facility]  Discharge Plan: Patient will be discharged home today     Code Status: Level 1 - Full Code    Subjective:   Patient stated he slept well  Patient denies any acute complaints  Patient no longer experiencing nausea/vomiting  Patient able to tolerate lunch  Patient denies any active chest pain, worsening shortness of breath, abdominal pain, or diarrhea  Objective:     Vitals:   Temp (24hrs), Av 8 °F (37 1 °C), Min:98 6 °F (37 °C), Max:99 °F (37 2 °C)    Temp:  [98 6 °F (37 °C)-99 °F (37 2 °C)] 98 6 °F (37 °C)  HR:  [] 98  Resp:  [16] 16  BP: (105-127)/(71-79) 105/71  SpO2:  [90 %-99 %] 93 %  Body mass index is 22 24 kg/m²  Input and Output Summary (last 24 hours): Intake/Output Summary (Last 24 hours) at 2/6/2022 1342  Last data filed at 2/6/2022 0900  Gross per 24 hour   Intake 980 ml   Output 1150 ml   Net -170 ml       Physical Exam:   Physical Exam  Vitals and nursing note reviewed  Constitutional:       General: He is not in acute distress  Appearance: Normal appearance  He is normal weight  HENT:      Head: Normocephalic  Nose: Nose normal  No congestion  Mouth/Throat:      Mouth: Mucous membranes are moist       Pharynx: Oropharynx is clear  Eyes:      Pupils: Pupils are equal, round, and reactive to light  Cardiovascular:      Rate and Rhythm: Normal rate  Rhythm irregular  Pulses: Normal pulses  Heart sounds: Normal heart sounds  No murmur heard  Pulmonary:      Effort: Pulmonary effort is normal  No respiratory distress  Breath sounds: Normal breath sounds  Abdominal:      General: Abdomen is flat  Bowel sounds are normal  There is no distension  Palpations: Abdomen is soft  Tenderness: There is no abdominal tenderness  Genitourinary:     Comments: Cloud intact  Urine yellow/clear  Musculoskeletal:         General: No swelling  Normal range of motion  Cervical back: Normal range of motion  No tenderness  Right lower leg: No edema  Left lower leg: No edema  Skin:     General: Skin is warm and dry  Capillary Refill: Capillary refill takes less than 2 seconds  Neurological:      General: No focal deficit present  Mental Status: He is alert and oriented to person, place, and time  Mental status is at baseline  Psychiatric:         Attention and Perception: Attention normal          Mood and Affect: Mood normal  Affect is flat  Speech: Speech normal          Behavior: Behavior is cooperative  Thought Content:  Thought content normal          Judgment: Judgment normal           Additional Data:     Labs:  Results from last 7 days   Lab Units 02/05/22  0554   WBC Thousand/uL 14 07*   HEMOGLOBIN g/dL 9 0*   HEMATOCRIT % 32 5*   PLATELETS Thousands/uL 288   NEUTROS PCT % 83*   LYMPHS PCT % 8*   MONOS PCT % 7   EOS PCT % 1     Results from last 7 days   Lab Units 02/05/22  0554 02/01/22  0621 01/31/22  1921   SODIUM mmol/L 138   < > 137   POTASSIUM mmol/L 4 1   < > 3 9   CHLORIDE mmol/L 104   < > 101   CO2 mmol/L 26   < > 29   BUN mg/dL 5*   < > 13   CREATININE mg/dL 1 02   < > 1 46*   ANION GAP mmol/L 8   < > 7   CALCIUM mg/dL 8 5   < > 8 7   ALBUMIN g/dL  --   --  3 7   TOTAL BILIRUBIN mg/dL  --   --  1 04   ALK PHOS U/L  --   --  83 8   ALT U/L  --   --  9   AST U/L  --   --  14*   GLUCOSE RANDOM mg/dL 94   < > 94    < > = values in this interval not displayed  Results from last 7 days   Lab Units 01/31/22  1917   POC GLUCOSE mg/dl 92               Lines/Drains:  Invasive Devices  Report    Peripheral Intravenous Line            Peripheral IV 02/03/22 Right;Ventral (anterior) Forearm 3 days          Drain            Urethral Catheter Straight-tip 18 Fr  6 days              Urinary Catheter:  Goal for removal: N/A- Discharging with Cloud               Imaging: No pertinent imaging reviewed      Recent Cultures (last 7 days):         Last 24 Hours Medication List:   Current Facility-Administered Medications   Medication Dose Route Frequency Provider Last Rate    acetaminophen  650 mg Oral Q6H PRN Yaritza Phillips PA-C      albuterol  2 5 mg Nebulization Q4H PRN Shereen Zelaya MD      docusate sodium  100 mg Oral BID Yaritza Phillips PA-C      folic acid  008 mcg Oral Daily Yaritza Phillips PA-C      ipratropium  0 5 mg Nebulization TID Shereen Zelaya MD      levalbuterol  1 25 mg Nebulization TID Shereen Zelaya MD      metoprolol succinate  25 mg Oral Daily Yaritza Phillips PA-C      ondansetron  4 mg Intravenous Q6H PRN Shereen Zelaya MD      tamsulosin  0 4 mg Oral Daily Para DENVER Carroll          Today, Patient Was Seen By: GABRIELA Mack    **Please Note: This note may have been constructed using a voice recognition system  **

## 2022-02-06 NOTE — ASSESSMENT & PLAN NOTE
Patient with anticipated discharge yesterday 2/5  Patient unable to safely return home secondary to nausea/vomting and unable to tolerate diet  Patient with improvement in symptoms  Patient will be D/C'd home today 2/6  · Patient presented with complaints of dizziness with ambulating and at rest   Denies associated chest pain, shortness of breath, weakness  Denies melena, hematochezia, hematemesis  · FOBT negative  · Hgb 6 4  · S/P 1 unit PRBCs in ED  · Baseline hemoglobin appears 7-8 due to iron deficiency in the past    · (2/1): Hgb dropped to 7 0  S/P 2 units PRBCs  · Continue IV Venofer while admitted  Will restart oral supplementation upon discharge   · Follow iron panel results --> consistent with iron deficiency anemia with iron of 13, TIBC of 458  · S/P colonoscopy from 02/04  · Hgb remians stable with no active signs of bleeding  · Discussed with GI, okay to resume Eliquis sooner than 3 days  Patient with higher risk of bleeding  · Patient will be restarted on Eliquis 2/6  · GI following:  · S/p colonoscopy from 02/04  Patient with identified polyps , samples were sent for biopsy  Plan to hold oral anticoagulation for 3 days post procedure

## 2022-02-06 NOTE — ASSESSMENT & PLAN NOTE
· Chronic, Stable  · Continue home medication:  · Metoprolol  · A/C with Eliquis restarted Sunday 2/6

## 2022-02-06 NOTE — ASSESSMENT & PLAN NOTE
· Had EGD/colonoscopy with Dr John Trammell in September 2020  · Multiple adenomatous polyps were removed  Needed repeat colonoscopy in 3 months for removal of remaining polyps  However patient never followed up  Pathology came back as tubular adenomas, no high grade dysplasia or malignancy  Biopsies for EGD came back positive for H  Pylori  AB script for eradication was send to pharmacy however patient did not complete the treatment  · FOBT negative on admission and per history no S&S of acute GI bleed  However chronic bleeding focus in GI tract causing his chronic iron deficiency anemia is likely  · Will need close outpatient follow-up with GI   · Discussed with GI via TT, will wait for colonoscopy results prior to prescribing H  Pylori treatment  · GI will follow up with tissue results  · GI Following:  · S/p colonoscopy from 02/04  Patient with identified polyps, samples sent for biopsy  Oral anticoagulation will be held postprocedure for 3 days  Patient will need to restart Eliquis on Monday 2/7  Patient will need outpatient follow-up with Gastroenterology

## 2022-02-06 NOTE — ASSESSMENT & PLAN NOTE
· Complains of dizziness starting this afternoon when standing up from table  Denies any loss of consciousness, head strike, syncope, chest pain, palpitation, weakness, dysarthria,   · Likely secondary to anemia vs Afib RVR  · Orthostatics --> negative  · OOB with assistance   · S/P IV hydration  · Continue NURYS stockings  · PT/OT: recommending STR  Patient refusing    Patient will be discharged home today 2/6 with Gregory Hughes

## 2022-02-06 NOTE — ASSESSMENT & PLAN NOTE
· Cr 1 46 on admission  · Likely multifactorial prerenal/post renal in setting of acute urinary retention treated this morning with placement of Cloud catheter  · Baseline appears to be 0 8-1 1  · Creatinine back to baseline  Most recent  · Received 1 unit PRBCs in ED  · S/P 2 units PRBCs for anemia  · S/P IV hydration   · Home lisinopril and furosemide held in the setting of HERNÁN  · Restart home medications upon discharge  · Recommend repeat BMP within 1 week of discharge

## 2022-02-06 NOTE — PLAN OF CARE
Problem: DISCHARGE PLANNING  Goal: Discharge to home or other facility with appropriate resources  Description: INTERVENTIONS:  - Identify barriers to discharge w/patient and caregiver  - Arrange for needed discharge resources and transportation as appropriate  - Identify discharge learning needs (meds, wound care, etc )  - Arrange for interpretive services to assist at discharge as needed  - Refer to Case Management Department for coordinating discharge planning if the patient needs post-hospital services based on physician/advanced practitioner order or complex needs related to functional status, cognitive ability, or social support system  Outcome: Progressing     Problem: MOBILITY - ADULT  Goal: Maintain or return to baseline ADL function  Description: INTERVENTIONS:  -  Assess patient's ability to carry out ADLs; assess patient's baseline for ADL function and identify physical deficits which impact ability to perform ADLs (bathing, care of mouth/teeth, toileting, grooming, dressing, etc )  - Assess/evaluate cause of self-care deficits   - Assess range of motion  - Assess patient's mobility; develop plan if impaired  - Assess patient's need for assistive devices and provide as appropriate  - Encourage maximum independence but intervene and supervise when necessary  - Involve family in performance of ADLs  - Assess for home care needs following discharge   - Consider OT consult to assist with ADL evaluation and planning for discharge  - Provide patient education as appropriate  Outcome: Progressing

## 2022-02-07 ENCOUNTER — TRANSITIONAL CARE MANAGEMENT (OUTPATIENT)
Dept: FAMILY MEDICINE CLINIC | Facility: CLINIC | Age: 77
End: 2022-02-07

## 2022-02-09 NOTE — RESULT ENCOUNTER NOTE
Please inform patient that their biopsies were benign    EGD in 1 year to repeat biopsies of the gastric antrum due to intestinal metaplasia, repeat colonoscopy 3 months to remove any remaining polyps

## 2022-02-16 ENCOUNTER — TELEPHONE (OUTPATIENT)
Dept: GASTROENTEROLOGY | Facility: CLINIC | Age: 77
End: 2022-02-16

## 2022-02-16 NOTE — TELEPHONE ENCOUNTER
Called and spoke to pt  He will call back when he is ready to schedule   He has other appts to take care of first

## 2022-02-17 ENCOUNTER — TELEPHONE (OUTPATIENT)
Dept: FAMILY MEDICINE CLINIC | Facility: CLINIC | Age: 77
End: 2022-02-17

## 2022-02-17 ENCOUNTER — HOSPITAL ENCOUNTER (EMERGENCY)
Facility: HOSPITAL | Age: 77
Discharge: HOME | End: 2022-02-17
Attending: EMERGENCY MEDICINE | Admitting: EMERGENCY MEDICINE
Payer: MEDICARE

## 2022-02-17 VITALS
TEMPERATURE: 98.7 F | SYSTOLIC BLOOD PRESSURE: 115 MMHG | HEART RATE: 87 BPM | DIASTOLIC BLOOD PRESSURE: 81 MMHG | RESPIRATION RATE: 18 BRPM | OXYGEN SATURATION: 100 %

## 2022-02-17 DIAGNOSIS — I48.20 CHRONIC ATRIAL FIBRILLATION (HCC): Primary | ICD-10-CM

## 2022-02-17 LAB
ALBUMIN SERPL BCP-MCNC: 3.8 G/DL (ref 3.4–4.8)
ALP SERPL-CCNC: 74.3 U/L (ref 10–129)
ALT SERPL W P-5'-P-CCNC: 9 U/L (ref 5–63)
ANION GAP SERPL CALCULATED.3IONS-SCNC: 5 MMOL/L (ref 4–13)
AST SERPL W P-5'-P-CCNC: 16 U/L (ref 15–41)
BASOPHILS # BLD AUTO: 0.08 THOUSANDS/ΜL (ref 0–0.1)
BASOPHILS NFR BLD AUTO: 1 % (ref 0–1)
BILIRUB SERPL-MCNC: 0.46 MG/DL (ref 0.3–1.2)
BUN SERPL-MCNC: 16 MG/DL (ref 6–20)
CALCIUM SERPL-MCNC: 9.2 MG/DL (ref 8.4–10.2)
CHLORIDE SERPL-SCNC: 102 MMOL/L (ref 96–108)
CO2 SERPL-SCNC: 31 MMOL/L (ref 22–33)
CREAT SERPL-MCNC: 1.26 MG/DL (ref 0.5–1.2)
EOSINOPHIL # BLD AUTO: 0.11 THOUSAND/ΜL (ref 0–0.61)
EOSINOPHIL NFR BLD AUTO: 1 % (ref 0–6)
ERYTHROCYTE [DISTWIDTH] IN BLOOD BY AUTOMATED COUNT: 27 % (ref 11.6–15.1)
GFR SERPL CREATININE-BSD FRML MDRD: 55 ML/MIN/1.73SQ M
GLUCOSE SERPL-MCNC: 98 MG/DL (ref 65–140)
HCT VFR BLD AUTO: 35.5 % (ref 36.5–49.3)
HGB BLD-MCNC: 9.9 G/DL (ref 12–17)
IMM GRANULOCYTES # BLD AUTO: 0.24 THOUSAND/UL (ref 0–0.2)
IMM GRANULOCYTES NFR BLD AUTO: 2 % (ref 0–2)
LYMPHOCYTES # BLD AUTO: 1.26 THOUSANDS/ΜL (ref 0.6–4.47)
LYMPHOCYTES NFR BLD AUTO: 10 % (ref 14–44)
MAGNESIUM SERPL-MCNC: 2.4 MG/DL (ref 1.6–2.6)
MCH RBC QN AUTO: 21.5 PG (ref 26.8–34.3)
MCHC RBC AUTO-ENTMCNC: 27.9 G/DL (ref 31.4–37.4)
MCV RBC AUTO: 77 FL (ref 82–98)
MONOCYTES # BLD AUTO: 0.75 THOUSAND/ΜL (ref 0.17–1.22)
MONOCYTES NFR BLD AUTO: 6 % (ref 4–12)
NEUTROPHILS # BLD AUTO: 9.64 THOUSANDS/ΜL (ref 1.85–7.62)
NEUTS SEG NFR BLD AUTO: 80 % (ref 43–75)
NRBC BLD AUTO-RTO: 0 /100 WBCS
PLATELET # BLD AUTO: 811 THOUSANDS/UL (ref 149–390)
PMV BLD AUTO: 9.3 FL (ref 8.9–12.7)
POTASSIUM SERPL-SCNC: 4.8 MMOL/L (ref 3.5–5)
PROT SERPL-MCNC: 7.4 G/DL (ref 6.4–8.3)
RBC # BLD AUTO: 4.61 MILLION/UL (ref 3.88–5.62)
SODIUM SERPL-SCNC: 138 MMOL/L (ref 133–145)
WBC # BLD AUTO: 12.08 THOUSAND/UL (ref 4.31–10.16)

## 2022-02-17 PROCEDURE — 93005 ELECTROCARDIOGRAM TRACING: CPT

## 2022-02-17 PROCEDURE — 99285 EMERGENCY DEPT VISIT HI MDM: CPT

## 2022-02-17 PROCEDURE — 85025 COMPLETE CBC W/AUTO DIFF WBC: CPT | Performed by: PHYSICIAN ASSISTANT

## 2022-02-17 PROCEDURE — 80053 COMPREHEN METABOLIC PANEL: CPT | Performed by: PHYSICIAN ASSISTANT

## 2022-02-17 PROCEDURE — 36415 COLL VENOUS BLD VENIPUNCTURE: CPT | Performed by: PHYSICIAN ASSISTANT

## 2022-02-17 PROCEDURE — 99285 EMERGENCY DEPT VISIT HI MDM: CPT | Performed by: PHYSICIAN ASSISTANT

## 2022-02-17 PROCEDURE — 83735 ASSAY OF MAGNESIUM: CPT | Performed by: PHYSICIAN ASSISTANT

## 2022-02-17 NOTE — ED PROVIDER NOTES
History  Chief Complaint   Patient presents with    Atrial Fibrillation     pt presents with new onset rapid afib, no prior hx of same     Pt with Past Medical History: Anxiety disorder, Atrial fibrillation on Eliquis, Coronary artery disease, Depression, Hepatitis C, CAD with MI  Past Surgical History: CARDIAC CATHETERIZATION-07/09/2018  Presents to ED for evaluation of "irregular heart beat per patient", states at 1400 today his PT came to house and noted irregular heart beat, pt thought that was new for him so wanted to come to ED for evaluation  Pt concerned and wants to be admitted to find out the cause of his Afib, because he thought it was new  Pt has no complaints, no fever, no cp, no sob, no LE edema, no diaphoresis  Old records reviewed and pt has chronic Afib          Prior to Admission Medications   Prescriptions Last Dose Informant Patient Reported? Taking? apixaban (ELIQUIS) 5 mg   No No   Sig: Take 1 tablet (5 mg total) by mouth 2 (two) times a day   docusate sodium (COLACE) 100 mg capsule   No No   Sig: Take 1 capsule (100 mg total) by mouth 2 (two) times a day   ferrous sulfate 324 (65 Fe) mg   No No   Sig: Take 1 tablet (324 mg total) by mouth 2 (two) times a day before meals   Patient not taking: Reported on 2/1/2022    fluticasone-salmeterol (Advair Diskus) 100-50 mcg/dose inhaler   No No   Sig: Inhale 1 puff 2 (two) times a day Rinse mouth after use     folic acid (FOLVITE) 069 mcg tablet   No No   Sig: Take 1 tablet (400 mcg total) by mouth daily   Patient not taking: Reported on 2/1/2022    furosemide (LASIX) 40 mg tablet   No No   Sig: TAKE ONE-HALF (1/2) TABLET EVERY OTHER DAY   ipratropium-albuterol (DUO-NEB) 0 5-2 5 mg/3 mL nebulizer solution   No No   Sig: Take 3 mL by nebulization 4 (four) times a day   lisinopril (ZESTRIL) 2 5 mg tablet   No No   Sig: Take 1 tablet (2 5 mg total) by mouth daily   metoprolol succinate (Toprol XL) 25 mg 24 hr tablet   No No   Sig: Take 1 tablet (25 mg total) by mouth daily   pantoprazole (PROTONIX) 40 mg tablet   No No   Sig: Take 1 tablet (40 mg total) by mouth daily before breakfast   Patient not taking: Reported on 10/14/2021   potassium chloride (Klor-Con) 10 mEq tablet   No No   Sig: Take 1 tablet (10 mEq total) by mouth daily   tamsulosin (FLOMAX) 0 4 mg   No No   Sig: Take 1 capsule (0 4 mg total) by mouth daily      Facility-Administered Medications: None       Past Medical History:   Diagnosis Date    Anxiety disorder     Atrial fibrillation (HCC)     Coronary artery disease     Depression     Hepatitis C     screening negative in 1/2017    MI [facility] infarction[facility]        Past Surgical History:   Procedure Laterality Date    CARDIAC CATHETERIZATION  07/09/2018       Family History   Problem Relation Age of Onset    Hypertension Mother     Coronary artery disease Mother         premature    Hypertension Father     Coronary artery disease Father         premature    Diabetes Father      I have reviewed and agree with the history as documented  E-Cigarette/Vaping    E-Cigarette Use Never User      E-Cigarette/Vaping Substances    Nicotine No     THC No     CBD No     Flavoring No     Other No     Unknown No      Social History     Tobacco Use    Smoking status: Current Every Day Smoker     Packs/day: 0 50     Years: 57 00     Pack years: 28 50     Types: Cigarettes    Smokeless tobacco: Never Used    Tobacco comment: since age 17; Has history of smoking for over 54 years  He has been smoking more than packet daily in the past however he has been smokes about half a pack a day lately and stopped smoking on 7/1/2018 when he was admitted to the hospital     Vaping Use    Vaping Use: Never used   Substance Use Topics    Alcohol use: Never    Drug use: Never       Review of Systems   Constitutional: Negative for chills and fever  HENT: Negative for hearing loss and sore throat      Respiratory: Negative for cough and shortness of breath  Cardiovascular: Negative for chest pain, palpitations and leg swelling  Gastrointestinal: Negative for abdominal pain, nausea and vomiting  Genitourinary: Negative for dysuria and frequency  Musculoskeletal: Negative for arthralgias and myalgias  Skin: Negative for color change and pallor  Neurological: Negative for dizziness, weakness and headaches  Psychiatric/Behavioral: Negative for behavioral problems  All other systems reviewed and are negative  Physical Exam  Physical Exam  Vitals and nursing note reviewed  Constitutional:       General: He is not in acute distress  Appearance: Normal appearance  He is well-developed  He is not ill-appearing  HENT:      Head: Normocephalic and atraumatic  Right Ear: External ear normal       Left Ear: External ear normal       Nose: Nose normal       Mouth/Throat:      Mouth: Mucous membranes are moist       Pharynx: Oropharynx is clear  Eyes:      Conjunctiva/sclera: Conjunctivae normal    Cardiovascular:      Rate and Rhythm: Tachycardia present  Rhythm irregular  Pulmonary:      Effort: Pulmonary effort is normal  No respiratory distress  Breath sounds: Normal breath sounds  Abdominal:      General: Bowel sounds are normal       Palpations: Abdomen is soft  Musculoskeletal:         General: Normal range of motion  Cervical back: Normal range of motion  Right lower leg: No edema  Left lower leg: No edema  Lymphadenopathy:      Cervical: No cervical adenopathy  Skin:     General: Skin is warm and dry  Findings: No rash  Neurological:      General: No focal deficit present  Mental Status: He is alert and oriented to person, place, and time  Motor: No weakness     Psychiatric:         Behavior: Behavior normal          Vital Signs  ED Triage Vitals   Temperature Pulse Respirations Blood Pressure SpO2   02/17/22 1516 02/17/22 1449 02/17/22 1449 02/17/22 1449 02/17/22 1449 98 7 °F (37 1 °C) (!) 108 16 103/59 100 %      Temp Source Heart Rate Source Patient Position - Orthostatic VS BP Location FiO2 (%)   02/17/22 1516 02/17/22 1449 02/17/22 1449 02/17/22 1449 --   Oral Monitor Sitting Left arm       Pain Score       --                  Vitals:    02/17/22 1449 02/17/22 1614   BP: 103/59 115/81   Pulse: (!) 108 87   Patient Position - Orthostatic VS: Sitting Sitting         Visual Acuity      ED Medications  Medications - No data to display    Diagnostic Studies  Results Reviewed     Procedure Component Value Units Date/Time    Comprehensive metabolic panel [[id number]]  (Abnormal) Collected: 02/17/22 1531    Lab Status: Final result Specimen: Blood from Arm, Right Updated: 02/17/22 1608     Sodium 138 mmol/L      Potassium 4 8 mmol/L      Chloride 102 mmol/L      CO2 31 mmol/L      ANION GAP 5 mmol/L      BUN 16 mg/dL      Creatinine 1 26 mg/dL      Glucose 98 mg/dL      Calcium 9 2 mg/dL      AST 16 U/L      ALT 9 U/L      Alkaline Phosphatase 74 3 U/L      Total Protein 7 4 g/dL      Albumin 3 8 g/dL      Total Bilirubin 0 46 mg/dL      eGFR 55 ml/min/1 73sq m     Narrative:      [place] guidelines for Chronic Kidney Disease (CKD):     Stage 1 with normal or high GFR (GFR > 90 mL/min/1 73 square meters)    Stage 2 Mild CKD (GFR = 60-89 mL/min/1 73 square meters)    Stage 3A Moderate CKD (GFR = 45-59 mL/min/1 73 square meters)    Stage 3B Moderate CKD (GFR = 30-44 mL/min/1 73 square meters)    Stage 4 Severe CKD (GFR = 15-29 mL/min/1 73 square meters)    Stage 5 End Stage CKD (GFR <15 mL/min/1 73 square meters)  Note: GFR calculation is accurate only with a steady state creatinine    Magnesium [[id number]]  (Normal) Collected: 02/17/22 1531    Lab Status: Final result Specimen: Blood from Arm, Right Updated: 02/17/22 1608     Magnesium 2 4 mg/dL     CBC and differential [[id number]]  (Abnormal) Collected: 02/17/22 1531    Lab Status: Final result Specimen: Blood from Arm, Right Updated: 02/17/22 1548     WBC 12 08 Thousand/uL      RBC 4 61 Million/uL      Hemoglobin 9 9 g/dL      Hematocrit 35 5 %      MCV 77 fL      MCH 21 5 pg      MCHC 27 9 g/dL      RDW 27 0 %      MPV 9 3 fL      Platelets 015 Thousands/uL      nRBC 0 /100 WBCs      Neutrophils Relative 80 %      Immat GRANS % 2 %      Lymphocytes Relative 10 %      Monocytes Relative 6 %      Eosinophils Relative 1 %      Basophils Relative 1 %      Neutrophils Absolute 9 64 Thousands/µL      Immature Grans Absolute 0 24 Thousand/uL      Lymphocytes Absolute 1 26 Thousands/µL      Monocytes Absolute 0 75 Thousand/µL      Eosinophils Absolute 0 11 Thousand/µL      Basophils Absolute 0 08 Thousands/µL                  No orders to display              Procedures  ECG 12 Lead Documentation Only    Date/Time: 2/17/2022 3:37 PM  Performed by: Chana Jimenez PA-C  Authorized by: Chana Jimenez PA-C     Indications / Diagnosis:  Tachy  ECG reviewed by me, the ED Provider: yes    Patient location:  ED  Previous ECG:     Previous ECG:  Compared to current    Similarity:  No change    Comparison to cardiac monitor: Yes    Interpretation:     Interpretation: abnormal    Rate:     ECG rate:  94    ECG rate assessment: normal    Rhythm:     Rhythm: atrial fibrillation    Comments:      No acute ischemic changes             ED Course  ED Course as of 02/17/22 1702   Thu Feb 17, 2022   1627 Labs wnl, slight increase in Cr, but stable  Pt remained stable throughout ED evaluation with no persistent RVR during visit, VSS, stable for DC  Educated pt on his Chronic AFIB                                             MDM  Number of Diagnoses or Management Options  Diagnosis management comments: Old records reviewed, pt noted to have several years of chronic AFIB    Labs wnl, pt stable for dc       Amount and/or Complexity of Data Reviewed  Clinical lab tests: ordered and reviewed  Review and summarize past medical records: yes        Disposition  Final diagnoses:   Chronic atrial fibrillation ([facility] [address] )     Time reflects when diagnosis was documented in both MDM as applicable and the Disposition within this note     Time User Action Codes Description Comment    2/17/2022  5:00 PM Juice Pallas Add [I48 20] Chronic atrial fibrillation [facility])       ED Disposition     ED Disposition Condition Date/Time Comment    Discharge Stable Thu Feb 17, 2022  5:00 PM Robert Mainhelena discharge to home/self care  Follow-up Information     Follow up With Specialties Details Why New Yue Riddle MD Family Medicine  As needed [address]  [place] [address] [place]  [phone]            Patient's Medications   Discharge Prescriptions    No medications on file       No discharge procedures on file      PDMP Review     None          ED Provider  Electronically Signed by           Nati Orellana PA-C  02/17/22 1639

## 2022-02-17 NOTE — DISCHARGE INSTRUCTIONS
YOU HAVE CHRONIC A-FIB  CONTINUE WILL ALL YOUR MEDICATION AS DIRECTED    Follow-up with your provider as needed

## 2022-02-17 NOTE — TELEPHONE ENCOUNTER
Physical therapy call the patient is having irregular heart rate     Per kalli  Patient advised to go to er

## 2022-02-21 DIAGNOSIS — D50.9 MICROCYTIC ANEMIA: ICD-10-CM

## 2022-02-22 RX ORDER — POTASSIUM CHLORIDE 750 MG/1
TABLET, FILM COATED, EXTENDED RELEASE ORAL
Qty: 30 TABLET | Refills: 11 | Status: SHIPPED | OUTPATIENT
Start: 2022-02-22 | End: 2022-02-23 | Stop reason: SDUPTHER

## 2022-02-23 DIAGNOSIS — D50.9 MICROCYTIC ANEMIA: ICD-10-CM

## 2022-02-23 RX ORDER — POTASSIUM CHLORIDE 750 MG/1
10 TABLET, FILM COATED, EXTENDED RELEASE ORAL DAILY
Qty: 90 TABLET | Refills: 1 | Status: SHIPPED | OUTPATIENT
Start: 2022-02-23 | End: 2022-06-01

## 2022-02-28 ENCOUNTER — TELEPHONE (OUTPATIENT)
Dept: FAMILY MEDICINE CLINIC | Facility: CLINIC | Age: 77
End: 2022-02-28

## 2022-02-28 ENCOUNTER — HOSPITAL ENCOUNTER (INPATIENT)
Facility: HOSPITAL | Age: 77
LOS: 3 days | Discharge: HOME HEALTH | DRG: 698 | End: 2022-03-04
Attending: EMERGENCY MEDICINE | Admitting: INTERNAL MEDICINE
Payer: MEDICARE

## 2022-02-28 ENCOUNTER — APPOINTMENT (EMERGENCY)
Dept: CT IMAGING | Facility: HOSPITAL | Age: 77
DRG: 698 | End: 2022-02-28
Payer: MEDICARE

## 2022-02-28 ENCOUNTER — APPOINTMENT (EMERGENCY)
Dept: RADIOLOGY | Facility: HOSPITAL | Age: 77
DRG: 698 | End: 2022-02-28
Payer: MEDICARE

## 2022-02-28 DIAGNOSIS — A41.9 SEPSIS SECONDARY TO UTI (HCC): ICD-10-CM

## 2022-02-28 DIAGNOSIS — R33.9 URINARY RETENTION: ICD-10-CM

## 2022-02-28 DIAGNOSIS — E87.5 HYPERKALEMIA: ICD-10-CM

## 2022-02-28 DIAGNOSIS — R74.01 TRANSAMINITIS: ICD-10-CM

## 2022-02-28 DIAGNOSIS — R53.1 GENERALIZED WEAKNESS: ICD-10-CM

## 2022-02-28 DIAGNOSIS — E44.0 MODERATE PROTEIN-CALORIE MALNUTRITION (HCC): ICD-10-CM

## 2022-02-28 DIAGNOSIS — J44.9 COPD (CHRONIC OBSTRUCTIVE PULMONARY DISEASE) (HCC): ICD-10-CM

## 2022-02-28 DIAGNOSIS — N39.0 SEPSIS SECONDARY TO UTI (HCC): ICD-10-CM

## 2022-02-28 DIAGNOSIS — R55 SYNCOPE: Primary | ICD-10-CM

## 2022-02-28 PROBLEM — D50.9 IRON DEFICIENCY ANEMIA: Status: ACTIVE | Noted: 2022-02-28

## 2022-02-28 LAB
ALBUMIN SERPL BCP-MCNC: 4.1 G/DL (ref 3.4–4.8)
ALP SERPL-CCNC: 62.6 U/L (ref 10–129)
ALT SERPL W P-5'-P-CCNC: 9 U/L (ref 5–63)
ANION GAP SERPL CALCULATED.3IONS-SCNC: 11 MMOL/L (ref 4–13)
AST SERPL W P-5'-P-CCNC: 21 U/L (ref 15–41)
BACTERIA UR QL AUTO: ABNORMAL /HPF
BASOPHILS # BLD AUTO: 0.07 THOUSANDS/ΜL (ref 0–0.1)
BASOPHILS NFR BLD AUTO: 1 % (ref 0–1)
BILIRUB SERPL-MCNC: 1.27 MG/DL (ref 0.3–1.2)
BILIRUB UR QL STRIP: NEGATIVE
BUN SERPL-MCNC: 31 MG/DL (ref 6–20)
CALCIUM SERPL-MCNC: 9.5 MG/DL (ref 8.4–10.2)
CARDIAC TROPONIN I PNL SERPL HS: 13 NG/L (ref 8–18)
CHLORIDE SERPL-SCNC: 98 MMOL/L (ref 96–108)
CK SERPL-CCNC: 68.1 U/L (ref 49–397)
CLARITY UR: CLEAR
CO2 SERPL-SCNC: 26 MMOL/L (ref 22–33)
COLOR UR: YELLOW
CREAT SERPL-MCNC: 1.66 MG/DL (ref 0.5–1.2)
EOSINOPHIL # BLD AUTO: 0.05 THOUSAND/ΜL (ref 0–0.61)
EOSINOPHIL NFR BLD AUTO: 1 % (ref 0–6)
ERYTHROCYTE [DISTWIDTH] IN BLOOD BY AUTOMATED COUNT: 27.9 % (ref 11.6–15.1)
GFR SERPL CREATININE-BSD FRML MDRD: 39 ML/MIN/1.73SQ M
GLUCOSE SERPL-MCNC: 90 MG/DL (ref 65–140)
GLUCOSE UR STRIP-MCNC: NEGATIVE MG/DL
HCT VFR BLD AUTO: 38.9 % (ref 36.5–49.3)
HGB BLD-MCNC: 11.7 G/DL (ref 12–17)
HGB UR QL STRIP.AUTO: ABNORMAL
IMM GRANULOCYTES # BLD AUTO: 0.08 THOUSAND/UL (ref 0–0.2)
IMM GRANULOCYTES NFR BLD AUTO: 1 % (ref 0–2)
KETONES UR STRIP-MCNC: NEGATIVE MG/DL
LEUKOCYTE ESTERASE UR QL STRIP: ABNORMAL
LYMPHOCYTES # BLD AUTO: 0.93 THOUSANDS/ΜL (ref 0.6–4.47)
LYMPHOCYTES NFR BLD AUTO: 11 % (ref 14–44)
MCH RBC QN AUTO: 22.7 PG (ref 26.8–34.3)
MCHC RBC AUTO-ENTMCNC: 30.1 G/DL (ref 31.4–37.4)
MCV RBC AUTO: 76 FL (ref 82–98)
MONOCYTES # BLD AUTO: 0.5 THOUSAND/ΜL (ref 0.17–1.22)
MONOCYTES NFR BLD AUTO: 6 % (ref 4–12)
NEUTROPHILS # BLD AUTO: 6.95 THOUSANDS/ΜL (ref 1.85–7.62)
NEUTS SEG NFR BLD AUTO: 80 % (ref 43–75)
NITRITE UR QL STRIP: NEGATIVE
NON-SQ EPI CELLS URNS QL MICRO: ABNORMAL /HPF
NRBC BLD AUTO-RTO: 0 /100 WBCS
PH UR STRIP.AUTO: 5 [PH]
PLATELET # BLD AUTO: 382 THOUSANDS/UL (ref 149–390)
PMV BLD AUTO: 9.6 FL (ref 8.9–12.7)
POTASSIUM SERPL-SCNC: 5.2 MMOL/L (ref 3.5–5)
PROT SERPL-MCNC: 7.2 G/DL (ref 6.4–8.3)
PROT UR STRIP-MCNC: NEGATIVE MG/DL
RBC # BLD AUTO: 5.15 MILLION/UL (ref 3.88–5.62)
RBC #/AREA URNS AUTO: ABNORMAL /HPF
SODIUM SERPL-SCNC: 135 MMOL/L (ref 133–145)
SP GR UR STRIP.AUTO: 1.01 (ref 1–1.03)
TSH SERPL DL<=0.05 MIU/L-ACNC: 1.18 UIU/ML (ref 0.34–5.6)
UROBILINOGEN UR QL STRIP.AUTO: 0.2 E.U./DL
WBC # BLD AUTO: 8.58 THOUSAND/UL (ref 4.31–10.16)
WBC #/AREA URNS AUTO: ABNORMAL /HPF

## 2022-02-28 PROCEDURE — 36415 COLL VENOUS BLD VENIPUNCTURE: CPT | Performed by: INTERNAL MEDICINE

## 2022-02-28 PROCEDURE — 99285 EMERGENCY DEPT VISIT HI MDM: CPT | Performed by: INTERNAL MEDICINE

## 2022-02-28 PROCEDURE — G1004 CDSM NDSC: HCPCS

## 2022-02-28 PROCEDURE — 99285 EMERGENCY DEPT VISIT HI MDM: CPT

## 2022-02-28 PROCEDURE — 82550 ASSAY OF CK (CPK): CPT | Performed by: INTERNAL MEDICINE

## 2022-02-28 PROCEDURE — 96361 HYDRATE IV INFUSION ADD-ON: CPT

## 2022-02-28 PROCEDURE — 99219 PR INITIAL OBSERVATION CARE/DAY 50 MINUTES: CPT | Performed by: INTERNAL MEDICINE

## 2022-02-28 PROCEDURE — 70450 CT HEAD/BRAIN W/O DYE: CPT

## 2022-02-28 PROCEDURE — 96360 HYDRATION IV INFUSION INIT: CPT

## 2022-02-28 PROCEDURE — 84484 ASSAY OF TROPONIN QUANT: CPT | Performed by: INTERNAL MEDICINE

## 2022-02-28 PROCEDURE — 81001 URINALYSIS AUTO W/SCOPE: CPT | Performed by: INTERNAL MEDICINE

## 2022-02-28 PROCEDURE — 85025 COMPLETE CBC W/AUTO DIFF WBC: CPT | Performed by: INTERNAL MEDICINE

## 2022-02-28 PROCEDURE — 94760 N-INVAS EAR/PLS OXIMETRY 1: CPT

## 2022-02-28 PROCEDURE — 84443 ASSAY THYROID STIM HORMONE: CPT | Performed by: INTERNAL MEDICINE

## 2022-02-28 PROCEDURE — 71045 X-RAY EXAM CHEST 1 VIEW: CPT

## 2022-02-28 PROCEDURE — 80053 COMPREHEN METABOLIC PANEL: CPT | Performed by: INTERNAL MEDICINE

## 2022-02-28 PROCEDURE — 93005 ELECTROCARDIOGRAM TRACING: CPT

## 2022-02-28 PROCEDURE — 94664 DEMO&/EVAL PT USE INHALER: CPT

## 2022-02-28 PROCEDURE — 81003 URINALYSIS AUTO W/O SCOPE: CPT | Performed by: INTERNAL MEDICINE

## 2022-02-28 PROCEDURE — 94640 AIRWAY INHALATION TREATMENT: CPT

## 2022-02-28 RX ORDER — SODIUM POLYSTYRENE SULFONATE 4.1 MEQ/G
15 POWDER, FOR SUSPENSION ORAL; RECTAL ONCE
Status: DISCONTINUED | OUTPATIENT
Start: 2022-02-28 | End: 2022-03-04 | Stop reason: HOSPADM

## 2022-02-28 RX ORDER — IPRATROPIUM BROMIDE AND ALBUTEROL SULFATE 2.5; .5 MG/3ML; MG/3ML
3 SOLUTION RESPIRATORY (INHALATION)
Status: DISCONTINUED | OUTPATIENT
Start: 2022-02-28 | End: 2022-02-28

## 2022-02-28 RX ORDER — METOPROLOL SUCCINATE 25 MG/1
25 TABLET, EXTENDED RELEASE ORAL DAILY
Status: DISCONTINUED | OUTPATIENT
Start: 2022-02-28 | End: 2022-03-04 | Stop reason: HOSPADM

## 2022-02-28 RX ORDER — SODIUM CHLORIDE 9 MG/ML
100 INJECTION, SOLUTION INTRAVENOUS CONTINUOUS
Status: DISCONTINUED | OUTPATIENT
Start: 2022-02-28 | End: 2022-03-03

## 2022-02-28 RX ORDER — PANTOPRAZOLE SODIUM 40 MG/1
40 TABLET, DELAYED RELEASE ORAL
Status: DISCONTINUED | OUTPATIENT
Start: 2022-03-01 | End: 2022-03-04 | Stop reason: HOSPADM

## 2022-02-28 RX ORDER — FERROUS SULFATE 325(65) MG
325 TABLET ORAL
Status: DISCONTINUED | OUTPATIENT
Start: 2022-03-01 | End: 2022-03-04 | Stop reason: HOSPADM

## 2022-02-28 RX ORDER — TAMSULOSIN HYDROCHLORIDE 0.4 MG/1
0.4 CAPSULE ORAL DAILY
Status: DISCONTINUED | OUTPATIENT
Start: 2022-02-28 | End: 2022-03-01

## 2022-02-28 RX ORDER — DOCUSATE SODIUM 100 MG/1
100 CAPSULE, LIQUID FILLED ORAL 2 TIMES DAILY
Status: DISCONTINUED | OUTPATIENT
Start: 2022-02-28 | End: 2022-03-04 | Stop reason: HOSPADM

## 2022-02-28 RX ORDER — LANOLIN ALCOHOL/MO/W.PET/CERES
400 CREAM (GRAM) TOPICAL DAILY
Status: DISCONTINUED | OUTPATIENT
Start: 2022-02-28 | End: 2022-03-04 | Stop reason: HOSPADM

## 2022-02-28 RX ORDER — LEVALBUTEROL 1.25 MG/.5ML
1.25 SOLUTION, CONCENTRATE RESPIRATORY (INHALATION)
Status: DISCONTINUED | OUTPATIENT
Start: 2022-03-01 | End: 2022-03-04 | Stop reason: HOSPADM

## 2022-02-28 RX ORDER — FLUTICASONE FUROATE AND VILANTEROL 200; 25 UG/1; UG/1
1 POWDER RESPIRATORY (INHALATION)
Status: DISCONTINUED | OUTPATIENT
Start: 2022-03-01 | End: 2022-03-04 | Stop reason: HOSPADM

## 2022-02-28 RX ADMIN — DOCUSATE SODIUM 100 MG: 100 CAPSULE, LIQUID FILLED ORAL at 16:42

## 2022-02-28 RX ADMIN — SODIUM CHLORIDE 75 ML/HR: 9 INJECTION, SOLUTION INTRAVENOUS at 23:39

## 2022-02-28 RX ADMIN — NICOTINE 1 PATCH: 7 PATCH, EXTENDED RELEASE TRANSDERMAL at 15:29

## 2022-02-28 RX ADMIN — METOPROLOL SUCCINATE 25 MG: 25 TABLET, EXTENDED RELEASE ORAL at 15:35

## 2022-02-28 RX ADMIN — APIXABAN 5 MG: 5 TABLET, FILM COATED ORAL at 16:42

## 2022-02-28 RX ADMIN — IPRATROPIUM BROMIDE AND ALBUTEROL SULFATE 3 ML: 2.5; .5 SOLUTION RESPIRATORY (INHALATION) at 17:00

## 2022-02-28 RX ADMIN — TAMSULOSIN HYDROCHLORIDE 0.4 MG: 0.4 CAPSULE ORAL at 15:35

## 2022-02-28 RX ADMIN — FOLIC ACID TAB 400 MCG 400 MCG: 400 TAB at 15:35

## 2022-02-28 RX ADMIN — SODIUM CHLORIDE 125 ML/HR: 9 INJECTION, SOLUTION INTRAVENOUS at 11:58

## 2022-02-28 NOTE — ED PROVIDER NOTES
History  Chief Complaint   Patient presents with    Weakness - Generalized     pt reports ongoing weakness and ambulatory dysfunction at home; pt home care nurse reported to  EMS slide to the ground fall, denies head strike     A this is a 78years old came for having generalized weakness  Patient stated that today he felt weaker than before and he became dizzy and when he tried to stand up he found himself on the floor  The home care nurse call 911 and brought him to the ER  Patient stated that this weakness is going on for 2 weeks  Patient denies any headache numbness of extremities or slurred speech  Patient has no chest pain, SOB, abdominal pain  Patient has a Cloud's catheter as he stated that he cannot pass urine by himself and he has this for years  Patient has no fever  Patient denies any nausea vomiting diarrhea  Patient has history of anemia, cardiomyopathy, CHF, MRI, hepatitis  Patient is not sure whether he has loss of consciousness  Patient stated that he was too weak to stand up or walk  No other complaints  Prior to Admission Medications   Prescriptions Last Dose Informant Patient Reported? Taking? apixaban (ELIQUIS) 5 mg   No No   Sig: Take 1 tablet (5 mg total) by mouth 2 (two) times a day   docusate sodium (COLACE) 100 mg capsule   No No   Sig: Take 1 capsule (100 mg total) by mouth 2 (two) times a day   ferrous sulfate 324 (65 Fe) mg   No No   Sig: Take 1 tablet (324 mg total) by mouth 2 (two) times a day before meals   Patient not taking: Reported on 2/1/2022    fluticasone-salmeterol (Advair Diskus) 100-50 mcg/dose inhaler   No No   Sig: Inhale 1 puff 2 (two) times a day Rinse mouth after use     folic acid (FOLVITE) 836 mcg tablet   No No   Sig: Take 1 tablet (400 mcg total) by mouth daily   Patient not taking: Reported on 2/1/2022    furosemide (LASIX) 40 mg tablet   No No   Sig: TAKE ONE-HALF (1/2) TABLET EVERY OTHER DAY   ipratropium-albuterol (DUO-NEB) 0 5-2 5 mg/3 mL nebulizer solution   No No   Sig: Take 3 mL by nebulization 4 (four) times a day   lisinopril (ZESTRIL) 2 5 mg tablet   No No   Sig: Take 1 tablet (2 5 mg total) by mouth daily   metoprolol succinate (Toprol XL) 25 mg 24 hr tablet   No No   Sig: Take 1 tablet (25 mg total) by mouth daily   pantoprazole (PROTONIX) 40 mg tablet   No No   Sig: Take 1 tablet (40 mg total) by mouth daily before breakfast   Patient not taking: Reported on 10/14/2021   potassium chloride (Klor-Con) 10 mEq tablet   No No   Sig: Take 1 tablet (10 mEq total) by mouth daily   tamsulosin (FLOMAX) 0 4 mg   No No   Sig: Take 1 capsule (0 4 mg total) by mouth daily      Facility-Administered Medications: None       Past Medical History:   Diagnosis Date    Anxiety disorder     Atrial fibrillation (HCC)     Coronary artery disease     Depression     Hepatitis C     screening negative in 1/2017    MI [facility] infarction[facility])        Past Surgical History:   Procedure Laterality Date    CARDIAC CATHETERIZATION  07/09/2018       Family History   Problem Relation Age of Onset    Hypertension Mother     Coronary artery disease Mother         premature    Hypertension Father     Coronary artery disease Father         premature    Diabetes Father      I have reviewed and agree with the history as documented  E-Cigarette/Vaping    E-Cigarette Use Never User      E-Cigarette/Vaping Substances    Nicotine No     THC No     CBD No     Flavoring No     Other No     Unknown No      Social History     Tobacco Use    Smoking status: Current Every Day Smoker     Packs/day: 0 50     Years: 57 00     Pack years: 28 50     Types: Cigarettes    Smokeless tobacco: Never Used    Tobacco comment: since age 17; Has history of smoking for over 54 years   He has been smoking more than packet daily in the past however he has been smokes about half a pack a day lately and stopped smoking on 7/1/2018 when he was admitted to the hospital     Vaping Use    Vaping Use: Never used   Substance Use Topics    Alcohol use: Never    Drug use: Never       Review of Systems   Constitutional: Positive for fatigue  Negative for diaphoresis and fever  HENT: Negative for congestion, dental problem, ear pain, facial swelling, mouth sores, nosebleeds, sinus pressure, sinus pain, sneezing, sore throat, tinnitus and trouble swallowing  Eyes: Negative for visual disturbance  Respiratory: Negative for cough, chest tightness, shortness of breath and wheezing  Cardiovascular: Negative for chest pain, palpitations and leg swelling  Gastrointestinal: Negative for abdominal pain, diarrhea, nausea and vomiting  Genitourinary: Negative for decreased urine volume, difficulty urinating, dysuria, flank pain, hematuria, testicular pain and urgency  Musculoskeletal: Negative for arthralgias, back pain, gait problem, neck pain and neck stiffness  Skin: Negative for color change, pallor and rash  Neurological: Positive for weakness  Negative for dizziness, tremors, seizures, light-headedness, numbness and headaches  Hematological: Negative for adenopathy  Does not bruise/bleed easily  Psychiatric/Behavioral: Negative for agitation and behavioral problems  Physical Exam  Physical Exam  Vitals and nursing note reviewed  Constitutional:       General: He is not in acute distress  Appearance: Normal appearance  He is well-developed  He is not ill-appearing, toxic-appearing or diaphoretic  HENT:      Head: Normocephalic and atraumatic  Right Ear: Ear canal normal       Left Ear: Ear canal normal       Nose: Nose normal  No congestion or rhinorrhea  Mouth/Throat:      Mouth: Mucous membranes are moist       Pharynx: No oropharyngeal exudate or posterior oropharyngeal erythema  Eyes:      Extraocular Movements: Extraocular movements intact  Conjunctiva/sclera: Conjunctivae normal       Pupils: Pupils are equal, round, and reactive to light  Neck:      Vascular: No carotid bruit  Cardiovascular:      Rate and Rhythm: Normal rate and regular rhythm  Heart sounds: Normal heart sounds  No murmur heard  No friction rub  No gallop  Pulmonary:      Effort: Pulmonary effort is normal  No respiratory distress  Breath sounds: Normal breath sounds  No wheezing, rhonchi or rales  Chest:      Chest wall: No tenderness  Abdominal:      General: Bowel sounds are normal  There is no distension  Palpations: Abdomen is soft  There is no mass  Tenderness: There is no abdominal tenderness  There is no right CVA tenderness, left CVA tenderness, guarding or rebound  Hernia: No hernia is present  Genitourinary:     Comments: Has miller's catheter which changed today by health care nurse  Musculoskeletal:         General: No swelling, tenderness, deformity or signs of injury  Normal range of motion  Cervical back: Normal range of motion and neck supple  No rigidity or tenderness  Right lower leg: No edema  Left lower leg: No edema  Lymphadenopathy:      Cervical: No cervical adenopathy  Skin:     General: Skin is warm and dry  Capillary Refill: Capillary refill takes less than 2 seconds  Coloration: Skin is not jaundiced or pale  Findings: No bruising, erythema, lesion or rash  Neurological:      General: No focal deficit present  Mental Status: He is alert and oriented to person, place, and time     Psychiatric:         Mood and Affect: Mood normal          Behavior: Behavior normal          Vital Signs  ED Triage Vitals   Temperature Pulse Respirations Blood Pressure SpO2   02/28/22 1146 02/28/22 1133 02/28/22 1133 02/28/22 1133 02/28/22 1133   97 7 °F (36 5 °C) 92 20 104/57 100 %      Temp Source Heart Rate Source Patient Position - Orthostatic VS BP Location FiO2 (%)   02/28/22 1146 02/28/22 1133 02/28/22 1133 02/28/22 1133 --   Oral Monitor Lying Left arm       Pain Score       02/28/22 1133 No Pain           Vitals:    03/01/22 0748 03/01/22 1340 03/01/22 1345 03/01/22 1428   BP: 109/69 (!) 88/57 (!) 86/65 101/68   Pulse: 102 97 (!) 106 103   Patient Position - Orthostatic VS: Lying Lying - Orthostatic VS Sitting - Orthostatic VS Lying         Visual Acuity      ED Medications  Medications   sodium chloride 0 9 % infusion (75 mL/hr Intravenous New Bag 2/28/22 1469)   sodium polystyrene (KAYEXALATE) powder 15 g ( Oral Canceled Entry 2/28/22 1531)   nicotine (NICODERM CQ) 7 mg/24hr TD 24 hr patch 1 patch (1 patch Transdermal Medication Applied 3/1/22 0825)   apixaban (ELIQUIS) tablet 5 mg (5 mg Oral Given 3/1/22 0825)   docusate sodium (COLACE) capsule 100 mg (100 mg Oral Given 3/1/22 0825)   ferrous sulfate tablet 325 mg (325 mg Oral Given 3/1/22 0825)   fluticasone-vilanterol (BREO ELLIPTA) 200-25 MCG/INH inhaler 1 puff (1 puff Inhalation Given 7/8/22 1086)   folic acid (FOLVITE) tablet 400 mcg (400 mcg Oral Given 3/1/22 0825)   metoprolol succinate (TOPROL-XL) 24 hr tablet 25 mg (25 mg Oral Given 3/1/22 0825)   pantoprazole (PROTONIX) EC tablet 40 mg (40 mg Oral Given 3/1/22 0825)   levalbuterol (XOPENEX) inhalation solution 1 25 mg (1 25 mg Nebulization Given 3/1/22 1350)   ipratropium (ATROVENT) 0 02 % inhalation solution 0 5 mg (0 5 mg Nebulization Given 3/1/22 1350)   cefepime (MAXIPIME) IVPB (premix in dextrose) 2,000 mg 50 mL (has no administration in time range)   tamsulosin (FLOMAX) capsule 0 4 mg (has no administration in time range)   sodium chloride 0 9 % bolus 1,000 mL (1,000 mL Intravenous New Bag 3/1/22 1215)       Diagnostic Studies  Results Reviewed     Procedure Component Value Units Date/Time    CBC and differential [[id number]]  (Abnormal) Collected: 03/01/22 0515    Lab Status: Final result Specimen: Blood from Arm, Right Updated: 03/01/22 0742     WBC 14 92 Thousand/uL      RBC 4 79 Million/uL      Hemoglobin 11 3 g/dL      Hematocrit 37 4 %      MCV 78 fL      MCH 23 6 pg MCHC 30 2 g/dL      RDW 28 6 %      MPV 10 4 fL      Platelets 007 Thousands/uL      nRBC 0 /100 WBCs      Neutrophils Relative 92 %      Immat GRANS % 0 %      Lymphocytes Relative 3 %      Monocytes Relative 5 %      Eosinophils Relative 0 %      Basophils Relative 0 %      Neutrophils Absolute 13 69 Thousands/µL      Immature Grans Absolute 0 06 Thousand/uL      Lymphocytes Absolute 0 40 Thousands/µL      Monocytes Absolute 0 75 Thousand/µL      Eosinophils Absolute 0 01 Thousand/µL      Basophils Absolute 0 01 Thousands/µL     Narrative: This is an appended report  These results have been appended to a previously verified report      Basic metabolic panel [[id number]]  (Abnormal) Collected: 03/01/22 0509    Lab Status: Final result Specimen: Blood from Arm, Right Updated: 03/01/22 0254     Sodium 139 mmol/L      Potassium 3 9 mmol/L      Chloride 104 mmol/L      CO2 24 mmol/L      ANION GAP 11 mmol/L      BUN 26 mg/dL      Creatinine 1 27 mg/dL      Glucose 121 mg/dL      Glucose, Fasting 121 mg/dL      Calcium 8 7 mg/dL      eGFR 55 ml/min/1 73sq m     Narrative:      [place] guidelines for Chronic Kidney Disease (CKD):     Stage 1 with normal or high GFR (GFR > 90 mL/min/1 73 square meters)    Stage 2 Mild CKD (GFR = 60-89 mL/min/1 73 square meters)    Stage 3A Moderate CKD (GFR = 45-59 mL/min/1 73 square meters)    Stage 3B Moderate CKD (GFR = 30-44 mL/min/1 73 square meters)    Stage 4 Severe CKD (GFR = 15-29 mL/min/1 73 square meters)    Stage 5 End Stage CKD (GFR <15 mL/min/1 73 square meters)  Note: GFR calculation is accurate only with a steady state creatinine    TSH, 3rd generation with Free T4 reflex [[id number]]  (Normal) Collected: 02/28/22 1157    Lab Status: Final result Specimen: Blood from Arm, Left Updated: 02/28/22 1504     TSH 3RD GENERATON 1 178 uIU/mL     Narrative:      Patients undergoing fluorescein dye angiography may retain small amounts of fluorescein in the body for 48-72 hours post procedure  Samples containing fluorescein can produce falsely depressed TSH values  If the patient had this procedure,a specimen should be resubmitted post fluorescein clearance        CK (with reflex to MB) [[id number]]  (Normal) Collected: 02/28/22 1157    Lab Status: Final result Specimen: Blood from Arm, Left Updated: 02/28/22 1452     Total CK 68 1 U/L     Urine Microscopic [[id number]]  (Abnormal) Collected: 02/28/22 1242    Lab Status: Final result Specimen: Urine, Clean Catch Updated: 02/28/22 1254     RBC, UA 0-1 /hpf      WBC, UA 0-5 /hpf      Epithelial Cells None Seen /hpf      Bacteria, UA Moderate /hpf     UA w Reflex to Microscopic w Reflex to Culture [[id number]]  (Abnormal) Collected: 02/28/22 1242    Lab Status: Final result Specimen: Urine, Clean Catch Updated: 02/28/22 1249     Color, UA Yellow     Clarity, UA Clear     Specific Gravity, UA 1 015     pH, UA 5 0     Leukocytes, UA 2+     Nitrite, UA Negative     Protein, UA Negative mg/dl      Glucose, UA Negative mg/dl      Ketones, UA Negative mg/dl      Urobilinogen, UA 0 2 E U /dl      Bilirubin, UA Negative     Blood, UA 1+    Comprehensive metabolic panel [[id number]]  (Abnormal) Collected: 02/28/22 1157    Lab Status: Final result Specimen: Blood from Arm, Left Updated: 02/28/22 1230     Sodium 135 mmol/L      Potassium 5 2 mmol/L      Chloride 98 mmol/L      CO2 26 mmol/L      ANION GAP 11 mmol/L      BUN 31 mg/dL      Creatinine 1 66 mg/dL      Glucose 90 mg/dL      Calcium 9 5 mg/dL      AST 21 U/L      ALT 9 U/L      Alkaline Phosphatase 62 6 U/L      Total Protein 7 2 g/dL      Albumin 4 1 g/dL      Total Bilirubin 1 27 mg/dL      eGFR 39 ml/min/1 73sq m     Narrative:      [place] guidelines for Chronic Kidney Disease (CKD):     Stage 1 with normal or high GFR (GFR > 90 mL/min/1 73 square meters)    Stage 2 Mild CKD (GFR = 60-89 mL/min/1 73 square meters)    Stage 3A Moderate CKD (GFR = 45-59 mL/min/1 73 square meters)    Stage 3B Moderate CKD (GFR = 30-44 mL/min/1 73 square meters)    Stage 4 Severe CKD (GFR = 15-29 mL/min/1 73 square meters)    Stage 5 End Stage CKD (GFR <15 mL/min/1 73 square meters)  Note: GFR calculation is accurate only with a steady state creatinine    High Sensitivity Troponin I Random [[id number]]  (Normal) Collected: 02/28/22 1157    Lab Status: Final result Specimen: Blood from Arm, Left Updated: 02/28/22 1229     HS TnI random 13 ng/L     CBC and differential [[id number]]  (Abnormal) Collected: 02/28/22 1157    Lab Status: Final result Specimen: Blood from Arm, Left Updated: 02/28/22 1203     WBC 8 58 Thousand/uL      RBC 5 15 Million/uL      Hemoglobin 11 7 g/dL      Hematocrit 38 9 %      MCV 76 fL      MCH 22 7 pg      MCHC 30 1 g/dL      RDW 27 9 %      MPV 9 6 fL      Platelets 049 Thousands/uL      nRBC 0 /100 WBCs      Neutrophils Relative 80 %      Immat GRANS % 1 %      Lymphocytes Relative 11 %      Monocytes Relative 6 %      Eosinophils Relative 1 %      Basophils Relative 1 %      Neutrophils Absolute 6 95 Thousands/µL      Immature Grans Absolute 0 08 Thousand/uL      Lymphocytes Absolute 0 93 Thousands/µL      Monocytes Absolute 0 50 Thousand/µL      Eosinophils Absolute 0 05 Thousand/µL      Basophils Absolute 0 07 Thousands/µL                  CT head wo contrast   Final Result by Gume Smith MD (02/28 1311)      No acute intracranial abnormality  Workstation performed: [id number]         XR chest portable   Final Result by Ellen Ordoñez MD (02/28 2944)   No acute cardiopulmonary disease        Findings are stable            Workstation performed: [id number]                    Procedures  Procedures         ED Course  ED Course as of 03/01/22 1509   Mon Feb 28, 2022   1330 Ekg; A fib RATE 83/min, no ischemic changes                                             MDM  Number of Diagnoses or Management Options  Diagnosis management comments: A this is a 78years old came for having generalized weakness patient has weakness for 2 weeks  And to today he try to stand up and walk and he found himself on the floor  Patient denies any chest pain, SOB, abdominal pain, nausea vomiting diarrhea  Patient stated that he just feel weak and today he passed out  Physical exam on him is negative  Patient has history of AFib  Labs reviewed and he has some renal insufficiency and high potassium  Case discussed with hospitalist we are going to admit him for observation for syncope and generalized weakness         Amount and/or Complexity of Data Reviewed  Clinical lab tests: ordered and reviewed  Tests in the radiology section of CPT®: ordered and reviewed    Risk of Complications, Morbidity, and/or Mortality  Presenting problems: moderate  Management options: low        Disposition  Final diagnoses:   Syncope   Generalized weakness     Time reflects when diagnosis was documented in both MDM as applicable and the Disposition within this note     Time User Action Codes Description Comment    2/28/2022  1:36 PM [facility] [R55] Syncope     2/28/2022  1:36 PM Radha Kim Add [R53 1] Generalized weakness       ED Disposition     ED Disposition Condition Date/Time Comment    Admit Stable Mon Feb 28, 2022  1:35 PM Case was discussed with Js Cano and the patient's admission status was agreed to be admited for observationto the service of Dr Salvador Lowry    None         Current Discharge Medication List      CONTINUE these medications which have NOT CHANGED    Details   apixaban (ELIQUIS) 5 mg Take 1 tablet (5 mg total) by mouth 2 (two) times a day  Qty: 180 tablet, Refills: 0    Associated Diagnoses: Longstanding persistent atrial fibrillation (HCC)      docusate sodium (COLACE) 100 mg capsule Take 1 capsule (100 mg total) by mouth 2 (two) times a day  Qty: 180 capsule, Refills: 0    Associated Diagnoses: Microcytic anemia      ferrous sulfate 324 (65 Fe) mg Take 1 tablet (324 mg total) by mouth 2 (two) times a day before meals  Qty: 180 tablet, Refills: 0    Associated Diagnoses: Microcytic anemia      fluticasone-salmeterol (Advair Diskus) 100-50 mcg/dose inhaler Inhale 1 puff 2 (two) times a day Rinse mouth after use  Qty: 60 blister, Refills: 2    Comments: Substitution to a formulary equivalent within the same pharmaceutical class is authorized    Associated Diagnoses: Essential hypertension; Chronic obstructive pulmonary disease, unspecified COPD type (HCC)      folic acid (FOLVITE) 617 mcg tablet Take 1 tablet (400 mcg total) by mouth daily  Qty: 30 tablet, Refills: 0    Associated Diagnoses: Microcytic anemia      furosemide (LASIX) 40 mg tablet TAKE ONE-HALF (1/2) TABLET EVERY OTHER DAY  Qty: 30 tablet, Refills: 3    Associated Diagnoses: Essential hypertension      ipratropium-albuterol (DUO-NEB) 0 5-2 5 mg/3 mL nebulizer solution Take 3 mL by nebulization 4 (four) times a day  Qty: 360 mL, Refills: 2    Associated Diagnoses: Pulmonary emphysema, unspecified emphysema type ([facility])      lisinopril (ZESTRIL) 2 5 mg tablet Take 1 tablet (2 5 mg total) by mouth daily  Qty: 90 tablet, Refills: 3    Associated Diagnoses: Essential hypertension      metoprolol succinate (Toprol XL) 25 mg 24 hr tablet Take 1 tablet (25 mg total) by mouth daily  Qty: 90 tablet, Refills: 3    Associated Diagnoses: Longstanding persistent atrial fibrillation (HCC)      pantoprazole (PROTONIX) 40 mg tablet Take 1 tablet (40 mg total) by mouth daily before breakfast  Qty: 90 tablet, Refills: 3    Associated Diagnoses: Iron deficiency anemia due to chronic blood loss      potassium chloride (Klor-Con) 10 mEq tablet Take 1 tablet (10 mEq total) by mouth daily  Qty: 90 tablet, Refills: 1    Associated Diagnoses: Microcytic anemia      tamsulosin (FLOMAX) 0 4 mg Take 1 capsule (0 4 mg total) by mouth daily  Qty: 30 capsule, Refills: 2    Associated Diagnoses: Ambulatory dysfunction             No discharge procedures on file      PDMP Review     None          ED Provider  Electronically Signed by           Elen Gutierrez MD  03/01/22 MD [place]en  03/01/22 5525

## 2022-02-28 NOTE — ASSESSMENT & PLAN NOTE
-history of chronic urinary retention on Cloud catheter, as per note review secondary to BPH  -patient is on Flomax 0 4 mg daily  -outpatient follow-up with Urology

## 2022-02-28 NOTE — ASSESSMENT & PLAN NOTE
-elevated potassium 5 2 likely secondary to Ace inhibitors versus Fidel   -given Kayexalate on the ED  -no acute changes on the electrocardiogram  -no plan for treatment at this point  -follow-up potassium in the a m   -follow CK

## 2022-02-28 NOTE — ASSESSMENT & PLAN NOTE
-patient has history of generalized weakness with ambulatory dysfunction, given the option to go to skilled nursing facilities in the past but patient refuse  -patient reports a mechanical fall today, unclear if he had a syncopal episode or not    He call EMS due to this generalized weakness and ambulatory dysfunction  -CT head done due to patient being on anticoagulation was unremarkable with no signs of intracranial bleeding  -continue fall precautions  -check orthostatic vital signs  -check echocardiogram  -will do PT/OT evaluation this admission and follow recommendations

## 2022-02-28 NOTE — H&P
[facility] [address]  H&P- Rick Guzmán 1945, 78 y o  male MRN: [id number]  Unit/Bed#: YOSI Encounter: [id number]  Primary Care Provider: Christy Cruz MD   Date and time admitted to hospital: 2/28/2022 11:28 AM    * HERNÁN (acute kidney injury) [facility])  Assessment & Plan  -patient baseline creatinine around 1 1, currently 1 6  -several admissions for acute kidney injury  -this could be prerenal versus postrenal in the view of chronic urinary retention  Plan  -continue urinary retention protocol  -follow BMP in the a m   -no history of CHF, continue gentle IV hydration   -monitor urine output  -avoid hypotension  -avoid nephrotoxic drugs including NSAIDs, hold diuretics , ACEI  -continue Cloud catheter, consider voiding trial tomorrow, it fails will consider consulting urology  -patient needs to follow up outpatient with urology    Iron deficiency anemia likely secondary to GI bleeding  Assessment & Plan  -patient was recently evaluated on February this year for symptomatic lower GI bleeding, he underwent EGD and colonoscopy  Multiple polyps were removed and were benign    Patient was recommended to follow with GI for after further colonoscopy in 3 months and an EGD in 1 year   -today patient hemoglobin 11 7 which seems to be patient's baseline  -no signs of GI bleeding  -hemodynamically stable  -anticoagulation was resumed 3 days after colonoscopy  -continue iron replacement    Hyperkalemia  Assessment & Plan  -elevated potassium 5 2 likely secondary to Ace inhibitors versus Hernán   -given Kayexalate on the ED  -no acute changes on the electrocardiogram  -no plan for treatment at this point  -follow-up potassium in the a m   -follow CK    Urinary retention  Assessment & Plan  -history of chronic urinary retention on Cloud catheter, as per note review secondary to BPH  -patient is on Flomax 0 4 mg daily  -outpatient follow-up with Urology    COPD (chronic obstructive pulmonary disease) [facility])  Assessment & Plan  -Currently not in exacerbation, patient continues to smoke  -Few scattered rales heard bilaterally  -Patient reports not using inhalers at home  Patient does get short of breath with ambulating   -will provide nebulization p r n  For SOB and wheezing  -continue nicotine past replacement    Ambulatory dysfunction  Assessment & Plan  -patient has history of generalized weakness with ambulatory dysfunction, given the option to go to skilled nursing facilities in the past but patient refuse  -patient reports a mechanical fall today, unclear if he had a syncopal episode or not  He call EMS due to this generalized weakness and ambulatory dysfunction  -CT head done due to patient being on anticoagulation was unremarkable with no signs of intracranial bleeding  -continue fall precautions  -check orthostatic vital signs  -check echocardiogram  -will do PT/OT evaluation this admission and follow recommendations    Permanent atrial fibrillation [facility])  Assessment & Plan  -currently well controlled on metoprolol 25 mg daily  -on apixaban 5 mg b i d  Emergency Contacts: Extended Emergency Contact Information  Primary Emergency Contact: Leela Moura  Address: [address] [place] [address] Phone: [phone]  Mobile Phone: [phone]  Relation: Spouse      VTE Prophylaxis: Apixaban (Eliquis)  / sequential compression device   Code Status:  Level 1  POLST: There is no POLST form on file for this patient (pre-hospital)    Discussion with family:  Patient reports that he lives with the wife but there is no phone the like a contact any family members at this point  Anticipated Length of Stay:  Patient will be admitted on an Observation basis with an anticipated length of stay of   2 midnights  Justification for Hospital Stay:  Acute kidney injury    Total Time for Visit, including Counseling / Coordination of Care: 30 minutes    Greater than 50% of this total time spent on direct patient counseling and coordination of care  Chief Complaint:   Generalized weakness    History of Present Illness:    Jasmin Merritt is a 78 y o  male with history of GI bleeding, status post recent transfusion in February 2022, permanent atrial fibrillation on anticoagulation, urinary retention suspected to be BPH on tamsulosin, COPD not on home oxygen, tobacco dependence, iron-deficiency anemia, idiopathic thrombocytosis, who presents with a chief complaint of generalized weakness, ambulatory dysfunction a mechanical fall  Patient reports that this morning he was in his usual state of health when all the sudden he stood up from the couch and had a mechanical fall, he is unclear of what happened, he is unsure if he lost consciousness or not  He reports that before the episode he was feeling at baseline  Denies any chest pain, shortness of breath, palpitations, change in mental status, slurred speech, focal weakness, vision changes, striking his head, no nauseous, vomiting, neck pain or muscle pain  On the ED patient was found to have a mild HERNÁN reason why patient will be placed on observation  Review of Systems:    Review of Systems   Constitutional: Negative for fatigue, fever and unexpected weight change  HENT: Negative for congestion  Eyes: Negative for visual disturbance  Respiratory: Negative for shortness of breath and wheezing  Cardiovascular: Negative for chest pain, palpitations and leg swelling  Gastrointestinal: Negative for diarrhea, nausea and vomiting  Genitourinary: Positive for decreased urine volume  Negative for dysuria, flank pain, frequency and urgency  Patient has an indwelling catheter draining clear urine  Musculoskeletal: Negative for arthralgias, back pain and myalgias  Skin: Negative for color change  Neurological: Negative for dizziness, syncope, weakness, light-headedness, numbness and headaches  Psychiatric/Behavioral: Negative for confusion  Past Medical and Surgical History:     Past Medical History:   Diagnosis Date    Anxiety disorder     Atrial fibrillation (HCC)     Coronary artery disease     Depression     Hepatitis C     screening negative in 1/2017    MI (myocardial infarction) [facility])        Past Surgical History:   Procedure Laterality Date    CARDIAC CATHETERIZATION  07/09/2018       Meds/Allergies:    Prior to Admission medications    Medication Sig Start Date End Date Taking? Authorizing Provider   apixaban (ELIQUIS) 5 mg Take 1 tablet (5 mg total) by mouth 2 (two) times a day 2/6/22 5/7/22  GABRIELA Marcus   docusate sodium (COLACE) 100 mg capsule Take 1 capsule (100 mg total) by mouth 2 (two) times a day 9/10/21 2/1/22  Daren Churchill DO   ferrous sulfate 324 (65 Fe) mg Take 1 tablet (324 mg total) by mouth 2 (two) times a day before meals  Patient not taking: Reported on 2/1/2022  9/10/21 1/13/22  Daren Churchill DO   fluticasone-salmeterol (Advair Diskus) 100-50 mcg/dose inhaler Inhale 1 puff 2 (two) times a day Rinse mouth after use   9/10/21 2/1/22  Daren Churchill DO   folic acid (FOLVITE) 426 mcg tablet Take 1 tablet (400 mcg total) by mouth daily  Patient not taking: Reported on 2/1/2022  9/10/21   Daren Churchill DO   furosemide (LASIX) 40 mg tablet TAKE ONE-HALF (1/2) TABLET EVERY OTHER DAY 11/22/21   Daren Churchill,    ipratropium-albuterol (DUO-NEB) 0 5-2 5 mg/3 mL nebulizer solution Take 3 mL by nebulization 4 (four) times a day 1/13/22 4/13/22  Bam Cuevas MD   lisinopril (ZESTRIL) 2 5 mg tablet Take 1 tablet (2 5 mg total) by mouth daily 9/10/21   Daren Churchill,    metoprolol succinate (Toprol XL) 25 mg 24 hr tablet Take 1 tablet (25 mg total) by mouth daily 7/27/21   Bam Cuevas MD   pantoprazole (PROTONIX) 40 mg tablet Take 1 tablet (40 mg total) by mouth daily before breakfast  Patient not taking: Reported on 10/14/2021 9/10/21 12/9/21 Sherren Rasp, DO   potassium chloride (Klor-Con) 10 mEq tablet Take 1 tablet (10 mEq total) by mouth daily 2/23/22 5/24/22  Sweta Diaz MD   tamsulosin [facility]) 0 4 mg Take 1 capsule (0 4 mg total) by mouth daily 9/10/21   Sherren Rasp, DO     I have reviewed home medications with patient personally  Allergies: No Known Allergies    Social History:     Marital Status: /Civil Union   Occupation:  Retired   Patient Pre-hospital Living Situation:  Lives in a single family house with the wife  Patient Pre-hospital Level of Mobility:  Walks with a cane  Patient Pre-hospital Diet Restrictions:  Known  Substance Use History:  Smoker  Social History     Substance and Sexual Activity   Alcohol Use Never     Social History     Tobacco Use   Smoking Status Current Every Day Smoker    Packs/day: 0 50    Years: 57 00    Pack years: 28 50    Types: Cigarettes   Smokeless Tobacco Never Used   Tobacco Comment    since age 17; Has history of smoking for over 54 years  He has been smoking more than packet daily in the past however he has been smokes about half a pack a day lately and stopped smoking on 7/1/2018 when he was admitted to the hospital       Social History     Substance and Sexual Activity   Drug Use Never       Family History:    Family History   Problem Relation Age of Onset    Hypertension Mother     Coronary artery disease Mother         premature    Hypertension Father     Coronary artery disease Father         premature    Diabetes Father         Physical Exam:     Vitals:   Blood Pressure: 104/57 (02/28/22 1133)  Pulse: 92 (02/28/22 1133)  Temperature: 97 7 °F (36 5 °C) (02/28/22 1146)  Temp Source: Oral (02/28/22 1146)  Respirations: 20 (02/28/22 1133)  Height: 6' (182 9 cm) (02/28/22 1133)  Weight - Scale: 67 1 kg (147 lb 14 9 oz) (02/28/22 1133)  SpO2: 100 % (02/28/22 1133)    Physical Exam  Constitutional:       General: He is not in acute distress    HENT:      Head: Normocephalic and atraumatic  Eyes:      Extraocular Movements: Extraocular movements intact  Pupils: Pupils are equal, round, and reactive to light  Cardiovascular:      Rate and Rhythm: Normal rate  Rhythm irregular  Pulses: Normal pulses  Heart sounds: Normal heart sounds  No murmur heard  Pulmonary:      Effort: Pulmonary effort is normal       Breath sounds: Normal breath sounds  Abdominal:      General: Abdomen is flat  Bowel sounds are normal       Palpations: Abdomen is soft  Musculoskeletal:         General: No signs of injury  Normal range of motion  Cervical back: Normal range of motion  Right lower leg: No edema  Left lower leg: No edema  Skin:     General: Skin is warm  Capillary Refill: Capillary refill takes less than 2 seconds  Neurological:      General: No focal deficit present  Mental Status: He is alert and oriented to person, place, and time  Mental status is at baseline  Cranial Nerves: No cranial nerve deficit  Sensory: No sensory deficit  Motor: No weakness  Psychiatric:         Mood and Affect: Mood normal            Additional Data:     Lab Results: I have personally reviewed pertinent reports  Results from last 7 days   Lab Units 02/28/22  1157   WBC Thousand/uL 8 58   HEMOGLOBIN g/dL 11 7*   HEMATOCRIT % 38 9   PLATELETS Thousands/uL 382   NEUTROS PCT % 80*   LYMPHS PCT % 11*   MONOS PCT % 6   EOS PCT % 1     Results from last 7 days   Lab Units 02/28/22  1157   SODIUM mmol/L 135   POTASSIUM mmol/L 5 2*   CHLORIDE mmol/L 98   CO2 mmol/L 26   BUN mg/dL 31*   CREATININE mg/dL 1 66*   ANION GAP mmol/L 11   CALCIUM mg/dL 9 5   ALBUMIN g/dL 4 1   TOTAL BILIRUBIN mg/dL 1 27*   ALK PHOS U/L 62 6   ALT U/L 9   AST U/L 21   GLUCOSE RANDOM mg/dL 90                       Imaging: I have personally reviewed pertinent reports        CT head wo contrast   Final Result by Kassie Oliveira MD (02/28 1311)      No acute intracranial abnormality  Workstation performed: [id number]         XR chest portable   Final Result by Kelsey Guzmán MD (02/28 1414)   No acute cardiopulmonary disease  Findings are stable            Workstation performed: [id number]               Allscripts / Epic Records Reviewed: Yes     ** Please Note: This note has been constructed using a voice recognition system   **

## 2022-02-28 NOTE — ASSESSMENT & PLAN NOTE
-patient was recently evaluated on February this year for symptomatic lower GI bleeding, he underwent EGD and colonoscopy  Multiple polyps were removed and were benign    Patient was recommended to follow with GI for after further colonoscopy in 3 months and an EGD in 1 year   -today patient hemoglobin 11 7 which seems to be patient's baseline  -no signs of GI bleeding  -hemodynamically stable  -anticoagulation was resumed 3 days after colonoscopy  -continue iron replacement

## 2022-02-28 NOTE — RESPIRATORY THERAPY NOTE
RT Protocol Note  Tea Saini 78 y o  male MRN: [id number]  Unit/Bed#: -01 Encounter: [id number]    Assessment    Principal Problem:    HERNÁN (acute kidney injury) [facility])  Active Problems:    Permanent atrial fibrillation (HCC)    Ambulatory dysfunction    COPD (chronic obstructive pulmonary disease) ([facility] )    Urinary retention    Hyperkalemia    Iron deficiency anemia likely secondary to GI bleeding      Home Pulmonary Medications:  Advair  Duoneb       Past Medical History:   Diagnosis Date    Anxiety disorder     Atrial fibrillation (HCC)     Coronary artery disease     Depression     Hepatitis C     screening negative in 2017    MI (myocardial infarction) ([facility] )      Social History     Socioeconomic History    Marital status: /Civil Union     Spouse name: None    Number of children: 3    Years of education: 15    Highest education level: 12th grade   Occupational History    Occupation: retired   Tobacco Use    Smoking status: Current Every Day Smoker     Packs/day: 0 50     Years: 57 00     Pack years: 28 50     Types: Cigarettes    Smokeless tobacco: Never Used    Tobacco comment: since age 17; Has history of smoking for over 54 years  He has been smoking more than packet daily in the past however he has been smokes about half a pack a day lately and stopped smoking on 2018 when he was admitted to the hospital     Vaping Use    Vaping Use: Never used   Substance and Sexual Activity    Alcohol use: Never    Drug use: Never    Sexual activity: Not Currently     Partners: Female     Birth control/protection: Condom Male   Other Topics Concern    None   Social History Narrative    History of Ultra Sound: 2016    History of Stress Test: 2018    History of ECHO: 2018    · Most recent tobacco use screenin2019      · Live alone or with others:   with others        · Diet:   Regular      · Caffeine intake:    Moderate      · Guns present in home:   No · Asbestos exposure:   No      · TB exposure:   No      · Environmental exposure:   No      · Animal exposure:   No      · Smoke alarm in home:   Yes     Social Determinants of Health     Financial Resource Strain: Not on file   Food Insecurity: No Food Insecurity    Worried About Running Out of Food in the Last Year: Never true    Arnel of Food in the Last Year: Never true   Transportation Needs: No Transportation Needs    Lack of Transportation (Medical): No    Lack of Transportation (Non-Medical): No   Physical Activity: Not on file   Stress: Not on file   Social Connections: Not on file   Intimate Partner Violence: Not on file   Housing Stability: Low Risk     Unable to Pay for Housing in the Last Year: No    Number of Places Lived in the Last Year: 1    Unstable Housing in the Last Year: No       Subjective         Objective    Physical Exam:   Assessment Type: During-treatment  General Appearance: Alert,Awake  Respiratory Pattern: Normal  Chest Assessment: Chest expansion symmetrical  Bilateral Breath Sounds: Diminished  O2 Device: RA    Vitals:  Blood pressure 132/86, pulse 80, temperature (!) 96 4 °F (35 8 °C), temperature source Tympanic, resp  rate 18, height 6' (1 829 m), weight 67 2 kg (148 lb 2 4 oz), SpO2 99 %  Imaging and other studies: I have personally reviewed pertinent reports  O2 Device: RA     Plan    Respiratory Plan: Home Bronchodilator Patient pathway        Resp Comments: (P) Assessed pt per protocol  Pt has a COPD history  Pt is on RA and not in any respiratory distress  Breath sounds was diminished  Pt is not here for his COPD  Pt takes Advair and Duoneb at home  Will keep pt on home regimen  Respiratory to follow

## 2022-02-28 NOTE — ASSESSMENT & PLAN NOTE
-patient baseline creatinine around 1 1, currently 1 6  -several admissions for acute kidney injury  -this could be prerenal versus postrenal in the view of chronic urinary retention  Plan  -continue urinary retention protocol  -follow BMP in the a m   -no history of CHF, continue gentle IV hydration   -monitor urine output  -avoid hypotension  -avoid nephrotoxic drugs including NSAIDs, hold diuretics , ACEI  -continue Cloud catheter, consider voiding trial tomorrow, it fails will consider consulting urology  -patient needs to follow up outpatient with urology

## 2022-02-28 NOTE — ASSESSMENT & PLAN NOTE
-Currently not in exacerbation, patient continues to smoke  -Few scattered rales heard bilaterally  -Patient reports not using inhalers at home  Patient does get short of breath with ambulating   -will provide nebulization p r n   For SOB and wheezing  -continue nicotine past replacement

## 2022-02-28 NOTE — TELEPHONE ENCOUNTER
Edgar Koch from [facility] called to let you know that they sent Ann Irizarry to the hospital after 3 falls in the house

## 2022-03-01 PROBLEM — N39.0 SEPSIS SECONDARY TO UTI (HCC): Status: ACTIVE | Noted: 2022-03-01

## 2022-03-01 PROBLEM — E87.5 HYPERKALEMIA: Status: RESOLVED | Noted: 2022-02-28 | Resolved: 2022-03-01

## 2022-03-01 PROBLEM — A41.9 SEPSIS SECONDARY TO UTI (HCC): Status: ACTIVE | Noted: 2022-03-01

## 2022-03-01 LAB
ANION GAP SERPL CALCULATED.3IONS-SCNC: 11 MMOL/L (ref 4–13)
ATRIAL RATE: 106 BPM
ATRIAL RATE: 204 BPM
BACTERIA UR QL AUTO: ABNORMAL /HPF
BASOPHILS # BLD AUTO: 0.01 THOUSANDS/ΜL (ref 0–0.1)
BASOPHILS NFR BLD AUTO: 0 % (ref 0–1)
BILIRUB UR QL STRIP: ABNORMAL
BUN SERPL-MCNC: 26 MG/DL (ref 6–20)
CALCIUM SERPL-MCNC: 8.7 MG/DL (ref 8.4–10.2)
CHLORIDE SERPL-SCNC: 104 MMOL/L (ref 96–108)
CLARITY UR: CLEAR
CO2 SERPL-SCNC: 24 MMOL/L (ref 22–33)
COLOR UR: ABNORMAL
CREAT SERPL-MCNC: 1.27 MG/DL (ref 0.5–1.2)
EOSINOPHIL # BLD AUTO: 0.01 THOUSAND/ΜL (ref 0–0.61)
EOSINOPHIL NFR BLD AUTO: 0 % (ref 0–6)
ERYTHROCYTE [DISTWIDTH] IN BLOOD BY AUTOMATED COUNT: 28.6 % (ref 11.6–15.1)
GFR SERPL CREATININE-BSD FRML MDRD: 55 ML/MIN/1.73SQ M
GLUCOSE P FAST SERPL-MCNC: 121 MG/DL (ref 70–105)
GLUCOSE SERPL-MCNC: 121 MG/DL (ref 65–140)
GLUCOSE UR STRIP-MCNC: NEGATIVE MG/DL
HCT VFR BLD AUTO: 37.4 % (ref 36.5–49.3)
HGB BLD-MCNC: 11.3 G/DL (ref 12–17)
HGB UR QL STRIP.AUTO: ABNORMAL
IMM GRANULOCYTES # BLD AUTO: 0.06 THOUSAND/UL (ref 0–0.2)
IMM GRANULOCYTES NFR BLD AUTO: 0 % (ref 0–2)
KETONES UR STRIP-MCNC: NEGATIVE MG/DL
LACTATE SERPL-SCNC: 1.3 MMOL/L (ref 0.5–2)
LACTATE SERPL-SCNC: 2.4 MMOL/L (ref 0.5–2)
LACTATE SERPL-SCNC: 2.9 MMOL/L (ref 0.5–2)
LEUKOCYTE ESTERASE UR QL STRIP: ABNORMAL
LYMPHOCYTES # BLD AUTO: 0.4 THOUSANDS/ΜL (ref 0.6–4.47)
LYMPHOCYTES NFR BLD AUTO: 3 % (ref 14–44)
MCH RBC QN AUTO: 23.6 PG (ref 26.8–34.3)
MCHC RBC AUTO-ENTMCNC: 30.2 G/DL (ref 31.4–37.4)
MCV RBC AUTO: 78 FL (ref 82–98)
MONOCYTES # BLD AUTO: 0.75 THOUSAND/ΜL (ref 0.17–1.22)
MONOCYTES NFR BLD AUTO: 5 % (ref 4–12)
NEUTROPHILS # BLD AUTO: 13.69 THOUSANDS/ΜL (ref 1.85–7.62)
NEUTS SEG NFR BLD AUTO: 92 % (ref 43–75)
NITRITE UR QL STRIP: NEGATIVE
NON-SQ EPI CELLS URNS QL MICRO: ABNORMAL /HPF
NRBC BLD AUTO-RTO: 0 /100 WBCS
PH UR STRIP.AUTO: 6 [PH]
PLATELET # BLD AUTO: 326 THOUSANDS/UL (ref 149–390)
PMV BLD AUTO: 10.4 FL (ref 8.9–12.7)
POTASSIUM SERPL-SCNC: 3.9 MMOL/L (ref 3.5–5)
PR INTERVAL: 133 MS
PR INTERVAL: 140 MS
PROCALCITONIN SERPL-MCNC: 1.5 NG/ML
PROT UR STRIP-MCNC: ABNORMAL MG/DL
QRS AXIS: 22 DEGREES
QRS AXIS: 71 DEGREES
QRSD INTERVAL: 94 MS
QRSD INTERVAL: 95 MS
QT INTERVAL: 371 MS
QT INTERVAL: 390 MS
QTC INTERVAL: 459 MS
QTC INTERVAL: 464 MS
RBC # BLD AUTO: 4.79 MILLION/UL (ref 3.88–5.62)
RBC #/AREA URNS AUTO: ABNORMAL /HPF
SODIUM SERPL-SCNC: 139 MMOL/L (ref 133–145)
SP GR UR STRIP.AUTO: 1.02 (ref 1–1.03)
T WAVE AXIS: 67 DEGREES
T WAVE AXIS: 78 DEGREES
UROBILINOGEN UR QL STRIP.AUTO: >=8 E.U./DL
VENTRICULAR RATE: 83 BPM
VENTRICULAR RATE: 94 BPM
WBC # BLD AUTO: 14.92 THOUSAND/UL (ref 4.31–10.16)
WBC #/AREA URNS AUTO: ABNORMAL /HPF

## 2022-03-01 PROCEDURE — 87077 CULTURE AEROBIC IDENTIFY: CPT | Performed by: INTERNAL MEDICINE

## 2022-03-01 PROCEDURE — 93010 ELECTROCARDIOGRAM REPORT: CPT | Performed by: INTERNAL MEDICINE

## 2022-03-01 PROCEDURE — 97163 PT EVAL HIGH COMPLEX 45 MIN: CPT

## 2022-03-01 PROCEDURE — 99232 SBSQ HOSP IP/OBS MODERATE 35: CPT | Performed by: INTERNAL MEDICINE

## 2022-03-01 PROCEDURE — 87186 SC STD MICRODIL/AGAR DIL: CPT | Performed by: INTERNAL MEDICINE

## 2022-03-01 PROCEDURE — 87040 BLOOD CULTURE FOR BACTERIA: CPT | Performed by: INTERNAL MEDICINE

## 2022-03-01 PROCEDURE — 97167 OT EVAL HIGH COMPLEX 60 MIN: CPT

## 2022-03-01 PROCEDURE — 84145 PROCALCITONIN (PCT): CPT | Performed by: INTERNAL MEDICINE

## 2022-03-01 PROCEDURE — 94760 N-INVAS EAR/PLS OXIMETRY 1: CPT

## 2022-03-01 PROCEDURE — 87086 URINE CULTURE/COLONY COUNT: CPT | Performed by: INTERNAL MEDICINE

## 2022-03-01 PROCEDURE — 83605 ASSAY OF LACTIC ACID: CPT | Performed by: INTERNAL MEDICINE

## 2022-03-01 PROCEDURE — 94640 AIRWAY INHALATION TREATMENT: CPT

## 2022-03-01 PROCEDURE — 80048 BASIC METABOLIC PNL TOTAL CA: CPT | Performed by: INTERNAL MEDICINE

## 2022-03-01 PROCEDURE — 85025 COMPLETE CBC W/AUTO DIFF WBC: CPT | Performed by: INTERNAL MEDICINE

## 2022-03-01 PROCEDURE — 81001 URINALYSIS AUTO W/SCOPE: CPT | Performed by: INTERNAL MEDICINE

## 2022-03-01 RX ORDER — CEFTRIAXONE 1 G/50ML
1000 INJECTION, SOLUTION INTRAVENOUS EVERY 24 HOURS
Status: DISCONTINUED | OUTPATIENT
Start: 2022-03-01 | End: 2022-03-01

## 2022-03-01 RX ORDER — TAMSULOSIN HYDROCHLORIDE 0.4 MG/1
0.4 CAPSULE ORAL
Status: DISCONTINUED | OUTPATIENT
Start: 2022-03-01 | End: 2022-03-02

## 2022-03-01 RX ORDER — CEFEPIME HYDROCHLORIDE 2 G/50ML
2000 INJECTION, SOLUTION INTRAVENOUS EVERY 12 HOURS
Status: DISCONTINUED | OUTPATIENT
Start: 2022-03-01 | End: 2022-03-04 | Stop reason: HOSPADM

## 2022-03-01 RX ADMIN — CEFEPIME HYDROCHLORIDE 2000 MG: 2 INJECTION, SOLUTION INTRAVENOUS at 22:39

## 2022-03-01 RX ADMIN — CEFTRIAXONE 1000 MG: 1 INJECTION, SOLUTION INTRAVENOUS at 10:51

## 2022-03-01 RX ADMIN — LEVALBUTEROL HYDROCHLORIDE 1.25 MG: 1.25 SOLUTION, CONCENTRATE RESPIRATORY (INHALATION) at 13:50

## 2022-03-01 RX ADMIN — TAMSULOSIN HYDROCHLORIDE 0.4 MG: 0.4 CAPSULE ORAL at 16:49

## 2022-03-01 RX ADMIN — TAMSULOSIN HYDROCHLORIDE 0.4 MG: 0.4 CAPSULE ORAL at 08:25

## 2022-03-01 RX ADMIN — LEVALBUTEROL HYDROCHLORIDE 1.25 MG: 1.25 SOLUTION, CONCENTRATE RESPIRATORY (INHALATION) at 07:42

## 2022-03-01 RX ADMIN — IPRATROPIUM BROMIDE 0.5 MG: 0.5 SOLUTION RESPIRATORY (INHALATION) at 07:42

## 2022-03-01 RX ADMIN — NICOTINE 1 PATCH: 7 PATCH, EXTENDED RELEASE TRANSDERMAL at 08:25

## 2022-03-01 RX ADMIN — FLUTICASONE FUROATE AND VILANTEROL TRIFENATATE 1 PUFF: 200; 25 POWDER RESPIRATORY (INHALATION) at 07:53

## 2022-03-01 RX ADMIN — FOLIC ACID TAB 400 MCG 400 MCG: 400 TAB at 08:25

## 2022-03-01 RX ADMIN — PANTOPRAZOLE SODIUM 40 MG: 40 TABLET, DELAYED RELEASE ORAL at 08:25

## 2022-03-01 RX ADMIN — IPRATROPIUM BROMIDE 0.5 MG: 0.5 SOLUTION RESPIRATORY (INHALATION) at 13:50

## 2022-03-01 RX ADMIN — DOCUSATE SODIUM 100 MG: 100 CAPSULE, LIQUID FILLED ORAL at 08:25

## 2022-03-01 RX ADMIN — DOCUSATE SODIUM 100 MG: 100 CAPSULE, LIQUID FILLED ORAL at 16:49

## 2022-03-01 RX ADMIN — APIXABAN 5 MG: 5 TABLET, FILM COATED ORAL at 08:25

## 2022-03-01 RX ADMIN — SODIUM CHLORIDE, SODIUM LACTATE, POTASSIUM CHLORIDE, AND CALCIUM CHLORIDE 500 ML: .6; .31; .03; .02 INJECTION, SOLUTION INTRAVENOUS at 18:00

## 2022-03-01 RX ADMIN — METOPROLOL SUCCINATE 25 MG: 25 TABLET, EXTENDED RELEASE ORAL at 08:25

## 2022-03-01 RX ADMIN — SODIUM CHLORIDE 1000 ML: 0.9 INJECTION, SOLUTION INTRAVENOUS at 12:15

## 2022-03-01 RX ADMIN — APIXABAN 5 MG: 5 TABLET, FILM COATED ORAL at 16:49

## 2022-03-01 RX ADMIN — FERROUS SULFATE TAB 325 MG (65 MG ELEMENTAL FE) 325 MG: 325 (65 FE) TAB at 08:25

## 2022-03-01 NOTE — ASSESSMENT & PLAN NOTE
-patient has history of generalized weakness with ambulatory dysfunction, given the option to go to skilled nursing facilities in the past but patient refuse  -patient reports a mechanical fall today, unclear if he had a syncopal episode or not    He call EMS due to this generalized weakness and ambulatory dysfunction  -CT head done due to patient being on anticoagulation was unremarkable with no signs of intracranial bleeding  -continue fall precautions  -check orthostatic vital signs--> negative  -check echocardiogram  -will do PT/OT evaluation this admission and follow recommendations

## 2022-03-01 NOTE — PLAN OF CARE
Problem: Potential for Falls  Goal: Patient will remain free of falls  Description: INTERVENTIONS:  - Educate patient/family on patient safety including physical limitations  - Instruct patient to call for assistance with activity   - Consult OT/PT to assist with strengthening/mobility   - Keep Call bell within reach  - Keep bed low and locked with side rails adjusted as appropriate  - Keep care items and personal belongings within reach  - Initiate and maintain comfort rounds  - Make Fall Risk Sign visible to staff  - Offer Toileting every 2 Hours, in advance of need  - Initiate/Maintain bed alarm  Problem: MOBILITY - ADULT  Goal: Maintain or return to baseline ADL function  Description: INTERVENTIONS:  -  Assess patient's ability to carry out ADLs; assess patient's baseline for ADL function and identify physical deficits which impact ability to perform ADLs (bathing, care of mouth/teeth, toileting, grooming, dressing, etc )  - Assess/evaluate cause of self-care deficits   - Assess range of motion  - Assess patient's mobility; develop plan if impaired  - Assess patient's need for assistive devices and provide as appropriate  - Encourage maximum independence but intervene and supervise when necessary  - Involve family in performance of ADLs  - Assess for home care needs following discharge   - Consider OT consult to assist with ADL evaluation and planning for discharge  - Provide patient education as appropriate  Outcome: Progressing     - Apply yellow socks and bracelet for high fall risk patients  - Consider moving patient to room near nurses station  Outcome: Progressing

## 2022-03-01 NOTE — ASSESSMENT & PLAN NOTE
-history of chronic urinary retention on Cloud catheter, as per note review secondary to BPH  -patient is on Flomax 0 4 mg daily  -outpatient follow-up with Urology  -3/1 voiding trial

## 2022-03-01 NOTE — PLAN OF CARE
Problem: OCCUPATIONAL THERAPY ADULT  Goal: Performs self-care activities at highest level of function for planned discharge setting  See evaluation for individualized goals  Description: Treatment Interventions: ADL retraining,Functional transfer training,Patient/family training,Endurance training,UE strengthening/ROM,Energy conservation,Equipment evaluation/education          See flowsheet documentation for full assessment, interventions and recommendations  Note: Limitation: Decreased ADL status,Decreased UE strength,Decreased Safe judgement during ADL,Decreased endurance,Decreased self-care trans,Decreased high-level ADLs  Prognosis: Fair  Assessment: Patient is a 78 y o  male seen for OT evaluation at [address] [place] [address] following admission on 2/28/2022  s/p HERNÁN (acute kidney injury) ([facility] )  Comorbidities impacting functional performance include: Permanent A-FIB, COPD, Urinary retention, hyperkalemia, iron deficiency anemia likely secondary to GI bleeding, congestive cardiomyopathy, Hx of clsed nondisplaced comminuted fracture of L patella with routine healing, Dizziness, tobacco use  Patient presents with active orders for OT eval and treat and Up with assistance   Performed at least 2 patient identifiers during session including name and wristband  Pt reports needing assistance with ADLs/ IADLs, lives in apartment with 10 vs 15 KAL  SPC vs RW used at baseline, utilizes public transportation Upon initial evaluation, patient requires supervision/set up for UB ADLs, mod assistance for LB ADLs, unable to assess bed or functional mobility at this time d/t hypotension; will continue to assess  Based on functional eval, patient presents A&Ox4 with impaired  attention, impaired  safety awareness, and intact  short term and long term memory   Occupational performance is affected by the following deficits:decreased tolerance for positional changes,  decreased muscular strength , decreased standing tolerance for self care tasks , decreased dynamic balance impacting functional reach, impaired safety awareness  and (+) pain  Based on these findings, functional performance deficits, and assessment findings, pt has been identified as a high complexity evaluation  Personal factors impacting performance at the time of evaluation include: decreased (+) Hx of falls , steps to enter home, decreased ADL independence , decreased IADL independence, High fall risk  and health management  Patient would benefit from OT services to maximize level of functional independence and safety in self-care and transfers  Occupational areas to address include:bathing/showering, toileting, dressing , eating , personal hygiene/grooming , bed mobility , functional mobility, transfer to all surfaces and fall prevention   From OT standpoint, recommendation at time of D/C would be post-acute rehabilitation  pending further mobility        OT Discharge Recommendation: Post acute rehabilitation services (pending further mobility )  OT - OK to Discharge: Yes (once medically clear)     Brynn Houston OT

## 2022-03-01 NOTE — PROGRESS NOTES
Pt cristinaer  Scanned per protocol, prior to scan pt was asked to turn on his back, this writer noted a distention on his right pubic area, palpated it was soft non tender, when placed on his back, distention went away and bladder scan performed for 217 ml  SLIM made aware, straight cath to be completed

## 2022-03-01 NOTE — PROGRESS NOTES
Pt has orders to remove miller and begin voiding trial   Miller remove with no complaints from patient  Bladder scan prior to to miller discontinue   bladder scan 14    Will continue to monitor  Call parra in hand

## 2022-03-01 NOTE — OCCUPATIONAL THERAPY NOTE
Occupational Therapy Evaluation      Rick Guzmán    3/1/2022    Patient Active Problem List   Diagnosis    Symptomatic anemia    Congestive cardiomyopathy (HCC)    Permanent atrial fibrillation ([facility] )    Tobacco use disorder    Orthostatic hypotension    Syncope    Colonic adenoma    Ambulatory dysfunction    COPD (chronic obstructive pulmonary disease) (HCC)    Bradycardia    Thrombocytosis    Head trauma    Closed nondisplaced comminuted fracture of left patella    Essential (hemorrhagic) thrombocythemia ([facility] )    Dizziness    [facility]    Urinary retention    Abnormal colonoscopy    Hyperkalemia    Iron deficiency anemia likely secondary to GI bleeding       Past Medical History:   Diagnosis Date    Anxiety disorder     Atrial fibrillation (HCC)     Coronary artery disease     Depression     Hepatitis C     screening negative in 1/2017    MI [facility])        Past Surgical History:   Procedure Laterality Date    CARDIAC CATHETERIZATION  07/09/2018 03/01/22 0830   OT Last Visit   OT Visit Date 03/01/22   Note Type   Note type Evaluation   Restrictions/Precautions   Weight Bearing Precautions Per Order Yes   LLE Weight Bearing Per Order WBAT  (Per Dr Zena Schilder note 12/9; patella fx c routine healing)   Braces or Orthoses LE Braces  (none at bedside, T ROM brace per EMR (unkown duration))   Pain Assessment   Pain Assessment Tool Mehta-Baker FACES   Mehta-Baker FACES Pain Rating 4   Pain Location/Orientation Location: Abdomen  (bladder region )   Pain Radiating Towards non radiating    Pain Onset/Description Onset: Ongoing; Descriptor: Pressure   Effect of Pain on Daily Activities comfort, bed mobility    Hospital Pain Intervention(s) Repositioned   Multiple Pain Sites No   Home Living   Type of Home House   Home Layout Two level;Performs ADLs on one level; Able to live on main level with bedroom/bathroom;Stairs to enter with rails  (10 vs 15 KAL c HR  does not utilize upstairs loft area )   Bathroom Shower/Tub Tub/shower unit  (spongebathing recently d/t weakness )   Bathroom Toilet Standard   Bathroom Equipment   (no DME at baseline )   [address] Walker;Cane  (RW vs SPC at baseline )   Additional Comments Pt reports him and his wife are currently sleeping on couch; loft space upstairs is intended bedroom, inaccessable d/t full flight of steep steps  Prior Function   Level of [place] Independent with ADLs and functional mobility; Needs assistance with IADLs   Lives With Spouse   Receives Help From Family; Other (Comment)  (spouse, HHPT)   ADL Assistance Needs assistance  (wife manages chronic miller, A with LB dressing )   IADLs Needs assistance  (wife completes laundry, groceries, cooking, cleaning  )   Falls in the last 6 months >10  (11 per pt NOT including 3 prior to admission )   Vocational Retired   Comments Pt reports using taxis and public transportation, pt and wife do not drive  Pt has significant h/o falls  Reports d/t weakness and dizziness  *Pt reports always falling to R side, unable to evaluate balance further at this time d/t hypotension  Wife assists with managing chronic miller and miller line through pants  Pt reports walking long distances into town "just on Sunday"     Lifestyle   Autonomy Pt reports needing assistance with ADLs/ IADLs, lives in apartment with 10 vs 15 KAL  SPC vs RW used at baseline, utilizes public transportation      Reciprocal Relationships spouse    Service to Others retired    Intrinsic Gratification enjoys watching football ([employer] fan)    ADL   Eating Assistance 5  [address] [facility] 5  Supervision/Setup   [address] 5  [address] [place] [address]  3  Moderate Assistance   [address] 5  [address] [facility] [address] 3  Moderate [address]  3  Moderate Assistance Toileting Deficit   (chronic miller )   Functional Assistance 5  Supervision/Setup   Additional Comments Pt limited by generalized weakness, decrased activity tolerance, pain, and dizziness/lightheadedness    Bed Mobility   Supine to Sit Unable to assess   Sit to Supine Unable to assess   Additional Comments Semi supine BP: 92/57, pt reporting dizziness resting in bed  Deferred further mobility at this time d/t suspected orthostatic hypotension  Dr Georgina Monge present at bedside verbalizing to defer mobility  Will continue to reassess as BP improves    Transfers   Sit to Stand Unable to assess   Stand to Sit Unable to assess   Balance   Static Sitting   (Unable to assess safely at this time )   Activity Tolerance   Activity Tolerance Treatment limited secondary to medical complications (Comment)  (dizziness, hypotension )   Kristie Arms Dr Georgina Monge present during session, made aware of D/C recs  Nurse Made Aware Communication with JOHANNY Trejo pre/post session  RUE Assessment   RUE Assessment WFL  (MMT 3+/5 )   LUE Assessment   LUE Assessment WFL  (MMT 3+/5 )   Sensation   Light Touch Partial deficits in the RLE;Partial deficits in the LLE  (in B feet per pt )   Cognition   Overall Cognitive Status [facility]   Arousal/Participation Cooperative   Attention Attends with cues to redirect   Orientation Level Oriented X4   Memory Within functional limits   Following Commands Follows one step commands without difficulty   Comments Pt agreeable to OT session  Assessment   Limitation Decreased ADL status; Decreased UE strength;Decreased Safe judgement during ADL;Decreased endurance;Decreased self-care trans;Decreased high-level ADLs   Prognosis Fair   Assessment Patient is a 78 y o  male seen for OT evaluation at [address] [place] [address] following admission on 2/28/2022  s/p HERNÁN (acute kidney injury) ([facility] )   Comorbidities impacting functional performance include: Permanent A-FIB, COPD, Urinary retention, hyperkalemia, iron deficiency anemia likely secondary to GI bleeding, congestive cardiomyopathy, Hx of clsed nondisplaced comminuted fracture of L patella with routine healing, Dizziness, tobacco use  Patient presents with active orders for OT eval and treat and Up with assistance   Performed at least 2 patient identifiers during session including name and wristband  Pt reports needing assistance with ADLs/ IADLs, lives in apartment with 10 vs 15 KAL  SPC vs RW used at baseline, utilizes public transportation Upon initial evaluation, patient requires supervision/set up for UB ADLs, mod assistance for LB ADLs, unable to assess bed or functional mobility at this time d/t hypotension; will continue to assess  Based on functional eval, patient presents A&Ox4 with impaired  attention, impaired  safety awareness, and intact  short term and long term memory  Occupational performance is affected by the following deficits:decreased tolerance for positional changes,  decreased muscular strength , decreased standing tolerance for self care tasks , decreased dynamic balance impacting functional reach, impaired safety awareness  and (+) pain  Based on these findings, functional performance deficits, and assessment findings, pt has been identified as a high complexity evaluation  Personal factors impacting performance at the time of evaluation include: decreased (+) Hx of falls , steps to enter home, decreased ADL independence , decreased IADL independence, High fall risk  and health management  Patient would benefit from OT services to maximize level of functional independence and safety in self-care and transfers  Occupational areas to address include:bathing/showering, toileting, dressing , eating , personal hygiene/grooming , bed mobility , functional mobility, transfer to all surfaces and fall prevention   From OT standpoint, recommendation at time of D/C would be post-acute rehabilitation  pending further mobility      Goals   Patient Goals to get stronger and be walking again   Plan   Treatment Interventions ADL retraining;Functional transfer training;Patient/family training; Endurance training;UE strengthening/ROM; Energy conservation;Equipment evaluation/education   Goal Expiration Date 03/11/22   OT Treatment Day 0   OT Frequency 3-5x/wk   Recommendation   OT Discharge Recommendation Post acute rehabilitation services  (pending further mobility )   OT - OK to Discharge Yes  (once medically clear)   Additional Comments  Pt resting in bed at end of session with bed alarm on and call button in reach, all needs mret    AM-PAC Daily Activity Inpatient   Lower Body Dressing 2   Bathing 2   Toileting 2   Upper Body Dressing 3   Grooming 3   Eating 4   Daily Activity Raw Score 16   Daily Activity Standardized Score (Calc for Raw Score >=11) 35 96   AM-PAC Applied Cognition Inpatient   Following a Speech/Presentation 3   Understanding Ordinary Conversation 3   Taking Medications 3   Remembering Where Things Are Placed or Put Away 3   Remembering List of 4-5 Errands 3   Taking Care of Complicated Tasks 3   Applied Cognition Raw Score 18   Applied Cognition Standardized Score 38 07   Barthel Index   Feeding 10   Bathing 0   Grooming Score 0   Dressing Score 5   Bladder Score 0  (chronic miller )   Bowels Score 10   Toilet Use Score 5   Transfers (Bed/Chair) Score 0  (unable to assess )   Mobility (Level Surface) Score 0  (DNT )   Pt will complete UB ADLs Independent  with use of AE as needed for increased ADL independence within 10 days  Pt will complete LB ADLs Min A  with use of AE as needed for increased ADL independence within 10 days  Pt will complete toileting Min A  with use of DME for increased ADL independence within 10 days  Pt will demonstrate proper body mechanics to complete transfers and functional mobility with Supervision and use of AD for increased safety and functional mobility within 10 days       Pt will demonstrate standing tolerance of 5 min with Supervision and use of AD for increased endurance and activity tolerance within 10 days  Pt will demonstrate OOB sitting tolerance of 2-4 hr/day for increased activity tolerance and engagement within 10 days  Pt will participate in 10 min of BUE therapeutic exercise to increase strength, ROM, and endurance during ADL/IADLs within 10 days  Pt will attend to treatment task or activity for 4 min minutes without need for redirection to improve activity engagement within 10 days  Pt will participate in ongoing cognitive assessments to assist with safe D/C planning and recommendations  Pt will demonstrate proper body mechanics and fall prevention strategies during 100% of tx sessions for increased safety awareness during ADL/IADLs       Pt will verbalize and demonstrate understanding of energy conservation/deep breathing techniques for increased activity tolerance and endurance during meaningful activities       Lida Hua, OT

## 2022-03-01 NOTE — ASSESSMENT & PLAN NOTE
-patient developed fever over night, he was tachycardic and with soft blood pressures  -UA showed bacteriuria  -lactic acid 2 9  Plan  -given patient is a bolus of 1 L IV fluids, last echo showed an EF of 55%  -after that continue gentle IV fluid hydration  -follow urine culture, blood cultures and repeated lactic acid  -continue treatment with cefepime and tailor accordingly

## 2022-03-01 NOTE — ASSESSMENT & PLAN NOTE
-patient baseline creatinine around 1 1, currently 1 6--> 1 27, improving  -several admissions for acute kidney injury  -this could be prerenal versus postrenal in the view of chronic urinary retention  Plan  -continue urinary retention protocol  -follow BMP in the a m   -no history of CHF, continue gentle IV hydration   -monitor urine output  -avoid hypotension  -avoid nephrotoxic drugs including NSAIDs, hold diuretics , ACEI  -voiding trial on 03/01  -patient needs to follow up outpatient with urology

## 2022-03-01 NOTE — PLAN OF CARE
Problem: Potential for Falls  Goal: Patient will remain free of falls  Description: INTERVENTIONS:  - Educate patient/family on patient safety including physical limitations  - Instruct patient to call for assistance with activity   - Consult OT/PT to assist with strengthening/mobility   - Keep Call bell within reach  - Keep bed low and locked with side rails adjusted as appropriate  - Keep care items and personal belongings within reach  - Initiate and maintain comfort rounds  - Make Fall Risk Sign visible to staff  - Offer Toileting every 2 Hours, in advance of need  - Initiate/Maintain bed alarm  - Obtain necessary fall risk management equipment:   - Apply yellow socks and bracelet for high fall risk patients  - Consider moving patient to room near nurses station  Outcome: Progressing     Problem: MOBILITY - ADULT  Goal: Maintain or return to baseline ADL function  Description: INTERVENTIONS:  -  Assess patient's ability to carry out ADLs; assess patient's baseline for ADL function and identify physical deficits which impact ability to perform ADLs (bathing, care of mouth/teeth, toileting, grooming, dressing, etc )  - Assess/evaluate cause of self-care deficits   - Assess range of motion  - Assess patient's mobility; develop plan if impaired  - Assess patient's need for assistive devices and provide as appropriate  - Encourage maximum independence but intervene and supervise when necessary  - Involve family in performance of ADLs  - Assess for home care needs following discharge   - Consider OT consult to assist with ADL evaluation and planning for discharge  - Provide patient education as appropriate  Outcome: Progressing  Goal: Maintains/Returns to pre admission functional level  Description: INTERVENTIONS:  - Perform BMAT or MOVE assessment daily    - Set and communicate daily mobility goal to care team and patient/family/caregiver     - Collaborate with rehabilitation services on mobility goals if consulted  - Perform Range of Motion 2 times a day  - Reposition patient every 2 hours  - Dangle patient 2 times a day  - Stand patient 2 times a day  - Ambulate patient 2 times a day  - Out of bed to chair 2 times a day   - Out of bed for meals 2 times a day  - Out of bed for toileting  - Record patient progress and toleration of activity level   Outcome: Progressing     Problem: Nutrition/Hydration-ADULT  Goal: Nutrient/Hydration intake appropriate for improving, restoring or maintaining nutritional needs  Description: Monitor and assess patient's nutrition/hydration status for malnutrition  Collaborate with interdisciplinary team and initiate plan and interventions as ordered  Monitor patient's weight and dietary intake as ordered or per policy  Utilize nutrition screening tool and intervene as necessary  Determine patient's food preferences and provide high-protein, high-caloric foods as appropriate       INTERVENTIONS:  - Monitor oral intake, urinary output, labs, and treatment plans  - Assess nutrition and hydration status and recommend course of action  - Evaluate amount of meals eaten  - Assist patient with eating if necessary   - Allow adequate time for meals  - Recommend/ encourage appropriate diets, oral nutritional supplements, and vitamin/mineral supplements  - Order, calculate, and assess calorie counts as needed  - Recommend, monitor, and adjust tube feedings and TPN/PPN based on assessed needs  - Assess need for intravenous fluids  - Provide specific nutrition/hydration education as appropriate  - Include patient/family/caregiver in decisions related to nutrition  Outcome: Progressing

## 2022-03-01 NOTE — PHYSICAL THERAPY NOTE
PHYSICAL THERAPY EVALUATION    NAME:  Rick Guzmán  DATE: 03/01/22    AGE:   78 y o  Mrn:   [id number]  Length Of Stay: 0    ADMIT DX:  Syncope [R55]  Weakness [R53 1]  Generalized weakness [R53 1]    Past Medical History:   Diagnosis Date    Anxiety disorder     Atrial fibrillation (HCC)     Coronary artery disease     Depression     Hepatitis C     screening negative in 1/2017    MI (myocardial infarction) [facility])      Past Surgical History:   Procedure Laterality Date    CARDIAC CATHETERIZATION  07/09/2018       Performed at least 2 patient identifiers during session: Name, Leonidas Prasad and ID bracelet        03/01/22 1350   PT Last Visit   PT Visit Date 03/01/22   Note Type   Note type Evaluation   Pain Assessment   Pain Assessment Tool 0-10   Pain Score No Pain   Effect of Pain on Daily Activities n/a   Hospital Pain Intervention(s) Repositioned; Ambulation/increased activity   Multiple Pain Sites No   Restrictions/Precautions   Weight Bearing Precautions Per Order Yes   LLE Weight Bearing Per Order WBAT  (Per Dr Zena Schilder note 12/9; patella fx w/ routine healing)   Braces or Orthoses LE Braces  (none at bedside, hinged knee brace for L knee per EMR)   Other Precautions Chair Alarm; Bed Alarm;Telemetry;Multiple lines; Fall Risk  (+hypotension)   Home Living   Type of Home Apartment   Home Layout Two level;Stairs to enter with rails  (2 level apartment, 10-15 KAL with HR, able to have FFSU)   Bathroom Shower/Tub Tub/shower unit  (sponge bathing recently d/t weakness )   Bathroom Toilet Standard   Bathroom Accessibility Accessible   Home Equipment Walker;Cane  (uses SPC at baseline, despite prev PT rec of use of RW)   Additional Comments Pt reports him and his wife are currently sleeping on couch; loft space upstairs is intended bedroom, inaccessable d/t full flight of steep steps  Prior Function   Level of [place] Independent with ADLs and functional mobility; Needs assistance with IADLs   Lives With Spouse   Receives Help From Family;Home health  (HHPT 2x/wk)   ADL Assistance Needs assistance   IADLs Needs assistance   Falls in the last 6 months >10   Vocational Retired   Comments Patient ambulates with [facility] for household and community distances  Patient reports using public transportation or walking to all community needs, states recently he has only gone out of the home "less than a handful" of times  Pt reports his wife cares for his chronic miller  General   Additional Pertinent History 12/9/2021 Dr Romana Prose: "WBAT in T ROM brace locked in extension  Begin slow range of motion from 0-30 degrees active assist and passive to increase gradually by 10° each week as tolerated no strengthening yet "   Family/Caregiver Present No   Cognition   Overall Cognitive Status WFL   Arousal/Participation Cooperative   Attention Attends with cues to redirect   Orientation Level Oriented to person;Oriented to place; Disoriented to time;Disoriented to situation   Memory Decreased recall of recent events;Decreased short term memory;Decreased recall of precautions   Following Commands Follows one step commands with increased time or repetition   Subjective   Subjective "I don't think I want to try walking at all today "   RUE Assessment   RUE Assessment WFL   LUE Assessment   LUE Assessment WFL   RLE Assessment   RLE Assessment X   Strength RLE   RLE Overall Strength 3+/5   LLE Assessment   LLE Assessment X   Strength LLE   LLE Overall Strength 3+/5   Vision-Basic Assessment   Current Vision Wears glasses all the time   Visual History Glaucoma   Coordination   Movements are Fluid and Coordinated 0   Coordination and Movement Description Coordination limited due to symptoms of hypotension  Sensation WFL   Light Touch   RLE Light Touch Grossly intact   LLE Light Touch Grossly intact   Bed Mobility   Rolling R 3  Moderate assistance   Additional items Assist x 1;Bedrails; Increased time required;Verbal cues   Supine to Sit 3 Moderate assistance   Additional items Assist x 1;HOB elevated; Bedrails; Increased time required;Verbal cues   Sit to Supine 4  Minimal assistance   Additional items Assist x 1; Increased time required;LE management   Transfers   Sit to Stand Unable to assess   Stand to Sit Unable to assess   Ambulation/Elevation   Gait pattern Not tested; Not appropriate  (NT d/t hypotension)   Stair Management Assistance Not tested   Balance   Static Sitting Poor   Dynamic Sitting Poor -   Endurance Deficit   Endurance Deficit Yes   Activity Tolerance   Activity Tolerance Treatment limited secondary to medical complications (Comment)  (symptomatic hypotension)   Medical Staff Made Aware Spoke with SLIM   Nurse Made Aware Spoke with JOHANNY Murguia pre/post session   Assessment   Prognosis Fair   Problem List Decreased strength;Decreased endurance; Impaired balance;Decreased mobility; Decreased safety awareness; Impaired judgement   Assessment Pt seen for PT evaluation, cleared by JOHANNY Murguia  PT consult received for mobility assessment & discharge needs  Pt admitted 2/28/2022 w/ c/o syncope, s/p fall, ambulatory dysfunction, dx Sepsis secondary to UTI ([facility] [address] )  Comorbidities affecting pt's fnxl performance include: falls, chronic miller, anemia, cardiomyopathy, CHF, hepatitis, anxiety, Afib, MI, depression, CAD  Personal factors affecting pt include: lives in 2 story house, ambulating with assistive device, stairs to enter home, inability to ambulate household distances, limited home support, positive fall history, impulsivity, limited insight into impairments and inability to perform ADLS  PTA, pt was independent with functional mobility with SPC, independent with ADLS, requiring assist for IADLS and living with his wife in a 2 level home with 10-15 steps to enter  AM-PAC objective tool in combination w/ Barthel Index outcome tool were used to assist in determining pt safety w/ mobility/ADLs & appropriate d/c recommendations, see above for scores  Pt is at risk of falls d/t multiple comorbidities, impulsivity, h/o falls, impaired balance, impaired insight/safety awareness, use of ambulatory aid, acuity of medical illness, ongoing medical treatment of primary dx and orthostatic blood pressures  Pt's clinical presentation is currently unstable/unpredictable seen in pt's presentation of vital sign response, changing level of pain, increased fall risk, new onset of impairment of functional mobility, decreased endurance and new onset of weakness  PT IE requires high complexity clinical decision making, based on multiple factors which affect pt's performance w/ evaluation & therapist judgement for d/c recommendations  Pt will benefit from continued PT treatment in order to address impairments, decrease risk of falls, maximize independence w/ fnxl mobility, & ensure safety w/ mobility for transition to next level of care  Based on pt presentation & impairments, pt would most appropriately benefit from post acute STR  Pt with multiple recent hospitalizations for similar/same issues, pt with inability to safely care for self at home at current level of functional mobility  Barriers to Discharge Decreased caregiver support; Inaccessible home environment   Goals   Patient Goals "to feel better and walk better"   [facility] Expiration Date 03/11/22   Short Term Goal #1 Patient PT goals established in order to address patient self reported goal of "to feel better and walk better"   Pt will: complete all bed mobility independently in order to promote increased OOB functional mobility to improve overall activity tolerance; complete all transfers with LRAD at rudy level in order to increase safety with functional mobility; ambulate >100ft with LRAD and S in order to increase safety with household and in facility functional mobility; negotiate 3-5 stairs with HR assist and S in order to facilitate progression towards negotiation of full flight of steps; improve B LE strength to >/= 4/5 MMT t/o in order to increase safety with functional mobility and decrease risk of falls; demonstrate understanding and independence with LE strengthening HEP; improve static standing balance to >/= good grade in order to promote safety and increased independence with mobility; tolerate >3hrs OOB in upright position, in order to improve muscular endurance and respiratory status; improve AM-PAC score to >/= 16/24 in order to increase independence with mobility and decrease burden of care; improve Barthel Index score to >/= 45/100 in order to increase independence and decrease risk of falls  PT Treatment Day 0   Plan   Treatment/Interventions Functional transfer training;LE strengthening/ROM; Elevations; Therapeutic exercise; Endurance training;Patient/family training;Equipment eval/education; Bed mobility;Gait training;Spoke to MD;Spoke to nursing   PT Frequency 3-5x/wk   Recommendation   PT Discharge Recommendation Post acute rehabilitation services   AM-PAC Basic Mobility Inpatient   Turning in Bed Without Bedrails 3   Lying on Back to Sitting on Edge of Flat Bed 2   Moving Bed to Chair 2   Standing Up From Chair 2   Walk in Room 1   Climb 3-5 Stairs 1   Basic Mobility Inpatient Raw Score 11   Basic Mobility Standardized Score 30 25   Highest Level Of Mobility   -[facility] Goal 4: Move to chair/commode   -[facility] Highest Level of Mobility 3: Sit at edge of bed   -[facility] Goal Achieved No  (d/t medical complications)   Modified [place]ella Scale   Modified [place] Scale 4   Barthel Index   Feeding 10   Bathing 0   Grooming Score 0   Dressing Score 5   Bladder Score 0   Bowels Score 10   Toilet Use Score 5   Transfers (Bed/Chair) Score 5   Mobility (Level Surface) Score 0   Stairs Score 0   Barthel Index Score 35   End of Consult   Patient Position at End of Consult Supine;Bed/Chair alarm activated; All needs within reach   End of Consult Comments Based on patient's [facility] Level of Mobility scores today, patient currently has a goal of -[facility] Levels: 4: MOVE TO CHAIR/COMMODE, to be completed with RN staffing each shift, in order to improve overall activity tolerance and mobility, combat hospital related deconditioning, and maximize outcomes for d/c from the acute care setting  The patient's AM-PAC Basic Mobility Inpatient Short Form Raw Score is 11  A Raw score of less than or equal to 16 suggests the patient may benefit from discharge to post-acute rehabilitation services  Please also refer to the recommendation of the Physical Therapist for safe discharge planning          Valentino Fordyce, PT, DPT   Available via [employer]  [facility] # [id number]  [place] License - [id number]  5/3/3403

## 2022-03-01 NOTE — PROGRESS NOTES
[address]  Progress Note - Sienna Llanos 1945, 78 y o  male MRN: [id number]  Unit/Bed#: -01 Encounter: [id number]  Primary Care Provider: Junior Hager MD   Date and time admitted to hospital: 2/28/2022 11:28 AM    HERNÁN (acute kidney injury) [facility])  Assessment & Plan  -patient baseline creatinine around 1 1, currently 1 6--> 1 27, improving  -several admissions for acute kidney injury  -this could be prerenal versus postrenal in the view of chronic urinary retention  Plan  -continue urinary retention protocol  -follow BMP in the a m   -no history of CHF, continue gentle IV hydration   -monitor urine output  -avoid hypotension  -avoid nephrotoxic drugs including NSAIDs, hold diuretics , ACEI  -voiding trial on 03/01  -patient needs to follow up outpatient with urology    * Sepsis secondary to UTI [facility])  Assessment & Plan  -patient developed fever over night, he was tachycardic and with soft blood pressures  -UA showed bacteriuria  -lactic acid 2 9  Plan  -given patient is a bolus of 1 L IV fluids, last echo showed an EF of 55%  -after that continue gentle IV fluid hydration  -follow urine culture, blood cultures and repeated lactic acid  -continue treatment with cefepime and tailor accordingly    Iron deficiency anemia likely secondary to GI bleeding  Assessment & Plan  -patient was recently evaluated on February this year for symptomatic lower GI bleeding, he underwent EGD and colonoscopy  Multiple polyps were removed and were benign    Patient was recommended to follow with GI for after further colonoscopy in 3 months and an EGD in 1 year   -today patient hemoglobin 11 7 which seems to be patient's baseline  -no signs of GI bleeding  -hemodynamically stable  -anticoagulation was resumed 3 days after colonoscopy  -continue iron replacement    Urinary retention  Assessment & Plan  -history of chronic urinary retention on Cloud catheter, as per note review secondary to BPH  -patient is on Flomax 0 4 mg daily  -outpatient follow-up with Urology  -3/1 voiding trial    COPD (chronic obstructive pulmonary disease) ([facility] [address] )  Assessment & Plan  -Currently not in exacerbation, patient continues to smoke  -Few scattered rales heard bilaterally  -Patient reports not using inhalers at home  Patient does get short of breath with ambulating   -will provide nebulization p r n  For SOB and wheezing  -continue nicotine past replacement    Ambulatory dysfunction  Assessment & Plan  -patient has history of generalized weakness with ambulatory dysfunction, given the option to go to skilled nursing facilities in the past but patient refuse  -patient reports a mechanical fall today, unclear if he had a syncopal episode or not  He call EMS due to this generalized weakness and ambulatory dysfunction  -CT head done due to patient being on anticoagulation was unremarkable with no signs of intracranial bleeding  -continue fall precautions  -check orthostatic vital signs--> negative  -check echocardiogram  -will do PT/OT evaluation this admission and follow recommendations    Permanent atrial fibrillation [facility])  Assessment & Plan  -currently well controlled on metoprolol 25 mg daily  -on apixaban 5 mg b i d  Hyperkalemia-resolved as of 3/1/2022  Assessment & Plan  -elevated potassium 5 2 likely secondary to Ace inhibitors versus Fidel   -given Kayexalate on the ED  -no acute changes on the electrocardiogram  -no plan for treatment at this point  -follow-up potassium in the a m   -follow CK          VTE Pharmacologic Prophylaxis:     Low Risk (Score 0-2) - Encourage Ambulation  Mechanical VTE Prophylaxis in Place: Yes    Patient Centered Rounds: I have performed bedside rounds with nursing staff today  Current Length of Stay: 0 day(s)    Current Patient Status: Inpatient     Discharge Plan / Estimated Discharge Date: Anticipate discharge in 48 hrs to rehab facility      Code Status: Level 1 - Full Code      Subjective:   Overnight patient developed fever and chills, denies any abdominal pain, nausea vomiting or diarrhea  No shortness of breath, palpitations, chest pain or cough  Objective:     Vitals:   Temp (24hrs), Av 6 °F (37 °C), Min:96 4 °F (35 8 °C), Max:100 7 °F (38 2 °C)    Temp:  [96 4 °F (35 8 °C)-100 7 °F (38 2 °C)] 100 7 °F (38 2 °C)  HR:  [] 102  Resp:  [18-20] 20  BP: (109-132)/(69-86) 109/69  SpO2:  [95 %-100 %] 100 %  Body mass index is 20 09 kg/m²  Input and Output Summary (last 24 hours): Intake/Output Summary (Last 24 hours) at 3/1/2022 1251  Last data filed at 3/1/2022 1247  Gross per 24 hour   Intake 1240 ml   Output 1275 ml   Net -35 ml       Physical Exam:     Physical Exam  Constitutional:       General: He is not in acute distress  Appearance: Normal appearance  HENT:      Head: Normocephalic and atraumatic  Cardiovascular:      Rate and Rhythm: Normal rate and regular rhythm  Pulses: Normal pulses  Heart sounds: Normal heart sounds  Pulmonary:      Effort: Pulmonary effort is normal  No respiratory distress  Breath sounds: Normal breath sounds  Abdominal:      General: Abdomen is flat  Bowel sounds are normal       Palpations: Abdomen is soft  Musculoskeletal:         General: Normal range of motion  Cervical back: Normal range of motion  Skin:     General: Skin is warm  Capillary Refill: Capillary refill takes less than 2 seconds  Neurological:      General: No focal deficit present  Mental Status: He is alert and oriented to person, place, and time  Mental status is at baseline     Psychiatric:         Mood and Affect: Mood normal           Additional Data:     Labs:  Results from last 7 days   Lab Units 22  0515   WBC Thousand/uL 14 92*   HEMOGLOBIN g/dL 11 3*   HEMATOCRIT % 37 4   PLATELETS Thousands/uL 326   NEUTROS PCT % 92*   LYMPHS PCT % 3*   MONOS PCT % 5   EOS PCT % 0     Results from last 7 days Lab Units 03/01/22  0509 02/28/22  1157 02/28/22  1157   SODIUM mmol/L 139   < > 135   POTASSIUM mmol/L 3 9   < > 5 2*   CHLORIDE mmol/L 104   < > 98   CO2 mmol/L 24   < > 26   BUN mg/dL 26*   < > 31*   CREATININE mg/dL 1 27*   < > 1 66*   ANION GAP mmol/L 11   < > 11   CALCIUM mg/dL 8 7   < > 9 5   ALBUMIN g/dL  --   --  4 1   TOTAL BILIRUBIN mg/dL  --   --  1 27*   ALK PHOS U/L  --   --  62 6   ALT U/L  --   --  9   AST U/L  --   --  21   GLUCOSE RANDOM mg/dL 121   < > 90    < > = values in this interval not displayed  Results from last 7 days   Lab Units 03/01/22  1017   LACTIC ACID mmol/L 2 9*   PROCALCITONIN ng/ml 1 50*       Imaging: No pertinent imaging reviewed      Recent Cultures (last 7 days):           Lines/Drains:  Invasive Devices  Report    Peripheral Intravenous Line            Peripheral IV 02/28/22 Left Antecubital 1 day          Drain            Urethral Catheter Straight-tip 18 Fr  29 days                Telemetry:   Telemetry Orders (From admission, onward)             24 Hour Telemetry Monitoring  Continuous x 24 Hours (Te[place])        References:    Telemetry Guidelines   Question:  Reason for 24 Hour Telemetry  Answer:  Metabolic/Electrolyte Disturbance with High Probability of Dysrhythmia (K level <3 or >6, or KCL infusion >=10mEq/hr)                    Last 24 Hours Medication List:   Current Facility-Administered Medications   Medication Dose Route Frequency Provider Last Rate    apixaban  5 mg Oral BID Mirta Ahmadi MD      cefepime  2,000 mg Intravenous Q12H Mirta Ahmadi MD      docusate sodium  100 mg Oral BID Mirta Ahmadi MD      ferrous sulfate  325 mg Oral Daily With Breakfast Mirta Ahmadi MD      fluticasone-vilanterol  1 puff Inhalation Daily Mirta Ahmadi MD      folic acid  200 mcg Oral Daily Mirta Ahmadi MD      ipratropium  0 5 mg Nebulization TID Kizzy Zamora MD      levalbuterol  1 25 mg Nebulization TID Kizzy Zamora MD  metoprolol succinate  25 mg Oral Daily Ugo Lima MD      nicotine  1 patch Transdermal Daily Ugo Lima MD      pantoprazole  40 mg Oral Daily Before Breakfast Ugo Lima MD      sodium chloride  1,000 mL Intravenous Once Ugo Lima MD 1,000 mL (03/01/22 1215)    sodium chloride  75 mL/hr Intravenous Continuous Ugo Lima MD 75 mL/hr (02/28/22 8239)    sodium polystyrene  15 g Oral Once Charles Ceballos MD      tamsulosin  0 4 mg Oral Daily With Carla Buchanan MD          Today, Patient Was Seen By: Ugo Lima MD    ** Please Note: This note has been constructed using a voice recognition system   **

## 2022-03-02 ENCOUNTER — APPOINTMENT (INPATIENT)
Dept: ULTRASOUND IMAGING | Facility: HOSPITAL | Age: 77
DRG: 698 | End: 2022-03-02
Payer: MEDICARE

## 2022-03-02 PROBLEM — K83.09 CHOLANGITIS: Status: ACTIVE | Noted: 2022-03-02

## 2022-03-02 LAB
ALBUMIN SERPL BCP-MCNC: 3 G/DL (ref 3.5–5)
ALP SERPL-CCNC: 173 U/L (ref 34–104)
ALT SERPL W P-5'-P-CCNC: 193 U/L (ref 7–52)
ANION GAP SERPL CALCULATED.3IONS-SCNC: 8 MMOL/L (ref 4–13)
AST SERPL W P-5'-P-CCNC: 190 U/L (ref 13–39)
BACTERIA UR CULT: ABNORMAL
BACTERIA UR CULT: NORMAL
BASOPHILS # BLD AUTO: 0.03 THOUSANDS/ΜL (ref 0–0.1)
BASOPHILS NFR BLD AUTO: 0 % (ref 0–1)
BILIRUB DIRECT SERPL-MCNC: 2.14 MG/DL (ref 0–0.2)
BILIRUB SERPL-MCNC: 3.03 MG/DL (ref 0.2–1)
BUN SERPL-MCNC: 20 MG/DL (ref 5–25)
CALCIUM SERPL-MCNC: 8.1 MG/DL (ref 8.4–10.2)
CHLORIDE SERPL-SCNC: 106 MMOL/L (ref 96–108)
CO2 SERPL-SCNC: 22 MMOL/L (ref 21–32)
CREAT SERPL-MCNC: 1.2 MG/DL (ref 0.6–1.3)
EOSINOPHIL # BLD AUTO: 0.07 THOUSAND/ΜL (ref 0–0.61)
EOSINOPHIL NFR BLD AUTO: 1 % (ref 0–6)
ERYTHROCYTE [DISTWIDTH] IN BLOOD BY AUTOMATED COUNT: 28.5 % (ref 11.6–15.1)
GFR SERPL CREATININE-BSD FRML MDRD: 59 ML/MIN/1.73SQ M
GLUCOSE SERPL-MCNC: 100 MG/DL (ref 65–140)
HCT VFR BLD AUTO: 34.5 % (ref 36.5–49.3)
HGB BLD-MCNC: 9.9 G/DL (ref 12–17)
IMM GRANULOCYTES # BLD AUTO: 0.04 THOUSAND/UL (ref 0–0.2)
IMM GRANULOCYTES NFR BLD AUTO: 0 % (ref 0–2)
LYMPHOCYTES # BLD AUTO: 0.82 THOUSANDS/ΜL (ref 0.6–4.47)
LYMPHOCYTES NFR BLD AUTO: 8 % (ref 14–44)
MCH RBC QN AUTO: 23.3 PG (ref 26.8–34.3)
MCHC RBC AUTO-ENTMCNC: 28.7 G/DL (ref 31.4–37.4)
MCV RBC AUTO: 81 FL (ref 82–98)
MONOCYTES # BLD AUTO: 0.66 THOUSAND/ΜL (ref 0.17–1.22)
MONOCYTES NFR BLD AUTO: 7 % (ref 4–12)
NEUTROPHILS # BLD AUTO: 8.52 THOUSANDS/ΜL (ref 1.85–7.62)
NEUTS SEG NFR BLD AUTO: 84 % (ref 43–75)
NRBC BLD AUTO-RTO: 0 /100 WBCS
PLATELET # BLD AUTO: 274 THOUSANDS/UL (ref 149–390)
PMV BLD AUTO: 11 FL (ref 8.9–12.7)
POTASSIUM SERPL-SCNC: 4.1 MMOL/L (ref 3.5–5.3)
PROCALCITONIN SERPL-MCNC: 0.88 NG/ML
PROT SERPL-MCNC: 5.5 G/DL (ref 6.4–8.4)
RBC # BLD AUTO: 4.24 MILLION/UL (ref 3.88–5.62)
SODIUM SERPL-SCNC: 136 MMOL/L (ref 135–147)
WBC # BLD AUTO: 10.14 THOUSAND/UL (ref 4.31–10.16)

## 2022-03-02 PROCEDURE — 86704 HEP B CORE ANTIBODY TOTAL: CPT | Performed by: INTERNAL MEDICINE

## 2022-03-02 PROCEDURE — 84145 PROCALCITONIN (PCT): CPT | Performed by: INTERNAL MEDICINE

## 2022-03-02 PROCEDURE — 94640 AIRWAY INHALATION TREATMENT: CPT

## 2022-03-02 PROCEDURE — 94760 N-INVAS EAR/PLS OXIMETRY 1: CPT

## 2022-03-02 PROCEDURE — 80074 ACUTE HEPATITIS PANEL: CPT | Performed by: INTERNAL MEDICINE

## 2022-03-02 PROCEDURE — 80076 HEPATIC FUNCTION PANEL: CPT | Performed by: INTERNAL MEDICINE

## 2022-03-02 PROCEDURE — 87040 BLOOD CULTURE FOR BACTERIA: CPT | Performed by: INTERNAL MEDICINE

## 2022-03-02 PROCEDURE — 99222 1ST HOSP IP/OBS MODERATE 55: CPT | Performed by: INTERNAL MEDICINE

## 2022-03-02 PROCEDURE — 80048 BASIC METABOLIC PNL TOTAL CA: CPT | Performed by: INTERNAL MEDICINE

## 2022-03-02 PROCEDURE — 84154 ASSAY OF PSA FREE: CPT | Performed by: INTERNAL MEDICINE

## 2022-03-02 PROCEDURE — 86705 HEP B CORE ANTIBODY IGM: CPT | Performed by: INTERNAL MEDICINE

## 2022-03-02 PROCEDURE — 85025 COMPLETE CBC W/AUTO DIFF WBC: CPT | Performed by: INTERNAL MEDICINE

## 2022-03-02 PROCEDURE — 0T9B70Z DRAINAGE OF BLADDER WITH DRAINAGE DEVICE, VIA NATURAL OR ARTIFICIAL OPENING: ICD-10-PCS | Performed by: INTERNAL MEDICINE

## 2022-03-02 PROCEDURE — 99232 SBSQ HOSP IP/OBS MODERATE 35: CPT | Performed by: INTERNAL MEDICINE

## 2022-03-02 PROCEDURE — 84153 ASSAY OF PSA TOTAL: CPT | Performed by: INTERNAL MEDICINE

## 2022-03-02 PROCEDURE — 76705 ECHO EXAM OF ABDOMEN: CPT

## 2022-03-02 PROCEDURE — 86803 HEPATITIS C AB TEST: CPT | Performed by: INTERNAL MEDICINE

## 2022-03-02 PROCEDURE — 87340 HEPATITIS B SURFACE AG IA: CPT | Performed by: INTERNAL MEDICINE

## 2022-03-02 RX ORDER — POLYETHYLENE GLYCOL 3350 17 G/17G
17 POWDER, FOR SOLUTION ORAL DAILY PRN
Status: DISCONTINUED | OUTPATIENT
Start: 2022-03-02 | End: 2022-03-04 | Stop reason: HOSPADM

## 2022-03-02 RX ADMIN — METRONIDAZOLE 500 MG: 500 INJECTION, SOLUTION INTRAVENOUS at 09:10

## 2022-03-02 RX ADMIN — IPRATROPIUM BROMIDE 0.5 MG: 0.5 SOLUTION RESPIRATORY (INHALATION) at 19:32

## 2022-03-02 RX ADMIN — NICOTINE 1 PATCH: 7 PATCH, EXTENDED RELEASE TRANSDERMAL at 08:50

## 2022-03-02 RX ADMIN — METOPROLOL SUCCINATE 25 MG: 25 TABLET, EXTENDED RELEASE ORAL at 08:50

## 2022-03-02 RX ADMIN — SODIUM CHLORIDE 100 ML/HR: 9 INJECTION, SOLUTION INTRAVENOUS at 09:10

## 2022-03-02 RX ADMIN — APIXABAN 5 MG: 5 TABLET, FILM COATED ORAL at 08:50

## 2022-03-02 RX ADMIN — IPRATROPIUM BROMIDE 0.5 MG: 0.5 SOLUTION RESPIRATORY (INHALATION) at 08:30

## 2022-03-02 RX ADMIN — FERROUS SULFATE TAB 325 MG (65 MG ELEMENTAL FE) 325 MG: 325 (65 FE) TAB at 08:50

## 2022-03-02 RX ADMIN — FOLIC ACID TAB 400 MCG 400 MCG: 400 TAB at 08:50

## 2022-03-02 RX ADMIN — DOCUSATE SODIUM 100 MG: 100 CAPSULE, LIQUID FILLED ORAL at 08:50

## 2022-03-02 RX ADMIN — CEFEPIME HYDROCHLORIDE 2000 MG: 2 INJECTION, SOLUTION INTRAVENOUS at 23:30

## 2022-03-02 RX ADMIN — PANTOPRAZOLE SODIUM 40 MG: 40 TABLET, DELAYED RELEASE ORAL at 08:50

## 2022-03-02 RX ADMIN — CEFEPIME HYDROCHLORIDE 2000 MG: 2 INJECTION, SOLUTION INTRAVENOUS at 13:34

## 2022-03-02 RX ADMIN — LEVALBUTEROL HYDROCHLORIDE 1.25 MG: 1.25 SOLUTION, CONCENTRATE RESPIRATORY (INHALATION) at 08:30

## 2022-03-02 RX ADMIN — LEVALBUTEROL HYDROCHLORIDE 1.25 MG: 1.25 SOLUTION, CONCENTRATE RESPIRATORY (INHALATION) at 14:10

## 2022-03-02 RX ADMIN — LEVALBUTEROL HYDROCHLORIDE 1.25 MG: 1.25 SOLUTION, CONCENTRATE RESPIRATORY (INHALATION) at 19:32

## 2022-03-02 RX ADMIN — FLUTICASONE FUROATE AND VILANTEROL TRIFENATATE 1 PUFF: 200; 25 POWDER RESPIRATORY (INHALATION) at 08:30

## 2022-03-02 RX ADMIN — SODIUM CHLORIDE 100 ML/HR: 9 INJECTION, SOLUTION INTRAVENOUS at 20:10

## 2022-03-02 RX ADMIN — IPRATROPIUM BROMIDE 0.5 MG: 0.5 SOLUTION RESPIRATORY (INHALATION) at 14:10

## 2022-03-02 RX ADMIN — METRONIDAZOLE 500 MG: 500 INJECTION, SOLUTION INTRAVENOUS at 18:01

## 2022-03-02 NOTE — PROGRESS NOTES
[facility]  Progress Note - Td Hernandez 1945, 78 y o  male MRN: [id number]  Unit/Bed#: -01 Encounter: [id number]  Primary Care Provider: Brendan Del Rosario MD   Date and time admitted to hospital: 2/28/2022 11:28 AM    HERNÁN (acute kidney injury) [facility])  Assessment & Plan  -patient baseline creatinine around 1 1, currently 1 6--> 1 27, improving  -several admissions for acute kidney injury  -this could be prerenal versus postrenal in the view of chronic urinary retention  Plan  -continue urinary retention protocol  -follow BMP in the a m   -no history of CHF, continue gentle IV hydration   -monitor urine output  -avoid hypotension  -avoid nephrotoxic drugs including NSAIDs, hold diuretics , ACEI  -voiding trial on 03/01  -patient needs to follow up outpatient with urology    * Possible cholangitis  Assessment & Plan  -suspected cholangitis in the view of LFTs showing hyperbilirubinemia direct, transaminitis,cholestatic , patient being septic   -place patient NPO  -followed GI consult  -continue cefepime and Flagyl  -over the right upper quadrant ultrasound with Dopplers  -follow-up attic panel acute and chronic    Sepsis secondary to UTI (HCC)/bacteremia  Assessment & Plan  -patient developed fever over night, he was tachycardic and with soft blood pressures  -UA showed bacteriuria  -lactic acid 2 9  Plan  -given patient is a bolus of 1 L IV fluids, last echo showed an EF of 55%  -after that continue gentle IV fluid hydration  -follow urine culture--> pending   -blood cultures -->1/2 GNR  -normal lactic acid  -continue treatment with cefepime/flagyl and tailor accordingly    Iron deficiency anemia likely secondary to GI bleeding  Assessment & Plan  -patient was recently evaluated on February this year for symptomatic lower GI bleeding, he underwent EGD and colonoscopy  Multiple polyps were removed and were benign    Patient was recommended to follow with GI for after further colonoscopy in 3 months and an EGD in 1 year   -today patient hemoglobin 11 7 which seems to be patient's baseline  -no signs of GI bleeding  -hemodynamically stable  -anticoagulation was resumed 3 days after colonoscopy  -continue iron replacement outpatient    Urinary retention  Assessment & Plan  -history of chronic urinary retention on Miller catheter, as per note review secondary to BPH  -patient is on Flomax 0 4 mg daily  -outpatient follow-up with Urology  -3/1 voiding trial-->failed-->back on miller    COPD (chronic obstructive pulmonary disease) ([facility] [address] )  Assessment & Plan  -Currently not in exacerbation, patient continues to smoke  -Few scattered rales heard bilaterally  -Patient reports not using inhalers at home  Patient does get short of breath with ambulating   -will provide nebulization p r n  For SOB and wheezing  -continue nicotine past replacement    Ambulatory dysfunction  Assessment & Plan  -patient has history of generalized weakness with ambulatory dysfunction, given the option to go to skilled nursing facilities in the past but patient refuse  -patient reports a mechanical fall today, unclear if he had a syncopal episode or not    He call EMS due to this generalized weakness and ambulatory dysfunction  -CT head done due to patient being on anticoagulation was unremarkable with no signs of intracranial bleeding  -continue fall precautions  -check orthostatic vital signs--> negative  -check echocardiogram  -will do PT/OT evaluation this admission and follow recommendations    Permanent atrial fibrillation [facility])  Assessment & Plan  -currently well controlled on metoprolol 25 mg daily  -on apixaban 5 mg b i d  on hold    Hyperkalemia-resolved as of 3/1/2022  Assessment & Plan  -elevated potassium 5 2 likely secondary to Ace inhibitors versus Fidel   -given Kayexalate on the ED  -no acute changes on the electrocardiogram  -no plan for treatment at this point  -follow-up potassium in the a m   -follow CK      VTE Pharmacologic Prophylaxis:     Moderate Risk (Score 3-4) - Pharmacological DVT Prophylaxis Contraindicated  Sequential Compression Devices Ordered  Mechanical VTE Prophylaxis in Place: Yes    Patient Centered Rounds: I have performed bedside rounds with nursing staff today  Discussions with Specialists or Other Care Team Provider: Gi        Current Length of Stay: 1 day(s)    Current Patient Status: Inpatient     Discharge Plan / Estimated Discharge Date: Anticipate discharge in 48-72 hrs to rehab facility  Code Status: Level 1 - Full Code      Subjective:   No acute complains, overnight urinary retention noticed straight cath twice  Bcak on miller catheter    Objective:     Vitals:   Temp (24hrs), Av 4 °F (36 9 °C), Min:98 °F (36 7 °C), Max:98 6 °F (37 °C)    Temp:  [98 °F (36 7 °C)-98 6 °F (37 °C)] 98 3 °F (36 8 °C)  HR:  [] 103  Resp:  [16-20] 18  BP: ()/(57-86) 121/86  SpO2:  [95 %-100 %] 98 %  Body mass index is 20 09 kg/m²  Input and Output Summary (last 24 hours): Intake/Output Summary (Last 24 hours) at 3/2/2022 1014  Last data filed at 3/1/2022 1901  Gross per 24 hour   Intake 1240 ml   Output 248 ml   Net 992 ml       Physical Exam:     Physical Exam  Constitutional:       General: He is not in acute distress  HENT:      Head: Normocephalic and atraumatic  Cardiovascular:      Rate and Rhythm: Normal rate  Rhythm irregular  Pulses: Normal pulses  Heart sounds: Normal heart sounds  Pulmonary:      Effort: Pulmonary effort is normal  No respiratory distress  Breath sounds: Normal breath sounds  Abdominal:      General: Abdomen is flat  Bowel sounds are normal  There is no distension  Palpations: Abdomen is soft  Tenderness: There is abdominal tenderness  There is no guarding or rebound  Musculoskeletal:         General: Normal range of motion  Cervical back: Normal range of motion  Right lower leg: No edema        Left lower leg: No edema  Skin:     General: Skin is warm and dry  Capillary Refill: Capillary refill takes less than 2 seconds  Coloration: Skin is jaundiced  Neurological:      General: No focal deficit present  Mental Status: He is alert and oriented to person, place, and time  Mental status is at baseline  Psychiatric:         Mood and Affect: Mood normal           Additional Data:     Labs:  Results from last 7 days   Lab Units 22  0503   WBC Thousand/uL 10 14   HEMOGLOBIN g/dL 9 9*   HEMATOCRIT % 34 5*   PLATELETS Thousands/uL 274   NEUTROS PCT % 84*   LYMPHS PCT % 8*   MONOS PCT % 7   EOS PCT % 1     Results from last 7 days   Lab Units 22  0503   SODIUM mmol/L 136   POTASSIUM mmol/L 4 1   CHLORIDE mmol/L 106   CO2 mmol/L 22   BUN mg/dL 20   CREATININE mg/dL 1 20   ANION GAP mmol/L 8   CALCIUM mg/dL 8 1*   ALBUMIN g/dL 3 0*   TOTAL BILIRUBIN mg/dL 3 03*   ALK PHOS U/L 173*   ALT U/L 193*   AST U/L 190*   GLUCOSE RANDOM mg/dL 100                 Results from last 7 days   Lab Units 22  0503 22  2257 22  1554 22  1017   LACTIC ACID mmol/L  --  1 3 2 4* 2 9*   PROCALCITONIN ng/ml 0 88*  --   --  1 50*       Imaging: No pertinent imaging reviewed  Recent Cultures (last 7 days):     Results from last 7 days   Lab Units 22  1014   BLOOD CULTURE  Received in Microbiology Lab  Culture in Progress     GRAM STAIN RESULT  Gram negative rods*       Lines/Drains:  Invasive Devices  Report    Peripheral Intravenous Line            Peripheral IV 22 Left Antecubital 1 day                Telemetry:   Telemetry Orders (From admission, onward)             24 Hour Telemetry Monitoring  Continuous x 24 Hours (Telem)           References:    Telemetry Guidelines   Question:  Reason for 24 Hour Telemetry  Answer:  Metabolic/Electrolyte Disturbance with High Probability of Dysrhythmia (K level <3 or >6, or KCL infusion >=10mEq/hr)                    Last 24 Hours Medication List:   Current Facility-Administered Medications   Medication Dose Route Frequency Provider Last Rate    cefepime  2,000 mg Intravenous Q12H Vlad Encarnacion MD 2,000 mg (03/01/22 2239)    docusate sodium  100 mg Oral BID Vlad Encarnacion MD      ferrous sulfate  325 mg Oral Daily With Breakfast Vlad Encarnacion MD      fluticasone-vilanterol  1 puff Inhalation Daily Vlad Encarnacion MD      folic acid  213 mcg Oral Daily Vlad Encarnacion MD      ipratropium  0 5 mg Nebulization TID Dante Luna MD      levalbuterol  1 25 mg Nebulization TID Dante Luna MD      metoprolol succinate  25 mg Oral Daily Vlad Encarnacion MD      metroNIDAZOLE  500 mg Intravenous Q8H Brigette Gutierrez  mg (03/02/22 0910)    nicotine  1 patch Transdermal Daily Vlad Encarnacion MD      pantoprazole  40 mg Oral Daily Before Breakfast Vlad Encarnacion MD      polyethylene glycol  17 g Oral Daily PRN Yaritza Blackman PA-C      sodium chloride  100 mL/hr Intravenous Continuous Roberto Rodrigues  mL/hr (03/02/22 0910)    sodium polystyrene  15 g Oral Once Vida Cortés MD      tamsulosin  0 4 mg Oral Daily With Wale Laura MD          Today, Patient Was Seen By: Vlad Encarnacion MD    ** Please Note: This note has been constructed using a voice recognition system   **

## 2022-03-02 NOTE — CASE MANAGEMENT
Case Management Assessment & Discharge Planning Note    Patient name Keyshawn Caceres  Location /-23 MRN [id number]  : 1945 Date 3/2/2022       Current Admission Date: 2022  Current Admission Diagnosis:Possible cholangitis   Patient Active Problem List    Diagnosis Date Noted    Possible cholangitis 2022    Sepsis secondary to UTI (HCC)/bacteremia 2022    Iron deficiency anemia likely secondary to GI bleeding 2022    Abnormal colonoscopy 2022    Dizziness 2022    HERNÁN (acute kidney injury) ([facility] [address] ) 2022    Urinary retention 2022    Essential (hemorrhagic) thrombocythemia ([facility] [address] ) 2022    Closed nondisplaced comminuted fracture of left patella 2021    Head trauma 2021    Thrombocytosis 2021    Bradycardia 2021    Syncope 2021    Colonic adenoma 2021    Ambulatory dysfunction 2021    COPD (chronic obstructive pulmonary disease) ([facility] [address] ) 2021    Orthostatic hypotension 2020    Tobacco use disorder 2020    Symptomatic anemia 2020    Congestive cardiomyopathy ([facility] [address] ) 2020    Permanent atrial fibrillation ([facility] [address] ) 2020      LOS (days): 1  Geometric Mean LOS (GMLOS) (days): 4 80  Days to GMLOS:3 6     OBJECTIVE:  PATIENT READMITTED TO HOSPITAL  Risk of Unplanned Readmission Score: 26         Current admission status: Inpatient       Preferred Pharmacy:   [facility] Macias [address] [place] [address]  [place] [address]  Phone: [phone] Fax: [phone] [facility] [place] [facility] [place] [address]  Phone: [phone] Fax: [phone]    Primary Care Provider: Katie Paige MD    Primary Insurance: MEDICARE  Secondary Insurance:  FOR LIFE    ASSESSMENT:  Active Health Care Agents    There are no active Health Care Agents on file  Readmission Root Cause  30 Day Readmission: Yes  Who directed you to return to the hospital?: Family  Did you understand whom to contact if you had questions or problems?: Yes  Did you get your prescriptions before you left the hospital?: Yes,No  Reason[de-identified] Not preferred pharmacy  Were you able to get your prescriptions filled when you left the hospital?: Yes  Did you take your medications as prescribed?: Yes  Were you able to get to your follow-up appointments?: Yes  During previous admission, was a post-acute recommendation made?: Yes  What post-acute resources were offered?: [facility] ([address])  Patient was readmitted due to: syncope  Action Plan: on previous admission pt home with [facility], pt to d/c to STR this admission    Patient Information  Admitted from[de-identified] Home  Mental Status: Alert  During Assessment patient was accompanied by: Not accompanied during assessment  Assessment information provided by[de-identified] Patient  Primary Caregiver: Self  Support Systems: Spouse/significant other  Home entry access options   Select all that apply : Stairs  Number of steps to enter home : One Flight  Type of Current Residence: Apartment  Floor Level: 2  Upon entering residence, is there a bedroom on the main floor (no further steps)?: Yes  Upon entering residence, is there a bathroom on the main floor (no further steps)?: Yes  In the last 12 months, was there a time when you were not able to pay the mortgage or rent on time?: No  In the last 12 months, how many places have you lived?: 1  In the last 12 months, was there a time when you did not have a steady place to sleep or slept in a shelter (including now)?: No  Living Arrangements: Lives w/ Spouse/significant other    Activities of Daily Living Prior to Admission  Functional Status: Independent  Completes ADLs independently?: Yes  Ambulates independently?: Yes  Does patient use assisted devices?: Yes  Assisted Devices (DME) used: Straight Cane  Does patient currently own DME?: Yes  What DME does the patient currently own?: Straight Cane  Does patient have a history of Outpatient Therapy (PT/OT)?: No  Does the patient have a history of Short-Term Rehab?: No  Does patient have a history of HHC?: Yes  Does patient currently have [facility]?: Yes ([address])    [facility]  Type of Current Home Care Services: [facility][de-identified] Other (please enter name in comment) ([address])  [facility] Provider[de-identified] PCP    Patient Information Continued  Does patient receive dialysis treatments?: No         Means of Transportation  Means of Transport to Appts[de-identified] Family transport  In the past 12 months, has lack of transportation kept you from medical appointments or from getting medications?: No  In the past 12 months, has lack of transportation kept you from meetings, work, or from getting things needed for daily living?: No  Was application for public transport provided?: No        DISCHARGE DETAILS:    Discharge planning discussed with[de-identified] patient  Freedom of Choice: Yes  Comments - Freedom of Choice: no preference for STR - blanket referral sent  CM contacted family/caregiver?: No- see comments (patient declined call to spouse )  Were Treatment Team discharge recommendations reviewed with patient/caregiver?: Yes  Did patient/caregiver verbalize understanding of patient care needs?: N/A- going to facility  Were patient/caregiver advised of the risks associated with not following Treatment Team discharge recommendations?: Yes    Contacts  Patient Contacts: Maria Del Rosario Kruger (Spouse)  Relationship to Patient[de-identified] Family  Contact Method: Phone  Phone Number: [phone] (K)  Reason/Outcome: Emergency Contact              Other Referral/Resources/Interventions Provided:  Referral Comments: Patient readmitted due to syncope, possible cholangitis  GI consulted, EGD and colonoscopy  PT/OT rcmd STR  CM met with pt at bedside to discuss d/c planning   Pt lives with spouse in an apartment on the second level, one flight steps to floor, no elevator access  Pt reports independent with ADLs, utilizes a cane to ambulate, and spouse provides transport to appointments  On previous d/c pt set up with [facility], referral sent via ecin for [facility] to follow  Pt does not currently have healthcare POA or living will, declined further information when offered  Discussed PT/OT rcmd for STR, pt relayed no preference for facility and is open to STR  [place] referral sent via ecin, awaiting responses  Pt declined call to spouse when offered re: d/c plan update  CM to follow up with pt re: STR options      Would you like to participate in our [address] service program?  : No - Declined

## 2022-03-02 NOTE — ASSESSMENT & PLAN NOTE
-patient developed fever over night, he was tachycardic and with soft blood pressures  -UA showed bacteriuria  -lactic acid 2 9  Plan  -given patient is a bolus of 1 L IV fluids, last echo showed an EF of 55%  -after that continue gentle IV fluid hydration  -follow urine culture--> pending   -blood cultures -->1/2 GNR  -normal lactic acid  -continue treatment with cefepime/flagyl and tailor accordingly

## 2022-03-02 NOTE — PLAN OF CARE
Problem: GENITOURINARY - ADULT  Goal: Maintains or returns to baseline urinary function  Description: INTERVENTIONS:  - Assess urinary function  - Encourage oral fluids to ensure adequate hydration if ordered  - Administer IV fluids as ordered to ensure adequate hydration  - Administer ordered medications as needed  - Offer frequent toileting  - Follow urinary retention protocol if ordered  Outcome: Progressing  Goal: Absence of urinary retention  Description: INTERVENTIONS:  - Assess patients ability to void and empty bladder  - Monitor I/O  - Bladder scan as needed  - Discuss with physician/AP medications to alleviate retention as needed  - Discuss catheterization for long term situations as appropriate  Outcome: Progressing  Goal: Urinary catheter remains patent  Description: INTERVENTIONS:  - Assess patency of urinary catheter  - If patient has a chronic miller, consider changing catheter if non-functioning  - Follow guidelines for intermittent irrigation of non-functioning urinary catheter  Outcome: Progressing    Pt is aaox4  Pt retaining urine  Bladder scanned >725ml in bladder  900 drained when miller placed per MD order and urinary retention protocol  Urine is dark brown  Pt is denying any pain or distress  Pt is resting in bed and NPO for ultrasound  Blood cultures and labs sent down for pt  Bed is locked and in the lowest position and call bell in reach

## 2022-03-02 NOTE — ASSESSMENT & PLAN NOTE
-suspected cholangitis in the view of LFTs showing hyperbilirubinemia direct, transaminitis,cholestatic , patient being septic   -place patient NPO  -followed GI consult  -continue cefepime and Flagyl  -over the right upper quadrant ultrasound with Dopplers  -follow-up attic panel acute and chronic

## 2022-03-02 NOTE — ASSESSMENT & PLAN NOTE
-patient was recently evaluated on February this year for symptomatic lower GI bleeding, he underwent EGD and colonoscopy  Multiple polyps were removed and were benign    Patient was recommended to follow with GI for after further colonoscopy in 3 months and an EGD in 1 year   -today patient hemoglobin 11 7 which seems to be patient's baseline  -no signs of GI bleeding  -hemodynamically stable  -anticoagulation was resumed 3 days after colonoscopy  -continue iron replacement outpatient

## 2022-03-02 NOTE — ASSESSMENT & PLAN NOTE
-history of chronic urinary retention on Miller catheter, as per note review secondary to BPH  -patient is on Flomax 0 4 mg daily  -outpatient follow-up with Urology  -3/1 voiding trial-->failed-->back on miller

## 2022-03-02 NOTE — CONSULTS
Consultation -[facility]   Marbella Hsu 78 y o  male MRN: [id number]    Unit/Bed#: -01 Encounter: [id number]      Physician Requesting Consult: Dr Mark Sy    Reason for Consult / Principal Problem:  Transaminitis     HPI:   This is a 78 y o  male with history of GI bleeding, status post recent transfusion in February 2022, permanent atrial fibrillation on anticoagulation, urinary retention suspected to be BPH on tamsulosin, COPD not on home oxygen, tobacco dependence, iron-deficiency anemia, idiopathic thrombocytosis, who presents with a chief complaint of generalized weakness, ambulatory dysfunction and mechanical fall    Patient reports that on day of admission he was in his usual state of health when all the sudden he stood up from the couch and had a mechanical fall, he is unclear of what happened, he is unsure if he lost consciousness or not  Patient was recently seen by us for iron-deficiency anemia and recently had EGD and colonoscopy and multiple polyps were removed, and recommended to have colonoscopy in 3 months an EGD in 1 year and anticoagulation was resumed 3 days after colonoscopy  Patient now noted to have elevated LFTs, bilirubin now noted to be 3 with elevated direct bilirubin along with alkaline phosphatase of 173 AST of 190 and ALT of 193  Ultrasound of right upper quadrant was ordered which has not yet been done  Patient offers no significant complaints of abdominal pain  Does have indwelling urinary catheter for past month due to urinary retention  Denies any history of liver disease in the past   Does report he drank heavily in the past but had stopped for a long time  Patient denies any history of cholecystectomy  CT was done in June of last year which did reveal unremarkable liver and no calcified gallstones or pericholecystic inflammatory change  Does report some nausea last night        Allergies: No Known Allergies    Medications:  Current Facility-Administered Medications:     cefepime (MAXIPIME) IVPB (premix in dextrose) 2,000 mg 50 mL, 2,000 mg, Intravenous, Q12H, Mc Hendrickson MD, Last Rate: 100 mL/hr at 03/01/22 2239, 2,000 mg at 03/01/22 2239    docusate sodium (COLACE) capsule 100 mg, 100 mg, Oral, BID, Mc Hendrickson MD, 100 mg at 03/02/22 9219    ferrous sulfate tablet 325 mg, 325 mg, Oral, Daily With Breakfast, Mc Hendrickson MD, 325 mg at 03/02/22 0850    fluticasone-vilanterol (BREO ELLIPTA) 200-25 MCG/INH inhaler 1 puff, 1 puff, Inhalation, Daily, Mc Hendrickson MD, 1 puff at 50/72/89 2589    folic acid (FOLVITE) tablet 400 mcg, 400 mcg, Oral, Daily, Mc Hendrickson MD, 400 mcg at 03/02/22 0850    ipratropium (ATROVENT) 0 02 % inhalation solution 0 5 mg, 0 5 mg, Nebulization, TID, Luisana Hearn MD, 0 5 mg at 03/02/22 0830    levalbuterol (Tonna Lute) inhalation solution 1 25 mg, 1 25 mg, Nebulization, TID, Luisana Hearn MD, 1 25 mg at 03/02/22 0830    metoprolol succinate (TOPROL-XL) 24 hr tablet 25 mg, 25 mg, Oral, Daily, Mc Hendrickson MD, 25 mg at 03/02/22 0850    metroNIDAZOLE (FLAGYL) IVPB (premix) 500 mg 100 mL, 500 mg, Intravenous, Q8H, Roberto Rodrigues MD, Last Rate: 200 mL/hr at 03/02/22 0910, 500 mg at 03/02/22 0910    nicotine (NICODERM CQ) 7 mg/24hr TD 24 hr patch 1 patch, 1 patch, Transdermal, Daily, Mc Hendrickson MD, 1 patch at 03/02/22 0850    pantoprazole (PROTONIX) EC tablet 40 mg, 40 mg, Oral, Daily Before Breakfast, Mc Hendrickson MD, 40 mg at 03/02/22 0850    polyethylene glycol (MIRALAX) packet 17 g, 17 g, Oral, Daily PRN, Jeremy Washburn PA-C    sodium chloride 0 9 % infusion, 100 mL/hr, Intravenous, Continuous, Roberto Rodrigues MD, Last Rate: 100 mL/hr at 03/02/22 0910, 100 mL/hr at 03/02/22 0910    sodium polystyrene (KAYEXALATE) powder 15 g, 15 g, Oral, Once, Anita Mata MD    tamsulosin (FLOMAX) capsule 0 4 mg, 0 4 mg, Oral, Daily With Julia Yepez MD, 0 4 mg at 22 1649    Past Medical history:  Past Medical History:   Diagnosis Date    Anxiety disorder     Atrial fibrillation (HCC)     Coronary artery disease     Depression     Hepatitis C     screening negative in 2017    MI (myocardial infarction[facility])        Past Surgical History:   Past Surgical History:   Procedure Laterality Date    CARDIAC CATHETERIZATION  2018       Family History:   Family History   Problem Relation Age of Onset    Hypertension Mother     Coronary artery disease Mother         premature    Hypertension Father     Coronary artery disease Father         premature    Diabetes Father        Social history:   Social History     Socioeconomic History    Marital status: /Civil Union     Spouse name: Not on file    Number of children: 3    Years of education: 15    Highest education level: 12th grade   Occupational History    Occupation: retired   Tobacco Use    Smoking status: Current Every Day Smoker     Packs/day: 0 50     Years: 57 00     Pack years: 28 50     Types: Cigarettes    Smokeless tobacco: Never Used    Tobacco comment: since age 17; Has history of smoking for over 54 years  He has been smoking more than packet daily in the past however he has been smokes about half a pack a day lately and stopped smoking on 2018 when he was admitted to the hospital     Vaping Use    Vaping Use: Never used   Substance and Sexual Activity    Alcohol use: Never    Drug use: Never    Sexual activity: Not Currently     Partners: Female     Birth control/protection: Condom Male   Other Topics Concern    Not on file   Social History Narrative    History of Ultra Sound: 2016    History of Stress Test: 2018    History of ECHO: 2018    · Most recent tobacco use screenin2019      · Live alone or with others:   with others        · Diet:   Regular      · Caffeine intake:    Moderate      · Guns present in home:   No      · Asbestos exposure: No      · TB exposure:   No      · Environmental exposure:   No      · Animal exposure:   No      · Smoke alarm in home:   Yes     Social Determinants of Health     Financial Resource Strain: Not on file   Food Insecurity: No Food Insecurity    Worried About Running Out of Food in the Last Year: Never true    Arnel of Food in the Last Year: Never true   Transportation Needs: No Transportation Needs    Lack of Transportation (Medical): No    Lack of Transportation (Non-Medical):  No   Physical Activity: Not on file   Stress: Not on file   Social Connections: Not on file   Intimate Partner Violence: Not on file   Housing Stability: Low Risk     Unable to Pay for Housing in the Last Year: No    Number of Places Lived in the Last Year: 1    Unstable Housing in the Last Year: No       Review of Systems: All other systems were reviewed and were negative, otherwise please refer to HPI    Physical Exam: /86 (BP Location: Right arm)   Pulse 103   Temp 98 3 °F (36 8 °C) (Oral)   Resp 18   Ht 6' (1 829 m)   Wt 67 2 kg (148 lb 2 4 oz)   SpO2 98%   BMI 20 09 kg/m²     General Appearance:    Alert, cooperative, no distress, appears stated age   Head:    Normocephalic, without obvious abnormality, atraumatic   Eyes:    +scleral icterus           Mouth:  Mucosa moist   Neck:   Supple, symmetrical, trachea midline, no thyromegaly       Lungs:     Clear to auscultation bilaterally, respirations unlabored       Heart:    Regular rate and rhythm, S1 and S2 normal, no murmur, rub   or gallop     Abdomen:     Soft, some diffuse nonspecific abdominal tenderness to palpation, bowel sounds active all four quadrants,     no masses, no organomegaly   Genitalia:   deferred   Rectal:   deferred   Extremities:   Extremities normal,no cyanosis or edema       Skin:   + palmar erythema, telangiectasias noted on anterior thorax, and skin icteric,        Neurologic:   Grossly intact, no focal deficit           Lab Results: Recent Results (from the past 24 hour(s))   Lactic acid 2 Hours    Collection Time: 03/01/22  3:54 PM   Result Value Ref Range    LACTIC ACID 2 4 (HH) 0 5 - 2 0 mmol/L   UA w Reflex to Microscopic w Reflex to Culture    Collection Time: 03/01/22  6:56 PM    Specimen: Urine, Clean Catch   Result Value Ref Range    Color, UA [place] (A) Yellow    Clarity, UA Clear Clear    Specific [place], UA 1 025 1 001 - 1 030    pH, UA 6 0 5 0, 5 5, 6 0, 6 5, 7 0, 7 5, 8 0    Leukocytes, UA 2+ (A) Negative    Nitrite, UA Negative Negative    Protein, UA Trace (A) Negative, Interference- unable to analyze mg/dl    Glucose, UA Negative Negative mg/dl    Ketones, UA Negative Negative mg/dl    Urobilinogen, UA >=8 0 (A) 0 2, 1 0 E U /dl E U /dl    Bilirubin, UA 3+ (A) Negative    Blood, UA Trace-Intact (A) Negative   Urine Microscopic    Collection Time: 03/01/22  6:56 PM   Result Value Ref Range    RBC, UA None Seen None Seen, 0-1, 1-2, 2-4, 0-5 /hpf    WBC, UA 30-50 (A) None Seen, 0-1, 1-2, 0-5, 2-4 /hpf    Epithelial Cells Occasional None Seen, Occasional /hpf    Bacteria, UA Occasional None Seen, Occasional /hpf   Lactic acid, plasma    Collection Time: 03/01/22 10:57 PM   Result Value Ref Range    LACTIC ACID 1 3 0 5 - 2 0 mmol/L   Procalcitonin Reflex    Collection Time: 03/02/22  5:03 AM   Result Value Ref Range    Procalcitonin 0 88 (H) <=0 25 ng/ml   CBC and differential    Collection Time: 03/02/22  5:03 AM   Result Value Ref Range    WBC 10 14 4 31 - 10 16 Thousand/uL    RBC 4 24 3 88 - 5 62 Million/uL    Hemoglobin 9 9 (L) 12 0 - 17 0 g/dL    Hematocrit 34 5 (L) 36 5 - 49 3 %    MCV 81 (L) 82 - 98 fL    MCH 23 3 (L) 26 8 - 34 3 pg    MCHC 28 7 (L) 31 4 - 37 4 g/dL    RDW 28 5 (H) 11 6 - 15 1 %    MPV 11 0 8 9 - 12 7 fL    Platelets 820 845 - 025 Thousands/uL    nRBC 0 /100 WBCs    Neutrophils Relative 84 (H) 43 - 75 %    Immat GRANS % 0 0 - 2 %    Lymphocytes Relative 8 (L) 14 - 44 %    Monocytes Relative 7 4 - 12 % Eosinophils Relative 1 0 - 6 %    Basophils Relative 0 0 - 1 %    Neutrophils Absolute 8 52 (H) 1 85 - 7 62 Thousands/µL    Immature Grans Absolute 0 04 0 00 - 0 20 Thousand/uL    Lymphocytes Absolute 0 82 0 60 - 4 47 Thousands/µL    Monocytes Absolute 0 66 0 17 - 1 22 Thousand/µL    Eosinophils Absolute 0 07 0 00 - 0 61 Thousand/µL    Basophils Absolute 0 03 0 00 - 0 10 Thousands/µL   Basic metabolic panel    Collection Time: 03/02/22  5:03 AM   Result Value Ref Range    Sodium 136 135 - 147 mmol/L    Potassium 4 1 3 5 - 5 3 mmol/L    Chloride 106 96 - 108 mmol/L    CO2 22 21 - 32 mmol/L    ANION GAP 8 4 - 13 mmol/L    BUN 20 5 - 25 mg/dL    Creatinine 1 20 0 60 - 1 30 mg/dL    Glucose 100 65 - 140 mg/dL    Calcium 8 1 (L) 8 4 - 10 2 mg/dL    eGFR 59 ml/min/1 73sq m   Hepatic function panel    Collection Time: 03/02/22  5:03 AM   Result Value Ref Range    Total Bilirubin 3 03 (H) 0 20 - 1 00 mg/dL    Bilirubin, Direct 2 14 (H) 0 00 - 0 20 mg/dL    Alkaline Phosphatase 173 (H) 34 - 104 U/L     (H) 13 - 39 U/L     (H) 7 - 52 U/L    Total Protein 5 5 (L) 6 4 - 8 4 g/dL    Albumin 3 0 (L) 3 5 - 5 0 g/dL       Imaging Studies: EGD    Result Date: 2/4/2022  Narrative: [facility] [address] [facility] [place] [id number] [phone] DATE OF SERVICE: 2/04/22 PHYSICIAN(S): Ellie Palafox MD Proceduralist INDICATION: Microcytic anemia, Helicobacter pylori gastritis POST-OP DIAGNOSIS: See the impression below  PREPROCEDURE: Informed consent was obtained for the procedure, including sedation  Risks of perforation, hemorrhage, adverse drug reaction and aspiration were discussed  The patient was placed in the left lateral decubitus position  Patient was explained about the risks and benefits of the procedure  Risks including but not limited to bleeding, infection, and perforation were explained in detail  Also explained about less than 100% sensitivity with the exam and other alternatives  DETAILS OF PROCEDURE: Patient was taken to the procedure room where a time out was performed to confirm correct patient and correct procedure  The patient underwent monitored anesthesia care, which was administered by an anesthesia professional  The patient's blood pressure, heart rate, level of consciousness, respirations and oxygen were monitored throughout the procedure  The scope was advanced to the second part of the duodenum  Retroflexion was performed in the fundus  The patient experienced no blood loss  The procedure was not difficult  The patient tolerated the procedure well  There were no apparent complications  ANESTHESIA INFORMATION: ASA: III Anesthesia Type: IV Sedation with Anesthesia MEDICATIONS: No administrations occurring from 1141 to 1154 on 02/04/22 FINDINGS: Erythematous mucosa in the antrum; performed cold forceps biopsy Hiatal hernia - GE junction 45 cm from the incisors, diaphragmatic impression 47 cm from the incisors Irregular Z-line; performed cold forceps biopsy The remainder of a detailed exam otherwise unremarkable SPECIMENS: ID Type Source Tests Collected by Time Destination 1 : antrum Tissue Stomach TISSUE EXAM Marbella Xiong MD 2/4/2022 11:53 AM  2 : lower esoph Tissue Esophagus TISSUE EXAM Marbella Xiong MD 2/4/2022 11:54 AM      Impression: Hiatal hernia, a mild irregularity of the squamocolumnar junction, mild antritis RECOMMENDATION: There is no recommended follow-up for this procedure  Marbella Xiong MD     Colonoscopy    Addendum Date: 2/4/2022 Addendum:   [facility] [phone] [facility] OF SERVICE: 2/04/22 PHYSICIAN(S): Marbella Xiong MD Proceduralist INDICATION: Microcytic anemia, Abnormal colonoscopy Colonoscopy performed for a diagnostic indication  POST-OP DIAGNOSIS: See the impression below  HISTORY: Prior colonoscopy: Less than 3 years ago   It is being repeated at an interval of less than 3 years because:  Polyps remained after last colonoscopy BOWEL PREPARATION: Golytely/Colyte/Trilyte; Miralax/Dulcolax PREPROCEDURE: Informed consent was obtained for the procedure, including sedation  Risks including but not limited to bleeding, infection, perforation, adverse drug reaction and aspiration were explained in detail  Also explained about less than 100% sensitivity with the exam and other alternatives  The patient was placed in the left lateral decubitus position  DETAILS OF PROCEDURE: Patient was taken to the procedure room where a time out was performed to confirm correct patient and correct procedure  The patient underwent monitored anesthesia care, which was administered by an anesthesia professional  The patient's blood pressure, heart rate, level of consciousness, oxygen and respirations were monitored throughout the procedure  A digital rectal exam was performed  The scope was introduced through the anus and advanced to the terminal ileum  Retroflexion was performed in the rectum  The quality of bowel preparation was evaluated using the [place] Bowel Preparation Scale with scores of: right colon = 2, transverse colon = 2, left colon = 2  The total BBPS score was 6  Bowel prep was adequate  The patient experienced no blood loss  The procedure was not difficult  The patient tolerated the procedure well  There were no apparent complications  ANESTHESIA INFORMATION: ASA: III Anesthesia Type: IV Sedation with Anesthesia MEDICATIONS: EPINEPHrine (ADRENALIN) injection 0 02 mg (Totals for administrations occurring from 1141 to 1300 on 02/04/22) FINDINGS: Rectal exam no masses Protruding, internal hemorrhoids Diverticula in the descending colon and sigmoid colon Two polyps measuring from 10 mm up to 12 mm in the ascending colon; completely removed en bloc by hot snare and retrieved specimen; placed 2 clips successfully   One on each polypectomy site 1 on each polypectomy site Two polyps measuring from 8 mm up to 12 mm in the proximal transverse colon; completely removed by cold snare and retrieved specimen; completely removed by hot snare and retrieved specimen; placed 2 clips successfully  Larger polyp hot snare small polyp cold snare clip applied to each 7 mm polyp in the descending colon; completely removed en bloc by cold snare and retrieved specimen 18 mm pedunculated polyp; completely removed en bloc by hot snare and retrieved specimen; placed MRI conditional clips  2 cc of epi prior to hot snare, polyp removed small remnant was left and removed with snare and edges were fulgurated then 2 hemostatic clips were applied Several smaller polyps less than a cm remain most of which were in the transverse colon EVENTS: Procedure Events Event Event Time ENDO CECUM REACHED 2/4/2022 12:06 PM ENDO SCOPE OUT TIME 2/4/2022 12:59 PM SPECIMENS: ID Type Source Tests Collected by Time Destination 1 : antrum Tissue Stomach TISSUE EXAM Mode Mejia MD 2/4/2022 11:53 AM  2 : lower esoph Tissue Esophagus TISSUE EXAM Mode Mejia MD 2/4/2022 11:54 AM  3 : descending Tissue Polyp, Colorectal TISSUE EXAM Mode Mejia MD 2/4/2022 12:08 PM  4 : ascending x2  Tissue Polyp, Colorectal TISSUE EXAM Mode Mejia MD 2/4/2022 12:09 PM  5 : proximal transverse x 2  Tissue Polyp, Colorectal TISSUE EXAM Mode Mejia MD 2/4/2022 12:25 PM  6 : descending Tissue Polyp, Colorectal TISSUE EXAM Mode Mejia MD 2/4/2022 12:51 PM  EQUIPMENT: Colonoscope - IMPRESSION: Six colon polyps removed some small polyps remain less than 1 cm, diverticulosis RECOMMENDATION: Repeat colonoscopy in 3 months due to remove remaining polyps and check polypectomy sites   Hold Eliquis 3 days if possible otherwise start tomorrow but risk of bleeding  Mode Mejia MD     Result Date: 2/4/2022  Narrative: [facility] [address] [facility] [address] [id number] Gauri Fernandes 92 OF SERVICE: 2/04/22 PHYSICIAN(S): Mode Mejia MD Proceduralist INDICATION: Microcytic anemia, Abnormal colonoscopy Colonoscopy performed for a diagnostic indication  POST-OP DIAGNOSIS: See the impression below  HISTORY: Prior colonoscopy: Less than 3 years ago  It is being repeated at an interval of less than 3 years because:  Polyps remained after last colonoscopy BOWEL PREPARATION: Golytely/Colyte/Trilyte; Miralax/Dulcolax PREPROCEDURE: Informed consent was obtained for the procedure, including sedation  Risks including but not limited to bleeding, infection, perforation, adverse drug reaction and aspiration were explained in detail  Also explained about less than 100% sensitivity with the exam and other alternatives  The patient was placed in the left lateral decubitus position  DETAILS OF PROCEDURE: Patient was taken to the procedure room where a time out was performed to confirm correct patient and correct procedure  The patient underwent monitored anesthesia care, which was administered by an anesthesia professional  The patient's blood pressure, heart rate, level of consciousness, oxygen and respirations were monitored throughout the procedure  A digital rectal exam was performed  The scope was introduced through the anus and advanced to the terminal ileum  Retroflexion was performed in the rectum  The quality of bowel preparation was evaluated using the [facility] Bowel Preparation Scale with scores of: right colon = 2, transverse colon = 2, left colon = 2  The total BBPS score was 6  Bowel prep was adequate  The patient experienced no blood loss  The procedure was not difficult  The patient tolerated the procedure well  There were no apparent complications   ANESTHESIA INFORMATION: ASA: III Anesthesia Type: IV Sedation with Anesthesia MEDICATIONS: EPINEPHrine (ADRENALIN) injection 0 02 mg (Totals for administrations occurring from 1141 to 1300 on 02/04/22) FINDINGS: Rectal exam no masses Protruding, internal hemorrhoids Diverticula in the descending colon and sigmoid colon Two polyps measuring from 10 mm up to 12 mm in the ascending colon; completely removed en bloc by hot snare and retrieved specimen; placed 2 clips successfully  One on each polypectomy site 1 on each polypectomy site Two polyps measuring from 8 mm up to 12 mm in the proximal transverse colon; completely removed by cold snare and retrieved specimen; completely removed by hot snare and retrieved specimen; placed 2 clips successfully  Larger polyp hot snare small polyp cold snare clip applied to each 7 mm polyp in the descending colon; completely removed en bloc by cold snare and retrieved specimen 18 mm pedunculated polyp; completely removed en bloc by hot snare and retrieved specimen; placed MRI conditional clips  2 cc of epi prior to hot snare, polyp removed small remnant was left and removed with snare and edges were fulgurated then 2 hemostatic clips were applied Several smaller polyps less than a cm remain most of which were in the transverse colon EVENTS: Procedure Events Event Event Time ENDO CECUM REACHED 2/4/2022 12:06 PM ENDO SCOPE OUT TIME 2/4/2022 12:59 PM SPECIMENS: ID Type Source Tests Collected by Time Destination 1 : antrum Tissue Stomach TISSUE EXAM Yan Maynard MD 2/4/2022 11:53 AM  2 : lower esoph Tissue Esophagus TISSUE EXAM Yan Maynard MD 2/4/2022 11:54 AM  3 : descending Tissue Polyp, Colorectal TISSUE EXAM Yan Maynard MD 2/4/2022 12:08 PM  4 : ascending x2  Tissue Polyp, Colorectal TISSUE EXAM Yan Maynard MD 2/4/2022 12:09 PM  5 : proximal transverse x 2  Tissue Polyp, Colorectal TISSUE EXAM Yan Maynard MD 2/4/2022 12:25 PM  6 : descending Tissue Polyp, Colorectal TISSUE EXAM Yan Maynard MD 2/4/2022 12:51 PM  EQUIPMENT: Colonoscope -     Impression: Six colon polyps removed some small polyps remain less than 1 cm RECOMMENDATION: Repeat colonoscopy in 3 months due to remove remaining polyps and check polypectomy sites    Yan Maynard MD     XR chest portable    Result Date: 2/28/2022  Narrative: CHEST INDICATION: dizziness  COMPARISON:  9/7/2021 EXAM PERFORMED/VIEWS:  XR CHEST PORTABLE Images: 2 FINDINGS: Cardiomediastinal silhouette appears unremarkable  The lungs are clear  No pneumothorax or pleural effusion  Osseous structures appear within normal limits for patient age  Impression: No acute cardiopulmonary disease  Findings are stable Workstation performed: [id number]     CT head wo contrast    Result Date: 2/28/2022  Narrative: CT BRAIN - WITHOUT CONTRAST INDICATION:   Dizziness  COMPARISON:  1/31/2022 TECHNIQUE:  CT examination of the brain was performed  In addition to axial images, sagittal and coronal 2D reformatted images were created and submitted for interpretation  Radiation dose length product (DLP) for this visit:  758 9 mGy-cm   This examination, like all CT scans performed in the [facility], was performed utilizing techniques to minimize radiation dose exposure, including the use of iterative reconstruction and automated exposure control  IMAGE QUALITY:  Diagnostic  FINDINGS: PARENCHYMA: Decreased attenuation is noted in periventricular and subcortical white matter demonstrating an appearance that is statistically most likely to represent mild microangiopathic change  No CT signs of acute infarction  No intracranial mass, mass effect or midline shift  No acute parenchymal hemorrhage  VENTRICLES AND EXTRA-AXIAL SPACES:  Normal for the patient's age  VISUALIZED ORBITS AND PARANASAL SINUSES:  Unremarkable  CALVARIUM AND EXTRACRANIAL SOFT TISSUES:  Normal      Impression: No acute intracranial abnormality  Workstation performed: [id number]     CT head without contrast    Result Date: 1/31/2022  Narrative: CT BRAIN - WITHOUT CONTRAST INDICATION:   Vertigo, central dizzy  COMPARISON:  CT scan 9/8/2021 TECHNIQUE:  CT examination of the brain was performed  In addition to axial images, sagittal and coronal 2D reformatted images were created and submitted for interpretation   Radiation dose length product (DLP) for this visit:  1030 2 mGy-cm   This examination, like all CT scans performed in the [facility], was performed utilizing techniques to minimize radiation dose exposure, including the use of iterative  reconstruction and automated exposure control  IMAGE QUALITY:  Diagnostic  FINDINGS: PARENCHYMA:  No intracranial mass, mass effect or midline shift  No CT signs of acute infarction  No acute parenchymal hemorrhage  VENTRICLES AND EXTRA-AXIAL SPACES:  Normal for the patient's age  VISUALIZED ORBITS AND PARANASAL SINUSES:  No acute abnormality involving the orbits  Mild scattered sinus mucosal thickening is noted  No fluid levels are seen  CALVARIUM AND EXTRACRANIAL SOFT TISSUES:  Normal      Impression: No acute intracranial abnormality  Workstation performed: [id number]       Assessment/Plan: This is a 24-year-old male who presents with ambulatory dysfunction and generalized weakness and was noted to have HERNÁN on admission and now noted to have transaminitis  Patient became septic yesterday and was noted to be hypotensive and blood cultures are growing Gram-negative rods  Patient does have Cloud catheter and currently is being treated for UTI as UA did show bacteriuria  Patient has ultrasound ordered to evaluate for any biliary abnormality but unfortunately this has not yet been done  Must rule out any biliary source of Gram-negative bacteremia/cholangitis  Continue cefepime and Flagyl  If no biliary findings are found, could be related to sepsis induced cholestasis  Patient did have some hypotension but current LFT pattern not consistent with ischemic hepatopathy  We will continue to trend  May have some underlying liver disease as evidenced by exam showing palmar erythema and telangiectasias and we will wait for Pt/ INR, no thrombocytopenia noted or findings consistent with liver disease on prior CT  Hepatitis panel also ordered   We will make further recommendations once abdominal imaging has been obtained  As for iron deficiency anemia recent GI workup as above and will need repeat colonoscopy in 3 months due to remaining polyps, EGD in 1 year due to intestinal metaplasia noted in antrum on biopsy  Thank you for the consultation  Case will be discussed with Dr Maurice Colorado

## 2022-03-02 NOTE — ASSESSMENT & PLAN NOTE
-patient baseline creatinine around 1 1, currently 1 6--> 1 27, improving  -several admissions for acute kidney injury  -this could be prerenal versus postrenal in the view of chronic urinary retention  Plan  -continue urinary retention protocol  -follow BMP in the a m   -no history of CHF, continue gentle IV hydration   -monitor urine output  -avoid hypotension  -avoid nephrotoxic drugs including NSAIDs, hold diuretics , ACEI  -voiding trial on 03/01  -patient needs to follow up outpatient with urology Statement Selected

## 2022-03-02 NOTE — PLAN OF CARE
Problem: GENITOURINARY - ADULT  Goal: Maintains or returns to baseline urinary function  Description: INTERVENTIONS:  - Assess urinary function  - Encourage oral fluids to ensure adequate hydration if ordered  - Administer IV fluids as ordered to ensure adequate hydration  - Administer ordered medications as needed  - Offer frequent toileting  - Follow urinary retention protocol if ordered  3/1/2022 2317 by Debra Bates RN  Outcome: Progressing  3/1/2022 2317 by Debra Bates RN  Outcome: Progressing

## 2022-03-02 NOTE — NURSING NOTE
Pts bladder scan was 309 PA made aware ,bladder scan to be repeated at 9am and miller to be inserted if needed

## 2022-03-03 PROBLEM — N17.9 AKI (ACUTE KIDNEY INJURY) (HCC): Status: RESOLVED | Noted: 2022-01-31 | Resolved: 2022-03-03

## 2022-03-03 PROBLEM — E44.0 MODERATE PROTEIN-CALORIE MALNUTRITION (HCC): Status: ACTIVE | Noted: 2022-03-03

## 2022-03-03 LAB
ALBUMIN SERPL BCP-MCNC: 2.9 G/DL (ref 3.5–5)
ALP SERPL-CCNC: 164 U/L (ref 34–104)
ALT SERPL W P-5'-P-CCNC: 126 U/L (ref 7–52)
ANION GAP SERPL CALCULATED.3IONS-SCNC: 8 MMOL/L (ref 4–13)
AST SERPL W P-5'-P-CCNC: 71 U/L (ref 13–39)
BASOPHILS # BLD AUTO: 0.03 THOUSANDS/ΜL (ref 0–0.1)
BASOPHILS NFR BLD AUTO: 0 % (ref 0–1)
BILIRUB DIRECT SERPL-MCNC: 0.97 MG/DL (ref 0–0.2)
BILIRUB SERPL-MCNC: 1.61 MG/DL (ref 0.2–1)
BUN SERPL-MCNC: 16 MG/DL (ref 5–25)
CALCIUM ALBUM COR SERPL-MCNC: 8.8 MG/DL (ref 8.3–10.1)
CALCIUM SERPL-MCNC: 7.9 MG/DL (ref 8.4–10.2)
CHLORIDE SERPL-SCNC: 107 MMOL/L (ref 96–108)
CO2 SERPL-SCNC: 22 MMOL/L (ref 21–32)
CREAT SERPL-MCNC: 0.99 MG/DL (ref 0.6–1.3)
EOSINOPHIL # BLD AUTO: 0.27 THOUSAND/ΜL (ref 0–0.61)
EOSINOPHIL NFR BLD AUTO: 3 % (ref 0–6)
ERYTHROCYTE [DISTWIDTH] IN BLOOD BY AUTOMATED COUNT: 28.2 % (ref 11.6–15.1)
GFR SERPL CREATININE-BSD FRML MDRD: 74 ML/MIN/1.73SQ M
GLUCOSE SERPL-MCNC: 131 MG/DL (ref 65–140)
HAV IGM SER QL: NORMAL
HBV CORE AB SER QL: NORMAL
HBV CORE IGM SER QL: NORMAL
HBV CORE IGM SER QL: NORMAL
HBV SURFACE AG SER QL: NORMAL
HBV SURFACE AG SER QL: NORMAL
HCT VFR BLD AUTO: 31.9 % (ref 36.5–49.3)
HCV AB SER QL: NORMAL
HCV AB SER QL: NORMAL
HGB BLD-MCNC: 9.5 G/DL (ref 12–17)
IMM GRANULOCYTES # BLD AUTO: 0.05 THOUSAND/UL (ref 0–0.2)
IMM GRANULOCYTES NFR BLD AUTO: 1 % (ref 0–2)
INR PPP: 1.34 (ref 0.84–1.19)
LYMPHOCYTES # BLD AUTO: 0.53 THOUSANDS/ΜL (ref 0.6–4.47)
LYMPHOCYTES NFR BLD AUTO: 6 % (ref 14–44)
MCH RBC QN AUTO: 23.6 PG (ref 26.8–34.3)
MCHC RBC AUTO-ENTMCNC: 29.8 G/DL (ref 31.4–37.4)
MCV RBC AUTO: 79 FL (ref 82–98)
MONOCYTES # BLD AUTO: 0.52 THOUSAND/ΜL (ref 0.17–1.22)
MONOCYTES NFR BLD AUTO: 6 % (ref 4–12)
NEUTROPHILS # BLD AUTO: 8.09 THOUSANDS/ΜL (ref 1.85–7.62)
NEUTS SEG NFR BLD AUTO: 84 % (ref 43–75)
NRBC BLD AUTO-RTO: 0 /100 WBCS
PLATELET # BLD AUTO: 223 THOUSANDS/UL (ref 149–390)
POTASSIUM SERPL-SCNC: 4 MMOL/L (ref 3.5–5.3)
PROT SERPL-MCNC: 5.3 G/DL (ref 6.4–8.4)
PROTHROMBIN TIME: 16.4 SECONDS (ref 11.6–14.5)
RBC # BLD AUTO: 4.03 MILLION/UL (ref 3.88–5.62)
SODIUM SERPL-SCNC: 137 MMOL/L (ref 135–147)
WBC # BLD AUTO: 9.49 THOUSAND/UL (ref 4.31–10.16)

## 2022-03-03 PROCEDURE — 85025 COMPLETE CBC W/AUTO DIFF WBC: CPT | Performed by: INTERNAL MEDICINE

## 2022-03-03 PROCEDURE — 82248 BILIRUBIN DIRECT: CPT | Performed by: PHYSICIAN ASSISTANT

## 2022-03-03 PROCEDURE — 94760 N-INVAS EAR/PLS OXIMETRY 1: CPT

## 2022-03-03 PROCEDURE — 99232 SBSQ HOSP IP/OBS MODERATE 35: CPT | Performed by: INTERNAL MEDICINE

## 2022-03-03 PROCEDURE — 94640 AIRWAY INHALATION TREATMENT: CPT

## 2022-03-03 PROCEDURE — 80053 COMPREHEN METABOLIC PANEL: CPT | Performed by: INTERNAL MEDICINE

## 2022-03-03 PROCEDURE — 85610 PROTHROMBIN TIME: CPT | Performed by: INTERNAL MEDICINE

## 2022-03-03 RX ADMIN — LEVALBUTEROL HYDROCHLORIDE 1.25 MG: 1.25 SOLUTION, CONCENTRATE RESPIRATORY (INHALATION) at 08:24

## 2022-03-03 RX ADMIN — FOLIC ACID TAB 400 MCG 400 MCG: 400 TAB at 10:27

## 2022-03-03 RX ADMIN — FERROUS SULFATE TAB 325 MG (65 MG ELEMENTAL FE) 325 MG: 325 (65 FE) TAB at 10:31

## 2022-03-03 RX ADMIN — DOCUSATE SODIUM 100 MG: 100 CAPSULE, LIQUID FILLED ORAL at 10:27

## 2022-03-03 RX ADMIN — PANTOPRAZOLE SODIUM 40 MG: 40 TABLET, DELAYED RELEASE ORAL at 10:34

## 2022-03-03 RX ADMIN — METRONIDAZOLE 500 MG: 500 INJECTION, SOLUTION INTRAVENOUS at 00:46

## 2022-03-03 RX ADMIN — IPRATROPIUM BROMIDE 0.5 MG: 0.5 SOLUTION RESPIRATORY (INHALATION) at 19:54

## 2022-03-03 RX ADMIN — METOPROLOL SUCCINATE 25 MG: 25 TABLET, EXTENDED RELEASE ORAL at 10:27

## 2022-03-03 RX ADMIN — APIXABAN 5 MG: 5 TABLET, FILM COATED ORAL at 10:31

## 2022-03-03 RX ADMIN — FLUTICASONE FUROATE AND VILANTEROL TRIFENATATE 1 PUFF: 200; 25 POWDER RESPIRATORY (INHALATION) at 08:24

## 2022-03-03 RX ADMIN — NICOTINE 1 PATCH: 7 PATCH, EXTENDED RELEASE TRANSDERMAL at 10:32

## 2022-03-03 RX ADMIN — CEFEPIME HYDROCHLORIDE 2000 MG: 2 INJECTION, SOLUTION INTRAVENOUS at 10:25

## 2022-03-03 RX ADMIN — DOCUSATE SODIUM 100 MG: 100 CAPSULE, LIQUID FILLED ORAL at 16:52

## 2022-03-03 RX ADMIN — LEVALBUTEROL HYDROCHLORIDE 1.25 MG: 1.25 SOLUTION, CONCENTRATE RESPIRATORY (INHALATION) at 19:54

## 2022-03-03 RX ADMIN — APIXABAN 5 MG: 5 TABLET, FILM COATED ORAL at 16:52

## 2022-03-03 RX ADMIN — LEVALBUTEROL HYDROCHLORIDE 1.25 MG: 1.25 SOLUTION, CONCENTRATE RESPIRATORY (INHALATION) at 14:20

## 2022-03-03 RX ADMIN — IPRATROPIUM BROMIDE 0.5 MG: 0.5 SOLUTION RESPIRATORY (INHALATION) at 08:24

## 2022-03-03 RX ADMIN — IPRATROPIUM BROMIDE 0.5 MG: 0.5 SOLUTION RESPIRATORY (INHALATION) at 14:19

## 2022-03-03 NOTE — PLAN OF CARE
Problem: MOBILITY - ADULT  Goal: Maintain or return to baseline ADL function  Description: INTERVENTIONS:  -  Assess patient's ability to carry out ADLs; assess patient's baseline for ADL function and identify physical deficits which impact ability to perform ADLs (bathing, care of mouth/teeth, toileting, grooming, dressing, etc )  - Assess/evaluate cause of self-care deficits   - Assess range of motion  - Assess patient's mobility; develop plan if impaired  - Assess patient's need for assistive devices and provide as appropriate  - Encourage maximum independence but intervene and supervise when necessary  - Involve family in performance of ADLs  - Assess for home care needs following discharge   - Consider OT consult to assist with ADL evaluation and planning for discharge  - Provide patient education as appropriate  Outcome: Progressing     Problem: Nutrition/Hydration-ADULT  Goal: Nutrient/Hydration intake appropriate for improving, restoring or maintaining nutritional needs  Description: Monitor and assess patient's nutrition/hydration status for malnutrition  Collaborate with interdisciplinary team and initiate plan and interventions as ordered  Monitor patient's weight and dietary intake as ordered or per policy  Utilize nutrition screening tool and intervene as necessary  Determine patient's food preferences and provide high-protein, high-caloric foods as appropriate       INTERVENTIONS:  - Monitor oral intake, urinary output, labs, and treatment plans  - Assess nutrition and hydration status and recommend course of action  - Evaluate amount of meals eaten  - Assist patient with eating if necessary   - Allow adequate time for meals  - Recommend/ encourage appropriate diets, oral nutritional supplements, and vitamin/mineral supplements  - Order, calculate, and assess calorie counts as needed  - Recommend, monitor, and adjust tube feedings and TPN/PPN based on assessed needs  - Assess need for intravenous fluids  - Provide specific nutrition/hydration education as appropriate  - Include patient/family/caregiver in decisions related to nutrition  Outcome: Progressing     Problem: GENITOURINARY - ADULT  Goal: Maintains or returns to baseline urinary function  Description: INTERVENTIONS:  - Assess urinary function  - Encourage oral fluids to ensure adequate hydration if ordered  - Administer IV fluids as ordered to ensure adequate hydration  - Administer ordered medications as needed  - Offer frequent toileting  - Follow urinary retention protocol if ordered  Outcome: Progressing

## 2022-03-03 NOTE — ASSESSMENT & PLAN NOTE
-patient baseline creatinine around 1 1, currently 1 6--> 1 27, improving--> resolved  -several admissions for acute kidney injury  -this could be prerenal versus postrenal in the view of chronic urinary retention  Plan  -continue urinary retention protocol  -follow BMP in the a m   -no history of CHF, continue gentle IV hydration   -monitor urine output  -avoid hypotension  -avoid nephrotoxic drugs including NSAIDs, hold diuretics , ACEI  -voiding trial on 03/01  -patient needs to follow up outpatient with urology

## 2022-03-03 NOTE — PROGRESS NOTES
[facility]  Progress Note - Marbella Hsu 1945, 78 y o  male MRN: [id number]  Unit/Bed#: -01 Encounter: [id number]  Primary Care Provider: Shawn Brizuela MD   Date and time admitted to hospital: 2/28/2022 11:28 AM    * Cholestatic on LFT, cholangitis ruled out  Assessment & Plan  -suspected cholangitis in the view of LFTs showing hyperbilirubinemia direct, transaminitis,cholestatic , patient being septic   -ultrasound of the liver showed gallbladder stones but no signs of cholecystitis, cholangitis, normal liver and   flow   -cholestatic could be related to patient's sepsis, transaminitis, bilirubin improving on CMP  Plan  -followed GI consult  -continue cefepime and Flagyl  DC flagyl  -follow-up attic panel acute and chronic  -advance diet as tolerated    HERNÁN (acute kidney injury) (HCC)-resolved as of 3/3/2022  Assessment & Plan  -patient baseline creatinine around 1 1, currently 1 6--> 1 27, improving--> resolved  -several admissions for acute kidney injury  -this could be prerenal versus postrenal in the view of chronic urinary retention  Plan  -continue urinary retention protocol  -follow BMP in the a m   -no history of CHF, continue gentle IV hydration   -monitor urine output  -avoid hypotension  -avoid nephrotoxic drugs including NSAIDs, hold diuretics , ACEI  -voiding trial on 03/01  -patient needs to follow up outpatient with urology    Sepsis secondary to UTI (HCC)/bacteremia  Assessment & Plan  -patient developed fever over night, he was tachycardic and with soft blood pressures  -UA showed bacteriuria  -urine culture growing Gram-negative enteric bacteria  -blood cultures 1/2 grow Gram-negative rods    New blood cultures showing no growth  -lactic acid 2 9--> normal  Plan  -discontinue IV fluids  -follow urine culture--> pending sensitivity  -continue treatment with cefepime since patient had an indwelling catheter, catheter replaced during hospitalization  Iron deficiency anemia likely secondary to GI bleeding  Assessment & Plan  -patient was recently evaluated on February this year for symptomatic lower GI bleeding, he underwent EGD and colonoscopy  Multiple polyps were removed and were benign  Patient was recommended to follow with GI for after further colonoscopy in 3 months and an EGD in 1 year   -today patient hemoglobin 11 7 which seems to be patient's baseline  -no signs of GI bleeding  -hemodynamically stable  -continue iron replacement outpatient    Urinary retention  Assessment & Plan  -history of chronic urinary retention on Cloud catheter, as per note review secondary to BPH  -fail  voiding trial in this admission  -continue indwelling catheter and follow-up with Urology as an outpatient      COPD (chronic obstructive pulmonary disease) ([facility] [address] )  Assessment & Plan  -Currently not in exacerbation, patient continues to smoke  -Few scattered rales heard bilaterally  -Patient reports not using inhalers at home  Patient does get short of breath with ambulating   -will provide nebulization p r n  For SOB and wheezing  -continue nicotine past replacement    Ambulatory dysfunction  Assessment & Plan  -patient has history of generalized weakness with ambulatory dysfunction, given the option to go to skilled nursing facilities in the past but patient refuse  -patient reports a mechanical fall today, unclear if he had a syncopal episode or not  He call EMS due to this generalized weakness and ambulatory dysfunction  -CT head done due to patient being on anticoagulation was unremarkable with no signs of intracranial bleeding  -continue fall precautions  -check orthostatic vital signs--> negative  -will do PT/OT evaluation this admission and follow recommendations--> Rehab    Permanent atrial fibrillation [facility])  Assessment & Plan  -currently well controlled on metoprolol 25 mg daily  -on apixaban 5 mg b i d       Hyperkalemia-resolved as of 3/1/2022  Assessment & Plan  -elevated potassium 5 2 likely secondary to Ace inhibitors versus Fidel   -given Kayexalate on the ED  -no acute changes on the electrocardiogram  -no plan for treatment at this point  -follow-up potassium in the a m  VTE Pharmacologic Prophylaxis:     Moderate Risk (Score 3-4) - Pharmacological DVT Prophylaxis Ordered: Apixaban (Eliquis)  Mechanical VTE Prophylaxis in Place: Yes    Patient Centered Rounds: I have performed bedside rounds with nursing staff today  Discussions with Specialists or Other Care Team Provider: GI        Current Length of Stay: 2 day(s)    Current Patient Status: Inpatient     Discharge Plan / Estimated Discharge Date: Anticipate discharge tomorrow to rehab facility  Code Status: Level 1 - Full Code      Subjective:   No acute complains, no overnight events  Unfortunately patient failed voiding trial and although probably the reason why patient is having a new urinary tract infection today is related to the indwelling catheter , the risk of him having acute kidney injury due to urinary retention overweight the risk of him having further urinary infections for the Cloud catheter  Objective:     Vitals:   Temp (24hrs), Av 5 °F (36 4 °C), Min:97 1 °F (36 2 °C), Max:98 4 °F (36 9 °C)    Temp:  [97 1 °F (36 2 °C)-98 4 °F (36 9 °C)] 98 4 °F (36 9 °C)  HR:  [] 95  Resp:  [17-18] 18  BP: (121-154)/(76-93) 133/82  SpO2:  [97 %-100 %] 100 %  Body mass index is 20 09 kg/m²  Input and Output Summary (last 24 hours): Intake/Output Summary (Last 24 hours) at 3/3/2022 1048  Last data filed at 3/3/2022 0601  Gross per 24 hour   Intake 985 ml   Output 600 ml   Net 385 ml       Physical Exam:     Physical Exam  Cardiovascular:      Rate and Rhythm: Normal rate  Pulses: Normal pulses  Pulmonary:      Effort: Pulmonary effort is normal    Abdominal:      General: Abdomen is flat   Bowel sounds are normal       Palpations: Abdomen is soft    Genitourinary:     Comments: Cloud draining clean urine  Musculoskeletal:         General: Normal range of motion  Cervical back: Normal range of motion  Right lower leg: No edema  Left lower leg: No edema  Skin:     General: Skin is warm  Capillary Refill: Capillary refill takes less than 2 seconds  Neurological:      General: No focal deficit present  Mental Status: He is alert and oriented to person, place, and time  Mental status is at baseline  Psychiatric:         Mood and Affect: Mood normal           Additional Data:     Labs:  Results from last 7 days   Lab Units 03/03/22  0426   WBC Thousand/uL 9 49   HEMOGLOBIN g/dL 9 5*   HEMATOCRIT % 31 9*   PLATELETS Thousands/uL 223   NEUTROS PCT % 84*   LYMPHS PCT % 6*   MONOS PCT % 6   EOS PCT % 3     Results from last 7 days   Lab Units 03/03/22  0426   SODIUM mmol/L 137   POTASSIUM mmol/L 4 0   CHLORIDE mmol/L 107   CO2 mmol/L 22   BUN mg/dL 16   CREATININE mg/dL 0 99   ANION GAP mmol/L 8   CALCIUM mg/dL 7 9*   ALBUMIN g/dL 2 9*   TOTAL BILIRUBIN mg/dL 1 61*   ALK PHOS U/L 164*   ALT U/L 126*   AST U/L 71*   GLUCOSE RANDOM mg/dL 131     Results from last 7 days   Lab Units 03/03/22  0426   INR  1 34*             Results from last 7 days   Lab Units 03/02/22  0503 03/01/22  2257 03/01/22  1554 03/01/22  1017   LACTIC ACID mmol/L  --  1 3 2 4* 2 9*   PROCALCITONIN ng/ml 0 88*  --   --  1 50*       Imaging: Reviewed radiology reports from this admission including: ultrasound(s)    Recent Cultures (last 7 days):     Results from last 7 days   Lab Units 03/02/22  0903 03/02/22  0901 03/01/22  1856 03/01/22  1014   BLOOD CULTURE  Received in Microbiology Lab  Culture in Progress  Received in Microbiology Lab  Culture in Progress  --  Klebsiella-Enterobacter  group*  No Growth at 24 hrs     GRAM STAIN RESULT   --   --   --  Gram negative rods*   URINE CULTURE   --   --  <10,000 cfu/ml Gram Negative Pramod Enteric Like*  No Growth <1000 cfu/mL  --        Lines/Drains:  Invasive Devices  Report    Peripheral Intravenous Line            Peripheral IV 02/28/22 Left Antecubital 2 days          Drain            Urethral Catheter 18 Fr  <1 day                Telemetry:        Last 24 Hours Medication List:   Current Facility-Administered Medications   Medication Dose Route Frequency Provider Last Rate    apixaban  5 mg Oral BID Nicolette Russ MD      cefepime  2,000 mg Intravenous Q12H Nicolette Russ MD 2,000 mg (03/03/22 1025)    docusate sodium  100 mg Oral BID Nicolette Russ MD      ferrous sulfate  325 mg Oral Daily With Breakfast Nicolette Russ MD      fluticasone-vilanterol  1 puff Inhalation Daily Nicolette Russ MD      folic acid  124 mcg Oral Daily Nicolette Russ MD      ipratropium  0 5 mg Nebulization TID Nelida Sandy MD      levalbuterol  1 25 mg Nebulization TID Nelida Sandy MD      metoprolol succinate  25 mg Oral Daily Nicolette Russ MD      nicotine  1 patch Transdermal Daily Nicolette Russ MD      pantoprazole  40 mg Oral Daily Before Breakfast Nicolette Russ MD      polyethylene glycol  17 g Oral Daily PRN Yaritza Marin PA-C      sodium polystyrene  15 g Oral Once Loly Leung MD          Today, Patient Was Seen By: Nicolette Russ MD    ** Please Note: This note has been constructed using a voice recognition system   **

## 2022-03-03 NOTE — CASE MANAGEMENT
Case Management Discharge Planning Note    Patient name Vandana Mason  Location /-06 MRN [id number]  : 1945 Date 3/3/2022       Current Admission Date: 2022  Current Admission Diagnosis:Cholestatic on LFT, cholangitis ruled out   Patient Active Problem List    Diagnosis Date Noted    Moderate protein-calorie malnutrition ([facility] [address] ) 2022    Cholestatic on LFT, cholangitis ruled out 2022    Sepsis secondary to UTI (HCC)/bacteremia 2022    Iron deficiency anemia likely secondary to GI bleeding 2022    Abnormal colonoscopy 2022    Dizziness 2022    Urinary retention 2022    Essential (hemorrhagic) thrombocythemia ([facility] [address] ) 2022    Closed nondisplaced comminuted fracture of left patella 2021    Head trauma 2021    Thrombocytosis 2021    Bradycardia 2021    Syncope 2021    Colonic adenoma 2021    Ambulatory dysfunction 2021    [facility] 2021    Orthostatic hypotension 2020    Tobacco use disorder 2020    Symptomatic anemia 2020    Congestive cardiomyopathy ([facility] ) 2020    Permanent atrial fibrillation ([facility] ) 2020      LOS (days): 2  Geometric Mean LOS (GMLOS) (days): 4 80  Days to GMLOS:2 7     OBJECTIVE:  Risk of Unplanned Readmission Score: 29         Current admission status: Inpatient   Preferred Pharmacy:   [facility] [address] [place] [address] [address] [address] [facility] [address]  [address] [place] [address]  Phone: [phone] Fax: [phone] [facility][note 3][address] [place] [address]  Phone: [phone] Fax: [phone]    Primary Care Provider: Omar Vincent MD    Primary Insurance: MEDICARE  Secondary Insurance:  FOR LIFE    DISCHARGE DETAILS:    Discharge planning discussed with[de-identified] patient  Freedom of Choice: Yes  Comments - Freedom of Choice: patient now refusing STR, pt is current with [address] - set to resume upon d/c  CM contacted family/caregiver?: No- see comments (pt declined call to spouse)  Were Treatment Team discharge recommendations reviewed with patient/caregiver?: Yes  Did patient/caregiver verbalize understanding of patient care needs?: Yes  Were patient/caregiver advised of the risks associated with not following Treatment Team discharge recommendations?: Yes    Contacts  Patient Contacts: Brianne Garrison (Spouse)  Relationship to Patient[de-identified] Family  Contact Method: Phone  Phone Number: [phone]  Reason/Outcome: Emergency [address]         Is the patient interested in [facility] at discharge?: Yes  [address] requested[de-identified] [address] Name[de-identified] Other ([address] [place] [address])  [facility] Provider[de-identified] PCP  Home Health Services Needed[de-identified] Evaluate Functional Status and Safety,Gait/ADL Training,Strengthening/Theraputic Exercises to Improve Function  Homebound Criteria Met[de-identified] Requires the Assistance of Another Person for Safe Ambulation or to Leave the Home,Uses an Assist Device (i e  cane, walker, etc)  Supporting Clincal Findings[de-identified] Limited Endurance,Fatigues Easliy in Short Distances    DME Referral Provided  Referral made for DME?: No    Other Referral/Resources/Interventions Provided:  Referral Comments: CM met with patient at bedside to discuss STR options  Pt now refusing STR, safety risks were addressed, pt aware and relayed wants to return home with [facility]  Pt [facility] agency made aware via ecin pt likely d/c tomorrow  Pt relayed would need transport home upon d/c  Pt declined call to spouse as phone is currently still not working  IMM reviewed with patient, copy provided      Would you like to participate in our [address] service program?  : No - Declined    Treatment Team Recommendation: Short Term Rehab  Discharge Destination Plan[de-identified] Home with [address] Given (Date):: 03/03/22  IMM Given to[de-identified] Patient

## 2022-03-03 NOTE — ASSESSMENT & PLAN NOTE
-patient developed fever over night, he was tachycardic and with soft blood pressures  -UA showed bacteriuria  -urine culture growing Gram-negative enteric bacteria  -blood cultures 1/2 grow Gram-negative rods  New blood cultures showing no growth  -lactic acid 2 9--> normal  Plan  -discontinue IV fluids  -follow urine culture--> pending sensitivity  -continue treatment with cefepime since patient had an indwelling catheter, catheter replaced during hospitalization

## 2022-03-03 NOTE — MALNUTRITION/BMI
This medical record reflects one or more clinical indicators suggestive of malnutrition and/or morbid obesity  Malnutrition Findings:   Adult Malnutrition type: Acute illness  Adult Degree of Malnutrition: Malnutrition of moderate degree (r/t lower appetite with inadequate protein and calorie intake as evidenced by wt decrease of 11 6% x 1 5 mo and muscle wasting (moderate-clavicle)  Will treat with nutrition therapy & oral nutritional supplements )  Malnutrition Characteristics: Muscle loss,Weight loss  Documentation of % meal eaten would be appreciated  BMI Findings: Body mass index is 20 09 kg/m²  See Nutrition note dated 03/03/22 for additional details  Completed nutrition assessment is viewable in the nutrition documentation

## 2022-03-03 NOTE — ASSESSMENT & PLAN NOTE
-patient was recently evaluated on February this year for symptomatic lower GI bleeding, he underwent EGD and colonoscopy  Multiple polyps were removed and were benign    Patient was recommended to follow with GI for after further colonoscopy in 3 months and an EGD in 1 year   -today patient hemoglobin 11 7 which seems to be patient's baseline  -no signs of GI bleeding  -hemodynamically stable  -continue iron replacement outpatient

## 2022-03-03 NOTE — PROGRESS NOTES
Progress Note - Suellen Blount 78 y o  male MRN: [id number]    Unit/Bed#: -01 Encounter: [id number]        Assessment/Plan: This is a 81-year-old male who presents with ambulatory dysfunction and generalized weakness and was noted to have HERNÁN on admission and now noted to have transaminitis  Patient became septic yesterday and was noted to be hypotensive and blood cultures are Klebsiella/Enterobacter and urine culture did show gram negative enteric like rods, likely sepsis related to UTI  Patient does have indwelling Cloud catheter  Patient had ultrasound ordered to evaluate for any biliary abnormality which showed gallbladder stones and sludge without evidence of acute cholecystitis  No biliary ductal dilatation  Continue cefepime and Flagyl  LFTs have all improved today  Suspect LFT elevation could be related to sepsis induced cholestasis  Patient did have some hypotension but current LFT pattern not consistent with ischemic hepatopathy  Hepatitis panel also ordered and pending  LFTs currently are improving but if upward trend then MRCP may need to be considered  As for iron deficiency anemia, recent GI workup as above and will need repeat colonoscopy in 3 months due to remaining polyps, EGD in 1 year due to intestinal metaplasia noted in antrum on biopsy    Subjective:   Patient is lying in bed  He offers no complaints  Denies any abdominal pain  Talking to dietitian and plan is to increase diet      Objective:     Vitals: /82 (BP Location: Right arm)   Pulse 95   Temp 98 4 °F (36 9 °C) (Oral)   Resp 18   Ht 6' (1 829 m)   Wt 67 2 kg (148 lb 2 4 oz)   SpO2 100%   BMI 20 09 kg/m²       Physical Exam:  Gen-alert no acute distress  Abd-positive bowel sounds soft nontender nondistended, no rebound rigidity guarding       Lab, Imaging and other studies:   Recent Results (from the past 72 hour(s))   CBC and differential    Collection Time: 02/28/22 11:57 AM   Result Value Ref Range WBC 8 58 4 31 - 10 16 Thousand/uL    RBC 5 15 3 88 - 5 62 Million/uL    Hemoglobin 11 7 (L) 12 0 - 17 0 g/dL    Hematocrit 38 9 36 5 - 49 3 %    MCV 76 (L) 82 - 98 fL    MCH 22 7 (L) 26 8 - 34 3 pg    MCHC 30 1 (L) 31 4 - 37 4 g/dL    RDW 27 9 (H) 11 6 - 15 1 %    MPV 9 6 8 9 - 12 7 fL    Platelets 074 427 - 322 Thousands/uL    nRBC 0 /100 WBCs    Neutrophils Relative 80 (H) 43 - 75 %    Immat GRANS % 1 0 - 2 %    Lymphocytes Relative 11 (L) 14 - 44 %    Monocytes Relative 6 4 - 12 %    Eosinophils Relative 1 0 - 6 %    Basophils Relative 1 0 - 1 %    Neutrophils Absolute 6 95 1 85 - 7 62 Thousands/µL    Immature Grans Absolute 0 08 0 00 - 0 20 Thousand/uL    Lymphocytes Absolute 0 93 0 60 - 4 47 Thousands/µL    Monocytes Absolute 0 50 0 17 - 1 22 Thousand/µL    Eosinophils Absolute 0 05 0 00 - 0 61 Thousand/µL    Basophils Absolute 0 07 0 00 - 0 10 Thousands/µL   Comprehensive metabolic panel    Collection Time: 02/28/22 11:57 AM   Result Value Ref Range    Sodium 135 133 - 145 mmol/L    Potassium 5 2 (H) 3 5 - 5 0 mmol/L    Chloride 98 96 - 108 mmol/L    CO2 26 22 - 33 mmol/L    ANION GAP 11 4 - 13 mmol/L    BUN 31 (H) 6 - 20 mg/dL    Creatinine 1 66 (H) 0 50 - 1 20 mg/dL    Glucose 90 65 - 140 mg/dL    Calcium 9 5 8 4 - 10 2 mg/dL    AST 21 15 - 41 U/L    ALT 9 5 - 63 U/L    Alkaline Phosphatase 62 6 10 - 129 U/L    Total Protein 7 2 6 4 - 8 3 g/dL    Albumin 4 1 3 4 - 4 8 g/dL    Total Bilirubin 1 27 (H) 0 30 - 1 20 mg/dL    eGFR 39 ml/min/1 73sq m   High Sensitivity Troponin I Random    Collection Time: 02/28/22 11:57 AM   Result Value Ref Range    HS TnI random 13 8 - 18 ng/L   CK (with reflex to MB)    Collection Time: 02/28/22 11:57 AM   Result Value Ref Range    Total CK 68 1 49 - 397 U/L   TSH, 3rd generation with Free T4 reflex    Collection Time: 02/28/22 11:57 AM   Result Value Ref Range    TSH 3RD GENERATON 1 178 0 340 - 5 600 uIU/mL   ECG 12 lead    Collection Time: 02/28/22 12:23 PM   Result Value Ref Range    Ventricular Rate 83 BPM    Atrial Rate 106 BPM    AZ Interval 133 ms    QRSD Interval 95 ms    QT Interval 390 ms    QTC Interval 459 ms    P Axis  degrees    QRS Axis 71 degrees    T Wave Axis 67 degrees   UA w Reflex to Microscopic w Reflex to Culture    Collection Time: 02/28/22 12:42 PM    Specimen: Urine, Clean Catch   Result Value Ref Range    Color, UA Yellow Yellow    Clarity, UA Clear Clear    Specific Gravity, UA 1 015 1 001 - 1 030    pH, UA 5 0 5 0, 5 5, 6 0, 6 5, 7 0, 7 5, 8 0    Leukocytes, UA 2+ (A) Negative    Nitrite, UA Negative Negative    Protein, UA Negative Negative, Interference- unable to analyze mg/dl    Glucose, UA Negative Negative mg/dl    Ketones, UA Negative Negative mg/dl    Urobilinogen, UA 0 2 0 2, 1 0 E U /dl E U /dl    Bilirubin, UA Negative Negative    Blood, UA 1+ (A) Negative   Urine Microscopic    Collection Time: 02/28/22 12:42 PM   Result Value Ref Range    RBC, UA 0-1 None Seen, 0-1, 1-2, 2-4, 0-5 /hpf    WBC, UA 0-5 None Seen, 0-1, 1-2, 0-5, 2-4 /hpf    Epithelial Cells None Seen None Seen, Occasional /hpf    Bacteria, UA Moderate (A) None Seen, Occasional /hpf   Basic metabolic panel    Collection Time: 03/01/22  5:09 AM   Result Value Ref Range    Sodium 139 133 - 145 mmol/L    Potassium 3 9 3 5 - 5 0 mmol/L    Chloride 104 96 - 108 mmol/L    CO2 24 22 - 33 mmol/L    ANION GAP 11 4 - 13 mmol/L    BUN 26 (H) 6 - 20 mg/dL    Creatinine 1 27 (H) 0 50 - 1 20 mg/dL    Glucose 121 65 - 140 mg/dL    Glucose, Fasting 121 (H) 70 - 105 mg/dL    Calcium 8 7 8 4 - 10 2 mg/dL    eGFR 55 ml/min/1 73sq m   CBC and differential    Collection Time: 03/01/22  5:15 AM   Result Value Ref Range    WBC 14 92 (H) 4 31 - 10 16 Thousand/uL    RBC 4 79 3 88 - 5 62 Million/uL    Hemoglobin 11 3 (L) 12 0 - 17 0 g/dL    Hematocrit 37 4 36 5 - 49 3 %    MCV 78 (L) 82 - 98 fL    MCH 23 6 (L) 26 8 - 34 3 pg    MCHC 30 2 (L) 31 4 - 37 4 g/dL    RDW 28 6 (H) 11 6 - 15 1 %    MPV 10 4 8 9 - 12 7 fL    Platelets 383 965 - 624 Thousands/uL    nRBC 0 /100 WBCs    Neutrophils Relative 92 (H) 43 - 75 %    Immat GRANS % 0 0 - 2 %    Lymphocytes Relative 3 (L) 14 - 44 %    Monocytes Relative 5 4 - 12 %    Eosinophils Relative 0 0 - 6 %    Basophils Relative 0 0 - 1 %    Neutrophils Absolute 13 69 (H) 1 85 - 7 62 Thousands/µL    Immature Grans Absolute 0 06 0 00 - 0 20 Thousand/uL    Lymphocytes Absolute 0 40 (L) 0 60 - 4 47 Thousands/µL    Monocytes Absolute 0 75 0 17 - 1 22 Thousand/µL    Eosinophils Absolute 0 01 0 00 - 0 61 Thousand/µL    Basophils Absolute 0 01 0 00 - 0 10 Thousands/µL   Blood culture    Collection Time: 03/01/22 10:14 AM    Specimen: Arm, Left; Blood   Result Value Ref Range    Blood Culture No Growth at 24 hrs      Blood culture    Collection Time: 03/01/22 10:14 AM    Specimen: Arm, Right; Blood   Result Value Ref Range    Blood Culture Klebsiella-Enterobacter  group (A)     Gram Stain Result Gram negative rods (A)    Lactic acid, plasma    Collection Time: 03/01/22 10:17 AM   Result Value Ref Range    LACTIC ACID 2 9 (HH) 0 5 - 2 0 mmol/L   Procalcitonin with AM Reflex    Collection Time: 03/01/22 10:17 AM   Result Value Ref Range    Procalcitonin 1 50 (H) <=0 25 ng/ml   Lactic acid 2 Hours    Collection Time: 03/01/22  3:54 PM   Result Value Ref Range    LACTIC ACID 2 4 (HH) 0 5 - 2 0 mmol/L   Urine culture    Collection Time: 03/01/22  6:56 PM    Specimen: Urine, Clean Catch   Result Value Ref Range    Urine Culture <10,000 cfu/ml Gram Negative Pramod Enteric Like (A)    UA w Reflex to Microscopic w Reflex to Culture    Collection Time: 03/01/22  6:56 PM    Specimen: Urine, Clean Catch   Result Value Ref Range    Color, UA Daya (A) Yellow    Clarity, UA Clear Clear    Specific [place], UA 1 025 1 001 - 1 030    pH, UA 6 0 5 0, 5 5, 6 0, 6 5, 7 0, 7 5, 8 0    Leukocytes, UA 2+ (A) Negative    Nitrite, UA Negative Negative    Protein, UA Trace (A) Negative, Interference- unable to analyze mg/dl    Glucose, UA Negative Negative mg/dl    Ketones, UA Negative Negative mg/dl    Urobilinogen, UA >=8 0 (A) 0 2, 1 0 E U /dl E U /dl    Bilirubin, UA 3+ (A) Negative    Blood, UA Trace-Intact (A) Negative   Urine Microscopic    Collection Time: 03/01/22  6:56 PM   Result Value Ref Range    RBC, UA None Seen None Seen, 0-1, 1-2, 2-4, 0-5 /hpf    WBC, UA 30-50 (A) None Seen, 0-1, 1-2, 0-5, 2-4 /hpf    Epithelial Cells Occasional None Seen, Occasional /hpf    Bacteria, UA Occasional None Seen, Occasional /hpf   Urine culture    Collection Time: 03/01/22  6:56 PM    Specimen: Urine, Clean Catch   Result Value Ref Range    Urine Culture No Growth <1000 cfu/mL    Lactic acid, plasma    Collection Time: 03/01/22 10:57 PM   Result Value Ref Range    LACTIC ACID 1 3 0 5 - 2 0 mmol/L   Procalcitonin Reflex    Collection Time: 03/02/22  5:03 AM   Result Value Ref Range    Procalcitonin 0 88 (H) <=0 25 ng/ml   CBC and differential    Collection Time: 03/02/22  5:03 AM   Result Value Ref Range    WBC 10 14 4 31 - 10 16 Thousand/uL    RBC 4 24 3 88 - 5 62 Million/uL    Hemoglobin 9 9 (L) 12 0 - 17 0 g/dL    Hematocrit 34 5 (L) 36 5 - 49 3 %    MCV 81 (L) 82 - 98 fL    MCH 23 3 (L) 26 8 - 34 3 pg    MCHC 28 7 (L) 31 4 - 37 4 g/dL    RDW 28 5 (H) 11 6 - 15 1 %    MPV 11 0 8 9 - 12 7 fL    Platelets 631 828 - 980 Thousands/uL    nRBC 0 /100 WBCs    Neutrophils Relative 84 (H) 43 - 75 %    Immat GRANS % 0 0 - 2 %    Lymphocytes Relative 8 (L) 14 - 44 %    Monocytes Relative 7 4 - 12 %    Eosinophils Relative 1 0 - 6 %    Basophils Relative 0 0 - 1 %    Neutrophils Absolute 8 52 (H) 1 85 - 7 62 Thousands/µL    Immature Grans Absolute 0 04 0 00 - 0 20 Thousand/uL    Lymphocytes Absolute 0 82 0 60 - 4 47 Thousands/µL    Monocytes Absolute 0 66 0 17 - 1 22 Thousand/µL    Eosinophils Absolute 0 07 0 00 - 0 61 Thousand/µL    Basophils Absolute 0 03 0 00 - 0 10 Thousands/µL   Basic metabolic panel    Collection Time: 03/02/22 5:03 AM   Result Value Ref Range    Sodium 136 135 - 147 mmol/L    Potassium 4 1 3 5 - 5 3 mmol/L    Chloride 106 96 - 108 mmol/L    CO2 22 21 - 32 mmol/L    ANION GAP 8 4 - 13 mmol/L    BUN 20 5 - 25 mg/dL    Creatinine 1 20 0 60 - 1 30 mg/dL    Glucose 100 65 - 140 mg/dL    Calcium 8 1 (L) 8 4 - 10 2 mg/dL    eGFR 59 ml/min/1 73sq m   Hepatic function panel    Collection Time: 03/02/22  5:03 AM   Result Value Ref Range    Total Bilirubin 3 03 (H) 0 20 - 1 00 mg/dL    Bilirubin, Direct 2 14 (H) 0 00 - 0 20 mg/dL    Alkaline Phosphatase 173 (H) 34 - 104 U/L     (H) 13 - 39 U/L     (H) 7 - 52 U/L    Total Protein 5 5 (L) 6 4 - 8 4 g/dL    Albumin 3 0 (L) 3 5 - 5 0 g/dL   Blood culture    Collection Time: 03/02/22  9:01 AM    Specimen: Arm, Left; Blood   Result Value Ref Range    Blood Culture Received in Microbiology Lab  Culture in Progress  Blood culture    Collection Time: 03/02/22  9:03 AM    Specimen: Arm, Right; Blood   Result Value Ref Range    Blood Culture Received in Microbiology Lab  Culture in Progress      Protime-INR    Collection Time: 03/03/22  4:26 AM   Result Value Ref Range    Protime 16 4 (H) 11 6 - 14 5 seconds    INR 1 34 (H) 0 84 - 1 19   Comprehensive metabolic panel    Collection Time: 03/03/22  4:26 AM   Result Value Ref Range    Sodium 137 135 - 147 mmol/L    Potassium 4 0 3 5 - 5 3 mmol/L    Chloride 107 96 - 108 mmol/L    CO2 22 21 - 32 mmol/L    ANION GAP 8 4 - 13 mmol/L    BUN 16 5 - 25 mg/dL    Creatinine 0 99 0 60 - 1 30 mg/dL    Glucose 131 65 - 140 mg/dL    Calcium 7 9 (L) 8 4 - 10 2 mg/dL    Corrected Calcium 8 8 8 3 - 10 1 mg/dL    AST 71 (H) 13 - 39 U/L     (H) 7 - 52 U/L    Alkaline Phosphatase 164 (H) 34 - 104 U/L    Total Protein 5 3 (L) 6 4 - 8 4 g/dL    Albumin 2 9 (L) 3 5 - 5 0 g/dL    Total Bilirubin 1 61 (H) 0 20 - 1 00 mg/dL    eGFR 74 ml/min/1 73sq m   CBC and differential    Collection Time: 03/03/22  4:26 AM   Result Value Ref Range    WBC 9 49 4 31 - 10 16 Thousand/uL    RBC 4 03 3 88 - 5 62 Million/uL    Hemoglobin 9 5 (L) 12 0 - 17 0 g/dL    Hematocrit 31 9 (L) 36 5 - 49 3 %    MCV 79 (L) 82 - 98 fL    MCH 23 6 (L) 26 8 - 34 3 pg    MCHC 29 8 (L) 31 4 - 37 4 g/dL    RDW 28 2 (H) 11 6 - 15 1 %    Platelets 423 185 - 154 Thousands/uL    nRBC 0 /100 WBCs    Neutrophils Relative 84 (H) 43 - 75 %    Immat GRANS % 1 0 - 2 %    Lymphocytes Relative 6 (L) 14 - 44 %    Monocytes Relative 6 4 - 12 %    Eosinophils Relative 3 0 - 6 %    Basophils Relative 0 0 - 1 %    Neutrophils Absolute 8 09 (H) 1 85 - 7 62 Thousands/µL    Immature Grans Absolute 0 05 0 00 - 0 20 Thousand/uL    Lymphocytes Absolute 0 53 (L) 0 60 - 4 47 Thousands/µL    Monocytes Absolute 0 52 0 17 - 1 22 Thousand/µL    Eosinophils Absolute 0 27 0 00 - 0 61 Thousand/µL    Basophils Absolute 0 03 0 00 - 0 10 Thousands/µL   Bilirubin, direct    Collection Time: 03/03/22  4:26 AM   Result Value Ref Range    Bilirubin, Direct 0 97 (H) 0 00 - 0 20 mg/dL

## 2022-03-03 NOTE — PLAN OF CARE
Problem: Potential for Falls  Goal: Patient will remain free of falls  Description: INTERVENTIONS:  - Educate patient/family on patient safety including physical limitations  - Instruct patient to call for assistance with activity   - Consult OT/PT to assist with strengthening/mobility   - Keep Call bell within reach  - Keep bed low and locked with side rails adjusted as appropriate  - Keep care items and personal belongings within reach  - Initiate and maintain comfort rounds  - Make Fall Risk Sign visible to staff  - Offer Toileting every   - Initiate/Maintain bed alarm  - Obtain necessary fall risk management equipment  - Apply yellow socks and bracelet for high fall risk patients  - Consider moving patient to room near nurses station  Outcome: Progressing     Problem: MOBILITY - ADULT  Goal: Maintain or return to baseline ADL function  Description: INTERVENTIONS:  -  Assess patient's ability to carry out ADLs; assess patient's baseline for ADL function and identify physical deficits which impact ability to perform ADLs (bathing, care of mouth/teeth, toileting, grooming, dressing, etc )  - Assess/evaluate cause of self-care deficits   - Assess range of motion  - Assess patient's mobility; develop plan if impaired  - Assess patient's need for assistive devices and provide as appropriate  - Encourage maximum independence but intervene and supervise when necessary  - Involve family in performance of ADLs  - Assess for home care needs following discharge   - Consider OT consult to assist with ADL evaluation and planning for discharge  - Provide patient education as appropriate  Outcome: Progressing  Goal: Maintains/Returns to pre admission functional level  Description: INTERVENTIONS:  - Perform BMAT or MOVE assessment daily    - Set and communicate daily mobility goal to care team and patient/family/caregiver     - Collaborate with rehabilitation services on mobility goals if consulted    - Reposition patient every 2 hours  - Out of bed for meals 2 times a day  - Out of bed for toileting  - Record patient progress and toleration of activity level   Outcome: Progressing     Problem: Nutrition/Hydration-ADULT  Goal: Nutrient/Hydration intake appropriate for improving, restoring or maintaining nutritional needs  Description: Monitor and assess patient's nutrition/hydration status for malnutrition  Collaborate with interdisciplinary team and initiate plan and interventions as ordered  Monitor patient's weight and dietary intake as ordered or per policy  Utilize nutrition screening tool and intervene as necessary  Determine patient's food preferences and provide high-protein, high-caloric foods as appropriate       INTERVENTIONS:  - Monitor oral intake, urinary output, labs, and treatment plans  - Assess nutrition and hydration status and recommend course of action  - Evaluate amount of meals eaten  - Assist patient with eating if necessary   - Allow adequate time for meals  - Recommend/ encourage appropriate diets, oral nutritional supplements, and vitamin/mineral supplements  - Order, calculate, and assess calorie counts as needed  - Recommend, monitor, and adjust tube feedings and TPN/PPN based on assessed needs  - Assess need for intravenous fluids  - Provide specific nutrition/hydration education as appropriate  - Include patient/family/caregiver in decisions related to nutrition  Outcome: Progressing     Problem: GENITOURINARY - ADULT  Goal: Maintains or returns to baseline urinary function  Description: INTERVENTIONS:  - Assess urinary function  - Encourage oral fluids to ensure adequate hydration if ordered  - Administer IV fluids as ordered to ensure adequate hydration  - Administer ordered medications as needed  - Offer frequent toileting  - Follow urinary retention protocol if ordered  Outcome: Progressing  Goal: Absence of urinary retention  Description: INTERVENTIONS:  - Assess patients ability to void and empty bladder  - Monitor I/O  - Bladder scan as needed  - Discuss with physician/AP medications to alleviate retention as needed  - Discuss catheterization for long term situations as appropriate  Outcome: Progressing  Goal: Urinary catheter remains patent  Description: INTERVENTIONS:  - Assess patency of urinary catheter  - If patient has a chronic miller, consider changing catheter if non-functioning  - Follow guidelines for intermittent irrigation of non-functioning urinary catheter  Outcome: Progressing

## 2022-03-03 NOTE — ASSESSMENT & PLAN NOTE
-suspected cholangitis in the view of LFTs showing hyperbilirubinemia direct, transaminitis,cholestatic , patient being septic   -ultrasound of the liver showed gallbladder stones but no signs of cholecystitis, cholangitis, normal liver and   flow   -cholestatic could be related to patient's sepsis, transaminitis, bilirubin improving on CMP  Plan  -followed GI consult  -continue cefepime and Flagyl  DC flagyl  -follow-up attic panel acute and chronic  -advance diet as tolerated

## 2022-03-03 NOTE — ASSESSMENT & PLAN NOTE
-patient has history of generalized weakness with ambulatory dysfunction, given the option to go to skilled nursing facilities in the past but patient refuse  -patient reports a mechanical fall today, unclear if he had a syncopal episode or not    He call EMS due to this generalized weakness and ambulatory dysfunction  -CT head done due to patient being on anticoagulation was unremarkable with no signs of intracranial bleeding  -continue fall precautions  -check orthostatic vital signs--> negative  -will do PT/OT evaluation this admission and follow recommendations--> Rehab

## 2022-03-03 NOTE — ASSESSMENT & PLAN NOTE
-history of chronic urinary retention on Cloud catheter, as per note review secondary to BPH  -fail  voiding trial in this admission  -continue indwelling catheter and follow-up with Urology as an outpatient

## 2022-03-04 VITALS
OXYGEN SATURATION: 100 % | HEART RATE: 87 BPM | RESPIRATION RATE: 16 BRPM | DIASTOLIC BLOOD PRESSURE: 87 MMHG | SYSTOLIC BLOOD PRESSURE: 134 MMHG | WEIGHT: 148.15 LBS | BODY MASS INDEX: 20.07 KG/M2 | TEMPERATURE: 98.1 F | HEIGHT: 72 IN

## 2022-03-04 PROBLEM — R17 CHOLESTATIC JAUNDICE: Status: ACTIVE | Noted: 2022-03-02

## 2022-03-04 LAB
ALBUMIN SERPL BCP-MCNC: 2.9 G/DL (ref 3.5–5)
ALP SERPL-CCNC: 163 U/L (ref 34–104)
ALT SERPL W P-5'-P-CCNC: 89 U/L (ref 7–52)
ANION GAP SERPL CALCULATED.3IONS-SCNC: 7 MMOL/L (ref 4–13)
AST SERPL W P-5'-P-CCNC: 25 U/L (ref 13–39)
BACTERIA BLD CULT: ABNORMAL
BASOPHILS # BLD AUTO: 0.05 THOUSANDS/ΜL (ref 0–0.1)
BASOPHILS NFR BLD AUTO: 1 % (ref 0–1)
BILIRUB SERPL-MCNC: 1 MG/DL (ref 0.2–1)
BUN SERPL-MCNC: 10 MG/DL (ref 5–25)
CALCIUM ALBUM COR SERPL-MCNC: 9 MG/DL (ref 8.3–10.1)
CALCIUM SERPL-MCNC: 8.1 MG/DL (ref 8.4–10.2)
CHLORIDE SERPL-SCNC: 108 MMOL/L (ref 96–108)
CO2 SERPL-SCNC: 22 MMOL/L (ref 21–32)
CREAT SERPL-MCNC: 0.95 MG/DL (ref 0.6–1.3)
EOSINOPHIL # BLD AUTO: 0.6 THOUSAND/ΜL (ref 0–0.61)
EOSINOPHIL NFR BLD AUTO: 7 % (ref 0–6)
ERYTHROCYTE [DISTWIDTH] IN BLOOD BY AUTOMATED COUNT: 28.4 % (ref 11.6–15.1)
GFR SERPL CREATININE-BSD FRML MDRD: 78 ML/MIN/1.73SQ M
GLUCOSE SERPL-MCNC: 120 MG/DL (ref 65–140)
GRAM STN SPEC: ABNORMAL
HCT VFR BLD AUTO: 33.9 % (ref 36.5–49.3)
HGB BLD-MCNC: 9.7 G/DL (ref 12–17)
IMM GRANULOCYTES # BLD AUTO: 0.06 THOUSAND/UL (ref 0–0.2)
IMM GRANULOCYTES NFR BLD AUTO: 1 % (ref 0–2)
LYMPHOCYTES # BLD AUTO: 0.72 THOUSANDS/ΜL (ref 0.6–4.47)
LYMPHOCYTES NFR BLD AUTO: 8 % (ref 14–44)
MCH RBC QN AUTO: 23.4 PG (ref 26.8–34.3)
MCHC RBC AUTO-ENTMCNC: 28.6 G/DL (ref 31.4–37.4)
MCV RBC AUTO: 82 FL (ref 82–98)
MONOCYTES # BLD AUTO: 0.58 THOUSAND/ΜL (ref 0.17–1.22)
MONOCYTES NFR BLD AUTO: 7 % (ref 4–12)
NEUTROPHILS # BLD AUTO: 6.98 THOUSANDS/ΜL (ref 1.85–7.62)
NEUTS SEG NFR BLD AUTO: 76 % (ref 43–75)
NRBC BLD AUTO-RTO: 0 /100 WBCS
PLATELET # BLD AUTO: 267 THOUSANDS/UL (ref 149–390)
POTASSIUM SERPL-SCNC: 4.1 MMOL/L (ref 3.5–5.3)
PROT SERPL-MCNC: 5.5 G/DL (ref 6.4–8.4)
PSA FREE MFR SERPL: 13.5 %
PSA FREE SERPL-MCNC: 0.5 NG/ML
PSA SERPL-MCNC: 3.7 NG/ML (ref 0–4)
RBC # BLD AUTO: 4.15 MILLION/UL (ref 3.88–5.62)
SODIUM SERPL-SCNC: 137 MMOL/L (ref 135–147)
WBC # BLD AUTO: 8.99 THOUSAND/UL (ref 4.31–10.16)

## 2022-03-04 PROCEDURE — 80053 COMPREHEN METABOLIC PANEL: CPT | Performed by: INTERNAL MEDICINE

## 2022-03-04 PROCEDURE — 99232 SBSQ HOSP IP/OBS MODERATE 35: CPT | Performed by: INTERNAL MEDICINE

## 2022-03-04 PROCEDURE — 85025 COMPLETE CBC W/AUTO DIFF WBC: CPT | Performed by: INTERNAL MEDICINE

## 2022-03-04 PROCEDURE — 94760 N-INVAS EAR/PLS OXIMETRY 1: CPT

## 2022-03-04 PROCEDURE — 99239 HOSP IP/OBS DSCHRG MGMT >30: CPT | Performed by: INTERNAL MEDICINE

## 2022-03-04 PROCEDURE — 94640 AIRWAY INHALATION TREATMENT: CPT

## 2022-03-04 RX ORDER — CEFPODOXIME PROXETIL 200 MG/1
200 TABLET, FILM COATED ORAL 2 TIMES DAILY
Qty: 14 TABLET | Refills: 0 | Status: SHIPPED | OUTPATIENT
Start: 2022-03-04 | End: 2022-03-11

## 2022-03-04 RX ADMIN — CEFEPIME HYDROCHLORIDE 2000 MG: 2 INJECTION, SOLUTION INTRAVENOUS at 00:18

## 2022-03-04 RX ADMIN — NICOTINE 1 PATCH: 7 PATCH, EXTENDED RELEASE TRANSDERMAL at 09:42

## 2022-03-04 RX ADMIN — FERROUS SULFATE TAB 325 MG (65 MG ELEMENTAL FE) 325 MG: 325 (65 FE) TAB at 09:39

## 2022-03-04 RX ADMIN — FOLIC ACID TAB 400 MCG 400 MCG: 400 TAB at 09:39

## 2022-03-04 RX ADMIN — PANTOPRAZOLE SODIUM 40 MG: 40 TABLET, DELAYED RELEASE ORAL at 09:39

## 2022-03-04 RX ADMIN — METOPROLOL SUCCINATE 25 MG: 25 TABLET, EXTENDED RELEASE ORAL at 09:40

## 2022-03-04 RX ADMIN — LEVALBUTEROL HYDROCHLORIDE 1.25 MG: 1.25 SOLUTION, CONCENTRATE RESPIRATORY (INHALATION) at 08:44

## 2022-03-04 RX ADMIN — APIXABAN 5 MG: 5 TABLET, FILM COATED ORAL at 09:39

## 2022-03-04 RX ADMIN — IPRATROPIUM BROMIDE 0.5 MG: 0.5 SOLUTION RESPIRATORY (INHALATION) at 08:44

## 2022-03-04 RX ADMIN — FLUTICASONE FUROATE AND VILANTEROL TRIFENATATE 1 PUFF: 200; 25 POWDER RESPIRATORY (INHALATION) at 08:44

## 2022-03-04 RX ADMIN — DOCUSATE SODIUM 100 MG: 100 CAPSULE, LIQUID FILLED ORAL at 09:39

## 2022-03-04 NOTE — PLAN OF CARE
Problem: MOBILITY - ADULT  Goal: Maintain or return to baseline ADL function  Description: INTERVENTIONS:  -  Assess patient's ability to carry out ADLs; assess patient's baseline for ADL function and identify physical deficits which impact ability to perform ADLs (bathing, care of mouth/teeth, toileting, grooming, dressing, etc )  - Assess/evaluate cause of self-care deficits   - Assess range of motion  - Assess patient's mobility; develop plan if impaired  - Assess patient's need for assistive devices and provide as appropriate  - Encourage maximum independence but intervene and supervise when necessary  - Involve family in performance of ADLs  - Assess for home care needs following discharge   - Consider OT consult to assist with ADL evaluation and planning for discharge  - Provide patient education as appropriate  Outcome: Progressing     Problem: GENITOURINARY - ADULT  Goal: Absence of urinary retention  Description: INTERVENTIONS:  - Assess patients ability to void and empty bladder  - Monitor I/O  - Bladder scan as needed  - Discuss with physician/AP medications to alleviate retention as needed  - Discuss catheterization for long term situations as appropriate  Outcome: Progressing  Goal: Urinary catheter remains patent  Description: INTERVENTIONS:  - Assess patency of urinary catheter  - If patient has a chronic miller, consider changing catheter if non-functioning  - Follow guidelines for intermittent irrigation of non-functioning urinary catheter  Outcome: Progressing

## 2022-03-04 NOTE — NURSING NOTE
Pt discharged home  Iv removed prior to discharge  AVS given and explained to pt  Pt verbalized understanding

## 2022-03-04 NOTE — ASSESSMENT & PLAN NOTE
-patient developed fever over night, he was tachycardic and with soft blood pressures  -UA showed bacteriuria  -urine culture growing Gram-negative enteric bacteria  -blood cultures 2/2 grow Gram-negative rods  -->klebsiella  -UC-->GNR  -lactic acid 2 9--> normal  Plan  -follow urine/blood  culture--> pansensitive  -continue treatment with cefepime since patient had an indwelling catheter, catheter replaced during hospitalization

## 2022-03-04 NOTE — PLAN OF CARE
Problem: Potential for Falls  Goal: Patient will remain free of falls  Description: INTERVENTIONS:  - Educate patient/family on patient safety including physical limitations  - Instruct patient to call for assistance with activity   - Consult OT/PT to assist with strengthening/mobility   - Keep Call bell within reach  - Keep bed low and locked with side rails adjusted as appropriate  - Keep care items and personal belongings within reach  - Initiate and maintain comfort rounds  - Make Fall Risk Sign visible to staff  - Offer Toileting every 2 Hours, in advance of need  - Initiate/Maintain alarm  - Obtain necessary fall risk management equipment  - Apply yellow socks and bracelet for high fall risk patients  - Consider moving patient to room near nurses station  Outcome: Progressing     Problem: MOBILITY - ADULT  Goal: Maintain or return to baseline ADL function  Description: INTERVENTIONS:  -  Assess patient's ability to carry out ADLs; assess patient's baseline for ADL function and identify physical deficits which impact ability to perform ADLs (bathing, care of mouth/teeth, toileting, grooming, dressing, etc )  - Assess/evaluate cause of self-care deficits   - Assess range of motion  - Assess patient's mobility; develop plan if impaired  - Assess patient's need for assistive devices and provide as appropriate  - Encourage maximum independence but intervene and supervise when necessary  - Involve family in performance of ADLs  - Assess for home care needs following discharge   - Consider OT consult to assist with ADL evaluation and planning for discharge  - Provide patient education as appropriate  Outcome: Progressing  Goal: Maintains/Returns to pre admission functional level  Description: INTERVENTIONS:  - Perform BMAT or MOVE assessment daily    - Set and communicate daily mobility goal to care team and patient/family/caregiver     - Collaborate with rehabilitation services on mobility goals if consulted  - Perform Range of Motion 2 times a day  - Reposition patient every 2 hours  - Dangle patient 2 times a day  - Stand patient 2 times a day  - Ambulate patient 2 times a day  - Out of bed to chair 2 times a day   - Out of bed for meals 2 times a day  - Out of bed for toileting  - Record patient progress and toleration of activity level   Outcome: Progressing     Problem: Nutrition/Hydration-ADULT  Goal: Nutrient/Hydration intake appropriate for improving, restoring or maintaining nutritional needs  Description: Monitor and assess patient's nutrition/hydration status for malnutrition  Collaborate with interdisciplinary team and initiate plan and interventions as ordered  Monitor patient's weight and dietary intake as ordered or per policy  Utilize nutrition screening tool and intervene as necessary  Determine patient's food preferences and provide high-protein, high-caloric foods as appropriate       INTERVENTIONS:  - Monitor oral intake, urinary output, labs, and treatment plans  - Assess nutrition and hydration status and recommend course of action  - Evaluate amount of meals eaten  - Assist patient with eating if necessary   - Allow adequate time for meals  - Recommend/ encourage appropriate diets, oral nutritional supplements, and vitamin/mineral supplements  - Order, calculate, and assess calorie counts as needed  - Recommend, monitor, and adjust tube feedings and TPN/PPN based on assessed needs  - Assess need for intravenous fluids  - Provide specific nutrition/hydration education as appropriate  - Include patient/family/caregiver in decisions related to nutrition  Outcome: Progressing     Problem: GENITOURINARY - ADULT  Goal: Maintains or returns to baseline urinary function  Description: INTERVENTIONS:  - Assess urinary function  - Encourage oral fluids to ensure adequate hydration if ordered  - Administer IV fluids as ordered to ensure adequate hydration  - Administer ordered medications as needed  - Offer frequent toileting  - Follow urinary retention protocol if ordered  Outcome: Progressing  Goal: Absence of urinary retention  Description: INTERVENTIONS:  - Assess patients ability to void and empty bladder  - Monitor I/O  - Bladder scan as needed  - Discuss with physician/AP medications to alleviate retention as needed  - Discuss catheterization for long term situations as appropriate  Outcome: Progressing  Goal: Urinary catheter remains patent  Description: INTERVENTIONS:  - Assess patency of urinary catheter  - If patient has a chronic miller, consider changing catheter if non-functioning  - Follow guidelines for intermittent irrigation of non-functioning urinary catheter  Outcome: Progressing

## 2022-03-04 NOTE — CASE MANAGEMENT
Case Management Discharge Planning Note    Patient name Rick Guzmán  Location /-71 MRN [id number]  : 1945 Date 3/4/2022       Current Admission Date: 2022  Current Admission Diagnosis:Cholestatic jaundice   Patient Active Problem List    Diagnosis Date Noted    Moderate protein-calorie malnutrition ([facility] ) 2022    Cholestatic jaundice 2022    Sepsis secondary to UTI (HCC)/bacteremia 2022    Iron deficiency anemia likely secondary to GI bleeding 2022    Abnormal colonoscopy 2022    Dizziness 2022    Urinary retention 2022    Essential (hemorrhagic) thrombocythemia ([facility] [address] ) 2022    Closed nondisplaced comminuted fracture of left patella 2021    Head trauma 2021    Thrombocytosis 2021    Bradycardia 2021    Syncope 2021    Colonic adenoma 2021    Ambulatory dysfunction 2021    COPD (chronic obstructive pulmonary disease) ([facility] [address] ) 2021    Orthostatic hypotension 2020    Tobacco use disorder 2020    Symptomatic anemia 2020    Congestive cardiomyopathy ([facility] [address] ) 2020    Permanent atrial fibrillation ([facility] [address] ) 2020      LOS (days): 3  Geometric Mean LOS (GMLOS) (days): 4 80  Days to GMLOS:1 7     OBJECTIVE:  Risk of Unplanned Readmission Score: 32         Current admission status: Inpatient   [facility] [address] [place] [address]  Phone: [phone] Fax: [phone] [place] [address]  Phone: [phone] Fax: [phone]    Primary Care Provider: Christy Curz MD    Primary Insurance: MEDICARE  Secondary Insurance:  FOR LIFE    DISCHARGE DETAILS:    Comments - Freedom of Choice: Pt was provided with a Lyft to assist with his transportation to the home environment today upon d/c  CM provided Pt with [facility] information in the event she should need it in the further  Angela Grider

## 2022-03-04 NOTE — DISCHARGE INSTR - AVS FIRST PAGE
Please follow-up with your primary care physician in regard this admission  Please follow-up with your urology for your Cloud catheter exchanged    Please follow-up with your gastroenterologist for your colonoscopy  Please continue taking your antibiotics for 7 more days  Please come back to emergency department if her symptoms get worse

## 2022-03-04 NOTE — ASSESSMENT & PLAN NOTE
-history of chronic urinary retention on Cloud catheter, as per note review secondary to BPH  -fail  voiding trial in this admission  -continue indwelling catheter and follow-up with Urology as an outpatient, although indwelling catheter increases the risk for UTI at this point the benefits overweigh the rest of urinary retention and kidney injury

## 2022-03-04 NOTE — ASSESSMENT & PLAN NOTE
-elevated potassium 5 2 likely secondary to Ace inhibitors versus Fidel   -given Kayexalate on the ED  -no acute changes on the electrocardiogram  -no plan for treatment at this point  -follow-up potassium in the a m

## 2022-03-04 NOTE — ASSESSMENT & PLAN NOTE
Malnutrition Findings:   Adult Malnutrition type: Acute illness  Adult Degree of Malnutrition: Malnutrition of moderate degree (r/t lower appetite with inadequate protein and calorie intake as evidenced by wt decrease of 11 6% x 1 5 mo and muscle wasting (moderate-clavicle)  Will treat with nutrition therapy & oral nutritional supplements )    BMI Findings: Body mass index is 20 09 kg/m²
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
## 2022-03-04 NOTE — DISCHARGE SUMMARY
Jeysonpa U  68   Discharge- Matthew Members 1945, 78 y o  male MRN: [id number]  Unit/Bed#: -Jocelyn Encounter: [id number]  Primary Care Provider: Glo Rubin MD   Date and time admitted to hospital: 2/28/2022 11:28 AM    * Cholestatic jaundice  Assessment & Plan  -suspected cholangitis in the view of LFTs showing hyperbilirubinemia direct, transaminitis,cholestatic , patient being septic  RULE OUT ultrasound of the liver showed gallbladder stones but no signs of cholecystitis, cholangitis, normal liver and flow  -negative acute and chronic hepatitis panel  -cholestatic could be related to patient's sepsis, transaminitis, bilirubin improving on CMP      HERNÁN (acute kidney injury) (HCC)-resolved as of 3/3/2022  Assessment & Plan  -patient baseline creatinine around 1 1, currently 1 6--> 1 27, improving--> resolved  -several admissions for acute kidney injury  -this could be prerenal versus postrenal in the view of chronic urinary retention  Plan  -continue urinary retention protocol  -follow BMP in the a m   -no history of CHF, continue gentle IV hydration   -monitor urine output  -avoid hypotension  -avoid nephrotoxic drugs including NSAIDs, hold diuretics , ACEI  -voiding trial on 03/01  -patient needs to follow up outpatient with urology    Sepsis secondary to UTI (HCC)/bacteremia  Assessment & Plan  -patient developed fever over night, he was tachycardic and with soft blood pressures  -UA showed bacteriuria  -urine culture growing Gram-negative enteric bacteria  -blood cultures 2/2 grow Gram-negative rods  -->klebsiella  -UC-->GNR  -lactic acid 2 9--> normal  Plan  -follow urine/blood  culture--> pansensitive  -continue treatment with cefepime since patient had an indwelling catheter, catheter replaced during hospitalization      Moderate protein-calorie malnutrition ([address] )  Assessment & Plan  Malnutrition Findings:   Adult Malnutrition type: Acute illness  Adult Degree of Malnutrition: Malnutrition of moderate degree (r/t lower appetite with inadequate protein and calorie intake as evidenced by wt decrease of 11 6% x 1 5 mo and muscle wasting (moderate-clavicle)  Will treat with nutrition therapy & oral nutritional supplements )    BMI Findings: Body mass index is 20 09 kg/m²  Iron deficiency anemia likely secondary to GI bleeding  Assessment & Plan  -patient was recently evaluated on February this year for symptomatic lower GI bleeding, he underwent EGD and colonoscopy  Multiple polyps were removed and were benign  Patient was recommended to follow with GI for after further colonoscopy in 3 months and an EGD in 1 year   -today patient hemoglobin 11 7 which seems to be patient's baseline  -no signs of GI bleeding  -hemodynamically stable  -continue iron replacement outpatient    Urinary retention  Assessment & Plan  -history of chronic urinary retention on Cloud catheter, as per note review secondary to BPH  -fail  voiding trial in this admission  -continue indwelling catheter and follow-up with Urology as an outpatient, although indwelling catheter increases the risk for UTI at this point the benefits overweigh the rest of urinary retention and kidney injury  COPD (chronic obstructive pulmonary disease) ([facility])  Assessment & Plan  -Currently not in exacerbation, patient continues to smoke  -Few scattered rales heard bilaterally  -Patient reports not using inhalers at home  Patient does get short of breath with ambulating   -will provide nebulization p r n  For SOB and wheezing  -continue nicotine past replacement    Ambulatory dysfunction  Assessment & Plan  -patient has history of generalized weakness with ambulatory dysfunction, given the option to go to skilled nursing facilities in the past but patient refuse  -patient reports a mechanical fall today, unclear if he had a syncopal episode or not    He call EMS due to this generalized weakness and ambulatory dysfunction  -CT head done due to patient being on anticoagulation was unremarkable with no signs of intracranial bleeding  -continue fall precautions  -check orthostatic vital signs--> negative  -will do PT/OT evaluation this admission and follow recommendations--> Rehab    Permanent atrial fibrillation [facility])  Assessment & Plan  -currently well controlled on metoprolol 25 mg daily  -on apixaban 5 mg b i d  Hyperkalemia-resolved as of 3/1/2022  Assessment & Plan  -elevated potassium 5 2 likely secondary to Ace inhibitors versus Hernán   -given Kayexalate on the ED  -no acute changes on the electrocardiogram  -no plan for treatment at this point  -follow-up potassium in the a m  Discharging Resident: Jax Roth MD  Attending: Tee Segovia MD  PCP: Rina Degroot MD  Admission Date: 2/28/2022  Discharge Date: 03/04/22    Disposition:      Other: home VNA refused rehab  Diet Recommendations at Discharge:  Diet -        Diet Orders   (From admission, onward)             Start     Ordered    03/03/22 1349  Diet Regular; Regular House; Sodium 2 GM  Diet effective now        References:    Nutrtion Support Algorithm Enteral vs  Parenteral   Question Answer Comment   Diet Type Regular    Regular Regular House    Other Restriction(s): Sodium 2 GM    RD to adjust diet per protocol? Yes        03/03/22 1348              Fluid Restriction - No Fluid Restriction at Discharge  Hospital Course:     Rosalia Dockery is a 78 y o  male with history of GI bleeding, status post recent transfusion in February 2022, permanent atrial fibrillation on anticoagulation, urinary retention suspected to be BPH on tamsulosin, COPD not on home oxygen, tobacco dependence, iron-deficiency anemia, idiopathic thrombocytosis, who presents with a chief complaint of generalized weakness, ambulatory dysfunction a mechanical fall      During ED evaluation patient was found to be on HERNÁN, there improved quickly with IV fluid hydration, was most likely prerenal in the view of poor oral intake  Patient also had and UTI likely secondary to the indwelling catheter that he was to follow with Urology outpatient and unfortunately patient due to personal issues could not follow-up  Cloud catheter was replaced, voiding trial was done unfortunately patient continues to fail  He was recommended to follow up outpatient with Urology and all the referrals were given  For this patient UTI he grow Klebsiella which was pansensitive  He also had a bacteremia, new blood cultures showed no further growth  Patient was treated with cefepime and switched to [facility] on discharge  During hospitalization patient had an episode of cholestatic this, he was evaluated by GI for possible cholangitis  Ultrasound showed no signs of cholangitis, and cholestasis resolved once sepsis resolves as well  So was likely secondary to sespsis  He was recommended to follow-up outpatient with Gastroenterology  Transaminitis, bilirubin were all between normal limits on discharge  Hepatic panel was also negative  He will need a colonoscopy as an outpatient    Patient chronic comorbidities were well managed during hospitalization without complications  PT/OT recommended rehab but patient refuse so he will go home with VNA  At discharge he was hemodynamically stable another corresponding follow-up on recommendations were given                      Condition at Discharge: good     Discharge Day Visit / Exam:     Subjective:  No acute complaints, no overnight events  Vitals: Blood Pressure: 134/87 (03/04/22 0722)  Pulse: 87 (03/04/22 0722)  Temperature: 98 1 °F (36 7 °C) (03/04/22 0722)  Temp Source: Tympanic (03/04/22 0722)  Respirations: 16 (03/04/22 0722)  Height: 6' (182 9 cm) (02/28/22 1501)  Weight - Scale: 67 2 kg (148 lb 2 4 oz) (02/28/22 1501)  SpO2: 94 % (03/04/22 0722)  Exam:   Physical Exam  HENT:      Head: Normocephalic and atraumatic  Cardiovascular:      Rate and Rhythm: Normal rate and regular rhythm  Pulses: Normal pulses  Heart sounds: Normal heart sounds  Pulmonary:      Effort: Pulmonary effort is normal       Breath sounds: Normal breath sounds  Abdominal:      General: Abdomen is flat  Bowel sounds are normal       Palpations: Abdomen is soft  Musculoskeletal:         General: Normal range of motion  Cervical back: Normal range of motion  Right lower leg: No edema  Left lower leg: No edema  Skin:     General: Skin is warm  Capillary Refill: Capillary refill takes less than 2 seconds  Neurological:      General: No focal deficit present  Mental Status: He is alert and oriented to person, place, and time  Mental status is at baseline  Psychiatric:         Mood and Affect: Mood normal        Discharge instructions/Information to patient and family:   See after visit summary section titled Discharge Instructions for information provided to patient and family             ** Please Note: This note has been constructed using a voice recognition system **

## 2022-03-04 NOTE — DISCHARGE INSTRUCTIONS
Acute Kidney Injury, Ambulatory Care   GENERAL INFORMATION:   Acute kidney injury  happens when your kidneys suddenly stop working correctly  Normally, the kidneys turn fluid, chemicals, and waste from your blood into urine  In acute kidney injury, your kidneys can no longer do this  In most cases, it is temporary, but it may become a chronic kidney condition  Common symptoms include the following:   · Decreased urination or dark-colored urine    · Swelling in your arms, legs, or feet     · Abdominal or low back pain    · Vomiting, diarrhea, or loss of appetite    · Fatigue     · Skin rash  Seek immediate care for the following symptoms:   · Heart beating faster than normal for you    · Sudden chest pain or trouble breathing    · Seizure  Treatment for acute kidney injury:  Treatment depends upon the cause of your acute kidney injury and how severe it is  Medicines may be given to increase blood flow to your kidneys and protect your kidneys  You may also need medicine to decrease inflammation in your kidneys  You may be given IV fluids to replenish fluids and help your heart pump blood  Dialysis may be needed to remove chemicals and waste from your blood when your kidneys cannot  Manage acute kidney injury:   · Manage other health conditions  Care for your diabetes, high blood pressure, or heart disease  These conditions increase your risk for acute kidney injury  · Talk to your healthcare provider before you take over-the-counter-medicine  NSAIDs, stomach medicine, or laxatives may harm your kidneys and increase your risk for acute kidney injury  Follow up with your healthcare provider as directed:  Write down your questions so you remember to ask them during your visits  CARE AGREEMENT:   You have the right to help plan your care  Learn about your health condition and how it may be treated  Discuss treatment options with your caregivers to decide what care you want to receive   You always have the right to refuse treatment  The above information is an  only  It is not intended as medical advice for individual conditions or treatments  Talk to your doctor, nurse or pharmacist before following any medical regimen to see if it is safe and effective for you  © 2014 [address] is for End User's use only and may not be sold, redistributed or otherwise used for commercial purposes  All illustrations and images included in CareNotes® are the copyrighted property of [employer]  or [facility]  Anemia   AMBULATORY CARE:   Anemia  is a low number of red blood cells or a low amount of hemoglobin in your red blood cells  Hemoglobin is a protein that helps carry oxygen throughout your body  Red blood cells use iron to create hemoglobin  Anemia may develop if your body does not have enough iron  It may also develop if your body does not make enough red blood cells or they die faster than your body can make them  Common symptoms include the following:   · Chest pain or a fast heartbeat    · Lightheadedness, dizziness, or shortness of breath    · Cold or pale skin    · Tiredness, weakness, or confusion    Call your local emergency number (911 in the [address] [place] [address],[address]), or have someone call if:   · You lose consciousness  · You have severe chest pain  Seek care immediately if:   · You have dark or bloody bowel movements  Call your doctor if:   · Your symptoms are worse, even after treatment  · You have questions or concerns about your condition or care  Treatment for anemia  may include any of the following:  · Iron or folic acid supplements  help increase your red blood cell and hemoglobin levels  · Vitamin B12 injections  may help boost your red blood cell level and decrease your symptoms  Ask your healthcare provider how to inject B12  Prevent anemia:  Eat healthy foods rich in iron and vitamin C    Nuts, meat, dark leafy green vegetables, and beans are high in iron and protein  Vitamin C helps your body absorb iron  Foods rich in vitamin C include oranges and other citrus fruits  Ask your healthcare provider for a list of other foods that are high in iron or vitamin C  Ask if you need to be on a special diet  Follow up with your doctor as directed:  Write down your questions so you remember to ask them during your visits  © Copyright [employer] 2022 Information is for End User's use only and may not be sold, redistributed or otherwise used for commercial purposes  All illustrations and images included in CareNotes® are the copyrighted property of A D A M [employer]  or [employer]   The above information is an  only  It is not intended as medical advice for individual conditions or treatments  Talk to your doctor, nurse or pharmacist before following any medical regimen to see if it is safe and effective for you  Bladder Management Program After Spinal Cord Injury   WHAT YOU NEED TO KNOW:   What do I need to know about a bladder management program?  After a spinal cord injury, you may have trouble controlling urine or emptying your bladder  A bladder management program helps you control when and how you empty your bladder  It will also help prevent bladder and kidney infections  A bladder management program includes medicines, devices to empty your bladder, and scheduled bladder care  Why is a bladder management program important? An overfilled bladder can cause autonomic dysreflexia (AD)  AD is a medical emergency that causes very high blood pressure  AD is more likely to happen if you have a spinal cord injury at or above T7 or T8  What do I need to know about medicines and bladder management? Medicines can help prevent leakage of urine and help you control urination  Medicines can also help prevent bladder spasms  What devices can help me empty my bladder? There are several devices you can use to empty your bladder  Healthcare providers will help you find the device that is best for you  You may need someone to help you use any of the following:  · Intermittent self-catheterization  means you will place a tube into your bladder several times each day to drain your urine  Your healthcare provider will teach you how to catheterize your bladder  · A Cloud catheter  is a tube that stays in your bladder and drains your urine  The catheter is placed through your urethra and into your bladder  The catheter drains your urine into a bag  · A suprapubic catheter  is a tube that drains your urine through a surgically made stoma (created opening) in your abdomen  The opening ends in your bladder  The catheter drains urine into a bag  · An external condom catheter  is put over a man's penis  The tip of the condom catheter is connected to a tube  The tube drains urine into a bag  How often should I empty my bladder? Your healthcare provider will help you develop a bladder schedule  Your bladder schedule may depend on which devices you use to empty your bladder  It is important to urinate often to decrease your risk for AD, infection, and accidents  What else should I do while on a bladder management program?   · Drink liquids as directed  Ask your healthcare provider how much liquid to drink each day and which liquids are best for you  Do not drink coffee, tea, or alcohol  These liquids may cause dehydration or make it difficult to control your urine  · Monitor your intake and output as directed  Write down how much liquid you drink each day and how much you urinate  This will help your healthcare provider know if your program is working  · Prevent skin breakdown from urine leakage  If you leak urine or have accidents, your skin is at risk for breakdown and infection  Use pads, guards, or drip collectors as directed to help protect your skin   You can also use creams or ointments on your skin to decrease irritation  Ask your healthcare provider where to buy these products  When should I seek immediate care? You have any of the following symptoms of autonomic dysreflexia:  · You have sudden changes in your vision  · Your heart is beating faster than usual and you suddenly feel anxious  · Your skin above the spinal cord injury is red and sweaty  · You have a severe headache  · You stop urinating  When should I contact my healthcare provider? · You have a fever or chills  · You have blood in your urine  · You are urinating less than usual     · You have pain in your back  · You have questions or concerns about your condition or care  CARE AGREEMENT:   You have the right to help plan your care  Learn about your health condition and how it may be treated  Discuss treatment options with your healthcare providers to decide what care you want to receive  You always have the right to refuse treatment  The above information is an  only  It is not intended as medical advice for individual conditions or treatments  Talk to your doctor, nurse or pharmacist before following any medical regimen to see if it is safe and effective for you  © Copyright [employer] 2022 Information is for End User's use only and may not be sold, redistributed or otherwise used for commercial purposes  All illustrations and images included in CareNotes® are the copyrighted property of A D A M , Inc  or [address] [place], [place] [address]    COPD (Chronic Obstructive Pulmonary Disease)   WHAT YOU NEED TO KNOW:   COPD (chronic obstructive pulmonary disease) can get worse quickly  Your healthcare providers will help you create a care plan to use at home  The plan will give directions on how to prevent or manage shortness of breath  Your family members or anyone who cares for you will also get directions to help you         DISCHARGE INSTRUCTIONS:   Call your local emergency number (026 in the [address] [place] [address],[address]) if:   · You feel lightheaded, short of breath, and have chest pain  Return to the emergency department if:   · You cough up blood  · You are confused, dizzy, or feel faint  · Your arm or leg feels warm, tender, and painful  It may look swollen and red  Call your doctor if:   · You have increased shortness of breath  · You need more medicine than usual to control your symptoms  · You are coughing or wheezing more than usual     · You are coughing up more mucus, or it has a new color or odor  · You gain more than 3 pounds in a week  · You have a fever, a runny or stuffy nose, and a sore throat, or other cold or flu symptoms  · Your skin, lips, or nails start to turn blue  · You have swelling in your legs or ankles  · You are very tired or weak for more than a day  · You notice changes in your mood, or changes in your ability to think or concentrate  · You have questions or concerns about your condition or care  Medicines:   · Short-acting bronchodilators  may be called rescue inhalers or relievers  They relieve sudden, severe symptoms and start to work right away  · Long-acting bronchodilators  may be called controllers  This medicine helps open the airways over time, and is used to decrease and prevent breathing problems  Long-acting bronchodilators should not be used to treat sudden, severe symptoms, such as trouble breathing  · Antibiotics may be given for up to 5 days to treat a bacterial infection during an exacerbation  · Take your medicine as directed  Contact your healthcare provider if you think your medicine is not helping or if you have side effects  Tell him or her if you are allergic to any medicine  Keep a list of the medicines, vitamins, and herbs you take  Include the amounts, and when and why you take them  Bring the list or the pill bottles to follow-up visits  Carry your medicine list with you in case of an emergency      Help make breathing easier:   · Use pursed-lip breathing any time you feel short of breath  Take a deep breath in through your nose  Slowly breathe out through your mouth with your lips pursed  Try to take 2 times as long to breathe out as to breathe in  This helps you get rid of as much air from your lungs as possible  You can also practice this breathing pattern while you bend, lift, climb stairs, or exercise  It slows down your breathing and helps move more air in and out of your lungs  · Avoid anything that makes your symptoms worse  Stay out of high altitudes and places with high humidity  Stay inside, or cover your mouth and nose with a scarf when you are outside in cold weather  Stay inside on days when air pollution or pollen counts are high  Do not use aerosol sprays such as deodorant, bug spray, and hairspray  · Exercise as directed  Your healthcare provider may recommend at least 20 minutes of exercise each day to help increase your energy and decrease shortness of breath  Talk to your provider about the best exercise plan for you  Manage COPD and help prevent exacerbations:  COPD is a serious condition that gets worse over time  A COPD exacerbation means your symptoms suddenly get worse  It is important to prevent exacerbations  An exacerbation can cause more lung damage  COPD cannot be cured, but you can take action to feel better and prevent exacerbations:  · Do not smoke  Nicotine and other chemicals in cigarettes and cigars can cause lung damage and make your COPD worse  Ask your healthcare provider for information if you currently smoke and need help to quit  E-cigarettes or smokeless tobacco still contain nicotine  Talk to your healthcare provider before you use these products  · Avoid secondhand smoke  This is smoke another person exhales  Even if you have never smoked or have quit, it is important to avoid secondhand smoke  This smoke can also cause lung damage or trigger an exacerbation      · Go to pulmonary rehabilitation (rehab) if directed  Rehab is a program run by specialists who help you learn to manage COPD  Examples include a pulmonologist (lung specialist), dietitian, or exercise therapist  The specialists will help you make a plan to avoid triggers that cause an exacerbation  · Take your medicines as directed  Refill your medicines before you are out so that you do not miss a dose  Ask your healthcare provider if you have any questions on how to take your medicines  · Protect yourself from germs  Germs can get into your lungs and cause an infection  An infection in your lungs can create more mucus and make it harder to breathe  An infection can also create swelling in your airway and prevent air from getting in  You can decrease your risk for infection by doing the following:         ? Wash your hands often with soap and water  Carry germ-killing gel with you  You can use the gel to clean your hands when soap and water are not available  ? Do not touch your eyes, nose, or mouth unless you have washed your hands first     ? Always cover your mouth when you cough  Cough into a tissue or your shirtsleeve so you do not spread germs from your hands  ? Try to avoid people who have a cold or the flu  If you are sick, stay away from others as much as possible  ? Ask about vaccines you may need  Influenza (the flu), pneumonia, and COVID-19 can become life-threatening for a person who has COPD  Get a yearly flu vaccine as soon as recommended, usually in September or October  The pneumonia vaccine may be given every 5 years, or as directed  COVID-19 vaccines are available in shots given in 1 or 2 doses  Your healthcare provider can tell you if you should also get other vaccines, and when to get them  · Drink liquids as directed  You may need to drink more liquid than usual  Liquid will help to keep your air passages moist and help you cough up mucus   Ask how much liquid to drink each day and which liquids are best for you  Follow up with your doctor as directed: You may need more tests  Your doctor may refer you to a specialist, depending on your needs  Some specialist services may be available through your pulmonary rehab program  Write down your questions so you remember to ask them during your visits  © [employer] 2022 Information is for End User's use only and may not be sold, redistributed or otherwise used for commercial purposes  All illustrations and images included in CareNotes® are the copyrighted property of A D A M [employer] Inc  or [facility] Gianni French   The above information is an  only  It is not intended as medical advice for individual conditions or treatments  Talk to your doctor, nurse or pharmacist before following any medical regimen to see if it is safe and effective for you  Catheter-associated Urinary Tract Infection   WHAT YOU NEED TO KNOW:   A catheter-associated urinary tract infection (CAUTI) is an infection caused by an indwelling urinary catheter  An indwelling urinary catheter is a thin, flexible tube that is inserted into the bladder  It is left in place to drain urine  The infection may travel along the catheter and into the bladder or kidneys  DISCHARGE INSTRUCTIONS:   Return to the emergency department if:   · You have severe pain in your lower back or abdomen  · You have blood in your urine  · You stop urinating, or you urinate much less than usual     Contact your healthcare provider if:   · You have a fever  · Your symptoms do not improve or get worse  · You have questions or concerns about your condition or care  Medicines: You may need any of the following:  · Antibiotics  help treat an infection caused by bacteria  · Antifungals  help treat an infection caused by fungus  · Acetaminophen  decreases pain and fever  It is available without a doctor's order  Ask how much to take and how often to take it  Follow directions  Read the labels of all other medicines you are using to see if they also contain acetaminophen, or ask your doctor or pharmacist  Acetaminophen can cause liver damage if not taken correctly  Do not use more than 4 grams (4,000 milligrams) total of acetaminophen in one day  · NSAIDs , such as ibuprofen, help decrease swelling, pain, and fever  This medicine is available with or without a doctor's order  NSAIDs can cause stomach bleeding or kidney problems in certain people  If you take blood thinner medicine, always ask your healthcare provider if NSAIDs are safe for you  Always read the medicine label and follow directions  · Take your medicine as directed  Contact your healthcare provider if you think your medicine is not helping or if you have side effects  Tell him of her if you are allergic to any medicine  Keep a list of the medicines, vitamins, and herbs you take  Include the amounts, and when and why you take them  Bring the list or the pill bottles to follow-up visits  Carry your medicine list with you in case of an emergency  Self-care:   · Drink fluids as directed  Fluids may help your kidneys and bladder get rid of the infection  · Keep the catheter area clean  Clean your skin around the catheter as directed  Shower once a day  Do not take baths or go in hot tubs until your infection is gone  · Do not have sex  until your healthcare provider says it is okay  Sex may delay healing or cause another UTI  Prevent another CAUTI:   · Wash your hands before and after you use the bathroom or touch the catheter  Wash your hands to prevent the spread of infection to your urinary tract  · Clean all parts of your catheter as directed  Keep your catheter tubing clean  Do not place the catheter on the ground  Do not allow the drainage spout to touch the toilet  Use an alcohol swab to clean the end of drainage spout as directed       · Keep the drainage bag below your waist   This may prevent urine from moving back into your bladder, which can cause an infection  · Empty the urine bag as directed  This may prevent urine from flowing back into your bladder  · Women should wipe front to back  after a bowel movement  This may prevent germs from getting into the urinary tract  · Keep the catheter secured to your leg as directed  Use tape or a special catheter luque to prevent your catheter from being pulled  This may also prevent kinks that could cause the urine to move back into the bladder  Follow up with your healthcare provider as directed:  Write down your questions so you remember to ask them during your visits  © Copyright [employer] 2022 Information is for End User's use only and may not be sold, redistributed or otherwise used for commercial purposes  All illustrations and images included in CareNotes® are the copyrighted property of A [employer] A M [employer]  or Antonia French   The above information is an  only  It is not intended as medical advice for individual conditions or treatments  Talk to your doctor, nurse or pharmacist before following any medical regimen to see if it is safe and effective for you  Catheter-associated Urinary Tract Infection   WHAT YOU NEED TO KNOW:   A catheter-associated urinary tract infection (CAUTI) is an infection caused by an indwelling urinary catheter  An indwelling urinary catheter is a thin, flexible tube that is inserted into the bladder  It is left in place to drain urine  The infection may travel along the catheter and into the bladder or kidneys  DISCHARGE INSTRUCTIONS:   Return to the emergency department if:   · You have severe pain in your lower back or abdomen  · You have blood in your urine  · You stop urinating, or you urinate much less than usual     Contact your healthcare provider if:   · You have a fever  · Your symptoms do not improve or get worse       · You have questions or concerns about your condition or care  Medicines: You may need any of the following:  · Antibiotics  help treat an infection caused by bacteria  · Antifungals  help treat an infection caused by fungus  · Acetaminophen  decreases pain and fever  It is available without a doctor's order  Ask how much to take and how often to take it  Follow directions  Read the labels of all other medicines you are using to see if they also contain acetaminophen, or ask your doctor or pharmacist  Acetaminophen can cause liver damage if not taken correctly  Do not use more than 4 grams (4,000 milligrams) total of acetaminophen in one day  · NSAIDs , such as ibuprofen, help decrease swelling, pain, and fever  This medicine is available with or without a doctor's order  NSAIDs can cause stomach bleeding or kidney problems in certain people  If you take blood thinner medicine, always ask your healthcare provider if NSAIDs are safe for you  Always read the medicine label and follow directions  · Take your medicine as directed  Contact your healthcare provider if you think your medicine is not helping or if you have side effects  Tell him of her if you are allergic to any medicine  Keep a list of the medicines, vitamins, and herbs you take  Include the amounts, and when and why you take them  Bring the list or the pill bottles to follow-up visits  Carry your medicine list with you in case of an emergency  Self-care:   · Drink fluids as directed  Fluids may help your kidneys and bladder get rid of the infection  · Keep the catheter area clean  Clean your skin around the catheter as directed  Shower once a day  Do not take baths or go in hot tubs until your infection is gone  · Do not have sex  until your healthcare provider says it is okay  Sex may delay healing or cause another UTI  Prevent another CAUTI:   · Wash your hands before and after you use the bathroom or touch the catheter    Wash your hands to prevent the spread of infection to your urinary tract  · Clean all parts of your catheter as directed  Keep your catheter tubing clean  Do not place the catheter on the ground  Do not allow the drainage spout to touch the toilet  Use an alcohol swab to clean the end of drainage spout as directed  · Keep the drainage bag below your waist   This may prevent urine from moving back into your bladder, which can cause an infection  · Empty the urine bag as directed  This may prevent urine from flowing back into your bladder  · Women should wipe front to back  after a bowel movement  This may prevent germs from getting into the urinary tract  · Keep the catheter secured to your leg as directed  Use tape or a special catheter luque to prevent your catheter from being pulled  This may also prevent kinks that could cause the urine to move back into the bladder  Follow up with your healthcare provider as directed:  Write down your questions so you remember to ask them during your visits  © [employer] 2022 Information is for End User's use only and may not be sold, redistributed or otherwise used for commercial purposes  All illustrations and images included in CareNotes® are the copyrighted property of A D A M , Inc  or [address] [place], [place] [address]rommel   The above information is an  only  It is not intended as medical advice for individual conditions or treatments  Talk to your doctor, nurse or pharmacist before following any medical regimen to see if it is safe and effective for you  Chronic Bronchitis   WHAT YOU NEED TO KNOW:   Chronic bronchitis is a long-term swelling and irritation in the air passages in your lungs  The irritation may damage your lungs  The lung damage often gets worse over time, and it is usually permanent  Chronic bronchitis is part of a group of lung diseases called chronic obstructive pulmonary disease (COPD)     DISCHARGE INSTRUCTIONS:   Call 911 for any of the following:   · You have new chest pain or tightness  · You become confused, dizzy, or feel like you may faint  · You have a new or increased gray or blue tint to your nail beds, fingers, or lips  Return to the emergency department if:   · You become tired easily from trying to get enough breath  · The amount or color of your sputum changes, or your sputum becomes too hard to cough up  Contact your healthcare provider if:   · You have a fever  · You use your inhalers more often than usual      · You have new or increased swelling in your legs, ankles, or abdomen  · You run out of breath easily when you talk or do your usual exercise or activities  · You have questions or concerns about your condition or care  Medicines: You may  need any of the following:  · Inhalers  help you breathe easier and cough less  An inhaler gives your medicine in a mist form so that you can breathe it into your lungs  Ask your healthcare provider to show you how to use your inhaler correctly  · Steroids  help decrease inflammation in your airway so that you can breathe easier  You may need to go home on oxygen: You may need extra oxygen if your blood oxygen level is lower than it should be  You will be instructed on how to use oxygen in your home  How to use an inhaler:   · Shake the inhaler well to make sure you get the correct amount of medicine per puff  Remove the cover from your inhaler's mouthpiece  If you use a spacer, connect your inhaler to the flat end of the spacer  · Breathe out as much air from your lungs as you can  Put the mouthpiece in your mouth past your front teeth and rest it on the top of your tongue  Do not block the mouthpiece opening with your tongue  · Breathe in through your mouth at a slow and steady rate  As you do this, press the inhaler to release the puff of medicine  Finish breathing in slowly and deeply as you inhale the medicine   When your lungs are full, hold your breath for 10 seconds  Then breathe out slowly through puckered lips or through your nose  · If you need to take more puffs, wait at least 1 minute between each puff  · Rinse your mouth with water after you use the inhaler  This may keep you from getting a mouth infection or irritation  · Follow the instructions that come with your inhaler to clean it  You should clean your inhaler at least once a week  Ways to help you breathe better:   · Take deep breaths and cough  10 times each hour  This will decrease your risk for a lung infection  Take a deep breath and hold it for as long as you can  Let the air out and then cough strongly  Deep breaths help open your airway  You may be given an incentive spirometer to help you take deep breaths  Put the plastic piece in your mouth and take a slow, deep breath  Then let the air out and cough  Repeat these steps 10 times every hour  · Pursed-lip breathing  can be used any time you feel short of breath  Pursed-lip breathing can be especially helpful before you start an activity:     ? Breathe in through your nose  Use the muscles in your abdomen to help fill your lungs with air  ? Slowly breathe out through your mouth with your lips slightly puckered  You should make a quiet hissing sound as you breathe out  ? Try to take twice as long to breathe out as it did to breathe in  This helps you get rid of as much air from your lungs as possible  ? Repeat this exercise several times  Once you are used to doing pursed-lip breathing, you can do it any time you need more air  · Diaphragmatic breathing  can help strengthen some of the muscles you use to breathe:     ? Place one hand on your stomach just below your ribs  Place your other hand in the middle of your chest over your breastbone  ? Breathe in slowly through your nose, as deeply as you can     ? Breathe out slowly through pursed lips   As you breathe out, tighten the muscles in your stomach  Use your hand to gently push in and up while tightening the muscles  ? Diaphragmatic breathing takes practice  You may need to practice this many times a day  Slowly increase the amount of time you spend during each practice session  Self-care:   · Sleep with your upper body raised  This will help you breathe easier  You can use foam wedges or elevate the head of your bed  Many devices are available to help raise your upper body while in bed  Use a device that will tilt your whole body, or bend your body at the waist  The device should not bend your body at the upper back or neck  · Prevent the spread of germs:      ? Wash your hands often with soap and water  Carry germ-killing gel with you  You can use the gel to clean your hands when soap and water are not available  ? Do not touch your eyes, nose, or mouth unless you have washed your hands first      ? Always cover your mouth when you cough  Cough into a tissue or your shirtsleeve so you do not spread germs from your hands  ? Try to avoid people who have a cold or the flu  If you are sick, stay away from others as much as possible  · Do not smoke  Nicotine and other substances can cause lung damage  Do not use e-cigarettes or smokeless tobacco without first talking to your healthcare provider  They still contain nicotine  Ask your healthcare provider for information if you currently smoke and need help to quit  · Avoid lung irritants  Stay out of high altitudes and places with high humidity  Stay inside, or cover your mouth and nose with a scarf when you are outside during cold weather  Stay inside on days when air pollution or pollen counts are high  Do not use aerosol sprays such as deodorant, bug spray, and hair spray  · Drink more liquids  This will help to keep your air passages moist and help you cough up mucus   Ask how much liquid to drink each day and which liquids are best for you     · Ask your healthcare provider about the flu and pneumonia vaccines  All adults should get the flu (influenza) vaccine as soon as recommended each year, usually in September or October  The pneumonia vaccine is given to adults aged 72 or older to prevent pneumococcal disease, such as pneumonia  Adults aged 23 to 59 years who are at high risk for pneumococcal disease also should get the pneumococcal vaccine  It may need to be repeated 1 or 5 years later  Follow up with your healthcare provider as directed:  Write down your questions so you remember to ask them during your visits  © Copyright [employer] 2022 Information is for End User's use only and may not be sold, redistributed or otherwise used for commercial purposes  All illustrations and images included in CareNotes® are the copyrighted property of A D A M , Inc  or [facility] [employer]   The above information is an  only  It is not intended as medical advice for individual conditions or treatments  Talk to your doctor, nurse or pharmacist before following any medical regimen to see if it is safe and effective for you  Catheter-associated Urinary Tract Infection   Rudolph TL: Aging and Geriatric Urology  In: Ignacia Kurtz LR, Jose Luis AW, et al, eds  [place] [address] Urology, 11th ed  [address], [place], [place], 2016  Rosalia Pond LE: Urinary tract infections in the older adult  Clin Geriatr Med 2016; 32(3):523-538  Centers for Disease Control and Prevention: FAQs about Catheter-associated Urinary Tract Infection  Centers for Disease Control and Prevention  [place], [address] [place] [address],[address]  Available from URL: [employer]  As accessed 2015-09-08  Matta A: Urinary Tract Infection (UTI) in Females  In: Arnoldo ABDI, ed  The 5 Minute Clinical Consult Standard 2015, 23rd ed  [address] [facility] [place], [place], 2014  Lucy  & Fields SA: Urinary Tract Infection (UTI) in Males  In: Arnoldo ABDI, ed   The 5 Minute Clinical Consult Standard 2015, 23rd ed  [address] [facility] [place], [place], 2014  [facility] JW: Urinary tract infections in the elderly population  Jana Harvey Pharmacother 2011; 7(6):172-450  Eleuterio SWANSON: Catheter-associated Urinary Tract Infections in the ICU: Implications for Clinical Practice  In: Marshall Portillo, & Cristian's: Critical Care, 4th ed  [address] [facility] [place], [place], 2009  © Copyright [employer] 2022 Information is for End User's use only and may not be sold, redistributed or otherwise used for commercial purposes  All illustrations and images included in CareNotes® are the copyrighted property of A D A M , Inc  or Antonia French   The above information is an  only  It is not intended as medical advice for individual conditions or treatments  Talk to your doctor, nurse or pharmacist before following any medical regimen to see if it is safe and effective for you

## 2022-03-04 NOTE — PROGRESS NOTES
Progress Note - Dom Zuleta 78 y o  male MRN: [id number]    Unit/Bed#: -01 Encounter: [id number]        Assessment/Plan: This is a 65-year-old male who presents with ambulatory dysfunction and generalized weakness and was noted to have HERNÁN on admission and now noted to have transaminitis   Patient had developed sepsis and was noted to be hypotensive and blood cultures are positive for Klebsiella and urine culture did show gram negative enteric like rods, likely sepsis was related to UTI   Patient does have indwelling Cloud catheter which was exchanged   Patient had ultrasound ordered to evaluate for any biliary abnormality which showed gallbladder stones and sludge without evidence of acute cholecystitis   No biliary ductal dilatation  Continue cefepime and Flagyl   LFTs have all continued to improve today  Suspect LFT elevation could be related to sepsis induced cholestasis and hypotension  Hepatitis panel also ordered and was negative  As for iron deficiency anemia, recent GI workup as above and will need repeat colonoscopy in 3 months due to remaining polyps, EGD in 1 year due to intestinal metaplasia noted in antrum on biopsy  Patient can follow with us as an outpatient  LFT should be followed to assure normalization        Subjective:   Patient is lying in bed  He is doing well and denies any complaints and plan is for discharge today  Explained to patient that he will need follow-up with us as an outpatient      Objective:     Vitals: /87 (BP Location: Right arm)   Pulse 87   Temp 98 1 °F (36 7 °C) (Tympanic)   Resp 16   Ht 6' (1 829 m)   Wt 67 2 kg (148 lb 2 4 oz)   SpO2 100%   BMI 20 09 kg/m²       Physical Exam:  Gen-alert no acute distress  Abd-positive bowel sounds nontender nondistended no rebound rigidity or guarding       Lab, Imaging and other studies:   Recent Results (from the past 72 hour(s))   Blood culture    Collection Time: 03/01/22 10:14 AM    Specimen: Arm, Left; Blood   Result Value Ref Range    Gram Stain Result Gram negative rods (A)    Blood culture    Collection Time: 03/01/22 10:14 AM    Specimen: Arm, Right; Blood   Result Value Ref Range    Blood Culture Klebsiella variicola (A)     Gram Stain Result Gram negative rods (A)        Susceptibility    Klebsiella variicola - MARIXA     ZID Performed Yes       Amoxicillin + Clavulanate <=8/4 Susceptible ug/ml     Ampicillin ($$) 16 00 Resistant ug/ml     Ampicillin + Sulbactam ($) <=4/2 Susceptible ug/ml     Aztreonam ($$$)  <=4 Susceptible ug/ml     Cefazolin ($) <=2 00 Susceptible ug/ml     Ciprofloxacin ($)  <=0 25 Susceptible ug/ml     Gentamicin ($$) <=2 Susceptible ug/ml     Levofloxacin ($) <=0 50 Susceptible ug/ml     Tetracycline <=4 Susceptible ug/ml     Tobramycin ($) <=2 Susceptible ug/ml     Trimethoprim + Sulfamethoxazole ($$$) 1/19 Susceptible ug/ml   Lactic acid, plasma    Collection Time: 03/01/22 10:17 AM   Result Value Ref Range    LACTIC ACID 2 9 (HH) 0 5 - 2 0 mmol/L   Procalcitonin with AM Reflex    Collection Time: 03/01/22 10:17 AM   Result Value Ref Range    Procalcitonin 1 50 (H) <=0 25 ng/ml   Lactic acid 2 Hours    Collection Time: 03/01/22  3:54 PM   Result Value Ref Range    LACTIC ACID 2 4 (HH) 0 5 - 2 0 mmol/L   Urine culture    Collection Time: 03/01/22  6:56 PM    Specimen: Urine, Clean Catch   Result Value Ref Range    Urine Culture <10,000 cfu/ml Gram Negative Pramod Enteric Like (A)    UA w Reflex to Microscopic w Reflex to Culture    Collection Time: 03/01/22  6:56 PM    Specimen: Urine, Clean Catch   Result Value Ref Range    Color, UA Daya (A) Yellow    Clarity, UA Clear Clear    Specific [place], UA 1 025 1 001 - 1 030    pH, UA 6 0 5 0, 5 5, 6 0, 6 5, 7 0, 7 5, 8 0    Leukocytes, UA 2+ (A) Negative    Nitrite, UA Negative Negative    Protein, UA Trace (A) Negative, Interference- unable to analyze mg/dl    Glucose, UA Negative Negative mg/dl    Ketones, UA Negative Negative mg/dl Urobilinogen, UA >=8 0 (A) 0 2, 1 0 E U /dl E U /dl    Bilirubin, UA 3+ (A) Negative    Blood, UA Trace-Intact (A) Negative   Urine Microscopic    Collection Time: 03/01/22  6:56 PM   Result Value Ref Range    RBC, UA None Seen None Seen, 0-1, 1-2, 2-4, 0-5 /hpf    WBC, UA 30-50 (A) None Seen, 0-1, 1-2, 0-5, 2-4 /hpf    Epithelial Cells Occasional None Seen, Occasional /hpf    Bacteria, UA Occasional None Seen, Occasional /hpf   Urine culture    Collection Time: 03/01/22  6:56 PM    Specimen: Urine, Clean Catch   Result Value Ref Range    Urine Culture No Growth <1000 cfu/mL    Lactic acid, plasma    Collection Time: 03/01/22 10:57 PM   Result Value Ref Range    LACTIC ACID 1 3 0 5 - 2 0 mmol/L   Procalcitonin Reflex    Collection Time: 03/02/22  5:03 AM   Result Value Ref Range    Procalcitonin 0 88 (H) <=0 25 ng/ml   CBC and differential    Collection Time: 03/02/22  5:03 AM   Result Value Ref Range    WBC 10 14 4 31 - 10 16 Thousand/uL    RBC 4 24 3 88 - 5 62 Million/uL    Hemoglobin 9 9 (L) 12 0 - 17 0 g/dL    Hematocrit 34 5 (L) 36 5 - 49 3 %    MCV 81 (L) 82 - 98 fL    MCH 23 3 (L) 26 8 - 34 3 pg    MCHC 28 7 (L) 31 4 - 37 4 g/dL    RDW 28 5 (H) 11 6 - 15 1 %    MPV 11 0 8 9 - 12 7 fL    Platelets 376 305 - 995 Thousands/uL    nRBC 0 /100 WBCs    Neutrophils Relative 84 (H) 43 - 75 %    Immat GRANS % 0 0 - 2 %    Lymphocytes Relative 8 (L) 14 - 44 %    Monocytes Relative 7 4 - 12 %    Eosinophils Relative 1 0 - 6 %    Basophils Relative 0 0 - 1 %    Neutrophils Absolute 8 52 (H) 1 85 - 7 62 Thousands/µL    Immature Grans Absolute 0 04 0 00 - 0 20 Thousand/uL    Lymphocytes Absolute 0 82 0 60 - 4 47 Thousands/µL    Monocytes Absolute 0 66 0 17 - 1 22 Thousand/µL    Eosinophils Absolute 0 07 0 00 - 0 61 Thousand/µL    Basophils Absolute 0 03 0 00 - 0 10 Thousands/µL   Basic metabolic panel    Collection Time: 03/02/22  5:03 AM   Result Value Ref Range    Sodium 136 135 - 147 mmol/L    Potassium 4 1 3 5 - 5 3 mmol/L    Chloride 106 96 - 108 mmol/L    CO2 22 21 - 32 mmol/L    ANION GAP 8 4 - 13 mmol/L    BUN 20 5 - 25 mg/dL    Creatinine 1 20 0 60 - 1 30 mg/dL    Glucose 100 65 - 140 mg/dL    Calcium 8 1 (L) 8 4 - 10 2 mg/dL    eGFR 59 ml/min/1 73sq m   Hepatic function panel    Collection Time: 03/02/22  5:03 AM   Result Value Ref Range    Total Bilirubin 3 03 (H) 0 20 - 1 00 mg/dL    Bilirubin, Direct 2 14 (H) 0 00 - 0 20 mg/dL    Alkaline Phosphatase 173 (H) 34 - 104 U/L     (H) 13 - 39 U/L     (H) 7 - 52 U/L    Total Protein 5 5 (L) 6 4 - 8 4 g/dL    Albumin 3 0 (L) 3 5 - 5 0 g/dL   Blood culture    Collection Time: 03/02/22  9:01 AM    Specimen: Arm, Left; Blood   Result Value Ref Range    Blood Culture No Growth at 24 hrs  Blood culture    Collection Time: 03/02/22  9:03 AM    Specimen: Arm, Right; Blood   Result Value Ref Range    Blood Culture No Growth at 24 hrs      PSA, total and free    Collection Time: 03/02/22  9:05 AM   Result Value Ref Range    Prostate Specific Antigen Total 3 7 0 0 - 4 0 ng/mL    PSA, Free 0 50 N/A ng/mL    PSA, Free Pct 13 5 %   Chronic Hepatitis Panel    Collection Time: 03/02/22  1:38 PM   Result Value Ref Range    Hepatitis B Surface Ag Non-reactive Non-reactive, NonReactive - Confirmed    Hepatitis C Ab Non-reactive Non-reactive    Hep B C IgM Non-reactive Non-reactive    Hep B Core Total Ab Non-reactive Non-reactive   Hepatitis panel, acute    Collection Time: 03/02/22  1:38 PM   Result Value Ref Range    Hepatitis B Surface Ag Non-reactive Non-reactive, NonReactive - Confirmed    Hep A IgM Non-reactive Non-reactive, Equivocal-Suggest Recollect    Hepatitis C Ab Non-reactive Non-reactive    Hep B C IgM Non-reactive Non-reactive   Protime-INR    Collection Time: 03/03/22  4:26 AM   Result Value Ref Range    Protime 16 4 (H) 11 6 - 14 5 seconds    INR 1 34 (H) 0 84 - 1 19   Comprehensive metabolic panel    Collection Time: 03/03/22  4:26 AM   Result Value Ref Range Sodium 137 135 - 147 mmol/L    Potassium 4 0 3 5 - 5 3 mmol/L    Chloride 107 96 - 108 mmol/L    CO2 22 21 - 32 mmol/L    ANION GAP 8 4 - 13 mmol/L    BUN 16 5 - 25 mg/dL    Creatinine 0 99 0 60 - 1 30 mg/dL    Glucose 131 65 - 140 mg/dL    Calcium 7 9 (L) 8 4 - 10 2 mg/dL    Corrected Calcium 8 8 8 3 - 10 1 mg/dL    AST 71 (H) 13 - 39 U/L     (H) 7 - 52 U/L    Alkaline Phosphatase 164 (H) 34 - 104 U/L    Total Protein 5 3 (L) 6 4 - 8 4 g/dL    Albumin 2 9 (L) 3 5 - 5 0 g/dL    Total Bilirubin 1 61 (H) 0 20 - 1 00 mg/dL    eGFR 74 ml/min/1 73sq m   CBC and differential    Collection Time: 03/03/22  4:26 AM   Result Value Ref Range    WBC 9 49 4 31 - 10 16 Thousand/uL    RBC 4 03 3 88 - 5 62 Million/uL    Hemoglobin 9 5 (L) 12 0 - 17 0 g/dL    Hematocrit 31 9 (L) 36 5 - 49 3 %    MCV 79 (L) 82 - 98 fL    MCH 23 6 (L) 26 8 - 34 3 pg    MCHC 29 8 (L) 31 4 - 37 4 g/dL    RDW 28 2 (H) 11 6 - 15 1 %    Platelets 841 374 - 349 Thousands/uL    nRBC 0 /100 WBCs    Neutrophils Relative 84 (H) 43 - 75 %    Immat GRANS % 1 0 - 2 %    Lymphocytes Relative 6 (L) 14 - 44 %    Monocytes Relative 6 4 - 12 %    Eosinophils Relative 3 0 - 6 %    Basophils Relative 0 0 - 1 %    Neutrophils Absolute 8 09 (H) 1 85 - 7 62 Thousands/µL    Immature Grans Absolute 0 05 0 00 - 0 20 Thousand/uL    Lymphocytes Absolute 0 53 (L) 0 60 - 4 47 Thousands/µL    Monocytes Absolute 0 52 0 17 - 1 22 Thousand/µL    Eosinophils Absolute 0 27 0 00 - 0 61 Thousand/µL    Basophils Absolute 0 03 0 00 - 0 10 Thousands/µL   Bilirubin, direct    Collection Time: 03/03/22  4:26 AM   Result Value Ref Range    Bilirubin, Direct 0 97 (H) 0 00 - 0 20 mg/dL   CBC and differential    Collection Time: 03/04/22  5:28 AM   Result Value Ref Range    WBC 8 99 4 31 - 10 16 Thousand/uL    RBC 4 15 3 88 - 5 62 Million/uL    Hemoglobin 9 7 (L) 12 0 - 17 0 g/dL    Hematocrit 33 9 (L) 36 5 - 49 3 %    MCV 82 82 - 98 fL    MCH 23 4 (L) 26 8 - 34 3 pg    MCHC 28 6 (L) 31 4 - 37 4 g/dL    RDW 28 4 (H) 11 6 - 15 1 %    Platelets 532 851 - 893 Thousands/uL    nRBC 0 /100 WBCs    Neutrophils Relative 76 (H) 43 - 75 %    Immat GRANS % 1 0 - 2 %    Lymphocytes Relative 8 (L) 14 - 44 %    Monocytes Relative 7 4 - 12 %    Eosinophils Relative 7 (H) 0 - 6 %    Basophils Relative 1 0 - 1 %    Neutrophils Absolute 6 98 1 85 - 7 62 Thousands/µL    Immature Grans Absolute 0 06 0 00 - 0 20 Thousand/uL    Lymphocytes Absolute 0 72 0 60 - 4 47 Thousands/µL    Monocytes Absolute 0 58 0 17 - 1 22 Thousand/µL    Eosinophils Absolute 0 60 0 00 - 0 61 Thousand/µL    Basophils Absolute 0 05 0 00 - 0 10 Thousands/µL   Comprehensive metabolic panel    Collection Time: 03/04/22  5:28 AM   Result Value Ref Range    Sodium 137 135 - 147 mmol/L    Potassium 4 1 3 5 - 5 3 mmol/L    Chloride 108 96 - 108 mmol/L    CO2 22 21 - 32 mmol/L    ANION GAP 7 4 - 13 mmol/L    BUN 10 5 - 25 mg/dL    Creatinine 0 95 0 60 - 1 30 mg/dL    Glucose 120 65 - 140 mg/dL    Calcium 8 1 (L) 8 4 - 10 2 mg/dL    Corrected Calcium 9 0 8 3 - 10 1 mg/dL    AST 25 13 - 39 U/L    ALT 89 (H) 7 - 52 U/L    Alkaline Phosphatase 163 (H) 34 - 104 U/L    Total Protein 5 5 (L) 6 4 - 8 4 g/dL    Albumin 2 9 (L) 3 5 - 5 0 g/dL    Total Bilirubin 1 00 0 20 - 1 00 mg/dL    eGFR 78 ml/min/1 73sq m

## 2022-03-04 NOTE — ASSESSMENT & PLAN NOTE
-suspected cholangitis in the view of LFTs showing hyperbilirubinemia direct, transaminitis,cholestatic , patient being septic  RULE OUT ultrasound of the liver showed gallbladder stones but no signs of cholecystitis, cholangitis, normal liver and flow  -negative acute and chronic hepatitis panel  -cholestatic could be related to patient's sepsis, transaminitis, bilirubin improving on CMP

## 2022-03-05 LAB
BACTERIA BLD CULT: ABNORMAL
GRAM STN SPEC: ABNORMAL

## 2022-03-07 LAB
BACTERIA BLD CULT: NORMAL
BACTERIA BLD CULT: NORMAL

## 2022-03-14 ENCOUNTER — TELEPHONE (OUTPATIENT)
Dept: FAMILY MEDICINE CLINIC | Facility: CLINIC | Age: 77
End: 2022-03-14

## 2022-03-14 NOTE — TELEPHONE ENCOUNTER
[facility] - pt has reported a fall without injury - this was just a courtesy call

## 2022-03-15 ENCOUNTER — TELEPHONE (OUTPATIENT)
Dept: FAMILY MEDICINE CLINIC | Facility: CLINIC | Age: 77
End: 2022-03-15

## 2022-03-15 ENCOUNTER — TRANSITIONAL CARE MANAGEMENT (OUTPATIENT)
Dept: FAMILY MEDICINE CLINIC | Facility: CLINIC | Age: 77
End: 2022-03-15

## 2022-03-15 NOTE — TELEPHONE ENCOUNTER
CALLED AND LEFT MESSAGE ON VOICEMAIL TO CALL OFFICE TO COMPLETE TCM CALL AND SCHEDULE FOLLOW UP  Delmis Feliz

## 2022-03-21 ENCOUNTER — OFFICE VISIT (OUTPATIENT)
Dept: FAMILY MEDICINE CLINIC | Facility: CLINIC | Age: 77
End: 2022-03-21
Payer: MEDICARE

## 2022-03-21 VITALS
HEART RATE: 72 BPM | WEIGHT: 154.2 LBS | RESPIRATION RATE: 16 BRPM | BODY MASS INDEX: 20.88 KG/M2 | TEMPERATURE: 97.6 F | OXYGEN SATURATION: 98 % | DIASTOLIC BLOOD PRESSURE: 70 MMHG | SYSTOLIC BLOOD PRESSURE: 120 MMHG | HEIGHT: 72 IN

## 2022-03-21 DIAGNOSIS — Z12.2 SCREENING FOR LUNG CANCER: ICD-10-CM

## 2022-03-21 DIAGNOSIS — Z76.89 ENCOUNTER FOR SUPPORT AND COORDINATION OF TRANSITION OF CARE: ICD-10-CM

## 2022-03-21 DIAGNOSIS — I48.21 PERMANENT ATRIAL FIBRILLATION (HCC): Primary | ICD-10-CM

## 2022-03-21 DIAGNOSIS — J44.9 CHRONIC OBSTRUCTIVE PULMONARY DISEASE, UNSPECIFIED COPD TYPE (HCC): ICD-10-CM

## 2022-03-21 PROCEDURE — 99214 OFFICE O/P EST MOD 30 MIN: CPT | Performed by: INTERNAL MEDICINE

## 2022-03-21 NOTE — PROGRESS NOTES
Assessment/Plan:         Problem List Items Addressed This Visit        Respiratory    COPD (chronic obstructive pulmonary disease) ([facility] )       Cardiovascular and Mediastinum    Permanent atrial fibrillation (HCC) - Primary    Relevant Orders    CBC and differential    Comprehensive metabolic panel       Other    Encounter for support and coordination of transition of care     hospital record reviewed  Was in with sepsis with likely a UTI with ?gallstone cholangitis  lft better  Feeling better  Has f/u appt with urologist for urinary retention with cath  Other Visit Diagnoses     Screening for lung cancer                Subjective:      Patient ID: Destiny Bush is a 78 y o  male  1  TCM- patient was admitted with sepsis due to urinary tract infection with elevated liver enzymes  Finished antibiotics  He is now back to his baseline  No diarrhea  No fever or chills  No abdominal pain  No lightheadedness  No chest pain  He does have mild dyspnea on exertion from chronic smoking with COPD  No falls  He is eating better  2  afib- no palpitations  No black stool or blood in the stool  In January he was evaluated for GI bleeding  His hemoglobin has been stable around 9 7  No syncope  No increased fatigue  3  htn      The following portions of the patient's history were reviewed and updated as appropriate:   Past Medical History:  He has a past medical history of Anxiety disorder, Atrial fibrillation ([facility] ), Coronary artery disease, Depression, Hepatitis C, and MI (myocardial infarction) ([facility] )  ,  _______________________________________________________________________  Medical Problems:  does not have any pertinent problems on file ,  _______________________________________________________________________  Past Surgical History:   has a past surgical history that includes Cardiac catheterization (07/09/2018)  ,  _______________________________________________________________________  [facility] History:  family history includes Coronary artery disease in his father and mother; Diabetes in his father; Hypertension in his father and mother ,  _______________________________________________________________________  Social History:   reports that he has been smoking cigarettes  He has a 28 50 pack-year smoking history  He has never used smokeless tobacco  He reports that he does not drink alcohol and does not use drugs  ,  _______________________________________________________________________  Allergies:  has No Known Allergies     _______________________________________________________________________  Current Outpatient Medications   Medication Sig Dispense Refill    apixaban (ELIQUIS) 5 mg Take 1 tablet (5 mg total) by mouth 2 (two) times a day 180 tablet 0    docusate sodium (COLACE) 100 mg capsule Take 1 capsule (100 mg total) by mouth 2 (two) times a day 180 capsule 0    fluticasone-salmeterol (Advair Diskus) 100-50 mcg/dose inhaler Inhale 1 puff 2 (two) times a day Rinse mouth after use   60 blister 2    ipratropium-albuterol (DUO-NEB) 0 5-2 5 mg/3 mL nebulizer solution Take 3 mL by nebulization 4 (four) times a day 360 mL 2    metoprolol succinate (Toprol XL) 25 mg 24 hr tablet Take 1 tablet (25 mg total) by mouth daily 90 tablet 3    potassium chloride (Klor-Con) 10 mEq tablet Take 1 tablet (10 mEq total) by mouth daily 90 tablet 1    tamsulosin (FLOMAX) 0 4 mg Take 1 capsule (0 4 mg total) by mouth daily 30 capsule 2    ferrous sulfate 324 (65 Fe) mg Take 1 tablet (324 mg total) by mouth 2 (two) times a day before meals (Patient not taking: Reported on 2/1/2022 ) 908 tablet 0    folic acid (FOLVITE) 384 mcg tablet Take 1 tablet (400 mcg total) by mouth daily (Patient not taking: Reported on 2/1/2022 ) 30 tablet 0    nicotine (NICODERM CQ) 7 mg/24hr TD 24 hr patch Place 1 patch on the skin daily (Patient not taking: Reported on 3/21/2022 ) 28 patch 0    pantoprazole (PROTONIX) 40 mg tablet Take 1 tablet (40 mg total) by mouth daily before breakfast (Patient not taking: Reported on 10/14/2021) 90 tablet 3     No current facility-administered medications for this visit      _______________________________________________________________________  Review of Systems   Constitutional: Negative for chills, fatigue and fever  HENT: Negative for congestion, nosebleeds and rhinorrhea  Eyes: Negative for discharge and visual disturbance  Respiratory: Negative for cough, chest tightness, shortness of breath and wheezing  Cardiovascular: Negative for chest pain  Gastrointestinal: Negative for abdominal distention, abdominal pain, constipation, diarrhea and vomiting  Endocrine: Negative for polydipsia and polyuria  Genitourinary: Positive for difficulty urinating  Negative for frequency and hematuria  Musculoskeletal: Negative for arthralgias, back pain and myalgias  Skin: Negative for rash  Allergic/Immunologic: Negative for environmental allergies  Neurological: Negative for dizziness, syncope, weakness, light-headedness and headaches  Hematological: Negative  Psychiatric/Behavioral: Negative for agitation, confusion, decreased concentration, dysphoric mood and suicidal ideas  The patient is not nervous/anxious  All other systems reviewed and are negative  Objective:  Vitals:    03/21/22 1320   BP: 120/70   BP Location: Left arm   Patient Position: Sitting   Cuff Size: Standard   Pulse: 72   Resp: 16   Temp: 97 6 °F (36 4 °C)   TempSrc: Temporal   SpO2: 98%   Weight: 69 9 kg (154 lb 3 2 oz)   Height: 6' (1 829 m)     Body mass index is 20 91 kg/m²  Physical Exam  Vitals and nursing note reviewed  Constitutional:       General: He is not in acute distress  Appearance: Normal appearance  He is well-developed  HENT:      Head: Normocephalic and atraumatic  Eyes:      General:         Right eye: No discharge  Left eye: No discharge     Neck:      Thyroid: No thyromegaly  Cardiovascular:      Rate and Rhythm: Normal rate and regular rhythm  Heart sounds: Normal heart sounds  No murmur heard  Pulmonary:      Effort: Pulmonary effort is normal       Breath sounds: No wheezing or rhonchi  Abdominal:      General: Bowel sounds are normal  There is no distension  Palpations: Abdomen is soft  Tenderness: There is no abdominal tenderness  Musculoskeletal:         General: No swelling  Cervical back: Neck supple  Right lower leg: No edema  Left lower leg: No edema  Lymphadenopathy:      Cervical: No cervical adenopathy  Skin:     General: Skin is warm  Capillary Refill: Capillary refill takes less than 2 seconds  Findings: No erythema  Neurological:      General: No focal deficit present  Mental Status: He is alert and oriented to person, place, and time  Psychiatric:         Mood and Affect: Mood normal          Behavior: Behavior normal          Thought Content:  Thought content normal

## 2022-03-21 NOTE — ASSESSMENT & PLAN NOTE
hospital record reviewed  Was in with sepsis with likely a UTI with ?gallstone cholangitis  lft better  Feeling better  Has f/u appt with urologist for urinary retention with cath

## 2022-03-22 ENCOUNTER — TELEPHONE (OUTPATIENT)
Dept: FAMILY MEDICINE CLINIC | Facility: CLINIC | Age: 77
End: 2022-03-22

## 2022-03-22 NOTE — TELEPHONE ENCOUNTER
Rahul from [facility] stated that Mr Merrill Salguero is in need of a new neubulizer  Sent to [facility] or where ever it goes

## 2022-03-25 NOTE — TELEPHONE ENCOUNTER
PLACED ORDER AND NOTIFIED OFFICE PATIENT HAD RECEIVED NEBULIZER IN 2020 INSURANCE WILL NOT PAY FOR ANOTHER ONE, TRIED TO CALL PATIENT SEVERAL TIMES TO FIND OUT WHY HE NEEDS AND TO LET HIM KNOW ORDER WAS PLACED AND IS NOT TAKING CALLS AT THIS TIME  [facility] ALSO HAVE BEEN CALLING PATIENT AND GETTING SAME MESSAGE     Anni Smith

## 2022-04-21 ENCOUNTER — HOSPITAL ENCOUNTER (EMERGENCY)
Facility: HOSPITAL | Age: 77
Discharge: HOME | End: 2022-04-21
Attending: EMERGENCY MEDICINE
Payer: MEDICARE

## 2022-04-21 VITALS
SYSTOLIC BLOOD PRESSURE: 137 MMHG | TEMPERATURE: 98.2 F | RESPIRATION RATE: 20 BRPM | HEART RATE: 95 BPM | DIASTOLIC BLOOD PRESSURE: 79 MMHG | OXYGEN SATURATION: 99 %

## 2022-04-21 DIAGNOSIS — T83.9XXA PROBLEM WITH URINARY CATHETER (HCC): Primary | ICD-10-CM

## 2022-04-21 PROCEDURE — 99283 EMERGENCY DEPT VISIT LOW MDM: CPT

## 2022-04-21 PROCEDURE — 99284 EMERGENCY DEPT VISIT MOD MDM: CPT | Performed by: PHYSICIAN ASSISTANT

## 2022-04-21 NOTE — DISCHARGE INSTRUCTIONS
Please return to the emergency department for worsening symptoms including chest pain, shortness of breath, dizziness, lightheadedness, fever greater than 103, severe pain, inability to walk, blood in catheter, catheter is not draining, catheter is clogged, fainting episodes, etc  Please follow-up with your family practice provider as soon as possible  Please follow-up with urology as soon as possible  They will be able to do a definitive treatment for your catheter issue

## 2022-04-21 NOTE — ED PROVIDER NOTES
History  Chief Complaint   Patient presents with    Urinary Catheter Problem     pt states he has a chronic miller and was told to follow up with urology and never did  Pt states the catheter is draining but it is leaking from his penis  This is a 72-year-old male with past medical history significant for atrial fibrillation on Eliquis and urinary retention with chronic Miller catheter presenting to the emergency department today for difficulties with his urinary catheter  He notes it was replaced earlier today by home health  Since then, the patient notes he has been experiencing some leaking from a different hole of his penis  He denies any pain, dysuria, or hematuria  He notes the Miller catheter has normal flow of urine; he denies any obstructions  He denies any abdominal pain, nausea, vomiting, diarrhea, or constipation  He has not yet followed up with urology  He denies any fever or chills  He is concerned that there is urine leaking out of his penis and not going into his Miller catheter bag  The patient denies other complaints at this time  History provided by:  Patient   used: No    Urinary Catheter Problem  Location:  Penis  Severity:  Mild  Onset quality:  Sudden  Duration: Since this AM   Timing:  Sporadic  Progression:  Unchanged  Chronicity:  New  Relieved by:  None Tried  Worsened by:  Nothing  Ineffective treatments:  None Tried  Associated symptoms: no abdominal pain, no chest pain, no congestion, no cough, no diarrhea, no ear pain, no fatigue, no fever, no headaches, no loss of consciousness, no myalgias, no nausea, no rash, no rhinorrhea, no shortness of breath, no sore throat, no vomiting and no wheezing        Prior to Admission Medications   Prescriptions Last Dose Informant Patient Reported? Taking?    apixaban (ELIQUIS) 5 mg   No No   Sig: Take 1 tablet (5 mg total) by mouth 2 (two) times a day   docusate sodium (COLACE) 100 mg capsule   No No   Sig: Take 1 capsule (100 mg total) by mouth 2 (two) times a day   ferrous sulfate 324 (65 Fe) mg   No No   Sig: Take 1 tablet (324 mg total) by mouth 2 (two) times a day before meals   Patient not taking: Reported on 2/1/2022    fluticasone-salmeterol (Advair Diskus) 100-50 mcg/dose inhaler   No No   Sig: Inhale 1 puff 2 (two) times a day Rinse mouth after use  folic acid (FOLVITE) 328 mcg tablet   No No   Sig: Take 1 tablet (400 mcg total) by mouth daily   Patient not taking: Reported on 2/1/2022    metoprolol succinate (Toprol XL) 25 mg 24 hr tablet   No No   Sig: Take 1 tablet (25 mg total) by mouth daily   nicotine (NICODERM CQ) 7 mg/24hr TD 24 hr patch   No No   Sig: Place 1 patch on the skin daily   Patient not taking: Reported on 3/21/2022    pantoprazole (PROTONIX) 40 mg tablet   No No   Sig: Take 1 tablet (40 mg total) by mouth daily before breakfast   Patient not taking: Reported on 10/14/2021   potassium chloride (Klor-Con) 10 mEq tablet   No No   Sig: Take 1 tablet (10 mEq total) by mouth daily   tamsulosin (FLOMAX) 0 4 mg   No No   Sig: Take 1 capsule (0 4 mg total) by mouth daily      Facility-Administered Medications: None       Past Medical History:   Diagnosis Date    Anxiety disorder     Atrial fibrillation (HCC)     Coronary artery disease     Depression     Hepatitis C     screening negative in 1/2017    MI (myocardial infarction) [facility])        Past Surgical History:   Procedure Laterality Date    CARDIAC CATHETERIZATION  07/09/2018       Family History   Problem Relation Age of Onset    Hypertension Mother     Coronary artery disease Mother         premature    Hypertension Father     Coronary artery disease Father         premature    Diabetes Father      I have reviewed and agree with the history as documented      E-Cigarette/Vaping    E-Cigarette Use Never User      E-Cigarette/Vaping Substances    Nicotine Yes     THC No     CBD No     Flavoring No     Other No     Unknown No Social History     Tobacco Use    Smoking status: Current Every Day Smoker     Packs/day: 0 50     Years: 57 00     Pack years: 28 50     Types: Cigarettes    Smokeless tobacco: Never Used    Tobacco comment: since age 17; Has history of smoking for over 54 years  He has been smoking more than packet daily in the past however he has been smokes about half a pack a day lately and stopped smoking on 7/1/2018 when he was admitted to the hospital     Vaping Use    Vaping Use: Never used   Substance Use Topics    Alcohol use: Never    Drug use: Never       Review of Systems   Constitutional: Negative for appetite change, chills, diaphoresis, fatigue and fever  HENT: Negative for congestion, ear pain, rhinorrhea and sore throat  Eyes: Negative for visual disturbance  Respiratory: Negative for cough, chest tightness, shortness of breath and wheezing  Cardiovascular: Negative for chest pain and palpitations  Gastrointestinal: Negative for abdominal pain, constipation, diarrhea, nausea and vomiting  Genitourinary: Positive for difficulty urinating  Negative for dysuria and urgency  Musculoskeletal: Negative for myalgias  Skin: Negative for rash  Neurological: Negative for dizziness, seizures, loss of consciousness, syncope, weakness, light-headedness, numbness and headaches  Psychiatric/Behavioral: Negative for confusion  All other systems reviewed and are negative  Physical Exam  Physical Exam  Vitals and nursing note reviewed  Constitutional:       General: He is not in acute distress  Appearance: Normal appearance  He is normal weight  He is not ill-appearing, toxic-appearing or diaphoretic  HENT:      Head: Normocephalic and atraumatic  Nose: Nose normal  No congestion or rhinorrhea  Mouth/Throat:      Mouth: Mucous membranes are moist       Pharynx: No oropharyngeal exudate or posterior oropharyngeal erythema  Eyes:      General: No scleral icterus          Right eye: No discharge  Left eye: No discharge  Conjunctiva/sclera: Conjunctivae normal    Cardiovascular:      Rate and Rhythm: Normal rate and regular rhythm  Pulses: Normal pulses  Heart sounds: Normal heart sounds  No murmur heard  No friction rub  No gallop  Pulmonary:      Effort: Pulmonary effort is normal  No respiratory distress  Breath sounds: Normal breath sounds  No stridor  No wheezing, rhonchi or rales  Chest:      Chest wall: No tenderness  Abdominal:      General: Abdomen is flat  There is no distension  Palpations: Abdomen is soft  Tenderness: There is no abdominal tenderness  There is no right CVA tenderness, left CVA tenderness, guarding or rebound  Comments: Soft, nontender, nondistended, and without organomegaly   Genitourinary:     Comments: The patient has hypospadias  Urinary catheter appears to be in place and is draining clear urine; no clots or obstructions  Urine is without red-tinge  No penile discharge  Musculoskeletal:         General: Normal range of motion  Cervical back: Normal range of motion  No rigidity  Right lower leg: No edema  Left lower leg: No edema  Skin:     General: Skin is warm and dry  Capillary Refill: Capillary refill takes less than 2 seconds  Coloration: Skin is not jaundiced or pale  Neurological:      General: No focal deficit present  Mental Status: He is alert and oriented to person, place, and time  Mental status is at baseline     Psychiatric:         Mood and Affect: Mood normal          Behavior: Behavior normal          Vital Signs  ED Triage Vitals   Temperature Pulse Respirations Blood Pressure SpO2   04/21/22 1530 04/21/22 1530 04/21/22 1530 04/21/22 1530 04/21/22 1530   98 2 °F (36 8 °C) (!) 120 20 155/93 100 %      Temp Source Heart Rate Source Patient Position - Orthostatic VS BP Location FiO2 (%)   04/21/22 1530 04/21/22 1633 04/21/22 1530 04/21/22 1530 --   Oral Monitor Lying Right arm       Pain Score       --                  Vitals:    04/21/22 1530 04/21/22 1633   BP: 155/93 137/79   Pulse: (!) 120 95   Patient Position - Orthostatic VS: Lying Sitting         Visual Acuity      ED Medications  Medications - No data to display    Diagnostic Studies  Results Reviewed     None                 No orders to display              Procedures  Procedures         ED Course  ED Course as of 04/21/22 1908   Thu Apr 21, 2022   1549 TT sent to Fernanda Adorno with urology  Will continue to monitor  1606 Bladder scan shows 11 mL - urinary catheter currently draining clear yellow urine  [address] with [facility] Do Barclay  with urology  Explained patient's symptoms  Sounds like patient may have acquired hypospadias; patient will likely require suprapubic catheter placement outpatient  Will continue to monitor  SBIRT 22yo+      Most Recent Value   SBIRT (22 yo +)    In order to provide better care to our patients, we are screening all of our patients for alcohol and drug use  Would it be okay to ask you these screening questions? No Filed at: 04/21/2022 1605                    MDM  Number of Diagnoses or Management Options  Problem with urinary catheter [facility]): new and requires workup  Diagnosis management comments: This is a 61-year-old male presenting to the emergency department today for urine leaking around his urinary catheter  Catheter was replaced earlier today by home health  Patient has not yet followed up with urology  Patient has a chronic Cloud catheter  He denies dysuria, hematuria, or abdominal pain  Vital signs are stable  Physical exam shows acquired hypospadias; urine does not appear to be leaking around the catheter at this time  Urine appears to be clear, without red tinge, and appears to be draining appropriately  Patient denies other complaints  I discussed the patient with Fernanda Adorno with urology    She notes that this sounds like acquired hypospadias  He will require suprapubic catheter placement but this can be done outpatient as patient is currently without any symptoms  I discussed this with the patient who agrees  The patient is stable for discharge at this time  Recommend PCP and urology follow-up as soon as possible  Stressed the importance of following up with urology  Cloud care instructions were discussed to the patient by nursing staff  Strict return precautions were given  Recommend PCP follow-up as soon as possible  The patient verifies understanding and agrees to the plan at this time  All questions answered to the patient's satisfaction  Amount and/or Complexity of Data Reviewed  Review and summarize past medical records: yes  Discuss the patient with other providers: yes Tico NAPOLES    Patient Progress  Patient progress: stable      Disposition  Final diagnoses:   Problem with urinary catheter [facility])     Time reflects when diagnosis was documented in both MDM as applicable and the Disposition within this note     Time User Action Codes Description Comment    4/21/2022  4:39 PM Shereen Daly Add [Q54 9] Hypospadias in male     4/21/2022  4:39 PM Shameka Mccauley  9XXA] Problem with urinary catheter ([address] )     4/21/2022  4:40 PM West Mccauley Modify [T83  9XXA] Problem with urinary catheter ([address] )     4/21/2022  4:40 PM West Mccauley Remove [Q54 9] Hypospadias in male       ED Disposition     ED Disposition Condition Date/Time Comment    Discharge Stable Thu Apr 21, 2022 2428 Amari Carbajal discharge to home/self care              Follow-up Information     Follow up With Specialties Details Why Contact Info Additional [address] For Urology [facility] Follow up in 10 day(s) Service contact patient/caregiver with hospital follow-up appointment date and time once discharged [address] [place] [address] [facility] [place], [address] [place], [place], 169 [facility]    Yao Hernandez MD Urology Call   [address] [facility] [address]  [phone]       Irlanda Sandoval MD Family Medicine Schedule an appointment as soon as possible for a visit   [address] [facility]  20 [facility]  [id number] [place] [address] [address] [facility] Se[address]       [address] Emergency Department Emergency Medicine Go to  If symptoms worsen [address]15 [facility] [address]  [address] [place] [address] Emergency Department, [address], [address] [place] [address] Rd          Discharge Medication List as of 4/21/2022  4:41 PM      CONTINUE these medications which have NOT CHANGED    Details   apixaban (ELIQUIS) 5 mg Take 1 tablet (5 mg total) by mouth 2 (two) times a day, Starting Sun 2/6/2022, Until Sat 5/7/2022, Normal      docusate sodium (COLACE) 100 mg capsule Take 1 capsule (100 mg total) by mouth 2 (two) times a day, Starting Fri 9/10/2021, Until Mon 3/21/2022, Normal      ferrous sulfate 324 (65 Fe) mg Take 1 tablet (324 mg total) by mouth 2 (two) times a day before meals, Starting Fri 9/10/2021, Until Thu 1/13/2022, Normal      fluticasone-salmeterol (Advair Diskus) 100-50 mcg/dose inhaler Inhale 1 puff 2 (two) times a day Rinse mouth after use , Starting Fri 9/10/2021, Until Mon 9/919/91/5385, Normal      folic acid (FOLVITE) 611 mcg tablet Take 1 tablet (400 mcg total) by mouth daily, Starting Fri 9/10/2021, Normal      metoprolol succinate (Toprol XL) 25 mg 24 hr tablet Take 1 tablet (25 mg total) by mouth daily, Starting Tue 7/27/2021, Normal      nicotine (NICODERM CQ) 7 mg/24hr TD 24 hr patch Place 1 patch on the skin daily, Starting Fri 3/4/2022, Normal      pantoprazole (PROTONIX) 40 mg tablet Take 1 tablet (40 mg total) by mouth daily before breakfast, Starting Fri 9/10/2021, Until Thu 12/9/2021, Normal      potassium chloride (Klor-Con) 10 mEq tablet Take 1 tablet (10 mEq total) by mouth daily, Starting Wed 2/23/2022, Until Tue 5/24/2022, Normal      tamsulosin (FLOMAX) 0 4 mg Take 1 capsule (0 4 mg total) by mouth daily, Starting Fri 9/10/2021, Normal             No discharge procedures on file      PDMP Review     None          ED Provider  Electronically Signed by           Steffany Chopra PA-C  04/21/22 1921

## 2022-04-27 DIAGNOSIS — D50.9 MICROCYTIC ANEMIA: ICD-10-CM

## 2022-04-29 RX ORDER — FERROUS SULFATE TAB EC 324 MG (65 MG FE EQUIVALENT) 324 (65 FE) MG
324 TABLET DELAYED RESPONSE ORAL
Qty: 180 TABLET | Refills: 2 | Status: SHIPPED | OUTPATIENT
Start: 2022-04-29 | End: 2022-07-28

## 2022-05-24 ENCOUNTER — OFFICE VISIT (OUTPATIENT)
Dept: FAMILY MEDICINE CLINIC | Facility: CLINIC | Age: 77
End: 2022-05-24
Payer: MEDICARE

## 2022-05-24 VITALS
RESPIRATION RATE: 17 BRPM | WEIGHT: 159 LBS | OXYGEN SATURATION: 98 % | SYSTOLIC BLOOD PRESSURE: 142 MMHG | HEIGHT: 72 IN | BODY MASS INDEX: 21.54 KG/M2 | DIASTOLIC BLOOD PRESSURE: 80 MMHG | HEART RATE: 101 BPM | TEMPERATURE: 98.4 F

## 2022-05-24 DIAGNOSIS — R33.9 URINARY RETENTION: ICD-10-CM

## 2022-05-24 DIAGNOSIS — D50.9 IRON DEFICIENCY ANEMIA, UNSPECIFIED IRON DEFICIENCY ANEMIA TYPE: ICD-10-CM

## 2022-05-24 DIAGNOSIS — I48.21 PERMANENT ATRIAL FIBRILLATION (HCC): Primary | ICD-10-CM

## 2022-05-24 DIAGNOSIS — R09.81 NASAL CONGESTION: ICD-10-CM

## 2022-05-24 DIAGNOSIS — J44.9 CHRONIC OBSTRUCTIVE PULMONARY DISEASE, UNSPECIFIED COPD TYPE (HCC): ICD-10-CM

## 2022-05-24 DIAGNOSIS — R26.2 AMBULATORY DYSFUNCTION: ICD-10-CM

## 2022-05-24 PROCEDURE — 99214 OFFICE O/P EST MOD 30 MIN: CPT | Performed by: INTERNAL MEDICINE

## 2022-05-24 RX ORDER — IPRATROPIUM BROMIDE AND ALBUTEROL SULFATE 2.5; .5 MG/3ML; MG/3ML
SOLUTION RESPIRATORY (INHALATION)
COMMUNITY
Start: 2022-05-22 | End: 2022-06-07

## 2022-05-24 RX ORDER — AZELASTINE 1 MG/ML
1 SPRAY, METERED NASAL 2 TIMES DAILY
Qty: 90 ML | Refills: 3 | Status: SHIPPED | OUTPATIENT
Start: 2022-05-24

## 2022-05-24 NOTE — PROGRESS NOTES
BMI Counseling: Body mass index is 21 56 kg/m²  The BMI is above normal  Nutrition recommendations include decreasing portion sizes, decreasing fast food intake, consuming healthier snacks, limiting drinks that contain sugar, moderation in carbohydrate intake and reducing intake of cholesterol  Exercise recommendations include exercising 3-5 times per week and strength training exercises  Rationale for BMI follow-up plan is due to patient being overweight or obese  Depression Screening and Follow-up Plan: Patient was screened for depression during today's encounter  They screened negative with a PHQ-2 score of 0  Falls Plan of Care: balance, strength, and gait training instructions were provided  Assessment/Plan:         Problem List Items Addressed This Visit        Respiratory    COPD (chronic obstructive pulmonary disease) ([facility] )    Relevant Medications    ipratropium-albuterol (DUO-NEB) 0 5-2 5 mg/3 mL nebulizer solution       Cardiovascular and Mediastinum    Permanent atrial fibrillation ([facility] ) - Primary     vr controlled  Continue eliquis  Watch for any bleeding           Relevant Orders    Basic metabolic panel       Genitourinary    Urinary retention     Has chronic miller  Seeing urologist  Lucia Hoffmann  On flomax  Other    Iron deficiency anemia likely secondary to GI bleeding     Recent hgb 12  4  monitor           Relevant Orders    CBC and differential    Basic metabolic panel            Subjective:      Patient ID: Alfred Ramachandran is a 78 y o  male  1  afib- no palpitations  No lightheadedness or syncope  No chest pain or increased dyspnea  No black stool or blood in the stool  No increased fatigue  2  Anemia- he was treated with IV iron and blood transfusion  Recent hemoglobin is 12 4  Feeling more energetic  Eating okay  No black stool or blood in the stool  3  chf-has a history of cardiomyopathy  On diuretics  Weight and symptoms are stable  No orthopnea  No leg edema  No lightheadedness or chest pain  4  Nasal stuffiness and congestion  Clear discharge with some sneezing  No sinus pressure  The following portions of the patient's history were reviewed and updated as appropriate:   Past Medical History:  He has a past medical history of Anxiety disorder, Atrial fibrillation ([facility] ), Coronary artery disease, Depression, Hepatitis C, and MI (myocardial infarction) ([facility] )  ,  _______________________________________________________________________  Medical Problems:  does not have any pertinent problems on file ,  _______________________________________________________________________  Past Surgical History:   has a past surgical history that includes Cardiac catheterization (07/09/2018)  ,  _______________________________________________________________________  Family History:  family history includes Coronary artery disease in his father and mother; Diabetes in his father; Hypertension in his father and mother ,  _______________________________________________________________________  Social History:   reports that he has been smoking cigarettes  He has a 28 50 pack-year smoking history  He has never used smokeless tobacco  He reports that he does not drink alcohol and does not use drugs  ,  _______________________________________________________________________  Allergies:  has No Known Allergies     _______________________________________________________________________  Current Outpatient Medications   Medication Sig Dispense Refill    apixaban (ELIQUIS) 5 mg Take 1 tablet (5 mg total) by mouth 2 (two) times a day 180 tablet 0    docusate sodium (COLACE) 100 mg capsule Take 1 capsule (100 mg total) by mouth 2 (two) times a day 180 capsule 0    ferrous sulfate 324 (65 Fe) mg Take 1 tablet (324 mg total) by mouth 2 (two) times a day before meals 180 tablet 2    fluticasone-salmeterol (Advair Diskus) 100-50 mcg/dose inhaler Inhale 1 puff 2 (two) times a day Rinse mouth after use  60 blister 2    folic acid (FOLVITE) 895 mcg tablet Take 1 tablet (400 mcg total) by mouth daily (Patient not taking: Reported on 2/1/2022 ) 30 tablet 0    ipratropium-albuterol (DUO-NEB) 0 5-2 5 mg/3 mL nebulizer solution       metoprolol succinate (Toprol XL) 25 mg 24 hr tablet Take 1 tablet (25 mg total) by mouth daily 90 tablet 3    nicotine (NICODERM CQ) 7 mg/24hr TD 24 hr patch Place 1 patch on the skin daily (Patient not taking: Reported on 3/21/2022 ) 28 patch 0    pantoprazole (PROTONIX) 40 mg tablet Take 1 tablet (40 mg total) by mouth daily before breakfast (Patient not taking: Reported on 10/14/2021) 90 tablet 3    potassium chloride (Klor-Con) 10 mEq tablet Take 1 tablet (10 mEq total) by mouth daily 90 tablet 1    tamsulosin (FLOMAX) 0 4 mg Take 1 capsule (0 4 mg total) by mouth daily 30 capsule 2     No current facility-administered medications for this visit      _______________________________________________________________________  Review of Systems   Constitutional: Negative for chills, fatigue and fever  HENT: Negative for congestion, nosebleeds and rhinorrhea  Eyes: Negative for discharge and visual disturbance  Respiratory: Positive for shortness of breath (chronic- mild)  Negative for cough, chest tightness and wheezing  Cardiovascular: Negative for chest pain and leg swelling  Gastrointestinal: Negative for abdominal distention, abdominal pain, constipation, diarrhea and vomiting  Endocrine: Negative for polydipsia and polyuria  Genitourinary: Positive for difficulty urinating  Negative for frequency and hematuria  Musculoskeletal: Negative for arthralgias, back pain and myalgias  Skin: Negative for rash  Allergic/Immunologic: Negative for environmental allergies  Neurological: Negative for dizziness, syncope, light-headedness and headaches  Hematological: Negative      Psychiatric/Behavioral: Negative for agitation, confusion, decreased concentration, dysphoric mood, hallucinations and suicidal ideas  The patient is not nervous/anxious  All other systems reviewed and are negative  Objective:  Vitals:    05/24/22 1035   BP: 142/80   BP Location: Left arm   Patient Position: Sitting   Cuff Size: Standard   Pulse: 101   Resp: 17   Temp: 98 4 °F (36 9 °C)   TempSrc: Temporal   SpO2: 98%   Weight: 72 1 kg (159 lb)   Height: 6' (1 829 m)     Body mass index is 21 56 kg/m²  Physical Exam  Vitals and nursing note reviewed  Constitutional:       General: He is not in acute distress  Appearance: Normal appearance  He is well-developed  He is not ill-appearing  HENT:      Head: Normocephalic and atraumatic  Nose: Congestion (nasal) present  Eyes:      General:         Right eye: No discharge  Left eye: No discharge  Neck:      Thyroid: No thyromegaly  Cardiovascular:      Rate and Rhythm: Normal rate  Rhythm irregular  Pulses: Normal pulses  Heart sounds: Normal heart sounds  No murmur heard  Pulmonary:      Effort: Pulmonary effort is normal       Breath sounds: Normal breath sounds  No wheezing or rhonchi  Abdominal:      General: Bowel sounds are normal  There is no distension  Palpations: Abdomen is soft  Tenderness: There is no abdominal tenderness  Musculoskeletal:         General: No swelling or tenderness  Cervical back: Neck supple  Right lower leg: No edema  Left lower leg: No edema  Lymphadenopathy:      Cervical: No cervical adenopathy  Skin:     General: Skin is warm  Findings: No erythema  Neurological:      Mental Status: He is alert and oriented to person, place, and time  Gait: Gait abnormal    Psychiatric:         Mood and Affect: Mood normal          Behavior: Behavior normal          Thought Content:  Thought content normal

## 2022-06-01 DIAGNOSIS — D50.9 MICROCYTIC ANEMIA: ICD-10-CM

## 2022-06-01 RX ORDER — POTASSIUM CHLORIDE 750 MG/1
TABLET, FILM COATED, EXTENDED RELEASE ORAL
Qty: 90 TABLET | Refills: 3 | Status: SHIPPED | OUTPATIENT
Start: 2022-06-01

## 2022-06-07 DIAGNOSIS — J44.9 CHRONIC OBSTRUCTIVE PULMONARY DISEASE, UNSPECIFIED COPD TYPE (HCC): Primary | ICD-10-CM

## 2022-06-07 RX ORDER — IPRATROPIUM BROMIDE AND ALBUTEROL SULFATE 2.5; .5 MG/3ML; MG/3ML
SOLUTION RESPIRATORY (INHALATION)
Qty: 60 ML | Refills: 11 | Status: SHIPPED | OUTPATIENT
Start: 2022-06-07

## 2022-06-20 ENCOUNTER — TELEPHONE (OUTPATIENT)
Dept: FAMILY MEDICINE CLINIC | Facility: CLINIC | Age: 77
End: 2022-06-20

## 2022-06-20 NOTE — TELEPHONE ENCOUNTER
[facility] called they need to verify quanity on the ipratropium albuteral   It comes in a pack of 90 ml  The order was for 60 ml   Can they give the [phone]   Ref  [id number]

## 2022-06-22 NOTE — TELEPHONE ENCOUNTER
Called and spoke to Nate Lane at [facility] and will dispense 360 vial with 3 refills  Will be a 90 day supply     Nishant Linda

## 2022-07-07 ENCOUNTER — TELEPHONE (OUTPATIENT)
Dept: FAMILY MEDICINE CLINIC | Facility: CLINIC | Age: 77
End: 2022-07-07

## 2022-07-07 NOTE — TELEPHONE ENCOUNTER
Bruna Rinne from [address] [place] [address] called and left msg stating that pt is in need of a script sent to [facility] for a new neublizer and the tubing if  you have any questions call Bruna Rinne

## 2022-07-26 ENCOUNTER — OFFICE VISIT (OUTPATIENT)
Dept: UROLOGY | Facility: CLINIC | Age: 77
End: 2022-07-26
Payer: MEDICARE

## 2022-07-26 VITALS
WEIGHT: 147.4 LBS | HEART RATE: 90 BPM | BODY MASS INDEX: 19.96 KG/M2 | DIASTOLIC BLOOD PRESSURE: 82 MMHG | SYSTOLIC BLOOD PRESSURE: 136 MMHG | OXYGEN SATURATION: 97 % | HEIGHT: 72 IN

## 2022-07-26 DIAGNOSIS — K40.90 NON-RECURRENT UNILATERAL INGUINAL HERNIA WITHOUT OBSTRUCTION OR GANGRENE: Primary | ICD-10-CM

## 2022-07-26 DIAGNOSIS — R33.9 URINARY RETENTION: ICD-10-CM

## 2022-07-26 PROCEDURE — 99204 OFFICE O/P NEW MOD 45 MIN: CPT | Performed by: PHYSICIAN ASSISTANT

## 2022-07-26 PROCEDURE — 51702 INSERT TEMP BLADDER CATH: CPT

## 2022-07-26 NOTE — PROGRESS NOTES
1  Non-recurrent unilateral inguinal hernia without obstruction or gangrene  Ambulatory Referral to General Surgery   2  Urinary retention  Bladder catheterization         Assessment and plan:       1  Urinary retention  2  Urethral meatal erosion  - patient would have a difficult SPT placement this time given his inguinal hernia  Patient denies ever having evaluation of bladder outlet obstruction and recommend proceeding with cystoscopy and transrectal ultrasound  Continue routine catheter changes in the meantime    3  Right inguinal hernia  -general surgery referral    Dinora Hernandez PA-C      Chief Complaint     Urinary retention    History of Present Illness     Keyshawn Caceres is a 78 y o  male presenting for consultation of urinary retention  In ER 4/21/22 with catheter related issues  Noted to have meatal erosion secondary to catheter  Patient poor historian and unable to determine how long he has had a miller for as well as when the last exchange was performed  He does note transportation difficulties  Patient denies any pain from miller  He was not aware of his right inguinal hernia nor his meatal erosion  Denies any prior urologic surveillance or surgical intervention  Last PSA 3 7 (3/2/22)    Medical comorbidies include MI, CAD, COPD, cardiomyopathy, afib,   CT abdomen and pelvis 6/28/21 showing right inguinal hernia, no  abnormalities  Laboratory     Lab Results   Component Value Date    CREATININE 0 95 03/04/2022       Lab Results   Component Value Date    PSA 3 7 03/02/2022       Review of Systems     Review of Systems   Constitutional: Negative for activity change, appetite change, chills, diaphoresis, fatigue, fever and unexpected weight change  Respiratory: Negative for chest tightness and shortness of breath  Cardiovascular: Negative for chest pain, palpitations and leg swelling     Gastrointestinal: Negative for abdominal distention, abdominal pain, constipation, diarrhea, nausea and vomiting  Genitourinary:        Urinary retention with indwelling miller catheter   Musculoskeletal: Negative for back pain, gait problem and myalgias  Skin: Negative for color change, pallor, rash and wound  Psychiatric/Behavioral: Negative for behavioral problems  The patient is not nervous/anxious  Allergies     No Known Allergies    Physical Exam     Physical Exam  Constitutional:       General: He is not in acute distress  Appearance: He is normal weight  He is not ill-appearing, toxic-appearing or diaphoretic  HENT:      Head: Normocephalic and atraumatic  Eyes:      General:         Right eye: No discharge  Left eye: No discharge  Conjunctiva/sclera: Conjunctivae normal    Pulmonary:      Effort: Pulmonary effort is normal  No respiratory distress  Genitourinary:     Comments: Moderate meatal erosion down through midshaft  Large right inguinal hernia  Musculoskeletal:         General: No swelling or tenderness  Normal range of motion  Skin:     General: Skin is warm and dry  Coloration: Skin is not jaundiced or pale  Neurological:      General: No focal deficit present  Mental Status: He is alert and oriented to person, place, and time  Psychiatric:         Mood and Affect: Mood normal          Behavior: Behavior normal          Thought Content: Thought content normal            Vital Signs     There were no vitals filed for this visit  Current Medications       Current Outpatient Medications:     apixaban (ELIQUIS) 5 mg, Take 1 tablet (5 mg total) by mouth 2 (two) times a day, Disp: 180 tablet, Rfl: 0    azelastine (ASTELIN) 0 1 % nasal spray, 1 spray into each nostril in the morning and 1 spray in the evening   Use in each nostril as directed , Disp: 90 mL, Rfl: 3    docusate sodium (COLACE) 100 mg capsule, Take 1 capsule (100 mg total) by mouth 2 (two) times a day, Disp: 180 capsule, Rfl: 0    ferrous sulfate 324 (65 Fe) mg, Take 1 tablet (324 mg total) by mouth 2 (two) times a day before meals, Disp: 180 tablet, Rfl: 2    fluticasone-salmeterol (Advair Diskus) 100-50 mcg/dose inhaler, Inhale 1 puff 2 (two) times a day Rinse mouth after use , Disp: 60 blister, Rfl: 2    folic acid (FOLVITE) 825 mcg tablet, Take 1 tablet (400 mcg total) by mouth daily (Patient not taking: Reported on 2/1/2022 ), Disp: 30 tablet, Rfl: 0    ipratropium-albuterol (DUO-NEB) 0 5-2 5 mg/3 mL nebulizer solution, INHALE CONTENTS OF 1 VIAL VIA NEBULIZER FOUR TIMES A DAY, Disp: 60 mL, Rfl: 11    metoprolol succinate (Toprol XL) 25 mg 24 hr tablet, Take 1 tablet (25 mg total) by mouth daily, Disp: 90 tablet, Rfl: 3    nicotine (NICODERM CQ) 7 mg/24hr TD 24 hr patch, Place 1 patch on the skin daily (Patient not taking: Reported on 3/21/2022 ), Disp: 28 patch, Rfl: 0    pantoprazole (PROTONIX) 40 mg tablet, Take 1 tablet (40 mg total) by mouth daily before breakfast (Patient not taking: Reported on 10/14/2021), Disp: 90 tablet, Rfl: 3    potassium chloride (Klor-Con) 10 mEq tablet, TAKE 1 TABLET DAILY, Disp: 90 tablet, Rfl: 3    tamsulosin (FLOMAX) 0 4 mg, Take 1 capsule (0 4 mg total) by mouth daily, Disp: 30 capsule, Rfl: 2      Active Problems     Patient Active Problem List   Diagnosis    Symptomatic anemia    Congestive cardiomyopathy (HCC)    Permanent atrial fibrillation (HCC)    Tobacco use disorder    Orthostatic hypotension    Syncope    Colonic adenoma    Ambulatory dysfunction    COPD (chronic obstructive pulmonary disease) (HCC)    Bradycardia    Thrombocytosis    Head trauma    Closed nondisplaced comminuted fracture of left patella    Essential (hemorrhagic) thrombocythemia (HCC)    Dizziness    Urinary retention    Abnormal colonoscopy    Iron deficiency anemia likely secondary to GI bleeding    Sepsis secondary to UTI (HCC)/bacteremia    Cholestatic jaundice    Moderate protein-calorie malnutrition ([facility] )    Encounter for support and coordination of transition of care    Nasal congestion         Past Medical History     Past Medical History:   Diagnosis Date    Anxiety disorder     Atrial fibrillation (HCC)     Coronary artery disease     Depression     Hepatitis C     screening negative in 1/2017    MI [facility])          Surgical History     Past Surgical History:   Procedure Laterality Date    CARDIAC CATHETERIZATION  07/09/2018         Family History     Family History   Problem Relation Age of Onset    Hypertension Mother     Coronary artery disease Mother         premature    Hypertension Father     Coronary artery disease Father         premature    Diabetes Father          Social History     Social History       Radiology

## 2022-07-26 NOTE — PROGRESS NOTES
7/26/2022  Rosalia Dockery is a 78 y o  male  [id number]    Diagnosis:      Patient presents for routine Cloud catheter change managed by our office    Plan:  Per Deysi Ritter  Patient instructed to call with any questions or concerns in the meantime  Orders Placed This Encounter   Procedures    Ambulatory Referral to General Surgery        Vitals:    07/26/22 0749   BP: 136/82   Pulse: 90   SpO2: 97%   Weight: 66 9 kg (147 lb 6 4 oz)   Height: 6' (1 829 m)       Patient's existing Cloud catheter was removed after deflation of an intact balloon  Procedure:    Universal Protocol:  Consent: Verbal consent obtained  Risks and benefits: risks, benefits and alternatives were discussed  Consent given by: patient  Patient understanding: patient states understanding of the procedure being performed  Patient identity confirmed: verbally with patient      Bladder catheterization    Date/Time: 7/26/2022 9:13 AM  Performed by: Celina Carlton RN  Authorized by: Amadou Bahena PA-C     Patient location:  Bedside  Consent:     Consent given by:  Patient  Universal protocol:     Procedure explained and questions answered to patient or proxy's satisfaction: yes      Patient identity confirmed:  Verbally with patient  Pre-procedure details:     Procedure purpose:  Therapeutic    Preparation: Patient was prepped and draped in usual sterile fashion    Anesthesia (see MAR for exact dosages): Anesthesia method:  None  Procedure details:     Bladder irrigation: no      Catheter insertion:  Indwelling    Approach: natural orifice      Catheter type:  Coude, Cloud and latex    Catheter size:  18 Fr    Number of attempts:  1    Successful placement: yes    Post-procedure details:     Patient tolerance of procedure: Tolerated well, no immediate complications  Comments:      Attached to leg bag  Patient was provided with extra stat locks, leg and drainage bags   He is following up per Marquis Mckee Noam Taylor

## 2022-07-28 ENCOUNTER — TELEPHONE (OUTPATIENT)
Dept: FAMILY MEDICINE CLINIC | Facility: CLINIC | Age: 77
End: 2022-07-28

## 2022-07-28 NOTE — TELEPHONE ENCOUNTER
Michelle Whalen called and left msg stating that Tori Murrell is in need of a new nebulizer , he stated that it is so old that he cannot even see a name on it

## 2022-07-31 PROCEDURE — 52000 CYSTOURETHROSCOPY: CPT | Performed by: UROLOGY

## 2022-07-31 NOTE — PROGRESS NOTES
Cystoscopy     Date/Time 7/31/2022 7:46 PM     Performed by  Maricruz Tyler MD     Authorized by Maricruz Tyler MD          Procedure Details:  Procedure type: cystoscopy      Office Cystoscopy Procedure Note    Indication:    Chronic indwelling Cloud catheter    Informed consent   The risks, benefits, complications, treatment options, and expected outcomes were discussed with the patient  The patient concurred with the proposed plan and provided informed consent  Anesthesia  Lidocaine jelly 2%    Procedure  The patient was placed in the supineposition, was prepped and draped in the usual manner using sterile technique, and 2% lidocaine jelly instilled into the urethra  A 17 F flexible cystoscope was then inserted into the urethra and the urethra and bladder carefully examined  The following findings were noted:    Findings:  Urethra:  Normal  Prostate:  Hyperplasia  Bladder:  Large capacity, somewhat floppy bladder  Significant debris is noted which somewhat limits visualization however on thorough cystoscopy there are no papillary tumors or signs of neoplasm  Ureteral orifices:  Normal  Other findings:  None     Cloud catheters exchanged at the conclusion of the study  Specimens: None                 Complications:    None; patient tolerated the procedure well           Disposition: To home after 30 minute observation             Condition: Stable

## 2022-08-01 ENCOUNTER — PROCEDURE VISIT (OUTPATIENT)
Dept: UROLOGY | Facility: CLINIC | Age: 77
End: 2022-08-01
Payer: MEDICARE

## 2022-08-01 VITALS
DIASTOLIC BLOOD PRESSURE: 82 MMHG | SYSTOLIC BLOOD PRESSURE: 136 MMHG | BODY MASS INDEX: 19.64 KG/M2 | HEIGHT: 72 IN | WEIGHT: 145 LBS

## 2022-08-01 DIAGNOSIS — R33.9 URINARY RETENTION: Primary | ICD-10-CM

## 2022-08-01 PROCEDURE — 99213 OFFICE O/P EST LOW 20 MIN: CPT | Performed by: UROLOGY

## 2022-08-02 ENCOUNTER — TELEPHONE (OUTPATIENT)
Dept: OTHER | Facility: OTHER | Age: 77
End: 2022-08-02

## 2022-08-02 DIAGNOSIS — D50.0 IRON DEFICIENCY ANEMIA DUE TO CHRONIC BLOOD LOSS: ICD-10-CM

## 2022-08-02 RX ORDER — PANTOPRAZOLE SODIUM 40 MG/1
TABLET, DELAYED RELEASE ORAL
Qty: 90 TABLET | Refills: 3 | Status: SHIPPED | OUTPATIENT
Start: 2022-08-02 | End: 2022-10-25

## 2022-08-04 ENCOUNTER — CONSULT (OUTPATIENT)
Dept: SURGERY | Facility: CLINIC | Age: 77
End: 2022-08-04
Payer: MEDICARE

## 2022-08-04 VITALS
SYSTOLIC BLOOD PRESSURE: 133 MMHG | DIASTOLIC BLOOD PRESSURE: 83 MMHG | HEIGHT: 72 IN | WEIGHT: 150 LBS | BODY MASS INDEX: 20.32 KG/M2 | HEART RATE: 92 BPM

## 2022-08-04 DIAGNOSIS — K40.91 RECURRENT RIGHT INGUINAL HERNIA: ICD-10-CM

## 2022-08-04 PROCEDURE — 99204 OFFICE O/P NEW MOD 45 MIN: CPT | Performed by: SURGERY

## 2022-08-04 NOTE — LETTER
August 4, 2022     Driss Rushtanner, [address]    Patient: Feng Salmeron   YOB: 1945   Date of Visit: 8/4/2022       Dear Dr Chacha Rubio: Thank you for referring Vipin Yanez to me for evaluation  Below are my notes for this consultation  If you have questions, please do not hesitate to call me  I look forward to following your patient along with you  Sincerely,        Jewell Henry DO        CC: MD Jewell Mullins DO  8/4/2022  1:49 PM  Sign when Signing Visit  Assessment/Plan:    Diagnoses and all orders for this visit:    Recurrent right inguinal hernia  -     Ambulatory Referral to General Surgery    Risks and benefits of a right inguinal hernia repair were discussed with him including potential for spermatic cord injury, bowel injury, or pain issues and he agrees to proceed    Subjective:      Patient ID: Feng Salmeron is a 78 y o  male  Patient presents for right inguinal hernia consult  States he has had a bulge RLQ for 6 months  Denies pain  Does not limit his activities  States he had a laparoscopic bilateral inguinal hernia repair approximately 8 years ago  Over the last 6 months he is noticed a right inguinal bulge  Wears a chronic Cloud catheter for urinary incontinence  Also states an MI in the past   It looks like he had a cardiac catheterization in 2018  Presently on Eliquis  The following portions of the patient's history were reviewed and updated as appropriate:     He  has a past medical history of Anxiety disorder, Atrial fibrillation ([address] ), Coronary artery disease, Depression, Hepatitis C, and MI (myocardial infarction) ([facility] )  He  has a past surgical history that includes Cardiac catheterization (07/09/2018)  His family history includes Coronary artery disease in his father and mother; Diabetes in his father; Hypertension in his father and mother    He  reports that he has been smoking cigarettes  He has a 28 50 pack-year smoking history  He has never used smokeless tobacco  He reports that he does not drink alcohol and does not use drugs  Current Outpatient Medications   Medication Sig Dispense Refill    apixaban (ELIQUIS) 5 mg Take 1 tablet (5 mg total) by mouth 2 (two) times a day 180 tablet 0    azelastine (ASTELIN) 0 1 % nasal spray 1 spray into each nostril in the morning and 1 spray in the evening  Use in each nostril as directed  90 mL 3    docusate sodium (COLACE) 100 mg capsule Take 1 capsule (100 mg total) by mouth 2 (two) times a day 180 capsule 0    ferrous sulfate 324 (65 Fe) mg Take 1 tablet (324 mg total) by mouth 2 (two) times a day before meals 180 tablet 2    fluticasone-salmeterol (Advair Diskus) 100-50 mcg/dose inhaler Inhale 1 puff 2 (two) times a day Rinse mouth after use  60 blister 2    folic acid (FOLVITE) 410 mcg tablet Take 1 tablet (400 mcg total) by mouth daily 30 tablet 0    ipratropium-albuterol (DUO-NEB) 0 5-2 5 mg/3 mL nebulizer solution INHALE CONTENTS OF 1 VIAL VIA NEBULIZER FOUR TIMES A DAY 60 mL 11    metoprolol succinate (Toprol XL) 25 mg 24 hr tablet Take 1 tablet (25 mg total) by mouth daily 90 tablet 3    nicotine (NICODERM CQ) 7 mg/24hr TD 24 hr patch Place 1 patch on the skin daily 28 patch 0    pantoprazole (PROTONIX) 40 mg tablet TAKE 1 TABLET DAILY BEFORE BREAKFAST 90 tablet 3    potassium chloride (Klor-Con) 10 mEq tablet TAKE 1 TABLET DAILY 90 tablet 3    tamsulosin (FLOMAX) 0 4 mg Take 1 capsule (0 4 mg total) by mouth daily 30 capsule 2     No current facility-administered medications for this visit  He has No Known Allergies       Review of Systems   Genitourinary: Positive for frequency  All other systems reviewed and are negative          Objective:      /83   Pulse 92   Ht 6' (1 829 m)   Wt 68 kg (150 lb)   BMI 20 34 kg/m²          Physical Exam  Constitutional:       General: He is not in acute distress  HENT:      Head: Atraumatic  Mouth/Throat:      Mouth: Mucous membranes are moist    Eyes:      Extraocular Movements: Extraocular movements intact  Cardiovascular:      Rate and Rhythm: Normal rate  Pulmonary:      Effort: Pulmonary effort is normal    Abdominal:      General: Abdomen is flat  Palpations: Abdomen is soft  Hernia: A hernia (Recurrent right inguinal hernia noted  No signs of a left inguinal hernia ) is present  Genitourinary:     Comments: Chronic Cloud catheter in place  Musculoskeletal:      Cervical back: Normal range of motion  Neurological:      Mental Status: He is alert         extremities:  No edema

## 2022-08-04 NOTE — TELEPHONE ENCOUNTER
Called and spoke to [facility]) informed was trying to get a hold of them for delivery of new nebulizer  I replaced order     Pauline Abel

## 2022-08-04 NOTE — PROGRESS NOTES
Assessment/Plan:    Diagnoses and all orders for this visit:    Recurrent right inguinal hernia  -     Ambulatory Referral to General Surgery    Risks and benefits of a right inguinal hernia repair were discussed with him including potential for spermatic cord injury, bowel injury, or pain issues and he agrees to proceed    Subjective:      Patient ID: Rosalia Dockery is a 78 y o  male  Patient presents for right inguinal hernia consult  States he has had a bulge RLQ for 6 months  Denies pain  Does not limit his activities  States he had a laparoscopic bilateral inguinal hernia repair approximately 8 years ago  Over the last 6 months he is noticed a right inguinal bulge  Wears a chronic Cloud catheter for urinary incontinence  Also states an MI in the past   It looks like he had a cardiac catheterization in 2018  Presently on Eliquis  The following portions of the patient's history were reviewed and updated as appropriate:     He  has a past medical history of Anxiety disorder, Atrial fibrillation ([facility] [address] ), Coronary artery disease, Depression, Hepatitis C, and MI (myocardial infarction) ([facility] [address] )  He  has a past surgical history that includes Cardiac catheterization (07/09/2018)  His family history includes Coronary artery disease in his father and mother; Diabetes in his father; Hypertension in his father and mother  He  reports that he has been smoking cigarettes  He has a 28 50 pack-year smoking history  He has never used smokeless tobacco  He reports that he does not drink alcohol and does not use drugs  Current Outpatient Medications   Medication Sig Dispense Refill    apixaban (ELIQUIS) 5 mg Take 1 tablet (5 mg total) by mouth 2 (two) times a day 180 tablet 0    azelastine (ASTELIN) 0 1 % nasal spray 1 spray into each nostril in the morning and 1 spray in the evening  Use in each nostril as directed   90 mL 3    docusate sodium (COLACE) 100 mg capsule Take 1 capsule (100 mg total) by mouth 2 (two) times a day 180 capsule 0    ferrous sulfate 324 (65 Fe) mg Take 1 tablet (324 mg total) by mouth 2 (two) times a day before meals 180 tablet 2    fluticasone-salmeterol (Advair Diskus) 100-50 mcg/dose inhaler Inhale 1 puff 2 (two) times a day Rinse mouth after use  60 blister 2    folic acid (FOLVITE) 602 mcg tablet Take 1 tablet (400 mcg total) by mouth daily 30 tablet 0    ipratropium-albuterol (DUO-NEB) 0 5-2 5 mg/3 mL nebulizer solution INHALE CONTENTS OF 1 VIAL VIA NEBULIZER FOUR TIMES A DAY 60 mL 11    metoprolol succinate (Toprol XL) 25 mg 24 hr tablet Take 1 tablet (25 mg total) by mouth daily 90 tablet 3    nicotine (NICODERM CQ) 7 mg/24hr TD 24 hr patch Place 1 patch on the skin daily 28 patch 0    pantoprazole (PROTONIX) 40 mg tablet TAKE 1 TABLET DAILY BEFORE BREAKFAST 90 tablet 3    potassium chloride (Klor-Con) 10 mEq tablet TAKE 1 TABLET DAILY 90 tablet 3    tamsulosin (FLOMAX) 0 4 mg Take 1 capsule (0 4 mg total) by mouth daily 30 capsule 2     No current facility-administered medications for this visit  He has No Known Allergies       Review of Systems   Genitourinary: Positive for frequency  All other systems reviewed and are negative  Objective:      /83   Pulse 92   Ht 6' (1 829 m)   Wt 68 kg (150 lb)   BMI 20 34 kg/m²          Physical Exam  Constitutional:       General: He is not in acute distress  HENT:      Head: Atraumatic  Mouth/Throat:      Mouth: Mucous membranes are moist    Eyes:      Extraocular Movements: Extraocular movements intact  Cardiovascular:      Rate and Rhythm: Normal rate  Pulmonary:      Effort: Pulmonary effort is normal    Abdominal:      General: Abdomen is flat  Palpations: Abdomen is soft  Hernia: A hernia (Recurrent right inguinal hernia noted  No signs of a left inguinal hernia ) is present     Genitourinary:     Comments: Chronic Cloud catheter in place  Musculoskeletal:      Cervical back: Normal range of motion  Neurological:      Mental Status: He is alert         extremities:  No edema

## 2022-08-04 NOTE — H&P (VIEW-ONLY)
Assessment/Plan:    Diagnoses and all orders for this visit:    Recurrent right inguinal hernia  -     Ambulatory Referral to General Surgery    Risks and benefits of a right inguinal hernia repair were discussed with him including potential for spermatic cord injury, bowel injury, or pain issues and he agrees to proceed    Subjective:      Patient ID: Dominic Burr is a 78 y o  male  Patient presents for right inguinal hernia consult  States he has had a bulge RLQ for 6 months  Denies pain  Does not limit his activities  States he had a laparoscopic bilateral inguinal hernia repair approximately 8 years ago  Over the last 6 months he is noticed a right inguinal bulge  Wears a chronic Cloud catheter for urinary incontinence  Also states an MI in the past   It looks like he had a cardiac catheterization in 2018  Presently on Eliquis  The following portions of the patient's history were reviewed and updated as appropriate:     He  has a past medical history of Anxiety disorder, Atrial fibrillation ([facility] [address] ), Coronary artery disease, Depression, Hepatitis C, and MI (myocardial infarction) ([facility] [address] )  He  has a past surgical history that includes Cardiac catheterization (07/09/2018)  His family history includes Coronary artery disease in his father and mother; Diabetes in his father; Hypertension in his father and mother  He  reports that he has been smoking cigarettes  He has a 28 50 pack-year smoking history  He has never used smokeless tobacco  He reports that he does not drink alcohol and does not use drugs  Current Outpatient Medications   Medication Sig Dispense Refill    apixaban (ELIQUIS) 5 mg Take 1 tablet (5 mg total) by mouth 2 (two) times a day 180 tablet 0    azelastine (ASTELIN) 0 1 % nasal spray 1 spray into each nostril in the morning and 1 spray in the evening  Use in each nostril as directed   90 mL 3    docusate sodium (COLACE) 100 mg capsule Take 1 capsule (100 mg total) by mouth 2 (two) times a day 180 capsule 0    ferrous sulfate 324 (65 Fe) mg Take 1 tablet (324 mg total) by mouth 2 (two) times a day before meals 180 tablet 2    fluticasone-salmeterol (Advair Diskus) 100-50 mcg/dose inhaler Inhale 1 puff 2 (two) times a day Rinse mouth after use  60 blister 2    folic acid (FOLVITE) 321 mcg tablet Take 1 tablet (400 mcg total) by mouth daily 30 tablet 0    ipratropium-albuterol (DUO-NEB) 0 5-2 5 mg/3 mL nebulizer solution INHALE CONTENTS OF 1 VIAL VIA NEBULIZER FOUR TIMES A DAY 60 mL 11    metoprolol succinate (Toprol XL) 25 mg 24 hr tablet Take 1 tablet (25 mg total) by mouth daily 90 tablet 3    nicotine (NICODERM CQ) 7 mg/24hr TD 24 hr patch Place 1 patch on the skin daily 28 patch 0    pantoprazole (PROTONIX) 40 mg tablet TAKE 1 TABLET DAILY BEFORE BREAKFAST 90 tablet 3    potassium chloride (Klor-Con) 10 mEq tablet TAKE 1 TABLET DAILY 90 tablet 3    tamsulosin (FLOMAX) 0 4 mg Take 1 capsule (0 4 mg total) by mouth daily 30 capsule 2     No current facility-administered medications for this visit  He has No Known Allergies       Review of Systems   Genitourinary: Positive for frequency  All other systems reviewed and are negative  Objective:      /83   Pulse 92   Ht 6' (1 829 m)   Wt 68 kg (150 lb)   BMI 20 34 kg/m²          Physical Exam  Constitutional:       General: He is not in acute distress  HENT:      Head: Atraumatic  Mouth/Throat:      Mouth: Mucous membranes are moist    Eyes:      Extraocular Movements: Extraocular movements intact  Cardiovascular:      Rate and Rhythm: Normal rate  Pulmonary:      Effort: Pulmonary effort is normal    Abdominal:      General: Abdomen is flat  Palpations: Abdomen is soft  Hernia: A hernia (Recurrent right inguinal hernia noted  No signs of a left inguinal hernia ) is present     Genitourinary:     Comments: Chronic Cloud catheter in place  Musculoskeletal:      Cervical back: Normal range of motion  Neurological:      Mental Status: He is alert         extremities:  No edema

## 2022-08-05 ENCOUNTER — TELEPHONE (OUTPATIENT)
Dept: UROLOGY | Facility: CLINIC | Age: 77
End: 2022-08-05

## 2022-08-05 NOTE — TELEPHONE ENCOUNTER
According to patient there was a misunderstanding   He knows how to care for his catheter and has no questions

## 2022-08-05 NOTE — TELEPHONE ENCOUNTER
Called and spoke to [facility]) inform to call [phone] to schedule delivery of nebulizer, company has been trying to reach patient and not able to reach    Jackiese Marvin

## 2022-08-09 ENCOUNTER — TELEPHONE (OUTPATIENT)
Dept: FAMILY MEDICINE CLINIC | Facility: CLINIC | Age: 77
End: 2022-08-09

## 2022-08-09 ENCOUNTER — ANESTHESIA EVENT (OUTPATIENT)
Dept: PERIOP | Facility: HOSPITAL | Age: 77
End: 2022-08-09
Payer: MEDICARE

## 2022-08-09 NOTE — TELEPHONE ENCOUNTER
yoselyn from [facility] called she called the patient to review instructions for his upcoming surgery she felt he was a poor historian  Not sure about which medications he should be taking and if he needs refills   Someone should review his medication with him

## 2022-08-09 NOTE — PRE-PROCEDURE INSTRUCTIONS
Pre-Surgery Instructions:   Medication Instructions    apixaban (ELIQUIS) 5 mg inst by Dr Rina Hayden to hold eliquis 2 days before    azelastine (ASTELIN) 0 1 % nasal spray Take day of surgery   docusate sodium (COLACE) 100 mg capsule Take day of surgery   fluticasone-salmeterol (Advair Diskus) 100-50 mcg/dose inhaler Take day of surgery   folic acid (FOLVITE) 100 mcg tablet Stop taking 7 days prior to surgery   ipratropium-albuterol (DUO-NEB) 0 5-2 5 mg/3 mL nebulizer solution Take day of surgery   pantoprazole (PROTONIX) 40 mg tablet Take day of surgery   potassium chloride (Klor-Con) 10 mEq tablet Hold day of surgery   tamsulosin (FLOMAX) 0 4 mg    Metoprolol 25 mg po Take night before surgery   INSTRUCTED TO CONTACT PCP TO CLARIFY & to take on am w/ sip water if able to get medication      All pre-op instructions reviewed as per [facility] w/ pt verb understanding  Inst NPO post MN night before sx except sips water w/ meds am of sx (protonix)  Also instructed to contact his PCP to clarify his medications, specifically his metoprolol , as he had no knowledge of being on this medicine yet it is in his med list  Stated he would contact Dr Mireille Gandhi office - I also contacted the office w/ same information  Inst pt he should be taking his metoprolol on morning of sx if  confirms with him & he is able to get the medicine from his pharmacy  Inst to use inhalers as he normally does  Inst not to smoke cigarettes morning of surgery  Instructed to avoid all ASA/NSAIDs and OTC Vit/Supp from now until after surgery per anesthesia guidelines  Tylenol ok prn  Received pre-op showering instructions and CHG  from surgeon office, reviewed process at time of call  Current hospital visitor & masking policy reviewed   Reinforced that if he intends to take taxi home, he must have an adult person accompanying him Inst will receive phone call w/ time to report on DOS btwn 2-8 pm afternoon/evening before surgery from hospital nursing staff  Pt verbalized an understanding of all instructions reviewed and offers no concerns at this time  Also instructed to contact his PCP to clarify his medications, specifically his metoprolol , as he had no knowledge of being on this medicine yet it is in his med list  Stated he would contact Dr Fredy Guerrero office - I also contacted the office w/ same information  Inst pt he should be taking his metoprolol if Dr confirms with him & he is able to get the medicine from his pharmacy  ADDENDUM: Reviewed w/ wife Syed Dubon patient medication instructions including taking metoprolol as directed with only a sip of water if able to get medicine today

## 2022-08-10 ENCOUNTER — APPOINTMENT (OUTPATIENT)
Dept: LAB | Facility: HOSPITAL | Age: 77
End: 2022-08-10
Attending: SURGERY
Payer: MEDICARE

## 2022-08-10 ENCOUNTER — TELEPHONE (OUTPATIENT)
Dept: FAMILY MEDICINE CLINIC | Facility: CLINIC | Age: 77
End: 2022-08-10

## 2022-08-10 ENCOUNTER — LAB (OUTPATIENT)
Dept: LAB | Facility: HOSPITAL | Age: 77
End: 2022-08-10
Attending: SURGERY
Payer: MEDICARE

## 2022-08-10 DIAGNOSIS — K40.91 RECURRENT RIGHT INGUINAL HERNIA: ICD-10-CM

## 2022-08-10 DIAGNOSIS — I48.11 LONGSTANDING PERSISTENT ATRIAL FIBRILLATION (HCC): ICD-10-CM

## 2022-08-10 LAB
ALBUMIN SERPL BCP-MCNC: 4 G/DL (ref 3.5–5)
ALP SERPL-CCNC: 74 U/L (ref 34–104)
ALT SERPL W P-5'-P-CCNC: 15 U/L (ref 7–52)
ANION GAP SERPL CALCULATED.3IONS-SCNC: 7 MMOL/L (ref 4–13)
AST SERPL W P-5'-P-CCNC: 14 U/L (ref 13–39)
BILIRUB SERPL-MCNC: 0.77 MG/DL (ref 0.2–1)
BUN SERPL-MCNC: 14 MG/DL (ref 5–25)
CALCIUM SERPL-MCNC: 9.2 MG/DL (ref 8.4–10.2)
CHLORIDE SERPL-SCNC: 104 MMOL/L (ref 96–108)
CO2 SERPL-SCNC: 28 MMOL/L (ref 21–32)
CREAT SERPL-MCNC: 0.98 MG/DL (ref 0.6–1.3)
ERYTHROCYTE [DISTWIDTH] IN BLOOD BY AUTOMATED COUNT: 16.2 % (ref 11.6–15.1)
GFR SERPL CREATININE-BSD FRML MDRD: 75 ML/MIN/1.73SQ M
GLUCOSE P FAST SERPL-MCNC: 99 MG/DL (ref 65–99)
HCT VFR BLD AUTO: 42.4 % (ref 36.5–49.3)
HGB BLD-MCNC: 13.3 G/DL (ref 12–17)
MCH RBC QN AUTO: 28.7 PG (ref 26.8–34.3)
MCHC RBC AUTO-ENTMCNC: 31.4 G/DL (ref 31.4–37.4)
MCV RBC AUTO: 91 FL (ref 82–98)
PLATELET # BLD AUTO: 194 THOUSANDS/UL (ref 149–390)
PMV BLD AUTO: 10.4 FL (ref 8.9–12.7)
POTASSIUM SERPL-SCNC: 4 MMOL/L (ref 3.5–5.3)
PROT SERPL-MCNC: 6.7 G/DL (ref 6.4–8.4)
RBC # BLD AUTO: 4.64 MILLION/UL (ref 3.88–5.62)
SODIUM SERPL-SCNC: 139 MMOL/L (ref 135–147)
WBC # BLD AUTO: 6.27 THOUSAND/UL (ref 4.31–10.16)

## 2022-08-10 PROCEDURE — 85027 COMPLETE CBC AUTOMATED: CPT

## 2022-08-10 PROCEDURE — 93005 ELECTROCARDIOGRAM TRACING: CPT

## 2022-08-10 PROCEDURE — 36415 COLL VENOUS BLD VENIPUNCTURE: CPT

## 2022-08-10 PROCEDURE — 80053 COMPREHEN METABOLIC PANEL: CPT

## 2022-08-10 RX ORDER — METOPROLOL SUCCINATE 25 MG/1
25 TABLET, EXTENDED RELEASE ORAL DAILY
Qty: 30 TABLET | Refills: 0 | Status: SHIPPED | OUTPATIENT
Start: 2022-08-10 | End: 2022-08-15 | Stop reason: SDUPTHER

## 2022-08-10 RX ORDER — METOPROLOL SUCCINATE 25 MG/1
25 TABLET, EXTENDED RELEASE ORAL DAILY
Qty: 90 TABLET | Refills: 3 | Status: SHIPPED | OUTPATIENT
Start: 2022-08-10 | End: 2022-08-10 | Stop reason: SDUPTHER

## 2022-08-10 RX ORDER — CEFAZOLIN SODIUM 1 G/50ML
1000 SOLUTION INTRAVENOUS ONCE
Status: COMPLETED | OUTPATIENT
Start: 2022-08-11 | End: 2022-08-11

## 2022-08-10 NOTE — ANESTHESIA PREPROCEDURE EVALUATION
Procedure:  REPAIR HERNIA INGUINAL (Right Groin)    Relevant Problems   CARDIO   (+) Permanent atrial fibrillation (HCC)      HEMATOLOGY   (+) Iron deficiency anemia likely secondary to GI bleeding   (+) Symptomatic anemia      PULMONARY   (+) COPD (chronic obstructive pulmonary disease) (HCC)      Cardiovascular and Mediastinum   (+) Congestive cardiomyopathy (HCC)   (+) Orthostatic hypotension      Digestive   (+) Cholestatic jaundice   (+) Colonic adenoma      Musculoskeletal and Integument   (+) Closed nondisplaced comminuted fracture of left patella      Genitourinary   (+) Urinary retention      Hematopoietic and Hemostatic   (+) Essential (hemorrhagic) thrombocythemia (HCC)   (+) Thrombocytosis      Other   (+) Ambulatory dysfunction   (+) Dizziness   (+) Head trauma   (+) Moderate protein-calorie malnutrition (HCC)   (+) Nasal congestion   (+) Recurrent right inguinal hernia   (+) Sepsis secondary to UTI (HCC)/bacteremia   (+) Syncope   (+) Tobacco use disorder      Lab Results   Component Value Date    SODIUM 139 08/10/2022    K 4 0 08/10/2022     08/10/2022    CO2 28 08/10/2022    AGAP 7 08/10/2022    BUN 14 08/10/2022    CREATININE 0 98 08/10/2022    GLUC 120 03/04/2022    GLUF 99 08/10/2022    CALCIUM 9 2 08/10/2022    AST 14 08/10/2022    ALT 15 08/10/2022    ALKPHOS 74 08/10/2022    TP 6 7 08/10/2022    TBILI 0 77 08/10/2022    EGFR 75 08/10/2022     Lab Results   Component Value Date    WBC 6 27 08/10/2022    HGB 13 3 08/10/2022    HCT 42 4 08/10/2022    MCV 91 08/10/2022     08/10/2022     2020 TTE     LEFT VENTRICLE: Size was normal  Systolic function was normal  Ejection fraction was estimated to be 55 %  There were no regional wall motion abnormalities   Wall thickness was at the upper limits of normal  DOPPLER: Transmitral flow  pattern: atrial fibrillation      RIGHT VENTRICLE: The size was normal  Systolic function was normal  Wall thickness was normal      LEFT ATRIUM: The atrium was mildly dilated      RIGHT ATRIUM: The atrium was mildly to moderately dilated      MITRAL VALVE: There was mild annular calcification  Valve structure was normal  There was mild-moderate diffuse thickening  There was normal leaflet separation  DOPPLER: The transmitral velocity was within the normal range  There was no  evidence for stenosis  There was mild to moderate regurgitation      AORTIC VALVE: The valve was trileaflet  Leaflets exhibited mildly increased thickness, normal cuspal separation, and sclerosis  DOPPLER: Transaortic velocity was within the normal range  There was no evidence for stenosis  There was no  significant regurgitation      TRICUSPID VALVE: The valve structure was normal  There was normal leaflet separation  DOPPLER: The transtricuspid velocity was within the normal range  There was no evidence for stenosis  There was moderate regurgitation  Pulmonary artery  systolic pressure was mildly increased  Estimated peak PA pressure was 39 mmHg      PULMONIC VALVE: Leaflets exhibited normal thickness, no calcification, and normal cuspal separation  DOPPLER: The transpulmonic velocity was within the normal range  There was mild regurgitation      PERICARDIUM: There was no pericardial effusion      AORTA: The root exhibited normal size      SYSTEMIC VEINS: IVC: The inferior vena cava was normal in size  Respirophasic changes were normal        Physical Exam    Airway       Dental       Cardiovascular      Pulmonary      Other Findings        Anesthesia Plan  ASA Score- 3     Anesthesia Type- general with ASA Monitors  Additional Monitors:   Airway Plan: LMA  Plan Factors-Exercise tolerance (METS): >4 METS  Chart reviewed  EKG reviewed  Imaging results reviewed  Existing labs reviewed  Patient summary reviewed  Induction- intravenous  Postoperative Plan-     Informed Consent- Anesthetic plan and risks discussed with patient    I personally reviewed this patient with the CRNA  Discussed and agreed on the Anesthesia Plan with the CRNA  Angela Grider

## 2022-08-10 NOTE — TELEPHONE ENCOUNTER
PT CALLED AND STATED THAT HE IS IN NEED OF HIS   METOPROLOL SUCCINATE sent into the pharmacy he stated that he needs today

## 2022-08-11 ENCOUNTER — ANESTHESIA (OUTPATIENT)
Dept: PERIOP | Facility: HOSPITAL | Age: 77
End: 2022-08-11
Payer: MEDICARE

## 2022-08-11 ENCOUNTER — HOSPITAL ENCOUNTER (OUTPATIENT)
Facility: HOSPITAL | Age: 77
Setting detail: OUTPATIENT SURGERY
Discharge: HOME | End: 2022-08-11
Attending: SURGERY | Admitting: SURGERY
Payer: MEDICARE

## 2022-08-11 VITALS
HEART RATE: 94 BPM | SYSTOLIC BLOOD PRESSURE: 138 MMHG | HEIGHT: 72 IN | BODY MASS INDEX: 20.32 KG/M2 | RESPIRATION RATE: 18 BRPM | TEMPERATURE: 97 F | WEIGHT: 150 LBS | DIASTOLIC BLOOD PRESSURE: 93 MMHG | OXYGEN SATURATION: 97 %

## 2022-08-11 DIAGNOSIS — K40.91 RECURRENT RIGHT INGUINAL HERNIA: Primary | ICD-10-CM

## 2022-08-11 PROCEDURE — C1781 MESH (IMPLANTABLE): HCPCS | Performed by: SURGERY

## 2022-08-11 PROCEDURE — 49505 PRP I/HERN INIT REDUC >5 YR: CPT | Performed by: SURGERY

## 2022-08-11 DEVICE — MODIFIED ONFLEX MESH
Type: IMPLANTABLE DEVICE | Site: INGUINAL | Status: FUNCTIONAL
Brand: MODIFIED ONFLEX MESH

## 2022-08-11 DEVICE — BARD MESH PERFIX PLUG, LARGE
Type: IMPLANTABLE DEVICE | Site: INGUINAL | Status: FUNCTIONAL
Brand: BARD MESH PERFIX PLUG

## 2022-08-11 RX ORDER — HYDROMORPHONE HCL IN WATER/PF 6 MG/30 ML
0.2 PATIENT CONTROLLED ANALGESIA SYRINGE INTRAVENOUS
Status: DISCONTINUED | OUTPATIENT
Start: 2022-08-11 | End: 2022-08-11 | Stop reason: HOSPADM

## 2022-08-11 RX ORDER — SODIUM CHLORIDE, SODIUM LACTATE, POTASSIUM CHLORIDE, CALCIUM CHLORIDE 600; 310; 30; 20 MG/100ML; MG/100ML; MG/100ML; MG/100ML
50 INJECTION, SOLUTION INTRAVENOUS CONTINUOUS
Status: CANCELLED | OUTPATIENT
Start: 2022-08-11

## 2022-08-11 RX ORDER — FENTANYL CITRATE 50 UG/ML
INJECTION, SOLUTION INTRAMUSCULAR; INTRAVENOUS AS NEEDED
Status: DISCONTINUED | OUTPATIENT
Start: 2022-08-11 | End: 2022-08-11

## 2022-08-11 RX ORDER — PROPOFOL 10 MG/ML
INJECTION, EMULSION INTRAVENOUS CONTINUOUS PRN
Status: DISCONTINUED | OUTPATIENT
Start: 2022-08-11 | End: 2022-08-11

## 2022-08-11 RX ORDER — SODIUM CHLORIDE, SODIUM LACTATE, POTASSIUM CHLORIDE, CALCIUM CHLORIDE 600; 310; 30; 20 MG/100ML; MG/100ML; MG/100ML; MG/100ML
125 INJECTION, SOLUTION INTRAVENOUS CONTINUOUS
Status: DISCONTINUED | OUTPATIENT
Start: 2022-08-11 | End: 2022-08-11 | Stop reason: HOSPADM

## 2022-08-11 RX ORDER — ONDANSETRON 2 MG/ML
INJECTION INTRAMUSCULAR; INTRAVENOUS AS NEEDED
Status: DISCONTINUED | OUTPATIENT
Start: 2022-08-11 | End: 2022-08-11

## 2022-08-11 RX ORDER — ONDANSETRON 2 MG/ML
4 INJECTION INTRAMUSCULAR; INTRAVENOUS EVERY 4 HOURS PRN
Status: DISCONTINUED | OUTPATIENT
Start: 2022-08-11 | End: 2022-08-11 | Stop reason: HOSPADM

## 2022-08-11 RX ORDER — MORPHINE SULFATE 10 MG/ML
4 INJECTION, SOLUTION INTRAMUSCULAR; INTRAVENOUS
Status: DISCONTINUED | OUTPATIENT
Start: 2022-08-11 | End: 2022-08-11 | Stop reason: HOSPADM

## 2022-08-11 RX ORDER — SODIUM CHLORIDE, SODIUM LACTATE, POTASSIUM CHLORIDE, CALCIUM CHLORIDE 600; 310; 30; 20 MG/100ML; MG/100ML; MG/100ML; MG/100ML
INJECTION, SOLUTION INTRAVENOUS CONTINUOUS PRN
Status: DISCONTINUED | OUTPATIENT
Start: 2022-08-11 | End: 2022-08-11

## 2022-08-11 RX ORDER — PROPOFOL 10 MG/ML
INJECTION, EMULSION INTRAVENOUS AS NEEDED
Status: DISCONTINUED | OUTPATIENT
Start: 2022-08-11 | End: 2022-08-11

## 2022-08-11 RX ORDER — BUPIVACAINE HYDROCHLORIDE AND EPINEPHRINE 2.5; 5 MG/ML; UG/ML
INJECTION, SOLUTION EPIDURAL; INFILTRATION; INTRACAUDAL; PERINEURAL AS NEEDED
Status: DISCONTINUED | OUTPATIENT
Start: 2022-08-11 | End: 2022-08-11 | Stop reason: HOSPADM

## 2022-08-11 RX ORDER — OXYCODONE HYDROCHLORIDE 5 MG/1
5 TABLET ORAL EVERY 4 HOURS PRN
Qty: 10 TABLET | Refills: 0 | Status: SHIPPED | OUTPATIENT
Start: 2022-08-11 | End: 2022-08-21

## 2022-08-11 RX ORDER — DEXAMETHASONE SODIUM PHOSPHATE 10 MG/ML
INJECTION, SOLUTION INTRAMUSCULAR; INTRAVENOUS AS NEEDED
Status: DISCONTINUED | OUTPATIENT
Start: 2022-08-11 | End: 2022-08-11

## 2022-08-11 RX ORDER — OXYCODONE HYDROCHLORIDE 5 MG/1
5 TABLET ORAL EVERY 4 HOURS PRN
Status: DISCONTINUED | OUTPATIENT
Start: 2022-08-11 | End: 2022-08-11 | Stop reason: HOSPADM

## 2022-08-11 RX ORDER — FENTANYL CITRATE/PF 50 MCG/ML
25 SYRINGE (ML) INJECTION
Status: DISCONTINUED | OUTPATIENT
Start: 2022-08-11 | End: 2022-08-11 | Stop reason: HOSPADM

## 2022-08-11 RX ORDER — HYDROMORPHONE HCL/PF 1 MG/ML
SYRINGE (ML) INJECTION AS NEEDED
Status: DISCONTINUED | OUTPATIENT
Start: 2022-08-11 | End: 2022-08-11

## 2022-08-11 RX ORDER — ONDANSETRON 2 MG/ML
4 INJECTION INTRAMUSCULAR; INTRAVENOUS ONCE AS NEEDED
Status: DISCONTINUED | OUTPATIENT
Start: 2022-08-11 | End: 2022-08-11 | Stop reason: HOSPADM

## 2022-08-11 RX ORDER — LIDOCAINE HYDROCHLORIDE 10 MG/ML
INJECTION, SOLUTION EPIDURAL; INFILTRATION; INTRACAUDAL; PERINEURAL AS NEEDED
Status: DISCONTINUED | OUTPATIENT
Start: 2022-08-11 | End: 2022-08-11

## 2022-08-11 RX ADMIN — PHENYLEPHRINE HYDROCHLORIDE 40 MCG/MIN: 10 INJECTION INTRAVENOUS at 13:27

## 2022-08-11 RX ADMIN — PHENYLEPHRINE HYDROCHLORIDE 50 MCG/MIN: 10 INJECTION INTRAVENOUS at 13:20

## 2022-08-11 RX ADMIN — DEXAMETHASONE SODIUM PHOSPHATE 10 MG: 10 INJECTION, SOLUTION INTRAMUSCULAR; INTRAVENOUS at 13:25

## 2022-08-11 RX ADMIN — FENTANYL CITRATE 50 MCG: 50 INJECTION, SOLUTION INTRAMUSCULAR; INTRAVENOUS at 13:52

## 2022-08-11 RX ADMIN — PROPOFOL 100 MG: 10 INJECTION, EMULSION INTRAVENOUS at 13:20

## 2022-08-11 RX ADMIN — HYDROMORPHONE HYDROCHLORIDE 0.5 MG: 1 INJECTION, SOLUTION INTRAMUSCULAR; INTRAVENOUS; SUBCUTANEOUS at 14:06

## 2022-08-11 RX ADMIN — PROPOFOL 80 MCG/KG/MIN: 10 INJECTION, EMULSION INTRAVENOUS at 13:20

## 2022-08-11 RX ADMIN — PROPOFOL 20 MG: 10 INJECTION, EMULSION INTRAVENOUS at 13:22

## 2022-08-11 RX ADMIN — SODIUM CHLORIDE, SODIUM LACTATE, POTASSIUM CHLORIDE, AND CALCIUM CHLORIDE: .6; .31; .03; .02 INJECTION, SOLUTION INTRAVENOUS at 14:24

## 2022-08-11 RX ADMIN — ONDANSETRON 4 MG: 2 INJECTION INTRAMUSCULAR; INTRAVENOUS at 13:25

## 2022-08-11 RX ADMIN — FENTANYL CITRATE 50 MCG: 50 INJECTION, SOLUTION INTRAMUSCULAR; INTRAVENOUS at 13:16

## 2022-08-11 RX ADMIN — CEFAZOLIN SODIUM 1000 MG: 1 SOLUTION INTRAVENOUS at 13:20

## 2022-08-11 RX ADMIN — LIDOCAINE HYDROCHLORIDE 50 MG: 10 INJECTION, SOLUTION EPIDURAL; INFILTRATION; INTRACAUDAL at 13:20

## 2022-08-11 RX ADMIN — SODIUM CHLORIDE, SODIUM LACTATE, POTASSIUM CHLORIDE, AND CALCIUM CHLORIDE: .6; .31; .03; .02 INJECTION, SOLUTION INTRAVENOUS at 12:41

## 2022-08-11 NOTE — DISCHARGE INSTRUCTIONS
Please call the office when you leave to schedule an appointment to be seen in 2-3 weeks    Activity:    Do not lift more than 25 lb for 4 weeks post-operatively                 Walking is encouraged  Normal daily activities including climbing steps are okay  Do not engage in strenuous activity or contact sports for 4-6 weeks post-operatively    Return to work:   Return to work to be discussed at first post-operative visit    Diet:   You may return to your normal heart healthy diet    Wound Care: Your wound is closed with skin glue  It is okay to shower  Wash incision gently with soap and water and pat dry  Do not soak incisions in bath water or swim for two weeks  Do not apply any creams or ointments  Apply ice as needed to the wound    Pain Medication:   Please take as directed  No driving while taking narcotic pain medications    Other:  If you have questions after discharge please call the office      If you have increased pain, fever >101 5, increased drainage, redness or a bad smell at your surgery site, please call us immediately or come directly to the Emergency Room

## 2022-08-11 NOTE — ANESTHESIA POSTPROCEDURE EVALUATION
Post-Op Assessment Note    CV Status:  Stable    Pain management: adequate     Mental Status:  Lethargic and sleepy   Hydration Status:  Stable   PONV Controlled:  None   Airway Patency:  Patent   Two or more mitigation strategies used for obstructive sleep apnea   Post Op Vitals Reviewed: Yes      Staff: CRNA         No complications documented      BP  120/79   Temp   97 2   Pulse  77   Resp   15   SpO2   99

## 2022-08-11 NOTE — INTERVAL H&P NOTE
H&P reviewed  After examining the patient I find no changes in the patients condition since the H&P had been written      Vitals:    08/11/22 1215   BP: (!) 133/102   Pulse: 70   Resp: 18   Temp: 97 5 °F (36 4 °C)   SpO2: 98%

## 2022-08-11 NOTE — OP NOTE
OPERATIVE REPORT  PATIENT NAME: Td Qureshi    :  1945  MRN: [id number]  Pt Location: AN OR ROOM 01    SURGERY DATE: 2022    Surgeon(s) and Role:     * Neal Castrejon DO - Primary     * Roberta Banks MD - Assisting    Preop Diagnosis:  Recurrent right inguinal hernia [K40 91]    Post-Op Diagnosis Codes:     * Recurrent right inguinal hernia [K40 91], pantaloon    Procedure(s) (LRB):  REPAIR HERNIA INGUINAL (Right) with medium on flex mesh as well as large plug and patch    Specimen(s):  * No specimens in log *    Estimated Blood Loss:   Minimal    Drains:  Urethral Catheter 18 Fr  (Active)   Number of days: 162       Anesthesia Type:   General/local    Operative Indications:  Recurrent right inguinal hernia [K40 91]    Operative Findings:  Large defect of the floor the inguinal canal   Repaired with a medium on flex mesh Fair amount of scar from his previous repair  Separate indirect defect was noted after placement of the on flex mesh requiring placement of a plug  ASA 3  Wound class 2  Height 72 in weight 68 kg/150 lb  BMI 20    Complications:   None    Procedure and Technique:  Patient was brought the operative suite and identified by visualization, conversation, by armband  Sequential compression pumps were placed  Was given Ancef perioperatively  Chronic indwelling Cloud catheter was kept out of the field  Right groin areas prepped and draped in a sterile fashion  Time-out was performed and was assured that the prep was dry  Local was instilled over the right inguinal canal   Oblique skin incision was made in the subcutaneous tissues were dissected with hot cautery down to the external oblique fascia  We encountered a fair amount of scar and identifying the external oblique fascia  This was carefully taken off the surface of the external oblique fascia to identify the external ring    Opened up the external oblique fascia through the external ring to level E up above the internal ring  Again more scar was encountered along with Ethibond sutures from his previous repair  Spermatic cord was identified and encircled with a Penrose drain  Large direct inguinal hernia was noted  The floor was very thickened  This was carefully dissected out  Could certainly also feel an indirect defect  The hernia sac was scored get me into the preperitoneal space to direct defect  4 x 4 sponge was used to help develop this area  Fair amount of scar was encountered around the internal ring from the inside and therefore I could not get adequate mesh from the on flex mesh to cover the indirect defect  The preperitoneal component of the medium on flex mesh was then deployed in the direct defect  Tails were anchored to each side with 2-0 Vicryl  Did close the defect on top of this mesh with 2-0 Vicryl  Indirect defect was still noted therefore large plug was placed  Inner leaves were anchored each side with 2-0 Vicryl suture  Onlay mesh was placed on the floor the canal like a repeat 2-0 Vicryl to the pubic tubercle, shelving edge common co joint tendon  I cut from the superior aspect of the onlay mesh down the cord structures  The result 2 tails were brought around the cord and anchored to themselves as well as the underlying transversalis tissues with 2-0 Vicryl  Excess mesh was trimmed and the tails were tucked under the external oblique fascia  Copious irrigation was carried out local was instilled  External oblique fascia was reapproximated on top of the cord structures with 3-0 Vicryl suture  Abida's was reapproximated using simple 2-0 Vicryl  Skin was closing 4 Monocryl in a subcuticular fashion  Wounds were washed and dried  Sterile skin glue was applied  Was assured the testicles in the scrotum at the completion the case  Awake in the operating room and returned to the recovery area in stable condition having tolerated the procedure well     I was present for the entire procedure    Patient Disposition:  PACU       SIGNATURE: Katie Mosqueda DO  DATE: August 11, 2022  TIME: 2:17 PM

## 2022-08-15 DIAGNOSIS — I48.11 LONGSTANDING PERSISTENT ATRIAL FIBRILLATION (HCC): ICD-10-CM

## 2022-08-15 RX ORDER — METOPROLOL SUCCINATE 25 MG/1
25 TABLET, EXTENDED RELEASE ORAL DAILY
Qty: 90 TABLET | Refills: 3 | Status: SHIPPED | OUTPATIENT
Start: 2022-08-15 | End: 2022-11-13

## 2022-08-17 LAB
ATRIAL RATE: 394 BPM
QRS AXIS: -22 DEGREES
QRSD INTERVAL: 96 MS
QT INTERVAL: 402 MS
QTC INTERVAL: 458 MS
T WAVE AXIS: 21 DEGREES
VENTRICULAR RATE: 78 BPM

## 2022-08-17 PROCEDURE — 93010 ELECTROCARDIOGRAM REPORT: CPT | Performed by: INTERNAL MEDICINE

## 2022-08-22 ENCOUNTER — TELEPHONE (OUTPATIENT)
Dept: OTHER | Facility: OTHER | Age: 77
End: 2022-08-22

## 2022-08-22 NOTE — TELEPHONE ENCOUNTER
Spoke with Tyree Wallace from [facility] [place] and reviewed Dr Malaika Rodrigues office notes from 8/1/22:       2  Chronic indwelling Cloud catheter  -appears to have been present for quite some time due to acquired meatal stenosis  - given the long-term nature of his Cloud catheter dependence I do not believe the patient would be urinate volitionally even following a bladder outlet procedure and I recommended maintaining his Cloud catheter     3  Urethral erosion  - asymptomatic     4  Right inguinal hernia, recurrent  - scheduled for general surgery consultation next week  - given the indwelling Cloud catheter and urethral erosion, I would recommend initiating Bactrim 3 days prior to herniorrhaphy in order to sterilize his urine and minimize the risk of infectious complications     3  Pertinent comorbidities: Atrial fibrillation, COPD, congestive cardiomyopathy        Patient will continue with Cloud catheter exchanges performed by his home health aide  I believe he is doing a good job        I do not recommend a bladder outlet procedure for this patient  I do not recommend a suprapubic tube at the present time        However after he recovers from his hernia repair if he has any difficulties with his urethral meatal erosion or trouble with Cloud catheter exchanges, suprapubic tube placement with Interventional Radiology could be considered at that time  Tyree Wallace verbalized understanding as he was not familiar with patients having chronic Cloud catheters  Explained to Tyree Wallace we have many patients with chronic Cloud catheters  Tyree Wallace was thankful for follow up phone call

## 2022-08-22 NOTE — TELEPHONE ENCOUNTER
Fabio Kuhn from [facility] would like to know if it is correct that the patient should be seen in 6 months on 2/1/2023  He states the patient should be seen monthly  If the patient is seen on 2/1 then he would have his catheter in for almost a year   Please call Jv Garcia to discuss and determine if the patient should be seen sooner

## 2022-08-26 DIAGNOSIS — R26.2 AMBULATORY DYSFUNCTION: ICD-10-CM

## 2022-08-26 DIAGNOSIS — I10 ESSENTIAL HYPERTENSION: ICD-10-CM

## 2022-08-26 DIAGNOSIS — J44.9 CHRONIC OBSTRUCTIVE PULMONARY DISEASE, UNSPECIFIED COPD TYPE (HCC): ICD-10-CM

## 2022-08-26 RX ORDER — FLUTICASONE PROPIONATE AND SALMETEROL 50; 100 UG/1; UG/1
POWDER RESPIRATORY (INHALATION)
Qty: 60 BLISTER | Refills: 11 | Status: SHIPPED | OUTPATIENT
Start: 2022-08-26

## 2022-08-26 RX ORDER — TAMSULOSIN HYDROCHLORIDE 0.4 MG/1
CAPSULE ORAL
Qty: 30 CAPSULE | Refills: 11 | Status: SHIPPED | OUTPATIENT
Start: 2022-08-26

## 2022-09-01 ENCOUNTER — OFFICE VISIT (OUTPATIENT)
Dept: SURGERY | Facility: CLINIC | Age: 77
End: 2022-09-01

## 2022-09-01 DIAGNOSIS — K40.91 RECURRENT RIGHT INGUINAL HERNIA: Primary | ICD-10-CM

## 2022-09-01 PROCEDURE — 99024 POSTOP FOLLOW-UP VISIT: CPT | Performed by: SURGERY

## 2022-09-01 NOTE — PROGRESS NOTES
Assessment/Plan:    Diagnoses and all orders for this visit:    Recurrent right inguinal hernia    Status post repair of recurrent right inguinal hernia  Doing quite well  Healing ridge as expected  And resume typical activities  Postoperative restrictions reviewed  All questions answered  ______________________________________________________  HPI: Patient presents post operatively  Right inguinal hernia repair 8/11/2022  ROS:  General ROS: negative for - chills, fatigue, fever or night sweats, weight loss  Respiratory ROS: no cough, shortness of breath, or wheezing  Cardiovascular ROS: no chest pain or dyspnea on exertion  Genito-Urinary ROS: no dysuria, trouble voiding, or hematuria  Musculoskeletal ROS: negative for - gait disturbance, joint pain or muscle pain  Neurological ROS: no TIA or stroke symptoms  GI ROS: see HPI  Skin ROS: no new rashes or lesions   Lymphatic ROS: no new adenopathy noted by pt     GYN ROS: see HPI, no new GYN history or bleeding noted  Psy ROS: no new mental or behavioral disturbances         Patient Active Problem List   Diagnosis    Symptomatic anemia    Congestive cardiomyopathy (HCC)    Permanent atrial fibrillation (HCC)    Tobacco use disorder    Orthostatic hypotension    Syncope    Colonic adenoma    Ambulatory dysfunction    COPD (chronic obstructive pulmonary disease) (HCC)    Bradycardia    Thrombocytosis    Head trauma    Closed nondisplaced comminuted fracture of left patella    Essential (hemorrhagic) thrombocythemia (HCC)    Dizziness    Urinary retention    Abnormal colonoscopy    Iron deficiency anemia likely secondary to GI bleeding    Sepsis secondary to UTI (HCC)/bacteremia    Cholestatic jaundice    Moderate protein-calorie malnutrition ([facility] [address] )    Encounter for support and coordination of transition of care    Nasal congestion    Recurrent right inguinal hernia       Allergies:  Patient has no known allergies  Current Outpatient Medications:     Advair Diskus 100-50 MCG/ACT inhaler, USE 1 INHALATION TWICE A DAY (RINSE MOUTH AFTER USE), Disp: 60 blister, Rfl: 11    apixaban (ELIQUIS) 5 mg, Take 1 tablet (5 mg total) by mouth 2 (two) times a day, Disp: 180 tablet, Rfl: 0    azelastine (ASTELIN) 0 1 % nasal spray, 1 spray into each nostril in the morning and 1 spray in the evening   Use in each nostril as directed , Disp: 90 mL, Rfl: 3    docusate sodium (COLACE) 100 mg capsule, Take 1 capsule (100 mg total) by mouth 2 (two) times a day, Disp: 180 capsule, Rfl: 0    ferrous sulfate 324 (65 Fe) mg, Take 1 tablet (324 mg total) by mouth 2 (two) times a day before meals, Disp: 180 tablet, Rfl: 2    folic acid (FOLVITE) 319 mcg tablet, Take 1 tablet (400 mcg total) by mouth daily, Disp: 30 tablet, Rfl: 0    ipratropium-albuterol (DUO-NEB) 0 5-2 5 mg/3 mL nebulizer solution, INHALE CONTENTS OF 1 VIAL VIA NEBULIZER FOUR TIMES A DAY, Disp: 60 mL, Rfl: 11    metoprolol succinate (Toprol XL) 25 mg 24 hr tablet, Take 1 tablet (25 mg total) by mouth daily, Disp: 90 tablet, Rfl: 3    nicotine (NICODERM CQ) 7 mg/24hr TD 24 hr patch, Place 1 patch on the skin daily, Disp: 28 patch, Rfl: 0    pantoprazole (PROTONIX) 40 mg tablet, TAKE 1 TABLET DAILY BEFORE BREAKFAST, Disp: 90 tablet, Rfl: 3    potassium chloride (Klor-Con) 10 mEq tablet, TAKE 1 TABLET DAILY, Disp: 90 tablet, Rfl: 3    tamsulosin (FLOMAX) 0 4 mg, TAKE 1 CAPSULE DAILY, Disp: 30 capsule, Rfl: 11    Past Medical History:   Diagnosis Date    Anxiety disorder     Atrial fibrillation (HCC)     Coronary artery disease     Depression     Cloud catheter in place     Hepatitis C     screening negative in 1/2017    Hypertension     MI (myocardial infarction) ([facility] [address] )     Use of cane as ambulatory aid        Past Surgical History:   Procedure Laterality Date    CARDIAC CATHETERIZATION  07/09/2018    COLONOSCOPY      [place] REPAIR ING HERNIA,5+Y/O,REDUCIBL Right 8/11/2022    Procedure: REPAIR HERNIA INGUINAL;  Surgeon: Lester Phalen Conron, DO;  [facility];  Service: General    TONSILLECTOMY         Family History   Problem Relation Age of Onset    Hypertension Mother     Coronary artery disease Mother         premature    Hypertension Father     Coronary artery disease Father         premature    Diabetes Father         reports that he has been smoking cigarettes  He has a 28 50 pack-year smoking history  He has never used smokeless tobacco  He reports that he does not drink alcohol and does not use drugs  PHYSICAL EXAM    There were no vitals taken for this visit      General: normal, cooperative, no distress  Abdominal: soft, nondistended or nontender  Incision: clean, dry, and intact and healing well      Brant Easton DO    Date: 9/1/2022 Time: 3:14 PM

## 2022-09-02 ENCOUNTER — TELEPHONE (OUTPATIENT)
Dept: OTHER | Facility: OTHER | Age: 77
End: 2022-09-02

## 2022-09-02 NOTE — TELEPHONE ENCOUNTER
Patient states that he want's to make an appt to "see when he can have his catheter taken out"    "I don't really know off hand how long I've had this catheter"    Patient states he would like to speak with Dr Beryle [place] about getting his catheter removed and he wants an appt

## 2022-09-02 NOTE — TELEPHONE ENCOUNTER
Spoke with Charles Velasquez from [employer] and reviewed Dr Conchita Fowler office visit notes from 22 with him clearly statin  Chronic indwelling Cloud catheter  -appears to have been present for quite some time due to acquired meatal stenosis  - given the long-term nature of his Cloud catheter dependence I do not believe the patient would be urinate volitionally even following a bladder outlet procedure and I recommended maintaining his Cloud catheter    Charles Velasquez verbalized understanding and felt obligated on patient's behalf to call office  Informed Charles Velasquez we will call patient to inform him of this also  Spoke with patient and explained the need for catheter  He verbalized understanding and was agreeable with plan  He is scheduled to return here for f/u 23

## 2022-09-02 NOTE — TELEPHONE ENCOUNTER
Amy Crouch from [employer] is calling in regards to pt's angela  Pt has miller exchange next week however states he wants it removed and not replaced  Amy Crouch is requesting advice in regards to pt's request  Please call Amy Crouch to advise

## 2022-09-06 ENCOUNTER — TELEPHONE (OUTPATIENT)
Dept: FAMILY MEDICINE CLINIC | Facility: CLINIC | Age: 77
End: 2022-09-06

## 2022-09-24 NOTE — TELEPHONE ENCOUNTER
Can you order new nebulizer machine? Diagnosis: Diabetic ketoacidosis without coma associated with type 1 diabetes mellitus [8676959]   Admitting Provider:: ROMEO SIDDIQUI [[id number]]   Future Attending Provider: ROMEO SIDDIQUI [[id number]]   Reason for IP Medical Treatment  (Clinical interventions that can only be accomplished in the IP setting? ) :: DKA   Estimated Length of Stay:: 3-4 midnights   I certify that Inpatient services for greater than or equal to 2 midnights are medically necessary:: Yes   Plans for Post-Acute care--if anticipated (pick the single best option):: B. Discharge home with medical equipment

## 2022-09-28 ENCOUNTER — HOSPITAL ENCOUNTER (INPATIENT)
Facility: HOSPITAL | Age: 77
LOS: 4 days | Discharge: NURSING, REHAB OR LONG-TERM CARE | DRG: 041 | End: 2022-10-02
Attending: EMERGENCY MEDICINE | Admitting: SURGERY
Payer: MEDICARE

## 2022-09-28 ENCOUNTER — APPOINTMENT (OUTPATIENT)
Dept: RADIOLOGY | Facility: HOSPITAL | Age: 77
End: 2022-09-28
Payer: MEDICARE

## 2022-09-28 ENCOUNTER — HOSPITAL ENCOUNTER (EMERGENCY)
Facility: HOSPITAL | Age: 77
End: 2022-09-28
Attending: EMERGENCY MEDICINE
Payer: MEDICARE

## 2022-09-28 ENCOUNTER — APPOINTMENT (EMERGENCY)
Dept: CT IMAGING | Facility: HOSPITAL | Age: 77
End: 2022-09-28
Payer: MEDICARE

## 2022-09-28 VITALS
RESPIRATION RATE: 18 BRPM | SYSTOLIC BLOOD PRESSURE: 150 MMHG | DIASTOLIC BLOOD PRESSURE: 99 MMHG | WEIGHT: 150 LBS | BODY MASS INDEX: 20.32 KG/M2 | HEART RATE: 63 BPM | OXYGEN SATURATION: 98 % | TEMPERATURE: 98.2 F | HEIGHT: 72 IN

## 2022-09-28 DIAGNOSIS — S06.5XAA SDH (SUBDURAL HEMATOMA): Primary | ICD-10-CM

## 2022-09-28 DIAGNOSIS — I48.21 PERMANENT ATRIAL FIBRILLATION (HCC): ICD-10-CM

## 2022-09-28 DIAGNOSIS — R55 SYNCOPE, UNSPECIFIED SYNCOPE TYPE: ICD-10-CM

## 2022-09-28 DIAGNOSIS — S06.5XAA SUBDURAL HEMATOMA: Primary | ICD-10-CM

## 2022-09-28 DIAGNOSIS — R42 DIZZINESS: ICD-10-CM

## 2022-09-28 LAB
2HR DELTA HS TROPONIN: -1 NG/L
ALBUMIN SERPL BCP-MCNC: 4 G/DL (ref 3.5–5)
ALP SERPL-CCNC: 78 U/L (ref 34–104)
ALT SERPL W P-5'-P-CCNC: 8 U/L (ref 7–52)
ANION GAP SERPL CALCULATED.3IONS-SCNC: 8 MMOL/L (ref 4–13)
AST SERPL W P-5'-P-CCNC: 11 U/L (ref 13–39)
BASOPHILS # BLD AUTO: 0.04 THOUSANDS/ΜL (ref 0–0.1)
BASOPHILS NFR BLD AUTO: 1 % (ref 0–1)
BILIRUB SERPL-MCNC: 0.83 MG/DL (ref 0.2–1)
BUN SERPL-MCNC: 18 MG/DL (ref 5–25)
CALCIUM SERPL-MCNC: 9.4 MG/DL (ref 8.4–10.2)
CARDIAC TROPONIN I PNL SERPL HS: 5 NG/L
CARDIAC TROPONIN I PNL SERPL HS: 6 NG/L
CHLORIDE SERPL-SCNC: 104 MMOL/L (ref 96–108)
CO2 SERPL-SCNC: 26 MMOL/L (ref 21–32)
CREAT SERPL-MCNC: 0.92 MG/DL (ref 0.6–1.3)
EOSINOPHIL # BLD AUTO: 0.17 THOUSAND/ΜL (ref 0–0.61)
EOSINOPHIL NFR BLD AUTO: 3 % (ref 0–6)
ERYTHROCYTE [DISTWIDTH] IN BLOOD BY AUTOMATED COUNT: 14.9 % (ref 11.6–15.1)
GFR SERPL CREATININE-BSD FRML MDRD: 81 ML/MIN/1.73SQ M
GLUCOSE SERPL-MCNC: 95 MG/DL (ref 65–140)
HCT VFR BLD AUTO: 42.5 % (ref 36.5–49.3)
HGB BLD-MCNC: 13.7 G/DL (ref 12–17)
IMM GRANULOCYTES # BLD AUTO: 0.03 THOUSAND/UL (ref 0–0.2)
IMM GRANULOCYTES NFR BLD AUTO: 1 % (ref 0–2)
LYMPHOCYTES # BLD AUTO: 1.12 THOUSANDS/ΜL (ref 0.6–4.47)
LYMPHOCYTES NFR BLD AUTO: 19 % (ref 14–44)
MCH RBC QN AUTO: 29.3 PG (ref 26.8–34.3)
MCHC RBC AUTO-ENTMCNC: 32.2 G/DL (ref 31.4–37.4)
MCV RBC AUTO: 91 FL (ref 82–98)
MONOCYTES # BLD AUTO: 0.45 THOUSAND/ΜL (ref 0.17–1.22)
MONOCYTES NFR BLD AUTO: 8 % (ref 4–12)
NEUTROPHILS # BLD AUTO: 4.1 THOUSANDS/ΜL (ref 1.85–7.62)
NEUTS SEG NFR BLD AUTO: 68 % (ref 43–75)
NRBC BLD AUTO-RTO: 0 /100 WBCS
PLATELET # BLD AUTO: 224 THOUSANDS/UL (ref 149–390)
PMV BLD AUTO: 9.7 FL (ref 8.9–12.7)
POTASSIUM SERPL-SCNC: 4.3 MMOL/L (ref 3.5–5.3)
PROT SERPL-MCNC: 6.9 G/DL (ref 6.4–8.4)
RBC # BLD AUTO: 4.67 MILLION/UL (ref 3.88–5.62)
SODIUM SERPL-SCNC: 138 MMOL/L (ref 135–147)
WBC # BLD AUTO: 5.91 THOUSAND/UL (ref 4.31–10.16)

## 2022-09-28 PROCEDURE — 36415 COLL VENOUS BLD VENIPUNCTURE: CPT | Performed by: EMERGENCY MEDICINE

## 2022-09-28 PROCEDURE — 70498 CT ANGIOGRAPHY NECK: CPT

## 2022-09-28 PROCEDURE — 99285 EMERGENCY DEPT VISIT HI MDM: CPT

## 2022-09-28 PROCEDURE — 85025 COMPLETE CBC W/AUTO DIFF WBC: CPT | Performed by: EMERGENCY MEDICINE

## 2022-09-28 PROCEDURE — 96374 THER/PROPH/DIAG INJ IV PUSH: CPT

## 2022-09-28 PROCEDURE — 84484 ASSAY OF TROPONIN QUANT: CPT | Performed by: EMERGENCY MEDICINE

## 2022-09-28 PROCEDURE — 99223 1ST HOSP IP/OBS HIGH 75: CPT | Performed by: SURGERY

## 2022-09-28 PROCEDURE — 99291 CRITICAL CARE FIRST HOUR: CPT | Performed by: EMERGENCY MEDICINE

## 2022-09-28 PROCEDURE — 80053 COMPREHEN METABOLIC PANEL: CPT | Performed by: EMERGENCY MEDICINE

## 2022-09-28 PROCEDURE — 71045 X-RAY EXAM CHEST 1 VIEW: CPT

## 2022-09-28 PROCEDURE — G1004 CDSM NDSC: HCPCS

## 2022-09-28 PROCEDURE — 93005 ELECTROCARDIOGRAM TRACING: CPT

## 2022-09-28 PROCEDURE — 70496 CT ANGIOGRAPHY HEAD: CPT

## 2022-09-28 RX ORDER — IPRATROPIUM BROMIDE AND ALBUTEROL SULFATE 2.5; .5 MG/3ML; MG/3ML
3 SOLUTION RESPIRATORY (INHALATION) 4 TIMES DAILY
Status: DISCONTINUED | OUTPATIENT
Start: 2022-09-28 | End: 2022-09-29

## 2022-09-28 RX ORDER — METOPROLOL SUCCINATE 25 MG/1
25 TABLET, EXTENDED RELEASE ORAL DAILY
Status: DISCONTINUED | OUTPATIENT
Start: 2022-09-29 | End: 2022-10-02 | Stop reason: HOSPADM

## 2022-09-28 RX ORDER — TAMSULOSIN HYDROCHLORIDE 0.4 MG/1
0.4 CAPSULE ORAL DAILY
Status: DISCONTINUED | OUTPATIENT
Start: 2022-09-29 | End: 2022-10-02 | Stop reason: HOSPADM

## 2022-09-28 RX ORDER — NICOTINE 21 MG/24HR
1 PATCH, TRANSDERMAL 24 HOURS TRANSDERMAL DAILY
Status: DISCONTINUED | OUTPATIENT
Start: 2022-09-29 | End: 2022-10-02 | Stop reason: HOSPADM

## 2022-09-28 RX ORDER — DIAZEPAM 5 MG/1
5 TABLET ORAL ONCE
Status: COMPLETED | OUTPATIENT
Start: 2022-09-28 | End: 2022-09-28

## 2022-09-28 RX ORDER — ONDANSETRON 2 MG/ML
4 INJECTION INTRAMUSCULAR; INTRAVENOUS EVERY 6 HOURS PRN
Status: DISCONTINUED | OUTPATIENT
Start: 2022-09-28 | End: 2022-10-02 | Stop reason: HOSPADM

## 2022-09-28 RX ORDER — LEVETIRACETAM 500 MG/1
500 TABLET ORAL 2 TIMES DAILY
Status: DISCONTINUED | OUTPATIENT
Start: 2022-09-28 | End: 2022-10-02 | Stop reason: HOSPADM

## 2022-09-28 RX ORDER — PANTOPRAZOLE SODIUM 40 MG/1
40 TABLET, DELAYED RELEASE ORAL
Status: DISCONTINUED | OUTPATIENT
Start: 2022-09-29 | End: 2022-10-02 | Stop reason: HOSPADM

## 2022-09-28 RX ORDER — MECLIZINE HYDROCHLORIDE 25 MG/1
25 TABLET ORAL ONCE
Status: COMPLETED | OUTPATIENT
Start: 2022-09-28 | End: 2022-09-28

## 2022-09-28 RX ADMIN — DIAZEPAM 5 MG: 5 TABLET ORAL at 13:24

## 2022-09-28 RX ADMIN — MECLIZINE HYDROCHLORIDE 25 MG: 25 TABLET ORAL at 11:37

## 2022-09-28 RX ADMIN — IOHEXOL 100 ML: 350 INJECTION, SOLUTION INTRAVENOUS at 12:58

## 2022-09-28 RX ADMIN — Medication 500 UNITS: at 14:17

## 2022-09-28 RX ADMIN — LEVETIRACETAM 500 MG: 500 TABLET, FILM COATED ORAL at 21:28

## 2022-09-28 NOTE — H&P
H&P - Trauma   Jalen Evans 78 y o  male MRN: [id number]  Unit/Bed#: ED 02 Encounter: [id number]    Trauma Alert: Evaluation; trauma team notified at 4:59 pm via text   Model of Arrival: Ambulance    Trauma Team: Attending Lesley Palomares and Residents Gurwinder Brewer  Consultants:     Neurosurgery: routine consult; Epic consult order placed; Assessment/Plan   Active Problems / Assessment:   78 y o  M with PMHx of Afib on Eliquis, who presents with an acute vs subacute SDH in setting of recent syncopal event       Plan:   Patient is neurologically intact, GCS 15  He received [facility] at [facility] ED for Eliquis reversal      Trauma admission - SD2  HOT protocol  Neurosurgery consult    [address]  Prn analgesia  Syncope workup - echo ordered  PT/OT  Case management   Mechanical DVT ppx       History of Present Illness     Chief Complaint: I passed out   Mechanism:Fall     HPI:    Jalen Evans is a 78 y o  male PMHx of of HTN, Afib on Eliquis, CAD s/p PCI who initially presented to the [facility] emergency department after syncopal event  Patient states he got up this morning and walked to his kitchen to make his coffee and breakfast   He then felt lightheaded and passed out falling forward and hitting his head on a door frame  He did not fall to the ground  EMS was called he was transported to the [facility] emergency department for evaluation  Workup revealed a right sided subdural hematoma  He was given [facility] for his history of taking Eliquis for atrial fibrillation  Patient was transferred to [facility] ED for trauma evaluation and management  Upon arrival, he denies any active complaints  He denies headache, weakness, numbness, tingling or any other complaints at this time  Review of Systems   Constitutional: Negative for chills and fever  HENT: Negative  Eyes: Negative for visual disturbance  Respiratory: Negative for chest tightness and shortness of breath  Cardiovascular: Negative for chest pain and leg swelling  Gastrointestinal: Negative for abdominal distention, abdominal pain, diarrhea, nausea and vomiting  Endocrine: Negative  Genitourinary: Negative for difficulty urinating  Musculoskeletal: Negative for back pain and neck pain  Skin: Negative for wound  Allergic/Immunologic: Negative  Neurological: Negative for weakness, numbness and headaches  Hematological: Negative  Psychiatric/Behavioral: Negative  12-point, complete review of systems was reviewed and negative except as stated above  Historical Information     Past Medical History:   Diagnosis Date    Anxiety disorder     Atrial fibrillation ([facility] )     Coronary artery disease     Depression     Cloud catheter in place     Hepatitis C     screening negative in 1/2017    Hypertension     MI (myocardial infarction) ([facility] )     Use of cane as ambulatory aid      Past Surgical History:   Procedure Laterality Date    CARDIAC CATHETERIZATION  07/09/2018    COLONOSCOPY      [place] REPAIR ING HERNIA,5+Y/O,REDUCIBL Right 8/11/2022    Procedure: REPAIR HERNIA INGUINAL;  Surgeon: Cecil Encarnacion DO;  [facility];  Service: General    TONSILLECTOMY          Social History     Tobacco Use    Smoking status: Current Every Day Smoker     Packs/day: 0 50     Years: 57 00     Pack years: 28 50     Types: Cigarettes    Smokeless tobacco: Never Used    Tobacco comment: since age 17; Has history of smoking for over 54 years   He has been smoking more than packet daily in the past however he has been smokes about half a pack a day lately and stopped smoking on 7/1/2018 when he was admitted to the hospital     Vaping Use    Vaping Use: Never used   Substance Use Topics    Alcohol use: Never    Drug use: Never     Immunization History   Administered Date(s) Administered    COVID-19 MODERNA VACC 0 5 ML IM 04/21/2021, 05/19/2021    INFLUENZA 09/05/2014, 04/05/2016, 11/27/2018    Influenza, high dose seasonal 0 7 mL 09/28/2020, 10/12/2021    Pneumococcal Conjugate 13-Valent 10/27/2015, 04/05/2016    Pneumococcal Polysaccharide PPV23 10/18/2013    Tdap 08/15/2018     Last Tetanus: 2018  Family History: Non-contributory    1  Before the illness or injury that brought you to the Emergency, did you need someone to help you on a regular basis? 0=No   2  Since the illness or injury that brought you to the Emergency, have you needed more help than usual to take care of yourself? 0=No   3  Have you been hospitalized for one or more nights during the past 6 months (excluding a stay in the Emergency Department)? 0=No   4  In general, do you see well? 0=Yes   5  In general, do you have serious problems with your memory? 0=No   6  Do you take more than three different medications everyday? 1=Yes   TOTAL   1     Did you order a geriatric consult if the score was 2 or greater?: no     Meds/Allergies   current meds:   Current Facility-Administered Medications   Medication Dose Route Frequency    [START ON 9/29/2022] fluticasone-vilanterol (BREO ELLIPTA) 100-25 mcg/inh inhaler 1 puff  1 puff Inhalation Daily    ipratropium-albuterol (DUO-NEB) 0 5-2 5 mg/3 mL inhalation solution 3 mL  3 mL Nebulization 4x Daily    levETIRAcetam (KEPPRA) tablet 500 mg  500 mg Oral BID    [START ON 9/29/2022] metoprolol succinate (TOPROL-XL) 24 hr tablet 25 mg  25 mg Oral Daily    [START ON 9/29/2022] nicotine (NICODERM CQ) 14 mg/24hr TD 24 hr patch 1 patch  1 patch Transdermal Daily    ondansetron (ZOFRAN) injection 4 mg  4 mg Intravenous Q6H PRN    [START ON 9/29/2022] pantoprazole (PROTONIX) EC tablet 40 mg  40 mg Oral Early Morning    [START ON 9/29/2022] tamsulosin (FLOMAX) capsule 0 4 mg  0 4 mg Oral Daily     Allergies have not been reviewed;   No Known Allergies    Objective   Initial Vitals:   Temperature: 97 5 °F (36 4 °C) (09/28/22 1700)  Pulse: 85 (09/28/22 1700)  Respirations: 18 (09/28/22 1700)  Blood Pressure: 159/89 (09/28/22 1700)    Primary Survey:   Airway:        Status: patent;        Pre-hospital Interventions: none        Hospital Interventions: none  Breathing:        Pre-hospital Interventions: none       Effort: normal       Right breath sounds: normal       Left breath sounds: normal  Circulation:        Rhythm: irregular       Rate: regular   Right Pulses Left Pulses    R radial: 2+  R femoral: 2+  R pedal: 2+     L radial: 2+  L femoral: 2+  L pedal: 2+       Disability:        GCS: Eye: 4; Verbal: 5 Motor: 6 Total: 15       Right Pupil: 3 mm;  round;  reactive         Left Pupil:  3 mm;  round;  reactive      R Motor Strength L Motor Strength    R : 5/5  R dorsiflex: 5/5  R plantarflex: 5/5 L : 5/5  L dorsiflex: 5/5  L plantarflex: 5/5        Sensory:  No sensory deficit  Exposure:       Completed: Yes      Secondary Survey:  Physical Exam  Vitals and nursing note reviewed  Constitutional:       Appearance: Normal appearance  HENT:      Head: Normocephalic  Right Ear: External ear normal       Left Ear: External ear normal       Nose: Nose normal       Mouth/Throat:      Mouth: Mucous membranes are moist    Eyes:      Extraocular Movements: Extraocular movements intact  Conjunctiva/sclera: Conjunctivae normal       Pupils: Pupils are equal, round, and reactive to light  Cardiovascular:      Rate and Rhythm: Normal rate  Pulses: Normal pulses  Heart sounds: No murmur heard  Pulmonary:      Effort: Pulmonary effort is normal  No respiratory distress  Breath sounds: Normal breath sounds  Abdominal:      General: There is no distension  Palpations: Abdomen is soft  Tenderness: There is no abdominal tenderness  There is no guarding or rebound  Genitourinary:     Comments: Cloud catheter in place   Musculoskeletal:      Cervical back: Normal range of motion and neck supple  No tenderness  Right lower leg: No edema  Left lower leg: No edema  Skin:     General: Skin is warm and dry  Capillary Refill: Capillary refill takes less than 2 seconds  Neurological:      General: No focal deficit present  Mental Status: He is alert and oriented to person, place, and time  GCS: GCS eye subscore is 4  GCS verbal subscore is 5  GCS motor subscore is 6  Cranial Nerves: Cranial nerves are intact  No cranial nerve deficit  Sensory: Sensation is intact  Motor: Motor function is intact  No weakness  Psychiatric:         Mood and Affect: Mood normal          Behavior: Behavior normal          Invasive Devices  Report    Peripheral Intravenous Line  Duration           Peripheral IV 09/28/22 Left Antecubital <1 day          Drain  Duration           Urethral Catheter 18 Fr  210 days              Lab Results:   BMP/CMP:   Lab Results   Component Value Date    SODIUM 138 09/28/2022    K 4 3 09/28/2022     09/28/2022    CO2 26 09/28/2022    BUN 18 09/28/2022    CREATININE 0 92 09/28/2022    CALCIUM 9 4 09/28/2022    AST 11 (L) 09/28/2022    ALT 8 09/28/2022    ALKPHOS 78 09/28/2022    EGFR 81 09/28/2022   , CBC:   Lab Results   Component Value Date    WBC 5 91 09/28/2022    HGB 13 7 09/28/2022    HCT 42 5 09/28/2022    MCV 91 09/28/2022     09/28/2022    MCH 29 3 09/28/2022    MCHC 32 2 09/28/2022    RDW 14 9 09/28/2022    MPV 9 7 09/28/2022    NRBC 0 09/28/2022    and Coagulation: No results found for: PT, INR, APTT    Imaging Results: I have personally reviewed pertinent reports  Chest Xray(s): negative for acute findings   FAST exam(s): N/A   CT Scan(s): positive for acute findings: 9/28 CTA H&N: Right subdural hematoma that is likely subacute  Slight localized mass effect  No shift or herniation  No acute territorial infarct  No significant stenosis of the cervical carotid or vertebral arteries  No significant intracranial stenosis, large vessel occlusion or aneurysm     Additional Xray(s): N/A     Other Studies: N/A    Code Status: Prior  Advance Directive and Living Will:      Power of :    POLST:    I have spent 30 minutes with Patient and family today in which greater than 50% of this time was spent in counseling/coordination of care regarding Diagnostic results, Prognosis, Risks and benefits of tx options, Intructions for management, Patient and family education, Importance of tx compliance, Risk factor reductions and Impressions

## 2022-09-28 NOTE — EMTALA/ACUTE CARE TRANSFER
[facility] EMERGENCY DEPARTMENT  [address] [place] [address] [address]  Dept: [phone]      EMTALA TRANSFER CONSENT    NAME Robert Madrigal                                         1945                              MRN [id number]    I have been informed of my rights regarding examination, treatment, and transfer   by Dr Juice Flores MD    Benefits: Specialized equipment and/or services available at the receiving facility (Include comment)________________________ (Trauma)    Risks: Potential for delay in receiving treatment, Potential deterioration of medical condition, Loss of IV, Increased discomfort during transfer, Possible worsening of condition or death during transfer      Consent for Transfer:  I acknowledge that my medical condition has been evaluated and explained to me by the emergency department physician or other qualified medical person and/or my attending physician, who has recommended that I be transferred to the service of  Accepting Physician: Doc Patch at [address] Name, [address] : [address] [facility], [address] [facility]  The above potential benefits of such transfer, the potential risks associated with such transfer, and the probable risks of not being transferred have been explained to me, and I fully understand them  The doctor has explained that, in my case, the benefits of transfer outweigh the risks  I agree to be transferred  I authorize the performance of emergency medical procedures and treatments upon me in both transit and upon arrival at the receiving facility  Additionally, I authorize the release of any and all medical records to the receiving facility and request they be transported with me, if possible  I understand that the safest mode of transportation during a medical emergency is an ambulance and that the Hospital advocates the use of this mode of transport   Risks of traveling to the receiving facility by car, including absence of medical control, life sustaining equipment, such as oxygen, and medical personnel has been explained to me and I fully understand them  (JOSE CORRECT BOX BELOW)  [  ]  I consent to the stated transfer and to be transported by ambulance/helicopter  [  ]  I consent to the stated transfer, but refuse transportation by ambulance and accept full responsibility for my transportation by car  I understand the risks of non-ambulance transfers and I exonerate the Hospital and its staff from any deterioration in my condition that results from this refusal     X___________________________________________    DATE  22  TIME________  Signature of patient or legally responsible individual signing on patient behalf           RELATIONSHIP TO PATIENT_________________________          Provider Certification    NAME Negrita Brunson                                        [facility] 1945                              MRN [id number]    A medical screening exam was performed on the above named patient  Based on the examination:    Condition Necessitating Transfer The primary encounter diagnosis was Subdural hematoma  A diagnosis of Dizziness was also pertinent to this visit      Patient Condition: The patient has been stabilized such that within reasonable medical probability, no material deterioration of the patient condition or the condition of the unborn child(dee) is likely to result from the transfer    Reason for Transfer: Level of Care needed not available at this facility    Transfer Requirements: [facility] [place]   · Space available and qualified personnel available for treatment as acknowledged by    · Agreed to accept transfer and to provide appropriate medical treatment as acknowledged by       [place]  · Appropriate medical records of the examination and treatment of the patient are provided at the time of transfer   [address], [address] _______  · Transfer will be performed by qualified personnel from    and appropriate transfer equipment as required, including the use of necessary and appropriate life support measures  Provider Certification: I have examined the patient and explained the following risks and benefits of being transferred/refusing transfer to the patient/family:  General risk, such as traffic hazards, adverse weather conditions, rough terrain or turbulence, possible failure of equipment (including vehicle or aircraft), or consequences of actions of persons outside the control of the transport personnel, Unanticipated needs of medical equipment and personnel during transport, Risk of worsening condition, The possibility of a transport vehicle being unavailable      Based on these reasonable risks and benefits to the patient and/or the unborn child(dee), and based upon the information available at the time of the patients examination, I certify that the medical benefits reasonably to be expected from the provision of appropriate medical treatments at another medical facility outweigh the increasing risks, if any, to the individuals medical condition, and in the case of labor to the unborn child, from effecting the transfer      X____________________________________________ DATE 09/28/22        TIME_______      ORIGINAL - SEND TO MEDICAL RECORDS   COPY - SEND WITH PATIENT DURING TRANSFER

## 2022-09-28 NOTE — ED PROVIDER NOTES
History  Chief Complaint   Patient presents with    Dizziness     Arrived via [facility] and [facility] with c/o dizziness that started this am       62-year-old male with history of AFib on Eliquis, CAD, hypertension who presents for evaluation of dizziness  Patient reports that upon awakening this morning, he had very mild vertiginous dizziness that he describes as a room spinning sensation as if I was on a merry-go-round    The dizziness was very minimal until he sat down at the kitchen table and attempted to stand at which time his symptoms acutely worsened  The dizziness is improved with lying still and worsened with any sort of movement  He he had difficulty ambulating because of the significant dizziness  He has never had symptoms like this before  He notes that he has felt short of breath today as well  This is not unusual for him, however, he does not have it every day  He does not feel short of breath at this moment  He otherwise denies headache, vision changes, focal weakness, chest pain, abdominal pain, nausea/vomiting, fever  He did not take any medications prior to arrival for his symptoms  Prior to Admission Medications   Prescriptions Last Dose Informant Patient Reported? Taking? Advair Diskus 100-50 MCG/ACT inhaler   No No   Sig: USE 1 INHALATION TWICE A DAY (RINSE MOUTH AFTER USE)   apixaban (ELIQUIS) 5 mg  Self No No   Sig: Take 1 tablet (5 mg total) by mouth 2 (two) times a day   azelastine (ASTELIN) 0 1 % nasal spray  Self No No   Si spray into each nostril in the morning and 1 spray in the evening  Use in each nostril as directed     docusate sodium (COLACE) 100 mg capsule  Self No No   Sig: Take 1 capsule (100 mg total) by mouth 2 (two) times a day   ferrous sulfate 324 (65 Fe) mg  Self No No   Sig: Take 1 tablet (324 mg total) by mouth 2 (two) times a day before meals   folic acid (FOLVITE) 240 mcg tablet  Self No No   Sig: Take 1 tablet (400 mcg total) by mouth daily ipratropium-albuterol (DUO-NEB) 0 5-2 5 mg/3 mL nebulizer solution  Self No No   Sig: INHALE CONTENTS OF 1 VIAL VIA NEBULIZER FOUR TIMES A DAY   metoprolol succinate (Toprol XL) 25 mg 24 hr tablet   No No   Sig: Take 1 tablet (25 mg total) by mouth daily   nicotine (NICODERM CQ) 7 mg/24hr TD 24 hr patch  Self No No   Sig: Place 1 patch on the skin daily   pantoprazole (PROTONIX) 40 mg tablet   No No   Sig: TAKE 1 TABLET DAILY BEFORE BREAKFAST   potassium chloride (Klor-Con) 10 mEq tablet  Self No No   Sig: TAKE 1 TABLET DAILY   tamsulosin (FLOMAX) 0 4 mg   No No   Sig: TAKE 1 CAPSULE DAILY      Facility-Administered Medications: None       Past Medical History:   Diagnosis Date    Anxiety disorder     Atrial fibrillation (HCC)     Coronary artery disease     Depression     Cloud catheter in place     Hepatitis C     screening negative in 1/2017    Hypertension     MI [facility]myocardial infarction) ([facility] [address] )     Use of cane as ambulatory aid        Past Surgical History:   Procedure Laterality Date    CARDIAC CATHETERIZATION  07/09/2018    COLONOSCOPY      WA REPAIR ING HERNIA,5+Y/O,REDUCIBL Right 8/11/2022    Procedure: REPAIR HERNIA INGUINAL;  Surgeon: Giovanny Encarnacion DO;  Location: [facility];  Service: General    TONSILLECTOMY         Family History   Problem Relation Age of Onset    Hypertension Mother     Coronary artery disease Mother         premature    Hypertension Father     Coronary artery disease Father         premature    Diabetes Father      I have reviewed and agree with the history as documented      E-Cigarette/Vaping    E-Cigarette Use Never User      E-Cigarette/Vaping Substances    Nicotine Yes     THC No     CBD No     Flavoring No     Other No     Unknown No      Social History     Tobacco Use    Smoking status: Current Every Day Smoker     Packs/day: 0 50     Years: 57 00     Pack years: 28 50     Types: Cigarettes    Smokeless tobacco: Never Used    Tobacco comment: since age 17; Has history of smoking for over 55 years  He has been smoking more than packet daily in the past however he has been smokes about half a pack a day lately and stopped smoking on 7/1/2018 when he was admitted to the hospital     Vaping Use    Vaping Use: Never used   Substance Use Topics    Alcohol use: Never    Drug use: Never       Review of Systems   Constitutional: Negative for chills and fever  HENT: Negative for congestion and sore throat  Eyes: Negative for visual disturbance  Respiratory: Positive for shortness of breath  Negative for cough and chest tightness  Cardiovascular: Negative for chest pain and leg swelling  Gastrointestinal: Negative for abdominal pain, diarrhea, nausea and vomiting  Genitourinary: Negative for difficulty urinating and flank pain  Musculoskeletal: Positive for gait problem  Negative for back pain  Skin: Negative for pallor and rash  Neurological: Positive for dizziness  Negative for syncope, weakness, light-headedness and headaches  All other systems reviewed and are negative  Physical Exam  Physical Exam  Vitals and nursing note reviewed  Constitutional:       General: He is not in acute distress  Appearance: He is not ill-appearing  HENT:      Head: Normocephalic and atraumatic  Nose: Nose normal       Mouth/Throat:      Mouth: Mucous membranes are moist    Eyes:      Extraocular Movements: Extraocular movements intact  Pupils: Pupils are equal, round, and reactive to light  Cardiovascular:      Rate and Rhythm: Normal rate  Rhythm irregular  Heart sounds: No murmur heard  No friction rub  No gallop  Pulmonary:      Effort: Pulmonary effort is normal       Breath sounds: Normal breath sounds  No wheezing, rhonchi or rales  Abdominal:      General: There is no distension  Palpations: Abdomen is soft  Tenderness: There is no abdominal tenderness     Musculoskeletal:         General: No swelling or tenderness  Normal range of motion  Cervical back: Normal range of motion and neck supple  Skin:     General: Skin is warm and dry  Coloration: Skin is not pale  Findings: No rash  Neurological:      General: No focal deficit present  Mental Status: He is alert and oriented to person, place, and time  Comments: Cranial nerves 2-12 intact  5/5 strength and sensation intact all 4 extremities  Speech normal   No nystagmus noted  Finger-to-nose normal   No drift     Psychiatric:         Behavior: Behavior normal          Vital Signs  ED Triage Vitals [09/28/22 1122]   Temperature Pulse Respirations Blood Pressure SpO2   98 2 °F (36 8 °C) 101 20 155/88 96 %      Temp Source Heart Rate Source Patient Position - Orthostatic VS BP Location FiO2 (%)   Oral -- -- -- --      Pain Score       No Pain           Vitals:    09/28/22 1215 09/28/22 1330 09/28/22 1430 09/28/22 1450   BP: 135/90 143/90 142/95 150/99   Pulse: 77 81 93 63         Visual Acuity      ED Medications  Medications   meclizine (ANTIVERT) tablet 25 mg (25 mg Oral Given 9/28/22 1137)   iohexol (OMNIPAQUE) 350 MG/ML injection (SINGLE-DOSE) 100 mL (100 mL Intravenous Given 9/28/22 1258)   diazepam (VALIUM) tablet 5 mg (5 mg Oral Given 9/28/22 1324)   prothrombin complex concentrate (human) (Kcentra) 2,000 Units (500 Units Intravenous Given 9/28/22 1417)   prothrombin complex concentrate (human) (Kcentra) 500 Units IV **ADS Override Pull** (500 Units  Given 9/28/22 1413)   prothrombin complex concentrate (human) (Kcentra) 500 Units IV **ADS Override Pull** (500 Units  Given 9/28/22 1413)   prothrombin complex concentrate (human) (Kcentra) 500 Units IV **ADS Override Pull** (500 Units  Given 9/28/22 1412)       Diagnostic Studies  Results Reviewed     Procedure Component Value Units Date/Time    HS Troponin I 2hr [[id number]]  (Normal) Collected: 09/28/22 1327    Lab Status: Final result Specimen: Blood from Arm, Left Updated: 09/28/22 1359     hs TnI 2hr 5 ng/L      Delta 2hr hsTnI -1 ng/L     HS Troponin 0hr (reflex protocol) [[id number]]  (Normal) Collected: 09/28/22 1134    Lab Status: Final result Specimen: Blood from Arm, Left Updated: 09/28/22 1209     hs TnI 0hr 6 ng/L     Comprehensive metabolic panel [[id number]]  (Abnormal) Collected: 09/28/22 1134    Lab Status: Final result Specimen: Blood from Arm, Left Updated: 09/28/22 1204     Sodium 138 mmol/L      Potassium 4 3 mmol/L      Chloride 104 mmol/L      CO2 26 mmol/L      ANION GAP 8 mmol/L      BUN 18 mg/dL      Creatinine 0 92 mg/dL      Glucose 95 mg/dL      Calcium 9 4 mg/dL      AST 11 U/L      ALT 8 U/L      Alkaline Phosphatase 78 U/L      Total Protein 6 9 g/dL      Albumin 4 0 g/dL      Total Bilirubin 0 83 mg/dL      eGFR 81 ml/min/1 73sq m     Narrative:      [place] guidelines for Chronic Kidney Disease (CKD):     Stage 1 with normal or high GFR (GFR > 90 mL/min/1 73 square meters)    Stage 2 Mild CKD (GFR = 60-89 mL/min/1 73 square meters)    Stage 3A Moderate CKD (GFR = 45-59 mL/min/1 73 square meters)    Stage 3B Moderate CKD (GFR = 30-44 mL/min/1 73 square meters)    Stage 4 Severe CKD (GFR = 15-29 mL/min/1 73 square meters)    Stage 5 End Stage CKD (GFR <15 mL/min/1 73 square meters)  Note: GFR calculation is accurate only with a steady state creatinine    CBC and differential [[id number]] Collected: 09/28/22 1134    Lab Status: Final result Specimen: Blood from Arm, Left Updated: 09/28/22 1140     WBC 5 91 Thousand/uL      RBC 4 67 Million/uL      Hemoglobin 13 7 g/dL      Hematocrit 42 5 %      MCV 91 fL      MCH 29 3 pg      MCHC 32 2 g/dL      RDW 14 9 %      MPV 9 7 fL      Platelets 692 Thousands/uL      nRBC 0 /100 WBCs      Neutrophils Relative 68 %      Immat GRANS % 1 %      Lymphocytes Relative 19 %      Monocytes Relative 8 %      Eosinophils Relative 3 %      Basophils Relative 1 %      Neutrophils Absolute 4 10 Thousands/µL Immature Grans Absolute 0 03 Thousand/uL      Lymphocytes Absolute 1 12 Thousands/µL      Monocytes Absolute 0 45 Thousand/µL      Eosinophils Absolute 0 17 Thousand/µL      Basophils Absolute 0 04 Thousands/µL                  CTA head and neck with and without contrast   Final Result by John Ha MD (09/28 0172)   Right subdural hematoma that is likely subacute  Slight localized mass effect  No shift or herniation  No acute territorial infarct  No significant stenosis of the cervical carotid or vertebral arteries  No significant intracranial stenosis, large vessel occlusion or aneurysm  I personally discussed this study with Albaro Martinez on 9/28/2022 at 1:43 PM                Workstation performed: [id number]         XR chest 1 view portable   ED Interpretation by Leeann Pandey MD (09/28 7104)   No acute cardiopulmonary abnormality                   Procedures  CriticalCare Time  Performed by: Leeann Pandey MD  Authorized by: Leeann Pandey MD     Critical care provider statement:     Critical care time (minutes):  31    Critical care start time:  9/28/2022 11:20 AM    Critical care end time:  9/28/2022 11:51 AM    Critical care time was exclusive of:  Separately billable procedures and treating other patients and teaching time    Critical care was necessary to treat or prevent imminent or life-threatening deterioration of the following conditions:  CNS failure or compromise and trauma    Critical care was time spent personally by me on the following activities:  Blood draw for specimens, obtaining history from patient or surrogate, development of treatment plan with patient or surrogate, discussions with consultants, examination of patient, ordering and performing treatments and interventions, ordering and review of radiographic studies, ordering and review of laboratory studies and re-evaluation of patient's condition    I assumed direction of critical care for this patient from another provider in my specialty: no               ED Course  ED Course as of 09/28/22 2117   Wed Sep 28, 2022   1139 Procedure Note: EKG  Date/Time: 09/28/22 11:22 AM   Interpreted by: Corrinne Solid  Indications / Diagnosis: dizziness  ECG reviewed by me, the ED Provider: yes   The EKG demonstrates:  Rhythm: rate 80, afib rate controlled  Intervals: normal intervals  Axis: normal axis  QRS/Blocks: normal QRS  ST Changes: No acute ST Changes, no STD/KAL       1146 CBC and differential   1218 Comprehensive metabolic panel(!)   8792 hs TnI 0hr: 6   1341 R subdural subacute  Minimal mass effect no midline shift  26 S/w Dr Matta Shock from [facility] trauma  Reverse w/ Durene Sails  Accepted for transfer  SBIRT 22yo+    Flowsheet Row Most Recent Value   SBIRT (25 yo +)    In order to provide better care to our patients, we are screening all of our patients for alcohol and drug use  Would it be okay to ask you these screening questions? No Filed at: 09/28/2022 1124                MDM  Number of Diagnoses or Management Options  Dizziness: new and requires workup  Subdural hematoma: new and requires workup  Diagnosis management comments: 74-year-old male who presents for evaluation of dizziness  Vital stable  Will obtain labs, CTA head and neck  Will treat symptomatically for vertigo with meclizine  Labs unremarkable  CT showing a right-sided subdural hematoma  On further questioning, patient admits to falling this morning  Discussed with Trauma Service, recommend reversing with Kcentra  Patient transferred to hospitals          Amount and/or Complexity of Data Reviewed  Clinical lab tests: ordered and reviewed  Tests in the radiology section of CPT®: reviewed and ordered  Review and summarize past medical records: yes  Discuss the patient with other providers: yes    Risk of Complications, Morbidity, and/or Mortality  Presenting problems: high  Diagnostic procedures: low  Management options: moderate    Patient Progress  Patient progress: stable      Disposition  Final diagnoses:   Subdural hematoma   Dizziness     Time reflects when diagnosis was documented in both MDM as applicable and the Disposition within this note     Time User Action Codes Description Comment    9/28/2022  1:57 PM Candelario Gathers Add [S06 [id number]] Subdural hematoma     9/28/2022  1:57 PM Candelario Gathers Add [R42] Dizziness       ED Disposition     ED Disposition   Transfer to Another Facility-In Network    Condition   --    Date/Time   Wed Sep 28, 2022  1:57 PM    Comment   Rockne Riedel should be transferred out to [facility]             MD Documentation    [address] Most Recent Value   Patient Condition The patient has been stabilized such that within reasonable medical probability, no material deterioration of the patient condition or the condition of the unborn child(dee) is likely to result from the transfer   Reason for Transfer Level of Care needed not available at this facility   Benefits of Transfer Specialized equipment and/or services available at the receiving facility (Include comment)________________________  [Trauma]   Risks of Transfer Potential for delay in receiving treatment, Potential deterioration of medical condition, Loss of IV, Increased discomfort during transfer, Possible worsening of condition or death during transfer   Accepting Physician [address] [place] [address] Name, [address]   Transported by [employer] and Unit #) [employer]   Sending MD [facility]   Provider Certification General risk, such as traffic hazards, adverse weather conditions, rough terrain or turbulence, possible failure of equipment (including vehicle or aircraft), or consequences of actions of persons outside the control of the transport personnel, Unanticipated needs of medical equipment and personnel during transport, Risk of worsening condition, The possibility of a transport vehicle being unavailable      RN Documentation    [address] Name, [address] Assignment ED   Transport Mode Ambulance   [facility]   Level of Care Advanced life support   Patient Belongings Disposition Sent with patient   Transfer Date 09/28/22   Transfer Time 1600      Follow-up Information    None         Discharge Medication List as of 9/28/2022  4:27 PM      CONTINUE these medications which have NOT CHANGED    Details   Advair Diskus 100-50 MCG/ACT inhaler USE 1 INHALATION TWICE A DAY (RINSE MOUTH AFTER USE), Normal      apixaban (ELIQUIS) 5 mg Take 1 tablet (5 mg total) by mouth 2 (two) times a day, Starting Sun 2/6/2022, Until Tue 8/9/2022, Normal      azelastine (ASTELIN) 0 1 % nasal spray 1 spray into each nostril in the morning and 1 spray in the evening   Use in each nostril as directed , Starting Tue 5/24/2022, Normal      docusate sodium (COLACE) 100 mg capsule Take 1 capsule (100 mg total) by mouth 2 (two) times a day, Starting Fri 9/10/2021, Until Tue 8/9/2022, Normal      ferrous sulfate 324 (65 Fe) mg Take 1 tablet (324 mg total) by mouth 2 (two) times a day before meals, Starting Fri 4/29/2022, Until u 0/86/7976, Normal      folic acid (FOLVITE) 725 mcg tablet Take 1 tablet (400 mcg total) by mouth daily, Starting Fri 9/10/2021, Normal      ipratropium-albuterol (DUO-NEB) 0 5-2 5 mg/3 mL nebulizer solution INHALE CONTENTS OF 1 VIAL VIA NEBULIZER FOUR TIMES A DAY, Normal      metoprolol succinate (Toprol XL) 25 mg 24 hr tablet Take 1 tablet (25 mg total) by mouth daily, Starting Mon 8/15/2022, Until Sun 11/13/2022, Normal      nicotine (NICODERM CQ) 7 mg/24hr TD 24 hr patch Place 1 patch on the skin daily, Starting Fri 3/4/2022, Normal      pantoprazole (PROTONIX) 40 mg tablet TAKE 1 TABLET DAILY BEFORE BREAKFAST, Normal      potassium chloride (Klor-Con) 10 mEq tablet TAKE 1 TABLET DAILY, Normal      tamsulosin (FLOMAX) 0 4 mg TAKE 1 CAPSULE DAILY, Normal             No discharge procedures on file      PDMP Review     None          ED Provider  Electronically Signed by           Henrry Boss MD  09/28/22 7135

## 2022-09-28 NOTE — ED NOTES
Patients wife Behzad Salmon called as per pt's request  Behzad iWlson states understanding and is aware that patient is being transferred to [facility] for evaluation  Behzad Wilson expresses that patient is often "confused" and repeats himself and asks questions over and over  Patient is also hard of hearing        Shakira Cunningham RN  09/28/22 6029

## 2022-09-29 ENCOUNTER — APPOINTMENT (INPATIENT)
Dept: RADIOLOGY | Facility: HOSPITAL | Age: 77
DRG: 041 | End: 2022-09-29
Payer: MEDICARE

## 2022-09-29 ENCOUNTER — TELEPHONE (OUTPATIENT)
Dept: NEUROSURGERY | Facility: CLINIC | Age: 77
End: 2022-09-29

## 2022-09-29 ENCOUNTER — APPOINTMENT (INPATIENT)
Dept: NON INVASIVE DIAGNOSTICS | Facility: HOSPITAL | Age: 77
DRG: 041 | End: 2022-09-29
Payer: MEDICARE

## 2022-09-29 PROBLEM — S06.5XAA SDH (SUBDURAL HEMATOMA): Status: ACTIVE | Noted: 2022-09-29

## 2022-09-29 LAB
ANION GAP SERPL CALCULATED.3IONS-SCNC: 4 MMOL/L (ref 4–13)
ATRIAL RATE: 131 BPM
ATRIAL RATE: 97 BPM
BASOPHILS # BLD AUTO: 0.07 THOUSANDS/ΜL (ref 0–0.1)
BASOPHILS NFR BLD AUTO: 1 % (ref 0–1)
BUN SERPL-MCNC: 16 MG/DL (ref 5–25)
CALCIUM SERPL-MCNC: 8.5 MG/DL (ref 8.3–10.1)
CHLORIDE SERPL-SCNC: 108 MMOL/L (ref 96–108)
CO2 SERPL-SCNC: 26 MMOL/L (ref 21–32)
CREAT SERPL-MCNC: 0.9 MG/DL (ref 0.6–1.3)
EOSINOPHIL # BLD AUTO: 0.32 THOUSAND/ΜL (ref 0–0.61)
EOSINOPHIL NFR BLD AUTO: 4 % (ref 0–6)
ERYTHROCYTE [DISTWIDTH] IN BLOOD BY AUTOMATED COUNT: 14.9 % (ref 11.6–15.1)
GFR SERPL CREATININE-BSD FRML MDRD: 83 ML/MIN/1.73SQ M
GLUCOSE SERPL-MCNC: 74 MG/DL (ref 65–140)
HCT VFR BLD AUTO: 42.5 % (ref 36.5–49.3)
HGB BLD-MCNC: 13 G/DL (ref 12–17)
IMM GRANULOCYTES # BLD AUTO: 0.03 THOUSAND/UL (ref 0–0.2)
IMM GRANULOCYTES NFR BLD AUTO: 0 % (ref 0–2)
LYMPHOCYTES # BLD AUTO: 1.69 THOUSANDS/ΜL (ref 0.6–4.47)
LYMPHOCYTES NFR BLD AUTO: 21 % (ref 14–44)
MCH RBC QN AUTO: 28.4 PG (ref 26.8–34.3)
MCHC RBC AUTO-ENTMCNC: 30.6 G/DL (ref 31.4–37.4)
MCV RBC AUTO: 93 FL (ref 82–98)
MONOCYTES # BLD AUTO: 0.65 THOUSAND/ΜL (ref 0.17–1.22)
MONOCYTES NFR BLD AUTO: 8 % (ref 4–12)
NEUTROPHILS # BLD AUTO: 5.13 THOUSANDS/ΜL (ref 1.85–7.62)
NEUTS SEG NFR BLD AUTO: 66 % (ref 43–75)
NRBC BLD AUTO-RTO: 0 /100 WBCS
PLATELET # BLD AUTO: 246 THOUSANDS/UL (ref 149–390)
PMV BLD AUTO: 10.6 FL (ref 8.9–12.7)
POTASSIUM SERPL-SCNC: 3.8 MMOL/L (ref 3.5–5.3)
QRS AXIS: -36 DEGREES
QRS AXIS: -49 DEGREES
QRSD INTERVAL: 94 MS
QRSD INTERVAL: 96 MS
QT INTERVAL: 422 MS
QT INTERVAL: 422 MS
QTC INTERVAL: 464 MS
QTC INTERVAL: 474 MS
RBC # BLD AUTO: 4.58 MILLION/UL (ref 3.88–5.62)
SODIUM SERPL-SCNC: 138 MMOL/L (ref 135–147)
T WAVE AXIS: 11 DEGREES
T WAVE AXIS: 73 DEGREES
VENTRICULAR RATE: 73 BPM
VENTRICULAR RATE: 76 BPM
WBC # BLD AUTO: 7.89 THOUSAND/UL (ref 4.31–10.16)

## 2022-09-29 PROCEDURE — 85025 COMPLETE CBC W/AUTO DIFF WBC: CPT | Performed by: STUDENT IN AN ORGANIZED HEALTH CARE EDUCATION/TRAINING PROGRAM

## 2022-09-29 PROCEDURE — G1004 CDSM NDSC: HCPCS

## 2022-09-29 PROCEDURE — 99232 SBSQ HOSP IP/OBS MODERATE 35: CPT | Performed by: EMERGENCY MEDICINE

## 2022-09-29 PROCEDURE — 94760 N-INVAS EAR/PLS OXIMETRY 1: CPT | Performed by: SOCIAL WORKER

## 2022-09-29 PROCEDURE — 93005 ELECTROCARDIOGRAM TRACING: CPT

## 2022-09-29 PROCEDURE — 97163 PT EVAL HIGH COMPLEX 45 MIN: CPT

## 2022-09-29 PROCEDURE — 97166 OT EVAL MOD COMPLEX 45 MIN: CPT

## 2022-09-29 PROCEDURE — 94640 AIRWAY INHALATION TREATMENT: CPT | Performed by: SOCIAL WORKER

## 2022-09-29 PROCEDURE — 80048 BASIC METABOLIC PNL TOTAL CA: CPT | Performed by: STUDENT IN AN ORGANIZED HEALTH CARE EDUCATION/TRAINING PROGRAM

## 2022-09-29 PROCEDURE — 70450 CT HEAD/BRAIN W/O DYE: CPT

## 2022-09-29 PROCEDURE — 94760 N-INVAS EAR/PLS OXIMETRY 1: CPT

## 2022-09-29 PROCEDURE — 99222 1ST HOSP IP/OBS MODERATE 55: CPT | Performed by: INTERNAL MEDICINE

## 2022-09-29 PROCEDURE — 93010 ELECTROCARDIOGRAM REPORT: CPT | Performed by: INTERNAL MEDICINE

## 2022-09-29 PROCEDURE — 94640 AIRWAY INHALATION TREATMENT: CPT

## 2022-09-29 RX ORDER — ACETAMINOPHEN 325 MG/1
975 TABLET ORAL EVERY 6 HOURS PRN
Status: DISCONTINUED | OUTPATIENT
Start: 2022-09-29 | End: 2022-10-02 | Stop reason: HOSPADM

## 2022-09-29 RX ORDER — HEPARIN SODIUM 5000 [USP'U]/ML
5000 INJECTION, SOLUTION INTRAVENOUS; SUBCUTANEOUS EVERY 8 HOURS SCHEDULED
Status: DISCONTINUED | OUTPATIENT
Start: 2022-09-29 | End: 2022-10-02 | Stop reason: HOSPADM

## 2022-09-29 RX ORDER — LEVALBUTEROL 1.25 MG/.5ML
1.25 SOLUTION, CONCENTRATE RESPIRATORY (INHALATION)
Status: DISCONTINUED | OUTPATIENT
Start: 2022-09-29 | End: 2022-10-02 | Stop reason: HOSPADM

## 2022-09-29 RX ORDER — ALBUTEROL SULFATE 2.5 MG/3ML
2.5 SOLUTION RESPIRATORY (INHALATION) EVERY 6 HOURS PRN
Status: DISCONTINUED | OUTPATIENT
Start: 2022-09-29 | End: 2022-10-02 | Stop reason: HOSPADM

## 2022-09-29 RX ADMIN — TAMSULOSIN HYDROCHLORIDE 0.4 MG: 0.4 CAPSULE ORAL at 08:58

## 2022-09-29 RX ADMIN — LEVALBUTEROL HYDROCHLORIDE 1.25 MG: 1.25 SOLUTION, CONCENTRATE RESPIRATORY (INHALATION) at 19:10

## 2022-09-29 RX ADMIN — LEVETIRACETAM 500 MG: 500 TABLET, FILM COATED ORAL at 08:58

## 2022-09-29 RX ADMIN — IPRATROPIUM BROMIDE AND ALBUTEROL SULFATE 3 ML: 2.5; .5 SOLUTION RESPIRATORY (INHALATION) at 07:51

## 2022-09-29 RX ADMIN — FLUTICASONE FUROATE AND VILANTEROL TRIFENATATE 1 PUFF: 100; 25 POWDER RESPIRATORY (INHALATION) at 08:56

## 2022-09-29 RX ADMIN — IPRATROPIUM BROMIDE 0.5 MG: 0.5 SOLUTION RESPIRATORY (INHALATION) at 13:40

## 2022-09-29 RX ADMIN — LEVALBUTEROL HYDROCHLORIDE 1.25 MG: 1.25 SOLUTION, CONCENTRATE RESPIRATORY (INHALATION) at 13:40

## 2022-09-29 RX ADMIN — PANTOPRAZOLE SODIUM 40 MG: 40 TABLET, DELAYED RELEASE ORAL at 05:14

## 2022-09-29 RX ADMIN — METOPROLOL SUCCINATE 25 MG: 25 TABLET, EXTENDED RELEASE ORAL at 08:58

## 2022-09-29 RX ADMIN — IPRATROPIUM BROMIDE 0.5 MG: 0.5 SOLUTION RESPIRATORY (INHALATION) at 19:10

## 2022-09-29 RX ADMIN — HEPARIN SODIUM 5000 UNITS: 5000 INJECTION INTRAVENOUS; SUBCUTANEOUS at 20:45

## 2022-09-29 RX ADMIN — LEVETIRACETAM 500 MG: 500 TABLET, FILM COATED ORAL at 17:58

## 2022-09-29 NOTE — ASSESSMENT & PLAN NOTE
- had syncopal event with fall  - given [facility] prior to transfer  - GCS 15, no focal deficits  - Neurosurgery consult  - q1 hr neuro checks  - PT/OT  - pain control

## 2022-09-29 NOTE — CONSULTS
Reason for Consult / Principal Problem: pauses    Physician Requesting Consult:  Todd Portillo DO    Cardiologist: none      Assessment and Plan      Current Problem List   Active Problems:    Permanent atrial fibrillation (HCC)    Syncope    SDH (subdural hematoma)    Assessment/Plan: This is 61-year-old male with past medical history of permanent AFib who initially presented to the trauma team for an episode of syncope  Patient was found to have right subdural hematoma likely subacute  S/p [facility]  EP was consulted for pauses on tele in setting of AFib  # sinus pause   # chronic Afib- on Toprol-XL and Eliquis at home  #SDH on presentation  #chronic miller in place     Plan:    · Sinus pauses on tele not significant enough for any further intervention although his hx of passing out at home as per wife is concerning  · Plan for loop placement tomorrow  No need for NPO  · The nature of his fall is likely orthostatic hypotension based on history  · On Toprol XL 25 mg daily  · Can restart Eliquis once okay from Neurosurgery and primary team for his chronic Afib  Subjective     CC: sinus pauses    HPI: Suellen Jose Alejandro 78y o  year old male with past medical history of permanent AFib who presents with fall and syncope  Patient woke up in the morning and went to make a breakfast in the kitchen  He was sitting on a chair when he tried to stood up and felt dizzy  Patient went to bathroom regardless although had more symptoms and he passed out  Patient denied any chest pain or palpitations  Denies having any episodes like this in the past   EMS was called and patient was brought to ER  On imaging, patient was found to have right-sided subdural hematoma likely subacute  Patient received [facility] and his anticoagulation is on hold  Neurosurgery was also consulted  On tele, patient was found to have sinus pauses for which EP was consulted  On my exam, patient is alert and ordered x3    Denies any chest pain or palpitations  Family History: non-contributory  Historical Information   Past Medical History:   Diagnosis Date    Anxiety disorder     [facility] )     Coronary artery disease     Depression     Cloud catheter in place     Hepatitis C     screening negative in 1/2017    Hypertension     [facility]     Use of cane as ambulatory aid      Past Surgical History:   Procedure Laterality Date    CARDIAC CATHETERIZATION  07/09/2018    COLONOSCOPY      NH REPAIR ING HERNIA,5+Y/O,REDUCIBL Right 8/11/2022    Procedure: REPAIR HERNIA INGUINAL;  Surgeon: Elinor Encarnacion DO;  [facility];  Service: General    TONSILLECTOMY       Social History   Social History     Substance and Sexual Activity   Alcohol Use Never     Social History     Substance and Sexual Activity   Drug Use Never     Social History     Tobacco Use   Smoking Status Current Every Day Smoker    Packs/day: 3 00    Years: 57 00    Pack years: 171 00    Types: Cigarettes   Smokeless Tobacco Never Used   Tobacco Comment    since age 17; Has history of smoking for over 54 years  He has been smoking more than packet daily in the past however he has been smokes about half a pack a day lately and stopped smoking on 7/1/2018 when he was admitted to the hospital       Family History:   Family History   Problem Relation Age of Onset    Hypertension Mother     Coronary artery disease Mother         premature    Hypertension Father     Coronary artery disease Father         premature    Diabetes Father        Review of Systems:  Review of Systems   Constitutional: Negative for activity change, chills, diaphoresis, fatigue and fever  HENT: Negative for congestion, rhinorrhea, sinus pressure and sinus pain  Respiratory: Negative for apnea, cough, shortness of breath and stridor  Cardiovascular: Negative for chest pain, palpitations and leg swelling     Gastrointestinal: Negative for abdominal distention, abdominal pain, blood in stool, constipation and diarrhea  Endocrine: Negative for cold intolerance, heat intolerance and polyuria  Genitourinary: Negative for difficulty urinating  Musculoskeletal: Negative for arthralgias, back pain, joint swelling and myalgias  Skin: Negative for color change, pallor, rash and wound  Neurological: Negative for dizziness, seizures, syncope, light-headedness, numbness and headaches  Psychiatric/Behavioral: Negative for agitation and hallucinations  The patient is not nervous/anxious and is not hyperactive              Scheduled Meds:  Current Facility-Administered Medications   Medication Dose Route Frequency Provider Last Rate    acetaminophen  975 mg Oral Q6H PRN Flavio Brady MD      albuterol  2 5 mg Nebulization Q6H PRN Trey Blum, DO      fluticasone-vilanterol  1 puff Inhalation Daily Erika Weber, DO      ipratropium  0 5 mg Nebulization TID Trey Blum, DO      levalbuterol  1 25 mg Nebulization TID Trey Blum, DO      levETIRAcetam  500 mg Oral BID Erika Weber, DO      metoprolol succinate  25 mg Oral Daily Modesto Joyce, DO      nicotine  1 patch Transdermal Daily Erika Lopes, DO      ondansetron  4 mg Intravenous Q6H PRN Modesto Joyce, DO      pantoprazole  40 mg Oral Early Morning Modesto Joyce, DO      tamsulosin  0 4 mg Oral Daily Erika Weber, DO       Continuous Infusions:   PRN Meds:   acetaminophen    albuterol    ondansetron  all current active meds have been reviewed    No Known Allergies    Objective   Vitals: Temp (24hrs), Av 8 °F (36 6 °C), Min:97 5 °F (36 4 °C), Max:98 °F (36 7 °C)  Current: Temperature: 98 °F (36 7 °C)  Patient Vitals for the past 24 hrs:   BP Temp Temp src Pulse Resp SpO2 Height Weight   22 1036 103/76 98 °F (36 7 °C) -- 73 16 96 % -- --   22 0851 139/98 97 9 °F (36 6 °C) -- 85 16 91 % -- --   22 0755 -- -- -- -- -- 98 % -- --   09/29/22 0400 106/67 97 7 °F (36 5 °C) Oral 74 18 93 % -- --   09/28/22 2333 128/84 97 5 °F (36 4 °C) -- 80 -- 97 % -- --   09/28/22 2022 134/89 97 9 °F (36 6 °C) Oral 92 18 97 % 6' (1 829 m) 68 kg (150 lb)   09/28/22 2000 141/93 -- -- 100 20 95 % -- --   09/28/22 1900 157/82 -- -- 100 20 92 % -- --   09/28/22 1800 (!) 163/118 -- -- 67 18 98 % -- --   09/28/22 1700 159/89 97 5 °F (36 4 °C) -- 85 18 98 % -- --    Body mass index is 20 34 kg/m²  Orthostatic Blood Pressures    Flowsheet Row Most Recent Value   Blood Pressure 103/76 filed at 09/29/2022 1036   Patient Position - Orthostatic VS Lying filed at 09/29/2022 0400              Invasive Devices  Report    Drain  Duration           Urethral Catheter 18 Fr  211 days                Physical Exam:  Physical Exam  Constitutional:       General: He is not in acute distress  Appearance: He is well-developed  He is not diaphoretic  HENT:      Head: Normocephalic and atraumatic  Nose: Nose normal       Mouth/Throat:      Pharynx: No oropharyngeal exudate  Eyes:      General: No scleral icterus  Right eye: No discharge  Left eye: No discharge  Cardiovascular:      Rate and Rhythm: Normal rate  Rhythm irregular  Heart sounds: Normal heart sounds  No murmur heard  No friction rub  No gallop  Pulmonary:      Effort: Pulmonary effort is normal  No respiratory distress  Breath sounds: No stridor  No wheezing or rales  Abdominal:      General: Bowel sounds are normal  There is no distension  Palpations: Abdomen is soft  Tenderness: There is no abdominal tenderness  There is no guarding  Musculoskeletal:         General: Normal range of motion  Cervical back: Normal range of motion and neck supple  Skin:     General: Skin is warm  Findings: No erythema  Neurological:      Mental Status: He is alert and oriented to person, place, and time               Lab Results:   Results from last 7 days   Lab Units 09/29/22  0440 09/28/22  1134   WBC Thousand/uL 7 89 5 91   HEMOGLOBIN g/dL 13 0 13 7   HEMATOCRIT % 42 5 42 5   PLATELETS Thousands/uL 246 224   NEUTROS PCT % 66 68   MONOS PCT % 8 8      Results from last 7 days   Lab Units 09/29/22  0440 09/28/22  1134   SODIUM mmol/L 138 138   POTASSIUM mmol/L 3 8 4 3   CHLORIDE mmol/L 108 104   CO2 mmol/L 26 26   BUN mg/dL 16 18   CREATININE mg/dL 0 90 0 92   CALCIUM mg/dL 8 5 9 4   ALK PHOS U/L  --  78   ALT U/L  --  8   AST U/L  --  11*   EGFR ml/min/1 73sq m 83 81                 No results found for: PHART, EML4IIJ, PO2ART, INT3HQG, B3QOFNQS, BEART, SOURCE  No components found for: HIV1X2  Lab Results   Component Value Date    HEPAIGM Non-reactive 03/02/2022    HEPBIGM Non-reactive 03/02/2022    HEPBIGM Non-reactive 03/02/2022    HEPBCAB Non-reactive 03/02/2022    HEPCAB Non-reactive 03/02/2022    HEPCAB Non-reactive 03/02/2022     No results found for: SPEP, UPEP No results found for: HGBA1C  No results found for: CHOL No results found for: HDL No results found for: LDLCALC No results found for: TRIG  No components found for: PROCAL          Imaging: I have personally reviewed pertinent reports

## 2022-09-29 NOTE — PLAN OF CARE
Problem: OCCUPATIONAL THERAPY ADULT  Goal: Performs self-care activities at highest level of function for planned discharge setting  See evaluation for individualized goals  Description: Treatment Interventions: ADL retraining, Functional transfer training, Endurance training, Cognitive reorientation, Patient/family training, Compensatory technique education, Continued evaluation, Energy conservation, Activityengagement, Equipment evaluation/education          See flowsheet documentation for full assessment, interventions and recommendations  Note: Limitation: Decreased ADL status, Decreased Safe judgement during ADL, Decreased endurance, Decreased self-care trans, Decreased high-level ADLs  Prognosis: Fair  Assessment: Pt is a 77 y/o male seen for OT eval s/p adm to [facility] as a transfer from [facility] after syncopal event  Pt is dx'd w/ right sided subdural hematoma  Pt  has a past medical history of Anxiety disorder, Atrial fibrillation ([facility] ), Coronary artery disease, Depression, Cloud catheter in place, Hepatitis C, Hypertension, MI (myocardial infarction) ([facility] ), and Use of cane as ambulatory aid  Pt with active OT orders and up with assistance  orders  Pt lives with his spouse in 2nd floor apt w/ FFOS to enter  Pt was I w/  ADLS and IADLS, does not drive, & required use of DME PTA including SPC  Pt is currently demonstrating the following occupational deficits: S UB ADLS, Min A LB ADLS, transfers and functional mobility w/ RW  These deficits that are impacting pt's baseline areas of occupation are a result of the following impairments: endurance, activity tolerance, functional mobility, forward functional reach, balance, functional standing tolerance, decreased I w/ ADLS/IADLS, decreased safety awareness, decreased insight into deficits and coordination deficits   The following Occupational Performance Areas to address include: bathing/shower, toilet hygiene, dressing, medication management, socialization, health maintenance, functional mobility and clothing managementRecommend inpatient rehab  upon D/C   Pt to continue to benefit from acute immediate OT services to address the following goals 3-5x/week to  w/in 10-14 days:  Recommendation: (S) Physiatry Consult  OT Discharge Recommendation: Post acute rehabilitation services   Zoey Katz MS, OTR/L

## 2022-09-29 NOTE — PLAN OF CARE
Problem: PHYSICAL THERAPY ADULT  Goal: Performs mobility at highest level of function for planned discharge setting  See evaluation for individualized goals  Description: Treatment/Interventions: Functional transfer training, LE strengthening/ROM, Elevations, Therapeutic exercise, Endurance training, Patient/family training, Equipment eval/education, Bed mobility, Gait training, Spoke to nursing, OT  Equipment Recommended: Alexei Quinonez       See flowsheet documentation for full assessment, interventions and recommendations  Note: Prognosis: Good  Problem List: Decreased strength, Decreased endurance, Impaired balance, Decreased mobility, Decreased safety awareness  Assessment: Pt is 78 y o  male seen for PT evaluation s/p admit to Counts include 234 beds at the [facility] on 9/28/2022  Two pt identifiers were used to confirm  Pt presented s/p syncope with headstrike  Patient originally presented at [facility] and was transferred to [facility] for further medical management/ evaluation  Pt was admitted with a primary dx of:  Subdural hematoma, syncope, and other active problems including permanent AFib  PT now consulted for assessment of mobility and d/c needs  Pt with Up in chair orders  Pts current co morbidities affecting treatment include: Anxiety disorder, AFib, CAD, depression, HTN, mi, and personal factors including steps to enter apartment  Pts current clinical presentation is Unstable/ Unpredictable (high complexity) due to Ongoing medical management for primary dx, Increased reliance on more restrictive AD compared to baseline, Decreased activity tolerance compared to baseline, Fall risk, Increased assistance needed from caregiver at current time, Continuous pulse oximetry monitoring     Upon evaluation, pt currently is requiring Supervision for bed mobility; Min Ax1 for transfers and Min Ax1 for ambulation w/ RW   Pt presents at PT eval functioning below baseline and currently w/ overall mobility deficits 2* to: BLE weakness, impaired balance, decreased endurance, gait deviations, decreased activity tolerance compared to baseline, decreased safety awareness, fall risk  Pt currently at a fall risk 2* to impairments listed above  Based on the aforementioned PT evaluation, pt will continue to benefit from skilled Acute PT interventions to address stated impairments; to maximize functional mobility; for ongoing pt/ family training; and DME needs  At conclusion of PT session pt returned back in chair with phone and call bell within reach  Pt denies any further questions at this time  PT is currently recommending Rehab  PT will continue to follow during hospital stay  See flowsheet documentation for full assessment

## 2022-09-29 NOTE — OCCUPATIONAL THERAPY NOTE
Occupational Therapy Evaluation     Patient Name: Jalen Evans  JSMRP'G Date: 9/29/2022  Problem List  Active Problems:    Permanent atrial fibrillation ([facility] [address] )    Syncope    SDH (subdural hematoma)    Past Medical History  Past Medical History:   Diagnosis Date    Anxiety disorder     Atrial fibrillation ([facility] )     Coronary artery disease     Depression     Cloud catheter in place     Hepatitis C     screening negative in 1/2017    Hypertension     MI (myocardial infarction) [facility])     Use of cane as ambulatory aid      Past Surgical History  Past Surgical History:   Procedure Laterality Date    CARDIAC CATHETERIZATION  07/09/2018    COLONOSCOPY      [place] REPAIR ING HERNIA,5+Y/O,REDUCIBL Right 8/11/2022    Procedure: REPAIR HERNIA INGUINAL;  Surgeon: Sana Encarnacion DO;  [facility];  Service: General    TONSILLECTOMY               09/29/22 0923   OT Last Visit   OT Visit Date 09/29/22   Note Type   Note type Evaluation   Restrictions/Precautions   Weight Bearing Precautions Per Order No   Other Precautions Cognitive; Chair Alarm; Bed Alarm;Multiple lines;Telemetry; Fall Risk;Pain   Pain Assessment   Pain Assessment Tool 0-10   Pain Score No Pain   Home Living   Type of Home Apartment   Home Layout One level  (FFOS to enter)   Bathroom Shower/Tub Tub/shower unit   Bathroom Toilet Standard   Bathroom Equipment Other (Comment)  (denies any)   Home Equipment Cane;Walker  (primarily uses [facility])   Additional Comments Pt reports living w/ his spouse in 2nd floor apt w/ FFOS to enter   Prior Function   Level of [place] Independent with ADLs and functional mobility   Lives With Spouse   Receives Help From Family   ADL Assistance Independent   IADLs Independent   Falls in the last 6 months 1 to 4  (2)   Vocational Retired   Comments (-) ; spouse can transport   Lifestyle   Autonomy I w/ ADLS/IADLS, Mod I w/ cane for transfers and functional mobility PTA   Reciprocal Relationships Pt lives w/ his spouse Service to Others Retired; 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)   Intrinsic Gratification Likes to read   Psychosocial   Psychosocial (WDL) WDL   ADL   Eating Assistance 7  Independent   Grooming Assistance 7  [address]  Supervision/Setup   LB [address] 4  [address]  [address]  4  [address] 4  Minimal Assistance   Functional Deficit Steadying;Verbal cueing;Supervision/safety; Increased time to complete   Bed Mobility   Supine to Sit 5  Supervision   Additional items HOB elevated; Increased time required;Verbal cues   Sit to Supine Unable to assess   Additional Comments Pt sat EOB w/ Fair sitting balance/trunk control   Transfers   Sit to Stand 4  Minimal assistance   Additional items Assist x 1; Increased time required;Verbal cues   Stand to Sit 4  Minimal assistance   Additional items Assist x 1; Increased time required;Verbal cues   Additional Comments RW for support in standing   Functional Mobility   Functional Mobility 4  Minimal assistance   Additional Comments Pt ambulated short household distance w/ Min A x1; HHA used   Additional items Rolling walker   Balance   Static Sitting Fair   Dynamic Sitting Fair -   Static Standing Poor +   Dynamic Standing Poor +   Ambulatory Poor +   Activity Tolerance   Activity Tolerance Patient limited by fatigue   Medical Staff Made Aware PTHailey   Nurse Made Aware yes   RUE Assessment   RUE Assessment WFL   LUE Assessment   LUE Assessment WFL   Hand Function   Gross Motor Coordination Functional  (slowed dysmetria bilaterally; appeared to be more related to decreased understanding of task vs  true coordination impairment)   Fine Motor Coordination Functional   Sensation   Light Touch No apparent deficits   Proprioception   Proprioception No apparent deficits   Vision-Basic Assessment   Current Vision Wears glasses all the time   Vision - Complex Assessment   Ocular Range of Motion WFL   Head Position WDL   Tracking Able to track stimulus in all quads without difficulty   Acuity Able to read clock/calendar on wall without difficulty; Able to read employee name badge without difficulty   Cognition   Overall Cognitive Status WFL   Arousal/Participation Responsive; Cooperative   Attention Attends with cues to redirect   Orientation Level Oriented X4   Memory Decreased recall of precautions   Following Commands Follows one step commands without difficulty   Comments Pt is pleasant and cooperative; at times slow to respond and appears to have some decreased insight into deficits/safety awareness   Assessment   Limitation Decreased ADL status; Decreased Safe judgement during ADL;Decreased endurance;Decreased self-care trans;Decreased high-level ADLs   Prognosis Fair   Assessment Pt is a 77 y/o male seen for OT eval s/p adm to [facility] a transfer [facility] after syncopal event  Pt is dx'd w/ right sided subdural hematoma  Pt  has a past medical history of Anxiety disorder, Atrial fibrillation ([facility] [address] ), Coronary artery disease, Depression, Cloud catheter in place, Hepatitis C, Hypertension, MI (myocardial infarction) ([facility] [address] ), and Use of cane as ambulatory aid  Pt with active OT orders and up with assistance  orders  Pt lives with his spouse in 2nd floor apt w/ FFOS to enter  Pt was I w/  ADLS and IADLS, does not drive, & required use of DME PTA including SPC  Pt is currently demonstrating the following occupational deficits: S UB ADLS, Min A LB ADLS, transfers and functional mobility w/ RW  These deficits that are impacting pt's baseline areas of occupation are a result of the following impairments: endurance, activity tolerance, functional mobility, forward functional reach, balance, functional standing tolerance, decreased I w/ ADLS/IADLS, decreased safety awareness, decreased insight into deficits and coordination deficits  The following Occupational Performance Areas to address include: bathing/shower, toilet hygiene, dressing, medication management, socialization, health maintenance, functional mobility and clothing managementRecommend inpatient rehab  upon D/C  Pt to continue to benefit from acute immediate OT services to address the following goals 3-5x/week to  w/in 10-14 days:   Goals   Patient Goals to eat breakfast   LTG Time Frame 10-   Long Term Goal #1 see below listed goals   Plan   Treatment Interventions ADL retraining;Functional transfer training; Endurance training;Cognitive reorientation;Patient/family training; Compensatory technique education;Continued evaluation; Energy conservation; Activityengagement;Equipment evaluation/education   Goal Expiration Date 10/13/22   OT Frequency 3-5x/wk   Recommendation   Recommendation (S)  Physiatry Consult   OT Discharge Recommendation Post acute rehabilitation services   Additional Comments  The patient's raw score on the AM-PAC Daily Activity inpatient short form is 21, standardized score is 44 27, greater than 39 4  Patients at this level are likely to benefit from discharge to post-acute rehabilitation services  Please refer to the recommendation of the Occupational Therapist for safe discharge planning     Additional Comments 2 Pt seen as a co-evaluation due to the patient's co-morbidities, clinically unstable presentation, and present impairments which are a regression from the patient's baseline   AM-PAC Daily Activity Inpatient   Lower Body Dressing 3   Bathing 3   Toileting 3   Upper Body Dressing 4   Grooming 4   Eating 4   Daily Activity Raw Score 21   Daily Activity Standardized Score (Calc for Raw Score >=11) 44 27   AM-PAC Applied Cognition Inpatient   Following a Speech/Presentation 3   Understanding Ordinary Conversation 4   Taking Medications 4   Remembering Where Things Are Placed or Put Away 4   Remembering List of 4-5 Errands 4   Taking Care of Complicated Tasks 3   Applied Cognition Raw Score 22   Applied Cognition Standardized Score 47 83      GOALS    1) Pt will improve activity tolerance to G for 30 min txment sessions for increase engagement in functional tasks  2) Pt will complete UB/LB dressing/self care w/ mod I using adaptive device and DME as needed  3) Pt will complete bathing w/ Mod I w/ use of AE and DME as needed  4) Pt will complete toileting w/ mod I w/ G hygiene/thoroughness using DME as needed  5) Pt will improve functional transfers to Mod I on/off all surfaces using DME as needed w/ G balance/safety   6) Pt will improve functional mobility during ADL/IADL/leisure tasks to Mod I using DME as needed w/ G balance/safety   7) Pt will participate in simulated IADL management task to increase independence to Mod I w/ G safety and endurance  8) Pt will be attentive 100% of the time during ongoing cognitive assessment w/ G participation to assist w/ safe d/c planning/recommendations  9) Pt will demonstrate G carryover of pt/caregiver education and training as appropriate w/o cues w/ good tolerance to increase safety during functional tasks  10) Pt will demonstrate 100% carryover of energy conservation techniques t/o functional I/ADL/leisure tasks w/o cues s/p skilled education to increase endurance during functional tasks     Rivka Tilley MS, OTR/L

## 2022-09-29 NOTE — PLAN OF CARE
Problem: INFECTION - ADULT  Goal: Absence or prevention of progression during hospitalization  Description: INTERVENTIONS:  - Assess and monitor for signs and symptoms of infection  - Monitor lab/diagnostic results  - Monitor all insertion sites, i e  indwelling lines, tubes, and drains  - Monitor endotracheal if appropriate and nasal secretions for changes in amount and color  - Stanton appropriate cooling/warming therapies per order  - Administer medications as ordered  - Instruct and encourage patient and family to use good hand hygiene technique  - Identify and instruct in appropriate isolation precautions for identified infection/condition  Outcome: Progressing  Goal: Absence of fever/infection during neutropenic period  Description: INTERVENTIONS:  - Monitor WBC    Outcome: Progressing     Problem: SAFETY ADULT  Goal: Patient will remain free of falls  Description: INTERVENTIONS:  - Educate patient/family on patient safety including physical limitations  - Instruct patient to call for assistance with activity   - Consult OT/PT to assist with strengthening/mobility   - Keep Call bell within reach  - Keep bed low and locked with side rails adjusted as appropriate  - Keep care items and personal belongings within reach  - Initiate and maintain comfort rounds  - Make Fall Risk Sign visible to staff  - Offer Toileting every  Hours, in advance of need  - Initiate/Maintain alarm  - Obtain necessary fall risk management equipment:   - Apply yellow socks and bracelet for high fall risk patients  - Consider moving patient to room near nurses station  Outcome: Progressing  Goal: Maintain or return to baseline ADL function  Description: INTERVENTIONS:  -  Assess patient's ability to carry out ADLs; assess patient's baseline for ADL function and identify physical deficits which impact ability to perform ADLs (bathing, care of mouth/teeth, toileting, grooming, dressing, etc )  - Assess/evaluate cause of self-care deficits   - Assess range of motion  - Assess patient's mobility; develop plan if impaired  - Assess patient's need for assistive devices and provide as appropriate  - Encourage maximum independence but intervene and supervise when necessary  - Involve family in performance of ADLs  - Assess for home care needs following discharge   - Consider OT consult to assist with ADL evaluation and planning for discharge  - Provide patient education as appropriate  Outcome: Progressing  Goal: Maintains/Returns to pre admission functional level  Description: INTERVENTIONS:  - Perform BMAT or MOVE assessment daily    - Set and communicate daily mobility goal to care team and patient/family/caregiver  - Collaborate with rehabilitation services on mobility goals if consulted  - Perform Range of Motion  times a day  - Reposition patient every  hours    - Dangle patient  times a day  - Stand patient  times a day  - Ambulate patient times a day  - Out of bed to chair  times a day   - Out of bed for meals  times a day  - Out of bed for toileting  - Record patient progress and toleration of activity level   Outcome: Progressing     Problem: MOBILITY - ADULT  Goal: Maintain or return to baseline ADL function  Description: INTERVENTIONS:  -  Assess patient's ability to carry out ADLs; assess patient's baseline for ADL function and identify physical deficits which impact ability to perform ADLs (bathing, care of mouth/teeth, toileting, grooming, dressing, etc )  - Assess/evaluate cause of self-care deficits   - Assess range of motion  - Assess patient's mobility; develop plan if impaired  - Assess patient's need for assistive devices and provide as appropriate  - Encourage maximum independence but intervene and supervise when necessary  - Involve family in performance of ADLs  - Assess for home care needs following discharge   - Consider OT consult to assist with ADL evaluation and planning for discharge  - Provide patient education as appropriate  Outcome: Progressing  Goal: Maintains/Returns to pre admission functional level  Description: INTERVENTIONS:  - Perform BMAT or MOVE assessment daily    - Set and communicate daily mobility goal to care team and patient/family/caregiver  - Collaborate with rehabilitation services on mobility goals if consulted  - Perform Range of Motion  times a day  - Reposition patient every  hours    - Dangle patient  times a day  - Stand patient  times a day  - Ambulate patient  times a day  - Out of bed to chair  times a day   - Out of bed for meals  times a day  - Out of bed for toileting  - Record patient progress and toleration of activity level   Outcome: Progressing

## 2022-09-29 NOTE — ASSESSMENT & PLAN NOTE
Right convexity SDH   Patient presents after feeling dizzy and lightheaded   Admits to hitting doorway 9/28, fall landing on right with unclear head trauma 3 weeks ago and striking head on brick wall 2/2 fall early this summer   On Eliquis for Afib s/p Kcentra    Imaging:    CTA head and neck w/wo 9/28/22: Right subdural hematoma that is likely subacute  Slight localized mass effect  No shift or herniation  No acute territorial infarct  No significant stenosis of the cervical carotid or vertebral arteries  No significant intracranial stenosis, large vessel occlusion or aneurysm  Plan:    Continue to monitor neurological exam   STAT CT head with any neurological decline including a drop in [address] of 2 points within 1 hours   Repeat CT head today for stability    Hold all AP/AC   AED for sz ppx per primary - Keppra x 1 wk   Mobilize PT/OT   DVT PPX: SCD  Defer pharmacological DVT PPX until stable CT head obtained   No surgical intervention indicated at this juncture  Neurosurgery will plan for follow-up in 2 weeks with repeat CT head 2-3 days prior to visit  Call with questions/concerns

## 2022-09-29 NOTE — RESPIRATORY THERAPY NOTE
RT Protocol Note  Zluma Pham 78 y o  male MRN: [id number]  Unit/Bed#: [facility] 603-01 Encounter: [id number]    Assessment    Active Problems:    Permanent atrial fibrillation (HCC)    Syncope    SDH (subdural hematoma)      Home Pulmonary Medications:  Albuterol/atrovent       Past Medical History:   Diagnosis Date    Anxiety disorder     Atrial fibrillation (HCC)     Coronary artery disease     Depression     Cloud catheter in place     Hepatitis C     screening negative in 2017    Hypertension     MI (myocardial infarction) ([facility] [address] )     Use of cane as ambulatory aid      Social History     Socioeconomic History    Marital status: /Civil Union     Spouse name: None    Number of children: 3    Years of education: 12    Highest education level: 12th grade   Occupational History    Occupation: retired   Tobacco Use    Smoking status: Current Every Day Smoker     Packs/day: 3 00     Years: 57 00     Pack years: 171 00     Types: Cigarettes    Smokeless tobacco: Never Used    Tobacco comment: since age 17; Has history of smoking for over 54 years  He has been smoking more than packet daily in the past however he has been smokes about half a pack a day lately and stopped smoking on 2018 when he was admitted to the hospital     Vaping Use    Vaping Use: Never used   Substance and Sexual Activity    Alcohol use: Never    Drug use: Never    Sexual activity: Not Currently     Partners: Female     Birth control/protection: Condom Male   Other Topics Concern    None   Social History Narrative    History of Ultra Sound: 2016    History of Stress Test: 2018    History of ECHO: 2018    · Most recent tobacco use screenin2019      · Live alone or with others:   with others        · Diet:   Regular      · Caffeine intake:    Moderate      · Guns present in home:   No      · Asbestos exposure:   No      · TB exposure:   No      · Environmental exposure:   No      · Animal exposure:   No      · Smoke alarm in home:   Yes     Social Determinants of Health     Financial Resource Strain: Not on file   Food Insecurity: No Food Insecurity    Worried About [employer] [employer] in the Last Year: Never true    Ran Out of Food in the Last Year: Never true   Transportation Needs: No Transportation Needs    Lack of Transportation (Medical): No    Lack of Transportation (Non-Medical): No   Physical Activity: Not on file   Stress: Not on file   Social Connections: Not on file   Intimate Partner Violence: Not on file   Housing Stability: Low Risk     Unable to Pay for Housing in the Last Year: No    Number of Places Lived in the Last Year: 1    Unstable Housing in the Last Year: No       Subjective         Objective    Physical Exam:   Assessment Type: Pre-treatment  General Appearance: Awake, Alert  Respiratory Pattern: Normal  Chest Assessment: Chest expansion symmetrical  Bilateral Breath Sounds: Clear, Diminished    Vitals:  Blood pressure 106/67, pulse 74, temperature 97 7 °F (36 5 °C), temperature source Oral, resp  rate 18, height 6' (1 829 m), weight 68 kg (150 lb), SpO2 98 %  Imaging and other studies: I have personally reviewed pertinent reports  Plan    Respiratory Plan: Home Bronchodilator Patient pathway        Resp Comments: pt admitted post fall  pt has hx COPD and takes udns @ home qid  d/c duo neb and order xopenex/atrovent tid    albuterol udn prn per protocol

## 2022-09-29 NOTE — ASSESSMENT & PLAN NOTE
- echo pending  - several 2 second pauses on telemetry overnight  - continue to monitor on telemetry  - EP consult

## 2022-09-29 NOTE — PLAN OF CARE
Problem: INFECTION - ADULT  Goal: Absence or prevention of progression during hospitalization  Description: INTERVENTIONS:  - Assess and monitor for signs and symptoms of infection  - Monitor lab/diagnostic results  - Monitor all insertion sites, i e  indwelling lines, tubes, and drains  - Monitor endotracheal if appropriate and nasal secretions for changes in amount and color  - [place] appropriate cooling/warming therapies per order  - Administer medications as ordered  - Instruct and encourage patient and family to use good hand hygiene technique  - Identify and instruct in appropriate isolation precautions for identified infection/condition  Outcome: Progressing  Goal: Absence of fever/infection during neutropenic period  Description: INTERVENTIONS:  - Monitor WBC    Outcome: Progressing     Problem: SAFETY ADULT  Goal: Patient will remain free of falls  Description: INTERVENTIONS:  - Educate patient/family on patient safety including physical limitations  - Instruct patient to call for assistance with activity   - Consult OT/PT to assist with strengthening/mobility   - Keep Call bell within reach  - Keep bed low and locked with side rails adjusted as appropriate  - Keep care items and personal belongings within reach  - Initiate and maintain comfort rounds  - Make Fall Risk Sign visible to staff  - Offer Toileting every 2 Hours, in advance of need  - Initiate/Maintain bed alarm  - Obtain necessary fall risk management equipment: bed alarm  - Apply yellow socks and bracelet for high fall risk patients  - Consider moving patient to room near nurses station  Outcome: Progressing  Goal: Maintain or return to baseline ADL function  Description: INTERVENTIONS:  -  Assess patient's ability to carry out ADLs; assess patient's baseline for ADL function and identify physical deficits which impact ability to perform ADLs (bathing, care of mouth/teeth, toileting, grooming, dressing, etc )  - Assess/evaluate cause of self-care deficits   - Assess range of motion  - Assess patient's mobility; develop plan if impaired  - Assess patient's need for assistive devices and provide as appropriate  - Encourage maximum independence but intervene and supervise when necessary  - Involve family in performance of ADLs  - Assess for home care needs following discharge   - Consider OT consult to assist with ADL evaluation and planning for discharge  - Provide patient education as appropriate  Outcome: Progressing  Goal: Maintains/Returns to pre admission functional level  Description: INTERVENTIONS:  - Perform BMAT or MOVE assessment daily    - Set and communicate daily mobility goal to care team and patient/family/caregiver  - Collaborate with rehabilitation services on mobility goals if consulted  - Perform Range of Motion 3 times a day  - Reposition patient every 2 hours    - Dangle patient 3 times a day  - Stand patient 3 times a day  - Ambulate patient 3 times a day  - Out of bed to chair 3 times a day   - Out of bed for meals 3 times a day  - Out of bed for toileting  - Record patient progress and toleration of activity level   Outcome: Progressing

## 2022-09-29 NOTE — ASSESSMENT & PLAN NOTE
Patient was on Eliquis and metoprolol prior to admission    Hold Eliquis and all AP/AC until cleared to resume  Minimum 2 weeks

## 2022-09-29 NOTE — DISCHARGE INSTRUCTIONS
Neurosurgery discharge instructions following traumatic head bleed:     Do not take any blood thinning medications (ie  No Advil  No motrin  No ibuprofen  No Aleve  No Aspirin  No fishoil  No heparin  No antiplatelet / no anticoagulation medication)  Refrain from activity that increases chance of trauma to head or falls  Recommend you take fall precaution  No strenuous activity or sports  Return to hospital Emergency Room if you experience worsening / new headache, nausea/vomiting, speech/vision change, seizure, confusion / mental status change, weakness, or other neurological changes  Follow-up as scheduled with a repeat CT head without contrast to be completed 2-3 days prior to visit  Prescription has been entered electronically  Please call  to schedule  LOOP RECORDER INSTRUCTIONS:  LOOP RECORDER INSTRUCTIONS:  - Keep loop recorder incision dry for one week  Do not use lotions, powders, creams, or ointments on incision      - Remove outer bandage 24-48 hours after procedure  There is waterproof glue present over the incision  This should keep the incision dry  Avoid picking at the glue or peeling it off  The glue will come off in its own time      - Because the glue is waterproof, it is safe to shower if the glue remains on over the incision  It is ok to let the soap and water gently run over the incision  Avoid direct exposure to the stream of water  Do not soak the incision  Do not scrub  Pat dry  - If the glue comes off in less that 7 days, place a waterproof bandage over the incision before showering     - If present, leave underlying steri-strips in place  They will either fall off on their own or will be removed at the 2 week follow up appointment  If present, leave stitches in place  They will be removed at the 2 week follow up appointment  - Please call the office if you notice redness, swelling, bleeding, or drainage from incision or if you develop fevers        Cardiac Loop Recorder Insertion      WHAT YOU SHOULD KNOW:    A cardiac loop recorder is a device used to diagnose heart rhythm problems, such as a fast or irregular heartbeat  It is implanted in your left chest, just under the skin  The device records a pattern of your heart's rhythm, called an EKG  Your device records automatic EKGs, depending on how your caregiver programs it  You may also receive a handheld controller  You press a button on the controller when you have symptoms, such as dizziness, lightheadedness, or palpitations  The device will record an EKG at that moment  The recording can help your caregiver see if your symptoms may be caused by heart rhythm problems  Your caregiver will remove the device after it has collected enough data  You may need the device for up to 3 years  The procedure to remove the device is similar to the procedure used to implant it  AFTER YOU LEAVE:    Follow up with your cardiologist as directed: You will need to present to the office in 2 weeks  A nurse at the device clinic will check your incision and remove any stitches or steri strips that may be present  They may also program your device settings again  They will retrieve data from the device every 1 to 3 months with a monitor held over your skin  You may be able to transmit data from your device from home as well  You will do this by calling a number provided by the device clinic or as they have instructed you  Ask for information about this process  Write down your questions so you remember to ask them during your visits  Wound care: Keep loop recorder incision dry for one week  Do not use lotions, powders, creams, or ointments on incision  Remove outer bandage 24-48 hours after procedure  There is waterproof glue present over the incision  This should keep the incision dry  Avoid picking at the glue or peeling it off  The glue will come off in its own time   Because the glue is waterproof, it is safe to shower if the glue remains on over the incision  It is ok to let the soap and water gently run over the incision  Avoid direct exposure to the stream of water  Do not soak the incision  Do not scrub  Pat dry  If the glue comes off in less that 7 days, place a waterproof bandage over the incision before showering  If present, leave underlying steri-strips in place  They will either fall off on their own or will be removed at the 2 week follow up appointment  If present, leave stitches in place  They will be removed at the 2 week follow up appointment  Please call the office if you notice redness, swelling, bleeding, or drainage from incision or if you develop fevers  Return to activity: If you received anesthesia, you will not be able to drive for 24 hours  Otherwise, most people can return to normal activities soon after the procedure  Your cardiologist may want to know if your work involves electrical current or high-voltage equipment  Ask about other electrical items that could interfere with your cardiac loop recorder  Contact your cardiologist if:   You have a fever or chills  Your wound is red, swollen, or draining pus  You have questions or concerns about your condition or care  Seek care immediately or call 911 if: You feel weak, dizzy, or faint  You lose consciousness  © 2014 [address] [employer] is for End User's use only and may not be sold, redistributed or otherwise used for commercial purposes  All illustrations and images included in CareNotes® are the copyrighted property of [employer]  or Richard Oleary  The above information is an  only  It is not intended as medical advice for individual conditions or treatments  Talk to your doctor, nurse or pharmacist before following any medical regimen to see if it is safe and effective for you

## 2022-09-29 NOTE — CONSULTS
Tripp Liriano 1945, 78 y o  male MRN: [id number]  Unit/Bed#: [facility] 603-01 Encounter: [id number]  Primary Care Provider: Kathleen Crane MD   Date and time admitted to hospital: 9/28/2022  4:54 PM    Inpatient consult to Neurosurgery  Consult performed by: Marybeth Albert PA-C  Consult ordered by: Roman Arnold DO        Patient seen and examined 9/29/2022 1010    SDH (subdural hematoma)  Assessment & Plan  Right convexity SDH   Patient presents after feeling dizzy and lightheaded   Admits to hitting doorway 9/28, fall landing on right with unclear head trauma 3 weeks ago and striking head on brick wall 2/2 fall early this summer   On Eliquis for Afib s/p Kcentra    Imaging:    CTA head and neck w/wo 9/28/22: Right subdural hematoma that is likely subacute  Slight localized mass effect  No shift or herniation  No acute territorial infarct  No significant stenosis of the cervical carotid or vertebral arteries  No significant intracranial stenosis, large vessel occlusion or aneurysm  Plan:    Continue to monitor neurological exam   STAT CT head with any neurological decline including a drop in [address] of 2 points within 1 hours   Repeat CT head today for stability    Hold all AP/AC   AED for sz ppx per primary - Keppra x 1 wk   Mobilize PT/OT   DVT PPX: SCD  Defer pharmacological DVT PPX until stable CT head obtained   No surgical intervention indicated at this juncture  Neurosurgery will plan for follow-up in 2 weeks with repeat CT head 2-3 days prior to visit  Call with questions/concerns  Syncope  Assessment & Plan  Work-up per primary team     Permanent atrial fibrillation [facility])  Assessment & Plan  Patient was on Eliquis and metoprolol prior to admission    Hold Eliquis and all AP/AC until cleared to resume  Minimum 2 weeks         History of Present Illness     HPI: Td Qureshi is a 78 y o  male with PMH including atrial fibrillation on Eliquis, CAD with history of MI, hypertension and anxiety/depression who presents following a syncopal event  Patient states he felt okay when he woke in the morning but upon going to the bathroom felt lightheaded dizzy and struck the door frame  He states ongoing dizziness and lightheadedness it standing or sitting but resolved when lying flat  EMS was called and patient was taken the [place] emergency room  Workup included CT head which demonstrated right-sided subdural hematoma 1st patient was transferred to [facility] for ongoing care  He was given Kcentra given use of Eliquis  Admits to trip and fall approximately three weeks ago striking his right side  No definite head trauma at that time  Endorses significant fall into fork wall striking his head earlier in the summer  Denies any headaches, vision or speech changes  Admits to intermittent mild headaches which he relates to his glasses which have not increased in severity or frequency  Denies any weakness or numbness  Endorses intermittent palpitations  Denies any chest pain, shortness breath, nausea or vomiting  Review of Systems   Constitutional: Negative for activity change, fatigue and fever  HENT: Negative for trouble swallowing and voice change  Eyes: Negative for photophobia and visual disturbance  Respiratory: Negative for cough and shortness of breath  Cardiovascular: Positive for palpitations (intermittent)  Negative for chest pain and leg swelling  Gastrointestinal: Negative for abdominal pain, nausea and vomiting  Genitourinary: Negative for decreased urine volume and difficulty urinating  Musculoskeletal: Negative for back pain, gait problem and neck pain  Skin: Negative for pallor, rash and wound  Neurological: Negative for dizziness, tremors, seizures, speech difficulty, weakness, light-headedness, numbness and headaches     Psychiatric/Behavioral: Negative for agitation and confusion  Historical Information   Past Medical History:   Diagnosis Date    Anxiety disorder     Atrial fibrillation ([facility] )     Coronary artery disease     Depression     Cloud catheter in place     Hepatitis C     screening negative in 1/2017    Hypertension     MI (myocardial infarction) ([facility] )     Use of cane as ambulatory aid      Past Surgical History:   Procedure Laterality Date    CARDIAC CATHETERIZATION  07/09/2018    COLONOSCOPY      FL REPAIR ING HERNIA,5+Y/O,REDUCIBL Right 8/11/2022    Procedure: REPAIR HERNIA INGUINAL;  Surgeon: Sana Encarnacion DO;  Location: [facility];  Service: General    TONSILLECTOMY       Social History     Substance and Sexual Activity   Alcohol Use Never     Social History     Substance and Sexual Activity   Drug Use Never     Social History     Tobacco Use   Smoking Status Current Every Day Smoker    Packs/day: 3 00    Years: 57 00    Pack years: 171 00    Types: Cigarettes   Smokeless Tobacco Never Used   Tobacco Comment    since age 17; Has history of smoking for over 54 years   He has been smoking more than packet daily in the past however he has been smokes about half a pack a day lately and stopped smoking on 7/1/2018 when he was admitted to the hospital       Family History   Problem Relation Age of Onset    Hypertension Mother     Coronary artery disease Mother         premature    Hypertension Father     Coronary artery disease Father         premature    Diabetes Father        Meds/Allergies   all current active meds have been reviewed, current meds:   Current Facility-Administered Medications   Medication Dose Route Frequency    acetaminophen (TYLENOL) tablet 975 mg  975 mg Oral Q6H PRN    albuterol inhalation solution 2 5 mg  2 5 mg Nebulization Q6H PRN    fluticasone-vilanterol (BREO ELLIPTA) 100-25 mcg/inh inhaler 1 puff  1 puff Inhalation Daily    ipratropium (ATROVENT) 0 02 % inhalation solution 0 5 mg  0 5 mg Nebulization TID    levalbuterol (XOPENEX) inhalation solution 1 25 mg  1 25 mg Nebulization TID    levETIRAcetam (KEPPRA) tablet 500 mg  500 mg Oral BID    metoprolol succinate (TOPROL-XL) 24 hr tablet 25 mg  25 mg Oral Daily    nicotine (NICODERM CQ) 14 mg/24hr TD 24 hr patch 1 patch  1 patch Transdermal Daily    ondansetron (ZOFRAN) injection 4 mg  4 mg Intravenous Q6H PRN    pantoprazole (PROTONIX) EC tablet 40 mg  40 mg Oral Early Morning    tamsulosin (FLOMAX) capsule 0 4 mg  0 4 mg Oral Daily    and PTA meds:   Prior to Admission Medications   Prescriptions Last Dose Informant Patient Reported? Taking? Advair Diskus 100-50 MCG/ACT inhaler 2022 at Unknown time  No Yes   Sig: USE 1 INHALATION TWICE A DAY (RINSE MOUTH AFTER USE)   apixaban (ELIQUIS) 5 mg 2022 at Unknown time Self No Yes   Sig: Take 1 tablet (5 mg total) by mouth 2 (two) times a day   azelastine (ASTELIN) 0 1 % nasal spray  Self No No   Si spray into each nostril in the morning and 1 spray in the evening  Use in each nostril as directed     Patient taking differently: 1 spray into each nostril 2 (two) times a day as needed Use in each nostril as directed   docusate sodium (COLACE) 100 mg capsule 2022 at Unknown time Self No Yes   Sig: Take 1 capsule (100 mg total) by mouth 2 (two) times a day   ferrous sulfate 324 (65 Fe) mg 2022 at Unknown time Self No Yes   Sig: Take 1 tablet (324 mg total) by mouth 2 (two) times a day before meals   folic acid (FOLVITE) 450 mcg tablet  Self No No   Sig: Take 1 tablet (400 mcg total) by mouth daily   ipratropium-albuterol (DUO-NEB) 0 5-2 5 mg/3 mL nebulizer solution 2022 at Unknown time Self No Yes   Sig: INHALE CONTENTS OF 1 VIAL VIA NEBULIZER FOUR TIMES A DAY   metoprolol succinate (Toprol XL) 25 mg 24 hr tablet 2022 at Unknown time  No Yes   Sig: Take 1 tablet (25 mg total) by mouth daily   nicotine (NICODERM CQ) 7 mg/24hr TD 24 hr patch Not Taking at Unknown time Self No No   Sig: Place 1 patch on the skin daily   Patient not taking: Reported on 9/29/2022   pantoprazole (PROTONIX) 40 mg tablet 9/28/2022 at Unknown time  No Yes   Sig: TAKE 1 TABLET DAILY BEFORE BREAKFAST   potassium chloride (Klor-Con) 10 mEq tablet 9/28/2022 at Unknown time Self No Yes   Sig: TAKE 1 TABLET DAILY   tamsulosin (FLOMAX) 0 4 mg 9/28/2022 at Unknown time  No Yes   Sig: TAKE 1 CAPSULE DAILY      Facility-Administered Medications: None     No Known Allergies    Objective   I/O       09/27 0701  09/28 0700 09/28 0701 09/29 0700 09/29 0701 09/30 0700    Urine (mL/kg/hr)  550     Total Output  550     Net  -550                  Physical Exam  Constitutional:       General: He is not in acute distress  Appearance: Normal appearance  He is well-developed  He is not ill-appearing  HENT:      Head: Normocephalic and atraumatic  Right Ear: External ear normal       Left Ear: External ear normal       Mouth/Throat:      Mouth: Mucous membranes are moist    Eyes:      General: No scleral icterus  Right eye: No discharge  Left eye: No discharge  Extraocular Movements: Extraocular movements intact and EOM normal       Conjunctiva/sclera: Conjunctivae normal       Pupils: Pupils are equal, round, and reactive to light  Cardiovascular:      Rate and Rhythm: Normal rate  Pulmonary:      Effort: Pulmonary effort is normal  No respiratory distress  Abdominal:      General: There is no distension  Palpations: Abdomen is soft  Tenderness: There is no abdominal tenderness  Musculoskeletal:         General: No tenderness  Cervical back: Normal range of motion and neck supple  Skin:     General: Skin is warm and dry  Neurological:      Mental Status: He is alert  Coordination: Finger-Nose-Finger Test abnormal (slightly abnl b/l )        Deep Tendon Reflexes: Strength normal    Psychiatric:         Mood and Affect: Mood normal          Speech: Speech normal  Behavior: Behavior normal          Thought Content: Thought content normal          Judgment: Judgment normal        Neurologic Exam     Mental Status   Follows 3 step commands  Attention: normal  Concentration: normal    Speech: speech is normal   Level of consciousness: alert  Knowledge: good  Able to perform simple calculations  Normal comprehension  Cranial Nerves     CN III, IV, VI   Pupils are equal, round, and reactive to light  Extraocular motions are normal    Nystagmus: none   Upgaze: normal  Conjugate gaze: present    CN V   Facial sensation intact  CN VII   Facial expression full, symmetric  CN VIII   Hearing: impaired    CN XI   Right trapezius strength: normal  Left trapezius strength: normal    CN XII   Tongue: not atrophic  Fasciculations: absent  Tongue deviation: none    Motor Exam   Muscle bulk: normal  Overall muscle tone: normal  Right arm pronator drift: absent (downward drift)  Left arm pronator drift: absent    Strength   Strength 5/5 throughout  Sensory Exam   Light touch normal      Gait, Coordination, and Reflexes     Gait  Gait: (deferred)    Coordination   Finger to nose coordination: abnormal (slightly abnl b/l )    Tremor   Resting tremor: absent  Intention tremor: absent  Action tremor: absent        Vitals:Blood pressure 103/76, pulse 73, temperature 97 9 °F (36 6 °C), resp  rate 16, height 6' (1 829 m), weight 68 kg (150 lb), SpO2 96 %  ,Body mass index is 20 34 kg/m²       Lab Results:   Results from last 7 days   Lab Units 09/29/22  0440 09/28/22  1134   WBC Thousand/uL 7 89 5 91   HEMOGLOBIN g/dL 13 0 13 7   HEMATOCRIT % 42 5 42 5   PLATELETS Thousands/uL 246 224   NEUTROS PCT % 66 68   MONOS PCT % 8 8     Results from last 7 days   Lab Units 09/29/22  0440 09/28/22  1134   POTASSIUM mmol/L 3 8 4 3   CHLORIDE mmol/L 108 104   CO2 mmol/L 26 26   BUN mg/dL 16 18   CREATININE mg/dL 0 90 0 92   CALCIUM mg/dL 8 5 9 4   ALK PHOS U/L  --  78   ALT U/L  --  8 AST U/L  --  11*                 No results found for: TROPONINT  ABG:No results found for: PHART, OST2CIQ, PO2ART, XWU0GNV, R3PLZSRI, BEART, SOURCE    Imaging Studies: I have personally reviewed pertinent reports  and I have personally reviewed pertinent films in PACS    CTA head and neck with and without contrast    Result Date: 9/28/2022  Impression: Right subdural hematoma that is likely subacute  Slight localized mass effect  No shift or herniation  No acute territorial infarct  No significant stenosis of the cervical carotid or vertebral arteries  No significant intracranial stenosis, large vessel occlusion or aneurysm  I personally discussed this study with Domenic Kwan on 9/28/2022 at 1:43 PM  Workstation performed: [id number]       EKG, Pathology, and Other Studies: none    VTE Prophylaxis: Sequential compression device (Venodyne)  and Reason for no pharmacologic prophylaxis SDH    Code Status: Level 1 - Full Code  Advance Directive and Living Will:      Power of :    POLST:      Counseling / Coordination of Care  I spent 20 minutes with the patient

## 2022-09-29 NOTE — PROGRESS NOTES
[address] [facility]  Progress Note - Chris Pack 1945, 78 y o  male MRN: [id number]  [facility] Encounter: [id number]  Primary Care Provider: Emerson Henley MD   Date and time admitted to hospital: 9/28/2022  4:54 PM    SDH (subdural hematoma)  Assessment & Plan  - had syncopal event with fall  - given [facility] prior to transfer  - GCS 15, no focal deficits  - Neurosurgery consult  - q1 hr neuro checks  - PT/OT  - pain control      Syncope  Assessment & Plan  - echo pending  - several 2 second pauses on telemetry overnight  - continue to monitor on telemetry  - EP consult     Permanent atrial fibrillation (HCC)  Assessment & Plan  - Hold eliquis  - s/p [facility] for SDH      TERTIARY TRAUMA SURVEY NOTE    Prophylaxis: Sequential compression device Zollie Dunlap)     Disposition: continue inpatient management, pending neurosurgery and cardiology evaluation    Code status:  Level 1 - Full Code    Consultants: Neurosurgery, cardiology      SUBJECTIVE:     Transfer from: [facility]  Mechanism of Injury:Fall    Chief Complaint: syncope    HPI/Last 24 hour events: Per Dr Cr Robert HPI, "78 y o  male PMHx of of HTN, Afib on Eliquis, CAD s/p PCI who initially presented to the [place] emergency department after syncopal event  Patient states he got up this morning and walked to his kitchen to make his coffee and breakfast   He then felt lightheaded and passed out falling forward and hitting his head on a door frame  He did not fall to the ground  EMS was called he was transported to the [place] emergency department for evaluation  Workup revealed a right sided subdural hematoma  He was given [facility] for his history of taking Eliquis for atrial fibrillation  Patient was transferred to [facility] ED for trauma evaluation and management  Upon arrival, he denies any active complaints    He denies headache, weakness, numbness, tingling or any other complaints at this time "    Patient had several sinus pauses overnight on telemetry, asymptomatic during these episodes  Patient denies any symptoms currently  Denies numbness or weakness in his extremities  Denies nausea or vomiting        Active medications:           Current Facility-Administered Medications:     acetaminophen (TYLENOL) tablet 975 mg, 975 mg, Oral, Q6H PRN    albuterol inhalation solution 2 5 mg, 2 5 mg, Nebulization, Q6H PRN    fluticasone-vilanterol (BREO ELLIPTA) 100-25 mcg/inh inhaler 1 puff, 1 puff, Inhalation, Daily, 1 puff at 09/29/22 0856    ipratropium (ATROVENT) 0 02 % inhalation solution 0 5 mg, 0 5 mg, Nebulization, TID    levalbuterol (XOPENEX) inhalation solution 1 25 mg, 1 25 mg, Nebulization, TID    levETIRAcetam (KEPPRA) tablet 500 mg, 500 mg, Oral, BID, 500 mg at 09/29/22 0858    metoprolol succinate (TOPROL-XL) 24 hr tablet 25 mg, 25 mg, Oral, Daily, 25 mg at 09/29/22 0858    nicotine (NICODERM CQ) 14 mg/24hr TD 24 hr patch 1 patch, 1 patch, Transdermal, Daily    ondansetron (ZOFRAN) injection 4 mg, 4 mg, Intravenous, Q6H PRN    pantoprazole (PROTONIX) EC tablet 40 mg, 40 mg, Oral, Early Morning, 40 mg at 09/29/22 0514    tamsulosin (FLOMAX) capsule 0 4 mg, 0 4 mg, Oral, Daily      OBJECTIVE:     Vitals:   Vitals:    09/29/22 0851   BP: 139/98   Pulse: 85   Resp: 16   Temp: 97 9 °F (36 6 °C)   SpO2: 91%       Physical Exam:   GENERAL APPEARANCE: No acute distress  NEURO: GCS15, strength 5/5 in all four extremities  Sensation intact  Cranial nerves 2-12 intact  HEENT: normocephalic, atruamatic  CV: regular rate and rhythm  LUNGS: clear to auscultation bilaterally  GI: soft, non-tender, non-distended  : deferred  MSK: no edema  SKIN: warm and well perfused      1  Before the illness or injury that brought you to the Emergency, did you need someone to help you on a regular basis? 0=No   2  Since the illness or injury that brought you to the Emergency, have you needed more help than usual to take care of yourself? 0=No   3  Have you been hospitalized for one or more nights during the past 6 months (excluding a stay in the Emergency Department)? 0=No   4  In general, do you see well? 0=Yes   5  In general, do you have serious problems with your memory? 0=No   6  Do you take more than three different medications everyday? 1=Yes   TOTAL   1     Did you order a geriatric consult if the score was 2 or greater?: n/a                I/O:   I/O       09/27 0701 09/28 0700 09/28 0701 09/29 0700 09/29 0701 09/30 0700    Urine (mL/kg/hr)  550     Total Output  550     Net  -550                  Invasive Devices: Invasive Devices  Report    Peripheral Intravenous Line  Duration           Peripheral IV 09/28/22 Left Antecubital <1 day          Drain  Duration           Urethral Catheter 18 Fr  210 days                  Imaging:   CTA head and neck with and without contrast    Result Date: 9/28/2022  Impression: Right subdural hematoma that is likely subacute  Slight localized mass effect  No shift or herniation  No acute territorial infarct  No significant stenosis of the cervical carotid or vertebral arteries  No significant intracranial stenosis, large vessel occlusion or aneurysm  I personally discussed this study with Thomas Born on 9/28/2022 at 1:43 PM  Workstation performed: [id number]     XR chest 1 view portable    Result Date: 9/29/2022  Impression: No acute cardiopulmonary disease   Workstation performed: [id number]       Labs:   CBC:   Lab Results   Component Value Date    WBC 7 89 09/29/2022    HGB 13 0 09/29/2022    HCT 42 5 09/29/2022    MCV 93 09/29/2022     09/29/2022    MCH 28 4 09/29/2022    MCHC 30 6 (L) 09/29/2022    RDW 14 9 09/29/2022    MPV 10 6 09/29/2022    NRBC 0 09/29/2022     CMP:   Lab Results   Component Value Date     09/29/2022    CO2 26 09/29/2022    BUN 16 09/29/2022    CREATININE 0 90 09/29/2022    CALCIUM 8 5 09/29/2022    AST 11 (L) 09/28/2022    ALT 8 09/28/2022    ALKPHOS 78 09/28/2022    EGFR 83 09/29/2022     Coagulation: No results found for: PT, INR, APTT

## 2022-09-29 NOTE — PHYSICAL THERAPY NOTE
PHYSICAL THERAPY EVALUATION  NAME:  Zulma Pham  DATE: 09/29/22    AGE:   78 y o  Mrn:   [id number]  ADMIT DX:  SDH (subdural hematoma) [S06 [id number]]    Past Medical History:   Diagnosis Date    Anxiety disorder     Atrial fibrillation (HCC)     Coronary artery disease     Depression     Cloud catheter in place     Hepatitis C     screening negative in 1/2017    Hypertension     MI (myocardial infarction) [facility])     Use of cane as ambulatory aid        Past Surgical History:   Procedure Laterality Date    CARDIAC CATHETERIZATION  07/09/2018    COLONOSCOPY      OH REPAIR ING HERNIA,5+Y/O,REDUCIBL Right 8/11/2022    Procedure: REPAIR HERNIA INGUINAL;  Surgeon: Darius Encarnacion DO;  Location: [facility];  Service: General    TONSILLECTOMY         Length Of Stay: 1    PHYSICAL THERAPY EVALUATION:        09/29/22 0925   Note Type   Note type Evaluation   Pain Assessment   Pain Assessment Tool 0-10   Pain Score No Pain   Restrictions/Precautions   Weight Bearing Precautions Per Order No   Other Precautions Cognitive; Chair Alarm; Bed Alarm; Fall Risk   Home Living   Type of Home Apartment  (2nd floor apartment)   Home Layout One level;Stairs to enter with rails  (full flight of KAL apartment)   Home Equipment Walker;Cane   Additional Comments Patient reports living with spouse who is able to provide assistance if needed   Prior Function   Level of [place] Independent with ADLs and functional mobility   Lives With Spouse   Receives Help From Family   ADL Assistance Independent   Falls in the last 6 months 1 to 4   Comments Patient reports use of a single-point cane for ambulation prior to admission   General   Family/Caregiver Present No   Cognition   Overall Cognitive Status WFL   Arousal/Participation Alert   Orientation Level Oriented to person;Oriented to time;Oriented to situation;Oriented to place   Memory Decreased recall of recent events   Following Commands Follows one step commands with increased time or repetition   RUE Assessment   RUE Assessment WFL   LUE Assessment   LUE Assessment WFL   RLE Assessment   RLE Assessment WFL   Strength RLE   RLE Overall Strength 4/5   LLE Assessment   LLE Assessment WFL   Strength LLE   LLE Overall Strength 4/5   Bed Mobility   Supine to Sit 5  Supervision   Additional items Increased time required;Verbal cues   Transfers   Sit to Stand 4  Minimal assistance   Additional items Assist x 1; Increased time required;Verbal cues   Stand to Sit 4  Minimal assistance   Additional items Assist x 1; Increased time required;Verbal cues   Additional Comments Cues needed for hand placement during transfers   Ambulation/Elevation   Gait pattern Short stride; Foward flexed; Inconsistent khanh  (LOB noted x2  min Arequired to correct)   Gait Assistance 4  Minimal assist   Additional items Assist x 1   Assistive Device Rolling walker   Distance 55ft x 2   Stair Management Assistance 4  Minimal assist   Additional items Assist x 1   Stair Management Technique Two rails   Number of Stairs 3   Balance   Static Sitting Fair -   Static Standing Poor +   Ambulatory Poor +   Endurance Deficit   Endurance Deficit Yes   Endurance Deficit Description fatigue   Activity Tolerance   Activity Tolerance Patient limited by fatigue   Medical Staff Made Aware Patrick Reardon, OT; OT present for co evaluation due to patient's current medical presentation   Nurse Made Aware Patient appropriate to be seen and mobilized per nursing   Assessment   Prognosis Good   Problem List Decreased strength;Decreased endurance; Impaired balance;Decreased mobility; Decreased safety awareness   Assessment Pt is 78 y o  male seen for PT evaluation s/p admit to [facility] on 9/28/2022  Two pt identifiers were used to confirm  Pt presented s/p syncope with headstrike  Patient originally presented at [facility] and was transferred to [facility] for further medical management/ evaluation   Pt was admitted with a primary dx of:  Subdural hematoma, syncope, and other active problems including permanent AFib  PT now consulted for assessment of mobility and d/c needs  Pt with Up in chair orders  Pts current co morbidities affecting treatment include: Anxiety disorder, AFib, CAD, depression, HTN, mi, and personal factors including steps to enter apartment  Pts current clinical presentation is Unstable/ Unpredictable (high complexity) due to Ongoing medical management for primary dx, Increased reliance on more restrictive AD compared to baseline, Decreased activity tolerance compared to baseline, Fall risk, Increased assistance needed from caregiver at current time, Continuous pulse oximetry monitoring     Upon evaluation, pt currently is requiring Supervision for bed mobility; Min Ax1 for transfers and Min Ax1 for ambulation w/ RW  Pt presents at PT eval functioning below baseline and currently w/ overall mobility deficits 2* to: BLE weakness, impaired balance, decreased endurance, gait deviations, decreased activity tolerance compared to baseline, decreased safety awareness, fall risk  Pt currently at a fall risk 2* to impairments listed above  Based on the aforementioned PT evaluation, pt will continue to benefit from skilled Acute PT interventions to address stated impairments; to maximize functional mobility; for ongoing pt/ family training; and DME needs  At conclusion of PT session pt returned back in chair with phone and call bell within reach  Pt denies any further questions at this time  PT is currently recommending Rehab  PT will continue to follow during hospital stay  Goals   Patient Goals " to eat breakfast"   STG Expiration Date 10/09/22   Short Term Goal #1 In 10 days pt will complete: 1) Bed mobility skills with mod I to increase safety and independence as well as decrease caregiver burden  2) Functional transfers with mod I to promote increased independence, safety, and QOL   3) Ambulate 300' using least restrictive AD with mod I without LOB and stable vitals so that pt can negotiate previous living environment safely and promote independence with functional mobility and return to PLOF  4) Stair training up/ down 10 step/s using rail/s with mod I so that pt can enter/negotiate previous living environment safely and decrease fall risk  5) Improve balance grades by 1/2 grade to increase safety with all mobility and decrease fall risk  6) Improve BLE strength by 1/2 grade to help increase overall functional mobility and decrease fall risk  Plan   Treatment/Interventions Functional transfer training;LE strengthening/ROM; Elevations; Therapeutic exercise; Endurance training;Patient/family training;Equipment eval/education; Bed mobility;Gait training;Spoke to nursing;OT   PT Frequency Other (Comment)  (3-6x a week)   Recommendation   Equipment Recommended [facility] Recommended Wheeled walker   AM-PAC Basic Mobility Inpatient   Turning in Bed Without Bedrails 4   Lying on Back to Sitting on Edge of Flat Bed 4   Moving Bed to Chair 3   Standing Up From Chair 3   Walk in Room 3   Climb 3-5 Stairs 2   Basic Mobility Inpatient Raw Score 19   Basic Mobility Standardized Score 42 48   Highest Level Of Mobility   JH-HLM Goal 6: Walk 10 steps or more   JH-HLM Achieved 7: Walk 25 feet or more   Modified Is[place] Scale   Modified Isanti Scale 4   Barthel Index   Feeding 10   Bathing 0   Grooming Score 5   Dressing Score 5   Bladder Score 10   Bowels Score 10   Toilet Use Score 10   Transfers (Bed/Chair) Score 10   Mobility (Level Surface) Score 10   Stairs Score 5   Barthel Index Score 75   Portions of the documentation may have been created using voice recognition software  Occasional wrong word or sound alike substitutions may have occurred due to the inherent limitations of the voice recognition software  Read the chart carefully and recognize, using context, where substitutions have occurred      Thomas Sheriff DPT

## 2022-09-30 ENCOUNTER — APPOINTMENT (INPATIENT)
Dept: NON INVASIVE DIAGNOSTICS | Facility: HOSPITAL | Age: 77
DRG: 041 | End: 2022-09-30
Payer: MEDICARE

## 2022-09-30 LAB
AORTIC ROOT: 3.4 CM
APICAL FOUR CHAMBER EJECTION FRACTION: 56 %
ERYTHROCYTE [DISTWIDTH] IN BLOOD BY AUTOMATED COUNT: 14.9 % (ref 11.6–15.1)
FRACTIONAL SHORTENING: 33 % (ref 28–44)
HCT VFR BLD AUTO: 44 % (ref 36.5–49.3)
HGB BLD-MCNC: 13.4 G/DL (ref 12–17)
INTERVENTRICULAR SEPTUM IN DIASTOLE (PARASTERNAL SHORT AXIS VIEW): 1.1 CM
INTERVENTRICULAR SEPTUM: 1.1 CM (ref 0.6–1.1)
LAAS-AP2: 33.5 CM2
LAAS-AP4: 28.9 CM2
LEFT ATRIUM AREA SYSTOLE SINGLE PLANE A4C: 25.6 CM2
LEFT ATRIUM SIZE: 4.7 CM
LEFT INTERNAL DIMENSION IN SYSTOLE: 3.8 CM (ref 2.1–4)
LEFT VENTRICULAR INTERNAL DIMENSION IN DIASTOLE: 5.7 CM (ref 3.5–6)
LEFT VENTRICULAR POSTERIOR WALL IN END DIASTOLE: 1.1 CM
LEFT VENTRICULAR STROKE VOLUME: 99 ML
LVSV (TEICH): 99 ML
MCH RBC QN AUTO: 28.3 PG (ref 26.8–34.3)
MCHC RBC AUTO-ENTMCNC: 30.5 G/DL (ref 31.4–37.4)
MCV RBC AUTO: 93 FL (ref 82–98)
MV E'TISSUE VEL-SEP: 10 CM/S
PLATELET # BLD AUTO: 271 THOUSANDS/UL (ref 149–390)
PMV BLD AUTO: 10.3 FL (ref 8.9–12.7)
PULMONARY REGURGITATION LATE DIASTOLIC VELOCITY: 0.02 M/S
RA PRESSURE ESTIMATED: 10 MMHG
RBC # BLD AUTO: 4.73 MILLION/UL (ref 3.88–5.62)
RIGHT ATRIUM AREA SYSTOLE A4C: 22.7 CM2
RIGHT VENTRICLE ID DIMENSION: 3.6 CM
RV PSP: 32 MMHG
SL CV LEFT ATRIUM LENGTH A2C: 7.1 CM
SL CV LV EF: 50
SL CV PED ECHO LEFT VENTRICLE DIASTOLIC VOLUME (MOD BIPLANE) 2D: 162 ML
SL CV PED ECHO LEFT VENTRICLE SYSTOLIC VOLUME (MOD BIPLANE) 2D: 63 ML
TR MAX PG: 22 MMHG
TR PEAK VELOCITY: 2.4 M/S
TRICUSPID VALVE PEAK REGURGITATION VELOCITY: 2.35 M/S
WBC # BLD AUTO: 7.95 THOUSAND/UL (ref 4.31–10.16)

## 2022-09-30 PROCEDURE — 33285 INSJ SUBQ CAR RHYTHM MNTR: CPT | Performed by: INTERNAL MEDICINE

## 2022-09-30 PROCEDURE — 99232 SBSQ HOSP IP/OBS MODERATE 35: CPT | Performed by: EMERGENCY MEDICINE

## 2022-09-30 PROCEDURE — C1764 EVENT RECORDER, CARDIAC: HCPCS | Performed by: INTERNAL MEDICINE

## 2022-09-30 PROCEDURE — 85027 COMPLETE CBC AUTOMATED: CPT

## 2022-09-30 PROCEDURE — 94760 N-INVAS EAR/PLS OXIMETRY 1: CPT

## 2022-09-30 PROCEDURE — 93306 TTE W/DOPPLER COMPLETE: CPT

## 2022-09-30 PROCEDURE — 33285 INSJ SUBQ CAR RHYTHM MNTR: CPT | Performed by: PHYSICIAN ASSISTANT

## 2022-09-30 PROCEDURE — 93306 TTE W/DOPPLER COMPLETE: CPT | Performed by: INTERNAL MEDICINE

## 2022-09-30 PROCEDURE — 0JH632Z INSERTION OF MONITORING DEVICE INTO CHEST SUBCUTANEOUS TISSUE AND FASCIA, PERCUTANEOUS APPROACH: ICD-10-PCS | Performed by: INTERNAL MEDICINE

## 2022-09-30 PROCEDURE — 94640 AIRWAY INHALATION TREATMENT: CPT

## 2022-09-30 PROCEDURE — 97535 SELF CARE MNGMENT TRAINING: CPT

## 2022-09-30 DEVICE — LOOP RECORDER REVEAL LINQ II SYS DEVICE ONLY: Type: IMPLANTABLE DEVICE | Status: FUNCTIONAL

## 2022-09-30 RX ORDER — LIDOCAINE HYDROCHLORIDE AND EPINEPHRINE 10; 10 MG/ML; UG/ML
INJECTION, SOLUTION INFILTRATION; PERINEURAL AS NEEDED
Status: DISCONTINUED | OUTPATIENT
Start: 2022-09-30 | End: 2022-09-30 | Stop reason: HOSPADM

## 2022-09-30 RX ADMIN — FLUTICASONE FUROATE AND VILANTEROL TRIFENATATE 1 PUFF: 100; 25 POWDER RESPIRATORY (INHALATION) at 09:25

## 2022-09-30 RX ADMIN — LEVALBUTEROL HYDROCHLORIDE 1.25 MG: 1.25 SOLUTION, CONCENTRATE RESPIRATORY (INHALATION) at 14:10

## 2022-09-30 RX ADMIN — IPRATROPIUM BROMIDE 0.5 MG: 0.5 SOLUTION RESPIRATORY (INHALATION) at 19:25

## 2022-09-30 RX ADMIN — LEVALBUTEROL HYDROCHLORIDE 1.25 MG: 1.25 SOLUTION, CONCENTRATE RESPIRATORY (INHALATION) at 07:30

## 2022-09-30 RX ADMIN — HEPARIN SODIUM 5000 UNITS: 5000 INJECTION INTRAVENOUS; SUBCUTANEOUS at 14:06

## 2022-09-30 RX ADMIN — LEVETIRACETAM 500 MG: 500 TABLET, FILM COATED ORAL at 09:25

## 2022-09-30 RX ADMIN — LEVALBUTEROL HYDROCHLORIDE 1.25 MG: 1.25 SOLUTION, CONCENTRATE RESPIRATORY (INHALATION) at 19:25

## 2022-09-30 RX ADMIN — TAMSULOSIN HYDROCHLORIDE 0.4 MG: 0.4 CAPSULE ORAL at 09:26

## 2022-09-30 RX ADMIN — HEPARIN SODIUM 5000 UNITS: 5000 INJECTION INTRAVENOUS; SUBCUTANEOUS at 06:05

## 2022-09-30 RX ADMIN — NICOTINE 1 PATCH: 14 PATCH, EXTENDED RELEASE TRANSDERMAL at 09:25

## 2022-09-30 RX ADMIN — HEPARIN SODIUM 5000 UNITS: 5000 INJECTION INTRAVENOUS; SUBCUTANEOUS at 22:53

## 2022-09-30 RX ADMIN — IPRATROPIUM BROMIDE 0.5 MG: 0.5 SOLUTION RESPIRATORY (INHALATION) at 14:09

## 2022-09-30 RX ADMIN — LEVETIRACETAM 500 MG: 500 TABLET, FILM COATED ORAL at 17:40

## 2022-09-30 RX ADMIN — IPRATROPIUM BROMIDE 0.5 MG: 0.5 SOLUTION RESPIRATORY (INHALATION) at 07:30

## 2022-09-30 RX ADMIN — PANTOPRAZOLE SODIUM 40 MG: 40 TABLET, DELAYED RELEASE ORAL at 06:05

## 2022-09-30 RX ADMIN — METOPROLOL SUCCINATE 25 MG: 25 TABLET, EXTENDED RELEASE ORAL at 09:25

## 2022-09-30 NOTE — TELEPHONE ENCOUNTER
[facility]20[phone] TRANSFERRED TO A VOICEMAIL, LEFT MESSAGE ON MACHINE FOR CALL BACK TO CONFIRM 10/17/2022 APT AND SCHEDULE CT HEAD          10/04/2022-PT DISCHARGED TO [facility]    09/30/2022-PT STILL IN HOSPITAL

## 2022-09-30 NOTE — PLAN OF CARE
Problem: OCCUPATIONAL THERAPY ADULT  Goal: Performs self-care activities at highest level of function for planned discharge setting  See evaluation for individualized goals  Description: Treatment Interventions: ADL retraining, Functional transfer training, Endurance training, Cognitive reorientation, Patient/family training, Compensatory technique education, Continued evaluation, Energy conservation, Activityengagement, Equipment evaluation/education          See flowsheet documentation for full assessment, interventions and recommendations  Outcome: Progressing  Note: Limitation: Decreased ADL status, Decreased Safe judgement during ADL, Decreased endurance, Decreased self-care trans, Decreased high-level ADLs  Prognosis: Fair  Assessment: pt participated in am ot session and was seen focusing on adls, bed mobility, functional transfers  pt was able to perform adls with sba ub and min asst lb  pt requries min asst balance and transfers  pt offered c/o dizziness initially with standing pt requires min asst for  transfers  sba ub adls and min asst lb self care and toileting    Recommendation: (S) Physiatry Consult  OT Discharge Recommendation: Post acute rehabilitation services     April A Storm

## 2022-09-30 NOTE — OCCUPATIONAL THERAPY NOTE
Occupational Therapy Treatment Note:      09/30/22 1020   OT Last Visit   OT Visit Date 09/30/22   Note Type   Note Type Treatment   Pain Assessment   Pain Assessment Tool 0-10   Pain Score No Pain   Bed Mobility   Supine to Sit 5  Supervision   Transfers   Sit to Stand 4  Minimal assistance   Stand to Sit 4  Minimal assistance   Cognition   Arousal/Participation Alert; Cooperative   Attention Attends with cues to redirect   Memory Decreased recall of precautions   Following Commands Follows multistep commands with increased time or repetition   Comments min cues for safety and carryover of newly insturcted techniques   Activity Tolerance   Activity Tolerance Patient tolerated treatment well   Assessment   Assessment pt participated in am ot session and was seen focusing on adls, bed mobility, functional transfers  pt was able to perform adls with sba ub and min asst lb  pt requries min asst balance and transfers  pt offered c/o dizziness initially with standing pt requires min asst for  transfers  sba ub adls and min asst lb self care and toileting  Plan   Treatment Interventions ADL retraining;Functional transfer training; Endurance training;Patient/family training; Activityengagement   Goal Expiration Date 10/13/22   OT Treatment Day 1   OT Frequency 3-5x/wk   Recommendation   OT Discharge Recommendation Post acute rehabilitation services   AM-PAC Daily Activity Inpatient   Lower Body Dressing 3   Bathing 3   Toileting 3   Upper Body Dressing 4   Grooming 4   Eating 4   Daily Activity Raw Score 21   Daily Activity Standardized Score (Calc for Raw Score >=11) 44 27   AM-PAC Applied Cognition Inpatient   Following a Speech/Presentation 3   Understanding Ordinary Conversation 4   Taking Medications 3   Remembering Where Things Are Placed or Put Away 4   Remembering List of 4-5 Errands 4   Taking Care of Complicated Tasks 3   Applied Cognition Raw Score 21   Applied Cognition Standardized Score 44 3   April A Storm

## 2022-09-30 NOTE — PLAN OF CARE
Problem: INFECTION - ADULT  Goal: Absence or prevention of progression during hospitalization  Description: INTERVENTIONS:  - Assess and monitor for signs and symptoms of infection  - Monitor lab/diagnostic results  - Monitor all insertion sites, i e  indwelling lines, tubes, and drains  - Monitor endotracheal if appropriate and nasal secretions for changes in amount and color  - [place] appropriate cooling/warming therapies per order  - Administer medications as ordered  - Instruct and encourage patient and family to use good hand hygiene technique  - Identify and instruct in appropriate isolation precautions for identified infection/condition  Outcome: Progressing  Goal: Absence of fever/infection during neutropenic period  Description: INTERVENTIONS:  - Monitor WBC    Outcome: Progressing     Problem: SAFETY ADULT  Goal: Patient will remain free of falls  Description: INTERVENTIONS:  - Educate patient/family on patient safety including physical limitations  - Instruct patient to call for assistance with activity   - Consult OT/PT to assist with strengthening/mobility   - Keep Call bell within reach  - Keep bed low and locked with side rails adjusted as appropriate  - Keep care items and personal belongings within reach  - Initiate and maintain comfort rounds  - Make Fall Risk Sign visible to staff  - Offer Toileting every 2 Hours, in advance of need  - Initiate/Maintain bed alarm  - Obtain necessary fall risk management equipment: bed alarm  - Apply yellow socks and bracelet for high fall risk patients  - Consider moving patient to room near nurses station  Outcome: Progressing  Goal: Maintain or return to baseline ADL function  Description: INTERVENTIONS:  -  Assess patient's ability to carry out ADLs; assess patient's baseline for ADL function and identify physical deficits which impact ability to perform ADLs (bathing, care of mouth/teeth, toileting, grooming, dressing, etc )  - Assess/evaluate cause of self-care deficits   - Assess range of motion  - Assess patient's mobility; develop plan if impaired  - Assess patient's need for assistive devices and provide as appropriate  - Encourage maximum independence but intervene and supervise when necessary  - Involve family in performance of ADLs  - Assess for home care needs following discharge   - Consider OT consult to assist with ADL evaluation and planning for discharge  - Provide patient education as appropriate  Outcome: Progressing  Goal: Maintains/Returns to pre admission functional level  Description: INTERVENTIONS:  - Perform BMAT or MOVE assessment daily    - Set and communicate daily mobility goal to care team and patient/family/caregiver  - Collaborate with rehabilitation services on mobility goals if consulted  - Perform Range of Motion 3 times a day  - Reposition patient every 2 hours    - Dangle patient 3 times a day  - Stand patient 3 times a day  - Ambulate patient 3 times a day  - Out of bed to chair 3 times a day   - Out of bed for meals 3 times a day  - Out of bed for toileting  - Record patient progress and toleration of activity level   Outcome: Progressing     Problem: MOBILITY - ADULT  Goal: Maintain or return to baseline ADL function  Description: INTERVENTIONS:  -  Assess patient's ability to carry out ADLs; assess patient's baseline for ADL function and identify physical deficits which impact ability to perform ADLs (bathing, care of mouth/teeth, toileting, grooming, dressing, etc )  - Assess/evaluate cause of self-care deficits   - Assess range of motion  - Assess patient's mobility; develop plan if impaired  - Assess patient's need for assistive devices and provide as appropriate  - Encourage maximum independence but intervene and supervise when necessary  - Involve family in performance of ADLs  - Assess for home care needs following discharge   - Consider OT consult to assist with ADL evaluation and planning for discharge  - Provide patient education as appropriate  Outcome: Progressing  Goal: Maintains/Returns to pre admission functional level  Description: INTERVENTIONS:  - Perform BMAT or MOVE assessment daily    - Set and communicate daily mobility goal to care team and patient/family/caregiver  - Collaborate with rehabilitation services on mobility goals if consulted  - Perform Range of Motion 3 times a day  - Reposition patient every 2 hours    - Dangle patient 3 times a day  - Stand patient 3 times a day  - Ambulate patient 3 times a day  - Out of bed to chair 3 times a day   - Out of bed for meals 3 times a day  - Out of bed for toileting  - Record patient progress and toleration of activity level   Outcome: Progressing     Problem: Prexisting or High Potential for Compromised Skin Integrity  Goal: Skin integrity is maintained or improved  Description: INTERVENTIONS:  - Identify patients at risk for skin breakdown  - Assess and monitor skin integrity  - Assess and monitor nutrition and hydration status  - Monitor labs   - Assess for incontinence   - Turn and reposition patient  - Assist with mobility/ambulation  - Relieve pressure over bony prominences  - Avoid friction and shearing  - Provide appropriate hygiene as needed including keeping skin clean and dry  - Evaluate need for skin moisturizer/barrier cream  - Collaborate with interdisciplinary team   - Patient/family teaching  - Consider wound care consult   Outcome: Progressing     Problem: Potential for Falls  Goal: Patient will remain free of falls  Description: INTERVENTIONS:  - Educate patient/family on patient safety including physical limitations  - Instruct patient to call for assistance with activity   - Consult OT/PT to assist with strengthening/mobility   - Keep Call bell within reach  - Keep bed low and locked with side rails adjusted as appropriate  - Keep care items and personal belongings within reach  - Initiate and maintain comfort rounds  - Make Fall Risk Sign visible to staff  - Offer Toileting every 2 Hours, in advance of need  - Initiate/Maintain bed alarm  - Obtain necessary fall risk management equipment: bed alarm  - Apply yellow socks and bracelet for high fall risk patients  - Consider moving patient to room near nurses station  Outcome: Progressing     Problem: Nutrition/Hydration-ADULT  Goal: Nutrient/Hydration intake appropriate for improving, restoring or maintaining nutritional needs  Description: Monitor and assess patient's nutrition/hydration status for malnutrition  Collaborate with interdisciplinary team and initiate plan and interventions as ordered  Monitor patient's weight and dietary intake as ordered or per policy  Utilize nutrition screening tool and intervene as necessary  Determine patient's food preferences and provide high-protein, high-caloric foods as appropriate       INTERVENTIONS:  - Monitor oral intake, urinary output, labs, and treatment plans  - Assess nutrition and hydration status and recommend course of action  - Evaluate amount of meals eaten  - Assist patient with eating if necessary   - Allow adequate time for meals  - Recommend/ encourage appropriate diets, oral nutritional supplements, and vitamin/mineral supplements  - Order, calculate, and assess calorie counts as needed  - Recommend, monitor, and adjust tube feedings and TPN/PPN based on assessed needs  - Assess need for intravenous fluids  - Provide specific nutrition/hydration education as appropriate  - Include patient/family/caregiver in decisions related to nutrition  Outcome: Progressing

## 2022-09-30 NOTE — ASSESSMENT & PLAN NOTE
- had syncopal event with fall  - given [address] prior to transfer  - GCS 15, no focal deficits  - Neurosurgery consult  - stable head CT 9/29  - PT/OT  - pain control

## 2022-09-30 NOTE — PROGRESS NOTES
[facility]  Progress Note - Fabby Dillard 1945, 78 y o  male MRN: [id number]  Unit/Bed#: [facility] 603-01 Encounter: [id number]  Primary Care Provider: Ofe Bray MD   Date and time admitted to hospital: 9/28/2022  4:54 PM    SDH (subdural hematoma)  Assessment & Plan  - had syncopal event with fall  - given [facility] prior to transfer  - GCS 15, no focal deficits  - Neurosurgery consult  - stable head CT 9/29  - PT/OT  - pain control      Syncope  Assessment & Plan  - echo pending  - continue to monitor on telemetry  - EP consult  - Loop recorder placement today    Permanent atrial fibrillation ([address] )  Assessment & Plan  - Hold eliquis  - s/p [facility] for SDH        Disposition: PT recommending rehab, pending placement    SUBJECTIVE:  Chief Complaint: I'm tired    Subjective: Alem Grijalva  Patient without complaint this morning  Aware he is having loop recorder placed for syncopal episodes       OBJECTIVE:   Vitals:   Temp:  [97 9 °F (36 6 °C)-98 2 °F (36 8 °C)] 98 2 °F (36 8 °C)  HR:  [73-89] 80  Resp:  [16] 16  BP: ()/(56-78) 117/74    Intake/Output:  I/O       09/28 0701  09/29 0700 09/29 0701  09/30 0700 09/30 0701  10/01 0700    Urine (mL/kg/hr) 550 600 (0 4)     Total Output 550 600     Net -550 -600                 Nutrition: Diet Regular; Regular House  GI Proph/Bowel Reg: n/a  VTE Prophylaxis:Sequential compression device (Venodyne)  and Heparin     Physical Exam:   GENERAL APPEARANCE: NAD  NEURO: No focal deficits  HEENT: NC/AT  CV: RRR, well perfused  LUNGS: normal WOB, CTAB  GI: soft, NT/ND  : deferred  MSK: no peripheral edema  SKIN: warm, dry    Invasive Devices  Report    Peripheral Intravenous Line  Duration           Peripheral IV 09/29/22 Right;Ventral (anterior) Forearm <1 day          Drain  Duration           Urethral Catheter 18 Fr  211 days                      Lab Results: BMP/CMP: No results found for: SODIUM, K, CL, CO2, ANIONGAP, BUN, CREATININE, GLUCOSE, CALCIUM, AST, ALT, ALKPHOS, PROT, BILITOT, EGFR and CBC: No results found for: WBC, HGB, HCT, MCV, PLT, ADJUSTEDWBC, MCH, MCHC, RDW, MPV, NRBC  Imaging/EKG Studies: I have personally reviewed pertinent reports

## 2022-09-30 NOTE — PLAN OF CARE
Problem: INFECTION - ADULT  Goal: Absence or prevention of progression during hospitalization  Description: INTERVENTIONS:  - Assess and monitor for signs and symptoms of infection  - Monitor lab/diagnostic results  - Monitor all insertion sites, i e  indwelling lines, tubes, and drains  - Monitor endotracheal if appropriate and nasal secretions for changes in amount and color  - [place] appropriate cooling/warming therapies per order  - Administer medications as ordered  - Instruct and encourage patient and family to use good hand hygiene technique  - Identify and instruct in appropriate isolation precautions for identified infection/condition  9/30/2022 1314 by Elijah Dockery RN  Outcome: Progressing  9/30/2022 1313 by Elijah Dockery RN  Outcome: Progressing  Goal: Absence of fever/infection during neutropenic period  Description: INTERVENTIONS:  - Monitor WBC    9/30/2022 1314 by Elijah Dockery RN  Outcome: Progressing  9/30/2022 1313 by Elijah Dockery RN  Outcome: Progressing     Problem: SAFETY ADULT  Goal: Patient will remain free of falls  Description: INTERVENTIONS:  - Educate patient/family on patient safety including physical limitations  - Instruct patient to call for assistance with activity   - Consult OT/PT to assist with strengthening/mobility   - Keep Call bell within reach  - Keep bed low and locked with side rails adjusted as appropriate  - Keep care items and personal belongings within reach  - Initiate and maintain comfort rounds  - Make Fall Risk Sign visible to staff  - Apply yellow socks and bracelet for high fall risk patients  - Consider moving patient to room near nurses station  9/30/2022 1314 by Elijah Dockery RN  Outcome: Progressing  9/30/2022 1313 by Elijah Dockery RN  Outcome: Progressing  Goal: Maintain or return to baseline ADL function  Description: INTERVENTIONS:  -  Assess patient's ability to carry out ADLs; assess patient's baseline for ADL function and identify physical deficits which impact ability to perform ADLs (bathing, care of mouth/teeth, toileting, grooming, dressing, etc )  - Assess/evaluate cause of self-care deficits   - Assess range of motion  - Assess patient's mobility; develop plan if impaired  - Assess patient's need for assistive devices and provide as appropriate  - Encourage maximum independence but intervene and supervise when necessary  - Involve family in performance of ADLs  - Assess for home care needs following discharge   - Consider OT consult to assist with ADL evaluation and planning for discharge  - Provide patient education as appropriate  9/30/2022 1314 by Saravanan Ambriz RN  Outcome: Progressing  9/30/2022 1313 by Saravanan Ambriz RN  Outcome: Progressing  Goal: Maintains/Returns to pre admission functional level  Description: INTERVENTIONS:  - Perform BMAT or MOVE assessment daily    - Set and communicate daily mobility goal to care team and patient/family/caregiver  - Collaborate with rehabilitation services on mobility goals if consulted  - Perform Range of Motion 3 times a day  - Reposition patient every 2 hours    - Dangle patient 3 times a day  - Stand patient 3 times a day  - Ambulate patient 3 times a day  - Out of bed to chair 3 times a day   - Out of bed for meals 3 times a day  - Out of bed for toileting  - Record patient progress and toleration of activity level   9/30/2022 1314 by Saravanan Ambriz RN  Outcome: Progressing  9/30/2022 1313 by Saravanan Ambriz RN  Outcome: Progressing     Problem: MOBILITY - ADULT  Goal: Maintain or return to baseline ADL function  Description: INTERVENTIONS:  -  Assess patient's ability to carry out ADLs; assess patient's baseline for ADL function and identify physical deficits which impact ability to perform ADLs (bathing, care of mouth/teeth, toileting, grooming, dressing, etc )  - Assess/evaluate cause of self-care deficits   - Assess range of motion  - Assess patient's mobility; develop plan if impaired  - Assess patient's need for assistive devices and provide as appropriate  - Encourage maximum independence but intervene and supervise when necessary  - Involve family in performance of ADLs  - Assess for home care needs following discharge   - Consider OT consult to assist with ADL evaluation and planning for discharge  - Provide patient education as appropriate  9/30/2022 1314 by Evelina French RN  Outcome: Progressing  9/30/2022 1313 by Evelina French RN  Outcome: Progressing  Goal: Maintains/Returns to pre admission functional level  Description: INTERVENTIONS:  - Perform BMAT or MOVE assessment daily    - Set and communicate daily mobility goal to care team and patient/family/caregiver  - Collaborate with rehabilitation services on mobility goals if consulted  - Perform Range of Motion 3 times a day  - Reposition patient every 2 hours    - Dangle patient 3 times a day  - Stand patient 3 times a day  - Ambulate patient 3 times a day  - Out of bed to chair 3 times a day   - Out of bed for meals 3 times a day  - Out of bed for toileting  - Record patient progress and toleration of activity level   9/30/2022 1314 by Evelina French RN  Outcome: Progressing  9/30/2022 1313 by Evelina French RN  Outcome: Progressing     Problem: Prexisting or High Potential for Compromised Skin Integrity  Goal: Skin integrity is maintained or improved  Description: INTERVENTIONS:  - Identify patients at risk for skin breakdown  - Assess and monitor skin integrity  - Assess and monitor nutrition and hydration status  - Monitor labs   - Assess for incontinence   - Turn and reposition patient  - Assist with mobility/ambulation  - Relieve pressure over bony prominences  - Avoid friction and shearing  - Provide appropriate hygiene as needed including keeping skin clean and dry  - Evaluate need for skin moisturizer/barrier cream  - Collaborate with interdisciplinary team   - Patient/family teaching  - Consider wound care consult   9/30/2022 1314 by Joi Argueta RN  Outcome: Progressing  9/30/2022 1313 by Joi Argueta RN  Outcome: Progressing     Problem: Potential for Falls  Goal: Patient will remain free of falls  Description: INTERVENTIONS:  - Educate patient/family on patient safety including physical limitations  - Instruct patient to call for assistance with activity   - Consult OT/PT to assist with strengthening/mobility   - Keep Call bell within reach  - Keep bed low and locked with side rails adjusted as appropriate  - Keep care items and personal belongings within reach  - Initiate and maintain comfort rounds  - Make Fall Risk Sign visible to staff  - Apply yellow socks and bracelet for high fall risk patients  - Consider moving patient to room near nurses station  9/30/2022 1314 by Joi Argueta RN  Outcome: Progressing  9/30/2022 1313 by Joi Argueta RN  Outcome: Progressing     Problem: Nutrition/Hydration-ADULT  Goal: Nutrient/Hydration intake appropriate for improving, restoring or maintaining nutritional needs  Description: Monitor and assess patient's nutrition/hydration status for malnutrition  Collaborate with interdisciplinary team and initiate plan and interventions as ordered  Monitor patient's weight and dietary intake as ordered or per policy  Utilize nutrition screening tool and intervene as necessary  Determine patient's food preferences and provide high-protein, high-caloric foods as appropriate       INTERVENTIONS:  - Monitor oral intake, urinary output, labs, and treatment plans  - Assess nutrition and hydration status and recommend course of action  - Evaluate amount of meals eaten  - Assist patient with eating if necessary   - Allow adequate time for meals  - Recommend/ encourage appropriate diets, oral nutritional supplements, and vitamin/mineral supplements  - Order, calculate, and assess calorie counts as needed  - Recommend, monitor, and adjust tube feedings and TPN/PPN based on assessed needs  - Assess need for intravenous fluids  - Provide specific nutrition/hydration education as appropriate  - Include patient/family/caregiver in decisions related to nutrition  9/30/2022 1314 by Gigi Douglas RN  Outcome: Progressing  9/30/2022 1313 by Gigi Douglas RN  Outcome: Progressing     Problem: NEUROSENSORY - ADULT  Goal: Achieves stable or improved neurological status  Description: INTERVENTIONS  - Monitor and report changes in neurological status  - Monitor vital signs such as temperature, blood pressure, glucose, and any other labs ordered   - Initiate measures to prevent increased intracranial pressure  - Monitor for seizure activity and implement precautions if appropriate      Outcome: Progressing  Goal: Remains free of injury related to seizures activity  Description: INTERVENTIONS  - Maintain airway, patient safety  and administer oxygen as ordered  - Monitor patient for seizure activity, document and report duration and description of seizure to physician/advanced practitioner  - If seizure occurs,  ensure patient safety during seizure  - Reorient patient post seizure  - Seizure pads on all 4 side rails  - Instruct patient/family to notify RN of any seizure activity including if an aura is experienced  - Instruct patient/family to call for assistance with activity based on nursing assessment  - Administer anti-seizure medications if ordered    Outcome: Progressing  Goal: Achieves maximal functionality and self care  Description: INTERVENTIONS  - Monitor swallowing and airway patency with patient fatigue and changes in neurological status  - Encourage and assist patient to increase activity and self care     - Encourage visually impaired, hearing impaired and aphasic patients to use assistive/communication devices  Outcome: Progressing     Problem: CARDIOVASCULAR - ADULT  Goal: Maintains optimal cardiac output and hemodynamic stability  Description: INTERVENTIONS:  - Monitor I/O, vital signs and rhythm  - Monitor for S/S and trends of decreased cardiac output  - Administer and titrate ordered vasoactive medications to optimize hemodynamic stability  - Assess quality of pulses, skin color and temperature  - Assess for signs of decreased coronary artery perfusion  - Instruct patient to report change in severity of symptoms  Outcome: Progressing  Goal: Absence of cardiac dysrhythmias or at baseline rhythm  Description: INTERVENTIONS:  - Continuous cardiac monitoring, vital signs, obtain 12 lead EKG if ordered  - Administer antiarrhythmic and heart rate control medications as ordered  - Monitor electrolytes and administer replacement therapy as ordered  Outcome: Progressing

## 2022-09-30 NOTE — QUICK NOTE
Patient underwent loop recorder implant today, bedside  Patient tolerated procedure well  There were no complications  Incision is dressed with tegaderm  Device instructions given  Follow up appointment made  Patient has no further EP needs at this time  Please reach out with any questions

## 2022-10-01 PROCEDURE — 99232 SBSQ HOSP IP/OBS MODERATE 35: CPT | Performed by: SURGERY

## 2022-10-01 PROCEDURE — 97535 SELF CARE MNGMENT TRAINING: CPT

## 2022-10-01 PROCEDURE — 97530 THERAPEUTIC ACTIVITIES: CPT

## 2022-10-01 PROCEDURE — 97116 GAIT TRAINING THERAPY: CPT

## 2022-10-01 PROCEDURE — 97112 NEUROMUSCULAR REEDUCATION: CPT

## 2022-10-01 PROCEDURE — 94760 N-INVAS EAR/PLS OXIMETRY 1: CPT

## 2022-10-01 PROCEDURE — 94640 AIRWAY INHALATION TREATMENT: CPT

## 2022-10-01 RX ORDER — LEVETIRACETAM 500 MG/1
500 TABLET ORAL EVERY 12 HOURS SCHEDULED
Qty: 8 TABLET | Refills: 0 | Status: SHIPPED | OUTPATIENT
Start: 2022-10-01 | End: 2022-10-18 | Stop reason: ALTCHOICE

## 2022-10-01 RX ADMIN — LEVALBUTEROL HYDROCHLORIDE 1.25 MG: 1.25 SOLUTION, CONCENTRATE RESPIRATORY (INHALATION) at 18:50

## 2022-10-01 RX ADMIN — IPRATROPIUM BROMIDE 0.5 MG: 0.5 SOLUTION RESPIRATORY (INHALATION) at 13:36

## 2022-10-01 RX ADMIN — HEPARIN SODIUM 5000 UNITS: 5000 INJECTION INTRAVENOUS; SUBCUTANEOUS at 21:00

## 2022-10-01 RX ADMIN — TAMSULOSIN HYDROCHLORIDE 0.4 MG: 0.4 CAPSULE ORAL at 08:30

## 2022-10-01 RX ADMIN — LEVALBUTEROL HYDROCHLORIDE 1.25 MG: 1.25 SOLUTION, CONCENTRATE RESPIRATORY (INHALATION) at 13:36

## 2022-10-01 RX ADMIN — PANTOPRAZOLE SODIUM 40 MG: 40 TABLET, DELAYED RELEASE ORAL at 05:21

## 2022-10-01 RX ADMIN — HEPARIN SODIUM 5000 UNITS: 5000 INJECTION INTRAVENOUS; SUBCUTANEOUS at 13:20

## 2022-10-01 RX ADMIN — NICOTINE 1 PATCH: 14 PATCH, EXTENDED RELEASE TRANSDERMAL at 08:32

## 2022-10-01 RX ADMIN — IPRATROPIUM BROMIDE 0.5 MG: 0.5 SOLUTION RESPIRATORY (INHALATION) at 07:26

## 2022-10-01 RX ADMIN — FLUTICASONE FUROATE AND VILANTEROL TRIFENATATE 1 PUFF: 100; 25 POWDER RESPIRATORY (INHALATION) at 08:32

## 2022-10-01 RX ADMIN — HEPARIN SODIUM 5000 UNITS: 5000 INJECTION INTRAVENOUS; SUBCUTANEOUS at 05:21

## 2022-10-01 RX ADMIN — METOPROLOL SUCCINATE 25 MG: 25 TABLET, EXTENDED RELEASE ORAL at 08:31

## 2022-10-01 RX ADMIN — LEVETIRACETAM 500 MG: 500 TABLET, FILM COATED ORAL at 08:30

## 2022-10-01 RX ADMIN — IPRATROPIUM BROMIDE 0.5 MG: 0.5 SOLUTION RESPIRATORY (INHALATION) at 18:50

## 2022-10-01 RX ADMIN — LEVETIRACETAM 500 MG: 500 TABLET, FILM COATED ORAL at 17:11

## 2022-10-01 RX ADMIN — LEVALBUTEROL HYDROCHLORIDE 1.25 MG: 1.25 SOLUTION, CONCENTRATE RESPIRATORY (INHALATION) at 07:26

## 2022-10-01 NOTE — PROGRESS NOTES
[facility]  Progress Note - Aishwarya Urban 1945, 78 y o  male MRN: [id number]  Unit/Bed#: [facility] 603-01 Encounter: [id number]  Primary Care Provider: Lexi Whitehead MD   Date and time admitted to hospital: 9/28/2022  4:54 PM    Syncope  Assessment & Plan  - echo pending  - continue to monitor on telemetry  - EP consult  - Loop recorder placement 9/30    Permanent atrial fibrillation ([address] )  Assessment & Plan  - Hold eliquis  - s/p [facility] for SDH    * SDH (subdural hematoma)  Assessment & Plan  - had syncopal event with fall  - given [facility] prior to transfer  - GCS 15, no focal deficits  - 9/28 CTA H&N: Right subdural hematoma that is likely subacute  Slight localized mass effect  No shift or herniation  No acute territorial infarct  No significant stenosis of the cervical carotid or vertebral arteries  No significant intracranial stenosis, large vessel occlusion or aneurysm  - Neurosurgery consult  - 9/29 Repeat Head CT: stable  - PT/OT: recommending rehab  - pain control            Disposition: d/c home with home health, pending CM    SUBJECTIVE:  Chief Complaint: tired    Subjective: No acute events overnight  VSS  Neuro exam continues to be unchanged  Patient understands the recommendation is for rehab placement, though he is requesting to be discharged home with the assistance of his wife instead  Denies fevers, chills, HA, CP, SOB, N/V  Pain well controlled  No other complaints at this time  OBJECTIVE:   Vitals:   Temp:  [97 2 °F (36 2 °C)-98 2 °F (36 8 °C)] 97 6 °F (36 4 °C)  HR:  [67-96] 96  Resp:  [16] 16  BP: (117-126)/(68-81) 123/80    Intake/Output:  I/O       09/29 0701  09/30 0700 09/30 0701  10/01 0700 10/01 0701  10/02 0700    P  O   960     Total Intake(mL/kg)  960 (14 1)     Urine (mL/kg/hr) 600 (0 4) 1150 (0 7)     Total Output 600 1150     Net -600 -190                 Nutrition: Diet Regular; Regular House  GI Proph/Bowel Reg: pantoprazole  VTE Prophylaxis:Heparin     Physical Exam:   GENERAL APPEARANCE: NAD, resting comfortably in bed  NEURO: AAOx3, PRASHANT, sensation intact to light touch to musculocutaneous, axillary, median, radial, ulnar, sural, saphenous, tibial, SPN, DPN nerve distributions  HEENT: Normocephalic, moist mucous membranes, sclera non icteric  CV: normal rate, warm and well perfused, cap refill 2 sec  LUNGS: normal work of breathing, no respiratory distress or accessory muscle usage, satting at 98% on room air  GI: NTTP 4 quadrants + epigastrium  : deferred  MSK: moves all extremities equally  SKIN: warm, dry    Invasive Devices  Report    Peripheral Intravenous Line  Duration           Peripheral IV 09/29/22 Right;Ventral (anterior) Forearm 1 day          Drain  Duration           Urethral Catheter 18 Fr  212 days                      Lab Results: Results: I have personally reviewed all pertinent laboratory/tests results No new labs in interim  Imaging/EKG Studies: I have personally reviewed pertinent reports  No new studies or imaging in interim    Other Studies: None    John Parada MD

## 2022-10-01 NOTE — PHYSICAL THERAPY NOTE
Physical Therapy Progress Note     10/01/22 1100   PT Last Visit   PT Visit Date 10/01/22   Note Type   Note Type Treatment   Pain Assessment   Pain Assessment Tool 0-10   Pain Score No Pain   Restrictions/Precautions   Other Precautions Cognitive; Bed Alarm; Chair Alarm; Fall Risk  (Alarm active post session )   Subjective   Subjective The patient was expressive about sports, and he notes that his dizziness is better  He reports fear of continued dizziness, but that he wants to return home  He declined ambulating at this time as he does not want to do any of that now  Bed Mobility   Supine to Sit 5  Supervision   Additional items Increased time required   Sit to Supine 5  Supervision   Additional items Increased time required   Transfers   Sit to Stand 5  Supervision   Additional items Increased time required; Other  (Excessively slow )   Stand to Sit 5  Supervision   Additional items Increased time required; Other  (Excessively slow )   Ambulation/Elevation   Gait pattern Excessively slow; Short stride;Decreased foot clearance   Gait Assistance 4  Minimal assist   Additional items Assist x 1;Verbal cues   Assistive Device Rolling walker   Distance 10 feet x 2  Balance   Static Sitting Fair   Dynamic Sitting Fair -   Static Standing Fair -   Ambulatory Poor +   Activity Tolerance   Activity Tolerance Patient tolerated treatment well;Patient limited by fatigue   Assessment   Prognosis Good   Problem List Decreased strength;Decreased endurance; Impaired balance;Decreased mobility; Decreased safety awareness   Assessment The patient deferred further ambulation today, but he does attest to improved dizziness  Functionally he takes extensive time for mobility due to his fear of increased dizziness as well as to falls  The patient requires frequent redirection as he perseverates on sports on the television which is maintained at a very loud level and he declined muting it during the session   Educated the patient about further mobility, but he declined at this time  He was returned to bed as he did not want to sit up in the chair at this time  Alarm active post session  Goals   Patient Goals To go home  STG Expiration Date 10/09/22   PT Treatment Day 1   Plan   Treatment/Interventions Functional transfer training;LE strengthening/ROM; Therapeutic exercise; Endurance training;Patient/family training;Cognitive reorientation; Bed mobility;Gait training;Elevations   Progress Slow progress, multiple refusals   PT Frequency   (3-6x a week )   Recommendation   PT Discharge Recommendation   (Pt  refusing rehab at this time )   Equipment Recommended [place] walker   [address] [place] [address] Mobility Inpatient   Turning in Bed Without Bedrails 4   Lying on Back to Sitting on Edge of Flat Bed 3   Moving Bed to Chair 3   Standing Up From Chair 3   Walk in Room 3   Climb 3-5 Stairs 3   Basic Mobility Inpatient Raw Score 19   Basic Mobility Standardized Score 42 48   Highest Level Of Mobility   JH-HLM Goal 6: Walk 10 steps or more   JH-HLM Achieved 6: Walk 10 steps or more       An AM-PAC Basic Mobility standardized score less than 40 78 suggests the patient may benefit from discharge to post-acute rehab services      Padmini Schmidt

## 2022-10-01 NOTE — PLAN OF CARE
Problem: PHYSICAL THERAPY ADULT  Goal: Performs mobility at highest level of function for planned discharge setting  See evaluation for individualized goals  Description: Treatment/Interventions: Functional transfer training, LE strengthening/ROM, Elevations, Therapeutic exercise, Endurance training, Patient/family training, Equipment eval/education, Bed mobility, Gait training, Spoke to nursing, OT  Equipment Recommended: Irlanda Smith       See flowsheet documentation for full assessment, interventions and recommendations  Outcome: Not Progressing  Note: Prognosis: Good  Problem List: Decreased strength, Decreased endurance, Impaired balance, Decreased mobility, Decreased safety awareness  Assessment: The patient deferred further ambulation today, but he does attest to improved dizziness  Functionally he takes extensive time for mobility due to his fear of increased dizziness as well as to falls  The patient requires frequent redirection as he perseverates on sports on the television which is maintained at a very loud level and he declined muting it during the session  Educated the patient about further mobility, but he declined at this time  He was returned to bed as he did not want to sit up in the chair at this time  Alarm active post session  PT Discharge Recommendation:  (Pt  refusing rehab at this time )    See flowsheet documentation for full assessment

## 2022-10-01 NOTE — PLAN OF CARE
Problem: OCCUPATIONAL THERAPY ADULT  Goal: Performs self-care activities at highest level of function for planned discharge setting  See evaluation for individualized goals  Description: Treatment Interventions: ADL retraining, Functional transfer training, Endurance training, Cognitive reorientation, Patient/family training, Compensatory technique education, Continued evaluation, Energy conservation, Activityengagement, Equipment evaluation/education          See flowsheet documentation for full assessment, interventions and recommendations  Outcome: Progressing  Note: Limitation: Decreased ADL status, Decreased Safe judgement during ADL, Decreased endurance, Decreased self-care trans, Decreased high-level ADLs  Prognosis: Fair  Assessment: pt particpated in am ot session and was seen focusing on bed mobility, sitting tolerence eob and functional trnasfers  pt is very talkative primarily about baseball and sports  pt apperas somewhat [place] c loud tv and loud voice  pt demosntrates fair safety and insight and moved very cautiously going from supine to sit and sit to stand  pt without b/p changes with supine and then sin ing  pt was agreeable to get into recliner chair for noon meal and was encouraged to walk with staff at later time  this session pt was noted to move slowly cautiously to avoid dizziness  pt did report dizziness with turning head when in bed etc  pt is recommended to persure in pt rehab  he is hopeful to return home but he states wife wants him to go to rehab  currently feel pt's dizziness impacts independence and safety  will follow focusing on goals from ie  pt intends to see how he feels next tx date  pt has not performed functional mobility with this therapist since ie   refused p t  earlier today and has not walked  Recommendation: (S) Physiatry Consult  OT Discharge Recommendation: Post acute rehabilitation services     April A Storm

## 2022-10-01 NOTE — OCCUPATIONAL THERAPY NOTE
Occupational Therapy Treatment Note:      10/01/22 1200   OT Last Visit   OT Visit Date 10/01/22   Note Type   Note Type Treatment   Pain Assessment   Pain Assessment Tool 0-10   Pain Score No Pain   Transfers   Sit to Stand 4  Minimal assistance   Additional items   (slow, cautious and guarded )   Stand to Sit 4  Minimal assistance   Cognition   Arousal/Participation Alert   Attention Attends with cues to redirect   Memory Decreased recall of precautions   Following Commands Follows one step commands without difficulty   Comments mod vc's for safety and handplacement with rw  Activity Tolerance   Activity Tolerance Patient tolerated treatment well   Assessment   Assessment pt particpated in am ot session and was seen focusing on bed mobility, sitting tolerence eob and functional trnasfers  pt is very talkative primarily about baseball and sports  pt apperas somewhat [place] c loud tv and loud voice  pt demosntrates fair safety and insight and moved very cautiously going from supine to sit and sit to stand  pt without b/p changes with supine and then sin ing  pt was agreeable to get into recliner chair for noon meal and was encouraged to walk with staff at later time  this session pt was noted to move slowly cautiously to avoid dizziness  pt did report dizziness with turning head when in bed etc  pt is recommended to persure in pt rehab  he is hopeful to return home but he states wife wants him to go to rehab  currently feel pt's dizziness impacts independence and safety  will follow focusing on goals from ie  pt intends to see how he feels next tx date  pt has not performed functional mobility with this therapist since ie   refused p t  earlier today and has not walked   Plan   Treatment Interventions ADL retraining;Functional transfer training   Goal Expiration Date 10/13/22   OT Treatment Day 2   OT Frequency 3-5x/wk   Recommendation   OT Discharge Recommendation Post acute rehabilitation services   April A Storm

## 2022-10-01 NOTE — INCIDENTAL FINDINGS
The following findings require follow up:  Radiographic finding   Finding: Small left maxillary sinus retention cyst  Severe degenerative spondylosis from C3-4 to C5-C6 with severe canal and foraminal stenosis due to disc osteophyte complexes, facet and uncovertebral hypertrophy  3 mm right upper lobe nodule is stable compared with chest CT from 9/18/2018  Emphysematous changes and apical scarring  Mild groundglass opacity  Follow up required: PCP   Follow up should be done within 2 week(s)    Please notify the following clinician to assist with the follow up:   Dr Bharat Cortes MD - PCP    The above outlined incidental findings were discussed with the patient who communicated understanding via the teach back method  He understands that he needs to follow up regarding these incidental findings within 2 weeks with his primary care physician listed above      Adwoa Katz MD

## 2022-10-01 NOTE — DISCHARGE SUMMARY
Discharge Summary - Fabian Philippe 78 y o  male MRN: [id number]    [facility] Encounter: [id number]    Admission Date:   Admission Orders (From admission, onward)     Ordered        09/28/22 1730  Inpatient Admission  Once                        Admitting Diagnosis: SDH (subdural hematoma) [S06  5XAA]    HPI: Per Dr Ulisses Moreno on 9/28, "Fabian Philippe is a 78 y o  male PMHx of of HTN, Afib on Eliquis, CAD s/p PCI who initially presented to the [facility] emergency department after syncopal event  Patient states he got up this morning and walked to his kitchen to make his coffee and breakfast   He then felt lightheaded and passed out falling forward and hitting his head on a door frame  He did not fall to the ground  EMS was called he was transported to the [facility] emergency department for evaluation  Workup revealed a right sided subdural hematoma  He was given [facility] for his history of taking Eliquis for atrial fibrillation  Patient was transferred to [facility] ED for trauma evaluation and management  Upon arrival, he denies any active complaints  He denies headache, weakness, numbness, tingling or any other complaints at this time "      Procedures Performed:   Orders Placed This Encounter   Procedures    Cardiac ep lab eps/ablations       Summary of Hospital Course:  Patient is a 72-year-old male with past medical history of hypertension, atrial fibrillation on Eliquis, CAD status post PCI who originally presented to the ED stating [place] following a fall secondary to a syncopal event  Within the [address] [place] [address] in ED, workup revealed a small right-sided subdural hematoma  The patient was then given [facility] for his history of taking Eliquis  He was then transferred to [facility] for higher level of care  Upon presentation, patient was found to be neurologically intact with a GCS of 15  Neurosurgery was consulted  Their recommendations were no surgical intervention or followed    His neurological exam was closely followed, which remained a GCS of 15 throughout the hospital stay  Repeat head CT demonstrated stable subdural hematoma  Neurosurgery recommends Keppra for 1 week for seizure prophylaxis  Upon receiving stable repeat CT, DVT prophylaxis was initiated  Patient was seen and evaluated by Physical therapy and Occupational therapy, for which they recommended rehabilitation  He was discharged to [facility] on 10/2/22  Significant Findings, Care, Treatment and Services Provided:  See summary of hospital course    Complications:  None    Discharge Diagnosis: Subdural hematoma    Medical Problems             Resolved Problems  Date Reviewed: 10/1/2022   None                 Condition at Discharge: good         Discharge instructions/Information to patient and family:   See after visit summary for information provided to patient and family  Provisions for Follow-Up Care:  See after visit summary for information related to follow-up care and any pertinent home health orders  PCP: Sam Llanes MD    Disposition: Home    Planned Readmission: No      Discharge Statement   I spent 25 minutes discharging the patient  This time was spent on the day of discharge  I had direct contact with the patient on the day of discharge  Additional documentation is required if more than 30 minutes were spent on discharge  Discharge Medications:  See after visit summary for reconciled discharge medications provided to patient and family         Chris Wood MD

## 2022-10-01 NOTE — ASSESSMENT & PLAN NOTE
- had syncopal event with fall  - given [facility] prior to transfer  - GCS 15, no focal deficits  - 9/28 CTA H&N: Right subdural hematoma that is likely subacute  Slight localized mass effect  No shift or herniation  No acute territorial infarct  No significant stenosis of the cervical carotid or vertebral arteries  No significant intracranial stenosis, large vessel occlusion or aneurysm    - Neurosurgery consult  - 9/29 Repeat Head CT: stable  - PT/OT: recommending rehab  - pain control

## 2022-10-01 NOTE — CASE MANAGEMENT
Case Management Assessment & Discharge Planning Note    Patient name Aishwarya Urban  Location [facility] 603/PPHP 925-67 MRN [id number]  : 1945 Date 10/1/2022       Current Admission Date: 2022  Current Admission Diagnosis:SDH (subdural hematoma)   Patient Active Problem List    Diagnosis Date Noted    SDH (subdural hematoma) 2022    Recurrent right inguinal hernia 2022    Nasal congestion 2022    Encounter for support and coordination of transition of care 2022    Moderate protein-calorie malnutrition ([facility] ) 2022    Cholestatic jaundice 2022    Sepsis secondary to UTI (HCC)/bacteremia 2022    Iron deficiency anemia likely secondary to GI bleeding 2022    Abnormal colonoscopy 2022    Dizziness 2022    Urinary retention 2022    Essential (hemorrhagic) thrombocythemia ([facility] ) 2022    Closed nondisplaced comminuted fracture of left patella 2021    Head trauma 2021    Thrombocytosis 2021    Bradycardia 2021    Syncope 2021    Colonic adenoma 2021    Ambulatory dysfunction 2021    COPD (chronic obstructive pulmonary disease) ([facility] ) 2021    Orthostatic hypotension 2020    Tobacco use disorder 2020    Symptomatic anemia 2020    Congestive cardiomyopathy ([facility] ) 2020    Permanent atrial fibrillation ([facility] ) 2020      LOS (days): 3  Geometric Mean LOS (GMLOS) (days): 2 10  Days to GMLOS:-0 8     OBJECTIVE:    Risk of Unplanned Readmission Score: 14 47     Current admission status: Inpatient    Preferred Pharmacy:   [address] [place] [address], [address] [place] [address]  Phone: [phone] Fax: [place], [place] - [address] [place] [id number]  Phone: [phone] Fax: [phone]    Primary Care Provider: Lexi Whitehead MD    Primary Insurance: MEDICARE  Secondary Insurance:  FOR LIFE    ASSESSMENT:  Sun 26 [employer]     Fani Moura [address] Representative - Spouse   Primary Phone: [phone] (Mobile)               Advance Directives  Primary Contact: Debbi Peabody (Spouse)    Readmission Root Cause  30 Day Readmission: No    Patient Information  Admitted from[de-identified] Home  Mental Status: Alert  During Assessment patient was accompanied by: Not accompanied during assessment  Assessment information provided by[de-identified] Patient  Primary Caregiver: Self  Support Systems: Spouse/significant other  Home entry access options  Select all that apply : Stairs  Number of steps to enter home : One Flight  Type of Current Residence: Apartment (Second floor apartment with one flight of step to enter   1st floor set-up upon entry )  Upon entering residence, is there a bedroom on the main floor (no further steps)?: Yes  Upon entering residence, is there a bathroom on the main floor (no further steps)?: Yes  Living Arrangements: Lives w/ Spouse/significant other    Activities of Daily Living Prior to Admission  Functional Status: Independent  Completes ADLs independently?: Yes  Ambulates independently?: Yes  Does patient use assisted devices?: Yes  Assisted Devices (DME) used: Straight Cane  Does patient currently own DME?: Yes  What DME does the patient currently own?: Elizabeth Banuelos  Does patient have a history of Outpatient Therapy (PT/OT)?: No  Does the patient have a history of Short-Term Rehab?: No  Does patient currently have [facility]?: No    Patient Information Continued  Does patient have prescription coverage?: Yes  Does patient have a history of substance abuse?: No  Does patient have a history of Mental Health Diagnosis?: No    Means of Transportation  Means of Transport to [facility][de-identified] [address] (pt reports to have a Senior bus pass, and uses public transportation to and from all appointments )        DISCHARGE DETAILS:  Discharge planning discussed with[de-identified] pt at bedside and spouse Ryann Hoffmann [address] [facility]  [place]iolet of Choice: Yes  Comments - Freedom of Choice: Therapy recommendations  for STR discussed with patient and pts spouse, both interested in STR at this time and requested referrals to SNF's in the [facility]  CM contacted family/caregiver?: Yes  Were Treatment Team discharge recommendations reviewed with patient/caregiver?: Yes  Did patient/caregiver verbalize understanding of patient care needs?: Yes  Were patient/caregiver advised of the risks associated with not following Treatment Team discharge recommendations?: Yes    Contacts  Patient Contacts: Bridgette Alexandre (Spouse)  Relationship to Patient[de-identified] Family  Contact Method: Phone  Phone Number: [phone]  Reason/Outcome: Discharge Planning, Referral    Other Referral/Resources/Interventions Provided:  Interventions: Short Term Rehab  Referral Comments: Referral's placed via Brynn Cameron to local SNF's in the [facility] as requested by pt and pts spouse  Per rounds with trauma team, pt is medically cleared for d/c pending placement  CM team will follow for final determination        Treatment Team Recommendation: Short Term Rehab  Discharge Destination Plan[de-identified] Short Term Rehab

## 2022-10-01 NOTE — PLAN OF CARE
Problem: INFECTION - ADULT  Goal: Absence or prevention of progression during hospitalization  Description: INTERVENTIONS:  - Assess and monitor for signs and symptoms of infection  - Monitor lab/diagnostic results  - Monitor all insertion sites, i e  indwelling lines, tubes, and drains  - Monitor endotracheal if appropriate and nasal secretions for changes in amount and color  - [place] appropriate cooling/warming therapies per order  - Administer medications as ordered  - Instruct and encourage patient and family to use good hand hygiene technique  - Identify and instruct in appropriate isolation precautions for identified infection/condition  Outcome: Progressing  Goal: Absence of fever/infection during neutropenic period  Description: INTERVENTIONS:  - Monitor WBC    Outcome: Progressing     Problem: SAFETY ADULT  Goal: Patient will remain free of falls  Description: INTERVENTIONS:  - Educate patient/family on patient safety including physical limitations  - Instruct patient to call for assistance with activity   - Consult OT/PT to assist with strengthening/mobility   - Keep Call bell within reach  - Keep bed low and locked with side rails adjusted as appropriate  - Keep care items and personal belongings within reach  - Initiate and maintain comfort rounds  - Make Fall Risk Sign visible to staff  - Offer Toileting every  Hours, in advance of need  - Initiate/Maintain alarm  - Obtain necessary fall risk management equipment:   - Apply yellow socks and bracelet for high fall risk patients  - Consider moving patient to room near nurses station  Outcome: Progressing  Goal: Maintain or return to baseline ADL function  Description: INTERVENTIONS:  -  Assess patient's ability to carry out ADLs; assess patient's baseline for ADL function and identify physical deficits which impact ability to perform ADLs (bathing, care of mouth/teeth, toileting, grooming, dressing, etc )  - Assess/evaluate cause of self-care deficits   - Assess range of motion  - Assess patient's mobility; develop plan if impaired  - Assess patient's need for assistive devices and provide as appropriate  - Encourage maximum independence but intervene and supervise when necessary  - Involve family in performance of ADLs  - Assess for home care needs following discharge   - Consider OT consult to assist with ADL evaluation and planning for discharge  - Provide patient education as appropriate  Outcome: Progressing  Goal: Maintains/Returns to pre admission functional level  Description: INTERVENTIONS:  - Perform BMAT or MOVE assessment daily    - Set and communicate daily mobility goal to care team and patient/family/caregiver  - Collaborate with rehabilitation services on mobility goals if consulted  - Perform Range of Motion  times a day  - Reposition patient every  hours    - Dangle patient  times a day  - Stand patient  times a day  - Ambulate patient  times a day  - Out of bed to chair  times a day   - Out of bed for meals  times a day  - Out of bed for toileting  - Record patient progress and toleration of activity level   Outcome: Progressing     Problem: MOBILITY - ADULT  Goal: Maintain or return to baseline ADL function  Description: INTERVENTIONS:  -  Assess patient's ability to carry out ADLs; assess patient's baseline for ADL function and identify physical deficits which impact ability to perform ADLs (bathing, care of mouth/teeth, toileting, grooming, dressing, etc )  - Assess/evaluate cause of self-care deficits   - Assess range of motion  - Assess patient's mobility; develop plan if impaired  - Assess patient's need for assistive devices and provide as appropriate  - Encourage maximum independence but intervene and supervise when necessary  - Involve family in performance of ADLs  - Assess for home care needs following discharge   - Consider OT consult to assist with ADL evaluation and planning for discharge  - Provide patient education as appropriate  Outcome: Progressing  Goal: Maintains/Returns to pre admission functional level  Description: INTERVENTIONS:  - Perform BMAT or MOVE assessment daily    - Set and communicate daily mobility goal to care team and patient/family/caregiver  - Collaborate with rehabilitation services on mobility goals if consulted  - Perform Range of Motion  times a day  - Reposition patient every  hours    - Dangle patient  times a day  - Stand patient  times a day  - Ambulate patient  times a day  - Out of bed to chair  times a day   - Out of bed for meals  times a day  - Out of bed for toileting  - Record patient progress and toleration of activity level   Outcome: Progressing     Problem: Prexisting or High Potential for Compromised Skin Integrity  Goal: Skin integrity is maintained or improved  Description: INTERVENTIONS:  - Identify patients at risk for skin breakdown  - Assess and monitor skin integrity  - Assess and monitor nutrition and hydration status  - Monitor labs   - Assess for incontinence   - Turn and reposition patient  - Assist with mobility/ambulation  - Relieve pressure over bony prominences  - Avoid friction and shearing  - Provide appropriate hygiene as needed including keeping skin clean and dry  - Evaluate need for skin moisturizer/barrier cream  - Collaborate with interdisciplinary team   - Patient/family teaching  - Consider wound care consult   Outcome: Progressing     Problem: Potential for Falls  Goal: Patient will remain free of falls  Description: INTERVENTIONS:  - Educate patient/family on patient safety including physical limitations  - Instruct patient to call for assistance with activity   - Consult OT/PT to assist with strengthening/mobility   - Keep Call bell within reach  - Keep bed low and locked with side rails adjusted as appropriate  - Keep care items and personal belongings within reach  - Initiate and maintain comfort rounds  - Make Fall Risk Sign visible to staff  - Offer Toileting every  Hours, in advance of need  - Initiate/Maintain alarm  - Obtain necessary fall risk management equipment:  - Apply yellow socks and bracelet for high fall risk patients  - Consider moving patient to room near nurses station  Outcome: Progressing     Problem: Nutrition/Hydration-ADULT  Goal: Nutrient/Hydration intake appropriate for improving, restoring or maintaining nutritional needs  Description: Monitor and assess patient's nutrition/hydration status for malnutrition  Collaborate with interdisciplinary team and initiate plan and interventions as ordered  Monitor patient's weight and dietary intake as ordered or per policy  Utilize nutrition screening tool and intervene as necessary  Determine patient's food preferences and provide high-protein, high-caloric foods as appropriate       INTERVENTIONS:  - Monitor oral intake, urinary output, labs, and treatment plans  - Assess nutrition and hydration status and recommend course of action  - Evaluate amount of meals eaten  - Assist patient with eating if necessary   - Allow adequate time for meals  - Recommend/ encourage appropriate diets, oral nutritional supplements, and vitamin/mineral supplements  - Order, calculate, and assess calorie counts as needed  - Recommend, monitor, and adjust tube feedings and TPN/PPN based on assessed needs  - Assess need for intravenous fluids  - Provide specific nutrition/hydration education as appropriate  - Include patient/family/caregiver in decisions related to nutrition  Outcome: Progressing     Problem: NEUROSENSORY - ADULT  Goal: Achieves stable or improved neurological status  Description: INTERVENTIONS  - Monitor and report changes in neurological status  - Monitor vital signs such as temperature, blood pressure, glucose, and any other labs ordered   - Initiate measures to prevent increased intracranial pressure  - Monitor for seizure activity and implement precautions if appropriate      Outcome: Progressing  Goal: Remains free of injury related to seizures activity  Description: INTERVENTIONS  - Maintain airway, patient safety  and administer oxygen as ordered  - Monitor patient for seizure activity, document and report duration and description of seizure to physician/advanced practitioner  - If seizure occurs,  ensure patient safety during seizure  - Reorient patient post seizure  - Seizure pads on all 4 side rails  - Instruct patient/family to notify RN of any seizure activity including if an aura is experienced  - Instruct patient/family to call for assistance with activity based on nursing assessment  - Administer anti-seizure medications if ordered    Outcome: Progressing  Goal: Achieves maximal functionality and self care  Description: INTERVENTIONS  - Monitor swallowing and airway patency with patient fatigue and changes in neurological status  - Encourage and assist patient to increase activity and self care     - Encourage visually impaired, hearing impaired and aphasic patients to use assistive/communication devices  Outcome: Progressing     Problem: CARDIOVASCULAR - ADULT  Goal: Maintains optimal cardiac output and hemodynamic stability  Description: INTERVENTIONS:  - Monitor I/O, vital signs and rhythm  - Monitor for S/S and trends of decreased cardiac output  - Administer and titrate ordered vasoactive medications to optimize hemodynamic stability  - Assess quality of pulses, skin color and temperature  - Assess for signs of decreased coronary artery perfusion  - Instruct patient to report change in severity of symptoms  Outcome: Progressing  Goal: Absence of cardiac dysrhythmias or at baseline rhythm  Description: INTERVENTIONS:  - Continuous cardiac monitoring, vital signs, obtain 12 lead EKG if ordered  - Administer antiarrhythmic and heart rate control medications as ordered  - Monitor electrolytes and administer replacement therapy as ordered  Outcome: Progressing

## 2022-10-02 ENCOUNTER — NURSING HOME VISIT (OUTPATIENT)
Dept: GERIATRICS | Facility: OTHER | Age: 77
End: 2022-10-02
Payer: MEDICARE

## 2022-10-02 VITALS
HEART RATE: 82 BPM | TEMPERATURE: 97.3 F | WEIGHT: 150 LBS | OXYGEN SATURATION: 97 % | SYSTOLIC BLOOD PRESSURE: 141 MMHG | HEIGHT: 72 IN | BODY MASS INDEX: 20.32 KG/M2 | DIASTOLIC BLOOD PRESSURE: 82 MMHG | RESPIRATION RATE: 16 BRPM

## 2022-10-02 DIAGNOSIS — S06.5XAA SDH (SUBDURAL HEMATOMA): Primary | ICD-10-CM

## 2022-10-02 LAB
FLUAV RNA RESP QL NAA+PROBE: NEGATIVE
FLUBV RNA RESP QL NAA+PROBE: NEGATIVE
RSV RNA RESP QL NAA+PROBE: NEGATIVE
SARS-COV-2 RNA RESP QL NAA+PROBE: NEGATIVE

## 2022-10-02 PROCEDURE — 0241U HB NFCT DS VIR RESP RNA 4 TRGT: CPT | Performed by: SURGERY

## 2022-10-02 PROCEDURE — 97112 NEUROMUSCULAR REEDUCATION: CPT

## 2022-10-02 PROCEDURE — 99306 1ST NF CARE HIGH MDM 50: CPT | Performed by: INTERNAL MEDICINE

## 2022-10-02 PROCEDURE — 99024 POSTOP FOLLOW-UP VISIT: CPT | Performed by: SURGERY

## 2022-10-02 PROCEDURE — 97116 GAIT TRAINING THERAPY: CPT

## 2022-10-02 PROCEDURE — NC001 PR NO CHARGE: Performed by: SURGERY

## 2022-10-02 PROCEDURE — 94760 N-INVAS EAR/PLS OXIMETRY 1: CPT

## 2022-10-02 PROCEDURE — 97530 THERAPEUTIC ACTIVITIES: CPT

## 2022-10-02 PROCEDURE — 94640 AIRWAY INHALATION TREATMENT: CPT

## 2022-10-02 RX ADMIN — LEVALBUTEROL HYDROCHLORIDE 1.25 MG: 1.25 SOLUTION, CONCENTRATE RESPIRATORY (INHALATION) at 07:16

## 2022-10-02 RX ADMIN — METOPROLOL SUCCINATE 25 MG: 25 TABLET, EXTENDED RELEASE ORAL at 08:46

## 2022-10-02 RX ADMIN — FLUTICASONE FUROATE AND VILANTEROL TRIFENATATE 1 PUFF: 100; 25 POWDER RESPIRATORY (INHALATION) at 08:44

## 2022-10-02 RX ADMIN — NICOTINE 1 PATCH: 14 PATCH, EXTENDED RELEASE TRANSDERMAL at 08:43

## 2022-10-02 RX ADMIN — PANTOPRAZOLE SODIUM 40 MG: 40 TABLET, DELAYED RELEASE ORAL at 06:18

## 2022-10-02 RX ADMIN — HEPARIN SODIUM 5000 UNITS: 5000 INJECTION INTRAVENOUS; SUBCUTANEOUS at 06:18

## 2022-10-02 RX ADMIN — TAMSULOSIN HYDROCHLORIDE 0.4 MG: 0.4 CAPSULE ORAL at 08:44

## 2022-10-02 RX ADMIN — LEVETIRACETAM 500 MG: 500 TABLET, FILM COATED ORAL at 08:46

## 2022-10-02 RX ADMIN — IPRATROPIUM BROMIDE 0.5 MG: 0.5 SOLUTION RESPIRATORY (INHALATION) at 07:16

## 2022-10-02 NOTE — PLAN OF CARE
Problem: PHYSICAL THERAPY ADULT  Goal: Performs mobility at highest level of function for planned discharge setting  See evaluation for individualized goals  Description: Treatment/Interventions: Functional transfer training, LE strengthening/ROM, Elevations, Therapeutic exercise, Endurance training, Patient/family training, Equipment eval/education, Bed mobility, Gait training, Spoke to nursing, OT  Equipment Recommended: Alex Richardson       See flowsheet documentation for full assessment, interventions and recommendations  Outcome: Progressing  Note: Prognosis: Good  Problem List: Impaired balance, Decreased mobility, Decreased endurance, Decreased safety awareness  Assessment: The patient continues to remain limited due to dizziness with mobility  He performs all movements exceptionally slow to reduce his dizziness  This translates to a significantly reduced gait velocity which is demonstrative of an increased risk of falls  He declined further ambulation as he wanted to watch sports on television, and he did not want to feel worse from being dizzy  He was able ot stand for prolonged time without any demonstrable fatigue  PT Discharge Recommendation: Post acute rehabilitation services    See flowsheet documentation for full assessment

## 2022-10-02 NOTE — ASSESSMENT & PLAN NOTE
- had syncopal event with fall  - given [facility] prior to transfer  - GCS 15, no focal deficits  - 9/28 CTA H&N: Right subdural hematoma that is likely subacute  Slight localized mass effect  No shift or herniation  No acute territorial infarct  No significant stenosis of the cervical carotid or vertebral arteries  No significant intracranial stenosis, large vessel occlusion or aneurysm    - Neurosurgery consult  - 9/29 Repeat Head CT: stable  - PT/OT: recommending rehab  - pain control

## 2022-10-02 NOTE — PHYSICAL THERAPY NOTE
Physical Therapy Progress Note     10/02/22 1200   PT Last Visit   PT Visit Date 10/02/22   Note Type   Note Type Treatment   Pain Assessment   Pain Assessment Tool 0-10   Pain Score No Pain   Restrictions/Precautions   Other Precautions Cognitive; Chair Alarm; Bed Alarm; Fall Risk  (Alarm active post session )   Subjective   Subjective The patient requesting to go to the bathroom  He notes continued dizziness with movement  Bed Mobility   Supine to Sit 5  Supervision   Additional items Increased time required;Verbal cues   Sit to Supine 5  Supervision   Additional items Increased time required;Verbal cues   Transfers   Sit to Stand 4  Minimal assistance   Additional items Assist x 1; Increased time required   Stand to Sit 4  Minimal assistance   Additional items Assist x 1;Verbal cues; Increased time required   Ambulation/Elevation   Gait pattern Excessively slow; Short stride; Inconsistent khanh;Decreased foot clearance   Gait Assistance 4  Minimal assist   Additional items Assist x 1;Verbal cues   Assistive Device Rolling walker   Distance 10 feet x 2  Balance   Static Sitting Fair   Dynamic Sitting Fair   Static Standing Fair -   Ambulatory Poor +   Activity Tolerance   Activity Tolerance Patient tolerated treatment well   Assessment   Prognosis Good   Problem List Impaired balance;Decreased mobility; Decreased endurance;Decreased safety awareness   Assessment The patient continues to remain limited due to dizziness with mobility  He performs all movements exceptionally slow to reduce his dizziness  This translates to a significantly reduced gait velocity which is demonstrative of an increased risk of falls  He declined further ambulation as he wanted to watch sports on television, and he did not want to feel worse from being dizzy  He was able ot stand for prolonged time without any demonstrable fatigue  Goals   Patient Goals To get better     STG Expiration Date 10/09/22   PT Treatment Day 2   Plan Treatment/Interventions Functional transfer training;LE strengthening/ROM; Elevations; Therapeutic exercise; Endurance training;Patient/family training;Bed mobility;Gait training   Progress Progressing toward goals   PT Frequency   (3-6x a week )   Recommendation   PT Discharge Recommendation Post acute rehabilitation services   Equipment Recommended [facility] Recommended Wheeled walker   AM-PAC Basic Mobility Inpatient   Turning in Bed Without Bedrails 4   Lying on Back to Sitting on Edge of Flat Bed 3   Moving Bed to Chair 3   Standing Up From Chair 3   Walk in Room 3   Climb 3-5 Stairs 3   Basic Mobility Inpatient Raw Score 19   Basic Mobility Standardized Score 42 48   Highest Level Of Mobility   JH-HLM Goal 6: Walk 10 steps or more   JH-HLM Achieved 6: Walk 10 steps or more       An AM-PAC Basic Mobility standardized score less than 40 78 suggests the patient may benefit from discharge to post-acute rehab services      Akanksha Newton

## 2022-10-02 NOTE — CASE MANAGEMENT
Case Management Discharge Planning Note    Patient name Georgene Romberg  Location [address] 603/PPHP 927-55 MRN [id number]  : 1945 Date 10/2/2022       Current Admission Date: 2022  Current Admission Diagnosis:SDH (subdural hematoma)   Patient Active Problem List    Diagnosis Date Noted    SDH (subdural hematoma) 2022    Recurrent right inguinal hernia 2022    Nasal congestion 2022    Encounter for support and coordination of transition of care 2022    Moderate protein-calorie malnutrition ([address] ) 2022    Cholestatic jaundice 2022    Sepsis secondary to UTI (HCC)/bacteremia 2022    Iron deficiency anemia likely secondary to GI bleeding 2022    Abnormal colonoscopy 2022    Dizziness 2022    Urinary retention 2022    Essential (hemorrhagic) thrombocythemia ([facility] [address] ) 2022    Closed nondisplaced comminuted fracture of left patella 2021    Head trauma 2021    Thrombocytosis 2021    Bradycardia 2021    Syncope 2021    Colonic adenoma 2021    Ambulatory dysfunction 2021    COPD (chronic obstructive pulmonary disease) ([facility] [address] ) 2021    Orthostatic hypotension 2020    Tobacco use disorder 2020    Symptomatic anemia 2020    Congestive cardiomyopathy ([facility] [address] ) 2020    Permanent atrial fibrillation ([facility] [address] ) 2020      LOS (days): 4  Geometric Mean LOS (GMLOS) (days): 2 10  Days to GMLOS:-1 6     OBJECTIVE:  Risk of Unplanned Readmission Score: 14 81         Current admission status: Inpatient   Preferred Pharmacy:   [facility] , [address] [address]  Phone: [phone] Fax: [place], [place] - [address] [facility]  [address] [facility]  [address] [place] [address]  Phone: [phone] Fax: [phone]    Primary Care Provider: Fernanda Francisco MD    Primary Insurance: MEDICARE  Secondary Insurance:  FOR LIFE    DISCHARGE DETAILS:    Contacts  Patient Contacts: Lico Jacob (Spouse)  Relationship to Patient[de-identified] Family  Contact Method: Phone  Phone Number: [phone]  Reason/Outcome: Discharge Planning, Referral VM left to wife in order to update on DCP  Other Referral/Resources/Interventions Provided:  Interventions: Short Term Rehab  Referral Comments: Per communication with trauma team pt is cleared for d/c today pending accepting bed  Per [facility] communication pt accepted for admission to [facility] with available bed today  CM informed pt of same who requested to accept bed at [facility]  Covid requested prior to transfer  Covid test ordered and pending  Transport requested for 12pm p/u  CM will follow for confirmed transport time  Treatment Team Recommendation: Short Term Rehab  Discharge Destination Plan[de-identified] Short Term Rehab  Transport at Discharge : [facility]  Dispatcher Contacted: Yes  Number/Name of Dispatcher: Round Trip  Transported by [employer] and Unit #):  SLETS  ETA of Transport (Date): 10/02/22  ETA of Transport (Time):  (request made for 12pm  CM will follow for confirmed transport time )    Accepting Facility Name, [address] : [facility]  Receiving Facility/Agency Phone Number: [phone]

## 2022-10-02 NOTE — CASE MANAGEMENT
Case Management Discharge Planning Note    Patient name Avani Frausto  [facility] [address] [facility] [id number]  : 1945 Date 10/2/2022       Current Admission Date: 2022  Current Admission Diagnosis:SDH (subdural hematoma)   Patient Active Problem List    Diagnosis Date Noted    SDH (subdural hematoma) 2022    Recurrent right inguinal hernia 2022    Nasal congestion 2022    Encounter for support and coordination of transition of care 2022    Moderate protein-calorie malnutrition ([facility] [address] [facility] ) 2022    Cholestatic jaundice 2022    Sepsis secondary to UTI (HCC)/bacteremia 2022    Iron deficiency anemia likely secondary to GI bleeding 2022    Abnormal colonoscopy 2022    Dizziness 2022    Urinary retention 2022    Essential (hemorrhagic) thrombocythemia ([facility] [address] [facility] ) 2022    Closed nondisplaced comminuted fracture of left patella 2021    Head trauma 2021    Thrombocytosis 2021    Bradycardia 2021    Syncope 2021    Colonic adenoma 2021    Ambulatory dysfunction 2021    COPD (chronic obstructive pulmonary disease) ([facility] [address] [facility] ) 2021    Orthostatic hypotension 2020    Tobacco use disorder 2020    Symptomatic anemia 2020    Congestive cardiomyopathy ([facility] ) 2020    Permanent atrial fibrillation ([facility] ) 2020      LOS (days): 4  Geometric Mean LOS (GMLOS) (days): 2 10  Days to GMLOS:-1 6     OBJECTIVE:  Risk of Unplanned Readmission Score: 14 81         Current admission status: Inpatient   Preferred Pharmacy:   [facility] , [address] [place] [address]  Phone: [phone] Fax: [place], [place] - [address] [place] [address]  [address] [place] [address]  Phone: [phone] Fax: [phone]    Primary Care Provider: Anthoney Seip, MD    Primary Insurance: MEDICARE  Secondary Insurance:  FOR LIFE    DISCHARGE DETAILS:    Treatment Team Recommendation: Short Term Rehab  Discharge Destination Plan[de-identified] Short Term Rehab  Transport at Discharge : [facility]  Dispatcher Contacted: Yes  Number/Name of Dispatcher: Round Trip  Transported by [employer] and Unit #): SLEIRVIN  ETA of Transport (Date): 10/02/22  ETA of Transport (Time): 1230     IMM Given (Date):: 10/02/22  IMM Given to[de-identified] Patient    Accepting Facility Name, [address] : [address] [facility]  Receiving Facility/Agency Phone Number: [phone]  Facility/Agency Fax Number: [phone]     Pt, SLIM, bedside RN, and Lorena Wang informed of transport time  D/c envelope at charge desk

## 2022-10-02 NOTE — ASSESSMENT & PLAN NOTE
History of recent syncope episode leading to head trauma  Per records patient felt lightheaded, falling forward hitting his head on the door knob while he was walking to the kitchen to make morning coffee  CT head revealed right subdural hematoma  Patient was evaluated by neurosurgical service  Conservative treatment was recommended  CT head revealed stable findings  CTA was negative for any significant stenosis of cervical carotid or vertebral arteries Patient was started by a week for seizure prophylaxis  Heart Eliquis was discontinued for now  Continue PT OT    Follow-up with neuro surgical service

## 2022-10-02 NOTE — PROGRESS NOTES
[facility] [address], [address] [place], [place] [address]    Nursing Home Admission    NAME: Zulma Pham  AGE: 78 y o  SEX: male [id number]      [facility]    Patients care was coordinated with nursing facility staff  Recent vitals, labs and updated medications were reviewed on [facility]  Past Medical, surgical, social, medication and allergy history and patients previous records reviewed  Assessment/Plan:    SDH (subdural hematoma)    History of recent syncope episode leading to head trauma  Per records patient felt lightheaded, falling forward hitting his head on the door knob while he was walking to the kitchen to make morning coffee  CT head revealed right subdural hematoma  Patient was evaluated by neurosurgical service  Conservative treatment was recommended  CT head revealed stable findings  CTA was negative for any significant stenosis of cervical carotid or vertebral arteries Patient was started by a week for seizure prophylaxis  Heart Eliquis was discontinued for now  Continue PT OT  Follow-up with neuro surgical service      Atrial fibrillation:    Heart rate stable on metoprolol  She was recently taken off of Eliquis status internal hematoma  Per records patient underwent loop recorder in pain on 09/30  Blood pressure was noted to be borderline low today  Other readings have been stable  Will continue to monitor    Syncope:  Patient was recently hospitalized with a syncope episode  Tele monitor revealed sinus pauses not significant enough for any acute intervention  Patient underwent loop recorder placement on 09/30  There was a question of orthostatic hypotension causing syncope  Follow-up with cardiology service  Check orthostatic blood pressure    GERD:  Stable on pantoprazole    BPH:  Patient has a chronic Cloud in place    Continue tamsulosin    COPD:  Stable on nebulizer treatments and Advair    Anemia; Recent hemoglobin was stable at 13   Decreased iron supplements to once daily    Chief Complaint     Subdural hematoma, recent fall    HPI       Patient is a 78 y o  male with past medical history significant for hypertension, AFib on Eliquis, CAD status post PCI, anxiety, hypertension and depression  Patient was hospitalized on 09/28/2022 with a syncope episode  Per records patient felt lightheaded and passed out falling forward and hitting his head on a door frame while walking to the kitchen to make his coffee  EMS was called  Workup revealed right-sided subdural hematoma  Patient denied any focal weakness headache numbness or tingling  Patient was evaluated by neurosurgical service  Conservative treatment was recommended  Repeat CT head demonstrated stable subdural hematoma  Patient was started on Keppra for a week for seizure prophylaxis  Etiology of syncope was unclear  There was a question of orthostatic hypotension  Tele revealed sinus pauses not significant enough for intervention  Patient underwent loop recorder placement on 09/30/2022  Patient was seen by PT OT services and subsequently discharged to [facility] rehab where he is being seen for post hospital admission  At the time of my evaluation patient is doing okay  Past Medical History:   Diagnosis Date    Anxiety disorder     Atrial fibrillation (HCC)     Coronary artery disease     Depression     Cloud catheter in place     Hepatitis C     screening negative in 1/2017    Hypertension     MI (myocardial infarction) ([facility] [address] )     Use of cane as ambulatory aid        Past Surgical History:   Procedure Laterality Date    CARDIAC CATHETERIZATION  07/09/2018    CARDIAC ELECTROPHYSIOLOGY PROCEDURE N/A 9/30/2022    Procedure: Cardiac loop recorder implant;  Surgeon: Ana Rizo MD;  Location: [facility] CARDIAC CATH [facility];   Service: Cardiology    COLONOSCOPY      HI REPAIR ING HERNIA,5+Y/O,REDUCIBL Right 8/11/2022 Procedure: REPAIR HERNIA INGUINAL;  Surgeon: Ling Encarnacion DO;  Location: [facility] Main OR;  Service: General    TONSILLECTOMY         Social History     Tobacco Use   Smoking Status Current Every Day Smoker    Packs/day: 3 00    Years: 57 00    Pack years: 171 00    Types: Cigarettes   Smokeless Tobacco Never Used   Tobacco Comment    since age 17; Has history of smoking for over 54 years  He has been smoking more than packet daily in the past however he has been smokes about half a pack a day lately and stopped smoking on 7/1/2018 when he was admitted to the hospital            Family History   Problem Relation Age of Onset    Hypertension Mother     Coronary artery disease Mother         premature    Hypertension Father     Coronary artery disease Father         premature    Diabetes Father         No Known Allergies       Current Outpatient Medications:     Advair Diskus 100-50 MCG/ACT inhaler, USE 1 INHALATION TWICE A DAY (RINSE MOUTH AFTER USE), Disp: 60 blister, Rfl: 11    azelastine (ASTELIN) 0 1 % nasal spray, 1 spray into each nostril in the morning and 1 spray in the evening  Use in each nostril as directed   (Patient taking differently: 1 spray into each nostril 2 (two) times a day as needed Use in each nostril as directed), Disp: 90 mL, Rfl: 3    docusate sodium (COLACE) 100 mg capsule, Take 1 capsule (100 mg total) by mouth 2 (two) times a day, Disp: 180 capsule, Rfl: 0    ferrous sulfate 324 (65 Fe) mg, Take 1 tablet (324 mg total) by mouth 2 (two) times a day before meals, Disp: 180 tablet, Rfl: 2    folic acid (FOLVITE) 448 mcg tablet, Take 1 tablet (400 mcg total) by mouth daily, Disp: 30 tablet, Rfl: 0    ipratropium-albuterol (DUO-NEB) 0 5-2 5 mg/3 mL nebulizer solution, INHALE CONTENTS OF 1 VIAL VIA NEBULIZER FOUR TIMES A DAY, Disp: 60 mL, Rfl: 11    levETIRAcetam (KEPPRA) 500 mg tablet, Take 1 tablet (500 mg total) by mouth every 12 (twelve) hours for 8 doses, Disp: 8 tablet, Rfl: 0    metoprolol succinate (Toprol XL) 25 mg 24 hr tablet, Take 1 tablet (25 mg total) by mouth daily, Disp: 90 tablet, Rfl: 3    nicotine (NICODERM CQ) 7 mg/24hr TD 24 hr patch, Place 1 patch on the skin daily (Patient not taking: Reported on 9/29/2022), Disp: 28 patch, Rfl: 0    pantoprazole (PROTONIX) 40 mg tablet, TAKE 1 TABLET DAILY BEFORE BREAKFAST, Disp: 90 tablet, Rfl: 3    potassium chloride (Klor-Con) 10 mEq tablet, TAKE 1 TABLET DAILY, Disp: 90 tablet, Rfl: 3    tamsulosin (FLOMAX) 0 4 mg, TAKE 1 CAPSULE DAILY, Disp: 30 capsule, Rfl: 11  No current facility-administered medications for this visit  Updated list was reviewed in [address] [place] [address] system of facility  Vitals:    10/03/22 0907   BP: 95/60   Pulse: 68   Resp: 18   Temp: 98 1 °F (36 7 °C)   SpO2: 94%       Vital signs were reviewed in point click care    Review of Systems   Constitutional: Negative for chills, fatigue and fever  HENT: Negative for nosebleeds and rhinorrhea  Eyes: Negative for discharge and redness  Respiratory: Negative for cough, chest tightness, shortness of breath, wheezing and stridor  Cardiovascular: Negative for chest pain and leg swelling  Gastrointestinal: Negative for abdominal distention, abdominal pain, diarrhea and vomiting  Genitourinary: Negative for dysuria, flank pain and hematuria  Musculoskeletal: Positive for gait problem  Negative for arthralgias and back pain  Skin: Negative for pallor  Neurological: Positive for dizziness (Occasional), syncope (History of recent syncope episode leading to head trauma) and weakness (Generalized)  Negative for tremors, seizures and headaches  Psychiatric/Behavioral: Negative for agitation, behavioral problems and confusion  Physical Exam  Constitutional:       General: He is not in acute distress  HENT:      Head: Normocephalic  Eyes:      General: No scleral icterus  Right eye: No discharge           Left eye: No discharge  Cardiovascular:      Rate and Rhythm: Normal rate and regular rhythm  Pulmonary:      Breath sounds: No wheezing, rhonchi or rales  Abdominal:      General: There is no distension  Palpations: Abdomen is soft  Tenderness: There is no abdominal tenderness  Musculoskeletal:      Cervical back: Neck supple  Right lower leg: No edema  Left lower leg: No edema  Skin:     Coloration: Skin is not jaundiced  Comments: Dressing noted over loop recorder site  Neurological:      General: No focal deficit present  Mental Status: He is oriented to person, place, and time  Cranial Nerves: No cranial nerve deficit  Psychiatric:         Mood and Affect: Mood normal          Behavior: Behavior normal            Diagnostic Data       Recent labs and imaging studies were reviewed    Lab Results   Component Value Date    WBC 7 95 09/30/2022    HGB 13 4 09/30/2022    HCT 44 0 09/30/2022    MCV 93 09/30/2022     09/30/2022        Lab Results   Component Value Date    SODIUM 138 09/29/2022    K 3 8 09/29/2022     09/29/2022    CO2 26 09/29/2022    BUN 16 09/29/2022    CREATININE 0 90 09/29/2022    GLUC 74 09/29/2022    CALCIUM 8 5 09/29/2022       Code Status:      Full code               This note was electronically signed by Dr Deonna Sosa

## 2022-10-02 NOTE — PROGRESS NOTES
[facility]  Progress Note - Sienna Poll 1945, 78 y o  male MRN: [id number]  Unit/Bed#: [facility] 603-01 Encounter: [id number]  Primary Care Provider: Carli Blackburn MD   Date and time admitted to hospital: 9/28/2022  4:54 PM    Syncope  Assessment & Plan  - echo pending  - continue to monitor on telemetry  - EP consult  - Loop recorder placement 9/30    Permanent atrial fibrillation ([address] )  Assessment & Plan  - Hold eliquis  - s/p [facility] for SDH    * SDH (subdural hematoma)  Assessment & Plan  - had syncopal event with fall  - given [facility] prior to transfer  - GCS 15, no focal deficits  - 9/28 CTA H&N: Right subdural hematoma that is likely subacute  Slight localized mass effect  No shift or herniation  No acute territorial infarct  No significant stenosis of the cervical carotid or vertebral arteries  No significant intracranial stenosis, large vessel occlusion or aneurysm  - Neurosurgery consult  - 9/29 Repeat Head CT: stable  - PT/OT: recommending rehab  - pain control            Disposition: D/c to [facility] today  SUBJECTIVE:  Chief Complaint: No complaints    Subjective: No acute events overnight  Patient denies pain this morning  OBJECTIVE:   Vitals:   Temp:  [96 2 °F (35 7 °C)-98 2 °F (36 8 °C)] 97 3 °F (36 3 °C)  HR:  [82-88] 82  Resp:  [16-18] 16  BP: (117-141)/(73-89) 141/82    Intake/Output:  I/O       09/30 0701  10/01 0700 10/01 0701  10/02 0700 10/02 0701  10/03 0700    P  O  960 240     Total Intake(mL/kg) 960 (14 1) 240 (3 5)     Urine (mL/kg/hr) 1150 (0 7) 1075 (0 7)     Total Output 1150 1075     Net -190 -835                 Nutrition: Diet Regular; Regular House  GI Proph/Bowel Reg: Protonix  VTE Prophylaxis:Heparin     Physical Exam:   NAD, alert and oriented x3  Normocephalic  Moist mucous membranes   Norm resp effort on room air   Regular rate  Abd soft, NT/ND  No calf tenderness or peripheral edema  CN grossly intact, GCS15  Skin is warm and dry      Invasive Devices  Report    Peripheral Intravenous Line  Duration           Peripheral IV 09/29/22 Right;Ventral (anterior) Forearm 2 days          Drain  Duration           Urethral Catheter 18 Fr  213 days                      Lab Results: BMP/CMP: No results found for: SODIUM, K, CL, CO2, ANIONGAP, BUN, CREATININE, GLUCOSE, CALCIUM, AST, ALT, ALKPHOS, PROT, BILITOT, EGFR and CBC: No results found for: WBC, HGB, HCT, MCV, PLT, ADJUSTEDWBC, MCH, MCHC, RDW, MPV, NRBC  Imaging/EKG Studies: I have personally reviewed pertinent reports       Other Studies: N/A

## 2022-10-03 ENCOUNTER — TELEPHONE (OUTPATIENT)
Dept: FAMILY MEDICINE CLINIC | Facility: CLINIC | Age: 77
End: 2022-10-03

## 2022-10-03 VITALS
BODY MASS INDEX: 19.8 KG/M2 | DIASTOLIC BLOOD PRESSURE: 60 MMHG | WEIGHT: 146 LBS | TEMPERATURE: 98.1 F | HEART RATE: 68 BPM | OXYGEN SATURATION: 94 % | SYSTOLIC BLOOD PRESSURE: 95 MMHG | RESPIRATION RATE: 18 BRPM

## 2022-10-03 LAB
ATRIAL RATE: 144 BPM
PR INTERVAL: 64 MS
QRS AXIS: 46 DEGREES
QRSD INTERVAL: 97 MS
QT INTERVAL: 418 MS
QTC INTERVAL: 483 MS
T WAVE AXIS: 62 DEGREES
VENTRICULAR RATE: 80 BPM

## 2022-10-03 PROCEDURE — 93010 ELECTROCARDIOGRAM REPORT: CPT | Performed by: INTERNAL MEDICINE

## 2022-10-03 NOTE — TELEPHONE ENCOUNTER
Pt wife called in stating pt had an apt on 9/28 but she had canceled it because she she took him to the ER  [facility]  Pt wife stated he will be staying there for about 2 to 3 weeks  I explained to her that pt should be seen here once he is discharged!

## 2022-10-05 ENCOUNTER — NURSING HOME VISIT (OUTPATIENT)
Dept: GERIATRICS | Facility: OTHER | Age: 77
End: 2022-10-05
Payer: MEDICARE

## 2022-10-05 ENCOUNTER — NURSING HOME VISIT (OUTPATIENT)
Dept: WOUND CARE | Facility: HOSPITAL | Age: 77
End: 2022-10-05
Payer: MEDICARE

## 2022-10-05 ENCOUNTER — OFFICE VISIT (OUTPATIENT)
Dept: CARDIOLOGY CLINIC | Facility: SKILLED NURSING FACILITY | Age: 77
End: 2022-10-05
Payer: MEDICARE

## 2022-10-05 VITALS
TEMPERATURE: 97.3 F | HEART RATE: 82 BPM | BODY MASS INDEX: 19.75 KG/M2 | SYSTOLIC BLOOD PRESSURE: 137 MMHG | OXYGEN SATURATION: 96 % | DIASTOLIC BLOOD PRESSURE: 75 MMHG | RESPIRATION RATE: 18 BRPM | WEIGHT: 145.6 LBS

## 2022-10-05 DIAGNOSIS — Z95.818 STATUS POST PLACEMENT OF IMPLANTABLE LOOP RECORDER: Primary | ICD-10-CM

## 2022-10-05 DIAGNOSIS — R26.2 AMBULATORY DYSFUNCTION: ICD-10-CM

## 2022-10-05 DIAGNOSIS — S06.5XAA SDH (SUBDURAL HEMATOMA): Primary | ICD-10-CM

## 2022-10-05 DIAGNOSIS — I48.20 CHRONIC ATRIAL FIBRILLATION (HCC): ICD-10-CM

## 2022-10-05 DIAGNOSIS — T14.8XXA SURGICAL WOUND PRESENT: Primary | ICD-10-CM

## 2022-10-05 DIAGNOSIS — R63.6 UNDERWEIGHT: ICD-10-CM

## 2022-10-05 DIAGNOSIS — R55 SYNCOPE, UNSPECIFIED SYNCOPE TYPE: ICD-10-CM

## 2022-10-05 PROCEDURE — 99304 1ST NF CARE SF/LOW MDM 25: CPT | Performed by: NURSE PRACTITIONER

## 2022-10-05 PROCEDURE — 99309 SBSQ NF CARE MODERATE MDM 30: CPT | Performed by: INTERNAL MEDICINE

## 2022-10-05 NOTE — ASSESSMENT & PLAN NOTE
S/p Cardiac loop recorder implant (N/A Chest) on 9/30/2022  - surgical incision is sutured, intact, with no obvious sign of infection  - Leave [facility]ILKE  -Follow up on 10/13/2022 for device check  - Patient will be included in the weekly wound round as per request of the facility

## 2022-10-05 NOTE — PROGRESS NOTES
[address] [facility]NA[facility]   [facility]       Patient ID: Sienna Llanos is a 78 y o  male Date of Birth 1945     Location of Service: [address] [place] [address]    Performed wound round with: Wound team     Chief Complaint : Chest    Wound Instructions:  Chest  Leave SILKE  Monitor for any drainage or sign of infection    Allergies  Patient has no known allergies  Assessment & Plan:  1  Surgical wound present  Assessment & Plan:  S/p Cardiac loop recorder implant (N/A Chest) on 9/30/2022  - surgical incision is sutured, intact, with no obvious sign of infection  - Leave SILKE  -Follow up on 10/13/2022 for device check  - Patient will be included in the weekly wound round as per request of the facility  2  Ambulatory dysfunction  Assessment & Plan:  On STR             Subjective:   10/5/2022This is a 78 y o , male referred to our service for wound/ skin alterations on chest Patient have a complex medical history including but not limited to Atrial fibrillation ([address] ), Coronary artery disease, Depression, Cloud catheter in place, Hepatitis C, Hypertension, MI (myocardial infarction) ([address] ), and Use of cane as ambulatory aid    Patient was referred by Senior Care Team  Patient was seen in collaboration with the facility wound team      Wound History:   As per medical record review, patient had Cardiac loop recorder implant on 9/30/2022  Received patient in bed, seems comfortable  Denies pain  No significant issues related to the wound  Patient have a device check appointment on 10/13/2022  Review of Systems   Constitutional: Negative  HENT: Negative  Eyes: Negative  Respiratory: Negative  Cardiovascular: Negative  Gastrointestinal: Negative  Endocrine: Negative  Genitourinary: Negative  Musculoskeletal: Positive for gait problem  Skin: Positive for wound  See HPI   Hematological: Negative  Psychiatric/Behavioral: Negative      All other systems reviewed and are negative  Objective:    Physical Exam  Constitutional:       Appearance: Normal appearance  HENT:      Head: Normocephalic and atraumatic  Nose: Nose normal       Mouth/Throat:      Mouth: Mucous membranes are moist    Eyes:      Conjunctiva/sclera: Conjunctivae normal    Cardiovascular:      Rate and Rhythm: Normal rate  Pulmonary:      Effort: Pulmonary effort is normal    Abdominal:      Tenderness: There is no abdominal tenderness  Genitourinary:     Comments: continent  Musculoskeletal:      Cervical back: Normal range of motion  Comments: LROM   Skin:     Findings: Lesion present  Comments: Chest - surgical incision is 0 5 x 0 1 cm , sutured, no drainage, intact, approximated, with no obvious sign of infection   Neurological:      Mental Status: He is alert  Gait: Gait abnormal    Psychiatric:         Mood and Affect: Mood normal          Behavior: Behavior normal               Procedures           Patient's care was coordinated with nursing facility staff  Recent vitals, labs and updated medications were reviewed on EMR or chart system of facility  Past Medical, surgical, social, medication and allergy history and patient's previous records were reviewed and updated as appropriate: Most up-to date information is available in the facility EMR where the patient is currently admitted      Patient Active Problem List   Diagnosis    Symptomatic anemia    Congestive cardiomyopathy (HCC)    Permanent atrial fibrillation (HCC)    Tobacco use disorder    Orthostatic hypotension    Syncope    Colonic adenoma    Ambulatory dysfunction    COPD (chronic obstructive pulmonary disease) (HCC)    Bradycardia    Thrombocytosis    Head trauma    Closed nondisplaced comminuted fracture of left patella    Essential (hemorrhagic) thrombocythemia ([address] )    Dizziness    Urinary retention    Abnormal colonoscopy    Iron deficiency anemia likely secondary to GI bleeding    Sepsis secondary to UTI (HCC)/bacteremia    Cholestatic jaundice    Moderate protein-calorie malnutrition ([facility] )    Encounter for support and coordination of transition of care    Nasal congestion    Recurrent right inguinal hernia    SDH (subdural hematoma)    Surgical wound present     Past Medical History:   Diagnosis Date    Anxiety disorder     Atrial fibrillation (HCC)     Coronary artery disease     Depression     Cloud catheter in place     Hepatitis C     screening negative in 1/2017    Hypertension     MI (myocardial infarction) ([facility] )     Use of cane as ambulatory aid      Past Surgical History:   Procedure Laterality Date    CARDIAC CATHETERIZATION  07/09/2018    CARDIAC ELECTROPHYSIOLOGY PROCEDURE N/A 9/30/2022    Procedure: Cardiac loop recorder implant;  Surgeon: Amirah Urban MD;  [facility]: [facility]; Service: Cardiology    COLONOSCOPY      ME REPAIR ING HERNIA,5+Y/O,REDUCIBL Right 8/11/2022    Procedure: REPAIR HERNIA INGUINAL;  Surgeon: Niesha Encarnacion DO;  [facility];  Service: General    TONSILLECTOMY       Social History     Socioeconomic History    Marital status: /Civil Union     Spouse name: None    Number of children: 3    Years of education: 12    Highest education level: 12th grade   Occupational History    Occupation: retired   Tobacco Use    Smoking status: Current Every Day Smoker     Packs/day: 3 00     Years: 57 00     Pack years: 171 00     Types: Cigarettes    Smokeless tobacco: Never Used    Tobacco comment: since age 17; Has history of smoking for over 54 years   He has been smoking more than packet daily in the past however he has been smokes about half a pack a day lately and stopped smoking on 7/1/2018 when he was admitted to the hospital     Vaping Use    Vaping Use: Never used   Substance and Sexual Activity    Alcohol use: Never    Drug use: Never    Sexual activity: Not Currently     Partners: Female     Birth control/protection: Condom Male   Other Topics Concern    None   Social History Narrative    History of Ultra Sound: 2016    History of Stress Test: 2018    History of ECHO: 2018    · Most recent tobacco use screenin2019      · Live alone or with others:   with others        · Diet:   Regular      · Caffeine intake: Moderate      · Guns present in home:   No      · Asbestos exposure:   No      · TB exposure:   No      · Environmental exposure:   No      · Animal exposure:   No      · Smoke alarm in home:   Yes     Social Determinants of Health     Financial Resource Strain: Not on file   Food Insecurity: No Food Insecurity    Worried About Running Out of Food in the Last Year: Never true    Arnel of Food in the Last Year: Never true   Transportation Needs: No Transportation Needs    Lack of Transportation (Medical): No    Lack of Transportation (Non-Medical): No   Physical Activity: Not on file   Stress: Not on file   Social Connections: Not on file   Intimate Partner Violence: Not on file   Housing Stability: Low Risk     Unable to Pay for Housing in the Last Year: No    Number of Places Lived in the Last Year: 1    Unstable Housing in the Last Year: No        Current Outpatient Medications:     Advair Diskus 100-50 MCG/ACT inhaler, USE 1 INHALATION TWICE A DAY (RINSE MOUTH AFTER USE), Disp: 60 blister, Rfl: 11    azelastine (ASTELIN) 0 1 % nasal spray, 1 spray into each nostril in the morning and 1 spray in the evening  Use in each nostril as directed   (Patient taking differently: 1 spray into each nostril 2 (two) times a day as needed Use in each nostril as directed), Disp: 90 mL, Rfl: 3    docusate sodium (COLACE) 100 mg capsule, Take 1 capsule (100 mg total) by mouth 2 (two) times a day, Disp: 180 capsule, Rfl: 0    ferrous sulfate 324 (65 Fe) mg, Take 1 tablet (324 mg total) by mouth 2 (two) times a day before meals, Disp: 180 tablet, Rfl: 2    folic acid (FOLVITE) 400 mcg tablet, Take 1 tablet (400 mcg total) by mouth daily, Disp: 30 tablet, Rfl: 0    ipratropium-albuterol (DUO-NEB) 0 5-2 5 mg/3 mL nebulizer solution, INHALE CONTENTS OF 1 VIAL VIA NEBULIZER FOUR TIMES A DAY, Disp: 60 mL, Rfl: 11    levETIRAcetam (KEPPRA) 500 mg tablet, Take 1 tablet (500 mg total) by mouth every 12 (twelve) hours for 8 doses, Disp: 8 tablet, Rfl: 0    metoprolol succinate (Toprol XL) 25 mg 24 hr tablet, Take 1 tablet (25 mg total) by mouth daily, Disp: 90 tablet, Rfl: 3    nicotine (NICODERM CQ) 7 mg/24hr TD 24 hr patch, Place 1 patch on the skin daily (Patient not taking: Reported on 9/29/2022), Disp: 28 patch, Rfl: 0    pantoprazole (PROTONIX) 40 mg tablet, TAKE 1 TABLET DAILY BEFORE BREAKFAST, Disp: 90 tablet, Rfl: 3    potassium chloride (Klor-Con) 10 mEq tablet, TAKE 1 TABLET DAILY, Disp: 90 tablet, Rfl: 3    tamsulosin (FLOMAX) 0 4 mg, TAKE 1 CAPSULE DAILY, Disp: 30 capsule, Rfl: 11  Family History   Problem Relation Age of Onset    Hypertension Mother     Coronary artery disease Mother         premature    Hypertension Father     Coronary artery disease Father         premature    Diabetes Father               Coordination of Care: Wound team aware of the treatment plan  Facility nurse will provide wound treatment and monitor the wound for any changes  Patient / Staff education : Patient / Staff was given education on sign of infection and pressure ulcer prevention  Patient/ Staff verbalized understanding     Follow up :  Next week    Voice-recognition software may have been used in the preparation of this document  Occasional wrong word or "sound-alike" substitutions may have occurred due to the inherent limitations of voice recognition software  Interpretation should be guided by context        Elijah Bell

## 2022-10-05 NOTE — PROGRESS NOTES
[address]  [address]     Progress Note  Code SNF 31     Patient Location     [facility]    Reason for visit     Follow-up subdural hematoma, AFib, recent syncope, GERD, BPH, COPD,    Patients care was coordinated with nursing facility staff  Recent vitals, labs and updated medications were reviewed on [employer] of facility  Problem List Items Addressed This Visit        Nervous and Auditory    SDH (subdural hematoma) - Primary     Patient was recently hospitalized with a syncope episode leading to head trauma CT head revealed right subdural hematoma  Conservative treatment was recommended by neurosurgical service  Repeat CT revealed stable findings  CTA was negative for any significant stenosis of cervical, carotid or vertebral arteries  Patient was started on Keppra for  seizure prophylaxis  Possibly can be discontinued soon  Await neurosurgical recommendation  Eliquis was discontinued  Follow-up with neuro surgical service  Patient overall remains stable  No focal changes  Continue PT OT               Atrial fibrillation:  Heart rate stable on metoprolol 25 mg daily  Patient had been on digoxin previously later discontinued due to episodes of bradycardia  Eliquis was discontinued recently due to subdural hematoma    Syncopal:  History of recent syncope episode  Telemonitor revealed sinus pauses not significant enough for any PPM placement however patient has history of bradycardia in the past   Patient underwent loop recorder placement on 09/30  Follow-up with cardiology service  Patient has an appointment with device clinic on 10/13/2022  Monitor orthostatic blood pressure  Loop recorder surgical site has healed well  Patient will need suture removal soon      GERD:  Stable on pantoprazole    COPD:  Stable on Advair and nebulizer treatments    Anemia hemoglobin was stable at 13 on 10/03/2022   Iron supplements to be decreased to once daily  Patient additionally remains on folic acid    HPI         Patient is being seen for a follow-up visit today  He is doing okay at present  Denies any active complaints  There has been no recurrence of syncope  Patient denies anydyspnea chest pain GI or  complaints  Surgical incision over the chest is healing well with sutures intact    Review of Systems   Respiratory: Negative for chest tightness and shortness of breath  Cardiovascular: Negative for chest pain and leg swelling  Gastrointestinal: Negative for abdominal pain and vomiting  Genitourinary: Negative for dysuria and flank pain  Neurological: Positive for weakness  Negative for seizures  Psychiatric/Behavioral: Negative for confusion  Past Medical History:   Diagnosis Date    Anxiety disorder     Atrial fibrillation (HCC)     Coronary artery disease     Depression     Cloud catheter in place     Hepatitis C     screening negative in 1/2017    Hypertension     [facility] infarction) [facility] )     Use of cane as ambulatory aid        Past Surgical History:   Procedure Laterality Date    CARDIAC CATHETERIZATION  07/09/2018    CARDIAC ELECTROPHYSIOLOGY PROCEDURE N/A 9/30/2022    Procedure: Cardiac loop recorder implant;  Surgeon: Mathew Powers MD;  [facility]; Service: Cardiology    COLONOSCOPY      FL REPAIR ING HERNIA,5+Y/O,REDUCIBL Right 8/11/2022    Procedure: REPAIR HERNIA INGUINAL;  Surgeon: Cipriano Encarnacion DO;  [facility];  Service: General    TONSILLECTOMY         Social History     Tobacco Use   Smoking Status Current Every Day Smoker    Packs/day: 3 00    Years: 57 00    Pack years: 171 00    Types: Cigarettes   Smokeless Tobacco Never Used   Tobacco Comment    since age 17; Has history of smoking for over 54 years   He has been smoking more than packet daily in the past however he has been smokes about half a pack a day lately and stopped smoking on 7/1/2018 when he was admitted to the hospital         Family History   Problem Relation Age of Onset    Hypertension Mother     Coronary artery disease Mother         premature    Hypertension Father     Coronary artery disease Father         premature    Diabetes Father         No Known Allergies      Current Outpatient Medications:     Advair Diskus 100-50 MCG/ACT inhaler, USE 1 INHALATION TWICE A DAY (RINSE MOUTH AFTER USE), Disp: 60 blister, Rfl: 11    azelastine (ASTELIN) 0 1 % nasal spray, 1 spray into each nostril in the morning and 1 spray in the evening  Use in each nostril as directed   (Patient taking differently: 1 spray into each nostril 2 (two) times a day as needed Use in each nostril as directed), Disp: 90 mL, Rfl: 3    docusate sodium (COLACE) 100 mg capsule, Take 1 capsule (100 mg total) by mouth 2 (two) times a day, Disp: 180 capsule, Rfl: 0    ferrous sulfate 324 (65 Fe) mg, Take 1 tablet (324 mg total) by mouth 2 (two) times a day before meals, Disp: 180 tablet, Rfl: 2    folic acid (FOLVITE) 649 mcg tablet, Take 1 tablet (400 mcg total) by mouth daily, Disp: 30 tablet, Rfl: 0    ipratropium-albuterol (DUO-NEB) 0 5-2 5 mg/3 mL nebulizer solution, INHALE CONTENTS OF 1 VIAL VIA NEBULIZER FOUR TIMES A DAY, Disp: 60 mL, Rfl: 11    levETIRAcetam (KEPPRA) 500 mg tablet, Take 1 tablet (500 mg total) by mouth every 12 (twelve) hours for 8 doses, Disp: 8 tablet, Rfl: 0    metoprolol succinate (Toprol XL) 25 mg 24 hr tablet, Take 1 tablet (25 mg total) by mouth daily, Disp: 90 tablet, Rfl: 3    nicotine (NICODERM CQ) 7 mg/24hr TD 24 hr patch, Place 1 patch on the skin daily (Patient not taking: Reported on 9/29/2022), Disp: 28 patch, Rfl: 0    pantoprazole (PROTONIX) 40 mg tablet, TAKE 1 TABLET DAILY BEFORE BREAKFAST, Disp: 90 tablet, Rfl: 3    potassium chloride (Klor-Con) 10 mEq tablet, TAKE 1 TABLET DAILY, Disp: 90 tablet, Rfl: 3    tamsulosin (FLOMAX) 0 4 mg, TAKE 1 CAPSULE DAILY, Disp: 30 capsule, Rfl: 11    Updated list was reviewed in [facility] of facility  Vitals:    10/05/22 1951   BP: 137/75   Pulse: 82   Resp: 18   Temp: (!) 97 3 °F (36 3 °C)   SpO2: 96%       Physical Exam  HENT:      Head: Normocephalic and atraumatic  Eyes:      General:         Right eye: No discharge  Left eye: No discharge  Cardiovascular:      Rate and Rhythm: Normal rate and regular rhythm  Heart sounds: Murmur heard  Pulmonary:      Breath sounds: No wheezing or rales  Abdominal:      General: There is no distension  Palpations: Abdomen is soft  Tenderness: There is no abdominal tenderness  There is no guarding  Musculoskeletal:      Right lower leg: No edema  Left lower leg: No edema  Skin:     Coloration: Skin is not jaundiced  Comments: Small incision over the chest at the site of loop recorder  healing well with sutures intact   Neurological:      General: No focal deficit present  Motor: Weakness present  Psychiatric:         Behavior: Behavior normal          Diagnostic Data:    Recent labs were reviewed    Labs done on 10/03/2022 revealed BUN of 22, creatinine 0 95, sodium 142 potassium 4 9  Hemoglobin 13, WBC count 6 7, platelet count 095      This note was electronically signed by Dr Aaron Linares

## 2022-10-05 NOTE — PROGRESS NOTES
[facility] [place] [address] [address]  [facility] [address]  Phone#  [phone]  Fax#  [phone]                                               Cardiology Nursing Home Visit - Follow up  Rockne Riedel, 78 y o  male  YOB: 1945  MRN: [id number] Encounter: [id number]      PCP - Bharat Cortes MD  Outpatient cardiologist - [facility]    No chief complaint on file        Assessment  S/p loop recorder implantation  Recurrent syncope  Subdural hematoma  Chronic atrial fibrillation  H/o BERNARDA-cardioversion in 2018, but then went back into afib soon after  Cardiomyopathy  EF 10% in 2018  LHC - 7/9/2018 - no significant CAD  Echo - 8/7/2020 - LVEF 55%, mild-moderate MR, moderate TR, PASP 39  COPD/emphysema  AMbulatory dysfunction  Uses cane     Outpatient cardiologist - Ben Ivey (but has not followed up since 9/2020)    Plan  Recurrent syncope, S/p loop recorder implantation  Possibly orthostatic or vasovagal v bradycardia-mediated  He also had bradycardia while at the hospital and as a result there were concerns about tachy-efren contributing to this and he underwent a loop recorder implantation  No further episodes of dizziness since then  He has previously had near-syncope and syncope as well without any clear explanation, but did have hypotension and bradycardia in June 2021 as well when he was at [facility] on loop recorder  He has an appointment with the device clinic on 10/13/2022, and arrangements will be made for travelling to the same    Chronic atrial fibrillation, subdural hematoma  He was previously on digoxin plus metoprolol, but after his episode in June 2021 with bradycardia and hypotension, he was taken off digoxin, and metoprolol was reduced to 25 mg daily  During recent hospitalization at [facility], he did have Afib with bradycardia and dropped beats, but after EP evaluation it was not felt to be significant enough to need a pacemaker implantation --> loop recorder implanted instead  Continue metoprolol succinate 25 mg daily, while monitoring for bradycardia on loop recorder  Stroke prophylaxis  Was on elqiuis previously, currently on hold - due to subdural hematoma after his recent fall related to syncope  Resume anticoagulation, when acceptable from neurosurgical standpoint      No results found for this visit on 10/05/22  No orders of the defined types were placed in this encounter  Return in about 6 weeks (around 11/16/2022), or if symptoms worsen or fail to improve  History of Present Illness   78 y o  male is currently admitted to the [facility] after recent discharge from [facility] on 9/28/22  We are currently consulted to evaluate and assist with management of her cardiac problems  He has a known history of chronic atrial fibrillation, cardiomyopathy, and had recently presented to the hospital after a syncopal episode  When he woke up in the morning, he initially had vertiginous symptoms  He subsequently developed more dizziness after sitting down at the kitchen table and when he attempted to stand up he got severely dizzy and then passed out  He was admitted at [facility], and was evaluated by EP  There was concerns for bradycardia, but he was felt to not meet criteria for pacemaker implantation, and as a result a loop recorder was implanted into instead  Of note, he has had on and off symptoms of dizziness over the past year, and even had a near syncopal episode last year in June 2021  He had presented to [facility] after this, and I saw him during this admission  At that time, he had bradycardia and hypotension at presentation  He received hydration and reduction in his rate control therapy        Historical Information   Past Medical History:   Diagnosis Date    Anxiety disorder     Atrial fibrillation (HCC)     Coronary artery disease     Depression     Cloud catheter in place  Hepatitis C     screening negative in 1/2017    Hypertension     [facility]     Use of cane as ambulatory aid      Past Surgical History:   Procedure Laterality Date    CARDIAC CATHETERIZATION  07/09/2018    CARDIAC ELECTROPHYSIOLOGY PROCEDURE N/A 9/30/2022    Procedure: Cardiac loop recorder implant;  Surgeon: Seth Sousa MD;  Location: [facility]; Service: Cardiology    COLONOSCOPY      VT REPAIR ING HERNIA,5+Y/O,REDUCIBL Right 8/11/2022    Procedure: REPAIR HERNIA INGUINAL;  Surgeon: Sana Encarnacion DO;  Location: [facility];  Service: General    TONSILLECTOMY       Family History   Problem Relation Age of Onset    Hypertension Mother     Coronary artery disease Mother         premature    Hypertension Father     Coronary artery disease Father         premature    Diabetes Father      Current Outpatient Medications on File Prior to Visit   Medication Sig Dispense Refill    Advair Diskus 100-50 MCG/ACT inhaler USE 1 INHALATION TWICE A DAY (RINSE MOUTH AFTER USE) 60 blister 11    azelastine (ASTELIN) 0 1 % nasal spray 1 spray into each nostril in the morning and 1 spray in the evening  Use in each nostril as directed   (Patient taking differently: 1 spray into each nostril 2 (two) times a day as needed Use in each nostril as directed) 90 mL 3    docusate sodium (COLACE) 100 mg capsule Take 1 capsule (100 mg total) by mouth 2 (two) times a day 180 capsule 0    ferrous sulfate 324 (65 Fe) mg Take 1 tablet (324 mg total) by mouth 2 (two) times a day before meals 983 tablet 2    folic acid (FOLVITE) 348 mcg tablet Take 1 tablet (400 mcg total) by mouth daily 30 tablet 0    ipratropium-albuterol (DUO-NEB) 0 5-2 5 mg/3 mL nebulizer solution INHALE CONTENTS OF 1 VIAL VIA NEBULIZER FOUR TIMES A DAY 60 mL 11    levETIRAcetam (KEPPRA) 500 mg tablet Take 1 tablet (500 mg total) by mouth every 12 (twelve) hours for 8 doses 8 tablet 0    metoprolol succinate (Toprol XL) 25 mg 24 hr tablet Take 1 tablet (25 mg total) by mouth daily 90 tablet 3    nicotine (NICODERM CQ) 7 mg/24hr TD 24 hr patch Place 1 patch on the skin daily (Patient not taking: Reported on 2022) 28 patch 0    pantoprazole (PROTONIX) 40 mg tablet TAKE 1 TABLET DAILY BEFORE BREAKFAST 90 tablet 3    potassium chloride (Klor-Con) 10 mEq tablet TAKE 1 TABLET DAILY 90 tablet 3    tamsulosin (FLOMAX) 0 4 mg TAKE 1 CAPSULE DAILY 30 capsule 11     No current facility-administered medications on file prior to visit  No Known Allergies  Social History     Socioeconomic History    Marital status: /Civil Union     Spouse name: None    Number of children: 3    Years of education: 12    Highest education level: 12th grade   Occupational History    Occupation: retired   Tobacco Use    Smoking status: Current Every Day Smoker     Packs/day: 3 00     Years: 57 00     Pack years: 171 00     Types: Cigarettes    Smokeless tobacco: Never Used    Tobacco comment: since age 17; Has history of smoking for over 54 years  He has been smoking more than packet daily in the past however he has been smokes about half a pack a day lately and stopped smoking on 2018 when he was admitted to the hospital     Vaping Use    Vaping Use: Never used   Substance and Sexual Activity    Alcohol use: Never    Drug use: Never    Sexual activity: Not Currently     Partners: Female     Birth control/protection: Condom Male   Other Topics Concern    None   Social History Narrative    History of Ultra Sound: 2016    History of Stress Test: 2018    History of ECHO: 2018    · Most recent tobacco use screenin2019      · Live alone or with others:   with others        · Diet:   Regular      · Caffeine intake: Moderate      · Guns present in home:   No      · Asbestos exposure:   No      · TB exposure:   No      · Environmental exposure:   No      · Animal exposure:   No      · Smoke alarm in home:    Yes Social Determinants of Health     Financial Resource Strain: Not on file   Food Insecurity: No Food Insecurity    Worried About Running Out of Food in the Last Year: Never true    Arnel of Food in the Last Year: Never true   Transportation Needs: No Transportation Needs    Lack of Transportation (Medical): No    Lack of Transportation (Non-Medical): No   Physical Activity: Not on file   Stress: Not on file   Social Connections: Not on file   Intimate Partner Violence: Not on file   Housing Stability: Low Risk     Unable to Pay for Housing in the Last Year: No    Number of Places Lived in the Last Year: 1    Unstable Housing in the Last Year: No        Review of Systems   All other systems reviewed and are negative  Vitals: There were no vitals filed for this visit  BMI - There is no height or weight on file to calculate BMI  Wt Readings from Last 7 Encounters:   10/03/22 66 2 kg (146 lb)   09/30/22 68 kg (150 lb)   09/28/22 68 kg (150 lb)   08/09/22 68 kg (150 lb)   08/04/22 68 kg (150 lb)   08/01/22 65 8 kg (145 lb)   07/26/22 66 9 kg (147 lb 6 4 oz)       Physical Exam  Vitals and nursing note reviewed  Constitutional:       General: He is not in acute distress  Appearance: He is well-developed  He is not ill-appearing or diaphoretic  HENT:      Head: Normocephalic and atraumatic  Nose: No congestion  Eyes:      General: No scleral icterus  Conjunctiva/sclera: Conjunctivae normal    Neck:      Vascular: No carotid bruit or JVD  Cardiovascular:      Rate and Rhythm: Normal rate  Rhythm irregular  Heart sounds: Normal heart sounds  No murmur heard  No friction rub  No gallop  Comments: Left sided chest wall incision site clean, dry, no tenderness or redness  Suture noted to be intact  Pulmonary:      Effort: Pulmonary effort is normal  No respiratory distress  Breath sounds: Normal breath sounds  No wheezing or rales     Chest:      Chest wall: No tenderness  Abdominal:      General: There is no distension  Palpations: Abdomen is soft  Tenderness: There is no abdominal tenderness  Musculoskeletal:         General: No swelling, tenderness or deformity  Cervical back: Neck supple  No muscular tenderness  Right lower leg: No edema  Left lower leg: No edema  Skin:     General: Skin is warm  Neurological:      General: No focal deficit present  Mental Status: He is alert and oriented to person, place, and time  Mental status is at baseline  Psychiatric:         Mood and Affect: Mood normal          Behavior: Behavior normal          Thought Content: Thought content normal            Labs:        CBC:   Lab Results   Component Value Date    WBC 7 95 09/30/2022    RBC 4 73 09/30/2022    HGB 13 4 09/30/2022    HCT 44 0 09/30/2022    MCV 93 09/30/2022     09/30/2022    RDW 14 9 09/30/2022       CMP:   Lab Results   Component Value Date    K 3 8 09/29/2022     09/29/2022    CO2 26 09/29/2022    BUN 16 09/29/2022    CREATININE 0 90 09/29/2022    EGFR 83 09/29/2022    CALCIUM 8 5 09/29/2022    AST 11 (L) 09/28/2022    ALT 8 09/28/2022    ALKPHOS 78 09/28/2022       Magnesium:  Lab Results   Component Value Date    MG 2 4 02/17/2022       Lipid Profile:   No results found for: CHOL, HDL, TRIG, LDLCALC    Thyroid Studies:   Lab Results   Component Value Date    VZI3DCCAGANE 1 178 02/28/2022       A1c:  No components found for: HGA1C    INR:  Lab Results   Component Value Date    INR 1 34 (H) 03/03/2022    INR 1 11 (H) 06/28/2021    INR 1 07 08/04/2020   5    Imaging: CTA head and neck with and without contrast    Result Date: 9/28/2022  Narrative: CTA NECK AND BRAIN WITH AND WITHOUT CONTRAST INDICATION: Dizziness, non-specific New onset dizziness COMPARISON:   None   TECHNIQUE:  Routine CT imaging of the Brain without contrast   Post contrast imaging was performed after administration of iodinated contrast through the neck and brain  Post contrast axial 0 625 mm images timed to opacify the arterial system  3D rendering was performed on an independent workstation  MIP reconstructions performed  Coronal reconstructions were performed of the noncontrast portion of the brain  Radiation dose length product (DLP) for this visit:  1161 mGy-cm   This examination, like all CT scans performed in the [facility], was performed utilizing techniques to minimize radiation dose exposure, including the use of iterative reconstruction and automated exposure control  IV Contrast:  100 mL of iohexol (OMNIPAQUE)  IMAGE QUALITY:   Diagnostic FINDINGS: NONCONTRAST BRAIN PARENCHYMA: Right hemispheric subdural hematoma measuring up to 8 mm along the frontoparietal convexity that is iso to mildly hypodense and favored to be subacute  Slight localized mass effect on subjacent frontal and parietal convexity     No shift or herniation  No acute infarct  Stable diminished attenuation in the periventricular and subcortical white matter due to chronic microangiopathy  VENTRICLES:  Normal for the patient's age  VISUALIZED ORBITS AND PARANASAL SINUSES:  Orbits are unremarkable  Left maxillary sinus retention cyst  CERVICAL VASCULATURE AORTIC ARCH AND GREAT VESSELS:  Mild atherosclerotic disease of the arch, proximal great vessels and visualized subclavian vessels  No significant stenosis  RIGHT VERTEBRAL ARTERY CERVICAL SEGMENT:  Normal origin  The vessel is normal in caliber throughout the neck  LEFT VERTEBRAL ARTERY CERVICAL SEGMENT:  Normal origin  The vessel is normal in caliber throughout the neck  RIGHT EXTRACRANIAL CAROTID SEGMENT:  Normal caliber common carotid artery  Normal bifurcation and cervical internal carotid artery  No stenosis or dissection  Cervical ICA is tortuous  LEFT EXTRACRANIAL CAROTID SEGMENT:  Normal caliber common carotid artery  Minimal calcified plaque bifurcation  No stenosis or dissection    Cervical ICA is tortuous  NASCET criteria was used to determine the degree of internal carotid artery diameter stenosis  INTRACRANIAL VASCULATURE INTERNAL CAROTID ARTERIES:  Normal enhancement of the intracranial portions of the internal carotid arteries  Normal ophthalmic artery origins  Normal ICA terminus  ANTERIOR CIRCULATION:  Symmetric A1 segments and anterior cerebral arteries with normal enhancement  Normal anterior communicating artery  MIDDLE CEREBRAL ARTERY CIRCULATION:  M1 segment and middle cerebral artery branches demonstrate normal enhancement bilaterally  DISTAL VERTEBRAL ARTERIES:  Normal distal vertebral arteries  Posterior inferior cerebellar artery origins are normal  Normal vertebral basilar junction  BASILAR ARTERY:  Basilar artery is normal in caliber  Normal superior cerebellar arteries  POSTERIOR CEREBRAL ARTERIES: Both posterior cerebral arteries arises from the basilar tip  Both arteries demonstrate normal enhancement  VENOUS STRUCTURES:  Normal  NON VASCULAR ANATOMY BONY STRUCTURES:  No acute osseous abnormality  Severe degenerative spondylosis from C3-4 to C5-C6 with severe canal and foraminal stenosis due to disc osteophyte complexes, facet and uncovertebral hypertrophy  SOFT TISSUES OF THE NECK:  Unremarkable  THORACIC INLET:  3 mm right upper lobe nodule on image 20 of series 601 in retrospect is stable compared with chest CT from 9/18/2018  Emphysematous changes and apical scarring  Mild groundglass opacity     Impression: Right subdural hematoma that is likely subacute  Slight localized mass effect  No shift or herniation  No acute territorial infarct  No significant stenosis of the cervical carotid or vertebral arteries  No significant intracranial stenosis, large vessel occlusion or aneurysm    I personally discussed this study with Daphne Curtis on 9/28/2022 at 1:43 PM  Workstation performed: [id number]     XR chest 1 view portable    Result Date: 9/29/2022  Narrative: CHEST INDICATION:   shortness of breath  COMPARISON:  Chest x-ray from 2/28/2022  EXAM PERFORMED/VIEWS:  XR CHEST PORTABLE FINDINGS: Cardiomediastinal silhouette appears unremarkable  The lungs are clear  No pneumothorax or pleural effusion  Osseous structures appear within normal limits for patient age  Impression: No acute cardiopulmonary disease  Workstation performed: [id number]     CT head wo contrast    Result Date: 9/29/2022  Narrative: CT BRAIN - WITHOUT CONTRAST INDICATION:   Subdural hemorrhage follow-up SDH  COMPARISON:  CT performed yesterday  TECHNIQUE:  CT examination of the brain was performed  In addition to axial images, sagittal and coronal 2D reformatted images were created and submitted for interpretation  Radiation dose length product (DLP) for this visit:  880 23 mGy-cm   This examination, like all CT scans performed in the [facility], was performed utilizing techniques to minimize radiation dose exposure, including the use of iterative  reconstruction and automated exposure control  IMAGE QUALITY:  Diagnostic  FINDINGS: PARENCHYMA: Stable right subdural hematoma measuring up to 8 mm along the frontoparietal convexity  Subdural is mostly isodense to mildly hypodense with tiny hyperattenuating focus along the parietal convexity, unchanged     Stable slight localized mass effect  No shift or herniation  No acute infarct  No new hemorrhage  Stable diminished attenuation in the periventricular and subcortical white matter due to chronic microangiopathy  VENTRICLES:  No hydrocephalus  Orbits are unremarkable  Small retention cyst in the left maxillary sinus  VISUALIZED ORBITS AND PARANASAL SINUSES:  Unremarkable  CALVARIUM AND EXTRACRANIAL SOFT TISSUES:  Normal      Impression: Stable right hemispheric subdural hematoma with slight localized mass effect  No shift or herniation  No new hemorrhage    Chronic microangiopathy   Workstation performed: [id number]     Cardiac ep lab eps/ablations    Result Date: 10/1/2022  Narrative: This is from 35th of September I did not see the patient myself I did not oversee the procedure Signature is for administrative purpose Placement of Cardiac Event Recorder H&P was reviewed  Patient was examined and history was reviewed  No change in patient's condition since H&P was completed  The pre- operative diagnosis: Syncope, unspecified syncope type [R55]Permanent atrial fibrillation (HCC) [I48 21] Postoperative diagnosis: Syncope, unspecified syncope type [R55]Permanent atrial fibrillation ([address] ) [I48 21] Procedure: Placement of cardiac event recorder Surgeon: Vel Chavis PA-C Assistants - none Specimens - none Estimated blood loss - none Findings - none Complications - none Anesthesia - local lidocaine 10cc by myself Details of the device MedFastnote Reveal Linq Description of procedure: The patient was seen before the procedure  The details of the procedure, including risks and benefits, were explained to the patient  The patient was agreeable to proceed and appropriate consent was signed  Time out was done  The patient was prepped and draped in a sterile fasion  Local lidocaine was infiltrated subcutaneously, in the left 3rd intercostal space, 2 cm lateral to the left sternal edge  Using the stab knife, an incision was made  Thereafter, the  was inserted subcutaneously and rotated to form a pocket  The pocket was created along the long axis of the heart, in the 3rd intercostal space region  Thereafter, the Plunger was used to deploy the loop recorder  The Plunger was disengaged and removed  The  was pulled back  Pressure was held and hemostasis was obtained  Pocket closed with interrupted 4-0 ethilon and surgical glue  Sterile dressing was placed and covered with a water resistant band aid  Summary of the procedure: Linq implanted successfully and patient tolerated the procedure well       Cardiac EP device report    Result Date: 10/5/2022  Narrative: Magali Naranjo TRANSMISSION: LOOP: AF: PRESENTING RHYTHM SHOWING AF W/ RVR, PT CANNOT TAKE AC DUE TO RIGHT SUBDURAL HEMATOMA  PT TAKES METOPROLOL SUCC  PT HAS SITE CHECK 10/13/22~     Echo complete w/ contrast if indicated    Result Date: 2022  Narrative: Sheng Brownors  Left Ventricle: Left ventricular cavity size is normal  Wall thickness is mildly increased  There is mild concentric hypertrophy  The left ventricular ejection fraction is 50%  Systolic function is low normal  There is mild global hypokinesis    Right Ventricle: Right ventricular cavity size is normal  Systolic function is normal    Left Atrium: The atrium is moderately dilated    Right Atrium: The atrium is mildly dilated    Mitral Valve: There is mild thickening of the anterior leaflet and posterior leaflet involving the leaflet from base to margin  There is mild annular calcification  There is systolic bowing of the anterior and posterior leaflets  There is mild to moderate regurgitation with a centrally directed jet    Tricuspid Valve: There is moderate regurgitation  The tricuspid valve regurgitation jet is central    Pulmonic Valve: There is mild regurgitation         Cardiac testing:   Results for orders placed during the hospital encounter of 20    Echo complete with contrast if indicated    Narrative  [address]  [facility], [address] [place] [address]    Transthoracic Echocardiogram  2D, M-mode, Doppler, and Color Doppler    Study date:  07-Aug-2020    Patient: Marquis Torres  MR number: [id number]  Account number: [de-identified]  : 1945  Age: 69 years  Gender: Male  Status: Inpatient  Location: [facility]  Height: 70 in  Weight: 148 lb  BP: 123/ 70 mmHg    Indications: Heart Failure    Diagnoses: I50 9 - Heart failure, unspecified    Sonographer:  NAN Hoang  Referring Physician:  Margarito Keith MD  Group:  [facility]  Interpreting Physician:  Effie Jackson MD    SUMMARY    LEFT VENTRICLE:  Systolic function was normal  Ejection fraction was estimated to be 55 %  There were no regional wall motion abnormalities  Wall thickness was at the upper limits of normal     LEFT ATRIUM:  The atrium was mildly dilated  RIGHT ATRIUM:  The atrium was mildly to moderately dilated  MITRAL VALVE:  There was mild annular calcification  There was mild to moderate regurgitation  TRICUSPID VALVE:  There was moderate regurgitation  Estimated peak PA pressure was 39 mmHg  PULMONIC VALVE:  There was mild regurgitation  HISTORY: PRIOR HISTORY: CAD, Anemia Congestive heart failure  Nonischemic cardiomyopathy  Atrial fibrillation  PROCEDURE: The study was performed in the [facility]  This was a routine study  The transthoracic approach was used  The study included complete 2D imaging, M-mode, complete spectral Doppler, and color Doppler  The heart rate was 80  bpm, at the start of the study  Images were obtained from the parasternal, apical, subcostal, and suprasternal notch acoustic windows  Image quality was adequate  LEFT VENTRICLE: Size was normal  Systolic function was normal  Ejection fraction was estimated to be 55 %  There were no regional wall motion abnormalities  Wall thickness was at the upper limits of normal  DOPPLER: Transmitral flow  pattern: atrial fibrillation  RIGHT VENTRICLE: The size was normal  Systolic function was normal  Wall thickness was normal     LEFT ATRIUM: The atrium was mildly dilated  RIGHT ATRIUM: The atrium was mildly to moderately dilated  MITRAL VALVE: There was mild annular calcification  Valve structure was normal  There was mild-moderate diffuse thickening  There was normal leaflet separation  DOPPLER: The transmitral velocity was within the normal range  There was no  evidence for stenosis  There was mild to moderate regurgitation  AORTIC VALVE: The valve was trileaflet   Leaflets exhibited mildly increased thickness, normal cuspal separation, and sclerosis  DOPPLER: Transaortic velocity was within the normal range  There was no evidence for stenosis  There was no  significant regurgitation  TRICUSPID VALVE: The valve structure was normal  There was normal leaflet separation  DOPPLER: The transtricuspid velocity was within the normal range  There was no evidence for stenosis  There was moderate regurgitation  Pulmonary artery  systolic pressure was mildly increased  Estimated peak PA pressure was 39 mmHg  PULMONIC VALVE: Leaflets exhibited normal thickness, no calcification, and normal cuspal separation  DOPPLER: The transpulmonic velocity was within the normal range  There was mild regurgitation  PERICARDIUM: There was no pericardial effusion  AORTA: The root exhibited normal size  SYSTEMIC VEINS: IVC: The inferior vena cava was normal in size  Respirophasic changes were normal     SYSTEM MEASUREMENT TABLES    2D  %FS: 34 29 %  Ao Diam: 3 52 cm  EDV(Teich): 151 99 ml  EF(Teich): 62 68 %  ESV(Teich): 56 72 ml  HR_4Ch_Q: 90 87 BPM  IVSd: 1 23 cm  LA Diam: 4 97 cm  LAAs A4C: 30 77 cm2  LAESV A-L A4C: 106 62 ml  LAESV MOD A4C: 99 72 ml  LALs A4C: 7 54 cm  LVCO_4Ch_Q: 4 61 L/min  LVEF_4Ch_Q: 56 72 %  LVIDd: 5 57 cm  LVIDs: 3 66 cm  LVLd_4Ch_Q: 7 96 cm  LVLs_4Ch_Q: 6 38 cm  LVPWd: 1 08 cm  LVSV_4Ch_Q: 50 7 ml  LVVED_4Ch_Q: 89 39 ml  LVVES_4Ch_Q: 38 69 ml  RAAs: 31 12 cm2  RAESV A-L: 112 8 ml  RAESV MOD: 108 5 ml  RALs: 7 29 cm  RVIDd: 3 86 cm  SV(Teich): 95 27 ml    CW  TR Vmax: 2 78 m/s  TR maxP 97 mmHg    MM  TAPSE: 1 68 cm    [employer] Accredited Echocardiography Laboratory    Prepared and electronically signed by    Marlen Miranda MD  Signed 07-Aug-2020 09:50:46    No results found for this or any previous visit  No results found for this or any previous visit  No results found for this or any previous visit        Cardiac EP device report  NON-BILLABLE CARELINK TRANSMISSION: LOOP: AF: PRESENTING RHYTHM SHOWING AF W/ RVR, PT CANNOT TAKE AC DUE TO RIGHT SUBDURAL HEMATOMA  PT TAKES METOPROLOL SUCC   PT HAS SITE CHECK 10/13/22~NORIS

## 2022-10-06 NOTE — ASSESSMENT & PLAN NOTE
Patient was recently hospitalized with a syncope episode leading to head trauma CT head revealed right subdural hematoma  Conservative treatment was recommended by neurosurgical service  Repeat CT revealed stable findings  CTA was negative for any significant stenosis of cervical, carotid or vertebral arteries  Patient was started on Keppra for  seizure prophylaxis  Possibly can be discontinued soon  Await neurosurgical recommendation  Eliquis was discontinued  Follow-up with neuro surgical service  Patient overall remains stable  No focal changes    Continue PT OT

## 2022-10-07 ENCOUNTER — NURSING HOME VISIT (OUTPATIENT)
Dept: GERIATRICS | Facility: OTHER | Age: 77
End: 2022-10-07
Payer: MEDICARE

## 2022-10-07 VITALS
TEMPERATURE: 97.7 F | OXYGEN SATURATION: 97 % | RESPIRATION RATE: 18 BRPM | DIASTOLIC BLOOD PRESSURE: 74 MMHG | BODY MASS INDEX: 19.75 KG/M2 | HEART RATE: 78 BPM | WEIGHT: 145.6 LBS | SYSTOLIC BLOOD PRESSURE: 130 MMHG

## 2022-10-07 DIAGNOSIS — S06.5XAA SDH (SUBDURAL HEMATOMA): Primary | ICD-10-CM

## 2022-10-07 DIAGNOSIS — R26.2 AMBULATORY DYSFUNCTION: ICD-10-CM

## 2022-10-07 DIAGNOSIS — I48.21 PERMANENT ATRIAL FIBRILLATION (HCC): ICD-10-CM

## 2022-10-07 DIAGNOSIS — T14.8XXA SURGICAL WOUND PRESENT: ICD-10-CM

## 2022-10-07 DIAGNOSIS — R33.9 URINARY RETENTION: ICD-10-CM

## 2022-10-07 PROCEDURE — 99309 SBSQ NF CARE MODERATE MDM 30: CPT

## 2022-10-07 NOTE — ASSESSMENT & PLAN NOTE
· History AFib  · Due to recent fall with subdural hematoma Eliquis is on hold  · Heart rate stable in the 70's-80's  · Patient denies palpitations/chest pain  · Continue metoprolol 25 mg daily  · Loop recorder placed during recent hospitalization  · Site is clean dry and intact with no signs of infection, sutures are intact    · Follow-up appointment with Cardiology 10/13/2022  · Continue to monitor heart rate

## 2022-10-07 NOTE — ASSESSMENT & PLAN NOTE
· Patient recently admitted to hospital for syncopal episode with head strike  · CT showed small right-sided subdural hematoma  · Repeat CT showed subdural hematoma stable  · Neurology recommended Keppra x1 week for seizure prophylaxis  Course completed 10/05/2022  · Eliquis discontinued  · Patient remains stable  · Follow-up neurology 10/16/2022  · Recommended follow-up with Neurosurgery in 2 weeks  Will need repeat CT 2-3 days prior to appointment    · Continue PT and OT  · Fall precautions

## 2022-10-07 NOTE — ASSESSMENT & PLAN NOTE
· History of BPH  · Patient has chronic Cloud  · Draining clear yellow urine  · Urology following  · Monitor urine output

## 2022-10-07 NOTE — ASSESSMENT & PLAN NOTE
· Loop recorder placed during last hospitalization, 09/30/2022  · Sutures are intact with no signs of infection  · Open to air  · Device check follow-up on 10/13/2022  · Wound care following

## 2022-10-07 NOTE — PROGRESS NOTES
[facility]  [address]  [phone]  Halina Stepan  Code 31 (STR)          NAME: Td Qureshi  AGE: 78 y o  SEX: male CODE STATUS: CPR    DATE OF ENCOUNTER: 10/7/2022    Assessment and Plan     1  SDH (subdural hematoma)  Assessment & Plan:  · Patient recently admitted to hospital for syncopal episode with head strike  · CT showed small right-sided subdural hematoma  · Repeat CT showed subdural hematoma stable  · Neurology recommended Keppra x1 week for seizure prophylaxis  Course completed 10/05/2022  · Eliquis discontinued  · Patient remains stable  · Follow-up neurology 10/16/2022  · Recommended follow-up with Neurosurgery in 2 weeks  Will need repeat CT 2-3 days prior to appointment  · Continue PT and OT  · Fall precautions      2  Permanent atrial fibrillation (HCC)  Assessment & Plan:  · History AFib  · Due to recent fall with subdural hematoma Eliquis is on hold  · Heart rate stable in the 70's-80's  · Patient denies palpitations/chest pain  · Continue metoprolol 25 mg daily  · Loop recorder placed during recent hospitalization  · Site is clean dry and intact with no signs of infection, sutures are intact  · Follow-up appointment with Cardiology 10/13/2022  · Continue to monitor heart rate      3  Ambulatory dysfunction  Assessment & Plan:  Multifactorial  Continue PT/OT  Fall Precautions  Ensure adequate nutrition/hydration   Monitor CBC/BMP    following for d/c planning       4  Urinary retention  Assessment & Plan:  · History of BPH  · Patient has chronic Cloud  · Draining clear yellow urine  · Urology following  · Monitor urine output      5   Surgical wound present  Assessment & Plan:  · Loop recorder placed during last hospitalization, 09/30/2022  · Sutures are intact with no signs of infection  · Open to air  · Device check follow-up on 10/13/2022  · Wound care following         All medications and routine orders were reviewed and updated as needed  Chief Complaint     STR follow up visit  Patient's care was coordinated with nursing facility staff  Recent vitals, labs, and updated medications were review on [facility] system in facility  Past Medical and Surgica History      Past Medical History:   Diagnosis Date    Anxiety disorder     Atrial fibrillation (HCC)     Coronary artery disease     Depression     Cloud catheter in place     Hepatitis C     screening negative in 1/2017    Hypertension     MI [facility]myocardial infarction) ([facility] [address] )     Use of cane as ambulatory aid      Past Surgical History:   Procedure Laterality Date    CARDIAC CATHETERIZATION  07/09/2018    CARDIAC ELECTROPHYSIOLOGY PROCEDURE N/A 9/30/2022    Procedure: Cardiac loop recorder implant;  Surgeon: Khushi Peng MD;  [facility]; Service: Cardiology    COLONOSCOPY      IL REPAIR ING HERNIA,5+Y/O,REDUCIBL Right 8/11/2022    Procedure: REPAIR HERNIA INGUINAL;  Surgeon: Andrew Encarnacion DO;  [facility]: [facility];  Service: General    TONSILLECTOMY       No Known Allergies       History of Present Illness     TREVOR Smith is a 78year old male, he is a STR patient of [facility] since 10/02/2022  Past Medical Hx including but not limited to BPH with chronic Cloud, AFib, COPD, anxiety, depression, CAD  He was seen in collaboration with nursing for medical mgmt and STR follow up  Hospital course  Patient was admitted to the hospital 09/28/2022 following recent syncopal episode at home with head strike  CT of head revealed small right-sided subdural hematoma  Neurology recommended Keppra dosing x1 week for seizure prophylaxis  Completed 10/05/2022  With recorder placed 09/30/2022 initially showing AFib with RVR  Patient will follow-up with Cardiology for device check 10/13/2022  Patient will follow up with Neurology 10/16/2022    It is recommended patient follow up with Neurosurgery in 2 weeks, patient will need repeat CT scan in 2-3 days prior to appointment  Rehab course  Seen and examined at bedside today  Patient is a reliable historian  He is sitting in bed and is alert and oriented x4, denies any pain, and does not appear to be in any distress at this time  His Miller catheter is intact and is draining clear yellow urine  Denies CP/SOB/N/V/D  Denies lightheadedness, dizziness, headaches, vision changes  Patient states they are eating well and staying hydrated  Denies any bowel or bladder issues  Per review of SNF records, Patient is eating 3 meals per day, consuming  %  Last documented BM 10/04/2022  No concerns from nursing at this time  The patient's allergies, past medical, surgical, social and family history were reviewed and unchanged  Review of Systems     Review of Systems   Constitutional: Negative  Negative for activity change, appetite change, chills, fatigue and fever  HENT: Negative  Negative for congestion, sneezing and sore throat  Eyes: Negative  Respiratory: Negative  Negative for cough, chest tightness, shortness of breath and wheezing  Cardiovascular: Negative  Negative for chest pain, palpitations and leg swelling  Gastrointestinal: Negative  Negative for abdominal distention, abdominal pain, constipation, diarrhea, nausea and vomiting  Endocrine: Negative  Genitourinary: Positive for difficulty urinating  Negative for decreased urine volume and hematuria  Chronic miller   Musculoskeletal: Negative  Skin: Negative  Allergic/Immunologic: Negative  Neurological: Negative for dizziness, seizures, syncope, speech difficulty, weakness, light-headedness, numbness and headaches  Hematological: Negative  Psychiatric/Behavioral: Negative  Objective     Vitals:   Vitals:    10/07/22 1648   BP: 130/74   Pulse: 78   Resp: 18   Temp: 97 7 °F (36 5 °C)   SpO2: 97%         Physical Exam  Constitutional:       General: He is not in acute distress  Appearance: Normal appearance  He is normal weight  He is not ill-appearing  HENT:      Head: Normocephalic and atraumatic  Nose: No congestion  Mouth/Throat:      Mouth: Mucous membranes are moist    Eyes:      Conjunctiva/sclera: Conjunctivae normal       Pupils: Pupils are equal, round, and reactive to light  Cardiovascular:      Rate and Rhythm: Normal rate  Rhythm irregular  Pulses: Normal pulses  Heart sounds: Normal heart sounds  Pulmonary:      Effort: Pulmonary effort is normal  No respiratory distress  Breath sounds: Normal breath sounds  No wheezing  Abdominal:      General: Abdomen is flat  Bowel sounds are normal  There is no distension  Palpations: Abdomen is soft  Tenderness: There is no abdominal tenderness  Musculoskeletal:      Right lower leg: No edema  Left lower leg: No edema  Skin:     General: Skin is warm  Capillary Refill: Capillary refill takes 2 to 3 seconds  Neurological:      General: No focal deficit present  Mental Status: He is alert and oriented to person, place, and time  Motor: No weakness  Gait: Gait normal    Psychiatric:         Mood and Affect: Mood normal          Behavior: Behavior normal          Pertinent Laboratory/Diagnostic Studies:   Reviewed in facility chart-stable      Current Medications   Medications reviewed and updated see facility [facility] for details  Current Outpatient Medications:     Advair Diskus 100-50 MCG/ACT inhaler, USE 1 INHALATION TWICE A DAY (RINSE MOUTH AFTER USE), Disp: 60 blister, Rfl: 11    azelastine (ASTELIN) 0 1 % nasal spray, 1 spray into each nostril in the morning and 1 spray in the evening  Use in each nostril as directed   (Patient taking differently: 1 spray into each nostril 2 (two) times a day as needed Use in each nostril as directed), Disp: 90 mL, Rfl: 3    docusate sodium (COLACE) 100 mg capsule, Take 1 capsule (100 mg total) by mouth 2 (two) times a day, Disp: 180 capsule, Rfl: 0    ferrous sulfate 324 (65 Fe) mg, Take 1 tablet (324 mg total) by mouth 2 (two) times a day before meals, Disp: 180 tablet, Rfl: 2    folic acid (FOLVITE) 775 mcg tablet, Take 1 tablet (400 mcg total) by mouth daily, Disp: 30 tablet, Rfl: 0    ipratropium-albuterol (DUO-NEB) 0 5-2 5 mg/3 mL nebulizer solution, INHALE CONTENTS OF 1 VIAL VIA NEBULIZER FOUR TIMES A DAY, Disp: 60 mL, Rfl: 11    levETIRAcetam (KEPPRA) 500 mg tablet, Take 1 tablet (500 mg total) by mouth every 12 (twelve) hours for 8 doses, Disp: 8 tablet, Rfl: 0    metoprolol succinate (Toprol XL) 25 mg 24 hr tablet, Take 1 tablet (25 mg total) by mouth daily, Disp: 90 tablet, Rfl: 3    nicotine (NICODERM CQ) 7 mg/24hr TD 24 hr patch, Place 1 patch on the skin daily (Patient not taking: Reported on 9/29/2022), Disp: 28 patch, Rfl: 0    pantoprazole (PROTONIX) 40 mg tablet, TAKE 1 TABLET DAILY BEFORE BREAKFAST, Disp: 90 tablet, Rfl: 3    potassium chloride (Klor-Con) 10 mEq tablet, TAKE 1 TABLET DAILY, Disp: 90 tablet, Rfl: 3    tamsulosin (FLOMAX) 0 4 mg, TAKE 1 CAPSULE DAILY, Disp: 30 capsule, Rfl: 11     Plan discussed with Dr Theadore Carrel Dr Theadore Carrel noted agreement with assessment and plan  Please note:  Voice-recognition software may have been used in the preparation of this document  Occasional wrong word or "sound-alike" substitutions may have occurred due to the inherent limitations of voice recognition software  Interpretation should be guided by context           Flaquita Cloud  10/7/2022  5:27 PM

## 2022-10-11 ENCOUNTER — NURSING HOME VISIT (OUTPATIENT)
Dept: GERIATRICS | Facility: OTHER | Age: 77
End: 2022-10-11
Payer: MEDICARE

## 2022-10-11 VITALS
RESPIRATION RATE: 18 BRPM | OXYGEN SATURATION: 95 % | DIASTOLIC BLOOD PRESSURE: 78 MMHG | BODY MASS INDEX: 18.66 KG/M2 | HEART RATE: 76 BPM | WEIGHT: 137.6 LBS | SYSTOLIC BLOOD PRESSURE: 130 MMHG | TEMPERATURE: 97.6 F

## 2022-10-11 DIAGNOSIS — R26.2 AMBULATORY DYSFUNCTION: ICD-10-CM

## 2022-10-11 DIAGNOSIS — T14.8XXA SURGICAL WOUND PRESENT: ICD-10-CM

## 2022-10-11 DIAGNOSIS — I48.21 PERMANENT ATRIAL FIBRILLATION (HCC): ICD-10-CM

## 2022-10-11 DIAGNOSIS — S06.5XAA SDH (SUBDURAL HEMATOMA): Primary | ICD-10-CM

## 2022-10-11 DIAGNOSIS — R33.9 URINARY RETENTION: ICD-10-CM

## 2022-10-11 PROBLEM — N39.0 SEPSIS SECONDARY TO UTI (HCC): Status: RESOLVED | Noted: 2022-03-01 | Resolved: 2022-10-11

## 2022-10-11 PROBLEM — A41.9 SEPSIS SECONDARY TO UTI (HCC): Status: RESOLVED | Noted: 2022-03-01 | Resolved: 2022-10-11

## 2022-10-11 PROCEDURE — 99309 SBSQ NF CARE MODERATE MDM 30: CPT

## 2022-10-11 NOTE — PROGRESS NOTES
[facility]  [address]  [phone]  [facility]  Code 31 (STR)          NAME: Td Qureshi  AGE: 78 y o  SEX: male CODE STATUS: CPR    DATE OF ENCOUNTER: 10/11/2022    Assessment and Plan     1  SDH (subdural hematoma)  Assessment & Plan:  · Patient recently admitted to hospital for syncopal episode with head strike  · CT showed small right-sided subdural hematoma  · Repeat CT showed subdural hematoma stable  · Neurology recommended Keppra x1 week for seizure prophylaxis  Course completed 10/05/2022  · Eliquis discontinued  · Patient remains stable  · Follow-up neurology 10/16/2022  · Recommended follow-up with Neurosurgery in 2 weeks  Will need repeat CT 2-3 days prior to appointment  · Continue PT and OT  · Fall precautions      2  Urinary retention  Assessment & Plan:  · History of BPH  · Patient has chronic Cloud  · Draining clear yellow urine  · Urology following  · Monitor urine output      3  Ambulatory dysfunction  Assessment & Plan:  Multifactorial  Continue PT/OT  Fall Precautions  Ensure adequate nutrition/hydration   Monitor CBC/BMP    following for d/c planning       4  Surgical wound present  Assessment & Plan:  · Loop recorder placed during last hospitalization, 09/30/2022  · Sutures are intact with no signs of infection  · Open to air  · Device check follow-up on 10/13/2022  · Wound care following      5  Permanent atrial fibrillation (HCC)  Assessment & Plan:  · History AFib  · Due to recent fall with subdural hematoma Eliquis is on hold  · Heart rate stable in the 70's-80's  · Patient denies palpitations/chest pain  · Continue metoprolol 25 mg daily  · Loop recorder placed during recent hospitalization  · Site is clean dry and intact with no signs of infection, sutures are intact    · Follow-up appointment with Cardiology 10/13/2022  · Continue to monitor heart rate         All medications and routine orders were reviewed and updated as needed  Chief Complaint     STR follow up visit  Patient's care was coordinated with nursing facility staff  Recent vitals, labs, and updated medications were review on [facility] system in facility  Past Medical and Surgica History      Past Medical History:   Diagnosis Date   • Anxiety disorder    • Atrial fibrillation ([address] )    • Coronary artery disease    • Depression    • Cloud catheter in place    • Hepatitis C     screening negative in 1/2017   • Hypertension    • MI (myocardial infarction) [facility])    • Use of cane as ambulatory aid      Past Surgical History:   Procedure Laterality Date   • CARDIAC CATHETERIZATION  07/09/2018   • CARDIAC ELECTROPHYSIOLOGY PROCEDURE N/A 9/30/2022    Procedure: Cardiac loop recorder implant;  Surgeon: Phyllis Young MD;  [facility]; Service: Cardiology   • COLONOSCOPY     • IA REPAIR ING HERNIA,5+Y/O,REDUCIBL Right 8/11/2022    Procedure: REPAIR HERNIA INGUINAL;  Surgeon: Cecil Encarnacion DO;  [facility]: [facility];  Service: General   • TONSILLECTOMY       No Known Allergies       History of Present Illness     HPI    Noah Carmichael is a 72-year-old male, he is a STR patient of [facility] since 10/02/2022  Past Medical Hx including but not limited to BPH with chronic Cloud, AFib, COPD, anxiety, depression, CAD  He was seen in collaboration with nursing for medical management and STR follow up  Hospital course  Patient was admitted to the hospital 09/28/2022 following recent syncopal episode at home with head strike  CT of head revealed small right-sided subdural hematoma  Neurology recommended Keppra dosing x1 week for seizure prophylaxis  Completed 10/05/2022  No seizure activity noted  With loop recorder placed 09/30/2022 initially showing AFib with RVR  Site CDI, sutures intact  Patient will follow-up with Cardiology for device check 10/13/2022  Patient will follow up with Neurology 10/16/2022   It is recommended patient follow up with Neurosurgery in 2 weeks, patient will need repeat CT scan in 2-3 days prior to appointment  Rehab course  Seen and examined at bedside today  Patient is a reliable historian  He is sitting in bed and is alert and oriented x4, denies any pain, and does not appear to be in any distress at this time  His Cloud catheter is intact and is draining clear yellow urine  Denies CP/SOB/N/V/D  Denies lightheadedness, dizziness, headaches, vision changes  Patient states they are eating well and staying hydrated  Denies any bowel or bladder issues  Per review of SNF records, Patient is eating 3 meals per day, consuming  %  Last documented BM 10/10//2022  No concerns from nursing at this time  The patient's allergies, past medical, surgical, social and family history were reviewed and unchanged  Review of Systems     Review of Systems   Constitutional: Negative  Negative for activity change, appetite change and fever  HENT: Negative  Negative for congestion  Eyes: Negative  Respiratory: Negative  Negative for cough, chest tightness, shortness of breath and wheezing  Cardiovascular: Negative  Negative for chest pain, palpitations and leg swelling  Gastrointestinal: Negative  Negative for abdominal distention, abdominal pain, constipation, diarrhea, nausea and vomiting  Endocrine: Negative  Genitourinary: Positive for difficulty urinating  Cloud CYU   Musculoskeletal: Negative  Negative for gait problem  Skin: Negative  Allergic/Immunologic: Negative  Neurological: Negative for dizziness, syncope, weakness, light-headedness, numbness and headaches  Hematological: Negative  Psychiatric/Behavioral: Negative  Negative for dysphoric mood  Objective     Vitals:   Vitals:    10/11/22 1208   BP: 130/78   Pulse: 76   Resp: 18   Temp: 97 6 °F (36 4 °C)   SpO2: 95%         Physical Exam  Vitals and nursing note reviewed     Constitutional:       General: He is not in acute distress  Appearance: Normal appearance  He is not ill-appearing  HENT:      Head: Normocephalic and atraumatic  Nose: Nose normal  No congestion  Mouth/Throat:      Mouth: Mucous membranes are moist    Eyes:      Conjunctiva/sclera: Conjunctivae normal       Pupils: Pupils are equal, round, and reactive to light  Cardiovascular:      Rate and Rhythm: Normal rate and regular rhythm  Pulses: Normal pulses  Heart sounds: Normal heart sounds  Pulmonary:      Effort: Pulmonary effort is normal  No respiratory distress  Breath sounds: Normal breath sounds  No wheezing  Abdominal:      General: Abdomen is flat  Bowel sounds are normal  There is no distension  Palpations: Abdomen is soft  Tenderness: There is no abdominal tenderness  Musculoskeletal:      Right lower leg: No edema  Left lower leg: No edema  Skin:     General: Skin is warm and dry  Capillary Refill: Capillary refill takes 2 to 3 seconds  Neurological:      Mental Status: He is alert and oriented to person, place, and time  Motor: No weakness  Psychiatric:         Mood and Affect: Mood normal          Behavior: Behavior normal          Pertinent Laboratory/Diagnostic Studies:   Reviewed in facility chart-stable      Current Medications   Medications reviewed and updated see facility [facility] for details  Current Outpatient Medications:   •  Advair Diskus 100-50 MCG/ACT inhaler, USE 1 INHALATION TWICE A DAY (RINSE MOUTH AFTER USE), Disp: 60 blister, Rfl: 11  •  azelastine (ASTELIN) 0 1 % nasal spray, 1 spray into each nostril in the morning and 1 spray in the evening  Use in each nostril as directed   (Patient taking differently: 1 spray into each nostril 2 (two) times a day as needed Use in each nostril as directed), Disp: 90 mL, Rfl: 3  •  docusate sodium (COLACE) 100 mg capsule, Take 1 capsule (100 mg total) by mouth 2 (two) times a day, Disp: 180 capsule, Rfl: 0  •  ferrous sulfate 324 (65 Fe) mg, Take 1 tablet (324 mg total) by mouth 2 (two) times a day before meals, Disp: 180 tablet, Rfl: 2  •  folic acid (FOLVITE) 594 mcg tablet, Take 1 tablet (400 mcg total) by mouth daily, Disp: 30 tablet, Rfl: 0  •  ipratropium-albuterol (DUO-NEB) 0 5-2 5 mg/3 mL nebulizer solution, INHALE CONTENTS OF 1 VIAL VIA NEBULIZER FOUR TIMES A DAY, Disp: 60 mL, Rfl: 11  •  levETIRAcetam (KEPPRA) 500 mg tablet, Take 1 tablet (500 mg total) by mouth every 12 (twelve) hours for 8 doses, Disp: 8 tablet, Rfl: 0  •  metoprolol succinate (Toprol XL) 25 mg 24 hr tablet, Take 1 tablet (25 mg total) by mouth daily, Disp: 90 tablet, Rfl: 3  •  nicotine (NICODERM CQ) 7 mg/24hr TD 24 hr patch, Place 1 patch on the skin daily (Patient not taking: Reported on 9/29/2022), Disp: 28 patch, Rfl: 0  •  pantoprazole (PROTONIX) 40 mg tablet, TAKE 1 TABLET DAILY BEFORE BREAKFAST, Disp: 90 tablet, Rfl: 3  •  potassium chloride (Klor-Con) 10 mEq tablet, TAKE 1 TABLET DAILY, Disp: 90 tablet, Rfl: 3  •  tamsulosin (FLOMAX) 0 4 mg, TAKE 1 CAPSULE DAILY, Disp: 30 capsule, Rfl: 11     Plan discussed with Dr Alena Junior noted agreement with assessment and plan  Please note:  Voice-recognition software may have been used in the preparation of this document  Occasional wrong word or "sound-alike" substitutions may have occurred due to the inherent limitations of voice recognition software  Interpretation should be guided by context           Fazal Cloud  10/11/2022  12:22 PM

## 2022-10-12 ENCOUNTER — NURSING HOME VISIT (OUTPATIENT)
Dept: WOUND CARE | Facility: HOSPITAL | Age: 77
End: 2022-10-12
Payer: MEDICARE

## 2022-10-12 DIAGNOSIS — R26.2 AMBULATORY DYSFUNCTION: ICD-10-CM

## 2022-10-12 DIAGNOSIS — T14.8XXA SURGICAL WOUND PRESENT: Primary | ICD-10-CM

## 2022-10-12 PROCEDURE — 99307 SBSQ NF CARE SF MDM 10: CPT | Performed by: NURSE PRACTITIONER

## 2022-10-12 NOTE — PROGRESS NOTES
[address] [facility]NAG[facility]   [facility]       Patient ID: Rosalia Dockery is a 78 y o  male Date of Birth 1945     Location of Service: [address] [place] [address]    Performed wound round with: Wound team     Chief Complaint : Chest    Wound Instructions:  Chest  Leave SILKE  Monitor for any drainage or sign of infection    Allergies  Patient has no known allergies  Assessment & Plan:  1  Surgical wound present  Assessment & Plan:  S/p Cardiac loop recorder implant (N/A Chest) on 9/30/2022  - surgical incision is sutured, intact, with no obvious sign of infection  - Leave SILKE  -Follow up on 10/13/2022 for device check  - Patient will be included in the weekly wound round as per request of the facility  2  Ambulatory dysfunction  Assessment & Plan:  On STR             Subjective:   10/5/2022This is a 78 y o , male referred to our service for wound/ skin alterations on chest Patient have a complex medical history including but not limited to Atrial fibrillation ([address] ), Coronary artery disease, Depression, Cloud catheter in place, Hepatitis C, Hypertension, MI (myocardial infarction) ([address] ), and Use of cane as ambulatory aid    Patient was referred by Senior Care Team  Patient was seen in collaboration with the facility wound team      Wound History:   As per medical record review, patient had Cardiac loop recorder implant on 9/30/2022  Received patient in bed, seems comfortable  Denies pain  No significant issues related to the wound  Patient have a device check appointment on 10/13/2022     10/12/2022 Follow up for wound on the chest   Received patient, not in distress  Facility staff did not report any significant issues related to the wound  As per patient, he have a follow up appointment on Thursday  Review of Systems   Constitutional: Negative  HENT: Negative  Eyes: Negative  Respiratory: Negative  Cardiovascular: Negative  Gastrointestinal: Negative  Endocrine: Negative  Genitourinary: Negative  Musculoskeletal: Positive for gait problem  Skin: Positive for wound  See HPI   Hematological: Negative  Psychiatric/Behavioral: Negative  All other systems reviewed and are negative  Objective:    Physical Exam  Constitutional:       Appearance: Normal appearance  HENT:      Head: Normocephalic and atraumatic  Nose: Nose normal       Mouth/Throat:      Mouth: Mucous membranes are moist    Eyes:      Conjunctiva/sclera: Conjunctivae normal    Pulmonary:      Effort: Pulmonary effort is normal    Abdominal:      Tenderness: There is no abdominal tenderness  Genitourinary:     Comments: continent  Musculoskeletal:      Cervical back: Normal range of motion  Comments: LROM   Skin:     Findings: Lesion present  Comments: Chest - surgical incision is 0 5 x 0 1 cm , sutured, no drainage, intact, approximated, with no obvious sign of infection   Neurological:      Mental Status: He is alert  Gait: Gait abnormal    Psychiatric:         Mood and Affect: Mood normal          Behavior: Behavior normal               Procedures           Patient's care was coordinated with nursing facility staff  Recent vitals, labs and updated medications were reviewed on EMR or chart system of facility  Past Medical, surgical, social, medication and allergy history and patient's previous records were reviewed and updated as appropriate: Most up-to date information is available in the facility EMR where the patient is currently admitted      Patient Active Problem List   Diagnosis   • Symptomatic anemia   • Congestive cardiomyopathy (HCC)   • Permanent atrial fibrillation (HCC)   • Tobacco use disorder   • Orthostatic hypotension   • Syncope   • Colonic adenoma   • Ambulatory dysfunction   • COPD (chronic obstructive pulmonary disease) (HCC)   • Bradycardia   • Thrombocytosis   • Head trauma   • Closed nondisplaced comminuted fracture of left patella   • Essential (hemorrhagic) thrombocythemia (HCC)   • Dizziness   • Urinary retention   • Abnormal colonoscopy   • Iron deficiency anemia likely secondary to GI bleeding   • Cholestatic jaundice   • Moderate protein-calorie malnutrition (HCC)   • Encounter for support and coordination of transition of care   • Nasal congestion   • Recurrent right inguinal hernia   • SDH (subdural hematoma)   • Surgical wound present     Past Medical History:   Diagnosis Date   • Anxiety disorder    • Atrial fibrillation ([address] )    • Coronary artery disease    • Depression    • Cloud catheter in place    • Hepatitis C     screening negative in 1/2017   • Hypertension    • MI (myocardial infarction) [facility])    • Use of cane as ambulatory aid      Past Surgical History:   Procedure Laterality Date   • CARDIAC CATHETERIZATION  07/09/2018   • CARDIAC ELECTROPHYSIOLOGY PROCEDURE N/A 9/30/2022    Procedure: Cardiac loop recorder implant;  Surgeon: Rosie Cole MD;  [facility]: [facility]; Service: Cardiology   • COLONOSCOPY     • DC REPAIR ING HERNIA,5+Y/O,REDUCIBL Right 8/11/2022    Procedure: REPAIR HERNIA INGUINAL;  Surgeon: Lavell Encarnacion DO;  Location: [facility];  Service: General   • TONSILLECTOMY       Social History     Socioeconomic History   • Marital status: /Civil Union     Spouse name: None   • Number of children: 3   • Years of education: 12   • Highest education level: 12th grade   Occupational History   • Occupation: retired   Tobacco Use   • Smoking status: Current Every Day Smoker     Packs/day: 3 00     Years: 57 00     Pack years: 171 00     Types: Cigarettes   • Smokeless tobacco: Never Used   • Tobacco comment: since age 17; Has history of smoking for over 54 years   He has been smoking more than packet daily in the past however he has been smokes about half a pack a day lately and stopped smoking on 7/1/2018 when he was admitted to the hospital     Vaping Use   • Vaping Use: Never used Substance and Sexual Activity   • Alcohol use: Never   • Drug use: Never   • Sexual activity: Not Currently     Partners: Female     Birth control/protection: Condom Male   Other Topics Concern   • None   Social History Narrative    History of Ultra Sound: 2016    History of Stress Test: 2018    History of ECHO: 2018    · Most recent tobacco use screenin2019      · Live alone or with others:   with others        · Diet:   Regular      · Caffeine intake: Moderate      · Guns present in home:   No      · Asbestos exposure:   No      · TB exposure:   No      · Environmental exposure:   No      · Animal exposure:   No      · Smoke alarm in home:   Yes     Social Determinants of Health     Financial Resource Strain: Not on file   Food Insecurity: No Food Insecurity   • Worried About Running Out of Food in the Last Year: Never true   • Ran Out of Food in the Last Year: Never true   Transportation Needs: No Transportation Needs   • Lack of Transportation (Medical): No   • Lack of Transportation (Non-Medical): No   Physical Activity: Not on file   Stress: Not on file   Social Connections: Not on file   Intimate Partner Violence: Not on file   Housing Stability: Low Risk    • Unable to Pay for Housing in the Last Year: No   • Number of Places Lived in the Last Year: 1   • Unstable Housing in the Last Year: No        Current Outpatient Medications:   •  Advair Diskus 100-50 MCG/ACT inhaler, USE 1 INHALATION TWICE A DAY (RINSE MOUTH AFTER USE), Disp: 60 blister, Rfl: 11  •  azelastine (ASTELIN) 0 1 % nasal spray, 1 spray into each nostril in the morning and 1 spray in the evening  Use in each nostril as directed   (Patient taking differently: 1 spray into each nostril 2 (two) times a day as needed Use in each nostril as directed), Disp: 90 mL, Rfl: 3  •  docusate sodium (COLACE) 100 mg capsule, Take 1 capsule (100 mg total) by mouth 2 (two) times a day, Disp: 180 capsule, Rfl: 0  •  ferrous sulfate 324 (65 Fe) mg, Take 1 tablet (324 mg total) by mouth 2 (two) times a day before meals, Disp: 180 tablet, Rfl: 2  •  folic acid (FOLVITE) 830 mcg tablet, Take 1 tablet (400 mcg total) by mouth daily, Disp: 30 tablet, Rfl: 0  •  ipratropium-albuterol (DUO-NEB) 0 5-2 5 mg/3 mL nebulizer solution, INHALE CONTENTS OF 1 VIAL VIA NEBULIZER FOUR TIMES A DAY, Disp: 60 mL, Rfl: 11  •  levETIRAcetam (KEPPRA) 500 mg tablet, Take 1 tablet (500 mg total) by mouth every 12 (twelve) hours for 8 doses, Disp: 8 tablet, Rfl: 0  •  metoprolol succinate (Toprol XL) 25 mg 24 hr tablet, Take 1 tablet (25 mg total) by mouth daily, Disp: 90 tablet, Rfl: 3  •  nicotine (NICODERM CQ) 7 mg/24hr TD 24 hr patch, Place 1 patch on the skin daily (Patient not taking: Reported on 9/29/2022), Disp: 28 patch, Rfl: 0  •  pantoprazole (PROTONIX) 40 mg tablet, TAKE 1 TABLET DAILY BEFORE BREAKFAST, Disp: 90 tablet, Rfl: 3  •  potassium chloride (Klor-Con) 10 mEq tablet, TAKE 1 TABLET DAILY, Disp: 90 tablet, Rfl: 3  •  tamsulosin (FLOMAX) 0 4 mg, TAKE 1 CAPSULE DAILY, Disp: 30 capsule, Rfl: 11  Family History   Problem Relation Age of Onset   • Hypertension Mother    • Coronary artery disease Mother         premature   • Hypertension Father    • Coronary artery disease Father         premature   • Diabetes Father               Coordination of Care: Wound team aware of the treatment plan  Facility nurse will provide wound treatment and monitor the wound for any changes  Patient / Staff education : Patient / Staff was given education on sign of infection and pressure ulcer prevention  Patient/ Staff verbalized understanding     Follow up :  Next week    Voice-recognition software may have been used in the preparation of this document  Occasional wrong word or "sound-alike" substitutions may have occurred due to the inherent limitations of voice recognition software  Interpretation should be guided by nii Raymond

## 2022-10-13 ENCOUNTER — NURSING HOME VISIT (OUTPATIENT)
Dept: GERIATRICS | Facility: OTHER | Age: 77
End: 2022-10-13
Payer: MEDICARE

## 2022-10-13 VITALS
WEIGHT: 137.6 LBS | OXYGEN SATURATION: 94 % | DIASTOLIC BLOOD PRESSURE: 74 MMHG | HEART RATE: 80 BPM | BODY MASS INDEX: 18.66 KG/M2 | RESPIRATION RATE: 18 BRPM | SYSTOLIC BLOOD PRESSURE: 132 MMHG | TEMPERATURE: 97.3 F

## 2022-10-13 DIAGNOSIS — S06.5XAA SDH (SUBDURAL HEMATOMA): Primary | ICD-10-CM

## 2022-10-13 DIAGNOSIS — T14.8XXA SURGICAL WOUND PRESENT: ICD-10-CM

## 2022-10-13 DIAGNOSIS — R26.2 AMBULATORY DYSFUNCTION: ICD-10-CM

## 2022-10-13 DIAGNOSIS — R33.9 URINARY RETENTION: ICD-10-CM

## 2022-10-13 DIAGNOSIS — I48.21 PERMANENT ATRIAL FIBRILLATION (HCC): ICD-10-CM

## 2022-10-13 DIAGNOSIS — F17.200 TOBACCO USE DISORDER: ICD-10-CM

## 2022-10-13 PROCEDURE — 99309 SBSQ NF CARE MODERATE MDM 30: CPT

## 2022-10-13 NOTE — ASSESSMENT & PLAN NOTE
· History of BPH  · Patient has chronic Cloud  · Draining clear yellow urine  · Urology following  · Monitor urine output  · Continue Tamsulosin 0 4 mg daily

## 2022-10-13 NOTE — ASSESSMENT & PLAN NOTE
· History AFib  · Due to recent fall with subdural hematoma Eliquis is on hold  · Heart rate stable in the 70's-80's  · Patient denies palpitations/chest pain  · Continue metoprolol 25 mg daily  · Loop recorder placed during recent hospitalization  · Site is clean dry and intact with no signs of infection, sutures are intact    · Follow-up appointment with Cardiology 10/14/2022  · Continue to monitor heart rate

## 2022-10-13 NOTE — PROGRESS NOTES
[facility]  [address]  [phone]  James Bilis  Code 31 (STR)          NAME: Tea Saini  AGE: 78 y o  SEX: male CODE STATUS: CPR    DATE OF ENCOUNTER: 10/13/2022    Assessment and Plan     1  SDH (subdural hematoma)  Assessment & Plan:  · Patient recently admitted to hospital for syncopal episode with head strike  · CT showed small right-sided subdural hematoma  · Repeat CT showed subdural hematoma stable  · Neurology recommended Keppra x1 week for seizure prophylaxis  Course completed 10/05/2022  · Eliquis discontinued  · Patient remains stable  · Follow-up neurology 10/16/2022  · Recommended follow-up with Neurosurgery in 2 weeks  Will need repeat CT 2-3 days prior to appointment  · Continue PT and OT  · Fall precautions      2  Permanent atrial fibrillation (HCC)  Assessment & Plan:  · History AFib  · Due to recent fall with subdural hematoma Eliquis is on hold  · Heart rate stable in the 70's-80's  · Patient denies palpitations/chest pain  · Continue metoprolol 25 mg daily  · Loop recorder placed during recent hospitalization  · Site is clean dry and intact with no signs of infection, sutures are intact  · Follow-up appointment with Cardiology 10/14/2022  · Continue to monitor heart rate      3  Urinary retention  Assessment & Plan:  · History of BPH  · Patient has chronic Cloud  · Draining clear yellow urine  · Urology following  · Monitor urine output  · Continue Tamsulosin 0 4 mg daily      4  Tobacco use disorder  Assessment & Plan:  · Patient current smoker  · Reports 1/2-1 ppd  · Indicates desire to quit  · States the patch is working  · Continue nicotine patch 7 mg daily      5  Surgical wound present  Assessment & Plan:  · Loop recorder placed during last hospitalization, 09/30/2022  · Sutures are intact with no signs of infection  · Open to air  · Device check follow-up on 10/14/2022  · Wound care following      6   Ambulatory dysfunction  Assessment & Plan:  Multifactorial  Recent syncopal episode  Continue PT/OT  Fall/safety  Precautions  Ensure adequate nutrition/hydration   Monitor CBC/BMP    following for d/c planning          All medications and routine orders were reviewed and updated as needed  Chief Complaint     STR follow up visit  Patient's care was coordinated with nursing facility staff  Recent vitals, labs, and updated medications were review on [facility] system in facility  Past Medical and Surgica History      Past Medical History:   Diagnosis Date   • Anxiety disorder    • Atrial fibrillation ([address] )    • Coronary artery disease    • Depression    • Cloud catheter in place    • Hepatitis C     screening negative in 1/2017   • Hypertension    • MI (myocardial infarction) [facility])    • Use of cane as ambulatory aid      Past Surgical History:   Procedure Laterality Date   • CARDIAC CATHETERIZATION  07/09/2018   • CARDIAC ELECTROPHYSIOLOGY PROCEDURE N/A 9/30/2022    Procedure: Cardiac loop recorder implant;  Surgeon: Lm Petty MD;  [facility]; Service: Cardiology   • COLONOSCOPY     • SC REPAIR ING HERNIA,5+Y/O,REDUCIBL Right 8/11/2022    Procedure: REPAIR HERNIA INGUINAL;  Surgeon: Ling Encarnacion DO;  Location: [facility];  Service: General   • TONSILLECTOMY       No Known Allergies       History of Present Illness     TREVOR Mauricio is a 79-year-old male, he is a STR patient of [facility] since 10/02/2022  Past Medical Hx including but not limited to BPH with chronic Cloud, AFib, COPD, anxiety, depression, CAD  He was seen in collaboration with nursing for medical management and STR follow up  Hospital course  Patient was admitted to the hospital 09/28/2022 following recent syncopal episode at home with head strike  CT of head revealed small right-sided subdural hematoma  Neurology recommended Keppra dosing x1 week for seizure prophylaxis  Completed 10/05/2022   No seizure activity noted  Loop recorder placed 09/30/2022 initially showing AFib with RVR  Site CDI, sutures intact  Patient will follow-up with Cardiology for device check 10/14/2022  Patient will follow up with Neurology 10/16/2022  It is recommended patient follow up with Neurosurgery in 2 weeks, patient will need repeat CT scan in 2-3 days prior to appointment  Rehab course  Seen and examined at bedside today  Patient is a reliable historian  He is sitting in bed and is alert and oriented x4, denies any pain, and does not appear to be in any distress at this time  His Miller catheter is intact and is draining clear yellow urine  Denies CP/SOB/N/V/D  Denies lightheadedness, dizziness, headaches, vision changes  Patient states they are eating well and staying hydrated  Denies any bowel or bladder issues  Per review of SNF records, Patient is eating 3 meals per day, consuming  %  Last documented BM 10/11//2022  No concerns from nursing at this time  The patient's allergies, past medical, surgical, social and family history were reviewed and unchanged  Review of Systems     Review of Systems   Constitutional: Negative  Negative for activity change, appetite change, fatigue and fever  HENT: Negative  Negative for congestion  Eyes: Negative  Respiratory: Negative  Negative for cough, chest tightness, shortness of breath and wheezing  Cardiovascular: Negative  Negative for chest pain, palpitations and leg swelling  Gastrointestinal: Negative  Negative for abdominal distention, abdominal pain, constipation, diarrhea, nausea and vomiting  Endocrine: Negative  Genitourinary: Positive for difficulty urinating  Chronic miller catheter   Musculoskeletal: Negative  Skin: Negative  Allergic/Immunologic: Negative  Neurological: Negative  Negative for dizziness, syncope, weakness, light-headedness, numbness and headaches  Hematological: Negative      Psychiatric/Behavioral: Negative  Negative for dysphoric mood  Objective     Vitals:   Vitals:    10/13/22 1123   BP: 132/74   Pulse: 80   Resp: 18   Temp: (!) 97 3 °F (36 3 °C)   SpO2: 94%         Physical Exam  Vitals and nursing note reviewed  Constitutional:       General: He is not in acute distress  Appearance: Normal appearance  He is not ill-appearing  HENT:      Head: Normocephalic and atraumatic  Nose: Nose normal  No congestion  Mouth/Throat:      Mouth: Mucous membranes are moist       Pharynx: Oropharynx is clear  Eyes:      Conjunctiva/sclera: Conjunctivae normal       Pupils: Pupils are equal, round, and reactive to light  Cardiovascular:      Rate and Rhythm: Normal rate  Pulses: Normal pulses  Heart sounds: Normal heart sounds  Pulmonary:      Effort: Pulmonary effort is normal  No respiratory distress  Breath sounds: No wheezing  Abdominal:      General: Abdomen is flat  Bowel sounds are normal  There is no distension  Palpations: Abdomen is soft  Tenderness: There is no abdominal tenderness  Genitourinary:     Comments: Chronic miller catheter  Musculoskeletal:      Right lower leg: No edema  Left lower leg: No edema  Skin:     General: Skin is warm and dry  Capillary Refill: Capillary refill takes 2 to 3 seconds  Neurological:      Mental Status: He is alert and oriented to person, place, and time  Psychiatric:         Mood and Affect: Mood normal          Behavior: Behavior normal          Pertinent Laboratory/Diagnostic Studies:   Reviewed in facility chart-stable      Current Medications   Medications reviewed and updated see facility [facility] for details  Current Outpatient Medications:   •  Advair Diskus 100-50 MCG/ACT inhaler, USE 1 INHALATION TWICE A DAY (RINSE MOUTH AFTER USE), Disp: 60 blister, Rfl: 11  •  azelastine (ASTELIN) 0 1 % nasal spray, 1 spray into each nostril in the morning and 1 spray in the evening   Use in each nostril as directed  (Patient taking differently: 1 spray into each nostril 2 (two) times a day as needed Use in each nostril as directed), Disp: 90 mL, Rfl: 3  •  docusate sodium (COLACE) 100 mg capsule, Take 1 capsule (100 mg total) by mouth 2 (two) times a day, Disp: 180 capsule, Rfl: 0  •  ferrous sulfate 324 (65 Fe) mg, Take 1 tablet (324 mg total) by mouth 2 (two) times a day before meals, Disp: 180 tablet, Rfl: 2  •  folic acid (FOLVITE) 214 mcg tablet, Take 1 tablet (400 mcg total) by mouth daily, Disp: 30 tablet, Rfl: 0  •  ipratropium-albuterol (DUO-NEB) 0 5-2 5 mg/3 mL nebulizer solution, INHALE CONTENTS OF 1 VIAL VIA NEBULIZER FOUR TIMES A DAY, Disp: 60 mL, Rfl: 11  •  levETIRAcetam (KEPPRA) 500 mg tablet, Take 1 tablet (500 mg total) by mouth every 12 (twelve) hours for 8 doses, Disp: 8 tablet, Rfl: 0  •  metoprolol succinate (Toprol XL) 25 mg 24 hr tablet, Take 1 tablet (25 mg total) by mouth daily, Disp: 90 tablet, Rfl: 3  •  nicotine (NICODERM CQ) 7 mg/24hr TD 24 hr patch, Place 1 patch on the skin daily (Patient not taking: Reported on 9/29/2022), Disp: 28 patch, Rfl: 0  •  pantoprazole (PROTONIX) 40 mg tablet, TAKE 1 TABLET DAILY BEFORE BREAKFAST, Disp: 90 tablet, Rfl: 3  •  potassium chloride (Klor-Con) 10 mEq tablet, TAKE 1 TABLET DAILY, Disp: 90 tablet, Rfl: 3  •  tamsulosin (FLOMAX) 0 4 mg, TAKE 1 CAPSULE DAILY, Disp: 30 capsule, Rfl: 11     Plan discussed with Dr Jennifer Hebert noted agreement with assessment and plan  Please note:  Voice-recognition software may have been used in the preparation of this document  Occasional wrong word or "sound-alike" substitutions may have occurred due to the inherent limitations of voice recognition software  Interpretation should be guided by context           Heidy Cloud  10/13/2022  11:32 AM

## 2022-10-13 NOTE — ASSESSMENT & PLAN NOTE
· Loop recorder placed during last hospitalization, 09/30/2022  · Sutures are intact with no signs of infection  · Open to air  · Device check follow-up on 10/14/2022  · Wound care following

## 2022-10-13 NOTE — ASSESSMENT & PLAN NOTE
· Patient current smoker  · Reports 1/2-1 ppd  · Indicates desire to quit  · States the patch is working  · Continue nicotine patch 7 mg daily

## 2022-10-13 NOTE — ASSESSMENT & PLAN NOTE
Multifactorial  Recent syncopal episode  Continue PT/OT  Fall/safety  Precautions  Ensure adequate nutrition/hydration   Monitor CBC/BMP    following for d/c planning

## 2022-10-14 ENCOUNTER — IN-CLINIC DEVICE VISIT (OUTPATIENT)
Dept: CARDIOLOGY CLINIC | Facility: CLINIC | Age: 77
End: 2022-10-14
Payer: MEDICARE

## 2022-10-14 DIAGNOSIS — Z95.818 PRESENCE OF CARDIAC DEVICE: Primary | ICD-10-CM

## 2022-10-14 PROCEDURE — 93291 INTERROG DEV EVAL SCRMS IP: CPT | Performed by: INTERNAL MEDICINE

## 2022-10-14 NOTE — PROGRESS NOTES
Results for orders placed or performed in visit on 10/14/22   Cardiac EP device report    Narrative    MDT [facility] INTERROGATED IN THE [place] OFFICE  BATTERY VOLTAGE ADEQUATE  158 AF EPISODES DETECTED (90%  OF TIME) LONGEST <9 HOURS  PATIENT IS ON METOPROLOL SUCC- NO AC'S-Was on elqiuis previously, currently on hold - due to subdural hematoma after his recent fall related to syncope  WOUND CHECK: INCISION CLEAN AND DRY WITH EDGES APPROXIMATED; SUTURES REMOVED; WOUND CARE AND RESTRICTIONS REVIEWED WITH PATIENT  ---FIGUEROA

## 2022-10-18 ENCOUNTER — NURSING HOME VISIT (OUTPATIENT)
Dept: GERIATRICS | Facility: OTHER | Age: 77
End: 2022-10-18
Payer: MEDICARE

## 2022-10-18 ENCOUNTER — HOSPITAL ENCOUNTER (OUTPATIENT)
Dept: CT IMAGING | Facility: HOSPITAL | Age: 77
Discharge: HOME | End: 2022-10-18
Payer: MEDICARE

## 2022-10-18 VITALS
OXYGEN SATURATION: 96 % | RESPIRATION RATE: 18 BRPM | HEART RATE: 83 BPM | SYSTOLIC BLOOD PRESSURE: 137 MMHG | TEMPERATURE: 97.1 F | DIASTOLIC BLOOD PRESSURE: 78 MMHG

## 2022-10-18 DIAGNOSIS — R33.9 URINARY RETENTION: ICD-10-CM

## 2022-10-18 DIAGNOSIS — F17.200 TOBACCO USE DISORDER: ICD-10-CM

## 2022-10-18 DIAGNOSIS — J44.9 COPD (CHRONIC OBSTRUCTIVE PULMONARY DISEASE) (HCC): ICD-10-CM

## 2022-10-18 DIAGNOSIS — D50.0 IRON DEFICIENCY ANEMIA DUE TO CHRONIC BLOOD LOSS: ICD-10-CM

## 2022-10-18 DIAGNOSIS — I48.21 PERMANENT ATRIAL FIBRILLATION (HCC): ICD-10-CM

## 2022-10-18 DIAGNOSIS — D50.9 MICROCYTIC ANEMIA: ICD-10-CM

## 2022-10-18 DIAGNOSIS — S06.5XAA SDH (SUBDURAL HEMATOMA): ICD-10-CM

## 2022-10-18 DIAGNOSIS — R09.81 NASAL CONGESTION: ICD-10-CM

## 2022-10-18 DIAGNOSIS — I10 ESSENTIAL HYPERTENSION: ICD-10-CM

## 2022-10-18 DIAGNOSIS — J44.9 CHRONIC OBSTRUCTIVE PULMONARY DISEASE, UNSPECIFIED COPD TYPE (HCC): ICD-10-CM

## 2022-10-18 DIAGNOSIS — R26.2 AMBULATORY DYSFUNCTION: Primary | ICD-10-CM

## 2022-10-18 PROCEDURE — G1004 CDSM NDSC: HCPCS

## 2022-10-18 PROCEDURE — 70450 CT HEAD/BRAIN W/O DYE: CPT

## 2022-10-18 PROCEDURE — 99316 NF DSCHRG MGMT 30 MIN+: CPT

## 2022-10-18 NOTE — ASSESSMENT & PLAN NOTE
· Patient recently admitted to hospital for syncopal episode with head strike  · CT showed small right-sided subdural hematoma  · Repeat CT showed subdural hematoma stable  · Neurology recommended Keppra x1 week for seizure prophylaxis  Course completed 10/05/2022  · Eliquis discontinued  · Patient remains stable  · Follow-up neurology 10/16/2022  · Recommended follow-up with Neurosurgery in 2 weeks     · Repeat CT today  · Continue PT and OT  · Fall/ safety precautions

## 2022-10-18 NOTE — PROGRESS NOTES
[facility]  Małachshawn Lombardi 79  [phone] [id number]: [facility]  Code 31    NAME: Matthew Laird  AGE: 78 y o  SEX: male   CODE STATUS: CPR    DATE OF ADMISSION: 10/2/2022   DATE OF DISCHARGE: 10/19/2022   DISCHARGE DISPOSITION: Stable for discharge to home with family support and home health PT/OT/SN services  Reason for Admission: Patient was admitted to [address] [place] [address] for rehabilitation after hospitalization for syncopal episode with injury  Past Medical and Surgical History:   Past Medical History:   Diagnosis Date   • Anxiety disorder    • Atrial fibrillation [facility])    • Coronary artery disease    • Depression    • Cloud catheter in place    • Hepatitis C     screening negative in 1/2017   • Hypertension    • MI (myocardial infarction) [facility])    • Use of cane as ambulatory aid       Past Surgical History:   Procedure Laterality Date   • CARDIAC CATHETERIZATION  07/09/2018   • CARDIAC ELECTROPHYSIOLOGY PROCEDURE N/A 9/30/2022    Procedure: Cardiac loop recorder implant;  Surgeon: Elmer Harris MD;  Location: [facility]ARD[facility]; Service: Cardiology   • COLONOSCOPY     • VA REPAIR ING HERNIA,5+Y/O,REDUCIBL Right 8/11/2022    Procedure: REPAIR HERNIA INGUINAL;  Surgeon: Jessica Encarnacion DO;  Location: [facility] Main [facility];  Service: General   • TONSILLECTOMY         Course of stay:   TREVOR Alaniz Seen is a 70-year-old male, he is a STR patient of [address] [place] [address] SNF since 10/02/2022  Past Medical Hx including but not limited to BPH with chronic Cloud, AFib, COPD, anxiety, depression, CAD  He was seen in collaboration with nursing for medical management and STR follow up  Patient will be discharged to home 10/19/2022  Hospital course  Patient was admitted to the hospital 09/28/2022 following recent syncopal episode at home with head strike  CT of head revealed small right-sided subdural hematoma  Neurology recommended Keppra dosing x1 week for seizure prophylaxis  Completed 10/05/2022  No seizure activity noted  Loop recorder placed 09/30/2022 initially showing AFib with RVR  Site CDI  Patient will follow-up with Cardiology for device check as recommended  Patient will follow up with Neurology 10/16/2022  It is recommended patient follow up with Neurosurgery in 2 weeks, patient having repeat CT today at 1 pm     Rehab course  Seen and examined at bedside today  Patient is a reliable historian  He is sitting in bed and is alert and oriented x4, denies any pain, and does not appear to be in any distress at this time  His Miller catheter is intact and is draining clear yellow urine  Denies CP/SOB/N/V/D  Denies lightheadedness, dizziness, headaches, vision changes  Patient states they are eating well and staying hydrated  Denies any bowel or bladder issues  Per review of SNF records, Patient is eating 3 meals per day, consuming  %  Last documented BM 10/18//2022  No concerns from nursing at this time  During the patients stay at [facility], he received skilled nursing care, PT, OT, social service support, dietician support, and medical management  Pt scheduled to be discharged on 10/19/2022   ROS:  Review of Systems   Constitutional: Negative  Negative for activity change, appetite change and fever  HENT: Negative  Negative for congestion  Eyes: Negative  Respiratory: Negative  Negative for cough, shortness of breath and wheezing  Cardiovascular: Negative  Negative for chest pain, palpitations and leg swelling  Gastrointestinal: Negative  Negative for abdominal distention, abdominal pain, constipation, diarrhea, nausea and vomiting  Endocrine: Negative  Genitourinary: Positive for difficulty urinating  Negative for dysuria, frequency and hematuria  Chronic miller   Musculoskeletal: Negative  Skin: Negative  Allergic/Immunologic: Negative  Neurological: Negative    Negative for dizziness, syncope, weakness, light-headedness, numbness and headaches  Hematological: Negative  Psychiatric/Behavioral: Negative  PHYSICAL EXAM:  VITALS:   Vitals:    10/18/22 1341   BP: 137/78   Pulse: 83   Resp: 18   Temp: (!) 97 1 °F (36 2 °C)   SpO2: 96%        Physical Exam  Vitals and nursing note reviewed  Constitutional:       General: He is not in acute distress  Appearance: Normal appearance  He is normal weight  He is not ill-appearing  HENT:      Head: Normocephalic and atraumatic  Nose: Nose normal  No congestion  Mouth/Throat:      Mouth: Mucous membranes are moist    Eyes:      Conjunctiva/sclera: Conjunctivae normal       Pupils: Pupils are equal, round, and reactive to light  Cardiovascular:      Rate and Rhythm: Normal rate and regular rhythm  Pulses: Normal pulses  Heart sounds: Normal heart sounds  Pulmonary:      Effort: Pulmonary effort is normal  No respiratory distress  Breath sounds: No wheezing  Abdominal:      General: Bowel sounds are normal  There is no distension  Palpations: Abdomen is soft  Tenderness: There is no abdominal tenderness  Genitourinary:     Comments: Chronic miller  Musculoskeletal:      Right lower leg: No edema  Left lower leg: No edema  Skin:     General: Skin is warm  Capillary Refill: Capillary refill takes 2 to 3 seconds  Neurological:      Mental Status: He is alert and oriented to person, place, and time  Psychiatric:         Mood and Affect: Mood normal          Behavior: Behavior normal          Admission Diagnoses:   1  Ambulatory dysfunction  Assessment & Plan:  Multifactorial  Recent syncopal episode  Continue PT/OT  Fall/safety  Precautions  Ensure adequate nutrition/hydration   Monitor CBC/BMP    following for d/c planning       2   Urinary retention  Assessment & Plan:  · History of BPH  · Patient has chronic Miller  · Draining clear yellow urine  · Urology following  · Monitor urine output  · Continue Tamsulosin 0 4 mg daily      3  Tobacco use disorder  Assessment & Plan:  · Patient current smoker  · Reports 1/2-1 ppd  · Indicates desire to quit  · States the patch is working  · Continue nicotine patch 7 mg daily      4  SDH (subdural hematoma)  Assessment & Plan:  · Patient recently admitted to hospital for syncopal episode with head strike  · CT showed small right-sided subdural hematoma  · Repeat CT showed subdural hematoma stable  · Neurology recommended Keppra x1 week for seizure prophylaxis  Course completed 10/05/2022  · Eliquis discontinued  · Patient remains stable  · Follow-up neurology 10/16/2022  · Recommended follow-up with Neurosurgery in 2 weeks  · Repeat CT today  · Continue PT and OT  · Fall/ safety precautions      5  Permanent atrial fibrillation (HCC)  Assessment & Plan:  · History AFib  · Due to recent fall with subdural hematoma Eliquis is on hold  · Heart rate stable in the 70's-80's  · Patient denies palpitations/chest pain  · Continue metoprolol 25 mg daily  · Loop recorder placed during recent hospitalization  · Site is clean dry and intact with no signs of infection  · Follow-up with cardiology outpatient  · Continue to monitor heart rate      6  Essential hypertension    7  Chronic obstructive pulmonary disease, unspecified COPD type ([facility] [address] )    8  Nasal congestion    9  Symptomatic anemia    10  COPD (chronic obstructive pulmonary disease) ([facility])  -     nicotine (NICODERM CQ) 7 mg/24hr TD 24 hr patch; Place 1 patch on the skin daily    11   Iron deficiency anemia due to chronic blood loss       Follow-up Recommendations:    • Outpatient Follow up with PCP in the next 2 weeks  • Follow up with cardiology, loop recorder  • Follow up with neurosurgery after repeat CT, evaluation of SDH  • Home Health PT/OT/SN services     Labs and testing performed during stay:  Glucose 92  Bun 22  Creatinine 0 95  Na 142  K 4 9  Cl 109  CO2 29  CA 8 8  H/H 13/39 7  Plt 234    Discharge Medications: See discharge medication list which was reviewed and signed  Current Outpatient Medications:   •  Advair Diskus 100-50 MCG/ACT inhaler, USE 1 INHALATION TWICE A DAY (RINSE MOUTH AFTER USE), Disp: 60 blister, Rfl: 11  •  azelastine (ASTELIN) 0 1 % nasal spray, 1 spray into each nostril in the morning and 1 spray in the evening  Use in each nostril as directed  (Patient taking differently: 1 spray into each nostril 2 (two) times a day as needed Use in each nostril as directed), Disp: 90 mL, Rfl: 3  •  ferrous sulfate 324 (65 Fe) mg, Take 1 tablet (324 mg total) by mouth 2 (two) times a day before meals, Disp: 180 tablet, Rfl: 2  •  folic acid (FOLVITE) 194 mcg tablet, Take 1 tablet (400 mcg total) by mouth daily, Disp: 30 tablet, Rfl: 0  •  ipratropium-albuterol (DUO-NEB) 0 5-2 5 mg/3 mL nebulizer solution, INHALE CONTENTS OF 1 VIAL VIA NEBULIZER FOUR TIMES A DAY, Disp: 60 mL, Rfl: 11  •  metoprolol succinate (Toprol XL) 25 mg 24 hr tablet, Take 1 tablet (25 mg total) by mouth daily, Disp: 90 tablet, Rfl: 3  •  nicotine (NICODERM CQ) 7 mg/24hr TD 24 hr patch, Place 1 patch on the skin daily, Disp: 14 patch, Rfl: 0  •  pantoprazole (PROTONIX) 40 mg tablet, TAKE 1 TABLET DAILY BEFORE BREAKFAST, Disp: 90 tablet, Rfl: 3  •  potassium chloride (Klor-Con) 10 mEq tablet, TAKE 1 TABLET DAILY, Disp: 90 tablet, Rfl: 3  •  tamsulosin (FLOMAX) 0 4 mg, TAKE 1 CAPSULE DAILY, Disp: 30 capsule, Rfl: 11  •  docusate sodium (COLACE) 100 mg capsule, Take 1 capsule (100 mg total) by mouth 2 (two) times a day, Disp: 180 capsule, Rfl: 0     Discussion with patient/family and further instructions:  -Fall precautions  -Aspiration precautions  -Bleeding precautions  -Monitor for signs/symptoms of infection  -Medication list was reviewed and signed  -DME form was completed    Status at time of discharge: Stable     Billing based on time  Time spent on unit, 40 minutes  Time spent counseling pt on debility/condition, 30 minutes        Please note: Voice-recognition software may have been used in the preparation of this document  Occasional wrong word or "sound-alike" substitutions may have occurred due to the inherent limitations of voice recognition software  Interpretation should be guided by [employer]  10/18/2022

## 2022-10-18 NOTE — LETTER
October 18, 2022     Narayan Rota, [address]  [facility] [place]  [address] [address]    Patient: Robin Eastman   YOB: 1945   Date of Visit: 10/18/2022       Dear Dr Rose Steve: Thank you for referring Mathew Daugherty to me for evaluation  Below are my notes for this consultation  If you have questions, please do not hesitate to call me  I look forward to following your patient along with you  Sincerely,        GABRIELA Gregg        CC: No Recipients  Dora Kavitha, [address]  10/18/2022  3:52 PM  Incomplete  [facility]  [address]  [phone]  [facility]: [facility]  Code 31    NAME: Robin Eastman  AGE: 78 y o  SEX: male   CODE STATUS: CPR    DATE OF ADMISSION: 10/2/2022   DATE OF DISCHARGE: 10/19/2022   DISCHARGE DISPOSITION: Stable for discharge to home with family support and home health PT/OT/SN services  Reason for Admission: Patient was admitted to [facility] for rehabilitation after hospitalization for syncopal episode with injury  Past Medical and Surgical History:   Past Medical History:   Diagnosis Date   • Anxiety disorder    • Atrial fibrillation [facility])    • Coronary artery disease    • Depression    • Cloud catheter in place    • Hepatitis C     screening negative in 1/2017   • Hypertension    • MI (myocardial infarction) [facility])    • Use of cane as ambulatory aid       Past Surgical History:   Procedure Laterality Date   • CARDIAC CATHETERIZATION  07/09/2018   • CARDIAC ELECTROPHYSIOLOGY PROCEDURE N/A 9/30/2022    Procedure: Cardiac loop recorder implant;  Surgeon: Yaneth Brooks MD;  Location: [facility] CARDIAC CATH LAB;   Service: Cardiology   • COLONOSCOPY     • CO REPAIR ING HERNIA,5+Y/O,REDUCIBL Right 8/11/2022    Procedure: REPAIR HERNIA INGUINAL;  Surgeon: Liban Encarnacion DO;  Location: [facility] Main OR;  Service: General   • TONSILLECTOMY         Course of stay:   HPI   Mathew Daugherty is a 30-year-old male, he is a STR patient of [facility] since 10/02/2022  Past Medical Hx including but not limited to BPH with chronic Cloud, AFib, COPD, anxiety, depression, CAD  He was seen in collaboration with nursing for medical management and STR follow up  Patient will be discharged to home 10/19/2022  Hospital course  Patient was admitted to the hospital 09/28/2022 following recent syncopal episode at home with head strike  CT of head revealed small right-sided subdural hematoma  Neurology recommended Keppra dosing x1 week for seizure prophylaxis  Completed 10/05/2022  No seizure activity noted  Loop recorder placed 09/30/2022 initially showing AFib with RVR  Site CDI  Patient will follow-up with Cardiology for device check as recommended  Patient will follow up with Neurology 10/16/2022  It is recommended patient follow up with Neurosurgery in 2 weeks, patient having repeat CT today at 1 pm     Rehab course  Seen and examined at bedside today  Patient is a reliable historian  He is sitting in bed and is alert and oriented x4, denies any pain, and does not appear to be in any distress at this time  His Cloud catheter is intact and is draining clear yellow urine  Denies CP/SOB/N/V/D  Denies lightheadedness, dizziness, headaches, vision changes  Patient states they are eating well and staying hydrated  Denies any bowel or bladder issues  Per review of SNF records, Patient is eating 3 meals per day, consuming  %  Last documented BM 10/18//2022  No concerns from nursing at this time  During the patients stay at [facility], he received skilled nursing care, PT, OT, social service support, dietician support, and medical management  Pt scheduled to be discharged on 10/19/2022   ROS:  Review of Systems   Constitutional: Negative  Negative for activity change, appetite change and fever  HENT: Negative  Negative for congestion  Eyes: Negative  Respiratory: Negative    Negative for cough, shortness of breath and wheezing  Cardiovascular: Negative  Negative for chest pain, palpitations and leg swelling  Gastrointestinal: Negative  Negative for abdominal distention, abdominal pain, constipation, diarrhea, nausea and vomiting  Endocrine: Negative  Genitourinary: Positive for difficulty urinating  Negative for dysuria, frequency and hematuria  Chronic miller   Musculoskeletal: Negative  Skin: Negative  Allergic/Immunologic: Negative  Neurological: Negative  Negative for dizziness, syncope, weakness, light-headedness, numbness and headaches  Hematological: Negative  Psychiatric/Behavioral: Negative  PHYSICAL EXAM:  VITALS:   Vitals:    10/18/22 1341   BP: 137/78   Pulse: 83   Resp: 18   Temp: (!) 97 1 °F (36 2 °C)   SpO2: 96%        Physical Exam  Vitals and nursing note reviewed  Constitutional:       General: He is not in acute distress  Appearance: Normal appearance  He is normal weight  He is not ill-appearing  HENT:      Head: Normocephalic and atraumatic  Nose: Nose normal  No congestion  Mouth/Throat:      Mouth: Mucous membranes are moist    Eyes:      Conjunctiva/sclera: Conjunctivae normal       Pupils: Pupils are equal, round, and reactive to light  Cardiovascular:      Rate and Rhythm: Normal rate and regular rhythm  Pulses: Normal pulses  Heart sounds: Normal heart sounds  Pulmonary:      Effort: Pulmonary effort is normal  No respiratory distress  Breath sounds: No wheezing  Abdominal:      General: Bowel sounds are normal  There is no distension  Palpations: Abdomen is soft  Tenderness: There is no abdominal tenderness  Genitourinary:     Comments: Chronic miller  Musculoskeletal:      Right lower leg: No edema  Left lower leg: No edema  Skin:     General: Skin is warm  Capillary Refill: Capillary refill takes 2 to 3 seconds     Neurological:      Mental Status: He is alert and oriented to person, place, and time  Psychiatric:         Mood and Affect: Mood normal          Behavior: Behavior normal          Admission Diagnoses:   1  Ambulatory dysfunction  Assessment & Plan:  Multifactorial  Recent syncopal episode  Continue PT/OT  Fall/safety  Precautions  Ensure adequate nutrition/hydration   Monitor CBC/BMP    following for d/c planning       2  Urinary retention  Assessment & Plan:  · History of BPH  · Patient has chronic Cloud  · Draining clear yellow urine  · Urology following  · Monitor urine output  · Continue Tamsulosin 0 4 mg daily      3  Tobacco use disorder  Assessment & Plan:  · Patient current smoker  · Reports 1/2-1 ppd  · Indicates desire to quit  · States the patch is working  · Continue nicotine patch 7 mg daily      4  SDH (subdural hematoma)  Assessment & Plan:  · Patient recently admitted to hospital for syncopal episode with head strike  · CT showed small right-sided subdural hematoma  · Repeat CT showed subdural hematoma stable  · Neurology recommended Keppra x1 week for seizure prophylaxis  Course completed 10/05/2022  · Eliquis discontinued  · Patient remains stable  · Follow-up neurology 10/16/2022  · Recommended follow-up with Neurosurgery in 2 weeks  · Repeat CT today  · Continue PT and OT  · Fall/ safety precautions      5  Permanent atrial fibrillation (HCC)  Assessment & Plan:  · History AFib  · Due to recent fall with subdural hematoma Eliquis is on hold  · Heart rate stable in the 70's-80's  · Patient denies palpitations/chest pain  · Continue metoprolol 25 mg daily  · Loop recorder placed during recent hospitalization  · Site is clean dry and intact with no signs of infection  · Follow-up with cardiology outpatient  · Continue to monitor heart rate      6  Essential hypertension    7  Chronic obstructive pulmonary disease, unspecified COPD type ([facility] [address] )    8  Nasal congestion    9  Symptomatic anemia    10   COPD (chronic obstructive pulmonary disease) ([facility] )  -     nicotine (NICODERM CQ) 7 mg/24hr TD 24 hr patch; Place 1 patch on the skin daily    11  Iron deficiency anemia due to chronic blood loss       Follow-up Recommendations:    • Outpatient Follow up with PCP in the next 2 weeks  • Follow up with cardiology, loop recorder  • Follow up with neurosurgery after repeat CT, evaluation of SDH  • Home Health PT/OT/SN services     Labs and testing performed during stay:  Glucose 92  Bun 22  Creatinine 0 95  Na 142  K 4 9  Cl 109  CO2 29  CA 8 8  H/H 13/39 7  Plt 234    Discharge Medications: See discharge medication list which was reviewed and signed  Current Outpatient Medications:   •  Advair Diskus 100-50 MCG/ACT inhaler, USE 1 INHALATION TWICE A DAY (RINSE MOUTH AFTER USE), Disp: 60 blister, Rfl: 11  •  azelastine (ASTELIN) 0 1 % nasal spray, 1 spray into each nostril in the morning and 1 spray in the evening  Use in each nostril as directed   (Patient taking differently: 1 spray into each nostril 2 (two) times a day as needed Use in each nostril as directed), Disp: 90 mL, Rfl: 3  •  ferrous sulfate 324 (65 Fe) mg, Take 1 tablet (324 mg total) by mouth 2 (two) times a day before meals, Disp: 180 tablet, Rfl: 2  •  folic acid (FOLVITE) 109 mcg tablet, Take 1 tablet (400 mcg total) by mouth daily, Disp: 30 tablet, Rfl: 0  •  ipratropium-albuterol (DUO-NEB) 0 5-2 5 mg/3 mL nebulizer solution, INHALE CONTENTS OF 1 VIAL VIA NEBULIZER FOUR TIMES A DAY, Disp: 60 mL, Rfl: 11  •  metoprolol succinate (Toprol XL) 25 mg 24 hr tablet, Take 1 tablet (25 mg total) by mouth daily, Disp: 90 tablet, Rfl: 3  •  nicotine (NICODERM CQ) 7 mg/24hr TD 24 hr patch, Place 1 patch on the skin daily, Disp: 14 patch, Rfl: 0  •  pantoprazole (PROTONIX) 40 mg tablet, TAKE 1 TABLET DAILY BEFORE BREAKFAST, Disp: 90 tablet, Rfl: 3  •  potassium chloride (Klor-Con) 10 mEq tablet, TAKE 1 TABLET DAILY, Disp: 90 tablet, Rfl: 3  •  tamsulosin (FLOMAX) 0 4 mg, TAKE 1 CAPSULE DAILY, Disp: 30 capsule, Rfl: 11  •  docusate sodium (COLACE) 100 mg capsule, Take 1 capsule (100 mg total) by mouth 2 (two) times a day, Disp: 180 capsule, Rfl: 0     Discussion with patient/family and further instructions:  -Fall precautions  -Aspiration precautions  -Bleeding precautions  -Monitor for signs/symptoms of infection  -Medication list was reviewed and signed  -DME form was completed    Status at time of discharge: Stable     Billing based on time  Time spent on unit, 40 minutes  Time spent counseling pt on debility/condition, 30 minutes  Please note:  Voice-recognition software may have been used in the preparation of this document  Occasional wrong word or "sound-alike" substitutions may have occurred due to the inherent limitations of voice recognition software  Interpretation should be guided by context  [facility]  10/18/2022     [address] [place] [address]  10/18/2022  2:01 PM  Sign when Signing Visit  [facility]  [address]  [phone]  [address] [place] [address] St: [facility]  Code 31    NAME: Nita Linda  AGE: 78 y o  SEX: male   CODE STATUS: CPR    DATE OF ADMISSION: 10/2/2022   DATE OF DISCHARGE: 10/19/2022   DISCHARGE DISPOSITION: Stable for discharge to home with family support and home health PT/OT/SN services  Reason for Admission: Patient was admitted to [facility] for rehabilitation after hospitalization for syncopal episode with injury      Past Medical and Surgical History:   Past Medical History:   Diagnosis Date   • Anxiety disorder    • Atrial fibrillation [facility])    • Coronary artery disease    • Depression    • Cloud catheter in place    • Hepatitis C     screening negative in 1/2017   • Hypertension    • MI (myocardial infarction) [facility])    • Use of cane as ambulatory aid       Past Surgical History:   Procedure Laterality Date   • CARDIAC CATHETERIZATION  07/09/2018   • CARDIAC ELECTROPHYSIOLOGY PROCEDURE N/A 9/30/2022    Procedure: Cardiac loop recorder implant;  Surgeon: Rolfe Angelucci, MD;  Location: [facility] CARDIAC CATH LAB; Service: Cardiology   • COLONOSCOPY     • IL REPAIR ING HERNIA,5+Y/O,REDUCIBL Right 8/11/2022    Procedure: REPAIR HERNIA INGUINAL;  Surgeon: Mega Encarnacion DO;  Location: [facility] Main OR;  Service: General   • TONSILLECTOMY         Course of stay:   HPI   Ryan Yee is a 72-year-old male, he is a STR patient of [address] [place] [address] since 10/02/2022  Past Medical Hx including but not limited to BPH with chronic Cloud, AFib, COPD, anxiety, depression, CAD  He was seen in collaboration with nursing for medical management and STR follow up  Patient will be discharged to home 10/19/2022  Hospital course  Patient was admitted to the hospital 09/28/2022 following recent syncopal episode at home with head strike  CT of head revealed small right-sided subdural hematoma  Neurology recommended Keppra dosing x1 week for seizure prophylaxis  Completed 10/05/2022  No seizure activity noted  Loop recorder placed 09/30/2022 initially showing AFib with RVR  Site CDI  Patient will follow-up with Cardiology for device check as recommended  Patient will follow up with Neurology 10/16/2022  It is recommended patient follow up with Neurosurgery in 2 weeks, patient having repeat CT today at 1 pm     Rehab course  Seen and examined at bedside today  Patient is a reliable historian  He is sitting in bed and is alert and oriented x4, denies any pain, and does not appear to be in any distress at this time  His Cloud catheter is intact and is draining clear yellow urine  Denies CP/SOB/N/V/D  Denies lightheadedness, dizziness, headaches, vision changes  Patient states they are eating well and staying hydrated  Denies any bowel or bladder issues  Per review of SNF records, Patient is eating 3 meals per day, consuming  %  Last documented BM 10/18//2022  No concerns from nursing at this time    During the patients stay at [facility], he received skilled nursing care, PT, OT, social service support, dietician support, and medical management  Pt scheduled to be discharged on 10/19/2022   ROS:  Review of Systems   Constitutional: Negative  Negative for activity change, appetite change and fever  HENT: Negative  Negative for congestion  Eyes: Negative  Respiratory: Negative  Negative for cough, shortness of breath and wheezing  Cardiovascular: Negative  Negative for chest pain, palpitations and leg swelling  Gastrointestinal: Negative  Negative for abdominal distention, abdominal pain, constipation, diarrhea, nausea and vomiting  Endocrine: Negative  Genitourinary: Positive for difficulty urinating  Negative for dysuria, frequency and hematuria  Chronic miller   Musculoskeletal: Negative  Skin: Negative  Allergic/Immunologic: Negative  Neurological: Negative  Negative for dizziness, syncope, weakness, light-headedness, numbness and headaches  Hematological: Negative  Psychiatric/Behavioral: Negative  PHYSICAL EXAM:  VITALS:   Vitals:    10/18/22 1341   BP: 137/78   Pulse: 83   Resp: 18   Temp: (!) 97 1 °F (36 2 °C)   SpO2: 96%        Physical Exam  Vitals and nursing note reviewed  Constitutional:       General: He is not in acute distress  Appearance: Normal appearance  He is normal weight  He is not ill-appearing  HENT:      Head: Normocephalic and atraumatic  Nose: Nose normal  No congestion  Mouth/Throat:      Mouth: Mucous membranes are moist    Eyes:      Conjunctiva/sclera: Conjunctivae normal       Pupils: Pupils are equal, round, and reactive to light  Cardiovascular:      Rate and Rhythm: Normal rate and regular rhythm  Pulses: Normal pulses  Heart sounds: Normal heart sounds  Pulmonary:      Effort: Pulmonary effort is normal  No respiratory distress  Breath sounds: No wheezing     Abdominal:      General: Bowel sounds are normal  There is no distension  Palpations: Abdomen is soft  Tenderness: There is no abdominal tenderness  Genitourinary:     Comments: Chronic miller  Musculoskeletal:      Right lower leg: No edema  Left lower leg: No edema  Skin:     General: Skin is warm  Capillary Refill: Capillary refill takes 2 to 3 seconds  Neurological:      Mental Status: He is alert and oriented to person, place, and time  Psychiatric:         Mood and Affect: Mood normal          Behavior: Behavior normal          Admission Diagnoses:   1  Ambulatory dysfunction  Assessment & Plan:  Multifactorial  Recent syncopal episode  Continue PT/OT  Fall/safety  Precautions  Ensure adequate nutrition/hydration   Monitor CBC/BMP    following for d/c planning       2  Urinary retention  Assessment & Plan:  · History of BPH  · Patient has chronic Miller  · Draining clear yellow urine  · Urology following  · Monitor urine output  · Continue Tamsulosin 0 4 mg daily      3  Tobacco use disorder  Assessment & Plan:  · Patient current smoker  · Reports 1/2-1 ppd  · Indicates desire to quit  · States the patch is working  · Continue nicotine patch 7 mg daily      4  SDH (subdural hematoma)  Assessment & Plan:  · Patient recently admitted to hospital for syncopal episode with head strike  · CT showed small right-sided subdural hematoma  · Repeat CT showed subdural hematoma stable  · Neurology recommended Keppra x1 week for seizure prophylaxis  Course completed 10/05/2022  · Eliquis discontinued  · Patient remains stable  · Follow-up neurology 10/16/2022  · Recommended follow-up with Neurosurgery in 2 weeks  · Repeat CT today  · Continue PT and OT  · Fall/ safety precautions      5   Permanent atrial fibrillation (HCC)  Assessment & Plan:  · History AFib  · Due to recent fall with subdural hematoma Eliquis is on hold  · Heart rate stable in the 70's-80's  · Patient denies palpitations/chest pain  · Continue metoprolol 25 mg daily  · Loop recorder placed during recent hospitalization  · Site is clean dry and intact with no signs of infection  · Follow-up with cardiology outpatient  · Continue to monitor heart rate         Follow-up Recommendations:    • Outpatient Follow up with PCP in the next 2 weeks  • Follow up with cardiology, loop recorder  • Follow up with neurosurgery after repeat CT, evaluation of SDH  • Home Health PT/OT/SN services     Labs and testing performed during stay:  Glucose 92  Bun 22  Creatinine 0 95  Na 142  K 4 9  Cl 109  CO2 29  CA 8 8  H/H 13/39 7  Plt 234    Discharge Medications: See discharge medication list which was reviewed and signed  Current Outpatient Medications:   •  Advair Diskus 100-50 MCG/ACT inhaler, USE 1 INHALATION TWICE A DAY (RINSE MOUTH AFTER USE), Disp: 60 blister, Rfl: 11  •  azelastine (ASTELIN) 0 1 % nasal spray, 1 spray into each nostril in the morning and 1 spray in the evening  Use in each nostril as directed   (Patient taking differently: 1 spray into each nostril 2 (two) times a day as needed Use in each nostril as directed), Disp: 90 mL, Rfl: 3  •  docusate sodium (COLACE) 100 mg capsule, Take 1 capsule (100 mg total) by mouth 2 (two) times a day, Disp: 180 capsule, Rfl: 0  •  ferrous sulfate 324 (65 Fe) mg, Take 1 tablet (324 mg total) by mouth 2 (two) times a day before meals, Disp: 180 tablet, Rfl: 2  •  folic acid (FOLVITE) 287 mcg tablet, Take 1 tablet (400 mcg total) by mouth daily, Disp: 30 tablet, Rfl: 0  •  ipratropium-albuterol (DUO-NEB) 0 5-2 5 mg/3 mL nebulizer solution, INHALE CONTENTS OF 1 VIAL VIA NEBULIZER FOUR TIMES A DAY, Disp: 60 mL, Rfl: 11  •  levETIRAcetam (KEPPRA) 500 mg tablet, Take 1 tablet (500 mg total) by mouth every 12 (twelve) hours for 8 doses, Disp: 8 tablet, Rfl: 0  •  metoprolol succinate (Toprol XL) 25 mg 24 hr tablet, Take 1 tablet (25 mg total) by mouth daily, Disp: 90 tablet, Rfl: 3  •  nicotine (NICODERM CQ) 7 mg/24hr TD 24 hr patch, Place 1 patch on the skin daily (Patient not taking: Reported on 9/29/2022), Disp: 28 patch, Rfl: 0  •  pantoprazole (PROTONIX) 40 mg tablet, TAKE 1 TABLET DAILY BEFORE BREAKFAST, Disp: 90 tablet, Rfl: 3  •  potassium chloride (Klor-Con) 10 mEq tablet, TAKE 1 TABLET DAILY, Disp: 90 tablet, Rfl: 3  •  tamsulosin (FLOMAX) 0 4 mg, TAKE 1 CAPSULE DAILY, Disp: 30 capsule, Rfl: 11     Discussion with patient/family and further instructions:  -Fall precautions  -Aspiration precautions  -Bleeding precautions  -Monitor for signs/symptoms of infection  -Medication list was reviewed and signed  -DME form was completed    Status at time of discharge: Stable         Billing based on time  Time spent on unit, 40 minutes  Time spent counseling pt on debility/condition, 30 minutes  Please note:  Voice-recognition software may have been used in the preparation of this document  Occasional wrong word or "sound-alike" substitutions may have occurred due to the inherent limitations of voice recognition software  Interpretation should be guided by context          [facility]  10/18/2022

## 2022-10-18 NOTE — ASSESSMENT & PLAN NOTE
· History AFib  · Due to recent fall with subdural hematoma Eliquis is on hold  · Heart rate stable in the 70's-80's  · Patient denies palpitations/chest pain  · Continue metoprolol 25 mg daily  · Loop recorder placed during recent hospitalization  · Site is clean dry and intact with no signs of infection  · Follow-up with cardiology outpatient  · Continue to monitor heart rate

## 2022-10-21 ENCOUNTER — TELEPHONE (OUTPATIENT)
Dept: UROLOGY | Facility: AMBULATORY SURGERY CENTER | Age: 77
End: 2022-10-21

## 2022-10-21 NOTE — TELEPHONE ENCOUNTER
Patient's wife calling to see if a hospital follow up is needed  Patient currently scheduled on 2/1/23 with Shanelle Cuevas in [place] for 6 month follow up  She stated the patient just got home on 10/19/22 and has been in since 9/28/22      She is requesting a call back to find out if a sooner appointment is needed at [phone]

## 2022-10-21 NOTE — TELEPHONE ENCOUNTER
Patient last seen in urology by Dr Darvin Awan for urinary retention with cystoscopy 8/1/22  He was advised at that time to maintain chronic Cloud catheter  His catheters have been changed and managed at home by [facility]  Patient was recently inpatient 9/28/22 to 10/2/22 due to syncopal episode resulting in subdural hematoma  No clear mention in hospital notes whether or not patient's Cloud catheter has been exchanged  He was discharged from [facility] and did STR at [facility] until 10/19/22  Will patient need sooner follow up from a urologic standpoint? He is currently scheduled to f/u here Feb 2023

## 2022-10-25 ENCOUNTER — OFFICE VISIT (OUTPATIENT)
Dept: FAMILY MEDICINE CLINIC | Facility: CLINIC | Age: 77
End: 2022-10-25
Payer: MEDICARE

## 2022-10-25 VITALS
BODY MASS INDEX: 20.45 KG/M2 | DIASTOLIC BLOOD PRESSURE: 80 MMHG | TEMPERATURE: 97.8 F | HEIGHT: 72 IN | SYSTOLIC BLOOD PRESSURE: 130 MMHG | WEIGHT: 151 LBS | OXYGEN SATURATION: 93 % | HEART RATE: 83 BPM

## 2022-10-25 DIAGNOSIS — F17.200 TOBACCO USE DISORDER: ICD-10-CM

## 2022-10-25 DIAGNOSIS — S06.5XAA SDH (SUBDURAL HEMATOMA): Primary | ICD-10-CM

## 2022-10-25 DIAGNOSIS — J44.9 CHRONIC OBSTRUCTIVE PULMONARY DISEASE, UNSPECIFIED COPD TYPE (HCC): ICD-10-CM

## 2022-10-25 DIAGNOSIS — I48.21 PERMANENT ATRIAL FIBRILLATION (HCC): ICD-10-CM

## 2022-10-25 DIAGNOSIS — F17.200 SMOKER: ICD-10-CM

## 2022-10-25 DIAGNOSIS — D50.9 MICROCYTIC ANEMIA: ICD-10-CM

## 2022-10-25 PROCEDURE — 99214 OFFICE O/P EST MOD 30 MIN: CPT

## 2022-10-25 RX ORDER — FERROUS SULFATE TAB EC 324 MG (65 MG FE EQUIVALENT) 324 (65 FE) MG
324 TABLET DELAYED RESPONSE ORAL
Qty: 180 TABLET | Refills: 2 | Status: SHIPPED | OUTPATIENT
Start: 2022-10-25 | End: 2023-01-23

## 2022-10-25 RX ORDER — NICOTINE 21 MG/24HR
1 PATCH, TRANSDERMAL 24 HOURS TRANSDERMAL EVERY 24 HOURS
Qty: 14 PATCH | Refills: 0 | Status: SHIPPED | OUTPATIENT
Start: 2022-10-25

## 2022-10-25 RX ORDER — OMEPRAZOLE 20 MG/1
20 CAPSULE, DELAYED RELEASE ORAL DAILY
COMMUNITY

## 2022-10-25 NOTE — ASSESSMENT & PLAN NOTE
Patient was admitted on 9/28/22 for subdural hematoma found on CT  Subdural hematoma has been resolving on repeat CT    Eliquis on hold as per Neurosurgery advise

## 2022-10-25 NOTE — ASSESSMENT & PLAN NOTE
Patient with recent hospital admission for SDH with loop recorder placement  Currently not on Eliquis secondary to SDH  Patient is continuing with metoprolol succinate 25 mg daily    Ventricular rate under control

## 2022-10-25 NOTE — TELEPHONE ENCOUNTER
Attempted calling patient's mobile phone and there continues to be no answer, no voicemail  Attempted calling wife's number and it said again "your call cannot be completed at this time   Try your call again later"

## 2022-10-25 NOTE — PROGRESS NOTES
Name: Fang Baird      : 1945      MRN: [id number]  Encounter Provider: Swathi Jacobo MD  Encounter Date: 10/25/2022   Encounter department: [address] [place] [address]     1  SDH (subdural hematoma)  Assessment & Plan:  Patient was admitted on 22 for subdural hematoma found on CT  Subdural hematoma has been resolving on repeat CT  Will continue to hold Eliquis at this time  2  Permanent atrial fibrillation [facility])  Assessment & Plan:  Patient with recent hospital admission for SDH with loop recorder placement  Currently not on Eliquis secondary to SDH  Patient is continuing with metoprolol succinate 25 mg daily  Will re-evaluate for resuming Eliquis vs consideration of watchman procedure  3  Symptomatic anemia  Assessment & Plan:  Most recent hemoglobin 13 4  Continue with ferrous sulfate 324  Orders:  -     ferrous sulfate 324 (65 Fe) mg; Take 1 tablet (324 mg total) by mouth 2 (two) times a day before meals    4  Chronic obstructive pulmonary disease, unspecified COPD type ([facility] [address] )  -     nicotine (NICODERM CQ) 7 mg/24hr TD 24 hr patch; Place 1 patch on the skin daily Start after 14 mg patch is over    5  Tobacco use disorder  Assessment & Plan:  Patient expressed desire to stop smoking  He was given nicotine patches on discharge from inpatient rehab and wishes to continue using these  6  Smoker  -     nicotine (NICODERM CQ) 14 mg/24hr TD 24 hr patch; Place 1 patch on the skin every 24 hours         Subjective      Mr Sarbjit Corey is a 72-year-old male past medical history of AFib, hypertension, coronary artery disease, who was recently admitted on 2022 for syncopal episode while on Eliquis  He was subsequently found to have subdural hematoma  Patient remained alert and oriented with GCS of 15 throughout his stay  Eliquis was held and he was given prophylactic Keppra for seizure prevention for 1 week which he completed    Loop recorder device was implanted during this stay  Patient was discharged to [facility] for inpatient rehabilitation  Patient denies any further syncopal episodes and reports he has been feeling well with no new complaints  He stated he would like to stop smoking and requested nicotine patches  Review of Systems   Constitutional: Negative for chills and fever  HENT: Negative for ear pain and sore throat  Eyes: Negative for pain and visual disturbance  Respiratory: Negative for cough and shortness of breath  Cardiovascular: Negative for chest pain and palpitations  Gastrointestinal: Negative for abdominal pain, constipation and vomiting  Genitourinary: Negative for dysuria and hematuria  Musculoskeletal: Negative for arthralgias and back pain  Skin: Negative for rash  Neurological: Negative for seizures and syncope  All other systems reviewed and are negative  Current Outpatient Medications on File Prior to Visit   Medication Sig   • Advair Diskus 100-50 MCG/ACT inhaler USE 1 INHALATION TWICE A DAY (RINSE MOUTH AFTER USE)   • azelastine (ASTELIN) 0 1 % nasal spray 1 spray into each nostril in the morning and 1 spray in the evening  Use in each nostril as directed   (Patient taking differently: 1 spray into each nostril 2 (two) times a day as needed Use in each nostril as directed)   • docusate sodium (COLACE) 100 mg capsule Take 1 capsule (100 mg total) by mouth 2 (two) times a day   • folic acid (FOLVITE) 055 mcg tablet Take 1 tablet (400 mcg total) by mouth daily   • ipratropium-albuterol (DUO-NEB) 0 5-2 5 mg/3 mL nebulizer solution INHALE CONTENTS OF 1 VIAL VIA NEBULIZER FOUR TIMES A DAY   • metoprolol succinate (Toprol XL) 25 mg 24 hr tablet Take 1 tablet (25 mg total) by mouth daily   • omeprazole (PriLOSEC) 20 mg delayed release capsule Take 20 mg by mouth daily   • potassium chloride (Klor-Con) 10 mEq tablet TAKE 1 TABLET DAILY   • tamsulosin (FLOMAX) 0 4 mg TAKE 1 CAPSULE DAILY   • [DISCONTINUED] ferrous sulfate 324 (65 Fe) mg Take 1 tablet (324 mg total) by mouth 2 (two) times a day before meals   • [DISCONTINUED] nicotine (NICODERM CQ) 7 mg/24hr TD 24 hr patch Place 1 patch on the skin daily (Patient not taking: Reported on 10/25/2022)   • [DISCONTINUED] pantoprazole (PROTONIX) 40 mg tablet TAKE 1 TABLET DAILY BEFORE BREAKFAST (Patient not taking: Reported on 10/25/2022)       Objective     /80 (BP Location: Left arm, Patient Position: Sitting, Cuff Size: Adult)   Pulse 83   Temp 97 8 °F (36 6 °C) (Temporal)   Ht 6' (1 829 m)   Wt 68 5 kg (151 lb)   SpO2 93%   BMI 20 48 kg/m²     Physical Exam  Constitutional:       General: He is not in acute distress  Appearance: He is not ill-appearing or toxic-appearing  HENT:      Head: Normocephalic and atraumatic  Nose: No congestion or rhinorrhea  Mouth/Throat:      Pharynx: No oropharyngeal exudate or posterior oropharyngeal erythema  Eyes:      General: No scleral icterus  Right eye: No discharge  Left eye: No discharge  Extraocular Movements: Extraocular movements intact  Pupils: Pupils are equal, round, and reactive to light  Cardiovascular:      Rate and Rhythm: Normal rate  Rhythm irregular  Pulses: Normal pulses  Heart sounds: Normal heart sounds  No murmur heard  No friction rub  No gallop  Pulmonary:      Effort: Pulmonary effort is normal  No respiratory distress  Breath sounds: Normal breath sounds  Decreased air movement present  No wheezing, rhonchi or rales  Chest:      Chest wall: No tenderness  Abdominal:      Palpations: Abdomen is soft  Tenderness: There is no abdominal tenderness  There is no guarding or rebound  Musculoskeletal:         General: Normal range of motion  Cervical back: Normal range of motion and neck supple  No rigidity or tenderness  Right lower leg: No edema  Left lower leg: No edema     Skin:     General: Skin is warm and dry  Coloration: Skin is not jaundiced  Neurological:      General: No focal deficit present  Mental Status: He is alert and oriented to person, place, and time     Psychiatric:         Mood and Affect: Mood normal          Behavior: Behavior normal        Neha Castellanos MD

## 2022-10-25 NOTE — TELEPHONE ENCOUNTER
[id number], [facility] Jacquelin Bajwa Clinical 1 minute ago (11:01 AM)         Just continue routine miller changes and f/u as scheduled unless having any new urinary concerns    Routing comment      Attempted calling wife back at [phone] and automated message states cannot complete phone calls at this time  Attempted calling patient's number [phone], and it continued to ring, no voicemail

## 2022-10-26 NOTE — TELEPHONE ENCOUNTER
Attempted a 3rd time to call Trecia Bosworth and the call cannot be completed at this time  Attempted a 3rd time to call patient's mobile phone and it continues to ring, no voicemail

## 2022-11-18 ENCOUNTER — HOSPITAL ENCOUNTER (INPATIENT)
Facility: HOSPITAL | Age: 77
LOS: 4 days | Discharge: HOME HEALTH | End: 2022-11-22
Attending: EMERGENCY MEDICINE | Admitting: STUDENT IN AN ORGANIZED HEALTH CARE EDUCATION/TRAINING PROGRAM

## 2022-11-18 ENCOUNTER — APPOINTMENT (EMERGENCY)
Dept: RADIOLOGY | Facility: HOSPITAL | Age: 77
End: 2022-11-18

## 2022-11-18 ENCOUNTER — APPOINTMENT (EMERGENCY)
Dept: VASCULAR ULTRASOUND | Facility: HOSPITAL | Age: 77
End: 2022-11-18

## 2022-11-18 DIAGNOSIS — Z16.12 ESBL (EXTENDED SPECTRUM BETA-LACTAMASE) PRODUCING BACTERIA INFECTION: ICD-10-CM

## 2022-11-18 DIAGNOSIS — A49.9 ESBL (EXTENDED SPECTRUM BETA-LACTAMASE) PRODUCING BACTERIA INFECTION: ICD-10-CM

## 2022-11-18 DIAGNOSIS — N39.0 SEPSIS SECONDARY TO UTI (HCC): ICD-10-CM

## 2022-11-18 DIAGNOSIS — R09.81 NASAL CONGESTION: ICD-10-CM

## 2022-11-18 DIAGNOSIS — I48.11 LONGSTANDING PERSISTENT ATRIAL FIBRILLATION (HCC): ICD-10-CM

## 2022-11-18 DIAGNOSIS — A41.9 SEPSIS SECONDARY TO UTI (HCC): ICD-10-CM

## 2022-11-18 DIAGNOSIS — D50.9 MICROCYTIC ANEMIA: ICD-10-CM

## 2022-11-18 DIAGNOSIS — A41.9 SEPSIS (HCC): Primary | ICD-10-CM

## 2022-11-18 DIAGNOSIS — N39.0 UTI (URINARY TRACT INFECTION): ICD-10-CM

## 2022-11-18 PROBLEM — M79.89 SWELLING OF LOWER LEG: Status: ACTIVE | Noted: 2022-11-18

## 2022-11-18 LAB
ALBUMIN SERPL BCP-MCNC: 4 G/DL (ref 3.5–5)
ALP SERPL-CCNC: 75 U/L (ref 34–104)
ALT SERPL W P-5'-P-CCNC: 22 U/L (ref 7–52)
ANION GAP SERPL CALCULATED.3IONS-SCNC: 8 MMOL/L (ref 4–13)
APTT PPP: 26 SECONDS (ref 23–37)
AST SERPL W P-5'-P-CCNC: 19 U/L (ref 13–39)
BACTERIA UR QL AUTO: ABNORMAL /HPF
BASOPHILS # BLD AUTO: 0.02 THOUSANDS/ÂΜL (ref 0–0.1)
BASOPHILS NFR BLD AUTO: 0 % (ref 0–1)
BILIRUB SERPL-MCNC: 0.66 MG/DL (ref 0.2–1)
BILIRUB UR QL STRIP: NEGATIVE
BUN SERPL-MCNC: 16 MG/DL (ref 5–25)
CALCIUM SERPL-MCNC: 9 MG/DL (ref 8.4–10.2)
CHLORIDE SERPL-SCNC: 106 MMOL/L (ref 96–108)
CLARITY UR: ABNORMAL
CO2 SERPL-SCNC: 24 MMOL/L (ref 21–32)
COLOR UR: ABNORMAL
CREAT SERPL-MCNC: 1 MG/DL (ref 0.6–1.3)
EOSINOPHIL # BLD AUTO: 0.05 THOUSAND/ÂΜL (ref 0–0.61)
EOSINOPHIL NFR BLD AUTO: 0 % (ref 0–6)
ERYTHROCYTE [DISTWIDTH] IN BLOOD BY AUTOMATED COUNT: 14.5 % (ref 11.6–15.1)
FLUAV RNA RESP QL NAA+PROBE: NEGATIVE
FLUBV RNA RESP QL NAA+PROBE: NEGATIVE
GFR SERPL CREATININE-BSD FRML MDRD: 73 ML/MIN/1.73SQ M
GLUCOSE SERPL-MCNC: 91 MG/DL (ref 65–140)
GLUCOSE UR STRIP-MCNC: NEGATIVE MG/DL
HCT VFR BLD AUTO: 42.2 % (ref 36.5–49.3)
HGB BLD-MCNC: 13.3 G/DL (ref 12–17)
HGB UR QL STRIP.AUTO: ABNORMAL
IMM GRANULOCYTES # BLD AUTO: 0.06 THOUSAND/UL (ref 0–0.2)
IMM GRANULOCYTES NFR BLD AUTO: 1 % (ref 0–2)
INR PPP: 1.07 (ref 0.84–1.19)
KETONES UR STRIP-MCNC: NEGATIVE MG/DL
LACTATE SERPL-SCNC: 1.6 MMOL/L (ref 0.5–2)
LEUKOCYTE ESTERASE UR QL STRIP: ABNORMAL
LYMPHOCYTES # BLD AUTO: 0.63 THOUSANDS/ÂΜL (ref 0.6–4.47)
LYMPHOCYTES NFR BLD AUTO: 5 % (ref 14–44)
MCH RBC QN AUTO: 29.4 PG (ref 26.8–34.3)
MCHC RBC AUTO-ENTMCNC: 31.5 G/DL (ref 31.4–37.4)
MCV RBC AUTO: 93 FL (ref 82–98)
MONOCYTES # BLD AUTO: 0.24 THOUSAND/ÂΜL (ref 0.17–1.22)
MONOCYTES NFR BLD AUTO: 2 % (ref 4–12)
NEUTROPHILS # BLD AUTO: 11.89 THOUSANDS/ÂΜL (ref 1.85–7.62)
NEUTS SEG NFR BLD AUTO: 92 % (ref 43–75)
NITRITE UR QL STRIP: NEGATIVE
NON-SQ EPI CELLS URNS QL MICRO: ABNORMAL /HPF
NRBC BLD AUTO-RTO: 0 /100 WBCS
PH UR STRIP.AUTO: 5.5 [PH]
PLATELET # BLD AUTO: 216 THOUSANDS/UL (ref 149–390)
PMV BLD AUTO: 9.9 FL (ref 8.9–12.7)
POTASSIUM SERPL-SCNC: 4.1 MMOL/L (ref 3.5–5.3)
PROCALCITONIN SERPL-MCNC: 0.18 NG/ML
PROT SERPL-MCNC: 6.6 G/DL (ref 6.4–8.4)
PROT UR STRIP-MCNC: ABNORMAL MG/DL
PROTHROMBIN TIME: 14.2 SECONDS (ref 11.6–14.5)
RBC # BLD AUTO: 4.52 MILLION/UL (ref 3.88–5.62)
RBC #/AREA URNS AUTO: ABNORMAL /HPF
RSV RNA RESP QL NAA+PROBE: NEGATIVE
SARS-COV-2 RNA RESP QL NAA+PROBE: NEGATIVE
SODIUM SERPL-SCNC: 138 MMOL/L (ref 135–147)
SP GR UR STRIP.AUTO: 1.01 (ref 1–1.03)
UROBILINOGEN UR QL STRIP.AUTO: 0.2 E.U./DL
WBC # BLD AUTO: 12.89 THOUSAND/UL (ref 4.31–10.16)
WBC #/AREA URNS AUTO: ABNORMAL /HPF

## 2022-11-18 RX ORDER — IPRATROPIUM BROMIDE AND ALBUTEROL SULFATE 2.5; .5 MG/3ML; MG/3ML
3 SOLUTION RESPIRATORY (INHALATION)
Status: DISCONTINUED | OUTPATIENT
Start: 2022-11-18 | End: 2022-11-18

## 2022-11-18 RX ORDER — SODIUM CHLORIDE, SODIUM GLUCONATE, SODIUM ACETATE, POTASSIUM CHLORIDE, MAGNESIUM CHLORIDE, SODIUM PHOSPHATE, DIBASIC, AND POTASSIUM PHOSPHATE .53; .5; .37; .037; .03; .012; .00082 G/100ML; G/100ML; G/100ML; G/100ML; G/100ML; G/100ML; G/100ML
125 INJECTION, SOLUTION INTRAVENOUS CONTINUOUS
Status: DISCONTINUED | OUTPATIENT
Start: 2022-11-18 | End: 2022-11-19

## 2022-11-18 RX ORDER — FERROUS SULFATE 325(65) MG
325 TABLET ORAL 2 TIMES DAILY WITH MEALS
Status: DISCONTINUED | OUTPATIENT
Start: 2022-11-19 | End: 2022-11-22 | Stop reason: HOSPADM

## 2022-11-18 RX ORDER — FLUTICASONE FUROATE AND VILANTEROL 100; 25 UG/1; UG/1
1 POWDER RESPIRATORY (INHALATION)
Status: DISCONTINUED | OUTPATIENT
Start: 2022-11-19 | End: 2022-11-22 | Stop reason: HOSPADM

## 2022-11-18 RX ORDER — METOPROLOL SUCCINATE 25 MG/1
25 TABLET, EXTENDED RELEASE ORAL DAILY
Status: DISCONTINUED | OUTPATIENT
Start: 2022-11-19 | End: 2022-11-22 | Stop reason: HOSPADM

## 2022-11-18 RX ORDER — ACETAMINOPHEN 325 MG/1
975 TABLET ORAL ONCE
Status: COMPLETED | OUTPATIENT
Start: 2022-11-18 | End: 2022-11-18

## 2022-11-18 RX ORDER — ONDANSETRON 2 MG/ML
4 INJECTION INTRAMUSCULAR; INTRAVENOUS EVERY 4 HOURS PRN
Status: DISCONTINUED | OUTPATIENT
Start: 2022-11-18 | End: 2022-11-22 | Stop reason: HOSPADM

## 2022-11-18 RX ORDER — LEVALBUTEROL 1.25 MG/.5ML
1.25 SOLUTION, CONCENTRATE RESPIRATORY (INHALATION)
Status: DISCONTINUED | OUTPATIENT
Start: 2022-11-19 | End: 2022-11-22 | Stop reason: HOSPADM

## 2022-11-18 RX ORDER — DOCUSATE SODIUM 100 MG/1
100 CAPSULE, LIQUID FILLED ORAL 2 TIMES DAILY
Status: DISCONTINUED | OUTPATIENT
Start: 2022-11-18 | End: 2022-11-22 | Stop reason: HOSPADM

## 2022-11-18 RX ORDER — LANOLIN ALCOHOL/MO/W.PET/CERES
400 CREAM (GRAM) TOPICAL DAILY
Status: DISCONTINUED | OUTPATIENT
Start: 2022-11-19 | End: 2022-11-22 | Stop reason: HOSPADM

## 2022-11-18 RX ORDER — SENNOSIDES 8.6 MG
2 TABLET ORAL
Status: DISCONTINUED | OUTPATIENT
Start: 2022-11-18 | End: 2022-11-22 | Stop reason: HOSPADM

## 2022-11-18 RX ORDER — CEFTRIAXONE 1 G/50ML
1000 INJECTION, SOLUTION INTRAVENOUS EVERY 24 HOURS
Status: DISCONTINUED | OUTPATIENT
Start: 2022-11-19 | End: 2022-11-22 | Stop reason: HOSPADM

## 2022-11-18 RX ORDER — ECHINACEA PURPUREA EXTRACT 125 MG
1 TABLET ORAL
Status: DISCONTINUED | OUTPATIENT
Start: 2022-11-18 | End: 2022-11-22 | Stop reason: HOSPADM

## 2022-11-18 RX ORDER — MAGNESIUM HYDROXIDE/ALUMINUM HYDROXICE/SIMETHICONE 120; 1200; 1200 MG/30ML; MG/30ML; MG/30ML
30 SUSPENSION ORAL EVERY 4 HOURS PRN
Status: DISCONTINUED | OUTPATIENT
Start: 2022-11-18 | End: 2022-11-22 | Stop reason: HOSPADM

## 2022-11-18 RX ORDER — ACETAMINOPHEN 325 MG/1
650 TABLET ORAL EVERY 6 HOURS PRN
Status: DISCONTINUED | OUTPATIENT
Start: 2022-11-18 | End: 2022-11-22 | Stop reason: HOSPADM

## 2022-11-18 RX ORDER — TAMSULOSIN HYDROCHLORIDE 0.4 MG/1
0.4 CAPSULE ORAL DAILY
Status: DISCONTINUED | OUTPATIENT
Start: 2022-11-19 | End: 2022-11-22 | Stop reason: HOSPADM

## 2022-11-18 RX ORDER — NICOTINE 21 MG/24HR
14 PATCH, TRANSDERMAL 24 HOURS TRANSDERMAL DAILY
Status: DISCONTINUED | OUTPATIENT
Start: 2022-11-19 | End: 2022-11-22 | Stop reason: HOSPADM

## 2022-11-18 RX ORDER — CEFTRIAXONE 1 G/50ML
1000 INJECTION, SOLUTION INTRAVENOUS ONCE
Status: COMPLETED | OUTPATIENT
Start: 2022-11-18 | End: 2022-11-18

## 2022-11-18 RX ADMIN — SODIUM CHLORIDE, SODIUM GLUCONATE, SODIUM ACETATE, POTASSIUM CHLORIDE AND MAGNESIUM CHLORIDE 125 ML/HR: 526; 502; 368; 37; 30 INJECTION, SOLUTION INTRAVENOUS at 21:37

## 2022-11-18 RX ADMIN — IPRATROPIUM BROMIDE AND ALBUTEROL SULFATE 3 ML: 2.5; .5 SOLUTION RESPIRATORY (INHALATION) at 21:30

## 2022-11-18 RX ADMIN — SODIUM CHLORIDE 1000 ML: 0.9 INJECTION, SOLUTION INTRAVENOUS at 19:37

## 2022-11-18 RX ADMIN — ACETAMINOPHEN 975 MG: 325 TABLET ORAL at 18:25

## 2022-11-18 RX ADMIN — DOCUSATE SODIUM 100 MG: 100 CAPSULE, LIQUID FILLED ORAL at 21:37

## 2022-11-18 RX ADMIN — CEFTRIAXONE 1000 MG: 1 INJECTION, SOLUTION INTRAVENOUS at 19:39

## 2022-11-18 NOTE — ED PROVIDER NOTES
History  Chief Complaint   Patient presents with   • Urinary Catheter Problem     Pt presents to ED via EMS from home after getting his urinary catheter changed today, now blood in urine  Pt denies pain  74-year-old male with history of AFib not on Eliquis because of recent subdural hematoma presents to the emergency department for evaluation of blood in his Cloud catheter  The patient reports that his Cloud catheter was changed today and afterwards he noticed that there was blood in the tubing  He denies having any pain when the Cloud catheter was being changed or any current pain  The patient is unsure of how long he has had a Cloud catheter but believes he has had 1 for several months  States that it was placed because of urinary retention but he is unsure of why he has urinary retention  The patient states he has otherwise been feeling well  He denies fevers, chills, nausea, vomiting, diarrhea, chest pain and shortness of breath  While taking the history from the patient he was noted to have significant swelling to his left lower leg  When the patient was questioned about the swelling he states that this the 1st that he has noticed it  He denies any pain associated with the swelling  No tenderness to palpation  The patient also found to be febrile on initial vital signs  When questioned again if he has had any fevers patient states that he does feel warm now  He denies sick contacts  Prior to Admission Medications   Prescriptions Last Dose Informant Patient Reported? Taking? Advair Diskus 100-50 MCG/ACT inhaler   No No   Sig: USE 1 INHALATION TWICE A DAY (RINSE MOUTH AFTER USE)   azelastine (ASTELIN) 0 1 % nasal spray   No No   Si spray into each nostril in the morning and 1 spray in the evening  Use in each nostril as directed     Patient taking differently: 1 spray into each nostril 2 (two) times a day as needed Use in each nostril as directed   docusate sodium (COLACE) 100 mg capsule   No No   Sig: Take 1 capsule (100 mg total) by mouth 2 (two) times a day   ferrous sulfate 324 (65 Fe) mg   No No   Sig: Take 1 tablet (324 mg total) by mouth 2 (two) times a day before meals   folic acid (FOLVITE) 582 mcg tablet   No No   Sig: Take 1 tablet (400 mcg total) by mouth daily   ipratropium-albuterol (DUO-NEB) 0 5-2 5 mg/3 mL nebulizer solution   No No   Sig: INHALE CONTENTS OF 1 VIAL VIA NEBULIZER FOUR TIMES A DAY   metoprolol succinate (Toprol XL) 25 mg 24 hr tablet   No No   Sig: Take 1 tablet (25 mg total) by mouth daily   nicotine (NICODERM CQ) 14 mg/24hr TD 24 hr patch   No No   Sig: Place 1 patch on the skin every 24 hours   nicotine (NICODERM CQ) 7 mg/24hr TD 24 hr patch   No No   Sig: Place 1 patch on the skin daily Start after 14 mg patch is over   omeprazole (PriLOSEC) 20 mg delayed release capsule   Yes No   Sig: Take 20 mg by mouth daily   potassium chloride (Klor-Con) 10 mEq tablet   No No   Sig: TAKE 1 TABLET DAILY   tamsulosin (FLOMAX) 0 4 mg   No No   Sig: TAKE 1 CAPSULE DAILY      Facility-Administered Medications: None       Past Medical History:   Diagnosis Date   • Anxiety disorder    • Atrial fibrillation (HCC)    • Coronary artery disease    • Depression    • Cloud catheter in place    • Hepatitis C     screening negative in 1/2017   • Hypertension    • MI (myocardial infarction) [facility])    • Use of cane as ambulatory aid        Past Surgical History:   Procedure Laterality Date   • CARDIAC CATHETERIZATION  07/09/2018   • CARDIAC ELECTROPHYSIOLOGY PROCEDURE N/A 9/30/2022    Procedure: Cardiac loop recorder implant;  Surgeon: Aidee Chamorro MD;  Location: [facility];   Service: Cardiology   • COLONOSCOPY     • AZ REPAIR ING HERNIA,5+Y/O,REDUCIBL Right 8/11/2022    Procedure: REPAIR HERNIA INGUINAL;  Surgeon: Killian Encarnacion DO;  Location: [facility];  Service: General   • TONSILLECTOMY         Family History   Problem Relation Age of Onset   • Hypertension Mother    • Coronary artery disease Mother         premature   • Hypertension Father    • Coronary artery disease Father         premature   • Diabetes Father      I have reviewed and agree with the history as documented  E-Cigarette/Vaping   • E-Cigarette Use Never User      E-Cigarette/Vaping Substances   • Nicotine Yes    • THC No    • CBD No    • Flavoring No    • Other No    • Unknown No      Social History     Tobacco Use   • Smoking status: Every Day     Packs/day: 3 00     Years: 57 00     Pack years: 171 00     Types: Cigarettes   • Smokeless tobacco: Never   • Tobacco comments:     since age 17; Has history of smoking for over 55 years  He has been smoking more than packet daily in the past however he has been smokes about half a pack a day lately and stopped smoking on 7/1/2018 when he was admitted to the hospital     Vaping Use   • Vaping Use: Never used   Substance Use Topics   • Alcohol use: Never   • Drug use: Never       Review of Systems   Constitutional: Negative for chills and fever  HENT: Negative for ear pain and sore throat  Eyes: Negative for pain and visual disturbance  Respiratory: Negative for cough and shortness of breath  Cardiovascular: Negative for chest pain and palpitations  Gastrointestinal: Negative for abdominal pain and vomiting  Genitourinary: Positive for hematuria  Negative for dysuria  Musculoskeletal: Negative for arthralgias and back pain  Skin: Negative for color change and rash  Neurological: Negative for seizures and syncope  All other systems reviewed and are negative  Physical Exam  Physical Exam  Vitals and nursing note reviewed  Constitutional:       General: He is not in acute distress  Appearance: He is well-developed  HENT:      Head: Normocephalic and atraumatic  Eyes:      Conjunctiva/sclera: Conjunctivae normal    Cardiovascular:      Rate and Rhythm: Tachycardia present  Rhythm irregularly irregular        Pulses: Radial pulses are 2+ on the right side and 2+ on the left side  Dorsalis pedis pulses are 2+ on the right side and 2+ on the left side  Heart sounds: No murmur heard  Pulmonary:      Effort: Pulmonary effort is normal  No respiratory distress  Breath sounds: Normal breath sounds  Abdominal:      Palpations: Abdomen is soft  Tenderness: There is no abdominal tenderness  Genitourinary:     Comments: Cloud catheter in place  Blood noted in the tubing  Musculoskeletal:         General: No swelling  Cervical back: Neck supple  Right lower leg: No edema  Left lower leg: Swelling present  Edema present  Skin:     General: Skin is warm and dry  Capillary Refill: Capillary refill takes less than 2 seconds  Neurological:      Mental Status: He is alert  Cranial Nerves: Cranial nerves 2-12 are intact  Sensory: Sensation is intact  Motor: Motor function is intact     Psychiatric:         Mood and Affect: Mood normal          Vital Signs  ED Triage Vitals   Temperature Pulse Respirations Blood Pressure SpO2   11/18/22 1754 11/18/22 1749 11/18/22 1749 11/18/22 1749 11/18/22 1749   (!) 102 5 °F (39 2 °C) 89 19 150/86 97 %      Temp Source Heart Rate Source Patient Position - Orthostatic VS BP Location FiO2 (%)   11/18/22 1754 11/18/22 1755 11/18/22 1945 11/18/22 1945 --   Oral Monitor Sitting Right arm       Pain Score       11/18/22 2101       No Pain           Vitals:    11/18/22 1749 11/18/22 1755 11/18/22 1945 11/18/22 2101   BP: 150/86  120/80 116/68   Pulse: 89 100 102 91   Patient Position - Orthostatic VS:   Sitting Sitting         Visual Acuity      ED Medications  Medications   Fluticasone Furoate-Vilanterol 100-25 mcg/actuation 1 puff (has no administration in time range)   docusate sodium (COLACE) capsule 100 mg (100 mg Oral Given 11/18/22 2137)   ferrous sulfate tablet 325 mg (has no administration in time range)   ipratropium-albuterol (DUO-NEB) 0 5-2 5 mg/3 mL inhalation solution 3 mL (3 mL Nebulization Given 11/18/22 2130)   metoprolol succinate (TOPROL-XL) 24 hr tablet 25 mg (has no administration in time range)   tamsulosin (FLOMAX) capsule 0 4 mg (has no administration in time range)   folic acid (FOLVITE) tablet 400 mcg (has no administration in time range)   multi-electrolyte (PLASMALYTE-A/ISOLYTE-S PH 7 4) IV solution (125 mL/hr Intravenous New Bag 11/18/22 2137)   cefTRIAXone (ROCEPHIN) IVPB (premix in dextrose) 1,000 mg 50 mL (has no administration in time range)   nicotine (NICODERM CQ) 14 mg/24hr TD 24 hr patch 14 mg (has no administration in time range)   senna (SENOKOT) tablet 17 2 mg (17 2 mg Oral Refused 11/18/22 2130)   sodium chloride (OCEAN) 0 65 % nasal spray 1 spray (has no administration in time range)   acetaminophen (TYLENOL) tablet 650 mg (has no administration in time range)   ondansetron (ZOFRAN) injection 4 mg (has no administration in time range)   aluminum-magnesium hydroxide-simethicone (MYLANTA) oral suspension 30 mL (has no administration in time range)   acetaminophen (TYLENOL) tablet 975 mg (975 mg Oral Given 11/18/22 1825)   cefTRIAXone (ROCEPHIN) IVPB (premix in dextrose) 1,000 mg 50 mL (0 mg Intravenous Stopped 11/18/22 2038)   sodium chloride 0 9 % bolus 1,000 mL (0 mL Intravenous Stopped 11/18/22 2038)       Diagnostic Studies  Results Reviewed     Procedure Component Value Units Date/Time    Urine culture [[id number]] Collected: 11/18/22 1852    Lab Status:  In process Specimen: Urine, Clean Catch Updated: 11/18/22 1952    Urine Microscopic [[id number]]  (Abnormal) Collected: 11/18/22 1852    Lab Status: Final result Specimen: Urine, Clean Catch Updated: 11/18/22 1919     RBC, UA Innumerable /hpf      WBC, UA 0-1 /hpf      Epithelial Cells None Seen /hpf      Bacteria, UA Occasional /hpf     UA w Reflex to Microscopic w Reflex to Culture [[id number]]  (Abnormal) Collected: 11/18/22 1852    Lab Status: Final result Specimen: Urine, Clean Catch Updated: 11/18/22 1917     Color, UA Orange     Clarity, UA Cloudy     Specific Gravity, UA 1 015     pH, UA 5 5     Leukocytes, UA Trace     Nitrite, UA Negative     Protein, UA Trace mg/dl      Glucose, UA Negative mg/dl      Ketones, UA Negative mg/dl      Urobilinogen, UA 0 2 E U /dl      Bilirubin, UA Negative     Occult Blood, UA 3+    FLU/RSV/COVID - if FLU/RSV clinically relevant [[id number]]  (Normal) Collected: 11/18/22 1804    Lab Status: Final result Specimen: Nares from Nose Updated: 11/18/22 1850     SARS-CoV-2 Negative     INFLUENZA A PCR Negative     INFLUENZA B PCR Negative     RSV PCR Negative    Narrative:      FOR PEDIATRIC PATIENTS - copy/paste COVID Guidelines URL to browser: [email or web address]  [employer]    SARS-CoV-2 assay is a Nucleic Acid Amplification assay intended for the  qualitative detection of nucleic acid from SARS-CoV-2 in nasopharyngeal  swabs  Results are for the presumptive identification of SARS-CoV-2 RNA  Positive results are indicative of infection with SARS-CoV-2, the virus  causing COVID-19, but do not rule out bacterial infection or co-infection  with other viruses  Laboratories within the [place] and its  territories are required to report all positive results to the appropriate  public health authorities  Negative results do not preclude SARS-CoV-2  infection and should not be used as the sole basis for treatment or other  patient management decisions  Negative results must be combined with  clinical observations, patient history, and epidemiological information  This test has not been FDA cleared or approved  This test has been authorized by FDA under an Emergency Use Authorization  (EUA)   This test is only authorized for the duration of time the  declaration that circumstances exist justifying the authorization of the  emergency use of an in vitro diagnostic tests for detection of SARS-CoV-2  virus and/or diagnosis of COVID-19 infection under section 564(b)(1) of  the Act, 21 U  S C  964KUY-5(Q)(8), unless the authorization is terminated  or revoked sooner  The test has been validated but independent review by FDA  and CLIA is pending  Test performed using [employer] GeneXpert: This RT-PCR assay targets N2,  a region unique to SARS-CoV-2  A conserved region in the E-gene was chosen  for pan-Sarbecovirus detection which includes SARS-CoV-2  According to CMS-2020-01-R, this platform meets the definition of high-throughput technology      Procalcitonin [[id number]]  (Normal) Collected: 11/18/22 1804    Lab Status: Final result Specimen: Blood from Arm, Left Updated: 11/18/22 1848     Procalcitonin 0 18 ng/ml     Comprehensive metabolic panel [[id number]] Collected: 11/18/22 1804    Lab Status: Final result Specimen: Blood from Arm, Left Updated: 11/18/22 1841     Sodium 138 mmol/L      Potassium 4 1 mmol/L      Chloride 106 mmol/L      CO2 24 mmol/L      ANION GAP 8 mmol/L      BUN 16 mg/dL      Creatinine 1 00 mg/dL      Glucose 91 mg/dL      Calcium 9 0 mg/dL      AST 19 U/L      ALT 22 U/L      Alkaline Phosphatase 75 U/L      Total Protein 6 6 g/dL      Albumin 4 0 g/dL      Total Bilirubin 0 66 mg/dL      eGFR 73 ml/min/1 73sq m     Narrative:      [facility] guidelines for Chronic Kidney Disease (CKD):   •  Stage 1 with normal or high GFR (GFR > 90 mL/min/1 73 square meters)  •  Stage 2 Mild CKD (GFR = 60-89 mL/min/1 73 square meters)  •  Stage 3A Moderate CKD (GFR = 45-59 mL/min/1 73 square meters)  •  Stage 3B Moderate CKD (GFR = 30-44 mL/min/1 73 square meters)  •  Stage 4 Severe CKD (GFR = 15-29 mL/min/1 73 square meters)  •  Stage 5 End Stage CKD (GFR <15 mL/min/1 73 square meters)  Note: GFR calculation is accurate only with a steady state creatinine    Lactic acid [[id number]]  (Normal) Collected: 11/18/22 1804    Lab Status: Final result Specimen: Blood from Arm, Left Updated: 11/18/22 1840     LACTIC ACID 1 6 mmol/L     Narrative:      Result may be elevated if tourniquet was used during collection  Protime-INR [[id number]]  (Normal) Collected: 11/18/22 1804    Lab Status: Final result Specimen: Blood from Arm, Left Updated: 11/18/22 1826     Protime 14 2 seconds      INR 1 07    APTT [[id number]]  (Normal) Collected: 11/18/22 1804    Lab Status: Final result Specimen: Blood from Arm, Left Updated: 11/18/22 1826     PTT 26 seconds     CBC and differential [[id number]]  (Abnormal) Collected: 11/18/22 1804    Lab Status: Final result Specimen: Blood from Arm, Left Updated: 11/18/22 1820     WBC 12 89 Thousand/uL      RBC 4 52 Million/uL      Hemoglobin 13 3 g/dL      Hematocrit 42 2 %      MCV 93 fL      MCH 29 4 pg      MCHC 31 5 g/dL      RDW 14 5 %      MPV 9 9 fL      Platelets 821 Thousands/uL      nRBC 0 /100 WBCs      Neutrophils Relative 92 %      Immat GRANS % 1 %      Lymphocytes Relative 5 %      Monocytes Relative 2 %      Eosinophils Relative 0 %      Basophils Relative 0 %      Neutrophils Absolute 11 89 Thousands/µL      Immature Grans Absolute 0 06 Thousand/uL      Lymphocytes Absolute 0 63 Thousands/µL      Monocytes Absolute 0 24 Thousand/µL      Eosinophils Absolute 0 05 Thousand/µL      Basophils Absolute 0 02 Thousands/µL     Narrative: This is an appended report  These results have been appended to a previously verified report  Blood culture #2 [[id number]] Collected: 11/18/22 1804    Lab Status: In process Specimen: Blood from Arm, Right Updated: 11/18/22 1810    Blood culture #1 [[id number]] Collected: 11/18/22 1804    Lab Status:  In process Specimen: Blood from Arm, Left Updated: 11/18/22 1810                 VAS lower limb venous duplex study, unilateral/limited   Final Result by Trung Hines MD (11/18 1935)      XR chest 1 view portable   ED Interpretation by Sanchez Barriga MD (11/18 1859)   No acute cardiopulmonary process      Final Result by Jaiden Foster MD (11/18 2214)      No acute cardiopulmonary disease  Workstation performed: [id number]                    Procedures  ECG 12 Lead Documentation Only    Date/Time: 11/18/2022 6:56 PM  Performed by: Israel Gonzalez MD  Authorized by: Israel Gonzalez MD     ECG reviewed by me, the ED Provider: yes    Patient location:  ED  Previous ECG:     Previous ECG:  Unavailable    Comparison to cardiac monitor: Yes    Interpretation:     Interpretation: abnormal    Rate:     ECG rate assessment: tachycardic    Rhythm:     Rhythm: atrial fibrillation    Ectopy:     Ectopy: none    QRS:     QRS axis:  Normal  Conduction:     Conduction: normal    ST segments:     ST segments:  Normal  T waves:     T waves: normal               ED Course            Initial Sepsis Screening     Row Name 11/18/22 1927                Is the patient's history suggestive of a new or worsening infection? Yes (Proceed)  -KF        Suspected source of infection urinary tract infection  -KF        Are two or more of the following signs & symptoms of infection both present and new to the patient? Yes (Proceed)  -KF        Indicate SIRS criteria Hyperthemia > 38 3C (100 9F); Tachycardia > 90 bpm  -KF        If the answer is yes to both questions, suspicion of sepsis is present --        If severe sepsis is present AND tissue hypoperfusion perists in the hour after fluid resuscitation or lactate > 4, the patient meets criteria for SEPTIC SHOCK --        Are any of the following organ dysfunction criteria present within 6 hours of suspected infection and SIRS criteria that are NOT considered to be chronic conditions?  No  -KF        Organ dysfunction --        Date of presentation of severe sepsis --        Time of presentation of severe sepsis --        Tissue hypoperfusion persists in the hour after crystalloid fluid administration, evidenced, by either: --        Was hypotension present within one hour of the conclusion of crystalloid fluid administration? --        Date of presentation of septic shock --        Time of presentation of septic shock --              User Key  (r) = Recorded By, (t) = Taken By, (c) = Cosigned By    [address] Name Provider Type     Sue Smith MD Physician              Default Flowsheet Data (last 720 hours)     Sepsis Reassess     Row Name 11/18/22 2050                   Repeat Volume Status and Tissue Perfusion Assessment Performed    Repeat Volume Status and Tissue Perfusion Assessment Performed Yes  -HC           Volume Status and Tissue Perfusion Post Fluid Resuscitation * Must Document All *    Vital Signs Reviewed (HR, RR, BP, T) Yes  -HC        Shock Index Reviewed Yes  -HC        Arterial Oxygen Saturation Reviewed (POx, SaO2 or SpO2) --        Cardio Irregular rhythm; No murmor  afib baseline  -HC        Pulmonary Normal effort  -HC        Capillary Refill Brisk  -        Peripheral Pulses --        Skin Warm;Dry  -HC        Urine output assessed Adequate  -HC           *OR*   Intensive Monitoring- Must Document One of the Following Four *:    Vital Signs Reviewed --        * Central Venous Pressure (CVP or RAP) --        * Central Venous Oxygen (SVO2, ScvO2 or Oxygen saturation via central catheter) --        * Bedside Cardiovascular US in IVC diameter and % collapse --        * Passive Leg Raise OR Crystalloid Challenge --              User Key  (r) = Recorded By, (t) = Taken By, (c) = Cosigned By    Initials Name Provider Type    [facility], [facility] Nurse Practitioner              SBIRT 20yo+    Flowsheet Row Most Recent Value   SBIRT (23 yo +)    In order to provide better care to our patients, we are screening all of our patients for alcohol and drug use  Would it be okay to ask you these screening questions? Yes Filed at: 11/18/2022 9390   Initial Alcohol Screen: US AUDIT-C     1   How often do you have a drink containing alcohol? 0 Filed at: 11/18/2022 1847   2  How many drinks containing alcohol do you have on a typical day you are drinking? 0 Filed at: 11/18/2022 1847   3a  Male UNDER 65: How often do you have five or more drinks on one occasion? 0 Filed at: 11/18/2022 1847   3b  FEMALE Any Age, or MALE 65+: How often do you have 4 or more drinks on one occassion? 0 Filed at: 11/18/2022 1847   Audit-C Score 0 Filed at: 11/18/2022 1847   JOHN: How many times in the past year have you    Used an illegal drug or used a prescription medication for non-medical reasons? Never Filed at: 11/18/2022 1847                    MDM  Number of Diagnoses or Management Options  Sepsis ([address] )  UTI (urinary tract infection)  Diagnosis management comments: 37-year-old male presented to the emergency department for evaluation of blood in his Cloud catheter  On arrival the patient was awake, alert and oriented  Initial vital signs show that the patient was febrile and tachycardic  On exam the patient was noted to have significant swelling of his left lower extremity  Workup done in the emergency department showed the patient had an elevated white blood cell count  Lactate and procalcitonin within normal level  Urinalysis with trace leukocytes and occasional bacteria  Culture ordered  Patient meeting sepsis criteria  Viral testing was negative  Given a dose of Rocephin for a likely urinary tract infection  The patient was also treated with a fluid bolus  His Cloud catheter was flushed  All diagnostic studies were discussed with the patient in detail  He will be admitted to the hospital for further treatment and evaluation         Amount and/or Complexity of Data Reviewed  Clinical lab tests: ordered and reviewed  Tests in the radiology section of CPT®: ordered and reviewed  Decide to obtain previous medical records or to obtain history from someone other than the patient: yes  Review and summarize past medical records: yes  Discuss the patient with other providers: yes  Independent visualization of images, tracings, or specimens: yes        Disposition  Final diagnoses:   Sepsis ([facility] )   UTI (urinary tract infection)     Time reflects when diagnosis was documented in both MDM as applicable and the Disposition within this note     Time User Action Codes Description Comment    11/18/2022  7:39 PM Hudson Parnell Add [A41 9] Sepsis ([facility] [address] )     11/18/2022  7:39 PM Hudson Parnell Add [N39 0] UTI (urinary tract infection)       ED Disposition     ED Disposition   Admit    Condition   Stable    Date/Time   Fri Nov 18, 2022  7:40 PM    Comment   Case was discussed with ephraim and the patient's admission status was agreed to be Admission Status: inpatient status to the service of Dr Luo Left   Follow-up Information    None         Current Discharge Medication List      CONTINUE these medications which have NOT CHANGED    Details   Advair Diskus 100-50 MCG/ACT inhaler USE 1 INHALATION TWICE A DAY (RINSE MOUTH AFTER USE)  Qty: 60 blister, Refills: 11    Associated Diagnoses: Essential hypertension; Chronic obstructive pulmonary disease, unspecified COPD type (HCC)      azelastine (ASTELIN) 0 1 % nasal spray 1 spray into each nostril in the morning and 1 spray in the evening  Use in each nostril as directed    Qty: 90 mL, Refills: 3    Associated Diagnoses: Nasal congestion      docusate sodium (COLACE) 100 mg capsule Take 1 capsule (100 mg total) by mouth 2 (two) times a day  Qty: 180 capsule, Refills: 0    Associated Diagnoses: Microcytic anemia      ferrous sulfate 324 (65 Fe) mg Take 1 tablet (324 mg total) by mouth 2 (two) times a day before meals  Qty: 180 tablet, Refills: 2    Associated Diagnoses: Microcytic anemia      folic acid (FOLVITE) 268 mcg tablet Take 1 tablet (400 mcg total) by mouth daily  Qty: 30 tablet, Refills: 0    Associated Diagnoses: Microcytic anemia      ipratropium-albuterol (DUO-NEB) 0 5-2 5 mg/3 mL nebulizer solution INHALE CONTENTS OF 1 VIAL VIA NEBULIZER FOUR TIMES A DAY  Qty: 60 mL, Refills: 11    Associated Diagnoses: Chronic obstructive pulmonary disease, unspecified COPD type (HCC)      metoprolol succinate (Toprol XL) 25 mg 24 hr tablet Take 1 tablet (25 mg total) by mouth daily  Qty: 90 tablet, Refills: 3    Associated Diagnoses: Longstanding persistent atrial fibrillation (HCC)      nicotine (NICODERM CQ) 14 mg/24hr TD 24 hr patch Place 1 patch on the skin every 24 hours  Qty: 14 patch, Refills: 0    Associated Diagnoses: Smoker      nicotine (NICODERM CQ) 7 mg/24hr TD 24 hr patch Place 1 patch on the skin daily Start after 14 mg patch is over  Qty: 14 patch, Refills: 0    Associated Diagnoses: Chronic obstructive pulmonary disease, unspecified COPD type (HCC)      omeprazole (PriLOSEC) 20 mg delayed release capsule Take 20 mg by mouth daily      potassium chloride (Klor-Con) 10 mEq tablet TAKE 1 TABLET DAILY  Qty: 90 tablet, Refills: 3    Associated Diagnoses: Microcytic anemia      tamsulosin (FLOMAX) 0 4 mg TAKE 1 CAPSULE DAILY  Qty: 30 capsule, Refills: 11    Associated Diagnoses: Ambulatory dysfunction             No discharge procedures on file      PDMP Review       Value Time User    PDMP Reviewed  Yes 11/18/2022  8:47 PM Jeison Mendez          ED Provider  Electronically Signed by           Brando Ahmadi MD  11/18/22 5710

## 2022-11-19 LAB
ANION GAP SERPL CALCULATED.3IONS-SCNC: 6 MMOL/L (ref 4–13)
BASOPHILS # BLD AUTO: 0.04 THOUSANDS/ÂΜL (ref 0–0.1)
BASOPHILS NFR BLD AUTO: 0 % (ref 0–1)
BUN SERPL-MCNC: 15 MG/DL (ref 5–25)
CALCIUM SERPL-MCNC: 8.2 MG/DL (ref 8.4–10.2)
CHLORIDE SERPL-SCNC: 109 MMOL/L (ref 96–108)
CO2 SERPL-SCNC: 25 MMOL/L (ref 21–32)
CREAT SERPL-MCNC: 0.94 MG/DL (ref 0.6–1.3)
EOSINOPHIL # BLD AUTO: 0.08 THOUSAND/ÂΜL (ref 0–0.61)
EOSINOPHIL NFR BLD AUTO: 1 % (ref 0–6)
ERYTHROCYTE [DISTWIDTH] IN BLOOD BY AUTOMATED COUNT: 14.6 % (ref 11.6–15.1)
GFR SERPL CREATININE-BSD FRML MDRD: 78 ML/MIN/1.73SQ M
GLUCOSE SERPL-MCNC: 81 MG/DL (ref 65–140)
HCT VFR BLD AUTO: 37 % (ref 36.5–49.3)
HGB BLD-MCNC: 11.7 G/DL (ref 12–17)
IMM GRANULOCYTES # BLD AUTO: 0.06 THOUSAND/UL (ref 0–0.2)
IMM GRANULOCYTES NFR BLD AUTO: 0 % (ref 0–2)
LYMPHOCYTES # BLD AUTO: 1.22 THOUSANDS/ÂΜL (ref 0.6–4.47)
LYMPHOCYTES NFR BLD AUTO: 9 % (ref 14–44)
MAGNESIUM SERPL-MCNC: 1.9 MG/DL (ref 1.9–2.7)
MCH RBC QN AUTO: 30 PG (ref 26.8–34.3)
MCHC RBC AUTO-ENTMCNC: 31.6 G/DL (ref 31.4–37.4)
MCV RBC AUTO: 95 FL (ref 82–98)
MONOCYTES # BLD AUTO: 1.03 THOUSAND/ÂΜL (ref 0.17–1.22)
MONOCYTES NFR BLD AUTO: 7 % (ref 4–12)
NEUTROPHILS # BLD AUTO: 11.77 THOUSANDS/ÂΜL (ref 1.85–7.62)
NEUTS SEG NFR BLD AUTO: 83 % (ref 43–75)
NRBC BLD AUTO-RTO: 0 /100 WBCS
PLATELET # BLD AUTO: 180 THOUSANDS/UL (ref 149–390)
PMV BLD AUTO: 10.9 FL (ref 8.9–12.7)
POTASSIUM SERPL-SCNC: 4 MMOL/L (ref 3.5–5.3)
PROCALCITONIN SERPL-MCNC: 26.68 NG/ML
RBC # BLD AUTO: 3.9 MILLION/UL (ref 3.88–5.62)
SODIUM SERPL-SCNC: 140 MMOL/L (ref 135–147)
WBC # BLD AUTO: 14.2 THOUSAND/UL (ref 4.31–10.16)

## 2022-11-19 RX ADMIN — IPRATROPIUM BROMIDE 0.5 MG: 0.5 SOLUTION RESPIRATORY (INHALATION) at 19:12

## 2022-11-19 RX ADMIN — LEVALBUTEROL HYDROCHLORIDE 1.25 MG: 1.25 SOLUTION, CONCENTRATE RESPIRATORY (INHALATION) at 19:12

## 2022-11-19 RX ADMIN — IPRATROPIUM BROMIDE 0.5 MG: 0.5 SOLUTION RESPIRATORY (INHALATION) at 14:14

## 2022-11-19 RX ADMIN — FOLIC ACID TAB 400 MCG 400 MCG: 400 TAB at 11:10

## 2022-11-19 RX ADMIN — LEVALBUTEROL HYDROCHLORIDE 1.25 MG: 1.25 SOLUTION, CONCENTRATE RESPIRATORY (INHALATION) at 08:32

## 2022-11-19 RX ADMIN — SENNOSIDES 17.2 MG: 8.6 TABLET, FILM COATED ORAL at 21:52

## 2022-11-19 RX ADMIN — CEFTRIAXONE 1000 MG: 1 INJECTION, SOLUTION INTRAVENOUS at 19:42

## 2022-11-19 RX ADMIN — TAMSULOSIN HYDROCHLORIDE 0.4 MG: 0.4 CAPSULE ORAL at 11:10

## 2022-11-19 RX ADMIN — SODIUM CHLORIDE, SODIUM GLUCONATE, SODIUM ACETATE, POTASSIUM CHLORIDE AND MAGNESIUM CHLORIDE 125 ML/HR: 526; 502; 368; 37; 30 INJECTION, SOLUTION INTRAVENOUS at 11:05

## 2022-11-19 RX ADMIN — IPRATROPIUM BROMIDE 0.5 MG: 0.5 SOLUTION RESPIRATORY (INHALATION) at 08:32

## 2022-11-19 RX ADMIN — LEVALBUTEROL HYDROCHLORIDE 1.25 MG: 1.25 SOLUTION, CONCENTRATE RESPIRATORY (INHALATION) at 14:14

## 2022-11-19 RX ADMIN — FLUTICASONE FUROATE AND VILANTEROL TRIFENATATE 1 PUFF: 100; 25 POWDER RESPIRATORY (INHALATION) at 08:32

## 2022-11-19 RX ADMIN — METOPROLOL SUCCINATE 25 MG: 25 TABLET, FILM COATED, EXTENDED RELEASE ORAL at 11:10

## 2022-11-19 RX ADMIN — FERROUS SULFATE TAB 325 MG (65 MG ELEMENTAL FE) 325 MG: 325 (65 FE) TAB at 11:10

## 2022-11-19 RX ADMIN — FERROUS SULFATE TAB 325 MG (65 MG ELEMENTAL FE) 325 MG: 325 (65 FE) TAB at 17:01

## 2022-11-19 NOTE — NURSING NOTE
Pt came to the floor with Cloud catheter that drained bloody hazy urine  As per pt,he has chronic Cloud cathether  Tthis catheter was changed yesterday by his visiting nurse and couple hours after that pt saw that his urine is bloody and some blood was noticed around penis area  Perineum and catheter care were given

## 2022-11-19 NOTE — ASSESSMENT & PLAN NOTE
· Per chart review, patient had been bradycardic and had Metoprolol decreased by Cardiology  · S/P loop recorder  · Continue to trend HR  · Continue Metoprolol Succinate 25 mg daily  · Eliquis on hold due to SDH

## 2022-11-19 NOTE — RESPIRATORY THERAPY NOTE
RT Protocol Note  Rishabh Moseley 78 y o  male MRN: [id number]  Unit/Bed#: -01 Encounter: [id number]    Assessment    Principal Problem:    Sepsis secondary to UTI [facility])  Active Problems:    Permanent atrial fibrillation ([facility] )    Smoker    Ambulatory dysfunction    COPD (chronic obstructive pulmonary disease) ([place] [address] )    Urinary retention    SDH (subdural hematoma)    Swelling of lower leg      Home Pulmonary Medications:  duoneb  Advair        Past Medical History:   Diagnosis Date   • Anxiety disorder    • Atrial fibrillation ([place] [address] )    • Coronary artery disease    • Depression    • Cloud catheter in place    • Hepatitis C     screening negative in 2017   • Hypertension    • MI (myocardial infarction) ([place] [address] )    • Use of cane as ambulatory aid      Social History     Socioeconomic History   • Marital status: /Civil Union     Spouse name: None   • Number of children: 3   • Years of education: 12   • Highest education level: 12th grade   Occupational History   • Occupation: retired   Tobacco Use   • Smoking status: Every Day     Packs/day: 3 00     Years: 57 00     Pack years: 171 00     Types: Cigarettes   • Smokeless tobacco: Never   • Tobacco comments:     since age 17; Has history of smoking for over 54 years   He has been smoking more than packet daily in the past however he has been smokes about half a pack a day lately and stopped smoking on 2018 when he was admitted to the hospital     Vaping Use   • Vaping Use: Never used   Substance and Sexual Activity   • Alcohol use: Never   • Drug use: Never   • Sexual activity: Not Currently     Partners: Female     Birth control/protection: Condom Male   Other Topics Concern   • None   Social History Narrative    History of Ultra Sound: 2016    History of Stress Test: 2018    History of ECHO: 2018    · Most recent tobacco use screenin2019      · Live alone or with others:   with others        · Diet:   Regular      · Caffeine intake: Moderate      · Guns present in home:   No      · Asbestos exposure:   No      · TB exposure:   No      · Environmental exposure:   No      · Animal exposure:   No      · Smoke alarm in home:   Yes     Social Determinants of Health     Financial Resource Strain: Not on file   Food Insecurity: No Food Insecurity   • Worried About Running Out of Food in the Last Year: Never true   • Ran Out of Food in the Last Year: Never true   Transportation Needs: No Transportation Needs   • Lack of Transportation (Medical): No   • Lack of Transportation (Non-Medical): No   Physical Activity: Not on file   Stress: Not on file   Social Connections: Not on file   Intimate Partner Violence: Not on file   Housing Stability: Low Risk    • Unable to Pay for Housing in the Last Year: No   • Number of Places Lived in the Last Year: 1   • Unstable Housing in the Last Year: No       Subjective         Objective    Physical Exam:   Assessment Type: Pre-treatment  General Appearance: Alert, Awake  Respiratory Pattern: Dyspnea with exertion, Normal  Chest Assessment: Chest expansion symmetrical, Trachea midline  Bilateral Breath Sounds: Diminished    Vitals:  Blood pressure 116/68, pulse 91, temperature 100 5 °F (38 1 °C), temperature source Tympanic, resp  rate 18, height 6' (1 829 m), weight 68 5 kg (151 lb), SpO2 96 %  Imaging and other studies:           Plan  Pt assessed per protocol  Pt takes Duoneb BID and prn at home  BS diminished RR 18 Spo2 96 on room air  No distress  Pt ordered xopenex/atrovent TID per protocol also Breo ordered QD     Respiratory Plan: Home Bronchodilator Patient pathway

## 2022-11-19 NOTE — ASSESSMENT & PLAN NOTE
· Presented to ED with hematuria from newly exchanged chronic miller catheter   · POA  SIRS: temp 102 5F, , WBC 12 89K with neutrophils 92%  · Source: UTI suspected from chronic miller  · UA with trace leukocytes, occasional bacteria  · Labs: lactate 1 6, procal 0 18  · Cultures:  · Urine culture and blood culture x 2 pending  · ABX: S/P 1 g Rocephin in ED  · Continue Rocephin day 1  · Adjust ABX pending culture results  · IVF: S/P 1 L NS in ED then placed on maintenance fluids overnight    IV fluids discontinued on hospital day 1

## 2022-11-19 NOTE — ASSESSMENT & PLAN NOTE
· Possible new left lower leg swelling noted in ED  · Swelling localized to left calf and left knee  · LE duplex with no observed superficial or deep DVTs  · No heat, redness, or injury noted on LLE  · Elevate LLE

## 2022-11-19 NOTE — SEPSIS NOTE
Sepsis Note   Estela Foster 78 y o  male MRN: [id number]  Unit/Bed#: ED 06 Encounter: [id number]       qSOFA     Row Name 11/18/22 1749                Altered mental status GCS < 15 --        Respiratory Rate > / =41 0        Systolic BP < / =363 0        Q Sofa Score 0                   Initial Sepsis Screening     Row Name 11/18/22 1927                Is the patient's history suggestive of a new or worsening infection? Yes (Proceed)  -KF        Suspected source of infection urinary tract infection  -KF        Are two or more of the following signs & symptoms of infection both present and new to the patient? Yes (Proceed)  -KF        Indicate SIRS criteria Hyperthemia > 38 3C (100 9F); Tachycardia > 90 bpm  -KF        If the answer is yes to both questions, suspicion of sepsis is present --        If severe sepsis is present AND tissue hypoperfusion perists in the hour after fluid resuscitation or lactate > 4, the patient meets criteria for SEPTIC SHOCK --        Are any of the following organ dysfunction criteria present within 6 hours of suspected infection and SIRS criteria that are NOT considered to be chronic conditions?  No  -KF        Organ dysfunction --        Date of presentation of severe sepsis --        Time of presentation of severe sepsis --        Tissue hypoperfusion persists in the hour after crystalloid fluid administration, evidenced, by either: --        Was hypotension present within one hour of the conclusion of crystalloid fluid administration? --        Date of presentation of septic shock --        Time of presentation of septic shock --              User Key  (r) = Recorded By, (t) = Taken By, (c) = Cosigned By    [address] Name Provider Type    KF Kendra Degroot MD Physician

## 2022-11-19 NOTE — ASSESSMENT & PLAN NOTE
· Presented to ED with blood noted in miller catheter tubing after exchange today by [facility]  · Chronic miller catheter due to urinary retention  · Follows with Urology outpatient  · Miller catheter flushed in ED with no evidence of blockage  · Continues to have blood in urine and blood noted at insertion site  · Patient reports new miller catheter took two attempts  · Hematuria likely from traumatic insertion   · UA suspicious for UTI  · See sepsis plan for more details  · Continue Flomax and miller catheter care  · Monitor hgb  · Will need to follow up outpatient with Urology

## 2022-11-19 NOTE — ASSESSMENT & PLAN NOTE
· S/P admission 9/28/2022 for SDH  · Repeat CT 10/18/2022 with resolving hematoma  · Continue to hold Eliquis

## 2022-11-19 NOTE — ASSESSMENT & PLAN NOTE
· Presented to ED with hematuria from newly exchanged chronic miller catheter   · POA   SIRS: temp 102 5F, , WBC 12 89K with neutrophils 92%  · Patient on a beta blocker  · Source: UTI suspected from chronic miller  · UA with trace leukocytes, occasional bacteria  · CXR appears unchanged from prior, official read pending  · Labs: lactate 1 6, procal 0 18  · Repeat procal in AM  · Cultures:  · Urine culture and blood culture x 2 pending  · ABX: S/P 1 g Rocephin in ED  · Continue daily  · Adjust ABX pending culture results  · IVF: S/P 1 L NS in ED  · Continue with decreased fluid due to suboptimal Echo on 9/30/2022  · Continuous IVF with Isolyte @ 125 mL/hr overnight  · Stop IVF and obtain new CXR for SOB or new hypoxia  · PRN Tylenol  · Trend WBC and fever curve

## 2022-11-19 NOTE — PLAN OF CARE
Problem: Potential for Falls  Goal: Patient will remain free of falls  Description: INTERVENTIONS:  - Educate patient/family on patient safety including physical limitations  - Instruct patient to call for assistance with activity   - Consult OT/PT to assist with strengthening/mobility   - Keep Call bell within reach  - Keep bed low and locked with side rails adjusted as appropriate  - Keep care items and personal belongings within reach  - Initiate and maintain comfort rounds  - Make Fall Risk Sign visible to staff  - Offer Toileting every 2 Hours, in advance of need  - Initiate/Maintain  bed alarm  - Obtain necessary fall risk management equipment: n/a  - Apply yellow socks and bracelet for high fall risk patients  - Consider moving patient to room near nurses station  Outcome: Not Progressing     Problem: MOBILITY - ADULT  Goal: Maintain or return to baseline ADL function  Description: INTERVENTIONS:  -  Assess patient's ability to carry out ADLs; assess patient's baseline for ADL function and identify physical deficits which impact ability to perform ADLs (bathing, care of mouth/teeth, toileting, grooming, dressing, etc )  - Assess/evaluate cause of self-care deficits   - Assess range of motion  - Assess patient's mobility; develop plan if impaired  - Assess patient's need for assistive devices and provide as appropriate  - Encourage maximum independence but intervene and supervise when necessary  - Involve family in performance of ADLs  - Assess for home care needs following discharge   - Consider OT consult to assist with ADL evaluation and planning for discharge  - Provide patient education as appropriate  Outcome: Not Progressing  Goal: Maintains/Returns to pre admission functional level  Description: INTERVENTIONS:  - Perform BMAT or MOVE assessment daily    - Set and communicate daily mobility goal to care team and patient/family/caregiver     - Collaborate with rehabilitation services on mobility goals if consulted  - Perform Range of Motion prn times a day  - Reposition patient every self hours    - Dangle patient prn times a day  - Stand patient prn times a day  - Ambulate patient prn times a day  - Out of bed to chair prn times a day   - Out of bed for meals prn times a day  - Out of bed for toileting  - Record patient progress and toleration of activity level   Outcome: Not Progressing     Problem: Prexisting or High Potential for Compromised Skin Integrity  Goal: Skin integrity is maintained or improved  Description: INTERVENTIONS:  - Identify patients at risk for skin breakdown  - Assess and monitor skin integrity  - Assess and monitor nutrition and hydration status  - Monitor labs   - Assess for incontinence   - Turn and reposition patient  - Assist with mobility/ambulation  - Relieve pressure over bony prominences  - Avoid friction and shearing  - Provide appropriate hygiene as needed including keeping skin clean and dry  - Evaluate need for skin moisturizer/barrier cream  - Collaborate with interdisciplinary team   - Patient/family teaching  - Consider wound care consult   Outcome: Not Progressing

## 2022-11-19 NOTE — H&P
Kristin [birth year]22 1945, 78 y o  male MRN: [id number]  Unit/Bed#: YOSI Encounter: [id number]  Primary Care Provider: Vincenzo Cowden, MD   Date and time admitted to hospital: 11/18/2022  5:46 PM    * Sepsis secondary to UTI [facility])  Assessment & Plan  · Presented to ED with hematuria from newly exchanged chronic miller catheter   · POA   SIRS: temp 102 5F, , WBC 12 89K with neutrophils 92%  · Patient on a beta blocker  · Source: UTI suspected from chronic miller  · UA with trace leukocytes, occasional bacteria  · CXR appears unchanged from prior, official read pending  · Labs: lactate 1 6, procal 0 18  · Repeat procal in AM  · Cultures:  · Urine culture and blood culture x 2 pending  · ABX: S/P 1 g Rocephin in ED  · Continue daily  · Adjust ABX pending culture results  · IVF: S/P 1 L NS in ED  · Continue with decreased fluid due to suboptimal Echo on 9/30/2022  · Continuous IVF with Isolyte @ 125 mL/hr overnight  · Stop IVF and obtain new CXR for SOB or new hypoxia  · PRN Tylenol  · Trend WBC and fever curve     Urinary retention  Assessment & Plan  · Presented to ED with blood noted in miller catheter tubing after exchange today by [facility]  · Chronic miller catheter due to urinary retention  · Follows with Urology outpatient  · Miller catheter flushed in ED with no evidence of blockage  · Continues to have blood in urine and blood noted at insertion site  · Patient reports new miller catheter took two attempts  · Hematuria likely from traumatic insertion   · UA suspicious for UTI  · See sepsis plan for more details  · Continue Flomax and miller catheter care  · Monitor hgb  · Will need to follow up outpatient with Urology     Swelling of lower leg  Assessment & Plan  · Possible new left lower leg swelling noted in ED  · Swelling localized to left calf and left knee  · LE duplex with no observed superficial or deep DVTs  · No heat, redness, or injury noted on LLE  · Elevate LLE    Permanent atrial fibrillation (HCC)  Assessment & Plan  · Heart rate mildly elevated in setting of sepsis  · Per chart review, patient had been bradycardic and had Metoprolol decreased by Cardiology  · S/P loop recorder  · Continue to trend HR  · Continue Metoprolol Succinate 25 mg daily  · Eliquis on hold due to SDH     SDH (subdural hematoma)  Assessment & Plan  · S/P admission 9/28/2022 for SDH  · Repeat CT 10/18/2022 with resolving hematoma  · Continue to hold Eliquis     COPD (chronic obstructive pulmonary disease) ([facility] [address] )  Assessment & Plan  · Does not appear to be in an acute exacerbation  · Continue inhalers     Ambulatory dysfunction  Assessment & Plan  · Multifactorial  · S/P recent STR stay  · Consult PT/OT  · Fall precautions     Smoker  Assessment & Plan  · Encourage cessation  · Nicotine patch    VTE Pharmacologic Prophylaxis: VTE Score: 6 High Risk (Score >/= 5) - Pharmacological DVT Prophylaxis Contraindicated  Sequential Compression Devices Ordered  Code Status: Level 3 - DNAR and DNI   Discussion with family: Attempted to update  (wife) via phone  Unable to contact  Anticipated Length of Stay: Patient will be admitted on an inpatient basis with an anticipated length of stay of greater than 2 midnights secondary to urosepsis requiring IV medication  Total Time for Visit, including Counseling / Coordination of Care: 60 minutes Greater than 50% of this total time spent on direct patient counseling and coordination of care  Chief Complaint: hematuria     History of Present Illness:  Carolee Larios is a 78 y o  male with a PMH of Afib not on Eliquis due to recent subdural hematoma, COPD, tobacco smoker, urinary rentention s/p chronic miller who presents with blood in urine after miller catheter exchange this morning  Per patient, his home health team completed his miller catheter exchange per outpatient routine    Patient reports that today the helper struggled to place miller and had to use two attempts with the same catheter prior to successful placement  [facility] and [facility] noted blood in urine and patient presented to the ED  Patient denies any fever, chills, headache, sore throat, cough, chest pain, shortness of breath, N/V/D, abdominal pain, flank pain, or recent sick contacts  In the ED, patient met sepsis criteria with elevated heart rate, temperature, and mild leukocytosis noted on laboratory evaluation  He was started on sepsis fluids  A UA was obtained and showed probable UTI and he was started on 1 g Rocephin  A miller catheter was flushed without incident with blood noted in urinary bag  He was also noted to have swelling in his LLE that the patient was unsure if he had ever noticed it before  A LE duplex was obtained with no evidence of DVTs  Review of Systems:  Review of Systems   Constitutional: Negative for chills, diaphoresis, fatigue and fever  HENT: Negative for congestion, ear pain, postnasal drip, sore throat and trouble swallowing  Eyes: Negative for pain and visual disturbance  Respiratory: Negative for cough, shortness of breath and wheezing  Cardiovascular: Negative for chest pain and palpitations  Gastrointestinal: Negative for abdominal pain, diarrhea, nausea and vomiting  Genitourinary: Positive for hematuria and penile pain  Negative for flank pain  Musculoskeletal: Negative for arthralgias and myalgias  Skin: Negative for rash  Neurological: Negative for dizziness, syncope, light-headedness, numbness and headaches         Past Medical and Surgical History:   Past Medical History:   Diagnosis Date   • Anxiety disorder    • Atrial fibrillation [facility])    • Coronary artery disease    • Depression    • Miller catheter in place    • Hepatitis C     screening negative in 1/2017   • Hypertension    • MI (myocardial infarction) ([facility] [address] )    • Use of cane as ambulatory aid        Past Surgical History:   Procedure Laterality Date   • CARDIAC CATHETERIZATION  07/09/2018   • CARDIAC ELECTROPHYSIOLOGY PROCEDURE N/A 9/30/2022    Procedure: Cardiac loop recorder implant;  Surgeon: Aidee Chamorro MD;  Location: [facility]; Service: Cardiology   • COLONOSCOPY     • WI REPAIR ING HERNIA,5+Y/O,REDUCIBL Right 8/11/2022    Procedure: REPAIR HERNIA INGUINAL;  Surgeon: Killian Encarnacion DO;  Location: [facility];  Service: General   • TONSILLECTOMY         Meds/Allergies:  Prior to Admission medications    Medication Sig Start Date End Date Taking? Authorizing Provider   Advair Diskus 100-50 MCG/ACT inhaler USE 1 INHALATION TWICE A DAY (RINSE MOUTH AFTER USE) 8/26/22   Joe Rae DO   azelastine (ASTELIN) 0 1 % nasal spray 1 spray into each nostril in the morning and 1 spray in the evening  Use in each nostril as directed    Patient taking differently: 1 spray into each nostril 2 (two) times a day as needed Use in each nostril as directed 5/24/22   Faizan Armas MD   docusate sodium (COLACE) 100 mg capsule Take 1 capsule (100 mg total) by mouth 2 (two) times a day 9/10/21 10/25/22  Joe Rae DO   ferrous sulfate 324 (65 Fe) mg Take 1 tablet (324 mg total) by mouth 2 (two) times a day before meals 10/25/22 1/23/23  Faizan Armas MD   folic acid (FOLVITE) 115 mcg tablet Take 1 tablet (400 mcg total) by mouth daily 9/10/21   Joe Rae DO   ipratropium-albuterol (DUO-NEB) 0 5-2 5 mg/3 mL nebulizer solution INHALE CONTENTS OF 1 VIAL VIA NEBULIZER FOUR TIMES A DAY 6/7/22   Faizan Armas MD   metoprolol succinate (Toprol XL) 25 mg 24 hr tablet Take 1 tablet (25 mg total) by mouth daily 8/15/22 11/13/22  Faizan Armas MD   nicotine (NICODERM CQ) 14 mg/24hr TD 24 hr patch Place 1 patch on the skin every 24 hours 10/25/22   Faizan Armas MD   nicotine (NICODERM CQ) 7 mg/24hr TD 24 hr patch Place 1 patch on the skin daily Start after 14 mg patch is over 10/25/22   Faizan Armas MD   omeprazole (PriLOSEC) 20 mg delayed release capsule Take 20 mg by mouth daily    Historical Provider, MD   potassium chloride (Klor-Con) 10 mEq tablet TAKE 1 TABLET DAILY 6/1/22   Bo Mena MD   tamsulosin (FLOMAX) 0 4 mg TAKE 1 CAPSULE DAILY 8/26/22   Lamont Henson DO     I have reviewed home medications with patient personally  Allergies: No Known Allergies    Social History:  Marital Status: /Civil Union   Occupation:   Patient Pre-hospital Living Situation: Home, With spouse  Patient Pre-hospital Level of Mobility: walks with cane  Patient Pre-hospital Diet Restrictions:   Substance Use History:   Social History     Substance and Sexual Activity   Alcohol Use Never     Social History     Tobacco Use   Smoking Status Every Day   • Packs/day: 3 00   • Years: 57 00   • Pack years: 171 00   • Types: Cigarettes   Smokeless Tobacco Never   Tobacco Comments    since age 17; Has history of smoking for over 54 years  He has been smoking more than packet daily in the past however he has been smokes about half a pack a day lately and stopped smoking on 7/1/2018 when he was admitted to the hospital       Social History     Substance and Sexual Activity   Drug Use Never       Family History:  Family History   Problem Relation Age of Onset   • Hypertension Mother    • Coronary artery disease Mother         premature   • Hypertension Father    • Coronary artery disease Father         premature   • Diabetes Father        Physical Exam:     Vitals:   Blood Pressure: 120/80 (11/18/22 1945)  Pulse: 102 (11/18/22 1945)  Temperature: 99 7 °F (37 6 °C) (11/18/22 1929)  Temp Source: Oral (11/18/22 1929)  Respirations: 20 (11/18/22 1945)  SpO2: 97 % (11/18/22 1945)    Physical Exam  Vitals reviewed  Constitutional:       General: He is not in acute distress  Appearance: He is not ill-appearing or diaphoretic  HENT:      Head: Normocephalic and atraumatic        Nose: Nose normal       Mouth/Throat:      Mouth: Mucous membranes are moist       Pharynx: Oropharynx is clear  Eyes:      Extraocular Movements: Extraocular movements intact  Conjunctiva/sclera: Conjunctivae normal       Pupils: Pupils are equal, round, and reactive to light  Cardiovascular:      Rate and Rhythm: Tachycardia present  Rhythm irregular  Pulses: Normal pulses  Heart sounds: Normal heart sounds  Pulmonary:      Effort: Pulmonary effort is normal       Breath sounds: Decreased breath sounds present  Abdominal:      General: Bowel sounds are normal  There is no distension  Palpations: Abdomen is soft  Tenderness: There is no abdominal tenderness  There is no guarding  Genitourinary:     Comments: Cloud catheter with blood noted in urine and at insertion site  Musculoskeletal:         General: Swelling (L calf to L knee) present  Normal range of motion  Cervical back: Normal range of motion and neck supple  Right lower leg: No edema  Left lower leg: No edema  Skin:     General: Skin is warm and dry  Capillary Refill: Capillary refill takes less than 2 seconds  Neurological:      General: No focal deficit present  Mental Status: He is alert and oriented to person, place, and time  Motor: No weakness     Psychiatric:         Mood and Affect: Mood normal          Behavior: Behavior normal           Additional Data:     Lab Results:  Results from last 7 days   Lab Units 11/18/22  1804   WBC Thousand/uL 12 89*   HEMOGLOBIN g/dL 13 3   HEMATOCRIT % 42 2   PLATELETS Thousands/uL 216   NEUTROS PCT % 92*   LYMPHS PCT % 5*   MONOS PCT % 2*   EOS PCT % 0     Results from last 7 days   Lab Units 11/18/22  1804   SODIUM mmol/L 138   POTASSIUM mmol/L 4 1   CHLORIDE mmol/L 106   CO2 mmol/L 24   BUN mg/dL 16   CREATININE mg/dL 1 00   ANION GAP mmol/L 8   CALCIUM mg/dL 9 0   ALBUMIN g/dL 4 0   TOTAL BILIRUBIN mg/dL 0 66   ALK PHOS U/L 75   ALT U/L 22   AST U/L 19   GLUCOSE RANDOM mg/dL 91     Results from last 7 days   Lab Units 11/18/22  1804   INR  1 07             Results from last 7 days   Lab Units 11/18/22  1804   LACTIC ACID mmol/L 1 6   PROCALCITONIN ng/ml 0 18       Lines/Drains:  Invasive Devices     Peripheral Intravenous Line  Duration           Peripheral IV 11/18/22 Left Antecubital <1 day    Peripheral IV 11/18/22 Right Antecubital <1 day          Drain  Duration           Urethral Catheter 18 Fr  261 days              Urinary Catheter:  Goal for removal: N/A - Chronic Cloud             Imaging: Reviewed radiology reports from this admission including: ultrasound(s) and Personally reviewed the following imaging: chest xray  VAS lower limb venous duplex study, unilateral/limited   Final Result by Sofya Fuchs MD (11/18 1935)      XR chest 1 view portable   ED Interpretation by Jennifer Morrison MD (11/18 1859)   No acute cardiopulmonary process          EKG and Other Studies Reviewed on Admission:   · EKG: Atrial fibrillation    ** Please Note: This note has been constructed using a voice recognition system   **

## 2022-11-19 NOTE — ASSESSMENT & PLAN NOTE
· Possible new left lower leg swelling noted in ED  · Swelling localized to left calf and left knee  · Venous duplex ultrasound negative for DVT

## 2022-11-19 NOTE — ASSESSMENT & PLAN NOTE
· Presented to ED with blood noted in miller catheter tubing after exchange today by [facility]  · Chronic miller catheter due to urinary retention  · Follows with Urology outpatient  · Miller catheter flushed in ED with no evidence of blockage  · Hematuria likely from traumatic insertion   · Hematuria resolved hospital day 1  · UA suspicious for UTI  · See sepsis plan for more details  · Continue Flomax and miller catheter care  · Will need to follow up outpatient with Urology

## 2022-11-19 NOTE — ASSESSMENT & PLAN NOTE
· Heart rate mildly elevated in setting of sepsis  · Per chart review, patient had been bradycardic and had Metoprolol decreased by Cardiology  · S/P loop recorder  · Continue to trend HR  · Continue Metoprolol Succinate 25 mg daily  · Eliquis on hold due to SDH

## 2022-11-19 NOTE — PROGRESS NOTES
[address]  Progress Note - Yana Aguilera 1945, 78 y o  male MRN: [id number]  Unit/Bed#: -01 Encounter: [id number]  Primary Care Provider: Tiera Kaur MD   Date and time admitted to hospital: 11/18/2022  5:46 PM    * Sepsis secondary to UTI [facility])  Assessment & Plan  · Presented to ED with hematuria from newly exchanged chronic miller catheter   · POA  SIRS: temp 102 5F, , WBC 12 89K with neutrophils 92%  · Source: UTI suspected from chronic miller  · UA with trace leukocytes, occasional bacteria  · Labs: lactate 1 6, procal 0 18  · Cultures:  · Urine culture and blood culture x 2 pending  · ABX: S/P 1 g Rocephin in ED  · Continue Rocephin day 1  · Adjust ABX pending culture results  · IVF: S/P 1 L NS in ED then placed on maintenance fluids overnight    IV fluids discontinued on hospital day 1      Urinary retention  Assessment & Plan  · Presented to ED with blood noted in miller catheter tubing after exchange today by [facility]  · Chronic miller catheter due to urinary retention  · Follows with Urology outpatient  · Miller catheter flushed in ED with no evidence of blockage  · Hematuria likely from traumatic insertion   · Hematuria resolved hospital day 1  · UA suspicious for UTI  · See sepsis plan for more details  · Continue Flomax and miller catheter care  · Will need to follow up outpatient with Urology     Swelling of lower leg  Assessment & Plan  · Possible new left lower leg swelling noted in ED  · Swelling localized to left calf and left knee  · Venous duplex ultrasound negative for DVT    SDH (subdural hematoma)  Assessment & Plan  · S/P admission 9/28/2022 for SDH  · Repeat CT 10/18/2022 with resolving hematoma  · Continue to hold Eliquis     COPD (chronic obstructive pulmonary disease) ([facility] [address] )  Assessment & Plan  · Does not appear to be in an acute exacerbation  · Continue inhalers     Ambulatory dysfunction  Assessment & Plan  · Multifactorial  · S/P recent STR stay  · Consult PT/OT  · Fall precautions     Smoker  Assessment & Plan  · Encourage cessation  · Nicotine patch    Permanent atrial fibrillation ([address] )  Assessment & Plan    · Per chart review, patient had been bradycardic and had Metoprolol decreased by Cardiology  · S/P loop recorder  · Continue to trend HR  · Continue Metoprolol Succinate 25 mg daily  · Eliquis on hold due to SDH           VTE Pharmacologic Prophylaxis: VTE Score: 6 High Risk (Score >/= 5) - Pharmacological DVT Prophylaxis Contraindicated  Sequential Compression Devices Ordered  Patient Centered Rounds: I performed bedside rounds with nursing staff today  Discussions with Specialists or Other Care Team Provider: case management    Education and Discussions with Family / Patient: Patient declined call to   Time Spent for Care: 30 minutes  More than 50% of total time spent on counseling and coordination of care as described above  Current Length of Stay: 1 day(s)  Current Patient Status: Inpatient   Certification Statement: The patient will continue to require additional inpatient hospital stay due to UTI  Discharge Plan: Anticipate discharge in 24-48 hrs to home  Code Status: Level 3 - DNAR and DNI    Subjective:   Patient seen examined at bedside  Patient states that he is feeling better denies any fevers or chills overnight  Also states hematuria resolved    Objective:     Vitals:   Temp (24hrs), Av 1 °F (37 8 °C), Min:97 5 °F (36 4 °C), Max:102 5 °F (39 2 °C)    Temp:  [97 5 °F (36 4 °C)-102 5 °F (39 2 °C)] 97 5 °F (36 4 °C)  HR:  [] 82  Resp:  [17-20] 18  BP: (104-150)/(49-86) 120/49  SpO2:  [94 %-97 %] 94 %  Body mass index is 20 53 kg/m²  Input and Output Summary (last 24 hours):      Intake/Output Summary (Last 24 hours) at 2022 1249  Last data filed at 2022 1210  Gross per 24 hour   Intake 3213 33 ml   Output 900 ml   Net 2313 33 ml       Physical Exam:   Physical Exam  Vitals reviewed  HENT:      Head: Normocephalic and atraumatic  Right Ear: External ear normal       Left Ear: External ear normal       Nose: Nose normal       Mouth/Throat:      Mouth: Mucous membranes are moist       Pharynx: Oropharynx is clear  Eyes:      Extraocular Movements: Extraocular movements intact  Cardiovascular:      Rate and Rhythm: Normal rate  Rhythm irregular  Heart sounds: Normal heart sounds  Pulmonary:      Effort: Pulmonary effort is normal       Comments: Decreased breath sounds  Abdominal:      General: Abdomen is flat  Palpations: Abdomen is soft  Tenderness: There is no abdominal tenderness  Musculoskeletal:      Cervical back: Normal range of motion  Right lower leg: No edema  Left lower leg: No edema  Skin:     General: Skin is warm and dry  Neurological:      Mental Status: He is alert  Mental status is at baseline     Psychiatric:         Mood and Affect: Mood normal          Behavior: Behavior normal           Additional Data:     Labs:  Results from last 7 days   Lab Units 11/19/22 0437   WBC Thousand/uL 14 20*   HEMOGLOBIN g/dL 11 7*   HEMATOCRIT % 37 0   PLATELETS Thousands/uL 180   NEUTROS PCT % 83*   LYMPHS PCT % 9*   MONOS PCT % 7   EOS PCT % 1     Results from last 7 days   Lab Units 11/19/22 0437 11/18/22  1804   SODIUM mmol/L 140 138   POTASSIUM mmol/L 4 0 4 1   CHLORIDE mmol/L 109* 106   CO2 mmol/L 25 24   BUN mg/dL 15 16   CREATININE mg/dL 0 94 1 00   ANION GAP mmol/L 6 8   CALCIUM mg/dL 8 2* 9 0   ALBUMIN g/dL  --  4 0   TOTAL BILIRUBIN mg/dL  --  0 66   ALK PHOS U/L  --  75   ALT U/L  --  22   AST U/L  --  19   GLUCOSE RANDOM mg/dL 81 91     Results from last 7 days   Lab Units 11/18/22  1804   INR  1 07             Results from last 7 days   Lab Units 11/19/22  0437 11/18/22  1804   LACTIC ACID mmol/L  --  1 6   PROCALCITONIN ng/ml 26 68* 0 18       Lines/Drains:  Invasive Devices     Peripheral Intravenous Line  Duration Peripheral IV 11/18/22 Right Antecubital 1 day    Peripheral IV 11/18/22 Left Antecubital <1 day          Drain  Duration           Urethral Catheter 18 Fr  261 days              Urinary Catheter:  Goal for removal: N/A - Chronic Cloud               Imaging: Reviewed radiology reports from this admission including: ultrasound(s)    Recent Cultures (last 7 days):   Results from last 7 days   Lab Units 11/18/22  1804   BLOOD CULTURE  Received in Microbiology Lab  Culture in Progress  Received in Microbiology Lab  Culture in Progress  Last 24 Hours Medication List:   Current Facility-Administered Medications   Medication Dose Route Frequency Provider Last Rate   • acetaminophen  650 mg Oral Q6H PRN GABRIELA Foy     • aluminum-magnesium hydroxide-simethicone  30 mL Oral Q4H PRN GABRIELA Foy     • cefTRIAXone  1,000 mg Intravenous Q24H GABRIELA Junior     • docusate sodium  100 mg Oral BID GABRIELA Foy     • ferrous sulfate  325 mg Oral BID With Meals GABRIELA Foy     • Fluticasone Furoate-Vilanterol  1 puff Inhalation Daily Lucio Padilla, [address]     • folic acid  182 mcg Oral Daily MAINOR JuniorNP     • ipratropium  0 5 mg Nebulization TID Artur Briggs DO     • levalbuterol  1 25 mg Nebulization TID Artur Briggs DO     • metoprolol succinate  25 mg Oral Daily GABRIELA Junior     • nicotine  14 mg Transdermal Daily GABRIELA Junior     • ondansetron  4 mg Intravenous Q4H PRN GABRIELA Foy     • senna  2 tablet Oral HS GABRIELA Foy     • sodium chloride  1 spray Each Nare Q1H PRN GABRIELA Fyo     • tamsulosin  0 4 mg Oral Daily GABRIELA Foy          Today, Patient Was Seen By: Светлана Conteh DO    **Please Note: This note may have been constructed using a voice recognition system  **

## 2022-11-20 LAB
ALBUMIN SERPL BCP-MCNC: 3.2 G/DL (ref 3.5–5)
ANION GAP SERPL CALCULATED.3IONS-SCNC: 6 MMOL/L (ref 4–13)
BUN SERPL-MCNC: 13 MG/DL (ref 5–25)
CALCIUM ALBUM COR SERPL-MCNC: 8.8 MG/DL (ref 8.3–10.1)
CALCIUM SERPL-MCNC: 8.2 MG/DL (ref 8.4–10.2)
CHLORIDE SERPL-SCNC: 106 MMOL/L (ref 96–108)
CO2 SERPL-SCNC: 26 MMOL/L (ref 21–32)
CREAT SERPL-MCNC: 1 MG/DL (ref 0.6–1.3)
ERYTHROCYTE [DISTWIDTH] IN BLOOD BY AUTOMATED COUNT: 14.5 % (ref 11.6–15.1)
GFR SERPL CREATININE-BSD FRML MDRD: 73 ML/MIN/1.73SQ M
GLUCOSE SERPL-MCNC: 98 MG/DL (ref 65–140)
HCT VFR BLD AUTO: 35.9 % (ref 36.5–49.3)
HGB BLD-MCNC: 11.7 G/DL (ref 12–17)
MAGNESIUM SERPL-MCNC: 2 MG/DL (ref 1.9–2.7)
MCH RBC QN AUTO: 30.6 PG (ref 26.8–34.3)
MCHC RBC AUTO-ENTMCNC: 32.6 G/DL (ref 31.4–37.4)
MCV RBC AUTO: 94 FL (ref 82–98)
PHOSPHATE SERPL-MCNC: 2.3 MG/DL (ref 2.3–4.1)
PLATELET # BLD AUTO: 171 THOUSANDS/UL (ref 149–390)
PMV BLD AUTO: 10.4 FL (ref 8.9–12.7)
POTASSIUM SERPL-SCNC: 3.8 MMOL/L (ref 3.5–5.3)
PROCALCITONIN SERPL-MCNC: 21.87 NG/ML
QRS AXIS: 3 DEGREES
QRSD INTERVAL: 88 MS
QT INTERVAL: 272 MS
QTC INTERVAL: 378 MS
RBC # BLD AUTO: 3.82 MILLION/UL (ref 3.88–5.62)
SODIUM SERPL-SCNC: 138 MMOL/L (ref 135–147)
T WAVE AXIS: 91 DEGREES
VENTRICULAR RATE: 116 BPM
WBC # BLD AUTO: 9.33 THOUSAND/UL (ref 4.31–10.16)

## 2022-11-20 RX ORDER — HEPARIN SODIUM 5000 [USP'U]/ML
5000 INJECTION, SOLUTION INTRAVENOUS; SUBCUTANEOUS EVERY 8 HOURS SCHEDULED
Status: DISCONTINUED | OUTPATIENT
Start: 2022-11-20 | End: 2022-11-22

## 2022-11-20 RX ADMIN — FERROUS SULFATE TAB 325 MG (65 MG ELEMENTAL FE) 325 MG: 325 (65 FE) TAB at 17:45

## 2022-11-20 RX ADMIN — FLUTICASONE FUROATE AND VILANTEROL TRIFENATATE 1 PUFF: 100; 25 POWDER RESPIRATORY (INHALATION) at 08:51

## 2022-11-20 RX ADMIN — METOPROLOL SUCCINATE 25 MG: 25 TABLET, FILM COATED, EXTENDED RELEASE ORAL at 09:51

## 2022-11-20 RX ADMIN — LEVALBUTEROL HYDROCHLORIDE 1.25 MG: 1.25 SOLUTION, CONCENTRATE RESPIRATORY (INHALATION) at 19:30

## 2022-11-20 RX ADMIN — IPRATROPIUM BROMIDE 0.5 MG: 0.5 SOLUTION RESPIRATORY (INHALATION) at 14:06

## 2022-11-20 RX ADMIN — HEPARIN SODIUM 5000 UNITS: 5000 INJECTION INTRAVENOUS; SUBCUTANEOUS at 14:13

## 2022-11-20 RX ADMIN — CEFTRIAXONE 1000 MG: 1 INJECTION, SOLUTION INTRAVENOUS at 18:36

## 2022-11-20 RX ADMIN — HEPARIN SODIUM 5000 UNITS: 5000 INJECTION INTRAVENOUS; SUBCUTANEOUS at 22:33

## 2022-11-20 RX ADMIN — SENNOSIDES 17.2 MG: 8.6 TABLET, FILM COATED ORAL at 22:34

## 2022-11-20 RX ADMIN — FOLIC ACID TAB 400 MCG 400 MCG: 400 TAB at 09:51

## 2022-11-20 RX ADMIN — ACETAMINOPHEN 650 MG: 325 TABLET ORAL at 01:36

## 2022-11-20 RX ADMIN — IPRATROPIUM BROMIDE 0.5 MG: 0.5 SOLUTION RESPIRATORY (INHALATION) at 19:30

## 2022-11-20 RX ADMIN — ACETAMINOPHEN 650 MG: 325 TABLET ORAL at 22:37

## 2022-11-20 RX ADMIN — LEVALBUTEROL HYDROCHLORIDE 1.25 MG: 1.25 SOLUTION, CONCENTRATE RESPIRATORY (INHALATION) at 14:06

## 2022-11-20 RX ADMIN — TAMSULOSIN HYDROCHLORIDE 0.4 MG: 0.4 CAPSULE ORAL at 09:51

## 2022-11-20 RX ADMIN — FERROUS SULFATE TAB 325 MG (65 MG ELEMENTAL FE) 325 MG: 325 (65 FE) TAB at 06:48

## 2022-11-20 RX ADMIN — IPRATROPIUM BROMIDE 0.5 MG: 0.5 SOLUTION RESPIRATORY (INHALATION) at 08:50

## 2022-11-20 RX ADMIN — LEVALBUTEROL HYDROCHLORIDE 1.25 MG: 1.25 SOLUTION, CONCENTRATE RESPIRATORY (INHALATION) at 08:50

## 2022-11-20 NOTE — ASSESSMENT & PLAN NOTE
· Presented to ED with hematuria from newly exchanged chronic miller catheter   · POA  SIRS: temp 102 5F, , WBC 12 89K with neutrophils 92%  · Source: UTI suspected from chronic miller  · UA with trace leukocytes, occasional bacteria  · Labs: lactate 1 6, procal 0 18  · Blood cultures negative to date  · IVF: S/P 1 L NS in ED then placed on maintenance fluids overnight    IV fluids discontinued on hospital day 1  ·  ABX: S/P 1 g Rocephin in ED  · Continue Rocephin day 2  · Urine culture growing Proteus sensitivities pending

## 2022-11-20 NOTE — PLAN OF CARE
Problem: GENITOURINARY - ADULT  Goal: Maintains or returns to baseline urinary function  Description: INTERVENTIONS:  - Assess urinary function  - Encourage oral fluids to ensure adequate hydration if ordered  - Administer IV fluids as ordered to ensure adequate hydration  - Administer ordered medications as needed  - Offer frequent toileting  - Follow urinary retention protocol if ordered  Outcome: Progressing  Goal: Urinary catheter remains patent  Description: INTERVENTIONS:  - Assess patency of urinary catheter  - If patient has a chronic miller, consider changing catheter if non-functioning  - Follow guidelines for intermittent irrigation of non-functioning urinary catheter  Outcome: Progressing     Problem: INFECTION - ADULT  Goal: Absence or prevention of progression during hospitalization  Description: INTERVENTIONS:  - Assess and monitor for signs and symptoms of infection  - Monitor lab/diagnostic results  - Monitor all insertion sites, i e  indwelling lines, tubes, and drains  - Monitor endotracheal if appropriate and nasal secretions for changes in amount and color  - [place] appropriate cooling/warming therapies per order  - Administer medications as ordered  - Instruct and encourage patient and family to use good hand hygiene technique  - Identify and instruct in appropriate isolation precautions for identified infection/condition  Outcome: Progressing

## 2022-11-20 NOTE — PLAN OF CARE
Problem: Potential for Falls  Goal: Patient will remain free of falls  Description: INTERVENTIONS:  - Educate patient/family on patient safety including physical limitations  - Instruct patient to call for assistance with activity   - Consult OT/PT to assist with strengthening/mobility   - Keep Call bell within reach  - Keep bed low and locked with side rails adjusted as appropriate  - Keep care items and personal belongings within reach  - Initiate and maintain comfort rounds  - Make Fall Risk Sign visible to staff  - Offer Toileting every 2  Hours, in advance of need  - Initiate/Maintain 2 alarm  - Obtain necessary fall risk management equipment: bed  - Apply yellow socks and bracelet for high fall risk patients  - Consider moving patient to room near nurses station  Outcome: Progressing     Problem: MOBILITY - ADULT  Goal: Maintain or return to baseline ADL function  Description: INTERVENTIONS:  -  Assess patient's ability to carry out ADLs; assess patient's baseline for ADL function and identify physical deficits which impact ability to perform ADLs (bathing, care of mouth/teeth, toileting, grooming, dressing, etc )  - Assess/evaluate cause of self-care deficits   - Assess range of motion  - Assess patient's mobility; develop plan if impaired  - Assess patient's need for assistive devices and provide as appropriate  - Encourage maximum independence but intervene and supervise when necessary  - Involve family in performance of ADLs  - Assess for home care needs following discharge   - Consider OT consult to assist with ADL evaluation and planning for discharge  - Provide patient education as appropriate  Outcome: Progressing

## 2022-11-20 NOTE — PROGRESS NOTES
Sarah U  68   Progress Note - Travis Gess 1945, 78 y o  male MRN: [id number]  Unit/Bed#: -01 Encounter: [id number]  Primary Care Provider: Chavo Langley MD   Date and time admitted to hospital: 11/18/2022  5:46 PM    * Sepsis secondary to UTI [facility])  Assessment & Plan  · Presented to ED with hematuria from newly exchanged chronic miller catheter   · POA  SIRS: temp 102 5F, , WBC 12 89K with neutrophils 92%  · Source: UTI suspected from chronic miller  · UA with trace leukocytes, occasional bacteria  · Labs: lactate 1 6, procal 0 18  · Blood cultures negative to date  · IVF: S/P 1 L NS in ED then placed on maintenance fluids overnight    IV fluids discontinued on hospital day 1  ·  ABX: S/P 1 g Rocephin in ED  · Continue Rocephin day 2  · Urine culture growing Proteus sensitivities pending        Urinary retention  Assessment & Plan  · Presented to ED with blood noted in miller catheter tubing after exchange on 11/18/22 by [facility]  · Chronic miller catheter due to urinary retention  · Follows with Urology outpatient  · Miller catheter flushed in ED with no evidence of blockage  · Hematuria likely from traumatic insertion   · Hematuria resolved hospital day 1  · UA suspicious for UTI  · See sepsis plan for more details  · Continue Flomax and miller catheter care  · Will need to follow up outpatient with Urology     Swelling of lower leg  Assessment & Plan  · Possible new left lower leg swelling noted in ED  · Venous duplex ultrasound negative for DVT    SDH (subdural hematoma)  Assessment & Plan  · S/P admission 9/28/2022 for SDH  · Repeat CT 10/18/2022 with resolving hematoma  · Continue to hold Eliquis     COPD (chronic obstructive pulmonary disease) ([facility] [address] )  Assessment & Plan  · Does not appear to be in an acute exacerbation  · Continue inhalers     Ambulatory dysfunction  Assessment & Plan  · Multifactorial  · S/P recent STR stay  · Consult PT/OT  · Fall precautions     Permanent atrial fibrillation (HCC)  Assessment & Plan    · Per chart review, patient had been bradycardic and had Metoprolol decreased by Cardiology  · S/P loop recorder  · Continue to trend HR  · Continue Metoprolol Succinate 25 mg daily  · Eliquis on hold due to SDH         VTE Pharmacologic Prophylaxis: VTE Score: 6 High Risk (Score >/= 5) - Pharmacological DVT Prophylaxis Ordered: heparin  Sequential Compression Devices Ordered  Patient Centered Rounds: I performed bedside rounds with nursing staff today  Discussions with Specialists or Other Care Team Provider: case management    Education and Discussions with Family / Patient: Attempted to update  (wife) via phone  Unable to contact  Time Spent for Care: 30 minutes  More than 50% of total time spent on counseling and coordination of care as described above  Current Length of Stay: 2 day(s)  Current Patient Status: Inpatient   Certification Statement: The patient will continue to require additional inpatient hospital stay due to pending sensitivities, pt/ot eval  Discharge Plan: Anticipate discharge tomorrow to discharge location to be determined pending rehab evaluations  Code Status: Level 3 - DNAR and DNI    Subjective:   Patient seen examined at bedside  No acute events overnight  Patient states that he is feeling better denies having fevers or chills  Objective:     Vitals:   Temp (24hrs), Av 5 °F (37 5 °C), Min:97 5 °F (36 4 °C), Max:100 4 °F (38 °C)    Temp:  [97 5 °F (36 4 °C)-100 4 °F (38 °C)] 97 5 °F (36 4 °C)  HR:  [61-90] 61  Resp:  [16-18] 16  BP: (120-150)/(49-78) 146/78  SpO2:  [94 %-98 %] 96 %  Body mass index is 20 28 kg/m²  Input and Output Summary (last 24 hours): Intake/Output Summary (Last 24 hours) at 2022 1046  Last data filed at 2022 1004  Gross per 24 hour   Intake 2395 53 ml   Output 5250 ml   Net -2854 47 ml       Physical Exam:   Physical Exam  Vitals reviewed     HENT:      Head: Normocephalic and atraumatic  Right Ear: External ear normal       Left Ear: External ear normal       Nose: Nose normal       Mouth/Throat:      Mouth: Mucous membranes are moist       Pharynx: Oropharynx is clear  Eyes:      Extraocular Movements: Extraocular movements intact  Cardiovascular:      Rate and Rhythm: Normal rate and regular rhythm  Heart sounds: Normal heart sounds  Pulmonary:      Effort: Pulmonary effort is normal       Breath sounds: Normal breath sounds  Abdominal:      General: Abdomen is flat  Palpations: Abdomen is soft  Tenderness: There is no abdominal tenderness  Musculoskeletal:      Cervical back: Normal range of motion  Right lower leg: No edema  Left lower leg: No edema  Skin:     General: Skin is warm and dry  Neurological:      Mental Status: He is alert  Mental status is at baseline  Psychiatric:         Mood and Affect: Mood normal          Behavior: Behavior normal           Additional Data:     Labs:  Results from last 7 days   Lab Units 11/20/22 0422 11/19/22  0437   WBC Thousand/uL 9 33 14 20*   HEMOGLOBIN g/dL 11 7* 11 7*   HEMATOCRIT % 35 9* 37 0   PLATELETS Thousands/uL 171 180   NEUTROS PCT %  --  83*   LYMPHS PCT %  --  9*   MONOS PCT %  --  7   EOS PCT %  --  1     Results from last 7 days   Lab Units 11/20/22 0422 11/19/22 0437 11/18/22  1804   SODIUM mmol/L 138   < > 138   POTASSIUM mmol/L 3 8   < > 4 1   CHLORIDE mmol/L 106   < > 106   CO2 mmol/L 26   < > 24   BUN mg/dL 13   < > 16   CREATININE mg/dL 1 00   < > 1 00   ANION GAP mmol/L 6   < > 8   CALCIUM mg/dL 8 2*   < > 9 0   ALBUMIN g/dL 3 2*  --  4 0   TOTAL BILIRUBIN mg/dL  --   --  0 66   ALK PHOS U/L  --   --  75   ALT U/L  --   --  22   AST U/L  --   --  19   GLUCOSE RANDOM mg/dL 98   < > 91    < > = values in this interval not displayed       Results from last 7 days   Lab Units 11/18/22  1804   INR  1 07             Results from last 7 days   Lab Units 11/19/22  0437 11/18/22  1804   LACTIC ACID mmol/L  --  1 6   PROCALCITONIN ng/ml 26 68* 0 18       Lines/Drains:  Invasive Devices     Peripheral Intravenous Line  Duration           Peripheral IV 11/18/22 Right Antecubital 2 days    Peripheral IV 11/18/22 Left Antecubital 1 day          Drain  Duration           Urethral Catheter 18 Fr  262 days              Urinary Catheter:  Goal for removal: N/A- Discharging with Cloud               Imaging: No pertinent imaging reviewed  Recent Cultures (last 7 days):   Results from last 7 days   Lab Units 11/18/22  1852 11/18/22  1804   BLOOD CULTURE   --  No Growth at 24 hrs  No Growth at 24 hrs     URINE CULTURE  70,000-79,000 cfu/ml Proteus species*  --        Last 24 Hours Medication List:   Current Facility-Administered Medications   Medication Dose Route Frequency Provider Last Rate   • acetaminophen  650 mg Oral Q6H PRN GABRIELA Carson     • aluminum-magnesium hydroxide-simethicone  30 mL Oral Q4H PRN GABRIELA Carson     • cefTRIAXone  1,000 mg Intravenous Q24H GABRIELA Carson 1,000 mg (11/19/22 1942)   • docusate sodium  100 mg Oral BID GABRIELA Carson     • ferrous sulfate  325 mg Oral BID With Meals GABRIELA Carson     • Fluticasone Furoate-Vilanterol  1 puff Inhalation Daily Lucio Meyers [place]     • folic acid  982 mcg Oral Daily GABRIELA Jaramillo     • heparin (porcine)  5,000 Units Subcutaneous Q8H [address] Artur Briggs DO     • ipratropium  0 5 mg Nebulization TID Artur Briggs DO     • levalbuterol  1 25 mg Nebulization TID Artur Briggs DO     • metoprolol succinate  25 mg Oral Daily GABRIELA Jaramillo     • nicotine  14 mg Transdermal Daily GABRIELA Jaramillo     • ondansetron  4 mg Intravenous Q4H PRN GABRIELA Carson     • senna  2 tablet Oral HS GABRIELA Carson     • sodium chloride  1 spray Each Nare Q1H PRN Lucio GABRIELA Warnre     • tamsulosin  0 4 mg Oral Daily Pegge Both, [place]          Today, Patient Was Seen By: Brent Kidd DO    **Please Note: This note may have been constructed using a voice recognition system  **

## 2022-11-20 NOTE — ASSESSMENT & PLAN NOTE
· Presented to ED with blood noted in miller catheter tubing after exchange on 11/18/22 by [facility]  · Chronic miller catheter due to urinary retention  · Follows with Urology outpatient  · Miller catheter flushed in ED with no evidence of blockage  · Hematuria likely from traumatic insertion   · Hematuria resolved hospital day 1  · UA suspicious for UTI  · See sepsis plan for more details  · Continue Flomax and miller catheter care  · Will need to follow up outpatient with Urology

## 2022-11-21 ENCOUNTER — APPOINTMENT (INPATIENT)
Dept: ULTRASOUND IMAGING | Facility: HOSPITAL | Age: 77
End: 2022-11-21

## 2022-11-21 LAB
ALBUMIN SERPL BCP-MCNC: 3.2 G/DL (ref 3.5–5)
ANION GAP SERPL CALCULATED.3IONS-SCNC: 5 MMOL/L (ref 4–13)
BACTERIA UR CULT: ABNORMAL
BACTERIA UR CULT: ABNORMAL
BUN SERPL-MCNC: 14 MG/DL (ref 5–25)
CALCIUM ALBUM COR SERPL-MCNC: 8.9 MG/DL (ref 8.3–10.1)
CALCIUM SERPL-MCNC: 8.3 MG/DL (ref 8.4–10.2)
CHLORIDE SERPL-SCNC: 105 MMOL/L (ref 96–108)
CO2 SERPL-SCNC: 28 MMOL/L (ref 21–32)
CREAT SERPL-MCNC: 0.89 MG/DL (ref 0.6–1.3)
ERYTHROCYTE [DISTWIDTH] IN BLOOD BY AUTOMATED COUNT: 14.1 % (ref 11.6–15.1)
GFR SERPL CREATININE-BSD FRML MDRD: 83 ML/MIN/1.73SQ M
GLUCOSE SERPL-MCNC: 100 MG/DL (ref 65–140)
HCT VFR BLD AUTO: 38.2 % (ref 36.5–49.3)
HGB BLD-MCNC: 12.3 G/DL (ref 12–17)
MAGNESIUM SERPL-MCNC: 2 MG/DL (ref 1.9–2.7)
MCH RBC QN AUTO: 29.9 PG (ref 26.8–34.3)
MCHC RBC AUTO-ENTMCNC: 32.2 G/DL (ref 31.4–37.4)
MCV RBC AUTO: 93 FL (ref 82–98)
PHOSPHATE SERPL-MCNC: 3.2 MG/DL (ref 2.3–4.1)
PLATELET # BLD AUTO: 173 THOUSANDS/UL (ref 149–390)
PMV BLD AUTO: 10.3 FL (ref 8.9–12.7)
POTASSIUM SERPL-SCNC: 4 MMOL/L (ref 3.5–5.3)
PROCALCITONIN SERPL-MCNC: 10.34 NG/ML
RBC # BLD AUTO: 4.12 MILLION/UL (ref 3.88–5.62)
SODIUM SERPL-SCNC: 138 MMOL/L (ref 135–147)
WBC # BLD AUTO: 9.03 THOUSAND/UL (ref 4.31–10.16)

## 2022-11-21 RX ADMIN — FERROUS SULFATE TAB 325 MG (65 MG ELEMENTAL FE) 325 MG: 325 (65 FE) TAB at 06:38

## 2022-11-21 RX ADMIN — CEFTRIAXONE 1000 MG: 1 INJECTION, SOLUTION INTRAVENOUS at 19:27

## 2022-11-21 RX ADMIN — HEPARIN SODIUM 5000 UNITS: 5000 INJECTION INTRAVENOUS; SUBCUTANEOUS at 21:04

## 2022-11-21 RX ADMIN — DOCUSATE SODIUM 100 MG: 100 CAPSULE, LIQUID FILLED ORAL at 08:29

## 2022-11-21 RX ADMIN — LEVALBUTEROL HYDROCHLORIDE 1.25 MG: 1.25 SOLUTION, CONCENTRATE RESPIRATORY (INHALATION) at 19:26

## 2022-11-21 RX ADMIN — IPRATROPIUM BROMIDE 0.5 MG: 0.5 SOLUTION RESPIRATORY (INHALATION) at 09:11

## 2022-11-21 RX ADMIN — TAMSULOSIN HYDROCHLORIDE 0.4 MG: 0.4 CAPSULE ORAL at 08:29

## 2022-11-21 RX ADMIN — DOCUSATE SODIUM 100 MG: 100 CAPSULE, LIQUID FILLED ORAL at 17:19

## 2022-11-21 RX ADMIN — IPRATROPIUM BROMIDE 0.5 MG: 0.5 SOLUTION RESPIRATORY (INHALATION) at 19:26

## 2022-11-21 RX ADMIN — LEVALBUTEROL HYDROCHLORIDE 1.25 MG: 1.25 SOLUTION, CONCENTRATE RESPIRATORY (INHALATION) at 09:11

## 2022-11-21 RX ADMIN — FERROUS SULFATE TAB 325 MG (65 MG ELEMENTAL FE) 325 MG: 325 (65 FE) TAB at 17:18

## 2022-11-21 RX ADMIN — HEPARIN SODIUM 5000 UNITS: 5000 INJECTION INTRAVENOUS; SUBCUTANEOUS at 13:55

## 2022-11-21 RX ADMIN — HEPARIN SODIUM 5000 UNITS: 5000 INJECTION INTRAVENOUS; SUBCUTANEOUS at 06:38

## 2022-11-21 RX ADMIN — FLUTICASONE FUROATE AND VILANTEROL TRIFENATATE 1 PUFF: 100; 25 POWDER RESPIRATORY (INHALATION) at 09:36

## 2022-11-21 RX ADMIN — METOPROLOL SUCCINATE 25 MG: 25 TABLET, FILM COATED, EXTENDED RELEASE ORAL at 08:29

## 2022-11-21 RX ADMIN — FOLIC ACID TAB 400 MCG 400 MCG: 400 TAB at 08:29

## 2022-11-21 RX ADMIN — SENNOSIDES 17.2 MG: 8.6 TABLET, FILM COATED ORAL at 21:04

## 2022-11-21 NOTE — ASSESSMENT & PLAN NOTE
POA  SIRS: temp 102 5F, , WBC 12 89K with neutrophils 92%  Improved  Presented to ED with hematuria from newly exchanged chronic Miller catheter  · Source: UTI suspected from chronic miller  · UA with trace leukocytes, occasional bacteria  · BC x2: NGTD (48 hrs)  · Lactic acid: wnl  · Procal: 0 18->26 8->21 8, today's pending  · Urine cx: 70,000-79,000 cfu/ml Proteus mirabilis, 20,000-29,000 cfu/ml Citrobacter koseri  · Final susceptibilities pending  · IVF: S/P 1 L NS in ED then placed on maintenance fluids overnight   IVFs D/C on hospital day 1   · ABX: S/P 1 g Rocephin in ED  · D4 IV Rocephin, hopefully can transition to oral antibiotics tomorrow  · Follow CBC/temperature curve, final cx results

## 2022-11-21 NOTE — PLAN OF CARE
Problem: Potential for Falls  Goal: Patient will remain free of falls  Description: INTERVENTIONS:  - Educate patient/family on patient safety including physical limitations  - Instruct patient to call for assistance with activity   - Consult OT/PT to assist with strengthening/mobility   - Keep Call bell within reach  - Keep bed low and locked with side rails adjusted as appropriate  - Keep care items and personal belongings within reach  - Initiate and maintain comfort rounds  - Make Fall Risk Sign visible to staff  - Offer Toileting every 2 Hours, in advance of need  - Initiate/Maintain 2 alarm  - Obtain necessary fall risk management equipment: bed  - Apply yellow socks and bracelet for high fall risk patients  - Consider moving patient to room near nurses station  Outcome: Progressing

## 2022-11-21 NOTE — ASSESSMENT & PLAN NOTE
· Per chart review, patient had been bradycardic and had Metoprolol decreased by Cardiology  · S/P loop recorder  · Continue Metoprolol Succinate 25 mg daily  · Eliquis continues to be on hold due to SDH

## 2022-11-21 NOTE — ASSESSMENT & PLAN NOTE
· S/P admission 9/28/2022 for SDH, Eliquis been on hold since  · Repeat CT 10/18/2022 with resolving hematoma  · Continue to hold Eliquis, to follow-up with nuurosurgery on resuming Eliquis

## 2022-11-21 NOTE — CONSULTS
Consultation - Infectious Disease   Travis Barroso 78 y o  male MRN: [id number]  Unit/Bed#: -01 Encounter: [id number]      IMPRESSION & RECOMMENDATIONS:   1  SIRS, POA with fever and leukocytosis soon after Miller catheter exchange  Fever and leukocytosis rapidly improved  Admission blood cultures remain negative   -continues ceftriaxone at present   -follow-up blood cultures  -monitor temperature and hemodynamics  -serial exam  -monitor serial a m  Labs     2  Asymptomatic bacteriuria  Patient presenting with #1 status post recent traumatic miller catheter exchange  UA bland without pyuria  Patient improved despite Proteus mirabilis being resistant to ceftriaxone   -antibiotics as above  -monitor urinary output/symptoms  -check renal ultrasound  -if continues clinical improvement and renal ultrasound benign, anticipate discontinuing antibiotic     3  Urinary retention with chronic Miller  With hematuria status post traumatic Miller catheter exchange 11/18/22 prompting admission  -Miller maintenance protocol  -urology follow-up outpatient  -continue Flomax  -check renal ultrasound as above     4  Afib  Not on Eliquis due to subdural hematoma 09/20/2022  -management per primary care team    5  IV phlebitis right arm   -remove IV  -serial exam  -warm compresses and elevation p r n  Antibiotics:  Ceftriaxone D4    I have discussed the above management plan in detail with patient, RN, and the primary service    Extensive review of the medical records in epic including review of the notes, radiographs, and laboratory results     HISTORY OF PRESENT ILLNESS:  Reason for Consult:  1  ESBL producing bacteria infection 2  Sepsis    HPI: Travis Barroso is a 78y o  year old male with Afib not on Eliquis due to recent subdural hematoma, COPD, tobacco smoker, urinary rentention with chronic miller who presented to the ER 11/18/22 with blood in urine after miller catheter exchange that morning    In the ER, patient had a fever of102 5 with a white count of 14 K  Blood cultures and a urinalysis were obtained  UA showed 0-1 white cells, negative nitrites and trace leukocytes  Cloud was flushed  Patient was admitted on IV ceftriaxone  He rapidly improved with decreased fever and leukocytosis within 24 hours  His urine culture ended up growing [id number] colonies of Proteus mirabilis MDR and Citrobacter koseri pansensitive except resistant to ampicillin  Subsequently infectious Disease now being consulted regarding evaluation management of ESBL producing bacteria infection and sepsis  Patient denies any bring with his fever on admission  No current fever, chills, headache, sore throat, cough, chest pain, shortness of breath, N/V/D, abdominal pain, flank pain, dysuria or recent sick contacts  REVIEW OF SYSTEMS:  A complete review of systems is negative other than that noted in the HPI  PAST MEDICAL HISTORY:  Past Medical History:   Diagnosis Date   • Anxiety disorder    • Atrial fibrillation [facility])    • Coronary artery disease    • Depression    • Cloud catheter in place    • Hepatitis C     screening negative in 1/2017   • Hypertension    • MI (myocardial infarction) [facility])    • Use of cane as ambulatory aid      Past Surgical History:   Procedure Laterality Date   • CARDIAC CATHETERIZATION  07/09/2018   • CARDIAC ELECTROPHYSIOLOGY PROCEDURE N/A 9/30/2022    Procedure: Cardiac loop recorder implant;  Surgeon: Rizwana Lim MD;  Location: [facility];   Service: Cardiology   • COLONOSCOPY     • MI REPAIR ING HERNIA,5+Y/O,REDUCIBL Right 8/11/2022    Procedure: REPAIR HERNIA INGUINAL;  Surgeon: Espinoza Encarnacion DO;  Location: [facility];  Service: General   • TONSILLECTOMY         FAMILY HISTORY:  Non-contributory    SOCIAL HISTORY:  Social History   Social History     Substance and Sexual Activity   Alcohol Use Never     Social History     Substance and Sexual Activity   Drug Use Never     Social History Tobacco Use   Smoking Status Every Day   • Packs/day: 3 00   • Years: 57 00   • Pack years: 171 00   • Types: Cigarettes   Smokeless Tobacco Never   Tobacco Comments    since age 17; Has history of smoking for over 54 years  He has been smoking more than packet daily in the past however he has been smokes about half a pack a day lately and stopped smoking on 2018 when he was admitted to the hospital         ALLERGIES:  No Known Allergies    MEDICATIONS:  All current active medications have been reviewed  PHYSICAL EXAM:  Temp:  [97 9 °F (36 6 °C)-98 3 °F (36 8 °C)] 97 9 °F (36 6 °C)  HR:  [73-96] 87  Resp:  [16] 16  BP: (114-148)/(73-97) 148/97  SpO2:  [95 %-98 %] 98 %  Temp (24hrs), Av 1 °F (36 7 °C), Min:97 9 °F (36 6 °C), Max:98 3 °F (36 8 °C)  Current: Temperature: 97 9 °F (36 6 °C)    Intake/Output Summary (Last 24 hours) at 2022 1533  Last data filed at 2022 1309  Gross per 24 hour   Intake 480 ml   Output 1250 ml   Net -770 ml       General Appearance:  26-year-old elderly male, appearing propped comfortably bed, nontoxic appearance, and in no distress   Head:  Normocephalic, without obvious abnormality, atraumatic   Eyes:  Conjunctiva pink and sclera anicteric, both eyes   Nose: Nares normal, mucosa normal, no drainage   Throat: Oropharynx moist without lesions  Edentulous   Neck: Supple, symmetrical, no adenopathy, no tenderness/mass/nodules   Back:   Symmetric, no curvature, ROM normal, no CVA tenderness   Lungs:   Clear to auscultation bilaterally, respirations unlabored   Chest Wall:  No tenderness or deformity   Heart:  RRR; no murmur, rub or gallop   Abdomen:   Soft, non-tender, non-distended, positive bowel sounds    Extremities: No cyanosis, clubbing or edema   : Cloud catheter in place with fairly clear in bag, no SPT, no flank tenderness   Skin: No rashes or lesions  No draining wounds noted    Bilateral arm IV sites, right arm exit site with light pink induration nontender   Lymph nodes: Cervical, supraclavicular nodes normal   Neurologic: Alert and oriented times 3, extremity strength 5/5 and symmetric       LABS, IMAGING, & OTHER STUDIES:  Lab Results:  I have personally reviewed pertinent labs  Results from last 7 days   Lab Units 11/21/22 0453 11/20/22 0422 11/19/22  0437   WBC Thousand/uL 9 03 9 33 14 20*   HEMOGLOBIN g/dL 12 3 11 7* 11 7*   PLATELETS Thousands/uL 173 171 180     Results from last 7 days   Lab Units 11/21/22 0453 11/20/22  0422 11/19/22 0437 11/18/22  1804   SODIUM mmol/L 138 138 140 138   POTASSIUM mmol/L 4 0 3 8 4 0 4 1   CHLORIDE mmol/L 105 106 109* 106   CO2 mmol/L 28 26 25 24   BUN mg/dL 14 13 15 16   CREATININE mg/dL 0 89 1 00 0 94 1 00   EGFR ml/min/1 73sq m 83 73 78 73   CALCIUM mg/dL 8 3* 8 2* 8 2* 9 0   AST U/L  --   --   --  19   ALT U/L  --   --   --  22   ALK PHOS U/L  --   --   --  75     Results from last 7 days   Lab Units 11/18/22  1852 11/18/22  1804   BLOOD CULTURE   --  No Growth at 48 hrs  No Growth at 48 hrs  URINE CULTURE  70,000-79,000 cfu/ml Proteus mirabilis ESBL*  20,000-29,000 cfu/ml Citrobacter koseri*  --      Results from last 7 days   Lab Units 11/21/22 0453 11/20/22 0422 11/19/22  0437 11/18/22  1804   PROCALCITONIN ng/ml 10 34* 21 87* 26 68* 0 18                   Imaging Studies:   Imaging reviewed personally in PACS  No recent abdomen/pelvis imaging  11/18/2022 portable chest x-ray:  Lungs clear    Other Studies:   I have personally reviewed pertinent reports

## 2022-11-21 NOTE — ASSESSMENT & PLAN NOTE
Presented to ED with blood noted in Miller catheter tubing after exchange on 11/18/22 by [facility]  Chronic miller catheter due to urinary retention, follows with Urology outpatient  · Miller catheter flushed in ED with no evidence of blockage  · Hematuria likely from traumatic insertion, resolved hospital day 1  Patient only with intermittent bleeding at meatus surrounding Miller likely due to traumatic insertion/irritation    · UA suspicious for UTI  · See sepsis plan for more details  · Continue Flomax and miller catheter care  · Will need to follow up outpatient with Urology

## 2022-11-21 NOTE — PHYSICAL THERAPY NOTE
PHYSICAL THERAPY EVALUATION  DATE: 11/21/22  TIME: 7639-3107    NAME:  Maynor Mathew  AGE:   78 y o  Mrn:   [id number]  Length Of Stay: 3    ADMIT DX:  UTI (urinary tract infection) [N39 0]  Cloud catheter problem ([facility] [address] ) [T83  9XXA]  Sepsis ([facility] [address] ) [A41 9]    Past Medical History:   Diagnosis Date    Anxiety disorder     Atrial fibrillation ([facility] [address] )     Coronary artery disease     Depression     Cloud catheter in place     Hepatitis C     screening negative in 1/2017    Hypertension     MI (myocardial infarction) [facility])     Use of cane as ambulatory aid      Past Surgical History:   Procedure Laterality Date    CARDIAC CATHETERIZATION  07/09/2018    CARDIAC ELECTROPHYSIOLOGY PROCEDURE N/A 9/30/2022    Procedure: Cardiac loop recorder implant;  Surgeon: Barrett Sherwood MD;  Location: [facility] CARDIAC CATH LAB; Service: Cardiology    COLONOSCOPY      NV REPAIR [address] HERNIA,5+Y/O,REDUCIBL Right 8/11/2022    Procedure: REPAIR HERNIA INGUINAL;  Surgeon: Kirk Encarnacion DO;  Location: [facility] Main OR;  Service: General    TONSILLECTOMY         Performed at least 2 patient identifiers during session: Name, Memory Ku, and ID bracelet     11/21/22 1012   PT Last Visit   PT Visit Date 11/21/22   Note Type   Note type Evaluation   Pain Assessment   Pain Assessment Tool 0-10   Pain Score No Pain   Effect of Pain on Daily Activities n/a   Hospital Pain Intervention(s) Repositioned; Ambulation/increased activity   Multiple Pain Sites No   Restrictions/Precautions   Weight Bearing Precautions Per Order No   Other Precautions Impulsive; Chair Alarm; Bed Alarm;Multiple lines; Fall Risk;Hard of hearing   Home Living   Type of Home Apartment   Home Layout Two level;Performs ADLs on one level; Able to live on main level with bedroom/bathroom;Stairs to enter with rails  (15 steps with HR to access, maintains FFSU)   Bathroom Shower/Tub Tub/shower unit   Bathroom Toilet Standard   Bathroom Accessibility Accessible   Home Equipment Walker;Cane  (uses [facility] at baseline)   Additional Comments Pt reports him and his wife are currently sleeping on couch; loft space upstairs is intended bedroom, inaccessable d/t full flight of steep steps  Prior Function   Level of [place] Independent with ADLs; Independent with functional mobility; Needs assistance with IADLS   Lives With Spouse   Receives Help From Family;Home health   IADLs Family/Friend/Other provides transportation; Family/Friend/Other provides meals; Independent with medication management   Falls in the last 6 months >10   Vocational Retired   Comments Pt reports using [facility] for all functional mobility, household and previously community mobility  States he doesn't often complete community mobility now d/t dizziness and catheter  Would walk [place] to [place] and take the bus uphill back home  Pt reports having wife assist with IADLs, also has [place] [id number] services ? PT  General   Additional Pertinent History Pt admitted 11/18/2022 with miller catheter problem, hematuria, UTI     Family/Caregiver Present No   Cognition   Overall Cognitive Status WFL   Arousal/Participation Alert   Orientation Level Oriented X4   Memory Decreased recall of precautions   Following Commands Follows multistep commands with increased time or repetition   Comments Pt difficult to redirect at times, tangential    Subjective   Subjective "My normal therapist pamela that comes in to change my catheter couldn't come in, so I had someone else and now I'm here "   RUE Assessment   RUE Assessment WFL   LUE Assessment   LUE Assessment WFL   RLE Assessment   RLE Assessment WFL   LLE Assessment   LLE Assessment WFL   Vision-Basic Assessment   Current Vision Wears glasses all the time   Coordination   Movements are Fluid and Coordinated 1   Light Touch   RLE Light Touch Grossly intact   LLE Light Touch Grossly intact   Proprioception   RLE Proprioception Grossly intact   LLE Proprioception Grossly Intact   Bed Mobility   Supine to Sit 6  Modified independent Additional items HOB elevated   Sit to Supine 6  Modified independent   Additional items HOB elevated   Additional Comments Pt with onset of dizziness upon upright sit, requires ~3 minutes for resolution  Pt independently able to don B shoes at bedside  Transfers   Sit to Stand 5  Supervision   Additional items Assist x 1;Bedrails; Impulsive;Verbal cues   Stand to Sit 5  Supervision   Additional items Assist x 1;Bedrails;Verbal cues   Stand pivot 5  Supervision   Additional items Assist x 1; Impulsive;Verbal cues  [facility])   Additional Comments Upon initial stand, pt with c/o dizziness and slight instability however with grasp on bedrail and SPC pt does not require physical assistance to correct  Dizziness resolves ~1 minute of standing  Ambulation/Elevation   Gait pattern Narrow VERNON; Decreased foot clearance; Inconsistent khanh; Short stride; Step through pattern;Decreased hip extension   Gait Assistance 5  Supervision  (primarily close supervision, occasional CGA due to gross instability)   Additional items Assist x 1;Verbal cues   Assistive Device SPC  (Hallway HR;;; pt refuses to trial RW)   Distance 120ft   Stair Management Assistance Not tested  (stair negotiation assessment deferred for pt safety d/t continued dizziness and resulting instability)   Balance   Static Sitting Normal   Dynamic Sitting Good   Static Standing Fair   Dynamic Standing Fair -   Ambulatory Fair -   Endurance Deficit   Endurance Deficit Yes   Activity Tolerance   Activity Tolerance Patient limited by fatigue; Other (Comment)  (limited d/t dizziness - RECOMMEND COMPLETION OF FORMAL ORTHOSTATIC BPS)   Medical Staff Made Aware Spoke with Aimee Mendez CM   Nurse Made Aware Spoke with JOHANNY Bueno   Assessment   Prognosis Good   Problem List Decreased endurance; Impaired balance;Decreased mobility; Impaired judgement;Decreased safety awareness; Impaired hearing   Assessment Pt seen for PT evaluation for mobility assessment & discharge needs   Activity orders: up with assist  Pt admitted 11/18/2022 w/ miller catheter problem, blood in catheter, dx Sepsis secondary to UTI ([address] )  Comorbidities affecting pt's fnxl performance include: Afib, ambulatory dysfunction, COPD, SDH 9/28/2022, anxiety, depression, HepC, MI, chronic miller catheter, falls  During PT IE, pt completes bed mobility independently, transfers with S, and ambulates 120ft with SPC and close supervision to CGA (limited due to onset of dizziness and instability which both appear chronic for pt per chart review)  The AM-PAC & Barthel Index outcome tools were used to assist in determining pt safety w/ mobility/self care & appropriate d/c recommendations, see above for scores  Pt is at risk of falls d/t multiple comorbidities, impulsivity, h/o falls, impaired balance, impaired insight/safety awareness, use of ambulatory aid, acuity of medical illness and ongoing medical treatment of primary dx  Pt's clinical presentation is currently unstable/unpredictable as seen in pt's presentation of varying levels of cognitive performance, increased fall risk, new onset of impairment of functional mobility, decreased endurance and new onset of weakness, and requires high complexity clinical decision making  Pt will benefit from continued PT services in order to address impairments, decrease risk of falls, maximize independence w/ fnxl mobility, & ensure safety w/ mobility for transition to next level of care  Based on pt presentation & impairments, pt would most appropriately benefit from return to home with home health PT services  Goals   Patient Goals "to go home tomorrow"   [facility] Expiration Date 12/01/22   Short Term Goal #1 Patient PT goals established in order to address patient self reported goal of "to go home tomorrow"   Pt will: complete all transfers independently w/o instability in order to increase safety with functional mobility; ambulate >250ft with LRAD and S in order to increase safety with household and short community functional mobility; negotiate 10-12 stairs with HR and S in order to facilitate safe access to his home; demonstrate understanding and independence with LE strengthening HEP; improve ambulatory balance to >/= good grade with LRAD in order to promote safety and increased independence with mobility; tolerate >3hrs OOB in upright position, in order to improve muscular endurance and respiratory status; improve AM-PAC score to >/= 24/24 in order to increase independence with mobility and decrease burden of care; improve Barthel Index score to >/= 70/100 in order to increase independence and decrease risk of falls  PT Treatment Day 0   Plan   Treatment/Interventions Functional transfer training;LE strengthening/ROM; Elevations; Endurance training;Patient/family training;Equipment eval/education;Gait training;Spoke to nursing;Spoke to case management;Spoke to advanced practitioner   PT Frequency 2-3x/wk   Recommendation   PT Discharge Recommendation Home with home health rehabilitation   AM-PAC Basic Mobility Inpatient   Turning in Bed Without Bedrails 4   Lying on Back to Sitting on Edge of Flat Bed 4   Moving Bed to Chair 3   Standing Up From Chair 3   Walk in Room 3   Climb 3-5 Stairs 2   Basic Mobility Inpatient Raw Score 19   Basic Mobility Standardized Score 42 48   Highest Level Of Mobility   -HLM Goal 6: Walk 10 steps or more   JH-HLM Achieved 7: Walk 25 feet or more   Modified Mariella Scale   Modified Sharkey Scale 4   Barthel Index   Feeding 10   Bathing 0   Grooming Score 5   Dressing Score 5   Bladder Score 0   Bowels Score 10   Toilet Use Score 5   Transfers (Bed/Chair) Score 10   Mobility (Level Surface) Score 10   Stairs Score 5   Barthel Index Score 60   End of Consult   Patient Position at End of Consult Supine;Bed/Chair alarm activated; All needs within reach   End of Consult Comments Based on patient's [facility] Level of Mobility scores today, patient currently has a goal of -[facility] Levels: 7: WALK 25 FEET OR MORE, to be completed with RN staffing each shift, in order to improve overall activity tolerance and mobility, combat hospital related deconditioning, and maximize outcomes for d/c from the acute care setting  The patient's AM-PAC Basic Mobility Inpatient Short Form Raw Score is 19  A Raw score of greater than 16 suggests the patient may benefit from discharge to home  Please also refer to the recommendation of the Physical Therapist for safe discharge planning          Pramod Acevedo PT, DPT   Available via [facility]  [facility] # [id number]  [place] License - [id number]  [id number]/47/[id number]

## 2022-11-21 NOTE — ASSESSMENT & PLAN NOTE
· Multifactorial, reports chronic dizziness, possibly orthostatic hypotension    · S/P recent STR stay  · Check orthostatic BP, patient still reports intermittent dizziness  · PT/OT evaluation pending  · Fall precautions

## 2022-11-21 NOTE — PROGRESS NOTES
[facility] [facility]  Progress Note - Melanie Karimi 1945, 78 y o  male MRN: [id number]  Unit/Bed#: -01 Encounter: [id number]  Primary Care Provider: Hollie Renteria MD   Date and time admitted to hospital: 11/18/2022  5:46 PM    * Sepsis secondary to UTI [facility])  Assessment & Plan  POA  SIRS: temp 102 5F, , WBC 12 89K with neutrophils 92%  Improved  Presented to ED with hematuria from newly exchanged chronic Miller catheter  · Source: UTI suspected from chronic miller  · UA with trace leukocytes, occasional bacteria  · BC x2: NGTD (48 hrs)  · Lactic acid: wnl  · Procal: 0 18->26 8->21 8, today's pending  · Urine cx: 70,000-79,000 cfu/ml Proteus mirabilis, 20,000-29,000 cfu/ml Citrobacter koseri  · Final susceptibilities pending  · IVF: S/P 1 L NS in ED then placed on maintenance fluids overnight  IVFs D/C on hospital day 1   · ABX: S/P 1 g Rocephin in ED  · D4 IV Rocephin, hopefully can transition to oral antibiotics tomorrow  · Follow CBC/temperature curve, final cx results    Urinary retention  Assessment & Plan  Presented to ED with blood noted in Miller catheter tubing after exchange on 11/18/22 by [facility]  Chronic miller catheter due to urinary retention, follows with Urology outpatient  · Miller catheter flushed in ED with no evidence of blockage  · Hematuria likely from traumatic insertion, resolved hospital day 1  Patient only with intermittent bleeding at meatus surrounding Miller likely due to traumatic insertion/irritation  · UA suspicious for UTI  · See sepsis plan for more details  · Continue Flomax and miller catheter care  · Will need to follow up outpatient with Urology     Ambulatory dysfunction  Assessment & Plan  · Multifactorial, reports chronic dizziness, possibly orthostatic hypotension    · S/P recent STR stay  · Check orthostatic BP, patient still reports intermittent dizziness  · PT/OT evaluation pending  · Fall precautions     SDH (subdural hematoma)  Assessment & Plan  · S/P admission 9/28/2022 for SDH, Eliquis been on hold since  · Repeat CT 10/18/2022 with resolving hematoma  · Continue to hold Eliquis, to follow-up with nuurosurgery on resuming Eliquis    Swelling of lower leg  Assessment & Plan  · Possible new left lower leg swelling noted in ED  · Venous duplex ultrasound negative for DVT    COPD (chronic obstructive pulmonary disease) ([facility] [address] )  Assessment & Plan  · Does not appear to be in an acute exacerbation  · Continue inhalers     Permanent atrial fibrillation (HCC)  Assessment & Plan  · Per chart review, patient had been bradycardic and had Metoprolol decreased by Cardiology  · S/P loop recorder  · Continue Metoprolol Succinate 25 mg daily  · Eliquis continues to be on hold due to SDH         VTE Pharmacologic Prophylaxis: VTE Score: 6 High Risk (Score >/= 5) - Pharmacological DVT Prophylaxis Ordered: heparin  Sequential Compression Devices Ordered  Patient Centered Rounds: I performed bedside rounds with nursing staff today  Discussions with Specialists or Other Care Team Provider:  Case management, PT/OT    Education and Discussions with Family / Patient: Patient declined call to   Time Spent for Care: 30 minutes  More than 50% of total time spent on counseling and coordination of care as described above  Current Length of Stay: 3 day(s)  Current Patient Status: Inpatient   Certification Statement: The patient will continue to require additional inpatient hospital stay due to Pending urine cultures, improvement in procalcitonin, monitoring dizziness/orthostatics  Discharge Plan: Anticipate discharge tomorrow to home with home services  Code Status: Level 3 - DNAR and DNI    Subjective:   Patient seen at bedside this a m , denies any acute complaints at this time, although does state that he would like to be more cautious and would prefer to wait until tomorrow to be discharged if appropriate      Objective:     Vitals:   Temp (24hrs), Av 2 °F (36 8 °C), Min:98 °F (36 7 °C), Max:98 3 °F (36 8 °C)    Temp:  [98 °F (36 7 °C)-98 3 °F (36 8 °C)] 98 3 °F (36 8 °C)  HR:  [73-92] 73  Resp:  [16] 16  BP: (119-131)/(74-83) 119/74  SpO2:  [95 %-99 %] 98 %  Body mass index is 19 88 kg/m²  Input and Output Summary (last 24 hours): Intake/Output Summary (Last 24 hours) at 2022 1035  Last data filed at 2022 0844  Gross per 24 hour   Intake 240 ml   Output 1150 ml   Net -910 ml       Physical Exam:   Physical Exam  Constitutional:       General: He is not in acute distress  Appearance: He is not ill-appearing, toxic-appearing or diaphoretic  Cardiovascular:      Rate and Rhythm: Normal rate and regular rhythm  Pulses: Normal pulses  Heart sounds: Normal heart sounds  Pulmonary:      Effort: Pulmonary effort is normal  No respiratory distress  Breath sounds: Normal breath sounds  Abdominal:      General: Bowel sounds are normal  There is no distension  Palpations: Abdomen is soft  Tenderness: There is no abdominal tenderness  Genitourinary:     Comments: Cloud catheter in place, no hematuria or active bleeding on meatus  Musculoskeletal:         General: No swelling or tenderness  Skin:     General: Skin is warm  Neurological:      General: No focal deficit present  Mental Status: He is alert  Psychiatric:         Mood and Affect: Mood normal          Behavior: Behavior normal           Additional Data:     Labs:  Results from last 7 days   Lab Units 22  0437   WBC Thousand/uL 9 03   < > 14 20*   HEMOGLOBIN g/dL 12 3   < > 11 7*   HEMATOCRIT % 38 2   < > 37 0   PLATELETS Thousands/uL 173   < > 180   NEUTROS PCT %  --   --  83*   LYMPHS PCT %  --   --  9*   MONOS PCT %  --   --  7   EOS PCT %  --   --  1    < > = values in this interval not displayed       Results from last 7 days   Lab Units 22  0437 22  1804   SODIUM mmol/L 138 < > 138   POTASSIUM mmol/L 4 0   < > 4 1   CHLORIDE mmol/L 105   < > 106   CO2 mmol/L 28   < > 24   BUN mg/dL 14   < > 16   CREATININE mg/dL 0 89   < > 1 00   ANION GAP mmol/L 5   < > 8   CALCIUM mg/dL 8 3*   < > 9 0   ALBUMIN g/dL 3 2*   < > 4 0   TOTAL BILIRUBIN mg/dL  --   --  0 66   ALK PHOS U/L  --   --  75   ALT U/L  --   --  22   AST U/L  --   --  19   GLUCOSE RANDOM mg/dL 100   < > 91    < > = values in this interval not displayed  Results from last 7 days   Lab Units 11/18/22  1804   INR  1 07             Results from last 7 days   Lab Units 11/21/22  0453 11/20/22  0422 11/19/22  0437 11/18/22  1804   LACTIC ACID mmol/L  --   --   --  1 6   PROCALCITONIN ng/ml 10 34* 21 87* 26 68* 0 18       Lines/Drains:  Invasive Devices     Peripheral Intravenous Line  Duration           Peripheral IV 11/18/22 Right Antecubital 3 days    Peripheral IV 11/18/22 Left Antecubital 2 days          Drain  Duration           Urethral Catheter 18 Fr  263 days              Urinary Catheter:  Goal for removal: N/A- Discharging with Cloud               Imaging: No pertinent imaging reviewed  Recent Cultures (last 7 days):   Results from last 7 days   Lab Units 11/18/22  1852 11/18/22  1804   BLOOD CULTURE   --  No Growth at 48 hrs  No Growth at 48 hrs     URINE CULTURE  70,000-79,000 cfu/ml Proteus mirabilis*  20,000-29,000 cfu/ml Citrobacter koseri*  --        Last 24 Hours Medication List:   Current Facility-Administered Medications   Medication Dose Route Frequency Provider Last Rate   • acetaminophen  650 mg Oral Q6H PRN Freddy Lefort, CRNP     • aluminum-magnesium hydroxide-simethicone  30 mL Oral Q4H PRN Freddy Lefort, CRNP     • cefTRIAXone  1,000 mg Intravenous Q24H Freddy Lefort, CRNP 1,000 mg (11/20/22 1836)   • docusate sodium  100 mg Oral BID Freddy Lefort, CRNP     • ferrous sulfate  325 mg Oral BID With Meals Freddy Lefort, CRNP     • Fluticasone Furoate-Vilanterol  1 puff Inhalation Daily GABRIELA Jaramillo     • folic acid  199 mcg Oral Daily GABRIELA Jaramillo     • heparin (porcine)  5,000 Units Subcutaneous Q8H [address] Artur Briggs,      • ipratropium  0 5 mg Nebulization TID Artur Briggs DO     • levalbuterol  1 25 mg Nebulization TID Artur Briggs DO     • metoprolol succinate  25 mg Oral Daily GABRIELA Jaramillo     • nicotine  14 mg Transdermal Daily GABRIELA Jaramillo     • ondansetron  4 mg Intravenous Q4H PRN MAINOR KaurNP     • senna  2 tablet Oral HS GABRIELA Kaur     • sodium chloride  1 spray Each Nare Q1H PRN GABRIELA Kaur     • tamsulosin  0 4 mg Oral Daily Jewels Covarrubias, [address]           Today, Patient Was Seen By: León Beal PA-C    **Please Note: This note may have been constructed using a voice recognition system  **

## 2022-11-21 NOTE — PLAN OF CARE
Problem: INFECTION - ADULT  Goal: Absence or prevention of progression during hospitalization  Description: INTERVENTIONS:  - Assess and monitor for signs and symptoms of infection  - Monitor lab/diagnostic results  - Monitor all insertion sites, i e  indwelling lines, tubes, and drains  - Monitor endotracheal if appropriate and nasal secretions for changes in amount and color  - [place] appropriate cooling/warming therapies per order  - Administer medications as ordered  - Instruct and encourage patient and family to use good hand hygiene technique  - Identify and instruct in appropriate isolation precautions for identified infection/condition  Outcome: Progressing     Problem: CARDIOVASCULAR - ADULT  Goal: Maintains optimal cardiac output and hemodynamic stability  Description: INTERVENTIONS:  - Monitor I/O, vital signs and rhythm  - Monitor for S/S and trends of decreased cardiac output  - Administer and titrate ordered vasoactive medications to optimize hemodynamic stability  - Assess quality of pulses, skin color and temperature  - Assess for signs of decreased coronary artery perfusion  - Instruct patient to report change in severity of symptoms  Outcome: Progressing

## 2022-11-22 ENCOUNTER — APPOINTMENT (INPATIENT)
Dept: ULTRASOUND IMAGING | Facility: HOSPITAL | Age: 77
End: 2022-11-22

## 2022-11-22 ENCOUNTER — TRANSITIONAL CARE MANAGEMENT (OUTPATIENT)
Dept: FAMILY MEDICINE CLINIC | Facility: CLINIC | Age: 77
End: 2022-11-22

## 2022-11-22 VITALS
SYSTOLIC BLOOD PRESSURE: 133 MMHG | HEART RATE: 73 BPM | OXYGEN SATURATION: 96 % | HEIGHT: 72 IN | BODY MASS INDEX: 20.13 KG/M2 | TEMPERATURE: 97.7 F | DIASTOLIC BLOOD PRESSURE: 77 MMHG | RESPIRATION RATE: 18 BRPM | WEIGHT: 148.59 LBS

## 2022-11-22 PROBLEM — R65.10 SIRS (SYSTEMIC INFLAMMATORY RESPONSE SYNDROME) (HCC): Status: RESOLVED | Noted: 2022-03-01 | Resolved: 2022-11-22

## 2022-11-22 PROBLEM — R65.10 SIRS (SYSTEMIC INFLAMMATORY RESPONSE SYNDROME) (HCC): Status: ACTIVE | Noted: 2022-03-01

## 2022-11-22 LAB
ANION GAP SERPL CALCULATED.3IONS-SCNC: 7 MMOL/L (ref 4–13)
BASOPHILS # BLD AUTO: 0.03 THOUSANDS/ÂΜL (ref 0–0.1)
BASOPHILS NFR BLD AUTO: 0 % (ref 0–1)
BUN SERPL-MCNC: 15 MG/DL (ref 5–25)
CALCIUM SERPL-MCNC: 8.7 MG/DL (ref 8.4–10.2)
CHLORIDE SERPL-SCNC: 102 MMOL/L (ref 96–108)
CO2 SERPL-SCNC: 27 MMOL/L (ref 21–32)
CREAT SERPL-MCNC: 0.85 MG/DL (ref 0.6–1.3)
EOSINOPHIL # BLD AUTO: 0.27 THOUSAND/ÂΜL (ref 0–0.61)
EOSINOPHIL NFR BLD AUTO: 3 % (ref 0–6)
ERYTHROCYTE [DISTWIDTH] IN BLOOD BY AUTOMATED COUNT: 14 % (ref 11.6–15.1)
GFR SERPL CREATININE-BSD FRML MDRD: 85 ML/MIN/1.73SQ M
GLUCOSE SERPL-MCNC: 92 MG/DL (ref 65–140)
HCT VFR BLD AUTO: 40.2 % (ref 36.5–49.3)
HGB BLD-MCNC: 13 G/DL (ref 12–17)
IMM GRANULOCYTES # BLD AUTO: 0.06 THOUSAND/UL (ref 0–0.2)
IMM GRANULOCYTES NFR BLD AUTO: 1 % (ref 0–2)
LYMPHOCYTES # BLD AUTO: 1.44 THOUSANDS/ÂΜL (ref 0.6–4.47)
LYMPHOCYTES NFR BLD AUTO: 18 % (ref 14–44)
MCH RBC QN AUTO: 29.5 PG (ref 26.8–34.3)
MCHC RBC AUTO-ENTMCNC: 32.3 G/DL (ref 31.4–37.4)
MCV RBC AUTO: 91 FL (ref 82–98)
MONOCYTES # BLD AUTO: 0.92 THOUSAND/ÂΜL (ref 0.17–1.22)
MONOCYTES NFR BLD AUTO: 12 % (ref 4–12)
NEUTROPHILS # BLD AUTO: 5.17 THOUSANDS/ÂΜL (ref 1.85–7.62)
NEUTS SEG NFR BLD AUTO: 66 % (ref 43–75)
NRBC BLD AUTO-RTO: 0 /100 WBCS
PLATELET # BLD AUTO: 206 THOUSANDS/UL (ref 149–390)
PMV BLD AUTO: 10.5 FL (ref 8.9–12.7)
POTASSIUM SERPL-SCNC: 3.9 MMOL/L (ref 3.5–5.3)
PROCALCITONIN SERPL-MCNC: 5.15 NG/ML
RBC # BLD AUTO: 4.4 MILLION/UL (ref 3.88–5.62)
SODIUM SERPL-SCNC: 136 MMOL/L (ref 135–147)
WBC # BLD AUTO: 7.89 THOUSAND/UL (ref 4.31–10.16)

## 2022-11-22 RX ORDER — METOPROLOL SUCCINATE 25 MG/1
25 TABLET, EXTENDED RELEASE ORAL DAILY
Qty: 90 TABLET | Refills: 0 | Status: SHIPPED | OUTPATIENT
Start: 2022-11-22 | End: 2023-02-20

## 2022-11-22 RX ORDER — AZELASTINE 1 MG/ML
1 SPRAY, METERED NASAL 2 TIMES DAILY PRN
Qty: 30 ML | Refills: 0 | Status: SHIPPED | OUTPATIENT
Start: 2022-11-22

## 2022-11-22 RX ORDER — DOCUSATE SODIUM 100 MG/1
100 CAPSULE, LIQUID FILLED ORAL 2 TIMES DAILY
Qty: 180 CAPSULE | Refills: 0 | Status: SHIPPED | OUTPATIENT
Start: 2022-11-22 | End: 2023-02-20

## 2022-11-22 RX ADMIN — DOCUSATE SODIUM 100 MG: 100 CAPSULE, LIQUID FILLED ORAL at 09:05

## 2022-11-22 RX ADMIN — FOLIC ACID TAB 400 MCG 400 MCG: 400 TAB at 09:06

## 2022-11-22 RX ADMIN — FLUTICASONE FUROATE AND VILANTEROL TRIFENATATE 1 PUFF: 100; 25 POWDER RESPIRATORY (INHALATION) at 09:06

## 2022-11-22 RX ADMIN — TAMSULOSIN HYDROCHLORIDE 0.4 MG: 0.4 CAPSULE ORAL at 09:06

## 2022-11-22 RX ADMIN — IPRATROPIUM BROMIDE 0.5 MG: 0.5 SOLUTION RESPIRATORY (INHALATION) at 08:54

## 2022-11-22 RX ADMIN — METOPROLOL SUCCINATE 25 MG: 25 TABLET, FILM COATED, EXTENDED RELEASE ORAL at 09:06

## 2022-11-22 RX ADMIN — LEVALBUTEROL HYDROCHLORIDE 1.25 MG: 1.25 SOLUTION, CONCENTRATE RESPIRATORY (INHALATION) at 08:54

## 2022-11-22 RX ADMIN — FERROUS SULFATE TAB 325 MG (65 MG ELEMENTAL FE) 325 MG: 325 (65 FE) TAB at 09:06

## 2022-11-22 NOTE — CASE MANAGEMENT
Case Management Discharge Planning Note    Patient name Liyah Diop  Location /-84 MRN [id number]  : 1945 Date 2022       Current Admission Date: 2022  Current Admission Diagnosis:SDH (subdural hematoma)   Patient Active Problem List    Diagnosis Date Noted   • Swelling of lower leg 2022   • Surgical wound present 10/05/2022   • SDH (subdural hematoma) 2022   • Recurrent right inguinal hernia 2022   • Nasal congestion 2022   • Encounter for support and coordination of transition of care 2022   • Moderate protein-calorie malnutrition ([address] ) 2022   • Cholestatic jaundice 2022   • Iron deficiency anemia likely secondary to GI bleeding 2022   • Abnormal colonoscopy 2022   • Dizziness 2022   • Urinary retention 2022   • Essential (hemorrhagic) thrombocythemia ([facility] [address] ) 2022   • Closed nondisplaced comminuted fracture of left patella 2021   • Head trauma 2021   • Thrombocytosis 2021   • Bradycardia 2021   • Syncope 2021   • Colonic adenoma 2021   • Ambulatory dysfunction 2021   • COPD (chronic obstructive pulmonary disease) ([address] ) 2021   • Orthostatic hypotension 2020   • Smoker 2020   • Symptomatic anemia 2020   • Congestive cardiomyopathy ([address] ) 2020   • Permanent atrial fibrillation ([facility] [address] ) 2020      LOS (days): 4  Geometric Mean LOS (GMLOS) (days): 4 80  Days to GMLOS:1     OBJECTIVE:  Risk of Unplanned Readmission Score: 16 91         Current admission status: Inpatient   Preferred Pharmacy:   [facility] , [address] [place] [id number]  Phone: [phone] Fax: [place], [place]  [address] [facility]  [address] [address]  Phone: [phone] Fax: [phone]    Primary Care Provider: Lawrence Sevilla MD    Primary Insurance: MEDICARE  Secondary Insurance:  FOR LIFE    DISCHARGE DETAILS:                               IMM reviewed with patient, patient agrees with discharge determination    IMM Given (Date):: 11/22/22  IMM Given to[de-identified] Patient

## 2022-11-22 NOTE — DISCHARGE SUMMARY
[address] [address]  Discharge- Melanie Karimi 1945, 78 y o  male MRN: [id number]  Unit/Bed#: -01 Encounter: [id number]  Primary Care Provider: Hollie Renteria MD   Date and time admitted to hospital: 11/18/2022  5:46 PM    * SIRS (systemic inflammatory response syndrome) (HCC)-resolved as of 11/22/2022  Assessment & Plan  POA  SIRS: temp 102 5F, , WBC 12 89K with neutrophils 92%  Improved  Suspect the SIRS was from acute urinary retention as opposed to acute UTI  Presented to ED with hematuria from newly exchanged chronic Miller catheter  · UA with trace leukocytes, occasional bacteria  · BC x2: NGTD (72 hrs)  · Lactic acid: wnl  · Procal: 0 18->26 8->21 8> 10>5  · Urine cx: 70,000-79,000 cfu/ml Proteus mirabilis, 20,000-29,000 cfu/ml Citrobacter koseri  · IVF: S/P 1 L NS in ED then placed on maintenance fluids overnight  IVFs D/C on hospital day 1   · ABX: 4 days rocephin have been completed  · Follow CBC/temperature curve, final cx results    Urinary retention  Assessment & Plan  Presented to ED with blood noted in Miller catheter tubing after exchange on 11/18/22 by [facility]  Chronic miller catheter due to urinary retention, follows with Urology outpatient  · Miller catheter flushed in ED with no evidence of blockage  · Hematuria likely from traumatic insertion, resolved hospital day 1  Patient only with intermittent bleeding at meatus surrounding Miller likely due to traumatic insertion/irritation  · US Kidney & bladder completed 1 small cyst no hydronephrosis no other abnormalities  · UA suspicious for UTI  · See sepsis plan for more details  · Continue Flomax and miller catheter care  · Will need to follow up outpatient with Urology     Swelling of lower leg  Assessment & Plan  · Possible new left lower leg swelling noted in ED  · Venous duplex ultrasound negative for DVT  · Use compression stocking      SDH (subdural hematoma)  Assessment & Plan  · S/P admission 9/28/2022 for SDH, Eliquis been on hold since  · Repeat CT 10/18/2022 with resolving hematoma  · He has been complaining of chronic dizziness during this hospitalization, fall precaution  · Continue to hold Eliquis, to follow-up with neurosurgery on resuming Eliquis    COPD (chronic obstructive pulmonary disease) ([facility] [address] )  Assessment & Plan  · Does not appear to be in an acute exacerbation  · Continue inhalers     Ambulatory dysfunction  Assessment & Plan  · Multifactorial, reports chronic dizziness, possibly orthostatic hypotension  · S/P recent STR stay  · Orthostatic BP wnl, patient still reports intermittent dizziness  · PT/OT evaluation rec [facility]  · Fall precautions     Permanent atrial fibrillation [facility])  Assessment & Plan  · Per chart review, patient had been bradycardic and had Metoprolol decreased by Cardiology  · S/P loop recorder  · Continue Metoprolol Succinate 25 mg daily  · Eliquis continues to be on hold due to SDH     Medical Problems     Resolved Problems  Date Reviewed: 11/21/2022          Resolved    * (Principal) SIRS (systemic inflammatory response syndrome) ([facility] ) 11/22/2022     Resolved by  Missy Persaud PA-C        Discharging Physician / Practitioner: Missy Persaud PA-C  PCP: Jama Duong MD  Admission Date:   Admission Orders (From admission, onward)     Ordered        11/18/22 1941  INPATIENT ADMISSION  Once                      Discharge Date: 11/22/22    Consultations During Hospital Stay:  · Urology, pt, ot, cm    Procedures Performed:   · None    Significant Findings / Test Results:   VAS lower limb venous duplex study, unilateral/limited   Final Result by Mickey May MD (11/18 1935)      XR chest 1 view portable   ED Interpretation by Elli Valencia MD (11/18 1859)   No acute cardiopulmonary process      Final Result by Ainsley Nieto MD (11/18 2214)      No acute cardiopulmonary disease                    Workstation performed: [id number]         US kidney and bladder    (Results Pending) Results Reviewed     Procedure Component Value Units Date/Time    Blood culture #1 [[id number]] Collected: 11/18/22 1804    Lab Status: Preliminary result Specimen: Blood from Arm, Left Updated: 11/22/22 0001     Blood Culture No Growth at 72 hrs  Blood culture #2 [[id number]] Collected: 11/18/22 1804    Lab Status: Preliminary result Specimen: Blood from Arm, Right Updated: 11/22/22 0001     Blood Culture No Growth at 72 hrs      Urine culture [[id number]]  (Abnormal)  (Susceptibility) Collected: 11/18/22 1852    Lab Status: Final result Specimen: Urine, Clean Catch Updated: 11/21/22 1429     Urine Culture 70,000-79,000 cfu/ml Proteus mirabilis ESBL      20,000-29,000 cfu/ml Citrobacter koseri    Susceptibility     Proteus mirabilis ESBL (1)     Antibiotic Interpretation Microscan   Method Status    ZID Performed  Yes  MARIXA Final    Amikacin ($$) Susceptible <=16 ug/ml MARIXA Final    Amoxicillin + Clavulanate Susceptible <=8/4 ug/ml MARIXA Final    Ampicillin ($$) Resistant >16 00 ug/ml MARIXA Final    Ampicillin + Sulbactam ($) Intermediate 16/8 ug/ml MARIXA Final    Aztreonam ($$$)  Resistant <=4 ug/ml MARIXA Final    Cefazolin ($) Resistant >16 00 ug/ml MARIXA Final    Cefepime ($) Resistant >16 00 ug/ml MARIXA Final    Ceftazidime ($$) Resistant <=1 ug/ml MARIXA Final    Ceftriaxone ($$) Resistant >32 00 ug/ml MARIXA Final    Cefuroxime ($$) Resistant >16 ug/ml MARIXA Final    Ciprofloxacin ($)  Resistant >2 00 ug/ml MARIXA Final    Ertapenem ($$$) Susceptible <=0 5 ug/ml MARIXA Final    Gentamicin ($$) Intermediate 8 ug/ml MARIXA Final    Levofloxacin ($) Resistant >4 00 ug/ml MARIXA Final    Nitrofurantoin Resistant >64 ug/ml MARIXA Final    Piperacillin + Tazobactam ($$$) Susceptible <=8 ug/ml MARIXA Final    Tetracycline Resistant >8 ug/ml MARIXA Final    Tobramycin ($) Susceptible 4 ug/ml MARIXA Final    Trimethoprim + Sulfamethoxazole ($$$) Resistant >2/38 ug/ml MARIXA Final          Citrobacter koseri (2)     Antibiotic Interpretation Microscan   Method Status    ZID Performed  Yes  MARIXA Final    Amoxicillin + Clavulanate Susceptible <=8/4 ug/ml MARIXA Final    Ampicillin ($$) Resistant >16 00 ug/ml MARIXA Final    Ampicillin + Sulbactam ($) Susceptible <=4/2 ug/ml MARIXA Final    Aztreonam ($$$)  Susceptible <=4 ug/ml MARIXA Final    Cefazolin ($) Susceptible <=2 00 ug/ml MARIXA Final    Ciprofloxacin ($)  Susceptible <=0 25 ug/ml MARIXA Final    Gentamicin ($$) Susceptible <=2 ug/ml MARIXA Final    Levofloxacin ($) Susceptible <=0 50 ug/ml MARIXA Final    Nitrofurantoin Susceptible <=32 ug/ml MARIXA Final    Tetracycline Susceptible <=4 ug/ml MARIXA Final    Tobramycin ($) Susceptible <=2 ug/ml MARIXA Final    Trimethoprim + Sulfamethoxazole ($$$) Susceptible <=0 5/9 5 ug/ml MARIXA Final                   Basic metabolic panel [[id number]]  (Abnormal) Collected: 11/19/22 0437    Lab Status: Final result Specimen: Blood from Line, Venous Updated: 11/19/22 0753     Sodium 140 mmol/L      Potassium 4 0 mmol/L      Chloride 109 mmol/L      CO2 25 mmol/L      ANION GAP 6 mmol/L      BUN 15 mg/dL      Creatinine 0 94 mg/dL      Glucose 81 mg/dL      Calcium 8 2 mg/dL      eGFR 78 ml/min/1 73sq m     Narrative:      [place] guidelines for Chronic Kidney Disease (CKD):   •  Stage 1 with normal or high GFR (GFR > 90 mL/min/1 73 square meters)  •  Stage 2 Mild CKD (GFR = 60-89 mL/min/1 73 square meters)  •  Stage 3A Moderate CKD (GFR = 45-59 mL/min/1 73 square meters)  •  Stage 3B Moderate CKD (GFR = 30-44 mL/min/1 73 square meters)  •  Stage 4 Severe CKD (GFR = 15-29 mL/min/1 73 square meters)  •  Stage 5 End Stage CKD (GFR <15 mL/min/1 73 square meters)  Note: GFR calculation is accurate only with a steady state creatinine    Magnesium [[id number]]  (Normal) Collected: 11/19/22 0437    Lab Status: Final result Specimen: Blood from Line, Venous Updated: 11/19/22 0753     Magnesium 1 9 mg/dL     Procalcitonin, Next Day AM Collection [[id number]]  (Abnormal) Collected: 11/19/22 0437 Lab Status: Final result Specimen: Blood from Line, Venous Updated: 11/19/22 0740     Procalcitonin 26 68 ng/ml     CBC and differential [[id number]]  (Abnormal) Collected: 11/19/22 0437    Lab Status: Final result Specimen: Blood from Line, Venous Updated: 11/19/22 0645     WBC 14 20 Thousand/uL      RBC 3 90 Million/uL      Hemoglobin 11 7 g/dL      Hematocrit 37 0 %      MCV 95 fL      MCH 30 0 pg      MCHC 31 6 g/dL      RDW 14 6 %      MPV 10 9 fL      Platelets 169 Thousands/uL      nRBC 0 /100 WBCs      Neutrophils Relative 83 %      Immat GRANS % 0 %      Lymphocytes Relative 9 %      Monocytes Relative 7 %      Eosinophils Relative 1 %      Basophils Relative 0 %      Neutrophils Absolute 11 77 Thousands/µL      Immature Grans Absolute 0 06 Thousand/uL      Lymphocytes Absolute 1 22 Thousands/µL      Monocytes Absolute 1 03 Thousand/µL      Eosinophils Absolute 0 08 Thousand/µL      Basophils Absolute 0 04 Thousands/µL     Urine Microscopic [[id number]]  (Abnormal) Collected: 11/18/22 1852    Lab Status: Final result Specimen: Urine, Clean Catch Updated: 11/18/22 1919     RBC, UA Innumerable /hpf      WBC, UA 0-1 /hpf      Epithelial Cells None Seen /hpf      Bacteria, UA Occasional /hpf     UA w Reflex to Microscopic w Reflex to Culture [[id number]]  (Abnormal) Collected: 11/18/22 1852    Lab Status: Final result Specimen: Urine, Clean Catch Updated: 11/18/22 1917     Color, UA Orange     Clarity, UA Cloudy     Specific Gravity, UA 1 015     pH, UA 5 5     Leukocytes, UA Trace     Nitrite, UA Negative     Protein, UA Trace mg/dl      Glucose, UA Negative mg/dl      Ketones, UA Negative mg/dl      Urobilinogen, UA 0 2 E U /dl      Bilirubin, UA Negative     Occult Blood, UA 3+    FLU/RSV/COVID - if FLU/RSV clinically relevant [[id number]]  (Normal) Collected: 11/18/22 1804    Lab Status: Final result Specimen: Nares from Nose Updated: 11/18/22 1850     SARS-CoV-2 Negative     INFLUENZA A PCR Negative INFLUENZA B PCR Negative     RSV PCR Negative    Narrative:      FOR PEDIATRIC PATIENTS - copy/paste COVID Guidelines URL to browser: [email or web address]    SARS-CoV-2 assay is a Nucleic Acid Amplification assay intended for the  qualitative detection of nucleic acid from SARS-CoV-2 in nasopharyngeal  swabs  Results are for the presumptive identification of SARS-CoV-2 RNA  Positive results are indicative of infection with SARS-CoV-2, the virus  causing COVID-19, but do not rule out bacterial infection or co-infection  with other viruses  Laboratories within the [place] and its  territories are required to report all positive results to the appropriate  public health authorities  Negative results do not preclude SARS-CoV-2  infection and should not be used as the sole basis for treatment or other  patient management decisions  Negative results must be combined with  clinical observations, patient history, and epidemiological information  This test has not been FDA cleared or approved  This test has been authorized by FDA under an Emergency Use Authorization  (EUA)  This test is only authorized for the duration of time the  declaration that circumstances exist justifying the authorization of the  emergency use of an in vitro diagnostic tests for detection of SARS-CoV-2  virus and/or diagnosis of COVID-19 infection under section 564(b)(1) of  the Act, 21 U  S C  919PXS-7(Q)(2), unless the authorization is terminated  or revoked sooner  The test has been validated but independent review by FDA  and CLIA is pending  Test performed using [employer] GeneXpert: This RT-PCR assay targets N2,  a region unique to SARS-CoV-2  A conserved region in the E-gene was chosen  for pan-Sarbecovirus detection which includes SARS-CoV-2  According to CMS-2020-01-R, this platform meets the definition of high-throughput technology      Procalcitonin [[id number]]  (Normal) Collected: 11/18/22 1804    Lab Status: Final result Specimen: Blood from Arm, Left Updated: 11/18/22 1848     Procalcitonin 0 18 ng/ml     Comprehensive metabolic panel [[id number]] Collected: 11/18/22 1804    Lab Status: Final result Specimen: Blood from Arm, Left Updated: 11/18/22 1841     Sodium 138 mmol/L      Potassium 4 1 mmol/L      Chloride 106 mmol/L      CO2 24 mmol/L      ANION GAP 8 mmol/L      BUN 16 mg/dL      Creatinine 1 00 mg/dL      Glucose 91 mg/dL      Calcium 9 0 mg/dL      AST 19 U/L      ALT 22 U/L      Alkaline Phosphatase 75 U/L      Total Protein 6 6 g/dL      Albumin 4 0 g/dL      Total Bilirubin 0 66 mg/dL      eGFR 73 ml/min/1 73sq m     Narrative:      [place] guidelines for Chronic Kidney Disease (CKD):   •  Stage 1 with normal or high GFR (GFR > 90 mL/min/1 73 square meters)  •  Stage 2 Mild CKD (GFR = 60-89 mL/min/1 73 square meters)  •  Stage 3A Moderate CKD (GFR = 45-59 mL/min/1 73 square meters)  •  Stage 3B Moderate CKD (GFR = 30-44 mL/min/1 73 square meters)  •  Stage 4 Severe CKD (GFR = 15-29 mL/min/1 73 square meters)  •  Stage 5 End Stage CKD (GFR <15 mL/min/1 73 square meters)  Note: GFR calculation is accurate only with a steady state creatinine    Lactic acid [[id number]]  (Normal) Collected: 11/18/22 1804    Lab Status: Final result Specimen: Blood from Arm, Left Updated: 11/18/22 1840     LACTIC ACID 1 6 mmol/L     Narrative:      Result may be elevated if tourniquet was used during collection      Adelbert Shoulder [[id number]]  (Normal) Collected: 11/18/22 1804    Lab Status: Final result Specimen: Blood from Arm, Left Updated: 11/18/22 1826     Protime 14 2 seconds      INR 1 07    APTT [[id number]]  (Normal) Collected: 11/18/22 1804    Lab Status: Final result Specimen: Blood from Arm, Left Updated: 11/18/22 1826     PTT 26 seconds     CBC and differential [[id number]]  (Abnormal) Collected: 11/18/22 1804    Lab Status: Final result Specimen: Blood from Arm, Left Updated: 11/18/22 1820     WBC 12 89 Thousand/uL      RBC 4 52 Million/uL      Hemoglobin 13 3 g/dL      Hematocrit 42 2 %      MCV 93 fL      MCH 29 4 pg      MCHC 31 5 g/dL      RDW 14 5 %      MPV 9 9 fL      Platelets 111 Thousands/uL      nRBC 0 /100 WBCs      Neutrophils Relative 92 %      Immat GRANS % 1 %      Lymphocytes Relative 5 %      Monocytes Relative 2 %      Eosinophils Relative 0 %      Basophils Relative 0 %      Neutrophils Absolute 11 89 Thousands/µL      Immature Grans Absolute 0 06 Thousand/uL      Lymphocytes Absolute 0 63 Thousands/µL      Monocytes Absolute 0 24 Thousand/µL      Eosinophils Absolute 0 05 Thousand/µL      Basophils Absolute 0 02 Thousands/µL     Narrative: This is an appended report  These results have been appended to a previously verified report  Incidental Findings:    · None    Test Results Pending at Discharge (will require follow up):   · BC x 2 negative @ 72 hours, will follow for total growth of 5 days  · UC 70-79,000 proteus mirabilis ESBL and citrobacter koseri     Outpatient Tests Requested:  · None    Complications:  None    Reason for Admission: hematuria    Hospital Course:   Thuy Green is a 78 y o  male patient with pmh Afib not on Eliquis due to recent subdural hematoma, COPD, tobacco smoker, urinary rentention s/p chronic miller who originally presented to the hospital on 11/18/2022 due to hematuria  Had traumatic insertion of catheter during home health care worker exchange of the catheter just prior to admission  Met SIRS criteria on admission treated initially with Rocephin for UTI however ID was consulted and doubt true UTI thus abx were dc  Patient had resolution of hematuria  Hgb was stable  US K&B showed small cyst no hydronephrosis or damage to the bladder  Patient has had all of his questions answered and is amenable to dc to home with [facility] today          Please see above list of diagnoses and related plan for additional information  Condition at Discharge: stable    Discharge Day Visit / Exam:   * Please refer to separate progress note for these details *    Discussion with Family: patient alone  Discharge instructions/Information to patient and family:   See after visit summary for information provided to patient and family  Provisions for Follow-Up Care:  See after visit summary for information related to follow-up care and any pertinent home health orders  Disposition:   Home with [facility] (Reminder: Complete face to face encounter)    Planned Readmission: none     Discharge Statement:  I spent 45 minutes discharging the patient  This time was spent on the day of discharge  I had direct contact with the patient on the day of discharge  Greater than 50% of the total time was spent examining patient, answering all patient questions, arranging and discussing plan of care with patient as well as directly providing post-discharge instructions  Additional time then spent on discharge activities  Discharge Medications:  See after visit summary for reconciled discharge medications provided to patient and/or family        **Please Note: This note may have been constructed using a voice recognition system**

## 2022-11-22 NOTE — ASSESSMENT & PLAN NOTE
· S/P admission 9/28/2022 for SDH, Eliquis been on hold since  · Repeat CT 10/18/2022 with resolving hematoma  · He has been complaining of chronic dizziness during this hospitalization, fall precaution  · Continue to hold Eliquis, to follow-up with neurosurgery on resuming Eliquis

## 2022-11-22 NOTE — ASSESSMENT & PLAN NOTE
POA  SIRS: temp 102 5F, , WBC 12 89K with neutrophils 92%  Improved  Suspect the SIRS was from acute urinary retention as opposed to acute UTI  Presented to ED with hematuria from newly exchanged chronic Cloud catheter  · UA with trace leukocytes, occasional bacteria  · BC x2: NGTD (72 hrs)  · Lactic acid: wnl  · Procal: 0 18->26 8->21 8> 10>5  · Urine cx: 70,000-79,000 cfu/ml Proteus mirabilis, 20,000-29,000 cfu/ml Citrobacter koseri  · IVF: S/P 1 L NS in ED then placed on maintenance fluids overnight  IVFs D/C on hospital day 1   · ABX: 4 days rocephin have been completed    · Follow CBC/temperature curve, final cx results

## 2022-11-22 NOTE — ASSESSMENT & PLAN NOTE
Presented to ED with blood noted in Miller catheter tubing after exchange on 11/18/22 by [facility]  Chronic miller catheter due to urinary retention, follows with Urology outpatient  · Miller catheter flushed in ED with no evidence of blockage  · Hematuria likely from traumatic insertion, resolved hospital day 1  Patient only with intermittent bleeding at meatus surrounding Miller likely due to traumatic insertion/irritation    · US Kidney & bladder completed 1 small cyst no hydronephrosis no other abnormalities  · UA suspicious for UTI  · See sepsis plan for more details  · Continue Flomax and miller catheter care  · Will need to follow up outpatient with Urology

## 2022-11-22 NOTE — PROGRESS NOTES
Progress Note - Infectious Disease   Juan Art 78 y o  male MRN: [id number]  Unit/Bed#: -01 Encounter: [id number]      Impression/Plan:  1  Systemic inflammatory response syndrome-fever and leukocytosis  Suspect related urinary retention in the setting of gross hematuria requiring catheter exchange  The blood cultures remain negative, the patient's temperature is rapidly decreased as has the WBC count  No other localizing signs or symptoms or dated as gas invasive infectious process at this time  All the urine cultures growing a resistant Gram-negative deb, the urinalysis was without significant pyuria and the patient's inflammatory process seems to have resolved without any antibiotics directed against this organism  He remains hemodynamically stable  Procalcitonin level continues to fall  -continue ceftriaxone for today awaiting follow-up blood cultures  -follow-up blood cultures  -recheck procalcitonin level  -Miller drainage  -check renal ultrasound to make sure any obstruction cleared  -if renal ultrasound negative and blood cultures remain negative okay to discontinue antibiotics and discharge from infectious disease standpoint    2  Asymptomatic bacteriuria  Patient presenting with #1 status post recent traumatic miller catheter exchange  UA bland without pyuria  Patient improved despite Proteus mirabilis being resistant to ceftriaxone   -antibiotics as above  -monitor urinary output/symptoms  -check renal ultrasound  -if continues clinical improvement and renal ultrasound benign, anticipate discontinuing antibiotic     3   Urinary retention with chronic Miller  With hematuria status post traumatic Miller catheter exchange 11/18/22 prompting admission  -Miller maintenance protocol  -urology follow-up outpatient  -continue Flomax  -check renal ultrasound as above     4  Afib    Not on Eliquis due to subdural hematoma 09/20/2022  -management per primary care team    Discussed the above management plan with the primary service    Antibiotics:  Ceftriaxone     Subjective:  Patient has no fever, chills, sweats; no nausea, vomiting, diarrhea; no cough, shortness of breath; no pain  No new symptoms  He is anxious to get home    Objective:  Vitals:  Temp:  [97 7 °F (36 5 °C)-97 9 °F (36 6 °C)] 97 7 °F (36 5 °C)  HR:  [73-87] 73  Resp:  [16-18] 18  BP: (133-148)/() 133/77  SpO2:  [95 %-99 %] 96 %  Temp (24hrs), Av 8 °F (36 6 °C), Min:97 7 °F (36 5 °C), Max:97 9 °F (36 6 °C)  Current: Temperature: 97 7 °F (36 5 °C)    Physical Exam:   General Appearance:  Alert, interactive, nontoxic, no acute distress  Throat: Oropharynx moist without lesions  Lungs:   Clear to auscultation bilaterally; no wheezes, rhonchi or rales; respirations unlabored   Heart:  RRR; no murmur, rub or gallop   Abdomen:   Soft, non-tender, non-distended, positive bowel sounds  Extremities: No clubbing, cyanosis or edema   Skin: No new rashes or lesions  No draining wounds noted  Labs, Imaging, & Other studies:   All pertinent labs and imaging studies were personally reviewed  Results from last 7 days   Lab Units 22  06223 22  0422   WBC Thousand/uL 7 89 9 03 9 33   HEMOGLOBIN g/dL 13 0 12 3 11 7*   PLATELETS Thousands/uL 206 173 171     Results from last 7 days   Lab Units 22  06223 22  0422 22  0437 22  1804   SODIUM mmol/L 136 138 138   < > 138   POTASSIUM mmol/L 3 9 4 0 3 8   < > 4 1   CHLORIDE mmol/L 102 105 106   < > 106   CO2 mmol/L 27 28 26   < > 24   BUN mg/dL 15 14 13   < > 16   CREATININE mg/dL 0 85 0 89 1 00   < > 1 00   EGFR ml/min/1 73sq m 85 83 73   < > 73   CALCIUM mg/dL 8 7 8 3* 8 2*   < > 9 0   AST U/L  --   --   --   --  19   ALT U/L  --   --   --   --  22   ALK PHOS U/L  --   --   --   --  75    < > = values in this interval not displayed       Results from last 7 days   Lab Units 22  8452 22  4266   BLOOD CULTURE   --  No Growth at 72 hrs  No Growth at 72 hrs     URINE CULTURE  70,000-79,000 cfu/ml Proteus mirabilis ESBL*  20,000-29,000 cfu/ml Citrobacter koseri*  --      Results from last 7 days   Lab Units 11/22/22  0629 11/21/22  0453 11/20/22  0422 11/19/22  0437 11/18/22  1804   PROCALCITONIN ng/ml 5 15* 10 34* 21 87* 26 68* 0 18

## 2022-11-22 NOTE — ASSESSMENT & PLAN NOTE
· Possible new left lower leg swelling noted in ED  · Venous duplex ultrasound negative for DVT  · Use compression stocking

## 2022-11-22 NOTE — ASSESSMENT & PLAN NOTE
· Multifactorial, reports chronic dizziness, possibly orthostatic hypotension    · S/P recent STR stay  · Orthostatic BP wnl, patient still reports intermittent dizziness  · PT/OT evaluation rec [facility]  · Fall precautions

## 2022-11-22 NOTE — PROGRESS NOTES
-- Patient: Ariadne Ty  -- MRN: [id number]  -- Aidin Request ID: [id number]  -- Level of care reserved: [facility]  -- Partner Reserved: [facility] AT [place], [facility] [place] , [address] [place] [address] [phone]  -- Clinical needs requested:  -- Geography searched: [address] [place] [address]  -- Start of Service:  -- Request sent: 2:33pm EST on 11/22/2022 by Steven Meng  -- Partner reserved: 3:11pm EST on 11/22/2022 by Steven Meng  -- Choice list shared: 3:10pm EST on 11/22/2022 by Steven Meng

## 2022-11-22 NOTE — CASE MANAGEMENT
Case Management Assessment & Discharge Planning Note    Patient name Carolee Albright /-16 MRN [id number]  : 1945 Date 2022       Current Admission Date: 2022  Current Admission Diagnosis:SDH (subdural hematoma)   Patient Active Problem List    Diagnosis Date Noted   • Swelling of lower leg 2022   • Surgical wound present 10/05/2022   • SDH (subdural hematoma) 2022   • Recurrent right inguinal hernia 2022   • Nasal congestion 2022   • Encounter for support and coordination of transition of care 2022   • Moderate protein-calorie malnutrition ([address] ) 2022   • Cholestatic jaundice 2022   • Iron deficiency anemia likely secondary to GI bleeding 2022   • Abnormal colonoscopy 2022   • Dizziness 2022   • Urinary retention 2022   • Essential (hemorrhagic) thrombocythemia ([address] ) 2022   • Closed nondisplaced comminuted fracture of left patella 2021   • Head trauma 2021   • Thrombocytosis 2021   • Bradycardia 2021   • Syncope 2021   • Colonic adenoma 2021   • Ambulatory dysfunction 2021   • COPD (chronic obstructive pulmonary disease) ([address] ) 2021   • Orthostatic hypotension 2020   • Smoker 2020   • Symptomatic anemia 2020   • Congestive cardiomyopathy ([address] ) 2020   • Permanent atrial fibrillation ([facility] ) 2020      LOS (days): 4  Geometric Mean LOS (GMLOS) (days): 4 80  Days to GMLOS:1     OBJECTIVE:    Risk of Unplanned Readmission Score: 16 91         Current admission status: Inpatient       Preferred Pharmacy:   [facility] , [address] [place] [address]  Phone: [phone] Fax: [place], [place] - [address] [facility]  [address] [address]  Phone: [phone] Fax: [phone]    Primary Care Provider: Ida Moreno Nora Sanchez MD    Primary Insurance: MEDICARE  Secondary Insurance:  FOR LIFE    ASSESSMENT:  Sun 26 Proxies     Fani Moura [address] Representative - Spouse   Primary Phone: [phone] (Mobile)                              Patient Information  Admitted from[de-identified] Home  Mental Status: Alert  During Assessment patient was accompanied by: Not accompanied during assessment  Assessment information provided by[de-identified] Patient  Primary Caregiver: Self  Support Systems: Spouse/significant other  [place] of Residence: [address] [place] [address] do you live in?: [place] entry access options   Select all that apply : Stairs  Number of steps to enter home :  (15)  Do the steps have railings?: Yes  Type of Current Residence: [address] [place] home  Upon entering residence, is there a bedroom on the main floor (no further steps)?: Yes  Upon entering residence, is there a bathroom on the main floor (no further steps)?: Yes  In the last 12 months, was there a time when you were not able to pay the mortgage or rent on time?: No  In the last 12 months, was there a time when you did not have a steady place to sleep or slept in a shelter (including now)?: No  Living Arrangements: Lives w/ Spouse/significant other    Activities of Daily Living Prior to Admission  Functional Status: Independent  Completes ADLs independently?: Yes  Ambulates independently?: Yes  Does patient use assisted devices?: Yes  Assisted Devices (DME) used: Pastor Parrish  Does patient currently own DME?: Yes  What DME does the patient currently own?: Adriano Lianebarrie Bailon  Does patient have a history of Outpatient Therapy (PT/OT)?: No  Does the patient have a history of Short-Term Rehab?: Yes Tucker Ellis)  Does patient have a history of HHC?: Yes  Does patient currently have [address]?: Yes    Current Home Health Care  Type of Current Home Care Services: Nurse visit  [address][de-identified] Other (please enter name in comment) ([facility])  Current Home Health Follow-Up Provider[de-identified] PCP    Patient Information Continued  Within the past 12 months, you worried that your food would run out before you got the money to buy more : Never true  Within the past 12 months, the food you bought just didn't last and you didn't have money to get more : Never true         Means of Transportation  In the past 12 months, has lack of transportation kept you from medical appointments or from getting medications?: No  In the past 12 months, has lack of transportation kept you from meetings, work, or from getting things needed for daily living?: No        DISCHARGE DETAILS:    Discharge planning discussed with[de-identified] patient  Freedom of Choice: Yes  Comments - Freedom [facility]: [facility]  CM contacted family/caregiver?: Yes (attempted to reach pt's spouse via phone, busy signal received )  Were Treatment Team discharge recommendations reviewed with patient/caregiver?: Yes  Did patient/caregiver verbalize understanding of patient care needs?: Yes  Were patient/caregiver advised of the risks associated with not following Treatment Team discharge recommendations?: Yes    Contacts  Patient Contacts: Kalie Wolff (Spouse)  Relationship to Patient[de-identified] Family  Contact Method: Phone  Phone Number: [phone]  Reason/Outcome: Discharge Planning, Referral    [facility]         Is the patient interested in [place] [address] at discharge?: Yes  [address] requested[de-identified] Nursing, Occupational Therapy, Physical [address] Name[de-identified] Other ([address]) [address]  Provider[de-identified] PCP  Home Health Services Needed[de-identified] Wound/Ostomy Care, Strengthening/Theraputic Exercises to Improve Function, Evaluate Functional Status and Safety, Gait/ADL Training  Homebound Criteria Met[de-identified] Requires the Assistance of Another Person for Safe Ambulation or to Leave the Home, Uses an Assist Device (i e  cane, walker, etc)  Supporting Clincal Findings[de-identified] Limited Endurance, Fatigues Easliy in [employer]    DME Referral Provided  Referral made for DME?: No    Other Referral/Resources/Interventions Provided:  Interventions: HHC  Referral Comments: PT/OT [facility]  Patient reports being current with [facility] - referral sent via [employer] for CHRIS upon d/c           Treatment Team Recommendation: Home with [address]  Discharge Destination Plan[de-identified] Home with [address]

## 2022-11-22 NOTE — DISCHARGE INSTR - AVS FIRST PAGE
Dear Margarito Benton,     It was our pleasure to care for you here at [facility]  At this time we provide for you here, the most important instructions / recommendations at discharge:     Notable Medication Adjustments -   None, continue all medications as you were taking them prior to arrival  Testing Required after Discharge -   None  Important follow up information -   Call the urologist for a follow up appointment  Other Instructions -   None  Please review this entire after visit summary as additional general instructions including medication list, appointments, activity, diet, any pertinent wound care, and other additional recommendations from your care team that may be provided for you        Sincerely,     Tamika Khan PA-C

## 2022-11-22 NOTE — PROGRESS NOTES
[address] [address]  Progress Note - Thuy Green 1945, 78 y o  male MRN: [id number]  Unit/Bed#: -01 Encounter: [id number]  Primary Care Provider: Garett Olsen MD   Date and time admitted to hospital: 11/18/2022  5:46 PM    * SIRS (systemic inflammatory response syndrome) (HCC)-resolved as of 11/22/2022  Assessment & Plan  POA  SIRS: temp 102 5F, , WBC 12 89K with neutrophils 92%  Improved  Suspect the SIRS was from acute urinary retention as opposed to acute UTI  Presented to ED with hematuria from newly exchanged chronic Miller catheter  · UA with trace leukocytes, occasional bacteria  · BC x2: NGTD (72 hrs)  · Lactic acid: wnl  · Procal: 0 18->26 8->21 8> 10>5  · Urine cx: 70,000-79,000 cfu/ml Proteus mirabilis, 20,000-29,000 cfu/ml Citrobacter koseri  · IVF: S/P 1 L NS in ED then placed on maintenance fluids overnight  IVFs D/C on hospital day 1   · ABX: 4 days rocephin have been completed  · Follow CBC/temperature curve, final cx results    Urinary retention  Assessment & Plan  Presented to ED with blood noted in Miller catheter tubing after exchange on 11/18/22 by [facility]  Chronic miller catheter due to urinary retention, follows with Urology outpatient  · Miller catheter flushed in ED with no evidence of blockage  · Hematuria likely from traumatic insertion, resolved hospital day 1  Patient only with intermittent bleeding at meatus surrounding Miller likely due to traumatic insertion/irritation  · Can be dc once the US Kidney & bladder is completed  · UA suspicious for UTI  · See sepsis plan for more details  · Continue Flomax and miller catheter care  · Will need to follow up outpatient with Urology     Swelling of lower leg  Assessment & Plan  · Possible new left lower leg swelling noted in ED  · Venous duplex ultrasound negative for DVT  · Use compression stocking      SDH (subdural hematoma)  Assessment & Plan  · S/P admission 9/28/2022 for SDH, Eliquis been on hold since  · Repeat CT 10/18/2022 with resolving hematoma  · He has been complaining of chronic dizziness during this hospitalization, fall precaution  · Continue to hold Eliquis, to follow-up with neurosurgery on resuming Eliquis    COPD (chronic obstructive pulmonary disease) ([address] )  Assessment & Plan  · Does not appear to be in an acute exacerbation  · Continue inhalers     Ambulatory dysfunction  Assessment & Plan  · Multifactorial, reports chronic dizziness, possibly orthostatic hypotension  · S/P recent STR stay  · Orthostatic BP wnl, patient still reports intermittent dizziness  · PT/OT [facility]  · Fall precautions     Permanent atrial fibrillation [facility])  Assessment & Plan  · Per chart review, patient had been bradycardic and had Metoprolol decreased by Cardiology  · S/P loop recorder  · Continue Metoprolol Succinate 25 mg daily  · Eliquis continues to be on hold due to SDH       VTE Pharmacologic Prophylaxis: VTE Score: 6 No ac for h/o SDH    Patient Centered Rounds: I performed bedside rounds with nursing staff today  Discussions with Specialists or Other Care Team Provider: id    Education and Discussions with Family / Patient: Patient declined call to   He will call his wife    Time Spent for Care: 30 minutes  More than 50% of total time spent on counseling and coordination of care as described above  Current Length of Stay: 4 day(s)  Current Patient Status: Inpatient   Certification Statement: The patient will continue to require additional inpatient hospital stay due to us kidney & bladder and id final recs  Discharge Plan: Anticipate discharge tomorrow to home      Code Status: Level 3 - DNAR and DNI    Subjective:   No acute complaints    No penile pain or hematuria    No back pain, flank pain or abd pain    Objective:     Vitals:   Temp (24hrs), Av 8 °F (36 6 °C), Min:97 7 °F (36 5 °C), Max:97 9 °F (36 6 °C)    Temp:  [97 7 °F (36 5 °C)-97 9 °F (36 6 °C)] 97 7 °F (36 5 °C)  HR:  [73-96] 73  Resp:  [16-18] 18  BP: (114-148)/() 133/77  SpO2:  [95 %-99 %] 96 %  Body mass index is 20 15 kg/m²  Input and Output Summary (last 24 hours): Intake/Output Summary (Last 24 hours) at 11/22/2022 1032  Last data filed at 11/22/2022 0936  Gross per 24 hour   Intake 240 ml   Output 2000 ml   Net -1760 ml       Physical Exam:   Physical Exam  Vitals and nursing note reviewed  Constitutional:       Comments: Chronically ill appearing   HENT:      Head: Normocephalic and atraumatic  Nose: Nose normal       Mouth/Throat:      Mouth: Mucous membranes are moist    Eyes:      Conjunctiva/sclera: Conjunctivae normal    Cardiovascular:      Rate and Rhythm: Normal rate  Rhythm irregular  Pulses: Normal pulses  Pulmonary:      Effort: Pulmonary effort is normal       Breath sounds: Rhonchi present  Abdominal:      General: Bowel sounds are normal    Genitourinary:     Comments: Cloud with clear yellow urine output  Musculoskeletal:         General: No swelling or tenderness  Normal range of motion  Cervical back: Normal range of motion  Skin:     General: Skin is warm and dry  Findings: No bruising or rash  Neurological:      General: No focal deficit present  Mental Status: He is alert  Mental status is at baseline  Cranial Nerves: No cranial nerve deficit  Sensory: No sensory deficit  Motor: No weakness     Psychiatric:         Mood and Affect: Mood normal          Behavior: Behavior normal           Additional Data:     Labs:  Results from last 7 days   Lab Units 11/22/22  0629   WBC Thousand/uL 7 89   HEMOGLOBIN g/dL 13 0   HEMATOCRIT % 40 2   PLATELETS Thousands/uL 206   NEUTROS PCT % 66   LYMPHS PCT % 18   MONOS PCT % 12   EOS PCT % 3     Results from last 7 days   Lab Units 11/22/22  0629 11/21/22  0453 11/19/22  0437 11/18/22  1804   SODIUM mmol/L 136 138   < > 138   POTASSIUM mmol/L 3 9 4 0   < > 4 1   CHLORIDE mmol/L 102 105   < > 106 CO2 mmol/L 27 28   < > 24   BUN mg/dL 15 14   < > 16   CREATININE mg/dL 0 85 0 89   < > 1 00   ANION GAP mmol/L 7 5   < > 8   CALCIUM mg/dL 8 7 8 3*   < > 9 0   ALBUMIN g/dL  --  3 2*   < > 4 0   TOTAL BILIRUBIN mg/dL  --   --   --  0 66   ALK PHOS U/L  --   --   --  75   ALT U/L  --   --   --  22   AST U/L  --   --   --  19   GLUCOSE RANDOM mg/dL 92 100   < > 91    < > = values in this interval not displayed  Results from last 7 days   Lab Units 11/18/22  1804   INR  1 07             Results from last 7 days   Lab Units 11/22/22  0629 11/21/22  0453 11/20/22  0422 11/19/22  0437 11/18/22  1804   LACTIC ACID mmol/L  --   --   --   --  1 6   PROCALCITONIN ng/ml 5 15* 10 34* 21 87* 26 68* 0 18       Lines/Drains:  Invasive Devices     Peripheral Intravenous Line  Duration           Peripheral IV 11/18/22 Right Antecubital 4 days    Peripheral IV 11/18/22 Left Antecubital 3 days          Drain  Duration           Urethral Catheter 4 days              Urinary Catheter:  Goal for removal: N/A - Chronic Cloud               Imaging: No pertinent imaging reviewed  Recent Cultures (last 7 days):   Results from last 7 days   Lab Units 11/18/22  1852 11/18/22  1804   BLOOD CULTURE   --  No Growth at 72 hrs  No Growth at 72 hrs     URINE CULTURE  70,000-79,000 cfu/ml Proteus mirabilis ESBL*  20,000-29,000 cfu/ml Citrobacter koseri*  --        Last 24 Hours Medication List:   Current Facility-Administered Medications   Medication Dose Route Frequency Provider Last Rate   • acetaminophen  650 mg Oral Q6H PRN Margurette Taylor, CRNP     • aluminum-magnesium hydroxide-simethicone  30 mL Oral Q4H PRN Margurette Taylor, CRNP     • cefTRIAXone  1,000 mg Intravenous Q24H Margurette Taylor, CRNP 1,000 mg (11/21/22 1927)   • docusate sodium  100 mg Oral BID Margurette Taylor, CRNP     • ferrous sulfate  325 mg Oral BID With Meals Margurette Taylor, CRNP     • Fluticasone Furoate-Vilanterol  1 puff Inhalation Daily [facility], [address]     • folic acid  646 mcg Oral Daily GABRIELA Jaramillo     • ipratropium  0 5 mg Nebulization TID Artur Briggs DO     • levalbuterol  1 25 mg Nebulization TID Artur Briggs DO     • metoprolol succinate  25 mg Oral Daily GABRIELA Jaramillo     • nicotine  14 mg Transdermal Daily GABRIELA Jaramillo     • ondansetron  4 mg Intravenous Q4H PRN GABRIELA Santos     • senna  2 tablet Oral HS GABRIELA Santos     • sodium chloride  1 spray Each Nare Q1H PRN GABRIELA Santos     • tamsulosin  0 4 mg Oral Daily Ana Walton, [address]          Today, Patient Was Seen By: Daniel De La Paz PA-C    **Please Note: This note may have been constructed using a voice recognition system  **

## 2022-11-22 NOTE — ASSESSMENT & PLAN NOTE
Presented to ED with blood noted in Miller catheter tubing after exchange on 11/18/22 by [facility]  Chronic miller catheter due to urinary retention, follows with Urology outpatient  · Miller catheter flushed in ED with no evidence of blockage  · Hematuria likely from traumatic insertion, resolved hospital day 1  Patient only with intermittent bleeding at meatus surrounding Miller likely due to traumatic insertion/irritation    · Can be dc once the US Kidney & bladder is completed  · UA suspicious for UTI  · See sepsis plan for more details  · Continue Flomax and miller catheter care  · Will need to follow up outpatient with Urology

## 2022-11-22 NOTE — PLAN OF CARE
Problem: GENITOURINARY - ADULT  Goal: Maintains or returns to baseline urinary function  Description: INTERVENTIONS:  - Assess urinary function  - Encourage oral fluids to ensure adequate hydration if ordered  - Administer IV fluids as ordered to ensure adequate hydration  - Administer ordered medications as needed  - Offer frequent toileting  - Follow urinary retention protocol if ordered  Outcome: Progressing  Goal: Urinary catheter remains patent  Description: INTERVENTIONS:  - Assess patency of urinary catheter  - If patient has a chronic miller, consider changing catheter if non-functioning  - Follow guidelines for intermittent irrigation of non-functioning urinary catheter  Outcome: Progressing     Problem: Potential for Falls  Goal: Patient will remain free of falls  Description: INTERVENTIONS:  - Educate patient/family on patient safety including physical limitations  - Instruct patient to call for assistance with activity   - Consult OT/PT to assist with strengthening/mobility   - Keep Call bell within reach  - Keep bed low and locked with side rails adjusted as appropriate  - Keep care items and personal belongings within reach  - Initiate and maintain comfort rounds  - Make Fall Risk Sign visible to staff  - Offer Toileting every 2 Hours, in advance of need  - Initiate/Maintain bed alarm  - Obtain necessary fall risk management equipment  - Apply yellow socks and bracelet for high fall risk patients  - Consider moving patient to room near nurses station  Outcome: Progressing     Problem: CARDIOVASCULAR - ADULT  Goal: Maintains optimal cardiac output and hemodynamic stability  Description: INTERVENTIONS:  - Monitor I/O, vital signs and rhythm  - Monitor for S/S and trends of decreased cardiac output  - Administer and titrate ordered vasoactive medications to optimize hemodynamic stability  - Assess quality of pulses, skin color and temperature  - Assess for signs of decreased coronary artery perfusion  - Instruct patient to report change in severity of symptoms  Outcome: Progressing     Problem: INFECTION - ADULT  Goal: Absence or prevention of progression during hospitalization  Description: INTERVENTIONS:  - Assess and monitor for signs and symptoms of infection  - Monitor lab/diagnostic results  - Monitor all insertion sites, i e  indwelling lines, tubes, and drains  - Monitor endotracheal if appropriate and nasal secretions for changes in amount and color  - [place] appropriate cooling/warming therapies per order  - Administer medications as ordered  - Instruct and encourage patient and family to use good hand hygiene technique  - Identify and instruct in appropriate isolation precautions for identified infection/condition  Outcome: Progressing

## 2022-11-24 LAB
BACTERIA BLD CULT: NORMAL
BACTERIA BLD CULT: NORMAL

## 2022-11-30 ENCOUNTER — OFFICE VISIT (OUTPATIENT)
Dept: FAMILY MEDICINE CLINIC | Facility: CLINIC | Age: 77
End: 2022-11-30

## 2022-11-30 VITALS
TEMPERATURE: 97 F | HEIGHT: 72 IN | WEIGHT: 152.4 LBS | BODY MASS INDEX: 20.64 KG/M2 | HEART RATE: 65 BPM | RESPIRATION RATE: 16 BRPM | SYSTOLIC BLOOD PRESSURE: 130 MMHG | DIASTOLIC BLOOD PRESSURE: 80 MMHG | OXYGEN SATURATION: 98 %

## 2022-11-30 DIAGNOSIS — Z76.89 ENCOUNTER FOR SUPPORT AND COORDINATION OF TRANSITION OF CARE: Primary | ICD-10-CM

## 2022-11-30 DIAGNOSIS — K21.9 GASTROESOPHAGEAL REFLUX DISEASE WITHOUT ESOPHAGITIS: ICD-10-CM

## 2022-11-30 DIAGNOSIS — D12.6 COLON ADENOMAS: ICD-10-CM

## 2022-11-30 DIAGNOSIS — D50.9 MICROCYTIC ANEMIA: ICD-10-CM

## 2022-11-30 RX ORDER — FERROUS SULFATE TAB EC 324 MG (65 MG FE EQUIVALENT) 324 (65 FE) MG
324 TABLET DELAYED RESPONSE ORAL
Qty: 90 TABLET | Refills: 2 | Status: SHIPPED | OUTPATIENT
Start: 2022-11-30 | End: 2023-02-28

## 2022-11-30 RX ORDER — OMEPRAZOLE 20 MG/1
20 CAPSULE, DELAYED RELEASE ORAL DAILY
Qty: 90 CAPSULE | Refills: 3 | Status: SHIPPED | OUTPATIENT
Start: 2022-11-30

## 2022-11-30 NOTE — ASSESSMENT & PLAN NOTE
Hospital records reviewed  He was admitted with gross hematuria with possible urinary tract infection  Bleeding was likely due to catheter change related minor injury  He is feeling fine now    He has a follow-up appointment with a urologist

## 2022-11-30 NOTE — PROGRESS NOTES
Assessment and Plan:     Problem List Items Addressed This Visit        Digestive    Gastroesophageal reflux disease without esophagitis    Relevant Medications    omeprazole (PriLOSEC) 20 mg delayed release capsule       Other    Symptomatic anemia    Relevant Medications    ferrous sulfate 324 (65 Fe) mg    Encounter for support and coordination of transition of care [facility] records reviewed  He was admitted with gross hematuria with possible urinary tract infection  Bleeding was likely due to catheter change related minor injury  He is feeling fine now  He has a follow-up appointment with a urologist         Other Visit Diagnoses     Colon adenomas        Relevant Orders    Ambulatory referral for colonoscopy          Depression Screening and Follow-up Plan: Clincally patient does not have depression  No treatment is required  Patient advised to follow-up with PCP for further management  Falls Plan of Care: balance, strength, and gait training instructions were provided  Preventive health issues were discussed with patient, and age appropriate screening tests were ordered as noted in patient's After Visit Summary  Personalized health advice and appropriate referrals for health education or preventive services given if needed, as noted in patient's After Visit Summary  History of Present Illness:     Patient presents for a Medicare Wellness Visit    1  Transition of care management  Hospital records reviewed  Patient was admitted with gross hematuria with possible urinary tract infection  Bleeding was started hour after catheter was changed at home  Now he is feeling better  No more hematuria  No abdominal pain  No fever or chills  No chest pain or increased dyspnea  He is a chronic smoker  No new neurological weakness  No headache  No dizziness  He is still off of Eliquis due to subdural hematoma       Patient Care Team:  Hanna Mao MD as PCP - General (Internal Medicine)     Review of Systems:     Review of Systems   Constitutional: Negative for chills, fatigue and fever  HENT: Negative for congestion, nosebleeds and rhinorrhea  Eyes: Negative for discharge and visual disturbance  Respiratory: Negative for cough, chest tightness, shortness of breath and wheezing  Cardiovascular: Negative for chest pain  Gastrointestinal: Negative for abdominal distention, abdominal pain, constipation, diarrhea and vomiting  Endocrine: Negative for polydipsia and polyuria  Genitourinary: Negative for frequency and hematuria  Musculoskeletal: Negative for arthralgias, back pain and myalgias  Skin: Negative for rash  Allergic/Immunologic: Negative for environmental allergies  Neurological: Negative for dizziness, syncope, weakness, light-headedness and headaches  Hematological: Negative  Psychiatric/Behavioral: Negative for agitation, confusion, decreased concentration and dysphoric mood  The patient is not nervous/anxious  All other systems reviewed and are negative         Problem List:     Patient Active Problem List   Diagnosis   • Symptomatic anemia   • Congestive cardiomyopathy (HCC)   • Permanent atrial fibrillation (HCC)   • Smoker   • Orthostatic hypotension   • Syncope   • Colonic adenoma   • Ambulatory dysfunction   • COPD (chronic obstructive pulmonary disease) (HCC)   • Bradycardia   • Thrombocytosis   • Head trauma   • Closed nondisplaced comminuted fracture of left patella   • Essential (hemorrhagic) thrombocythemia (HCC)   • Dizziness   • Urinary retention   • Abnormal colonoscopy   • Iron deficiency anemia likely secondary to GI bleeding   • Cholestatic jaundice   • Moderate protein-calorie malnutrition ([address] )   • Encounter for support and coordination of transition of care   • Nasal congestion   • Recurrent right inguinal hernia   • SDH (subdural hematoma)   • Surgical wound present   • Swelling of lower leg   • Gastroesophageal reflux disease without esophagitis      Past Medical and Surgical History:     Past Medical History:   Diagnosis Date   • Anxiety disorder    • Atrial fibrillation ([address] )    • Coronary artery disease    • Depression    • Cloud catheter in place    • Hepatitis C     screening negative in 1/2017   • Hypertension    • MI (myocardial infarction) [facility])    • Use of cane as ambulatory aid      Past Surgical History:   Procedure Laterality Date   • CARDIAC CATHETERIZATION  07/09/2018   • CARDIAC ELECTROPHYSIOLOGY PROCEDURE N/A 9/30/2022    Procedure: Cardiac loop recorder implant;  Surgeon: Barrett Sherwood MD;  [facility]; Service: Cardiology   • COLONOSCOPY     • GA REPAIR ING HERNIA,5+Y/O,REDUCIBL Right 8/11/2022    Procedure: REPAIR HERNIA INGUINAL;  Surgeon: Kirk Encarnacion DO;  [facility];  Service: General   • TONSILLECTOMY        Family History:     Family History   Problem Relation Age of Onset   • Hypertension Mother    • Coronary artery disease Mother         premature   • Hypertension Father    • Coronary artery disease Father         premature   • Diabetes Father       Social History:     Social History     Socioeconomic History   • Marital status: /Civil Union     Spouse name: None   • Number of children: 3   • Years of education: 12   • Highest education level: 12th grade   Occupational History   • Occupation: retired   Tobacco Use   • Smoking status: Every Day     Packs/day: 3 00     Years: 57 00     Pack years: 171 00     Types: Cigarettes   • Smokeless tobacco: Never   • Tobacco comments:     since age 17; Has history of smoking for over 54 years   He has been smoking more than packet daily in the past however he has been smokes about half a pack a day lately and stopped smoking on 7/1/2018 when he was admitted to the hospital     Vaping Use   • Vaping Use: Never used   Substance and Sexual Activity   • Alcohol use: Never   • Drug use: Never   • Sexual activity: Not Currently Partners: Female     Birth control/protection: Condom Male   Other Topics Concern   • None   Social History Narrative    History of Ultra Sound: 2016    History of Stress Test: 2018    History of ECHO: 2018    · Most recent tobacco use screenin2019      · Live alone or with others:   with others        · Diet:   Regular      · Caffeine intake: Moderate      · Guns present in home:   No      · Asbestos exposure:   No      · TB exposure:   No      · Environmental exposure:   No      · Animal exposure:   No      · Smoke alarm in home:   Yes     Social Determinants of Health     Financial Resource Strain: Not on file   Food Insecurity: No Food Insecurity   • Worried About Running Out of Food in the Last Year: Never true   • Ran Out of Food in the Last Year: Never true   Transportation Needs: No Transportation Needs   • Lack of Transportation (Medical): No   • Lack of Transportation (Non-Medical):  No   Physical Activity: Not on file   Stress: Not on file   Social Connections: Not on file   Intimate Partner Violence: Not on file   Housing Stability: Low Risk    • Unable to Pay for Housing in the Last Year: No   • Number of Places Lived in the Last Year: 1   • Unstable Housing in the Last Year: No      Medications and Allergies:     Current Outpatient Medications   Medication Sig Dispense Refill   • Advair Diskus 100-50 MCG/ACT inhaler USE 1 INHALATION TWICE A DAY (RINSE MOUTH AFTER USE) 60 blister 11   • azelastine (ASTELIN) 0 1 % nasal spray 1 spray into each nostril 2 (two) times a day as needed for allergies or rhinitis Use in each nostril as directed 30 mL 0   • docusate sodium (COLACE) 100 mg capsule Take 1 capsule (100 mg total) by mouth 2 (two) times a day 180 capsule 0   • ferrous sulfate 324 (65 Fe) mg Take 1 tablet (324 mg total) by mouth daily before breakfast 90 tablet 2   • folic acid (FOLVITE) 661 mcg tablet Take 1 tablet (400 mcg total) by mouth daily 30 tablet 0   • ipratropium-albuterol (DUO-NEB) 0 5-2 5 mg/3 mL nebulizer solution INHALE CONTENTS OF 1 VIAL VIA NEBULIZER FOUR TIMES A DAY 60 mL 11   • metoprolol succinate (Toprol XL) 25 mg 24 hr tablet Take 1 tablet (25 mg total) by mouth daily 90 tablet 0   • nicotine (NICODERM CQ) 14 mg/24hr TD 24 hr patch Place 1 patch on the skin every 24 hours 14 patch 0   • nicotine (NICODERM CQ) 7 mg/24hr TD 24 hr patch Place 1 patch on the skin daily Start after 14 mg patch is over 14 patch 0   • omeprazole (PriLOSEC) 20 mg delayed release capsule Take 1 capsule (20 mg total) by mouth daily 90 capsule 3   • potassium chloride (Klor-Con) 10 mEq tablet TAKE 1 TABLET DAILY 90 tablet 3   • tamsulosin (FLOMAX) 0 4 mg TAKE 1 CAPSULE DAILY 30 capsule 11     No current facility-administered medications for this visit  No Known Allergies   Immunizations:     Immunization History   Administered Date(s) Administered   • COVID-19 MODERNA VACC 0 5 ML IM 04/21/2021, 05/19/2021   • INFLUENZA 09/05/2014, 04/05/2016, 11/27/2018, 10/12/2021, 10/13/2022   • Influenza, high dose seasonal 0 7 mL 09/28/2020, 10/12/2021   • Pneumococcal Conjugate 13-Valent 10/27/2015, 04/05/2016   • Pneumococcal Polysaccharide PPV23 10/18/2013   • Tdap 08/15/2018      Health Maintenance:         Topic Date Due   • Colorectal Cancer Screening  05/05/2022   • Lung Cancer Screening  03/21/2023 (Originally 9/18/2019)   • Hepatitis C Screening  Completed         Topic Date Due   • COVID-19 Vaccine (3 - Booster for Moderna series) 10/19/2021      Medicare Screening Tests and Risk Assessments:         PREVENTIVE SCREENINGS        Diabetes Screening:     General: Screening Current      Prostate Cancer Screening:    General: Screening Not Indicated      Abdominal Aortic Aneurysm (AAA) Screening:    Risk factors include: age between 73-69 yo and tobacco use        Hepatitis C Screening:    General: Screening Not Indicated and History Hepatitis C    No results found       Physical Exam:     /80 (BP Location: Right arm, Patient Position: Sitting, Cuff Size: Standard)   Pulse 65   Temp (!) 97 °F (36 1 °C) (Temporal)   Resp 16   Ht 6' (1 829 m)   Wt 69 1 kg (152 lb 6 4 oz)   SpO2 98%   BMI 20 67 kg/m²     Physical Exam  Vitals and nursing note reviewed  Constitutional:       General: He is not in acute distress  Appearance: He is well-developed  HENT:      Head: Normocephalic and atraumatic  Eyes:      Conjunctiva/sclera: Conjunctivae normal    Cardiovascular:      Rate and Rhythm: Normal rate and regular rhythm  Pulses: Normal pulses  Heart sounds: Normal heart sounds  No murmur heard  Pulmonary:      Effort: Pulmonary effort is normal  No respiratory distress  Breath sounds: Normal breath sounds  Abdominal:      General: There is no distension  Palpations: Abdomen is soft  There is no mass  Tenderness: There is no abdominal tenderness  Musculoskeletal:         General: No swelling  Cervical back: Neck supple  Skin:     General: Skin is warm and dry  Capillary Refill: Capillary refill takes less than 2 seconds  Neurological:      Mental Status: He is alert and oriented to person, place, and time     Psychiatric:         Mood and Affect: Mood normal          Behavior: Behavior normal           Oliver Lao MD

## 2022-12-09 ENCOUNTER — TELEPHONE (OUTPATIENT)
Dept: OTHER | Facility: OTHER | Age: 77
End: 2022-12-09

## 2022-12-09 NOTE — TELEPHONE ENCOUNTER
Karen Mcclain, visiting nurse called to inform tat pt was seen today, but there was bugs in home; roaches to be exact  Per policy, pt cannot be visited unless home is exterminated

## 2022-12-25 ENCOUNTER — APPOINTMENT (EMERGENCY)
Dept: RADIOLOGY | Facility: HOSPITAL | Age: 77
End: 2022-12-25

## 2022-12-25 ENCOUNTER — HOSPITAL ENCOUNTER (INPATIENT)
Facility: HOSPITAL | Age: 77
LOS: 4 days | Discharge: HOME HEALTH | End: 2022-12-30
Attending: EMERGENCY MEDICINE | Admitting: INTERNAL MEDICINE

## 2022-12-25 ENCOUNTER — APPOINTMENT (EMERGENCY)
Dept: CT IMAGING | Facility: HOSPITAL | Age: 77
End: 2022-12-25

## 2022-12-25 DIAGNOSIS — I95.1 ORTHOSTATIC HYPOTENSION: ICD-10-CM

## 2022-12-25 DIAGNOSIS — I48.21 PERMANENT ATRIAL FIBRILLATION (HCC): ICD-10-CM

## 2022-12-25 DIAGNOSIS — R55 SYNCOPE, UNSPECIFIED SYNCOPE TYPE: ICD-10-CM

## 2022-12-25 DIAGNOSIS — R55 NEAR SYNCOPE: Primary | ICD-10-CM

## 2022-12-25 LAB
2HR DELTA HS TROPONIN: 2 NG/L
ALBUMIN SERPL BCP-MCNC: 3.9 G/DL (ref 3.5–5)
ALP SERPL-CCNC: 108 U/L (ref 34–104)
ALT SERPL W P-5'-P-CCNC: 9 U/L (ref 7–52)
ANION GAP SERPL CALCULATED.3IONS-SCNC: 8 MMOL/L (ref 4–13)
AST SERPL W P-5'-P-CCNC: 10 U/L (ref 13–39)
BASOPHILS # BLD AUTO: 0.04 THOUSANDS/ÂΜL (ref 0–0.1)
BASOPHILS NFR BLD AUTO: 0 % (ref 0–1)
BILIRUB SERPL-MCNC: 0.7 MG/DL (ref 0.2–1)
BUN SERPL-MCNC: 21 MG/DL (ref 5–25)
CALCIUM SERPL-MCNC: 8.7 MG/DL (ref 8.4–10.2)
CARDIAC TROPONIN I PNL SERPL HS: 7 NG/L
CARDIAC TROPONIN I PNL SERPL HS: 9 NG/L
CHLORIDE SERPL-SCNC: 104 MMOL/L (ref 96–108)
CO2 SERPL-SCNC: 25 MMOL/L (ref 21–32)
CREAT SERPL-MCNC: 1.31 MG/DL (ref 0.6–1.3)
EOSINOPHIL # BLD AUTO: 0.04 THOUSAND/ÂΜL (ref 0–0.61)
EOSINOPHIL NFR BLD AUTO: 0 % (ref 0–6)
ERYTHROCYTE [DISTWIDTH] IN BLOOD BY AUTOMATED COUNT: 13.7 % (ref 11.6–15.1)
FLUAV RNA RESP QL NAA+PROBE: NEGATIVE
FLUBV RNA RESP QL NAA+PROBE: NEGATIVE
GFR SERPL CREATININE-BSD FRML MDRD: 52 ML/MIN/1.73SQ M
GLUCOSE SERPL-MCNC: 95 MG/DL (ref 65–140)
HCT VFR BLD AUTO: 43.7 % (ref 36.5–49.3)
HGB BLD-MCNC: 14.1 G/DL (ref 12–17)
IMM GRANULOCYTES # BLD AUTO: 0.05 THOUSAND/UL (ref 0–0.2)
IMM GRANULOCYTES NFR BLD AUTO: 1 % (ref 0–2)
LYMPHOCYTES # BLD AUTO: 1.15 THOUSANDS/ÂΜL (ref 0.6–4.47)
LYMPHOCYTES NFR BLD AUTO: 11 % (ref 14–44)
MCH RBC QN AUTO: 29.7 PG (ref 26.8–34.3)
MCHC RBC AUTO-ENTMCNC: 32.3 G/DL (ref 31.4–37.4)
MCV RBC AUTO: 92 FL (ref 82–98)
MONOCYTES # BLD AUTO: 0.61 THOUSAND/ÂΜL (ref 0.17–1.22)
MONOCYTES NFR BLD AUTO: 6 % (ref 4–12)
NEUTROPHILS # BLD AUTO: 8.26 THOUSANDS/ÂΜL (ref 1.85–7.62)
NEUTS SEG NFR BLD AUTO: 82 % (ref 43–75)
NRBC BLD AUTO-RTO: 0 /100 WBCS
PLATELET # BLD AUTO: 223 THOUSANDS/UL (ref 149–390)
PMV BLD AUTO: 10.4 FL (ref 8.9–12.7)
POTASSIUM SERPL-SCNC: 4 MMOL/L (ref 3.5–5.3)
PROT SERPL-MCNC: 6.4 G/DL (ref 6.4–8.4)
QRS AXIS: 48 DEGREES
QRSD INTERVAL: 92 MS
QT INTERVAL: 386 MS
QTC INTERVAL: 425 MS
RBC # BLD AUTO: 4.74 MILLION/UL (ref 3.88–5.62)
RSV RNA RESP QL NAA+PROBE: NEGATIVE
SARS-COV-2 RNA RESP QL NAA+PROBE: NEGATIVE
SODIUM SERPL-SCNC: 137 MMOL/L (ref 135–147)
T WAVE AXIS: 7 DEGREES
VENTRICULAR RATE: 73 BPM
WBC # BLD AUTO: 10.15 THOUSAND/UL (ref 4.31–10.16)

## 2022-12-25 RX ORDER — ACETAMINOPHEN 325 MG/1
650 TABLET ORAL EVERY 6 HOURS PRN
Status: DISCONTINUED | OUTPATIENT
Start: 2022-12-25 | End: 2022-12-30 | Stop reason: HOSPADM

## 2022-12-25 RX ORDER — PANTOPRAZOLE SODIUM 40 MG/1
40 TABLET, DELAYED RELEASE ORAL
Status: DISCONTINUED | OUTPATIENT
Start: 2022-12-26 | End: 2022-12-30 | Stop reason: HOSPADM

## 2022-12-25 RX ORDER — FLUTICASONE FUROATE AND VILANTEROL 100; 25 UG/1; UG/1
1 POWDER RESPIRATORY (INHALATION)
Status: DISCONTINUED | OUTPATIENT
Start: 2022-12-26 | End: 2022-12-30 | Stop reason: HOSPADM

## 2022-12-25 RX ORDER — ALBUTEROL SULFATE 2.5 MG/3ML
2.5 SOLUTION RESPIRATORY (INHALATION) EVERY 6 HOURS PRN
Status: DISCONTINUED | OUTPATIENT
Start: 2022-12-25 | End: 2022-12-30 | Stop reason: HOSPADM

## 2022-12-25 RX ORDER — LEVALBUTEROL 1.25 MG/.5ML
1.25 SOLUTION, CONCENTRATE RESPIRATORY (INHALATION)
Status: DISCONTINUED | OUTPATIENT
Start: 2022-12-25 | End: 2022-12-30 | Stop reason: HOSPADM

## 2022-12-25 RX ORDER — TAMSULOSIN HYDROCHLORIDE 0.4 MG/1
0.4 CAPSULE ORAL DAILY
Status: DISCONTINUED | OUTPATIENT
Start: 2022-12-26 | End: 2022-12-27

## 2022-12-25 RX ORDER — METOPROLOL SUCCINATE 25 MG/1
25 TABLET, EXTENDED RELEASE ORAL DAILY
Status: DISCONTINUED | OUTPATIENT
Start: 2022-12-26 | End: 2022-12-30 | Stop reason: HOSPADM

## 2022-12-25 RX ORDER — IPRATROPIUM BROMIDE AND ALBUTEROL SULFATE 2.5; .5 MG/3ML; MG/3ML
3 SOLUTION RESPIRATORY (INHALATION)
Status: DISCONTINUED | OUTPATIENT
Start: 2022-12-25 | End: 2022-12-25

## 2022-12-25 RX ORDER — HEPARIN SODIUM 5000 [USP'U]/ML
5000 INJECTION, SOLUTION INTRAVENOUS; SUBCUTANEOUS EVERY 8 HOURS SCHEDULED
Status: DISCONTINUED | OUTPATIENT
Start: 2022-12-25 | End: 2022-12-27

## 2022-12-25 RX ORDER — SODIUM CHLORIDE, SODIUM GLUCONATE, SODIUM ACETATE, POTASSIUM CHLORIDE, MAGNESIUM CHLORIDE, SODIUM PHOSPHATE, DIBASIC, AND POTASSIUM PHOSPHATE .53; .5; .37; .037; .03; .012; .00082 G/100ML; G/100ML; G/100ML; G/100ML; G/100ML; G/100ML; G/100ML
75 INJECTION, SOLUTION INTRAVENOUS CONTINUOUS
Status: DISCONTINUED | OUTPATIENT
Start: 2022-12-25 | End: 2022-12-27

## 2022-12-25 RX ADMIN — SODIUM CHLORIDE 1000 ML: 0.9 INJECTION, SOLUTION INTRAVENOUS at 15:06

## 2022-12-25 RX ADMIN — LEVALBUTEROL HYDROCHLORIDE 1.25 MG: 1.25 SOLUTION, CONCENTRATE RESPIRATORY (INHALATION) at 20:08

## 2022-12-25 RX ADMIN — HEPARIN SODIUM 5000 UNITS: 5000 INJECTION INTRAVENOUS; SUBCUTANEOUS at 18:00

## 2022-12-25 RX ADMIN — SODIUM CHLORIDE, SODIUM GLUCONATE, SODIUM ACETATE, POTASSIUM CHLORIDE, MAGNESIUM CHLORIDE, SODIUM PHOSPHATE, DIBASIC, AND POTASSIUM PHOSPHATE 75 ML/HR: .53; .5; .37; .037; .03; .012; .00082 INJECTION, SOLUTION INTRAVENOUS at 18:00

## 2022-12-25 RX ADMIN — IPRATROPIUM BROMIDE 0.5 MG: 0.5 SOLUTION RESPIRATORY (INHALATION) at 20:08

## 2022-12-25 NOTE — H&P
[address]  H&P- Sukumar Bis 1945, 78 y o  male MRN: [id number]  Unit/Bed#: -01 Encounter: [id number]  Primary Care Provider: Skylar Chen MD   Date and time admitted to hospital: 12/25/2022  1:48 PM    * Syncope  Assessment & Plan  Presented to the ED after a near syncopal episode  Patient was seen at the table trying to eat dinner when he had a notable lightheaded/dizziness and ended up falling forward  Patient did hit head on table  · Patient with known history of orthostatic hypotension  · Likely secondary to orthostatic hypotension vs underlying cardiac arrythmia  · CTH: No acute intracranial abnormalities  · Echo (9/2022): EF 50% with mild global hypokinesis  · HS Trop: 7->9  · Orthostatics performed in the ED with mild drop in BP  · S/p 1L IVF Bolus  · C/w Light IVF resuscitation overnight  · Continue Orthostatics while admitted  · Will discuss with Cardiology about interrogating loop recorder  · Will obtain EKG  · Initiate Telemetry  · Initiate fall precautions  · PT/OT Consulted  · Cardiology consulted    Permanent atrial fibrillation [facility])  Assessment & Plan  · History of intermittent Afib  · S/p Loop recorder placement  · Continue Home Medication:   · Toprol-Xl 25 mg  · Home Eliquis on Hold until cleared by Neurosurgery  · Recommend continued outpatient follow up with Cardiology    SDH (subdural hematoma)  Assessment & Plan  · S/p Admission from Sept 2022 for SDH   Eliquis has been on hold since  · Patient reports chronic dizziness following  · Recommend outpatient follow up with Neurosurgery to restart Eliquis    Urinary retention  Assessment & Plan  · History of Urinary retention  · S/p Cloud catheter-> Continue while admitted  · Continue Home Medication:  · Flomax  · Recommend outpatient follow up with Urology    COPD (chronic obstructive pulmonary disease) ([facility] [address] )  Assessment & Plan  · Does not appear to be in acute exacerbation  · Continue Home Inhalers/nebulizers    VTE Pharmacologic Prophylaxis: VTE Score: 5 High Risk (Score >/= 5) - Pharmacological DVT Prophylaxis Ordered: heparin  Sequential Compression Devices Ordered  Code Status: Level 1 - Full Code   Discussion with family: Patient declined call to   Anticipated Length of Stay: Patient will be admitted on an observation basis with an anticipated length of stay of less than 2 midnights secondary to near syncopal episdoe requirng close cardiac monitoirng and cardiac evaluation  Total Time for Visit, including Counseling / Coordination of Care: 60 minutes Greater than 50% of this total time spent on direct patient counseling and coordination of care  Chief Complaint: Syncopal episode    History of Present Illness:  Sara Anne is a 78 y o  male with a PMH of hypertension, urinary retention s/p Cloud catheter placement, subdural hematoma, with a static hypotension, and COPD who presents with near syncopal episode while at a friend's dinner table  Patient stated he was feeling lightheaded and dizzy throughout the day, patient stated that while at the dinner table he felt worse and ended up falling forward  Patient stated he did hit his head  Upon arrival to the ED, vitals remained stable  CT head was performed with no acute abnormalities  Labs revealed mild elevation in creatinine otherwise unremarkable  Patient received 1 L fluid bolus but with persistent complaints of lightheaded/dizziness  Orthostatics were performed in the ED with mild drop in blood pressure  Patient will be admitted observation overnight to further evaluate syncopal episode, requiring cardiac monitoring and cardiology consultation  Review of Systems:  Review of Systems   Constitutional: Positive for fatigue  Negative for chills and fever  HENT: Negative for congestion  Eyes: Negative for visual disturbance  Respiratory: Negative for cough, chest tightness and shortness of breath  Cardiovascular: Negative for chest pain, palpitations and leg swelling  Gastrointestinal: Negative for abdominal pain, diarrhea, nausea and vomiting  Musculoskeletal: Negative for arthralgias  Neurological: Positive for dizziness, syncope and light-headedness  All other systems reviewed and are negative  Past Medical and Surgical History:   Past Medical History:   Diagnosis Date   • Anxiety disorder    • Atrial fibrillation [facility])    • Coronary artery disease    • Depression    • Cloud catheter in place    • Hepatitis C     screening negative in 1/2017   • Hypertension    • MI (myocardial infarction) [facility])    • Use of cane as ambulatory aid        Past Surgical History:   Procedure Laterality Date   • CARDIAC CATHETERIZATION  07/09/2018   • CARDIAC ELECTROPHYSIOLOGY PROCEDURE N/A 9/30/2022    Procedure: Cardiac loop recorder implant;  Surgeon: Konstantin Odonnell MD;  [facility]; Service: Cardiology   • COLONOSCOPY     • NV RPR 1ST INGUN HRNA AGE 5 YRS/> REDUCIBLE Right 8/11/2022    Procedure: REPAIR HERNIA INGUINAL;  Surgeon: Deep Encarnacion DO;  [facility];  Service: General   • TONSILLECTOMY         Meds/Allergies:  Prior to Admission medications    Medication Sig Start Date End Date Taking?  Authorizing Provider   Advair Diskus 100-50 MCG/ACT inhaler USE 1 INHALATION TWICE A DAY (RINSE MOUTH AFTER USE) 8/26/22  Yes Shefali Licea DO   azelastine (ASTELIN) 0 1 % nasal spray 1 spray into each nostril 2 (two) times a day as needed for allergies or rhinitis Use in each nostril as directed 11/22/22  Yes Rosalinda Jo PA-C   docusate sodium (COLACE) 100 mg capsule Take 1 capsule (100 mg total) by mouth 2 (two) times a day 11/22/22 2/20/23 Yes Rosalinda Jo PA-C   ferrous sulfate 324 (65 Fe) mg Take 1 tablet (324 mg total) by mouth daily before breakfast 11/30/22 2/28/23 Yes Dagoberto Whiteside MD   folic acid (FOLVITE) 561 mcg tablet Take 1 tablet (400 mcg total) by mouth daily 9/10/21  Yes LINDA Chiang DO   ipratropium-albuterol (DUO-NEB) 0 5-2 5 mg/3 mL nebulizer solution INHALE CONTENTS OF 1 VIAL VIA NEBULIZER FOUR TIMES A DAY 6/7/22  Yes Sharad Ghosh MD   metoprolol succinate (Toprol XL) 25 mg 24 hr tablet Take 1 tablet (25 mg total) by mouth daily 11/22/22 2/20/23 Yes Mendel Seed, PA-C   nicotine (NICODERM CQ) 14 mg/24hr TD 24 hr patch Place 1 patch on the skin every 24 hours 10/25/22  Yes Sharad Ghosh MD   nicotine (NICODERM CQ) 7 mg/24hr TD 24 hr patch Place 1 patch on the skin daily Start after 14 mg patch is over 10/25/22  Yes Sharad Ghosh MD   omeprazole (PriLOSEC) 20 mg delayed release capsule Take 1 capsule (20 mg total) by mouth daily 11/30/22  Yes Sharad Ghosh MD   potassium chloride (Klor-Con) 10 mEq tablet TAKE 1 TABLET DAILY 6/1/22  Yes Sharad Ghosh MD   tamsulosin (FLOMAX) 0 4 mg TAKE 1 CAPSULE DAILY 8/26/22  Yes Caleb Lincoln DO     I have reviewed home medications with patient personally  Allergies: No Known Allergies    Social History:  Marital Status: /Civil Union   Occupation: Unknown  Patient Pre-hospital Living Situation: Home  Patient Pre-hospital Level of Mobility: walks with cane  Patient Pre-hospital Diet Restrictions: None  Substance Use History:   Social History     Substance and Sexual Activity   Alcohol Use Not Currently     Social History     Tobacco Use   Smoking Status Every Day   • Packs/day: 1 00   • Years: 57 00   • Pack years: 57 00   • Types: Cigarettes   Smokeless Tobacco Never   Tobacco Comments    since age 17; Has history of smoking for over 54 years   He has been smoking more than packet daily in the past however he has been smokes about half a pack a day lately and stopped smoking on 7/1/2018 when he was admitted to the hospital       Social History     Substance and Sexual Activity   Drug Use Never       Family History:  Family History   Problem Relation Age of Onset   • Hypertension Mother    • Coronary artery disease Mother         premature   • Hypertension Father    • Coronary artery disease Father         premature   • Diabetes Father        Physical Exam:     Vitals:   Blood Pressure: 142/84 (12/25/22 1706)  Pulse: 79 (12/25/22 1706)  Temperature: 98 8 °F (37 1 °C) (12/25/22 1345)  Temp Source: Oral (12/25/22 1345)  Respirations: 18 (12/25/22 1706)  Height: 6' (182 9 cm) (12/25/22 1349)  Weight - Scale: 72 6 kg (160 lb) (12/25/22 1349)  SpO2: 99 % (12/25/22 1706)    Physical Exam  Vitals and nursing note reviewed  Constitutional:       General: He is not in acute distress  HENT:      Head: Normocephalic  Right Ear: Decreased hearing noted  Left Ear: Decreased hearing noted  Nose: Nose normal  No congestion  Mouth/Throat:      Mouth: Mucous membranes are moist       Pharynx: Oropharynx is clear  Cardiovascular:      Rate and Rhythm: Normal rate and regular rhythm  Pulses: Normal pulses  Heart sounds: No murmur heard  Pulmonary:      Effort: Pulmonary effort is normal  No respiratory distress  Breath sounds: Decreased breath sounds present  Abdominal:      General: Bowel sounds are normal  There is no distension  Palpations: Abdomen is soft  Tenderness: There is no abdominal tenderness  Musculoskeletal:         General: Normal range of motion  Cervical back: Normal range of motion  Right lower leg: No edema  Left lower leg: No edema  Skin:     General: Skin is warm and dry  Capillary Refill: Capillary refill takes less than 2 seconds  Neurological:      Mental Status: He is alert and oriented to person, place, and time  Mental status is at baseline  Psychiatric:         Mood and Affect: Mood is anxious  Speech: Speech normal          Behavior: Behavior is cooperative           Additional Data:     Lab Results:  Results from last 7 days   Lab Units 12/25/22  1402   WBC Thousand/uL 10 15 HEMOGLOBIN g/dL 14 1   HEMATOCRIT % 43 7   PLATELETS Thousands/uL 223   NEUTROS PCT % 82*   LYMPHS PCT % 11*   MONOS PCT % 6   EOS PCT % 0     Results from last 7 days   Lab Units 12/25/22  1402   SODIUM mmol/L 137   POTASSIUM mmol/L 4 0   CHLORIDE mmol/L 104   CO2 mmol/L 25   BUN mg/dL 21   CREATININE mg/dL 1 31*   ANION GAP mmol/L 8   CALCIUM mg/dL 8 7   ALBUMIN g/dL 3 9   TOTAL BILIRUBIN mg/dL 0 70   ALK PHOS U/L 108*   ALT U/L 9   AST U/L 10*   GLUCOSE RANDOM mg/dL 95                       Lines/Drains:  Invasive Devices     Peripheral Intravenous Line  Duration           Peripheral IV 12/25/22 Distal;Left;Upper;Ventral (anterior) Arm <1 day          Drain  Duration           Urethral Catheter Non-latex 16 Fr  <1 day              Urinary Catheter:  Goal for removal: N/A - Chronic Cloud             Imaging: Reviewed radiology reports from this admission including: CT head  CT head without contrast   Final Result by Greg Franco MD (12/25 [id number])      No acute intracranial abnormality  Workstation performed: [id number]         XR chest 1 view portable   ED Interpretation by Nikole Meza PA-C (12/25 8587)   nad          EKG and Other Studies Reviewed on Admission:   · EKG: No EKG obtained  Will obtain now  ** Please Note: This note has been constructed using a voice recognition system   **

## 2022-12-25 NOTE — ASSESSMENT & PLAN NOTE
· History of Urinary retention  · S/p Cloud catheter-> Continue while admitted  · Continue Home Medication:  · Flomax  · Recommend outpatient follow up with Urology

## 2022-12-25 NOTE — ED PROVIDER NOTES
History  Chief Complaint   Patient presents with   • Syncope     EMS called for near witnessed syncopal episode  Pt "I was at a friend's house for dinner  After dinner I started feeling not right and my head fell smack down on the table  My stomach started to flip flop  And the dizziness, the dizziness got me  Still a little dizzy  I also have this cath that needs to be changed  " PT denies CP and SOB     Past Medical History: Anxiety disorder, Atrial fibrillation, CAD, Depression,  Miller catheter in place, Hepatitis C, HTN, MI, Use of cane as ambulatory aid, orthostatic hypotension, look recorder  Past Surgical History: CARDIAC CATHETERIZATION, Cardiac loop recorder implant, Right INGUN Hernia repair, TONSILLECTOMY      Presents to ED after near-syncopal episode  Pt went to friends, ate foods, was sitting down then got an overwhelming feeling of dizziness, light-headedness and nearly blacked out, falling forward, states if he wasn't sitting at the table, he would have fallen on the ground; but pt fell forward onto the table, hitting head  Got better and was able to sit up  Pt states if he sits up/leans forward too quickly he's going to go out again  Pt also asking that his chronic miller be changed; it's been there over 1 month and [place] [employer] can't come to house  Pt denies fever, no cp, no sob; but states + chronic cough/congestion, no abd pain, no NVD, no urinary complaints, no rash, no LE edema            Prior to Admission Medications   Prescriptions Last Dose Informant Patient Reported? Taking?    Advair Diskus 100-50 MCG/ACT inhaler   No Yes   Sig: USE 1 INHALATION TWICE A DAY (RINSE MOUTH AFTER USE)   azelastine (ASTELIN) 0 1 % nasal spray   No Yes   Si spray into each nostril 2 (two) times a day as needed for allergies or rhinitis Use in each nostril as directed   docusate sodium (COLACE) 100 mg capsule   No Yes   Sig: Take 1 capsule (100 mg total) by mouth 2 (two) times a day   ferrous sulfate 324 (65 Fe) mg   No Yes   Sig: Take 1 tablet (324 mg total) by mouth daily before breakfast   folic acid (FOLVITE) 955 mcg tablet   No Yes   Sig: Take 1 tablet (400 mcg total) by mouth daily   ipratropium-albuterol (DUO-NEB) 0 5-2 5 mg/3 mL nebulizer solution   No Yes   Sig: INHALE CONTENTS OF 1 VIAL VIA NEBULIZER FOUR TIMES A DAY   metoprolol succinate (Toprol XL) 25 mg 24 hr tablet   No Yes   Sig: Take 1 tablet (25 mg total) by mouth daily   nicotine (NICODERM CQ) 14 mg/24hr TD 24 hr patch   No Yes   Sig: Place 1 patch on the skin every 24 hours   nicotine (NICODERM CQ) 7 mg/24hr TD 24 hr patch   No Yes   Sig: Place 1 patch on the skin daily Start after 14 mg patch is over   omeprazole (PriLOSEC) 20 mg delayed release capsule   No Yes   Sig: Take 1 capsule (20 mg total) by mouth daily   potassium chloride (Klor-Con) 10 mEq tablet   No Yes   Sig: TAKE 1 TABLET DAILY   tamsulosin (FLOMAX) 0 4 mg   No Yes   Sig: TAKE 1 CAPSULE DAILY      Facility-Administered Medications: None       Past Medical History:   Diagnosis Date   • Anxiety disorder    • Atrial fibrillation (HCC)    • Coronary artery disease    • Depression    • Cloud catheter in place    • Hepatitis C     screening negative in 1/2017   • Hypertension    • MI (myocardial infarction) [facility])    • Use of cane as ambulatory aid        Past Surgical History:   Procedure Laterality Date   • CARDIAC CATHETERIZATION  07/09/2018   • CARDIAC ELECTROPHYSIOLOGY PROCEDURE N/A 9/30/2022    Procedure: Cardiac loop recorder implant;  Surgeon: Miky Harrison MD;  [facility];   Service: Cardiology   • COLONOSCOPY     • MA RPR 1ST INGUN HRNA AGE 7 YRS/> REDUCIBLE Right 8/11/2022    Procedure: REPAIR HERNIA INGUINAL;  Surgeon: Lorenzo Encarnacion DO;  Location: [facility];  Service: General   • TONSILLECTOMY         Family History   Problem Relation Age of Onset   • Hypertension Mother    • Coronary artery disease Mother         premature   • Hypertension Father    • Coronary artery disease Father         premature   • Diabetes Father      I have reviewed and agree with the history as documented  E-Cigarette/Vaping   • E-Cigarette Use Never User      E-Cigarette/Vaping Substances   • Nicotine Yes    • THC No    • CBD No    • Flavoring No    • Other No    • Unknown No      Social History     Tobacco Use   • Smoking status: Every Day     Packs/day: 1 00     Years: 57 00     Pack years: 57 00     Types: Cigarettes   • Smokeless tobacco: Never   • Tobacco comments:     since age 17; Has history of smoking for over 55 years  He has been smoking more than packet daily in the past however he has been smokes about half a pack a day lately and stopped smoking on 7/1/2018 when he was admitted to the hospital     Vaping Use   • Vaping Use: Never used   Substance Use Topics   • Alcohol use: Not Currently   • Drug use: Never       Review of Systems   Constitutional: Negative for chills and fever  HENT: Negative for hearing loss and sore throat  Respiratory: Negative for cough and shortness of breath  Cardiovascular: Negative for chest pain, palpitations and leg swelling  Gastrointestinal: Negative for abdominal pain, constipation, diarrhea and vomiting  Genitourinary: Negative for dysuria, frequency and hematuria  Musculoskeletal: Negative for arthralgias and myalgias  Skin: Negative for rash  Neurological: Positive for dizziness and light-headedness  Negative for seizures, facial asymmetry, speech difficulty, weakness and headaches  Psychiatric/Behavioral: Negative for behavioral problems  All other systems reviewed and are negative  Physical Exam  Physical Exam  Vitals and nursing note reviewed  Constitutional:       General: He is not in acute distress  Appearance: Normal appearance  He is well-developed  Comments: thin   HENT:      Head: Normocephalic and atraumatic        Right Ear: External ear normal       Left Ear: External ear normal       Nose: Nose normal       Mouth/Throat:      Mouth: Mucous membranes are moist       Pharynx: Oropharynx is clear  Eyes:      Conjunctiva/sclera: Conjunctivae normal       Pupils: Pupils are equal, round, and reactive to light  Cardiovascular:      Rate and Rhythm: Normal rate  Rhythm irregular  Pulmonary:      Effort: Pulmonary effort is normal  No respiratory distress  Breath sounds: Normal breath sounds  Abdominal:      General: Bowel sounds are normal       Palpations: Abdomen is soft  Tenderness: There is no abdominal tenderness  Musculoskeletal:         General: Normal range of motion  Cervical back: Normal range of motion  Right lower leg: No edema  Left lower leg: No edema  Skin:     General: Skin is warm and dry  Capillary Refill: Capillary refill takes less than 2 seconds  Findings: No rash  Neurological:      General: No focal deficit present  Mental Status: He is alert and oriented to person, place, and time  Cranial Nerves: No cranial nerve deficit  Motor: No weakness     Psychiatric:         Mood and Affect: Mood normal          Behavior: Behavior normal          Vital Signs  ED Triage Vitals   Temperature Pulse Respirations Blood Pressure SpO2   12/25/22 1345 12/25/22 1345 12/25/22 1345 12/25/22 1345 12/25/22 1345   98 8 °F (37 1 °C) 91 20 125/86 99 %      Temp Source Heart Rate Source Patient Position - Orthostatic VS BP Location FiO2 (%)   12/25/22 1345 12/25/22 1345 12/25/22 1345 12/25/22 1345 --   Oral Monitor Sitting Left arm       Pain Score       12/25/22 1349       No Pain           Vitals:    12/25/22 1510 12/25/22 1511 12/25/22 1512 12/25/22 1706   BP: 127/76 127/89 104/75 142/84   Pulse: 89 76 (!) 118 79   Patient Position - Orthostatic VS: Lying - Orthostatic VS Sitting - Orthostatic VS Standing for 3 minutes - Orthostatic VS Sitting         Visual Acuity      ED Medications  Medications   sodium chloride 0 9 % bolus 1,000 mL (1,000 mL Intravenous New Bag 12/25/22 1506)       Diagnostic Studies  Results Reviewed     Procedure Component Value Units Date/Time    HS Troponin I 2hr [[id number]]  (Normal) Collected: 12/25/22 1556    Lab Status: Final result Specimen: Blood from Arm, Left Updated: 12/25/22 1626     hs TnI 2hr 9 ng/L      Delta 2hr hsTnI 2 ng/L     FLU/RSV/COVID - if FLU/RSV clinically relevant [[id number]]  (Normal) Collected: 12/25/22 1508    Lab Status: Final result Specimen: Nares from Nose Updated: 12/25/22 1552     SARS-CoV-2 Negative     INFLUENZA A PCR Negative     INFLUENZA B PCR Negative     RSV PCR Negative    Narrative:      FOR PEDIATRIC PATIENTS - copy/paste COVID Guidelines URL to browser: https://[email or web address]/  [employer]    SARS-CoV-2 assay is a Nucleic Acid Amplification assay intended for the  qualitative detection of nucleic acid from SARS-CoV-2 in nasopharyngeal  swabs  Results are for the presumptive identification of SARS-CoV-2 RNA  Positive results are indicative of infection with SARS-CoV-2, the virus  causing COVID-19, but do not rule out bacterial infection or co-infection  with other viruses  Laboratories within the [place] and its  territories are required to report all positive results to the appropriate  public health authorities  Negative results do not preclude SARS-CoV-2  infection and should not be used as the sole basis for treatment or other  patient management decisions  Negative results must be combined with  clinical observations, patient history, and epidemiological information  This test has not been FDA cleared or approved  This test has been authorized by FDA under an Emergency Use Authorization  (EUA)   This test is only authorized for the duration of time the  declaration that circumstances exist justifying the authorization of the  emergency use of an in vitro diagnostic tests for detection of SARS-CoV-2  virus and/or diagnosis of COVID-19 infection under section 564(b)(1) of  the Act, 21 U  S C  659ULD-3(Z)(0), unless the authorization is terminated  or revoked sooner  The test has been validated but independent review by FDA  and CLIA is pending  Test performed using [employer] GeneXpert: This RT-PCR assay targets N2,  a region unique to SARS-CoV-2  A conserved region in the E-gene was chosen  for pan-Sarbecovirus detection which includes SARS-CoV-2  According to CMS-2020-01-R, this platform meets the definition of high-throughput technology      UA w Reflex to Microscopic w Reflex to Culture [[id number]]     Lab Status: No result Specimen: Urine, Indwelling Cloud Catheter     HS Troponin 0hr (reflex protocol) [[id number]]  (Normal) Collected: 12/25/22 1402    Lab Status: Final result Specimen: Blood from Arm, Left Updated: 12/25/22 1432     hs TnI 0hr 7 ng/L     Comprehensive metabolic panel [[id number]]  (Abnormal) Collected: 12/25/22 1402    Lab Status: Final result Specimen: Blood from Arm, Left Updated: 12/25/22 1426     Sodium 137 mmol/L      Potassium 4 0 mmol/L      Chloride 104 mmol/L      CO2 25 mmol/L      ANION GAP 8 mmol/L      BUN 21 mg/dL      Creatinine 1 31 mg/dL      Glucose 95 mg/dL      Calcium 8 7 mg/dL      AST 10 U/L      ALT 9 U/L      Alkaline Phosphatase 108 U/L      Total Protein 6 4 g/dL      Albumin 3 9 g/dL      Total Bilirubin 0 70 mg/dL      eGFR 52 ml/min/1 73sq m     Narrative:      Sh[place] guidelines for Chronic Kidney Disease (CKD):   •  Stage 1 with normal or high GFR (GFR > 90 mL/min/1 73 square meters)  •  Stage 2 Mild CKD (GFR = 60-89 mL/min/1 73 square meters)  •  Stage 3A Moderate CKD (GFR = 45-59 mL/min/1 73 square meters)  •  Stage 3B Moderate CKD (GFR = 30-44 mL/min/1 73 square meters)  •  Stage 4 Severe CKD (GFR = 15-29 mL/min/1 73 square meters)  •  Stage 5 End Stage CKD (GFR <15 mL/min/1 73 square meters)  Note: GFR calculation is accurate only with a steady state creatinine    CBC and differential [[id number]]  (Abnormal) Collected: 12/25/22 1402    Lab Status: Final result Specimen: Blood from Arm, Left Updated: 12/25/22 1408     WBC 10 15 Thousand/uL      RBC 4 74 Million/uL      Hemoglobin 14 1 g/dL      Hematocrit 43 7 %      MCV 92 fL      MCH 29 7 pg      MCHC 32 3 g/dL      RDW 13 7 %      MPV 10 4 fL      Platelets 908 Thousands/uL      nRBC 0 /100 WBCs      Neutrophils Relative 82 %      Immat GRANS % 1 %      Lymphocytes Relative 11 %      Monocytes Relative 6 %      Eosinophils Relative 0 %      Basophils Relative 0 %      Neutrophils Absolute 8 26 Thousands/µL      Immature Grans Absolute 0 05 Thousand/uL      Lymphocytes Absolute 1 15 Thousands/µL      Monocytes Absolute 0 61 Thousand/µL      Eosinophils Absolute 0 04 Thousand/µL      Basophils Absolute 0 04 Thousands/µL                  CT head without contrast   Final Result by Greg Franco MD (12/25 [id number])      No acute intracranial abnormality                    Workstation performed: [id number]         XR chest 1 view portable   ED Interpretation by Nikole Meza PA-C (12/25 [id number])   nad                 Procedures  ECG 12 Lead Documentation Only    Date/Time: 12/25/2022 4:01 PM  Performed by: Nikole Meza PA-C  Authorized by: Nikole Meza PA-C     Indications / Diagnosis:  Near syncope  ECG reviewed by me, the ED Provider: yes    Patient location:  ED  Previous ECG:     Comparison to cardiac monitor: Yes    Interpretation:     Interpretation: non-specific    Rate:     ECG rate:  87    ECG rate assessment: normal    Rhythm:     Rhythm: atrial fibrillation    Comments:      A  fib with controlled rate, no acute ischemic changes             ED Course  ED Course as of 12/25/22 1716   Sun Dec 25, 2022   1544 + orthostatics     1545 Creatinine(!): 1 31   1640 Delta 2hr hsTnI: 2                               SBIRT 20yo+    Flowsheet Row Most Recent Value   SBIRT (25 yo +)    In order to provide better care to our patients, we are screening all of our patients for alcohol and drug use  Would it be okay to ask you these screening questions? Yes Filed at: 12/25/2022 1423   Initial Alcohol Screen: US AUDIT-C     1  How often do you have a drink containing alcohol? 0 Filed at: 12/25/2022 1423   2  How many drinks containing alcohol do you have on a typical day you are drinking? 0 Filed at: 12/25/2022 1423   3a  Male UNDER 65: How often do you have five or more drinks on one occasion? 0 Filed at: 12/25/2022 1423   3b  FEMALE Any Age, or MALE 65+: How often do you have 4 or more drinks on one occassion? 0 Filed at: 12/25/2022 1423   Audit-C Score 0 Filed at: 12/25/2022 1423   JOHN: How many times in the past year have you    Used an illegal drug or used a prescription medication for non-medical reasons?  Never Filed at: 12/25/2022 1423                    MDM  Number of Diagnoses or Management Options  Near syncope  Orthostatic hypotension  Diagnosis management comments: PT with cardiac/vessel disease, Afib, loop recorder, orthostatic hypotension (h/o similar), increase in cr from baseline, will obs admit for monitory repeat labs, IVF       Amount and/or Complexity of Data Reviewed  Clinical lab tests: ordered and reviewed  Tests in the radiology section of CPT®: ordered and reviewed  Review and summarize past medical records: yes  Independent visualization of images, tracings, or specimens: yes        Disposition  Final diagnoses:   Near syncope   Orthostatic hypotension     Time reflects when diagnosis was documented in both MDM as applicable and the Disposition within this note     Time User Action Codes Description Comment    12/25/2022  4:02 PM Sheila Iniguez Add [R55] Near syncope     12/25/2022  4:02 PM Sheila Iniguez Add [I95 1] Orthostatic hypotension       ED Disposition     ED Disposition   Admit    Condition   Stable    Date/Time   Sun Dec 25, 2022  4:51 PM    Comment   Case was discussed with Kylie Saldivar and the patient's admission status was agreed to be Admission Status: observation status to the service of Dr Kylie Saldivar   Follow-up Information    None         Patient's Medications   Discharge Prescriptions    No medications on file       No discharge procedures on file      PDMP Review       Value Time User    PDMP Reviewed  Yes 11/18/2022  8:47 PM Yoselyn Rios, 10 [facility]          ED Provider  Electronically Signed by           Curtis Sheridan PA-C  12/25/22 [facility] Renny Abbott PA-C  12/25/22 [id number]

## 2022-12-25 NOTE — ASSESSMENT & PLAN NOTE
· S/p Admission from Sept 2022 for SDH   Eliquis has been on hold since  · Patient reports chronic dizziness following  · Recommend outpatient follow up with Neurosurgery to restart Eliquis

## 2022-12-25 NOTE — PLAN OF CARE
Problem: MOBILITY - ADULT  Goal: Maintain or return to baseline ADL function  Description: INTERVENTIONS:  -  Assess patient's ability to carry out ADLs; assess patient's baseline for ADL function and identify physical deficits which impact ability to perform ADLs (bathing, care of mouth/teeth, toileting, grooming, dressing, etc )  - Assess/evaluate cause of self-care deficits   - Assess range of motion  - Assess patient's mobility; develop plan if impaired  - Assess patient's need for assistive devices and provide as appropriate  - Encourage maximum independence but intervene and supervise when necessary  - Involve family in performance of ADLs  - Assess for home care needs following discharge   - Consider OT consult to assist with ADL evaluation and planning for discharge  - Provide patient education as appropriate  Outcome: Progressing  Goal: Maintains/Returns to pre admission functional level  Description: INTERVENTIONS:  - Perform BMAT or MOVE assessment daily    - Set and communicate daily mobility goal to care team and patient/family/caregiver     - Collaborate with rehabilitation services on mobility goals if consulted  -- Record patient progress and toleration of activity level   Outcome: Progressing

## 2022-12-25 NOTE — ASSESSMENT & PLAN NOTE
· History of intermittent Afib  · S/p Loop recorder placement  · Continue Home Medication:   · Toprol-Xl 25 mg  · Home Eliquis on Hold until cleared by Neurosurgery  · Recommend continued outpatient follow up with Cardiology

## 2022-12-25 NOTE — ASSESSMENT & PLAN NOTE
Presented to the ED after a near syncopal episode  Patient was seen at the table trying to eat dinner when he had a notable lightheaded/dizziness and ended up falling forward  Patient did hit head on table    · Patient with known history of orthostatic hypotension  · Likely secondary to orthostatic hypotension vs underlying cardiac arrythmia  · CTH: No acute intracranial abnormalities  · Echo (9/2022): EF 50% with mild global hypokinesis  · HS Trop: 7->9  · Orthostatics performed in the ED with mild drop in BP  · S/p 1L IVF Bolus  · C/w Light IVF resuscitation overnight  · Continue Orthostatics while admitted  · Will discuss with Cardiology about interrogating loop recorder  · Will obtain EKG  · Initiate Telemetry  · Initiate fall precautions  · PT/OT Consulted  · Cardiology consulted

## 2022-12-25 NOTE — RESPIRATORY THERAPY NOTE
RT Protocol Note  Lenny Vines 78 y o  male MRN: [id number]  Unit/Bed#: -01 Encounter: [id number]    Assessment    Principal Problem:    Syncope  Active Problems:    Permanent atrial fibrillation (HCC)    COPD (chronic obstructive pulmonary disease) (HCC)    Urinary retention    SDH (subdural hematoma)      Home Pulmonary Medications:  Duoneb       Past Medical History:   Diagnosis Date    Anxiety disorder     Atrial fibrillation (HCC)     Coronary artery disease     Depression     Cloud catheter in place     Hepatitis C     screening negative in 2017    Hypertension     MI (myocardial infarction) ([facility] [address] )     Use of cane as ambulatory aid      Social History     Socioeconomic History    Marital status: /Civil Union     Spouse name: Not on file    Number of children: 3    Years of education: 12    Highest education level: 12th grade   Occupational History    Occupation: retired   Tobacco Use    Smoking status: Every Day     Packs/day: 1 00     Years: 57 00     Pack years: 57 00     Types: Cigarettes    Smokeless tobacco: Never    Tobacco comments:     since age 17; Has history of smoking for over 54 years  He has been smoking more than packet daily in the past however he has been smokes about half a pack a day lately and stopped smoking on 2018 when he was admitted to the hospital     Vaping Use    Vaping Use: Never used   Substance and Sexual Activity    Alcohol use: Not Currently    Drug use: Never    Sexual activity: Not Currently     Partners: Female     Birth control/protection: Condom Male   Other Topics Concern    Not on file   Social History Narrative    History of Ultra Sound: 2016    History of Stress Test: 2018    History of ECHO: 2018    · Most recent tobacco use screenin2019      · Live alone or with others:   with others        · Diet:   Regular      · Caffeine intake:    Moderate      · Guns present in home:   No      · Asbestos exposure:   No      · TB exposure: No      · Environmental exposure:   No      · Animal exposure:   No      · Smoke alarm in home:   Yes     Social Determinants of Health     Financial Resource Strain: Not on file   Food Insecurity: No Food Insecurity    Worried About Running Out of Food in the Last Year: Never true    Ran Out of Food in the Last Year: Never true   Transportation Needs: No Transportation Needs    Lack of Transportation (Medical): No    Lack of Transportation (Non-Medical): No   Physical Activity: Not on file   Stress: Not on file   Social Connections: Not on file   Intimate Partner Violence: Not on file   Housing Stability: Low Risk     Unable to Pay for Housing in the Last Year: No    Number of Places Lived in the Last Year: 1    Unstable Housing in the Last Year: No       Subjective         Objective    Physical Exam:   Assessment Type: Assess only  General Appearance: (P) Alert, Awake  Respiratory Pattern: (P) Normal  Chest Assessment: (P) Chest expansion symmetrical  Bilateral Breath Sounds: (P) Diminished    Vitals:  Blood pressure 138/89, pulse (P) 69, temperature 97 9 °F (36 6 °C), temperature source Oral, resp  rate 18, height 6' (1 829 m), weight 72 6 kg (160 lb), SpO2 (P) 98 %  Imaging and other studies: I have personally reviewed pertinent reports  Plan    Respiratory Plan: (P) Home Bronchodilator Patient pathway        Resp Comments: (P) Assessed pt per protocol  Pt has a COPD history  Breath sounds were clear and diminished  Pt is currently on RA  Pt is here for a syncope episode  Will keep pt on home regimen  Respiratory to follow

## 2022-12-26 LAB
ALBUMIN SERPL BCP-MCNC: 3.4 G/DL (ref 3.5–5)
ALP SERPL-CCNC: 99 U/L (ref 34–104)
ALT SERPL W P-5'-P-CCNC: 9 U/L (ref 7–52)
ANION GAP SERPL CALCULATED.3IONS-SCNC: 9 MMOL/L (ref 4–13)
AST SERPL W P-5'-P-CCNC: 10 U/L (ref 13–39)
BASOPHILS # BLD AUTO: 0.06 THOUSANDS/ÂΜL (ref 0–0.1)
BASOPHILS NFR BLD AUTO: 1 % (ref 0–1)
BILIRUB SERPL-MCNC: 0.48 MG/DL (ref 0.2–1)
BUN SERPL-MCNC: 19 MG/DL (ref 5–25)
CALCIUM ALBUM COR SERPL-MCNC: 8.9 MG/DL (ref 8.3–10.1)
CALCIUM SERPL-MCNC: 8.4 MG/DL (ref 8.4–10.2)
CHLORIDE SERPL-SCNC: 105 MMOL/L (ref 96–108)
CO2 SERPL-SCNC: 25 MMOL/L (ref 21–32)
CREAT SERPL-MCNC: 1 MG/DL (ref 0.6–1.3)
EOSINOPHIL # BLD AUTO: 0.12 THOUSAND/ÂΜL (ref 0–0.61)
EOSINOPHIL NFR BLD AUTO: 2 % (ref 0–6)
ERYTHROCYTE [DISTWIDTH] IN BLOOD BY AUTOMATED COUNT: 13.8 % (ref 11.6–15.1)
GFR SERPL CREATININE-BSD FRML MDRD: 73 ML/MIN/1.73SQ M
GLUCOSE SERPL-MCNC: 80 MG/DL (ref 65–140)
HCT VFR BLD AUTO: 40.9 % (ref 36.5–49.3)
HGB BLD-MCNC: 12.8 G/DL (ref 12–17)
IMM GRANULOCYTES # BLD AUTO: 0.02 THOUSAND/UL (ref 0–0.2)
IMM GRANULOCYTES NFR BLD AUTO: 0 % (ref 0–2)
LYMPHOCYTES # BLD AUTO: 2.05 THOUSANDS/ÂΜL (ref 0.6–4.47)
LYMPHOCYTES NFR BLD AUTO: 26 % (ref 14–44)
MAGNESIUM SERPL-MCNC: 1.9 MG/DL (ref 1.9–2.7)
MCH RBC QN AUTO: 29.6 PG (ref 26.8–34.3)
MCHC RBC AUTO-ENTMCNC: 31.3 G/DL (ref 31.4–37.4)
MCV RBC AUTO: 95 FL (ref 82–98)
MONOCYTES # BLD AUTO: 0.62 THOUSAND/ÂΜL (ref 0.17–1.22)
MONOCYTES NFR BLD AUTO: 8 % (ref 4–12)
NEUTROPHILS # BLD AUTO: 5.11 THOUSANDS/ÂΜL (ref 1.85–7.62)
NEUTS SEG NFR BLD AUTO: 63 % (ref 43–75)
NRBC BLD AUTO-RTO: 0 /100 WBCS
PHOSPHATE SERPL-MCNC: 2.9 MG/DL (ref 2.3–4.1)
PLATELET # BLD AUTO: 193 THOUSANDS/UL (ref 149–390)
PMV BLD AUTO: 11.1 FL (ref 8.9–12.7)
POTASSIUM SERPL-SCNC: 3.7 MMOL/L (ref 3.5–5.3)
PROT SERPL-MCNC: 5.7 G/DL (ref 6.4–8.4)
RBC # BLD AUTO: 4.33 MILLION/UL (ref 3.88–5.62)
SODIUM SERPL-SCNC: 139 MMOL/L (ref 135–147)
WBC # BLD AUTO: 7.98 THOUSAND/UL (ref 4.31–10.16)

## 2022-12-26 RX ADMIN — IPRATROPIUM BROMIDE 0.5 MG: 0.5 SOLUTION RESPIRATORY (INHALATION) at 19:26

## 2022-12-26 RX ADMIN — FLUTICASONE FUROATE AND VILANTEROL TRIFENATATE 1 PUFF: 100; 25 POWDER RESPIRATORY (INHALATION) at 09:27

## 2022-12-26 RX ADMIN — HEPARIN SODIUM 5000 UNITS: 5000 INJECTION INTRAVENOUS; SUBCUTANEOUS at 11:34

## 2022-12-26 RX ADMIN — IPRATROPIUM BROMIDE 0.5 MG: 0.5 SOLUTION RESPIRATORY (INHALATION) at 14:50

## 2022-12-26 RX ADMIN — PANTOPRAZOLE SODIUM 40 MG: 40 TABLET, DELAYED RELEASE ORAL at 06:22

## 2022-12-26 RX ADMIN — SODIUM CHLORIDE, SODIUM GLUCONATE, SODIUM ACETATE, POTASSIUM CHLORIDE, MAGNESIUM CHLORIDE, SODIUM PHOSPHATE, DIBASIC, AND POTASSIUM PHOSPHATE 75 ML/HR: .53; .5; .37; .037; .03; .012; .00082 INJECTION, SOLUTION INTRAVENOUS at 20:23

## 2022-12-26 RX ADMIN — HEPARIN SODIUM 5000 UNITS: 5000 INJECTION INTRAVENOUS; SUBCUTANEOUS at 21:40

## 2022-12-26 RX ADMIN — HEPARIN SODIUM 5000 UNITS: 5000 INJECTION INTRAVENOUS; SUBCUTANEOUS at 15:55

## 2022-12-26 RX ADMIN — LEVALBUTEROL HYDROCHLORIDE 1.25 MG: 1.25 SOLUTION, CONCENTRATE RESPIRATORY (INHALATION) at 14:50

## 2022-12-26 RX ADMIN — LEVALBUTEROL HYDROCHLORIDE 1.25 MG: 1.25 SOLUTION, CONCENTRATE RESPIRATORY (INHALATION) at 19:26

## 2022-12-26 RX ADMIN — HEPARIN SODIUM 5000 UNITS: 5000 INJECTION INTRAVENOUS; SUBCUTANEOUS at 01:53

## 2022-12-26 RX ADMIN — LEVALBUTEROL HYDROCHLORIDE 1.25 MG: 1.25 SOLUTION, CONCENTRATE RESPIRATORY (INHALATION) at 09:27

## 2022-12-26 RX ADMIN — IPRATROPIUM BROMIDE 0.5 MG: 0.5 SOLUTION RESPIRATORY (INHALATION) at 09:27

## 2022-12-26 RX ADMIN — TAMSULOSIN HYDROCHLORIDE 0.4 MG: 0.4 CAPSULE ORAL at 11:34

## 2022-12-26 NOTE — NURSING NOTE
Loop recorder interrogated by nurse and Williams Mcelroy NP with [employer] faxed over results  Placed in patient chart

## 2022-12-26 NOTE — ASSESSMENT & PLAN NOTE
· History of Urinary retention  · Continue Cloud catheter while admitted  · Exchanged while in the ED (12/25)  · Continue Home Medication:  · Flomax  · Recommend continued outpatient follow up with Urology

## 2022-12-26 NOTE — PLAN OF CARE
Problem: MOBILITY - ADULT  Goal: Maintain or return to baseline ADL function  Description: INTERVENTIONS:  -  Assess patient's ability to carry out ADLs; assess patient's baseline for ADL function and identify physical deficits which impact ability to perform ADLs (bathing, care of mouth/teeth, toileting, grooming, dressing, etc )  - Assess/evaluate cause of self-care deficits   - Assess range of motion  - Assess patient's mobility; develop plan if impaired  - Assess patient's need for assistive devices and provide as appropriate  - Encourage maximum independence but intervene and supervise when necessary  - Involve family in performance of ADLs  - Assess for home care needs following discharge   - Consider OT consult to assist with ADL evaluation and planning for discharge  - Provide patient education as appropriate  Outcome: Progressing     Problem: NEUROSENSORY - ADULT  Goal: Achieves stable or improved neurological status  Description: INTERVENTIONS  - Monitor and report changes in neurological status  - Monitor vital signs such as temperature, blood pressure, glucose, and any other labs ordered   - Initiate measures to prevent increased intracranial pressure  - Monitor for seizure activity and implement precautions if appropriate      Outcome: Progressing     Problem: RESPIRATORY - ADULT  Goal: Achieves optimal ventilation and oxygenation  Description: INTERVENTIONS:  - Assess for changes in respiratory status  - Assess for changes in mentation and behavior  - Position to facilitate oxygenation and minimize respiratory effort  - Oxygen administered by appropriate delivery if ordered  - Initiate smoking cessation education as indicated  - Encourage broncho-pulmonary hygiene including cough, deep breathe, Incentive Spirometry  - Assess the need for suctioning and aspirate as needed  - Assess and instruct to report SOB or any respiratory difficulty  - Respiratory Therapy support as indicated  Outcome: Progressing

## 2022-12-26 NOTE — CONSULTS
[address] Cardiology  CONSULT    Patient Name: Henry Lopez  Patient MRN: [id number]  Admission Date: 12/25/2022  1:48 PM  Attending Provider: Eliz Ritchie*  Service: Cardiology    Reason for consult: Syncope    HPI  This is a 78 y o  male with a past medical history of ambulatory dysfunction, chronic atrial fibrillation, cardiomyopathy, and recurrent syncope who presented with a near syncopal episode  He was in his baseline state of health up until yesterday when in the late afternoon he developed some lightheadedness while he was sitting at the table and talking with family  He admits to not eating breakfast that morning and only had a cup of coffee  As they were getting ready to leave, he stood up, became lightheaded, and fell forward to his knees  He denies any loss of consciousness  He denies any preceding visual changes, chest pain, palpitations, shortness of breath  On presentation to the emergency department his ECG revealed rate controlled atrial fibrillation  His orthostatic vital signs were positive and he was treated with 1 L IV fluids  Review of Systems  10 point review of systems negative except as noted in HPI    Past Medical History:   Diagnosis Date   • Anxiety disorder    • Atrial fibrillation ([facility] [address] )    • Coronary artery disease    • Depression    • Cloud catheter in place    • Hepatitis C     screening negative in 1/2017   • Hypertension    • MI (myocardial infarction) [facility])    • Use of cane as ambulatory aid        Past Surgical History:   Procedure Laterality Date   • CARDIAC CATHETERIZATION  07/09/2018   • CARDIAC ELECTROPHYSIOLOGY PROCEDURE N/A 9/30/2022    Procedure: Cardiac loop recorder implant;  Surgeon: Konstantin Odonnell MD;  [facility];   Service: Cardiology   • COLONOSCOPY     • CT RPR 1ST INGUN HRNA AGE 5 YRS/> REDUCIBLE Right 8/11/2022    Procedure: REPAIR HERNIA INGUINAL;  Surgeon: Deep Encarnacion DO;  Location: [facility] Main OR;  Service: General   • TONSILLECTOMY         Family History   Problem Relation Age of Onset   • Hypertension Mother    • Coronary artery disease Mother         premature   • Hypertension Father    • Coronary artery disease Father         premature   • Diabetes Father        Social History     Tobacco Use   • Smoking status: Every Day     Packs/day: 1 00     Years: 57 00     Pack years: 57 00     Types: Cigarettes   • Smokeless tobacco: Never   • Tobacco comments:     since age 17; Has history of smoking for over 54 years  He has been smoking more than packet daily in the past however he has been smokes about half a pack a day lately and stopped smoking on 7/1/2018 when he was admitted to the hospital     Substance Use Topics   • Alcohol use: Not Currently       Vitals:    12/25/22 1940   BP: 126/78   Pulse: 87   Resp: 16   Temp: 97 5 °F (36 4 °C)   SpO2: 96%        I/O last 3 completed shifts:  In: -   Out: 50 [Urine:50]  No intake/output data recorded       Oxygen:  O2 Device: None (Room air)    Physical Exam  General Appearance: Alert, cooperative, no distress, appears stated age  Head: Normocephalic, without obvious abnormality, atraumatic  Eyes: PERRL, conjunctiva/corneas clear, EOM's intact  Neck: No carotid bruit or JVD  Lungs: Clear to auscultation bilaterally, respirations unlabored, no wheezes, rales  Heart: irreg irreg, S1 and S2 normal, no murmur, rub or gallop  Abdomen: Soft, non-tender, bowel sounds active  Extremities: Extremities normal, atraumatic, no edema  Pulses: 2+ and symmetric all extremities  Skin: Warm and dry without and rashes or lesions    Current Medications:  Scheduled Meds:  Current Facility-Administered Medications   Medication Dose Route Frequency Provider Last Rate   • acetaminophen  650 mg Oral Q6H PRN GABRIELA Majano     • albuterol  2 5 mg Nebulization Q6H PRN Mike Ricks MD     • [START ON 12/26/2022] Fluticasone Furoate-Vilanterol  1 puff Inhalation Daily Meet Stanton MAINORNP     • heparin (porcine)  5,000 Units Subcutaneous [facility] GABRIELA Keane     • ipratropium  0 5 mg Nebulization TID Armaan Stevens MD     • levalbuterol  1 25 mg Nebulization TID Armaan Stevens MD     • [START ON 12/26/2022] metoprolol succinate  25 mg Oral Daily GABRIELA Keane     • multi-electrolyte  75 mL/hr Intravenous Continuous GABRIELA Keane 75 mL/hr (12/25/22 1800)   • [START ON 12/26/2022] pantoprazole  40 mg Oral Early Morning GABRIELA Moore     • [START ON 12/26/2022] tamsulosin  0 4 mg Oral Daily GABRIELA Keane       Continuous Infusions:multi-electrolyte, 75 mL/hr, Last Rate: 75 mL/hr (12/25/22 1800)      PRN Meds: •  acetaminophen  •  albuterol    Laboratory Studies:  Lab Results   Component Value Date    WBC 10 15 12/25/2022    HGB 14 1 12/25/2022    HCT 43 7 12/25/2022    MCV 92 12/25/2022     12/25/2022       Lab Results   Component Value Date    CALCIUM 8 7 12/25/2022    SODIUM 137 12/25/2022    K 4 0 12/25/2022    CO2 25 12/25/2022     12/25/2022    BUN 21 12/25/2022    CREATININE 1 31 (H) 12/25/2022       CARDIAC IMAGING:  Echocardiogram - 9/30/22:  •  Left Ventricle: Left ventricular cavity size is normal  Wall thickness is mildly increased  There is mild concentric hypertrophy  The left ventricular ejection fraction is 50%  Systolic function is low normal  There is mild global hypokinesis  •  Right Ventricle: Right ventricular cavity size is normal  Systolic function is normal   •  Left Atrium: The atrium is moderately dilated  •  Right Atrium: The atrium is mildly dilated  •  Mitral Valve: There is mild thickening of the anterior leaflet and posterior leaflet involving the leaflet from base to margin  There is mild annular calcification  There is systolic bowing of the anterior and posterior leaflets  There is mild to moderate regurgitation with a centrally directed jet  •  Tricuspid Valve: There is moderate regurgitation   The tricuspid valve regurgitation jet is central   •  Pulmonic Valve: There is mild regurgitation  ECG: Personally reviewed    Telemetry: I have reviewed the telemetry which shows atrial fibrillation  IMAGING  CT head without contrast   Final Result      No acute intracranial abnormality  Workstation performed: [id number]         XR chest 1 view portable   ED Interpretation   nad      Final Result   Emphysema      No acute cardiopulmonary disease  Findings are stable            Workstation performed: [id number]              Assessment / Plan  This is a 78 y o  male who presents with    #  Near syncope with fall  #  Orthostatic hypotension:   Suspect that this event was due to immediate orthostatic hypotension and occurred quickly upon standing  He did not lose consciousness but felt weak and dropped to his knees  His telemetry shows atrial fibrillation with controlled rates which are sometimes in the 40s/50s  The longest pause was 2 seconds  Orthostatic vital signs were positive on presentation  He is also on Flomax for history of urinary retention which can exacerbate this  Recommend NURYS stockings and counseled on making slow changes in position   -Doubt presyncope related to atrial fibrillation and bradycardia at this time but we will obtain a loop recorder interrogation  #   History of recurrent syncope: He did have a near syncopal episode in June 2021 which was possibly heat syncope and related to dehydration  He did have some bradycardia and his digoxin & metoprolol were initially held  He was later resumed on metoprolol succinate 25 mg daily  He was evaluated by EP in September 2022 after presenting with an episode of syncope  He sustained a right subdural hematoma and has been off anticoagulation since that time until cleared by Neurosurgery  An implantable loop recorder was placed during this admission        #  Chronic atrial fibrillation: History of chronic atrial fibrillation  He was evaluated by EP during hospitalization in 9/2022 after a fall with resultant subdural hematoma  He was not felt to need pacemaker at that time  Her underwent loop recorder implantation  He is in metoprolol succinate 25 mg daily - continue       Principal Problem:    Syncope  Active Problems:    Permanent atrial fibrillation (HCC)    COPD (chronic obstructive pulmonary disease) (HCC)    Urinary retention    SDH (subdural hematoma)      Code Status: Level 1 - Full Code    Андрей Salazar MD

## 2022-12-26 NOTE — ASSESSMENT & PLAN NOTE
Presented to the ED after a near syncopal episode  Patient was seen at the table trying to eat dinner when he had a notable lightheaded/dizziness and ended up falling forward  Patient did hit head on table    · Patient with known history of orthostatic hypotension  · Likely secondary to orthostatic hypotension vs underlying cardiac arrythmia  · CTH: No acute intracranial abnormalities  · Echo (9/2022): EF 50% with mild global hypokinesis  · HS Trop: 7->9  · Orthostatics performed in the ED with mild drop in BP  · S/p 1L IVF Bolus  · C/w Light IVF resuscitation overnight  · Orthostatics remain positive  · Will start Compression stockings  · Plan to interrogate loop recorder  · Continue Telemetry  · Tele reviewed: NSR with Afib, HR 40-70's  · Continue fall precautions  · PT/OT eval: Recommend [facility]  · Cardiology consulted

## 2022-12-26 NOTE — PHYSICAL THERAPY NOTE
PHYSICAL THERAPY EVALUATION  DATE: 12/26/22  TIME: 8335-7599    NAME:  Fabiola Garcia  AGE:   78 y o  Mrn:   [id number]  Length Of Stay: 0    ADMIT DX:  Orthostatic hypotension [I95 1]  Syncope [R55]  Near syncope [R55]    Past Medical History:   Diagnosis Date    Anxiety disorder     Atrial fibrillation (HCC)     Coronary artery disease     Depression     Cloud catheter in place     Hepatitis C     screening negative in 1/2017    Hypertension     MI (myocardial infarction) [facility])     Use of cane as ambulatory aid      Past Surgical History:   Procedure Laterality Date    CARDIAC CATHETERIZATION  07/09/2018    CARDIAC ELECTROPHYSIOLOGY PROCEDURE N/A 9/30/2022    Procedure: Cardiac loop recorder implant;  Surgeon: Adwoa Abarca MD;  [facility]; Service: Cardiology    COLONOSCOPY      [place] RPR 1ST INGUN HRNA AGE 5 YRS/> REDUCIBLE Right 8/11/2022    Procedure: REPAIR HERNIA INGUINAL;  Surgeon: Rosette Encarnacion DO;  Location: [facility] Main OR;  Service: General    TONSILLECTOMY         Performed at least 2 patient identifiers during session: Name, Abhishek Stanton, and ID bracelet     12/26/22 0928   PT Last Visit   PT Visit Date 12/26/22   Note Type   Note type Evaluation   Pain Assessment   Pain Assessment Tool 0-10   Pain Score No Pain   Effect of Pain on Daily Activities n/a   Patient's Stated Pain Goal No pain   Hospital Pain Intervention(s) Repositioned; Ambulation/increased activity   Multiple Pain Sites No   Restrictions/Precautions   Weight Bearing Precautions Per Order No   Other Precautions Contact/isolation;Cognitive; Impulsive; Chair Alarm; Bed Alarm;Multiple lines;Telemetry; Fall Risk;Hard of hearing  (+ESBL, MDRO)   Home Living   Type of [facility] Two level;Stairs to enter with rails; Performs ADLs on one level; Able to live on main level with bedroom/bathroom  (15 steps with HR to access, maintains FFSU)   Bathroom Shower/Tub Tub/shower unit   Bathroom Toilet Standard   Bathroom Accessibility Accessible   Home Equipment Walker;Cane  (uses [facility] for all mobility at baseline)   Additional Comments Pt reports him and his wife are currently sleeping on couch; loft space upstairs is intended bedroom, inaccessable d/t full flight of steep steps  Prior Function   Level of [place] Independent with ADLs; Independent with functional mobility; Independent with IADLS   Lives With Spouse   Receives Help From Family;Home health  (per EMR, pt was previously getting home VNA and home PT, however they has refused returning due to langley infestation )   IADLs Family/Friend/Other provides transportation; Independent with meal prep; Independent with medication management   Falls in the last 6 months >10   Vocational Retired   Comments Pt reports using [facility] for all functional mobility, household and community mobility  Will walk downhill to downtown and take the bus uphill back home  Pt reports he has difficulty intermittently with long distance walking due to syncopal events  Pt reports having wife assist with IADLs  General   Additional Pertinent History Pt admitted under observation 12/25/2022 with c/o syncopal event + fall + headstrike  CT head (-)     Family/Caregiver Present No   Cognition   Overall Cognitive Status WFL  (suspect higher level / exacutive functioning deficits, defer to OT for further evaluation)   Arousal/Participation Alert   Orientation Level Oriented X4   Memory Decreased recall of recent events;Decreased recall of precautions   Following Commands Follows multistep commands with increased time or repetition   Subjective   Subjective "I'm not in any rush to get back home to my wife "   RUE Assessment   RUE Assessment WFL   LUE Assessment   LUE Assessment WFL   RLE Assessment   RLE Assessment WFL   LLE Assessment   LLE Assessment WFL   Vision-Basic Assessment   Current Vision Wears glasses all the time  (of note glassess are broken and taped together in multiple areas, pt reports he can't afford new glasses)   Coordination   Movements are Fluid and Coordinated 1   Sensation WFL   Light Touch   RLE Light Touch Grossly intact   LLE Light Touch Grossly intact   Proprioception   RLE Proprioception Grossly intact   LLE Proprioception Grossly Intact   Bed Mobility   Supine to Sit 7  Independent   Sit to Supine 7  Independent   Additional Comments Bed mobility completed in flat bed, no bedrail use  Pt able to bridge up and maintain bridge x multiple reps during session  Transfers   Sit to Stand 5  Supervision   Additional items Assist x 1; Impulsive; Increased time required;Verbal cues  (impulsive to initiate, increased time to complete)   Stand to Sit 5  Supervision   Additional items Assist x 1;Verbal cues   Additional Comments (S)  Orthostatic BP taken during session, + from supine to standing, pt symptomatic with all changes in positioning, most symptomatic from sitting to standing  See below for BP reads  Ambulation/Elevation   Gait pattern WNL  (impulsive)   Gait Assistance 5  Supervision   Additional items Assist x 1;Verbal cues   Assistive Device None   Distance 5ft fwd and back, limited due to lightheadedness and decreased attention   Stair Management Assistance Not tested  (pt has full flight of steps to enter his apartment)   Balance   Static Sitting Normal   Dynamic Sitting Normal   Static Standing Good   Dynamic Standing Fair +   Ambulatory Fair +   Endurance Deficit   Endurance Deficit Yes   Activity Tolerance   Activity Tolerance Patient limited by fatigue; Other (Comment)  (limited due to lightheadedness)   Medical Staff Made Aware Spoke with BENNY WINCHESTER   Nurse Made Aware Spoke with RN Tyron Ward and PCA Racquel Cruz   Assessment   Prognosis Fair   Problem List Decreased endurance; Impaired balance;Decreased mobility; Impaired judgement;Decreased safety awareness; Impaired hearing   Assessment Pt seen for PT evaluation for mobility assessment & discharge needs   Activity orders: up with assist  Pt admitted under observation 12/25/2022 w/ syncopal episode and need for chronic miller catheter change  Comorbidities affecting pt's fnxl performance include: Afib, ambulatory dysfunction, COPD, SDH 9/28/2022, anxiety, depression, HepC, MI, chronic miller catheter, falls, chronic orthostatic hypotension  During PT IE, pt completes bed mobility independently, transfers with S, and ambulates only 5ft fwd/back with S (limited due to significant lightheadedness and decreased attention)  Orthostatic BP taken during session and + from supine to standing  The AM-PAC & Barthel Index outcome tools were used to assist in determining pt safety w/ mobility/self care & appropriate d/c recommendations, see above for scores  Pt is at risk of falls d/t multiple comorbidities, impulsivity, h/o falls, impaired balance, impaired insight/safety awareness, use of ambulatory aid, acuity of medical illness and ongoing medical treatment of primary dx  Pt's clinical presentation is currently unstable/unpredictable as seen in pt's presentation of varying levels of cognitive performance, increased fall risk, new onset of impairment of functional mobility, decreased endurance and new onset of weakness, and requires high complexity clinical decision making  Pt will benefit from continued PT services in order to address impairments, decrease risk of falls, maximize independence w/ fnxl mobility, & ensure safety w/ mobility for transition to next level of care  Based on pt presentation & impairments, pt would most appropriately benefit from return to home with home health PT services  Barriers to Discharge Inaccessible home environment  (pending progress with increased ambulation distance and elevations)   Goals   Patient Goals "to get some rest"   [facility] Expiration Date 01/05/23   Short Term Goal #1 Patient PT goals established in order to maximize independence and safety with mobility   Pt will: complete all transfers independently w/o instability in order to increase safety with functional mobility; ambulate >250ft with LRAD and S in order to increase safety with household and short community functional mobility; negotiate 10-12 stairs with HR and S in order to facilitate safe access to his home; demonstrate understanding and independence with LE strengthening HEP; improve ambulatory balance to >/= good grade with LRAD in order to promote safety and increased independence with mobility; tolerate >3hrs OOB in upright position, in order to improve muscular endurance and respiratory status; improve AM-PAC score to >/= 24/24 in order to increase independence with mobility and decrease burden of care; improve Barthel Index score to >/= 55/100 in order to increase independence and decrease risk of falls  PT Treatment Day 0   Plan   Treatment/Interventions Functional transfer training;LE strengthening/ROM; Elevations; Therapeutic exercise; Endurance training;Patient/family training;Gait training;Spoke to nursing;Spoke to case management;Spoke to advanced practitioner   PT Frequency 2-3x/wk   Recommendation   PT Discharge Recommendation Home with home health rehabilitation   AM-PAC Basic Mobility Inpatient   Turning in Bed Without Bedrails 4   Lying on Back to Sitting on Edge of Flat Bed 4   Moving Bed to Chair 3   Standing Up From Chair 3   Walk in Room 3   Climb 3-5 Stairs 3   Basic Mobility Inpatient Raw Score 20   Basic Mobility Standardized Score 43 99   Highest Level Of Mobility   -HLM Goal 6: Walk 10 steps or more   -HLM Achieved 6: Walk 10 steps or more   Modified Belt Scale   Modified Belt Scale 4   Barthel Index   Feeding 10   Bathing 0   Grooming Score 5   Dressing Score 5   Bladder Score 0  (chronic miller catheter)   Bowels Score 10   Toilet Use Score 5   Transfers (Bed/Chair) Score 10   Mobility (Level Surface) Score 0   Stairs Score 0   Barthel Index Score 45   End of Consult   Patient Position at End of Consult Supine;Bed/Chair alarm activated; All needs within reach   End of Consult Comments Based on patient's [facility] Level of Mobility scores today, patient currently has a goal of -[facility] Levels: 6: WALK 10 STEPS OR MORE, to be completed with RN staffing each shift, in order to improve overall activity tolerance and mobility, combat hospital related deconditioning, and maximize outcomes for d/c from the acute care setting  12/26/22 0921 12/26/22 0922 12/26/22 0923   BP: 125/91 115/89 108/69   BP Location: Right arm Right arm Right arm   Pulse: 76 86 93   Position: Supine Sitting Standing       The patient's AM-PAC Basic Mobility Inpatient Short Form Raw Score is 20  A Raw score of greater than 16 suggests the patient may benefit from discharge to home  Please also refer to the recommendation of the Physical Therapist for safe discharge planning        Sherryle Daring, PT, DPT   Available via [employer] Twitter Dashboard  [facility] # [id number]  [place] License - [id number]  [id number]

## 2022-12-26 NOTE — TELEMEDICINE
e-Consult (IPC)     Consults     Contacted by GABRIELA Reardon 78 y o  male MRN: [id number]  Unit/Bed#: -01 Encounter: [id number]    Reason for Consult    Per provider report, patient presented to [address] [place] [address] with near syncopal episode/light-headedness  He has a past medical history of ambulatory dysfunction, atrial fibrillation, cardiomyopathy and recurrent syncope  He is known to our service for right convexity SDH sustained after syncopal episode on 9/28  Eliquis has been held since that time  He was scheduled to follow up as an outpatient but never did  Available past medical history,social history, surgical history, medication list, drug allergies and review of systems were reviewed  /70 (BP Location: Right arm)   Pulse 68   Temp 98 6 °F (37 °C) (Oral)   Resp 16   Ht 6' (1 829 m)   Wt 65 kg (143 lb 4 8 oz)   SpO2 97%   BMI 19 43 kg/m²      Clinical exam per provider report, generalized weakness  Non-focal  GCS 15     Imaging personally reviewed  CT head wo unremarkable  Assessment and Recommendations    1  Ok to resume Eliquis as SDH has completely resolved  2  Neurosurgery will sign off  No follow up needed  3  Recommend risk vs benefit discussion of Eliquis use in setting of frequent syncopal episodes and previous SDH  All questions answered  Provider is in agreement with the course of action  5-10 minutes, >50% of the total time devoted to medical consultative verbal/EMR discussion between providers  Written report will be generated in the EMR

## 2022-12-26 NOTE — PLAN OF CARE
Problem: MOBILITY - ADULT  Goal: Maintain or return to baseline ADL function  Description: INTERVENTIONS:  -  Assess patient's ability to carry out ADLs; assess patient's baseline for ADL function and identify physical deficits which impact ability to perform ADLs (bathing, care of mouth/teeth, toileting, grooming, dressing, etc )  - Assess/evaluate cause of self-care deficits   - Assess range of motion  - Assess patient's mobility; develop plan if impaired  - Assess patient's need for assistive devices and provide as appropriate  - Encourage maximum independence but intervene and supervise when necessary  - Involve family in performance of ADLs  - Assess for home care needs following discharge   - Consider OT consult to assist with ADL evaluation and planning for discharge  - Provide patient education as appropriate  Outcome: Progressing     Problem: NEUROSENSORY - ADULT  Goal: Achieves stable or improved neurological status  Description: INTERVENTIONS  - Monitor and report changes in neurological status  - Monitor vital signs such as temperature, blood pressure, glucose, and any other labs ordered   - Initiate measures to prevent increased intracranial pressure  - Monitor for seizure activity and implement precautions if appropriate      Outcome: Progressing

## 2022-12-26 NOTE — ASSESSMENT & PLAN NOTE
· S/p Admission from Sept 2022 for SDH   Eliquis has been on hold since  · Patient reports chronic dizziness following  · Recommend outpatient follow up with Neurosurgery

## 2022-12-26 NOTE — ASSESSMENT & PLAN NOTE
· History of intermittent Afib  · S/p Loop recorder placement  · Continue Home Medication:   · Toprol-Xl 25 mg  · Home Eliquis on Hold until cleared by Neurosurgery  · Will reach out to Neurosurgery on Call-> Awaiting response  · Recommend continued outpatient follow up with Cardiology

## 2022-12-26 NOTE — PROGRESS NOTES
[facility] [facility]  Progress Note - Leocadia Daily 1945, 78 y o  male MRN: [id number]  Unit/Bed#: -01 Encounter: [id number]  Primary Care Provider: Felicia Hardin MD   Date and time admitted to hospital: 12/25/2022  1:48 PM    * Syncope  Assessment & Plan  Presented to the ED after a near syncopal episode  Patient was seen at the table trying to eat dinner when he had a notable lightheaded/dizziness and ended up falling forward  Patient did hit head on table  · Patient with known history of orthostatic hypotension  · Likely secondary to orthostatic hypotension vs underlying cardiac arrythmia  · CTH: No acute intracranial abnormalities  · Echo (9/2022): EF 50% with mild global hypokinesis  · HS Trop: 7->9  · Orthostatics performed in the ED with mild drop in BP  · S/p 1L IVF Bolus  · C/w Light IVF resuscitation overnight  · Orthostatics remain positive  · Will start Compression stockings  · Plan to interrogate loop recorder  · Continue Telemetry  · Tele reviewed: NSR with Afib, HR 40-70's  · Continue fall precautions  · PT/OT eval: Recommend [facility]  · Cardiology consulted    Permanent atrial fibrillation ([address] )  Assessment & Plan  · History of intermittent Afib  · S/p Loop recorder placement  · Continue Home Medication:   · Toprol-Xl 25 mg  · Home Eliquis on Hold until cleared by Neurosurgery  · Will reach out to Neurosurgery on Call-> Awaiting response  · Recommend continued outpatient follow up with Cardiology    SDH (subdural hematoma)  Assessment & Plan  · S/p Admission from Sept 2022 for SDH   Eliquis has been on hold since  · Patient reports chronic dizziness following  · Recommend outpatient follow up with Neurosurgery    Urinary retention  Assessment & Plan  · History of Urinary retention  · Continue Cloud catheter while admitted  · Exchanged while in the ED (12/25)  · Continue Home Medication:  · Flomax  · Recommend continued outpatient follow up with Urology          VTE Pharmacologic Prophylaxis: VTE Score: 5 High Risk (Score >/= 5) - Pharmacological DVT Prophylaxis Ordered: heparin  Sequential Compression Devices Ordered  Patient Centered Rounds: I performed bedside rounds with nursing staff today  Discussions with Specialists or Other Care Team Provider: Cardiology, Neurosurgery    Education and Discussions with Family / Patient: Patient declined call to   Time Spent for Care: 30 minutes  More than 50% of total time spent on counseling and coordination of care as described above  Current Length of Stay: 0 day(s)  Current Patient Status: Observation   Certification Statement: The patient, admitted on an observation basis, will now require > 2 midnight hospital stay due to Syncopal episode requiring cardiology evaluation and medication adjustment  Discharge Plan: Anticipate discharge in 24-48 hrs to home  Code Status: Level 1 - Full Code    Subjective:   Patient states he is feeling mildly better from yesterday  Patient states he still having some intermittent lightheaded/dizziness  Patient denies any active chest pain, shortness of breath, abdominal pain, nausea, vomiting, or diarrhea  Objective:     Vitals:   Temp (24hrs), Av 3 °F (36 8 °C), Min:97 5 °F (36 4 °C), Max:98 9 °F (37 2 °C)    Temp:  [97 5 °F (36 4 °C)-98 9 °F (37 2 °C)] 98 6 °F (37 °C)  HR:  [] 68  Resp:  [16-20] 16  BP: (104-151)/(69-91) 117/70  SpO2:  [96 %-99 %] 97 %  Body mass index is 19 43 kg/m²  Input and Output Summary (last 24 hours): Intake/Output Summary (Last 24 hours) at 2022 1208  Last data filed at 2022 0500  Gross per 24 hour   Intake --   Output 1250 ml   Net -1250 ml       Physical Exam:   Physical Exam  Vitals and nursing note reviewed  Constitutional:       General: He is not in acute distress  Appearance: He is normal weight  HENT:      Head: Normocephalic  Nose: Nose normal  No congestion        Mouth/Throat:      Mouth: Mucous membranes are moist       Pharynx: Oropharynx is clear  Cardiovascular:      Rate and Rhythm: Normal rate  Rhythm irregular  Pulses: Normal pulses  Heart sounds: No murmur heard  Pulmonary:      Effort: Pulmonary effort is normal  No respiratory distress  Breath sounds: Decreased breath sounds present  Abdominal:      General: Bowel sounds are normal  There is no distension  Palpations: Abdomen is soft  Tenderness: There is no abdominal tenderness  Musculoskeletal:         General: Normal range of motion  Cervical back: Normal range of motion  Right lower leg: No edema  Left lower leg: No edema  Skin:     General: Skin is warm and dry  Capillary Refill: Capillary refill takes less than 2 seconds  Neurological:      Mental Status: He is alert and oriented to person, place, and time  Mental status is at baseline  Motor: Weakness (Generalized) present  Psychiatric:         Mood and Affect: Mood normal          Speech: Speech normal          Behavior: Behavior is cooperative            Additional Data:     Labs:  Results from last 7 days   Lab Units 12/26/22  0425   WBC Thousand/uL 7 98   HEMOGLOBIN g/dL 12 8   HEMATOCRIT % 40 9   PLATELETS Thousands/uL 193   NEUTROS PCT % 63   LYMPHS PCT % 26   MONOS PCT % 8   EOS PCT % 2     Results from last 7 days   Lab Units 12/26/22  0425   SODIUM mmol/L 139   POTASSIUM mmol/L 3 7   CHLORIDE mmol/L 105   CO2 mmol/L 25   BUN mg/dL 19   CREATININE mg/dL 1 00   ANION GAP mmol/L 9   CALCIUM mg/dL 8 4   ALBUMIN g/dL 3 4*   TOTAL BILIRUBIN mg/dL 0 48   ALK PHOS U/L 99   ALT U/L 9   AST U/L 10*   GLUCOSE RANDOM mg/dL 80                       Lines/Drains:  Invasive Devices     Peripheral Intravenous Line  Duration           Peripheral IV 12/25/22 Distal;Left;Upper;Ventral (anterior) Arm <1 day          Drain  Duration           Urethral Catheter Non-latex 16 Fr  <1 day              Urinary Catheter:  Goal for removal: N/A - Chronic Cloud           Telemetry:  Telemetry Orders (From admission, onward)             24 Hour Telemetry Monitoring  Continuous x 24 Hours (Telem)        Expiring   References:    Telemetry Guidelines   Question:  Reason for 24 Hour Telemetry  Answer:  Syncope suspected to be cardiac in origin                 Telemetry Reviewed: Atrial fibrillation  HR averaging 40-70's  Indication for Continued Telemetry Use: Syncope             Imaging: No pertinent imaging reviewed  Recent Cultures (last 7 days):         Last 24 Hours Medication List:   Current Facility-Administered Medications   Medication Dose Route Frequency Provider Last Rate   • acetaminophen  650 mg Oral Q6H PRN GABRIELA Snow     • albuterol  2 5 mg Nebulization Q6H PRN Leeann Sepulveda MD     • Fluticasone Furoate-Vilanterol  1 puff Inhalation Daily GABRIELA Snow     • heparin (porcine)  5,000 Units Subcutaneous [facility] GABRIELA Snow     • ipratropium  0 5 mg Nebulization TID Leeann Sepulveda MD     • levalbuterol  1 25 mg Nebulization TID Leeann Sepulveda MD     • metoprolol succinate  25 mg Oral Daily GABRIELA Snow     • multi-electrolyte  75 mL/hr Intravenous Continuous GABRIELA Snow 75 mL/hr (12/25/22 1800)   • pantoprazole  40 mg Oral Early Morning GABRIELA Snow     • tamsulosin  0 4 mg Oral Daily GABRIELA Snow          Today, Patient Was Seen By: GABRIELA Snow    **Please Note: This note may have been constructed using a voice recognition system  **

## 2022-12-27 RX ORDER — HEPARIN SODIUM 5000 [USP'U]/ML
5000 INJECTION, SOLUTION INTRAVENOUS; SUBCUTANEOUS EVERY 8 HOURS SCHEDULED
Status: DISCONTINUED | OUTPATIENT
Start: 2022-12-27 | End: 2022-12-30 | Stop reason: HOSPADM

## 2022-12-27 RX ORDER — HEPARIN SODIUM 5000 [USP'U]/ML
5000 INJECTION, SOLUTION INTRAVENOUS; SUBCUTANEOUS EVERY 8 HOURS SCHEDULED
Status: DISCONTINUED | OUTPATIENT
Start: 2022-12-27 | End: 2022-12-27

## 2022-12-27 RX ORDER — MIDODRINE HYDROCHLORIDE 5 MG/1
5 TABLET ORAL 3 TIMES DAILY PRN
Status: DISCONTINUED | OUTPATIENT
Start: 2022-12-27 | End: 2022-12-30 | Stop reason: HOSPADM

## 2022-12-27 RX ORDER — SODIUM CHLORIDE 9 MG/ML
125 INJECTION, SOLUTION INTRAVENOUS CONTINUOUS
Status: DISCONTINUED | OUTPATIENT
Start: 2022-12-27 | End: 2022-12-27

## 2022-12-27 RX ORDER — SODIUM CHLORIDE 9 MG/ML
125 INJECTION, SOLUTION INTRAVENOUS CONTINUOUS
Status: DISPENSED | OUTPATIENT
Start: 2022-12-27 | End: 2022-12-28

## 2022-12-27 RX ADMIN — TAMSULOSIN HYDROCHLORIDE 0.4 MG: 0.4 CAPSULE ORAL at 10:04

## 2022-12-27 RX ADMIN — IPRATROPIUM BROMIDE 0.5 MG: 0.5 SOLUTION RESPIRATORY (INHALATION) at 14:48

## 2022-12-27 RX ADMIN — LEVALBUTEROL HYDROCHLORIDE 1.25 MG: 1.25 SOLUTION, CONCENTRATE RESPIRATORY (INHALATION) at 20:07

## 2022-12-27 RX ADMIN — METOPROLOL SUCCINATE 25 MG: 25 TABLET, EXTENDED RELEASE ORAL at 10:04

## 2022-12-27 RX ADMIN — LEVALBUTEROL HYDROCHLORIDE 1.25 MG: 1.25 SOLUTION, CONCENTRATE RESPIRATORY (INHALATION) at 14:48

## 2022-12-27 RX ADMIN — IPRATROPIUM BROMIDE 0.5 MG: 0.5 SOLUTION RESPIRATORY (INHALATION) at 20:07

## 2022-12-27 RX ADMIN — PANTOPRAZOLE SODIUM 40 MG: 40 TABLET, DELAYED RELEASE ORAL at 06:03

## 2022-12-27 RX ADMIN — SODIUM CHLORIDE 125 ML/HR: 0.9 INJECTION, SOLUTION INTRAVENOUS at 10:57

## 2022-12-27 RX ADMIN — HEPARIN SODIUM 5000 UNITS: 5000 INJECTION INTRAVENOUS; SUBCUTANEOUS at 15:34

## 2022-12-27 RX ADMIN — HEPARIN SODIUM 5000 UNITS: 5000 INJECTION INTRAVENOUS; SUBCUTANEOUS at 06:03

## 2022-12-27 NOTE — ASSESSMENT & PLAN NOTE
· History of urinary retention  · Continue Cloud catheter while admitted, exchanged while in the ED (12/25)  · Consider holding Flomax with syncopal episodes  · Recommend continued outpatient follow up with Urology

## 2022-12-27 NOTE — ASSESSMENT & PLAN NOTE
· History of intermittent A-fib, s/p Loop recorder placement  · Home Medication: Toprol-Xl 25 mg, Eliquis  · Continue Toprol, has been unable to receive due to hypotension/bradycardia  · Hold off on further metoprolol doses per Cardiology

## 2022-12-27 NOTE — ASSESSMENT & PLAN NOTE
· S/p admission from September 2022 for SDH, patient reports chronic dizziness following  · Eliquis has been on hold ever since  · Neurosurgery consulted:  · Ok to resume Eliquis, as SDH has completely resolved  · Will sign off, no follow up needed  · Resume Eliquis, would benefit from risk vs benefit discussion of continued Eliquis use in setting of frequent syncopal episodes

## 2022-12-27 NOTE — ASSESSMENT & PLAN NOTE
Presented to the ED after a near syncopal episode  Patient was sitting at the table trying to eat dinner when he had a notable lightheaded/dizziness, ended up falling forward and hitting his head on table  History of orthostatic hypotension, A-fib s/p loop recorder placement  · Likely secondary to orthostatic hypotension vs underlying cardiac arrythmia  · Echo (9/2022): EF 50% with mild global hypokinesis  · CTH: No acute intracranial abnormalities  · S/p 1L IVF Bolus, gentle IVFs with Plasma-Lyte  · Orthostatics remain positive, patient still with profound dizziness  · Orthostatic BPs improving, 136/92->128/77 today  · Cardiology consulted:  · Loop recorder interrogated, no events noted on day of reported symptoms  Prior pause of about 3 5 seconds on 12/12/2022     · Switch IVFs to [email or web address] for 2L IVFs, more ideal for orthostatic hypotension  · Start midodrine 5 mg TID PRN for SBP <100 or symptomatic dizziness with standing  · Telemetry reviewed, NSR with Afib, HR 40-70's  · Compression stockings, fall precautions  · PT/OT eval: Recommend [facility]

## 2022-12-27 NOTE — PHYSICAL THERAPY NOTE
PHYSICAL THERAPY TREATMENT  DATE: 12/27/22  TIME: 6896-2808    NAME:  Saadia Machado  AGE:   78 y o  Mrn:   [id number]  Length Of Stay: 1    ADMIT DX:  Orthostatic hypotension [I95 1]  Syncope [R55]  Near syncope [R55]    Past Medical History:   Diagnosis Date    Anxiety disorder     Atrial fibrillation (HCC)     Coronary artery disease     Depression     Cloud catheter in place     Hepatitis C     screening negative in 1/2017    Hypertension     MI (myocardial infarction) [facility])     Use of cane as ambulatory aid      Past Surgical History:   Procedure Laterality Date    CARDIAC CATHETERIZATION  07/09/2018    CARDIAC ELECTROPHYSIOLOGY PROCEDURE N/A 9/30/2022    Procedure: Cardiac loop recorder implant;  Surgeon: Kacey Luis MD;  [facility]; Service: Cardiology    COLONOSCOPY      GA RPR 1ST INGUN HRNA AGE 5 YRS/> REDUCIBLE Right 8/11/2022    Procedure: REPAIR HERNIA INGUINAL;  Surgeon: Jo Ann Encarnacion DO;  Location: [facility];  Service: General    TONSILLECTOMY         Performed at least 2 patient identifiers during session: Name and ID bracelet     12/27/22 1004 (ACTUAL TIME 8700-4057)   PT Last Visit   PT Visit Date 12/27/22   Note Type   Note Type Treatment   Pain Assessment   Pain Assessment Tool 0-10   Pain Score No Pain   Effect of Pain on Daily Activities n/a   Hospital Pain Intervention(s) Repositioned; Ambulation/increased activity   Multiple Pain Sites No   Restrictions/Precautions   Weight Bearing Precautions Per Order No   Other Precautions Contact/isolation; Impulsive;Cognitive; Chair Alarm; Bed Alarm;Multiple lines;Telemetry; Fall Risk;Hard of hearing  (+ESBL, MDRO)   General   Chart Reviewed Yes   Additional Pertinent History Pt admitted under observation 12/25/2022 with c/o syncopal event + fall + headstrike  CT head (-)  Response to Previous Treatment Patient reporting fatigue but able to participate     Family/Caregiver Present No   Cognition   Overall Cognitive Status WFL  (suspect higher level / exacutive functioning deficits, defer to OT for further evaluation)   Arousal/Participation Cooperative   Attention Difficulty attending to directions   Orientation Level Oriented X4   Memory Decreased recall of precautions   Following Commands Follows one step commands without difficulty   Subjective   Subjective "I took those stockings off before because they were making my legs go numb "   Bed Mobility   Supine to Sit 5  Supervision   Additional items Assist x 1;HOB elevated; Bedrails; Increased time required;Verbal cues   Sit to Supine Unable to assess   Additional Comments Pt needing cues to take his time after each change in position, pt impulsive and with decreased acceptance of therapist education  Pt was left seated OOB in recliner chair with alarm engaged and activated to call system, needs in reach, care returned to RN  Transfers   Sit to Stand 5  Supervision   Additional items Assist x 1; Increased time required; Impulsive;Verbal cues  (impulsive to initiate, increased time to complete)   Stand to Sit 4  Minimal assistance   Additional items Assist x 1; Impulsive;Verbal cues   Stand pivot 4  Minimal assistance   Additional items Assist x 1;Verbal cues; Increased time required  [facility])   Ambulation/Elevation   Gait pattern Decreased foot clearance; Short stride  (significant pathway deviation)   Gait Assistance 4  Minimal assist   Additional items Assist x 1;Verbal cues; Tactile cues   Assistive Device [facility]   Distance 15ft x2 trials with functional seated rest break between  (overall ambulation distance and tolerance limited due to onset of lightheadedness, BP monitored and stable t/o despite symptoms)   Stair Management Assistance Not tested  (pt has full flight of steps to enter his apartment)   Balance   Static Sitting Normal   Dynamic Sitting Good   Static Standing Fair  (w/ SPC)   Dynamic Standing Fair -  (w/ SPC)   Ambulatory Fair -  (w/ SPC)   Endurance Deficit   Endurance Deficit Yes   Activity Tolerance   Activity Tolerance Patient limited by fatigue;Treatment limited secondary to medical complications (Comment)  (limited due to lightheadedness)   Medical Staff Made Aware Spoke with BENNY WINCHESTER   Nurse Made Aware Spoke with RN Madison Luna and CHRISTINA Lowery   Assessment   Prognosis Fair   Problem List Decreased strength;Decreased endurance; Impaired balance;Decreased mobility; Impaired judgement;Decreased safety awareness; Impaired hearing   Assessment Pt seen for PT treatment today with focus on gait training  Pt presents semi-supine in bed, denies pain, is agreeable to participate in session however states "I don't think it's going to go well " Therapist dependently donned B NURYS stockings for assist in BP control  Pt completes bed mobility with S, increased time, cues to maintain positioning for improved lightheadedness  Pt transfers with S, requires cues to maintain standing for a minute prior to ambulation  Pt denies lightheadedness right away, and ambulates 15ft with SPC and LEA  But then pt reports quick onset of lightheadedness, therapist cued pt to sit in recliner chair that was placed in hallway in anticipation  Pt ignores therapist and begins talking about the recent snow storm in [place], needs MAX redirection and cues to take a seat prior to syncopal episode  BP taken and stable at 128/77, HR 92  After in reclined position ~3 minutes, symptoms resolve and /70, HR 98  BP taken in standing position after 1 minute of standing, 119/68, and pt reports onset of significant lightheadedness again, functional seated rest break provided  Once symptoms resolved, pt transfers with S and ambulates an additional 15ft with SPC and LEA, again limited due to lightheadedness  Overall, pt remains significantly limited due to lightheadedness with changes in positioning and increased mobility  Pt with poor insight to deficits and poor safety awareness, requires max cues for safety with mobility   At end of session, pt was left seated OOB in recliner chair with alarm engaged and needs in reach, care returned to RN  Continue to recommend return to home with HHPT services upon d/c  PT encouraged pt utilization of RW however pt adamantly refuses to trial  Will continue PT POC as able and appropriate  Barriers to Discharge Inaccessible home environment  (pending progress with increased ambulation distance and elevations)   Goals   Patient Goals "to stop getting to dizzy"   STG Expiration Date 01/05/22   Short Term Goal #1 Patient PT goals established in order to maximize independence and safety with mobility  Pt will: complete all transfers independently w/o instability in order to increase safety with functional mobility; ambulate >250ft with LRAD and S in order to increase safety with household and short community functional mobility; negotiate 10-12 stairs with HR and S in order to facilitate safe access to his home; demonstrate understanding and independence with LE strengthening HEP; improve ambulatory balance to >/= good grade with LRAD in order to promote safety and increased independence with mobility; tolerate >3hrs OOB in upright position, in order to improve muscular endurance and respiratory status; improve AM-PAC score to >/= 24/24 in order to increase independence with mobility and decrease burden of care; improve Barthel Index score to >/= 55/100 in order to increase independence and decrease risk of falls  PT Treatment Day 1   Plan   Treatment/Interventions Functional transfer training;LE strengthening/ROM; Elevations; Therapeutic exercise; Endurance training;Patient/family training;Equipment eval/education; Bed mobility;Gait training;Spoke to MD;Spoke to nursing;Spoke to case management;OT   Progress Slow progress, medical status limitations   PT Frequency 2-3x/wk   Recommendation   PT Discharge Recommendation Home with home health rehabilitation   Additional Comments Next session, plan for ambulation assessment with use of RW  AM-PAC Basic Mobility Inpatient   Turning in Bed Without Bedrails 4   Lying on Back to Sitting on Edge of Flat Bed 3   Moving Bed to Chair 3   Standing Up From Chair 3   Walk in Room 3   Climb 3-5 Stairs 3   Basic Mobility Inpatient Raw Score 19   Basic Mobility Standardized Score 42 48   Highest Level Of Mobility   JH-HLM Goal 6: Walk 10 steps or more   JH-HLM Achieved 7: Walk 25 feet or more   Education   Education Provided Mobility training;Assistive device   Patient Demonstrates acceptance/verbal understanding;Reinforcement needed   End of Consult   Patient Position at End of Consult Bedside chair;Bed/Chair alarm activated; All needs within reach   End of Consult Comments Based on patient's [facility] Level of Mobility scores today, patient currently has a goal of JH-HLM Levels: 7: WALK 25 FEET OR MORE, to be completed with RN staffing each shift, in order to improve overall activity tolerance and mobility, combat hospital related deconditioning, and maximize outcomes for d/c from the acute care setting  The patient's AM-PAC Basic Mobility Inpatient Short Form Raw Score is 19  A Raw score of greater than 16 suggests the patient may benefit from discharge to home  Please also refer to the recommendation of the Physical Therapist for safe discharge planning        Xochitl Talbot PT, DPT   Available via [employer]  NPI # [id number]  [place] License - [id number]  62/[birth year]/2214

## 2022-12-27 NOTE — PLAN OF CARE
Problem: PHYSICAL THERAPY ADULT  Goal: Performs mobility at highest level of function for planned discharge setting  See evaluation for individualized goals  Description: Treatment/Interventions: Functional transfer training, LE strengthening/ROM, Elevations, Therapeutic exercise, Endurance training, Patient/family training, Gait training, Spoke to nursing, Spoke to case management, Spoke to advanced practitioner    See flowsheet documentation for full assessment, interventions and recommendations  Outcome: Progressing  Note: Prognosis: Fair  Problem List: Decreased strength, Decreased endurance, Impaired balance, Decreased mobility, Impaired judgement, Decreased safety awareness, Impaired hearing  Assessment: Pt seen for PT treatment today with focus on gait training  Pt presents semi-supine in bed, denies pain, is agreeable to participate in session however states "I don't think it's going to go well " Therapist dependently donned B NURYS stockings for assist in BP control  Pt completes bed mobility with S, increased time, cues to maintain positioning for improved lightheadedness  Pt transfers with S, requires cues to maintain standing for a minute prior to ambulation  Pt denies lightheadedness right away, and ambulates 15ft with SPC and LEA  But then pt reports quick onset of lightheadedness, therapist cued pt to sit in recliner chair that was placed in hallway in anticipation  Pt ignores therapist and begins talking about the recent snow storm in [place], needs MAX redirection and cues to take a seat prior to syncopal episode  BP taken and stable at 128/77, HR 92  After in reclined position ~3 minutes, symptoms resolve and /70, HR 98  BP taken in standing position after 1 minute of standing, 119/68, and pt reports onset of significant lightheadedness again, functional seated rest break provided   Once symptoms resolved, pt transfers with S and ambulates an additional 15ft with SPC and LEA, again limited due to lightheadedness  Overall, pt remains significantly limited due to lightheadedness with changes in positioning and increased mobility  Pt with poor insight to deficits and poor safety awareness, requires max cues for safety with mobility  At end of session, pt was left seated OOB in recliner chair with alarm engaged and needs in reach, care returned to RN  Continue to recommend return to home with HHPT services upon d/c  PT encouraged pt utilization of RW however pt adamantly refuses to trial  Will continue PT POC as able and appropriate  Barriers to Discharge: Inaccessible home environment (pending progress with increased ambulation distance and elevations)     PT Discharge Recommendation: Home with home health rehabilitation    See flowsheet documentation for full assessment

## 2022-12-27 NOTE — PROGRESS NOTES
[facility] [address]  Progress Note - Jia Moore 1945, 78 y o  male MRN: [id number]  Unit/Bed#: -Jocelyn Encounter: [id number]  Primary Care Provider: Jessie Irwin MD   Date and time admitted to hospital: 12/25/2022  1:48 PM    * Syncope  Assessment & Plan  Presented to the ED after a near syncopal episode  Patient was sitting at the table trying to eat dinner when he had a notable lightheaded/dizziness, ended up falling forward and hitting his head on table  History of orthostatic hypotension, A-fib s/p loop recorder placement  · Likely secondary to orthostatic hypotension vs underlying cardiac arrythmia  · Echo (9/2022): EF 50% with mild global hypokinesis  · CTH: No acute intracranial abnormalities  · S/p 1L IVF Bolus, gentle IVFs with Plasma-Lyte  · Orthostatics remain positive, patient still with profound dizziness  · Orthostatic BPs improving, 136/92->128/77 today  · Cardiology consulted:  · Loop recorder interrogated, no events noted on day of reported symptoms  Prior pause of about 3 5 seconds on 12/12/2022     · Switch IVFs to [email or web address] for 2L IVFs, more ideal for orthostatic hypotension  · Start midodrine 5 mg TID PRN for SBP <100 or symptomatic dizziness with standing  · Telemetry reviewed, NSR with Afib, HR 40-70's  · Compression stockings, fall precautions  · PT/OT eval: Recommend [facility]    SDH (subdural hematoma)  Assessment & Plan  · S/p admission from September 2022 for SDH, patient reports chronic dizziness following  · Eliquis has been on hold ever since  · Neurosurgery consulted:  · Ok to resume Eliquis, as SDH has completely resolved  · Will sign off, no follow up needed  · Resume Eliquis, would benefit from risk vs benefit discussion of continued Eliquis use in setting of frequent syncopal episodes    Permanent atrial fibrillation (HCC)  Assessment & Plan  · History of intermittent A-fib, s/p Loop recorder placement  · Home Medication: Toprol-Xl 25 mg, Eliquis  · Continue Toprol, has been unable to receive due to hypotension/bradycardia  · Hold off on further metoprolol doses per Cardiology    Urinary retention  Assessment & Plan  · History of urinary retention  · Continue Cloud catheter while admitted, exchanged while in the ED ()  · Consider holding Flomax with syncopal episodes  · Recommend continued outpatient follow up with Urology    COPD (chronic obstructive pulmonary disease) ([facility] [address] )  Assessment & Plan  · Does not appear to be in acute exacerbation  · Continue Home Inhalers/nebulizers      VTE Pharmacologic Prophylaxis: VTE Score: 5 High Risk (Score >/= 5) - Pharmacological DVT Prophylaxis Ordered: apixaban (Eliquis)  Sequential Compression Devices Ordered  Patient Centered Rounds: I performed bedside rounds with nursing staff today  Discussions with Specialists or Other Care Team Provider: Cardiology, case management    Education and Discussions with Family / Patient: Patient declined call to   Time Spent for Care: 45 minutes  More than 50% of total time spent on counseling and coordination of care as described above  Current Length of Stay: 1 day(s)  Current Patient Status: Inpatient   Certification Statement: The patient will continue to require additional inpatient hospital stay due to Orthostatic hypotension  Discharge Plan: Anticipate discharge in 24-48 hrs to home with home services  Code Status: Level 1 - Full Code    Subjective:   Patient is seen at bedside this a m , reports dizziness at rest after attempting to ambulate with PT, no syncopal episodes  Denies chest pain, palpitations or SOB  Objective:     Vitals:   Temp (24hrs), Av 2 °F (36 8 °C), Min:97 6 °F (36 4 °C), Max:99 1 °F (37 3 °C)    Temp:  [97 6 °F (36 4 °C)-99 1 °F (37 3 °C)] 97 6 °F (36 4 °C)  HR:  [66-98] 82  Resp:  [16-22] 16  BP: (111-136)/(59-95) 132/95  SpO2:  [96 %-99 %] 97 %  Body mass index is 19 39 kg/m²       Input and Output Summary (last 24 hours): Intake/Output Summary (Last 24 hours) at 12/27/2022 1330  Last data filed at 12/27/2022 0300  Gross per 24 hour   Intake 2353 75 ml   Output 1750 ml   Net 603 75 ml       Physical Exam:   Physical Exam  Constitutional:       General: He is not in acute distress  Appearance: He is not ill-appearing, toxic-appearing or diaphoretic  Cardiovascular:      Rate and Rhythm: Normal rate  Rhythm irregular  Pulses: Normal pulses  Heart sounds: Normal heart sounds  Pulmonary:      Effort: Pulmonary effort is normal  No respiratory distress  Breath sounds: Normal breath sounds  Abdominal:      General: Bowel sounds are normal  There is no distension  Palpations: Abdomen is soft  Tenderness: There is no abdominal tenderness  Musculoskeletal:         General: No swelling or tenderness  Skin:     General: Skin is warm  Neurological:      General: No focal deficit present  Mental Status: He is alert     Psychiatric:         Mood and Affect: Mood normal          Behavior: Behavior normal          Additional Data:     Labs:  Results from last 7 days   Lab Units 12/26/22  0425   WBC Thousand/uL 7 98   HEMOGLOBIN g/dL 12 8   HEMATOCRIT % 40 9   PLATELETS Thousands/uL 193   NEUTROS PCT % 63   LYMPHS PCT % 26   MONOS PCT % 8   EOS PCT % 2     Results from last 7 days   Lab Units 12/26/22  0425   SODIUM mmol/L 139   POTASSIUM mmol/L 3 7   CHLORIDE mmol/L 105   CO2 mmol/L 25   BUN mg/dL 19   CREATININE mg/dL 1 00   ANION GAP mmol/L 9   CALCIUM mg/dL 8 4   ALBUMIN g/dL 3 4*   TOTAL BILIRUBIN mg/dL 0 48   ALK PHOS U/L 99   ALT U/L 9   AST U/L 10*   GLUCOSE RANDOM mg/dL 80                       Lines/Drains:  Invasive Devices     Peripheral Intravenous Line  Duration           Peripheral IV 12/25/22 Distal;Left;Upper;Ventral (anterior) Arm 1 day          Drain  Duration           Urethral Catheter Non-latex 16 Fr  1 day              Urinary Catheter:  Goal for removal: N/A - Chronic Cloud           Telemetry:  Telemetry Orders (From admission, onward)             24 Hour Telemetry Monitoring  Continuous x 24 Hours (Telem)        References:    Telemetry Guidelines   Question:  Reason for 24 Hour Telemetry  Answer:  Syncope suspected to be cardiac in origin                 Telemetry Reviewed: Atrial fibrillation  HR averaging 40-80  Indication for Continued Telemetry Use: Arrthymias requiring medical therapy             Imaging: No pertinent imaging reviewed  Recent Cultures (last 7 days):         Last 24 Hours Medication List:   Current Facility-Administered Medications   Medication Dose Route Frequency Provider Last Rate   • acetaminophen  650 mg Oral Q6H PRN GABRIELA El     • albuterol  2 5 mg Nebulization Q6H PRN Konstantin Hunter MD     • apixaban  5 mg Oral BID Meli Bello PA-C     • Fluticasone Furoate-Vilanterol  1 puff Inhalation Daily GABRIELA El     • ipratropium  0 5 mg Nebulization TID Konstantin Hunter MD     • levalbuterol  1 25 mg Nebulization TID Konstantin Hunter MD     • metoprolol succinate  25 mg Oral Daily GABRIELA El     • midodrine  5 mg Oral TID PRN Nael Carrington MD     • pantoprazole  40 mg Oral Early Morning GABRIELA El     • sodium chloride  125 mL/hr Intravenous Continuous Nael Carrington  mL/hr (12/27/22 1057)        Today, Patient Was Seen By: Meli Bello PA-C    **Please Note: This note may have been constructed using a voice recognition system  **

## 2022-12-27 NOTE — PROGRESS NOTES
[facility]    Cardiology Progress Note  Stefania Patient 78 y o  male   YOB: 1945 MRN: [id number]  Unit/Bed#: -Jocelyn Encounter: [id number]      Subjective:   No significant events overnight  He continues to feel dizzy when he stands up  Physical therapy attempted to walk him, and had mild dizziness on standing up, but with walking just a few feet he got very dizzy and had to stop  No other symptoms or chest pain, palpitations  Assessments  Principal Problem:    Syncope  Active Problems:    Permanent atrial fibrillation (HCC)    COPD (chronic obstructive pulmonary disease) (HCC)    Urinary retention    SDH (subdural hematoma)      Plan  Recurrent syncope, Symptomatic Orthostatic hypotension  · Initially positive orthostatics, and HERNÁN, which is improved with IV fluid repletion  · But he continues to have dizziness on standing up with mild drop in diastolic pressures  · He does have A  fib with slow heart rates and heart rates down to 40s, but there were no pauses on the device check, and his symptoms seem to continue to occur even during periods where his heart rate is on the lower side  · Suspect his symptoms are more related to orthostatic hypotension  · Currently on Isolyte-S, which is not ideal for orthostatic hypotension  · Will switch back to normal saline and give 2 more liters of normal saline and reevaluate blood pressure  · NS @125 cc/hr x 2 L ordered  · Start midodrine 5 mg TID PRN for SBP <100 or symptomatic dizziness with standing    Chronic atrial fibrillation, Loop recorder in place  · Loop recorder check personally reviewed      · No events noted on the day of his reported symptoms  · He does have a prior pause, of about 3 5 second pause on 12/12/22 at 8:47 AM  · Chronic afib with HR   · Remains on low-dose metoprolol succinate 25 mg daily, but did not receive the dose yesterday and was still dizzy today morning  · Will hold further metoprolol doses for now  · Not on anticoagulation due to history of recurrent syncope and subdural hematoma      Review of Systems   All other systems reviewed and are negative  Telemetry Review: No significant arrhythmias seen on telemetry review  Afib, HR in   HR of 40-60 are mainly during sleep hours    Objective:   Vitals: Blood pressure 136/92, pulse 78, temperature 97 7 °F (36 5 °C), temperature source Oral, resp  rate 16, height 6' (1 829 m), weight 64 9 kg (143 lb), SpO2 98 %  , Body mass index is 19 39 kg/m² ,   Orthostatic Blood Pressures    Flowsheet Row Most Recent Value   Blood Pressure 136/92 filed at 2022 0729   Patient Position - Orthostatic VS Lying filed at 2022 1789         Systolic (45JZL), DV , Min:107 , ZYF:890     Diastolic (34CZM), GVS:70, Min:59, Max:92    Wt Readings from Last 5 Encounters:   22 64 9 kg (143 lb)   22 69 1 kg (152 lb 6 4 oz)   22 67 4 kg (148 lb 9 4 oz)   10/25/22 68 5 kg (151 lb)   10/13/22 62 4 kg (137 lb 9 6 oz)     I/O        0701   0700  0701   0700  0701   0700    P  O   375     I V  (mL/kg)  1978 8 (30 5)     Total Intake(mL/kg)  2353 8 (36 3)     Urine (mL/kg/hr) 1250 1750 (1 1)     Total Output 1250 1750     Net -1250 +603 8                      Physical Exam  Vitals and nursing note reviewed  Constitutional:       General: He is not in acute distress  Appearance: Normal appearance  He is well-developed and underweight  HENT:      Head: Normocephalic and atraumatic  Nose: No congestion  Eyes:      General: No scleral icterus  Conjunctiva/sclera: Conjunctivae normal    Neck:      Vascular: No carotid bruit or JVD  Cardiovascular:      Rate and Rhythm: Normal rate  Rhythm irregular  Heart sounds: Normal heart sounds  No murmur heard  No friction rub  No gallop  Pulmonary:      Effort: Pulmonary effort is normal  No respiratory distress  Breath sounds: Normal breath sounds   No wheezing or rales  Chest:      Chest wall: No tenderness  Abdominal:      General: There is no distension  Palpations: Abdomen is soft  Tenderness: There is no abdominal tenderness  Musculoskeletal:         General: No swelling, tenderness or deformity  Cervical back: Neck supple  No muscular tenderness  Right lower leg: No edema  Left lower leg: No edema  Skin:     General: Skin is warm  Neurological:      General: No focal deficit present  Mental Status: He is alert and oriented to person, place, and time  Mental status is at baseline  Psychiatric:         Mood and Affect: Mood normal          Behavior: Behavior normal          Thought Content: Thought content normal          Laboratory Results: personally reviewed        CBC with diff:   Results from last 7 days   Lab Units 12/26/22 0425 12/25/22  1402   WBC Thousand/uL 7 98 10 15   HEMOGLOBIN g/dL 12 8 14 1   HEMATOCRIT % 40 9 43 7   MCV fL 95 92   PLATELETS Thousands/uL 193 223   MCH pg 29 6 29 7   MCHC g/dL 31 3* 32 3   RDW % 13 8 13 7   MPV fL 11 1 10 4   NRBC AUTO /100 WBCs 0 0         CMP:  Results from last 7 days   Lab Units 12/26/22  0425 12/25/22  1402   POTASSIUM mmol/L 3 7 4 0   CHLORIDE mmol/L 105 104   CO2 mmol/L 25 25   BUN mg/dL 19 21   CREATININE mg/dL 1 00 1 31*   CALCIUM mg/dL 8 4 8 7   AST U/L 10* 10*   ALT U/L 9 9   ALK PHOS U/L 99 108*   EGFR ml/min/1 73sq m 73 52         BMP:  Results from last 7 days   Lab Units 12/26/22  0425 12/25/22  1402   POTASSIUM mmol/L 3 7 4 0   CHLORIDE mmol/L 105 104   CO2 mmol/L 25 25   BUN mg/dL 19 21   CREATININE mg/dL 1 00 1 31*   CALCIUM mg/dL 8 4 8 7       BNP: No results for input(s): BNP in the last 72 hours      Magnesium:   Results from last 7 days   Lab Units 12/26/22  0425   MAGNESIUM mg/dL 1 9       Coags:       TSH:        Hemoglobin A1C       Lipid Profile:       Meds/Allergies   all current active meds have been reviewed and current meds:   Current Facility-Administered Medications   Medication Dose Route Frequency   • acetaminophen (TYLENOL) tablet 650 mg  650 mg Oral Q6H PRN   • albuterol inhalation solution 2 5 mg  2 5 mg Nebulization Q6H PRN   • Fluticasone Furoate-Vilanterol 100-25 mcg/actuation 1 puff  1 puff Inhalation Daily   • heparin (porcine) subcutaneous injection 5,000 Units  5,000 Units Subcutaneous Q8H [address]   • ipratropium (ATROVENT) 0 02 % inhalation solution 0 5 mg  0 5 mg Nebulization TID   • levalbuterol (XOPENEX) inhalation solution 1 25 mg  1 25 mg Nebulization TID   • metoprolol succinate (TOPROL-XL) 24 hr tablet 25 mg  25 mg Oral Daily   • multi-electrolyte (PLASMALYTE-A/ISOLYTE-S PH 7 4) IV solution  75 mL/hr Intravenous Continuous   • pantoprazole (PROTONIX) EC tablet 40 mg  40 mg Oral Early Morning   • tamsulosin (FLOMAX) capsule 0 4 mg  0 4 mg Oral Daily     Medications Prior to Admission   Medication   • Advair Diskus 100-50 MCG/ACT inhaler   • azelastine (ASTELIN) 0 1 % nasal spray   • docusate sodium (COLACE) 100 mg capsule   • ferrous sulfate 324 (65 Fe) mg   • folic acid (FOLVITE) 209 mcg tablet   • ipratropium-albuterol (DUO-NEB) 0 5-2 5 mg/3 mL nebulizer solution   • metoprolol succinate (Toprol XL) 25 mg 24 hr tablet   • nicotine (NICODERM CQ) 14 mg/24hr TD 24 hr patch   • nicotine (NICODERM CQ) 7 mg/24hr TD 24 hr patch   • omeprazole (PriLOSEC) 20 mg delayed release capsule   • potassium chloride (Klor-Con) 10 mEq tablet   • tamsulosin (FLOMAX) 0 4 mg     multi-electrolyte, 75 mL/hr, Last Rate: 75 mL/hr (12/26/22 2023)          Cardiac testing: reviewed  Results for orders placed during the hospital encounter of 08/03/20    Echo complete with contrast if indicated    Narrative  [address] [place] [address]    Transthoracic Echocardiogram  2D, M-mode, Doppler, and Color Doppler    Study date:  07-Aug-2020    Patient: Marcello Councilman  MR number: [id number]  Account number: [id number]  : 1945  Age: 69 years  Gender: Male  Status: Inpatient  Location: [facility]  Height: 70 in  Weight: 148 lb  BP: 123/ 70 mmHg    Indications: Heart Failure    Diagnoses: I50 9 - Heart failure, unspecified    Sonographer:  NAN Aviles  Referring Physician:  Cami Demarco MD  Group:  [facility]  Interpreting Physician:  Alfonzo Adair MD    SUMMARY    LEFT VENTRICLE:  Systolic function was normal  Ejection fraction was estimated to be 55 %  There were no regional wall motion abnormalities  Wall thickness was at the upper limits of normal     LEFT ATRIUM:  The atrium was mildly dilated  RIGHT ATRIUM:  The atrium was mildly to moderately dilated  MITRAL VALVE:  There was mild annular calcification  There was mild to moderate regurgitation  TRICUSPID VALVE:  There was moderate regurgitation  Estimated peak PA pressure was 39 mmHg  PULMONIC VALVE:  There was mild regurgitation  HISTORY: PRIOR HISTORY: CAD, Anemia Congestive heart failure  Nonischemic cardiomyopathy  Atrial fibrillation  PROCEDURE: The study was performed in the [facility]  This was a routine study  The transthoracic approach was used  The study included complete 2D imaging, M-mode, complete spectral Doppler, and color Doppler  The heart rate was 80  bpm, at the start of the study  Images were obtained from the parasternal, apical, subcostal, and suprasternal notch acoustic windows  Image quality was adequate  LEFT VENTRICLE: Size was normal  Systolic function was normal  Ejection fraction was estimated to be 55 %  There were no regional wall motion abnormalities  Wall thickness was at the upper limits of normal  DOPPLER: Transmitral flow  pattern: atrial fibrillation  RIGHT VENTRICLE: The size was normal  Systolic function was normal  Wall thickness was normal     LEFT ATRIUM: The atrium was mildly dilated      RIGHT ATRIUM: The atrium was mildly to moderately dilated  MITRAL VALVE: There was mild annular calcification  Valve structure was normal  There was mild-moderate diffuse thickening  There was normal leaflet separation  DOPPLER: The transmitral velocity was within the normal range  There was no  evidence for stenosis  There was mild to moderate regurgitation  AORTIC VALVE: The valve was trileaflet  Leaflets exhibited mildly increased thickness, normal cuspal separation, and sclerosis  DOPPLER: Transaortic velocity was within the normal range  There was no evidence for stenosis  There was no  significant regurgitation  TRICUSPID VALVE: The valve structure was normal  There was normal leaflet separation  DOPPLER: The transtricuspid velocity was within the normal range  There was no evidence for stenosis  There was moderate regurgitation  Pulmonary artery  systolic pressure was mildly increased  Estimated peak PA pressure was 39 mmHg  PULMONIC VALVE: Leaflets exhibited normal thickness, no calcification, and normal cuspal separation  DOPPLER: The transpulmonic velocity was within the normal range  There was mild regurgitation  PERICARDIUM: There was no pericardial effusion  AORTA: The root exhibited normal size  SYSTEMIC VEINS: IVC: The inferior vena cava was normal in size   Respirophasic changes were normal     SYSTEM MEASUREMENT TABLES    2D  %FS: 34 29 %  Ao Diam: 3 52 cm  EDV(Teich): 151 99 ml  EF(Teich): 62 68 %  ESV(Teich): 56 72 ml  HR_4Ch_Q: 90 87 BPM  IVSd: 1 23 cm  LA Diam: 4 97 cm  LAAs A4C: 30 77 cm2  LAESV A-L A4C: 106 62 ml  LAESV MOD A4C: 99 72 ml  LALs A4C: 7 54 cm  LVCO_4Ch_Q: 4 61 L/min  LVEF_4Ch_Q: 56 72 %  LVIDd: 5 57 cm  LVIDs: 3 66 cm  LVLd_4Ch_Q: 7 96 cm  LVLs_4Ch_Q: 6 38 cm  LVPWd: 1 08 cm  LVSV_4Ch_Q: 50 7 ml  LVVED_4Ch_Q: 89 39 ml  LVVES_4Ch_Q: 38 69 ml  RAAs: 31 12 cm2  RAESV A-L: 112 8 ml  RAESV MOD: 108 5 ml  RALs: 7 29 cm  RVIDd: 3 86 cm  SV(Teich): 95 27 ml    CW  TR Vmax: 2 78 m/s  TR maxP 97 mmHg    MM  TAPSE: 1 68 cm    Inters\[facility] [place] [facility]\"" Commission Accredited Echocardiography Laboratory    Prepared and electronically signed by    Merlin Silos, MD  Signed 07-Aug-2020 09:50:46    No results found for this or any previous visit  No results found for this or any previous visit  No results found for this or any previous visit

## 2022-12-28 LAB
ANION GAP SERPL CALCULATED.3IONS-SCNC: 7 MMOL/L (ref 4–13)
BUN SERPL-MCNC: 14 MG/DL (ref 5–25)
CALCIUM SERPL-MCNC: 8.5 MG/DL (ref 8.4–10.2)
CHLORIDE SERPL-SCNC: 104 MMOL/L (ref 96–108)
CO2 SERPL-SCNC: 28 MMOL/L (ref 21–32)
CREAT SERPL-MCNC: 0.91 MG/DL (ref 0.6–1.3)
ERYTHROCYTE [DISTWIDTH] IN BLOOD BY AUTOMATED COUNT: 13.5 % (ref 11.6–15.1)
GFR SERPL CREATININE-BSD FRML MDRD: 82 ML/MIN/1.73SQ M
GLUCOSE SERPL-MCNC: 96 MG/DL (ref 65–140)
HCT VFR BLD AUTO: 42.9 % (ref 36.5–49.3)
HGB BLD-MCNC: 13.1 G/DL (ref 12–17)
MCH RBC QN AUTO: 29.2 PG (ref 26.8–34.3)
MCHC RBC AUTO-ENTMCNC: 30.5 G/DL (ref 31.4–37.4)
MCV RBC AUTO: 96 FL (ref 82–98)
PLATELET # BLD AUTO: 192 THOUSANDS/UL (ref 149–390)
PMV BLD AUTO: 10.7 FL (ref 8.9–12.7)
POTASSIUM SERPL-SCNC: 4.5 MMOL/L (ref 3.5–5.3)
RBC # BLD AUTO: 4.49 MILLION/UL (ref 3.88–5.62)
SODIUM SERPL-SCNC: 139 MMOL/L (ref 135–147)
WBC # BLD AUTO: 6.87 THOUSAND/UL (ref 4.31–10.16)

## 2022-12-28 RX ORDER — SODIUM CHLORIDE 9 MG/ML
125 INJECTION, SOLUTION INTRAVENOUS CONTINUOUS
Status: DISPENSED | OUTPATIENT
Start: 2022-12-28 | End: 2022-12-28

## 2022-12-28 RX ORDER — FLUDROCORTISONE ACETATE 0.1 MG/1
0.1 TABLET ORAL DAILY
Status: DISCONTINUED | OUTPATIENT
Start: 2022-12-28 | End: 2022-12-30 | Stop reason: HOSPADM

## 2022-12-28 RX ADMIN — METOPROLOL SUCCINATE 25 MG: 25 TABLET, EXTENDED RELEASE ORAL at 09:01

## 2022-12-28 RX ADMIN — HEPARIN SODIUM 5000 UNITS: 5000 INJECTION INTRAVENOUS; SUBCUTANEOUS at 22:46

## 2022-12-28 RX ADMIN — LEVALBUTEROL HYDROCHLORIDE 1.25 MG: 1.25 SOLUTION, CONCENTRATE RESPIRATORY (INHALATION) at 13:32

## 2022-12-28 RX ADMIN — SODIUM CHLORIDE 125 ML/HR: 0.9 INJECTION, SOLUTION INTRAVENOUS at 12:06

## 2022-12-28 RX ADMIN — IPRATROPIUM BROMIDE 0.5 MG: 0.5 SOLUTION RESPIRATORY (INHALATION) at 13:32

## 2022-12-28 RX ADMIN — PANTOPRAZOLE SODIUM 40 MG: 40 TABLET, DELAYED RELEASE ORAL at 05:52

## 2022-12-28 RX ADMIN — LEVALBUTEROL HYDROCHLORIDE 1.25 MG: 1.25 SOLUTION, CONCENTRATE RESPIRATORY (INHALATION) at 08:35

## 2022-12-28 RX ADMIN — IPRATROPIUM BROMIDE 0.5 MG: 0.5 SOLUTION RESPIRATORY (INHALATION) at 08:35

## 2022-12-28 RX ADMIN — FLUDROCORTISONE ACETATE 0.1 MG: 0.1 TABLET ORAL at 10:25

## 2022-12-28 NOTE — PROGRESS NOTES
[address] 47  Progress Note - Delma Hope 1945, 78 y o  male MRN: [id number]  Unit/Bed#: -Jocelyn Encounter: [id number]  Primary Care Provider: Donato Vega MD   Date and time admitted to hospital: 12/25/2022  1:48 PM    * Syncope  Assessment & Plan  Presented to the ED after a near syncopal episode  Patient was sitting at the table trying to eat dinner when he had a notable lightheaded/dizziness, ended up falling forward and hitting his head on table  History of orthostatic hypotension, A-fib s/p loop recorder placement  · Likely secondary to orthostatic hypotension  · Echo (9/2022): EF 50% with mild global hypokinesis  · CTH: No acute intracranial abnormalities  · S/p 1L IVF Bolus, gentle IVFs with Plasma-Lyte  · Orthostatics remain positive, patient remains symptomatic  · Orthostatic BPs improving, 136/92->128/77 yesterday  · Cardiology consulted:  · Loop recorder interrogated, no events noted on day of reported symptoms  Prior pause of about 3 5 seconds on 12/12/2022  · S/p 2L IVFs w/ NS yesterday for orthostasis  · C/w midodrine 5 mg TID PRN for SBP <100 or symptomatic dizziness with standing  · Start fludrocortisone 0 1 mg daily, give additional 1 L IV fluid total + midodrine 2 5 mg today  · Telemetry reviewed, NSR with Afib, HR 40-70's  · Compression stockings, fall precautions  · PT/OT eval: Recommend [facility]  · CM on board safe discharge planning-> Department of Aging to evaluate patient's home on discharge    SDH (subdural hematoma)  Assessment & Plan  · S/p admission from September 2022 for SDH, Eliquis has been on hold ever since    · Neurosurgery consulted:  · Ok to resume Eliquis, as SDH has completely resolved  · Will sign off, no follow up needed  · Given multiple falls/syncopal episodes, patient at increased risk of bleeding with continued AC   · Ongoing risk vs benefit discussion of continued Eliquis use  · Continue holding Eliquis for now until cleared by Cardiology  · Eliquis price check with CM for now pending final AC determination    Permanent atrial fibrillation (HCC)  Assessment & Plan  · History of intermittent A-fib, s/p Loop recorder placement  · Home Medication: Toprol-Xl 25 mg, Eliquis  · Intermittently unable to receive BB due to hypotension/bradycardia but tolerated well yesterday  · Continue Toprol  · Eliquis on hold, pending Cardiology recommendations    Urinary retention  Assessment & Plan  · History of urinary retention  · Continue Cloud catheter while admitted, exchanged while in the ED ()  · Consider holding Flomax with syncopal episodes  · Recommend continued outpatient follow up with Urology    COPD (chronic obstructive pulmonary disease) ([facility] [address] )  Assessment & Plan  · Does not appear to be in acute exacerbation  · Continue Home Inhalers/nebulizers      VTE Pharmacologic Prophylaxis: VTE Score: 5 High Risk (Score >/= 5) - Pharmacological DVT Prophylaxis Ordered: heparin  Sequential Compression Devices Ordered  Patient Centered Rounds: I performed bedside rounds with nursing staff today  Discussions with Specialists or Other Care Team Provider: Cardiology, case management    Education and Discussions with Family / Patient: Patient declined call to   Time Spent for Care: 30 minutes  More than 50% of total time spent on counseling and coordination of care as described above  Current Length of Stay: 2 day(s)  Current Patient Status: Inpatient   Certification Statement: The patient will continue to require additional inpatient hospital stay due to Orthostasis, IV fluids  Discharge Plan: Anticipate discharge in 24-48 hrs to home with home services  Code Status: Level 1 - Full Code    Subjective:   Patient is seen at bedside this AM, on my evaluation reports improved dizziness/lightheadedness but symptoms persist during ambulation this afternoon      Objective:     Vitals:   Temp (24hrs), Av 8 °F (36 6 °C), Min:97 5 °F (36 4 °C), Max:98 °F (36 7 °C)    Temp:  [97 5 °F (36 4 °C)-98 °F (36 7 °C)] 98 °F (36 7 °C)  HR:  [64-83] 83  Resp:  [16] 16  BP: (125-145)/(75-97) 131/95  SpO2:  [96 %-100 %] 96 %  Body mass index is 19 4 kg/m²  Input and Output Summary (last 24 hours): Intake/Output Summary (Last 24 hours) at 12/28/2022 1427  Last data filed at 12/28/2022 0600  Gross per 24 hour   Intake --   Output 2700 ml   Net -2700 ml       Physical Exam:   Physical Exam  Constitutional:       General: He is not in acute distress  Appearance: He is not ill-appearing, toxic-appearing or diaphoretic  Cardiovascular:      Rate and Rhythm: Normal rate and regular rhythm  Pulses: Normal pulses  Heart sounds: Normal heart sounds  Pulmonary:      Effort: Pulmonary effort is normal  No respiratory distress  Breath sounds: Normal breath sounds  Abdominal:      General: Bowel sounds are normal  There is no distension  Palpations: Abdomen is soft  Tenderness: There is no abdominal tenderness  Musculoskeletal:         General: No swelling or tenderness  Skin:     General: Skin is warm  Neurological:      General: No focal deficit present  Mental Status: He is alert     Psychiatric:         Mood and Affect: Mood normal          Behavior: Behavior normal          Additional Data:     Labs:  Results from last 7 days   Lab Units 12/28/22  0514 12/26/22  0425   WBC Thousand/uL 6 87 7 98   HEMOGLOBIN g/dL 13 1 12 8   HEMATOCRIT % 42 9 40 9   PLATELETS Thousands/uL 192 193   NEUTROS PCT %  --  63   LYMPHS PCT %  --  26   MONOS PCT %  --  8   EOS PCT %  --  2     Results from last 7 days   Lab Units 12/28/22  0514 12/26/22  0425   SODIUM mmol/L 139 139   POTASSIUM mmol/L 4 5 3 7   CHLORIDE mmol/L 104 105   CO2 mmol/L 28 25   BUN mg/dL 14 19   CREATININE mg/dL 0 91 1 00   ANION GAP mmol/L 7 9   CALCIUM mg/dL 8 5 8 4   ALBUMIN g/dL  --  3 4*   TOTAL BILIRUBIN mg/dL  --  0 48   ALK PHOS U/L  --  99   ALT U/L  -- 9   AST U/L  --  10*   GLUCOSE RANDOM mg/dL 96 80                       Lines/Drains:  Invasive Devices     Peripheral Intravenous Line  Duration           Peripheral IV 12/25/22 Distal;Left;Upper;Ventral (anterior) Arm 3 days          Drain  Duration           Urethral Catheter Non-latex 16 Fr  2 days              Urinary Catheter:  Goal for removal: N/A - Chronic Cloud           Telemetry:  Telemetry Orders (From admission, onward)             24 Hour Telemetry Monitoring  Continuous x 24 Hours (Telem)        Expiring   References:    Telemetry Guidelines   Question:  Reason for 24 Hour Telemetry  Answer:  Syncope suspected to be cardiac in origin                 Telemetry Reviewed: Atrial fibrillation  HR averaging 80  Indication for Continued Telemetry Use: Syncope             Imaging: No pertinent imaging reviewed      Recent Cultures (last 7 days):         Last 24 Hours Medication List:   Current Facility-Administered Medications   Medication Dose Route Frequency Provider Last Rate   • acetaminophen  650 mg Oral Q6H PRN GABRIELA Torres     • albuterol  2 5 mg Nebulization Q6H PRN Kira Hernandez MD     • fludrocortisone  0 1 mg Oral Daily Dmitriy Decker MD     • Fluticasone Furoate-Vilanterol  1 puff Inhalation Daily GABRIELA Torres     • heparin (porcine)  5,000 Units Subcutaneous [facility] Darren Fraga PA-C     • ipratropium  0 5 mg Nebulization TID Kira Hernandez MD     • levalbuterol  1 25 mg Nebulization TID Kira Hernandez MD     • metoprolol succinate  25 mg Oral Daily GABRIELA Torres     • midodrine  5 mg Oral TID PRN Maria Luisa Justice MD     • pantoprazole  40 mg Oral Early Morning GABRIELA Torres     • sodium chloride  125 mL/hr Intravenous Continuous Maria Luisa Justice  mL/hr (12/28/22 1206)        Today, Patient Was Seen By: Ivan Lopez PA-C    **Please Note: This note may have been constructed using a voice recognition system  **

## 2022-12-28 NOTE — PLAN OF CARE
Problem: MOBILITY - ADULT  Goal: Maintain or return to baseline ADL function  Description: INTERVENTIONS:  -  Assess patient's ability to carry out ADLs; assess patient's baseline for ADL function and identify physical deficits which impact ability to perform ADLs (bathing, care of mouth/teeth, toileting, grooming, dressing, etc )  - Assess/evaluate cause of self-care deficits   - Assess range of motion  - Assess patient's mobility; develop plan if impaired  - Assess patient's need for assistive devices and provide as appropriate  - Encourage maximum independence but intervene and supervise when necessary  - Involve family in performance of ADLs  - Assess for home care needs following discharge   - Consider OT consult to assist with ADL evaluation and planning for discharge  - Provide patient education as appropriate  Outcome: Progressing  Goal: Maintains/Returns to pre admission functional level  Description: INTERVENTIONS:  - Perform BMAT or MOVE assessment daily    - Set and communicate daily mobility goal to care team and patient/family/caregiver  - Collaborate with rehabilitation services on mobility goals if consulted  - Perform Range of Motion 3 times a day  - Reposition patient every 2 hours    - Dangle patient 3 times a day  - Stand patient 3 times a day  - Ambulate patient 2 times a day  - Out of bed to chair 2 times a day   - Out of bed for meals 3 times a day  - Out of bed for toileting  - Record patient progress and toleration of activity level   Outcome: Progressing     Problem: NEUROSENSORY - ADULT  Goal: Achieves stable or improved neurological status  Description: INTERVENTIONS  - Monitor and report changes in neurological status  - Monitor vital signs such as temperature, blood pressure, glucose, and any other labs ordered   - Initiate measures to prevent increased intracranial pressure  - Monitor for seizure activity and implement precautions if appropriate      Outcome: Progressing Problem: RESPIRATORY - ADULT  Goal: Achieves optimal ventilation and oxygenation  Description: INTERVENTIONS:  - Assess for changes in respiratory status  - Assess for changes in mentation and behavior  - Position to facilitate oxygenation and minimize respiratory effort  - Oxygen administered by appropriate delivery if ordered  - Initiate smoking cessation education as indicated  - Encourage broncho-pulmonary hygiene including cough, deep breathe, Incentive Spirometry  - Assess the need for suctioning and aspirate as needed  - Assess and instruct to report SOB or any respiratory difficulty  - Respiratory Therapy support as indicated  Outcome: Progressing     Problem: Prexisting or High Potential for Compromised Skin Integrity  Goal: Skin integrity is maintained or improved  Description: INTERVENTIONS:  - Identify patients at risk for skin breakdown  - Assess and monitor skin integrity  - Assess and monitor nutrition and hydration status  - Monitor labs   - Assess for incontinence   - Turn and reposition patient  - Assist with mobility/ambulation  - Relieve pressure over bony prominences  - Avoid friction and shearing  - Provide appropriate hygiene as needed including keeping skin clean and dry  - Evaluate need for skin moisturizer/barrier cream  - Collaborate with interdisciplinary team   - Patient/family teaching  - Consider wound care consult   Outcome: Progressing     Problem: Potential for Falls  Goal: Patient will remain free of falls  Description: INTERVENTIONS:  - Educate patient/family on patient safety including physical limitations  - Instruct patient to call for assistance with activity   - Consult OT/PT to assist with strengthening/mobility   - Keep Call bell within reach  - Keep bed low and locked with side rails adjusted as appropriate  - Keep care items and personal belongings within reach  - Initiate and maintain comfort rounds  - Make Fall Risk Sign visible to staff  - Offer Toileting every 2 Hours, in advance of need  - Initiate/Maintain bed alarm  - Obtain necessary fall risk management equipment: bed alarm  - Apply yellow socks and bracelet for high fall risk patients  - Consider moving patient to room near nurses station  Outcome: Progressing

## 2022-12-28 NOTE — ASSESSMENT & PLAN NOTE
Presented to the ED after a near syncopal episode  Patient was sitting at the table trying to eat dinner when he had a notable lightheaded/dizziness, ended up falling forward and hitting his head on table  History of orthostatic hypotension, A-fib s/p loop recorder placement  · Likely secondary to orthostatic hypotension  · Echo (9/2022): EF 50% with mild global hypokinesis  · CTH: No acute intracranial abnormalities  · S/p 1L IVF Bolus, gentle IVFs with Plasma-Lyte  · Orthostatics remain positive, patient remains symptomatic  · Orthostatic BPs improving, 136/92->128/77 yesterday  · Cardiology consulted:  · Loop recorder interrogated, no events noted on day of reported symptoms  Prior pause of about 3 5 seconds on 12/12/2022     · S/p 2L IVFs w/ NS yesterday for orthostasis  · C/w midodrine 5 mg TID PRN for SBP <100 or symptomatic dizziness with standing  · Start fludrocortisone 0 1 mg daily, give additional 1 L IV fluid total + midodrine 2 5 mg today  · Telemetry reviewed, NSR with Afib, HR 40-70's  · Compression stockings, fall precautions  · PT/OT eval: Recommend [facility]  · CM on board safe discharge planning-> Department of Aging to evaluate patient's home on discharge

## 2022-12-28 NOTE — DISCHARGE SUMMARY
[address]  Discharge- [facility] 1945, 78 y o  male MRN: [id number]  Unit/Bed#: -01 Encounter: [id number]  Primary Care Provider: Ирина Skaggs MD   Date and time admitted to hospital: 12/25/2022  1:48 PM    * Syncope  Assessment & Plan  Presented to the ED after a near syncopal episode  Patient was sitting at the table trying to eat dinner when he had a notable lightheaded/dizziness, ended up falling forward and hitting his head on table  History of orthostatic hypotension, A-fib s/p loop recorder placement  · Likely secondary to orthostatic hypotension  · Echo (9/2022): EF 50% with mild global hypokinesis  · CTH: No acute intracranial abnormalities  · S/p 1L IVF Bolus, continue with gentle IVFs with Plasma-Lyte  · Symptomatically improved, still with intermittent dizziness at times with ambulation, however orthostatic BP now negative  · Cardiology consulted:  · Loop recorder interrogated, no events noted on day of reported symptoms  Prior pause of about 3 5 seconds on 12/12/2022  · S/p 3 L IVF w/ NS total  · C/w fludrocortisone 0 1 mg daily, midodrine 2 5 mg TID + 5 mg TID PRN SBP <100 or orthostasis  · Telemetry reviewed, NSR with Afib, HR 40-70's  Will discontinue   · Compression stockings, fall precautions  · PT/OT eval: Recommend HHC  · CM on board safe discharge planning -> Department of Aging to evaluate patient's home on discharge    SDH (subdural hematoma)  Assessment & Plan  · S/p admission from September 2022 for SDH, Eliquis has been on hold ever since    · Neurosurgery consulted:  · Ok to resume Eliquis from this standpoint as SDH has completely resolved  · Given multiple falls/syncopal episodes, patient at increased risk of bleeding with continued AC  · Eliquis price check with CM done, sent via mail order scripts days prior  · Resume anticoagulation with Eliquis per Cardiology    Permanent atrial fibrillation [facility])  Assessment & Plan  · History of intermittent A-fib, s/p Loop recorder placement  Cardiology reviewed loop and did not show abnormalities   · Home Medication: Toprol-Xl 25 mg  · Intermittently unable to receive BB due to hypotension/bradycardia but tolerated well yesterday  · Continue Toprol    Moderate protein-calorie malnutrition (HCC)  Assessment & Plan  Malnutrition Findings:   · Encourage proper nutrition                                 BMI Findings: Body mass index is 18 97 kg/m²  Urinary retention  Assessment & Plan  · History of urinary retention  · Continue Cloud catheter while admitted, exchanged while in the ED (12/25)  · Consider holding Flomax with syncopal episodes  · Recommend continued outpatient follow up with Urology    COPD (chronic obstructive pulmonary disease) ([facility] [address] )  Assessment & Plan  · Does not appear to be in acute exacerbation  · Continue Home Inhalers/nebulizers    Medical Problems     Resolved Problems  Date Reviewed: 12/29/2022   None       Discharging Physician / Practitioner: Timothy Lora PA-C  PCP: Catalina Everett MD  Admission Date:   Admission Orders (From admission, onward)     Ordered        12/26/22 1624  Inpatient Admission  Once            12/25/22 1652  Place in Observation  Once                      Discharge Date: 12/30/22    Consultations During Hospital Stay:  · Cardiology  · Neurosurgery  · PT    Procedures Performed:   · None    Significant Findings / Test Results:   CT head without contrast   Final Result by Hernan Chicas MD (12/25 1553)      No acute intracranial abnormality  Workstation performed: [id number]         XR chest 1 view portable   ED Interpretation by Maida Sherman PA-C (12/25 1439)   nad      Final Result by Smiley Wasserman MD (12/25 1810)   Emphysema      No acute cardiopulmonary disease        Findings are stable            Workstation performed: [id number]             Incidental Findings:   · None    Test Results Pending at Discharge (will require follow up): · None     Outpatient Tests Requested:  · Follow-up with cardiology for ongoing management of atrial fibrillation and orthostatic hypotension  · Follow-up with urology for ongoing management of chronic urinary retention and Cloud catheter exchange within the next month    Complications: None    Reason for Admission: Syncope    Hospital Course:   Delma Couch is a 78 y o  male patient, PMH permanent atrial fibrillation s/p loop recorder placement, subdural hematoma, chronic urinary retention, COPD, who originally presented to the hospital on 12/25/2022 due to syncope  Patient had near syncopal episode while sitting at rest, known history of orthostatic hypotension and atrial fibrillation with loop recorder placement  On arrival to ED, work-up overall unremarkable, positive orthostatic hypotension, CT head negative for acute findings  Patient's chronic Cloud catheter was exchanged on arrival, placed on telemetry for monitoring with Cardiology consult  During admission, patient had persistent symptomatic orthostatic hypotension, Cardiology suspects syncopal episodes to be from orthostasis, less likely from intermittent bradycardia seen on telemetry or beta-blocker  Loop recorder was interrogated during admission, no events noted on day of reported symptoms  Patient was treated with IV fluid hydration, started on midodrine and fludrocortisone for orthostasis with improvement in symptoms  Neurosurgery was consulted given history of subdural hematoma, no contraindications to resume anticoagulation with Eliquis with resolution of SDH, cleared to resume Eliquis by cardiology on discharge  Patient will need close outpatient follow-up with Cardiology  Please see above list of diagnoses and related plan for additional information       Condition at Discharge: stable    Discharge Day Visit / Exam:   Subjective: Patient is seen at bedside this a m , reports resolution of dizziness/lightheadedness, states he feels ready for discharge home  Vitals: Blood Pressure: 127/65 (12/30/22 0800)  Pulse: 71 (12/30/22 0800)  Temperature: 98 °F (36 7 °C) (12/30/22 0800)  Temp Source: Oral (12/30/22 0800)  Respirations: 20 (12/30/22 0800)  Height: 6' (182 9 cm) (12/25/22 1833)  Weight - Scale: 63 5 kg (139 lb 14 4 oz) (12/30/22 0551)  SpO2: 97 % (12/30/22 0800)  Exam:   Physical Exam  Constitutional:       General: He is not in acute distress  Appearance: He is not ill-appearing, toxic-appearing or diaphoretic  Cardiovascular:      Rate and Rhythm: Normal rate and regular rhythm  Pulses: Normal pulses  Heart sounds: Normal heart sounds  Pulmonary:      Effort: Pulmonary effort is normal  No respiratory distress  Breath sounds: Normal breath sounds  Abdominal:      General: Bowel sounds are normal  There is no distension  Palpations: Abdomen is soft  Tenderness: There is no abdominal tenderness  Musculoskeletal:         General: No swelling or tenderness  Skin:     General: Skin is warm  Neurological:      General: No focal deficit present  Mental Status: He is alert  Psychiatric:         Mood and Affect: Mood normal          Behavior: Behavior normal          Discussion with Family: Attempted to update  (wife) via phone  Unable to contact  Discharge instructions/Information to patient and family:   See after visit summary for information provided to patient and family  Provisions for Follow-Up Care:  See after visit summary for information related to follow-up care and any pertinent home health orders  Disposition:   Home with [facility] Services (Reminder: Complete face to face encounter)    Planned Readmission: None     Discharge Statement:  I spent 45 minutes discharging the patient  This time was spent on the day of discharge  I had direct contact with the patient on the day of discharge   Greater than 50% of the total time was spent examining patient, answering all patient questions, arranging and discussing plan of care with patient as well as directly providing post-discharge instructions  Additional time then spent on discharge activities  Discharge Medications:  See after visit summary for reconciled discharge medications provided to patient and/or family        **Please Note: This note may have been constructed using a voice recognition system**

## 2022-12-28 NOTE — PLAN OF CARE
Problem: MOBILITY - ADULT  Goal: Maintain or return to baseline ADL function  Description: INTERVENTIONS:  -  Assess patient's ability to carry out ADLs; assess patient's baseline for ADL function and identify physical deficits which impact ability to perform ADLs (bathing, care of mouth/teeth, toileting, grooming, dressing, etc )  - Assess/evaluate cause of self-care deficits   - Assess range of motion  - Assess patient's mobility; develop plan if impaired  - Assess patient's need for assistive devices and provide as appropriate  - Encourage maximum independence but intervene and supervise when necessary  - Involve family in performance of ADLs  - Assess for home care needs following discharge   - Consider OT consult to assist with ADL evaluation and planning for discharge  - Provide patient education as appropriate  Outcome: Progressing  Goal: Maintains/Returns to pre admission functional level  Description: INTERVENTIONS:  - Perform BMAT or MOVE assessment daily    - Set and communicate daily mobility goal to care team and patient/family/caregiver  - Collaborate with rehabilitation services on mobility goals if consulted  - Perform Range of Motion 2 times a day  - Reposition patient every 2 hours    - Dangle patient 2 times a day  - Stand patient 2 times a day  - Ambulate patient 2 times a day  - Out of bed to chair 2 times a day   - Out of bed for meals 2 times a day  - Out of bed for toileting  - Record patient progress and toleration of activity level   Outcome: Progressing     Problem: NEUROSENSORY - ADULT  Goal: Achieves stable or improved neurological status  Description: INTERVENTIONS  - Monitor and report changes in neurological status  - Monitor vital signs such as temperature, blood pressure, glucose, and any other labs ordered   - Initiate measures to prevent increased intracranial pressure  - Monitor for seizure activity and implement precautions if appropriate      Outcome: Progressing Problem: RESPIRATORY - ADULT  Goal: Achieves optimal ventilation and oxygenation  Description: INTERVENTIONS:  - Assess for changes in respiratory status  - Assess for changes in mentation and behavior  - Position to facilitate oxygenation and minimize respiratory effort  - Oxygen administered by appropriate delivery if ordered  - Initiate smoking cessation education as indicated  - Encourage broncho-pulmonary hygiene including cough, deep breathe, Incentive Spirometry  - Assess the need for suctioning and aspirate as needed  - Assess and instruct to report SOB or any respiratory difficulty  - Respiratory Therapy support as indicated  Outcome: Progressing     Problem: Prexisting or High Potential for Compromised Skin Integrity  Goal: Skin integrity is maintained or improved  Description: INTERVENTIONS:  - Identify patients at risk for skin breakdown  - Assess and monitor skin integrity  - Assess and monitor nutrition and hydration status  - Monitor labs   - Assess for incontinence   - Turn and reposition patient  - Assist with mobility/ambulation  - Relieve pressure over bony prominences  - Avoid friction and shearing  - Provide appropriate hygiene as needed including keeping skin clean and dry  - Evaluate need for skin moisturizer/barrier cream  - Collaborate with interdisciplinary team   - Patient/family teaching  - Consider wound care consult   Outcome: Progressing     Problem: Potential for Falls  Goal: Patient will remain free of falls  Description: INTERVENTIONS:  - Educate patient/family on patient safety including physical limitations  - Instruct patient to call for assistance with activity   - Consult OT/PT to assist with strengthening/mobility   - Keep Call bell within reach  - Keep bed low and locked with side rails adjusted as appropriate  - Keep care items and personal belongings within reach  - Initiate and maintain comfort rounds  - Make Fall Risk Sign visible to staff  - Offer Toileting every 2 Hours, in advance of need  - Initiate/Maintain alarm  - Obtain necessary fall risk management equipment  - Apply yellow socks and bracelet for high fall risk patients  - Consider moving patient to room near nurses station  Outcome: Progressing

## 2022-12-28 NOTE — ASSESSMENT & PLAN NOTE
· S/p admission from September 2022 for SDH, Eliquis has been on hold ever since    · Neurosurgery consulted:  · Ok to resume Eliquis, as SDH has completely resolved  · Will sign off, no follow up needed  · Given multiple falls/syncopal episodes, patient at increased risk of bleeding with continued AC   · Ongoing risk vs benefit discussion of continued Eliquis use  · Continue holding Eliquis for now until cleared by Cardiology  · Eliquis price check with CM for now [facility]

## 2022-12-28 NOTE — ASSESSMENT & PLAN NOTE
· History of intermittent A-fib, s/p Loop recorder placement  · Home Medication: Toprol-Xl 25 mg, Eliquis  · Intermittently unable to receive BB due to hypotension/bradycardia but tolerated well yesterday  · Continue Toprol  · Eliquis on hold, pending Cardiology recommendations

## 2022-12-28 NOTE — PROGRESS NOTES
[facility]    Cardiology Progress Note  Flor Soto 78 y o  male   YOB: 1945 MRN: [id number]  Unit/Bed#: -Jocelyn Encounter: [id number]      Subjective:   He got IV fluids yesterday and felt a little bit better, but still reports having had some dizziness this morning with quickly sitting up  He does not feel comfortable standing up he had due to the dizziness    Assessments  Principal Problem:    Syncope  Active Problems:    Permanent atrial fibrillation (HCC)    COPD (chronic obstructive pulmonary disease) (HCC)    Urinary retention    SDH (subdural hematoma)      Plan  Recurrent syncope, Symptomatic orthostatic hypotension  · Overview  · Initially positive orthostatics, and HERNÁN, which is improved with IV fluid repletion, but he continues to have dizziness on standing up with mild drop in diastolic pressures  · He does have A  fib with slow heart rates and heart rates down to 40s, but there were no pauses on the device check, and his symptoms seem to continue to occur even during periods where his heart rate is on the lower side  · Impression  · Suspect his symptoms are more related to orthostatic hypotension  · Plan  · S/p 2 L IV NS yesterday, but did seem to have a lot of diuresis as well after this  Net +600 cc only  · Repeat orthostatic vitals now  · He still feels dizzy with sitting up  · Start Fludrocortisone 0 1 mg daily  · Will give 1 more L NS today after fludrocortisone  · Give midodrine 2 5 in the afternoon, and re-evaluate orthostatics  · Continue midodrine 5 mg TID PRN for SBP <100 or symptomatic dizziness with standing    Chronic atrial fibrillation, Loop recorder in place  · Loop recorder check personally reviewed      · No events noted on the day of his reported symptoms  · He does have a prior pause, of about 3 5 second pause on 12/12/22 at 8:47 AM  · Chronic afib with HR   · Metoprolol succinate 25 mg daily was already given yesterday and he tolerated it fine without any significant bradycardia  · Continue metoprolol succinate 25 mg daily   · Not on anticoagulation due to prior history of SDH, but repeat CT head on 22 was without acute intracranial pathology, and he is now cleared to receive same from neurosurgical standpoint  · With his multiple falls/syncope, he remains at risk of having problems with anticoagulation, hold for now while we await improvement in dizziness    Discussed with primary medical service    Review of Systems   All other systems reviewed and are negative  Telemetry Review: No significant arrhythmias seen on telemetry review  Afib, HR in   HR of 40-60 are mainly during sleep hours    Objective:   Vitals: Blood pressure 125/79, pulse 75, temperature 98 °F (36 7 °C), temperature source Oral, resp  rate 16, height 6' (1 829 m), weight 64 9 kg (143 lb 1 3 oz), SpO2 97 %  , Body mass index is 19 4 kg/m² ,   Orthostatic Blood Pressures    Flowsheet Row Most Recent Value   Blood Pressure 125/79 filed at 2022 0810   Patient Position - Orthostatic VS Lying filed at 2022 4572         Systolic (45FDG), SOL:708 , Min:122 , ABHINAV:973     Diastolic (46FKO), UC, Min:70, Max:97    Wt Readings from Last 5 Encounters:   22 64 9 kg (143 lb 1 3 oz)   22 69 1 kg (152 lb 6 4 oz)   22 67 4 kg (148 lb 9 4 oz)   10/25/22 68 5 kg (151 lb)   10/13/22 62 4 kg (137 lb 9 6 oz)     I/O        0701   0700  0701   0700  0701   0700    P  O   375     I V  (mL/kg)  1978 8 (30 5)     Total Intake(mL/kg)  2353 8 (36 3)     Urine (mL/kg/hr) 1250 1750 (1 1)     Total Output 1250 1750     Net -1250 +603 8                  Physical Exam  Vitals and nursing note reviewed  Constitutional:       General: He is not in acute distress  Appearance: Normal appearance  He is well-developed and underweight  HENT:      Head: Normocephalic and atraumatic  Nose: No congestion     Eyes: General: No scleral icterus  Conjunctiva/sclera: Conjunctivae normal    Neck:      Vascular: No carotid bruit or JVD  Cardiovascular:      Rate and Rhythm: Normal rate  Rhythm irregular  Heart sounds: Normal heart sounds  No murmur heard  No friction rub  No gallop  Pulmonary:      Effort: Pulmonary effort is normal  No respiratory distress  Breath sounds: Normal breath sounds  No wheezing or rales  Chest:      Chest wall: No tenderness  Abdominal:      General: There is no distension  Palpations: Abdomen is soft  Tenderness: There is no abdominal tenderness  Musculoskeletal:         General: No swelling, tenderness or deformity  Cervical back: Neck supple  No muscular tenderness  Right lower leg: No edema  Left lower leg: No edema  Skin:     General: Skin is warm  Neurological:      General: No focal deficit present  Mental Status: He is alert and oriented to person, place, and time  Mental status is at baseline  Psychiatric:         Mood and Affect: Mood normal          Behavior: Behavior normal          Thought Content:  Thought content normal          Laboratory Results: personally reviewed        CBC with diff:   Results from last 7 days   Lab Units 12/28/22  0514 12/26/22  0425 12/25/22  1402   WBC Thousand/uL 6 87 7 98 10 15   HEMOGLOBIN g/dL 13 1 12 8 14 1   HEMATOCRIT % 42 9 40 9 43 7   MCV fL 96 95 92   PLATELETS Thousands/uL 192 193 223   MCH pg 29 2 29 6 29 7   MCHC g/dL 30 5* 31 3* 32 3   RDW % 13 5 13 8 13 7   MPV fL 10 7 11 1 10 4   NRBC AUTO /100 WBCs  --  0 0         CMP:  Results from last 7 days   Lab Units 12/28/22  0514 12/26/22  0425 12/25/22  1402   POTASSIUM mmol/L 4 5 3 7 4 0   CHLORIDE mmol/L 104 105 104   CO2 mmol/L 28 25 25   BUN mg/dL 14 19 21   CREATININE mg/dL 0 91 1 00 1 31*   CALCIUM mg/dL 8 5 8 4 8 7   AST U/L  --  10* 10*   ALT U/L  --  9 9   ALK PHOS U/L  --  99 108*   EGFR ml/min/1 73sq m 82 73 52         BMP:  Results from last 7 days   Lab Units 12/28/22  0514 12/26/22  0425 12/25/22  1402   POTASSIUM mmol/L 4 5 3 7 4 0   CHLORIDE mmol/L 104 105 104   CO2 mmol/L 28 25 25   BUN mg/dL 14 19 21   CREATININE mg/dL 0 91 1 00 1 31*   CALCIUM mg/dL 8 5 8 4 8 7       BNP: No results for input(s): BNP in the last 72 hours      Magnesium:   Results from last 7 days   Lab Units 12/26/22  0425   MAGNESIUM mg/dL 1 9       Coags:       TSH:        Hemoglobin A1C       Lipid Profile:       Meds/Allergies   all current active meds have been reviewed and current meds:   Current Facility-Administered Medications   Medication Dose Route Frequency   • acetaminophen (TYLENOL) tablet 650 mg  650 mg Oral Q6H PRN   • albuterol inhalation solution 2 5 mg  2 5 mg Nebulization Q6H PRN   • Fluticasone Furoate-Vilanterol 100-25 mcg/actuation 1 puff  1 puff Inhalation Daily   • heparin (porcine) subcutaneous injection 5,000 Units  5,000 Units Subcutaneous Q8H [address]   • ipratropium (ATROVENT) 0 02 % inhalation solution 0 5 mg  0 5 mg Nebulization TID   • levalbuterol (XOPENEX) inhalation solution 1 25 mg  1 25 mg Nebulization TID   • metoprolol succinate (TOPROL-XL) 24 hr tablet 25 mg  25 mg Oral Daily   • midodrine (PROAMATINE) tablet 5 mg  5 mg Oral TID PRN   • pantoprazole (PROTONIX) EC tablet 40 mg  40 mg Oral Early Morning     Medications Prior to Admission   Medication   • Advair Diskus 100-50 MCG/ACT inhaler   • azelastine (ASTELIN) 0 1 % nasal spray   • docusate sodium (COLACE) 100 mg capsule   • ferrous sulfate 324 (65 Fe) mg   • folic acid (FOLVITE) 607 mcg tablet   • ipratropium-albuterol (DUO-NEB) 0 5-2 5 mg/3 mL nebulizer solution   • metoprolol succinate (Toprol XL) 25 mg 24 hr tablet   • nicotine (NICODERM CQ) 14 mg/24hr TD 24 hr patch   • nicotine (NICODERM CQ) 7 mg/24hr TD 24 hr patch   • omeprazole (PriLOSEC) 20 mg delayed release capsule   • potassium chloride (Klor-Con) 10 mEq tablet   • tamsulosin (FLOMAX) 0 4 mg            Cardiac testing: reviewed  Results for orders placed during the hospital encounter of 20    Echo complete with contrast if indicated    Narrative  [address]  [address]    Transthoracic Echocardiogram  2D, M-mode, Doppler, and Color Doppler    Study date:  07-Aug-2020    Patient: Flaquita Dalton  MR number: [id number]  Account number: [de-identified]  : 1945  Age: 69 years  Gender: Male  Status: Inpatient  Location: [facility]  Height: 70 in  Weight: 148 lb  BP: 123/ 70 mmHg    Indications: Heart Failure    Diagnoses: I50 9 - Heart failure, unspecified    Sonographer:  NAN Saunders  Referring Physician:  Loretta Ramirez MD  Group:  [facility]  Interpreting Physician:  Yanet Lopez MD    SUMMARY    LEFT VENTRICLE:  Systolic function was normal  Ejection fraction was estimated to be 55 %  There were no regional wall motion abnormalities  Wall thickness was at the upper limits of normal     LEFT ATRIUM:  The atrium was mildly dilated  RIGHT ATRIUM:  The atrium was mildly to moderately dilated  MITRAL VALVE:  There was mild annular calcification  There was mild to moderate regurgitation  TRICUSPID VALVE:  There was moderate regurgitation  Estimated peak PA pressure was 39 mmHg  PULMONIC VALVE:  There was mild regurgitation  HISTORY: PRIOR HISTORY: CAD, Anemia Congestive heart failure  Nonischemic cardiomyopathy  Atrial fibrillation  PROCEDURE: The study was performed in the [facility]  This was a routine study  The transthoracic approach was used  The study included complete 2D imaging, M-mode, complete spectral Doppler, and color Doppler  The heart rate was 80  bpm, at the start of the study  Images were obtained from the parasternal, apical, subcostal, and suprasternal notch acoustic windows  Image quality was adequate      LEFT VENTRICLE: Size was normal  Systolic function was normal  Ejection fraction was estimated to be 55 %  There were no regional wall motion abnormalities  Wall thickness was at the upper limits of normal  DOPPLER: Transmitral flow  pattern: atrial fibrillation  RIGHT VENTRICLE: The size was normal  Systolic function was normal  Wall thickness was normal     LEFT ATRIUM: The atrium was mildly dilated  RIGHT ATRIUM: The atrium was mildly to moderately dilated  MITRAL VALVE: There was mild annular calcification  Valve structure was normal  There was mild-moderate diffuse thickening  There was normal leaflet separation  DOPPLER: The transmitral velocity was within the normal range  There was no  evidence for stenosis  There was mild to moderate regurgitation  AORTIC VALVE: The valve was trileaflet  Leaflets exhibited mildly increased thickness, normal cuspal separation, and sclerosis  DOPPLER: Transaortic velocity was within the normal range  There was no evidence for stenosis  There was no  significant regurgitation  TRICUSPID VALVE: The valve structure was normal  There was normal leaflet separation  DOPPLER: The transtricuspid velocity was within the normal range  There was no evidence for stenosis  There was moderate regurgitation  Pulmonary artery  systolic pressure was mildly increased  Estimated peak PA pressure was 39 mmHg  PULMONIC VALVE: Leaflets exhibited normal thickness, no calcification, and normal cuspal separation  DOPPLER: The transpulmonic velocity was within the normal range  There was mild regurgitation  PERICARDIUM: There was no pericardial effusion  AORTA: The root exhibited normal size  SYSTEMIC VEINS: IVC: The inferior vena cava was normal in size   Respirophasic changes were normal     SYSTEM MEASUREMENT TABLES    2D  %FS: 34 29 %  Ao Diam: 3 52 cm  EDV(Teich): 151 99 ml  EF(Teich): 62 68 %  ESV(Teich): 56 72 ml  HR_4Ch_Q: 90 87 BPM  IVSd: 1 23 cm  LA Diam: 4 97 cm  LAAs A4C: 30 77 cm2  LAESV A-L A4C: 106 62 ml  LAESV MOD A4C: 99 72 ml  LALs A4C: 7 54 cm  LVCO_4Ch_Q: 4 61 L/min  LVEF_4Ch_Q: 56 72 %  LVIDd: 5 57 cm  LVIDs: 3 66 cm  LVLd_4Ch_Q: 7 96 cm  LVLs_4Ch_Q: 6 38 cm  LVPWd: 1 08 cm  LVSV_4Ch_Q: 50 7 ml  LVVED_4Ch_Q: 89 39 ml  LVVES_4Ch_Q: 38 69 ml  RAAs: 31 12 cm2  RAESV A-L: 112 8 ml  RAESV MOD: 108 5 ml  RALs: 7 29 cm  RVIDd: 3 86 cm  SV(Teich): 95 27 ml    CW  TR Vmax: 2 78 m/s  TR maxP 97 mmHg    MM  TAPSE: 1 68 cm    [facility] Accredited Echocardiography Laboratory    Prepared and electronically signed by    Brooke Greenwood MD  Signed 07-Aug-2020 09:50:46    No results found for this or any previous visit  No results found for this or any previous visit  No results found for this or any previous visit

## 2022-12-29 RX ORDER — MIDODRINE HYDROCHLORIDE 5 MG/1
2.5 TABLET ORAL
Status: DISCONTINUED | OUTPATIENT
Start: 2022-12-29 | End: 2022-12-30 | Stop reason: HOSPADM

## 2022-12-29 RX ADMIN — MIDODRINE HYDROCHLORIDE 2.5 MG: 5 TABLET ORAL at 17:42

## 2022-12-29 RX ADMIN — IPRATROPIUM BROMIDE 0.5 MG: 0.5 SOLUTION RESPIRATORY (INHALATION) at 08:47

## 2022-12-29 RX ADMIN — MIDODRINE HYDROCHLORIDE 2.5 MG: 5 TABLET ORAL at 14:05

## 2022-12-29 RX ADMIN — LEVALBUTEROL HYDROCHLORIDE 1.25 MG: 1.25 SOLUTION, CONCENTRATE RESPIRATORY (INHALATION) at 08:47

## 2022-12-29 RX ADMIN — FLUTICASONE FUROATE AND VILANTEROL TRIFENATATE 1 PUFF: 100; 25 POWDER RESPIRATORY (INHALATION) at 09:01

## 2022-12-29 RX ADMIN — METOPROLOL SUCCINATE 25 MG: 25 TABLET, EXTENDED RELEASE ORAL at 08:13

## 2022-12-29 RX ADMIN — PANTOPRAZOLE SODIUM 40 MG: 40 TABLET, DELAYED RELEASE ORAL at 05:17

## 2022-12-29 RX ADMIN — HEPARIN SODIUM 5000 UNITS: 5000 INJECTION INTRAVENOUS; SUBCUTANEOUS at 14:06

## 2022-12-29 RX ADMIN — HEPARIN SODIUM 5000 UNITS: 5000 INJECTION INTRAVENOUS; SUBCUTANEOUS at 21:59

## 2022-12-29 RX ADMIN — HEPARIN SODIUM 5000 UNITS: 5000 INJECTION INTRAVENOUS; SUBCUTANEOUS at 05:17

## 2022-12-29 RX ADMIN — FLUDROCORTISONE ACETATE 0.1 MG: 0.1 TABLET ORAL at 08:13

## 2022-12-29 NOTE — ASSESSMENT & PLAN NOTE
Presented to the ED after a near syncopal episode  Patient was sitting at the table trying to eat dinner when he had a notable lightheaded/dizziness, ended up falling forward and hitting his head on table  History of orthostatic hypotension, A-fib s/p loop recorder placement  · Likely secondary to orthostatic hypotension  · Echo (9/2022): EF 50% with mild global hypokinesis  · CTH: No acute intracranial abnormalities  · S/p 1L IVF Bolus, continue with gentle IVFs with Plasma-Lyte  · Orthostatics remain positive, patient remains symptomatic again today (12/28)  · Cardiology consulted:  · Loop recorder interrogated, no events noted on day of reported symptoms  Prior pause of about 3 5 seconds on 12/12/2022  · C/w midodrine 5 mg TID PRN for SBP <100 or symptomatic dizziness with standing  · Started fludrocortisone 0 1 mg daily, gave additional 1 L IV fluid total + midodrine 2 5 mg yesterday with some improvements  Midodrine 2 5 mg TID ordered and will continue this for today and then home on 12/30   · Telemetry reviewed, NSR with Afib, HR 40-70's   Will discontinue   · Compression stockings, fall precautions  · PT/OT eval: Recommend [facility]  · CM on board safe discharge planning-> Department of Aging to evaluate patient's home on discharge

## 2022-12-29 NOTE — ASSESSMENT & PLAN NOTE
· S/p admission from September 2022 for SDH, Eliquis has been on hold ever since    · Neurosurgery consulted:  · Ok to resume Eliquis from this standpoint as SDH has completely resolved  · Given multiple falls/syncopal episodes, patient at increased risk of bleeding with continued AC   · Ongoing risk vs benefit discussion of continued Eliquis use  · Eliquis price check with CM done however not likely to restart given fall risk

## 2022-12-29 NOTE — ASSESSMENT & PLAN NOTE
Malnutrition Findings:   · Encourage proper nutrition                                 BMI Findings: Body mass index is 19 26 kg/m²

## 2022-12-29 NOTE — PROGRESS NOTES
Sarah U  68   Progress Note - Fabiola Garcia 1945, 78 y o  male MRN: [id number]  Unit/Bed#: -01 Encounter: [id number]  Primary Care Provider: Kimani Dunham MD   Date and time admitted to hospital: 12/25/2022  1:48 PM    * Syncope  Assessment & Plan  Presented to the ED after a near syncopal episode  Patient was sitting at the table trying to eat dinner when he had a notable lightheaded/dizziness, ended up falling forward and hitting his head on table  History of orthostatic hypotension, A-fib s/p loop recorder placement  · Likely secondary to orthostatic hypotension  · Echo (9/2022): EF 50% with mild global hypokinesis  · CTH: No acute intracranial abnormalities  · S/p 1L IVF Bolus, continue with gentle IVFs with Plasma-Lyte  · Orthostatics remain positive, patient remains symptomatic again today (12/28)  · Cardiology consulted:  · Loop recorder interrogated, no events noted on day of reported symptoms  Prior pause of about 3 5 seconds on 12/12/2022  · C/w midodrine 5 mg TID PRN for SBP <100 or symptomatic dizziness with standing  · Started fludrocortisone 0 1 mg daily, gave additional 1 L IV fluid total + midodrine 2 5 mg yesterday with some improvements  Midodrine 2 5 mg TID ordered and will continue this for today and then home on 12/30   · Telemetry reviewed, NSR with Afib, HR 40-70's  Will discontinue   · Compression stockings, fall precautions  · PT/OT eval: Recommend HHC  · CM on board safe discharge planning-> Department of Aging to evaluate patient's home on discharge    COPD (chronic obstructive pulmonary disease) ([facility] [address] )  Assessment & Plan  · Does not appear to be in acute exacerbation  · Continue Home Inhalers/nebulizers    Permanent atrial fibrillation (HCC)  Assessment & Plan  · History of intermittent A-fib, s/p Loop recorder placement   Cardiology reviewed loop and did not show abnormalities   · Home Medication: Toprol-Xl 25 mg  · Intermittently unable to receive BB due to hypotension/bradycardia but tolerated well yesterday  · Continue Toprol  · Eliquis on hold, given the patient's fall risk, will hold on this treatment     SDH (subdural hematoma)  Assessment & Plan  · S/p admission from September 2022 for SDH, Eliquis has been on hold ever since  · Neurosurgery consulted:  · Tigist Juanves to resume Eliquis from this standpoint as SDH has completely resolved  · Given multiple falls/syncopal episodes, patient at increased risk of bleeding with continued AC   · Ongoing risk vs benefit discussion of continued Eliquis use  · Eliquis price check with CM done however not likely to restart given fall risk     Urinary retention  Assessment & Plan  · History of urinary retention  · Continue Cloud catheter while admitted, exchanged while in the ED (12/25)  · Consider holding Flomax with syncopal episodes  · Recommend continued outpatient follow up with Urology    Moderate protein-calorie malnutrition ([facility] [address] )  Assessment & Plan  Malnutrition Findings:   · Encourage proper nutrition                                 BMI Findings: Body mass index is 19 26 kg/m²  VTE Pharmacologic Prophylaxis: VTE Score: 5 High Risk (Score >/= 5) - Pharmacological DVT Prophylaxis Ordered: enoxaparin (Lovenox)  Sequential Compression Devices Ordered  Patient Centered Rounds: I performed bedside rounds with nursing staff today  Discussions with Specialists or Other Care Team Provider: Primary RN and CM    Education and Discussions with Family / Patient: Patient declined call to   Time Spent for Care: 45 minutes  More than 50% of total time spent on counseling and coordination of care as described above      Current Length of Stay: 3 day(s)  Current Patient Status: Inpatient   Certification Statement: The patient will continue to require additional inpatient hospital stay due to orthostatic hypotension and medication titration   Discharge Plan: Anticipate discharge tomorrow to home     Code Status: Level 1 - Full Code    Subjective:   Still with dizziness and unsteadiness when standing and walking  No passing out today  Objective:     Vitals:   Temp (24hrs), Av 1 °F (36 7 °C), Min:97 7 °F (36 5 °C), Max:98 5 °F (36 9 °C)    Temp:  [97 7 °F (36 5 °C)-98 5 °F (36 9 °C)] 98 5 °F (36 9 °C)  HR:  [71-98] 90  Resp:  [16-20] 16  BP: ()/(51-81) 99/69  SpO2:  [95 %-99 %] 96 %  Body mass index is 19 26 kg/m²  Input and Output Summary (last 24 hours): Intake/Output Summary (Last 24 hours) at 2022 1334  Last data filed at 2022 0511  Gross per 24 hour   Intake 375 ml   Output 1375 ml   Net -1000 ml       Physical Exam:   Physical Exam  Constitutional:       Appearance: He is not ill-appearing  HENT:      Nose: Nose normal       Mouth/Throat:      Mouth: Mucous membranes are moist    Cardiovascular:      Rate and Rhythm: Normal rate  Rhythm irregular  Pulses: Normal pulses  Heart sounds: Normal heart sounds  Pulmonary:      Effort: Pulmonary effort is normal       Breath sounds: Normal breath sounds  No stridor  Abdominal:      General: Abdomen is flat  Palpations: Abdomen is soft  Musculoskeletal:         General: No swelling  Normal range of motion  Skin:     General: Skin is warm and dry  Capillary Refill: Capillary refill takes less than 2 seconds  Neurological:      General: No focal deficit present  Mental Status: He is alert and oriented to person, place, and time     Psychiatric:         Mood and Affect: Mood normal          Behavior: Behavior normal             Additional Data:     Labs:  Results from last 7 days   Lab Units 22  0514 22  0425   WBC Thousand/uL 6 87 7 98   HEMOGLOBIN g/dL 13 1 12 8   HEMATOCRIT % 42 9 40 9   PLATELETS Thousands/uL 192 193   NEUTROS PCT %  --  63   LYMPHS PCT %  --  26   MONOS PCT %  --  8   EOS PCT %  --  2     Results from last 7 days   Lab Units 22  0514 22  0425 SODIUM mmol/L 139 139   POTASSIUM mmol/L 4 5 3 7   CHLORIDE mmol/L 104 105   CO2 mmol/L 28 25   BUN mg/dL 14 19   CREATININE mg/dL 0 91 1 00   ANION GAP mmol/L 7 9   CALCIUM mg/dL 8 5 8 4   ALBUMIN g/dL  --  3 4*   TOTAL BILIRUBIN mg/dL  --  0 48   ALK PHOS U/L  --  99   ALT U/L  --  9   AST U/L  --  10*   GLUCOSE RANDOM mg/dL 96 80                       Lines/Drains:  Invasive Devices     Peripheral Intravenous Line  Duration           Peripheral IV 12/25/22 Distal;Left;Upper;Ventral (anterior) Arm 3 days          Drain  Duration           Urethral Catheter Non-latex 16 Fr  3 days              Urinary Catheter:  Goal for removal: N/A - Chronic Cloud               Imaging: Reviewed radiology reports from this admission including: ECHO    Recent Cultures (last 7 days):         Last 24 Hours Medication List:   Current Facility-Administered Medications   Medication Dose Route Frequency Provider Last Rate   • acetaminophen  650 mg Oral Q6H PRN Branch ShowGABRIELA art     • albuterol  2 5 mg Nebulization Q6H PRN Caro Tejada MD     • fludrocortisone  0 1 mg Oral Daily Priscila Cardoza MD     • Fluticasone Furoate-Vilanterol  1 puff Inhalation Daily Branch ShowGABRIELA art     • heparin (porcine)  5,000 Units Subcutaneous Q8H [address] Vandana Fraga PA-C     • ipratropium  0 5 mg Nebulization TID Caro Tejada MD     • levalbuterol  1 25 mg Nebulization TID Caro Tejada MD     • metoprolol succinate  25 mg Oral Daily Branch ShowGABRIELA art     • midodrine  2 5 mg Oral TID AC Priscila Cardoza MD     • midodrine  5 mg Oral TID PRN Priscila Cardoza MD     • pantoprazole  40 mg Oral Early Morning Branch ShowGABRIELA art          Today, Patient Was Seen By: GABRIELA Baum    **Please Note: This note may have been constructed using a voice recognition system  **

## 2022-12-29 NOTE — ASSESSMENT & PLAN NOTE
· History of intermittent A-fib, s/p Loop recorder placement   Cardiology reviewed loop and did not show abnormalities   · Home Medication: Toprol-Xl 25 mg  · Intermittently unable to receive BB due to hypotension/bradycardia but tolerated well yesterday  · Continue Toprol  · Eliquis on hold, given the patient's fall risk, will hold on this treatment

## 2022-12-29 NOTE — PROGRESS NOTES
[facility]    Cardiology Progress Note  Kevin Mcintosh 78 y o  male   YOB: 1945 MRN: [id number]  Unit/Bed#: -Jocelyn Encounter: [id number]      Subjective:   He got IV fluids yesterday and felt a little bit better, but still reports having had some dizziness this morning with quickly sitting up  He does not feel comfortable standing up he had due to the dizziness    Assessments  Principal Problem:    Syncope  Active Problems:    Permanent atrial fibrillation (HCC)    COPD (chronic obstructive pulmonary disease) (HCC)    Urinary retention    SDH (subdural hematoma)      Plan  Recurrent syncope, Symptomatic orthostatic hypotension  · Overview  · Initially positive orthostatics, and HERNÁN, which is improved with IV fluid repletion, but he continues to have dizziness on standing up with mild drop in diastolic pressures  · He does have A  fib with slow heart rates and heart rates down to 40s, but there were no pauses on the device check, and his symptoms seem to continue to occur even during periods where his heart rate is on the lower side  · Impression  · Symptoms seem mostly related to orthostatic hypotension  · Plan  · S/p 3 L IV NS   · He feels a bit better, but still reports some dizziness at times when he gets up  · Recheck orthostatic vital signs today  · Continue fludrocortisone 0 1 mg daily  · Start midodrine 2 5 mg 3 times daily and use additional 5 mg as needed for SBP < 100 or orthostatic dizziness    Chronic atrial fibrillation, Loop recorder in place  · Loop recorder check personally reviewed      · No events noted on the day of his reported symptoms  · He does have a prior pause, of about 3 5 second pause on 12/12/22 at 8:47 AM  · Chronic afib with HR   · Tolerating metoprolol  · Continue metoprolol succinate 25 mg daily  · Symptoms are mainly orthostatic, and do not seem to be related to bradycardia for now  · Can continue to monitor on telemetry and follow-up on loop recorder checks outpatient  · Not on anticoagulation due to prior history of SDH, but repeat CT head on 12/25/22 was without acute intracranial pathology, and he is now cleared to receive same from neurosurgical standpoint  But with his multiple falls/syncope, he remains at risk of having problems with anticoagulation, hold for now while we await improvement in dizziness      Review of Systems   All other systems reviewed and are negative  Telemetry Review: No significant events on telemetry review  Afib, HR in   HR of 40-60 are mainly during sleep hours    Objective:   Vitals: Blood pressure 127/77, pulse 71, temperature 97 8 °F (36 6 °C), temperature source Oral, resp  rate 20, height 6' (1 829 m), weight 64 4 kg (141 lb 15 6 oz), SpO2 96 %  , Body mass index is 19 26 kg/m² ,   Orthostatic Blood Pressures    Flowsheet Row Most Recent Value   Blood Pressure 127/77 filed at 12/29/2022 0814   Patient Position - Orthostatic VS Lying filed at 12/29/2022 5441         Systolic (71NCI), MQF:729 , Min:97 , VNB:533     Diastolic (52XLT), WGO:83, Min:51, Max:95    Wt Readings from Last 5 Encounters:   12/29/22 64 4 kg (141 lb 15 6 oz)   11/30/22 69 1 kg (152 lb 6 4 oz)   11/22/22 67 4 kg (148 lb 9 4 oz)   10/25/22 68 5 kg (151 lb)   10/13/22 62 4 kg (137 lb 9 6 oz)     I/O       12/25 0701  12/26 0700 12/26 0701  12/27 0700 12/27 0701  12/28 0700    P  O   375     I V  (mL/kg)  1978 8 (30 5)     Total Intake(mL/kg)  2353 8 (36 3)     Urine (mL/kg/hr) 1250 1750 (1 1)     Total Output 1250 1750     Net -1250 +603 8                  Physical Exam  Vitals and nursing note reviewed  Constitutional:       General: He is not in acute distress  Appearance: Normal appearance  He is underweight  He is not ill-appearing or diaphoretic  HENT:      Head: Normocephalic and atraumatic  Nose: No congestion  Eyes:      General: No scleral icterus       Conjunctiva/sclera: Conjunctivae normal    Neck: Vascular: No carotid bruit or JVD  Cardiovascular:      Rate and Rhythm: Normal rate  Rhythm irregular  Heart sounds: Normal heart sounds  No murmur heard  No friction rub  No gallop  Pulmonary:      Effort: Pulmonary effort is normal  No respiratory distress  Breath sounds: Normal breath sounds  No wheezing or rales  Chest:      Chest wall: No tenderness  Abdominal:      General: There is no distension  Palpations: Abdomen is soft  Tenderness: There is no abdominal tenderness  Musculoskeletal:         General: No swelling, tenderness or deformity  Cervical back: Neck supple  No muscular tenderness  Right lower leg: No edema  Left lower leg: No edema  Skin:     General: Skin is warm  Neurological:      General: No focal deficit present  Mental Status: He is alert and oriented to person, place, and time  Mental status is at baseline  Psychiatric:         Mood and Affect: Mood normal          Behavior: Behavior normal  Behavior is cooperative  Thought Content:  Thought content normal          Laboratory Results: personally reviewed        CBC with diff:   Results from last 7 days   Lab Units 12/28/22 0514 12/26/22 0425 12/25/22  1402   WBC Thousand/uL 6 87 7 98 10 15   HEMOGLOBIN g/dL 13 1 12 8 14 1   HEMATOCRIT % 42 9 40 9 43 7   MCV fL 96 95 92   PLATELETS Thousands/uL 192 193 223   MCH pg 29 2 29 6 29 7   MCHC g/dL 30 5* 31 3* 32 3   RDW % 13 5 13 8 13 7   MPV fL 10 7 11 1 10 4   NRBC AUTO /100 WBCs  --  0 0         CMP:  Results from last 7 days   Lab Units 12/28/22 0514 12/26/22 0425 12/25/22  1402   POTASSIUM mmol/L 4 5 3 7 4 0   CHLORIDE mmol/L 104 105 104   CO2 mmol/L 28 25 25   BUN mg/dL 14 19 21   CREATININE mg/dL 0 91 1 00 1 31*   CALCIUM mg/dL 8 5 8 4 8 7   AST U/L  --  10* 10*   ALT U/L  --  9 9   ALK PHOS U/L  --  99 108*   EGFR ml/min/1 73sq m 82 73 52         BMP:  Results from last 7 days   Lab Units 12/28/22 0514 12/26/22 0425 12/25/22  1402   POTASSIUM mmol/L 4 5 3 7 4 0   CHLORIDE mmol/L 104 105 104   CO2 mmol/L 28 25 25   BUN mg/dL 14 19 21   CREATININE mg/dL 0 91 1 00 1 31*   CALCIUM mg/dL 8 5 8 4 8 7       BNP: No results for input(s): BNP in the last 72 hours      Magnesium:   Results from last 7 days   Lab Units 12/26/22  0425   MAGNESIUM mg/dL 1 9       Coags:       TSH:        Hemoglobin A1C       Lipid Profile:       Meds/Allergies   all current active meds have been reviewed and current meds:   Current Facility-Administered Medications   Medication Dose Route Frequency   • acetaminophen (TYLENOL) tablet 650 mg  650 mg Oral Q6H PRN   • albuterol inhalation solution 2 5 mg  2 5 mg Nebulization Q6H PRN   • fludrocortisone (FLORINEF) tablet 0 1 mg  0 1 mg Oral Daily   • Fluticasone Furoate-Vilanterol 100-25 mcg/actuation 1 puff  1 puff Inhalation Daily   • heparin (porcine) subcutaneous injection 5,000 Units  5,000 Units Subcutaneous Q8H [address]   • ipratropium (ATROVENT) 0 02 % inhalation solution 0 5 mg  0 5 mg Nebulization TID   • levalbuterol (XOPENEX) inhalation solution 1 25 mg  1 25 mg Nebulization TID   • metoprolol succinate (TOPROL-XL) 24 hr tablet 25 mg  25 mg Oral Daily   • midodrine (PROAMATINE) tablet 5 mg  5 mg Oral TID PRN   • pantoprazole (PROTONIX) EC tablet 40 mg  40 mg Oral Early Morning     Medications Prior to Admission   Medication   • Advair Diskus 100-50 MCG/ACT inhaler   • azelastine (ASTELIN) 0 1 % nasal spray   • docusate sodium (COLACE) 100 mg capsule   • ferrous sulfate 324 (65 Fe) mg   • folic acid (FOLVITE) 440 mcg tablet   • ipratropium-albuterol (DUO-NEB) 0 5-2 5 mg/3 mL nebulizer solution   • metoprolol succinate (Toprol XL) 25 mg 24 hr tablet   • nicotine (NICODERM CQ) 14 mg/24hr TD 24 hr patch   • nicotine (NICODERM CQ) 7 mg/24hr TD 24 hr patch   • omeprazole (PriLOSEC) 20 mg delayed release capsule   • potassium chloride (Klor-Con) 10 mEq tablet   • tamsulosin (FLOMAX) 0 4 mg            Cardiac testing: reviewed  Results for orders placed during the hospital encounter of 20    Echo complete with contrast if indicated    Narrative  [facility] [address]  [address]    Transthoracic Echocardiogram  2D, M-mode, Doppler, and Color Doppler    Study date:  07-Aug-2020    Patient: Deanna Bella  MR number: [id number]  Account number: [de-identified]  : 1945  Age: 69 years  Gender: Male  Status: Inpatient  Location: [facility]  Height: 70 in  Weight: 148 lb  BP: 123/ 70 mmHg    Indications: Heart Failure    Diagnoses: I50 9 - Heart failure, unspecified    Sonographer:  NAN Au  Referring Physician:  Kirstie Tillman MD  Group:  [facility]  Interpreting Physician:  Darvin Sales MD    SUMMARY    LEFT VENTRICLE:  Systolic function was normal  Ejection fraction was estimated to be 55 %  There were no regional wall motion abnormalities  Wall thickness was at the upper limits of normal     LEFT ATRIUM:  The atrium was mildly dilated  RIGHT ATRIUM:  The atrium was mildly to moderately dilated  MITRAL VALVE:  There was mild annular calcification  There was mild to moderate regurgitation  TRICUSPID VALVE:  There was moderate regurgitation  Estimated peak PA pressure was 39 mmHg  PULMONIC VALVE:  There was mild regurgitation  HISTORY: PRIOR HISTORY: CAD, Anemia Congestive heart failure  Nonischemic cardiomyopathy  Atrial fibrillation  PROCEDURE: The study was performed in the [facility]  This was a routine study  The transthoracic approach was used  The study included complete 2D imaging, M-mode, complete spectral Doppler, and color Doppler  The heart rate was 80  bpm, at the start of the study  Images were obtained from the parasternal, apical, subcostal, and suprasternal notch acoustic windows  Image quality was adequate      LEFT VENTRICLE: Size was normal  Systolic function was normal  Ejection fraction was estimated to be 55 %  There were no regional wall motion abnormalities  Wall thickness was at the upper limits of normal  DOPPLER: Transmitral flow  pattern: atrial fibrillation  RIGHT VENTRICLE: The size was normal  Systolic function was normal  Wall thickness was normal     LEFT ATRIUM: The atrium was mildly dilated  RIGHT ATRIUM: The atrium was mildly to moderately dilated  MITRAL VALVE: There was mild annular calcification  Valve structure was normal  There was mild-moderate diffuse thickening  There was normal leaflet separation  DOPPLER: The transmitral velocity was within the normal range  There was no  evidence for stenosis  There was mild to moderate regurgitation  AORTIC VALVE: The valve was trileaflet  Leaflets exhibited mildly increased thickness, normal cuspal separation, and sclerosis  DOPPLER: Transaortic velocity was within the normal range  There was no evidence for stenosis  There was no  significant regurgitation  TRICUSPID VALVE: The valve structure was normal  There was normal leaflet separation  DOPPLER: The transtricuspid velocity was within the normal range  There was no evidence for stenosis  There was moderate regurgitation  Pulmonary artery  systolic pressure was mildly increased  Estimated peak PA pressure was 39 mmHg  PULMONIC VALVE: Leaflets exhibited normal thickness, no calcification, and normal cuspal separation  DOPPLER: The transpulmonic velocity was within the normal range  There was mild regurgitation  PERICARDIUM: There was no pericardial effusion  AORTA: The root exhibited normal size  SYSTEMIC VEINS: IVC: The inferior vena cava was normal in size   Respirophasic changes were normal     SYSTEM MEASUREMENT TABLES    2D  %FS: 34 29 %  Ao Diam: 3 52 cm  EDV(Teich): 151 99 ml  EF(Teich): 62 68 %  ESV(Teich): 56 72 ml  HR_4Ch_Q: 90 87 BPM  IVSd: 1 23 cm  LA Diam: 4 97 cm  LAAs A4C: 30 77 cm2  LAESV A-L A4C: 106 62 ml  LAESV MOD A4C: 99 72 ml  LALs A4C: 7 54 cm  LVCO_4Ch_Q: 4 61 L/min  LVEF_4Ch_Q: 56 72 %  LVIDd: 5 57 cm  LVIDs: 3 66 cm  LVLd_4Ch_Q: 7 96 cm  LVLs_4Ch_Q: 6 38 cm  LVPWd: 1 08 cm  LVSV_4Ch_Q: 50 7 ml  LVVED_4Ch_Q: 89 39 ml  LVVES_4Ch_Q: 38 69 ml  RAAs: 31 12 cm2  RAESV A-L: 112 8 ml  RAESV MOD: 108 5 ml  RALs: 7 29 cm  RVIDd: 3 86 cm  SV(Teich): 95 27 ml    CW  TR Vmax: 2 78 m/s  TR maxP 97 mmHg    MM  TAPSE: 1 68 cm    [facility] Accredited [facility]ch[facility]    Prepared and electronically signed by    Sarah Fuentes MD  Signed 07-Aug-2020 09:50:46    No results found for this or any previous visit  No results found for this or any previous visit  No results found for this or any previous visit

## 2022-12-29 NOTE — CASE MANAGEMENT
Case Management Assessment & Discharge Planning Note    Patient name Dilma Muñoz  Location /-28 MRN [id number]  : 1945 Date 2022       Current Admission Date: 2022  Current Admission Diagnosis:Syncope   Patient Active Problem List    Diagnosis Date Noted   • Gastroesophageal reflux disease without esophagitis 2022   • Swelling of lower leg 2022   • Surgical wound present 10/05/2022   • SDH (subdural hematoma) 2022   • Recurrent right inguinal hernia 2022   • Nasal congestion 2022   • Encounter for support and coordination of transition of care 2022   • Moderate protein-calorie malnutrition ([address] ) 2022   • Cholestatic jaundice 2022   • Iron deficiency anemia likely secondary to GI bleeding 2022   • Abnormal colonoscopy 2022   • Dizziness 2022   • Urinary retention 2022   • Essential (hemorrhagic) thrombocythemia ([facility] ) 2022   • Closed nondisplaced comminuted fracture of left patella 2021   • Head trauma 2021   • Thrombocytosis 2021   • Bradycardia 2021   • Syncope 2021   • Colonic adenoma 2021   • Ambulatory dysfunction 2021   • COPD (chronic obstructive pulmonary disease) ([facility] ) 2021   • Orthostatic hypotension 2020   • Smoker 2020   • Symptomatic anemia 2020   • Congestive cardiomyopathy ([facility] ) 2020   • Permanent atrial fibrillation ([facility] ) 2020      LOS (days): 3  Geometric Mean LOS (GMLOS) (days): 2 30  Days to GMLOS:-0 6     OBJECTIVE:    Risk of Unplanned Readmission Score: 19 89         Current admission status: Inpatient       Preferred Pharmacy:   [address] [place] [address],[address]  [facility] [address] [place] [address]  Phone: [phone] Fax: [phone]    Primary Care Provider: Ernie Shipman MD    Primary Insurance: MEDICARE  Secondary Insurance:  FOR LIFE    ASSESSMENT:  Active Health Care Proxies     Fani M[facility]ra N [facility] Representative - Spouse   Primary Phone: [phone] (Mobile)                              Patient Information  Admitted from[de-identified] Home  Mental Status: Alert  During Assessment patient was accompanied by: Spouse  Assessment information provided by[de-identified] Spouse, Patient  Primary Caregiver: Self  Support Systems: Spouse/significant other  [place] of Residence: [address] [place] [address] do you live in?: [place]  Number of steps to enter home : One Flight  Do the steps have railings?: Yes  Type of Current Residence: Apartment  Floor Level: 1  Upon entering residence, is there a bedroom on the main floor (no further steps)?: No  A bedroom is located on the following floor levels of residence (select all that apply):: 2nd Floor (sleeps on couch on main floor)  Indicate which floors of current residence have a bathroom (select all the apply):: 2nd Floor (half bath on first floor)  In the last 12 months, was there a time when you were not able to pay the mortgage or rent on time?: No  In the last 12 months, how many places have you lived?: 1  In the last 12 months, was there a time when you did not have a steady place to sleep or slept in a shelter (including now)?: No  Living Arrangements: Lives w/ Spouse/significant other    Activities of Daily Living Prior to Admission  Functional Status: Independent  Completes ADLs independently?: Yes  Ambulates independently?: Yes  Does patient use assisted devices?: Yes  Assisted Devices (DME) used: [employer]  Does patient currently own DME?: Yes  What DME does the patient currently own?: [employer]  Does patient have a history of Outpatient Therapy (PT/OT)?: No  Does the patient have a history of Short-Term Rehab?: Yes ([facility])  Does patient have a history of HHC?: Yes  Does patient currently have [address]?: No         Patient Information Continued  Within the past 12 months, you worried that your food would run out before you got the money to buy more : Never true  Within the past 12 months, the food you bought just didn't last and you didn't have money to get more : Never true         Means of Transportation  In the past 12 months, has lack of transportation kept you from medical appointments or from getting medications?: No  In the past 12 months, has lack of transportation kept you from meetings, work, or from getting things needed for daily living?: No        DISCHARGE DETAILS:    Discharge planning discussed with[de-identified] patient and pt's spouse     Comments - Freedom of Choice: CM discussed dcp with patient and spouse, PT rcmd for [place] [address]  Pt and spouse relayed therapies had come to home previously but had ended services due to langley infestation in pt's home, which spouse reported kendy has refused to have addressed  Pt also relayed concerns for return to home due to syncopal episodes  CM contacted [facility], s/w Linda Reynaga, placed referral for assistance within [facility] to follow up with pt in community for further service need  Pt aware does not currently qualify for SNF STR per PT eval and treat at this time  Pt choice for [facility] as had previously - referral sent in aidin, confirmed for [facility] upon d/c  [facility] and [place] [address] agency to f/u with pt upon d/c for further home and community needs  CM remains available for any further CM d/c needs  CM contacted family/caregiver?: Yes  Were Treatment Team discharge recommendations reviewed with patient/caregiver?: Yes  Did patient/caregiver verbalize understanding of patient care needs?: Yes  Were patient/caregiver advised of the risks associated with not following Treatment Team discharge recommendations?: Yes    Contacts  Patient Contacts: Arina Pelaez (Spouse)  Relationship to Patient[de-identified] Family  Contact Method:  In Person  Reason/Outcome: Discharge Planning, Referral    [address]         Is the patient interested in [place] [address] at discharge?: Yes  [address] requested[de-identified] Nursing, Occupational Therapy, Physical [address] Name[de-identified] Other (Fawadlyylrogen Danay) [address] Provider[de-identified] PCP  Home Health Services Needed[de-identified] Gait/ADL Training, Evaluate Functional Status and Safety, Strengthening/Theraputic Exercises to Improve Function  Homebound Criteria Met[de-identified] Requires the Assistance of Another Person for Safe Ambulation or to Leave the Home, Uses an Assist Device (i e  cane, walker, etc)  Supporting Clincal Findings[de-identified] Limited Endurance, Fatigues Easliy in [employer]    DME Referral Provided  Referral made for DME?: No    Other Referral/Resources/Interventions Provided:  Interventions: HHC  Referral Comments: PT currently rcmd HHC - referral sent to [facility] via aidin, [facility] upon d/c           [facility]: [facility]  Discharge Destination Plan[de-identified] Home with Ciaran at Discharge : [facility]

## 2022-12-29 NOTE — PLAN OF CARE
Problem: MOBILITY - ADULT  Goal: Maintain or return to baseline ADL function  Description: INTERVENTIONS:  -  Assess patient's ability to carry out ADLs; assess patient's baseline for ADL function and identify physical deficits which impact ability to perform ADLs (bathing, care of mouth/teeth, toileting, grooming, dressing, etc )  - Assess/evaluate cause of self-care deficits   - Assess range of motion  - Assess patient's mobility; develop plan if impaired  - Assess patient's need for assistive devices and provide as appropriate  - Encourage maximum independence but intervene and supervise when necessary  - Involve family in performance of ADLs  - Assess for home care needs following discharge   - Consider OT consult to assist with ADL evaluation and planning for discharge  - Provide patient education as appropriate  Outcome: Progressing  Goal: Maintains/Returns to pre admission functional level  Description: INTERVENTIONS:  - Perform BMAT or MOVE assessment daily    - Set and communicate daily mobility goal to care team and patient/family/caregiver  - Collaborate with rehabilitation services on mobility goals if consulted  - Perform Range of Motion 3 times a day  - Reposition patient every 2 hours    - Dangle patient 3 times a day  - Stand patient 3 times a day  - Ambulate patient 3 times a day  - Out of bed to chair 3 times a day   - Out of bed for meals 3 times a day  - Out of bed for toileting  - Record patient progress and toleration of activity level   Outcome: Progressing     Problem: NEUROSENSORY - ADULT  Goal: Achieves stable or improved neurological status  Description: INTERVENTIONS  - Monitor and report changes in neurological status  - Monitor vital signs such as temperature, blood pressure, glucose, and any other labs ordered   - Initiate measures to prevent increased intracranial pressure  - Monitor for seizure activity and implement precautions if appropriate      Outcome: Progressing Problem: RESPIRATORY - ADULT  Goal: Achieves optimal ventilation and oxygenation  Description: INTERVENTIONS:  - Assess for changes in respiratory status  - Assess for changes in mentation and behavior  - Position to facilitate oxygenation and minimize respiratory effort  - Oxygen administered by appropriate delivery if ordered  - Initiate smoking cessation education as indicated  - Encourage broncho-pulmonary hygiene including cough, deep breathe, Incentive Spirometry  - Assess the need for suctioning and aspirate as needed  - Assess and instruct to report SOB or any respiratory difficulty  - Respiratory Therapy support as indicated  Outcome: Progressing     Problem: Prexisting or High Potential for Compromised Skin Integrity  Goal: Skin integrity is maintained or improved  Description: INTERVENTIONS:  - Identify patients at risk for skin breakdown  - Assess and monitor skin integrity  - Assess and monitor nutrition and hydration status  - Monitor labs   - Assess for incontinence   - Turn and reposition patient  - Assist with mobility/ambulation  - Relieve pressure over bony prominences  - Avoid friction and shearing  - Provide appropriate hygiene as needed including keeping skin clean and dry  - Evaluate need for skin moisturizer/barrier cream  - Collaborate with interdisciplinary team   - Patient/family teaching  - Consider wound care consult   Outcome: Progressing     Problem: Potential for Falls  Goal: Patient will remain free of falls  Description: INTERVENTIONS:  - Educate patient/family on patient safety including physical limitations  - Instruct patient to call for assistance with activity   - Consult OT/PT to assist with strengthening/mobility   - Keep Call bell within reach  - Keep bed low and locked with side rails adjusted as appropriate  - Keep care items and personal belongings within reach  - Initiate and maintain comfort rounds  - Make Fall Risk Sign visible to staff  - Offer Toileting every 2 Hours, in advance of need  - Initiate/Maintain bed alarm  - Obtain necessary fall risk management equipment:   - Apply yellow socks and bracelet for high fall risk patients  - Consider moving patient to room near nurses station  Outcome: Progressing

## 2022-12-30 VITALS
TEMPERATURE: 98 F | RESPIRATION RATE: 20 BRPM | OXYGEN SATURATION: 97 % | DIASTOLIC BLOOD PRESSURE: 65 MMHG | WEIGHT: 139.9 LBS | SYSTOLIC BLOOD PRESSURE: 127 MMHG | HEART RATE: 71 BPM | HEIGHT: 72 IN | BODY MASS INDEX: 18.95 KG/M2

## 2022-12-30 LAB
ATRIAL RATE: 242 BPM
PR INTERVAL: 154 MS
QRS AXIS: 53 DEGREES
QRSD INTERVAL: 97 MS
QT INTERVAL: 395 MS
QTC INTERVAL: 476 MS
T WAVE AXIS: 77 DEGREES
VENTRICULAR RATE: 87 BPM

## 2022-12-30 RX ORDER — MIDODRINE HYDROCHLORIDE 2.5 MG/1
2.5 TABLET ORAL
Qty: 90 TABLET | Refills: 0 | Status: SHIPPED | OUTPATIENT
Start: 2022-12-30 | End: 2022-12-30 | Stop reason: SDUPTHER

## 2022-12-30 RX ORDER — FLUDROCORTISONE ACETATE 0.1 MG/1
0.1 TABLET ORAL DAILY
Qty: 30 TABLET | Refills: 0 | Status: SHIPPED | OUTPATIENT
Start: 2022-12-31 | End: 2022-12-30 | Stop reason: SDUPTHER

## 2022-12-30 RX ORDER — MIDODRINE HYDROCHLORIDE 2.5 MG/1
2.5 TABLET ORAL
Qty: 90 TABLET | Refills: 0 | Status: SHIPPED | OUTPATIENT
Start: 2022-12-30 | End: 2023-01-29

## 2022-12-30 RX ORDER — MIDODRINE HYDROCHLORIDE 5 MG/1
5 TABLET ORAL 3 TIMES DAILY PRN
Qty: 30 TABLET | Refills: 0 | Status: SHIPPED | OUTPATIENT
Start: 2022-12-30 | End: 2022-12-30 | Stop reason: SDUPTHER

## 2022-12-30 RX ORDER — FLUDROCORTISONE ACETATE 0.1 MG/1
0.1 TABLET ORAL DAILY
Qty: 30 TABLET | Refills: 0 | Status: SHIPPED | OUTPATIENT
Start: 2022-12-31 | End: 2023-01-30

## 2022-12-30 RX ORDER — MIDODRINE HYDROCHLORIDE 5 MG/1
5 TABLET ORAL 3 TIMES DAILY PRN
Qty: 30 TABLET | Refills: 0 | Status: SHIPPED | OUTPATIENT
Start: 2022-12-30

## 2022-12-30 RX ADMIN — HEPARIN SODIUM 5000 UNITS: 5000 INJECTION INTRAVENOUS; SUBCUTANEOUS at 06:33

## 2022-12-30 RX ADMIN — PANTOPRAZOLE SODIUM 40 MG: 40 TABLET, DELAYED RELEASE ORAL at 06:33

## 2022-12-30 RX ADMIN — METOPROLOL SUCCINATE 25 MG: 25 TABLET, EXTENDED RELEASE ORAL at 10:54

## 2022-12-30 RX ADMIN — MIDODRINE HYDROCHLORIDE 2.5 MG: 5 TABLET ORAL at 10:54

## 2022-12-30 RX ADMIN — MIDODRINE HYDROCHLORIDE 2.5 MG: 5 TABLET ORAL at 06:33

## 2022-12-30 RX ADMIN — FLUDROCORTISONE ACETATE 0.1 MG: 0.1 TABLET ORAL at 10:53

## 2022-12-30 NOTE — ASSESSMENT & PLAN NOTE
Presented to the ED after a near syncopal episode  Patient was sitting at the table trying to eat dinner when he had a notable lightheaded/dizziness, ended up falling forward and hitting his head on table  History of orthostatic hypotension, A-fib s/p loop recorder placement  · Likely secondary to orthostatic hypotension  · Echo (9/2022): EF 50% with mild global hypokinesis  · CTH: No acute intracranial abnormalities  · S/p 1L IVF Bolus, continue with gentle IVFs with Plasma-Lyte  · Symptomatically improved, still with intermittent dizziness at times with ambulation, however orthostatic BP now negative  · Cardiology consulted:  · Loop recorder interrogated, no events noted on day of reported symptoms  Prior pause of about 3 5 seconds on 12/12/2022  · S/p 3 L IVF w/ NS total  · C/w fludrocortisone 0 1 mg daily, midodrine 2 5 mg TID + 5 mg TID PRN SBP <100 or orthostasis  · Telemetry reviewed, NSR with Afib, HR 40-70's   Will discontinue   · Compression stockings, fall precautions  · PT/OT eval: Recommend HHC  · CM on board safe discharge planning -> Department of Aging to evaluate patient's home on discharge

## 2022-12-30 NOTE — PLAN OF CARE
Problem: MOBILITY - ADULT  Goal: Maintain or return to baseline ADL function  Description: INTERVENTIONS:  -  Assess patient's ability to carry out ADLs; assess patient's baseline for ADL function and identify physical deficits which impact ability to perform ADLs (bathing, care of mouth/teeth, toileting, grooming, dressing, etc )  - Assess/evaluate cause of self-care deficits   - Assess range of motion  - Assess patient's mobility; develop plan if impaired  - Assess patient's need for assistive devices and provide as appropriate  - Encourage maximum independence but intervene and supervise when necessary  - Involve family in performance of ADLs  - Assess for home care needs following discharge   - Consider OT consult to assist with ADL evaluation and planning for discharge  - Provide patient education as appropriate  Outcome: Progressing     Problem: NEUROSENSORY - ADULT  Goal: Achieves stable or improved neurological status  Description: INTERVENTIONS  - Monitor and report changes in neurological status  - Monitor vital signs such as temperature, blood pressure, glucose, and any other labs ordered   - Initiate measures to prevent increased intracranial pressure  - Monitor for seizure activity and implement precautions if appropriate      Outcome: Progressing     Problem: RESPIRATORY - ADULT  Goal: Achieves optimal ventilation and oxygenation  Description: INTERVENTIONS:  - Assess for changes in respiratory status  - Assess for changes in mentation and behavior  - Position to facilitate oxygenation and minimize respiratory effort  - Oxygen administered by appropriate delivery if ordered  - Initiate smoking cessation education as indicated  - Encourage broncho-pulmonary hygiene including cough, deep breathe, Incentive Spirometry  - Assess the need for suctioning and aspirate as needed  - Assess and instruct to report SOB or any respiratory difficulty  - Respiratory Therapy support as indicated  Outcome: Progressing     Problem: Potential for Falls  Goal: Patient will remain free of falls  Description: INTERVENTIONS:  - Educate patient/family on patient safety including physical limitations  - Instruct patient to call for assistance with activity   - Consult OT/PT to assist with strengthening/mobility   - Keep Call bell within reach  - Keep bed low and locked with side rails adjusted as appropriate  - Keep care items and personal belongings within reach  - Initiate and maintain comfort rounds  - Make Fall Risk Sign visible to staff  - Offer Toileting every  Hours, in advance of need  - Initiate/Maintain alarm  - Obtain necessary fall risk management equipment:   - Apply yellow socks and bracelet for high fall risk patients  - Consider moving patient to room near nurses station  Outcome: Progressing

## 2022-12-30 NOTE — ASSESSMENT & PLAN NOTE
Malnutrition Findings:   · Encourage proper nutrition                                 BMI Findings: Body mass index is 18 97 kg/m²

## 2022-12-30 NOTE — PROGRESS NOTES
[facility]    Cardiology Progress Note  Ngozi Glynn 78 y o  male   YOB: 1945 MRN: [id number]  Unit/Bed#: -Jocelyn Encounter: [id number]      Subjective:   He feels better overall and no acute symptoms of dizziness  He did get up and walk around within the room yesterday  Assessments  Principal Problem:    Syncope  Active Problems:    Permanent atrial fibrillation (HCC)    COPD (chronic obstructive pulmonary disease) (HCC)    Urinary retention    Moderate protein-calorie malnutrition (HCC)    SDH (subdural hematoma)      Plan  Recurrent syncope, Symptomatic orthostatic hypotension  · Overview  · Initially positive orthostatics, and HERNÁN, which is improved with IV fluid repletion, but he continues to have dizziness on standing up with mild drop in diastolic pressures  · He does have A  fib with slow heart rates and heart rates down to 40s, but there were no pauses on the device check, and his symptoms seem to continue to occur even during periods where his heart rate is on the lower side  · Impression  · Symptoms seem mostly related to orthostatic hypotension  · Plan  · S/p 3 L IV NS   · Overall doing better, orthostatics negative yesterday  · Continue fludrocortisone 0 1 mg daily, midodrine 2 5 mg 3 times daily  · Can take extra midodrine 5 mg as needed for severe dizziness or SBP less than 100    Chronic atrial fibrillation, Loop recorder in place  · Loop recorder check personally reviewed  · No events noted on the day of his reported symptoms  · He does have a prior pause, of about 3 5 second pause on 12/12/22 at 8:47 AM  · Chronic afib with HR   · Continue metoprolol succinate 25 mg daily  · Previously not on anticoagulation due to prior history of SDH, but repeat CT head on 12/25/22 was without acute intracranial pathology, and he is now cleared to receive same from neurosurgical standpoint     · I had a long discussion with him today regarding risks and benefits of anticoagulation in his case in order to protect him from a stroke  He is agreeable to restarting anticoagulation at this point  · With his improved dizziness, okay to resume anticoagulation with Eliquis at this point    Stable for discharge from cardiac standpoint with outpatient cardiology follow-up  Discussed with our medical service Veronica Méndez PA-C    Review of Systems   All other systems reviewed and are negative  Telemetry Review: Not on telemetry    Objective:   Vitals: Blood pressure 127/65, pulse 71, temperature 98 °F (36 7 °C), temperature source Oral, resp  rate 20, height 6' (1 829 m), weight 63 5 kg (139 lb 14 4 oz), SpO2 97 %  , Body mass index is 18 97 kg/m² ,   Orthostatic Blood Pressures    Flowsheet Row Most Recent Value   Blood Pressure 127/65 filed at 12/30/2022 0800   Patient Position - Orthostatic VS Lying filed at 12/30/2022 1600         Systolic (82WQC), CZE:311 , Min:99 , CSA:824     Diastolic (95WML), RTQ:73, Min:65, Max:93    Wt Readings from Last 5 Encounters:   12/30/22 63 5 kg (139 lb 14 4 oz)   11/30/22 69 1 kg (152 lb 6 4 oz)   11/22/22 67 4 kg (148 lb 9 4 oz)   10/25/22 68 5 kg (151 lb)   10/13/22 62 4 kg (137 lb 9 6 oz)     I/O       12/25 0701  12/26 0700 12/26 0701  12/27 0700 12/27 0701  12/28 0700    P  O   375     I V  (mL/kg)  1978 8 (30 5)     Total Intake(mL/kg)  2353 8 (36 3)     Urine (mL/kg/hr) 1250 1750 (1 1)     Total Output 1250 1750     Net -1250 +603 8                  Physical Exam  Vitals and nursing note reviewed  Constitutional:       General: He is not in acute distress  Appearance: Normal appearance  He is underweight  He is not ill-appearing or diaphoretic  HENT:      Head: Normocephalic and atraumatic  Nose: No congestion  Eyes:      General: No scleral icterus  Conjunctiva/sclera: Conjunctivae normal    Neck:      Vascular: No carotid bruit or JVD  Cardiovascular:      Rate and Rhythm: Normal rate  Rhythm irregular  Heart sounds: Normal heart sounds  No murmur heard  No friction rub  No gallop  Pulmonary:      Effort: Pulmonary effort is normal  No respiratory distress  Breath sounds: Normal breath sounds  No wheezing or rales  Chest:      Chest wall: No tenderness  Abdominal:      General: There is no distension  Palpations: Abdomen is soft  Tenderness: There is no abdominal tenderness  Musculoskeletal:         General: No swelling, tenderness or deformity  Cervical back: Neck supple  No muscular tenderness  Right lower leg: No edema  Left lower leg: No edema  Skin:     General: Skin is warm  Neurological:      General: No focal deficit present  Mental Status: He is alert and oriented to person, place, and time  Mental status is at baseline  Psychiatric:         Mood and Affect: Mood normal          Behavior: Behavior normal  Behavior is cooperative  Thought Content:  Thought content normal          Laboratory Results: personally reviewed        CBC with diff:   Results from last 7 days   Lab Units 12/28/22 0514 12/26/22 0425 12/25/22  1402   WBC Thousand/uL 6 87 7 98 10 15   HEMOGLOBIN g/dL 13 1 12 8 14 1   HEMATOCRIT % 42 9 40 9 43 7   MCV fL 96 95 92   PLATELETS Thousands/uL 192 193 223   MCH pg 29 2 29 6 29 7   MCHC g/dL 30 5* 31 3* 32 3   RDW % 13 5 13 8 13 7   MPV fL 10 7 11 1 10 4   NRBC AUTO /100 WBCs  --  0 0         CMP:  Results from last 7 days   Lab Units 12/28/22 0514 12/26/22 0425 12/25/22  1402   POTASSIUM mmol/L 4 5 3 7 4 0   CHLORIDE mmol/L 104 105 104   CO2 mmol/L 28 25 25   BUN mg/dL 14 19 21   CREATININE mg/dL 0 91 1 00 1 31*   CALCIUM mg/dL 8 5 8 4 8 7   AST U/L  --  10* 10*   ALT U/L  --  9 9   ALK PHOS U/L  --  99 108*   EGFR ml/min/1 73sq m 82 73 52         BMP:  Results from last 7 days   Lab Units 12/28/22 0514 12/26/22 0425 12/25/22  1402   POTASSIUM mmol/L 4 5 3 7 4 0   CHLORIDE mmol/L 104 105 104   CO2 mmol/L 28 25 25   BUN mg/dL 14 19 21   CREATININE mg/dL 0 91 1 00 1 31*   CALCIUM mg/dL 8 5 8 4 8 7       BNP: No results for input(s): BNP in the last 72 hours      Magnesium:   Results from last 7 days   Lab Units 12/26/22  0425   MAGNESIUM mg/dL 1 9       Coags:       TSH:        Hemoglobin A1C       Lipid Profile:       Meds/Allergies   all current active meds have been reviewed and current meds:   Current Facility-Administered Medications   Medication Dose Route Frequency   • acetaminophen (TYLENOL) tablet 650 mg  650 mg Oral Q6H PRN   • albuterol inhalation solution 2 5 mg  2 5 mg Nebulization Q6H PRN   • fludrocortisone (FLORINEF) tablet 0 1 mg  0 1 mg Oral Daily   • Fluticasone Furoate-Vilanterol 100-25 mcg/actuation 1 puff  1 puff Inhalation Daily   • heparin (porcine) subcutaneous injection 5,000 Units  5,000 Units Subcutaneous Q8H [address]   • ipratropium (ATROVENT) 0 02 % inhalation solution 0 5 mg  0 5 mg Nebulization TID   • levalbuterol (XOPENEX) inhalation solution 1 25 mg  1 25 mg Nebulization TID   • metoprolol succinate (TOPROL-XL) 24 hr tablet 25 mg  25 mg Oral Daily   • midodrine (PROAMATINE) tablet 2 5 mg  2 5 mg Oral TID AC   • midodrine (PROAMATINE) tablet 5 mg  5 mg Oral TID PRN   • pantoprazole (PROTONIX) EC tablet 40 mg  40 mg Oral Early Morning     Medications Prior to Admission   Medication   • Advair Diskus 100-50 MCG/ACT inhaler   • azelastine (ASTELIN) 0 1 % nasal spray   • docusate sodium (COLACE) 100 mg capsule   • ferrous sulfate 324 (65 Fe) mg   • folic acid (FOLVITE) 854 mcg tablet   • ipratropium-albuterol (DUO-NEB) 0 5-2 5 mg/3 mL nebulizer solution   • metoprolol succinate (Toprol XL) 25 mg 24 hr tablet   • nicotine (NICODERM CQ) 14 mg/24hr TD 24 hr patch   • nicotine (NICODERM CQ) 7 mg/24hr TD 24 hr patch   • omeprazole (PriLOSEC) 20 mg delayed release capsule   • potassium chloride (Klor-Con) 10 mEq tablet   • tamsulosin (FLOMAX) 0 4 mg            Cardiac testing: reviewed  Results for orders placed during the hospital encounter of 20    Echo complete with contrast if indicated    Narrative  [address]  [facility], [address] [place] [address]    Transthoracic Echocardiogram  2D, M-mode, Doppler, and Color Doppler    Study date:  07-Aug-2020    Patient: Sol Salinas  MR number: [id number]  Account number: [de-identified]  : 1945  Age: 69 years  Gender: Male  Status: Inpatient  Location: [facility]  Height: 70 in  Weight: 148 lb  BP: 123/ 70 mmHg    Indications: Heart Failure    Diagnoses: I50 9 - Heart failure, unspecified    Sonographer:  NAN Montano  Referring Physician:  Smiley Escoto MD  Group:  [facility]  Interpreting Physician:  Moustapha Palmer MD    SUMMARY    LEFT VENTRICLE:  Systolic function was normal  Ejection fraction was estimated to be 55 %  There were no regional wall motion abnormalities  Wall thickness was at the upper limits of normal     LEFT ATRIUM:  The atrium was mildly dilated  RIGHT ATRIUM:  The atrium was mildly to moderately dilated  MITRAL VALVE:  There was mild annular calcification  There was mild to moderate regurgitation  TRICUSPID VALVE:  There was moderate regurgitation  Estimated peak PA pressure was 39 mmHg  PULMONIC VALVE:  There was mild regurgitation  HISTORY: PRIOR HISTORY: CAD, Anemia Congestive heart failure  Nonischemic cardiomyopathy  Atrial fibrillation  PROCEDURE: The study was performed in the [facility]  This was a routine study  The transthoracic approach was used  The study included complete 2D imaging, M-mode, complete spectral Doppler, and color Doppler  The heart rate was 80  bpm, at the start of the study  Images were obtained from the parasternal, apical, subcostal, and suprasternal notch acoustic windows  Image quality was adequate  LEFT VENTRICLE: Size was normal  Systolic function was normal  Ejection fraction was estimated to be 55 %   There were no regional wall motion abnormalities  Wall thickness was at the upper limits of normal  DOPPLER: Transmitral flow  pattern: atrial fibrillation  RIGHT VENTRICLE: The size was normal  Systolic function was normal  Wall thickness was normal     LEFT ATRIUM: The atrium was mildly dilated  RIGHT ATRIUM: The atrium was mildly to moderately dilated  MITRAL VALVE: There was mild annular calcification  Valve structure was normal  There was mild-moderate diffuse thickening  There was normal leaflet separation  DOPPLER: The transmitral velocity was within the normal range  There was no  evidence for stenosis  There was mild to moderate regurgitation  AORTIC VALVE: The valve was trileaflet  Leaflets exhibited mildly increased thickness, normal cuspal separation, and sclerosis  DOPPLER: Transaortic velocity was within the normal range  There was no evidence for stenosis  There was no  significant regurgitation  TRICUSPID VALVE: The valve structure was normal  There was normal leaflet separation  DOPPLER: The transtricuspid velocity was within the normal range  There was no evidence for stenosis  There was moderate regurgitation  Pulmonary artery  systolic pressure was mildly increased  Estimated peak PA pressure was 39 mmHg  PULMONIC VALVE: Leaflets exhibited normal thickness, no calcification, and normal cuspal separation  DOPPLER: The transpulmonic velocity was within the normal range  There was mild regurgitation  PERICARDIUM: There was no pericardial effusion  AORTA: The root exhibited normal size  SYSTEMIC VEINS: IVC: The inferior vena cava was normal in size   Respirophasic changes were normal     SYSTEM MEASUREMENT TABLES    2D  %FS: 34 29 %  Ao Diam: 3 52 cm  EDV(Teich): 151 99 ml  EF(Teich): 62 68 %  ESV(Teich): 56 72 ml  HR_4Ch_Q: 90 87 BPM  IVSd: 1 23 cm  LA Diam: 4 97 cm  LAAs A4C: 30 77 cm2  LAESV A-L A4C: 106 62 ml  LAESV MOD A4C: 99 72 ml  LALs A4C: 7 54 cm  LVCO_4Ch_Q: 4 61 L/min  LVEF_4Ch_Q: 56 72 %  LVIDd: 5 57 cm  LVIDs: 3 66 cm  LVLd_4Ch_Q: 7 96 cm  LVLs_4Ch_Q: 6 38 cm  LVPWd: 1 08 cm  LVSV_4Ch_Q: 50 7 ml  LVVED_4Ch_Q: 89 39 ml  LVVES_4Ch_Q: 38 69 ml  RAAs: 31 12 cm2  RAESV A-L: 112 8 ml  RAESV MOD: 108 5 ml  RALs: 7 29 cm  RVIDd: 3 86 cm  SV(Teich): 95 27 ml    CW  TR Vmax: 2 78 m/s  TR maxP 97 mmHg    MM  TAPSE: 1 68 cm    [facility]    Prepared and electronically signed by    Simba Serrano MD  Signed 07-Aug-2020 09:50:46    No results found for this or any previous visit  No results found for this or any previous visit  No results found for this or any previous visit

## 2022-12-30 NOTE — DISCHARGE INSTR - AVS FIRST PAGE
You were treated and evaluated after having near syncopal episode, or episode where you were dizzy and felt like you were going to pass out  Suspect these symptoms were due to orthostatic hypotension, or decrease in your blood pressure when you change position  Cardiology was following while inpatient, you were treated with IV fluids and adjustment of your medications to keep your blood pressure stable  You will need to continue taking fludrocortisone daily and midodrine 3 times a day to prevent drop in blood pressure and symptoms of dizziness/lightheadedness  Hydrate well, be cautious when standing up too quickly to prevent dizziness  Recommend you restart taking your blood thinner Eliquis to prevent stroke given your atrial fibrillation history, neurosurgery had evaluated your imaging and case, no need to continue holding given your subdural hematoma history  We will give referral to follow-up with Cardiology if not already established  Follow-up with Urology outpatient for ongoing management of your chronic Cloud catheter, most likely need to have it exchanged within the next month

## 2022-12-30 NOTE — CASE MANAGEMENT
Case Management Discharge Planning Note    Patient name Joan Sullivan  Location /-40 MRN [id number]  : 1945 Date 2022       Current Admission Date: 2022  Current Admission Diagnosis:Syncope   Patient Active Problem List    Diagnosis Date Noted   • Gastroesophageal reflux disease without esophagitis 2022   • Swelling of lower leg 2022   • Surgical wound present 10/05/2022   • SDH (subdural hematoma) 2022   • Recurrent right inguinal hernia 2022   • Nasal congestion 2022   • Encounter for support and coordination of transition of care 2022   • Moderate protein-calorie malnutrition ([address] ) 2022   • Cholestatic jaundice 2022   • Iron deficiency anemia likely secondary to GI bleeding 2022   • Abnormal colonoscopy 2022   • Dizziness 2022   • Urinary retention 2022   • Essential (hemorrhagic) thrombocythemia ([address] ) 2022   • Closed nondisplaced comminuted fracture of left patella 2021   • Head trauma 2021   • Thrombocytosis 2021   • Bradycardia 2021   • Syncope 2021   • Colonic adenoma 2021   • Ambulatory dysfunction 2021   • COPD (chronic obstructive pulmonary disease) ([address] ) 2021   • Orthostatic hypotension 2020   • Smoker 2020   • Symptomatic anemia 2020   • Congestive cardiomyopathy ([address] ) 2020   • Permanent atrial fibrillation ([facility] ) 2020      LOS (days): 4  Geometric Mean LOS (GMLOS) (days): 2 30  Days to GMLOS:-1 6     OBJECTIVE:  Risk of Unplanned Readmission Score: 20 99         Current admission status: Inpatient   Preferred Pharmacy:   [address] [place] [address], [address] [place] [address],[address]  [address] [facility],Fourth [address]  Phone: [phone] Fax: [place]  #[phone] [address] [address]  Phone: [phone] Fax: [phone]    Primary Care Provider: Una Pulido MD    Primary Insurance: MEDICARE  Secondary Insurance:  FOR LIFE    DISCHARGE DETAILS:                                                                                     IMM reviewed with patient, patient agrees with discharge determination    IMM Given (Date):: 12/30/22  IMM Given to[de-identified] Patient

## 2022-12-30 NOTE — ASSESSMENT & PLAN NOTE
· History of intermittent A-fib, s/p Loop recorder placement   Cardiology reviewed loop and did not show abnormalities   · Home Medication: Toprol-Xl 25 mg  · Intermittently unable to receive BB due to hypotension/bradycardia but tolerated well yesterday  · Continue Toprol

## 2022-12-30 NOTE — ASSESSMENT & PLAN NOTE
· S/p admission from September 2022 for SDH, Eliquis has been on hold ever since    · Neurosurgery consulted:  · Ok to resume Eliquis from this standpoint as SDH has completely resolved  · Given multiple falls/syncopal episodes, patient at increased risk of bleeding with continued AC  · Eliquis price check with CM done, sent via mail order scripts days prior  · Resume anticoagulation with Eliquis per Cardiology

## 2023-01-03 ENCOUNTER — TRANSITIONAL CARE MANAGEMENT (OUTPATIENT)
Dept: FAMILY MEDICINE CLINIC | Facility: CLINIC | Age: 78
End: 2023-01-03

## 2023-01-04 ENCOUNTER — TELEPHONE (OUTPATIENT)
Dept: CARDIOLOGY CLINIC | Facility: CLINIC | Age: 78
End: 2023-01-04

## 2023-01-04 ENCOUNTER — HOSPITAL ENCOUNTER (EMERGENCY)
Facility: HOSPITAL | Age: 78
Discharge: HOME | End: 2023-01-04
Attending: EMERGENCY MEDICINE

## 2023-01-04 ENCOUNTER — APPOINTMENT (EMERGENCY)
Dept: RADIOLOGY | Facility: HOSPITAL | Age: 78
End: 2023-01-04

## 2023-01-04 VITALS
DIASTOLIC BLOOD PRESSURE: 78 MMHG | HEART RATE: 94 BPM | OXYGEN SATURATION: 94 % | BODY MASS INDEX: 20.03 KG/M2 | RESPIRATION RATE: 18 BRPM | WEIGHT: 147.71 LBS | TEMPERATURE: 97.8 F | SYSTOLIC BLOOD PRESSURE: 115 MMHG

## 2023-01-04 DIAGNOSIS — N39.0 UTI (URINARY TRACT INFECTION): ICD-10-CM

## 2023-01-04 DIAGNOSIS — R33.9 URINARY RETENTION: Primary | ICD-10-CM

## 2023-01-04 LAB
ALBUMIN SERPL BCP-MCNC: 4.1 G/DL (ref 3.5–5)
ALP SERPL-CCNC: 102 U/L (ref 34–104)
ALT SERPL W P-5'-P-CCNC: 27 U/L (ref 7–52)
AMORPH PHOS CRY URNS QL MICRO: ABNORMAL /HPF
ANION GAP SERPL CALCULATED.3IONS-SCNC: 10 MMOL/L (ref 4–13)
AST SERPL W P-5'-P-CCNC: 24 U/L (ref 13–39)
BACTERIA UR QL AUTO: ABNORMAL /HPF
BASOPHILS # BLD AUTO: 0.04 THOUSANDS/ÂΜL (ref 0–0.1)
BASOPHILS NFR BLD AUTO: 0 % (ref 0–1)
BILIRUB SERPL-MCNC: 0.45 MG/DL (ref 0.2–1)
BILIRUB UR QL STRIP: NEGATIVE
BUN SERPL-MCNC: 24 MG/DL (ref 5–25)
CALCIUM SERPL-MCNC: 9.2 MG/DL (ref 8.4–10.2)
CHLORIDE SERPL-SCNC: 108 MMOL/L (ref 96–108)
CLARITY UR: ABNORMAL
CO2 SERPL-SCNC: 24 MMOL/L (ref 21–32)
COLOR UR: ABNORMAL
CREAT SERPL-MCNC: 1.19 MG/DL (ref 0.6–1.3)
EOSINOPHIL # BLD AUTO: 0.1 THOUSAND/ÂΜL (ref 0–0.61)
EOSINOPHIL NFR BLD AUTO: 1 % (ref 0–6)
ERYTHROCYTE [DISTWIDTH] IN BLOOD BY AUTOMATED COUNT: 14 % (ref 11.6–15.1)
GFR SERPL CREATININE-BSD FRML MDRD: 59 ML/MIN/1.73SQ M
GLUCOSE SERPL-MCNC: 132 MG/DL (ref 65–140)
GLUCOSE UR STRIP-MCNC: NEGATIVE MG/DL
HCT VFR BLD AUTO: 41.9 % (ref 36.5–49.3)
HGB BLD-MCNC: 13.6 G/DL (ref 12–17)
HGB UR QL STRIP.AUTO: ABNORMAL
IMM GRANULOCYTES # BLD AUTO: 0.08 THOUSAND/UL (ref 0–0.2)
IMM GRANULOCYTES NFR BLD AUTO: 1 % (ref 0–2)
KETONES UR STRIP-MCNC: NEGATIVE MG/DL
LEUKOCYTE ESTERASE UR QL STRIP: ABNORMAL
LYMPHOCYTES # BLD AUTO: 1.61 THOUSANDS/ÂΜL (ref 0.6–4.47)
LYMPHOCYTES NFR BLD AUTO: 13 % (ref 14–44)
MCH RBC QN AUTO: 29.8 PG (ref 26.8–34.3)
MCHC RBC AUTO-ENTMCNC: 32.5 G/DL (ref 31.4–37.4)
MCV RBC AUTO: 92 FL (ref 82–98)
MONOCYTES # BLD AUTO: 0.96 THOUSAND/ÂΜL (ref 0.17–1.22)
MONOCYTES NFR BLD AUTO: 8 % (ref 4–12)
NEUTROPHILS # BLD AUTO: 10.01 THOUSANDS/ÂΜL (ref 1.85–7.62)
NEUTS SEG NFR BLD AUTO: 77 % (ref 43–75)
NITRITE UR QL STRIP: POSITIVE
NON-SQ EPI CELLS URNS QL MICRO: ABNORMAL /HPF
NRBC BLD AUTO-RTO: 0 /100 WBCS
PH UR STRIP.AUTO: 8.5 [PH]
PLATELET # BLD AUTO: 239 THOUSANDS/UL (ref 149–390)
PMV BLD AUTO: 10.8 FL (ref 8.9–12.7)
POTASSIUM SERPL-SCNC: 3.9 MMOL/L (ref 3.5–5.3)
PROT SERPL-MCNC: 6.8 G/DL (ref 6.4–8.4)
PROT UR STRIP-MCNC: ABNORMAL MG/DL
RBC # BLD AUTO: 4.56 MILLION/UL (ref 3.88–5.62)
RBC #/AREA URNS AUTO: ABNORMAL /HPF
SODIUM SERPL-SCNC: 142 MMOL/L (ref 135–147)
SP GR UR STRIP.AUTO: 1.01 (ref 1–1.03)
TRI-PHOS CRY URNS QL MICRO: ABNORMAL /HPF
UROBILINOGEN UR QL STRIP.AUTO: 0.2 E.U./DL
WBC # BLD AUTO: 12.8 THOUSAND/UL (ref 4.31–10.16)
WBC #/AREA URNS AUTO: ABNORMAL /HPF

## 2023-01-04 RX ORDER — LEVOFLOXACIN 500 MG/1
500 TABLET, FILM COATED ORAL DAILY
Qty: 7 TABLET | Refills: 0 | Status: SHIPPED | OUTPATIENT
Start: 2023-01-04 | End: 2023-01-11

## 2023-01-04 RX ORDER — LEVOFLOXACIN 500 MG/1
500 TABLET, FILM COATED ORAL ONCE
Status: COMPLETED | OUTPATIENT
Start: 2023-01-04 | End: 2023-01-04

## 2023-01-04 RX ADMIN — LEVOFLOXACIN 500 MG: 500 TABLET, FILM COATED ORAL at 09:43

## 2023-01-04 NOTE — ED PROVIDER NOTES
History  Chief Complaint   Patient presents with   • Shortness of Breath     Began yesterday with shortness of breath and dizziness  Not better this morning  Afib for ems  No hx     This is a 81-year-old male who presents to the emergency department with EMS with a clogged Cloud catheter  Patient states he has not had urine come out of his Cloud since last night  He complains of pain to his lower abdomen  The patient states the pain is sharp and severe  Nothing is making it better or worse  The patient complains of shortness of breath  He states this has been ongoing for a few days  He denies fevers or chills  He denies a cough  He states he would like to stay in the hospital          Prior to Admission Medications   Prescriptions Last Dose Informant Patient Reported? Taking? Advair Diskus 100-50 MCG/ACT inhaler   No No   Sig: USE 1 INHALATION TWICE A DAY (RINSE MOUTH AFTER USE)   apixaban (Eliquis) 5 mg   No No   Sig: Take 1 tablet (5 mg total) by mouth 2 (two) times a day   azelastine (ASTELIN) 0 1 % nasal spray   No No   Si spray into each nostril 2 (two) times a day as needed for allergies or rhinitis Use in each nostril as directed   docusate sodium (COLACE) 100 mg capsule   No No   Sig: Take 1 capsule (100 mg total) by mouth 2 (two) times a day   ferrous sulfate 324 (65 Fe) mg   No No   Sig: Take 1 tablet (324 mg total) by mouth daily before breakfast   fludrocortisone (FLORINEF) 0 1 mg tablet   No No   Sig: Take 1 tablet (0 1 mg total) by mouth daily Do not start before 2022     folic acid (FOLVITE) 209 mcg tablet   No No   Sig: Take 1 tablet (400 mcg total) by mouth daily   ipratropium-albuterol (DUO-NEB) 0 5-2 5 mg/3 mL nebulizer solution   No No   Sig: INHALE CONTENTS OF 1 VIAL VIA NEBULIZER FOUR TIMES A DAY   metoprolol succinate (Toprol XL) 25 mg 24 hr tablet   No No   Sig: Take 1 tablet (25 mg total) by mouth daily   midodrine (PROAMATINE) 2 5 mg tablet   No No   Sig: Take 1 tablet (2 5 mg total) by mouth 3 (three) times a day before meals   midodrine (PROAMATINE) 5 mg tablet   No No   Sig: Take 1 tablet (5 mg total) by mouth 3 (three) times a day as needed (for SBP < 100 or severe dizziness with standing up  Hold for SBP > 130)   nicotine (NICODERM CQ) 14 mg/24hr TD 24 hr patch   No No   Sig: Place 1 patch on the skin every 24 hours   omeprazole (PriLOSEC) 20 mg delayed release capsule   No No   Sig: Take 1 capsule (20 mg total) by mouth daily      Facility-Administered Medications: None       Past Medical History:   Diagnosis Date   • Anxiety disorder    • Atrial fibrillation (HCC)    • Coronary artery disease    • Depression    • Cloud catheter in place    • Hepatitis C     screening negative in 1/2017   • Hypertension    • MI (myocardial infarction) [facility])    • Use of cane as ambulatory aid        Past Surgical History:   Procedure Laterality Date   • CARDIAC CATHETERIZATION  07/09/2018   • CARDIAC ELECTROPHYSIOLOGY PROCEDURE N/A 9/30/2022    Procedure: Cardiac loop recorder implant;  Surgeon: Sebastian Lopez MD;  Location: [facility]; Service: Cardiology   • COLONOSCOPY     • GA RPR 1ST INGUN HRNA AGE 5 YRS/> REDUCIBLE Right 8/11/2022    Procedure: REPAIR HERNIA INGUINAL;  Surgeon: Wing Encarnacion DO;  Location: [facility];  Service: General   • TONSILLECTOMY         Family History   Problem Relation Age of Onset   • Hypertension Mother    • Coronary artery disease Mother         premature   • Hypertension Father    • Coronary artery disease Father         premature   • Diabetes Father      I have reviewed and agree with the history as documented      E-Cigarette/Vaping   • E-Cigarette Use Never User      E-Cigarette/Vaping Substances   • Nicotine Yes    • THC No    • CBD No    • Flavoring No    • Other No    • Unknown No      Social History     Tobacco Use   • Smoking status: Every Day     Packs/day: 1 00     Years: 57 00     Pack years: 57 00     Types: Cigarettes   • Smokeless tobacco: Never   • Tobacco comments:     since age 17; Has history of smoking for over 54 years  He has been smoking more than packet daily in the past however he has been smokes about half a pack a day lately and stopped smoking on 7/1/2018 when he was admitted to the hospital     Vaping Use   • Vaping Use: Never used   Substance Use Topics   • Alcohol use: Not Currently   • Drug use: Never       Review of Systems   All other systems reviewed and are negative        Physical Exam  Physical Exam  Constitutional:  Vital signs reviewed, patient appears nontoxic, appears uncomfortable  Eyes: Pupils equal round reactive to light and accommodation, extraocular muscles intact  HEENT: trachea midline, no JVD, moist mucous membranes  Respiratory: lung sounds clear throughout, no rhonchi, no rales  Cardiovascular: Mildly tachycardic rate, irregular rhythm, no murmurs or rubs  Abdomen: soft, mild suprapubic tenderness, nondistended, no rebound or guarding  Back: no CVA tenderness, normal inspection  Extremities: no edema, pulses equal in all 4 extremities  Neuro: awake, alert, oriented, no focal weakness  Skin: warm, dry, intact, no rashes noted    Vital Signs  ED Triage Vitals [01/04/23 0758]   Temperature Pulse Respirations Blood Pressure SpO2   97 8 °F (36 6 °C) (!) 116 20 160/93 99 %      Temp Source Heart Rate Source Patient Position - Orthostatic VS BP Location FiO2 (%)   Oral Monitor Lying Left arm --      Pain Score       10 - Worst Possible Pain           Vitals:    01/04/23 0758 01/04/23 0939   BP: 160/93 115/78   Pulse: (!) 116 94   Patient Position - Orthostatic VS: Lying Lying         Visual Acuity      ED Medications  Medications   levofloxacin (LEVAQUIN) tablet 500 mg (500 mg Oral Given 1/4/23 0943)       Diagnostic Studies  Results Reviewed     Procedure Component Value Units Date/Time    Comprehensive metabolic panel [[id number]] Collected: 01/04/23 0839    Lab Status: Final result Specimen: Blood from Arm, Right Updated: 01/04/23 0915     Sodium 142 mmol/L      Potassium 3 9 mmol/L      Chloride 108 mmol/L      CO2 24 mmol/L      ANION GAP 10 mmol/L      BUN 24 mg/dL      Creatinine 1 19 mg/dL      Glucose 132 mg/dL      Calcium 9 2 mg/dL      AST 24 U/L      ALT 27 U/L      Alkaline Phosphatase 102 U/L      Total Protein 6 8 g/dL      Albumin 4 1 g/dL      Total Bilirubin 0 45 mg/dL      eGFR 59 ml/min/1 73sq m     Narrative:      [place] guidelines for Chronic Kidney Disease (CKD):   •  Stage 1 with normal or high GFR (GFR > 90 mL/min/1 73 square meters)  •  Stage 2 Mild CKD (GFR = 60-89 mL/min/1 73 square meters)  •  Stage 3A Moderate CKD (GFR = 45-59 mL/min/1 73 square meters)  •  Stage 3B Moderate CKD (GFR = 30-44 mL/min/1 73 square meters)  •  Stage 4 Severe CKD (GFR = 15-29 mL/min/1 73 square meters)  •  Stage 5 End Stage CKD (GFR <15 mL/min/1 73 square meters)  Note: GFR calculation is accurate only with a steady state creatinine    Urine Microscopic [[id number]]  (Abnormal) Collected: 01/04/23 0843    Lab Status: Final result Specimen: Urine, Indwelling Cloud Catheter Updated: 01/04/23 0913     RBC, UA 30-50 /hpf      WBC, UA 30-50 /hpf      Epithelial Cells None Seen /hpf      Bacteria, UA Moderate /hpf      AMORPH PHOSPATES Moderate /hpf      Triplep Phos [place], UA Occasional /hpf     Urine culture [[id number]] Collected: 01/04/23 0843    Lab Status:  In process Specimen: Urine, Indwelling Cloud Catheter Updated: 01/04/23 0913    UA w Reflex to Microscopic w Reflex to Culture [[id number]]  (Abnormal) Collected: 01/04/23 0843    Lab Status: Final result Specimen: Urine, Indwelling Cloud Catheter Updated: 01/04/23 0854     Color, UA Daya     Clarity, UA Turbid     Specific [place], UA 1 015     pH, UA 8 5     Leukocytes, UA 3+     Nitrite, UA Positive     Protein, UA 2+ mg/dl      Glucose, UA Negative mg/dl      Ketones, UA Negative mg/dl      Urobilinogen, UA 0 2 E U /dl Bilirubin, UA Negative     Occult Blood, UA 3+    CBC and differential [[id number]]  (Abnormal) Collected: 01/04/23 0839    Lab Status: Final result Specimen: Blood from Arm, Right Updated: 01/04/23 0849     WBC 12 80 Thousand/uL      RBC 4 56 Million/uL      Hemoglobin 13 6 g/dL      Hematocrit 41 9 %      MCV 92 fL      MCH 29 8 pg      MCHC 32 5 g/dL      RDW 14 0 %      MPV 10 8 fL      Platelets 980 Thousands/uL      nRBC 0 /100 WBCs      Neutrophils Relative 77 %      Immat GRANS % 1 %      Lymphocytes Relative 13 %      Monocytes Relative 8 %      Eosinophils Relative 1 %      Basophils Relative 0 %      Neutrophils Absolute 10 01 Thousands/µL      Immature Grans Absolute 0 08 Thousand/uL      Lymphocytes Absolute 1 61 Thousands/µL      Monocytes Absolute 0 96 Thousand/µL      Eosinophils Absolute 0 10 Thousand/µL      Basophils Absolute 0 04 Thousands/µL                  XR chest 1 view portable   Final Result by Gunnar Willson MD (01/04 1014)      No acute cardiopulmonary disease  Workstation performed: [id number]                    Procedures  ECG 12 Lead Documentation Only    Date/Time: 1/4/2023 10:28 AM  Performed by: Talia Ley DO  Authorized by: Talia Ley DO     ECG reviewed by me, the ED Provider: yes    Patient location:  ED  Comments:      A  fib, rate of 91, irregular OR, normal QTC, no STEMI             ED Course  ED Course as of 01/04/23 1028   Wed Jan 04, 2023   0919 The patient had a chest x-ray that was interpreted by me that shows no pneumonia or pneumothorax  It was compared to the chest x-ray from December 25, 2022   3225 The patient had lab work that was significant for a white count of 12 8  He had his catheter flushed and at least 350 mL were removed from the catheter  The patient has no further pain and feels much better  On reevaluation the patient is sleeping  The patient did have a UA that was nitrite and leuk positive    It had moderate bacteria with 30-50 WBCs  On review of the patient's chart, the patient has a prior ESBL from his urine  At this point, I question whether this is a urinary tract infection versus obstruction due to sediment  I will start the patient on Levaquin and I sent the urine for culture  The patient has a creatinine that is at his baseline  Will be discharged with follow-up to his primary care physician  SBIRT 22yo+    Flowsheet Row Most Recent Value   SBIRT (23 yo +)    In order to provide better care to our patients, we are screening all of our patients for alcohol and drug use  Would it be okay to ask you these screening questions? No Filed at: 01/04/2023 0848                    Medical Decision Making  This is a 72-year-old male who presented to the emergency department complaining of suprapubic tenderness and shortness of breath  I considered urinary retention with a Cloud catheter in place, urinary tract infection, pneumonia, pneumothorax  These and other diagnoses were considered  Urinary retention: acute illness or injury  UTI (urinary tract infection): complicated acute illness or injury  Amount and/or Complexity of Data Reviewed  Independent Historian: EMS  Labs: ordered  Decision-making details documented in ED Course  Radiology: ordered and independent interpretation performed  Decision-making details documented in ED Course  Risk  Prescription drug management            Disposition  Final diagnoses:   Urinary retention   UTI (urinary tract infection)     Time reflects when diagnosis was documented in both MDM as applicable and the Disposition within this note     Time User Action Codes Description Comment    1/4/2023  9:28 AM Ben Chimera Add [R33 9] Urinary retention     1/4/2023  9:28 AM Stephany Peterson Add [N39 0] UTI (urinary tract infection)       ED Disposition     ED Disposition   Discharge    Condition   Stable    Date/Time   Wed Jan 4, 2023  9:28 AM Comment   Chris Pack discharge to home/self care                 Follow-up Information     Follow up With Specialties Details Why Contact Info Additional Information    Jordy Nicole MD Internal Medicine In 2 days  [address]  [address] [facility] [address]       Suyapa Cardona MD Urology Schedule an appointment as soon as possible for a visit   [address]  [address] [facility] [phone]       [address] Emergency Department Emergency Medicine  If symptoms worsen [address] [facility],[address] [phone]  [address] [facility] Emergency Department, [address], [address]          Discharge Medication List as of 1/4/2023  9:31 AM      START taking these medications    Details   levofloxacin (LEVAQUIN) 500 mg tablet Take 1 tablet (500 mg total) by mouth daily for 7 days, Starting Wed 1/4/2023, Until Wed 1/11/2023, Normal         CONTINUE these medications which have NOT CHANGED    Details   Advair Diskus 100-50 MCG/ACT inhaler USE 1 INHALATION TWICE A DAY (RINSE MOUTH AFTER USE), Normal      apixaban (Eliquis) 5 mg Take 1 tablet (5 mg total) by mouth 2 (two) times a day, Starting Fri 12/30/2022, Until Sun 1/29/2023, Normal      azelastine (ASTELIN) 0 1 % nasal spray 1 spray into each nostril 2 (two) times a day as needed for allergies or rhinitis Use in each nostril as directed, Starting Tue 11/22/2022, Normal      docusate sodium (COLACE) 100 mg capsule Take 1 capsule (100 mg total) by mouth 2 (two) times a day, Starting Tue 11/22/2022, Until Mon 2/20/2023, Normal      ferrous sulfate 324 (65 Fe) mg Take 1 tablet (324 mg total) by mouth daily before breakfast, Starting Wed 11/30/2022, Until Tue 2/28/2023, Normal      fludrocortisone (FLORINEF) 0 1 mg tablet Take 1 tablet (0 1 mg total) by mouth daily Do not start before December 31, 2022 , Starting Sat 12/31/2022, Until Mon 1/30/2023, Normal folic acid (FOLVITE) 858 mcg tablet Take 1 tablet (400 mcg total) by mouth daily, Starting Fri 9/10/2021, Normal      ipratropium-albuterol (DUO-NEB) 0 5-2 5 mg/3 mL nebulizer solution INHALE CONTENTS OF 1 VIAL VIA NEBULIZER FOUR TIMES A DAY, Normal      metoprolol succinate (Toprol XL) 25 mg 24 hr tablet Take 1 tablet (25 mg total) by mouth daily, Starting Tue 11/22/2022, Until Mon 2/20/2023, Normal      !! midodrine (PROAMATINE) 2 5 mg tablet Take 1 tablet (2 5 mg total) by mouth 3 (three) times a day before meals, Starting Fri 12/30/2022, Until Sun 1/29/2023, Normal      !! midodrine (PROAMATINE) 5 mg tablet Take 1 tablet (5 mg total) by mouth 3 (three) times a day as needed (for SBP < 100 or severe dizziness with standing up  Hold for SBP > 130), Starting Fri 12/30/2022, Normal      nicotine (NICODERM CQ) 14 mg/24hr TD 24 hr patch Place 1 patch on the skin every 24 hours, Starting Tue 10/25/2022, Normal      omeprazole (PriLOSEC) 20 mg delayed release capsule Take 1 capsule (20 mg total) by mouth daily, Starting Wed 11/30/2022, Normal       !! - Potential duplicate medications found  Please discuss with provider  No discharge procedures on file      PDMP Review       Value Time User    PDMP Reviewed  Yes 11/18/2022  8:47 PM [facility] [address] [place] [address] Provider  Electronically Signed by           No Sneed DO  01/04/23 2848 [facility]  01/04/23 8465

## 2023-01-04 NOTE — ED NOTES
Patient c/o bladder pain, states decreased urinary output since last night, catheter found to not be secured in locking device adhered to LLE  Patient reports "It comes out"  Attempted to re-educate patient on important of securing miller catheter to prevent likelihood of it being dislodged, acknowledged understanding  Miller flushed with 20cc NSS, draining freely       Marcie Rivera RN  01/04/23 1921

## 2023-01-04 NOTE — TELEPHONE ENCOUNTER
----- Message from Lebron Gosselin, MD sent at 12/30/2022 11:16 AM EST -----  Patient getting discharged from the hospital today  He needs to be set up for follow-up with me in the office in about 1 month    Please call patient to set up follow-up

## 2023-01-04 NOTE — ED NOTES
Miller drainage tubing straightened out and observed foul looking urine draining from miller  Pt reports feeling better    Provider at bedside and aware     Balwinder Machuca RN  01/04/23 0039

## 2023-01-05 ENCOUNTER — TELEPHONE (OUTPATIENT)
Dept: OTHER | Facility: OTHER | Age: 78
End: 2023-01-05

## 2023-01-05 NOTE — PROGRESS NOTES
Pastoral Care Progress Note    2023  Patient: Cecilia Frederick : 1945  Admission Date & Time: 2023 0746  MRN: [id number] [facility]: [id number]         23 1200   Clinical Encounter Type   Visited With Patient   Lutheran Encounters   Lutheran Needs Prayer     Carmen Damon visited with the patient and provided prayers and blessings  No further needs were expressed at this time  Chaplains still remain available

## 2023-01-05 NOTE — TELEPHONE ENCOUNTER
Orders for catheter care need to be faxed to home health post patient's hospitalization       [address] [facility]  Fax # [phone]

## 2023-01-06 NOTE — TELEPHONE ENCOUNTER
· Routine miller catheter change, every 4 weeks, [id number] silicone, 10 mL balloon  · Routine catheter care  · May irrigate with 60 mL Normal strength saline for clog, sediment, hematuria  · May change catheter PRN for clog or dislodged catheter  · Call the office with any urological concerns     · Next routine catheter change due approximately 1/23/23    Above orders were faxed as requested

## 2023-01-07 LAB
BACTERIA UR CULT: ABNORMAL
BACTERIA UR CULT: ABNORMAL

## 2023-01-11 ENCOUNTER — TELEPHONE (OUTPATIENT)
Dept: FAMILY MEDICINE CLINIC | Facility: CLINIC | Age: 78
End: 2023-01-11

## 2023-01-11 NOTE — TELEPHONE ENCOUNTER
DARON FROM [facility] CALLED AND LEFT MSG ON MACHINE STATING THAT MISAEL HAS A FEVER , HE STATED THAT HE DOES HAVE A EDEN HOWEVER THERE IS NO ODOR OR SMELL   PLEASE ADVISE

## 2023-01-12 ENCOUNTER — TELEPHONE (OUTPATIENT)
Dept: FAMILY MEDICINE CLINIC | Facility: CLINIC | Age: 78
End: 2023-01-12

## 2023-01-12 RX ORDER — AMOXICILLIN AND CLAVULANATE POTASSIUM 875; 125 MG/1; MG/1
1 TABLET, FILM COATED ORAL 2 TIMES DAILY
Qty: 20 TABLET | Refills: 0 | Status: SHIPPED | OUTPATIENT
Start: 2023-01-12 | End: 2023-01-22

## 2023-01-12 NOTE — TELEPHONE ENCOUNTER
Cheryl Barker from [facility] labs called a visiting nurse dropped off a urine specimen and they need to

## 2023-01-17 ENCOUNTER — TELEPHONE (OUTPATIENT)
Dept: FAMILY MEDICINE CLINIC | Facility: CLINIC | Age: 78
End: 2023-01-17

## 2023-01-18 ENCOUNTER — HOSPITAL ENCOUNTER (EMERGENCY)
Facility: HOSPITAL | Age: 78
Discharge: HOME | End: 2023-01-18
Attending: EMERGENCY MEDICINE

## 2023-01-18 VITALS
WEIGHT: 153.22 LBS | RESPIRATION RATE: 16 BRPM | BODY MASS INDEX: 20.78 KG/M2 | SYSTOLIC BLOOD PRESSURE: 146 MMHG | HEART RATE: 101 BPM | DIASTOLIC BLOOD PRESSURE: 100 MMHG | TEMPERATURE: 97.8 F | OXYGEN SATURATION: 98 %

## 2023-01-18 DIAGNOSIS — N39.0 UTI (URINARY TRACT INFECTION): Primary | ICD-10-CM

## 2023-01-18 RX ORDER — AMOXICILLIN AND CLAVULANATE POTASSIUM 875; 125 MG/1; MG/1
1 TABLET, FILM COATED ORAL EVERY 12 HOURS
Qty: 28 TABLET | Refills: 0 | Status: SHIPPED | OUTPATIENT
Start: 2023-01-18 | End: 2023-02-01

## 2023-01-18 NOTE — TELEPHONE ENCOUNTER
----- Message from Tess Hemphill MD sent at 1/16/2023  2:28 PM EST -----  Please call the patient regarding his abnormal result  Please check with patient if he is still having fever or not feeling well? His urine culture is positive but it could be due to he has a chronic Cloud    Let me know how he is feeling
Attempted to call both patient and his wife's phone numbers  Neither number is working  Clinical team please attempt to call again tomorrow  If still no answer, please send results through mail 
Called as per previous note -- phone line still not working as of 1/18/23
car

## 2023-01-19 NOTE — ED NOTES
Discharge instructions reviewed with pt  Pt verbalized understanding  And has no further questions at this time  Pt ambulatory off unit with steady gait        Lorenzo Tony RN  01/18/23 1046

## 2023-01-19 NOTE — ED PROVIDER NOTES
History  Chief Complaint   Patient presents with   • Possible UTI     Pt presents to the ed after the home nurse ran ua and stated there was an abnormal reading, sent the patient to the ED for antibiotics      68-year-old male the presents the emergency department for urinary tract infection  History of chronic indwelling Cloud catheter, MI, anticoagulant usage, coronary artery disease  Was seen in this emergency department on the fourth of this month  Urine at that point significant for ESBL Proteus mirabilis  Initially started on Levaquin  Once culture resulted they had a hard time getting a hold of the patient  Changed to Augmentin on the   Patient had an additional urine test taken on the   Again grew Proteus mirabilis  Patient had not been taking the Augmentin yet at that point  Patient had a visit with his visiting nurse today  Visiting nurse received the results of his ESBL/Proteus culture from the other healthcare system  Augmentin was not listed as a antibiotic regarding sensitivities on this culture per the nurse  Spoke to this nurse directly  Patient denies any symptoms  Denies any flank pain  Has had no abdominal pain  Fatigue  No nausea or vomiting  Cloud has been changed recently  Prior to Admission Medications   Prescriptions Last Dose Informant Patient Reported? Taking?    Advair Diskus 100-50 MCG/ACT inhaler   No No   Sig: USE 1 INHALATION TWICE A DAY (RINSE MOUTH AFTER USE)   amoxicillin-clavulanate (Augmentin) 875-125 mg per tablet   No No   Sig: Take 1 tablet by mouth 2 (two) times a day for 10 days   apixaban (Eliquis) 5 mg   No No   Sig: Take 1 tablet (5 mg total) by mouth 2 (two) times a day   azelastine (ASTELIN) 0 1 % nasal spray   No No   Si spray into each nostril 2 (two) times a day as needed for allergies or rhinitis Use in each nostril as directed   docusate sodium (COLACE) 100 mg capsule   No No   Sig: Take 1 capsule (100 mg total) by mouth 2 (two) times a day   ferrous sulfate 324 (65 Fe) mg   No No   Sig: Take 1 tablet (324 mg total) by mouth daily before breakfast   fludrocortisone (FLORINEF) 0 1 mg tablet   No No   Sig: Take 1 tablet (0 1 mg total) by mouth daily Do not start before December 31, 2022  folic acid (FOLVITE) 118 mcg tablet   No No   Sig: Take 1 tablet (400 mcg total) by mouth daily   ipratropium-albuterol (DUO-NEB) 0 5-2 5 mg/3 mL nebulizer solution   No No   Sig: INHALE CONTENTS OF 1 VIAL VIA NEBULIZER FOUR TIMES A DAY   metoprolol succinate (Toprol XL) 25 mg 24 hr tablet   No No   Sig: Take 1 tablet (25 mg total) by mouth daily   midodrine (PROAMATINE) 2 5 mg tablet   No No   Sig: Take 1 tablet (2 5 mg total) by mouth 3 (three) times a day before meals   midodrine (PROAMATINE) 5 mg tablet   No No   Sig: Take 1 tablet (5 mg total) by mouth 3 (three) times a day as needed (for SBP < 100 or severe dizziness with standing up  Hold for SBP > 130)   nicotine (NICODERM CQ) 14 mg/24hr TD 24 hr patch   No No   Sig: Place 1 patch on the skin every 24 hours   omeprazole (PriLOSEC) 20 mg delayed release capsule   No No   Sig: Take 1 capsule (20 mg total) by mouth daily      Facility-Administered Medications: None       Past Medical History:   Diagnosis Date   • Anxiety disorder    • Atrial fibrillation (HCC)    • Coronary artery disease    • Depression    • Cloud catheter in place    • Hepatitis C     screening negative in 1/2017   • Hypertension    • MI (myocardial infarction) [facility])    • Use of cane as ambulatory aid        Past Surgical History:   Procedure Laterality Date   • CARDIAC CATHETERIZATION  07/09/2018   • CARDIAC ELECTROPHYSIOLOGY PROCEDURE N/A 9/30/2022    Procedure: Cardiac loop recorder implant;  Surgeon: Lanette Hoskins MD;  [facility];   Service: Cardiology   • COLONOSCOPY     • TN RPR 1ST INGUN HRNA AGE 5 YRS/> REDUCIBLE Right 8/11/2022    Procedure: REPAIR HERNIA INGUINAL;  Surgeon: Bhupinder Encarnacion DO; Location: [facility];  Service: General   • TONSILLECTOMY         Family History   Problem Relation Age of Onset   • Hypertension Mother    • Coronary artery disease Mother         premature   • Hypertension Father    • Coronary artery disease Father         premature   • Diabetes Father      I have reviewed and agree with the history as documented  E-Cigarette/Vaping   • E-Cigarette Use Never User      E-Cigarette/Vaping Substances   • Nicotine Yes    • THC No    • CBD No    • Flavoring No    • Other No    • Unknown No      Social History     Tobacco Use   • Smoking status: Every Day     Packs/day: 3 00     Years: 57 00     Pack years: 171 00     Types: Cigarettes   • Smokeless tobacco: Never   • Tobacco comments:     since age 17; Has history of smoking for over 55 years  He has been smoking more than packet daily in the past however he has been smokes about half a pack a day lately and stopped smoking on 7/1/2018 when he was admitted to the hospital     Vaping Use   • Vaping Use: Never used   Substance Use Topics   • Alcohol use: Not Currently   • Drug use: Never       Review of Systems   All other systems reviewed and are negative  Physical Exam  Physical Exam  Vitals and nursing note reviewed  Constitutional:       General: He is not in acute distress  Appearance: He is well-developed  He is not diaphoretic  HENT:      Head: Normocephalic and atraumatic  Right Ear: External ear normal       Left Ear: External ear normal    Eyes:      Conjunctiva/sclera: Conjunctivae normal    Neck:      Trachea: No tracheal deviation  Cardiovascular:      Rate and Rhythm: Normal rate and regular rhythm  Heart sounds: Normal heart sounds  No murmur heard  Pulmonary:      Effort: No respiratory distress  Breath sounds: Normal breath sounds  No stridor  No wheezing or rales  Abdominal:      General: Bowel sounds are normal  There is no distension  Palpations: Abdomen is soft   There is no mass       Tenderness: There is no abdominal tenderness  There is no guarding or rebound  Musculoskeletal:         General: No tenderness or deformity  Skin:     General: Skin is dry  Findings: No rash  Neurological:      Motor: No abnormal muscle tone  Coordination: Coordination normal    Psychiatric:         Behavior: Behavior normal          Thought Content: Thought content normal          Judgment: Judgment normal          Vital Signs  ED Triage Vitals [01/18/23 1840]   Temperature Pulse Respirations Blood Pressure SpO2   97 8 °F (36 6 °C) 101 16 146/100 98 %      Temp Source Heart Rate Source Patient Position - Orthostatic VS BP Location FiO2 (%)   Oral Monitor Sitting Left arm --      Pain Score       No Pain           Vitals:    01/18/23 1840   BP: 146/100   Pulse: 101   Patient Position - Orthostatic VS: Sitting         Visual Acuity      ED Medications  Medications - No data to display    Diagnostic Studies  Results Reviewed     None                 No orders to display              Procedures  Procedures         ED Course                               SBIRT 20yo+    Flowsheet Row Most Recent Value   SBIRT (25 yo +)    In order to provide better care to our patients, we are screening all of our patients for alcohol and drug use  Would it be okay to ask you these screening questions? No Filed at: 01/18/2023 1856                    Medical Decision Making  Patient presents after being sent by nurse  Reviewed records  Patient appears to be on appropriate antibiotic at this point  Patient is a poor historian  Wrote a written prescription for the antibiotic that the patient is supposed to be on  He will make sure he is on this medication when he gets home and if he does not he will start taking it as prescribed  Follow-up with the PCP as soon as possible  Return precautions given      UTI (urinary tract infection): acute illness or injury  Amount and/or Complexity of Data Reviewed  External Data Reviewed: labs and notes  Details: Urinalysis and previous note      Risk  Prescription drug management  Disposition  Final diagnoses:   UTI (urinary tract infection)     Time reflects when diagnosis was documented in both MDM as applicable and the Disposition within this note     Time User Action Codes Description Comment    1/18/2023  7:22 PM Gurwinder Dolan Add [N39 0] UTI (urinary tract infection)       ED Disposition     ED Disposition   Discharge    Condition   Stable    Date/Time   Wed Jan 18, 2023  7:22 PM    [address] [place] [address] discharge to home/self care  Follow-up Information     Follow up With Specialties Details Why Contact Info Additional Information    Catrina Edmondson MD Internal Medicine  For re-evaluation as soon as possible [address]  [employer]       Dallas Woo MD Internal Medicine Schedule an appointment as soon as possible for a visit  For re-evaluation as soon as possible if your primary care provider can not see you [address] [facility] [address] Emergency Department Emergency Medicine  If symptoms worsen [address] [facility],[address] [id number]  [address] [place] [address] Emergency Department, [address], [address] [place] [address] Rd          Discharge Medication List as of 1/18/2023  7:24 PM      START taking these medications    Details   !! amoxicillin-clavulanate (AUGMENTIN) 875-125 mg per tablet Take 1 tablet by mouth every 12 (twelve) hours for 14 days, Starting Wed 1/18/2023, Until Wed 2/1/2023, Print       !! - Potential duplicate medications found  Please discuss with provider        CONTINUE these medications which have NOT CHANGED    Details   Advair Diskus 100-50 MCG/ACT inhaler USE 1 INHALATION TWICE A DAY (RINSE MOUTH AFTER USE), Normal      !! amoxicillin-clavulanate (Augmentin) 875-125 mg per tablet Take 1 tablet by mouth 2 (two) times a day for 10 days, Starting Thu 1/12/2023, Until Sun 1/22/2023, Normal      apixaban (Eliquis) 5 mg Take 1 tablet (5 mg total) by mouth 2 (two) times a day, Starting Fri 12/30/2022, Until Sun 1/29/2023, Normal      azelastine (ASTELIN) 0 1 % nasal spray 1 spray into each nostril 2 (two) times a day as needed for allergies or rhinitis Use in each nostril as directed, Starting Tue 11/22/2022, Normal      docusate sodium (COLACE) 100 mg capsule Take 1 capsule (100 mg total) by mouth 2 (two) times a day, Starting Tue 11/22/2022, Until Mon 2/20/2023, Normal      ferrous sulfate 324 (65 Fe) mg Take 1 tablet (324 mg total) by mouth daily before breakfast, Starting Wed 11/30/2022, Until Tue 2/28/2023, Normal      fludrocortisone (FLORINEF) 0 1 mg tablet Take 1 tablet (0 1 mg total) by mouth daily Do not start before December 31, 2022 , Starting Sat 12/31/2022, Until Mon 9/68/4409, Normal      folic acid (FOLVITE) 782 mcg tablet Take 1 tablet (400 mcg total) by mouth daily, Starting Fri 9/10/2021, Normal      ipratropium-albuterol (DUO-NEB) 0 5-2 5 mg/3 mL nebulizer solution INHALE CONTENTS OF 1 VIAL VIA NEBULIZER FOUR TIMES A DAY, Normal      metoprolol succinate (Toprol XL) 25 mg 24 hr tablet Take 1 tablet (25 mg total) by mouth daily, Starting Tue 11/22/2022, Until Mon 2/20/2023, Normal      !! midodrine (PROAMATINE) 2 5 mg tablet Take 1 tablet (2 5 mg total) by mouth 3 (three) times a day before meals, Starting Fri 12/30/2022, Until Sun 1/29/2023, Normal      !! midodrine (PROAMATINE) 5 mg tablet Take 1 tablet (5 mg total) by mouth 3 (three) times a day as needed (for SBP < 100 or severe dizziness with standing up   Hold for SBP > 130), Starting Fri 12/30/2022, Normal      nicotine (NICODERM CQ) 14 mg/24hr TD 24 hr patch Place 1 patch on the skin every 24 hours, Starting Tue 10/25/2022, Normal      omeprazole (PriLOSEC) 20 mg delayed release capsule Take 1 capsule (20 mg total) by mouth daily, Starting Wed 11/30/2022, Normal       !! - Potential duplicate medications found  Please discuss with provider  No discharge procedures on file      PDMP Review       Value Time User    PDMP Reviewed  Yes 11/18/2022  8:47 PM Hattie Echeverria          ED Provider  Electronically Signed by           Lynda Mason DO  01/19/23 9727

## 2023-01-19 NOTE — DISCHARGE INSTRUCTIONS
Take the antibiotic as prescribed  This should be a medication called Augmentin (amoxicillin/clavulanate is the other name for this medication )    If you do not have this medication at home use the accompanied prescription to get this medication from the pharmacy  Follow-up with your urologist and primary care provider  If you do not have a primary care provider call the phone number below for establishment with a new primary care provider  Come back to the emergency department for new or worsening symptoms

## 2023-01-23 ENCOUNTER — REMOTE DEVICE CLINIC VISIT (OUTPATIENT)
Dept: CARDIOLOGY CLINIC | Facility: CLINIC | Age: 78
End: 2023-01-23

## 2023-01-23 DIAGNOSIS — Z95.818 PRESENCE OF OTHER CARDIAC IMPLANTS AND GRAFTS: Primary | ICD-10-CM

## 2023-01-23 NOTE — PROGRESS NOTES
MDT LNQ22/ ACTIVE SYSTEM IS MRI CONDITIONAL   CARELINK TRANSMISSION: LOOP RECORDER  PRESENTING RHYTHM SB W/ PACs @ 54 BPM  BATTERY STATUS "OK " 1 PAUSE EPISODE NO ALERT RECEIVED  [address] AF EPISODES, LONGEST EPISODE IS 61:42 HOURS AVAILABLE STRIPS DEMONSTRATE PACs, PVCs AND ATRIAL FIBRILLATION  AF BURDEN IS 97 2%  AF BURDEN MAYBE OVERESTIMATED DUE TO INAPPROPRIATE CLASSIFICATION OF AF  SOME STRIPS MAY NOT BE INTERPRETABLE OR AVAILABLE, CANNOT DEFINITIVELY RULE OUT ATRIAL FIBRILLATION  PT NOT ON AC MEDS DUE OT SYNCOPE  NO PATIENT ACTIVATED EPISODES  NORMAL DEVICE FUNCTION   DL

## 2023-02-01 ENCOUNTER — OFFICE VISIT (OUTPATIENT)
Dept: UROLOGY | Facility: CLINIC | Age: 78
End: 2023-02-01

## 2023-02-01 VITALS
DIASTOLIC BLOOD PRESSURE: 70 MMHG | WEIGHT: 143 LBS | OXYGEN SATURATION: 98 % | BODY MASS INDEX: 19.37 KG/M2 | HEART RATE: 106 BPM | HEIGHT: 72 IN | SYSTOLIC BLOOD PRESSURE: 120 MMHG

## 2023-02-01 DIAGNOSIS — N39.0 RECURRENT URINARY TRACT INFECTION: Primary | ICD-10-CM

## 2023-02-01 RX ORDER — METHENAMINE HIPPURATE 1000 MG/1
1 TABLET ORAL 2 TIMES DAILY WITH MEALS
Qty: 60 TABLET | Refills: 6 | Status: SHIPPED | OUTPATIENT
Start: 2023-02-01

## 2023-02-01 NOTE — PROGRESS NOTES
1  Recurrent urinary tract infection  methenamine hippurate (HIPREX) 1 g tablet          Assessment and plan:       1  Urinary retention  2  Urethral meatal erosion  3  Recurrent urinary infections     16 [place] coudé catheter change in the office today without difficulty  He will continue with monthly catheter changes through his visiting nursing  Patient was instructed to complete the course of antibiotics that he is currently on  He will start Hiprex the following day as a means of infection suppression  Will return in 6 months for reevaluation  Patient has ongoing issues with infections may possibly consider suprapubic tube placement now that his hernia has been repaired  We would need a pelvic CT prior  [employer], PA-C      Chief Complaint     Chief Complaint   Patient presents with   • Follow-up         History of Present Illness     Davin Mehta is a 78 y o  male presenting for follow up  In ER 4/21/22 with catheter related issues  Noted to have meatal erosion secondary to catheter  Patient poor historian and unable to determine how long he has had a miller for as well as when the last exchange was performed  Patient was not felt to be a good  for suprapubic tube secondary to his large right inguinal hernia  Ultimately patient underwent right inguinal hernia repair 8/11/2022 with Dr Kelli Richardson  Cystoscopy 8/1/22 with Dr Mae Holt showing Large capacity, somewhat floppy bladder  Significant debris is noted which somewhat limits visualization however on thorough cystoscopy there are no papillary tumors or signs of neoplasm  Patient was admitted in September 2022 with subdural hematoma  Patient was admitted and November 2022 with urosepsis  Patient was admitted in December 2022 after near syncopal event  Currently on a course of antibiotics for urinary infections     Last PSA 3 7 (3/2/22)     Medical comorbidies include MI, CAD, COPD, cardiomyopathy, afib      Ultrasound of the kidney and bladder 11/22/2022 showing collapsed bladder around Cloud catheter without any upper tract abnormalities  Laboratory     Lab Results   Component Value Date    CREATININE 1 19 01/04/2023       Lab Results   Component Value Date    PSA 3 7 03/02/2022       Review of Systems     Review of Systems   Constitutional: Negative for activity change, appetite change, chills, diaphoresis, fatigue, fever and unexpected weight change  Respiratory: Negative for chest tightness and shortness of breath  Cardiovascular: Negative for chest pain, palpitations and leg swelling  Gastrointestinal: Negative for abdominal distention, abdominal pain, constipation, diarrhea, nausea and vomiting  Genitourinary: Cloud catheter dependent   Musculoskeletal: Negative for back pain, gait problem and myalgias  Skin: Negative for color change, pallor, rash and wound  Psychiatric/Behavioral: Negative for behavioral problems  The patient is not nervous/anxious  Allergies     No Known Allergies    Physical Exam     Physical Exam  Constitutional:       General: He is not in acute distress  Appearance: Normal appearance  He is normal weight  He is not ill-appearing, toxic-appearing or diaphoretic  HENT:      Head: Normocephalic and atraumatic  Eyes:      General:         Right eye: No discharge  Left eye: No discharge  Conjunctiva/sclera: Conjunctivae normal    Pulmonary:      Effort: Pulmonary effort is normal  No respiratory distress  Genitourinary:     Comments: Patient's Cloud catheter had been removed at the beginning of today's visit  This was replaced with a 16 [employer] coudé catheter with good return  No complications during his exchange  10 cc instilled to balloon  Musculoskeletal:         General: No swelling or tenderness  Normal range of motion  Skin:     General: Skin is warm and dry  Coloration: Skin is not jaundiced or pale     Neurological:      General: No focal deficit present  Mental Status: He is alert and oriented to person, place, and time  Psychiatric:         Mood and Affect: Mood normal          Behavior: Behavior normal          Thought Content: Thought content normal          Vital Signs     Vitals:    02/01/23 1042   BP: 120/70   BP Location: Left arm   Patient Position: Sitting   Cuff Size: Standard   Pulse: (!) 106   SpO2: 98%   Weight: 64 9 kg (143 lb)   Height: 6' (1 829 m)         Current Medications       Current Outpatient Medications:   •  Advair Diskus 100-50 MCG/ACT inhaler, USE 1 INHALATION TWICE A DAY (RINSE MOUTH AFTER USE), Disp: 60 blister, Rfl: 11  •  amoxicillin-clavulanate (AUGMENTIN) 875-125 mg per tablet, Take 1 tablet by mouth every 12 (twelve) hours for 14 days, Disp: 28 tablet, Rfl: 0  •  azelastine (ASTELIN) 0 1 % nasal spray, 1 spray into each nostril 2 (two) times a day as needed for allergies or rhinitis Use in each nostril as directed, Disp: 30 mL, Rfl: 0  •  docusate sodium (COLACE) 100 mg capsule, Take 1 capsule (100 mg total) by mouth 2 (two) times a day, Disp: 180 capsule, Rfl: 0  •  ferrous sulfate 324 (65 Fe) mg, Take 1 tablet (324 mg total) by mouth daily before breakfast, Disp: 90 tablet, Rfl: 2  •  folic acid (FOLVITE) 612 mcg tablet, Take 1 tablet (400 mcg total) by mouth daily, Disp: 30 tablet, Rfl: 0  •  ipratropium-albuterol (DUO-NEB) 0 5-2 5 mg/3 mL nebulizer solution, INHALE CONTENTS OF 1 VIAL VIA NEBULIZER FOUR TIMES A DAY, Disp: 60 mL, Rfl: 11  •  methenamine hippurate (HIPREX) 1 g tablet, Take 1 tablet (1 g total) by mouth 2 (two) times a day with meals, Disp: 60 tablet, Rfl: 6  •  metoprolol succinate (Toprol XL) 25 mg 24 hr tablet, Take 1 tablet (25 mg total) by mouth daily, Disp: 90 tablet, Rfl: 0  •  midodrine (PROAMATINE) 5 mg tablet, Take 1 tablet (5 mg total) by mouth 3 (three) times a day as needed (for SBP < 100 or severe dizziness with standing up   Hold for SBP > 130), Disp: 30 tablet, Rfl: 0  • nicotine (NICODERM CQ) 14 mg/24hr TD 24 hr patch, Place 1 patch on the skin every 24 hours, Disp: 14 patch, Rfl: 0  •  omeprazole (PriLOSEC) 20 mg delayed release capsule, Take 1 capsule (20 mg total) by mouth daily, Disp: 90 capsule, Rfl: 3  •  apixaban (Eliquis) 5 mg, Take 1 tablet (5 mg total) by mouth 2 (two) times a day, Disp: 60 tablet, Rfl: 0  •  midodrine (PROAMATINE) 2 5 mg tablet, Take 1 tablet (2 5 mg total) by mouth 3 (three) times a day before meals, Disp: 90 tablet, Rfl: 0      Active Problems     Patient Active Problem List   Diagnosis   • Symptomatic anemia   • Congestive cardiomyopathy (HCC)   • Permanent atrial fibrillation (HCC)   • Smoker   • Orthostatic hypotension   • Syncope   • Colonic adenoma   • Ambulatory dysfunction   • COPD (chronic obstructive pulmonary disease) (HCC)   • Bradycardia   • Thrombocytosis   • Head trauma   • Closed nondisplaced comminuted fracture of left patella   • Essential (hemorrhagic) thrombocythemia (HCC)   • Dizziness   • Urinary retention   • Abnormal colonoscopy   • Iron deficiency anemia likely secondary to GI bleeding   • Cholestatic jaundice   • Moderate protein-calorie malnutrition (HCC)   • Encounter for support and coordination of transition of care   • Nasal congestion   • Recurrent right inguinal hernia   • SDH (subdural hematoma)   • Surgical wound present   • Swelling of lower leg   • Gastroesophageal reflux disease without esophagitis         Past Medical History     Past Medical History:   Diagnosis Date   • Anxiety disorder    • Atrial fibrillation (HCC)    • Coronary artery disease    • Depression    • Cloud catheter in place    • Hepatitis C     screening negative in 1/2017   • Hypertension    • MI (myocardial infarction) [facility])    • Use of cane as ambulatory aid          Surgical History     Past Surgical History:   Procedure Laterality Date   • CARDIAC CATHETERIZATION  07/09/2018   • CARDIAC ELECTROPHYSIOLOGY PROCEDURE N/A 9/30/2022    Procedure: Cardiac loop recorder implant;  Surgeon: Miky Harrison MD;  [facility];   Service: Cardiology   • COLONOSCOPY     • SD RPR 1ST INGUN HRNA AGE 5 YRS/> REDUCIBLE Right 8/11/2022    Procedure: REPAIR HERNIA INGUINAL;  Surgeon: Lorenzo Encarnacion DO;  Location: [facility];  Service: General   • TONSILLECTOMY           Family History     Family History   Problem Relation Age of Onset   • Hypertension Mother    • Coronary artery disease Mother         premature   • Hypertension Father    • Coronary artery disease Father         premature   • Diabetes Father          Social History     Social History       Radiology

## 2023-02-08 ENCOUNTER — TELEPHONE (OUTPATIENT)
Dept: FAMILY MEDICINE CLINIC | Facility: CLINIC | Age: 78
End: 2023-02-08

## 2023-02-08 DIAGNOSIS — I95.1 ORTHOSTATIC HYPOTENSION: ICD-10-CM

## 2023-02-08 RX ORDER — MIDODRINE HYDROCHLORIDE 5 MG/1
5 TABLET ORAL 3 TIMES DAILY PRN
Qty: 90 TABLET | Refills: 3 | Status: SHIPPED | OUTPATIENT
Start: 2023-02-08

## 2023-02-08 NOTE — TELEPHONE ENCOUNTER
Received an Rx request fax sheet from [facility] stating patient requests a refill on MIDORINE 5 MG for 90 supply  Should we refill for patient ?  Patient was given this medication at that hospital

## 2023-02-11 ENCOUNTER — HOSPITAL ENCOUNTER (EMERGENCY)
Facility: HOSPITAL | Age: 78
Discharge: HOME | End: 2023-02-11
Attending: EMERGENCY MEDICINE

## 2023-02-11 VITALS
SYSTOLIC BLOOD PRESSURE: 164 MMHG | RESPIRATION RATE: 16 BRPM | DIASTOLIC BLOOD PRESSURE: 95 MMHG | HEART RATE: 81 BPM | TEMPERATURE: 97.5 F | OXYGEN SATURATION: 97 %

## 2023-02-11 DIAGNOSIS — T83.9XXA PROBLEM WITH URINARY CATHETER (HCC): Primary | ICD-10-CM

## 2023-02-11 NOTE — ED NOTES
Physical Discharge Summary Addendum:  Date: 6/9/2021  Total Number of Visits: 4  Referred by: Catie Godoy MD  Medical Diagnosis (from order):   Diagnosis Information      Diagnosis    724.5 (ICD-9-CM) - M54.9 (ICD-10-CM) - Upper back pain on left side    724.8 (ICD-9-CM) - M62.830 (ICD-10-CM) - Spasm of back muscles                Patient discharged due to no shows for scheduled appointments.  Patient discharged due to not scheduling more appointments.  Status of goals: per status in last daily treatment note    Patient has not been seen in >30 days,  physical therapy episode of care to be discharged per clinic policy.     Urine emptied from patients leg bag catheter, new bag provided      Maryellen George, RN  02/11/23 8479

## 2023-02-11 NOTE — DISCHARGE INSTRUCTIONS
You are having leakage around her Cloud but your catheter is still working properly  You should follow-up with your urologist to discuss this problem and whether or not you may need to get a suprapubic tube or other new treatment  Please return to the ER if you develop fevers or abdominal pain as this could be a sign of urinary tract infection

## 2023-02-11 NOTE — ED PROVIDER NOTES
History  Chief Complaint   Patient presents with   • Urinary Catheter Problem     PT presents to ED from home after pt's urinary cath is leaking for one week around insertion site near penis  Cloud inserted one month ago  79-year-old male with chronic indwelling Cloud presents with complaint of leakage of urine around his Cloud  Urine is still draining into the bag  He had a new Cloud placed at his urology visit on the first of the month  No abdominal pain or fevers  No penile pain  Prior to Admission Medications   Prescriptions Last Dose Informant Patient Reported? Taking? Advair Diskus 100-50 MCG/ACT inhaler   No No   Sig: USE 1 INHALATION TWICE A DAY (RINSE MOUTH AFTER USE)   apixaban (Eliquis) 5 mg   No No   Sig: Take 1 tablet (5 mg total) by mouth 2 (two) times a day   azelastine (ASTELIN) 0 1 % nasal spray   No No   Si spray into each nostril 2 (two) times a day as needed for allergies or rhinitis Use in each nostril as directed   docusate sodium (COLACE) 100 mg capsule   No No   Sig: Take 1 capsule (100 mg total) by mouth 2 (two) times a day   ferrous sulfate 324 (65 Fe) mg   No No   Sig: Take 1 tablet (324 mg total) by mouth daily before breakfast   folic acid (FOLVITE) 902 mcg tablet   No No   Sig: Take 1 tablet (400 mcg total) by mouth daily   ipratropium-albuterol (DUO-NEB) 0 5-2 5 mg/3 mL nebulizer solution   No No   Sig: INHALE CONTENTS OF 1 VIAL VIA NEBULIZER FOUR TIMES A DAY   methenamine hippurate (HIPREX) 1 g tablet   No No   Sig: Take 1 tablet (1 g total) by mouth 2 (two) times a day with meals   metoprolol succinate (Toprol XL) 25 mg 24 hr tablet   No No   Sig: Take 1 tablet (25 mg total) by mouth daily   midodrine (PROAMATINE) 5 mg tablet   No No   Sig: Take 1 tablet (5 mg total) by mouth 3 (three) times a day as needed (for SBP < 100 or severe dizziness with standing up   Hold for SBP > 130)   nicotine (NICODERM CQ) 14 mg/24hr TD 24 hr patch   No No   Sig: Place 1 patch on the skin every 24 hours   omeprazole (PriLOSEC) 20 mg delayed release capsule   No No   Sig: Take 1 capsule (20 mg total) by mouth daily      Facility-Administered Medications: None       Past Medical History:   Diagnosis Date   • Anxiety disorder    • Atrial fibrillation (HCC)    • Coronary artery disease    • Depression    • Cloud catheter in place    • Hepatitis C     screening negative in 1/2017   • Hypertension    • MI (myocardial infarction) [facility])    • Use of cane as ambulatory aid        Past Surgical History:   Procedure Laterality Date   • CARDIAC CATHETERIZATION  07/09/2018   • CARDIAC ELECTROPHYSIOLOGY PROCEDURE N/A 9/30/2022    Procedure: Cardiac loop recorder implant;  Surgeon: Ephraim Closs, MD;  Location: [facility]; Service: Cardiology   • COLONOSCOPY     • SD RPR 1ST INGUN HRNA AGE 5 YRS/> REDUCIBLE Right 8/11/2022    Procedure: REPAIR HERNIA INGUINAL;  Surgeon: Valeri Encarnacion DO;  Location: [facility];  Service: General   • TONSILLECTOMY         Family History   Problem Relation Age of Onset   • Hypertension Mother    • Coronary artery disease Mother         premature   • Hypertension Father    • Coronary artery disease Father         premature   • Diabetes Father      I have reviewed and agree with the history as documented  E-Cigarette/Vaping   • E-Cigarette Use Never User      E-Cigarette/Vaping Substances   • Nicotine Yes    • THC No    • CBD No    • Flavoring No    • Other No    • Unknown No      Social History     Tobacco Use   • Smoking status: Every Day     Packs/day: 3 00     Years: 57 00     Pack years: 171 00     Types: Cigarettes   • Smokeless tobacco: Never   • Tobacco comments:     since age 17; Has history of smoking for over 55 years   He has been smoking more than packet daily in the past however he has been smokes about half a pack a day lately and stopped smoking on 7/1/2018 when he was admitted to the hospital     Vaping Use   • Vaping Use: Never used Substance Use Topics   • Alcohol use: Not Currently   • Drug use: Never       Review of Systems   Constitutional: Negative for fever  Gastrointestinal: Negative for abdominal pain  Genitourinary: Negative for dysuria, flank pain, hematuria, penile discharge, penile pain, penile swelling and scrotal swelling  Physical Exam  Physical Exam  Constitutional:       General: He is not in acute distress  Appearance: He is not toxic-appearing  Comments: Elderly male   HENT:      Head: Normocephalic  Nose: Nose normal       Mouth/Throat:      Mouth: Mucous membranes are moist    Eyes:      Conjunctiva/sclera: Conjunctivae normal    Cardiovascular:      Rate and Rhythm: Normal rate  Pulmonary:      Effort: Pulmonary effort is normal    Abdominal:      General: Abdomen is flat  Palpations: Abdomen is soft  Tenderness: There is no abdominal tenderness  Genitourinary:     Comments: Cloud in place, urethral erosion to the mid penile shaft without erythema or tenderness, urine in leg bag  Musculoskeletal:         General: Normal range of motion  Cervical back: Normal range of motion  Skin:     General: Skin is warm and dry  Neurological:      General: No focal deficit present  Mental Status: He is alert     Psychiatric:         Mood and Affect: Mood normal          Behavior: Behavior normal          Vital Signs  ED Triage Vitals   Temperature Pulse Respirations Blood Pressure SpO2   02/11/23 1440 02/11/23 1440 02/11/23 1440 02/11/23 1442 02/11/23 1440   97 5 °F (36 4 °C) 81 16 164/95 97 %      Temp src Heart Rate Source Patient Position - Orthostatic VS BP Location FiO2 (%)   -- -- -- -- --             Pain Score       --                  Vitals:    02/11/23 1440 02/11/23 1442   BP:  164/95   Pulse: 81          Visual Acuity      ED Medications  Medications - No data to display    Diagnostic Studies  Results Reviewed     None                 No orders to display Procedures  Procedures         ED Course       68-year-old male with chronic indwelling Cloud for urinary retention with associated urethral meatus erosion presenting with urine leakage around the Cloud reportedly only for the past week although patient is relatively poor historian  No fever or abdominal tenderness so will not test or treat for urinary tract infection  Urine leakage may be related to bladder spasm or urethral sphincter erosion either of which are not emergent medical conditions and can be managed by patient's urologist   Patient advised to contact his urologist regarding this problem and neck steps  Return precautions given for symptoms of urinary tract infection  MDM    Disposition  Final diagnoses:   Problem with urinary catheter ([address] )     Time reflects when diagnosis was documented in both MDM as applicable and the Disposition within this note     Time User Action Codes Description Comment    2/11/2023  3:40 PM Precious Perez Add [T83  9XXA] Problem with urinary catheter [facility])       ED Disposition     ED Disposition   Discharge    Condition   Stable    Date/Time   Sat Feb 11, 2023  3:45 PM    Comment   Gnio Flores discharge to home/self care  Follow-up Information    None         Patient's Medications   Discharge Prescriptions    No medications on file       No discharge procedures on file      PDMP Review       Value Time User    PDMP Reviewed  Yes 11/18/2022  8:47 PM Aly Bell 10 Stefanie Bowden          ED Provider  Electronically Signed by           Reji Kohli MD  02/11/23 [id number]

## 2023-02-14 ENCOUNTER — TELEPHONE (OUTPATIENT)
Dept: GASTROENTEROLOGY | Facility: CLINIC | Age: 78
End: 2023-02-14

## 2023-02-15 ENCOUNTER — OFFICE VISIT (OUTPATIENT)
Dept: CARDIOLOGY CLINIC | Facility: CLINIC | Age: 78
End: 2023-02-15

## 2023-02-15 VITALS
DIASTOLIC BLOOD PRESSURE: 64 MMHG | WEIGHT: 139.9 LBS | SYSTOLIC BLOOD PRESSURE: 130 MMHG | HEART RATE: 67 BPM | BODY MASS INDEX: 18.97 KG/M2

## 2023-02-15 DIAGNOSIS — Z95.818 PRESENCE OF OTHER CARDIAC IMPLANTS AND GRAFTS: Primary | ICD-10-CM

## 2023-02-15 DIAGNOSIS — I48.21 PERMANENT ATRIAL FIBRILLATION (HCC): ICD-10-CM

## 2023-02-15 NOTE — PATIENT INSTRUCTIONS
Our office will be in touch with you to schedule Watchman procedure       Start taking Eliquis 2 5 mg twice daily

## 2023-02-15 NOTE — PROGRESS NOTES
EPS Consultation/New Patient Evaluation - Ngozi Fairly 78 y o  male MRN: [id number]       Referring:Dr Thomas Luna    CC/HPI:   It was a pleasure to see Ngozi Fairly in our arrhythmia clinic at [place] [address]  As you know he is a 78 y o  man with anxiety, hepatitis C, CAD, chronic atrial fibrillation, history of subdural hematoma, orthostatic hypotension, COPD/emphysema, ambulatory dysfunction, history of cardiomyopathy with ejection fraction of 10% now with recovery to 55%, recurrent syncope status post loop recorder implantation who presents to discuss management of anticoagulation  He was admitted in September 2022 after having syncopal episode  Patient had right subdural hematoma  EP team was consulted for pauses on telemetry in setting of atrial fibrillation  However his pauses were not long enough to warrant a pacemaker  Instead he underwent loop recorder implantation  His anticoagulation was held because of the fall and subdural hematoma  He he then presented to the hospital in December again  He had near syncopal episode  He stood up and became lightheadedness and then fell forward to his knees  EKG revealed atrial fibrillation at the time  His orthostatics vital signs were positive and he was given 1 L of fluid  His device checks did not show any pauses  His metoprolol was held initially but was eventually restarted and he tolerated it well  He now presents to discuss Watchman procedure  He also had bleeding at time of miller replacement in summer 2022  Patient currently denies any recent falls or bleeding episodes  He otherwise denies any chest pain, shortness of breath, orthopnea, PND or syncope        Past Medical History:  Past Medical History:   Diagnosis Date   • Anxiety disorder    • Atrial fibrillation [facility])    • Coronary artery disease    • Depression    • Miller catheter in place    • Hepatitis C     screening negative in 1/2017   • Hypertension    • [facility]    • Use of cane as ambulatory aid        Medications:      Current Outpatient Medications:   •  apixaban (Eliquis) 2 5 mg, Take 1 tablet (2 5 mg total) by mouth 2 (two) times a day, Disp: 180 tablet, Rfl: 3  •  azelastine (ASTELIN) 0 1 % nasal spray, 1 spray into each nostril 2 (two) times a day as needed for allergies or rhinitis Use in each nostril as directed, Disp: 30 mL, Rfl: 0  •  ipratropium-albuterol (DUO-NEB) 0 5-2 5 mg/3 mL nebulizer solution, INHALE CONTENTS OF 1 VIAL VIA NEBULIZER FOUR TIMES A DAY, Disp: 60 mL, Rfl: 11  •  methenamine hippurate (HIPREX) 1 g tablet, Take 1 tablet (1 g total) by mouth 2 (two) times a day with meals, Disp: 60 tablet, Rfl: 6  •  metoprolol succinate (Toprol XL) 25 mg 24 hr tablet, Take 1 tablet (25 mg total) by mouth daily, Disp: 90 tablet, Rfl: 0  •  midodrine (PROAMATINE) 5 mg tablet, Take 1 tablet (5 mg total) by mouth 3 (three) times a day as needed (for SBP < 100 or severe dizziness with standing up   Hold for SBP > 130), Disp: 90 tablet, Rfl: 3  •  omeprazole (PriLOSEC) 20 mg delayed release capsule, Take 1 capsule (20 mg total) by mouth daily, Disp: 90 capsule, Rfl: 3  •  Advair Diskus 100-50 MCG/ACT inhaler, USE 1 INHALATION TWICE A DAY (RINSE MOUTH AFTER USE) (Patient not taking: Reported on 2/15/2023), Disp: 60 blister, Rfl: 11  •  docusate sodium (COLACE) 100 mg capsule, Take 1 capsule (100 mg total) by mouth 2 (two) times a day (Patient not taking: Reported on 2/15/2023), Disp: 180 capsule, Rfl: 0  •  ferrous sulfate 324 (65 Fe) mg, Take 1 tablet (324 mg total) by mouth daily before breakfast (Patient not taking: Reported on 2/15/2023), Disp: 90 tablet, Rfl: 2  •  folic acid (FOLVITE) 826 mcg tablet, Take 1 tablet (400 mcg total) by mouth daily (Patient not taking: Reported on 2/15/2023), Disp: 30 tablet, Rfl: 0  •  nicotine (NICODERM CQ) 14 mg/24hr TD 24 hr patch, Place 1 patch on the skin every 24 hours (Patient not taking: Reported on 2/15/2023), Disp: 14 patch, Rfl: 0     Family History   Problem Relation Age of Onset   • Hypertension Mother    • Coronary artery disease Mother         premature   • Hypertension Father    • Coronary artery disease Father         premature   • Diabetes Father      Social History     Socioeconomic History   • Marital status: /Civil Union     Spouse name: Not on file   • Number of children: 3   • Years of education: 15   • Highest education level: 12th grade   Occupational History   • Occupation: retired   Tobacco Use   • Smoking status: Every Day     Packs/day: 3 00     Years: 57 00     Pack years: 171 00     Types: Cigarettes   • Smokeless tobacco: Never   • Tobacco comments:     since age 17; Has history of smoking for over 54 years  He has been smoking more than packet daily in the past however he has been smokes about half a pack a day lately and stopped smoking on 2018 when he was admitted to the hospital     Vaping Use   • Vaping Use: Never used   Substance and Sexual Activity   • Alcohol use: Not Currently   • Drug use: Never   • Sexual activity: Not Currently     Partners: Female     Birth control/protection: Condom Male   Other Topics Concern   • Not on file   Social History Narrative    History of Ultra Sound: 2016    History of Stress Test: 2018    History of ECHO: 2018    · Most recent tobacco use screenin2019      · Live alone or with others:   with others        · Diet:   Regular      · Caffeine intake:    Moderate      · Guns present in home:   No      · Asbestos exposure:   No      · TB exposure:   No      · Environmental exposure:   No      · Animal exposure:   No      · Smoke alarm in home:   Yes     Social Determinants of Health     Financial Resource Strain: Not on file   Food Insecurity: No Food Insecurity   • Worried About Running Out of Food in the Last Year: Never true   • Ran Out of Food in the Last Year: Never true   Transportation Needs: No Transportation Needs   • Lack of Transportation (Medical): No   • Lack of Transportation (Non-Medical): No   Physical Activity: Not on file   Stress: Not on file   Social Connections: Not on file   Intimate Partner Violence: Not on file   Housing Stability: Low Risk    • Unable to Pay for Housing in the Last Year: No   • Number of Places Lived in the Last Year: 1   • Unstable Housing in the Last Year: No     Social History     Tobacco Use   Smoking Status Every Day   • Packs/day: 3 00   • Years: 57 00   • Pack years: 171 00   • Types: Cigarettes   Smokeless Tobacco Never   Tobacco Comments    since age 17; Has history of smoking for over 55 years  He has been smoking more than packet daily in the past however he has been smokes about half a pack a day lately and stopped smoking on 7/1/2018 when he was admitted to the hospital       Social History     Substance and Sexual Activity   Alcohol Use Not Currently       Review of Systems   Constitutional: Negative for chills and fever  HENT: Negative  Eyes: Negative for blurred vision and double vision  Cardiovascular: Negative for chest pain, dyspnea on exertion, leg swelling, near-syncope, orthopnea, palpitations, paroxysmal nocturnal dyspnea and syncope  Respiratory: Negative for cough and sputum production  Endocrine: Negative  Skin: Negative  Negative for rash  Musculoskeletal: Negative  Negative for arthritis and joint pain  Gastrointestinal: Negative for abdominal pain, nausea and vomiting  Genitourinary: Negative  Neurological: Negative  Negative for dizziness and light-headedness  Psychiatric/Behavioral: Negative  The patient is not nervous/anxious  Objective:     Vitals: Blood pressure 130/64, pulse 67, weight 63 5 kg (139 lb 14 4 oz)  , Body mass index is 18 97 kg/m²  ,        Physical Exam:    GEN: Gayla Lara appears well, alert and oriented x 3, pleasant and cooperative   HEENT: pupils equal, round, and reactive to light; extraocular muscles intact  NECK: supple, no carotid bruits   HEART: Irregularly irregular rhythm, normal S1 and S2, no murmurs, clicks, gallops or rubs   LUNGS: clear to auscultation bilaterally; no wheezes, rales, or rhonchi   ABDOMEN: normal bowel sounds, soft, no tenderness, no distention  EXTREMITIES: peripheral pulses normal; no clubbing, cyanosis, or edema  NEURO: no focal findings   SKIN: normal without suspicious lesions on exposed skin      Labs & Results:  Below is the patient's most recent value for Albumin, ALT, AST, BUN, Calcium, Chloride, Cholesterol, CO2, Creatinine, GFR, Glucose, HDL, Hematocrit, Hemoglobin, Hemoglobin A1C, LDL, Magnesium, Phosphorus, Platelets, Potassium, PSA, Sodium, Triglycerides, and WBC  Lab Results   Component Value Date    ALT 27 2023    AST 24 2023    BUN 24 2023    CALCIUM 9 2 2023     2023    CO2 24 2023    CREATININE 1 19 2023    HCT 41 9 2023    HGB 13 6 2023    MG 1 9 2022    PHOS 2 9 2022     2023    K 3 9 2023    PSA 3 7 2022    WBC 12 80 (H) 2023     Note: for a comprehensive list of the patient's lab results, access the Results Review activity  Cardiac testing:     I personally reviewed the ECG performed in the clinic on 02/15/23   It reveals atrial fibrillation with ventricular rate of 67 bpm     Echocardiograms:  Results for orders placed during the hospital encounter of 20    Echo complete with contrast if indicated    Narrative  [facility] [employer] [address]    Transthoracic Echocardiogram  2D, M-mode, Doppler, and Color Doppler    Study date:  07-Aug-2020    Patient: Pamela Martin  MR number: [id number]  Account number: [de-identified]  : 1945  Age: 69 years  Gender: Male  Status: Inpatient  Location: [facility]  Height: 70 in  Weight: 148 lb  BP: 123/ 70 mmHg    Indications: Heart Failure    Diagnoses: I50 9 - Heart failure, unspecified    Sonographer:  NAN Raymond  Referring Physician:  Nicola Otto MD  Group:  [id number]  Interpreting Physician:  Wilhelmenia Alpers, MD    SUMMARY    LEFT VENTRICLE:  Systolic function was normal  Ejection fraction was estimated to be 55 %  There were no regional wall motion abnormalities  Wall thickness was at the upper limits of normal     LEFT ATRIUM:  The atrium was mildly dilated  RIGHT ATRIUM:  The atrium was mildly to moderately dilated  MITRAL VALVE:  There was mild annular calcification  There was mild to moderate regurgitation  TRICUSPID VALVE:  There was moderate regurgitation  Estimated peak PA pressure was 39 mmHg  PULMONIC VALVE:  There was mild regurgitation  HISTORY: PRIOR HISTORY: CAD, Anemia Congestive heart failure  Nonischemic cardiomyopathy  Atrial fibrillation  PROCEDURE: The study was performed in the [facility]  This was a routine study  The transthoracic approach was used  The study included complete 2D imaging, M-mode, complete spectral Doppler, and color Doppler  The heart rate was 80  bpm, at the start of the study  Images were obtained from the parasternal, apical, subcostal, and suprasternal notch acoustic windows  Image quality was adequate  LEFT VENTRICLE: Size was normal  Systolic function was normal  Ejection fraction was estimated to be 55 %  There were no regional wall motion abnormalities  Wall thickness was at the upper limits of normal  DOPPLER: Transmitral flow  pattern: atrial fibrillation  RIGHT VENTRICLE: The size was normal  Systolic function was normal  Wall thickness was normal     LEFT ATRIUM: The atrium was mildly dilated  RIGHT ATRIUM: The atrium was mildly to moderately dilated  MITRAL VALVE: There was mild annular calcification  Valve structure was normal  There was mild-moderate diffuse thickening  There was normal leaflet separation  DOPPLER: The transmitral velocity was within the normal range  There was no  evidence for stenosis  There was mild to moderate regurgitation  AORTIC VALVE: The valve was trileaflet  Leaflets exhibited mildly increased thickness, normal cuspal separation, and sclerosis  DOPPLER: Transaortic velocity was within the normal range  There was no evidence for stenosis  There was no  significant regurgitation  TRICUSPID VALVE: The valve structure was normal  There was normal leaflet separation  DOPPLER: The transtricuspid velocity was within the normal range  There was no evidence for stenosis  There was moderate regurgitation  Pulmonary artery  systolic pressure was mildly increased  Estimated peak PA pressure was 39 mmHg  PULMONIC VALVE: Leaflets exhibited normal thickness, no calcification, and normal cuspal separation  DOPPLER: The transpulmonic velocity was within the normal range  There was mild regurgitation  PERICARDIUM: There was no pericardial effusion  AORTA: The root exhibited normal size  SYSTEMIC VEINS: IVC: The inferior vena cava was normal in size   Respirophasic changes were normal     SYSTEM MEASUREMENT TABLES    2D  %FS: 34 29 %  Ao Diam: 3 52 cm  EDV(Teich): 151 99 ml  EF(Teich): 62 68 %  ESV(Teich): 56 72 ml  HR_4Ch_Q: 90 87 BPM  IVSd: 1 23 cm  LA Diam: 4 97 cm  LAAs A4C: 30 77 cm2  LAESV A-L A4C: 106 62 ml  LAESV MOD A4C: 99 72 ml  LALs A4C: 7 54 cm  LVCO_4Ch_Q: 4 61 L/min  LVEF_4Ch_Q: 56 72 %  LVIDd: 5 57 cm  LVIDs: 3 66 cm  LVLd_4Ch_Q: 7 96 cm  LVLs_4Ch_Q: 6 38 cm  LVPWd: 1 08 cm  LVSV_4Ch_Q: 50 7 ml  LVVED_4Ch_Q: 89 39 ml  LVVES_4Ch_Q: 38 69 ml  RAAs: 31 12 cm2  RAESV A-L: 112 8 ml  RAESV MOD: 108 5 ml  RALs: 7 29 cm  RVIDd: 3 86 cm  SV(Teich): 95 27 ml    CW  TR Vmax: 2 78 m/s  TR maxP 97 mmHg    MM  TAPSE: 1 68 cm    [facility] Accredited Echocardiography Laboratory    Prepared and electronically signed by    Lisbet Nichols MD  Signed 07-Aug-2020 09:50:46    No results found for this or any previous visit  Catheterizations:   No results found for this or any previous visit  Stress Tests:  No results found for this or any previous visit  Holter monitor -   No results found for this or any previous visit  No results found for this or any previous visit  ASSESSMENT/PLAN:  #1 persistent atrial fibrillation  Patient is asymptomatic from atrial fibrillation  He was taken off Eliquis in September after having syncope and subdural hematoma  He had loop recorder implanted to monitor for pauses during atrial fibrillation  He then had another presyncopal episode in last December however loop recorder did not show significant pauses  He is deemed to have high fall risk due to orthostatic hypotension  His Roxie Vascor is 3 and has bled score is 3  We discussed watchman procedure as an alternative to reduce stroke risk and after going over the procedure in detail including risk and benefits she opted to proceed with it  We will start him on Eliquis 2 5 mg twice daily to assess bleeding burden  We will schedule the Watchman procedure in 4 to 6 weeks  Continue metoprolol    #2 orthostatic hypotension  Patient is currently maintained on midodrine 5 mg 3 times daily as needed    3  COPD  Maintained on DuoNebs    4    Recurrent UTI  Patient has chronic Cloud catheter  Follows with urology  Maintained on HipRex    Follow-up after Watchman procedure

## 2023-02-27 ENCOUNTER — TELEPHONE (OUTPATIENT)
Dept: CARDIAC SURGERY | Facility: CLINIC | Age: 78
End: 2023-02-27

## 2023-02-27 NOTE — TELEPHONE ENCOUNTER
Spoke with patient and reviewed pre Watchman instructions as noted below  All questions answered  Pre-Watchman Instructions      Watchman Procedure Is Scheduled For:     Patient Name: Beatrice Lockhart                         : 1945    Procedure Date: 3/16/23    Procedure Location: [facility]: [address] [place] [address]       Physician Name: Dr La Nena Crane Time:     ** You will receive a phone call from the hospital between 2:00 pm and 8:00 pm, the day prior to your Watchman procedure to confirm arrival time and location  **    If you have any questions regarding your arrival time for this procedure, please call:  Elective Admissions Unit at [phone] (5:30 am-8:00 pm)    • Please take Aspirin 81 mg the day before your procedure and the morning of your procedure with a sip of water    • Do not drink or eat anything after midnight the night before your procedure  This includes medications (except for Aspirin 81 mg with a sip of water morning of procedure), candy, gum, lozenges, lifesavers, etc      • You are required to have lab tests done before your procedure  Please go to: [address] [place], [place] [address] [facility] on  or  for bloodwork  • Please see attached Watchman Blood Thinner Protocol  • DO NOT take your Blood thinner (Eliquis) the MORNING of your procedure; but please DO take it the day/night before as usual      • Shower as usual the night before your procedure  • Please bring with you to the hospital any aide items that you will need to ambulate such as shoes, braces, etc  Do not bring any walkers or canes as the hospital will provide temporary use of these items  • Call the [facility] Team if you have any questions at [phone]  WATCHMAN BLOOD THINNER PROTOCOL      PRE-PROCEDURE:     1) Aspirin 81mg start the day before procedure if not already taking    Please also take Aspirin 81mg the morning of procedure with a sip of water  2)  DO NOT take your blood thinner (Eliquis) the morning of the Watchman procedure  It is important that you take it the day before as regularly scheduled  but NOT the morning of the procedure  POST-PROCEDURE:    You will have your blood thinner resumed the evening of the procedure  You will be instructed on exact dose to take prior to hospital discharge  You will also resume taking ASA 81 mg daily  45 Day BERNARDA:    You will have a BERNARDA approximately 45 days after the Watchman procedure to check your device  Please call Dr Jim Andrade office at [phone] after your BERNARDA to discuss anticoagulation/ASA recommendations  Please do not stop any blood thinner until you speak with Dr Cleon Cockayne or his designee  IMPORTANT:  Please contact the Watchman team with any questions or uncertainty about how and when to take your blood thinners  This includes uncertainty after receiving a call from the [place] the afternoon before your procedure          Watchman Team contact number: [phone]

## 2023-02-28 DIAGNOSIS — I48.21 PERMANENT ATRIAL FIBRILLATION (HCC): ICD-10-CM

## 2023-02-28 DIAGNOSIS — Z01.818 PRE-OPERATIVE EXAMINATION: Primary | ICD-10-CM

## 2023-03-01 ENCOUNTER — OFFICE VISIT (OUTPATIENT)
Dept: FAMILY MEDICINE CLINIC | Facility: CLINIC | Age: 78
End: 2023-03-01

## 2023-03-01 VITALS
DIASTOLIC BLOOD PRESSURE: 70 MMHG | HEIGHT: 72 IN | SYSTOLIC BLOOD PRESSURE: 130 MMHG | RESPIRATION RATE: 16 BRPM | TEMPERATURE: 98.7 F | HEART RATE: 112 BPM | WEIGHT: 140.4 LBS | OXYGEN SATURATION: 97 % | BODY MASS INDEX: 19.02 KG/M2

## 2023-03-01 DIAGNOSIS — I48.21 PERMANENT ATRIAL FIBRILLATION (HCC): ICD-10-CM

## 2023-03-01 DIAGNOSIS — Z00.00 MEDICARE ANNUAL WELLNESS VISIT, SUBSEQUENT: Primary | ICD-10-CM

## 2023-03-01 NOTE — PROGRESS NOTES
Assessment and Plan:     Problem List Items Addressed This Visit        Cardiovascular and Mediastinum    Permanent atrial fibrillation ([address] )     Scheduled for watch device placement  Other    Medicare annual wellness visit, subsequent - Primary     Check psa, lipids next visit  Falls Plan of Care: balance, strength, and gait training instructions were provided  Tobacco Cessation Counseling: Tobacco cessation counseling was provided  The patient is sincerely urged to quit consumption of tobacco  He is not ready to quit tobacco        Preventive health issues were discussed with patient, and age appropriate screening tests were ordered as noted in patient's After Visit Summary  Personalized health advice and appropriate referrals for health education or preventive services given if needed, as noted in patient's After Visit Summary  History of Present Illness:     Patient presents for a Medicare Wellness Visit    HPI   Patient Care Team:  Shanelle Lowry MD as PCP - General (Internal Medicine)     Review of Systems:     Review of Systems   Constitutional: Negative for chills, fatigue and fever  HENT: Negative for congestion, nosebleeds and rhinorrhea  Eyes: Negative for discharge and visual disturbance  Respiratory: Negative for cough, chest tightness, shortness of breath and wheezing  Cardiovascular: Negative for chest pain  Gastrointestinal: Negative for abdominal distention, abdominal pain, constipation, diarrhea and vomiting  Endocrine: Negative for polydipsia and polyuria  Genitourinary: Negative for difficulty urinating, frequency and hematuria  Musculoskeletal: Negative for arthralgias, back pain and myalgias  Skin: Negative for rash  Allergic/Immunologic: Negative for environmental allergies  Neurological: Negative for dizziness, syncope, weakness, light-headedness and headaches  Hematological: Negative      Psychiatric/Behavioral: Negative for agitation, confusion, decreased concentration and dysphoric mood  The patient is not nervous/anxious  All other systems reviewed and are negative  Problem List:     Patient Active Problem List   Diagnosis   • Symptomatic anemia   • Congestive cardiomyopathy (HCC)   • Permanent atrial fibrillation (HCC)   • Smoker   • Orthostatic hypotension   • Syncope   • Colonic adenoma   • Ambulatory dysfunction   • COPD (chronic obstructive pulmonary disease) (HCC)   • Bradycardia   • Thrombocytosis   • Head trauma   • Closed nondisplaced comminuted fracture of left patella   • Essential (hemorrhagic) thrombocythemia (HCC)   • Dizziness   • Urinary retention   • Abnormal colonoscopy   • Iron deficiency anemia likely secondary to GI bleeding   • Cholestatic jaundice   • Moderate protein-calorie malnutrition ([facility] )   • Medicare annual wellness visit, subsequent   • Nasal congestion   • Recurrent right inguinal hernia   • SDH (subdural hematoma)   • Surgical wound present   • Swelling of lower leg   • Gastroesophageal reflux disease without esophagitis      Past Medical and Surgical History:     Past Medical History:   Diagnosis Date   • Anxiety disorder    • Atrial fibrillation ([facility] )    • Coronary artery disease    • Depression    • Cloud catheter in place    • Hepatitis C     screening negative in 1/2017   • Hypertension    • MI (myocardial infarction) ([facility] )    • Use of cane as ambulatory aid      Past Surgical History:   Procedure Laterality Date   • CARDIAC CATHETERIZATION  07/09/2018   • CARDIAC ELECTROPHYSIOLOGY PROCEDURE N/A 9/30/2022    Procedure: Cardiac loop recorder implant;  Surgeon: Lanette Hoskins MD;  [facility];   Service: Cardiology   • COLONOSCOPY     • [place] RPR 1ST INGUN HRNA AGE 5 YRS/> REDUCIBLE Right 8/11/2022    Procedure: REPAIR HERNIA INGUINAL;  Surgeon: Bhupinder Encarnacion DO;  Location: [facility];  Service: General   • TONSILLECTOMY        Family History:     Family History Problem Relation Age of Onset   • Hypertension Mother    • Coronary artery disease Mother         premature   • Hypertension Father    • Coronary artery disease Father         premature   • Diabetes Father       Social History:     Social History     Socioeconomic History   • Marital status: /Civil Union     Spouse name: None   • Number of children: 3   • Years of education: 12   • Highest education level: 12th grade   Occupational History   • Occupation: retired   Tobacco Use   • Smoking status: Every Day     Packs/day: 3 00     Years: 57 00     Pack years: 171 00     Types: Cigarettes   • Smokeless tobacco: Never   • Tobacco comments:     since age 17; Has history of smoking for over 54 years  He has been smoking more than packet daily in the past however he has been smokes about half a pack a day lately and stopped smoking on 2018 when he was admitted to the hospital     Vaping Use   • Vaping Use: Every day   • Substances: Nicotine   Substance and Sexual Activity   • Alcohol use: Not Currently   • Drug use: Never   • Sexual activity: Not Currently     Partners: Female     Birth control/protection: Condom Male   Other Topics Concern   • None   Social History Narrative    History of Ultra Sound: 2016    History of Stress Test: 2018    History of ECHO: 2018    · Most recent tobacco use screenin2019      · Live alone or with others:   with others        · Diet:   Regular      · Caffeine intake:    Moderate      · Guns present in home:   No      · Asbestos exposure:   No      · TB exposure:   No      · Environmental exposure:   No      · Animal exposure:   No      · Smoke alarm in home:   Yes     Social Determinants of Health     Financial Resource Strain: Not on file   Food Insecurity: No Food Insecurity   • Worried About Running Out of Food in the Last Year: Never true   • Ran Out of Food in the Last Year: Never true   Transportation Needs: No Transportation Needs   • Lack of Transportation (Medical): No   • Lack of Transportation (Non-Medical): No   Physical Activity: Not on file   Stress: Not on file   Social Connections: Not on file   Intimate Partner Violence: Not on file   Housing Stability: Low Risk    • Unable to Pay for Housing in the Last Year: No   • Number of Places Lived in the Last Year: 1   • Unstable Housing in the Last Year: No      Medications and Allergies:     Current Outpatient Medications   Medication Sig Dispense Refill   • apixaban (Eliquis) 2 5 mg Take 1 tablet (2 5 mg total) by mouth 2 (two) times a day 180 tablet 3   • azelastine (ASTELIN) 0 1 % nasal spray 1 spray into each nostril 2 (two) times a day as needed for allergies or rhinitis Use in each nostril as directed 30 mL 0   • ipratropium-albuterol (DUO-NEB) 0 5-2 5 mg/3 mL nebulizer solution INHALE CONTENTS OF 1 VIAL VIA NEBULIZER FOUR TIMES A DAY 60 mL 11   • methenamine hippurate (HIPREX) 1 g tablet Take 1 tablet (1 g total) by mouth 2 (two) times a day with meals 60 tablet 6   • metoprolol succinate (Toprol XL) 25 mg 24 hr tablet Take 1 tablet (25 mg total) by mouth daily 90 tablet 0   • midodrine (PROAMATINE) 5 mg tablet Take 1 tablet (5 mg total) by mouth 3 (three) times a day as needed (for SBP < 100 or severe dizziness with standing up   Hold for SBP > 130) 90 tablet 3   • omeprazole (PriLOSEC) 20 mg delayed release capsule Take 1 capsule (20 mg total) by mouth daily 90 capsule 3   • Advair Diskus 100-50 MCG/ACT inhaler USE 1 INHALATION TWICE A DAY (RINSE MOUTH AFTER USE) (Patient not taking: Reported on 2/15/2023) 60 blister 11   • docusate sodium (COLACE) 100 mg capsule Take 1 capsule (100 mg total) by mouth 2 (two) times a day (Patient not taking: Reported on 2/15/2023) 180 capsule 0   • ferrous sulfate 324 (65 Fe) mg Take 1 tablet (324 mg total) by mouth daily before breakfast (Patient not taking: Reported on 2/15/2023) 90 tablet 2   • folic acid (FOLVITE) 376 mcg tablet Take 1 tablet (400 mcg total) by mouth daily (Patient not taking: Reported on 2/15/2023) 30 tablet 0   • nicotine (NICODERM CQ) 14 mg/24hr TD 24 hr patch Place 1 patch on the skin every 24 hours (Patient not taking: Reported on 2/15/2023) 14 patch 0     No current facility-administered medications for this visit  No Known Allergies   Immunizations:     Immunization History   Administered Date(s) Administered   • COVID-19 MODERNA VACC 0 5 ML IM 04/21/2021, 05/19/2021   • INFLUENZA 09/05/2014, 04/05/2016, 11/27/2018, 10/12/2021, 10/13/2022   • Influenza, high dose seasonal 0 7 mL 09/28/2020, 10/12/2021   • Pneumococcal Conjugate 13-Valent 10/27/2015, 04/05/2016   • Pneumococcal Polysaccharide PPV23 10/18/2013   • Tdap 08/15/2018      Health Maintenance:         Topic Date Due   • Colorectal Cancer Screening  05/05/2022   • Lung Cancer Screening  03/21/2023 (Originally 9/18/2019)   • Hepatitis C Screening  Completed         Topic Date Due   • COVID-19 Vaccine (3 - Booster for Janis Loyd series) 07/14/2021      Medicare Screening Tests and Risk Assessments:     Jose Whaley is here for his Subsequent Wellness visit  Last Medicare Wellness visit information reviewed, patient interviewed, no change since last AWV  Health Risk Assessment:   Patient rates overall health as good  Patient feels that their physical health rating is same  Patient is satisfied with their life  Hearing was rated as same  Patient feels that their emotional and mental health rating is same  Patients states they are sometimes angry  Patient states they are sometimes unusually tired/fatigued  Pain experienced in the last 7 days has been none  Patient states that he has experienced no weight loss or gain in last 6 months  Fall Risk Screening: In the past year, patient has experienced: no history of falling in past year      Home Safety:  Patient does not have trouble with stairs inside or outside of their home   Patient has working smoke alarms and has working carbon monoxide detector  Home safety hazards include: none  Medications:   Patient is not currently taking any over-the-counter supplements  Patient is able to manage medications  Activities of Daily Living (ADLs)/Instrumental Activities of Daily Living (IADLs):   Walk and transfer into and out of bed and chair?: Yes  Dress and groom yourself?: Yes    Bathe or shower yourself?: Yes    Feed yourself? Yes  Do your laundry/housekeeping?: Yes  Manage your money, pay your bills and track your expenses?: Yes  Make your own meals?: Yes    Do your own shopping?: Yes    Previous Hospitalizations:   Any hospitalizations or ED visits within the last 12 months?: No      Advance Care Planning:   Living will: No    Advanced directive: No      Cognitive Screening:   Provider or family/friend/caregiver concerned regarding cognition?: No    PREVENTIVE SCREENINGS      Cardiovascular Screening:    General: Screening Current      Diabetes Screening:     General: Screening Current      Colorectal Cancer Screening:     General: Screening Current      Prostate Cancer Screening:    General: Screening Not Indicated      Osteoporosis Screening:    General: Screening Not Indicated      Abdominal Aortic Aneurysm (AAA) Screening:    Risk factors include: age between 73-67 yo and tobacco use        Hepatitis C Screening:    General: Screening Not Indicated and History Hepatitis C    Other Counseling Topics:   Car/seat belt/driving safety, skin self-exam, sunscreen and regular weightbearing exercise and calcium and vitamin D intake  No results found  Physical Exam:     /70 (BP Location: Left arm, Patient Position: Sitting, Cuff Size: Standard)   Pulse (!) 112   Temp 98 7 °F (37 1 °C) (Temporal)   Resp 16   Ht 6' (1 829 m)   Wt 63 7 kg (140 lb 6 4 oz)   SpO2 97%   BMI 19 04 kg/m²     Physical Exam  Vitals and nursing note reviewed  Constitutional:       General: He is not in acute distress       Appearance: He is well-developed  HENT:      Head: Normocephalic and atraumatic  Eyes:      Conjunctiva/sclera: Conjunctivae normal    Cardiovascular:      Rate and Rhythm: Normal rate  Rhythm irregular  Heart sounds: Normal heart sounds  No murmur heard  Pulmonary:      Effort: Pulmonary effort is normal  No respiratory distress  Breath sounds: Normal breath sounds  Abdominal:      General: Bowel sounds are normal  There is no distension  Palpations: Abdomen is soft  There is no mass  Tenderness: There is no abdominal tenderness  Musculoskeletal:         General: No swelling  Cervical back: Neck supple  Right lower leg: No edema  Left lower leg: No edema  Skin:     General: Skin is warm and dry  Capillary Refill: Capillary refill takes less than 2 seconds  Neurological:      Mental Status: He is alert and oriented to person, place, and time     Psychiatric:         Mood and Affect: Mood normal           Candice Luna MD

## 2023-03-12 ENCOUNTER — HOSPITAL ENCOUNTER (EMERGENCY)
Facility: HOSPITAL | Age: 78
Discharge: HOME | End: 2023-03-12
Attending: SURGERY

## 2023-03-12 ENCOUNTER — APPOINTMENT (EMERGENCY)
Dept: RADIOLOGY | Facility: HOSPITAL | Age: 78
End: 2023-03-12

## 2023-03-12 ENCOUNTER — APPOINTMENT (EMERGENCY)
Dept: CT IMAGING | Facility: HOSPITAL | Age: 78
End: 2023-03-12

## 2023-03-12 VITALS
RESPIRATION RATE: 18 BRPM | HEART RATE: 87 BPM | OXYGEN SATURATION: 96 % | WEIGHT: 146.39 LBS | TEMPERATURE: 97.5 F | DIASTOLIC BLOOD PRESSURE: 101 MMHG | SYSTOLIC BLOOD PRESSURE: 146 MMHG

## 2023-03-12 DIAGNOSIS — S01.111A EYEBROW LACERATION, RIGHT, INITIAL ENCOUNTER: ICD-10-CM
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
## 2023-03-12 PROBLEM — T07.XXXA ABRASIONS OF MULTIPLE SITES: Status: ACTIVE | Noted: 2023-03-12

## 2023-03-12 PROBLEM — Z97.8 CHRONIC INDWELLING FOLEY CATHETER: Chronic | Status: ACTIVE | Noted: 2023-03-12

## 2023-03-12 PROBLEM — Z72.0 NICOTINE ABUSE: Status: ACTIVE | Noted: 2023-03-12

## 2023-03-12 PROBLEM — I95.1 ORTHOSTATIC HYPOTENSION: Chronic | Status: ACTIVE | Noted: 2023-03-12

## 2023-03-12 PROBLEM — I48.91 ATRIAL FIBRILLATION (HCC): Chronic | Status: ACTIVE | Noted: 2023-03-12

## 2023-03-12 PROBLEM — J44.9 COPD (CHRONIC OBSTRUCTIVE PULMONARY DISEASE) (HCC): Chronic | Status: ACTIVE | Noted: 2023-03-12

## 2023-03-12 LAB
ABO GROUP BLD: NORMAL
BASE EXCESS BLDA CALC-SCNC: 4 MMOL/L (ref -2–3)
BLD GP AB SCN SERPL QL: NEGATIVE
CA-I BLD-SCNC: 0.59 MMOL/L (ref 1.12–1.32)
CA-I BLD-SCNC: 1.16 MMOL/L (ref 1.12–1.32)
GLUCOSE SERPL-MCNC: 102 MG/DL (ref 65–140)
GLUCOSE SERPL-MCNC: 105 MG/DL (ref 65–140)
HCO3 BLDA-SCNC: 29.6 MMOL/L (ref 24–30)
HCT VFR BLD CALC: 40 % (ref 36.5–49.3)
HCT VFR BLD CALC: 40 % (ref 36.5–49.3)
HGB BLDA-MCNC: 13.6 G/DL (ref 12–17)
HGB BLDA-MCNC: 13.6 G/DL (ref 12–17)
PCO2 BLD: 31 MMOL/L (ref 21–32)
PCO2 BLD: 47.1 MM HG (ref 42–50)
PCO2 BLD: <17 MM HG (ref 42–50)
PH BLD: 7.41 [PH] (ref 7.3–7.4)
PH BLD: 7.79 [PH] (ref 7.3–7.4)
PO2 BLD: 35 MM HG (ref 35–45)
PO2 BLD: 81 MM HG (ref 35–45)
POTASSIUM BLD-SCNC: 3.8 MMOL/L (ref 3.5–5.3)
POTASSIUM BLD-SCNC: 6.6 MMOL/L (ref 3.5–5.3)
RH BLD: POSITIVE
SAO2 % BLD FROM PO2: 67 % (ref 60–85)
SODIUM BLD-SCNC: 129 MMOL/L (ref 136–145)
SODIUM BLD-SCNC: 140 MMOL/L (ref 136–145)
SPECIMEN EXPIRATION DATE: NORMAL
SPECIMEN SOURCE: ABNORMAL
SPECIMEN SOURCE: ABNORMAL

## 2023-03-12 RX ORDER — AZELASTINE 1 MG/ML
1 SPRAY, METERED NASAL 2 TIMES DAILY PRN
COMMUNITY

## 2023-03-12 RX ORDER — FLUTICASONE PROPIONATE AND SALMETEROL 100; 50 UG/1; UG/1
1 POWDER RESPIRATORY (INHALATION) 2 TIMES DAILY
COMMUNITY

## 2023-03-12 RX ORDER — CALCIUM CHLORIDE 100 MG/ML
1 INJECTION INTRAVENOUS; INTRAVENTRICULAR ONCE
Status: DISCONTINUED | OUTPATIENT
Start: 2023-03-12 | End: 2023-03-12

## 2023-03-12 RX ORDER — ACETAMINOPHEN 325 MG/1
650 TABLET ORAL EVERY 4 HOURS PRN
Qty: 30 TABLET | Refills: 0
Start: 2023-03-12 | End: 2023-04-11

## 2023-03-12 RX ORDER — OMEPRAZOLE 20 MG/1
20 CAPSULE, DELAYED RELEASE ORAL DAILY
COMMUNITY

## 2023-03-12 RX ORDER — METHENAMINE HIPPURATE 1000 MG/1
1 TABLET ORAL 2 TIMES DAILY WITH MEALS
COMMUNITY

## 2023-03-12 RX ORDER — IPRATROPIUM BROMIDE AND ALBUTEROL SULFATE 2.5; .5 MG/3ML; MG/3ML
3 SOLUTION RESPIRATORY (INHALATION) 4 TIMES DAILY
COMMUNITY

## 2023-03-12 RX ORDER — METOPROLOL SUCCINATE 25 MG/1
25 TABLET, EXTENDED RELEASE ORAL DAILY
COMMUNITY

## 2023-03-12 RX ORDER — MIDODRINE HYDROCHLORIDE 5 MG/1
5 TABLET ORAL
COMMUNITY

## 2023-03-12 RX ADMIN — TETANUS TOXOID, REDUCED DIPHTHERIA TOXOID AND ACELLULAR PERTUSSIS VACCINE, ADSORBED 0.5 ML: 5; 2.5; 8; 8; 2.5 SUSPENSION INTRAMUSCULAR at 14:07

## 2023-03-12 NOTE — ASSESSMENT & PLAN NOTE
- Patient with chronic history of COPD and longstanding history of tobacco abuse and smoking; patient continues to be an active smoker    - No home O2 use reported; patient maintaining oxygen saturation greater than 88% without supplemental oxygen on presentation   - Continue current/home medication regimen   - Outpatient follow-up with PCP recommended

## 2023-03-12 NOTE — ED PROVIDER NOTES
Emergency Department Airway Evaluation and Management Form    History  Obtained from: Patient presents a prehospital trauma level B activation due to fall, head strike on Eliquis  Patient has no known allergies  Chief Complaint   Patient presents with   • Fall     Patient arrived via EMS  Patient tripped and fell while walking to the store  +head strike, + thinners     HPI     Per patient, outside walking, mechanical fall, struck his head, no loss of consciousness, on Eliquis  Past Medical History:   Diagnosis Date   • Ambulatory dysfunction    • Anxiety    • Anxiety disorder    • Atrial fibrillation [facility])    • Coronary artery disease    • Depression    • Cloud catheter in place    • Hepatitis C     screening negative in 1/2017   • Hepatitis C    • History of MI (myocardial infarction)    • Hypertension    • MI (myocardial infarction) ([facility] [address] )    • Urinary catheter in place    • Use of cane as ambulatory aid      Past Surgical History:   Procedure Laterality Date   • CARDIAC CATHETERIZATION  07/09/2018   • CARDIAC ELECTROPHYSIOLOGY PROCEDURE N/A 9/30/2022    Procedure: Cardiac loop recorder implant;  Surgeon: Moustapha Ghotra MD;  [facility];   Service: Cardiology   • CARDIAC ELECTROPHYSIOLOGY PROCEDURE  09/30/2022    Cardiac loop recorder implant; Dr Moustapha Ghotra   • COLONOSCOPY     • COLONOSCOPY     • [address] Right 08/11/2022    Dr Dash Norman   • [place] RPR 1ST INGUN HRNA AGE 5 YRS/> REDUCIBLE Right 8/11/2022    Procedure: REPAIR HERNIA INGUINAL;  Surgeon: Jose Ramon Encarnacion DO;  [facility];  Service: General   • TONSILLECTOMY       Family History   Problem Relation Age of Onset   • Hypertension Mother    • Coronary artery disease Mother         premature   • Diabetes Father    • Coronary artery disease Father         premature   • Hypertension Father      Social History     Tobacco Use   • Smoking status: Every Day     Packs/day: 1 50     Years: 57 00     Pack years: 85 52 Types: Cigarettes   • Smokeless tobacco: Never   • Tobacco comments:     since age 17; Has history of smoking for over 54 years  He has been smoking more than packet daily in the past however he has been smokes about half a pack a day lately and stopped smoking on 7/1/2018 when he was admitted to the hospital     Vaping Use   • Vaping Use: Every day   • Substances: Nicotine   Substance Use Topics   • Alcohol use: Not Currently   • Drug use: Never     I have reviewed and agree with the history as documented  Review of Systems     Per trauma    Physical Exam  BP (!) 146/101   Pulse 87   Temp 97 5 °F (36 4 °C) (Oral)   Resp 18   Wt 66 4 kg (146 lb 6 2 oz)   SpO2 96%   BMI 19 85 kg/m²     Physical Exam     Per trauma    ED Medications  Medications   tetanus-diphtheria-acellular pertussis (BOOSTRIX) IM injection 0 5 mL (0 5 mL Intramuscular Given 3/12/23 1407)       Intubation  Procedures    Notes  Airway intact, equal, bilateral breath sounds  Additional work-up, documentation, disposition per trauma team who is at bedside in trauma bay for trauma level B activation      Final Diagnosis  Final diagnoses:   Fall, initial encounter   Eyebrow laceration, right, initial encounter       ED Provider  Electronically Signed by     Rizwana Rubin MD  03/12/23 1321       Rizwana Rubin MD  03/14/23 [id number]

## 2023-03-12 NOTE — QUICK NOTE
Cervical Collar Clearance: The patient had a CT scan of the cervical spine demonstrating no acute injury  On exam, the patient had no midline point tenderness or paresthesias/numbness/weakness in the extremities  The patient had full range of motion (was then able to flex, extend, and rotate head laterally) without pain  There were no distracting injuries and the patient was not intoxicated  The patient's cervical spine was cleared radiologically and clinically  Cervical collar removed at this time       Mercedes De La Rosa PA-C  3/12/2023  2:36 PM

## 2023-03-12 NOTE — H&P
[facility]&P- Daniel Sterling 1945, 78 y o  male MRN: [id number]  Unit/Bed#: ED-07 Encounter: [id number]  Primary Care Provider: Faye Rivera MD   Date and time admitted to hospital: 3/12/2023  1:13 PM    Fall  Assessment & Plan  - Status post mechanical fall with the below noted injuries  - Patient has history of ambulatory dysfunction   - Fall precautions   - Outpatient follow-up with PCP recommended  Eyebrow laceration, right, initial encounter  Assessment & Plan  - Small superficial 1 cm laceration above the right lateral eyebrow, present on presentation   - Repair bedside with glue on 3/12/2023   - Local wound care as indicated  - Analgesia as needed  - CT scan of the head on 3/12/2023 reviewed without evidence of acute intracranial injury   - Outpatient follow-up with PCP recommended  Abrasions of multiple sites  Assessment & Plan  - Patient with abrasions to multiple sites, present on presentation, including the right hand and right knee  - Local wound care as indicated  - Update tetanus vaccination status this patient is unclear when his last tetanus vaccination was  - Analgesia as needed  - Outpatient follow-up with PCP recommended  Atrial fibrillation [facility])  Assessment & Plan  - Patient with chronic history of atrial fibrillation    - Patient anticoagulated at baseline with Eliquis  - May resume/continue home medication regimen   - Outpatient follow-up with PCP and cardiology per routine  COPD (chronic obstructive pulmonary disease) (HCC)  Assessment & Plan  - Patient with chronic history of COPD and longstanding history of tobacco abuse and smoking; patient continues to be an active smoker    - No home O2 use reported; patient maintaining oxygen saturation greater than 88% without supplemental oxygen on presentation   - Continue current/home medication regimen   - Outpatient follow-up with PCP recommended      Nicotine abuse  Assessment & Plan  - Patient with longstanding history of smoking and nicotine abuse  - Encouraged smoking cessation   - Continued outpatient follow-up with PCP recommended  Orthostatic hypotension  Assessment & Plan  - Patient with chronic history of orthostatic hypotension, but no history to suggest current fall was related to an orthostatic event  - Continue home medication regimen, midodrine, as indicated  - Outpatient follow-up with PCP recommended  Chronic indwelling Cloud catheter  Assessment & Plan  - Patient with chronic indwelling urinary catheter, present on presentation, secondary to urinary retention   - Continue routine urinary catheter care  - Outpatient follow-up with PCP and urology per routine  Trauma Alert: Level B   Model of Arrival: Ambulance    Trauma Team: Attending Dr Ruth De La Rosa and RUPA London PA-C  Consultants:     None     History of Present Illness     Chief Complaint: "I tripped "  Mechanism:Fall     HPI:    Apple Iniguez is a 78 y o  male who presents following a fall with head strike  He was walking in [facility] when he tripped on a curb  He did strike his head and EMS noted a small laceration around his eyebrow  He did not losing consciousness  He denied any preceding symptoms  He offers no complaints and denies having pain at this time  Review of Systems   Constitutional: Negative  Negative for activity change, appetite change, chills, fatigue and fever  HENT: Negative  Negative for ear pain, facial swelling, nosebleeds and voice change  Eyes: Negative  Negative for photophobia, pain, redness and visual disturbance  Respiratory: Negative  Negative for cough, chest tightness, shortness of breath and wheezing  Cardiovascular: Negative  Negative for chest pain, palpitations and leg swelling  Gastrointestinal: Negative  Negative for abdominal distention, abdominal pain, nausea and vomiting  Endocrine: Negative      Genitourinary: Positive for difficulty urinating (Patient with chronic urinary catheter in place due to urinary retention)  Negative for decreased urine volume, dysuria, flank pain, frequency, hematuria and urgency  Musculoskeletal: Negative  Negative for arthralgias, back pain, myalgias and neck pain  Skin: Positive for wound (Abrasions to right hand and right knee)  Negative for color change, pallor and rash  Allergic/Immunologic: Negative  Neurological: Negative  Negative for dizziness, syncope, weakness, light-headedness, numbness and headaches  Hematological: Negative  Psychiatric/Behavioral: Negative  Negative for agitation, confusion, self-injury and sleep disturbance  The patient is not nervous/anxious  12-point, complete review of systems was reviewed and negative except as stated above  Historical Information     Past Medical History:   Diagnosis Date   • Ambulatory dysfunction    • Anxiety    • Atrial fibrillation ([facility] [address] )    • Coronary artery disease    • Depression    • Hepatitis C    • History of MI (myocardial infarction)    • Hypertension    • Urinary catheter in place      Past Surgical History:   Procedure Laterality Date   • CARDIAC CATHETERIZATION  07/09/2018   • CARDIAC ELECTROPHYSIOLOGY PROCEDURE  09/30/2022    Cardiac loop recorder implant; Dr Ayden Tripp   • COLONOSCOPY     • [address] Right 08/11/2022    Dr Zeny Barreto   • TONSILLECTOMY          Social History     Tobacco Use   • Smoking status: Every Day     Packs/day: 1 50     Years: 57 00     Pack years: 85 50     Types: Cigarettes   • Smokeless tobacco: Never   Vaping Use   • Vaping Use: Every day   • Substances: Nicotine   Substance Use Topics   • Alcohol use: Not Currently   • Drug use: Never     There is no immunization history for the selected administration types on file for this patient  Last Tetanus: Unknown  Family History: Non-contributory    1   Before the illness or injury that brought you to the Emergency, did you need someone to help you on a regular basis? 0=No   2  Since the illness or injury that brought you to the Emergency, have you needed more help than usual to take care of yourself? 0=No   3  Have you been hospitalized for one or more nights during the past 6 months (excluding a stay in the Emergency Department)? 0=No   4  In general, do you see well? 0=Yes   5  In general, do you have serious problems with your memory? 0=No   6  Do you take more than three different medications everyday?  1=Yes   TOTAL   1     Did you order a geriatric consult if the score was 2 or greater?: n/a     Meds/Allergies   all current active meds have been reviewed and current meds:   Current Facility-Administered Medications   Medication Dose Route Frequency   • tetanus-diphtheria-acellular pertussis (BOOSTRIX) IM injection 0 5 mL  0 5 mL Intramuscular Once      No Known Allergies    Objective   Initial Vitals:   Temperature: 97 5 °F (36 4 °C) (03/12/23 1319)  Pulse: 96 (03/12/23 1319)  Respirations: 18 (03/12/23 1319)  Blood Pressure: 159/93 (03/12/23 1319)    Primary Survey:   Airway:        Status: patent;        Pre-hospital Interventions: none        Hospital Interventions: none  Breathing:        Pre-hospital Interventions: none       Effort: normal       Right breath sounds: normal       Left breath sounds: normal  Circulation:        Rhythm: regular       Rate: regular   Right Pulses Left Pulses    R radial: 2+  R femoral: 2+  R pedal: 2+  R carotid: 2+  R popliteal: 2+ L radial: 2+  L femoral: 2+  L pedal: 2+  L carotid: 2+  L popliteal: 2+   Disability:        GCS: Eye: 4; Verbal: 5 Motor: 6 Total: 15       Right Pupil: 3 mm;  round;  reactive         Left Pupil:  3 mm;  round;  reactive      R Motor Strength L Motor Strength    R : 5/5  R dorsiflex: 5/5  R plantarflex: 5/5 L : 5/5  L dorsiflex: 5/5  L plantarflex: 5/5        Sensory:  No sensory deficit  Exposure:       Completed: Yes      Secondary Survey:  Physical Exam  Vitals and nursing note reviewed  Exam conducted with a chaperone present  Constitutional:       General: He is awake  He is not in acute distress  Appearance: Normal appearance  He is normal weight  He is not ill-appearing, toxic-appearing or diaphoretic  Interventions: He is not intubated  Cervical collar in place  HENT:      Head: Normocephalic  Laceration (Small 1 cm laceration above the right lateral eyebrow) present  No abrasion or contusion  Jaw: There is normal jaw occlusion  Right Ear: Hearing and external ear normal  No swelling or tenderness  Left Ear: Hearing and external ear normal  No swelling or tenderness  Nose: Nose normal  No nasal deformity, septal deviation, signs of injury, laceration or nasal tenderness  Mouth/Throat:      Mouth: Mucous membranes are moist       Pharynx: Oropharynx is clear  Eyes:      General: Lids are normal  Vision grossly intact  Extraocular Movements: Extraocular movements intact  Conjunctiva/sclera: Conjunctivae normal       Pupils: Pupils are equal, round, and reactive to light  Cardiovascular:      Rate and Rhythm: Normal rate  Rhythm irregularly irregular  Pulses:           Carotid pulses are 2+ on the right side and 2+ on the left side  Radial pulses are 2+ on the right side and 2+ on the left side  Femoral pulses are 2+ on the right side and 2+ on the left side  Dorsalis pedis pulses are 2+ on the right side and 2+ on the left side  Heart sounds: Normal heart sounds  No murmur heard  No friction rub  No gallop  Pulmonary:      Effort: Pulmonary effort is normal  No tachypnea, bradypnea, accessory muscle usage, prolonged expiration, respiratory distress or retractions  He is not intubated  Breath sounds: Normal breath sounds and air entry  No stridor or decreased air movement  No decreased breath sounds, wheezing, rhonchi or rales     Chest:      Chest wall: No lacerations, deformity, swelling, tenderness or crepitus  Abdominal:      General: Abdomen is flat  Bowel sounds are normal  There is no distension  Palpations: Abdomen is soft  Tenderness: There is no abdominal tenderness  There is no guarding or rebound  Genitourinary:     Comments: Indwelling urinary catheter in place with clear yellow urine draining  Musculoskeletal:         General: No swelling, tenderness or deformity  Normal range of motion  Cervical back: Neck supple  No swelling, deformity, signs of trauma, lacerations or tenderness  No spinous process tenderness or muscular tenderness  Thoracic back: No swelling, deformity, signs of trauma, lacerations or tenderness  Lumbar back: No swelling, deformity, signs of trauma, lacerations or tenderness  Right lower leg: No edema  Left lower leg: No edema  Comments: No midline cervical, thoracic or lumbar spine tenderness, step-offs or deformities  No paraspinal muscular tenderness in the neck or back  Normal range of motion in all 4 extremities without pain, tenderness or deformity  Skin:     General: Skin is warm and dry  Capillary Refill: Capillary refill takes less than 2 seconds  Coloration: Skin is not jaundiced or pale  Findings: Abrasion (Abrasions to right palm, tip of right ring finger and right anterior knee) and laceration (Small 1 cm laceration above the right lateral eyebrow) present  No bruising, ecchymosis, erythema, lesion or rash  Comments: Chronic changes to toenails   Neurological:      General: No focal deficit present  Mental Status: He is alert and oriented to person, place, and time  Mental status is at baseline  GCS: GCS eye subscore is 4  GCS verbal subscore is 5  GCS motor subscore is 6  Sensory: Sensation is intact  No sensory deficit  Motor: Motor function is intact  No weakness     Psychiatric:         Mood and Affect: Mood normal          Behavior: Behavior is cooperative  Invasive Devices     Peripheral Intravenous Line  Duration           Peripheral IV 03/12/23 Left;Proximal;Ventral (anterior) Forearm <1 day          Drain  Duration           Urethral Catheter Latex <1 day              Lab Results: Results: I have personally reviewed all pertinent laboratory/tests results and ISTAT: No components found for: VBG    Imaging Results: I have personally reviewed pertinent reports  and I have personally reviewed pertinent films in PACS  Chest Xray(s): negative for acute findings   FAST exam(s): negative for acute findings   CT Scan(s): negative for acute findings   Additional Xray(s): pending right knee x-ray     Other Studies: N/A    Code Status: No Order  Advance Directive and Living Will:      Power of :    POLST:    I have spent 40 minutes with Patient and family today in which greater than 50% of this time was spent in counseling/coordination of care regarding Diagnostic results, Instructions for management, Patient and family education, Impressions, Counseling / Coordination of care, Documenting in the medical record, Reviewing / ordering tests, medicine, procedures  , Obtaining or reviewing history   and Communicating with other healthcare professionals

## 2023-03-12 NOTE — QUICK NOTE
Patient seen and reevaluated after ambulation trial   Patient was able to get dressed and ambulate at baseline  He has no new complaints or new pain  He is deemed stable for discharge from the emergency department at this time  Patient provided discharge instructions and educated on what to watch out for and contact the trauma clinic for and/or to return to the ER for      Vel Fonseca PA-C  3/12/2023 3:22 PM

## 2023-03-12 NOTE — DISCHARGE INSTR - AVS FIRST PAGE
Trauma Discharge Instructions:    Please follow-up as instructed  If you need a follow-up appointment, please call the office when you leave to schedule an appointment  Activity:  - You may resume activity as tolerated  - Walking and normal light activities are encouraged  - Normal daily activities including climbing steps are okay  Diet:    - You may resume your normal diet  Medications:  - You should continue your current medication regimen after discharge unless otherwise instructed  Please refer to your discharge medication list for further details  - Please take the pain medications as directed  - You may become constipated, especially if taking pain medications  You may take any over the counter stool softeners or laxatives as needed  Examples: Milk of Magnesia, Colace, Senna  Additional Instructions:  - May shower daily   - If you have any questions or concerns after discharge please call the office   - Call office or return to ER if develop any new or worsening pain, visual changes, lightheadedness or dizziness, numbness/weakness/tingling in extremities, develop productive cough, increasing shortness of breath or difficulty breathing, fever greater than 101 and/or chills and/or increasing redness or purulent/foul smelling drainage from your right eyebrow laceration

## 2023-03-12 NOTE — ASSESSMENT & PLAN NOTE
- Patient with longstanding history of smoking and nicotine abuse  - Encouraged smoking cessation   - Continued outpatient follow-up with PCP recommended

## 2023-03-12 NOTE — PROCEDURES
POC FAST US    Date/Time: 3/12/2023 1:26 PM  Performed by: Santino Uribe PA-C  Authorized by: Santino Uribe PA-C     Patient location:  Trauma  Other Assisting Provider: No    Procedure details:     Exam Type:  Diagnostic    Indications: blunt abdominal trauma and blunt chest trauma      Assess for:  Intra-abdominal fluid, pericardial effusion and pneumothorax    Technique: extended FAST      Views obtained:  Heart - Pericardial sac, LUQ - Splenorenal space, Suprapubic - Pouch of Ray, RUQ - Damon's Pouch, Left thorax and Right thorax    Image quality: diagnostic      Image availability:  Images available in PACS and video obtained  FAST Findings:     RUQ (Hepatorenal) free fluid: absent      LUQ (Splenorenal) free fluid: absent      Suprapubic free fluid: absent      Cardiac wall motion: identified      Pericardial effusion: absent    extended FAST (Pulmonary) findings:     Left lung sliding: Present      Right lung sliding: Present      Left pleural effusion: Absent      Right pleural effusion: Absent    Interpretation:     Impressions: negative    Comments:      Bladder decompressed with indwelling urinary catheter in place; balloon of catheter visualized

## 2023-03-12 NOTE — PROCEDURES
Laceration repair    Date/Time: 3/12/2023 2:39 PM  Performed by: Dustin Crigler, PA-C  Authorized by: Dustin Crigler, PA-C   Consent: Verbal consent obtained  Risks and benefits: risks, benefits and alternatives were discussed  Consent given by: patient  Patient understanding: patient states understanding of the procedure being performed  Test results: test results available and properly labeled  Radiology Images displayed and confirmed   If images not available, report reviewed: imaging studies available  Required items: required blood products, implants, devices, and special equipment available  Patient identity confirmed: verbally with patient and arm band  Body area: head/neck  Location details: right eyebrow  Laceration length: 1 cm  Foreign bodies: no foreign bodies  Tendon involvement: none  Nerve involvement: none  Vascular damage: no    Sedation:  Patient sedated: no        Procedure Details:  Irrigation solution: saline  Irrigation method: syringe  Amount of cleaning: standard  Debridement: none  Degree of undermining: none  Skin closure: glue  Approximation: close  Approximation difficulty: simple  Patient tolerance: patient tolerated the procedure well with no immediate complications

## 2023-03-12 NOTE — ASSESSMENT & PLAN NOTE
- Patient with chronic history of orthostatic hypotension, but no history to suggest current fall was related to an orthostatic event  - Continue home medication regimen, midodrine, as indicated  - Outpatient follow-up with PCP recommended

## 2023-03-12 NOTE — ASSESSMENT & PLAN NOTE
- Patient with abrasions to multiple sites, present on presentation, including the right hand and right knee  - Local wound care as indicated  - Update tetanus vaccination status this patient is unclear when his last tetanus vaccination was  - Analgesia as needed  - Outpatient follow-up with PCP recommended

## 2023-03-12 NOTE — ASSESSMENT & PLAN NOTE
- Patient with chronic indwelling urinary catheter, present on presentation, secondary to urinary retention   - Continue routine urinary catheter care  - Outpatient follow-up with PCP and urology per routine

## 2023-03-12 NOTE — ASSESSMENT & PLAN NOTE
- Small superficial 1 cm laceration above the right lateral eyebrow, present on presentation   - Repair bedside with glue on 3/12/2023   - Local wound care as indicated  - Analgesia as needed  - CT scan of the head on 3/12/2023 reviewed without evidence of acute intracranial injury   - Outpatient follow-up with PCP recommended

## 2023-03-12 NOTE — ASSESSMENT & PLAN NOTE
- Status post mechanical fall with the below noted injuries  - Patient has history of ambulatory dysfunction   - Fall precautions   - Outpatient follow-up with PCP recommended

## 2023-03-12 NOTE — ASSESSMENT & PLAN NOTE
- Patient with chronic history of atrial fibrillation    - Patient anticoagulated at baseline with Eliquis  - May resume/continue home medication regimen   - Outpatient follow-up with PCP and cardiology per routine

## 2023-03-13 ENCOUNTER — VBI (OUTPATIENT)
Dept: FAMILY MEDICINE CLINIC | Facility: CLINIC | Age: 78
End: 2023-03-13

## 2023-03-13 ENCOUNTER — PREP FOR PROCEDURE (OUTPATIENT)
Dept: CARDIOLOGY CLINIC | Facility: CLINIC | Age: 78
End: 2023-03-13

## 2023-03-13 DIAGNOSIS — I48.91 ATRIAL FIBRILLATION, UNSPECIFIED TYPE (HCC): Primary | ICD-10-CM

## 2023-03-13 NOTE — TELEPHONE ENCOUNTER
Davin Mehta    ED Visit Information     Ed visit date: 3/12/23  Diagnosis Description: fall  In Network? Yes [address] [facility]  Discharge status: Home  Discharged with meds ? Yes  Number of ED visits to date: 1  ED Severity:3     Outreach Information    Outreach successful: No 2  Date letter mailed:no my chart  Date Finalized:3/14/23    Care Coordination    Follow up appointment with specialilty: yes scheduled for procedure and seeing Cardiology on 3/16/23   Transportation issues ? NA    [employer] Outreach    03/13/2023 01:02 PM EDT by Katia Fiore 03/13/2023 01:02 PM EDT by Katia Moctezuma (Self) [phone] ([facility])[phone] (Home)  [phone] (Home) Remove  - Left MessageCommunicated - Left message with wife, for patient to call back  03/14/2023 09:46 AM EDT by Katia Fiore 03/14/2023 09:46 AM EDT by Katia Moctezuma (Self) [phone]  [phone] Remove  - No Answer/BusyCommunicated - Unable to leave msg, voicemail box has not been set up

## 2023-03-20 ENCOUNTER — HOSPITAL ENCOUNTER (EMERGENCY)
Facility: HOSPITAL | Age: 78
Discharge: HOME | End: 2023-03-20
Attending: EMERGENCY MEDICINE

## 2023-03-20 VITALS
RESPIRATION RATE: 16 BRPM | DIASTOLIC BLOOD PRESSURE: 77 MMHG | SYSTOLIC BLOOD PRESSURE: 138 MMHG | HEART RATE: 100 BPM | OXYGEN SATURATION: 96 % | TEMPERATURE: 98.2 F

## 2023-03-20 DIAGNOSIS — Z46.6 URINARY CATHETER (FOLEY) CHANGE REQUIRED: Primary | ICD-10-CM

## 2023-03-21 ENCOUNTER — VBI (OUTPATIENT)
Dept: FAMILY MEDICINE CLINIC | Facility: CLINIC | Age: 78
End: 2023-03-21

## 2023-03-21 ENCOUNTER — PATIENT OUTREACH (OUTPATIENT)
Dept: FAMILY MEDICINE CLINIC | Facility: CLINIC | Age: 78
End: 2023-03-21

## 2023-03-21 DIAGNOSIS — Z78.9 NEED FOR FOLLOW-UP BY SOCIAL WORKER: Primary | ICD-10-CM

## 2023-03-21 NOTE — TELEPHONE ENCOUNTER
Tea Parveen    ED Visit Information     Ed visit date: 3/20/23  Diagnosis Description: urinary catheter check  In Network? Yes [address] Discharge status: Home  Discharged with meds ? No  Number of ED visits to date: 5  ED Severity:4     Outreach Information    Outreach successful: Yes 1  Date letter mailed:n/a  Date Finalized:3/21/23    Care Coordination    Follow up appointment with pcp: no Pt does not need at this time  Transportation issues ? No    Value [employer] type: 7 Day Outreach  [facility] PCP: Yes  Transportation: Friend/Family Transport  If able to choose an ED  Would you have chosen [facility]: Yes  Called PCP first?: No  Feels able to call PCP for urgent problems ?: Yes  Understands what emergencies can be handled by PCP ?: Yes  Ever any problems getting appointment with PCP for minor emergency/urgency problems?: No  Practice Contacted Patient ?: No  Pt had ED follow up with pcp/staff ?: No    Seen for follow-up out of network ?: No  Reason Patient went to ED instead of Urgent Care or PCP?: Perceived Severity of Illness  03/21/2023 12:15 PM EDT by Margaret Bowden 03/21/2023 12:15 PM EDT by Margaret Dennise Machado Cortez ([facility]) [phone]  - Call Complete  Patient does not have a phone at this time, phone number listed belongs to landlord  Spoke to patient, he states he feels fine  He does not need appt with pcp  Patient does not have a place to live, his landlord is providing him with a room for now  Will reach out to care management

## 2023-03-21 NOTE — DISCHARGE INSTRUCTIONS
Follow-up with your primary care physician as well as your urologist   Please return to the emergency department if you develop abdominal pain, fever, vomiting, your catheter is not draining, bloody urine, or anything else concerning to you

## 2023-03-21 NOTE — ED PROVIDER NOTES
History  Chief Complaint   Patient presents with   • Urinary Catheter Insertion or Check     Pt is here for a miller change  Pt states he gets it changed every 30 days  81-year-old male with history of A-fib, CAD, chronic Miller catheter who presents for catheter change  Patient reports that his urinary catheter is usually changed out monthly by a visiting nurse  He was scheduled to have the catheter replaced 3 days ago but notes that he moved to a new apartment and did not notify his nursing facility of the change in address  He therefore was unable to have his Miller changed  He states that since last night, he has had some leakage of urine around the Miller catheter insertion site  He has not had any bleeding from the site  He denies any abdominal pain, fevers, nausea  He otherwise feels at baseline  The Miller catheter has otherwise been draining normally  Prior to Admission Medications   Prescriptions Last Dose Informant Patient Reported? Taking? Advair Diskus 100-50 MCG/ACT inhaler   No No   Sig: USE 1 INHALATION TWICE A DAY (RINSE MOUTH AFTER USE)   Patient not taking: Reported on 2/15/2023   Fluticasone-Salmeterol (Advair) 100-50 mcg/dose inhaler   Yes No   Sig: Inhale 1 puff 2 (two) times a day Rinse mouth after use  acetaminophen (TYLENOL) 325 mg tablet   No No   Sig: Take 2 tablets (650 mg total) by mouth every 4 (four) hours as needed for mild pain Do not exceed 4 g daily     apixaban (Eliquis) 2 5 mg   No No   Sig: Take 1 tablet (2 5 mg total) by mouth 2 (two) times a day   apixaban (Eliquis) 2 5 mg   Yes No   Sig: Take 2 5 mg by mouth 2 (two) times a day   azelastine (ASTELIN) 0 1 % nasal spray   No No   Si spray into each nostril 2 (two) times a day as needed for allergies or rhinitis Use in each nostril as directed   azelastine (ASTELIN) 0 1 % nasal spray   Yes No   Si spray into each nostril 2 (two) times a day as needed for rhinitis Use in each nostril as directed docusate sodium (COLACE) 100 mg capsule   No No   Sig: Take 1 capsule (100 mg total) by mouth 2 (two) times a day   Patient not taking: Reported on 2/15/2023   ferrous sulfate 324 (65 Fe) mg   No No   Sig: Take 1 tablet (324 mg total) by mouth daily before breakfast   Patient not taking: Reported on 3/43/8940   folic acid (FOLVITE) 178 mcg tablet   No No   Sig: Take 1 tablet (400 mcg total) by mouth daily   Patient not taking: Reported on 2/15/2023   ipratropium-albuterol (DUO-NEB) 0 5-2 5 mg/3 mL nebulizer solution   No No   Sig: INHALE CONTENTS OF 1 VIAL VIA NEBULIZER FOUR TIMES A DAY   ipratropium-albuterol (DUO-NEB) 0 5-2 5 mg/3 mL nebulizer solution   Yes No   Sig: Take 3 mL by nebulization 4 (four) times a day   methenamine hippurate (HIPREX) 1 g tablet   No No   Sig: Take 1 tablet (1 g total) by mouth 2 (two) times a day with meals   methenamine hippurate (HIPREX) 1 g tablet   Yes No   Sig: Take 1 g by mouth 2 (two) times a day with meals   metoprolol succinate (TOPROL-XL) 25 mg 24 hr tablet   Yes No   Sig: Take 25 mg by mouth daily   metoprolol succinate (Toprol XL) 25 mg 24 hr tablet   No No   Sig: Take 1 tablet (25 mg total) by mouth daily   midodrine (PROAMATINE) 5 mg tablet   No No   Sig: Take 1 tablet (5 mg total) by mouth 3 (three) times a day as needed (for SBP < 100 or severe dizziness with standing up   Hold for SBP > 130)   midodrine (PROAMATINE) 5 mg tablet   Yes No   Sig: Take 5 mg by mouth 3 (three) times a day before meals   nicotine (NICODERM CQ) 14 mg/24hr TD 24 hr patch   No No   Sig: Place 1 patch on the skin every 24 hours   Patient not taking: Reported on 2/15/2023   omeprazole (PriLOSEC) 20 mg delayed release capsule   No No   Sig: Take 1 capsule (20 mg total) by mouth daily   omeprazole (PriLOSEC) 20 mg delayed release capsule   Yes No   Sig: Take 20 mg by mouth daily      Facility-Administered Medications: None       Past Medical History:   Diagnosis Date   • Ambulatory dysfunction • Anxiety    • Anxiety disorder    • Atrial fibrillation [facility])    • Coronary artery disease    • Depression    • Cloud catheter in place    • Hepatitis C     screening negative in 1/2017   • Hepatitis C    • History of MI (myocardial infarction)    • Hypertension    • MI (myocardial infarction) ([facility] [address] )    • Urinary catheter in place    • Use of cane as ambulatory aid        Past Surgical History:   Procedure Laterality Date   • CARDIAC CATHETERIZATION  07/09/2018   • CARDIAC ELECTROPHYSIOLOGY PROCEDURE N/A 9/30/2022    Procedure: Cardiac loop recorder implant;  Surgeon: Hi Baird MD;  Location: [facility]; Service: Cardiology   • CARDIAC ELECTROPHYSIOLOGY PROCEDURE  09/30/2022    Cardiac loop recorder implant; Dr Hi Baird   • COLONOSCOPY     • COLONOSCOPY     • [address] Right 08/11/2022    Dr Sloan Pena   • MS RPR 1ST INGUN HRNA AGE 5 YRS/> REDUCIBLE Right 8/11/2022    Procedure: REPAIR HERNIA INGUINAL;  Surgeon: Teresa Encarnacion DO;  Location: [facility];  Service: General   • TONSILLECTOMY         Family History   Problem Relation Age of Onset   • Hypertension Mother    • Coronary artery disease Mother         premature   • Diabetes Father    • Coronary artery disease Father         premature   • Hypertension Father      I have reviewed and agree with the history as documented  E-Cigarette/Vaping   • E-Cigarette Use Current Every Day User      E-Cigarette/Vaping Substances   • Nicotine Yes    • THC No    • CBD No    • Flavoring No    • Other No    • Unknown No      Social History     Tobacco Use   • Smoking status: Every Day     Packs/day: 1 50     Years: 57 00     Pack years: 85 50     Types: Cigarettes   • Smokeless tobacco: Never   • Tobacco comments:     since age 17; Has history of smoking for over 55 years   He has been smoking more than packet daily in the past however he has been smokes about half a pack a day lately and stopped smoking on 7/1/2018 when he was [facility]     Vaping Use   • Vaping Use: Every day   • Substances: Nicotine   Substance Use Topics   • Alcohol use: Not Currently   • Drug use: Never       Review of Systems   Constitutional: Negative for chills and fever  Respiratory: Negative for shortness of breath  Cardiovascular: Negative for chest pain  Gastrointestinal: Negative for abdominal pain, diarrhea, nausea and vomiting  Genitourinary: Negative for dysuria, flank pain and hematuria  Musculoskeletal: Negative for gait problem  Skin: Negative for rash  Neurological: Negative for weakness and light-headedness  All other systems reviewed and are negative  Physical Exam  Physical Exam  Vitals reviewed  Constitutional:       General: He is not in acute distress  Appearance: He is not ill-appearing  HENT:      Head: Normocephalic and atraumatic  Nose: Nose normal       Mouth/Throat:      Mouth: Mucous membranes are moist    Eyes:      Conjunctiva/sclera: Conjunctivae normal    Cardiovascular:      Rate and Rhythm: Normal rate  Rhythm irregular  Heart sounds: No murmur heard  Pulmonary:      Effort: Pulmonary effort is normal       Breath sounds: Normal breath sounds  No wheezing, rhonchi or rales  Abdominal:      General: There is no distension  Palpations: Abdomen is soft  Tenderness: There is no abdominal tenderness  Genitourinary:     Comments: Cloud catheter in place  No active leakage of urine around the catheter insertion site  Penis otherwise normal   Musculoskeletal:         General: No swelling or tenderness  Normal range of motion  Cervical back: Normal range of motion and neck supple  Skin:     General: Skin is warm and dry  Neurological:      General: No focal deficit present  Mental Status: He is alert and oriented to person, place, and time     Psychiatric:         Behavior: Behavior normal          Vital Signs  ED Triage Vitals [03/20/23 2054]   Temperature Pulse Respirations Blood Pressure SpO2   98 2 °F (36 8 °C) 100 16 138/77 96 %      Temp src Heart Rate Source Patient Position - Orthostatic VS BP Location FiO2 (%)   -- -- Sitting Right arm --      Pain Score       No Pain           Vitals:    03/20/23 2054   BP: 138/77   Pulse: 100   Patient Position - Orthostatic VS: Sitting         Visual Acuity      ED Medications  Medications - No data to display    Diagnostic Studies  Results Reviewed     None                 No orders to display              Procedures  Procedures         ED Course                               SBIRT 22yo+    Flowsheet Row Most Recent Value   SBIRT (25 yo +)    In order to provide better care to our patients, we are screening all of our patients for alcohol and drug use  Would it be okay to ask you these screening questions? No Filed at: 03/20/2023 2113                    Medical Decision Making  74-year-old male presenting for replacement of his chronic Cloud catheter  Patient otherwise asymptomatic  Stable vital signs  Cloud catheter successfully replaced by nursing staff  Patient otherwise stable for discharge  Advised follow-up with his primary care physician and urologist   Return precautions discussed  Urinary catheter (Cloud) change required: acute illness or injury      Disposition  Final diagnoses:   Urinary catheter (Cloud) change required     Time reflects when diagnosis was documented in both MDM as applicable and the Disposition within this note     Time User Action Codes Description Comment    3/20/2023  9:26 PM Lyla Minor Add [Z46 6] Urinary catheter (Cloud) change required       ED Disposition     ED Disposition   Discharge    Condition   Stable    Date/Time   Mon Mar 20, 2023  9:26 PM    Comment   Harvey Farooq discharge to home/self care                 Follow-up Information     Follow up With Specialties Details Why Contact Info    See Valera MD Internal Medicine In 2 days  Id[address] [facility]   [phone]            Patient's Medications   Discharge Prescriptions    No medications on file       No discharge procedures on file      PDMP Review       Value Time User    PDMP Reviewed  Yes 11/18/2022  8:47 PM Jeison Landon          ED Provider  Electronically Signed by           Theo Ahumada, MD  03/20/23 3616

## 2023-03-22 ENCOUNTER — TELEPHONE (OUTPATIENT)
Dept: FAMILY MEDICINE CLINIC | Facility: CLINIC | Age: 78
End: 2023-03-22

## 2023-03-22 ENCOUNTER — PATIENT OUTREACH (OUTPATIENT)
Dept: FAMILY MEDICINE CLINIC | Facility: CLINIC | Age: 78
End: 2023-03-22

## 2023-03-22 NOTE — TELEPHONE ENCOUNTER
Yes, hi, my name is Edison Diaz and I'm calling on behalf of Mr Rainer Fowler  His date of birth is [birth year]  And the reason I'm calling is because I need help with him  I'm trying to locate a nursing home for him, and the nursing home needs his records  If you could please give me a call, my number is [phone]  And again, my name is Edison Diaz  Thank you   leana Kearns

## 2023-03-22 NOTE — PROGRESS NOTES
Referral received, chart reviewed  Patient reportedly does not have a place to live, his landlord is providing him with a room for now    SW called pt, reached his guardian/friend Vishnu Palmer said he is not with pt at this time, he has a room he rented for pt, as pt   "has no one"  His wife reportedly kicked him out  Vishnu Palmer just got pt a phone and will give the phone to Vishnu Palmer later  [phone]  SW provide [place] with SW number and told him to have pt call me  SW will call pt tomorrow regardless  Vishnu Palmer said he did contact a place in [place] that is going to help pt find housing, but they need medical documents from pt's PCP and pt does not know who his PCP is  SW advised Vishnu Palmer that pt's PCP is Dr Carmelo Rhodes at [facility]  SW gave him the office number and told him to request the medical records  Vishnu Palmer has pt's SS number  SW asked about pt's mental health  Vishnu Palmer said that pt DID have thoughts of suicide  SW gave Vishnu Palmer the number [phone] and told him to give this to pt and, if he thinks pt is at risk of self harm or suicidal ideation or plan, to call this number immediatlely  Vishnu Palmer understood  SW routed note to office and PCP  SW will follow up

## 2023-03-24 ENCOUNTER — TELEPHONE (OUTPATIENT)
Dept: OTHER | Facility: OTHER | Age: 78
End: 2023-03-24

## 2023-03-24 NOTE — TELEPHONE ENCOUNTER
[address] [facility] Is requesting a call back regarding the patients miller catheter care  They change it and service it as needed  They stated that the patients PCP Dr Teresa Pate is no longer available to sign the order for continuation and will need a new physician to sign the order to continue care

## 2023-03-27 NOTE — TELEPHONE ENCOUNTER
Left message for Devin Alvarado stating urology can provide orders for Cloud catheter care and changes    Provided her the fax number here and phone number

## 2023-03-28 ENCOUNTER — TELEPHONE (OUTPATIENT)
Dept: UROLOGY | Facility: AMBULATORY SURGERY CENTER | Age: 78
End: 2023-03-28

## 2023-03-28 NOTE — TELEPHONE ENCOUNTER
[facility] called to get fax number to send orders for the patient  No further action needed

## 2023-03-28 NOTE — TELEPHONE ENCOUNTER
Spoke with asher in regards for having patient stay at a nursing home  I stated to asher that we cant send any medical records unless nursing faciliy faxes us over the form request medical records for the patient  I also stated if asher can give a call back with the patient on the phone since I cannot speak with asher since he is not in his medical consent communication  I spoke with nursing home facility [phone] If they can fax over us information requesting medical records for the patient can be in a nursing home facility  [facility] office stated will give asher a call for further information of the patient

## 2023-03-29 ENCOUNTER — PATIENT OUTREACH (OUTPATIENT)
Dept: FAMILY MEDICINE CLINIC | Facility: CLINIC | Age: 78
End: 2023-03-29

## 2023-03-29 NOTE — PROGRESS NOTES
STEPHANIE called pt's friend Gemmasolaselina Kinsey to follow up after last encounter  STEPHANIE spoke with Hamzah Kinsey  Pt is receiving Meals on Wheels starting today; he was visited by a Marsha Gross from [facility] services  Claude said someone called yesterday, as he was requesting medical records, to get pt into nursing home [facility]  He was not sure of the name of the person but is going to request medical records from office  Claude said pt can live by himself  STEPHANIE told Claude that STEPHANIE can send Claude information on where and how to get assistance to locate housing, such as [employer]  Claude provided his email and will look into places  Claude said he will call me if he needs additional help  SW provide him with STEPHANIE phone number  Claude thanked SW for call  SW reviewed chart  Per Janie's note from Dr Fernanda Davis office, in response to Hamzah Kinsey' request, she informed him they can't send medical records unless SNF faxes them the form to request the records  Ever Sanchez spoke with Michel requesting form to be faxed  Admin from [email or web address] said they would contact pt  SW emailed Hamzah Kinsey with housing resources and also relayed the above to clarify for him that [employer] will be contacting him  [email or web address]

## 2023-03-31 ENCOUNTER — TELEPHONE (OUTPATIENT)
Dept: ADMINISTRATIVE | Facility: HOSPITAL | Age: 78
End: 2023-03-31

## 2023-03-31 NOTE — TELEPHONE ENCOUNTER
Jo Ann Zazueta! I am reaching out to you in regards to pt due to receiving a call from Li with [facility]  Pt was receiving care with there services and then pt just went somewhat ROBERT  Li stated she is able to access [facility] care and seen you had a visit with pt  Li just wanted to follow up with you to double check if pt is still in need of their services or if she can discharge pt  Lissa phone number is [phone]  Thank you!    Lois Smith

## 2023-04-01 ENCOUNTER — APPOINTMENT (EMERGENCY)
Dept: RADIOLOGY | Facility: HOSPITAL | Age: 78
End: 2023-04-01

## 2023-04-01 ENCOUNTER — APPOINTMENT (EMERGENCY)
Dept: CT IMAGING | Facility: HOSPITAL | Age: 78
End: 2023-04-01

## 2023-04-01 ENCOUNTER — HOSPITAL ENCOUNTER (INPATIENT)
Facility: HOSPITAL | Age: 78
LOS: 1 days | Discharge: NURSING, REHAB OR LONG-TERM CARE | End: 2023-04-03
Attending: EMERGENCY MEDICINE | Admitting: INTERNAL MEDICINE

## 2023-04-01 DIAGNOSIS — R79.89 ELEVATED BRAIN NATRIURETIC PEPTIDE (BNP) LEVEL: ICD-10-CM

## 2023-04-01 DIAGNOSIS — R42 DIZZINESS: Primary | ICD-10-CM

## 2023-04-01 LAB
2HR DELTA HS TROPONIN: 1 NG/L
ALBUMIN SERPL BCP-MCNC: 4.1 G/DL (ref 3.5–5)
ALP SERPL-CCNC: 95 U/L (ref 34–104)
ALT SERPL W P-5'-P-CCNC: 15 U/L (ref 7–52)
ANION GAP SERPL CALCULATED.3IONS-SCNC: 8 MMOL/L (ref 4–13)
APTT PPP: 33 SECONDS (ref 23–37)
AST SERPL W P-5'-P-CCNC: 13 U/L (ref 13–39)
BASOPHILS # BLD AUTO: 0.05 THOUSANDS/ÂΜL (ref 0–0.1)
BASOPHILS NFR BLD AUTO: 1 % (ref 0–1)
BILIRUB SERPL-MCNC: 0.52 MG/DL (ref 0.2–1)
BNP SERPL-MCNC: 259 PG/ML (ref 0–100)
BUN SERPL-MCNC: 29 MG/DL (ref 5–25)
CALCIUM SERPL-MCNC: 9.2 MG/DL (ref 8.4–10.2)
CARDIAC TROPONIN I PNL SERPL HS: 10 NG/L
CARDIAC TROPONIN I PNL SERPL HS: 11 NG/L
CHLORIDE SERPL-SCNC: 106 MMOL/L (ref 96–108)
CO2 SERPL-SCNC: 29 MMOL/L (ref 21–32)
CREAT SERPL-MCNC: 1.15 MG/DL (ref 0.6–1.3)
EOSINOPHIL # BLD AUTO: 0.12 THOUSAND/ÂΜL (ref 0–0.61)
EOSINOPHIL NFR BLD AUTO: 1 % (ref 0–6)
ERYTHROCYTE [DISTWIDTH] IN BLOOD BY AUTOMATED COUNT: 14 % (ref 11.6–15.1)
GFR SERPL CREATININE-BSD FRML MDRD: 61 ML/MIN/1.73SQ M
GLUCOSE SERPL-MCNC: 118 MG/DL (ref 65–140)
HCT VFR BLD AUTO: 42.6 % (ref 36.5–49.3)
HGB BLD-MCNC: 13.6 G/DL (ref 12–17)
IMM GRANULOCYTES # BLD AUTO: 0.14 THOUSAND/UL (ref 0–0.2)
IMM GRANULOCYTES NFR BLD AUTO: 2 % (ref 0–2)
INR PPP: 1.24 (ref 0.84–1.19)
LYMPHOCYTES # BLD AUTO: 1.61 THOUSANDS/ÂΜL (ref 0.6–4.47)
LYMPHOCYTES NFR BLD AUTO: 18 % (ref 14–44)
MAGNESIUM SERPL-MCNC: 2.2 MG/DL (ref 1.9–2.7)
MCH RBC QN AUTO: 30 PG (ref 26.8–34.3)
MCHC RBC AUTO-ENTMCNC: 31.9 G/DL (ref 31.4–37.4)
MCV RBC AUTO: 94 FL (ref 82–98)
MONOCYTES # BLD AUTO: 0.61 THOUSAND/ÂΜL (ref 0.17–1.22)
MONOCYTES NFR BLD AUTO: 7 % (ref 4–12)
NEUTROPHILS # BLD AUTO: 6.53 THOUSANDS/ÂΜL (ref 1.85–7.62)
NEUTS SEG NFR BLD AUTO: 71 % (ref 43–75)
NRBC BLD AUTO-RTO: 0 /100 WBCS
PLATELET # BLD AUTO: 233 THOUSANDS/UL (ref 149–390)
PMV BLD AUTO: 10.1 FL (ref 8.9–12.7)
POTASSIUM SERPL-SCNC: 4.3 MMOL/L (ref 3.5–5.3)
PROT SERPL-MCNC: 6.7 G/DL (ref 6.4–8.4)
PROTHROMBIN TIME: 15.9 SECONDS (ref 11.6–14.5)
RBC # BLD AUTO: 4.53 MILLION/UL (ref 3.88–5.62)
SODIUM SERPL-SCNC: 143 MMOL/L (ref 135–147)
WBC # BLD AUTO: 9.06 THOUSAND/UL (ref 4.31–10.16)

## 2023-04-01 RX ADMIN — SODIUM CHLORIDE 500 ML: 0.9 INJECTION, SOLUTION INTRAVENOUS at 21:26

## 2023-04-02 LAB
ANION GAP SERPL CALCULATED.3IONS-SCNC: 7 MMOL/L (ref 4–13)
BASOPHILS # BLD AUTO: 0.04 THOUSANDS/ÂΜL (ref 0–0.1)
BASOPHILS NFR BLD AUTO: 0 % (ref 0–1)
BUN SERPL-MCNC: 26 MG/DL (ref 5–25)
CALCIUM SERPL-MCNC: 8.9 MG/DL (ref 8.4–10.2)
CHLORIDE SERPL-SCNC: 108 MMOL/L (ref 96–108)
CO2 SERPL-SCNC: 27 MMOL/L (ref 21–32)
CREAT SERPL-MCNC: 1.18 MG/DL (ref 0.6–1.3)
EOSINOPHIL # BLD AUTO: 0.13 THOUSAND/ÂΜL (ref 0–0.61)
EOSINOPHIL NFR BLD AUTO: 1 % (ref 0–6)
ERYTHROCYTE [DISTWIDTH] IN BLOOD BY AUTOMATED COUNT: 14.2 % (ref 11.6–15.1)
GFR SERPL CREATININE-BSD FRML MDRD: 60 ML/MIN/1.73SQ M
GLUCOSE SERPL-MCNC: 125 MG/DL (ref 65–140)
HCT VFR BLD AUTO: 40 % (ref 36.5–49.3)
HGB BLD-MCNC: 12.6 G/DL (ref 12–17)
IMM GRANULOCYTES # BLD AUTO: 0.09 THOUSAND/UL (ref 0–0.2)
IMM GRANULOCYTES NFR BLD AUTO: 1 % (ref 0–2)
LYMPHOCYTES # BLD AUTO: 2.13 THOUSANDS/ÂΜL (ref 0.6–4.47)
LYMPHOCYTES NFR BLD AUTO: 24 % (ref 14–44)
MCH RBC QN AUTO: 29.4 PG (ref 26.8–34.3)
MCHC RBC AUTO-ENTMCNC: 31.5 G/DL (ref 31.4–37.4)
MCV RBC AUTO: 93 FL (ref 82–98)
MONOCYTES # BLD AUTO: 0.73 THOUSAND/ÂΜL (ref 0.17–1.22)
MONOCYTES NFR BLD AUTO: 8 % (ref 4–12)
NEUTROPHILS # BLD AUTO: 5.9 THOUSANDS/ÂΜL (ref 1.85–7.62)
NEUTS SEG NFR BLD AUTO: 66 % (ref 43–75)
NRBC BLD AUTO-RTO: 0 /100 WBCS
PLATELET # BLD AUTO: 211 THOUSANDS/UL (ref 149–390)
PMV BLD AUTO: 10.7 FL (ref 8.9–12.7)
POTASSIUM SERPL-SCNC: 4.2 MMOL/L (ref 3.5–5.3)
RBC # BLD AUTO: 4.29 MILLION/UL (ref 3.88–5.62)
SODIUM SERPL-SCNC: 142 MMOL/L (ref 135–147)
WBC # BLD AUTO: 9.02 THOUSAND/UL (ref 4.31–10.16)

## 2023-04-02 RX ORDER — MIDODRINE HYDROCHLORIDE 5 MG/1
5 TABLET ORAL 3 TIMES DAILY PRN
Status: DISCONTINUED | OUTPATIENT
Start: 2023-04-02 | End: 2023-04-03 | Stop reason: HOSPADM

## 2023-04-02 RX ORDER — METHENAMINE HIPPURATE 1000 MG/1
1 TABLET ORAL 2 TIMES DAILY
Status: CANCELLED | OUTPATIENT
Start: 2023-04-02

## 2023-04-02 RX ORDER — SODIUM CHLORIDE, SODIUM GLUCONATE, SODIUM ACETATE, POTASSIUM CHLORIDE, MAGNESIUM CHLORIDE, SODIUM PHOSPHATE, DIBASIC, AND POTASSIUM PHOSPHATE .53; .5; .37; .037; .03; .012; .00082 G/100ML; G/100ML; G/100ML; G/100ML; G/100ML; G/100ML; G/100ML
75 INJECTION, SOLUTION INTRAVENOUS CONTINUOUS
Status: DISCONTINUED | OUTPATIENT
Start: 2023-04-02 | End: 2023-04-03 | Stop reason: HOSPADM

## 2023-04-02 RX ORDER — METOPROLOL SUCCINATE 25 MG/1
25 TABLET, EXTENDED RELEASE ORAL DAILY
Status: DISCONTINUED | OUTPATIENT
Start: 2023-04-02 | End: 2023-04-03 | Stop reason: HOSPADM

## 2023-04-02 RX ORDER — PANTOPRAZOLE SODIUM 40 MG/1
40 TABLET, DELAYED RELEASE ORAL
Status: DISCONTINUED | OUTPATIENT
Start: 2023-04-02 | End: 2023-04-03 | Stop reason: HOSPADM

## 2023-04-02 RX ORDER — ACETAMINOPHEN 325 MG/1
650 TABLET ORAL EVERY 6 HOURS PRN
Status: DISCONTINUED | OUTPATIENT
Start: 2023-04-02 | End: 2023-04-03 | Stop reason: HOSPADM

## 2023-04-02 RX ORDER — MECLIZINE HCL 12.5 MG/1
12.5 TABLET ORAL EVERY 8 HOURS PRN
Status: DISCONTINUED | OUTPATIENT
Start: 2023-04-02 | End: 2023-04-03 | Stop reason: HOSPADM

## 2023-04-02 RX ORDER — MIDODRINE HYDROCHLORIDE 5 MG/1
5 TABLET ORAL
Status: DISCONTINUED | OUTPATIENT
Start: 2023-04-02 | End: 2023-04-02

## 2023-04-02 RX ADMIN — APIXABAN 2.5 MG: 2.5 TABLET, FILM COATED ORAL at 09:55

## 2023-04-02 RX ADMIN — METOPROLOL SUCCINATE 25 MG: 25 TABLET, FILM COATED, EXTENDED RELEASE ORAL at 09:55

## 2023-04-02 RX ADMIN — PANTOPRAZOLE SODIUM 40 MG: 40 TABLET, DELAYED RELEASE ORAL at 05:55

## 2023-04-02 RX ADMIN — MECLIZINE 12.5 MG: 12.5 TABLET ORAL at 06:50

## 2023-04-02 RX ADMIN — SODIUM CHLORIDE, SODIUM GLUCONATE, SODIUM ACETATE, POTASSIUM CHLORIDE, MAGNESIUM CHLORIDE, SODIUM PHOSPHATE, DIBASIC, AND POTASSIUM PHOSPHATE 75 ML/HR: .53; .5; .37; .037; .03; .012; .00082 INJECTION, SOLUTION INTRAVENOUS at 02:19

## 2023-04-02 RX ADMIN — APIXABAN 2.5 MG: 2.5 TABLET, FILM COATED ORAL at 18:55

## 2023-04-02 NOTE — H&P
Sarah U  68   H&P  Name: Celia Sethi  MRN: [id number]  Unit/Bed#: ED 05 I Date of Admission: 4/1/2023   Date of Service: 4/2/2023 I Hospital Day: 0      Assessment/Plan   * Pre-syncope  Assessment & Plan  · Presented due to lightheadedness/dizziness occurring after prolonged period of bending to change miller catheter bag  Notes symptoms worsened when attempting to stand/ambulate  Denies chest pain, palpitations, no LOC or head strike  · Hospitalized 12/2022 for orthostatic hypotension  Was discharged with scheduled fludrocortisone/midrodrine and p r n midodrine  Device interrogation revealed no pauses  · Suspected due to orthostatic hypotension  · Reports some improvement in symptoms  However, given significant fall risk/fall history, patient does not feel safe returning home tonight  · CTH: negative  · Echo (9/2022): LVEF 50% mild global hypokinesis  · ECG: a fib, 80bpm  · Troponin 10>11  · No longer on Flomax    Plan   · Check orthostatics  · Gentle IVF hydration  · Resume home midodrine  · Loop recorder interrogation to rule out if again pauses or arrhythmia  · Monitor on telemetry   · Compression stockings  · PT/OT consult    Ambulatory dysfunction  Assessment & Plan  · W/ frequent falls & hx SDH  Is on [facility]  · Most recently seen 3/12 at [facility] for fall with +head strike  · Fall precautions, OOB with assistance   · Ambulates with cane  · PT/OT/CM      Chronic indwelling Miller catheter  Assessment & Plan  · 2/2 to retention  · No longer on Flomax  · Managed on Hipprex given recurrent UTIs    Atrial fibrillation (HCC)  Assessment & Plan  · W/ loop recorder   · Continue metoprolol, Eliquis    SDH (subdural hematoma) (HCC)  Assessment & Plan  · Hx of 9/2022  · Has been cleared to resume Eliquis   · However, due to significant fall risk patient was recommended to have Watchman device placed            VTE Pharmacologic Prophylaxis: VTE Score: 3 Eliquis  Code Status: Level 3 - DNAR and DNI Discussion with family: Patient declined call to   Anticipated Length of Stay: Patient will be admitted on an observation basis with an anticipated length of stay of less than 2 midnights secondary to dizziness, ambulatory dysfunction/frequent falls requiring PT/OT for safe dispo planning  Total Time Spent on Date of Encounter in care of patient: 55 minutes This time was spent on one or more of the following: performing physical exam; counseling and coordination of care; obtaining or reviewing history; documenting in the medical record; reviewing/ordering tests, medications or procedures; communicating with other healthcare professionals and discussing with patient's family/caregivers  Chief Complaint: dizziness    History of Present Illness:  Nico Mendoza is a 78 y o  male with a PMH of A fib on Eliquis, frequent falls, orthostatic hypotension who presents with dizziness  States he was bending down to replace his miller bag for prolonged period of time, and when he went to sit back up he became dizzy  Dizziness was worsened when he attempted to go from sitting to standing  Denies any loss of consciousness or head strike  Denies associated chest pain, palpitations, shortness of breath, visual changes, tenderness  No definite association with head movements  No one sided weakness, paresthesias, facial dropping  Patient does have history of orthostatic hypotension most hospitalized in December  At that time was started on midodrine and fludrocortisone  Patient states he was unable to find these medications tonight  States that he has been eating and drinking appropriately  Patient does have hx of A fib with loop recorder in situ  Of note, patient does have significant fall risk  Most recently fall 3/12 with + head strike  Also with hx of SDH this past September  In ED, patient noted to be in rate controlled A fib  Troponins obtained and negative   Symptoms improved with 500cc IVF bolus  Patient feeling unsafe for discharge while not feeling 100% at his baseline yet  Review of Systems:  Review of Systems   Constitutional: Negative for activity change, appetite change, chills and fatigue  HENT: Negative for congestion  Eyes: Negative for visual disturbance  Respiratory: Negative for cough, chest tightness and shortness of breath  Cardiovascular: Negative for chest pain, palpitations and leg swelling  Gastrointestinal: Negative for abdominal pain, diarrhea, nausea and vomiting  Genitourinary: Positive for difficulty urinating (chronic miller)  Musculoskeletal: Positive for gait problem  Neurological: Positive for dizziness, weakness and light-headedness  Negative for tremors, seizures, syncope, facial asymmetry, speech difficulty, numbness and headaches  Past Medical and Surgical History:   Past Medical History:   Diagnosis Date   • Ambulatory dysfunction    • Anxiety    • Anxiety disorder    • Atrial fibrillation (HCC)    • Coronary artery disease    • Depression    • Miller catheter in place    • Hepatitis C     screening negative in 1/2017   • Hepatitis C    • History of MI (myocardial infarction)    • Hypertension    • MI (myocardial infarction) ([facility] [address] )    • Urinary catheter in place    • Use of cane as ambulatory aid        Past Surgical History:   Procedure Laterality Date   • CARDIAC CATHETERIZATION  07/09/2018   • CARDIAC ELECTROPHYSIOLOGY PROCEDURE N/A 9/30/2022    Procedure: Cardiac loop recorder implant;  Surgeon: Christian Oconnell MD;  [facility]: [facility];   Service: Cardiology   • CARDIAC ELECTROPHYSIOLOGY PROCEDURE  09/30/2022    Cardiac loop recorder implant; Dr Christian Oconnell   • COLONOSCOPY     • COLONOSCOPY     • INGUINAL HERNIA REPAIR Right 08/11/2022    Dr Surendra Harrell   • SC RPR 1ST Kishan Resendez AGE 5 YRS/> REDUCIBLE Right 8/11/2022    Procedure: REPAIR HERNIA INGUINAL;  Surgeon: Dianelys Encarnacion DO;  Location: [facility];  Service: General   • TONSILLECTOMY         Meds/Allergies:  Prior to Admission medications    Medication Sig Start Date End Date Taking? Authorizing Provider   acetaminophen (TYLENOL) 325 mg tablet Take 2 tablets (650 mg total) by mouth every 4 (four) hours as needed for mild pain Do not exceed 4 g daily  3/12/23 4/11/23 Yes Bebo Lacey PA-C   apixaban (ELIQUIS) 2 5 mg Take 2 5 mg by mouth 2 (two) times a day   Yes Historical Provider, MD   azelastine (ASTELIN) 0 1 % nasal spray 1 spray into each nostril 2 (two) times a day as needed for allergies or rhinitis Use in each nostril as directed 11/22/22  Yes Rosalinda Jo PA-C   Fluticasone-Salmeterol (Advair) 100-50 mcg/dose inhaler Inhale 1 puff 2 (two) times a day Rinse mouth after use  Yes Historical Provider, MD   ipratropium-albuterol (DUO-NEB) 0 5-2 5 mg/3 mL nebulizer solution INHALE CONTENTS OF 1 VIAL VIA NEBULIZER FOUR TIMES A DAY 6/7/22  Yes Marbella Lopez MD   methenamine hippurate (HIPREX) 1 g tablet Take 1 tablet (1 g total) by mouth 2 (two) times a day with meals 2/1/23  Yes Brandi Sebastian PA-C   metoprolol succinate (TOPROL-XL) 25 mg 24 hr tablet Take 25 mg by mouth daily   Yes Historical Provider, MD   midodrine (PROAMATINE) 5 mg tablet Take 1 tablet (5 mg total) by mouth 3 (three) times a day as needed (for SBP < 100 or severe dizziness with standing up   Hold for SBP > 130) 2/8/23  Yes Marbella Lopez MD   omeprazole (PriLOSEC) 20 mg delayed release capsule Take 1 capsule (20 mg total) by mouth daily 11/30/22  Yes Marbella Lopez MD   Advair Diskus 100-50 MCG/ACT inhaler USE 1 INHALATION TWICE A DAY (RINSE MOUTH AFTER USE)  Patient not taking: Reported on 2/15/2023 8/26/22   Rory Oleary,    apixaban (Eliquis) 2 5 mg Take 1 tablet (2 5 mg total) by mouth 2 (two) times a day 2/15/23 3/17/23  Gregor Wilson MD   azelastine (ASTELIN) 0 1 % nasal spray 1 spray into each nostril 2 (two) times a day as needed for rhinitis Use in each nostril as directed    Historical Provider, MD   docusate sodium (COLACE) 100 mg capsule Take 1 capsule (100 mg total) by mouth 2 (two) times a day  Patient not taking: Reported on 2/15/2023 11/22/22 2/20/23  Leonila Carter PA-C   ferrous sulfate 324 (65 Fe) mg Take 1 tablet (324 mg total) by mouth daily before breakfast  Patient not taking: Reported on 2/15/2023 11/30/22 2/28/23  César Medley MD   folic acid (FOLVITE) 882 mcg tablet Take 1 tablet (400 mcg total) by mouth daily  Patient not taking: Reported on 2/15/2023 9/10/21   Lluvia Saini DO   ipratropium-albuterol (DUO-NEB) 0 5-2 5 mg/3 mL nebulizer solution Take 3 mL by nebulization 4 (four) times a day    Historical Provider, MD   methenamine hippurate (HIPREX) 1 g tablet Take 1 g by mouth 2 (two) times a day with meals    Historical Provider, MD   metoprolol succinate (Toprol XL) 25 mg 24 hr tablet Take 1 tablet (25 mg total) by mouth daily 11/22/22 3/1/23  Rosalinda Jo PA-C   midodrine (PROAMATINE) 5 mg tablet Take 5 mg by mouth 3 (three) times a day before meals    Historical Provider, MD   nicotine (NICODERM CQ) 14 mg/24hr TD 24 hr patch Place 1 patch on the skin every 24 hours  Patient not taking: Reported on 2/15/2023 10/25/22   César Medley MD   omeprazole (PriLOSEC) 20 mg delayed release capsule Take 20 mg by mouth daily    Historical Provider, MD     I have reviewed home medications with patient personally      Allergies: No Known Allergies    Social History:  Marital Status: /Civil Union   Occupation:   Patient Pre-hospital Living Situation: Home  Patient Pre-hospital Level of Mobility: walks with cane  Patient Pre-hospital Diet Restrictions:   Substance Use History:   Social History     Substance and Sexual Activity   Alcohol Use Not Currently     Social History     Tobacco Use   Smoking Status Former   • Packs/day: 1 50   • Years: 57 00   • Pack years: 85 50   • Types: Cigarettes   • Quit date: 3/12/2023   • Years since "quittin 0   Smokeless Tobacco Never   Tobacco Comments    since age 17; Has history of smoking for over 54 years  He has been smoking more than packet daily in the past however he has been smokes about half a pack a day lately and stopped smoking on 2018 when he was admitted to the hospital       Social History     Substance and Sexual Activity   Drug Use Never       Family History:  Family History   Problem Relation Age of Onset   • Hypertension Mother    • Coronary artery disease Mother         premature   • Diabetes Father    • Coronary artery disease Father         premature   • Hypertension Father        Physical Exam:     Vitals:   Blood Pressure: 131/100 (23)  Pulse: 69 (23)  Temperature: 98 2 °F (36 8 °C) (23)  Temp Source: Oral (23)  Respirations: 18 (23)  Height: 6' 1\" (185 4 cm) (23)  Weight - Scale: 68 kg (150 lb) (23)  SpO2: 98 % (23)    Physical Exam  Vitals reviewed  Constitutional:       General: He is not in acute distress  Appearance: He is not toxic-appearing  HENT:      Head: Normocephalic and atraumatic  Mouth/Throat:      Mouth: Mucous membranes are moist    Eyes:      General: No visual field deficit  Extraocular Movements: Extraocular movements intact  Pupils: Pupils are equal, round, and reactive to light  Cardiovascular:      Rate and Rhythm: Normal rate  Rhythm irregular  Comments: Irregularly irregular  Pulmonary:      Effort: Pulmonary effort is normal  No respiratory distress  Breath sounds: Normal breath sounds  Abdominal:      General: Abdomen is flat  Bowel sounds are normal  There is no distension  Palpations: Abdomen is soft  Musculoskeletal:      Right lower leg: No edema  Left lower leg: No edema  Skin:     General: Skin is warm and dry  Neurological:      General: No focal deficit present        Mental Status: He is alert and oriented " to person, place, and time  Cranial Nerves: Cranial nerves 2-12 are intact  No cranial nerve deficit, dysarthria or facial asymmetry  Sensory: Sensation is intact  Motor: No weakness or pronator drift  Coordination: Finger-Nose-Finger Test normal    Psychiatric:         Mood and Affect: Mood normal          Behavior: Behavior normal           Additional Data:   Lab Results:  Results from last 7 days   Lab Units 04/01/23 2054   WBC Thousand/uL 9 06   HEMOGLOBIN g/dL 13 6   HEMATOCRIT % 42 6   PLATELETS Thousands/uL 233   NEUTROS PCT % 71   LYMPHS PCT % 18   MONOS PCT % 7   EOS PCT % 1     Results from last 7 days   Lab Units 04/01/23 2054   SODIUM mmol/L 143   POTASSIUM mmol/L 4 3   CHLORIDE mmol/L 106   CO2 mmol/L 29   BUN mg/dL 29*   CREATININE mg/dL 1 15   ANION GAP mmol/L 8   CALCIUM mg/dL 9 2   ALBUMIN g/dL 4 1   TOTAL BILIRUBIN mg/dL 0 52   ALK PHOS U/L 95   ALT U/L 15   AST U/L 13   GLUCOSE RANDOM mg/dL 118     Results from last 7 days   Lab Units 04/01/23 2054   INR  1 24*                   Lines/Drains:  Invasive Devices     Peripheral Intravenous Line  Duration           Peripheral IV 04/01/23 Left;Proximal;Ventral (anterior) Forearm 1 day          Drain  Duration           Urethral Catheter Double-lumen 16 Fr  12 days              Urinary Catheter:  Goal for removal: N/A - Chronic Cloud             Imaging: Reviewed radiology reports from this admission including: chest xray and CT head  CT head without contrast   Final Result by Keli Bello MD (04/01 2134)      No acute intracranial abnormality  Workstation performed: [id number]         XR chest 1 view portable   ED Interpretation by Makenna Escobar MD (04/01 2132)   No infiltrate or pneumothorax          EKG and Other Studies Reviewed on Admission:   · EKG: A fib, 80bpm    ** Please Note: This note has been constructed using a voice recognition system   **

## 2023-04-02 NOTE — PLAN OF CARE
Problem: Potential for Falls  Goal: Patient will remain free of falls  Description: INTERVENTIONS:  - Educate patient/family on patient safety including physical limitations  - Instruct patient to call for assistance with activity   - Consult OT/PT to assist with strengthening/mobility   - Keep Call bell within reach  - Keep bed low and locked with side rails adjusted as appropriate  - Keep care items and personal belongings within reach  - Initiate and maintain comfort rounds  - Make Fall Risk Sign visible to staff  - Offer Toileting every 2 Hours, in advance of need  - Initiate/Maintain bed alarm  - Obtain necessary fall risk management equipment:   - Apply yellow socks and bracelet for high fall risk patients  - Consider moving patient to room near nurses station  Outcome: Progressing     Problem: PAIN - ADULT  Goal: Verbalizes/displays adequate comfort level or baseline comfort level  Description: Interventions:  - Encourage patient to monitor pain and request assistance  - Assess pain using appropriate pain scale  - Administer analgesics based on type and severity of pain and evaluate response  - Implement non-pharmacological measures as appropriate and evaluate response  - Consider cultural and social influences on pain and pain management  - Notify physician/advanced practitioner if interventions unsuccessful or patient reports new pain  Outcome: Progressing     Problem: INFECTION - ADULT  Goal: Absence or prevention of progression during hospitalization  Description: INTERVENTIONS:  - Assess and monitor for signs and symptoms of infection  - Monitor lab/diagnostic results  - Monitor all insertion sites, i e  indwelling lines, tubes, and drains  - Monitor endotracheal if appropriate and nasal secretions for changes in amount and color  - [place] appropriate cooling/warming therapies per order  - Administer medications as ordered  - Instruct and encourage patient and family to use good hand hygiene technique  - Identify and instruct in appropriate isolation precautions for identified infection/condition  Outcome: Progressing  Goal: Absence of fever/infection during neutropenic period  Description: INTERVENTIONS:  - Monitor WBC    Outcome: Progressing     Problem: SAFETY ADULT  Goal: Patient will remain free of falls  Description: INTERVENTIONS:  - Educate patient/family on patient safety including physical limitations  - Instruct patient to call for assistance with activity   - Consult OT/PT to assist with strengthening/mobility   - Keep Call bell within reach  - Keep bed low and locked with side rails adjusted as appropriate  - Keep care items and personal belongings within reach  - Initiate and maintain comfort rounds  - Make Fall Risk Sign visible to staff  - Offer Toileting every 2 Hours, in advance of need  - Initiate/Maintain bedalarm  - Obtain necessary fall risk management equipment:   - Apply yellow socks and bracelet for high fall risk patients  - Consider moving patient to room near nurses station  Outcome: Progressing  Goal: Maintain or return to baseline ADL function  Description: INTERVENTIONS:  -  Assess patient's ability to carry out ADLs; assess patient's baseline for ADL function and identify physical deficits which impact ability to perform ADLs (bathing, care of mouth/teeth, toileting, grooming, dressing, etc )  - Assess/evaluate cause of self-care deficits   - Assess range of motion  - Assess patient's mobility; develop plan if impaired  - Assess patient's need for assistive devices and provide as appropriate  - Encourage maximum independence but intervene and supervise when necessary  - Involve family in performance of ADLs  - Assess for home care needs following discharge   - Consider OT consult to assist with ADL evaluation and planning for discharge  - Provide patient education as appropriate  Outcome: Progressing  Goal: Maintains/Returns to pre admission functional level  Description: INTERVENTIONS:  - Perform BMAT or MOVE assessment daily    - Set and communicate daily mobility goal to care team and patient/family/caregiver  - Collaborate with rehabilitation services on mobility goals if consulted  - Perform Range of Motion 2 times a day  - Reposition patient every 2 hours  - Dangle patient 2 times a day  - Stand patient 2 times a day  - Ambulate patient 2 times a day  - Out of bed to chair 2 times a day   - Out of bed for meals 2 times a day  - Out of bed for toileting  - Record patient progress and toleration of activity level   Outcome: Progressing     Problem: DISCHARGE PLANNING  Goal: Discharge to home or other facility with appropriate resources  Description: INTERVENTIONS:  - Identify barriers to discharge w/patient and caregiver  - Arrange for needed discharge resources and transportation as appropriate  - Identify discharge learning needs (meds, wound care, etc )  - Arrange for interpretive services to assist at discharge as needed  - Refer to Case Management Department for coordinating discharge planning if the patient needs post-hospital services based on physician/advanced practitioner order or complex needs related to functional status, cognitive ability, or social support system  Outcome: Progressing     Problem: Knowledge Deficit  Goal: Patient/family/caregiver demonstrates understanding of disease process, treatment plan, medications, and discharge instructions  Description: Complete learning assessment and assess knowledge base    Interventions:  - Provide teaching at level of understanding  - Provide teaching via preferred learning methods  Outcome: Progressing     Problem: Prexisting or High Potential for Compromised Skin Integrity  Goal: Skin integrity is maintained or improved  Description: INTERVENTIONS:  - Identify patients at risk for skin breakdown  - Assess and monitor skin integrity  - Assess and monitor nutrition and hydration status  - Monitor labs   - Assess for incontinence   - Turn and reposition patient  - Assist with mobility/ambulation  - Relieve pressure over bony prominences  - Avoid friction and shearing  - Provide appropriate hygiene as needed including keeping skin clean and dry  - Evaluate need for skin moisturizer/barrier cream  - Collaborate with interdisciplinary team   - Patient/family teaching  - Consider wound care consult   Outcome: Progressing     Problem: MOBILITY - ADULT  Goal: Maintain or return to baseline ADL function  Description: INTERVENTIONS:  -  Assess patient's ability to carry out ADLs; assess patient's baseline for ADL function and identify physical deficits which impact ability to perform ADLs (bathing, care of mouth/teeth, toileting, grooming, dressing, etc )  - Assess/evaluate cause of self-care deficits   - Assess range of motion  - Assess patient's mobility; develop plan if impaired  - Assess patient's need for assistive devices and provide as appropriate  - Encourage maximum independence but intervene and supervise when necessary  - Involve family in performance of ADLs  - Assess for home care needs following discharge   - Consider OT consult to assist with ADL evaluation and planning for discharge  - Provide patient education as appropriate  Outcome: Progressing  Goal: Maintains/Returns to pre admission functional level  Description: INTERVENTIONS:  - Perform BMAT or MOVE assessment daily    - Set and communicate daily mobility goal to care team and patient/family/caregiver  - Collaborate with rehabilitation services on mobility goals if consulted  - Perform Range of Motion 2 times a day  - Reposition patient every 2 hours    - Dangle patient 2 times a day  - Stand patient 2 times a day  - Ambulate patient 2 times a day  - Out of bed to chair 2 times a day   - Out of bed for meals 2 times a day  - Out of bed for toileting  - Record patient progress and toleration of activity level   Outcome: Progressing

## 2023-04-02 NOTE — ASSESSMENT & PLAN NOTE
· Hx of 9/2022  · Has been cleared to resume Eliquis   · However, due to significant fall risk patient was recommended to have Watchman device placed

## 2023-04-02 NOTE — ED PROVIDER NOTES
"History  Chief Complaint   Patient presents with   • Dizziness     PT called EMS due to feeling \"dizzy\"   PT \"So I got it switched from my day bag to the night bag but it wouldn't go, no way  And I just couldn't get it on like it was suppose to  But every time I went to get up I I kept carlita falling back  \" Pt denies CP and SOB      Patient is a 72-year-old male seen in the emergency department with concern for episode of dizziness noted when bending down to change his leg bag this evening prior to evaluation  Patient noted some unsteady gait due to this dizziness, now improved  Patient notes no headache, chest pain, shortness of breath, abdominal pain, nausea, vomiting, weakness, numbness, tingling  Patient notes history of low blood pressure in the past, for which he is supposed to take midodrine as needed at home  Patient notes no other definite clear exacerbating or alleviating factors for his symptoms  Patient notes no recent head trauma  Patient states that he is feeling hungry  Prior to Admission Medications   Prescriptions Last Dose Informant Patient Reported? Taking? Advair Diskus 100-50 MCG/ACT inhaler   No No   Sig: USE 1 INHALATION TWICE A DAY (RINSE MOUTH AFTER USE)   Patient not taking: Reported on 2/15/2023   Fluticasone-Salmeterol (Advair) 100-50 mcg/dose inhaler   Yes Yes   Sig: Inhale 1 puff 2 (two) times a day Rinse mouth after use  acetaminophen (TYLENOL) 325 mg tablet   No Yes   Sig: Take 2 tablets (650 mg total) by mouth every 4 (four) hours as needed for mild pain Do not exceed 4 g daily     apixaban (ELIQUIS) 2 5 mg   Yes Yes   Sig: Take 2 5 mg by mouth 2 (two) times a day   apixaban (Eliquis) 2 5 mg   No No   Sig: Take 1 tablet (2 5 mg total) by mouth 2 (two) times a day   azelastine (ASTELIN) 0 1 % nasal spray   No Yes   Si spray into each nostril 2 (two) times a day as needed for allergies or rhinitis Use in each nostril as directed   azelastine (ASTELIN) 0 1 % nasal " spray   Yes No   Si spray into each nostril 2 (two) times a day as needed for rhinitis Use in each nostril as directed   docusate sodium (COLACE) 100 mg capsule   No No   Sig: Take 1 capsule (100 mg total) by mouth 2 (two) times a day   Patient not taking: Reported on 2/15/2023   ferrous sulfate 324 (65 Fe) mg   No No   Sig: Take 1 tablet (324 mg total) by mouth daily before breakfast   Patient not taking: Reported on    folic acid (FOLVITE) 361 mcg tablet   No No   Sig: Take 1 tablet (400 mcg total) by mouth daily   Patient not taking: Reported on 2/15/2023   ipratropium-albuterol (DUO-NEB) 0 5-2 5 mg/3 mL nebulizer solution   No Yes   Sig: INHALE CONTENTS OF 1 VIAL VIA NEBULIZER FOUR TIMES A DAY   ipratropium-albuterol (DUO-NEB) 0 5-2 5 mg/3 mL nebulizer solution   Yes No   Sig: Take 3 mL by nebulization 4 (four) times a day   methenamine hippurate (HIPREX) 1 g tablet   No Yes   Sig: Take 1 tablet (1 g total) by mouth 2 (two) times a day with meals   methenamine hippurate (HIPREX) 1 g tablet   Yes No   Sig: Take 1 g by mouth 2 (two) times a day with meals   metoprolol succinate (TOPROL-XL) 25 mg 24 hr tablet   Yes Yes   Sig: Take 25 mg by mouth daily   metoprolol succinate (Toprol XL) 25 mg 24 hr tablet   No No   Sig: Take 1 tablet (25 mg total) by mouth daily   midodrine (PROAMATINE) 5 mg tablet   No Yes   Sig: Take 1 tablet (5 mg total) by mouth 3 (three) times a day as needed (for SBP < 100 or severe dizziness with standing up   Hold for SBP > 130)   midodrine (PROAMATINE) 5 mg tablet   Yes No   Sig: Take 5 mg by mouth 3 (three) times a day before meals   nicotine (NICODERM CQ) 14 mg/24hr TD 24 hr patch   No No   Sig: Place 1 patch on the skin every 24 hours   Patient not taking: Reported on 2/15/2023   omeprazole (PriLOSEC) 20 mg delayed release capsule   No Yes   Sig: Take 1 capsule (20 mg total) by mouth daily   omeprazole (PriLOSEC) 20 mg delayed release capsule   Yes No   Sig: Take 20 mg by mouth daily      Facility-Administered Medications: None       Past Medical History:   Diagnosis Date   • Ambulatory dysfunction    • Anxiety    • Anxiety disorder    • Atrial fibrillation (HCC)    • Coronary artery disease    • Depression    • Cloud catheter in place    • Hepatitis C     screening negative in 2017   • Hepatitis C    • History of MI (myocardial infarction)    • Hypertension    • MI (myocardial infarction) ([facility] [address] )    • Urinary catheter in place    • Use of cane as ambulatory aid        Past Surgical History:   Procedure Laterality Date   • CARDIAC CATHETERIZATION  2018   • CARDIAC ELECTROPHYSIOLOGY PROCEDURE N/A 2022    Procedure: Cardiac loop recorder implant;  Surgeon: Claudetta Neu, MD;  Location: [facility]; Service: Cardiology   • CARDIAC ELECTROPHYSIOLOGY PROCEDURE  2022    Cardiac loop recorder implant; Dr Claudetta Neu   • COLONOSCOPY     • COLONOSCOPY     • [address] Right 2022    Dr Barreto Homes   • [place] RPR 1ST INGUN HRNA AGE 5 YRS/> REDUCIBLE Right 2022    Procedure: REPAIR HERNIA INGUINAL;  Surgeon: Rolan Bernheim Conron, DO;  Location: [facility];  Service: General   • TONSILLECTOMY         Family History   Problem Relation Age of Onset   • Hypertension Mother    • Coronary artery disease Mother         premature   • Diabetes Father    • Coronary artery disease Father         premature   • Hypertension Father      I have reviewed and agree with the history as documented      E-Cigarette/Vaping   • E-Cigarette Use Never User      E-Cigarette/Vaping Substances   • Nicotine Yes    • THC No    • CBD No    • Flavoring No    • Other No    • Unknown No      Social History     Tobacco Use   • Smoking status: Former     Packs/day: 1 50     Years: 57 00     Pack years: 85 50     Types: Cigarettes     Quit date: 3/12/2023     Years since quittin 0   • Smokeless tobacco: Never   • Tobacco comments:     since age 17; Has history of smoking for over 54 years  He has been smoking more than packet daily in the past however he has been smokes about half a pack a day lately and stopped smoking on 7/1/2018 when he was admitted to the hospital     Vaping Use   • Vaping Use: Never used   Substance Use Topics   • Alcohol use: Not Currently   • Drug use: Never       Review of Systems   Constitutional: Negative for chills and fever  HENT: Negative for ear pain and sore throat  Eyes: Negative for pain and visual disturbance  Respiratory: Negative for cough and shortness of breath  Cardiovascular: Negative for chest pain and palpitations  Gastrointestinal: Negative for abdominal pain and vomiting  Genitourinary: Negative for decreased urine volume and difficulty urinating  Musculoskeletal: Negative for neck pain and neck stiffness  Skin: Negative for color change and rash  Neurological: Positive for dizziness  Negative for seizures and syncope  Psychiatric/Behavioral: Negative for agitation and confusion  All other systems reviewed and are negative  Physical Exam  Physical Exam  Vitals and nursing note reviewed  Constitutional:       General: He is not in acute distress  Appearance: He is well-developed  HENT:      Head: Normocephalic and atraumatic  Right Ear: External ear normal       Left Ear: External ear normal       Nose: Nose normal       Mouth/Throat:      Pharynx: Oropharynx is clear  Eyes:      General: No scleral icterus  Conjunctiva/sclera: Conjunctivae normal    Cardiovascular:      Rate and Rhythm: Normal rate  Rhythm irregular  Heart sounds: No murmur heard  Pulmonary:      Effort: Pulmonary effort is normal  No respiratory distress  Breath sounds: Normal breath sounds  Abdominal:      General: There is no distension  Palpations: Abdomen is soft  Tenderness: There is no abdominal tenderness  Musculoskeletal:         General: No deformity or signs of injury        Cervical back: Normal range of motion and neck supple  Skin:     General: Skin is warm and dry  Neurological:      General: No focal deficit present  Mental Status: He is alert  Cranial Nerves: No cranial nerve deficit  Sensory: No sensory deficit  Motor: No weakness  Comments: Normal finger-to-nose bilaterally   Psychiatric:         Mood and Affect: Mood normal          Thought Content:  Thought content normal          Vital Signs  ED Triage Vitals   Temperature Pulse Respirations Blood Pressure SpO2   04/01/23 2042 04/01/23 2041 04/01/23 2041 04/01/23 2041 04/01/23 2041   98 2 °F (36 8 °C) 86 18 147/81 98 %      Temp Source Heart Rate Source Patient Position - Orthostatic VS BP Location FiO2 (%)   04/01/23 2042 04/01/23 2041 04/01/23 2041 04/01/23 2041 --   Oral Monitor Sitting Left arm       Pain Score       04/01/23 2041       No Pain           Vitals:    04/01/23 2041 04/01/23 2303   BP: 147/81 131/100   Pulse: 86 69   Patient Position - Orthostatic VS: Sitting Sitting         Visual Acuity  Visual Acuity    Flowsheet Row Most Recent Value   L Pupil Size (mm) 3   R Pupil Size (mm) 3          ED Medications  Medications   sodium chloride 0 9 % bolus 500 mL (0 mL Intravenous Stopped 4/1/23 2334)       Diagnostic Studies  Results Reviewed     Procedure Component Value Units Date/Time    HS Troponin I 2hr [[id number]]  (Normal) Collected: 04/01/23 2300    Lab Status: Final result Specimen: Blood from Arm, Left Updated: 04/01/23 2332     hs TnI 2hr 11 ng/L      Delta 2hr hsTnI 1 ng/L     B-Type Natriuretic Peptide(BNP) [[id number]]  (Abnormal) Collected: 04/01/23 2054    Lab Status: Final result Specimen: Blood from Arm, Left Updated: 04/01/23 2137      pg/mL     HS Troponin 0hr (reflex protocol) [[id number]]  (Normal) Collected: 04/01/23 2054    Lab Status: Final result Specimen: Blood from Arm, Left Updated: 04/01/23 2124     hs TnI 0hr 10 ng/L     Comprehensive metabolic panel [[id number]]  (Abnormal) Collected: 04/01/23 2054    Lab Status: Final result Specimen: Blood from Arm, Left Updated: 04/01/23 2117     Sodium 143 mmol/L      Potassium 4 3 mmol/L      Chloride 106 mmol/L      CO2 29 mmol/L      ANION GAP 8 mmol/L      BUN 29 mg/dL      Creatinine 1 15 mg/dL      Glucose 118 mg/dL      Calcium 9 2 mg/dL      AST 13 U/L      ALT 15 U/L      Alkaline Phosphatase 95 U/L      Total Protein 6 7 g/dL      Albumin 4 1 g/dL      Total Bilirubin 0 52 mg/dL      eGFR 61 ml/min/1 73sq m     Narrative:      [facility] guidelines for Chronic Kidney Disease (CKD):   •  Stage 1 with normal or high GFR (GFR > 90 mL/min/1 73 square meters)  •  Stage 2 Mild CKD (GFR = 60-89 mL/min/1 73 square meters)  •  Stage 3A Moderate CKD (GFR = 45-59 mL/min/1 73 square meters)  •  Stage 3B Moderate CKD (GFR = 30-44 mL/min/1 73 square meters)  •  Stage 4 Severe CKD (GFR = 15-29 mL/min/1 73 square meters)  •  Stage 5 End Stage CKD (GFR <15 mL/min/1 73 square meters)  Note: GFR calculation is accurate only with a steady state creatinine    Magnesium [[id number]]  (Normal) Collected: 04/01/23 2054    Lab Status: Final result Specimen: Blood from Arm, Left Updated: 04/01/23 2117     Magnesium 2 2 mg/dL     APTT [[id number]]  (Normal) Collected: 04/01/23 2054    Lab Status: Final result Specimen: Blood from Arm, Left Updated: 04/01/23 2110     PTT 33 seconds     Protime-INR [[id number]]  (Abnormal) Collected: 04/01/23 2054    Lab Status: Final result Specimen: Blood from Arm, Left Updated: 04/01/23 2110     Protime 15 9 seconds      INR 1 24    CBC and differential [[id number]] Collected: 04/01/23 2054    Lab Status: Final result Specimen: Blood from Arm, Left Updated: 04/01/23 2100     WBC 9 06 Thousand/uL      RBC 4 53 Million/uL      Hemoglobin 13 6 g/dL      Hematocrit 42 6 %      MCV 94 fL      MCH 30 0 pg      MCHC 31 9 g/dL      RDW 14 0 %      MPV 10 1 fL      Platelets 293 Thousands/uL      nRBC 0 /100 WBCs Neutrophils Relative 71 %      Immat GRANS % 2 %      Lymphocytes Relative 18 %      Monocytes Relative 7 %      Eosinophils Relative 1 %      Basophils Relative 1 %      Neutrophils Absolute 6 53 Thousands/µL      Immature Grans Absolute 0 14 Thousand/uL      Lymphocytes Absolute 1 61 Thousands/µL      Monocytes Absolute 0 61 Thousand/µL      Eosinophils Absolute 0 12 Thousand/µL      Basophils Absolute 0 05 Thousands/µL                  CT head without contrast   Final Result by Valentine Landau, MD (04/01 2134)      No acute intracranial abnormality  Workstation performed: [id number]         XR chest 1 view portable   ED Interpretation by Margarito Alcantar MD (04/01 2132)   No infiltrate or pneumothorax                 Procedures  ECG 12 Lead Documentation Only    Date/Time: 4/1/2023 9:08 PM  Performed by: Margarito Alcantar MD  Authorized by: Margarito Alcantar MD     Indications / Diagnosis:  Dizziness  ECG reviewed by me, the ED Provider: yes    Patient location:  ED  Rate:     ECG rate:  80    ECG rate assessment: normal    Rhythm:     Rhythm: atrial fibrillation    QRS:     QRS axis:  Normal  ST segments:     ST segments:  Non-specific  T waves:     T waves: non-specific    Comments:      Atrial fibrillation at 80, normal axis, QRS 88, QTc 438, nonspecific ST-T wave abnormality, no definite evidence of acute ischemia             ED Course  ED Course as of 04/01/23 2356   Sat Apr 01, 2023   2157 CT head-    IMPRESSION:     No acute intracranial abnormality                                      SBIRT 20yo+    Flowsheet Row Most Recent Value   SBIRT (25 yo +)    In order to provide better care to our patients, we are screening all of our patients for alcohol and drug use  Would it be okay to ask you these screening questions? Yes Filed at: 04/01/2023 2133   Initial Alcohol Screen: US AUDIT-C     1  How often do you have a drink containing alcohol? 0 Filed at: 04/01/2023 2133   2   How many drinks containing alcohol do you have on a typical day you are drinking? 0 Filed at: 04/01/2023 2133   3a  Male UNDER 65: How often do you have five or more drinks on one occasion? 0 Filed at: 04/01/2023 2133   Audit-C Score 0 Filed at: 04/01/2023 2133   JOHN: How many times in the past year have you    Used an illegal drug or used a prescription medication for non-medical reasons? Never Filed at: 04/01/2023 2133                    Medical Decision Making  Patient is a 29-year-old male seen in the emergency department with concern for dizziness  EKG was obtained and noted  Chest x-ray showed no infiltrate or pneumothorax  CT head showed no acute intracranial abnormality  Laboratory evaluation remarkable for elevated BNP of 259, elevated BUN of 29, elevated PT of 15 9, elevated INR of 1 24, serial troponins of 10, 11  No definite cause of the patient's symptoms was discovered  Patient was feeling better in the emergency department  Patient has a nonfocal neurologic exam in the emergency department  Evaluation is not consistent with ACS, CVA, acute anemia, acute electrolyte abnormality, or acute CHF  Plan to admit patient (observation status) for further evaluation and treatment, with concern for dizziness, elevated BNP, high risk of falls  Case was reviewed with medicine team  Plan of care was reviewed with patient  Dizziness: acute illness or injury  Amount and/or Complexity of Data Reviewed  Labs: ordered  Decision-making details documented in ED Course  Radiology: ordered and independent interpretation performed  Decision-making details documented in ED Course  ECG/medicine tests: ordered and independent interpretation performed  Decision-making details documented in ED Course  Risk  Decision regarding hospitalization            Disposition  Final diagnoses:   Dizziness   Elevated brain natriuretic peptide (BNP) level     Time reflects when diagnosis was documented in both MDM as applicable and the Disposition within this note     Time User Action Codes Description Comment    4/1/2023  8:43 PM Ronda Clark Add [R42] Dizziness     4/1/2023 11:01 PM Ronda Clark Add [R79 89] Elevated brain natriuretic peptide (BNP) level     4/1/2023 11:44 PM Ronda Clark Add [R55] Near syncope     4/1/2023 11:48 PM Amedeo Clink [R55] Near syncope       ED Disposition     ED Disposition   Admit    Condition   Stable    Date/Time   Sat Apr 1, 2023 11:54 PM    Comment   Case was reviewed with medicine team and the patient's admission status was agreed to be Admission Status: observation status to the service of Dr Becky Vargas  Follow-up Information     Follow up With Specialties Details Why Contact Info Additional Information    Adriana Moura MD Internal Medicine Call in 1 day  [address] [facility] [address]  [phone]       [facility] Cardiology Call  As needed [address] [facility] [phone] [id number] Cardiology [address] [facility], [address] [place] [address]          Patient's Medications   Discharge Prescriptions    No medications on file       No discharge procedures on file      PDMP Review       Value Time User    PDMP Reviewed  Yes 11/18/2022  8:47 PM Isabelle Lee, [address] [facility]          ED Provider  Electronically Signed by           Suzette Jacobo MD  04/01/23 2257

## 2023-04-02 NOTE — ASSESSMENT & PLAN NOTE
· Presented due to lightheadedness/dizziness occurring after prolonged period of bending to change miller catheter bag  Notes symptoms worsened when attempting to stand/ambulate  Denies chest pain, palpitations, no LOC or head strike  · Hospitalized 12/2022 for orthostatic hypotension  Was discharged with scheduled fludrocortisone/midrodrine and p r n midodrine  Device interrogation revealed no pauses  · Suspected due to orthostatic hypotension  · Reports some improvement in symptoms  However, given significant fall risk/fall history, patient does not feel safe returning home tonight     · CTH: negative  · Echo (9/2022): LVEF 50% mild global hypokinesis  · ECG: a fib, 80bpm  · Troponin 10>11  · No longer on Flomax    Plan   · Check orthostatics  · Gentle IVF hydration  · Resume home midodrine  · Loop recorder interrogation to rule out if again pauses or arrhythmia  · Monitor on telemetry   · Compression stockings  · PT/OT consult

## 2023-04-02 NOTE — ASSESSMENT & PLAN NOTE
· W/ frequent falls & hx SDH   Is on [facility]  · Most recently seen 3/12 at [facility] for fall with +head strike  · Fall precautions, OOB with assistance   · Ambulates with cane  · PT/OT/CM

## 2023-04-02 NOTE — PLAN OF CARE
Problem: Potential for Falls  Goal: Patient will remain free of falls  Description: INTERVENTIONS:  - Educate patient/family on patient safety including physical limitations  - Instruct patient to call for assistance with activity   - Consult OT/PT to assist with strengthening/mobility   - Keep Call bell within reach  - Keep bed low and locked with side rails adjusted as appropriate  - Keep care items and personal belongings within reach  - Initiate and maintain comfort rounds  - Make Fall Risk Sign visible to staff  - Offer Toileting every prn Hours, in advance of need  - Initiate/Maintain  bed alarm  - Obtain necessary fall risk management equipment: n/a  - Apply yellow socks and bracelet for high fall risk patients  - Consider moving patient to room near nurses station  Outcome: Not Progressing     Problem: SAFETY ADULT  Goal: Patient will remain free of falls  Description: INTERVENTIONS:  - Educate patient/family on patient safety including physical limitations  - Instruct patient to call for assistance with activity   - Consult OT/PT to assist with strengthening/mobility   - Keep Call bell within reach  - Keep bed low and locked with side rails adjusted as appropriate  - Keep care items and personal belongings within reach  - Initiate and maintain comfort rounds  - Make Fall Risk Sign visible to staff  - Offer Toileting every prn Hours, in advance of need  - Initiate/Maintain  bed alarm  - Obtain necessary fall risk management equipment: n/a  - Apply yellow socks and bracelet for high fall risk patients  - Consider moving patient to room near nurses station  Outcome: Not Progressing  Goal: Maintain or return to baseline ADL function  Description: INTERVENTIONS:  -  Assess patient's ability to carry out ADLs; assess patient's baseline for ADL function and identify physical deficits which impact ability to perform ADLs (bathing, care of mouth/teeth, toileting, grooming, dressing, etc )  - Assess/evaluate cause of self-care deficits   - Assess range of motion  - Assess patient's mobility; develop plan if impaired  - Assess patient's need for assistive devices and provide as appropriate  - Encourage maximum independence but intervene and supervise when necessary  - Involve family in performance of ADLs  - Assess for home care needs following discharge   - Consider OT consult to assist with ADL evaluation and planning for discharge  - Provide patient education as appropriate  Outcome: Not Progressing  Goal: Maintains/Returns to pre admission functional level  Description: INTERVENTIONS:  - Perform BMAT or MOVE assessment daily    - Set and communicate daily mobility goal to care team and patient/family/caregiver  - Collaborate with rehabilitation services on mobility goals if consulted  - Perform Range of Motion prn times a day  - Reposition patient every self hours    - Dangle patient prn times a day  - Stand patient prn times a day  - Ambulate patient prn times a day  - Out of bed to chair prn times a day   - Out of bed for meals prn times a day  - Out of bed for toileting  - Record patient progress and toleration of activity level   Outcome: Not Progressing     Problem: DISCHARGE PLANNING  Goal: Discharge to home or other facility with appropriate resources  Description: INTERVENTIONS:  - Identify barriers to discharge w/patient and caregiver  - Arrange for needed discharge resources and transportation as appropriate  - Identify discharge learning needs (meds, wound care, etc )  - Arrange for interpretive services to assist at discharge as needed  - Refer to Case Management Department for coordinating discharge planning if the patient needs post-hospital services based on physician/advanced practitioner order or complex needs related to functional status, cognitive ability, or social support system  Outcome: Not Progressing     Problem: Knowledge Deficit  Goal: Patient/family/caregiver demonstrates understanding of disease process, treatment plan, medications, and discharge instructions  Description: Complete learning assessment and assess knowledge base    Interventions:  - Provide teaching at level of understanding  - Provide teaching via preferred learning methods  Outcome: Not Progressing

## 2023-04-03 ENCOUNTER — PATIENT OUTREACH (OUTPATIENT)
Dept: FAMILY MEDICINE CLINIC | Facility: CLINIC | Age: 78
End: 2023-04-03

## 2023-04-03 VITALS
RESPIRATION RATE: 20 BRPM | HEIGHT: 72 IN | DIASTOLIC BLOOD PRESSURE: 100 MMHG | BODY MASS INDEX: 18.42 KG/M2 | WEIGHT: 136 LBS | OXYGEN SATURATION: 96 % | HEART RATE: 68 BPM | TEMPERATURE: 97.9 F | SYSTOLIC BLOOD PRESSURE: 144 MMHG

## 2023-04-03 LAB
QRS AXIS: 25 DEGREES
QRSD INTERVAL: 88 MS
QT INTERVAL: 380 MS
QTC INTERVAL: 438 MS
SARS-COV-2 RNA RESP QL NAA+PROBE: NEGATIVE
T WAVE AXIS: 72 DEGREES
VENTRICULAR RATE: 80 BPM

## 2023-04-03 RX ORDER — MECLIZINE HCL 12.5 MG/1
12.5 TABLET ORAL EVERY 8 HOURS PRN
Qty: 30 TABLET | Refills: 0 | Status: SHIPPED | OUTPATIENT
Start: 2023-04-03 | End: 2023-04-10

## 2023-04-03 RX ADMIN — APIXABAN 2.5 MG: 2.5 TABLET, FILM COATED ORAL at 08:39

## 2023-04-03 RX ADMIN — PANTOPRAZOLE SODIUM 40 MG: 40 TABLET, DELAYED RELEASE ORAL at 05:59

## 2023-04-03 RX ADMIN — METOPROLOL SUCCINATE 25 MG: 25 TABLET, FILM COATED, EXTENDED RELEASE ORAL at 08:39

## 2023-04-03 RX ADMIN — SODIUM CHLORIDE, SODIUM GLUCONATE, SODIUM ACETATE, POTASSIUM CHLORIDE, MAGNESIUM CHLORIDE, SODIUM PHOSPHATE, DIBASIC, AND POTASSIUM PHOSPHATE 75 ML/HR: .53; .5; .37; .037; .03; .012; .00082 INJECTION, SOLUTION INTRAVENOUS at 05:58

## 2023-04-03 NOTE — ASSESSMENT & PLAN NOTE
· W/ frequent falls & hx SDH  Is on [facility]  · Most recently seen 3/12 at THE [facility] for fall with +head strike  · Fall precautions, OOB with assistance   · Ambulates with cane  · PT/OT/CM- underwent evaluation for rehab needs, which were met; follow-up with case management for disposition planning

## 2023-04-03 NOTE — PROGRESS NOTES
LATE NOTE:    SW emailed pt's friend Aly Demetrio 3/31, following incoming message to this SW, inquiring about pt, per [facility] request

## 2023-04-03 NOTE — PLAN OF CARE
Problem: OCCUPATIONAL THERAPY ADULT  Goal: Performs self-care activities at highest level of function for planned discharge setting  See evaluation for individualized goals  Description: Treatment Interventions: ADL retraining, Functional transfer training, UE strengthening/ROM, Cognitive reorientation, Endurance training, Patient/family training, Equipment evaluation/education, Continued evaluation (cognitive eval)          See flowsheet documentation for full assessment, interventions and recommendations  Note: Limitation: Decreased ADL status, Decreased UE strength, Decreased endurance, Decreased self-care trans, Decreased high-level ADLs, Decreased Safe judgement during ADL, Decreased cognition  Prognosis: Fair  Assessment: Patient is a 78 y o  male seen for OT evaluation at [place] [address] following admission on 4/1/2023  s/p Pre-syncope  Please see above for comprehensive list of comorbidities and significant PMHx impacting functional performance  Patient presents with active orders for OT eval and treat and up and OOB as tolerated   At baseline, pt is (I) with ADLs, mod (I) c SPC  Upon initial evaluation, patient requires Supervision for UB ADLs, Mod A   for LB ADLs, and Min A  for transfers and functional mobility household distance with [facility]  Based on functional eval, pt presents with impaired  attention, impaired  safety awareness, impaired  problem solving skills, and impaired   Memory; recommend f/u formal assessment  Occupational performance is affected by the following deficits: endurance ,  decreased muscular strength , decreased balance , decreased standing tolerance for self care tasks , decreased trunk control , decreased activity tolerance , impaired memory , impaired judgement and problem solving  and impaired safety awareness    Patient would benefit from OT services within the acute care setting to maximize level of functional independence in the following areas fall prevention , UB ADLs, LB ADLs, self-care transfers, functional mobility and formal cognitive evaluation  Personal factors impacting D/C include: (+) Hx of falls , decreased caregiver status , steps to enter home, decreased ADL independence , decreased insight toward deficits  and decreased recall of precautions   From OT standpoint, recommendation at time of D/C would be post-acute rehabilitation        OT Discharge Recommendation: Post acute rehabilitation services     [facility]

## 2023-04-03 NOTE — PLAN OF CARE
Problem: PHYSICAL THERAPY ADULT  Goal: Performs mobility at highest level of function for planned discharge setting  See evaluation for individualized goals  Description: Treatment/Interventions: Functional transfer training, LE strengthening/ROM, Elevations, Therapeutic exercise, Endurance training, Cognitive reorientation, Patient/family training, Equipment eval/education, Bed mobility, Gait training, Compensatory technique education          See flowsheet documentation for full assessment, interventions and recommendations  4/3/2023 1325 by Kishan Isaacs PT  Note: Prognosis: Fair  Problem List: Decreased strength, Decreased endurance, Impaired balance, Decreased mobility, Decreased cognition, Impaired judgement, Decreased safety awareness, Decreased skin integrity  Assessment: Janes Mcdaniel is a 78 y o  Male who presents to [address] [place] [address] on 4/1/23 due to dizziness and diagnosis of pre-syncope  Orders for PT eval and treat received, w/ contact isolation precautions  Comorbidities affecting pt's functional mobility at time of evaluation include: a-fib, OH, COPD, ambulatory dysfunction, chronic miller catheter, head trauma  Personal factors affecting DC include: inaccessible home environment, ambulating w/ assistive device, stairs to enter home, inability to navigate level surfaces w/o external assistance, decreased cognition, limited home support, positive fall history, decreased initiation and engagement and limited insight into impairments  At baseline, pt mobilizes mod I w/ SPC, and w/ >10 fall(s) in the previous 6 months  Upon evaluation, pt presents w/ the following deficits: impaired strength, impaired skin integrity, impaired balance, impaired cognition, decreased safety awareness, decreased endurance/activity tolerance and gait deviations  Pt currently requires min Ax1 for bed mobility, min Ax1 for transfers, min Ax1 w/ SPC for ambulation   Pt's clinical presentation is unstable/unpredictable due to need for increased assistance w/ functional mobility compared to baseline, need for input for mobility technique, need for input for task focus, need to input for safety awareness, recent drastic decline in mobility status, recent h/o falls, ongoing medical management  From a PT/mobility standpoint given the above findings, DC recommendation is: Post-acute inpatient rehabilitation  During current admission, pt will benefit from continued skilled inpatient PT in the acute care setting in order to address the above deficits and to maximize function and mobility prior to DC from acute care  Barriers to Discharge: Inaccessible home environment, Decreased caregiver support     PT Discharge Recommendation: Post acute rehabilitation services    See flowsheet documentation for full assessment

## 2023-04-03 NOTE — CASE MANAGEMENT
Case Management Discharge Planning Note    Patient name Alexei Cedeno  Location /-34 MRN [id number]  : 1945 Date 4/3/2023       Current Admission Date: 2023  Current Admission Diagnosis:Pre-syncope   Patient Active Problem List    Diagnosis Date Noted   • Fall 2023   • Eyebrow laceration, right, initial encounter 2023   • Abrasions of multiple sites 2023   • Atrial fibrillation ([address] ) 2023   • Nicotine abuse 2023   • COPD (chronic obstructive pulmonary disease) ([address] ) 2023   • Chronic indwelling Cloud catheter 2023   • Orthostatic hypotension 2023   • Gastroesophageal reflux disease without esophagitis 2022   • Swelling of lower leg 2022   • Surgical wound present 10/05/2022   • SDH (subdural hematoma) ([address] ) 2022   • Recurrent right inguinal hernia 2022   • Nasal congestion 2022   • Medicare annual wellness visit, subsequent 2022   • Moderate protein-calorie malnutrition ([address] ) 2022   • Cholestatic jaundice 2022   • Iron deficiency anemia likely secondary to GI bleeding 2022   • Abnormal colonoscopy 2022   • Dizziness 2022   • Urinary retention 2022   • Essential (hemorrhagic) thrombocythemia ([address] ) 2022   • Closed nondisplaced comminuted fracture of left patella 2021   • Head trauma 2021   • Thrombocytosis 2021   • Bradycardia 2021   • Pre-syncope 2021   • Colonic adenoma 2021   • Ambulatory dysfunction 2021   • COPD (chronic obstructive pulmonary disease) ([facility] ) 2021   • Orthostatic hypotension 2020   • Smoker 2020   • Symptomatic anemia 2020   • Congestive cardiomyopathy ([address] ) 2020   • Permanent atrial fibrillation ([facility] ) 2020      LOS (days): 1  Geometric Mean LOS (GMLOS) (days): 1 90  Days to GMLOS:1     OBJECTIVE:  Risk of Unplanned Readmission Score: 30 92         Current admission status: Inpatient   Preferred Pharmacy:   [id number] [place], [address] [facility]   [address]  [address]  Phone: [phone] Fax: [phone] [facility]  [facility] [address]  Phone: [phone] Fax: [phone]    Primary Care Provider: Darrion Salcido MD    Primary Insurance: MEDICARE  Secondary Insurance:  FOR LIFE    DISCHARGE DETAILS:    Discharge planning discussed with[de-identified] patient  Freedom of Choice: Yes  Comments - Freedom of Choice: Cm met with patient to review role of CM  and dc plan  Patient is being recommended for rehab  Patient in agreement  [facility] referrals sent  Cm confirmed that [facility] a bed, which is patient's first choice  Cm will work on [employer] and transport  Spouse and patient are seperated  CM contacted family/caregiver?: No- see comments                  Requested [address]         Is the patient interested in [place] [address] at discharge?: No    DME Referral Provided  Referral made for DME?: No    Other Referral/Resources/Interventions Provided:  Referral Comments: [facility] referral sent  Patient selected [facility]  Transport request placed  Cm attempted to contact patient's wife, but phone number on file is no longer working and patient does not have another number for her           Treatment Team Recommendation: Short Term Rehab  Discharge Destination Plan[de-identified] Short Term Rehab  Transport at Discharge : Wheelchair van  Dispatcher Contacted: Yes  Number/Name of Dispatcher: Roundtrip     ETA of Transport (Date): 04/03/23  ETA of Transport (Time): 4894

## 2023-04-03 NOTE — PLAN OF CARE
Problem: Potential for Falls  Goal: Patient will remain free of falls  Description: INTERVENTIONS:  - Educate patient/family on patient safety including physical limitations  - Instruct patient to call for assistance with activity   - Consult OT/PT to assist with strengthening/mobility   - Keep Call bell within reach  - Keep bed low and locked with side rails adjusted as appropriate  - Keep care items and personal belongings within reach  - Initiate and maintain comfort rounds  - Make Fall Risk Sign visible to staff  - Offer Toileting every  Hours, in advance of need  - Initiate/Maintain alarm  - Obtain necessary fall risk management equipment:   - Apply yellow socks and bracelet for high fall risk patients  - Consider moving patient to room near nurses station  Outcome: Progressing     Problem: PAIN - ADULT  Goal: Verbalizes/displays adequate comfort level or baseline comfort level  Description: Interventions:  - Encourage patient to monitor pain and request assistance  - Assess pain using appropriate pain scale  - Administer analgesics based on type and severity of pain and evaluate response  - Implement non-pharmacological measures as appropriate and evaluate response  - Consider cultural and social influences on pain and pain management  - Notify physician/advanced practitioner if interventions unsuccessful or patient reports new pain  Outcome: Progressing     Problem: INFECTION - ADULT  Goal: Absence or prevention of progression during hospitalization  Description: INTERVENTIONS:  - Assess and monitor for signs and symptoms of infection  - Monitor lab/diagnostic results  - Monitor all insertion sites, i e  indwelling lines, tubes, and drains  - Monitor endotracheal if appropriate and nasal secretions for changes in amount and color  - [place] appropriate cooling/warming therapies per order  - Administer medications as ordered  - Instruct and encourage patient and family to use good hand hygiene technique  - Identify and instruct in appropriate isolation precautions for identified infection/condition  Outcome: Progressing  Goal: Absence of fever/infection during neutropenic period  Description: INTERVENTIONS:  - Monitor WBC    Outcome: Progressing     Problem: SAFETY ADULT  Goal: Patient will remain free of falls  Description: INTERVENTIONS:  - Educate patient/family on patient safety including physical limitations  - Instruct patient to call for assistance with activity   - Consult OT/PT to assist with strengthening/mobility   - Keep Call bell within reach  - Keep bed low and locked with side rails adjusted as appropriate  - Keep care items and personal belongings within reach  - Initiate and maintain comfort rounds  - Make Fall Risk Sign visible to staff  - Offer Toileting every  Hours, in advance of need  - Initiate/Maintain alarm  - Obtain necessary fall risk management equipment:   - Apply yellow socks and bracelet for high fall risk patients  - Consider moving patient to room near nurses station  Outcome: Progressing  Goal: Maintain or return to baseline ADL function  Description: INTERVENTIONS:  -  Assess patient's ability to carry out ADLs; assess patient's baseline for ADL function and identify physical deficits which impact ability to perform ADLs (bathing, care of mouth/teeth, toileting, grooming, dressing, etc )  - Assess/evaluate cause of self-care deficits   - Assess range of motion  - Assess patient's mobility; develop plan if impaired  - Assess patient's need for assistive devices and provide as appropriate  - Encourage maximum independence but intervene and supervise when necessary  - Involve family in performance of ADLs  - Assess for home care needs following discharge   - Consider OT consult to assist with ADL evaluation and planning for discharge  - Provide patient education as appropriate  Outcome: Progressing  Goal: Maintains/Returns to pre admission functional level  Description: INTERVENTIONS:  - Perform BMAT or MOVE assessment daily    - Set and communicate daily mobility goal to care team and patient/family/caregiver  - Collaborate with rehabilitation services on mobility goals if consulted  - Perform Range of Motion times a day  - Reposition patient every hours  - Dangle patient  times a day  - Stand patient times a day  - Ambulate patient  times a day  - Out of bed to chair times a day   - Out of bed for meals  times a day  - Out of bed for toileting  - Record patient progress and toleration of activity level   Outcome: Progressing     Problem: DISCHARGE PLANNING  Goal: Discharge to home or other facility with appropriate resources  Description: INTERVENTIONS:  - Identify barriers to discharge w/patient and caregiver  - Arrange for needed discharge resources and transportation as appropriate  - Identify discharge learning needs (meds, wound care, etc )  - Arrange for interpretive services to assist at discharge as needed  - Refer to Case Management Department for coordinating discharge planning if the patient needs post-hospital services based on physician/advanced practitioner order or complex needs related to functional status, cognitive ability, or social support system  Outcome: Progressing     Problem: Knowledge Deficit  Goal: Patient/family/caregiver demonstrates understanding of disease process, treatment plan, medications, and discharge instructions  Description: Complete learning assessment and assess knowledge base    Interventions:  - Provide teaching at level of understanding  - Provide teaching via preferred learning methods  Outcome: Progressing     Problem: Prexisting or High Potential for Compromised Skin Integrity  Goal: Skin integrity is maintained or improved  Description: INTERVENTIONS:  - Identify patients at risk for skin breakdown  - Assess and monitor skin integrity  - Assess and monitor nutrition and hydration status  - Monitor labs   - Assess for incontinence   - Turn and reposition patient  - Assist with mobility/ambulation  - Relieve pressure over bony prominences  - Avoid friction and shearing  - Provide appropriate hygiene as needed including keeping skin clean and dry  - Evaluate need for skin moisturizer/barrier cream  - Collaborate with interdisciplinary team   - Patient/family teaching  - Consider wound care consult   Outcome: Progressing     Problem: MOBILITY - ADULT  Goal: Maintain or return to baseline ADL function  Description: INTERVENTIONS:  -  Assess patient's ability to carry out ADLs; assess patient's baseline for ADL function and identify physical deficits which impact ability to perform ADLs (bathing, care of mouth/teeth, toileting, grooming, dressing, etc )  - Assess/evaluate cause of self-care deficits   - Assess range of motion  - Assess patient's mobility; develop plan if impaired  - Assess patient's need for assistive devices and provide as appropriate  - Encourage maximum independence but intervene and supervise when necessary  - Involve family in performance of ADLs  - Assess for home care needs following discharge   - Consider OT consult to assist with ADL evaluation and planning for discharge  - Provide patient education as appropriate  Outcome: Progressing  Goal: Maintains/Returns to pre admission functional level  Description: INTERVENTIONS:  - Perform BMAT or MOVE assessment daily    - Set and communicate daily mobility goal to care team and patient/family/caregiver  - Collaborate with rehabilitation services on mobility goals if consulted  - Perform Range of Motion times a day  - Reposition patient every hours    - Dangle patient  times a day  - Stand patient times a day  - Ambulate patient  times a day  - Out of bed to chair times a day   - Out of bed for meals  times a day  - Out of bed for toileting  - Record patient progress and toleration of activity level   Outcome: Progressing

## 2023-04-03 NOTE — PROGRESS NOTES
Sarah U  68   Progress Note  Name: Millie Cooper  MRN: [id number]  Unit/Bed#: -01 I Date of Admission: 4/1/2023   Date of Service: 4/3/2023 I Hospital Day: 1    Assessment/Plan   Chronic indwelling Miller catheter  Assessment & Plan  · 2/2 to retention  · No longer on Flomax  · Managed on Hipprex given recurrent UTIs    Atrial fibrillation [facility])  Assessment & Plan  · W/ loop recorder   · Continue metoprolol, Eliquis  · Cardiology follow-up at discharge  SDH (subdural hematoma) (HCC)  Assessment & Plan  · Hx of 9/2022  · Has been cleared to resume Eliquis   · However, due to significant fall risk patient was recommended to have Watchman device placed  Ambulatory dysfunction  Assessment & Plan  · W/ frequent falls & hx SDH  Is on [facility]  · Most recently seen 3/[facility] for fall with +head strike  · Fall precautions, OOB with assistance   · Ambulates with cane  · PT/OT/CM- underwent evaluation for rehab needs, which were met; follow-up with case management for disposition planning  * Pre-syncope  Assessment & Plan  · Presented due to lightheadedness/dizziness occurring after prolonged period of bending to change miller catheter bag  Notes symptoms worsened when attempting to stand/ambulate  Denies chest pain, palpitations, no LOC or head strike  · Hospitalized 12/2022 for orthostatic hypotension  Was discharged with scheduled fludrocortisone/midrodrine and p r n midodrine  Device interrogation revealed no pauses  · Suspected due to orthostatic hypotension  · Reports some improvement in symptoms  However, given significant fall risk/fall history, patient does not feel safe returning home tonight     · CTH: negative  · Echo (9/2022): LVEF 50% mild global hypokinesis  · ECG: a fib, 80bpm  · Troponin 10>11  · No longer on Flomax    Plan   · Check orthostatics  · Gentle IVF hydration  · Resume home midodrine  · Loop recorder interrogation to rule out if again pauses or arrhythmia  · Monitor on telemetry   · Compression stockings  · PT/OT consult    Patient with a history of orthostatic hypotension, noted on previous admission for which he had been discharged on fludrocortisone and midodrine prn  Additional medical history of subdural hemorrhage, ambulatory dysfunction atrial fibrillation on anticoagulation and loop recorder placement  Patient stated that dizziness and weakness started after a prolonged course of bending over and attempt to change Cloud bag  Patient underwent a robust work-up during this hospitalization course, including ischemic work-up and orthostatics, which were both negative  CT head was fortunate for no ischemia, hemorrhage or other focal lesions  Patient also underwent cardiology evaluation, with recommendations made for loop recorder interrogation prior to discharge  Additionally, patient underwent evaluation by PT/OT with recommendations made for postacute rehabilitation services for ambulatory dysfunction  VTE Pharmacologic Prophylaxis:   Pharmacologic: Apixaban (Eliquis)  Mechanical VTE Prophylaxis in Place: Yes    Patient Centered Rounds: I have performed bedside rounds with nursing staff today  Discussions with Specialists or Other Care Team Provider: Cardiology    Education and Discussions with Family / Patient:     Time Spent for Care: 45 minutes  More than 50% of total time spent on counseling and coordination of care as described above  Current Length of Stay: 1 day(s)    Current Patient Status: Inpatient   Certification Statement: The patient will continue to require additional inpatient hospital stay due to Patient requires rehab placement  Discharge Plan / Estimated Discharge Date: We will follow with case management for disposition planning /24 hours  Code Status: Level 3 - DNAR and DNI      Subjective:   Patient interviewed at the bedside    Denies any headaches, dizziness, chest pain, palpitation or shortness of breath at time of the encounter  Patient states that he did experience some weakness while trying to ambulate 3 steps yesterday  No other acute issues reported  Objective:     Vitals:   Temp (24hrs), Av 9 °F (36 6 °C), Min:97 2 °F (36 2 °C), Max:98 8 °F (37 1 °C)    Temp:  [97 2 °F (36 2 °C)-98 8 °F (37 1 °C)] 97 9 °F (36 6 °C)  HR:  [50-81] 81  Resp:  [18-20] 20  BP: (117-165)/() 151/84  SpO2:  [95 %-98 %] 96 %  Body mass index is 18 44 kg/m²  Input and Output Summary (last 24 hours): Intake/Output Summary (Last 24 hours) at 4/3/2023 1331  Last data filed at 4/3/2023 0801  Gross per 24 hour   Intake --   Output 1520 ml   Net -1520 ml       Physical Exam:     Physical Exam  Vitals reviewed  Constitutional:       Appearance: Normal appearance  HENT:      Head: Normocephalic and atraumatic  Mouth/Throat:      Mouth: Mucous membranes are moist    Eyes:      Extraocular Movements: Extraocular movements intact  Cardiovascular:      Rate and Rhythm: Normal rate  Rhythm irregular  Heart sounds: S1 normal and S2 normal    Pulmonary:      Effort: Pulmonary effort is normal       Breath sounds: Normal breath sounds  Abdominal:      Palpations: Abdomen is soft  Genitourinary:     Comments: Cloud catheter in situ draining straw-colored urine  Musculoskeletal:         General: Normal range of motion  Cervical back: Neck supple  Right lower leg: No edema  Left lower leg: No edema  Skin:     General: Skin is warm and dry  Neurological:      General: No focal deficit present  Mental Status: He is alert and oriented to person, place, and time     Psychiatric:         Mood and Affect: Mood normal          Behavior: Behavior normal            Additional Data:     Labs:    Results from last 7 days   Lab Units 23  0535   WBC Thousand/uL 9 02   HEMOGLOBIN g/dL 12 6   HEMATOCRIT % 40 0   PLATELETS Thousands/uL 211   NEUTROS PCT % 66   LYMPHS PCT % 24   MONOS PCT % 8   EOS PCT % 1     Results from last 7 days   Lab Units 04/02/23  0535 04/01/23 2054   POTASSIUM mmol/L 4 2 4 3   CHLORIDE mmol/L 108 106   CO2 mmol/L 27 29   BUN mg/dL 26* 29*   CREATININE mg/dL 1 18 1 15   CALCIUM mg/dL 8 9 9 2   ALK PHOS U/L  --  95   ALT U/L  --  15   AST U/L  --  13     Results from last 7 days   Lab Units 04/01/23 2054   INR  1 24*       * I Have Reviewed All Lab Data Listed Above  * Additional Pertinent Lab Tests Reviewed: All Labs Within Last 24 Hours Reviewed    Imaging:    Imaging Reports Reviewed Today Include: None  Imaging Personally Reviewed by Myself Includes: None  Recent Cultures (last 7 days):           Last 24 Hours Medication List:   Current Facility-Administered Medications   Medication Dose Route Frequency Provider Last Rate   • acetaminophen  650 mg Oral Q6H PRN Yaritza Laguna PA-C     • apixaban  2 5 mg Oral BID Yaritza Laguna PA-C     • meclizine  12 5 mg Oral Q8H PRN Yaritza Laguna PA-C     • metoprolol succinate  25 mg Oral Daily Yaritza Laguna PA-C     • midodrine  5 mg Oral TID PRN Ashwini ShowDENVER art     • multi-electrolyte  75 mL/hr Intravenous Continuous Ashwini ShowersDENVER 75 mL/hr (04/03/23 8236)   • pantoprazole  40 mg Oral Early Morning Yaritza Laguna PA-C          Today, Patient Was Seen By: Coleman Gramajo MD    ** Please Note: Dragon 360 Dictation voice to text software may have been used in the creation of this document   **

## 2023-04-03 NOTE — DISCHARGE SUMMARY
Sarah U  68   Discharge- Saima Colon 1945, 78 y o  male MRN: [id number]  Unit/Bed#: -01 Encounter: [id number]  Primary Care Provider: Susan Garcia MD   Date and time admitted to hospital: 4/1/2023  8:38 PM    Chronic indwelling Miller catheter  Assessment & Plan  · 2/2 to retention  · No longer on Flomax  · Managed on Hipprex given recurrent UTIs    Atrial fibrillation (HCC)  Assessment & Plan  · W/ loop recorder   · Continue metoprolol, Eliquis  · Cardiology follow-up at discharge  SDH (subdural hematoma) ([facility])  Assessment & Plan  · Hx of 9/2022  · Has been cleared to resume Eliquis   · However, due to significant fall risk patient was recommended to have Watchman device placed  Ambulatory dysfunction  Assessment & Plan  · W/ frequent falls & hx SDH  Is on [facility]  · Most recently seen 3/12 at [facility] for fall with +head strike  · Fall precautions, OOB with assistance   · Ambulates with cane  · PT/OT/CM- underwent evaluation for rehab needs, which were met; follow-up with case management for disposition planning  * Pre-syncope  Assessment & Plan  · Presented due to lightheadedness/dizziness occurring after prolonged period of bending to change miller catheter bag  Notes symptoms worsened when attempting to stand/ambulate  Denies chest pain, palpitations, no LOC or head strike  · Hospitalized 12/2022 for orthostatic hypotension  Was discharged with scheduled fludrocortisone/midrodrine and p r n midodrine  Device interrogation revealed no pauses  · Suspected due to orthostatic hypotension  · Reports some improvement in symptoms  However, given significant fall risk/fall history, patient does not feel safe returning home tonight     · CTH: negative  · Echo (9/2022): LVEF 50% mild global hypokinesis  · ECG: a fib, 80bpm  · Troponin 10>11  · No longer on Flomax    Plan   · Check orthostatics  · Gentle IVF hydration  · Resume home midodrine  · Loop recorder interrogation to rule out if again pauses or arrhythmia  · Monitor on telemetry   · Compression stockings  · PT/OT consult    Patient with a history of orthostatic hypotension, noted on previous admission for which he had been discharged on fludrocortisone and midodrine prn  Additional medical history of subdural hemorrhage, ambulatory dysfunction atrial fibrillation on anticoagulation and loop recorder placement  Patient stated that dizziness and weakness started after a prolonged course of bending over and attempt to change Cloud bag  Patient underwent a robust work-up during this hospitalization course, including ischemic work-up and orthostatics, which were both negative  CT head was fortunate for no ischemia, hemorrhage or other focal lesions  Patient also underwent cardiology evaluation, with recommendations made for loop recorder interrogation prior to discharge  Additionally, patient underwent evaluation by PT/OT with recommendations made for postacute rehabilitation services for ambulatory dysfunction                Medical Problems     Resolved Problems  Date Reviewed: 4/3/2023   None         Admission Date:   Admission Orders (From admission, onward)     Ordered        04/02/23 1638  Inpatient Admission  Once            04/01/23 2355  Place in Observation  Once                        Admitting Diagnosis: Dizziness [R42]  Elevated brain natriuretic peptide (BNP) level [R79 89]    Discharge diagnosis dizziness lightheadedness likely secondary to deconditioning and prolonged standing and bending while changing the Cloud bag, no evidence of orthostatic hypotension this admission, CT head negative, no pauses noted on loop recorder interrogation currently, has chronic A-fib on anticoagulation, history of orthostatic hypotension,  Ambulatory dysfunction  Subdural hematoma  Chronic indwelling Cloud catheter  HPI:   Priscilla Thomas is a 78 y o  male with a PMH of A fib on Eliquis, frequent falls, orthostatic hypotension who presents with dizziness  States he was bending down to replace his miller bag for prolonged period of time, and when he went to sit back up he became dizzy  Dizziness was worsened when he attempted to go from sitting to standing  Denies any loss of consciousness or head strike  Denies associated chest pain, palpitations, shortness of breath, visual changes, tenderness  No definite association with head movements  No one sided weakness, paresthesias, facial dropping  Patient does have history of orthostatic hypotension most hospitalized in December  At that time was started on midodrine and fludrocortisone  Patient states he was unable to find these medications tonight  States that he has been eating and drinking appropriately  Patient does have hx of A fib with loop recorder in situ  Of note, patient does have significant fall risk  Most recently fall 3/12 with + head strike  Also with hx of SDH this past September  In ED, patient noted to be in rate controlled A fib  Troponins obtained and negative  Symptoms improved with 500cc IVF bolus  Patient feeling unsafe for discharge while not feeling 100% at his baseline yet  Procedures Performed:   Orders Placed This Encounter   Procedures   • ED ECG Documentation Only       Summary of Hospital Course:    Patient was admitted with lightheadedness/dizziness after prolonged  Of pending to change his Miller catheter bag  Patient has difficulty to ambulate  Denies of loss of consciousness or fall or head strike  However felt dizzy lightheaded and unsteady with her gait on walking  Patient has a history of orthostatic hypotension and he had been discharged on fludrocortisone on as needed midodrine in the prior admission  Has a history of subdural hemorrhage with falls in the past   He has A-fib on anticoagulation and loop recorder placed and loop recorder interrogation showed episodes of A-fib  No pauses during this episode   patient needs follow-up with cardiology  Benefit from  from skilled rehab and orthostatic blood pressure this admission is negative, also patient has social situation with his wife leaving him and he had to move to this temporary housing  HEENT-PERRLA, moist oral mucosa  Neck-supple, no JVD elevation   Respiratory-equal air entry bilaterally, no rales or rhonchi  Cardiovascular system-S1, S2 heard, no murmur or gallops or rubs  Abdomen-soft, nontender, no guarding or rigidity, bowel sounds heard  Extremities-no pedal edema  Peripheral pulses palpable  Musculoskeletal-no contractures  Central nervous system-no acute focal neurological deficit ,no sensory or motor deficit noted  Skin-no rash noted        Significant Findings, Care, Treatment and Services Provided: telemetry     Complications: nil    Condition at Discharge: fair         Discharge instructions/Information to patient and family:   See after visit summary for information provided to patient and family  Provisions for Follow-Up Care:  See after visit summary for information related to follow-up care and any pertinent home health orders  PCP: Mónica Phillips MD    Disposition: Skilled nursing facility at [facility]    Planned Readmission: No    Discharge Statement   I spent 50 minutes discharging the patient  This time was spent on the day of discharge  I had direct contact with the patient on the day of discharge  Additional documentation is required if more than 30 minutes were spent on discharge  Discharge Medications:  See after visit summary for reconciled discharge medications provided to patient and family

## 2023-04-03 NOTE — PLAN OF CARE
Problem: Potential for Falls  Goal: Patient will remain free of falls  Description: INTERVENTIONS:  - Educate patient/family on patient safety including physical limitations  - Instruct patient to call for assistance with activity   - Consult OT/PT to assist with strengthening/mobility   - Keep Call bell within reach  - Keep bed low and locked with side rails adjusted as appropriate  - Keep care items and personal belongings within reach  - Initiate and maintain comfort rounds  - Make Fall Risk Sign visible to staff  - Apply yellow socks and bracelet for high fall risk patients  - Consider moving patient to room near nurses station  Outcome: Progressing     Problem: PAIN - ADULT  Goal: Verbalizes/displays adequate comfort level or baseline comfort level  Description: Interventions:  - Encourage patient to monitor pain and request assistance  - Assess pain using appropriate pain scale  - Administer analgesics based on type and severity of pain and evaluate response  - Implement non-pharmacological measures as appropriate and evaluate response  - Consider cultural and social influences on pain and pain management  - Notify physician/advanced practitioner if interventions unsuccessful or patient reports new pain  Outcome: Progressing     Problem: INFECTION - ADULT  Goal: Absence or prevention of progression during hospitalization  Description: INTERVENTIONS:  - Assess and monitor for signs and symptoms of infection  - Monitor lab/diagnostic results  - Monitor all insertion sites, i e  indwelling lines, tubes, and drains  - Monitor endotracheal if appropriate and nasal secretions for changes in amount and color  - Marshall appropriate cooling/warming therapies per order  - Administer medications as ordered  - Instruct and encourage patient and family to use good hand hygiene technique  - Identify and instruct in appropriate isolation precautions for identified infection/condition  Outcome: Progressing  Goal: Absence of fever/infection during neutropenic period  Description: INTERVENTIONS:  - Monitor WBC    Outcome: Progressing     Problem: SAFETY ADULT  Goal: Patient will remain free of falls  Description: INTERVENTIONS:  - Educate patient/family on patient safety including physical limitations  - Instruct patient to call for assistance with activity   - Consult OT/PT to assist with strengthening/mobility   - Keep Call bell within reach  - Keep bed low and locked with side rails adjusted as appropriate  - Keep care items and personal belongings within reach  - Initiate and maintain comfort rounds  - Make Fall Risk Sign visible to staff  - Apply yellow socks and bracelet for high fall risk patients  - Consider moving patient to room near nurses station  Outcome: Progressing  Goal: Maintain or return to baseline ADL function  Description: INTERVENTIONS:  -  Assess patient's ability to carry out ADLs; assess patient's baseline for ADL function and identify physical deficits which impact ability to perform ADLs (bathing, care of mouth/teeth, toileting, grooming, dressing, etc )  - Assess/evaluate cause of self-care deficits   - Assess range of motion  - Assess patient's mobility; develop plan if impaired  - Assess patient's need for assistive devices and provide as appropriate  - Encourage maximum independence but intervene and supervise when necessary  - Involve family in performance of ADLs  - Assess for home care needs following discharge   - Consider OT consult to assist with ADL evaluation and planning for discharge  - Provide patient education as appropriate  Outcome: Progressing  Goal: Maintains/Returns to pre admission functional level  Description: INTERVENTIONS:  - Perform BMAT or MOVE assessment daily    - Set and communicate daily mobility goal to care team and patient/family/caregiver     - Collaborate with rehabilitation services on mobility goals if consulted  - Out of bed for toileting  - Record patient progress and toleration of activity level   Outcome: Progressing     Problem: DISCHARGE PLANNING  Goal: Discharge to home or other facility with appropriate resources  Description: INTERVENTIONS:  - Identify barriers to discharge w/patient and caregiver  - Arrange for needed discharge resources and transportation as appropriate  - Identify discharge learning needs (meds, wound care, etc )  - Arrange for interpretive services to assist at discharge as needed  - Refer to Case Management Department for coordinating discharge planning if the patient needs post-hospital services based on physician/advanced practitioner order or complex needs related to functional status, cognitive ability, or social support system  Outcome: Progressing     Problem: Knowledge Deficit  Goal: Patient/family/caregiver demonstrates understanding of disease process, treatment plan, medications, and discharge instructions  Description: Complete learning assessment and assess knowledge base    Interventions:  - Provide teaching at level of understanding  - Provide teaching via preferred learning methods  Outcome: Progressing     Problem: Prexisting or High Potential for Compromised Skin Integrity  Goal: Skin integrity is maintained or improved  Description: INTERVENTIONS:  - Identify patients at risk for skin breakdown  - Assess and monitor skin integrity  - Assess and monitor nutrition and hydration status  - Monitor labs   - Assess for incontinence   - Turn and reposition patient  - Assist with mobility/ambulation  - Relieve pressure over bony prominences  - Avoid friction and shearing  - Provide appropriate hygiene as needed including keeping skin clean and dry  - Evaluate need for skin moisturizer/barrier cream  - Collaborate with interdisciplinary team   - Patient/family teaching  - Consider wound care consult   Outcome: Progressing     Problem: MOBILITY - ADULT  Goal: Maintain or return to baseline ADL function  Description: INTERVENTIONS:  -  Assess patient's ability to carry out ADLs; assess patient's baseline for ADL function and identify physical deficits which impact ability to perform ADLs (bathing, care of mouth/teeth, toileting, grooming, dressing, etc )  - Assess/evaluate cause of self-care deficits   - Assess range of motion  - Assess patient's mobility; develop plan if impaired  - Assess patient's need for assistive devices and provide as appropriate  - Encourage maximum independence but intervene and supervise when necessary  - Involve family in performance of ADLs  - Assess for home care needs following discharge   - Consider OT consult to assist with ADL evaluation and planning for discharge  - Provide patient education as appropriate  Outcome: Progressing  Goal: Maintains/Returns to pre admission functional level  Description: INTERVENTIONS:  - Perform BMAT or MOVE assessment daily    - Set and communicate daily mobility goal to care team and patient/family/caregiver     - Collaborate with rehabilitation services on mobility goals if consulted  - Out of bed for toileting  - Record patient progress and toleration of activity level   Outcome: Progressing

## 2023-04-03 NOTE — OCCUPATIONAL THERAPY NOTE
Occupational Therapy Evaluation     Patient Name: Mehdi BIANCHI Date: 4/3/2023  Problem List  Principal Problem:    Pre-syncope  Active Problems:    Ambulatory dysfunction    SDH (subdural hematoma) (HCC)    Atrial fibrillation (HCC)    Chronic indwelling Miller catheter    Past Medical History  Past Medical History:   Diagnosis Date    Ambulatory dysfunction     Anxiety     Anxiety disorder     Atrial fibrillation (HCC)     Coronary artery disease     Depression     Miller catheter in place     Hepatitis C     screening negative in 1/2017    Hepatitis C     History of MI (myocardial infarction)     Hypertension     MI (myocardial infarction) ([address] )     Urinary catheter in place     Use of cane as ambulatory aid      Past Surgical History  Past Surgical History:   Procedure Laterality Date    CARDIAC CATHETERIZATION  07/09/2018    CARDIAC ELECTROPHYSIOLOGY PROCEDURE N/A 9/30/2022    Procedure: Cardiac loop recorder implant;  Surgeon: Phyllis Nova MD;  [facility]; Service: Cardiology    CARDIAC ELECTROPHYSIOLOGY PROCEDURE  09/30/2022    Cardiac loop recorder implant; Dr Coyne South Right 08/11/2022    Dr Juan Benoit AGE 5 YRS/> REDUCIBLE Right 8/11/2022    Procedure: REPAIR HERNIA INGUINAL;  Surgeon: John Encarnacion DO;  Location: [facility];  Service: General    TONSILLECTOMY             04/03/23 1151   OT Last Visit   OT Visit Date 04/03/23   Note Type   Note type Evaluation   Pain Assessment   Pain Assessment Tool 0-10   Pain Score No Pain   Restrictions/Precautions   Weight Bearing Precautions Per Order No   Other Precautions Cognitive; Chair Alarm; Bed Alarm;Multiple lines;Telemetry; Fall Risk;Contact/isolation  (chronic miller, MRDO/ESBL+)   Home Living   Type of [address] [place] [address] One level;Performs ADLs on one level; Able to live on main level with bedroom/bathroom;Stairs to enter with rails  (3 KAL + 10 steps to access 2nd floor apartment  Once in apt, maintains FFUS)   Bathroom Shower/Tub Tub/shower unit   [address] [facility]  ([facility] used at all times)   Additional Comments at baseline, resides c spouse in 2nd floor apartment with 10 vs 15 KAL c HR  However, reports he us unable to stay currently  Has currently been residing in a different apartment (info above)   Prior Function   Level of [place] Independent with ADLs; Independent with functional mobility  (previously, had assistance from spouse for all IADLs  At this time, managing all IADLs independently d/t lack of assistance)   Lives With (S)  Alone   Receives Help From Family   IADLs Family/Friend/Other provides transportation; Independent with meal prep; Independent with medication management   Falls in the last 6 months (S)  >10  (pt reporting 2 falls since previous hospitalization (March 2023)  Per EMR, Pt with multiple falls, including with headstrike  >10 indicated as of Dec 2022)   Comments [employer] bus utilized for all transportation  Pt reporting taking walks to grocery store and outdoors, has had multiple falls during these walks  Lifestyle   Autonomy At baseline, pt is (I) with ADLs, SPC used at all times  Currently living alone in 2nd floor apartment wiht 3 + 10 KAL  Reciprocal Relationships denies local assistance   Service to Others retired   Intrinsic Gratification enjoys walking outside   [facility]   Additional Pertinent History Pt admitted d/t presyncope event  Hx of ambulatory dysfunction, orthostatic hypotension, COPD, SDH   Family/Caregiver Present No   Subjective   Subjective pt intermittently tearful re: social situation with spouse   Emotional support provided   ADL   Eating Assistance 5  Supervision/Setup   Grooming Assistance 5  Supervision/Setup   UB Bathing Assistance 4  Minimal Assistance   LB Bathing Assistance 3  Moderate Assistance   [address] 3 "Moderate Assistance   LB Dressing Deficit Don/doff R sock; Don/doff L sock  (Pt attempting to complete donning/doffing of socks  Increased time required, difficulty maintaining sitting balance, reporting fatigue )   Toileting Assistance  4  Minimal Assistance   Functional Assistance 4  Minimal Assistance   Additional Comments Unable to formally assess toileting, bathing at time of eval  The above are anticipated assistsance levels based on functional performance deficits  Pt limited by decreased safety awareness/poor insight to deficits, decreased balance, generalized weakness, intermittent lightheadedness  Pt needing multiple cues to attend to socks on feet, attempting to place socks over  Bed Mobility   Supine to Sit 4  Minimal assistance   Additional items Assist x 1; Increased time required;Verbal cues; Bedrails  (increased effort by pt)   Sit to Supine 4  Minimal assistance   Additional items Assist x 1; Increased time required;Verbal cues;LE management   Additional Comments Sat EOB with fair+ static sitting balance, fair- dynamic sitting balance  Orthostatics completed during session  See above for findings (-)  Transfers   Sit to Stand 4  Minimal assistance   Additional items Assist x 1; Increased time required;Verbal cues   Stand to Sit 4  Minimal assistance   Additional items Assist x 1; Increased time required;Verbal cues   Additional Comments cues for proper body mechanics and awareness of lines   Functional Mobility   Functional Mobility 4  Minimal assistance   Additional Comments Functional mobility household distance, grossly unsteady wiht multiple small LOBs requiring external correction  C/O mild dizziness, non progressing  (-) orthostatic BPs  Pt with poor insight to instability, cues for safety   Pt stating multiple times \"I will never use a walker\"   Additional items SPC   Balance   Static Sitting Fair +   Dynamic Sitting Fair -   Static Standing Poor +   Dynamic Standing Poor +   Activity Tolerance " Activity Tolerance Patient limited by fatigue; Other (Comment)  (cognition)   Medical Staff Made Aware PT BENNY Mejía SLIM  Nurse Made Aware JOHANNY Aclaraz pre/post session   RUE Assessment   RUE Assessment WFL  (Full AROM, MMT 4/5 based on functional assessment)   LUE Assessment   LUE Assessment WFL  (Full AROM, MMT 4/5 based on functional assessment)   Hand Function   Gross Motor Coordination Functional   Fine Motor Coordination Functional   Vision-Basic Assessment   Current Vision Wears glasses all the time   Cognition   Overall Cognitive Status Impaired   Arousal/Participation Alert; Cooperative   Attention Within functional limits   Orientation Level Oriented to person;Oriented to place;Oriented to situation;Disoriented to time  (reporting March 23rd, 2023; grossly orientated to situation  )   Memory Decreased recall of precautions;Decreased recall of recent events;Decreased short term memory   Following Commands Follows one step commands with increased time or repetition   Comments (S)  Pt agreeable to OT session  Overall poor safety awareness and insight to deficits  Intermittent confusion noted during conversation/PLOF questions  Recommend formal cognitive assessment in f/u sessions  Assessment   Limitation Decreased ADL status; Decreased UE strength;Decreased endurance;Decreased self-care trans;Decreased high-level ADLs; Decreased Safe judgement during ADL;Decreased cognition   Prognosis Fair   Assessment Patient is a 78 y o  male seen for OT evaluation at [place] [address] following admission on 4/1/2023  s/p Pre-syncope  Please see above for comprehensive list of comorbidities and significant PMHx impacting functional performance  Patient presents with active orders for OT eval and treat and up and OOB as tolerated   At baseline, pt is (I) with ADLs, mod (I) c SPC   Upon initial evaluation, patient requires Supervision for UB ADLs, Mod A   for LB ADLs, and Min A  for transfers and functional mobility household distance with SPC  Based on functional eval, pt presents with impaired  attention, impaired  safety awareness, impaired  problem solving skills, and impaired   Memory; recommend f/u formal assessment  Occupational performance is affected by the following deficits: endurance ,  decreased muscular strength , decreased balance , decreased standing tolerance for self care tasks , decreased trunk control , decreased activity tolerance , impaired memory , impaired judgement and problem solving  and impaired safety awareness   Patient would benefit from OT services within the acute care setting to maximize level of functional independence in the following areas fall prevention , UB ADLs, LB ADLs, self-care transfers, functional mobility and formal cognitive evaluation  Personal factors impacting D/C include: (+) Hx of falls , decreased caregiver status , steps to enter home, decreased ADL independence , decreased insight toward deficits  and decreased recall of precautions   From OT standpoint, recommendation at time of D/C would be post-acute rehabilitation   Goals   Patient Goals pt agreeable to rehab   Plan   Treatment Interventions ADL retraining;Functional transfer training;UE strengthening/ROM; Cognitive reorientation; Endurance training;Patient/family training;Equipment evaluation/education;Continued evaluation  (cognitive eval)   Goal Expiration Date 04/13/23   OT Treatment Day 0   OT Frequency 3-5x/wk   Recommendation   OT Discharge Recommendation Post acute rehabilitation services   Additional Comments  (S)  Recommend f/u formal cognitive testing for evaluation of neurodegenerative disorder  Also recommend continued mobility within room A x1 with SPC vs RW with nurisng staff for increased functional independence  Additional Comments 2 The patient's raw score on the AM-PAC Daily Activity Inpatient Short Form is 17   A raw score of less than 19 suggests the patient may benefit from discharge to post-acute rehabilitation services  Please refer to the recommendation of the Occupational Therapist for safe discharge planning  AM-PAC Daily Activity Inpatient   Lower Body Dressing 2   Bathing 2   Toileting 3   Upper Body Dressing 3   Grooming 3   Eating 4   Daily Activity Raw Score 17   Daily Activity Standardized Score (Calc for Raw Score >=11) 37 26   AM-PAC Applied Cognition Inpatient   Following a Speech/Presentation 2   Understanding Ordinary Conversation 3   Taking Medications 2   Remembering Where Things Are Placed or Put Away 2   Remembering List of 4-5 Errands 2   Taking Care of Complicated Tasks 2   Applied Cognition Raw Score 13   Applied Cognition Standardized Score 30 46   End of Consult   Education Provided Yes   Patient Position at End of Consult Supine;Bed/Chair alarm activated; All needs within reach  (meal tray set up)   Nurse Communication Nurse aware of consult  (JOHANNY Richardson made aware of LUE swelling at IV site)     Pt will complete UB ADLs Mod independent  as needed for increased ADL independence within 10 days  Pt will complete LB ADLs Supervision  with use of LHAE as needed for increased ADL independence within 10 days  Pt will complete toileting Supervision  with use of DME for increased ADL independence within 10 days  Pt will demonstrate proper body mechanics to complete self-care transfers and functional mobility with Supervision and use of AD PRN for increased safety and functional independence within 10 days  Pt will demonstrate standing tolerance of 7 min with Supervision and use of AD PRN for increased activity tolerance during ADL/IADL tasks within 10 days  Pt will demonstrate proper body mechanics and fall prevention strategies during 100% of tx sessions for increased safety awareness during ADL/IADLs    Pt will demonstrate OOB sitting tolerance of 2-4 hr/day for increased activity tolerance and engagement in self care tasks within 10 days       Pt will participate in ongoing cognitive assessments to assist with safe D/C planning and supervision/assistance recommendations  Pt benefited from co-session of skilled OT and PT therapists in order to most appropriately address functional deficits d/t evolving medical status  and decreased activity tolerance  OT/PT objectives were addressed separately; please see PT note for specific goal areas targeted        Yulissa Linker

## 2023-04-03 NOTE — PHYSICAL THERAPY NOTE
PHYSICAL THERAPY EVALUATION NOTE          Patient Name: Janes MATT Date: 4/3/2023        AGE:   78 y o  Mrn:   [id number]  ADMIT DX:  Dizziness [R42]  Elevated brain natriuretic peptide (BNP) level [R79 89]    Past Medical History:  Past Medical History:   Diagnosis Date    Ambulatory dysfunction     Anxiety     Anxiety disorder     Atrial fibrillation (HCC)     Coronary artery disease     Depression     Miller catheter in place     Hepatitis C     screening negative in 2017    Hepatitis C     History of MI (myocardial infarction)     Hypertension     MI (myocardial infarction) ([facility] [address] )     Urinary catheter in place     Use of cane as ambulatory aid        Past Surgical History:  Past Surgical History:   Procedure Laterality Date    CARDIAC CATHETERIZATION  2018    CARDIAC ELECTROPHYSIOLOGY PROCEDURE N/A 2022    Procedure: Cardiac loop recorder implant;  Surgeon: Angy Escalante MD;  [facility]; Service: Cardiology    CARDIAC ELECTROPHYSIOLOGY PROCEDURE  2022    Cardiac loop recorder implant; Dr Tho Schwartz Right 2022    Dr Manuel Sevilla AGE 5 YRS/> REDUCIBLE Right 2022    Procedure: REPAIR HERNIA INGUINAL;  Surgeon: Myra Encarnacion DO;  Location: [facility];  Service: General    TONSILLECTOMY       Length Of Stay: 1        PHYSICAL THERAPY EVALUATION:    Patient's identity confirmed via 2 patient identifiers (full name and ) at start of session       23 1228   PT Last Visit   PT Visit Date 23   Note Type   Note type Evaluation   Pain Assessment   Pain Assessment Tool 0-10   Pain Score No Pain   Restrictions/Precautions   Weight Bearing Precautions Per Order No   Other Precautions Contact/isolation;Cognitive; Chair Alarm;Multiple lines; Fall Risk  (chronic miller cath, IV, ESBL/MRDO)   Home Living   Type of Home "[address] One level;Performs ADLs on one level; Able to live on main level with bedroom/bathroom;Stairs to enter with rails  (3 KAL building, 13 steps up to 2nd floor apt w/ railings)   Bathroom Shower/Tub Tub/shower unit   [address] [facility] [facility])   Additional Comments Pt is currently living alone in a 2nd floor 1 level apt w/ 3+13 steps to access  Prior Function   Level of [place] Independent with functional mobility; Independent with ADLs   Lives With (S)  Alone  (at apt)   IADLs Family/Friend/Other provides transportation; Independent with meal prep; Independent with medication management  (pt uses public transportation, [employer] bus)   Falls in the last 6 months (S)  >10  (Pt reports 2 falls since last hospital visit (3/20 per chart), most recent PT eval performed 22 reports >10 falls)   General   Additional Pertinent History BP readings, supine: 144/105, sitting at EOB: 135/87, standin/85, post amb: 151/84  Pt w/ c/o intermittent lightheadedness   Family/Caregiver Present No   Cognition   Overall Cognitive Status Impaired   Arousal/Participation Cooperative   Orientation Level Oriented to person;Oriented to place;Oriented to situation  (pt reported  or )   Memory Decreased short term memory;Decreased recall of recent events;Decreased recall of precautions   Following Commands Follows one step commands with increased time or repetition  (requires VC for attention to task)   Comments Pt ID via name and ; pt agreeable to PT eval and OOB mobility  Pt very easily distracted and tangential throughout, requires VC for attention to task  Pt often providing conflicting home set-ups (apt currently stating at vs apt w/ estranged wife)   Appears to have limited insight into deficits   Subjective   Subjective \"I will NOT use a walker ever on those sidewalks\"   Strength RLE   RLE Overall Strength 3+/5  (grossly assessed w/ functional mobility)   Strength LLE   LLE Overall " Strength 3+/5  (grossly assessed w/ functional mobility)   Vision-Basic Assessment   Current Vision Wears glasses all the time   Bed Mobility   Supine to Sit 4  Minimal assistance   Additional items Assist x 1;HOB elevated; Bedrails; Increased time required;Verbal cues   Sit to Supine 4  Minimal assistance   Additional items Assist x 1; Increased time required;Verbal cues;LE management   Transfers   Sit to Stand 4  Minimal assistance   Additional items Assist x 1; Increased time required;Verbal cues   Stand to Sit 4  Minimal assistance   Additional items Assist x 1; Increased time required;Verbal cues   Ambulation/Elevation   Gait pattern Improper Weight shift; Wide VERNON; Decreased foot clearance; Short stride; Excessively slow   Gait Assistance 4  Minimal assist   Additional items Assist x 1;Verbal cues   Assistive Device Straight cane  (SPC in LUE w/ pt observed to reach for handrail in hallway w/ RUE for additional support)   Distance 60'   Ambulation/Elevation Additional Comments Pt required repeated VC for directional changes, demonstrated poor safety awareness of negotiating turns/obstacles  Reports he will not use a walker   Balance   Static Sitting Fair   Dynamic Sitting Fair -   Static Standing Poor +   Dynamic Standing Poor +   Ambulatory Poor +  (w/ SPC)   Activity Tolerance   Activity Tolerance Patient limited by fatigue   Medical Staff Made Aware Care coordination w/ OT Bird Mimi due to pt's medical complexity, regression from mobility baseline, and cognitive/safety awareness deficits requiring two skilled clinicians; individual items assessed and goals addressed   Nurse Made Aware JOHANNY Vu   Assessment   Prognosis Fair   Problem List Decreased strength;Decreased endurance; Impaired balance;Decreased mobility; Decreased cognition; Impaired judgement;Decreased safety awareness;Decreased skin integrity   Assessment Tyson Dumont is a 78 y o   Male who presents to [address] [place] [address] on 4/1/23 due to dizziness and diagnosis of pre-syncope  Orders for PT eval and treat received, w/ contact isolation precautions  Comorbidities affecting pt's functional mobility at time of evaluation include: a-fib, OH, COPD, ambulatory dysfunction, chronic miller catheter, head trauma  Personal factors affecting DC include: inaccessible home environment, ambulating w/ assistive device, stairs to enter home, inability to navigate level surfaces w/o external assistance, decreased cognition, limited home support, positive fall history, decreased initiation and engagement and limited insight into impairments  At baseline, pt mobilizes mod I w/ SPC, and w/ >10 fall(s) in the previous 6 months  Upon evaluation, pt presents w/ the following deficits: impaired strength, impaired skin integrity, impaired balance, impaired cognition, decreased safety awareness, decreased endurance/activity tolerance and gait deviations  Pt currently requires min Ax1 for bed mobility, min Ax1 for transfers, min Ax1 w/ SPC for ambulation  Pt's clinical presentation is unstable/unpredictable due to need for increased assistance w/ functional mobility compared to baseline, need for input for mobility technique, need for input for task focus, need to input for safety awareness, recent drastic decline in mobility status, recent h/o falls, ongoing medical management  From a PT/mobility standpoint given the above findings, DC recommendation is: Post-acute inpatient rehabilitation  During current admission, pt will benefit from continued skilled inpatient PT in the acute care setting in order to address the above deficits and to maximize function and mobility prior to DC from acute care     Barriers to Discharge Inaccessible home environment;Decreased caregiver support   Goals   Patient Goals to eat lunch   STG Expiration Date 04/13/23   Short Term Goal #1 Pt will: perform bed mobility w/ mod I to decrease pt's burden of care and increase pt's independence w/ repositioning in bed; perform transfers w/ mod I to increase pt's OOB mobility; ambulate at least 250' w/ LRAD and mod I to increase pt's ambulatory endurance/tolerance; negotiate 13 stair(s) w/ UE support and mod I to facilitate pt returning to previous living environment; increase all balance ratings by at least 1 grade to decrease pt's risk of falls   PT Treatment Day 0   Plan   Treatment/Interventions Functional transfer training;LE strengthening/ROM; Elevations; Therapeutic exercise; Endurance training;Cognitive reorientation;Patient/family training;Equipment eval/education; Bed mobility;Gait training; Compensatory technique education   PT Frequency 3-5x/wk   Recommendation   PT Discharge Recommendation Post acute rehabilitation services   Additional Comments plan to trial use of RW during future PT sessions to improved ambulatory balancel; of note, pt reports he will never use one on the sidewalks   AM-PAC Basic Mobility Inpatient   Turning in Flat Bed Without Bedrails 3   Lying on Back to Sitting on Edge of Flat Bed Without Bedrails 2   Moving Bed to Chair 3   Standing Up From Chair Using Arms 3   Walk in Room 3   Climb 3-5 Stairs With Railing 2   Basic Mobility Inpatient Raw Score 16   Basic Mobility Standardized Score 38 32   Highest Level Of Mobility   -[facility] Goal 5: Stand one or more mins   -HLM Achieved 7: Walk 25 feet or more   End of Consult   Patient Position at End of Consult Supine;Bed/Chair alarm activated; All needs within reach       The patient's AM-[facility] Basic Mobility Inpatient Short Form Raw Score is 16  A Raw score of less than or equal to 16 suggests the patient may benefit from discharge to post-acute rehabilitation services  Please also refer to the recommendation of the Physical Therapist for safe discharge planning      Pt will benefit from skilled inpatient PT during this admission in order to facilitate progress towards goals and to maximize functional independence prior to [address] rec: post acute rehab        Kian Flores Geo Shen, DPT  04/03/23

## 2023-04-03 NOTE — ASSESSMENT & PLAN NOTE
· Presented due to lightheadedness/dizziness occurring after prolonged period of bending to change miller catheter bag  Notes symptoms worsened when attempting to stand/ambulate  Denies chest pain, palpitations, no LOC or head strike  · Hospitalized 12/2022 for orthostatic hypotension  Was discharged with scheduled fludrocortisone/midrodrine and p r n midodrine  Device interrogation revealed no pauses  · Suspected due to orthostatic hypotension  · Reports some improvement in symptoms  However, given significant fall risk/fall history, patient does not feel safe returning home tonight  · CTH: negative  · Echo (9/2022): LVEF 50% mild global hypokinesis  · ECG: a fib, 80bpm  · Troponin 10>11  · No longer on Flomax    Plan   · Check orthostatics  · Gentle IVF hydration  · Resume home midodrine  · Loop recorder interrogation to rule out if again pauses or arrhythmia  · Monitor on telemetry   · Compression stockings  · PT/OT consult    Patient with a history of orthostatic hypotension, noted on previous admission for which he had been discharged on fludrocortisone and midodrine prn  Additional medical history of subdural hemorrhage, ambulatory dysfunction atrial fibrillation on anticoagulation and loop recorder placement  Patient stated that dizziness and weakness started after a prolonged course of bending over and attempt to change Miller bag  Patient underwent a robust work-up during this hospitalization course, including ischemic work-up and orthostatics, which were both negative  CT head was fortunate for no ischemia, hemorrhage or other focal lesions  Patient also underwent cardiology evaluation, with recommendations made for loop recorder interrogation prior to discharge  Additionally, patient underwent evaluation by PT/OT with recommendations made for postacute rehabilitation services for ambulatory dysfunction

## 2023-04-03 NOTE — ASSESSMENT & PLAN NOTE
· W/ frequent falls & hx SDH  Is on [facility]  · Most recently seen 3/12 at THE [facility] [place], [place] for fall with +head strike  · Fall precautions, OOB with assistance   · Ambulates with cane  · PT/OT/CM- underwent evaluation for rehab needs, which were met; follow-up with case management for disposition planning

## 2023-04-04 ENCOUNTER — NURSING HOME VISIT (OUTPATIENT)
Dept: GERIATRICS | Facility: OTHER | Age: 78
End: 2023-04-04

## 2023-04-04 VITALS
HEART RATE: 67 BPM | WEIGHT: 135 LBS | OXYGEN SATURATION: 95 % | SYSTOLIC BLOOD PRESSURE: 146 MMHG | BODY MASS INDEX: 18.31 KG/M2 | DIASTOLIC BLOOD PRESSURE: 95 MMHG | TEMPERATURE: 97.6 F | RESPIRATION RATE: 18 BRPM

## 2023-04-04 DIAGNOSIS — R55 PRE-SYNCOPE: Primary | ICD-10-CM

## 2023-04-04 NOTE — PROGRESS NOTES
[facility]  [address] [place] [address], [address] [place] [address]    Nursing Home Admission    NAME: Priscilla Thomas  AGE: 78 y o  SEX: male [phone]      Patient Location     [facility]    Patient’s care was coordinated with nursing facility staff  Recent vitals, labs and updated medications were reviewed on [facility] system of facility  Past Medical, surgical, social, medication and allergy history and patient’s previous records reviewed  Assessment/Plan:    Pre-syncope  • Pt was hospitalized recently  with lightheadedness/dizziness occurring after prolonged period of bending to change miller catheter bag  Pt additionally noted symptoms worsened when attempting to stand/ambulate      ? Pt was hspitalized in 12/2022 for orthostatic hypotension  Was discharged with scheduled fludrocortisone/midrodrine and p r n midodrine  Device interrogation revealed no pauses  • Symptoms were uspected due to be from orthostatic hypotension  • CTH: negative  • Echo (9/2022): LVEF 50% mild global hypokinesis  • ECG: a fib, 80bpm  • Troponin 10>11  He was previously taken off of Flomaxn   Static blood pressures during recent hospitalization were negative  Patient was discharged on as needed midodrine and meclizine  Pt has a loop recorder in place  Follow-up with cardiology service  Continue PT OT    Atrial fibrillation:  Heart rate stable on metoprolol  Continue Eliquis  Patient has a loop recorder in place  Follow-up with cardiology service    Urinary retention:  Patient has chronic indwelling catheter due to retention  He is maintained on Hip-Harley for recurrent UTIs    History of subdural hematoma:  Back in September/22  He was later cleared to resume Eliquis  Patient was previously recommended to have watchman's device placed due to significant fall risk    Ambulatory dysfunction:  History of recurrent falls  Continue to implement fall precautions    Continue PT OT      Chief Complaint     Dizziness, recurrent falls    HPI       Patient is a 78 y o  male history significant for recurrent falls, subdural hematoma, atrial fibrillation, CAD, anxiety hepatitis C, MI ,hypertension, Cloud and history of orthostatic hypotension  Patient was hospitalized on 4/1/2023 with episodes of dizziness upon bending down  Per records dizziness was worsened when he attempted to go from sitting to standing position  Any associated chest pain palpitations dyspnea or loss of consciousness  Patient has history of orthostatic hypotension for which she was hospitalized in December/22  At that time he was started on midodrine and fludrocortisone  Patient did not take the medications on regular basis  He was also noted to be at increased risk of fall  Most recent fall was on 3/12 with head strike  Additionally had history of subdural hematoma in September/22  Patient was noted to be in controlled A-fib in ER  Troponins were negative  Symptoms improved with 500 cc of IV fluids  Patient was hospitalized with lightheadedness/dizziness after prolonged  Still pending to change his Cloud catheter bag  He was additionally noted to have ambulatory dysfunction  He was monitored on a telemetry floor  No pauses were noted on the monitor  Orthostatic blood pressures during these admissions were negative  ET head was unremarkable  Echo done in September/22 revealed EF of 50% and mild global hypokinesis  Pt was considered unsafe for discharge home due to fall risk  He was subsequently discharged to [facility] rehab where he is being seen for posthospital admission  At the time of my evaluation patient is doing okay   Dizzy spells have improved     Past Medical History:   Diagnosis Date   • Ambulatory dysfunction    • Anxiety    • Anxiety disorder    • Atrial fibrillation [facility])    • Coronary artery disease    • Depression    • Cloud catheter in place    • Hepatitis C     screening negative in 2017   • Hepatitis C    • History of MI (myocardial infarction)    • Hypertension    • MI (myocardial infarction) ([address] )    • Urinary catheter in place    • Use of cane as ambulatory aid        Past Surgical History:   Procedure Laterality Date   • CARDIAC CATHETERIZATION  2018   • CARDIAC ELECTROPHYSIOLOGY PROCEDURE N/A 2022    Procedure: Cardiac loop recorder implant;  Surgeon: Karina Vargas MD;  Location: [facility] CARDIAC CATH LAB; Service: Cardiology   • CARDIAC ELECTROPHYSIOLOGY PROCEDURE  2022    Cardiac loop recorder implant; Dr Karina Vargas   • COLONOSCOPY     • COLONOSCOPY     • [address] Right 2022    Dr Franklin Raw   • [place] RPR 1ST INGUN HRNA AGE 5 YRS/> REDUCIBLE Right 2022    Procedure: REPAIR HERNIA INGUINAL;  Surgeon: Felix Encarnacion DO;  Location: [facility] Main OR;  Service: General   • TONSILLECTOMY         Social History     Tobacco Use   Smoking Status Former   • Packs/day: 1 50   • Years: 57 00   • Pack years: 85 50   • Types: Cigarettes   • Quit date: 3/12/2023   • Years since quittin 0   Smokeless Tobacco Never   Tobacco Comments    since age 17; Has history of smoking for over 54 years   He has been smoking more than packet daily in the past however he has been smokes about half a pack a day lately and stopped smoking on 2018 when he was admitted to the hospital            Family History   Problem Relation Age of Onset   • Hypertension Mother    • Coronary artery disease Mother         premature   • Diabetes Father    • Coronary artery disease Father         premature   • Hypertension Father         No Known Allergies       Current Outpatient Medications:   •  apixaban (ELIQUIS) 2 5 mg, Take 2 5 mg by mouth 2 (two) times a day, Disp: , Rfl:   •  azelastine (ASTELIN) 0 1 % nasal spray, 1 spray into each nostril 2 (two) times a day as needed for allergies or rhinitis Use in each nostril as directed, Disp: 30 mL, Rfl: 0  •  docusate sodium (COLACE) 100 mg capsule, Take 1 capsule (100 mg total) by mouth 2 (two) times a day (Patient not taking: Reported on 2/15/2023), Disp: 180 capsule, Rfl: 0  •  ferrous sulfate 324 (65 Fe) mg, Take 1 tablet (324 mg total) by mouth daily before breakfast (Patient not taking: Reported on 2/15/2023), Disp: 90 tablet, Rfl: 2  •  Fluticasone-Salmeterol (Advair) 100-50 mcg/dose inhaler, Inhale 1 puff 2 (two) times a day Rinse mouth after use , Disp: , Rfl:   •  folic acid (FOLVITE) 130 mcg tablet, Take 1 tablet (400 mcg total) by mouth daily (Patient not taking: Reported on 2/15/2023), Disp: 30 tablet, Rfl: 0  •  ipratropium-albuterol (DUO-NEB) 0 5-2 5 mg/3 mL nebulizer solution, INHALE CONTENTS OF 1 VIAL VIA NEBULIZER FOUR TIMES A DAY, Disp: 60 mL, Rfl: 11  •  meclizine (ANTIVERT) 12 5 MG tablet, Take 1 tablet (12 5 mg total) by mouth every 8 (eight) hours as needed for dizziness for up to 7 days, Disp: 30 tablet, Rfl: 0  •  methenamine hippurate (HIPREX) 1 g tablet, Take 1 tablet (1 g total) by mouth 2 (two) times a day with meals, Disp: 60 tablet, Rfl: 6  •  methenamine hippurate (HIPREX) 1 g tablet, Take 1 g by mouth 2 (two) times a day with meals (Patient not taking: Reported on 4/2/2023), Disp: , Rfl:   •  metoprolol succinate (TOPROL-XL) 25 mg 24 hr tablet, Take 25 mg by mouth daily, Disp: , Rfl:   •  midodrine (PROAMATINE) 5 mg tablet, Take 1 tablet (5 mg total) by mouth 3 (three) times a day as needed (for SBP < 100 or severe dizziness with standing up  Hold for SBP > 130), Disp: 90 tablet, Rfl: 3  •  omeprazole (PriLOSEC) 20 mg delayed release capsule, Take 1 capsule (20 mg total) by mouth daily, Disp: 90 capsule, Rfl: 3  No current facility-administered medications for this visit  Updated list was reviewed in [facility] system of facility       Vitals:    04/04/23 0948   BP: 146/95   Pulse: 67   Resp: 18   Temp: 97 6 °F (36 4 °C)   SpO2: 95%       Vital signs were reviewed in point click care    Review of Systems Constitutional: Negative for chills and fever  HENT: Negative for nosebleeds and rhinorrhea  Eyes: Negative for discharge and redness  Respiratory: Negative for cough, chest tightness, shortness of breath, wheezing and stridor  Cardiovascular: Negative for chest pain and leg swelling  Gastrointestinal: Negative for abdominal distention, abdominal pain, diarrhea and vomiting  Genitourinary: Negative for dysuria, flank pain and hematuria  Musculoskeletal: Positive for gait problem  Negative for arthralgias and back pain  Skin: Negative for pallor  Neurological: Positive for dizziness (at times) and weakness (Generalized)  Negative for tremors, seizures, syncope and headaches  Psychiatric/Behavioral: Negative for agitation, behavioral problems and confusion  Physical Exam  Constitutional:       General: He is not in acute distress  Appearance: He is well-developed  He is not diaphoretic  HENT:      Head: Normocephalic and atraumatic  Eyes:      General: No scleral icterus  Right eye: No discharge  Left eye: No discharge  Cardiovascular:      Rate and Rhythm: Normal rate and regular rhythm  Pulmonary:      Breath sounds: No stridor  No wheezing  Abdominal:      Palpations: Abdomen is soft  Tenderness: There is no abdominal tenderness  There is no guarding  Musculoskeletal:      Cervical back: Neck supple  Right lower leg: No edema  Left lower leg: No edema  Skin:     Coloration: Skin is not jaundiced or pale  Neurological:      General: No focal deficit present  Mental Status: He is alert and oriented to person, place, and time  Cranial Nerves: No cranial nerve deficit  Psychiatric:         Mood and Affect: Mood normal          Behavior: Behavior normal            Diagnostic Data       Recent labs and imaging studies were reviewed    Lab Results   Component Value Date    WBC 9 02 04/02/2023    HGB 12 6 04/02/2023    HCT 40 0 04/02/2023    MCV 93 04/02/2023     04/02/2023        Lab Results   Component Value Date    SODIUM 142 04/02/2023    K 4 2 04/02/2023     04/02/2023    CO2 27 04/02/2023    BUN 26 (H) 04/02/2023    CREATININE 1 18 04/02/2023    GLUC 125 04/02/2023    CALCIUM 8 9 04/02/2023     Code Status:      DNR, DNI          This note was electronically signed by Dr Sonali Alves

## 2023-04-04 NOTE — ASSESSMENT & PLAN NOTE
• Pt was hospitalized recently  with lightheadedness/dizziness occurring after prolonged period of bending to change miller catheter bag  Pt additionally noted symptoms worsened when attempting to stand/ambulate      ? Pt was hspitalized in 12/2022 for orthostatic hypotension  Was discharged with scheduled fludrocortisone/midrodrine and p r n midodrine  Device interrogation revealed no pauses  • Symptoms were uspected due to be from orthostatic hypotension  • CTH: negative  • Echo (9/2022): LVEF 50% mild global hypokinesis  • ECG: a fib, 80bpm  • Troponin 10>11  He was previously taken off of Flomaxn   Static blood pressures during recent hospitalization were negative  Patient was discharged on as needed midodrine and meclizine  Pt has a loop recorder in place  Follow-up with cardiology service    Continue PT OT

## 2023-04-05 ENCOUNTER — TRANSITIONAL CARE MANAGEMENT (OUTPATIENT)
Dept: FAMILY MEDICINE CLINIC | Facility: CLINIC | Age: 78
End: 2023-04-05

## 2023-04-06 ENCOUNTER — NURSING HOME VISIT (OUTPATIENT)
Dept: GERIATRICS | Facility: OTHER | Age: 78
End: 2023-04-06

## 2023-04-06 VITALS
HEART RATE: 81 BPM | OXYGEN SATURATION: 97 % | TEMPERATURE: 97.5 F | RESPIRATION RATE: 16 BRPM | DIASTOLIC BLOOD PRESSURE: 98 MMHG | SYSTOLIC BLOOD PRESSURE: 154 MMHG

## 2023-04-06 DIAGNOSIS — R26.2 AMBULATORY DYSFUNCTION: ICD-10-CM

## 2023-04-06 DIAGNOSIS — R55 PRE-SYNCOPE: Primary | ICD-10-CM

## 2023-04-06 DIAGNOSIS — R33.9 URINARY RETENTION: ICD-10-CM

## 2023-04-06 DIAGNOSIS — I48.20 CHRONIC ATRIAL FIBRILLATION (HCC): Chronic | ICD-10-CM

## 2023-04-07 NOTE — ASSESSMENT & PLAN NOTE
Hxof urinary retention with miller catheter  Followed by urology   Miller catheter exchanged overnight d/t excessive leaking, allow of exchange with larger catheter size if leaking continues

## 2023-04-07 NOTE — PROGRESS NOTES
[facility]  [address], [address] [facility]: POS 31    NAME: Saima Colon  AGE: 78 y o  SEX: male  : 1945     DATE: 2023     Assessment and Plan:       Problem List Items Addressed This Visit        Cardiovascular and Mediastinum    Atrial fibrillation ([address] ) (Chronic)     HR 70's-90's  Has a loop recorder  Rate control with metoprolol   AC on eliquis   Cardiology appt 23         Pre-syncope - Primary     Patient with recent hospitalization r/t dizziness from prolonged bending to change miller catheter bag  Symptoms suspected to be from orthostatic hypotension  He was discharged on midodrine and prn meclizine  He currently has a loop recorder in place with follow up with cardiology later this month  Continue meclizine 12 5 q8 prn   Midodrine 5 mg TID prn             Genitourinary    Urinary retention     Hxof urinary retention with miller catheter  Followed by urology   Miller catheter exchanged overnight d/t excessive leaking, allow of exchange with larger catheter size if leaking continues              Other    Ambulatory dysfunction     Hx of frequent falls  PT/OT   Fall precautions   Supportive care, ADL assistance                History of Present Illness:     Patient is a 78 yr old male being seen at [facility] for follow up of acute and chronic medical conditions  He was recently hospitalized with lightheadedness and dizziness following prolonged bending while trying to change his miller catheter bag  He does follow with cardiology and has a loop recorder in place  He also has history of urinary retention with miller catheter in place  During exam he is complaining about recurrent leaking around miller catheter  He reports it was already exchanged once d/t leaking  He denies CP, SOB, nausea, vomiting, constipation, diarrhea  He does report transient dizziness with changes of position         The following portions of the patient's history were reviewed and updated as appropriate: allergies, current medications, past family history, past medical history, past social history, past surgical history and problem list      Review of Systems:     Review of Systems   Constitutional: Negative for chills and fever  HENT: Negative for ear pain and sore throat  Eyes: Negative for pain and visual disturbance  Respiratory: Negative for cough and shortness of breath  Cardiovascular: Negative for chest pain and palpitations  Gastrointestinal: Negative for abdominal pain and vomiting  Genitourinary: Negative for dysuria and hematuria  Cloud catheter leaking around urethral meatus    Musculoskeletal: Negative for arthralgias and back pain  Skin: Negative for color change and rash  Neurological: Negative for seizures and syncope  All other systems reviewed and are negative         Problem List:     Patient Active Problem List   Diagnosis   • Symptomatic anemia   • Congestive cardiomyopathy (HCC)   • Permanent atrial fibrillation ([facility])   • Smoker   • Orthostatic hypotension   • Pre-syncope   • Colonic adenoma   • Ambulatory dysfunction   • COPD (chronic obstructive pulmonary disease) ([facility])   • Bradycardia   • Thrombocytosis   • Head trauma   • Closed nondisplaced comminuted fracture of left patella   • Essential (hemorrhagic) thrombocythemia ([facility])   • Dizziness   • Urinary retention   • Abnormal colonoscopy   • Iron deficiency anemia likely secondary to GI bleeding   • Cholestatic jaundice   • Moderate protein-calorie malnutrition ([facility] )   • Medicare annual wellness visit, subsequent   • Nasal congestion   • Recurrent right inguinal hernia   • SDH (subdural hematoma) ([facility])   • Surgical wound present   • Swelling of lower leg   • Gastroesophageal reflux disease without esophagitis   • Fall   • Eyebrow laceration, right, initial encounter   • Abrasions of multiple sites   • Atrial fibrillation ([address] )   • Nicotine abuse   • COPD (chronic obstructive pulmonary disease) ([facility] )   • Chronic indwelling Cloud catheter   • Orthostatic hypotension        Objective:     /98   Pulse 81   Temp 97 5 °F (36 4 °C)   Resp 16   SpO2 97%     Physical Exam  Vitals and nursing note reviewed  Constitutional:       General: He is not in acute distress  Appearance: He is well-developed  HENT:      Head: Normocephalic and atraumatic  Eyes:      Conjunctiva/sclera: Conjunctivae normal    Cardiovascular:      Rate and Rhythm: Normal rate and regular rhythm  Heart sounds: No murmur heard  Pulmonary:      Effort: Pulmonary effort is normal  No respiratory distress  Breath sounds: Normal breath sounds  Abdominal:      Palpations: Abdomen is soft  Tenderness: There is no abdominal tenderness  Musculoskeletal:         General: No swelling  Cervical back: Neck supple  Skin:     General: Skin is warm and dry  Capillary Refill: Capillary refill takes less than 2 seconds  Neurological:      Mental Status: He is alert  Psychiatric:         Mood and Affect: Mood normal          Pertinent Laboratory/Diagnostic Studies:    Laboratory Results: I have personally reviewed the pertinent laboratory results/reports     Radiology/Other Diagnostic Testing Results: I have personally reviewed pertinent reports        Jeison Narvaez  [facility]

## 2023-04-07 NOTE — ASSESSMENT & PLAN NOTE
HR 70's-90's  Has a loop recorder  Rate control with metoprolol   AC on eliquis   Cardiology appt 4/24/23

## 2023-04-07 NOTE — ASSESSMENT & PLAN NOTE
Patient with recent hospitalization r/t dizziness from prolonged bending to change miller catheter bag  Symptoms suspected to be from orthostatic hypotension  He was discharged on midodrine and prn meclizine  He currently has a loop recorder in place with follow up with cardiology later this month     Continue meclizine 12 5 q8 prn   Midodrine 5 mg TID prn

## 2023-04-25 ENCOUNTER — NURSING HOME VISIT (OUTPATIENT)
Dept: GERIATRICS | Facility: OTHER | Age: 78
End: 2023-04-25

## 2023-04-25 DIAGNOSIS — I95.1 ORTHOSTATIC HYPOTENSION: Chronic | ICD-10-CM

## 2023-04-25 DIAGNOSIS — R33.9 URINARY RETENTION: ICD-10-CM

## 2023-04-25 DIAGNOSIS — R26.2 AMBULATORY DYSFUNCTION: ICD-10-CM

## 2023-04-25 DIAGNOSIS — J44.9 CHRONIC OBSTRUCTIVE PULMONARY DISEASE, UNSPECIFIED COPD TYPE (HCC): Chronic | ICD-10-CM

## 2023-04-25 DIAGNOSIS — K21.9 GASTROESOPHAGEAL REFLUX DISEASE WITHOUT ESOPHAGITIS: Primary | ICD-10-CM

## 2023-04-25 DIAGNOSIS — I48.21 PERMANENT ATRIAL FIBRILLATION (HCC): Chronic | ICD-10-CM

## 2023-04-26 NOTE — PROGRESS NOTES
[address], [address] [place] [address], [address] [place] [address]  [phone]    NAME: Pérez Ramirez  AGE: 78 y o  SEX: male    Progress Note    Location: [place]  POS: 31 (SNF)     Patient's care was coordinated with nursing facility staff  Recent vitals, labs, and updated medications were review on [facility] system in facility  Assessment/Plan:    Gastroesophageal reflux disease without esophagitis  No acute issues  C/w omeprazole 20 mg daily     COPD (chronic obstructive pulmonary disease) ([facility] )  With known chronic history   Pt known to have a longstanding history of tobacco abuse and smoking  Continues to be an active smoker  Respiratory status stable on RA  SaO2 97%  Denies SOB or cough   C/w ipratropium albuterol daily and fluticasone salmeterol twice a day   Monitor respiratory status     Ambulatory dysfunction  Multifactorial in the setting of acute/chronic medical conditions  PT OT  Fall and safety precautions  Supportive care  SW following for DC planning     Urinary retention  With known h/o urinary retention with chronic miller catheter  Miller patent for yellow urine  No hematuria  Followed by Urology  Continue catheter care     Atrial fibrillation ([facility] )  HR 70's-90's  Today at 80  Has a loop recorder  Denies cp or palpitations  Rate control with metoprolol   C/w  Eliquis 2 5 mg BID  F/u with cardiology OP    Orthostatic hypotension  With known chronic history however no current falls  Denies lightheaded or dizziness at the time of my evaluation  C/w midodrine 5 mg TID with meals   Encourage adequate hydration and change positions slowly    Chief complaint / Reason for visit: STR f/u    History of Present Illness:  Patient is a 75-year old male seen and examined for STR f/u  Received pt lying in bed, resting  At the time of my evaluation appears ok  Denies back or joint pain  Denies lightheadedness or dizziness  With chronic miller draining yellow urine  No hematuria present  "No edema  No numbness or tingling  Tolerating diet  Consuming around 50-75% for all meals  Denies N/V/D/C  Free of chest pain or palpitations  No other concerns or issues at this time  Review of Systems:  Per history of present illness, all other systems reviewed and negative  Other than those noted in HPI    HISTORY:  Medical Hx: Reviewed, unchanged  Family Hx: Reviewed, unchanged  Soc Hx: Reviewed,  unchanged    ALLERGY: Reviewed, unchanged  No Known Allergies     PHYSICAL EXAM:  Visit Vitals  /68   Pulse 88   Temp 98 1   Resp 18   Wt 134 4 lb   SpO2 97% Comment: At rest RA   BMI 18 36 kg/m²   Smoking Status Former   BSA 1 8 m²     General: cooperative and supportive   Head: Atraumatic  Normocephalic  Eye Exam: anicteric sclera, no discharge, PERRLA, No injection  Oral Exam: moist mucous membranes, no buccaloropharyngeal erythema, palatine tonsils WNL  Neck Exam: no anterior cervical lymphadenopathy noted, neck supple  Cardiovascular: Irregular rate, regular rhythm, no murmurs, rubs, or gallops  Pulmonary: no wheeze, no rhonchi, no rales  No chest tenderness  Abdominal: soft, non-tender, nondistended, bowel sounds audible x 4 quadrants  : Non distended bladder  Cloud patent for yellow urine  No hematuria present  denies dysuria   Extremities and skin: no edema noted, no rashes  Neurological: alert, cooperative and responsive, Oriented x 3, moving all 4 extremities symmetrically    Laboratory results / Imaging reviewed: Hard copy/ies in medical chart: Reviewed from [facility]    Current Medications: All medications reviewed and updated in Nursing Home Chart    Please note:  Voice-recognition software may have been used in the preparation of this document  Occasional wrong word or \"sound-alike\" substitutions may have occurred due to the inherent limitations of voice recognition software  Interpretation should be guided by context      GABRIELA Lovelace  4/25/2023  "

## 2023-04-29 NOTE — ASSESSMENT & PLAN NOTE
HR 70's-90's   Today at 82  Has a loop recorder  Denies cp or palpitations  Rate control with metoprolol   C/w  Eliquis 2 5 mg BID  F/u with cardiology OP

## 2023-04-29 NOTE — ASSESSMENT & PLAN NOTE
Multifactorial in the setting of acute/chronic medical conditions  PT OT  Fall and safety precautions  Supportive care  SW following for DC planning

## 2023-04-29 NOTE — ASSESSMENT & PLAN NOTE
With known chronic history   Pt known to have a longstanding history of tobacco abuse and smoking  Continues to be an active smoker  Respiratory status stable on RA   SaO2 97%  Denies SOB or cough   C/w ipratropium albuterol daily and fluticasone salmeterol twice a day   Monitor respiratory status

## 2023-04-29 NOTE — ASSESSMENT & PLAN NOTE
With known h/o urinary retention with chronic miller catheter  Miller patent for yellow urine  No hematuria     Followed by Urology  Continue catheter care

## 2023-04-29 NOTE — ASSESSMENT & PLAN NOTE
With known chronic history however no current falls  Denies lightheaded or dizziness at the time of my evaluation  C/w midodrine 5 mg TID with meals   Encourage adequate hydration and change positions slowly

## 2023-04-30 PROBLEM — Z00.00 MEDICARE ANNUAL WELLNESS VISIT, SUBSEQUENT: Status: RESOLVED | Noted: 2022-03-21 | Resolved: 2023-04-30

## 2023-05-01 ENCOUNTER — NURSING HOME VISIT (OUTPATIENT)
Dept: GERIATRICS | Facility: OTHER | Age: 78
End: 2023-05-01

## 2023-05-01 VITALS
DIASTOLIC BLOOD PRESSURE: 68 MMHG | OXYGEN SATURATION: 97 % | BODY MASS INDEX: 18.66 KG/M2 | TEMPERATURE: 97.9 F | HEART RATE: 78 BPM | WEIGHT: 137.6 LBS | SYSTOLIC BLOOD PRESSURE: 128 MMHG | RESPIRATION RATE: 18 BRPM

## 2023-05-01 DIAGNOSIS — I95.1 ORTHOSTATIC HYPOTENSION: Chronic | ICD-10-CM

## 2023-05-01 DIAGNOSIS — E44.0 MODERATE PROTEIN-CALORIE MALNUTRITION (HCC): ICD-10-CM

## 2023-05-01 DIAGNOSIS — Z97.8 CHRONIC INDWELLING FOLEY CATHETER: Chronic | ICD-10-CM

## 2023-05-01 DIAGNOSIS — J44.9 CHRONIC OBSTRUCTIVE PULMONARY DISEASE, UNSPECIFIED COPD TYPE (HCC): Chronic | ICD-10-CM

## 2023-05-01 DIAGNOSIS — K21.9 GASTROESOPHAGEAL REFLUX DISEASE WITHOUT ESOPHAGITIS: Primary | ICD-10-CM

## 2023-05-01 DIAGNOSIS — R55 PRE-SYNCOPE: ICD-10-CM

## 2023-05-01 DIAGNOSIS — S06.5XAA SDH (SUBDURAL HEMATOMA) (HCC): ICD-10-CM

## 2023-05-01 DIAGNOSIS — R33.9 URINARY RETENTION: ICD-10-CM

## 2023-05-01 DIAGNOSIS — R26.2 AMBULATORY DYSFUNCTION: ICD-10-CM

## 2023-05-01 DIAGNOSIS — I48.21 PERMANENT ATRIAL FIBRILLATION (HCC): Chronic | ICD-10-CM

## 2023-05-01 NOTE — LETTER
May 3, 2023     Stephanie Jacques, [address] [address]    Patient: Evan Silva   YOB: 1945   Date of Visit: 5/1/2023       Dear Dr Chin Maggie: Thank you for referring Dwayne Soto to me for evaluation  Below are my notes for this consultation  If you have questions, please do not hesitate to call me  I look forward to following your patient along with you  Sincerely,        Sherrilyn Boast, CRNP        CC: No Recipients  Sherrilyn Boast, [address]  5/3/2023 10:42 PM  Sign when Signing Visit  [facility]  [address]  [phone]  DISCHARGE SUMMARY  POS: STR 31  Facility: [facility]     NAME: Evan Silva  AGE: 78 y o  SEX: male  DATE OF ADMISSION: 4/3/2023   DATE OF DISCHARGE: 5/2/2023  DISCHARGE DISPOSITION: Home     Reason for admission: Patient was admitted from [facility] for rehabilitation after hospitalization for syncopal event sustaining     Admission Diagnoses: As mentioned above   Additional Problems:   Discharge Diagnoses: See problem list follow up recommendations below  Gastroesophageal reflux disease without esophagitis  No acute issues  C/w omeprazole 20 mg daily     COPD (chronic obstructive pulmonary disease) (HCC)  With known chronic history   Pt known to have a longstanding history of tobacco abuse and smoking  Continues to be an active smoker  Respiratory status stable on RA  SaO2 97%  Denies SOB or cough   C/w ipratropium albuterol daily and fluticasone salmeterol twice a day   F/u with PCP post d/c     Atrial fibrillation (HCC)  HR 70's-90's   Today at 80  Has a loop recorder  Denies cp or palpitations  Rate control with metoprolol   C/w  Eliquis 2 5 mg BID  F/u with cardiology OP    Orthostatic hypotension  With known chronic history however no current falls  Denies lightheaded or dizziness at the time of my evaluation  C/w midodrine 5 mg TID with meals   Encourage adequate hydration and change positions slowly  F/u with PCP post d/c     Pre-syncope  Pt was hospitalized recently  with lightheadedness/dizziness occurring after prolonged period of bending to change miller catheter bag  Pt additionally noted symptoms worsened when attempting to stand/ambulate      Pt was hspitalized in 12/2022 for orthostatic hypotension  Was discharged with scheduled fludrocortisone/midrodrine and p r n midodrine  Device interrogation revealed no pauses  Symptoms were uspected due to be from orthostatic hypotension  CTH: negative  Echo (9/2022): LVEF 50% mild global hypokinesis  ECG: a fib, 80bpm  Troponin 10>11  He was previously taken off of Flomax  Static blood pressures during recent hospitalization were negative  Patient was discharged on as needed midodrine and meclizine  Pt has a loop recorder in place  PT OT  Follow-up with cardiology service post d/c     SDH (subdural hematoma) ([facility] [address] )  With known history back in September 2022  Was cleared to resume Eliquis at 2 5 mg bid  Follow up with cardiology for watchman's device placement due to significant fall risk r/t orthostatic hypotension   Cardiology rounding in facility will evaluate pt this week  Denies lightheaded, dizziness or cp   Fall and safety precautions  Continue supportive care     Urinary retention  With known h/o urinary retention with chronic miller catheter  Miller patent for yellow urine  No hematuria  Followed by Urology  Continue catheter care     Chronic indwelling Miller catheter  With known h/o neurogenic bladder  Not on Flomax  Managed on Hipprex given recurrent UTIs     Ambulatory dysfunction  Multifactorial in the setting of acute/chronic medical conditions  PT OT  Fall and safety precautions  Supportive care  SW following for DC planning   F/u with pcp post d/c     Moderate protein-calorie malnutrition ([facility] [address] )  Malnutrition Findings:    appears weak and frail with generalized muscle wasting   Recent weight 134 4 from admission weight at 135 0 lb stable   Tolerating regular diet thin consistency  Remains on MVI and supplements  Dietitian following   Patient reports good appetite  Consuming around 50-75% for all meals  Monitor weights   Body mass index is 18 23 kg/m²  F/u with pcp post d/c     Generalized weakness  Multifactorial  PT OT  Supportive care  F/u pcp post d/c     Course of stay: Patient was admitted to [facility] for rehabilitation following hospitalization  Patient was hospitalized on 4/1/2023 with episodes of dizziness upon bending down  Per records dizziness was worsened when he attempted to go from sitting to standing position  Any associated chest pain palpitations dyspnea or loss of consciousness  Patient has history of orthostatic hypotension for which she was hospitalized in December/22  At that time he was started on midodrine and fludrocortisone  Patient did not take the medications on regular basis  He was also noted to be at increased risk of fall  Most recent fall was on 3/12 with head strike  Additionally had history of subdural hematoma in September/22  Patient was noted to be in controlled A-fib in ER  Troponins were negative  Symptoms improved with 500 cc of IV fluids      Patient was hospitalized with lightheadedness/dizziness after prolonged  Still pending to change his Cloud catheter bag  He was additionally noted to have ambulatory dysfunction  He was monitored on a telemetry floor  No pauses were noted on the monitor  Orthostatic blood pressures during these admissions were negative  ET head was unremarkable  Echo done in September/22 revealed EF of 50% and mild global hypokinesis  Pt was considered unsafe for discharge home due to fall risk  He was subsequently discharged to [facility] rehab where he is being seen for posthospital admission  At the time of my evaluation patient is doing okay  Dizzy spells have improved  At the time of my evaluation pt appears ok  Pt has no complaints  Denies back or joint pain  Tolerating diet with good appetite  Consuming around 50-75% for all meals  With chronic miller catheter patent for yellow urine  No edema  Denies /GI discomfort  Free of chest pain or palpitations  During the resident's stay at [facility], pt received skilled nursing care, PT,OT, dietitian support, social service support and medical management  Pt is scheduled to be discharge home on 5/3/23  Labs and testing performed during stay: Reviewed from [address]    Discharge Medications: See discharge medication list which was reviewed in [facility] and compared to facility orders for accuracy  Status at time of discharge exam: Stable    Today's Visit: 5/1/202310:30 AM    Subjective: No concerns or issues at this time    Review of systems: As per review of present illness all other systems reviewed and negative     Vitals:  Visit Vitals  /68   Pulse 78   Temp 97 9 °F (36 6 °C)   Resp 18   Wt 62 4 kg (137 lb 9 6 oz)   SpO2 97% Comment: at rest RA   BMI 18 66 kg/m²   Smoking Status Former   BSA 1 82 m²     Exam: Physical Exam  Vitals and nursing note reviewed  Constitutional:       Appearance: He is not ill-appearing  Comments: Appears weak    HENT:      Head: Normocephalic and atraumatic  Nose: Nose normal       Mouth/Throat:      Mouth: Mucous membranes are moist    Eyes:      General:         Right eye: No discharge  Left eye: No discharge  Cardiovascular:      Rate and Rhythm: Normal rate and regular rhythm  Pulses: Normal pulses  Heart sounds: Normal heart sounds  No murmur heard  Pulmonary:      Effort: Pulmonary effort is normal  No respiratory distress  Breath sounds: Normal breath sounds  No wheezing  Chest:      Chest wall: No tenderness  Abdominal:      General: Bowel sounds are normal  There is no distension  Palpations: Abdomen is soft  Tenderness: There is no abdominal tenderness     Genitourinary:     Comments: Miller patent for yellow urine   Musculoskeletal:      Cervical back: Normal range of motion and neck supple  No rigidity or tenderness  Right lower leg: No edema  Left lower leg: No edema  Skin:     General: Skin is warm  Coloration: Skin is not jaundiced  Findings: No bruising or erythema  Neurological:      General: No focal deficit present  Mental Status: He is alert  Cranial Nerves: No cranial nerve deficit  Motor: Weakness present  Gait: Gait abnormal       Comments: Alert to self   Psychiatric:         Mood and Affect: Mood normal      Discussion with patient/family and further instructions:  -Fall precautions  -Bleeding precautions  -Monitor for signs/symptoms of infection  -Medication list was reviewed    Follow-up Recommendations: Please follow-up with your primary care physician within 7-10 days of discharge to review medication changes and current status  Problem List Follow-up Recommendations:  F/U with PCP, Cardiology, Urology    I have spent >30 minutes with patient today in which greater than 50% of this time was spent in counseling/coordination of care regarding Diagnostic results, Prognosis, Risks and benefits of tx options, Instructions for management, Patient and family education, Importance of tx compliance, Risk factor reductions, Impressions, Counseling / Coordination of care, Documenting in the medical record, Reviewing / ordering tests, medicine, procedures  , Obtaining or reviewing history   and Communicating with other healthcare professionals   PCP made aware of discharge summary via epic communications  This note was completed in part utilizing [email or web address] direct voice recognition software  Grammatical errors, random word insertion, spelling mistakes, and incomplete sentences may be an occasional consequence of the system secondary to software limitations, ambient noise and hardware issues    At the time of dictation, efforts were made to edit, clarify and/or correct errors  Please read the chart carefully and recognize, using context, where substitutions have occurred  If you have any questions or concerns about the context, text or information contained within the body of this dictation, please contact myself, the provider, for further clarification      Jose Alfredo Rachel  9/6/930390:45 AM

## 2023-05-01 NOTE — PROGRESS NOTES
[facility]  [address]  [phone]  DISCHARGE SUMMARY  POS: STR 31  Facility: [address]     NAME: Chel Willard  AGE: 78 y o  SEX: male  DATE OF ADMISSION: 4/3/2023   DATE OF DISCHARGE: 5/2/2023  DISCHARGE DISPOSITION: Home     Reason for admission: Patient was admitted from [facility] [place] for rehabilitation after hospitalization for syncopal event sustaining     Admission Diagnoses: As mentioned above   Additional Problems:   Discharge Diagnoses: See problem list follow up recommendations below  Gastroesophageal reflux disease without esophagitis  No acute issues  C/w omeprazole 20 mg daily     COPD (chronic obstructive pulmonary disease) (HCC)  With known chronic history   Pt known to have a longstanding history of tobacco abuse and smoking  Continues to be an active smoker  Respiratory status stable on RA  SaO2 97%  Denies SOB or cough   C/w ipratropium albuterol daily and fluticasone salmeterol twice a day   F/u with PCP post d/c     Atrial fibrillation (HCC)  HR 70's-90's  Today at 80  Has a loop recorder  Denies cp or palpitations  Rate control with metoprolol   C/w  Eliquis 2 5 mg BID  F/u with cardiology OP    Orthostatic hypotension  With known chronic history however no current falls  Denies lightheaded or dizziness at the time of my evaluation  C/w midodrine 5 mg TID with meals   Encourage adequate hydration and change positions slowly  F/u with PCP post d/c     Pre-syncope  Pt was hospitalized recently  with lightheadedness/dizziness occurring after prolonged period of bending to change miller catheter bag  Pt additionally noted symptoms worsened when attempting to stand/ambulate      Pt was hspitalized in 12/2022 for orthostatic hypotension  Was discharged with scheduled fludrocortisone/midrodrine and p r n midodrine  Device interrogation revealed no pauses  Symptoms were uspected due to be from orthostatic hypotension     CTH: negative  Echo (9/2022): LVEF 50% mild global hypokinesis  ECG: a fib, 80bpm  Troponin 10>11  He was previously taken off of Flomax  Static blood pressures during recent hospitalization were negative  Patient was discharged on as needed midodrine and meclizine  Pt has a loop recorder in place  PT OT  Follow-up with cardiology service post d/c     SDH (subdural hematoma) ([facility] [address] )  With known history back in September 2022  Was cleared to resume Eliquis at 2 5 mg bid  Follow up with cardiology for watchman's device placement due to significant fall risk r/t orthostatic hypotension   Cardiology rounding in facility will evaluate pt this week  Denies lightheaded, dizziness or cp   Fall and safety precautions  Continue supportive care     Urinary retention  With known h/o urinary retention with chronic miller catheter  Miller patent for yellow urine  No hematuria  Followed by Urology  Continue catheter care     Chronic indwelling Miller catheter  With known h/o neurogenic bladder  Not on Flomax  Managed on Hipprex given recurrent UTIs     Ambulatory dysfunction  Multifactorial in the setting of acute/chronic medical conditions  PT OT  Fall and safety precautions  Supportive care  SW following for DC planning   F/u with pcp post d/c     Moderate protein-calorie malnutrition ([facility] [address] )  Malnutrition Findings:    appears weak and frail with generalized muscle wasting  Recent weight 134 4 from admission weight at 135 0 lb stable   Tolerating regular diet thin consistency  Remains on MVI and supplements  Dietitian following   Patient reports good appetite  Consuming around 50-75% for all meals  Monitor weights   Body mass index is 18 23 kg/m²  F/u with pcp post d/c     Generalized weakness  Multifactorial  PT OT  Supportive care  F/u pcp post d/c     Course of stay: Patient was admitted to [facility] for rehabilitation following hospitalization  Patient was hospitalized on 4/1/2023 with episodes of dizziness upon bending down    Per records dizziness was worsened when he attempted to go from sitting to standing position  Any associated chest pain palpitations dyspnea or loss of consciousness  Patient has history of orthostatic hypotension for which she was hospitalized in December/22  At that time he was started on midodrine and fludrocortisone  Patient did not take the medications on regular basis  He was also noted to be at increased risk of fall  Most recent fall was on 3/12 with head strike  Additionally had history of subdural hematoma in September/22  Patient was noted to be in controlled A-fib in ER  Troponins were negative  Symptoms improved with 500 cc of IV fluids      Patient was hospitalized with lightheadedness/dizziness after prolonged  Still pending to change his Miller catheter bag  He was additionally noted to have ambulatory dysfunction  He was monitored on a telemetry floor  No pauses were noted on the monitor  Orthostatic blood pressures during these admissions were negative  ET head was unremarkable  Echo done in September/22 revealed EF of 50% and mild global hypokinesis  Pt was considered unsafe for discharge home due to fall risk  He was subsequently discharged to [facility] rehab where he is being seen for posthospital admission  At the time of my evaluation patient is doing okay  Dizzy spells have improved  At the time of my evaluation pt appears ok  Pt has no complaints  Denies back or joint pain  Tolerating diet with good appetite  Consuming around 50-75% for all meals  With chronic miller catheter patent for yellow urine  No edema  Denies /GI discomfort  Free of chest pain or palpitations  During the resident's stay at [facility], pt received skilled nursing care, PT,OT, dietitian support, social service support and medical management  Pt is scheduled to be discharge home on 5/3/23      Labs and testing performed during stay: Reviewed from [facility]    Discharge Medications: See discharge medication list which was reviewed in [facility] and compared to facility orders for accuracy  Status at time of discharge exam: Stable    Today's Visit: 5/1/202310:30 AM    Subjective: No concerns or issues at this time    Review of systems: As per review of present illness all other systems reviewed and negative     Vitals:  Visit Vitals  /68   Pulse 78   Temp 97 9 °F (36 6 °C)   Resp 18   Wt 62 4 kg (137 lb 9 6 oz)   SpO2 97% Comment: at rest RA   BMI 18 66 kg/m²   Smoking Status Former   BSA 1 82 m²     Exam: Physical Exam  Vitals and nursing note reviewed  Constitutional:       Appearance: He is not ill-appearing  Comments: Appears weak    HENT:      Head: Normocephalic and atraumatic  Nose: Nose normal       Mouth/Throat:      Mouth: Mucous membranes are moist    Eyes:      General:         Right eye: No discharge  Left eye: No discharge  Cardiovascular:      Rate and Rhythm: Normal rate and regular rhythm  Pulses: Normal pulses  Heart sounds: Normal heart sounds  No murmur heard  Pulmonary:      Effort: Pulmonary effort is normal  No respiratory distress  Breath sounds: Normal breath sounds  No wheezing  Chest:      Chest wall: No tenderness  Abdominal:      General: Bowel sounds are normal  There is no distension  Palpations: Abdomen is soft  Tenderness: There is no abdominal tenderness  Genitourinary:     Comments: Cloud patent for yellow urine   Musculoskeletal:      Cervical back: Normal range of motion and neck supple  No rigidity or tenderness  Right lower leg: No edema  Left lower leg: No edema  Skin:     General: Skin is warm  Coloration: Skin is not jaundiced  Findings: No bruising or erythema  Neurological:      General: No focal deficit present  Mental Status: He is alert  Cranial Nerves: No cranial nerve deficit  Motor: Weakness present        Gait: Gait abnormal       Comments: Alert to self   Psychiatric:         Mood and Affect: Mood normal      Discussion with patient/family and further instructions:  -Fall precautions  -Bleeding precautions  -Monitor for signs/symptoms of infection  -Medication list was reviewed    Follow-up Recommendations: Please follow-up with your primary care physician within 7-10 days of discharge to review medication changes and current status  Problem List Follow-up Recommendations:  F/U with PCP, Cardiology, Urology    I have spent >30 minutes with patient today in which greater than 50% of this time was spent in counseling/coordination of care regarding Diagnostic results, Prognosis, Risks and benefits of tx options, Instructions for management, Patient and family education, Importance of tx compliance, Risk factor reductions, Impressions, Counseling / Coordination of care, Documenting in the medical record, Reviewing / ordering tests, medicine, procedures  , Obtaining or reviewing history   and Communicating with other healthcare professionals   PCP made aware of discharge summary via epic communications  This note was completed in part utilizing [employer] direct voice recognition software  Grammatical errors, random word insertion, spelling mistakes, and incomplete sentences may be an occasional consequence of the system secondary to software limitations, ambient noise and hardware issues  At the time of dictation, efforts were made to edit, clarify and/or correct errors  Please read the chart carefully and recognize, using context, where substitutions have occurred  If you have any questions or concerns about the context, text or information contained within the body of this dictation, please contact myself, the provider, for further clarification      Daniela Lomax  1/5/568157:30 AM

## 2023-05-03 ENCOUNTER — HOSPITAL ENCOUNTER (EMERGENCY)
Facility: HOSPITAL | Age: 78
Discharge: HOME | End: 2023-05-03
Attending: EMERGENCY MEDICINE

## 2023-05-03 ENCOUNTER — TELEPHONE (OUTPATIENT)
Dept: OTHER | Facility: OTHER | Age: 78
End: 2023-05-03

## 2023-05-03 ENCOUNTER — APPOINTMENT (EMERGENCY)
Dept: RADIOLOGY | Facility: HOSPITAL | Age: 78
End: 2023-05-03

## 2023-05-03 VITALS
DIASTOLIC BLOOD PRESSURE: 90 MMHG | OXYGEN SATURATION: 98 % | TEMPERATURE: 97.9 F | SYSTOLIC BLOOD PRESSURE: 144 MMHG | RESPIRATION RATE: 16 BRPM | HEART RATE: 80 BPM

## 2023-05-03 DIAGNOSIS — R06.00 DYSPNEA, UNSPECIFIED TYPE: ICD-10-CM

## 2023-05-03 DIAGNOSIS — R42 LIGHTHEADEDNESS: Primary | ICD-10-CM

## 2023-05-03 PROBLEM — R53.1 GENERALIZED WEAKNESS: Status: ACTIVE | Noted: 2023-05-03

## 2023-05-03 LAB
2HR DELTA HS TROPONIN: 3 NG/L
ANION GAP SERPL CALCULATED.3IONS-SCNC: 9 MMOL/L (ref 4–13)
BASOPHILS # BLD AUTO: 0.06 THOUSANDS/ÂΜL (ref 0–0.1)
BASOPHILS NFR BLD AUTO: 1 % (ref 0–1)
BNP SERPL-MCNC: 199 PG/ML (ref 0–100)
BUN SERPL-MCNC: 20 MG/DL (ref 5–25)
CALCIUM SERPL-MCNC: 9.2 MG/DL (ref 8.4–10.2)
CARDIAC TROPONIN I PNL SERPL HS: 10 NG/L
CARDIAC TROPONIN I PNL SERPL HS: 7 NG/L
CHLORIDE SERPL-SCNC: 107 MMOL/L (ref 96–108)
CO2 SERPL-SCNC: 24 MMOL/L (ref 21–32)
CREAT SERPL-MCNC: 0.98 MG/DL (ref 0.6–1.3)
EOSINOPHIL # BLD AUTO: 0.13 THOUSAND/ÂΜL (ref 0–0.61)
EOSINOPHIL NFR BLD AUTO: 1 % (ref 0–6)
ERYTHROCYTE [DISTWIDTH] IN BLOOD BY AUTOMATED COUNT: 14 % (ref 11.6–15.1)
GFR SERPL CREATININE-BSD FRML MDRD: 75 ML/MIN/1.73SQ M
GLUCOSE SERPL-MCNC: 127 MG/DL (ref 65–140)
HCT VFR BLD AUTO: 44.4 % (ref 36.5–49.3)
HGB BLD-MCNC: 14.4 G/DL (ref 12–17)
IMM GRANULOCYTES # BLD AUTO: 0.08 THOUSAND/UL (ref 0–0.2)
IMM GRANULOCYTES NFR BLD AUTO: 1 % (ref 0–2)
LYMPHOCYTES # BLD AUTO: 1.26 THOUSANDS/ÂΜL (ref 0.6–4.47)
LYMPHOCYTES NFR BLD AUTO: 11 % (ref 14–44)
MCH RBC QN AUTO: 30.1 PG (ref 26.8–34.3)
MCHC RBC AUTO-ENTMCNC: 32.4 G/DL (ref 31.4–37.4)
MCV RBC AUTO: 93 FL (ref 82–98)
MONOCYTES # BLD AUTO: 0.68 THOUSAND/ÂΜL (ref 0.17–1.22)
MONOCYTES NFR BLD AUTO: 6 % (ref 4–12)
NEUTROPHILS # BLD AUTO: 9.34 THOUSANDS/ÂΜL (ref 1.85–7.62)
NEUTS SEG NFR BLD AUTO: 80 % (ref 43–75)
NRBC BLD AUTO-RTO: 0 /100 WBCS
PLATELET # BLD AUTO: 211 THOUSANDS/UL (ref 149–390)
PMV BLD AUTO: 11.2 FL (ref 8.9–12.7)
POTASSIUM SERPL-SCNC: 4.2 MMOL/L (ref 3.5–5.3)
RBC # BLD AUTO: 4.78 MILLION/UL (ref 3.88–5.62)
SODIUM SERPL-SCNC: 140 MMOL/L (ref 135–147)
WBC # BLD AUTO: 11.55 THOUSAND/UL (ref 4.31–10.16)

## 2023-05-04 ENCOUNTER — NURSING HOME VISIT (OUTPATIENT)
Dept: GERIATRICS | Facility: OTHER | Age: 78
End: 2023-05-04

## 2023-05-04 ENCOUNTER — RA CDI HCC (OUTPATIENT)
Dept: OTHER | Facility: HOSPITAL | Age: 78
End: 2023-05-04

## 2023-05-04 VITALS
TEMPERATURE: 97.1 F | BODY MASS INDEX: 18.96 KG/M2 | DIASTOLIC BLOOD PRESSURE: 70 MMHG | OXYGEN SATURATION: 97 % | HEART RATE: 71 BPM | RESPIRATION RATE: 18 BRPM | WEIGHT: 139.8 LBS | SYSTOLIC BLOOD PRESSURE: 136 MMHG

## 2023-05-04 DIAGNOSIS — I48.21 PERMANENT ATRIAL FIBRILLATION (HCC): Primary | Chronic | ICD-10-CM

## 2023-05-04 NOTE — ASSESSMENT & PLAN NOTE
With known chronic history however no current falls  Denies lightheaded or dizziness at the time of my evaluation  C/w midodrine 5 mg TID with meals   Encourage adequate hydration and change positions slowly  F/u with PCP post d/c

## 2023-05-04 NOTE — ED PROVIDER NOTES
History  Chief Complaint   Patient presents with   • Dizziness     [place]  [place] bus so he decided to try to catch up with the bus and became winded and dizzy  Pt recently lost his apartment and was just d/c from rehab       82-year-old male, presents with shortness of breath  Patient states he missed bus he was waiting for decided to try and walk  While patient was walking, started to develop shortness of breath with some chest discomfort and lightheadedness  EMS was called and brought patient to ED  Patient states he is feeling slightly better, no current chest pain  History provided by:  Patient   used: No    Dizziness  Associated symptoms: chest pain and shortness of breath        Prior to Admission Medications   Prescriptions Last Dose Informant Patient Reported? Taking? Fluticasone-Salmeterol (Advair) 100-50 mcg/dose inhaler   Yes No   Sig: Inhale 1 puff 2 (two) times a day Rinse mouth after use     acetaminophen (TYLENOL) 325 mg tablet   Yes No   Sig: Take 650 mg by mouth every 6 (six) hours as needed   apixaban (ELIQUIS) 2 5 mg   Yes No   Sig: Take 2 5 mg by mouth 2 (two) times a day   azelastine (ASTELIN) 0 1 % nasal spray   No No   Si spray into each nostril 2 (two) times a day as needed for allergies or rhinitis Use in each nostril as directed   bisacodyl (DULCOLAX) 10 mg suppository   Yes No   Sig: Insert 10 mg into the rectum once as needed   docusate sodium (COLACE) 100 mg capsule   No No   Sig: Take 1 capsule (100 mg total) by mouth 2 (two) times a day   ferrous sulfate 324 (65 Fe) mg   No No   Sig: Take 1 tablet (324 mg total) by mouth daily before breakfast   ipratropium-albuterol (DUO-NEB) 0 5-2 5 mg/3 mL nebulizer solution   No No   Sig: INHALE CONTENTS OF 1 VIAL VIA NEBULIZER FOUR TIMES A DAY   methenamine hippurate (HIPREX) 1 g tablet   Yes No   Sig: Take 1 g by mouth 2 (two) times a day with meals   metoprolol succinate (TOPROL-XL) 25 mg 24 hr tablet   Yes No   Sig: Take 25 mg by mouth daily   midodrine (PROAMATINE) 5 mg tablet   No No   Sig: Take 1 tablet (5 mg total) by mouth 3 (three) times a day as needed (for SBP < 100 or severe dizziness with standing up  Hold for SBP > 130)   nystatin (MYCOSTATIN) powder   Yes No   Sig: Apply 1 application  topically in the morning   omeprazole (PriLOSEC) 20 mg delayed release capsule   No No   Sig: Take 1 capsule (20 mg total) by mouth daily   sodium phosphate-biphosphate (FLEET) 7-19 g 118 mL enema   Yes No   Sig: Insert 1 enema into the rectum once as needed      Facility-Administered Medications: None       Past Medical History:   Diagnosis Date   • Ambulatory dysfunction    • Anxiety    • Anxiety disorder    • Atrial fibrillation (HCC)    • Coronary artery disease    • Depression    • Cloud catheter in place    • Hepatitis C     screening negative in 1/2017   • Hepatitis C    • History of MI (myocardial infarction)    • Hypertension    • MI (myocardial infarction) ([facility] [address] )    • Urinary catheter in place    • Use of cane as ambulatory aid        Past Surgical History:   Procedure Laterality Date   • CARDIAC CATHETERIZATION  07/09/2018   • CARDIAC ELECTROPHYSIOLOGY PROCEDURE N/A 9/30/2022    Procedure: Cardiac loop recorder implant;  Surgeon: Stefano Abdi MD;  Location: [facility];   Service: Cardiology   • CARDIAC ELECTROPHYSIOLOGY PROCEDURE  09/30/2022    Cardiac loop recorder implant; Dr Stefano Abdi   • COLONOSCOPY     • COLONOSCOPY     • [address] Right 08/11/2022    Dr Charleen Francois   • CA RPR 1ST INGUN HRNA AGE 5 YRS/> REDUCIBLE Right 8/11/2022    Procedure: REPAIR HERNIA INGUINAL;  Surgeon: Eve Encarnacion DO;  Location: [facility];  Service: General   • TONSILLECTOMY         Family History   Problem Relation Age of Onset   • Hypertension Mother    • Coronary artery disease Mother         premature   • Diabetes Father    • Coronary artery disease Father premature   • Hypertension Father      I have reviewed and agree with the history as documented  E-Cigarette/Vaping   • E-Cigarette Use Never User      E-Cigarette/Vaping Substances   • Nicotine Yes    • THC No    • CBD No    • Flavoring No    • Other No    • Unknown No      Social History     Tobacco Use   • Smoking status: Former     Packs/day: 1 50     Years: 57 00     Pack years: 85 50     Types: Cigarettes     Quit date: 3/12/2023     Years since quittin 1   • Smokeless tobacco: Never   • Tobacco comments:     since age 17; Has history of smoking for over 55 years  He has been smoking more than packet daily in the past however he has been smokes about half a pack a day lately and stopped smoking on 2018 when he was admitted to the hospital     Vaping Use   • Vaping Use: Never used   Substance Use Topics   • Alcohol use: Not Currently   • Drug use: Never       Review of Systems   Constitutional: Negative  Negative for fever  Respiratory: Positive for shortness of breath  Cardiovascular: Positive for chest pain  Gastrointestinal: Negative  Neurological: Positive for light-headedness  Negative for dizziness  Physical Exam  Physical Exam  Vitals and nursing note reviewed  Constitutional:       General: He is not in acute distress  HENT:      Head: Normocephalic and atraumatic  Eyes:      Extraocular Movements: Extraocular movements intact  Pupils: Pupils are equal, round, and reactive to light  Cardiovascular:      Rate and Rhythm: Normal rate  Rhythm irregular  Pulmonary:      Effort: Pulmonary effort is normal       Breath sounds: Normal breath sounds  Musculoskeletal:         General: No swelling  Normal range of motion  Skin:     General: Skin is warm and dry  Neurological:      General: No focal deficit present  Mental Status: He is alert and oriented to person, place, and time  Motor: No weakness           Vital Signs  ED Triage Vitals   Temperature Pulse Respirations Blood Pressure SpO2   05/03/23 1950 05/03/23 1949 05/03/23 1949 05/03/23 1949 05/03/23 1949   97 9 °F (36 6 °C) 94 18 165/93 94 %      Temp Source Heart Rate Source Patient Position - Orthostatic VS BP Location FiO2 (%)   05/03/23 1950 05/03/23 1949 -- 05/03/23 1949 --   Oral Monitor  Right arm       Pain Score       05/03/23 1949       No Pain           Vitals:    05/03/23 1949 05/03/23 1950 05/03/23 2112   BP: 165/93 142/91 144/90   Pulse: 94  80         Visual Acuity      ED Medications  Medications - No data to display    Diagnostic Studies  Results Reviewed     Procedure Component Value Units Date/Time    HS Troponin I 2hr [[id number]]  (Normal) Collected: 05/03/23 2200    Lab Status: Final result Specimen: Blood from Arm, Right Updated: 05/03/23 2224     hs TnI 2hr 10 ng/L      Delta 2hr hsTnI 3 ng/L     HS Troponin I 4hr [[id number]]     Lab Status: No result Specimen: Blood     B-Type Natriuretic Peptide(BNP) [[id number]]  (Abnormal) Collected: 05/03/23 2008    Lab Status: Final result Specimen: Blood from Arm, Right Updated: 05/03/23 2041      pg/mL     HS Troponin 0hr (reflex protocol) [[id number]]  (Normal) Collected: 05/03/23 2008    Lab Status: Final result Specimen: Blood from Arm, Right Updated: 05/03/23 2036     hs TnI 0hr 7 ng/L     Basic metabolic panel [[id number]] Collected: 05/03/23 2008    Lab Status: Final result Specimen: Blood from Arm, Right Updated: 05/03/23 2031     Sodium 140 mmol/L      Potassium 4 2 mmol/L      Chloride 107 mmol/L      CO2 24 mmol/L      ANION GAP 9 mmol/L      BUN 20 mg/dL      Creatinine 0 98 mg/dL      Glucose 127 mg/dL      Calcium 9 2 mg/dL      eGFR 75 ml/min/1 73sq m     Narrative:      [place] guidelines for Chronic Kidney Disease (CKD):   •  Stage 1 with normal or high GFR (GFR > 90 mL/min/1 73 square meters)  •  Stage 2 Mild CKD (GFR = 60-89 mL/min/1 73 square meters)  •  Stage 3A Moderate CKD (GFR = 45-59 mL/min/1 73 square meters)  •  Stage 3B Moderate CKD (GFR = 30-44 mL/min/1 73 square meters)  •  Stage 4 Severe CKD (GFR = 15-29 mL/min/1 73 square meters)  •  Stage 5 End Stage CKD (GFR <15 mL/min/1 73 square meters)  Note: GFR calculation is accurate only with a steady state creatinine    CBC and differential [[id number]]  (Abnormal) Collected: 05/03/23 2008    Lab Status: Final result Specimen: Blood from Arm, Right Updated: 05/03/23 2014     WBC 11 55 Thousand/uL      RBC 4 78 Million/uL      Hemoglobin 14 4 g/dL      Hematocrit 44 4 %      MCV 93 fL      MCH 30 1 pg      MCHC 32 4 g/dL      RDW 14 0 %      MPV 11 2 fL      Platelets 378 Thousands/uL      nRBC 0 /100 WBCs      Neutrophils Relative 80 %      Immat GRANS % 1 %      Lymphocytes Relative 11 %      Monocytes Relative 6 %      Eosinophils Relative 1 %      Basophils Relative 1 %      Neutrophils Absolute 9 34 Thousands/µL      Immature Grans Absolute 0 08 Thousand/uL      Lymphocytes Absolute 1 26 Thousands/µL      Monocytes Absolute 0 68 Thousand/µL      Eosinophils Absolute 0 13 Thousand/µL      Basophils Absolute 0 06 Thousands/µL                  XR chest 1 view portable    (Results Pending)              Procedures  ECG 12 Lead Documentation Only    Date/Time: 5/3/2023 8:22 PM  Performed by: Aashish Montgomery MD  Authorized by: Aashish Montgomery MD     ECG reviewed by me, the ED Provider: yes    Patient location:  ED  Previous ECG:     Previous ECG:  Compared to current    Similarity:  No change  Rate:     ECG rate assessment: normal    Rhythm:     Rhythm: atrial fibrillation    Ectopy:     Ectopy: none    QRS:     QRS axis:  Normal    QRS intervals:  Normal  Conduction:     Conduction: normal    ST segments:     ST segments:  Normal  Comments:      Atrial fibrillation, ventricular rate 94, no acute changes             ED Course  ED Course as of 05/03/23 2230   Wed May 03, 2023   2007 [facility] facility called, states patient left today stating that he has an apartment he can go to, patient states that he no longer has apartment, facility is willing to take patient back today if discharged from emergency department  2030 Chest x-ray dependently reviewed by myself, no infiltrate or effusion, no acute findings  2100 BNP(!): 199  BNP improved from previous   2229 Patient currently comfortable, denies any complaints, atrial fibrillation on the monitor, rate in the 80s  Patient's oxygen saturation has remained well on room air  2230 We will discharge patient back to skilled nursing facility, patient states he feels well to be discharged back there  SBIRT 22yo+    Flowsheet Row Most Recent Value   Initial Alcohol Screen: US AUDIT-C     1  How often do you have a drink containing alcohol? 0 Filed at: 05/03/2023 1950   2  How many drinks containing alcohol do you have on a typical day you are drinking? 0 Filed at: 05/03/2023 1950   3a  Male UNDER 65: How often do you have five or more drinks on one occasion? 0 Filed at: 05/03/2023 1950   3b  FEMALE Any Age, or MALE 65+: How often do you have 4 or more drinks on one occassion? 0 Filed at: 05/03/2023 1950   Audit-C Score 0 Filed at: 05/03/2023 1950   JOHN: How many times in the past year have you    Used an illegal drug or used a prescription medication for non-medical reasons? Never Filed at: 05/03/2023 1950                    Medical Decision Making  79-year-old male, presented with shortness of breath  Differential diagnosis includes ACS, heart failure, pneumonia among other diagnoses  Patient currently awake and alert in no distress, normal respiratory effort, speaking full sentences  Patient noted to be atrial fibrillation, rate 90s on monitor  Oxygen saturation good on room air  EKG, chest x-ray, labs ordered  We will continue to monitor in ED and reevaluate  Amount and/or Complexity of Data Reviewed  Labs: ordered   Decision-making details documented in ED Course  Radiology: ordered and independent interpretation performed  Decision-making details documented in ED Course  ECG/medicine tests: ordered and independent interpretation performed  Decision-making details documented in ED Course  Disposition  Final diagnoses:   Lightheadedness   Dyspnea, unspecified type     Time reflects when diagnosis was documented in both MDM as applicable and the Disposition within this note     Time User Action Codes Description Comment    5/3/2023 10:27 PM Beauty LaSalle Add [R42] Lightheadedness     5/3/2023 10:27 PM Beauty LaSalle Add [R06 00] Dyspnea, unspecified type       ED Disposition     ED Disposition   Discharge    Condition   Stable    Date/Time   Wed May 3, 2023 10:27 PM    [address] [facility] discharge to SNF  Follow-up Information     Follow up With Specialties Details Why [id number] [facility], [address] [facility], Nurse Practitioner   [address]   [phone]            Patient's Medications   Discharge Prescriptions    No medications on file       No discharge procedures on file      PDMP Review       Value Time User    PDMP Reviewed  Yes 11/18/2022  8:47 PM Fer Leslie, [address]           ED Provider  Electronically Signed by           Marialuisa Cruz MD  05/03/23 4423

## 2023-05-04 NOTE — ASSESSMENT & PLAN NOTE
With known chronic history   Pt known to have a longstanding history of tobacco abuse and smoking  Continues to be an active smoker  Respiratory status stable on RA   SaO2 97%  Denies SOB or cough   C/w ipratropium albuterol daily and fluticasone salmeterol twice a day   F/u with PCP post d/c

## 2023-05-04 NOTE — ASSESSMENT & PLAN NOTE
Heart rate stable on metoprolol  Patient has a loop recorder in place  Is being evaluated for watchman's procedure continue Eliquis 2 5 mg twice daily    Follow-up with cardiology service

## 2023-05-04 NOTE — ASSESSMENT & PLAN NOTE
Malnutrition Findings:    appears weak and frail with generalized muscle wasting  Recent weight 134 4 from admission weight at 135 0 lb stable   Tolerating regular diet thin consistency  Remains on MVI and supplements  Dietitian following   Patient reports good appetite  Consuming around 50-75% for all meals  Monitor weights   Body mass index is 18 23 kg/m²    F/u with pcp post d/c

## 2023-05-04 NOTE — PROGRESS NOTES
[facility] [address]  coding opportunities       Chart reviewed, no opportunity found: CHART REVIEWED, NO OPPORTUNITY FOUND        Patients Insurance     Medicare Insurance: Medicare

## 2023-05-04 NOTE — ASSESSMENT & PLAN NOTE
With known history back in September 2022  Was cleared to resume Eliquis at 2 5 mg bid   Follow up with cardiology for watchman's device placement due to significant fall risk r/t orthostatic hypotension   Cardiology rounding in facility will evaluate pt this week  Denies lightheaded, dizziness or cp   Fall and safety precautions  Continue supportive care

## 2023-05-04 NOTE — ED NOTES
Magaly Lozada from [facility] called in regards to pt  Magaly Lozada stated that pending pt d/c from ED pt can be directly admitted to the room he was d/c from today at [facility]  Number to call [phone]  Per Emmett Jacques is aware of situation and prepared for pt       Ebenezer Sommer, RN  05/03/23 2011

## 2023-05-04 NOTE — ASSESSMENT & PLAN NOTE
Multifactorial in the setting of acute/chronic medical conditions  PT OT  Fall and safety precautions  Supportive care  SW following for DC planning   F/u with pcp post d/c

## 2023-05-04 NOTE — PROGRESS NOTES
[facility]  [address] [place] [address], [address] [place] [address]    Nursing Home Admission    NAME: River President  AGE: 78 y o  SEX: male [phone]      Patient Location     [facility]    Patients care was coordinated with nursing facility staff  Recent vitals, labs and updated medications were reviewed on .com of facility  Past Medical, surgical, social, medication and allergy history and patients previous records reviewed  Assessment/Plan:    Atrial fibrillation (HCC)  Heart rate stable on metoprolol  Patient has a loop recorder in place  Is being evaluated for watchman's procedure continue Eliquis 2 5 mg twice daily  Follow-up with cardiology service      COPD:   Stable  Recent chest x-ray was unrevealing  Patient has longstanding history of tobacco abuse and per records continues to smoke  SaO2 stable on room air  Continue maintenance inhalers including ipratropium, albuterol and fluticasone salmeterol      Orthostatic hypotension:  Patient has history of orthostatic hypotension  Continue midodrine 5 mg 3 times daily with meals  Blood pressure is currently stable    Presyncope:  Patient was recently hospitalized with lightheadedness/dizziness occurring after prolonged period of bending to change Cloud catheter bag  CT head was unremarkable  Echo done in September 2022 revealed LVEF of 50% with mild global hypokinesis  EKG revealed A-fib with controlled heart rate  Troponins were negative  Presyncope was suspected to be from orthostatic hypotension however orthostatic blood pressures were recently negative  He was discharged to SNF on meclizine and midodrine the patient currently has a loop recorder in place  Follow-up with cardiology service      Subdural hematoma:  Patient has history of recurrent falls and subdural hematoma    Patient is being evaluated for watchman's procedure by cardiology service due to recurrent falls in the setting of A-fib      Urinary retention:  Patient has chronic Cloud in place  Continue local Cloud care  Follow-up with urology service    History of recurrent UTIs:  Patient remains on Hip-Harley    Ambulatory dysfunction:  Continue PT OT    Moderate protein calorie malnutrition:  Patient is noted to be weak and frail with generalized body muscle wasting follow-up with dietitian  Encourage p o  intake    Generalized weakness:  Continue PT OT    Chief Complaint     Shortness of breath, deconditioning    HPI       Patient is a 78 y o  male with past medical history significant for subdural hematoma, recurrent falls, atrial fibrillation, CAD, anxiety, MI, hypertension, hepatitis C history of orthostatic hypotension  Patient had a recent rehab stay after being hospitalized for dizziness and ambulatory dysfunction  Patient additionally has history of orthostatic hypotension  Patient was discharged from rehab facility yesterday however presented again to ER yesterday with episodes of shortness of breath and chest pain  Patient apparently missed a bus to return back home  He later decided to walk and subsequently developed dyspnea and some chest discomfort and lightheadedness  Work-up in ER was unrevealing EKG was without any ischemic changes  Patient was in controlled A-fib  Chest x-ray was negative  CBC and BMP were unremarkable except for mildly elevated WBC count of 11 5  Patient was brought back to SNF from ER  He is being seen for SNF admission  At the time of my evaluation patient is doing okay  Remains weak  Awake alert able to make his needs known         Past Medical History:   Diagnosis Date    Ambulatory dysfunction     Anxiety     Anxiety disorder     Atrial fibrillation (HCC)     Coronary artery disease     Depression     Cloud catheter in place     Hepatitis C     screening negative in 1/2017    Hepatitis C     History of MI (myocardial infarction)     Hypertension     MI (my[facility] infarction) ([facility] [address] )     Urinary catheter in place     Use of cane as ambulatory aid        Past Surgical History:   Procedure Laterality Date    CARDIAC CATHETERIZATION  2018    CARDIAC ELECTROPHYSIOLOGY PROCEDURE N/A 2022    Procedure: Cardiac loop recorder implant;  Surgeon: Ash Gayle MD;  [facility]; Service: Cardiology    CARDIAC ELECTROPHYSIOLOGY PROCEDURE  2022    Cardiac loop recorder implant; Dr Paulo Rand Right 2022    Dr Tami Fox AGE 5 YRS/> REDUCIBLE Right 2022    Procedure: REPAIR HERNIA INGUINAL;  Surgeon: Tracee Encarnacion DO;  Location: [facility];  Service: General    TONSILLECTOMY         Social History     Tobacco Use   Smoking Status Former    Packs/day: 1 50    Years: 57 00    Pack years: 85 50    Types: Cigarettes    Quit date: 3/12/2023    Years since quittin 1   Smokeless Tobacco Never   Tobacco Comments    since age 17; Has history of smoking for over 54 years   He has been smoking more than packet daily in the past however he has been smokes about half a pack a day lately and stopped smoking on 2018 when he was admitted to the hospital            Family History   Problem Relation Age of Onset    Hypertension Mother     Coronary artery disease Mother         premature    Diabetes Father     Coronary artery disease Father         premature    Hypertension Father         No Known Allergies       Current Outpatient Medications:     acetaminophen (TYLENOL) 325 mg tablet, Take 650 mg by mouth every 6 (six) hours as needed, Disp: , Rfl:     apixaban (ELIQUIS) 2 5 mg, Take 2 5 mg by mouth 2 (two) times a day, Disp: , Rfl:     azelastine (ASTELIN) 0 1 % nasal spray, 1 spray into each nostril 2 (two) times a day as needed for allergies or rhinitis Use in each nostril as directed, Disp: 30 mL, Rfl: 0    bisacodyl (DULCOLAX) 10 mg suppository, Insert 10 mg into the rectum once as needed, Disp: , Rfl:     docusate sodium (COLACE) 100 mg capsule, Take 1 capsule (100 mg total) by mouth 2 (two) times a day, Disp: 180 capsule, Rfl: 0    ferrous sulfate 324 (65 Fe) mg, Take 1 tablet (324 mg total) by mouth daily before breakfast, Disp: 90 tablet, Rfl: 2    Fluticasone-Salmeterol (Advair) 100-50 mcg/dose inhaler, Inhale 1 puff 2 (two) times a day Rinse mouth after use , Disp: , Rfl:     ipratropium-albuterol (DUO-NEB) 0 5-2 5 mg/3 mL nebulizer solution, INHALE CONTENTS OF 1 VIAL VIA NEBULIZER FOUR TIMES A DAY, Disp: 60 mL, Rfl: 11    methenamine hippurate (HIPREX) 1 g tablet, Take 1 g by mouth 2 (two) times a day with meals, Disp: , Rfl:     metoprolol succinate (TOPROL-XL) 25 mg 24 hr tablet, Take 25 mg by mouth daily, Disp: , Rfl:     midodrine (PROAMATINE) 5 mg tablet, Take 1 tablet (5 mg total) by mouth 3 (three) times a day as needed (for SBP < 100 or severe dizziness with standing up  Hold for SBP > 130), Disp: 90 tablet, Rfl: 3    nystatin (MYCOSTATIN) powder, Apply 1 application  topically in the morning, Disp: , Rfl:     omeprazole (PriLOSEC) 20 mg delayed release capsule, Take 1 capsule (20 mg total) by mouth daily, Disp: 90 capsule, Rfl: 3    sodium phosphate-biphosphate (FLEET) 7-19 g 118 mL enema, Insert 1 enema into the rectum once as needed, Disp: , Rfl:     Updated list was reviewed in [facility] care system of facility  Vitals:    05/04/23 0747   BP: 136/70   Pulse: 71   Resp: 18   Temp: (!) 97 1 °F (36 2 °C)   SpO2: 97%       Vital signs were reviewed in point click care    Review of Systems   Constitutional: Negative for chills and fever  HENT: Negative for nosebleeds and rhinorrhea  Eyes: Negative for discharge and redness  Respiratory: Negative for cough, chest tightness, shortness of breath, wheezing and stridor  Cardiovascular: Negative for chest pain and leg swelling  Gastrointestinal: Negative for abdominal distention, abdominal pain, diarrhea and vomiting  Genitourinary: Negative for dysuria, flank pain and hematuria  Musculoskeletal: Positive for gait problem  Negative for arthralgias  Skin: Negative for pallor  Neurological: Positive for dizziness (at times) and weakness (Generalized)  Negative for tremors, seizures, syncope and headaches  History of recurrent falls   Psychiatric/Behavioral: Negative for agitation, behavioral problems and confusion  Physical Exam  Constitutional:       General: He is not in acute distress  Appearance: He is well-developed  He is not diaphoretic  HENT:      Head: Normocephalic and atraumatic  Eyes:      General: No scleral icterus  Right eye: No discharge  Left eye: No discharge  Cardiovascular:      Rate and Rhythm: Rhythm irregular  Pulmonary:      Breath sounds: No stridor  No wheezing  Abdominal:      General: There is no distension  Palpations: Abdomen is soft  Tenderness: There is no abdominal tenderness  There is no guarding  Genitourinary:     Comments: Cloud in place  Musculoskeletal:      Cervical back: Neck supple  Right lower leg: No edema  Left lower leg: No edema  Comments: Muscle wasting of extremities   Skin:     Coloration: Skin is not jaundiced or pale  Neurological:      General: No focal deficit present  Mental Status: He is alert  Cranial Nerves: No cranial nerve deficit  Psychiatric:         Mood and Affect: Mood normal          Behavior: Behavior normal        Diagnostic Data       Recent labs and imaging studies were reviewed    Lab Results   Component Value Date    WBC 11 55 (H) 05/03/2023    HGB 14 4 05/03/2023    HCT 44 4 05/03/2023    MCV 93 05/03/2023     05/03/2023     Lab Results   Component Value Date    SODIUM 140 05/03/2023    K 4 2 05/03/2023     05/03/2023    CO2 24 05/03/2023    BUN 20 05/03/2023 "CREATININE 0 98 05/03/2023    GLUC 127 05/03/2023    CALCIUM 9 2 05/03/2023       Code Status:      Full code           Portions of the record may have been created with voice recognition software  Occasional wrong word or \"sound a like\" substitutions may have occurred due to the inherent limitations of voice recognition software  Read the chart carefully and recognize, using context, where substitutions have occurred      This note was electronically signed by Dr Delfina Armstrong   "

## 2023-05-04 NOTE — ASSESSMENT & PLAN NOTE
Pt was hospitalized recently  with lightheadedness/dizziness occurring after prolonged period of bending to change miller catheter bag  Pt additionally noted symptoms worsened when attempting to stand/ambulate      Pt was hspitalized in 12/2022 for orthostatic hypotension  Was discharged with scheduled fludrocortisone/midrodrine and p r n midodrine  Device interrogation revealed no pauses  Symptoms were uspected due to be from orthostatic hypotension  CTH: negative  Echo (9/2022): LVEF 50% mild global hypokinesis  ECG: a fib, 80bpm  Troponin 10>11  He was previously taken off of Flomax  Static blood pressures during recent hospitalization were negative  Patient was discharged on as needed midodrine and meclizine  Pt has a loop recorder in place      PT OT  Follow-up with cardiology service post d/c

## 2023-05-04 NOTE — TELEPHONE ENCOUNTER
[phone]/SDRJ from [facility] calling to speak to on-call  Orders to review for new admit  Dr Maryam Cueva was paged via TC    Please allow the on-call provider 20-30 mins to respond  If you have not heard from them within that time, please feel free to call back

## 2023-05-05 ENCOUNTER — NURSING HOME VISIT (OUTPATIENT)
Dept: GERIATRICS | Facility: OTHER | Age: 78
End: 2023-05-05

## 2023-05-05 DIAGNOSIS — E44.0 MODERATE PROTEIN-CALORIE MALNUTRITION (HCC): ICD-10-CM

## 2023-05-05 DIAGNOSIS — I48.21 PERMANENT ATRIAL FIBRILLATION (HCC): Primary | ICD-10-CM

## 2023-05-05 DIAGNOSIS — Z97.8 CHRONIC INDWELLING FOLEY CATHETER: Chronic | ICD-10-CM

## 2023-05-05 DIAGNOSIS — R26.2 AMBULATORY DYSFUNCTION: ICD-10-CM

## 2023-05-05 DIAGNOSIS — R53.81 DEBILITY: ICD-10-CM

## 2023-05-05 LAB
QRS AXIS: 51 DEGREES
QRSD INTERVAL: 90 MS
QT INTERVAL: 380 MS
QTC INTERVAL: 475 MS
T WAVE AXIS: 39 DEGREES
VENTRICULAR RATE: 94 BPM

## 2023-05-06 NOTE — PROGRESS NOTES
[address], [address] [place] [address], [address] [place] [address]  [phone]    NAME: Dominick Delaney  AGE: 78 y o  SEX: male    Progress Note    Location: [facility]  POS: 31 (SNF)     Patient's care was coordinated with nursing facility staff  Recent vitals, labs, and updated medications were review on [facility] system in facility  Assessment/Plan:    Permanent atrial fibrillation (HCC)  HR 70's-90's  Has a loop recorder  Denies cp or palpitations  Rate control with metoprolol   C/w  Eliquis 2 5 mg BID  F/u with cardiology OP    Chronic indwelling Miller catheter  With known h/o neurogenic bladder  Not on Flomax  Managed on Hipprex given recurrent UTIs     Ambulatory dysfunction  Multifactorial in the setting of acute/chronic medical conditions  PT OT  Fall and safety precautions  Supportive care  SW following for DC planning   F/u with pcp post d/c     Moderate protein-calorie malnutrition ([address] )  Malnutrition Findings:    appears weak and frail with generalized muscle wasting  Recent weight 134 4 from admission weight at 135 0 lb stable   Tolerating regular diet thin consistency  Remains on MVI and supplements  Dietitian following   Patient reports good appetite  Consuming around 50-75% for all meals  Monitor weights   Body mass index is 18 23 kg/m²  F/u with pcp post d/c     Debility  Multifactorial  PT OT  Supportive care  F/u pcp post d/c     Chief complaint / Reason for visit: STR f/u    History of Present Illness:  Patient is a 75-year old male seen and examined for STR f/u  Received pt lying in bed, resting  At the time of my evaluation appears ok  Denies back or joint pain  Denies lightheadedness or dizziness  With chronic miller draining yellow urine  No hematuria present  No edema  No numbness or tingling  Tolerating diet  Consuming around 50-75% for all meals  Denies N/V/D/C  Free of chest pain or palpitations  No other concerns or issues at this time      Review of "Systems:  Per history of present illness, all other systems reviewed and negative  Other than those noted in HPI    HISTORY:  Medical Hx: Reviewed, unchanged  Family Hx: Reviewed, unchanged  Soc Hx: Reviewed,  unchanged    ALLERGY: Reviewed, unchanged  No Known Allergies     PHYSICAL EXAM:  Visit Vitals  /68   Pulse 88   Temp 98 1   Resp 18   Wt 139 8 lb   SpO2 97% Comment: At rest RA   BMI 18 36 kg/m²   Smoking Status Former   BSA 1 8 m²     General: cooperative and supportive   Head: Atraumatic  Normocephalic  Eye Exam: anicteric sclera, no discharge, PERRLA, No injection  Oral Exam: moist mucous membranes, no buccaloropharyngeal erythema, palatine tonsils WNL  Neck Exam: no anterior cervical lymphadenopathy noted, neck supple  Cardiovascular: Irregular rate, regular rhythm, no murmurs, rubs, or gallops  Pulmonary: no wheeze, no rhonchi, no rales  No chest tenderness  Abdominal: soft, non-tender, nondistended, bowel sounds audible x 4 quadrants  : Non distended bladder  Cloud patent for yellow urine  No hematuria present  denies dysuria   Extremities and skin: no edema noted, no rashes  Neurological: alert, cooperative and responsive, Oriented x 3, moving all 4 extremities symmetrically    Laboratory results / Imaging reviewed: Hard copy/ies in medical chart: Reviewed from [facility]    Current Medications: All medications reviewed and updated in Nursing Home Chart    Please note:  Voice-recognition software may have been used in the preparation of this document  Occasional wrong word or \"sound-alike\" substitutions may have occurred due to the inherent limitations of voice recognition software  Interpretation should be guided by context      GABRIELA Carballo  5/5/2023    "

## 2023-05-09 ENCOUNTER — NURSING HOME VISIT (OUTPATIENT)
Dept: GERIATRICS | Facility: OTHER | Age: 78
End: 2023-05-09

## 2023-05-09 VITALS
HEART RATE: 87 BPM | DIASTOLIC BLOOD PRESSURE: 70 MMHG | OXYGEN SATURATION: 96 % | BODY MASS INDEX: 18.96 KG/M2 | RESPIRATION RATE: 18 BRPM | SYSTOLIC BLOOD PRESSURE: 124 MMHG | WEIGHT: 139.8 LBS | TEMPERATURE: 97.8 F

## 2023-05-09 DIAGNOSIS — I48.21 PERMANENT ATRIAL FIBRILLATION (HCC): Primary | Chronic | ICD-10-CM

## 2023-05-09 NOTE — PROGRESS NOTES
[facility]  [address] [place] [address], [address] [place], [place] [id number]    Progress note    NAME: Joanne Mir  AGE: 78 y o  SEX: male [phone]      Patient Location     [facility]       Assessment/Plan:    Atrial fibrillation (HCC)  Heart rate stable on metoprolol succinate 25 mg daily  Continue apixaban      COPD:   Stable  Bronchospasm continue maintenance inhalers including ipratropium, albuterol and fluticasone salmeterol      Orthostatic hypotension:  BP stable on midodrine 5 mg 3 times daily with meals  Presyncope:  Patient was recently hospitalized with lightheadedness/dizziness occurring after prolonged period of bending to change Cloud catheter bag  CT head was unremarkable  Echo done in September 2022 revealed LVEF of 50% with mild global hypokinesis  EKG revealed A-fib with controlled heart rate  Troponins were negative  Presyncope was suspected to be from orthostatic hypotension however orthostatic blood pressures were recently negative  He was discharged to SNF on meclizine and midodrine the patient currently has a loop recorder in place  Follow-up with cardiology service      Subdural hematoma:  Patient has history of recurrent falls and subdural hematoma  Patient is being evaluated for watchman's procedure by cardiology service due to recurrent falls in the setting of A-fib      Urinary retention:  Continue chronic Cloud  Cloud catheter was recently exchanged due to leakage issues continue local Cloud care  Follow-up with urology service    History of recurrent UTIs:  Patient remains on Hip-Harley and maintain adequate hydration    Ambulatory dysfunction:  Continue PT OT    Moderate protein calorie malnutrition:  Patient appears to be thin with extremities muscle wasting  Encourage p o  intake  Follow-up with dietitian    May add protein supplements as needed    Generalized weakness:  Continue PT OT    Reason for visit     Follow-up A-fib, COPD, history of orthostatic hypotension, anxiety, hypertension, history of subdural hematoma    HPI       Pt is being seen for a follow-up visit today  He is doing okay at present  Awake alert in no distress  Cloud catheter remains in place  Catheter was recently changed for leakage issues  P o  intake is good  Vitals are stable  Past Medical History:   Diagnosis Date   • Ambulatory dysfunction    • Anxiety    • Anxiety disorder    • Atrial fibrillation [facility])    • Coronary artery disease    • Depression    • Cloud catheter in place    • Hepatitis C     screening negative in 2017   • Hepatitis C    • History of MI (myocardial infarction)    • Hypertension    • MI (myocardial infarction) ([facility] [address] )    • Urinary catheter in place    • Use of cane as ambulatory aid        Past Surgical History:   Procedure Laterality Date   • CARDIAC CATHETERIZATION  2018   • CARDIAC ELECTROPHYSIOLOGY PROCEDURE N/A 2022    Procedure: Cardiac loop recorder implant;  Surgeon: Estelita Newman MD;  [facility]; Service: Cardiology   • CARDIAC ELECTROPHYSIOLOGY PROCEDURE  2022    Cardiac loop recorder implant; Dr Estelita Newman   • COLONOSCOPY     • COLONOSCOPY     • [address] Right 2022    Dr Chowdary [facility]   • [place] RPR 1ST INGUN HRNA AGE 5 YRS/> REDUCIBLE Right 2022    Procedure: REPAIR HERNIA INGUINAL;  Surgeon: Elaina Officer DO Shashank;  Location: [facility];  Service: General   • TONSILLECTOMY         Social History     Tobacco Use   Smoking Status Former   • Packs/day: 1 50   • Years: 57 00   • Pack years: 85 50   • Types: Cigarettes   • Quit date: 3/12/2023   • Years since quittin 1   Smokeless Tobacco Never   Tobacco Comments    since age 17; Has history of smoking for over 54 years   He has been smoking more than packet daily in the past however he has been smokes about half a pack a day lately and stopped smoking on 2018 when he was admitted to the hospital            Family History Problem Relation Age of Onset   • Hypertension Mother    • Coronary artery disease Mother         premature   • Diabetes Father    • Coronary artery disease Father         premature   • Hypertension Father         No Known Allergies       Current Outpatient Medications:   •  acetaminophen (TYLENOL) 325 mg tablet, Take 650 mg by mouth every 6 (six) hours as needed, Disp: , Rfl:   •  apixaban (ELIQUIS) 2 5 mg, Take 2 5 mg by mouth 2 (two) times a day, Disp: , Rfl:   •  azelastine (ASTELIN) 0 1 % nasal spray, 1 spray into each nostril 2 (two) times a day as needed for allergies or rhinitis Use in each nostril as directed, Disp: 30 mL, Rfl: 0  •  bisacodyl (DULCOLAX) 10 mg suppository, Insert 10 mg into the rectum once as needed, Disp: , Rfl:   •  docusate sodium (COLACE) 100 mg capsule, Take 1 capsule (100 mg total) by mouth 2 (two) times a day, Disp: 180 capsule, Rfl: 0  •  ferrous sulfate 324 (65 Fe) mg, Take 1 tablet (324 mg total) by mouth daily before breakfast, Disp: 90 tablet, Rfl: 2  •  Fluticasone-Salmeterol (Advair) 100-50 mcg/dose inhaler, Inhale 1 puff 2 (two) times a day Rinse mouth after use , Disp: , Rfl:   •  ipratropium-albuterol (DUO-NEB) 0 5-2 5 mg/3 mL nebulizer solution, INHALE CONTENTS OF 1 VIAL VIA NEBULIZER FOUR TIMES A DAY, Disp: 60 mL, Rfl: 11  •  methenamine hippurate (HIPREX) 1 g tablet, Take 1 g by mouth 2 (two) times a day with meals, Disp: , Rfl:   •  metoprolol succinate (TOPROL-XL) 25 mg 24 hr tablet, Take 25 mg by mouth daily, Disp: , Rfl:   •  midodrine (PROAMATINE) 5 mg tablet, Take 1 tablet (5 mg total) by mouth 3 (three) times a day as needed (for SBP < 100 or severe dizziness with standing up  Hold for SBP > 130), Disp: 90 tablet, Rfl: 3  •  nystatin (MYCOSTATIN) powder, Apply 1 application   topically in the morning, Disp: , Rfl:   •  omeprazole (PriLOSEC) 20 mg delayed release capsule, Take 1 capsule (20 mg total) by mouth daily, Disp: 90 capsule, Rfl: 3  •  sodium phosphate-biphosphate (FLEET) 7-19 g 118 mL enema, Insert 1 enema into the rectum once as needed, Disp: , Rfl:     [facility]  Vitals:    05/09/23 1804   BP: 124/70   Pulse: 87   Resp: 18   Temp: 97 8 °F (36 6 °C)   SpO2: 96%       Vital signs were reviewed in [facility] care    Review of Systems   Constitutional: Negative for chills and fever  Respiratory: Negative for cough, shortness of breath and wheezing  Cardiovascular: Negative for chest pain and leg swelling  Gastrointestinal: Negative for abdominal distention, abdominal pain, diarrhea and vomiting  Genitourinary: Negative for flank pain and hematuria  Musculoskeletal: Positive for gait problem  Negative for arthralgias  Neurological: Positive for dizziness (at times) and weakness (Generalized)  Negative for seizures  History of  falls   Psychiatric/Behavioral: Negative for behavioral problems and confusion  Physical Exam  Constitutional:       General: He is not in acute distress  Appearance: He is well-developed  He is not diaphoretic  HENT:      Head: Normocephalic and atraumatic  Cardiovascular:      Rate and Rhythm: Rhythm irregular  Pulmonary:      Breath sounds: No stridor  No wheezing  Abdominal:      General: There is no distension  Palpations: Abdomen is soft  Tenderness: There is no abdominal tenderness  There is no guarding  Genitourinary:     Comments: Cloud in place  Musculoskeletal:      Cervical back: Neck supple  Right lower leg: No edema  Left lower leg: No edema  Comments: Muscle wasting of extremities   Skin:     Coloration: Skin is not jaundiced or pale  Neurological:      General: No focal deficit present  Mental Status: He is alert  Cranial Nerves: No cranial nerve deficit     Psychiatric:         Mood and Affect: Mood normal          Behavior: Behavior normal        Diagnostic Data       Recent labs and "imaging studies were reviewed  Lab Results   Component Value Date    WBC 11 55 (H) 05/03/2023    HGB 14 4 05/03/2023    HCT 44 4 05/03/2023    MCV 93 05/03/2023     05/03/2023     Lab Results   Component Value Date    SODIUM 140 05/03/2023    K 4 2 05/03/2023     05/03/2023    CO2 24 05/03/2023    BUN 20 05/03/2023    CREATININE 0 98 05/03/2023    GLUC 127 05/03/2023    CALCIUM 9 2 05/03/2023       Code Status:      Full code           Portions of the record may have been created with voice recognition software  Occasional wrong word or \"sound a like\" substitutions may have occurred due to the inherent limitations of voice recognition software  Read the chart carefully and recognize, using context, where substitutions have occurred      This note was electronically signed by Dr Praveena Coello   "

## 2023-05-10 ENCOUNTER — OFFICE VISIT (OUTPATIENT)
Dept: CARDIOLOGY CLINIC | Facility: SKILLED NURSING FACILITY | Age: 78
End: 2023-05-10

## 2023-05-10 DIAGNOSIS — I95.1 ORTHOSTATIC HYPOTENSION: Chronic | ICD-10-CM

## 2023-05-10 DIAGNOSIS — I48.21 PERMANENT ATRIAL FIBRILLATION (HCC): Primary | ICD-10-CM

## 2023-05-10 NOTE — PROGRESS NOTES
[facility] Consultation - Cardiology     Joleen Nagy 78 y o  male MRN: [id number]        Assessment:      44-year-old man with a history of chronic atrial fibrillation, previously with falls and a subdural hematoma which have been related to orthostatic hypotension and poor balance issues  He is more regularly been in the facility now, and his vital signs are stable on midodrine  He has not had any recent falls  He has a catheter in place and does not have any major hematuria  Plan: At this juncture, I feel if he is actually tolerating his anticoagulation pretty well  There have been no recent bleeding issues nor falls  Recommend continuing the Eliquis as ordered  He has a history of orthostatic hypotension  With getting the midodrine more consistently than he was at home, his vital signs are stable  Would continue this  There are hold parameters to not give the medication if SBP greater than 130  Atrial fibrillation is rate controlled  Note anticoagulation discussion as above  Metoprolol for continued rate control  Previously with some tachy/efren syndrome, but currently vital signs are quite well controlled  History of Present Illness   Reason for Consult / Principal Problem: Atrial fibrillation  HPI: Joleen Nagy is a 78y o  year old male who was admitted to [facility] previously after hospitalization for fall  He was discharged home, but was then found wandering the streets and was readmitted to the facility  When last here, I had seen him for his atrial fibrillation  He had been planned for a Watchman procedure but more recently has been tolerating anticoagulation without any issues  He is having issues with transportation, as well to be able [facility]  Previously with orthostatic hypotension  His blood pressure at the facility seems to be much better, likely in part because he is getting this now consistently  At home it is unclear if he was compliant with his medications on a regular schedule  Review of Systems:  Poor balance, fatigue, weakness  Intermittent issues with catheter  No recent falls  Otherwise, 12 point ROS negative  Historical Information   Past Medical History:   Diagnosis Date   • Ambulatory dysfunction    • Anxiety    • Anxiety disorder    • Atrial fibrillation (HCC)    • Coronary artery disease    • Depression    • Cloud catheter in place    • Hepatitis C     screening negative in 2017   • Hepatitis C    • History of MI (myocardial infarction)    • Hypertension    • MI (myocardial infarction) ([address] )    • Urinary catheter in place    • Use of cane as ambulatory aid      Past Surgical History:   Procedure Laterality Date   • CARDIAC CATHETERIZATION  2018   • CARDIAC ELECTROPHYSIOLOGY PROCEDURE N/A 2022    Procedure: Cardiac loop recorder implant;  Surgeon: Mariann Segundo MD;  [facility]: [facility]; Service: Cardiology   • CARDIAC ELECTROPHYSIOLOGY PROCEDURE  2022    Cardiac loop recorder implant; Dr Mariann Segundo   • COLONOSCOPY     • COLONOSCOPY     • [address] Right 2022    Dr Fermin Samples   • RI RPR 1ST INGUN HRNA AGE 5 YRS/> REDUCIBLE Right 2022    Procedure: REPAIR HERNIA INGUINAL;  Surgeon: Pamela Encarnacion DO;  Location: [facility];  Service: General   • TONSILLECTOMY       Social History     Substance and Sexual Activity   Alcohol Use Not Currently     Social History     Substance and Sexual Activity   Drug Use Never     Social History     Tobacco Use   Smoking Status Former   • Packs/day: 1 50   • Years: 57 00   • Pack years: 85 50   • Types: Cigarettes   • Quit date: 3/12/2023   • Years since quittin 1   Smokeless Tobacco Never   Tobacco Comments    since age 17; Has history of smoking for over 54 years   He has been smoking more than packet daily in the past however he has been smokes about half a pack a day lately and stopped smoking on 7/1/2018 when he was admitted to the hospital       Family History: non-contributory    Meds/Allergies   Medications reviewed in [facility]  Pertinent cardiac medications include:   Eliquis 2 5 mg twice a day, metoprolol succinate 25 mg daily, midodrine 5 mg 3 times a day  No Known Allergies    Objective   Vitals:  Reviewed in [facility] and at the bedside with RN    Physical Exam:  GEN: Krys Morgan elderly man  Standing in room, no distress  HEENT: pupils equal, round, and reactive to light; extraocular muscles intact  NECK: supple, no carotid bruits   HEART: irregular  No murmurs  LUNGS: clear to auscultation bilaterally; no wheezes, rales, or rhonchi   ABDOMEN: normal bowel sounds, soft, no tenderness, no distention  Cloud bag  EXTREMITIES: peripheral pulses normal; no clubbing, cyanosis, or edema  NEURO: no focal findings   SKIN: normal without suspicious lesions on exposed skin    Lab Results:         Results from last 7 days   Lab Units 05/03/23 2008   WBC Thousand/uL 11 55*   HEMOGLOBIN g/dL 14 4   HEMATOCRIT % 44 4   PLATELETS Thousands/uL 211           Results from last 7 days   Lab Units 05/03/23 2008   POTASSIUM mmol/L 4 2   CHLORIDE mmol/L 107   CO2 mmol/L 24   BUN mg/dL 20   CREATININE mg/dL 0 98   CALCIUM mg/dL 9 2                 Imaging: I have personally reviewed pertinent reports  XR chest 1 view portable    Result Date: 4/2/2023  Narrative: CHEST INDICATION:  Dizziness  COMPARISON:  Chest radiograph 3/12/2023, CT chest 9/18/2018 EXAM PERFORMED/VIEWS:  XR CHEST PORTABLE Images: 2 FINDINGS: Cardiomediastinal silhouette appears unremarkable  Loop recorder left chest wall  The lungs are clear  No pneumothorax or pleural effusion  Mild osteoarthritis right AC joint  Impression: No acute cardiopulmonary disease  Workstation performed: [id number]     CT head without contrast    Result Date: 4/1/2023  Narrative: CT BRAIN - WITHOUT CONTRAST INDICATION:   dizziness   COMPARISON:  Brain CT from 3/12/2023  TECHNIQUE:  CT examination of the brain was performed  Multiplanar 2D reformatted images were created from the source data  Radiation dose length product (DLP) for this visit:  831 1 mGy-cm   This examination, like all CT scans performed in the [facility], was performed utilizing techniques to minimize radiation dose exposure, including the use of iterative reconstruction and automated exposure control  IMAGE QUALITY:  Diagnostic  FINDINGS: PARENCHYMA:  No intracranial mass, mass effect or midline shift  No CT signs of acute infarction  No acute parenchymal hemorrhage  VENTRICLES AND EXTRA-AXIAL SPACES:  Normal for the patient's age  VISUALIZED ORBITS: Normal visualized orbits  PARANASAL SINUSES: Normal visualized paranasal sinuses  CALVARIUM AND EXTRACRANIAL SOFT TISSUES:  Normal      Impression: No acute intracranial abnormality   Workstation performed: [id number]

## 2023-05-11 ENCOUNTER — NURSING HOME VISIT (OUTPATIENT)
Dept: GERIATRICS | Facility: OTHER | Age: 78
End: 2023-05-11

## 2023-05-11 DIAGNOSIS — Z87.440 HISTORY OF RECURRENT UTIS: ICD-10-CM

## 2023-05-11 DIAGNOSIS — R33.9 URINARY RETENTION: ICD-10-CM

## 2023-05-11 DIAGNOSIS — E44.0 MODERATE PROTEIN-CALORIE MALNUTRITION (HCC): ICD-10-CM

## 2023-05-11 DIAGNOSIS — J44.9 CHRONIC OBSTRUCTIVE PULMONARY DISEASE, UNSPECIFIED COPD TYPE (HCC): Chronic | ICD-10-CM

## 2023-05-11 DIAGNOSIS — I48.21 PERMANENT ATRIAL FIBRILLATION (HCC): Primary | ICD-10-CM

## 2023-05-11 DIAGNOSIS — R53.81 DEBILITY: ICD-10-CM

## 2023-05-11 DIAGNOSIS — I95.1 ORTHOSTATIC HYPOTENSION: Chronic | ICD-10-CM

## 2023-05-11 DIAGNOSIS — R42 DIZZINESS: ICD-10-CM

## 2023-05-11 PROBLEM — S01.111A EYEBROW LACERATION, RIGHT, INITIAL ENCOUNTER: Status: RESOLVED | Noted: 2023-03-12 | Resolved: 2023-05-11

## 2023-05-11 NOTE — ASSESSMENT & PLAN NOTE
With history of tobacco abuse and smoking  No exacerbation  O2 sat 96% on room air at time of exam  Continue ipratropium-albuterol nebulizer treatment  Continue fluticasone-salmeterol inhaler  Follow-up with pulmonology

## 2023-05-11 NOTE — ASSESSMENT & PLAN NOTE
With history of dizziness going back to 2022  Vital signs are stable  Encourage compression stockings/abdominal binder prior to getting out of bed  Encourage p o  hydration  Fall precautions

## 2023-05-11 NOTE — ASSESSMENT & PLAN NOTE
In setting of neurogenic bladder  With chronic indwelling urinary catheter and urology managing  With history of frequent UTIs   Patient is at risk for catheter associated urinary tract infection in setting of chronic IUC

## 2023-05-11 NOTE — ASSESSMENT & PLAN NOTE
Had follow-up with cardiology yesterday; no new orders noted  Heart rate stable and trending 60s to 70s  Continue metoprolol succinate 25 mg daily  Continue Eliquis 2 5 mg twice daily for stroke prevention  No signs of active bleeding noted

## 2023-05-11 NOTE — PROGRESS NOTES
Facility: [facility]  POS: 31 (STR)  Progress Note      Patient: Lance Osborn  BD: 1945   Chief Complaint/Reason for visit: STR follow up visit  Code Status: DNI/DNR    Assessment/Plan:  29-year-old male with:    Permanent [facility] fibrillation ([facility] )  Had follow-up with cardiology yesterday; no new orders noted  Heart rate stable and trending 60s to 70s  Continue metoprolol succinate 25 mg daily  Continue Eliquis 2 5 mg twice daily for stroke prevention  No signs of active bleeding noted    Orthostatic hypotension  BPs stable at [facility]  Continues to complain of dizziness at times but vital signs remains stable  Continue midodrine 5 mg TID  Encourage p o  hydration  Recommend compression stockings/abdominal binder to prevent orthostatic hypotension when getting out of bed    Dizziness  With history of dizziness going back to 2022  Vital signs are stable  Encourage compression stockings/abdominal binder prior to getting out of bed  Encourage p o  hydration  Fall precautions    Urinary retention  In setting of neurogenic bladder  With chronic indwelling urinary catheter and urology managing  With history of frequent UTIs   Patient is at risk for catheter associated urinary tract infection in setting of chronic IUC    History of recurrent UTIs  Continue methenamine hippurate as ordered by urology  With chronic indwelling urinary catheter; may need suprapubic catheter if frequent UTIs continue as per urology notes    Moderate protein-calorie malnutrition ([facility] )  As evidenced by low BMI, muscle and subcutaneous tissue loss  Patient has been gaining weight slowly since his SNF stay  Offer nutritional supplements  Monitor weights    COPD (chronic obstructive pulmonary disease) ([facility] )  With history of tobacco abuse and smoking  No exacerbation  O2 sat 96% on room air at time of exam  Continue ipratropium-albuterol nebulizer treatment  Continue fluticasone-salmeterol inhaler  Follow-up with pulmonology    Debility  Multifactorial in setting of above  Continue supportive care at SNF for ADLs  Continue PT/OT   Continue fall precautions  Ensure adequate hydration and nutrition    History of Present Illness: 72-year-old male seen and examined for STR follow up of acute and chronic medical conditions  Received patient resting in bed and appeared in no distress  Patient reports that he still has episodes of dizziness without syncope  No complaints of nausea or vomiting  Denies having shortness of breath  No complaints of pain or discomfort at time of exam   See A/P for additional information  Past Medical History: unchanged from history and physical  Past Medical History:   Diagnosis Date   • Ambulatory dysfunction    • Anxiety    • Anxiety disorder    • Atrial fibrillation (HCC)    • Coronary artery disease    • Depression    • Cloud catheter in place    • Hepatitis C     screening negative in 1/2017   • Hepatitis C    • History of MI (myocardial infarction)    • Hypertension    • [facility]) ([facility] )    • Urinary catheter in place    • Use of cane as ambulatory aid      Family History: unchanged from history and physical  Social History: unchanged from history and physical  Review of systems: As per review of medical illness, all other systems reviewed and negative  Medications: All medication and routine orders were reviewed and updated  Allergies: NKDA  Consults reviewed: Other  Labs/Diagnostics (reviewed by this provider): Copy in Chart    Imaging Reviewed: None today    Physical Exam  Weight: 139 4 pounds Temp: 98 3         BP:  Pulse: 78 resp: 20       O2 Sat: 96% on room air  Orientation:Person and Place     Physical Exam  Vitals and nursing note reviewed  Constitutional:       General: He is not in acute distress  Appearance: He is not toxic-appearing or diaphoretic  Comments: Frail elderly male who appears with chronic illness  HENT:      Head: Normocephalic  Nose: No congestion or rhinorrhea  Mouth/Throat:      Mouth: Mucous membranes are moist       Pharynx: No oropharyngeal exudate  Eyes:      General:         Right eye: No discharge  Left eye: No discharge  Extraocular Movements: Extraocular movements intact  Conjunctiva/sclera: Conjunctivae normal       Comments: Wears prescription glasses  Cardiovascular:      Rate and Rhythm: Normal rate  Rhythm irregular  Pulses: Normal pulses  Pulmonary:      Effort: Pulmonary effort is normal  No respiratory distress  Abdominal:      General: Bowel sounds are normal  There is no distension  Palpations: Abdomen is soft  Tenderness: There is no abdominal tenderness  There is no guarding  Genitourinary:     Comments: Indwelling urinary catheter patent for yellow urine  Musculoskeletal:      Cervical back: Neck supple  No rigidity  Right lower leg: No edema  Left lower leg: No edema  Comments: Moves all 4 extremities  Lymphadenopathy:      Cervical: No cervical adenopathy  Skin:     General: Skin is warm and dry  Capillary Refill: Capillary refill takes less than 2 seconds  Neurological:      Mental Status: He is alert  Mental status is at baseline  Motor: Weakness present  Gait: Gait abnormal    Psychiatric:         Mood and Affect: Mood normal          Behavior: Behavior normal          Thought Content: Thought content normal        This note was completed in part utilizing [facility] [employer] direct voice recognition software  Grammatical errors, random word insertion, spelling mistakes, and incomplete sentences may be an occasional consequence of the system secondary to software limitations, ambient noise and hardware issues  At the time of dictation, efforts were made to edit, clarify and/or correct errors  Please read the chart carefully and recognize, using context, where substitutions have occurred    If you have any questions or concerns about the context, text or information contained within the body of this dictation, please contact myself, the provider, for further clarification      [address]  1/65/06460:89 PM

## 2023-05-11 NOTE — ASSESSMENT & PLAN NOTE
BPs stable at SNF  Continues to complain of dizziness at times but vital signs remains stable  Continue midodrine 5 mg 3 times daily   Encourage p o  hydration  Recommend compression stockings/abdominal binder to prevent orthostatic hypotension when getting out of bed

## 2023-05-11 NOTE — ASSESSMENT & PLAN NOTE
Multifactorial in setting of above  Continue supportive care at SNF for ADLs  Continue PT/OT   Continue fall precautions  Ensure adequate hydration and nutrition

## 2023-05-11 NOTE — ASSESSMENT & PLAN NOTE
Continue methenamine hippurate as ordered by urology  With chronic indwelling urinary catheter; may need suprapubic catheter if frequent UTIs continue as per urology notes

## 2023-05-11 NOTE — ASSESSMENT & PLAN NOTE
As evidenced by low BMI, muscle and subcutaneous tissue loss  Patient has been gaining weight slowly since his SNF stay  Offer nutritional supplements  Monitor weights

## 2023-05-12 ENCOUNTER — APPOINTMENT (EMERGENCY)
Dept: RADIOLOGY | Facility: HOSPITAL | Age: 78
End: 2023-05-12

## 2023-05-12 ENCOUNTER — HOSPITAL ENCOUNTER (INPATIENT)
Facility: HOSPITAL | Age: 78
LOS: 4 days | Discharge: NURSING, REHAB OR LONG-TERM CARE | End: 2023-05-16
Attending: SURGERY | Admitting: INTERNAL MEDICINE

## 2023-05-12 ENCOUNTER — TELEPHONE (OUTPATIENT)
Dept: OTHER | Facility: OTHER | Age: 78
End: 2023-05-12

## 2023-05-12 ENCOUNTER — APPOINTMENT (EMERGENCY)
Dept: CT IMAGING | Facility: HOSPITAL | Age: 78
End: 2023-05-12

## 2023-05-12 DIAGNOSIS — R26.2 AMBULATORY DYSFUNCTION: ICD-10-CM

## 2023-05-12 DIAGNOSIS — Z97.8 CHRONIC INDWELLING FOLEY CATHETER: ICD-10-CM

## 2023-05-12 DIAGNOSIS — T83.511A URINARY TRACT INFECTION ASSOCIATED WITH INDWELLING URETHRAL CATHETER, INITIAL ENCOUNTER (HCC): ICD-10-CM

## 2023-05-12 DIAGNOSIS — N39.0 URINARY TRACT INFECTION ASSOCIATED WITH INDWELLING URETHRAL CATHETER, INITIAL ENCOUNTER (HCC): ICD-10-CM
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
## 2023-05-12 PROBLEM — S06.5XAA SDH (SUBDURAL HEMATOMA) (HCC): Status: ACTIVE | Noted: 2023-05-12

## 2023-05-12 PROBLEM — J44.9 COPD (CHRONIC OBSTRUCTIVE PULMONARY DISEASE) (HCC): Status: ACTIVE | Noted: 2023-05-12

## 2023-05-12 PROBLEM — D72.829 LEUKOCYTOSIS: Status: ACTIVE | Noted: 2023-05-12

## 2023-05-12 PROBLEM — R55 SYNCOPE AND COLLAPSE: Status: ACTIVE | Noted: 2023-05-12

## 2023-05-12 PROBLEM — I48.91 ATRIAL FIBRILLATION (HCC): Status: ACTIVE | Noted: 2023-05-12

## 2023-05-12 LAB
2HR DELTA HS TROPONIN: -1 NG/L
4HR DELTA HS TROPONIN: -1 NG/L
ANION GAP SERPL CALCULATED.3IONS-SCNC: 8 MMOL/L (ref 4–13)
BACTERIA UR QL AUTO: ABNORMAL /HPF
BASE EXCESS BLDA CALC-SCNC: 3 MMOL/L (ref -2–3)
BASOPHILS # BLD AUTO: 0.04 THOUSANDS/ÂΜL (ref 0–0.1)
BASOPHILS NFR BLD AUTO: 0 % (ref 0–1)
BILIRUB UR QL STRIP: NEGATIVE
BUN SERPL-MCNC: 18 MG/DL (ref 5–25)
CA-I BLD-SCNC: 1.13 MMOL/L (ref 1.12–1.32)
CALCIUM SERPL-MCNC: 8.3 MG/DL (ref 8.4–10.2)
CARDIAC TROPONIN I PNL SERPL HS: 10 NG/L
CARDIAC TROPONIN I PNL SERPL HS: 9 NG/L
CARDIAC TROPONIN I PNL SERPL HS: 9 NG/L
CHLORIDE SERPL-SCNC: 105 MMOL/L (ref 96–108)
CLARITY UR: ABNORMAL
CO2 SERPL-SCNC: 24 MMOL/L (ref 21–32)
COLOR UR: ABNORMAL
CREAT SERPL-MCNC: 0.99 MG/DL (ref 0.6–1.3)
EOSINOPHIL # BLD AUTO: 0.03 THOUSAND/ÂΜL (ref 0–0.61)
EOSINOPHIL NFR BLD AUTO: 0 % (ref 0–6)
ERYTHROCYTE [DISTWIDTH] IN BLOOD BY AUTOMATED COUNT: 14.4 % (ref 11.6–15.1)
GFR SERPL CREATININE-BSD FRML MDRD: 74 ML/MIN/1.73SQ M
GLUCOSE SERPL-MCNC: 110 MG/DL (ref 65–140)
GLUCOSE SERPL-MCNC: 118 MG/DL (ref 65–140)
GLUCOSE UR STRIP-MCNC: NEGATIVE MG/DL
HCO3 BLDA-SCNC: 26.3 MMOL/L (ref 24–30)
HCT VFR BLD AUTO: 41.4 % (ref 36.5–49.3)
HCT VFR BLD CALC: 39 % (ref 36.5–49.3)
HGB BLD-MCNC: 13.1 G/DL (ref 12–17)
HGB BLDA-MCNC: 13.3 G/DL (ref 12–17)
HGB UR QL STRIP.AUTO: ABNORMAL
IMM GRANULOCYTES # BLD AUTO: 0.09 THOUSAND/UL (ref 0–0.2)
IMM GRANULOCYTES NFR BLD AUTO: 1 % (ref 0–2)
KETONES UR STRIP-MCNC: NEGATIVE MG/DL
LEUKOCYTE ESTERASE UR QL STRIP: ABNORMAL
LYMPHOCYTES # BLD AUTO: 1.99 THOUSANDS/ÂΜL (ref 0.6–4.47)
LYMPHOCYTES NFR BLD AUTO: 14 % (ref 14–44)
MCH RBC QN AUTO: 30.5 PG (ref 26.8–34.3)
MCHC RBC AUTO-ENTMCNC: 31.6 G/DL (ref 31.4–37.4)
MCV RBC AUTO: 97 FL (ref 82–98)
MONOCYTES # BLD AUTO: 1.58 THOUSAND/ÂΜL (ref 0.17–1.22)
MONOCYTES NFR BLD AUTO: 11 % (ref 4–12)
MUCOUS THREADS UR QL AUTO: ABNORMAL
NEUTROPHILS # BLD AUTO: 10.68 THOUSANDS/ÂΜL (ref 1.85–7.62)
NEUTS SEG NFR BLD AUTO: 74 % (ref 43–75)
NITRITE UR QL STRIP: POSITIVE
NON-SQ EPI CELLS URNS QL MICRO: ABNORMAL /HPF
NRBC BLD AUTO-RTO: 0 /100 WBCS
PCO2 BLD: 27 MMOL/L (ref 21–32)
PCO2 BLD: 36 MM HG (ref 42–50)
PH BLD: 7.47 [PH] (ref 7.3–7.4)
PH UR STRIP.AUTO: 8.5 [PH]
PLATELET # BLD AUTO: 250 THOUSANDS/UL (ref 149–390)
PMV BLD AUTO: 12 FL (ref 8.9–12.7)
PO2 BLD: 81 MM HG (ref 35–45)
POTASSIUM BLD-SCNC: 4.1 MMOL/L (ref 3.5–5.3)
POTASSIUM SERPL-SCNC: 4.2 MMOL/L (ref 3.5–5.3)
PROCALCITONIN SERPL-MCNC: 0.31 NG/ML
PROT UR STRIP-MCNC: ABNORMAL MG/DL
RBC # BLD AUTO: 4.29 MILLION/UL (ref 3.88–5.62)
RBC #/AREA URNS AUTO: ABNORMAL /HPF
SAO2 % BLD FROM PO2: 97 % (ref 60–85)
SODIUM BLD-SCNC: 139 MMOL/L (ref 136–145)
SODIUM SERPL-SCNC: 137 MMOL/L (ref 135–147)
SP GR UR STRIP.AUTO: 1.02 (ref 1–1.03)
SPECIMEN SOURCE: ABNORMAL
TRI-PHOS CRY URNS QL MICRO: ABNORMAL /HPF
UROBILINOGEN UR STRIP-ACNC: <2 MG/DL
WBC # BLD AUTO: 14.41 THOUSAND/UL (ref 4.31–10.16)
WBC #/AREA URNS AUTO: ABNORMAL /HPF

## 2023-05-12 RX ORDER — METHENAMINE HIPPURATE 1000 MG/1
1 TABLET ORAL 2 TIMES DAILY WITH MEALS
Status: DISCONTINUED | OUTPATIENT
Start: 2023-05-12 | End: 2023-05-16 | Stop reason: HOSPADM

## 2023-05-12 RX ORDER — MIDODRINE HYDROCHLORIDE 5 MG/1
5 TABLET ORAL 3 TIMES DAILY PRN
Status: DISCONTINUED | OUTPATIENT
Start: 2023-05-12 | End: 2023-05-16 | Stop reason: HOSPADM

## 2023-05-12 RX ORDER — FLUTICASONE FUROATE AND VILANTEROL 100; 25 UG/1; UG/1
1 POWDER RESPIRATORY (INHALATION)
Status: DISCONTINUED | OUTPATIENT
Start: 2023-05-12 | End: 2023-05-16 | Stop reason: HOSPADM

## 2023-05-12 RX ORDER — MIDODRINE HYDROCHLORIDE 5 MG/1
5 TABLET ORAL 3 TIMES DAILY PRN
COMMUNITY
End: 2023-05-31

## 2023-05-12 RX ORDER — IPRATROPIUM BROMIDE AND ALBUTEROL SULFATE 2.5; .5 MG/3ML; MG/3ML
3 SOLUTION RESPIRATORY (INHALATION) 4 TIMES DAILY
Status: DISCONTINUED | OUTPATIENT
Start: 2023-05-12 | End: 2023-05-12

## 2023-05-12 RX ORDER — PANTOPRAZOLE SODIUM 40 MG/1
40 TABLET, DELAYED RELEASE ORAL
Status: DISCONTINUED | OUTPATIENT
Start: 2023-05-12 | End: 2023-05-16 | Stop reason: HOSPADM

## 2023-05-12 RX ORDER — LANOLIN ALCOHOL/MO/W.PET/CERES
400 CREAM (GRAM) TOPICAL DAILY
COMMUNITY

## 2023-05-12 RX ORDER — AZELASTINE 1 MG/ML
1 SPRAY, METERED NASAL 2 TIMES DAILY PRN
Status: DISCONTINUED | OUTPATIENT
Start: 2023-05-12 | End: 2023-05-16 | Stop reason: HOSPADM

## 2023-05-12 RX ORDER — METHENAMINE HIPPURATE 1000 MG/1
1 TABLET ORAL 2 TIMES DAILY WITH MEALS
COMMUNITY

## 2023-05-12 RX ORDER — FLUTICASONE PROPIONATE AND SALMETEROL 100; 50 UG/1; UG/1
1 POWDER RESPIRATORY (INHALATION) 2 TIMES DAILY
COMMUNITY

## 2023-05-12 RX ORDER — IPRATROPIUM BROMIDE AND ALBUTEROL SULFATE 2.5; .5 MG/3ML; MG/3ML
3 SOLUTION RESPIRATORY (INHALATION) 4 TIMES DAILY
COMMUNITY
End: 2023-05-31

## 2023-05-12 RX ORDER — LANOLIN ALCOHOL/MO/W.PET/CERES
400 CREAM (GRAM) TOPICAL DAILY
Status: DISCONTINUED | OUTPATIENT
Start: 2023-05-12 | End: 2023-05-16 | Stop reason: HOSPADM

## 2023-05-12 RX ORDER — MECLIZINE HCL 12.5 MG/1
12.5 TABLET ORAL EVERY 8 HOURS PRN
Status: DISCONTINUED | OUTPATIENT
Start: 2023-05-12 | End: 2023-05-16 | Stop reason: HOSPADM

## 2023-05-12 RX ORDER — FERROUS SULFATE 325(65) MG
325 TABLET ORAL
COMMUNITY

## 2023-05-12 RX ORDER — METOPROLOL SUCCINATE 25 MG/1
25 TABLET, EXTENDED RELEASE ORAL DAILY
Status: DISCONTINUED | OUTPATIENT
Start: 2023-05-12 | End: 2023-05-16 | Stop reason: HOSPADM

## 2023-05-12 RX ORDER — AZELASTINE 1 MG/ML
1 SPRAY, METERED NASAL 2 TIMES DAILY PRN
COMMUNITY

## 2023-05-12 RX ORDER — FERROUS SULFATE 325(65) MG
325 TABLET ORAL
Status: DISCONTINUED | OUTPATIENT
Start: 2023-05-12 | End: 2023-05-16 | Stop reason: HOSPADM

## 2023-05-12 RX ORDER — METOPROLOL SUCCINATE 25 MG/1
25 TABLET, EXTENDED RELEASE ORAL DAILY
COMMUNITY

## 2023-05-12 RX ORDER — ACETAMINOPHEN 325 MG/1
650 TABLET ORAL EVERY 6 HOURS PRN
Status: DISCONTINUED | OUTPATIENT
Start: 2023-05-12 | End: 2023-05-16 | Stop reason: HOSPADM

## 2023-05-12 RX ORDER — OMEPRAZOLE 20 MG/1
20 CAPSULE, DELAYED RELEASE ORAL DAILY
COMMUNITY
End: 2023-05-31

## 2023-05-12 RX ORDER — MECLIZINE HCL 12.5 MG/1
12.5 TABLET ORAL EVERY 8 HOURS PRN
COMMUNITY

## 2023-05-12 RX ORDER — DOCUSATE SODIUM 100 MG/1
100 CAPSULE, LIQUID FILLED ORAL 2 TIMES DAILY
COMMUNITY

## 2023-05-12 RX ADMIN — PANTOPRAZOLE SODIUM 40 MG: 40 TABLET, DELAYED RELEASE ORAL at 05:45

## 2023-05-12 RX ADMIN — METHENAMINE HIPPURATE 1 G: 1 TABLET ORAL at 09:14

## 2023-05-12 RX ADMIN — PIPERACILLIN AND TAZOBACTAM 3.38 G: 36; 4.5 INJECTION, POWDER, FOR SOLUTION INTRAVENOUS at 23:54

## 2023-05-12 RX ADMIN — APIXABAN 2.5 MG: 2.5 TABLET, FILM COATED ORAL at 18:22

## 2023-05-12 RX ADMIN — METOPROLOL SUCCINATE 25 MG: 25 TABLET, EXTENDED RELEASE ORAL at 09:14

## 2023-05-12 RX ADMIN — METHENAMINE HIPPURATE 1 G: 1 TABLET ORAL at 18:22

## 2023-05-12 RX ADMIN — FERROUS SULFATE TAB 325 MG (65 MG ELEMENTAL FE) 325 MG: 325 (65 FE) TAB at 09:14

## 2023-05-12 RX ADMIN — PIPERACILLIN AND TAZOBACTAM 3.38 G: 36; 4.5 INJECTION, POWDER, FOR SOLUTION INTRAVENOUS at 04:15

## 2023-05-12 RX ADMIN — PIPERACILLIN AND TAZOBACTAM 3.38 G: 36; 4.5 INJECTION, POWDER, FOR SOLUTION INTRAVENOUS at 11:45

## 2023-05-12 RX ADMIN — Medication 400 MCG: at 09:14

## 2023-05-12 RX ADMIN — FLUTICASONE FUROATE AND VILANTEROL TRIFENATATE 1 PUFF: 100; 25 POWDER RESPIRATORY (INHALATION) at 09:15

## 2023-05-12 RX ADMIN — APIXABAN 2.5 MG: 2.5 TABLET, FILM COATED ORAL at 09:14

## 2023-05-12 RX ADMIN — PIPERACILLIN AND TAZOBACTAM 3.38 G: 36; 4.5 INJECTION, POWDER, FOR SOLUTION INTRAVENOUS at 17:55

## 2023-05-12 NOTE — PHYSICAL THERAPY NOTE
Physical Therapy Evaluation    Patient's Name: Karen Soto    Admitting Diagnosis  Multiple injuries [T07  XXXA]  Chronic indwelling Cloud catheter [Z97 8]    Problem List  Patient Active Problem List   Diagnosis   • Atrial fibrillation ([address] )   • COPD (chronic obstructive pulmonary disease) ([facility]   • Chronic indwelling Cloud catheter   • UTI (urinary tract infection)   • SDH (subdural hematoma) ([facility])   • Syncope and collapse   • Leukocytosis       Past Medical History  Past Medical History:   Diagnosis Date   • Ambulatory dysfunction    • Atrial fibrillation [facility])        Past Surgical History  Past Surgical History:   Procedure Laterality Date   • CARDIAC LOOP RECORDER  23 1319   PT Last Visit   PT Visit Date 23   Note Type   Note type Evaluation   Pain Assessment   Pain Assessment Tool 0-10   Pain Score No Pain   Restrictions/Precautions   Other Precautions Cognitive; Chair Alarm; Bed Alarm; Fall Risk;Telemetry;Multiple lines   Home Living   Type of Home SNF  ([facility])   Home Equipment Cane;Walker   Prior Function   Lives With Facility staff   Receives Help From Personal care attendant   Falls in the last 6 months 5 to 10  (pt states 4-5 falls recently)   Vocational Retired   Comments pt states he uses SPC, CR states RW with assist x 1,   Cognition   Overall Cognitive Status Impaired   Orientation Level Oriented X4   Memory Decreased recall of recent events   Following Commands Follows one step commands with increased time or repetition   Comments pt ID by wristband, name and    Subjective   Subjective pt supine in bed agreeable to PT intervention   RLE Assessment   RLE Assessment WFL   LLE Assessment   LLE Assessment WFL   Bed Mobility   Supine to Sit 5  Supervision   Additional items Increased time required   Transfers   Sit to Stand 5  Supervision   Additional items Increased time required; Impulsive   Stand to Sit 5  Supervision   Additional items Increased time required; Impulsive   Ambulation/Elevation   Gait pattern Poor UE support; Improper Weight shift;Decreased R stance; Inconsistent khanh   Gait Assistance 4  Minimal assist  (CGA)   Additional items Assist x 1   Assistive Device Rolling walker   Distance 45' x 2   Stair Management Assistance Not tested   Balance   Static Sitting Good   Dynamic Sitting Good   Static Standing Fair +   Dynamic Standing Fair   Ambulatory Fair -  (RW)   Activity Tolerance   Activity Tolerance Patient tolerated treatment well   Medical Staff Made Aware REBEKA Arriaza RN Ibis   Assessment   Prognosis Fair   Problem List Decreased strength;Decreased endurance; Impaired balance;Decreased mobility; Decreased cognition; Impaired judgement;Decreased safety awareness;Decreased skin integrity   Assessment Pt is a 78 y o  male seen for PT evaluation s/p admit to [facility] on 5/12/[facility]23  Pt was admitted with a primary dx of: syncope and collapse  PT now consulted for assessment of mobility and d/c needs  Pt with Up and OOB as tolerated orders  Pts current comorbidities and personal factors effecting treatment include: afib, COPD, significant falls history  Pts current clinical presentation is Unstable/Unpredictable (high complexity) due to Ongoing medical management for primary dx, Increased reliance on more restrictive AD compared to baseline, Decreased activity tolerance compared to baseline, Fall risk, Increased assistance needed from caregiver at current time, Ongoing telemetry monitoring  Prior to admission, pt was independent with use of SC  Upon evaluation, pt currently is requiring Supervision for bed mobility; Supervision for transfers and Diana for ambulation 45 ft w/ RW   Pt presents at PT eval functioning below baseline and currently w/ overall mobility deficits 2* to: BLE weakness, impaired balance, decreased endurance, impaired coordination, pain, decreased activity tolerance compared to baseline, decreased functional mobility tolerance compared to baseline, decreased safety awareness, impaired judgement, fall risk, decreased skin integrity  Pt currently at a fall risk 2* to impairments listed above  Pt will continue to benefit from skilled acute PT interventions to address stated impairments; to maximize functional mobility; for ongoing pt/ family training; and DME needs  At conclusion of PT session chair alarm engaged, RN notified of session findings/recommendations and pt returned back in recliner chair with phone and call bell within reach  Pt denies any further questions at this time  Recommend no rehab needs upon hospital D/C  Goals   Patient Goals to get out of here   STG Expiration Date 05/22/23   Short Term Goal #1 In 10 days pt will be able to: 1  Demonstrate ability to perform all aspects of bed mobility independently to improve functional safety  2  Perform functional transfers independently to facilitate safe return to previous living environment  3   Ambulate 150 ft with LRAD independently with stable vitals to improve safety with household distances and reduce fall risk  4  Improve LE strength grades by 1 to increase ease of functional mobility with transfers and gait  5  Pt will demonstrate improved balance by one grade in order to decrease risk of falls  6  Tolerate 3 hours OOB in order to promote postural endurance and improved activity tolerance   PT Treatment Day 0   Plan   Treatment/Interventions Functional transfer training;LE strengthening/ROM; Therapeutic exercise;Cognitive reorientation;Patient/family training;Equipment eval/education;Spoke to nursing;Bed mobility;Gait training   PT Frequency 2-3x/wk   Recommendation   PT Discharge Recommendation No rehabilitation needs   Equipment Recommended [facility] Recommended Wheeled walker   Change/add to EasyRun?  No   AM-PAC Basic Mobility Inpatient   Turning in Flat Bed Without Bedrails 3   Lying on Back to Sitting on Edge of Flat Bed Without Bedrails 3   Moving Bed to Chair 4   Standing Up From Chair Using Arms 4   Walk in Room 4   Climb 3-5 Stairs With Railing 4   Basic Mobility Inpatient Raw Score 22   Basic Mobility Standardized Score 47 4   Highest Level Of Mobility   JH-HLM Goal 7: Walk 25 feet or more   JH-HLM Achieved 7: Walk 25 feet or more   End of Consult   Patient Position at End of Consult Bedside chair;Bed/Chair alarm activated; All needs within reach   The patient's AM-PAC Basic Mobility Inpatient Short Form Raw Score is 22  A Raw score of greater than 16 suggests the patient may benefit from discharge to home  Please also refer to the recommendation of the Physical Therapist for safe discharge planning      Tavon Yañez, PT

## 2023-05-12 NOTE — H&P
H&P - Trauma   Max Chatman 78 y o  male MRN: [id number]  Unit/Bed#: ED-42 Encounter: [id number]    Trauma Alert: Level B   Model of Arrival: Ambulance    Trauma Team: Attending Marilynn Melissa and RU[facility]  Consultants:     None     Assessment/Plan   Active Problems / Assessment:   - Pt reports he was getting out of bed to use the bathroom when he tripped and fell  - He reports head strike, no LOC, and takes Eliquis daily  - EMS reports that patient was found down in his room at [facility], unresponsive  - Pt arrives GCS 15, non focal  - Has an indwelling miller catheter, but reports that when he urinates it leaks around the catheter and he cant control when he urinates  - Posterior scalp abrasions     Plan:   - CXR normal  - FAST negative  - CT Head, C-spine negative  - UA positive for UTI   - Blood cultures ordered prior to antibiotics   - Since patient has history of recurrent UTIs and follows with Urology, it is recommended that urology is consulted as they have discussed possible suprapubic catheter placement in the past   - Pt on Hiprex 1 g BID according to linked chart  - CBC positive for leukocytosis  - Admit to internal medicine for urinary tract infection  - Pt asked for me to call his wife, but the listed phone number is not in service  Cervical Collar Clearance: The patient had a CT scan of the cervical spine demonstrating no acute injury  On exam, the patient had no midline point tenderness or paresthesias/numbness/weakness in the extremities  The patient had full range of motion (was then able to flex, extend, and rotate head laterally) without pain  There were no distracting injuries and the patient was not intoxicated  The patient's cervical spine was cleared radiologically and clinically  Cervical collar removed at this time       Serjio Fuchs PA-C  5/12/2023 2:57 AM           History of Present Illness     Chief Complaint: fall  Mechanism:Fall     HPI:    Max Chatman is a 78 y o  male who presents after a fall  Pt reports that he was getting out of bed to use the bathroom when he tripped and fell  He reports head strike, no loss of consciousness, and reports he takes Eliquis daily  Pt reports he was on the floor when nursing came to help him up, and he denies any memory loss of the event  EMS reports the facility found him on the floor unresponsive, but then woke up with more stimulation  Patient reports no pain at this time, he is awake and oriented  Review of Systems   HENT: Negative for facial swelling  Eyes: Negative for photophobia  Respiratory: Negative for shortness of breath  Cardiovascular: Negative for chest pain  Gastrointestinal: Negative for abdominal pain and nausea  Genitourinary: Positive for difficulty urinating  Negative for dysuria and penile pain  Musculoskeletal: Negative for back pain and neck pain  Skin: Negative for wound  Neurological: Negative for dizziness, syncope, weakness, light-headedness, numbness and headaches  Psychiatric/Behavioral: Negative for confusion  All other systems reviewed and are negative  12-point, complete review of systems was reviewed and negative except as stated above  Historical Information     Past Medical History:   Diagnosis Date   • Ambulatory dysfunction    • Atrial fibrillation [facility])      Past Surgical History:   Procedure Laterality Date   • CARDIAC LOOP RECORDER  09/2022      Unable to obtain history due to none    Social History     Tobacco Use   • Smoking status: Former     Types: Cigarettes   • Smokeless tobacco: Never   Substance Use Topics   • Alcohol use: Not Currently   • Drug use: Never       There is no immunization history on file for this patient  Last Tetanus: 3/2023  Family History: Non-contributory    1  Before the illness or injury that brought you to the Emergency, did you need someone to help you on a regular basis? 1=Yes   2   Since the illness or injury that brought you to the Emergency, have you needed more help than usual to take care of yourself? 1=Yes   3  Have you been hospitalized for one or more nights during the past 6 months (excluding a stay in the Emergency Department)? 1=Yes   4  In general, do you see well? 1=No   5  In general, do you have serious problems with your memory? 1=Yes   6  Do you take more than three different medications everyday? 1=Yes   TOTAL   6     Did you order a geriatric consult if the score was 2 or greater?: yes     Meds/Allergies   all current active meds have been reviewed  Allergies have not been reviewed; Not on File    Objective   Initial Vitals:   Temperature: 98 7 °F (37 1 °C) (05/12/23 0042)  Pulse: 82 (05/12/23 0042)  Respirations: 16 (05/12/23 0042)  Blood Pressure: 159/97 (05/12/23 0042)    Primary Survey:   Airway:        Status: patent;        Pre-hospital Interventions: none        Hospital Interventions: none  Breathing:        Pre-hospital Interventions: none       Effort: normal       Right breath sounds: normal       Left breath sounds: normal  Circulation:        Rhythm: regular       Rate: regular   Right Pulses Left Pulses    R radial: 2+  R femoral: 2+  R pedal: 2+  R carotid: 2+  R popliteal: 2+ L radial: 2+  L femoral: 2+  L pedal: 2+  L carotid: 2+  L popliteal: 2+   Disability:        GCS: Eye: 4; Verbal: 5 Motor: 6 Total: 15       Right Pupil: round;  reactive         Left Pupil:  round;  reactive      R Motor Strength L Motor Strength    R : 5/5  R dorsiflex: 5/5  R plantarflex: 5/5 L : 5/5  L dorsiflex: 5/5  L plantarflex: 5/5        Sensory:  No sensory deficit  Exposure:       Completed: Yes      Secondary Survey:  Physical Exam  Vitals reviewed  Constitutional:       General: He is not in acute distress  Appearance: Normal appearance  HENT:      Head: Normocephalic        Comments: Posterior scalp abrasions     Right Ear: External ear normal       Left Ear: External ear normal       Nose: Nose normal  Mouth/Throat:      Mouth: Mucous membranes are moist       Pharynx: Oropharynx is clear  Eyes:      Extraocular Movements: Extraocular movements intact  Conjunctiva/sclera: Conjunctivae normal       Pupils: Pupils are equal, round, and reactive to light  Cardiovascular:      Rate and Rhythm: Normal rate and regular rhythm  Pulses: Normal pulses  Heart sounds: Normal heart sounds  Pulmonary:      Effort: Pulmonary effort is normal  No respiratory distress  Breath sounds: Normal breath sounds  Chest:      Chest wall: No tenderness  Abdominal:      General: Abdomen is flat  Bowel sounds are normal  There is no distension  Palpations: Abdomen is soft  There is no mass  Tenderness: There is no abdominal tenderness  There is no guarding or rebound  Hernia: No hernia is present  Genitourinary:     Comments: Indwelling miller catheter in place  Urethral meatus enlarged/ deformed from prior urology procedure  Musculoskeletal:         General: No swelling, tenderness, deformity or signs of injury  Normal range of motion  Cervical back: No tenderness  Skin:     General: Skin is warm and dry  Capillary Refill: Capillary refill takes less than 2 seconds  Findings: No bruising or lesion  Neurological:      General: No focal deficit present  Mental Status: He is alert and oriented to person, place, and time  Mental status is at baseline  Sensory: No sensory deficit  Motor: No weakness     Psychiatric:         Mood and Affect: Mood normal          Behavior: Behavior normal          Invasive Devices     None               Lab Results:   Results: I have personally reviewed all pertinent laboratory/tests results, BMP/CMP:   Lab Results   Component Value Date    SODIUM 137 05/12/2023    K 4 2 05/12/2023     05/12/2023    CO2 24 05/12/2023    CO2 27 05/12/2023    BUN 18 05/12/2023    CREATININE 0 99 05/12/2023    GLUCOSE 118 05/12/2023    CALCIUM 8 3 (L) 05/12/2023    EGFR 74 05/12/2023    and CBC:   Lab Results   Component Value Date    WBC 14 41 (H) 05/12/2023    HGB 13 1 05/12/2023    HCT 41 4 05/12/2023    MCV 97 05/12/2023     05/12/2023    MCH 30 5 05/12/2023    MCHC 31 6 05/12/2023    RDW 14 4 05/12/2023    MPV 12 0 05/12/2023    NRBC 0 05/12/2023       Imaging Results: I have personally reviewed pertinent reports  Chest Xray(s): negative for acute findings   FAST exam(s): negative for acute findings   CT Scan(s): negative for acute findings   Additional Xray(s): N/A     Other Studies:   XR Trauma multiple (SLB/RA trauma bay ONLY)   Final Result by Teresita Dempsey MD (05/12 0133)      No pneumothorax or pleural effusion  Workstation performed: [id number]         TRAUMA - CT head wo contrast   Final Result by Teresita Dempsey MD (05/12 0133)      1  Focal soft tissue laceration along the midline posterior scalp  2   No intracranial hemorrhage or calvarial fracture  Workstation performed: [id number]         TRAUMA - CT spine cervical wo contrast   Final Result by Teresita Dempsey MD (05/12 0133)      No cervical spine fracture or traumatic malalignment  Moderate spinal canal stenosis at C3-C4, C4-C5, and C5-C6 with associated bilateral neuroforaminal narrowing        Workstation performed: [id number]         XR chest 1 view    (Results Pending)         Code Status: No Order  Advance Directive and Living Will:      Power of :    POLST:    I have spent 30 minutes with Patient  today in which greater than 50% of this time was spent in counseling/coordination of care regarding Diagnostic results, Prognosis, Risks and benefits of tx options, Instructions for management, Patient and family education, Importance of tx compliance, Risk factor reductions, Impressions, Counseling / Coordination of care, Documenting in the medical record, Reviewing / ordering tests, medicine, procedures  , Obtaining or reviewing history   and Communicating with other healthcare professionals

## 2023-05-12 NOTE — ASSESSMENT & PLAN NOTE
· 2/2 urinary retention  · Patient reports leaking around current miller  · Urology consult - prior discussion about suprapubic catheter placement in the past based on chart review

## 2023-05-12 NOTE — TELEPHONE ENCOUNTER
Ania Multani from [facility] is calling for patient falling, has laceration to the head and patient was sent to the hospital      Any questions or concern splease call [phone]

## 2023-05-12 NOTE — ASSESSMENT & PLAN NOTE
HR 70's-90's   Has a loop recorder  Denies cp or palpitations  Rate control with metoprolol   C/w  Eliquis 2 5 mg BID  F/u with cardiology OP

## 2023-05-12 NOTE — CONSULTS
Consult - Urology   Joan Baez 1945, 78 y o  male MRN: [id number]    Unit/Bed#: ICU 15 Encounter: [id number]    Assessment & Plan:  1  UTI  2  Leaking miller catheter  - Hari Prater is a 31-year-old male with past med history A-fib on Eliquis, COPD, chronic indwelling urinary catheter, recurrent UTIs admitted as a trauma status post fall   - Patient known to our practice with chronic indwelling Miller catheter  Was recently started on Hiprex for frequent UTIs  Patient had discussed suprapubic catheter placement, would need CT pelvis as patient had large inguinal hernia repaired  - Patient reports miller was exchanged 2 days ago  - Flushed catheter at bedside with return of dark yellow urine  No leakage  Patient with urethral erosion     - Maintain miller catheter  - VSS, afebrile  - Follow up urine/blood cultures  - UA innumerable WBC, occasional bacteria  Large leukocytes, nitrite positive  Previous urine cultures growing Proteus mirabilis  - Discussed with patient suprapubic catheter  Patient indifferent  Agreeable to SPT in the future  - Patient would need CT pelvis prior    - Follow up outpatient for IR referral  No acute need for SPT placement  - Medical optimization and antibiosis per primary team    - Urology will sign off  Please do not hesitate tor reach out with any questions or concerns  Subjective:   Hari Prater is a 31-year-old male with past medical history A-fib on Eliquis, COPD, chronic indwelling urinary catheter, recurrent UTIs, SDH who presented to the ED as a trauma due to fall  Patient suffered from a fall after attempting to return back to bed after using his bathroom  Patient does not know if he hit his head or lost consciousness  CT head CT spine unremarkable  Patient known to our practice for chronic indwelling urinary catheter  Utilizes 16 [employer] coudé catheters, with monthly changes  Patient was started on Hiprex for frequent UTIs    Patient had discussed suprapubic catheter in the office, was not a candidate due to large inguinal hernia  Patient has had large inguinal hernia repaired 8/11/2022  Urology was consulted due to miller leakage and discuss SPT  Review of Systems   Constitutional: Negative for chills and fever  Respiratory: Negative for cough and shortness of breath  Cardiovascular: Negative for chest pain and palpitations  Gastrointestinal: Negative for abdominal pain and vomiting  Genitourinary: Negative for dysuria and hematuria  Musculoskeletal: Negative for arthralgias and back pain  Skin: Negative for color change and rash  Neurological: Negative for seizures and syncope  All other systems reviewed and are negative  Objective:  Vitals: Blood pressure 130/79, pulse 81, temperature 97 9 °F (36 6 °C), temperature source Oral, resp  rate 20, height 6' (1 829 m), weight 68 2 kg (150 lb 5 7 oz), SpO2 97 %  ,Body mass index is 20 39 kg/m²  Physical Exam  Constitutional:       General: He is not in acute distress  Appearance: Normal appearance  He is normal weight  He is not ill-appearing or toxic-appearing  HENT:      Head: Normocephalic and atraumatic  Right Ear: External ear normal       Left Ear: External ear normal       Nose: Nose normal       Mouth/Throat:      Mouth: Mucous membranes are moist    Eyes:      General: No scleral icterus  Conjunctiva/sclera: Conjunctivae normal    Cardiovascular:      Rate and Rhythm: Normal rate  Pulses: Normal pulses  Pulmonary:      Effort: Pulmonary effort is normal    Abdominal:      General: Abdomen is flat  There is no distension  Palpations: Abdomen is soft  Tenderness: There is no abdominal tenderness  There is no right CVA tenderness, left CVA tenderness or guarding  Genitourinary:     Comments: Miller catheter draining yellow urine, mild sediment  Musculoskeletal:         General: Normal range of motion  Cervical back: Normal range of motion  Skin:     General: Skin is warm  Findings: No rash  Neurological:      General: No focal deficit present  Mental Status: He is alert and oriented to person, place, and time  Mental status is at baseline  Labs:  Recent Labs     05/12/23  0112   WBC 14 41*     Recent Labs     05/12/23  0050 05/12/23  0112   HGB 13 3 13 1     Recent Labs     05/12/23  0112   CREATININE 0 99         History:  Social History     Socioeconomic History   • Marital status: /Civil Union     Spouse name: None   • Number of children: None   • Years of education: None   • Highest education level: None   Occupational History   • None   Tobacco Use   • Smoking status: Former     Types: Cigarettes   • Smokeless tobacco: Never   Vaping Use   • Vaping Use: Never used   Substance and Sexual Activity   • Alcohol use: Not Currently   • Drug use: Never   • Sexual activity: Not Currently   Other Topics Concern   • None   Social History Narrative   • None     Social Determinants of Health     Financial Resource Strain: Not on file   Food Insecurity: Not on file   Transportation Needs: Not on file   Physical Activity: Not on file   Stress: Not on file   Social Connections: Not on file   Intimate Partner Violence: Not on file   Housing Stability: Not on file     Past Medical History:   Diagnosis Date   • Ambulatory dysfunction    • Atrial fibrillation [facility])      Past Surgical History:   Procedure Laterality Date   • CARDIAC LOOP RECORDER  09/2022     No family history on file      Amy Werner PA-C  Date: 5/12/2023 Time: 8:50 AM

## 2023-05-12 NOTE — OCCUPATIONAL THERAPY NOTE
Occupational Therapy Evaluation     Patient Name: Carolee Sherwood  [id number] Date: 5/12/2023  Problem List  Principal Problem:    Syncope and collapse  Active Problems:    Atrial fibrillation (HCC)    COPD (chronic obstructive pulmonary disease) (HCC)    Chronic indwelling Cloud catheter    UTI (urinary tract infection)    SDH (subdural hematoma) (HCC)    Leukocytosis    Past Medical History  Past Medical History:   Diagnosis Date    Ambulatory dysfunction     Atrial fibrillation [facility])      Past Surgical History  Past Surgical History:   Procedure Laterality Date    CARDIAC LOOP RECORDER  09/2022 05/12/23 1302   OT Last Visit   OT Visit Date 05/12/23   Note Type   Note type Evaluation   Pain Assessment   Pain Assessment Tool 0-10   Pain Score No Pain   Restrictions/Precautions   Other Precautions Cognitive; Chair Alarm; Bed Alarm; Fall Risk;Multiple lines   Home Living   Type of Home SNF  ([facility])   Home Equipment Cane;Walker   Additional Comments Pt reports use of SPC at baseline, per chart review pt is A x1 with RW at baseline   Prior Function   Level of [place] Independent with ADLs  (pt reports (I) with all ADLs and states he sponge bathes at the sink, per chart review pt is set-up assist with all ADL tasks)   Lives With Facility staff   Receives Help From Personal care attendant   IADLs   (goes to [employer] for meals)   Falls in the last 6 months   (4-5 recently)   Vocational Retired  (used to work for [employer])   Comments Per chart review pt was kicked out of his last residence and has since been at [address] recently at [facility], was d/c'd on 5/1, he was attempting to papito down a bus when he got dizzy and fell, readmitted to 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 [address]  "Gratification enjoys reading, especially [address]   Family/Caregiver Present No   Subjective   Subjective \"I think these might be too big\" (pt talking about briefs)   ADL   Eating Assistance 5  Supervision/Setup   Grooming Assistance 5  Supervision/Setup   UB Bathing Assistance 5  Supervision/Setup   LB [address] Deficit Increased time to complete;Supervision/safety;Verbal cueing; Thread RLE into pants; Thread LLE into pants;Pull up over hips; Thread RLE into underwear; Thread LLE into underwear  (A with threading catheter into underwear and pants)   Toileting Assistance  5  Supervision/Setup   Bed Mobility   Supine to Sit 5  Supervision   Additional items Increased time required;Verbal cues   Transfers   Sit to Stand 5  Supervision   Additional items Increased time required;Verbal cues   Stand to Sit 5  Supervision   Additional items Increased time required;Verbal cues   Additional Comments use of RW   Functional Mobility   Functional Mobility 5  Supervision   Additional Comments functional household distance   Additional items Rolling walker   Balance   Static Sitting Good   Dynamic Sitting Good   Static Standing Fair +   Dynamic Standing Fair   Ambulatory Fair -   Activity Tolerance   Activity Tolerance Patient tolerated treatment well   Medical Staff Made Aware PT JOHANNY Monte, BENNY GARCIA Assessment   RUE Assessment [facility] Assessment   LUE Assessment WFL   Hand Function   Gross Motor Coordination Functional   Fine Motor Coordination Functional   Cognition   Overall Cognitive Status Impaired   Arousal/Participation Alert; Cooperative   Attention Attends with cues to redirect   Orientation Level Oriented X4   Memory Decreased short term memory;Decreased recall of recent events;Decreased recall of biographical information   Following Commands Follows one step " commands with increased time or repetition   Comments Pt is a poor historian at times, requires VC for safety awareness and problem solving   Assessment   Limitation Decreased ADL status; Decreased UE strength;Decreased Safe judgement during ADL;Decreased cognition;Decreased endurance;Decreased self-care trans;Decreased high-level ADLs   Prognosis Good   Assessment Pt is a 78 y o  male seen for OT evaluation s/p admission to [facility] on 5/12/2023 due to fall  Diagnosed with Syncope and collapse  See medical history above for extensive list of comorbidities and significant PMHx affecting pt's functional performance at time of eval  Personal and env factors supporting pt at time of IE include supportive and accessible facility  Personal and env factors inhibiting engagement in occupations include current habits and behavioral patterns, lifestyle patterns and difficulty completing IADLs  During evaluation pt performed as is outlined above in flowsheet  Performance deficits that affect the pt’s occupational performance include impaired functional mobility, endurance, activity tolerance, forward functional reach and overall strength, attention to task, safety awareness, insight into deficits, problem solving and learning/remembering new tasks  Based on pt’s functional performance and deficits the following occupations will be addressed in OT treatments in order to maximize pt’s independence and overall occupational performance: grooming, bathing/showering, toileting and toilet hygiene, dressing and functional mobility  Goals   Patient Goals to find a home again   LTG Time Frame 10-14   Long Term Goal see goals listed below   Plan   Treatment Interventions ADL retraining;Functional transfer training; Endurance training;Cognitive reorientation;Patient/family training;Equipment evaluation/education; Compensatory technique education; Activityengagement   Goal Expiration Date 05/22/23   OT Treatment Day 0   OT Frequency 2-3x/wk Recommendation   OT Discharge Recommendation No rehabilitation needs  (return to facility with continued support)   AM-PAC Daily Activity Inpatient   Lower Body Dressing 3   Bathing 3   Toileting 3   Upper Body Dressing 3   Grooming 3   Eating 3   Daily Activity Raw Score 18   Daily Activity Standardized Score (Calc for Raw Score >=11) 38 66   AM-PAC Applied Cognition Inpatient   Following a Speech/Presentation 3   Understanding Ordinary Conversation 3   Taking Medications 1   Remembering Where Things Are Placed or Put Away 2   Remembering List of 4-5 Errands 1   Taking Care of Complicated Tasks 1   Applied Cognition Raw Score 11   Applied Cognition Standardized Score 27 03   Barthel Index   Feeding 5   Bathing 0   Grooming Score 5   Dressing Score 5   Bladder Score 0   Bowels Score 10   Toilet Use Score 10   Transfers (Bed/Chair) Score 10   Mobility (Level Surface) Score 10   Stairs Score 0   Barthel Index Score 55   End of Consult   Patient Position at End of Consult Bedside chair;Bed/Chair alarm activated; All needs within reach        GOALS:      -Patient will perform grooming tasks standing at sink with overall Mod I in order to increase overall independence    -Patient will be Mod I with UB ADLs using AE and AD as needed in order to increase (I) with ADLs    -Patient will be Mod I with LB ADLs with use of AE and AD as needed in order to increase (I) with ADLs    -Patient will complete toileting w/ Mod I w/ G hygiene/thoroughness in order to reduce caregiver burden    -Patient will demonstrate Mod I with bed mobility for ability to manage own comfort and initiate OOB tasks      -Patient will perform functional transfers with Mod I to/from all surfaces using DME as needed in order to increase (I) with functional tasks    -Patient will be Mod I with functional mobility to/from bathroom for increased independence with toileting tasks    -Patient will tolerate therapeutic activities for greater than 30 min, in order to increase tolerance for functional activities      -Patient will increase OOB/sitting tolerance to 2-4 hours per day to increase participation in self-care and leisure tasks with no s/s of exertion      -Patient will engage in ongoing cognitive assessment in order to assist with safe discharge planning/recommendations     -Patient will follow multi-step instructions with no VC in order to safely complete functional tasks     -Patient will attend to functional task for 10 min without VC for attention/redirection       The patient's raw score on the -PAC Daily Activity Inpatient Short Form is 18  A raw score of less than 19 suggests the patient may benefit from discharge to post-acute rehabilitation services  Please refer to the recommendation of the Occupational Therapist for safe discharge planning  This session, pt required and most appropriately benefited from skilled OT/PT co-eval due to  anticipated regression from baseline   OT and PT goals were addressed separately as seen in documentation       Ling Streeter MS, OTR/L

## 2023-05-12 NOTE — PROGRESS NOTES
[facility]  Progress Note  Name: Kendal Hernandez  MRN: [id number]  Unit/Bed#: ICU 15 I Date of Admission: 5/12/2023   Date of Service: 5/12/2023 I Hospital Day: 0    Assessment/Plan   SDH (subdural hematoma) (HCC)  Assessment & Plan  Per HCT - no intracranial hemorrhage    UTI (urinary tract infection)  Assessment & Plan  Per Primary team  On Zoysn  Monitor labs    * Syncope and collapse  Assessment & Plan  24 hour telemetry monitoring  Orthostatics  Monitor labs             TRAUMA TERTIARY SURVEY NOTE    VTE Prophylaxis:Sequential compression device (Venodyne)      Disposition: per primary team    Code status:  Level 3 - DNAR and DNI    Consultants: IP CONSULT TO UROLOGY  IP CONSULT TO CASE MANAGEMENT    Subjective   Transfer from: site of injury    Mechanism of Injury:Fall     Chief Complaint: states he is hungry    HPI/Last 24 hour events: admitted to Medicine and Trauma performed [facility] exam and is signing off  Objective   Vitals:   Temp:  [97 6 °F (36 4 °C)-98 7 °F (37 1 °C)] 97 6 °F (36 4 °C)  HR:  [71-87] 71  Resp:  [16-22] 22  BP: (115-169)/(73-97) 115/73    I/O       05/10 0701  05/11 0700 05/11 0701  05/12 0700 05/12 0701  05/13 0700    Urine (mL/kg/hr)   425 (0 7)    Total Output   425    Net   -425                  Physical Exam:   GENERAL APPEARANCE: OOB in a chair  NEURO: GCS - 15  HEENT: Eom's intact  CV: No complaint of chest discomfort, RRR  LUNGS: non-labored breathing, CTA bilaterally  GI: NPO  : voiding  MSK: moving all extremities  SKIN: warm and dry    Invasive Devices     Peripheral Intravenous Line  Duration           Peripheral IV 05/12/23 Right;Ventral (anterior) Forearm <1 day          Drain  Duration           Urethral Catheter <1 day                   1  Before the illness or injury that brought you to the Emergency, did you need someone to help you on a regular basis? 1=Yes   2   Since the illness or injury that brought you to the Emergency, have you "needed more help than usual to take care of yourself? 0=No   3  Have you been hospitalized for one or more nights during the past 6 months (excluding a stay in the Emergency Department)? 0=No   4  In general, do you see well? 0=Yes   5  In general, do you have serious problems with your memory? 0=No   6  Do you take more than three different medications everyday? 1=Yes   TOTAL   2     Did you order a geriatric consult if the score was 2 or greater?: yes         Lab Results:    Latest Reference Range & Units 05/12/23 06:41 05/12/23 09:37 05/12/23 11:07   hs TnI 0hr \"Refer to ACS Flowchart\"- see link ng/L 10     hs TnI 2hr \"Refer to ACS Flowchart\"- see link ng/L   9   Delta 2hr hsTnI <20 ng/L   -1   hs TnI 4hr \"Refer to ACS Flowchart\"- see link ng/L  9    Delta 4hr hsTnI <20 ng/L  -1        Imaging Results:   Chest Xray(s): CXR - No pneumothorax or pleural effusion       FAST exam(s): N/A   CT Scan(s): HCT -   Focal soft tissue laceration along the midline posterior scalp  2   No intracranial hemorrhage or calvarial fracture          Additional Xray(s): Other Studies: CT C-spine -No cervical spine fracture or traumatic malalignment   Moderate spinal canal stenosis at C3-C4, C4-C5, and C5-C6 with associated bilateral neuroforaminal narrowing        "

## 2023-05-12 NOTE — ED NOTES
Lazarus Rimes from [facility] made aware pt is being admitted        Darling Allred, JOHANNY  05/12/23 [id number]

## 2023-05-12 NOTE — ASSESSMENT & PLAN NOTE
"· Presentation: Patient presented to the ED as a trauma due a fall he suffered upon attempting to return back to bed after using the bathroom  Patient reports that his legs gave out from under him and he fell to the ground  He does not recall if he hit head and states that he may have lost consciousness for a couple minutes  Patient is on Eliquis  · CT head: \"Focal soft tissue laceration along the midline posterior scalp  No intracranial hemorrhage of calvarial fracture  \"  · CT cervical spine: \"No cervical spine fracture or traumatic malalignment  Moderate spinal canal stenosis at C3-C4, C4-C5, and C5-C6 with associated bilateral neuroforaminal narrowing  \"  · Trauma X-rays: \"No pneumothorax or pleural effusion  \"  · Hx of orthostatic hypotension  · Trend troponins x 3   · Monitor on telemetry  · Neuro checks q 4h  · Fall precautions  · Continue home midodrine prn  · PT/OT evaluations    "

## 2023-05-12 NOTE — ED PROVIDER NOTES
Emergency Department Airway Evaluation and Management Form    History  Obtained from: patient and EMS  Patient has no allergy information on record  No chief complaint on file  HPI Patient is a 78year old male who went to go to the bathroom tonight at got dizzy and fell  Patient is on eliquis  Struck head  Airway intact  PERRL 4 mm and oropharynx clear and TMs clear  ED resident Dr Joe Gregory evaluated patient's airway in trauma ED  Trauma team seeing patient in trauma ED  No past medical history on file  No past surgical history on file  No family history on file  I have reviewed and agree with the history as documented  Review of Systems    Physical Exam  There were no vitals taken for this visit      Physical Exam    ED Medications  Medications - No data to display    Intubation  Procedures    Notes      Final Diagnosis  Final diagnoses:   None       ED Provider  Electronically Signed by     Gretchen Celeste MD  05/12/23 0096

## 2023-05-12 NOTE — PLAN OF CARE
Problem: OCCUPATIONAL THERAPY ADULT  Goal: Performs self-care activities at highest level of function for planned discharge setting  See evaluation for individualized goals  Description: Treatment Interventions: ADL retraining, Functional transfer training, Endurance training, Cognitive reorientation, Patient/family training, Equipment evaluation/education, Compensatory technique education, Activityengagement          See flowsheet documentation for full assessment, interventions and recommendations  Note: Limitation: Decreased ADL status, Decreased UE strength, Decreased Safe judgement during ADL, Decreased cognition, Decreased endurance, Decreased self-care trans, Decreased high-level ADLs  Prognosis: Good  Assessment: Pt is a 78 y o  male seen for OT evaluation s/p admission to [facility] on 5/12/2023 due to fall  Diagnosed with Syncope and collapse  See medical history above for extensive list of comorbidities and significant PMHx affecting pt's functional performance at time of eval  Personal and env factors supporting pt at time of IE include supportive and accessible facility  Personal and env factors inhibiting engagement in occupations include current habits and behavioral patterns, lifestyle patterns and difficulty completing IADLs  During evaluation pt performed as is outlined above in flowsheet  Performance deficits that affect the pt’s occupational performance include impaired functional mobility, endurance, activity tolerance, forward functional reach and overall strength, attention to task, safety awareness, insight into deficits, problem solving and learning/remembering new tasks  Based on pt’s functional performance and deficits the following occupations will be addressed in OT treatments in order to maximize pt’s independence and overall occupational performance: grooming, bathing/showering, toileting and toilet hygiene, dressing and functional mobility       OT Discharge Recommendation: No rehabilitation needs (return to facility with continued support)

## 2023-05-12 NOTE — PLAN OF CARE
Problem: PHYSICAL THERAPY ADULT  Goal: Performs mobility at highest level of function for planned discharge setting  See evaluation for individualized goals  Description: Treatment/Interventions: Functional transfer training, LE strengthening/ROM, Therapeutic exercise, Cognitive reorientation, Patient/family training, Equipment eval/education, Spoke to nursing, Bed mobility, Gait training  Equipment Recommended: Rusty Good       See flowsheet documentation for full assessment, interventions and recommendations  5/12/2023 1426 by Tavon Yañez PT  Outcome: Progressing  Note: Prognosis: Fair  Problem List: Decreased strength, Decreased endurance, Impaired balance, Decreased mobility, Decreased cognition, Impaired judgement, Decreased safety awareness, Decreased skin integrity  Assessment: Pt is a 78 y o  male seen for PT evaluation s/p admit to [facility] on 5/12/2023  Pt was admitted with a primary dx of: syncope and collapse  PT now consulted for assessment of mobility and d/c needs  Pt with Up and OOB as tolerated orders  Pts current comorbidities and personal factors effecting treatment include: afib, COPD, significant falls history  Pts current clinical presentation is Unstable/Unpredictable (high complexity) due to Ongoing medical management for primary dx, Increased reliance on more restrictive AD compared to baseline, Decreased activity tolerance compared to baseline, Fall risk, Increased assistance needed from caregiver at current time, Ongoing telemetry monitoring  Prior to admission, pt was independent with use of SC  Upon evaluation, pt currently is requiring Supervision for bed mobility; Supervision for transfers and Diana for ambulation 45 ft w/ RW   Pt presents at PT eval functioning below baseline and currently w/ overall mobility deficits 2* to: BLE weakness, impaired balance, decreased endurance, impaired coordination, pain, decreased activity tolerance compared to baseline, decreased functional mobility tolerance compared to baseline, decreased safety awareness, impaired judgement, fall risk, decreased skin integrity  Pt currently at a fall risk 2* to impairments listed above  Pt will continue to benefit from skilled acute PT interventions to address stated impairments; to maximize functional mobility; for ongoing pt/ family training; and DME needs  At conclusion of PT session chair alarm engaged, RN notified of session findings/recommendations and pt returned back in recliner chair with phone and call bell within reach  Pt denies any further questions at this time  Recommend no rehab needs upon hospital D/C  PT Discharge Recommendation: No rehabilitation needs    See flowsheet documentation for full assessment

## 2023-05-12 NOTE — CASE MANAGEMENT
Case Management Assessment & Discharge Planning Note    Patient name Armen Sarkar  Location ICU 15/ICU 15 MRN [id number]  : 1945 Date 2023       Current Admission Date: 2023  Current Admission Diagnosis:Syncope and collapse   Patient Active Problem List    Diagnosis Date Noted   • At[facility] fibrillation ([facility] ) 2023   • COPD (chronic obstructive pulmonary disease) ([facility] ) 2023   • Chronic indwelling Cloud catheter 2023   • UTI (urinary tract infection) 2023   • SDH (subdural hematoma) ([facility] ) 2023   • Syncope and collapse 2023   • Leukocytosis 2023      LOS (days): 0  Geometric Mean LOS (GMLOS) (days):   Days to GMLOS:     OBJECTIVE:    Risk of Unplanned Readmission Score: 11 11      Current admission status: Inpatient       Preferred Pharmacy:   [address] [place] [address]  No address on file      Primary Care Provider: Logan Fisher MD    Primary Insurance: MEDICARE  Secondary Insurance:  FOR LIFE    ASSESSMENT:  [address] [place] [address], [address] Representative - Spouse   Primary Phone: [phone] (Mobile)               Advance Directives  Does patient have a [address]?: Yes  Does patient have Advance Directives?: No  Was patient offered paperwork?: Yes (declined at this time)  Primary Contact: Patient's spouse Tammy Young    Readmission Root Cause  30 Day Readmission: No    Patient Information  Admitted from[de-identified] Facility [place])  Mental Status: Alert  During Assessment patient was accompanied by: Not accompanied during assessment  Assessment information provided by[de-identified] Patient  Primary Caregiver: Self  Support Systems: Spouse/significant other  [place] of Residence: [address] do you live in?: [place] entry access options   Select all that apply : No steps to enter home  Type of Current Residence: Facility ([place])  Upon entering residence, is there a bedroom on the main floor (no further steps)?: Yes  Upon entering residence, is there a bathroom on the main floor (no further steps)?: Yes  In the last 12 months, was there a time when you were not able to pay the mortgage or rent on time?: No  In the last 12 months, was there a time when you did not have a steady place to sleep or slept in a shelter (including now)?: No  Homeless/housing insecurity resource given?: No  Living Arrangements: Other (Comment) (Currently at facility)    Activities of Daily Living Prior to Admission  Functional Status: Assistance  Completes ADLs independently?: No  Level of ADL dependence: Assistance  Ambulates independently?: No  Level of ambulatory dependence: Assistance  Does patient use assisted devices?: Yes  Assisted Devices (DME) used: Adonica Chanelle  Does patient currently own DME?: Yes  What DME does the patient currently own?: Adonica Chanelle  Does patient have a history of Outpatient Therapy (PT/OT)?: No  Does the patient have a history of Short-Term Rehab?: Yes ([facility])  Does patient have a history of HHC?: No  Does patient currently have [facility]?: No    Patient Information Continued  Income Source: SSI/SSD  Does patient have prescription coverage?: Yes  Food insecurity resource given?: No  Does patient receive dialysis treatments?: No  Does patient have a history of substance abuse?: No  Does patient have a history of Mental Health Diagnosis?: No    PHQ 2/9 Screening   Reviewed PHQ 2/9 Depression Screening Score?: No    Means of Transportation  Means of Transport to Appts[de-identified] Other (Comment) (facility transport)  In the past 12 months, has lack of transportation kept you from medical appointments or from getting medications?: No  In the past 12 months, has lack of transportation kept you from meetings, work, or from getting things needed for daily living?: No  Was application for public transport provided?: No    DISCHARGE DETAILS:    Discharge planning discussed with[de-identified] patient  Freedom of Choice: Yes     CM contacted family/caregiver?: No- see comments (Patient declined at this time)  Were Treatment Team discharge recommendations reviewed with patient/caregiver?: Yes  Did patient/caregiver verbalize understanding of patient care needs?: N/A- going to facility  Were patient/caregiver advised of the risks associated with not following Treatment Team discharge recommendations?: Yes    [address]         Is the patient interested in [facility] at discharge?: No    DME Referral Provided  Referral made for DME?: No    Other Referral/Resources/Interventions Provided:  Interventions: SNF, Facility Return  Referral Comments: [facility] to [facility]  Facility able to accept, does not need 3 midnight stay    Treatment Team Recommendation: Facility Return, SNF  Discharge Destination Plan[de-identified] Facility Return, SNF      CM spoke with [address] [place] [address] at   Patient alert and oriented x 4 at baseline  Patient is able to ambulate with RW with assist x 1  Patient needs set up for bathing and dressing  Patient able to feed self  CM sent referral via [facility] to [facility] for return once medically stable  Patient is a MA bedhold  Facility able to accept once medically stable  Patient does not need 3 midnight stay due to being a MA bedhold  CM spoke with patient at the bedside  CM introduced self and role  Patient confirmed he is currently at [facility]  Patient stated he does not have a home  Patient was kicked out of his last residence  Patient's spouse is his emergency contact  CM discussed with patient that facility is able to accept once medically stable  Patient expressed understanding  All questions/concerns answered at this time  CM will continue to f/u for DCP       CM reviewed discharge planning process including the following: identifying caregivers at home, preference for d/c planning needs,   availability of [facility] program, availability of treatment team to discuss questions or concerns patient and/or family may have regarding diagnosis, plan of care, old or new medications and discharge planning   CM will continue to follow for care coordination and update assessment as appropriate

## 2023-05-12 NOTE — PROCEDURES
POC FAST US    Date/Time: 5/12/2023 1:10 AM  Performed by: Reena Rudd PA-C  Authorized by: Reena Rudd PA-C     Patient location:  Trauma  Procedure details:     Exam Type:  Diagnostic    Indications: blunt abdominal trauma and blunt chest trauma      Assess for:  Intra-abdominal fluid and pericardial effusion    Technique: FAST      Views obtained:  Heart - Pericardial sac, LUQ - Splenorenal space, Suprapubic - Pouch of Ray and RUQ - Damon's Pouch    Image quality: diagnostic      Image availability:  Images available in PACS and video obtained  FAST Findings:     RUQ (Hepatorenal) free fluid: absent      LUQ (Splenorenal) free fluid: absent      Suprapubic free fluid: absent      Cardiac wall motion: identified      Pericardial effusion: absent    Interpretation:     Impressions: negative

## 2023-05-12 NOTE — H&P
"[facility]  H&P  Name: Kinjal Orourke 78 y o  male I MRN: [id number]  Unit/Bed#: ICU 13 I Date of Admission: 5/12/2023   Date of Service: 5/12/2023 I Hospital Day: 0      Assessment/Plan   * Syncope and collapse  Assessment & Plan  · Presentation: Patient presented to the ED as a trauma due a fall he suffered upon attempting to return back to bed after using the bathroom  Patient reports that his legs gave out from under him and he fell to the ground  He does not recall if he hit head and states that he may have lost consciousness for a couple minutes  Patient is on Eliquis  · CT head: \"Focal soft tissue laceration along the midline posterior scalp  No intracranial hemorrhage of calvarial fracture  \"  · CT cervical spine: \"No cervical spine fracture or traumatic malalignment  Moderate spinal canal stenosis at C3-C4, C4-C5, and C5-C6 with associated bilateral neuroforaminal narrowing  \"  · Trauma X-rays: \"No pneumothorax or pleural effusion  \"  · Hx of orthostatic hypotension  · Trend troponins x 3   · Monitor on telemetry  · Neuro checks q 4h  · Fall precautions  · Continue home midodrine prn  · PT/OT evaluations  UTI (urinary tract infection)  Assessment & Plan  · Patient did not meet two SIRS criteria on admission  · UA: pH 8 5, large leukocytes, positive nitrite, protein 300 (3+), moderate occult blood  · Urine micro: Innumerable RBCs, innumerable WBCs, moderate mucus threads  · Urine culture pending, prior urine culture grew Proteus mirabilis ESBL  · IV Zosyn as it is susceptible to prior urine culture  · BC x2 pending  · Procalcitonin pending  · Urology consult  Leukocytosis  Assessment & Plan  · POA, WBCs 14 41  · Suspect 2/2 UTI  · Follow up on cultures and vitals  · Trend CBC  Chronic indwelling Miller catheter  Assessment & Plan  · 2/2 urinary retention  · Patient reports leaking around current miller    · Urology consult - prior discussion about suprapubic " catheter placement in the past based on chart review  Atrial fibrillation ([facility])  Assessment & Plan  · Loop recorder in situ  · Rate controlling medication: Toprol XL  · AC: Eliquis  COPD (chronic obstructive pulmonary disease) ([facility])  Assessment & Plan  · Not in acute exacerbation  · PTA regimen of Advair, will replace with Breo  · Continue scheduled Duo-Nebs  · Astelin nasal spray prn  SDH (subdural hematoma) ([facility] )  Assessment & Plan  · History of 9/2022  VTE Pharmacologic Prophylaxis: VTE Score: 4 Moderate Risk (Score 3-4) - Pharmacological DVT Prophylaxis Ordered: apixaban (Eliquis)  Code Status: Level 3 - DNAR and DNI per patient  Discussion with family: Patient declined call to   Anticipated Length of Stay: Patient will be admitted on an inpatient basis with an anticipated length of stay of greater than 2 midnights secondary to UTI, urology consult, PT OT evals  Total Time Spent on Date of Encounter in care of patient: 75 minutes This time was spent on one or more of the following: performing physical exam; counseling and coordination of care; obtaining or reviewing history; documenting in the medical record; reviewing/ordering tests, medications or procedures; communicating with other healthcare professionals and discussing with patient's family/caregivers  Chief Complaint: Syncope    History of Present Illness:  Thomas Joyce is a 78 y o  male with a PMH of atrial fibrillation on Eliquis, COPD, chronic indwelling urinary catheter, recurrent UTIs and SDH who presented to the ED as a trauma due a fall he suffered upon attempting to return back to bed after using the bathroom  Patient reports that his legs gave out from under him and he fell to the ground  He does not recall if he hit head and states that he may have lost consciousness for a couple minutes  Patient is on Eliquis  Patient denies dizziness prior to the fall  He reports some dizziness after the fall   He denies fever, chills, nausea, vomiting, chest pain or shortness of breath  Upon evaluation in the ED, patient was noted to have UTI  Patient will be admitted for IV antibiotics, urology consult, PT OT evaluations and telemetry monitoring  Review of Systems:  Review of Systems   Constitutional: Negative for chills and fever  Respiratory: Negative for chest tightness and shortness of breath  Cardiovascular: Negative for chest pain  Gastrointestinal: Negative for abdominal pain, nausea and vomiting  Musculoskeletal: Negative for back pain  Neurological: Positive for dizziness (after fall), syncope and weakness  Negative for facial asymmetry  All other systems reviewed and are negative  Past Medical and Surgical History:   Past Medical History:   Diagnosis Date   • Ambulatory dysfunction    • Atrial fibrillation [facility])        Past Surgical History:   Procedure Laterality Date   • CARDIAC LOOP RECORDER  09/2022       Meds/Allergies:  Prior to Admission medications    Medication Sig Start Date End Date Taking? Authorizing Provider   apixaban (ELIQUIS) 2 5 mg Take 2 5 mg by mouth 2 (two) times a day   Yes Historical Provider, MD   azelastine (ASTELIN) 0 1 % nasal spray 1 spray into each nostril 2 (two) times a day as needed for rhinitis Use in each nostril as directed   Yes Historical Provider, MD   docusate sodium (COLACE) 100 mg capsule Take 100 mg by mouth 2 (two) times a day   Yes Historical Provider, MD   ferrous sulfate 325 (65 Fe) mg tablet Take 325 mg by mouth daily with breakfast   Yes Historical Provider, MD   Fluticasone-Salmeterol (Advair Diskus) 100-50 mcg/dose inhaler Inhale 1 puff 2 (two) times a day Rinse mouth after use     Yes Historical Provider, MD   folic acid (FOLVITE) 255 mcg tablet Take 400 mcg by mouth daily   Yes Historical Provider, MD   ipratropium-albuterol (DUO-NEB) 0 5-2 5 mg/3 mL nebulizer solution Take 3 mL by nebulization 4 (four) times a day   Yes Historical Provider, MD   meclizine (ANTIVERT) 12 5 MG tablet Take 12 5 mg by mouth every 8 (eight) hours as needed for dizziness   Yes Historical Provider, MD   methenamine hippurate (HIPREX) 1 g tablet Take 1 g by mouth 2 (two) times a day with meals   Yes Historical Provider, MD   metoprolol succinate (TOPROL-XL) 25 mg 24 hr tablet Take 25 mg by mouth daily   Yes Historical Provider, MD   midodrine (PROAMATINE) 5 mg tablet Take 5 mg by mouth 3 (three) times a day as needed   Yes Historical Provider, MD   omeprazole (PriLOSEC) 20 mg delayed release capsule Take 20 mg by mouth daily   Yes Historical Provider, MD     I have reviewed home medications using recent Epic encounter  Allergies: Not on File    Social History:  Marital Status: /Civil Union   Occupation: Unknown  Patient Pre-hospital Living Situation: [facility]: Evelin Rodriguez  Patient Pre-hospital Level of Mobility: unable to be assessed at time of evaluation  Patient Pre-hospital Diet Restrictions: None  Substance Use History:   Social History     Substance and Sexual Activity   Alcohol Use Not Currently     Social History     Tobacco Use   Smoking Status Former   • Types: Cigarettes   Smokeless Tobacco Never     Social History     Substance and Sexual Activity   Drug Use Never       Family History:  No family history on file  Physical Exam:     Vitals:   Blood Pressure: 130/79 (05/12/23 0500)  Pulse: 81 (05/12/23 0500)  Temperature: 97 9 °F (36 6 °C) (05/12/23 0500)  Temp Source: Oral (05/12/23 0500)  Respirations: 20 (05/12/23 0500)  Height: 6' (182 9 cm) (05/12/23 0500)  Weight - Scale: 68 2 kg (150 lb 5 7 oz) (05/12/23 0500)  SpO2: 97 % (05/12/23 0500)    Physical Exam  Vitals and nursing note reviewed  Constitutional:       General: He is not in acute distress  Appearance: He is ill-appearing (chronically)  He is not toxic-appearing  HENT:      Head: Normocephalic        Nose: Nose normal       Mouth/Throat:      Pharynx: Oropharynx is clear  Eyes:      General: No scleral icterus  Conjunctiva/sclera: Conjunctivae normal       Pupils: Pupils are equal, round, and reactive to light  Cardiovascular:      Rate and Rhythm: Normal rate  Rhythm irregular  Pulses:           Posterior tibial pulses are 1+ on the right side and 1+ on the left side  Heart sounds: Normal heart sounds  No murmur heard  Pulmonary:      Effort: Pulmonary effort is normal  No respiratory distress  Breath sounds: Normal breath sounds  No wheezing, rhonchi or rales  Chest:      Chest wall: No tenderness  Abdominal:      General: Bowel sounds are normal  There is no distension  Palpations: Abdomen is soft  Tenderness: There is no abdominal tenderness  Musculoskeletal:         General: No swelling or deformity  Normal range of motion  Cervical back: Normal range of motion  Skin:     General: Skin is warm and dry  Neurological:      General: No focal deficit present  Mental Status: He is alert and oriented to person, place, and time  GCS: GCS eye subscore is 4  GCS verbal subscore is 5  GCS motor subscore is 6  Cranial Nerves: No dysarthria or facial asymmetry  Motor: No weakness            Additional Data:     Lab Results:  Results from last 7 days   Lab Units 05/12/23  0112   WBC Thousand/uL 14 41*   HEMOGLOBIN g/dL 13 1   HEMATOCRIT % 41 4   PLATELETS Thousands/uL 250   NEUTROS PCT % 74   LYMPHS PCT % 14   MONOS PCT % 11   EOS PCT % 0     Results from last 7 days   Lab Units 05/12/23  0112   SODIUM mmol/L 137   POTASSIUM mmol/L 4 2   CHLORIDE mmol/L 105   CO2 mmol/L 24   BUN mg/dL 18   CREATININE mg/dL 0 99   ANION GAP mmol/L 8   CALCIUM mg/dL 8 3*   GLUCOSE RANDOM mg/dL 110                       Lines/Drains:  Invasive Devices     Peripheral Intravenous Line  Duration           Peripheral IV 05/12/23 Right;Ventral (anterior) Forearm <1 day                    Imaging: Reviewed radiology reports from this admission including: CT head, xray(s) and CT cervical spine and Personally reviewed the following imaging: chest xray  XR Trauma multiple (B/RA trauma bay ONLY)   Final Result by Kimberly Harris MD (05/12 0133)      No pneumothorax or pleural effusion  Workstation performed: [id number]         TRAUMA - CT head wo contrast   Final Result by Kimberly Harris MD (05/12 0133)      1  Focal soft tissue laceration along the midline posterior scalp  2   No intracranial hemorrhage or calvarial fracture  Workstation performed: [id number]         TRAUMA - CT spine cervical wo contrast   Final Result by Kimberly Harris MD (05/12 0133)      No cervical spine fracture or traumatic malalignment  Moderate spinal canal stenosis at C3-C4, C4-C5, and C5-C6 with associated bilateral neuroforaminal narrowing  Workstation performed: [id number]         XR chest 1 view    (Results Pending)       EKG and Other Studies Reviewed on Admission:   · EKG: Pending       ** Please Note: This note has been constructed using a voice recognition system   **

## 2023-05-12 NOTE — ASSESSMENT & PLAN NOTE
· Patient did not meet two SIRS criteria on admission  · UA: pH 8 5, large leukocytes, positive nitrite, protein 300 (3+), moderate occult blood  · Urine micro: Innumerable RBCs, innumerable WBCs, moderate mucus threads  · Urine culture pending, prior urine culture grew Proteus mirabilis ESBL  · IV Zosyn as it is susceptible to prior urine culture  · BC x2 pending  · Procalcitonin pending  · Urology consult

## 2023-05-12 NOTE — ASSESSMENT & PLAN NOTE
· Not in acute exacerbation  · PTA regimen of Advair, will replace with Breo  · Continue scheduled Duo-Nebs  · Astelin nasal spray prn

## 2023-05-13 LAB
ANION GAP SERPL CALCULATED.3IONS-SCNC: 7 MMOL/L (ref 4–13)
BASOPHILS # BLD AUTO: 0.05 THOUSANDS/ÂΜL (ref 0–0.1)
BASOPHILS NFR BLD AUTO: 1 % (ref 0–1)
BUN SERPL-MCNC: 19 MG/DL (ref 5–25)
CALCIUM SERPL-MCNC: 8.2 MG/DL (ref 8.4–10.2)
CHLORIDE SERPL-SCNC: 108 MMOL/L (ref 96–108)
CO2 SERPL-SCNC: 23 MMOL/L (ref 21–32)
CREAT SERPL-MCNC: 0.88 MG/DL (ref 0.6–1.3)
EOSINOPHIL # BLD AUTO: 0.23 THOUSAND/ÂΜL (ref 0–0.61)
EOSINOPHIL NFR BLD AUTO: 2 % (ref 0–6)
ERYTHROCYTE [DISTWIDTH] IN BLOOD BY AUTOMATED COUNT: 14 % (ref 11.6–15.1)
GFR SERPL CREATININE-BSD FRML MDRD: 84 ML/MIN/1.73SQ M
GLUCOSE SERPL-MCNC: 99 MG/DL (ref 65–140)
HCT VFR BLD AUTO: 42 % (ref 36.5–49.3)
HGB BLD-MCNC: 13.6 G/DL (ref 12–17)
IMM GRANULOCYTES # BLD AUTO: 0.05 THOUSAND/UL (ref 0–0.2)
IMM GRANULOCYTES NFR BLD AUTO: 1 % (ref 0–2)
LYMPHOCYTES # BLD AUTO: 1.43 THOUSANDS/ÂΜL (ref 0.6–4.47)
LYMPHOCYTES NFR BLD AUTO: 13 % (ref 14–44)
MCH RBC QN AUTO: 30.4 PG (ref 26.8–34.3)
MCHC RBC AUTO-ENTMCNC: 32.4 G/DL (ref 31.4–37.4)
MCV RBC AUTO: 94 FL (ref 82–98)
MONOCYTES # BLD AUTO: 1.16 THOUSAND/ÂΜL (ref 0.17–1.22)
MONOCYTES NFR BLD AUTO: 11 % (ref 4–12)
MRSA NOSE QL CULT: NORMAL
NEUTROPHILS # BLD AUTO: 7.93 THOUSANDS/ÂΜL (ref 1.85–7.62)
NEUTS SEG NFR BLD AUTO: 72 % (ref 43–75)
NRBC BLD AUTO-RTO: 0 /100 WBCS
PLATELET # BLD AUTO: 174 THOUSANDS/UL (ref 149–390)
PMV BLD AUTO: 10.9 FL (ref 8.9–12.7)
POTASSIUM SERPL-SCNC: 4.1 MMOL/L (ref 3.5–5.3)
PROCALCITONIN SERPL-MCNC: 0.21 NG/ML
RBC # BLD AUTO: 4.47 MILLION/UL (ref 3.88–5.62)
SODIUM SERPL-SCNC: 138 MMOL/L (ref 135–147)
WBC # BLD AUTO: 10.85 THOUSAND/UL (ref 4.31–10.16)

## 2023-05-13 RX ADMIN — METOPROLOL SUCCINATE 25 MG: 25 TABLET, EXTENDED RELEASE ORAL at 09:07

## 2023-05-13 RX ADMIN — FLUTICASONE FUROATE AND VILANTEROL TRIFENATATE 1 PUFF: 100; 25 POWDER RESPIRATORY (INHALATION) at 09:07

## 2023-05-13 RX ADMIN — APIXABAN 2.5 MG: 2.5 TABLET, FILM COATED ORAL at 09:07

## 2023-05-13 RX ADMIN — METHENAMINE HIPPURATE 1 G: 1 TABLET ORAL at 09:07

## 2023-05-13 RX ADMIN — Medication 400 MCG: at 09:07

## 2023-05-13 RX ADMIN — FERROUS SULFATE TAB 325 MG (65 MG ELEMENTAL FE) 325 MG: 325 (65 FE) TAB at 09:06

## 2023-05-13 RX ADMIN — METHENAMINE HIPPURATE 1 G: 1 TABLET ORAL at 16:34

## 2023-05-13 RX ADMIN — PIPERACILLIN AND TAZOBACTAM 3.38 G: 36; 4.5 INJECTION, POWDER, FOR SOLUTION INTRAVENOUS at 12:44

## 2023-05-13 RX ADMIN — PIPERACILLIN AND TAZOBACTAM 3.38 G: 36; 4.5 INJECTION, POWDER, FOR SOLUTION INTRAVENOUS at 17:38

## 2023-05-13 RX ADMIN — PIPERACILLIN AND TAZOBACTAM 3.38 G: 36; 4.5 INJECTION, POWDER, FOR SOLUTION INTRAVENOUS at 06:41

## 2023-05-13 RX ADMIN — PANTOPRAZOLE SODIUM 40 MG: 40 TABLET, DELAYED RELEASE ORAL at 06:41

## 2023-05-13 RX ADMIN — APIXABAN 2.5 MG: 2.5 TABLET, FILM COATED ORAL at 17:38

## 2023-05-13 NOTE — ASSESSMENT & PLAN NOTE
· Currently denies shortness of breath and is without wheezing    Continue Advair replacement with Breo   · Continue scheduled DuoNebs

## 2023-05-13 NOTE — PROGRESS NOTES
[facility]  Progress Note  Name: Rosmery Flores  MRN: [id number]  Unit/Bed#: S -01 I Date of Admission: 5/12/2023   Date of Service: 5/13/2023 I Hospital Day: 1    Assessment/Plan   UTI (urinary tract infection)  Assessment & Plan  · Patient culture growing Proteus with sensitivities pending   · Currently on IV Zosyn with sensitivities pending   · Blood cultures no growth to date   · Urology consult in regards to potential [facility] exchange recommended outpatient follow-up and would need to start with consideration of hernia    SDH (subdural hematoma) ([facility] )  Assessment & Plan  · History of 9/2022  Chronic indwelling Cloud catheter  Assessment & Plan  · Secondary to urinary retention and patient reports leaking around the current Cloud  · Urology recommends outpatient follow-up    COPD (chronic obstructive pulmonary disease) ([facility] )  Assessment & Plan  · Currently denies shortness of breath and is without wheezing  Continue Advair replacement with Breo   · Continue scheduled DuoNebs     Atrial fibrillation (HCC)  Assessment & Plan  · Loop recorder in situ, rate controlled on Toprol XL and anticoagulation with Eliquis     * Syncope and collapse  Assessment & Plan  · Presented to the emergency department after trauma where he suffered head strike and loss of consciousness  CT head showed focal soft tissue laceration along the midline posterior scalp without intracranial hemorrhage  CT cervical spine showed moderate spinal stenosis without traumatic injury  trauma x-ray showed no pneumothorax or pleural effusion  · PT OT eval   · Continue home midodrine               VTE Pharmacologic Prophylaxis: VTE Score: 4 Moderate Risk (Score 3-4) - Pharmacological DVT Prophylaxis Ordered: apixaban (Eliquis)  Patient Centered Rounds: I performed bedside rounds with nursing staff today    Discussions with Specialists or Other Care Team Provider: none    Education and Discussions with Family / Patient: Patient would like me to call his wife but on further investigation they no longer live together  Her number is discontinued  Total Time Spent on Date of Encounter in care of patient: 35 minutes This time was spent on one or more of the following: performing physical exam; counseling and coordination of care; obtaining or reviewing history; documenting in the medical record; reviewing/ordering tests, medications or procedures; communicating with other healthcare professionals and discussing with patient's family/caregivers  Current Length of Stay: 1 day(s)  Current Patient Status: Inpatient   Certification Statement: The patient will continue to require additional inpatient hospital stay due to IV antibiotics  Discharge Plan: Anticipate discharge in 48-72 hrs to discharge location to be determined pending rehab evaluations  Code Status: Level 3 - DNAR and DNI    Subjective:   Patient reports feeling better than when he came when he reports as better strength  He denies fevers or chills  He understands he has a urine infection  He came from a rehab and feels he needs to return there due to weakness    He reports housing insecurity    Objective:     Vitals:   Temp (24hrs), Av 2 °F (36 8 °C), Min:98 2 °F (36 8 °C), Max:98 2 °F (36 8 °C)    Temp:  [98 2 °F (36 8 °C)] 98 2 °F (36 8 °C)  HR:  [32-84] 83  Resp:  [18-41] 18  BP: (121-133)/(86-87) 133/87  SpO2:  [95 %-97 %] 97 %  Body mass index is 20 42 kg/m²  Input and Output Summary (last 24 hours): Intake/Output Summary (Last 24 hours) at 2023 1458  Last data filed at 2023 1300  Gross per 24 hour   Intake 236 ml   Output 1025 ml   Net -789 ml       Physical Exam:   Physical Exam  Vitals and nursing note reviewed  Constitutional:       Appearance: Normal appearance  He is not ill-appearing or diaphoretic  HENT:      Head: Normocephalic  Nose: Nose normal  No congestion        Mouth/Throat:      Mouth: Mucous membranes are moist       Pharynx: No oropharyngeal exudate  Eyes:      General: No scleral icterus  Conjunctiva/sclera: Conjunctivae normal    Pulmonary:      Effort: Pulmonary effort is normal  No respiratory distress  Abdominal:      General: Bowel sounds are normal  There is no distension  Palpations: Abdomen is soft  Tenderness: There is no abdominal tenderness  Genitourinary:     Comments: Indwelling Cloud in place  Musculoskeletal:      Cervical back: Normal range of motion and neck supple  No rigidity  Skin:     General: Skin is warm  Coloration: Skin is not jaundiced  Neurological:      Mental Status: He is alert  Psychiatric:         Mood and Affect: Mood normal          Behavior: Behavior normal             Additional Data:     Labs:  Results from last 7 days   Lab Units 05/13/23  0504   WBC Thousand/uL 10 85*   HEMOGLOBIN g/dL 13 6   HEMATOCRIT % 42 0   PLATELETS Thousands/uL 174   NEUTROS PCT % 72   LYMPHS PCT % 13*   MONOS PCT % 11   EOS PCT % 2     Results from last 7 days   Lab Units 05/13/23  0504   SODIUM mmol/L 138   POTASSIUM mmol/L 4 1   CHLORIDE mmol/L 108   CO2 mmol/L 23   BUN mg/dL 19   CREATININE mg/dL 0 88   ANION GAP mmol/L 7   CALCIUM mg/dL 8 2*   GLUCOSE RANDOM mg/dL 99                 Results from last 7 days   Lab Units 05/13/23  0504 05/12/23  0112   PROCALCITONIN ng/ml 0 21 0 31*       Lines/Drains:  Invasive Devices     Peripheral Intravenous Line  Duration           Peripheral IV 05/12/23 Right;Ventral (anterior) Forearm 1 day          Drain  Duration           Urethral Catheter 1 day              Urinary Catheter:  Goal for removal: N/A - Chronic Cloud               Imaging: Personally reviewed the following imaging: CT head and xray(s)    Recent Cultures (last 7 days):   Results from last 7 days   Lab Units 05/12/23  0418 05/12/23  0148   BLOOD CULTURE  No Growth at 24 hrs    No Growth at 24 hrs   --    URINE CULTURE   --  >100,000 cfu/ml Proteus mirabilis* 50,000-59,000 cfu/ml       Last 24 Hours Medication List:   Current Facility-Administered Medications   Medication Dose Route Frequency Provider Last Rate   • acetaminophen  650 mg Oral Q6H PRN Opal Blanton PA-C     • apixaban  2 5 mg Oral BID Tamika Blanton PA-C     • azelastine  1 spray Each Nare BID PRN Opal Blanton PA-C     • ferrous sulfate  325 mg Oral Daily With Breakfast Tamika Blanton PA-C     • Fluticasone Furoate-Vilanterol  1 puff Inhalation Daily Tamika Blanton PA-C     • folic acid  201 mcg Oral Daily Tamika Blanton PA-C     • meclizine  12 5 mg Oral Q8H PRN Opal Blanton PA-C     • methenamine hippurate  1 g Oral BID With Meals Opal Blanton PA-C     • metoprolol succinate  25 mg Oral Daily Tamika Blanton PA-C     • midodrine  5 mg Oral TID PRN Opal Blanton PA-C     • pantoprazole  40 mg Oral Early Morning Tamika Blanton PA-C     • piperacillin-tazobactam  3 375 g Intravenous Q6H Tamika Blanton PA-C 3 375 g (05/13/23 1244)        Today, Patient Was Seen By: Viridiana Gray MD    **Please Note: This note may have been constructed using a voice recognition system  **

## 2023-05-13 NOTE — ASSESSMENT & PLAN NOTE
· Patient culture growing Proteus with sensitivities pending   · Currently on IV Zosyn with sensitivities pending   · Blood cultures no growth to date   · Urology consult in regards to potential [facility] exchange recommended outpatient follow-up and would need to start with consideration of hernia

## 2023-05-13 NOTE — ASSESSMENT & PLAN NOTE
· Presented to the emergency department after trauma where he suffered head strike and loss of consciousness  CT head showed focal soft tissue laceration along the midline posterior scalp without intracranial hemorrhage  CT cervical spine showed moderate spinal stenosis without traumatic injury   trauma x-ray showed no pneumothorax or pleural effusion  · PT OT eval   · Continue home midodrine

## 2023-05-13 NOTE — ASSESSMENT & PLAN NOTE
· Secondary to urinary retention and patient reports leaking around the current Cloud     · Urology recommends outpatient follow-up

## 2023-05-14 RX ADMIN — METOPROLOL SUCCINATE 25 MG: 25 TABLET, EXTENDED RELEASE ORAL at 09:43

## 2023-05-14 RX ADMIN — METHENAMINE HIPPURATE 1 G: 1 TABLET ORAL at 09:43

## 2023-05-14 RX ADMIN — Medication 400 MCG: at 09:43

## 2023-05-14 RX ADMIN — ACETAMINOPHEN 650 MG: 325 TABLET ORAL at 13:25

## 2023-05-14 RX ADMIN — FLUTICASONE FUROATE AND VILANTEROL TRIFENATATE 1 PUFF: 100; 25 POWDER RESPIRATORY (INHALATION) at 09:43

## 2023-05-14 RX ADMIN — APIXABAN 2.5 MG: 2.5 TABLET, FILM COATED ORAL at 09:43

## 2023-05-14 RX ADMIN — PIPERACILLIN AND TAZOBACTAM 3.38 G: 36; 4.5 INJECTION, POWDER, FOR SOLUTION INTRAVENOUS at 12:12

## 2023-05-14 RX ADMIN — PIPERACILLIN AND TAZOBACTAM 3.38 G: 36; 4.5 INJECTION, POWDER, FOR SOLUTION INTRAVENOUS at 06:40

## 2023-05-14 RX ADMIN — PIPERACILLIN AND TAZOBACTAM 3.38 G: 36; 4.5 INJECTION, POWDER, FOR SOLUTION INTRAVENOUS at 01:00

## 2023-05-14 RX ADMIN — APIXABAN 2.5 MG: 2.5 TABLET, FILM COATED ORAL at 17:47

## 2023-05-14 RX ADMIN — PIPERACILLIN AND TAZOBACTAM 3.38 G: 36; 4.5 INJECTION, POWDER, FOR SOLUTION INTRAVENOUS at 17:47

## 2023-05-14 RX ADMIN — PANTOPRAZOLE SODIUM 40 MG: 40 TABLET, DELAYED RELEASE ORAL at 06:40

## 2023-05-14 RX ADMIN — METHENAMINE HIPPURATE 1 G: 1 TABLET ORAL at 17:47

## 2023-05-14 RX ADMIN — FERROUS SULFATE TAB 325 MG (65 MG ELEMENTAL FE) 325 MG: 325 (65 FE) TAB at 09:43

## 2023-05-14 NOTE — ASSESSMENT & PLAN NOTE
· Presented to the emergency department after trauma where he suffered head strike and loss of consciousness  CT head showed focal soft tissue laceration along the midline posterior scalp without intracranial hemorrhage  CT cervical spine showed moderate spinal stenosis without traumatic injury   trauma x-ray showed no pneumothorax or pleural effusion  · PT OT eval recommends return to facility without rehab needs  · Continue home midodrine

## 2023-05-14 NOTE — PLAN OF CARE
Problem: MOBILITY - ADULT  Goal: Maintain or return to baseline ADL function  Description: INTERVENTIONS:  -  Assess patient's ability to carry out ADLs; assess patient's baseline for ADL function and identify physical deficits which impact ability to perform ADLs (bathing, care of mouth/teeth, toileting, grooming, dressing, etc )  - Assess/evaluate cause of self-care deficits   - Assess range of motion  - Assess patient's mobility; develop plan if impaired  - Assess patient's need for assistive devices and provide as appropriate  - Encourage maximum independence but intervene and supervise when necessary  - Involve family in performance of ADLs  - Assess for home care needs following discharge   - Consider OT consult to assist with ADL evaluation and planning for discharge  - Provide patient education as appropriate  5/14/2023 1009 by Phuong Crooks RN  Outcome: Progressing  5/14/2023 1009 by Phuong Crooks RN  Outcome: Progressing  5/14/2023 1008 by Phuong Crooks RN  Outcome: Progressing  Goal: Maintains/Returns to pre admission functional level  Description: INTERVENTIONS:  - Perform BMAT or MOVE assessment daily    - Set and communicate daily mobility goal to care team and patient/family/caregiver  - Collaborate with rehabilitation services on mobility goals if consulted  - Perform Range of Motion  times a day  - Reposition patient every  hours    - Dangle patient  times a day  - Stand patient  times a day  - Ambulate patient  times a day  - Out of bed to chair  times a day   - Out of bed for meal times a day  - Out of bed for toileting  - Record patient progress and toleration of activity level   5/14/2023 1009 by Phuong Crooks RN  Outcome: Progressing  5/14/2023 1009 by Phuong Crooks RN  Outcome: Progressing  5/14/2023 1008 by Phuong Crooks RN  Outcome: Progressing     Problem: Prexisting or High Potential for Compromised Skin Integrity  Goal: Skin integrity is maintained or improved  Description: INTERVENTIONS:  - Identify patients at risk for skin breakdown  - Assess and monitor skin integrity  - Assess and monitor nutrition and hydration status  - Monitor labs   - Assess for incontinence   - Turn and reposition patient  - Assist with mobility/ambulation  - Relieve pressure over bony prominences  - Avoid friction and shearing  - Provide appropriate hygiene as needed including keeping skin clean and dry  - Evaluate need for skin moisturizer/barrier cream  - Collaborate with interdisciplinary team   - Patient/family teaching  - Consider wound care consult   5/14/2023 1009 by Sophia Perez RN  Outcome: Progressing  5/14/2023 1009 by Sophia Perez RN  Outcome: Progressing  5/14/2023 1008 by Sophia Perez RN  Outcome: Progressing     Problem: Nutrition/Hydration-ADULT  Goal: Nutrient/Hydration intake appropriate for improving, restoring or maintaining nutritional needs  Description: Monitor and assess patient's nutrition/hydration status for malnutrition  Collaborate with interdisciplinary team and initiate plan and interventions as ordered  Monitor patient's weight and dietary intake as ordered or per policy  Utilize nutrition screening tool and intervene as necessary  Determine patient's food preferences and provide high-protein, high-caloric foods as appropriate       INTERVENTIONS:  - Monitor oral intake, urinary output, labs, and treatment plans  - Assess nutrition and hydration status and recommend course of action  - Evaluate amount of meals eaten  - Assist patient with eating if necessary   - Allow adequate time for meals  - Recommend/ encourage appropriate diets, oral nutritional supplements, and vitamin/mineral supplements  - Order, calculate, and assess calorie counts as needed  - Recommend, monitor, and adjust tube feedings and TPN/PPN based on assessed needs  - Assess need for intravenous fluids  - Provide specific nutrition/hydration education as appropriate  - Include patient/family/caregiver in decisions related to nutrition  5/14/2023 1009 by Susanne Ferris RN  Outcome: Progressing  5/14/2023 1009 by Susanne Ferris RN  Outcome: Progressing  5/14/2023 1008 by Susanne Ferris RN  Outcome: Progressing     Problem: INFECTION - ADULT  Goal: Absence or prevention of progression during hospitalization  Description: INTERVENTIONS:  - Assess and monitor for signs and symptoms of infection  - Monitor lab/diagnostic results  - Monitor all insertion sites, i e  indwelling lines, tubes, and drains  - Monitor endotracheal if appropriate and nasal secretions for changes in amount and color  - [place] appropriate cooling/warming therapies per order  - Administer medications as ordered  - Instruct and encourage patient and family to use good hand hygiene technique  - Identify and instruct in appropriate isolation precautions for identified infection/condition  5/14/2023 1009 by Susanne Ferris RN  Outcome: Progressing  5/14/2023 1009 by Susanne Ferris RN  Outcome: Progressing  Goal: Absence of fever/infection during neutropenic period  Description: INTERVENTIONS:  - Monitor WBC    5/14/2023 1009 by Susanne Ferris RN  Outcome: Progressing  5/14/2023 1009 by Susanne Ferris RN  Outcome: Progressing     Problem: GENITOURINARY - ADULT  Goal: Maintains or returns to baseline urinary function  Description: INTERVENTIONS:  - Assess urinary function  - Encourage oral fluids to ensure adequate hydration if ordered  - Administer IV fluids as ordered to ensure adequate hydration  - Administer ordered medications as needed  - Offer frequent toileting  - Follow urinary retention protocol if ordered  Outcome: Progressing  Goal: Absence of urinary retention  Description: INTERVENTIONS:  - Assess patient’s ability to void and empty bladder  - Monitor I/O  - Bladder scan as needed  - Discuss with physician/AP medications to alleviate retention as needed  - Discuss catheterization for long term situations as appropriate  Outcome: Progressing  Goal: Urinary catheter remains patent  Description: INTERVENTIONS:  - Assess patency of urinary catheter  - If patient has a chronic miller, consider changing catheter if non-functioning  - Follow guidelines for intermittent irrigation of non-functioning urinary catheter  Outcome: Progressing depression

## 2023-05-14 NOTE — ASSESSMENT & PLAN NOTE
· Patient culture growing Proteus with sensitivities pending   · Currently on IV Zosyn day 3/7 with sensitivities pending   · Blood cultures no growth to date   · Urology consult in regards to potential [facility] placement outpatient follow-up and would need to start with consideration of hernia

## 2023-05-14 NOTE — PROGRESS NOTES
[facility]  Progress Note  Name: Jorge Rodrigues  MRN: [id number]  Unit/Bed#: S -01 I Date of Admission: 5/12/2023   Date of Service: 5/14/2023 I Hospital Day: 2    Assessment/Plan   UTI (urinary tract infection)  Assessment & Plan  · Patient culture growing Proteus with sensitivities pending   · Currently on IV Zosyn day 3/7 with sensitivities pending   · Blood cultures no growth to date   · Urology consult in regards to potential [facility] placement outpatient follow-up and would need to start with consideration of hernia    SDH (subdural hematoma) ([facility] )  Assessment & Plan  · History of 9/2022  Chronic indwelling Cloud catheter  Assessment & Plan  · Secondary to urinary retention and patient reports leaking around the current Cloud  · Urology recommends outpatient follow-up    COPD (chronic obstructive pulmonary disease) ([facility] )  Assessment & Plan  · Currently denies shortness of breath and is without wheezing  Continue Advair replacement with Breo   · Continue scheduled DuoNebs     Atrial fibrillation (HCC)  Assessment & Plan  · Loop recorder in situ, rate controlled on Toprol XL and anticoagulation with Eliquis     * Syncope and collapse  Assessment & Plan  · Presented to the emergency department after trauma where he suffered head strike and loss of consciousness  CT head showed focal soft tissue laceration along the midline posterior scalp without intracranial hemorrhage  CT cervical spine showed moderate spinal stenosis without traumatic injury  trauma x-ray showed no pneumothorax or pleural effusion  · PT OT eval recommends return to facility without rehab needs  · Continue home midodrine               VTE Pharmacologic Prophylaxis: VTE Score: 4 Moderate Risk (Score 3-4) - Pharmacological DVT Prophylaxis Ordered: apixaban (Eliquis)  Patient Centered Rounds: I performed bedside rounds with nursing staff today    Discussions with Specialists or Other Care Team Provider: pt/ot    Education and Discussions with Family / Patient: Patient declined call to   Total Time Spent on Date of Encounter in care of patient: 35 minutes This time was spent on one or more of the following: performing physical exam; counseling and coordination of care; obtaining or reviewing history; documenting in the medical record; reviewing/ordering tests, medications or procedures; communicating with other healthcare professionals and discussing with patient's family/caregivers  Current Length of Stay: 2 day(s)  Current Patient Status: Inpatient   Certification Statement: The patient will continue to require additional inpatient hospital stay due to IV abx  Discharge Plan: Anticipate discharge in 24-48 hrs to prior assisted or independent living facility  Code Status: Level 3 - DNAR and DNI    Subjective:   He reports frustration that no one can get in touch with his wife  Reports they have been  for over 30 years and that have had some recent strife  He is not concerned about her safety more or less he is concerned that she is avoiding him and has now changed her number  He denies fevers or chills  He understands the plan to return back to [facility] once we have an antibiotic regimen in place  He denies any abdominal pain or nausea or vomiting    Objective:     Vitals:   Temp (24hrs), Av 4 °F (36 9 °C), Min:98 4 °F (36 9 °C), Max:98 4 °F (36 9 °C)    Temp:  [98 4 °F (36 9 °C)] 98 4 °F (36 9 °C)  HR:  [65-99] 99  Resp:  [17-18] 18  BP: (129-137)/(89-96) 137/91  SpO2:  [94 %-95 %] 94 %  Body mass index is 20 18 kg/m²  Input and Output Summary (last 24 hours): Intake/Output Summary (Last 24 hours) at 2023 0933  Last data filed at 2023 0818  Gross per 24 hour   Intake 240 ml   Output 1475 ml   Net -1235 ml       Physical Exam:   Physical Exam  Vitals and nursing note reviewed  Constitutional:       Appearance: Normal appearance   He is not ill-appearing or diaphoretic  HENT:      Head: Normocephalic  Nose: No congestion  Mouth/Throat:      Mouth: Mucous membranes are moist       Pharynx: No oropharyngeal exudate  Eyes:      General: No scleral icterus  Conjunctiva/sclera: Conjunctivae normal    Abdominal:      General: Bowel sounds are normal  There is no distension  Palpations: Abdomen is soft  Tenderness: There is no abdominal tenderness  Genitourinary:     Comments: Chronic indwelling Cloud  Musculoskeletal:      Cervical back: Neck supple  No rigidity  Skin:     General: Skin is warm  Coloration: Skin is not jaundiced  Neurological:      Mental Status: He is alert and oriented to person, place, and time  Psychiatric:         Mood and Affect: Mood normal          Behavior: Behavior normal           Additional Data:     Labs:  Results from last 7 days   Lab Units 05/13/23  0504   WBC Thousand/uL 10 85*   HEMOGLOBIN g/dL 13 6   HEMATOCRIT % 42 0   PLATELETS Thousands/uL 174   NEUTROS PCT % 72   LYMPHS PCT % 13*   MONOS PCT % 11   EOS PCT % 2     Results from last 7 days   Lab Units 05/13/23  0504   SODIUM mmol/L 138   POTASSIUM mmol/L 4 1   CHLORIDE mmol/L 108   CO2 mmol/L 23   BUN mg/dL 19   CREATININE mg/dL 0 88   ANION GAP mmol/L 7   CALCIUM mg/dL 8 2*   GLUCOSE RANDOM mg/dL 99                 Results from last 7 days   Lab Units 05/13/23  0504 05/12/23  0112   PROCALCITONIN ng/ml 0 21 0 31*       Lines/Drains:  Invasive Devices     Peripheral Intravenous Line  Duration           Peripheral IV 05/12/23 Right;Ventral (anterior) Forearm 2 days          Drain  Duration           Urethral Catheter 2 days              Urinary Catheter:  Goal for removal: N/A - Chronic Cloud               Imaging: No pertinent imaging reviewed  Recent Cultures (last 7 days):   Results from last 7 days   Lab Units 05/12/23  0418 05/12/23  0148   BLOOD CULTURE  No Growth at 24 hrs    No Growth at 24 hrs   --    URINE CULTURE   --  >100,000 cfu/ml Proteus mirabilis*  50,000-59,000 cfu/ml       Last 24 Hours Medication List:   Current Facility-Administered Medications   Medication Dose Route Frequency Provider Last Rate   • acetaminophen  650 mg Oral Q6H PRN Molly Blanton PA-C     • apixaban  2 5 mg Oral BID Tamika Blanton PA-C     • azelastine  1 spray Each Nare BID PRN Molly Blanton PA-C     • ferrous sulfate  325 mg Oral Daily With Breakfast Tamika Blanton PA-C     • Fluticasone Furoate-Vilanterol  1 puff Inhalation Daily Tamika Blanton PA-C     • folic acid  233 mcg Oral Daily Tamika Blanton PA-C     • meclizine  12 5 mg Oral Q8H PRN Molly Blanton PA-C     • methenamine hippurate  1 g Oral BID With Meals Molly Blanton PA-C     • metoprolol succinate  25 mg Oral Daily Tamika Blanton PA-C     • midodrine  5 mg Oral TID PRN Molly Blanton PA-C     • pantoprazole  40 mg Oral Early Morning Tamika Blanton PA-C     • piperacillin-tazobactam  3 375 g Intravenous Q6H Molly Blanton PA-C 3 375 g (05/14/23 0640)        Today, Patient Was Seen By: Kendra Ramsey MD    **Please Note: This note may have been constructed using a voice recognition system  **

## 2023-05-15 LAB
BACTERIA UR CULT: ABNORMAL

## 2023-05-15 RX ADMIN — PIPERACILLIN AND TAZOBACTAM 3.38 G: 36; 4.5 INJECTION, POWDER, FOR SOLUTION INTRAVENOUS at 17:44

## 2023-05-15 RX ADMIN — PIPERACILLIN AND TAZOBACTAM 3.38 G: 36; 4.5 INJECTION, POWDER, FOR SOLUTION INTRAVENOUS at 13:13

## 2023-05-15 RX ADMIN — METHENAMINE HIPPURATE 1 G: 1 TABLET ORAL at 16:10

## 2023-05-15 RX ADMIN — PANTOPRAZOLE SODIUM 40 MG: 40 TABLET, DELAYED RELEASE ORAL at 06:55

## 2023-05-15 RX ADMIN — FERROUS SULFATE TAB 325 MG (65 MG ELEMENTAL FE) 325 MG: 325 (65 FE) TAB at 08:18

## 2023-05-15 RX ADMIN — METOPROLOL SUCCINATE 25 MG: 25 TABLET, EXTENDED RELEASE ORAL at 08:18

## 2023-05-15 RX ADMIN — PIPERACILLIN AND TAZOBACTAM 3.38 G: 36; 4.5 INJECTION, POWDER, FOR SOLUTION INTRAVENOUS at 06:55

## 2023-05-15 RX ADMIN — APIXABAN 2.5 MG: 2.5 TABLET, FILM COATED ORAL at 17:44

## 2023-05-15 RX ADMIN — FLUTICASONE FUROATE AND VILANTEROL TRIFENATATE 1 PUFF: 100; 25 POWDER RESPIRATORY (INHALATION) at 08:20

## 2023-05-15 RX ADMIN — METHENAMINE HIPPURATE 1 G: 1 TABLET ORAL at 08:19

## 2023-05-15 RX ADMIN — APIXABAN 2.5 MG: 2.5 TABLET, FILM COATED ORAL at 08:18

## 2023-05-15 RX ADMIN — PIPERACILLIN AND TAZOBACTAM 3.38 G: 36; 4.5 INJECTION, POWDER, FOR SOLUTION INTRAVENOUS at 01:00

## 2023-05-15 RX ADMIN — Medication 400 MCG: at 08:19

## 2023-05-15 NOTE — ASSESSMENT & PLAN NOTE
· Patient culture growing Proteus and with Alcaligenes fecalis - sensitivities pending   · Currently on IV Zosyn day 4/7 with sensitivities pending   · Blood cultures no growth to date   · Urology consult in regards to potential [facility] placement outpatient follow-up and would need to start with consideration of hernia

## 2023-05-15 NOTE — PROGRESS NOTES
[facility]  Progress Note  Name: Macho Sheldon  MRN: [id number]  Unit/Bed#: S -01 I Date of Admission: 5/12/2023   Date of Service: 5/15/2023 I Hospital Day: 3    Assessment/Plan   * Syncope and collapse  Assessment & Plan  · Presented to the emergency department after trauma where he suffered head strike and loss of consciousness  CT head showed focal soft tissue laceration along the midline posterior scalp without intracranial hemorrhage  CT cervical spine showed moderate spinal stenosis without traumatic injury  trauma x-ray showed no pneumothorax or pleural effusion  · PT OT eval recommends return to facility without rehab needs  · Continue home midodrine      UTI (urinary tract infection)  Assessment & Plan  · Patient culture growing Proteus and with Alcaligenes fecalis - sensitivities pending   · Currently on IV Zosyn day 4/7 with sensitivities pending   · Blood cultures no growth to date   · Urology consult in regards to potential [facility] placement outpatient follow-up and would need to start with consideration of hernia    Chronic indwelling Cloud catheter  Assessment & Plan  · Secondary to urinary retention and patient reports leaking around the current Cloud  · Urology recommends outpatient follow-up    COPD (chronic obstructive pulmonary disease) ([facility] [address] )  Assessment & Plan  · Not in exacerbation  · Continue fluticasone-vilanterol    Atrial fibrillation ([facility] [address] )  Assessment & Plan  · Rate controlled  · Continue Toprol-XL  · On Eliquis for anticoagulation  VTE Pharmacologic Prophylaxis:   Pharmacologic: Apixaban (Eliquis)  Mechanical VTE Prophylaxis in Place: Yes    Patient Centered Rounds: I have performed bedside rounds with nursing staff today  Discussions with Specialists or Other Care Team Provider:     Education and Discussions with Family / Patient: pt  Refused to call family    Time Spent for Care: 30 minutes    More than 50% of total time spent on counseling and coordination of care as described above  Current Length of Stay: 3 day(s)    Current Patient Status: Inpatient   Certification Statement: The patient will continue to require additional inpatient hospital stay due to apending urine sensitivities    Discharge Plan: tomorrow    Code Status: Level 3 - DNAR and DNI      Subjective:   Pt seen and examined by me in morning  Denied any specific complaints  He is frustrated as his wife is not answering his phone calls  Told me to just leave him alone as he wants to rest     Objective:     Vitals:   Temp (24hrs), Av 3 °F (36 8 °C), Min:98 3 °F (36 8 °C), Max:98 3 °F (36 8 °C)    Temp:  [98 3 °F (36 8 °C)] 98 3 °F (36 8 °C)  HR:  [67-77] 76  Resp:  [16-18] 18  BP: (120-171)/() 120/87  SpO2:  [94 %-97 %] 94 %  Body mass index is 20 03 kg/m²  Input and Output Summary (last 24 hours): Intake/Output Summary (Last 24 hours) at 5/15/2023 1544  Last data filed at 5/15/2023 0701  Gross per 24 hour   Intake --   Output 1500 ml   Net -1500 ml       Physical Exam:     Physical Exam    Constitutional: Pt appears comfortable  Not in any acute distress  Cardiovascular: Normal rate, regular rhythm, normal heart sounds  No murmur heard  Pulmonary/Chest: Effort normal, air entry b/l equal  No respiratory distress  Pt has no wheezes or crackles  Abdominal: Soft  Non-distended, Non-tender  Bowel sounds are normal    Musculoskeletal: Normal range of motion  Neurological: awake, alert  Moving all extremities spontaneously  Psychiatric: normal mood and affect       Additional Data:     Labs:    Results from last 7 days   Lab Units 23  0504   WBC Thousand/uL 10 85*   HEMOGLOBIN g/dL 13 6   HEMATOCRIT % 42 0   PLATELETS Thousands/uL 174   NEUTROS PCT % 72   LYMPHS PCT % 13*   MONOS PCT % 11   EOS PCT % 2     Results from last 7 days   Lab Units 23  0504   SODIUM mmol/L 138   POTASSIUM mmol/L 4 1   CHLORIDE mmol/L 108   CO2 mmol/L 23   BUN mg/dL 19   CREATININE mg/dL 0 88   ANION GAP mmol/L 7   CALCIUM mg/dL 8 2*   GLUCOSE RANDOM mg/dL 99                 Results from last 7 days   Lab Units 05/13/23  0504 05/12/23  0112   PROCALCITONIN ng/ml 0 21 0 31*           * I Have Reviewed All Lab Data Listed Above  * Additional Pertinent Lab Tests Reviewed: [address] Admission Reviewed    Imaging:    Imaging Reports Reviewed Today Include:   Imaging Personally Reviewed by Myself Includes:      Recent Cultures (last 7 days):     Results from last 7 days   Lab Units 05/12/23  0418 05/12/23  0148   BLOOD CULTURE  No Growth at 72 hrs    No Growth at 72 hrs   --    URINE CULTURE   --  >100,000 cfu/ml Proteus mirabilis ESBL*  >100,000 cfu/ml Alcaligenes faecalis*  50,000-59,000 cfu/ml       Last 24 Hours Medication List:   Current Facility-Administered Medications   Medication Dose Route Frequency Provider Last Rate   • acetaminophen  650 mg Oral Q6H PRN Shady Blanton PA-C     • apixaban  2 5 mg Oral BID Tamika Blanton PA-C     • azelastine  1 spray Each Nare BID PRN Shady Blanton PA-C     • ferrous sulfate  325 mg Oral Daily With Breakfast Tamika Blanton PA-C     • Fluticasone Furoate-Vilanterol  1 puff Inhalation Daily Tamika Blanton PA-C     • folic acid  157 mcg Oral Daily Tamika Blanton PA-C     • meclizine  12 5 mg Oral Q8H PRN Shady Blanton PA-C     • methenamine hippurate  1 g Oral BID With Meals Shady Blanton PA-C     • metoprolol succinate  25 mg Oral Daily Tamika Blanton PA-C     • midodrine  5 mg Oral TID PRN Shady Blanton PA-C     • pantoprazole  40 mg Oral Early Morning Tamika Blanton PA-C     • piperacillin-tazobactam  3 375 g Intravenous Q6H Tamika Blanton PA-C 3 375 g (05/15/23 1313)        Today, Patient Was Seen By: Elia Bullock MD    ** Please Note: Dictation voice to text software may have been used in the creation of this document   **

## 2023-05-16 VITALS
WEIGHT: 146.16 LBS | HEIGHT: 72 IN | DIASTOLIC BLOOD PRESSURE: 96 MMHG | TEMPERATURE: 98.5 F | HEART RATE: 75 BPM | SYSTOLIC BLOOD PRESSURE: 140 MMHG | OXYGEN SATURATION: 93 % | RESPIRATION RATE: 18 BRPM | BODY MASS INDEX: 19.8 KG/M2

## 2023-05-16 PROBLEM — D72.829 LEUKOCYTOSIS: Status: RESOLVED | Noted: 2023-05-12 | Resolved: 2023-05-16

## 2023-05-16 RX ORDER — AMOXICILLIN AND CLAVULANATE POTASSIUM 875; 125 MG/1; MG/1
1 TABLET, FILM COATED ORAL EVERY 12 HOURS SCHEDULED
Status: DISCONTINUED | OUTPATIENT
Start: 2023-05-16 | End: 2023-05-16 | Stop reason: HOSPADM

## 2023-05-16 RX ORDER — AMOXICILLIN AND CLAVULANATE POTASSIUM 875; 125 MG/1; MG/1
1 TABLET, FILM COATED ORAL EVERY 12 HOURS SCHEDULED
Qty: 5 TABLET | Refills: 0
Start: 2023-05-16 | End: 2023-05-19

## 2023-05-16 RX ADMIN — PANTOPRAZOLE SODIUM 40 MG: 40 TABLET, DELAYED RELEASE ORAL at 05:25

## 2023-05-16 RX ADMIN — METHENAMINE HIPPURATE 1 G: 1 TABLET ORAL at 08:51

## 2023-05-16 RX ADMIN — FLUTICASONE FUROATE AND VILANTEROL TRIFENATATE 1 PUFF: 100; 25 POWDER RESPIRATORY (INHALATION) at 08:51

## 2023-05-16 RX ADMIN — PIPERACILLIN AND TAZOBACTAM 3.38 G: 36; 4.5 INJECTION, POWDER, FOR SOLUTION INTRAVENOUS at 00:14

## 2023-05-16 RX ADMIN — METOPROLOL SUCCINATE 25 MG: 25 TABLET, EXTENDED RELEASE ORAL at 08:51

## 2023-05-16 RX ADMIN — Medication 400 MCG: at 08:51

## 2023-05-16 RX ADMIN — PIPERACILLIN AND TAZOBACTAM 3.38 G: 36; 4.5 INJECTION, POWDER, FOR SOLUTION INTRAVENOUS at 05:39

## 2023-05-16 RX ADMIN — APIXABAN 2.5 MG: 2.5 TABLET, FILM COATED ORAL at 08:51

## 2023-05-16 RX ADMIN — FERROUS SULFATE TAB 325 MG (65 MG ELEMENTAL FE) 325 MG: 325 (65 FE) TAB at 08:51

## 2023-05-16 NOTE — ASSESSMENT & PLAN NOTE
· Patient culture growing Proteus and with Alcaligenes fecalis  · Currently on IV Zosyn day 5/7     Will change to oral Augmentin on discharge  · Blood cultures no growth to date   · Urology consult in regards to potential [facility] placement outpatient follow-up and would need CT of pelvis prior in setting of hernia

## 2023-05-16 NOTE — DISCHARGE SUMMARY
[facility]  Discharge- Christy La Push 1945, 78 y o  male MRN: [id number]  Unit/Bed#: S -01 Encounter: [id number]  Primary Care Provider: Brent Alex MD   Date and time admitted to hospital: 5/12/2023 12:40 AM    * Syncope and collapse  Assessment & Plan  · Presented to the emergency department after trauma where he suffered head strike and loss of consciousness  CT head showed focal soft tissue laceration along the midline posterior scalp without intracranial hemorrhage  CT cervical spine showed moderate spinal stenosis without traumatic injury  trauma x-ray showed no pneumothorax or pleural effusion  · PT OT eval recommends return to facility without rehab needs  · Continue home midodrine      UTI (urinary tract infection)  Assessment & Plan  · Patient culture growing Proteus and with Alcaligenes fecalis  · Currently on IV Zosyn day 5/7   Will change to oral Augmentin on discharge  · Blood cultures no growth to date   · Urology consult in regards to potential [facility] placement outpatient follow-up and would need CT of pelvis prior in setting of hernia    Atrial fibrillation ([facility] [address] )  Assessment & Plan  · Rate controlled  · Continue Toprol-XL  · On Eliquis for anticoagulation  COPD (chronic obstructive pulmonary disease) ([facility])  Assessment & Plan  · Not in exacerbation  · Continue fluticasone-vilanterol    Chronic indwelling Cloud catheter  Assessment & Plan  · Secondary to urinary retention and patient reports leaking around the current Cloud     · Urology recommends outpatient follow-up      Medical Problems     Resolved Problems  Date Reviewed: 5/16/2023          Resolved    Leukocytosis 5/16/2023     Resolved by  Colleen Erazo PA-C        Discharging Physician / Practitioner: Colleen Erazo PA-C  PCP: Brent Alex MD  Admission Date:   Admission Orders (From admission, onward)     Ordered        05/12/23 0258  INPATIENT ADMISSION  Once                      Discharge Date: 05/16/23    Disposition:    Nina Sierra: Phong Frost  Unable to reach physician and no message left  Reason for Admission: syncope    Discharge Diagnoses:   Please see assessment and plan section above for further details regarding discharge diagnoses  Consultations During Hospital Stay:  · Dr Elpidio Flores    Procedures Performed:     CXR  No pneumothorax or pleural effusion    CT cervical spine  No cervical spine fracture or traumatic malalignment  Moderate spinal canal stenosis at C3-C4, C4-C5, and C5-C6 with associated bilateral neuroforaminal narrowing  CT head  1  Focal soft tissue laceration along the midline posterior scalp  2   No intracranial hemorrhage or calvarial fracture  Significant Findings / Test Results:   · See above    Incidental Findings:   · none     Test Results Pending at Discharge (will require follow up):   · none     Outpatient Tests Requested:  · none    Complications:  none    Hospital Course:      Jasmina Alford is a 78 y o  male patient who originally presented to the hospital on 5/12/2023 due to syncopal episode  Patient was found to have a urinary tract infection  He was started on IV Zosyn  Urine culture grew Proteus which is sensitive to Augmentin  He will be discharged on 5 more doses of Augmentin  Patient also complained of leaking around his Cloud catheter he was seen in consultation by urology  Urology will follow-up with the patient in their office to consider a suprapubic catheter  He will need a CT of his pelvis prior  Patient will return back to his facility in stable condition      Condition at Discharge: good    Discharge Day Visit / Exam:   Subjective: Offers no complaints  Vitals: Blood Pressure: 140/96 (05/16/23 0729)  Pulse: 75 (05/16/23 0729)  Temperature: 98 5 °F (36 9 °C) (05/16/23 0729)  Temp Source: Oral (05/16/23 0729)  Respirations: 18 (05/16/23 0729)  Height: 6' (182 9 cm) (05/12/23 0500)  Weight - Scale: 66 3 kg (146 lb 2 6 oz) (05/16/23 0600)  SpO2: 93 % (05/16/23 0729)  Exam:   Physical Exam  Vitals and nursing note reviewed  Constitutional:       General: He is not in acute distress  Appearance: He is well-developed  HENT:      Head: Normocephalic and atraumatic  Eyes:      Conjunctiva/sclera: Conjunctivae normal    Cardiovascular:      Rate and Rhythm: Normal rate and regular rhythm  Heart sounds: No murmur heard  Pulmonary:      Effort: Pulmonary effort is normal  No respiratory distress  Breath sounds: Normal breath sounds  Abdominal:      Palpations: Abdomen is soft  Tenderness: There is no abdominal tenderness  Musculoskeletal:         General: No swelling  Cervical back: Neck supple  Skin:     General: Skin is warm and dry  Capillary Refill: Capillary refill takes less than 2 seconds  Neurological:      Mental Status: He is alert  Psychiatric:         Mood and Affect: Mood normal          Discussion with Family: non working number    Medication Adjustments and Discharge Medications:  · Discharge Medication List: See after visit summary for reconciled discharge medications  · Medication Dosing Tapers - Please refer to Discharge Medication List for details on any medication dosing tapers (if applicable to patient)  · Summary of Medication Adjustments made as a result of this hospitalization: Augmentin for 5 more doses  · Medications being temporarily held (include recommended restart time): Wound Care Recommendations:  When applicable, please see wound care section of After Visit Summary      Instructions for any Catheters / Lines Present at Discharge (including removal date, if applicable): Routine catheter care    Diet Recommendations at Discharge:  Diet -        Diet Orders   (From admission, onward)             Start     Ordered    05/12/23 1517  Diet Regular; Regular House  Diet effective now        References:    Adult Nutrition Support Algorithm RD Therapeutic Diet Order Protocol   Question Answer Comment   Diet Type Regular    Regular Regular House    RD to adjust diet per protocol? Yes        05/12/23 4396                Goals of Care Discussions:  · Code Status at Discharge: Level 3 - DNAR and DNI  · Goals of care were not discussed during this admission  Discharge instructions/Information to patient and family:   See after visit summary section titled Discharge Instructions for information provided to patient and family  Planned Readmission: none      Discharge Statement:  I spent 45 minutes discharging the patient  This time was spent on the day of discharge  I had direct contact with the patient on the day of discharge  Greater than 50% of the total time was spent examining patient, answering all patient questions, arranging and discussing plan of care with patient as well as directly providing post-discharge instructions  Additional time then spent on discharge activities  **Please Note: This note may have been constructed using a voice recognition system  **

## 2023-05-16 NOTE — DISCHARGE SUMMARY
Addendum to discharge summary dated 5/16/23  UTI is associated with a chronic indwelling miller catheter and was treated with zosyn and transitioned to oral Augmentin at discharge  Urethral erosion - to consider [facility] as outpatient  Urology input appreciated

## 2023-05-16 NOTE — CASE MANAGEMENT
Case Management Discharge Planning Note    Patient name Joan Albright S /S -74 MRN [id number]  : 1945 Date 2023       Current Admission Date: 2023  Current Admission Diagnosis:Syncope and collapse   Patient Active Problem List    Diagnosis Date Noted   • Atrial fibrillation ([facility] [address] ) 2023   • COPD (chronic obstructive pulmonary disease) ([facility] [address] ) 2023   • Chronic indwelling Cloud catheter 2023   • UTI (urinary tract infection) 2023   • [facility] 2023   • Syncope and collapse 2023   • Leukocytosis 2023      LOS (days): 4  Geometric Mean LOS (GMLOS) (days): 2 80  Days to GMLOS:-1 5     OBJECTIVE:  Risk of Unplanned Readmission Score: 10 17         Current admission status: Inpatient   Preferred Pharmacy:   [facility]  No address on file      Primary Care Provider: Mike Hartman MD    Primary Insurance: MEDICARE  Secondary Insurance:  FOR LIFE    DISCHARGE DETAILS:    Discharge planning discussed with[de-identified] Patient  Freedom of Choice: Yes  Comments - Freedom of Choice: Return to [facility] at [address] [place] [address] contacted family/caregiver?: No- see comments (Patient declined CM outreach)  Were Treatment Team discharge recommendations reviewed with patient/caregiver?: Yes  Did patient/caregiver verbalize understanding of patient care needs?: N/A- going to facility  Were patient/caregiver advised of the risks associated with not following Treatment Team discharge recommendations?: Yes    Contacts  Patient Contacts: Patient  Relationship to Patient[de-identified] Other (Comment)  Contact Method:  In Person  Reason/Outcome: Discharge Planning, Continuity of [facility]         Is the patient interested in [address] at discharge?: No    DME Referral Provided  Referral made for DME?: No    Other Referral/Resources/Interventions Provided:  Interventions: Transportation, SNF, Facility Return         Treatment Team Recommendation: Facility Return, SNF  Discharge Destination Plan[de-identified] Facility Return, SNF  Transport at Discharge : Wheelchair van  Dispatcher Contacted: Yes  Number/Name of Dispatcher: Requested 12pm [facility] via [employer]  Transported by [employer] and Unit #): [address] [place] of Transport (Date): 05/16/23  ETA of Transport (Time): 9653              IMM Given (Date):: 05/16/23  IMM Given to[de-identified] Patient          Accepting Facility Name, [address] : [facility]  Receiving Facility/Agency Phone Number: [phone]  Facility/Agency Fax Number: [phone]       CM notified that patient is medically stable for DC today  Plan is to return to [facility]  CM confirmed with facility he is a bedhold and able to return today  No COVID test is needed  Transport was requested for 12pm via [employer] and was claimed by [facility] for 2:45  SLIM, primary nurse, patient and facility all aware  No further CM DC needs were discussed or identified

## 2023-05-17 ENCOUNTER — NURSING HOME VISIT (OUTPATIENT)
Dept: GERIATRICS | Facility: OTHER | Age: 78
End: 2023-05-17

## 2023-05-17 ENCOUNTER — OFFICE VISIT (OUTPATIENT)
Dept: CARDIOLOGY CLINIC | Facility: SKILLED NURSING FACILITY | Age: 78
End: 2023-05-17

## 2023-05-17 VITALS
WEIGHT: 139.4 LBS | DIASTOLIC BLOOD PRESSURE: 76 MMHG | HEART RATE: 64 BPM | SYSTOLIC BLOOD PRESSURE: 111 MMHG | BODY MASS INDEX: 18.91 KG/M2 | TEMPERATURE: 97.6 F | OXYGEN SATURATION: 94 %

## 2023-05-17 DIAGNOSIS — R29.6 RECURRENT FALLS: ICD-10-CM

## 2023-05-17 DIAGNOSIS — I95.1 ORTHOSTATIC HYPOTENSION: Primary | ICD-10-CM

## 2023-05-17 DIAGNOSIS — R42 DIZZINESS: ICD-10-CM

## 2023-05-17 DIAGNOSIS — I48.21 PERMANENT ATRIAL FIBRILLATION (HCC): ICD-10-CM

## 2023-05-17 DIAGNOSIS — R55 SYNCOPE AND COLLAPSE: Primary | ICD-10-CM

## 2023-05-17 LAB
BACTERIA BLD CULT: NORMAL
BACTERIA BLD CULT: NORMAL

## 2023-05-17 NOTE — PROGRESS NOTES
[facility] [place] [address] [address] [address]  Phone#  [phone]  Fax#  [phone]                                               Cardiology Nursing Home Visit - Follow up  Surendra Aranda, 78 y o  male  YOB: 1945  MRN: [id number] Encounter: [id number]      PCP - Rosalio Lind MD  Outpatient cardiologist - [facility]    No chief complaint on file  Assessment  1  Orthostatic hypotension        2  Recurrent falls        3  Permanent atrial fibrillation ([address] )        4   Dizziness          Recurrent falls / syncope  S/p loop recorder implantation  Orthostatic hypotension  Chronic atrial fibrillation  H/o BERNARDA-cardioversion in 2018, but then went back into afib soon after  Cardiomyopathy  EF 10% in 2018  LHC - 7/9/2018 - no significant CAD  Echo - 8/7/2020 - LVEF 55%, mild-moderate MR, moderate TR, PASP 39  H/o Subdural hematoma  Underweight, BMI 19  COPD/emphysema  Ambulatory dysfunction       Plan  Recurrent falls, syncope, orthostatic hypotension, s/p loop recorder implantation, h/o SDH  Overview  There has been concerns about orthostatic hypotension causing several of his falls previously, and as a result he has been maintained on midodrine for this and doing reasonably well  But he has still had a couple of other episodes including a fall on the day of recent visit by Dr Conor Oliveira  This was in setting of UTI with chronic indwelling catheter  He reports that at times he is walking normally for a while and then suddenly his legs get weak leading him to fall  He does report that his dizziness is worse in the morning  Impression  Unclear to me if all of his falls are orthostatic / related to syncope  ?leg weakness/gait dysfunction related v/s orthostatic  Although recent episode on 5/12/23 may have been related to UTI  Plan  Increase morning dose of midodrine to 7 5 mg, continue midodrine 5 mg in afternoon and evening  Keep diary of falls/syncope and try and identify triggering event associated with it or events leading up to fall  No recent loop recorder check since January, as his remote monitor was apparently thrown away from the rental apartment  He is in the process of getting another monitor available here to follow up on this  Follow up device interrogation    Chronic atrial fibrillation  97 2% A-fib burden on recent loop recorder check in January 2023  Remains rate controlled on metoprolol succinate 25 mg daily  He was previously not on anticoagulation related to concerns about fall, but with him doing better he has recently been placed on Eliquis 2 5 mg twice daily and continues to do reasonably well with it  But he still has on and off falls, and this may need reevaluation - if he continues to fall    No results found for this visit on 05/17/23  No orders of the defined types were placed in this encounter  Return in about 4 weeks (around 6/14/2023), or if symptoms worsen or fail to improve  History of Present Illness   78 y o  male is currently admitted to the [facility] rehabilitation [place] after recent discharge from [facility] on 5/12/23  We are currently consulted to evaluate and assist with management of her cardiac problems  10/5/22: He has a known history of chronic atrial fibrillation, cardiomyopathy, and had recently presented to the hospital after a syncopal episode  When he woke up in the morning, he initially had vertiginous symptoms  He subsequently developed more dizziness after sitting down at the kitchen table and when he attempted to stand up he got severely dizzy and then passed out  He was admitted at [facility], and was evaluated by EP    There was concerns for bradycardia, but he was felt to not meet criteria for pacemaker implantation, and as a result a loop recorder was implanted into instead      Of note, he has had on and off symptoms of dizziness over the past year, and even had a near syncopal episode last year in June 2021  He had presented to [facility] after this, and I saw him during this admission  At that time, he had bradycardia and hypotension at presentation  He received hydration and reduction in his rate control therapy  Interval history - 5/17/2023  He is being seen for short one 1 week follow-up due to recent hospitalization at [facility]  He was recently seen by Dr Osbaldo Jensen, and was noted to be doing well on midodrine and anticoagulation, but subsequently that same night he apparently passed out and again presented to the hospital   He has a chronic indwelling urinary catheter and was again diagnosed with a UTI and treated with IV antibiotics  He has not had any further issues with falls since returning back  He does note dizziness, which is worse in the mornings  Historical Information   Past Medical History:   Diagnosis Date   • Ambulatory dysfunction    • Anxiety    • Anxiety disorder    • Atrial fibrillation (HCC)    • Coronary artery disease    • Depression    • Cloud catheter in place    • Hepatitis C     screening negative in 1/2017   • Hepatitis C    • History of MI (myocardial infarction)    • Hypertension    • MI (myocardial infarction) ([facility] [address] )    • Urinary catheter in place    • Use of cane as ambulatory aid      Past Surgical History:   Procedure Laterality Date   • CARDIAC CATHETERIZATION  07/09/2018   • CARDIAC ELECTROPHYSIOLOGY PROCEDURE N/A 9/30/2022    Procedure: Cardiac loop recorder implant;  Surgeon: Temo Villatoro MD;  Location: [facility];   Service: Cardiology   • CARDIAC ELECTROPHYSIOLOGY PROCEDURE  09/30/2022    Cardiac loop recorder implant; Dr Barry Chan  09/2022   • COLONOSCOPY     • COLONOSCOPY     • INGUINAL HERNIA REPAIR Right 08/11/2022    Dr Tiffanie Colorado   • GA RPR 1ST INGUN HRNA AGE 5 YRS/> REDUCIBLE Right 8/11/2022    Procedure: REPAIR HERNIA INGUINAL;  Surgeon: Lortea Encarnacion DO;  Location: [facility] OR;  Service: General   • TONSILLECTOMY       Family History   Problem Relation Age of Onset   • Hypertension Mother    • Coronary artery disease Mother         premature   • Diabetes Father    • Coronary artery disease Father         premature   • Hypertension Father      Current Outpatient Medications on File Prior to Visit   Medication Sig Dispense Refill   • acetaminophen (TYLENOL) 325 mg tablet Take 650 mg by mouth every 6 (six) hours as needed     • amoxicillin-clavulanate (AUGMENTIN) 875-125 mg per tablet Take 1 tablet by mouth every 12 (twelve) hours for 5 doses 5 tablet 0   • apixaban (ELIQUIS) 2 5 mg Take 2 5 mg by mouth 2 (two) times a day     • apixaban (ELIQUIS) 2 5 mg Take 2 5 mg by mouth 2 (two) times a day     • azelastine (ASTELIN) 0 1 % nasal spray 1 spray into each nostril 2 (two) times a day as needed for allergies or rhinitis Use in each nostril as directed 30 mL 0   • azelastine (ASTELIN) 0 1 % nasal spray 1 spray into each nostril 2 (two) times a day as needed for rhinitis Use in each nostril as directed     • bisacodyl (DULCOLAX) 10 mg suppository Insert 10 mg into the rectum once as needed     • docusate sodium (COLACE) 100 mg capsule Take 1 capsule (100 mg total) by mouth 2 (two) times a day 180 capsule 0   • docusate sodium (COLACE) 100 mg capsule Take 100 mg by mouth 2 (two) times a day     • ferrous sulfate 324 (65 Fe) mg Take 1 tablet (324 mg total) by mouth daily before breakfast 90 tablet 2   • ferrous sulfate 325 (65 Fe) mg tablet Take 325 mg by mouth daily with breakfast     • Fluticasone-Salmeterol (Advair Diskus) 100-50 mcg/dose inhaler Inhale 1 puff 2 (two) times a day Rinse mouth after use  • Fluticasone-Salmeterol (Advair) 100-50 mcg/dose inhaler Inhale 1 puff 2 (two) times a day Rinse mouth after use       • Fluticasone-Salmeterol (Advair) 250-50 mcg/dose inhaler      • folic acid (FOLVITE) 367 mcg tablet Take 400 mcg by mouth daily     • ipratropium-albuterol (DUO-NEB) 0 5-2 5 mg/3 mL nebulizer solution INHALE CONTENTS OF 1 VIAL VIA NEBULIZER FOUR TIMES A DAY 60 mL 11   • ipratropium-albuterol (DUO-NEB) 0 5-2 5 mg/3 mL nebulizer solution Take 3 mL by nebulization 4 (four) times a day     • meclizine (ANTIVERT) 12 5 MG tablet Take 12 5 mg by mouth every 8 (eight) hours as needed for dizziness     • methenamine hippurate (HIPREX) 1 g tablet Take 1 g by mouth 2 (two) times a day with meals     • methenamine hippurate (HIPREX) 1 g tablet Take 1 g by mouth 2 (two) times a day with meals     • metoprolol succinate (TOPROL-XL) 25 mg 24 hr tablet Take 25 mg by mouth daily     • metoprolol succinate (TOPROL-XL) 25 mg 24 hr tablet Take 25 mg by mouth daily     • midodrine (PROAMATINE) 5 mg tablet Take 1 tablet (5 mg total) by mouth 3 (three) times a day as needed (for SBP < 100 or severe dizziness with standing up  Hold for SBP > 130) 90 tablet 3   • midodrine (PROAMATINE) 5 mg tablet Take 5 mg by mouth 3 (three) times a day as needed     • nystatin (MYCOSTATIN) powder Apply 1 application  topically in the morning     • omeprazole (PriLOSEC) 20 mg delayed release capsule Take 1 capsule (20 mg total) by mouth daily 90 capsule 3   • omeprazole (PriLOSEC) 20 mg delayed release capsule Take 20 mg by mouth daily     • sodium phosphate-biphosphate (FLEET) 7-19 g 118 mL enema Insert 1 enema into the rectum once as needed       No current facility-administered medications on file prior to visit       No Known Allergies  Social History     Socioeconomic History   • Marital status: /Civil Union     Spouse name: None   • Number of children: 3   • Years of education: 12   • Highest education level: 12th grade   Occupational History   • Occupation: retired   Tobacco Use   • Smoking status: Former     Packs/day: 1 50     Years: 57 00     Pack years: 85 50     Types: Cigarettes     Quit date: 3/12/2023     Years since quittin 1   • Smokeless tobacco: Never   • Tobacco comments:     since age 17; Has history of smoking for over 55 years  He has been smoking more than packet daily in the past however he has been smokes about half a pack a day lately and stopped smoking on 2018 when he was admitted to the hospital     Vaping Use   • Vaping Use: Never used   Substance and Sexual Activity   • Alcohol use: Not Currently   • Drug use: Never   • Sexual activity: Not Currently     Partners: Female     Birth control/protection: Condom Male   Other Topics Concern   • None   Social History Narrative    ** Merged History Encounter **         ** Merged History Encounter **         History of Ultra Sound: 2016  History of Stress Test: 2018  History of ECHO: 2018  · Most recent tobacco use screenin2019    · Live alone or with others:   with others      · Diet:   Regular    · Caffeine intake: Moderate    · Guns     present in home:   No    · Asbestos exposure:   No    · TB exposure:   No    · Environmental exposure:   No    · Animal exposure:   No    · Smoke alarm in home:   Yes       Social Determinants of Health     Financial Resource Strain: Not on file   Food Insecurity: No Food Insecurity   • Worried About Running Out of Food in the Last Year: Never true   • Ran Out of Food in the Last Year: Never true   Transportation Needs: No Transportation Needs   • Lack of Transportation (Medical): No   • Lack of Transportation (Non-Medical): No   Physical Activity: Not on file   Stress: Not on file   Social Connections: Not on file   Intimate Partner Violence: Not on file   Housing Stability: Low Risk    • Unable to Pay for Housing in the Last Year: No   • Number of Places Lived in the Last Year: 1   • Unstable Housing in the Last Year: No        Review of Systems   All other systems reviewed and are negative  Vitals: There were no vitals filed for this visit  BMI - There is no height or weight on file to calculate BMI    Wt Readings from Last 7 Encounters:   23 63 2 kg (139 lb 6 4 oz) 05/16/23 66 3 kg (146 lb 2 6 oz)   05/09/23 63 4 kg (139 lb 12 8 oz)   05/04/23 63 4 kg (139 lb 12 8 oz)   05/01/23 62 4 kg (137 lb 9 6 oz)   04/14/23 61 kg (134 lb 6 4 oz)   04/10/23 61 4 kg (135 lb 6 4 oz)       Physical Exam  Vitals and nursing note reviewed  Constitutional:       General: He is not in acute distress  Appearance: He is well-developed and underweight  He is ill-appearing  HENT:      Nose: No congestion  Eyes:      General: No scleral icterus  Conjunctiva/sclera: Conjunctivae normal    Neck:      Vascular: No carotid bruit or JVD  Cardiovascular:      Rate and Rhythm: Normal rate and regular rhythm  Heart sounds: Normal heart sounds  No murmur heard  No friction rub  No gallop  Pulmonary:      Effort: Pulmonary effort is normal  No respiratory distress  Breath sounds: Normal breath sounds  No wheezing or rales  Chest:      Chest wall: No tenderness  Abdominal:      General: There is no distension  Palpations: Abdomen is soft  Tenderness: There is no abdominal tenderness  Musculoskeletal:         General: Signs of injury present  No swelling, tenderness or deformity  Cervical back: Neck supple  No muscular tenderness  Right lower leg: No edema  Left lower leg: No edema  Skin:     General: Skin is warm  Neurological:      General: No focal deficit present  Mental Status: He is alert and oriented to person, place, and time  Mental status is at baseline  Psychiatric:         Mood and Affect: Mood normal          Behavior: Behavior normal  Behavior is cooperative  Thought Content:  Thought content normal          Labs:      CBC:   Lab Results   Component Value Date    WBC 10 85 (H) 05/13/2023    RBC 4 47 05/13/2023    HGB 13 6 05/13/2023    HCT 42 0 05/13/2023    MCV 94 05/13/2023     05/13/2023    RDW 14 0 05/13/2023       CMP:   Lab Results   Component Value Date    K 4 1 05/13/2023     05/13/2023    CO2 23 05/13/2023    BUN 19 05/13/2023    CREATININE 0 88 05/13/2023    EGFR 84 05/13/2023    GLUCOSE 118 05/12/2023    CALCIUM 8 2 (L) 05/13/2023    AST 13 04/01/2023    ALT 15 04/01/2023    ALKPHOS 95 04/01/2023       Magnesium:  Lab Results   Component Value Date    MG 2 2 04/01/2023       Lipid Profile:   No results found for: CHOL, HDL, TRIG, LDLCALC    Thyroid Studies:   Lab Results   Component Value Date    IPR0YLLPGVFI 1 178 02/28/2022       A1c:  No components found for: HGA1C    INR:  Lab Results   Component Value Date    INR 1 24 (H) 04/01/2023    INR 1 07 11/18/2022    INR 1 34 (H) 03/03/2022   5    Imaging: XR chest 1 view portable    Result Date: 5/4/2023  Narrative: CHEST INDICATION:   dyspnea  COMPARISON: 4/1/2023 EXAM PERFORMED/VIEWS:  XR CHEST PORTABLE FINDINGS: The cardiac silhouette is without change from the prior study  There is a loop recorder present The lungs are clear  No pneumothorax or pleural effusion  Osseous structures appear within normal limits for patient age  Impression: No acute cardiopulmonary disease  Workstation performed: [id number]     TRAUMA - CT head wo contrast    Result Date: 5/12/2023  Narrative: CT BRAIN - WITHOUT CONTRAST INDICATION:   TRAUMA  COMPARISON:  None  TECHNIQUE:  CT examination of the brain was performed  Multiplanar 2D reformatted images were created from the source data  Radiation dose length product (DLP) for this visit:  1041 mGy-cm   This examination, like all CT scans performed in the [facility], was performed utilizing techniques to minimize radiation dose exposure, including the use of iterative reconstruction and automated exposure control  IMAGE QUALITY:  Diagnostic  FINDINGS: PARENCHYMA: Decreased attenuation is noted in periventricular and subcortical white matter demonstrating an appearance that is statistically most likely to represent mild microangiopathic change  No CT signs of acute infarction    No intracranial mass, mass effect or midline shift  No acute parenchymal hemorrhage  VENTRICLES AND EXTRA-AXIAL SPACES:  Normal for the patient's age  VISUALIZED ORBITS: Normal visualized orbits  PARANASAL SINUSES: Small mucous retention cyst in the left maxillary sinus  CALVARIUM AND EXTRACRANIAL SOFT TISSUES: Focal soft tissue swelling and subcutaneous emphysema along the midline posterior scalp compatible with laceration (series 2, image 31)  Impression: 1  Focal soft tissue laceration along the midline posterior scalp  2   No intracranial hemorrhage or calvarial fracture  Workstation performed: [id number]     TRAUMA - CT spine cervical wo contrast    Result Date: 5/12/2023  Narrative: CT CERVICAL SPINE - WITHOUT CONTRAST INDICATION:   TRAUMA  COMPARISON:  None  TECHNIQUE:  CT examination of the cervical spine was performed without intravenous contrast   Contiguous axial images were obtained  Multiplanar 2D reformatted images were created from the source data  Radiation dose length product (DLP) for this visit:  295 mGy-cm   This examination, like all CT scans performed in the [facility], was performed utilizing techniques to minimize radiation dose exposure, including the use of iterative reconstruction and automated exposure control  IMAGE QUALITY:  Diagnostic  FINDINGS: ALIGNMENT: Grade 1 retrolisthesis of C3 on C4, degenerative  VERTEBRAE: No acute fracture  Chronic appearing fragment along the anterior aspect of the inferior endplate of the C6 vertebral body  Additional mildly fragmented osteophytes are seen, chronic  DEGENERATIVE CHANGES: Moderate spinal canal stenosis at C3-C4, C4-C5, and C5-C6  Severe bilateral neuroforaminal narrowing at C3-C4 and C4-C5  Severe neuroforaminal narrowing at C5-C6 on the right PREVERTEBRAL AND PARASPINAL SOFT TISSUES: Unremarkable THORACIC INLET: Emphysematous changes of the lungs  Impression: No cervical spine fracture or traumatic malalignment   Moderate spinal canal stenosis at C3-C4, C4-C5, and C5-C6 with associated bilateral neuroforaminal narrowing  Workstation performed: [id number]     XR chest 1 view    Result Date: 2023  Narrative: TRAUMA SERIES INDICATION:  TRAUMA  COMPARISON:  None VIEWS:  XR TRAUMA MULTIPLE FINDINGS: CHEST: Supine frontal view of the chest is obtained  The cardiac silhouette is enlarged  There is a loop recorder device overlying the left chest wall  No focal consolidation  No layering pleural effusions detected  No pneumothorax is seen on this supine film  Upright images are more sensitive to detect anterior pneumothoraces if relevant  No displaced fractures  Impression: No pneumothorax or pleural effusion  Workstation performed: [id number]     XR Trauma multiple ([facility] trauma bay ONLY)    Result Date: 2023  Narrative: TRAUMA SERIES INDICATION:  TRAUMA  COMPARISON:  None VIEWS:  XR TRAUMA MULTIPLE FINDINGS: CHEST: Supine frontal view of the chest is obtained  The cardiac silhouette is enlarged  There is a loop recorder device overlying the left chest wall  No focal consolidation  No layering pleural effusions detected  No pneumothorax is seen on this supine film  Upright images are more sensitive to detect anterior pneumothoraces if relevant  No displaced fractures  Impression: No pneumothorax or pleural effusion  Workstation performed: [id number]     [address] [facility] bedside procedure    Result Date: 2023  Narrative: 1 2 840 [id number]  6 [id number]  7 [id number]      Cardiac testing:   Results for orders placed during the hospital encounter of 20    Echo complete with contrast if indicated    Narrative  [facility] [address]    Transthoracic Echocardiogram  2D, M-mode, Doppler, and Color Doppler    Study date:  07-Aug-2020    Patient: Kaylah Justice  MR number: [id number]  Account number: [de-identified]  : [id number]  Age: 69 years  Gender: Male  Status: Inpatient  Location: [facility]  Height: 70 in  Weight: 148 lb  BP: 123/ 70 mmHg    Indications: Heart Failure    Diagnoses: I50 9 - Heart failure, unspecified    Sonographer:  NAN Caceres  Referring Physician:  Tonia Lal MD  Group:  [id number]  Interpreting Physician:  Patricia Cook MD    SUMMARY    LEFT VENTRICLE:  Systolic function was normal  Ejection fraction was estimated to be 55 %  There were no regional wall motion abnormalities  Wall thickness was at the upper limits of normal     LEFT ATRIUM:  The atrium was mildly dilated  RIGHT ATRIUM:  The atrium was mildly to moderately dilated  MITRAL VALVE:  There was mild annular calcification  There was mild to moderate regurgitation  TRICUSPID VALVE:  There was moderate regurgitation  Estimated peak PA pressure was 39 mmHg  PULMONIC VALVE:  There was mild regurgitation  HISTORY: PRIOR HISTORY: CAD, Anemia Congestive heart failure  Nonischemic cardiomyopathy  Atrial fibrillation  PROCEDURE: The study was performed in the [facility]  This was a routine study  The transthoracic approach was used  The study included complete 2D imaging, M-mode, complete spectral Doppler, and color Doppler  The heart rate was 80  bpm, at the start of the study  Images were obtained from the parasternal, apical, subcostal, and suprasternal notch acoustic windows  Image quality was adequate  LEFT VENTRICLE: Size was normal  Systolic function was normal  Ejection fraction was estimated to be 55 %  There were no regional wall motion abnormalities  Wall thickness was at the upper limits of normal  DOPPLER: Transmitral flow  pattern: atrial fibrillation  RIGHT VENTRICLE: The size was normal  Systolic function was normal  Wall thickness was normal     LEFT ATRIUM: The atrium was mildly dilated  RIGHT ATRIUM: The atrium was mildly to moderately dilated  MITRAL VALVE: There was mild annular calcification   Valve structure was normal  There was mild-moderate diffuse thickening  There was normal leaflet separation  DOPPLER: The transmitral velocity was within the normal range  There was no  evidence for stenosis  There was mild to moderate regurgitation  AORTIC VALVE: The valve was trileaflet  Leaflets exhibited mildly increased thickness, normal cuspal separation, and sclerosis  DOPPLER: Transaortic velocity was within the normal range  There was no evidence for stenosis  There was no  significant regurgitation  TRICUSPID VALVE: The valve structure was normal  There was normal leaflet separation  DOPPLER: The transtricuspid velocity was within the normal range  There was no evidence for stenosis  There was moderate regurgitation  Pulmonary artery  systolic pressure was mildly increased  Estimated peak PA pressure was 39 mmHg  PULMONIC VALVE: Leaflets exhibited normal thickness, no calcification, and normal cuspal separation  DOPPLER: The transpulmonic velocity was within the normal range  There was mild regurgitation  PERICARDIUM: There was no pericardial effusion  AORTA: The root exhibited normal size  SYSTEMIC VEINS: IVC: The inferior vena cava was normal in size   Respirophasic changes were normal     SYSTEM MEASUREMENT TABLES    2D  %FS: 34 29 %  Ao Diam: 3 52 cm  EDV(Teich): 151 99 ml  EF(Teich): 62 68 %  ESV(Teich): 56 72 ml  HR_4Ch_Q: 90 87 BPM  IVSd: 1 23 cm  LA Diam: 4 97 cm  LAAs A4C: 30 77 cm2  LAESV A-L A4C: 106 62 ml  LAESV MOD A4C: 99 72 ml  LALs A4C: 7 54 cm  LVCO_4Ch_Q: 4 61 L/min  LVEF_4Ch_Q: 56 72 %  LVIDd: 5 57 cm  LVIDs: 3 66 cm  LVLd_4Ch_Q: 7 96 cm  LVLs_4Ch_Q: 6 38 cm  LVPWd: 1 08 cm  LVSV_4Ch_Q: 50 7 ml  LVVED_4Ch_Q: 89 39 ml  LVVES_4Ch_Q: 38 69 ml  RAAs: 31 12 cm2  RAESV A-L: 112 8 ml  RAESV MOD: 108 5 ml  RALs: 7 29 cm  RVIDd: 3 86 cm  SV(Teich): 95 27 ml    CW  TR Vmax: 2 78 m/s  TR maxP 97 mmHg    MM  TAPSE: 1 68 cm    [facility]    Prepared and electronically signed by    Ann Putnam MD  Signed 07-Aug-2020 09:50:46    No results found for this or any previous visit  No results found for this or any previous visit  No results found for this or any previous visit  XR chest 1 view  Narrative: TRAUMA SERIES    INDICATION:  TRAUMA  COMPARISON:  None    VIEWS:  XR TRAUMA MULTIPLE    FINDINGS:    CHEST:    Supine frontal view of the chest is obtained  The cardiac silhouette is enlarged  There is a loop recorder device overlying the left chest wall  No focal consolidation  No layering pleural effusions detected  No pneumothorax is seen on this supine film  Upright images are more sensitive to detect anterior pneumothoraces if relevant  No displaced fractures  Impression: No pneumothorax or pleural effusion  Workstation performed: [id number]   bedside procedure  1 [id number]  8 [id number]  1 [id number]  TRAUMA - CT head wo contrast  Narrative: CT BRAIN - WITHOUT CONTRAST    INDICATION:   TRAUMA  COMPARISON:  None  TECHNIQUE:  CT examination of the brain was performed  Multiplanar 2D reformatted images were created from the source data  Radiation dose length product (DLP) for this visit:  1041 mGy-cm   This examination, like all CT scans performed in the [facility], was performed utilizing techniques to minimize radiation dose exposure, including the use of iterative   reconstruction and automated exposure control  IMAGE QUALITY:  Diagnostic  FINDINGS:    PARENCHYMA: Decreased attenuation is noted in periventricular and subcortical white matter demonstrating an appearance that is statistically most likely to represent mild microangiopathic change  No CT signs of acute infarction  No intracranial mass, mass effect or midline shift  No acute parenchymal hemorrhage  VENTRICLES AND EXTRA-AXIAL SPACES:  Normal for the patient's age      VISUALIZED ORBITS: Normal visualized orbits  PARANASAL SINUSES: Small mucous retention cyst in the left maxillary sinus  CALVARIUM AND EXTRACRANIAL SOFT TISSUES: Focal soft tissue swelling and subcutaneous emphysema along the midline posterior scalp compatible with laceration (series 2, image 31)  Impression: 1  Focal soft tissue laceration along the midline posterior scalp  2   No intracranial hemorrhage or calvarial fracture  Workstation performed: [id number]  TRAUMA - CT spine cervical wo contrast  Narrative: CT CERVICAL SPINE - WITHOUT CONTRAST    INDICATION:   TRAUMA  COMPARISON:  None  TECHNIQUE:  CT examination of the cervical spine was performed without intravenous contrast   Contiguous axial images were obtained  Multiplanar 2D reformatted images were created from the source data  Radiation dose length product (DLP) for this visit:  295 mGy-cm   This examination, like all CT scans performed in the [facility], was performed utilizing techniques to minimize radiation dose exposure, including the use of iterative   reconstruction and automated exposure control  IMAGE QUALITY:  Diagnostic  FINDINGS:    ALIGNMENT: Grade 1 retrolisthesis of C3 on C4, degenerative  VERTEBRAE: No acute fracture  Chronic appearing fragment along the anterior aspect of the inferior endplate of the C6 vertebral body  Additional mildly fragmented osteophytes are seen, chronic  DEGENERATIVE CHANGES: Moderate spinal canal stenosis at C3-C4, C4-C5, and C5-C6  Severe bilateral neuroforaminal narrowing at C3-C4 and C4-C5  Severe neuroforaminal narrowing at C5-C6 on the right    PREVERTEBRAL AND PARASPINAL SOFT TISSUES: Unremarkable    THORACIC INLET: Emphysematous changes of the lungs  Impression: No cervical spine fracture or traumatic malalignment  Moderate spinal canal stenosis at C3-C4, C4-C5, and C5-C6 with associated bilateral neuroforaminal narrowing      Workstation performed: [id number]  XR Trauma multiple ([facility] trauma bay ONLY)  Narrative: TRAUMA SERIES    INDICATION:  TRAUMA  COMPARISON:  None    VIEWS:  XR TRAUMA MULTIPLE    FINDINGS:    CHEST:    Supine frontal view of the chest is obtained  The cardiac silhouette is enlarged  There is a loop recorder device overlying the left chest wall  No focal consolidation  No layering pleural effusions detected  No pneumothorax is seen on this supine film  Upright images are more sensitive to detect anterior pneumothoraces if relevant  No displaced fractures  Impression: No pneumothorax or pleural effusion      Workstation performed: [id number]

## 2023-05-17 NOTE — ASSESSMENT & PLAN NOTE
Pt recently resented to the emergency department after a fall with head impact on the ground  CT head showed focal soft tissue laceration along the midline posterior scalp without intracranial hemorrhage  CT cervical spine showed moderate spinal stenosis without traumatic injury  Trauma x-ray showed no pneumothorax or pleural effusion  Continue PT OT  Continue to implement fall precautions  Pt remains on midodrine for history of orthostatic hypotension   BP is currently stable

## 2023-05-17 NOTE — PROGRESS NOTES
[facility]  [address] [place] [address], [address] [place] [address]    Nursing Home Admission    NAME: Eleazar Simms  AGE: 78 y o  SEX: male [phone]      Patient Location     [facility]        Assessment/Plan:    Syncope and collapse  Pt recently resented to the emergency department after a fall with head impact on the ground  CT head showed focal soft tissue laceration along the midline posterior scalp without intracranial hemorrhage  CT cervical spine showed moderate spinal stenosis without traumatic injury  Trauma x-ray showed no pneumothorax or pleural effusion  Continue PT OT  Continue to implement fall precautions  Pt remains on midodrine for history of orthostatic hypotension  BP is currently stable          UTI:  Recent urine culture revealed Proteus and with Alcaligenes fecalis  Pt was initially started on Zosyn later changed to oral Augmentin on discharge  Blood cultures remained negative   Pt to follow up with urology service for possible [facility]     Atrial fibrillation :  HR stable onToprol-XL  Continue Eliquis     COPD (chronic obstructive pulmonary disease) : Stable  Continue fluticasone-vilanterol     Chronic indwelling Miller catheter:  History of chronic miller / urinary retention  F U with urology      Chief Complaint     Recent hospitalization for near syncope, UTI    HPI       Patient is a 78 y o  male medical history significant for anemia, orthostatic hypotension, GERD, urinary function status post chronic indwelling Miller catheter, COPD, hypertension  Patient was hospitalized on 5/12/2023 after sustaining a fall at nursing facility resulting in a head impact  He was noted to have a small laceration involving posterior aspect of the scalp  CT head revealed focal soft tissue laceration along the midline posterior scalp without any acute intracranial hemorrhage    CT cervical spine revealed moderate spinal canal stenosis with associated bilateral neural foraminal narrowing  No cervical fracture was identified Work-up in the hospital additionally revealed UTI  Patient was initially started on Zosyn later switched over to Augmentin  Urine cultures grew Proteus  During the hospital stay patient was seen in consultation by urology service for leakage around Cloud catheter site  Patient has history of chronic indwelling Cloud catheter  Outpatient follow-up for consideration for suprapubic catheter was recommended  Patient was subsequently discharged back to SNF where he is currently undergoing rehab  At the time of my evaluation patient is doing okay  Denies any active complaints       Past Medical History:   Diagnosis Date   • Ambulatory dysfunction    • Anxiety    • Anxiety disorder    • Atrial fibrillation (HCC)    • Coronary artery disease    • Depression    • Cloud catheter in place    • Hepatitis C     screening negative in 1/2017   • Hepatitis C    • History of MI (myocardial infarction)    • Hypertension    • MI (myocardial infarction) ([facility] [address] )    • Urinary catheter in place    • Use of cane as ambulatory aid        Past Surgical History:   Procedure Laterality Date   • CARDIAC CATHETERIZATION  07/09/2018   • CARDIAC ELECTROPHYSIOLOGY PROCEDURE N/A 9/30/2022    Procedure: Cardiac loop recorder implant;  Surgeon: Mariann Segundo MD;  Location: [facility];   Service: Cardiology   • CARDIAC ELECTROPHYSIOLOGY PROCEDURE  09/30/2022    Cardiac loop recorder implant; Dr Andrea Adhikari  09/2022   • COLONOSCOPY     • COLONOSCOPY     • INGUINAL HERNIA REPAIR Right 08/11/2022    Dr Fermin Samples   • MS RPR 1ST INGUN HRNA AGE 5 YRS/> REDUCIBLE Right 8/11/2022    Procedure: REPAIR HERNIA INGUINAL;  Surgeon: Pamela Encarnacion DO;  Location: [facility];  Service: General   • TONSILLECTOMY         Social History     Tobacco Use   Smoking Status Former   • Packs/day: 1 50   • Years: 57 00   • Pack years: 85 50   • Types: Cigarettes   • Quit date: 3/12/2023   • Years since quittin 1   Smokeless Tobacco Never   Tobacco Comments    since age 17; Has history of smoking for over 54 years   He has been smoking more than packet daily in the past however he has been smokes about half a pack a day lately and stopped smoking on 2018 when he was admitted to the hospital            Family History   Problem Relation Age of Onset   • Hypertension Mother    • Coronary artery disease Mother         premature   • Diabetes Father    • Coronary artery disease Father         premature   • Hypertension Father         No Known Allergies       Current Outpatient Medications:   •  acetaminophen (TYLENOL) 325 mg tablet, Take 650 mg by mouth every 6 (six) hours as needed, Disp: , Rfl:   •  amoxicillin-clavulanate (AUGMENTIN) 875-125 mg per tablet, Take 1 tablet by mouth every 12 (twelve) hours for 5 doses, Disp: 5 tablet, Rfl: 0  •  apixaban (ELIQUIS) 2 5 mg, Take 2 5 mg by mouth 2 (two) times a day, Disp: , Rfl:   •  apixaban (ELIQUIS) 2 5 mg, Take 2 5 mg by mouth 2 (two) times a day, Disp: , Rfl:   •  azelastine (ASTELIN) 0 1 % nasal spray, 1 spray into each nostril 2 (two) times a day as needed for allergies or rhinitis Use in each nostril as directed, Disp: 30 mL, Rfl: 0  •  azelastine (ASTELIN) 0 1 % nasal spray, 1 spray into each nostril 2 (two) times a day as needed for rhinitis Use in each nostril as directed, Disp: , Rfl:   •  bisacodyl (DULCOLAX) 10 mg suppository, Insert 10 mg into the rectum once as needed, Disp: , Rfl:   •  docusate sodium (COLACE) 100 mg capsule, Take 1 capsule (100 mg total) by mouth 2 (two) times a day, Disp: 180 capsule, Rfl: 0  •  docusate sodium (COLACE) 100 mg capsule, Take 100 mg by mouth 2 (two) times a day, Disp: , Rfl:   •  ferrous sulfate 324 (65 Fe) mg, Take 1 tablet (324 mg total) by mouth daily before breakfast, Disp: 90 tablet, Rfl: 2  •  ferrous sulfate 325 (65 Fe) mg tablet, Take 325 mg by mouth daily with breakfast, Disp: , Rfl: •  Fluticasone-Salmeterol (Advair Diskus) 100-50 mcg/dose inhaler, Inhale 1 puff 2 (two) times a day Rinse mouth after use , Disp: , Rfl:   •  Fluticasone-Salmeterol (Advair) 100-50 mcg/dose inhaler, Inhale 1 puff 2 (two) times a day Rinse mouth after use , Disp: , Rfl:   •  Fluticasone-Salmeterol (Advair) 250-50 mcg/dose inhaler, , Disp: , Rfl:   •  folic acid (FOLVITE) 557 mcg tablet, Take 400 mcg by mouth daily, Disp: , Rfl:   •  ipratropium-albuterol (DUO-NEB) 0 5-2 5 mg/3 mL nebulizer solution, INHALE CONTENTS OF 1 VIAL VIA NEBULIZER FOUR TIMES A DAY, Disp: 60 mL, Rfl: 11  •  ipratropium-albuterol (DUO-NEB) 0 5-2 5 mg/3 mL nebulizer solution, Take 3 mL by nebulization 4 (four) times a day, Disp: , Rfl:   •  meclizine (ANTIVERT) 12 5 MG tablet, Take 12 5 mg by mouth every 8 (eight) hours as needed for dizziness, Disp: , Rfl:   •  methenamine hippurate (HIPREX) 1 g tablet, Take 1 g by mouth 2 (two) times a day with meals, Disp: , Rfl:   •  methenamine hippurate (HIPREX) 1 g tablet, Take 1 g by mouth 2 (two) times a day with meals, Disp: , Rfl:   •  metoprolol succinate (TOPROL-XL) 25 mg 24 hr tablet, Take 25 mg by mouth daily, Disp: , Rfl:   •  metoprolol succinate (TOPROL-XL) 25 mg 24 hr tablet, Take 25 mg by mouth daily, Disp: , Rfl:   •  midodrine (PROAMATINE) 5 mg tablet, Take 1 tablet (5 mg total) by mouth 3 (three) times a day as needed (for SBP < 100 or severe dizziness with standing up  Hold for SBP > 130), Disp: 90 tablet, Rfl: 3  •  midodrine (PROAMATINE) 5 mg tablet, Take 5 mg by mouth 3 (three) times a day as needed, Disp: , Rfl:   •  nystatin (MYCOSTATIN) powder, Apply 1 application   topically in the morning, Disp: , Rfl:   •  omeprazole (PriLOSEC) 20 mg delayed release capsule, Take 1 capsule (20 mg total) by mouth daily, Disp: 90 capsule, Rfl: 3  •  omeprazole (PriLOSEC) 20 mg delayed release capsule, Take 20 mg by mouth daily, Disp: , Rfl:   •  sodium phosphate-biphosphate (FLEET) 7-19 g 118 mL enema, Insert 1 enema into the rectum once as needed, Disp: , Rfl:   No current facility-administered medications for this visit  Updated list was reviewed in [facility] system of facility  Vitals:    05/17/23 1438   BP: 111/76   Pulse: 64   Temp: 97 6 °F (36 4 °C)   SpO2: 94%       Vital signs were reviewed in point click care    Review of Systems   Constitutional: Positive for fatigue  Negative for chills and fever  HENT: Negative for nosebleeds and rhinorrhea  Eyes: Negative for discharge and redness  Respiratory: Negative for cough, chest tightness, shortness of breath, wheezing and stridor  Cardiovascular: Negative for chest pain and leg swelling  Gastrointestinal: Negative for abdominal distention, abdominal pain, diarrhea and vomiting  Genitourinary: Negative for dysuria, flank pain and hematuria  Musculoskeletal: Positive for gait problem  Negative for arthralgias and back pain  Skin: Negative for pallor  Neurological: Positive for weakness (Generalized)  Negative for tremors, seizures, syncope and headaches  History of recent fall   Psychiatric/Behavioral: Negative for agitation, behavioral problems and confusion  Physical Exam  Constitutional:       General: He is not in acute distress  HENT:      Head: Normocephalic and atraumatic  Eyes:      General: No scleral icterus  Right eye: No discharge  Left eye: No discharge  Cardiovascular:      Rate and Rhythm: Normal rate and regular rhythm  Pulmonary:      Breath sounds: No wheezing, rhonchi or rales  Abdominal:      General: There is no distension  Palpations: Abdomen is soft  Tenderness: There is no abdominal tenderness  There is no guarding  Musculoskeletal:      Cervical back: Neck supple  Right lower leg: No edema  Left lower leg: No edema  Comments: Posterior scalp abrasion appears to be healing well   Skin:     Coloration: Skin is not jaundiced  "  Neurological:      General: No focal deficit present  Cranial Nerves: No cranial nerve deficit  Motor: Weakness (generalized) present  Psychiatric:         Mood and Affect: Mood normal            Diagnostic Data       Recent labs and imaging studies were reviewed  Code Status:      DNR           Portions of the record may have been created with voice recognition software  Occasional wrong word or \"sound a like\" substitutions may have occurred due to the inherent limitations of voice recognition software  Read the chart carefully and recognize, using context, where substitutions have occurred      This note was electronically signed by Dr Mayur Maria   "

## 2023-05-22 ENCOUNTER — REMOTE DEVICE CLINIC VISIT (OUTPATIENT)
Dept: CARDIOLOGY CLINIC | Facility: CLINIC | Age: 78
End: 2023-05-22

## 2023-05-22 DIAGNOSIS — Z95.818 PRESENCE OF OTHER CARDIAC IMPLANTS AND GRAFTS: Primary | ICD-10-CM

## 2023-05-22 NOTE — PROGRESS NOTES
"Results for orders placed or performed in visit on 05/22/23   Cardiac EP device report    Narrative     [facility]: BATTERY STATUS \"GOOD\"  DEVICE CLASSIFIED 1 PAUSE ON 3/24/23 DUE TO UNDERSENSING (SEE 2ND PDF)  DEVICE DETECTED 625 AF EPISODES & CURRENTLY IN AF  AF BURDEN-98 3%  PT ON ELIQUIS & METOPROLOL  NO PT ACTIVATED EPISODES  NORMAL DEVICE FUNCTION   GV       "

## 2023-05-31 ENCOUNTER — NURSING HOME VISIT (OUTPATIENT)
Dept: GERIATRICS | Facility: OTHER | Age: 78
End: 2023-05-31

## 2023-05-31 VITALS
OXYGEN SATURATION: 98 % | RESPIRATION RATE: 18 BRPM | SYSTOLIC BLOOD PRESSURE: 112 MMHG | HEART RATE: 74 BPM | TEMPERATURE: 97.5 F | DIASTOLIC BLOOD PRESSURE: 83 MMHG

## 2023-05-31 DIAGNOSIS — R26.2 AMBULATORY DYSFUNCTION: ICD-10-CM

## 2023-05-31 DIAGNOSIS — I48.91 ATRIAL FIBRILLATION, UNSPECIFIED TYPE (HCC): ICD-10-CM

## 2023-05-31 DIAGNOSIS — Z97.8 CHRONIC INDWELLING FOLEY CATHETER: ICD-10-CM

## 2023-05-31 DIAGNOSIS — N30.00 ACUTE CYSTITIS WITHOUT HEMATURIA: ICD-10-CM

## 2023-05-31 DIAGNOSIS — R55 SYNCOPE AND COLLAPSE: Primary | ICD-10-CM

## 2023-05-31 NOTE — ASSESSMENT & PLAN NOTE
· Patient had recent hospitalization following syncopal episode  · Patient was found to have UTI and was started on IV Zosyn  · Urine cultures grew Proteus and patient was switched to Augmentin, course completed  · Remains afebrile 97 5  · Patient has chronic Cloud catheter  · Outpatient follow-up with urology reminded

## 2023-05-31 NOTE — PROGRESS NOTES
[facility]  [address]  [phone]  Evelin Rodriguez  Code 31 (STR)        NAME: Quynh Squires  AGE: 78 y o  SEX: male CODE STATUS: No CPR    DATE OF ENCOUNTER: 5/31/2023    Assessment and Plan     1  Syncope and collapse  Assessment & Plan:  · Patient had recent hospitalization following syncopal episode at facility with head strike  · CT of head showed focal soft tissue laceration of the midline posterior scalp without any acute intracranial hemorrhage  · CT cervical spine revealed moderate spinal canal stenosis with associated bilateral neural foraminal narrowing  · Negative for cervical fracture   · patient was found to have UTI and completed course of Augmentin  · History of orthostatic hypotension, continue midodrine  · Continue PT/OT   · maintain fall/safety precautions  · Assist patient with ADLs/IADLs as needed      2  Chronic indwelling Cloud catheter  Assessment & Plan:  · Patient with chronic indwelling Cloud catheter secondary to urinary retention  · Patient continues to complain of leaking around catheter  · Outpatient follow-up with urology for possible suprapubic catheter  · Continue Cloud care      3  Ambulatory dysfunction  Assessment & Plan:  Multifactorial secondary to recent fall and chronic medical conditions  Continue PT/OT  Fall/safety precautions  Ensure adequate nutrition/hydration   Monitor CBC/BMP    following for d/c planning       4  Acute cystitis without hematuria  Assessment & Plan:  · Patient had recent hospitalization following syncopal episode  · Patient was found to have UTI and was started on IV Zosyn  · Urine cultures grew Proteus and patient was switched to Augmentin, course completed  · Remains afebrile 97 5  · Patient has chronic Cloud catheter  · Outpatient follow-up with urology reminded      5   Atrial fibrillation, unspecified type [facility])  Assessment & Plan:  · Heart rate controlled, 74  · Patient denies chest pain/palpitations  · Continue metoprolol 25 mg daily  · Continue apixaban 2 5 mg twice daily         All medications and routine orders were reviewed and updated as needed  Chief Complaint     STR follow up visit  Patient's care was coordinated with nursing facility staff  Recent vitals, labs, and updated medications were review on [facility] system in facility  Past Medical and Surgical History      Past Medical History:   Diagnosis Date   • Ambulatory dysfunction    • Anxiety    • Anxiety disorder    • Atrial fibrillation (HCC)    • Coronary artery disease    • Depression    • Cloud catheter in place    • Hepatitis C     screening negative in 1/2017   • Hepatitis C    • History of MI (myocardial infarction)    • Hypertension    • MI (myocardial infarction) ([facility] [address] )    • Urinary catheter in place    • Use of cane as ambulatory aid      Past Surgical History:   Procedure Laterality Date   • CARDIAC CATHETERIZATION  07/09/2018   • CARDIAC ELECTROPHYSIOLOGY PROCEDURE N/A 9/30/2022    Procedure: Cardiac loop recorder implant;  Surgeon: Reyna Solitario MD;  Location: [facility] CARD[facility]AC [facility]ATH [facility]; Service: Cardiology   • CARDIAC ELECTROPHYSIOLOGY PROCEDURE  09/30/2022    Cardiac loop recorder implant; Dr Alberta German  09/2022   • COLONOSCOPY     • COLONOSCOPY     • INGUINAL HERNIA REPAIR Right 08/11/2022    Dr Sol Vines   • LA RPR 1ST INGUN HRNA AGE 5 YRS/> REDUCIBLE Right 8/11/2022    Procedure: REPAIR HERNIA INGUINAL;  Surgeon: Vida Encarnacion DO;  Location: [facility];  Service: General   • TONSILLECTOMY       No Known Allergies       History of Present Illness     HPI  Hari Prater is a 78year old male, he is a STR patient of [facility] since 5/16/23  Past Medical Hx including but not limited to Hepatitis C, GERD, COPD, MI, HTN, A-fib, CAD, SDH, urinary retention with chronic Cloud, anxiety, depression seen in collaboration with nursing for medical mgmt and STR follow up  Hospital course  Patient was admitted to the hospital 5/12/2023 following syncopal episode at facility with head strike  Patient had small laceration on posterior aspect of scalp  CT of head showed focal soft tissue laceration over the midline posterior scalp without any acute intracranial hemorrhage  CT cervical spine revealed moderate spinal canal stenosis with associated bilateral neural foraminal narrowing  Negative for cervical fracture  Patient was found to have UTI and was started on Zosyn  Urine culture grew Proteus and patient was switched to Augmentin  Patient has chronic Cloud and is followed by urology, considering suprapubic catheter  Patient was discharged back to [facility]  Rehab course  Terrie Gardiner was seen and examined at bedside today  Patient is a reliable historian and is alert and oriented x3  On exam patient is resting in bed and does not appear to be in distress  Patient denies pain, sleep disturbance, and reports having a good appetite  He has been participating in therapy  He continues to complain of leaking around catheter site, follow-up with urology for possible suprapubic catheter  He denies CP/SOB/N/V/D  Denies lightheadedness, dizziness, headaches, vision changes  Patient states they are eating well and staying hydrated  Denies any bowel or bladder issues  Per review of SNF records, Patient is eating 3 meals per day, consuming %  Last documented BM 5/31/2023  No concerns from nursing at this time  The patient's allergies, past medical, surgical, social and family history were reviewed and unchanged  Review of Systems     Review of Systems   Constitutional: Positive for activity change  Negative for appetite change and fever  HENT: Negative  Negative for congestion  Eyes: Negative  Respiratory: Positive for cough  Negative for shortness of breath and wheezing  Patient reports having a cough since he quit smoking   Cardiovascular: Negative  Negative for chest pain and palpitations  Gastrointestinal: Negative for abdominal distention, abdominal pain, constipation, diarrhea, nausea and vomiting  Endocrine: Negative  Genitourinary: Positive for difficulty urinating  Negative for dysuria, flank pain and hematuria  Chronic Miller catheter, patient reports leaking around catheter   Musculoskeletal: Positive for gait problem  Skin: Negative  Allergic/Immunologic: Negative  Neurological: Positive for dizziness  Negative for headaches  Dizziness at times   Hematological: Negative  Psychiatric/Behavioral: Negative  Negative for sleep disturbance  Objective     Vitals:   Vitals:    05/31/23 1733   BP: 112/83   Pulse: 74   Resp: 18   Temp: 97 5 °F (36 4 °C)   SpO2: 98%         Physical Exam  Vitals and nursing note reviewed  Constitutional:       General: He is not in acute distress  Appearance: Normal appearance  He is normal weight  He is not ill-appearing  HENT:      Head: Normocephalic and atraumatic  Nose: Nose normal  No congestion  Mouth/Throat:      Mouth: Mucous membranes are moist    Eyes:      Conjunctiva/sclera: Conjunctivae normal       Pupils: Pupils are equal, round, and reactive to light  Cardiovascular:      Rate and Rhythm: Normal rate and regular rhythm  Pulses: Normal pulses  Heart sounds: Normal heart sounds  Pulmonary:      Effort: Pulmonary effort is normal  No respiratory distress  Breath sounds: No wheezing  Abdominal:      General: Bowel sounds are normal  There is no distension  Palpations: Abdomen is soft  Tenderness: There is no abdominal tenderness  Genitourinary:     Comments: Chronic miller  Musculoskeletal:      Right lower leg: No edema  Left lower leg: No edema  Skin:     General: Skin is warm  Neurological:      Mental Status: He is alert and oriented to person, place, and time     Psychiatric:         Mood and Affect: Mood normal  Behavior: Behavior normal          Pertinent Laboratory/Diagnostic Studies:   Reviewed in facility chart-stable      Current Medications   Medications reviewed and updated see facility [facility] for details  Current Outpatient Medications:   •  acetaminophen (TYLENOL) 325 mg tablet, Take 650 mg by mouth every 6 (six) hours as needed, Disp: , Rfl:   •  azelastine (ASTELIN) 0 1 % nasal spray, 1 spray into each nostril 2 (two) times a day as needed for rhinitis Use in each nostril as directed, Disp: , Rfl:   •  bisacodyl (DULCOLAX) 10 mg suppository, Insert 10 mg into the rectum once as needed, Disp: , Rfl:   •  ferrous sulfate 325 (65 Fe) mg tablet, Take 325 mg by mouth daily with breakfast, Disp: , Rfl:   •  Fluticasone-Salmeterol (Advair Diskus) 100-50 mcg/dose inhaler, Inhale 1 puff 2 (two) times a day Rinse mouth after use , Disp: , Rfl:   •  folic acid (FOLVITE) 128 mcg tablet, Take 400 mcg by mouth daily, Disp: , Rfl:   •  ipratropium-albuterol (DUO-NEB) 0 5-2 5 mg/3 mL nebulizer solution, INHALE CONTENTS OF 1 VIAL VIA NEBULIZER FOUR TIMES A DAY, Disp: 60 mL, Rfl: 11  •  meclizine (ANTIVERT) 12 5 MG tablet, Take 12 5 mg by mouth every 8 (eight) hours as needed for dizziness, Disp: , Rfl:   •  methenamine hippurate (HIPREX) 1 g tablet, Take 1 g by mouth 2 (two) times a day with meals, Disp: , Rfl:   •  metoprolol succinate (TOPROL-XL) 25 mg 24 hr tablet, Take 25 mg by mouth daily, Disp: , Rfl:   •  midodrine (PROAMATINE) 5 mg tablet, Take 1 tablet (5 mg total) by mouth 3 (three) times a day as needed (for SBP < 100 or severe dizziness with standing up  Hold for SBP > 130), Disp: 90 tablet, Rfl: 3  •  nystatin (MYCOSTATIN) powder, Apply 1 application   topically in the morning, Disp: , Rfl:   •  omeprazole (PriLOSEC) 20 mg delayed release capsule, Take 1 capsule (20 mg total) by mouth daily, Disp: 90 capsule, Rfl: 3  •  sodium phosphate-biphosphate (FLEET) 7-19 g 118 mL enema, Insert 1 enema into the rectum "once as needed, Disp: , Rfl:   •  apixaban (ELIQUIS) 2 5 mg, Take 2 5 mg by mouth 2 (two) times a day, Disp: , Rfl:   •  docusate sodium (COLACE) 100 mg capsule, Take 100 mg by mouth 2 (two) times a day, Disp: , Rfl:        Please note:  Voice-recognition software may have been used in the preparation of this document  Occasional wrong word or \"sound-alike\" substitutions may have occurred due to the inherent limitations of voice recognition software  Interpretation should be guided by context           GABRIELA Ochoa  5/31/2023  5:55 PM    "

## 2023-05-31 NOTE — ASSESSMENT & PLAN NOTE
Multifactorial secondary to recent fall and chronic medical conditions  Continue PT/OT  Fall/safety precautions  Ensure adequate nutrition/hydration   Monitor CBC/BMP    following for d/c planning

## 2023-05-31 NOTE — ASSESSMENT & PLAN NOTE
· Patient with chronic indwelling Cloud catheter secondary to urinary retention  · Patient continues to complain of leaking around catheter  · Outpatient follow-up with urology for possible suprapubic catheter  · Continue Cloud care

## 2023-05-31 NOTE — ASSESSMENT & PLAN NOTE
· Heart rate controlled, 74  · Patient denies chest pain/palpitations  · Continue metoprolol 25 mg daily  · Continue apixaban 2 5 mg twice daily

## 2023-06-01 ENCOUNTER — TELEPHONE (OUTPATIENT)
Dept: OTHER | Facility: OTHER | Age: 78
End: 2023-06-01

## 2023-06-02 ENCOUNTER — APPOINTMENT (EMERGENCY)
Dept: RADIOLOGY | Facility: HOSPITAL | Age: 78
DRG: 698 | End: 2023-06-02
Payer: MEDICARE

## 2023-06-02 ENCOUNTER — APPOINTMENT (EMERGENCY)
Dept: CT IMAGING | Facility: HOSPITAL | Age: 78
DRG: 698 | End: 2023-06-02
Payer: MEDICARE

## 2023-06-02 ENCOUNTER — HOSPITAL ENCOUNTER (INPATIENT)
Facility: HOSPITAL | Age: 78
LOS: 3 days | Discharge: NURSING, REHAB OR LONG-TERM CARE | DRG: 698 | End: 2023-06-05
Attending: STUDENT IN AN ORGANIZED HEALTH CARE EDUCATION/TRAINING PROGRAM | Admitting: STUDENT IN AN ORGANIZED HEALTH CARE EDUCATION/TRAINING PROGRAM
Payer: MEDICARE
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
## 2023-06-02 DIAGNOSIS — T83.511A URINARY TRACT INFECTION ASSOCIATED WITH INDWELLING URETHRAL CATHETER (HCC): ICD-10-CM

## 2023-06-02 DIAGNOSIS — N39.0 URINARY TRACT INFECTION ASSOCIATED WITH INDWELLING URETHRAL CATHETER (HCC): ICD-10-CM

## 2023-06-02 PROBLEM — I48.91 A-FIB (HCC): Status: ACTIVE | Noted: 2023-06-02

## 2023-06-02 PROBLEM — I50.9 CHF (CONGESTIVE HEART FAILURE) (HCC): Status: ACTIVE | Noted: 2023-06-02

## 2023-06-02 PROBLEM — Z97.8 FOLEY CATHETER IN PLACE: Status: ACTIVE | Noted: 2023-06-02

## 2023-06-02 PROBLEM — R65.10 SIRS (SYSTEMIC INFLAMMATORY RESPONSE SYNDROME) (HCC): Status: ACTIVE | Noted: 2023-06-02

## 2023-06-02 PROBLEM — R50.9 FEVER: Status: ACTIVE | Noted: 2023-06-02

## 2023-06-02 PROBLEM — J44.9 COPD (CHRONIC OBSTRUCTIVE PULMONARY DISEASE) (HCC): Status: ACTIVE | Noted: 2023-06-02

## 2023-06-02 PROBLEM — A41.9 SEPSIS (HCC): Status: ACTIVE | Noted: 2023-06-02

## 2023-06-02 LAB
ALBUMIN SERPL BCP-MCNC: 3.9 G/DL (ref 3.5–5)
ALP SERPL-CCNC: 72 U/L (ref 34–104)
ALT SERPL W P-5'-P-CCNC: 15 U/L (ref 7–52)
ANION GAP SERPL CALCULATED.3IONS-SCNC: 8 MMOL/L (ref 4–13)
AST SERPL W P-5'-P-CCNC: 24 U/L (ref 13–39)
BACTERIA UR QL AUTO: ABNORMAL /HPF
BASOPHILS # BLD AUTO: 0.06 THOUSANDS/ÂΜL (ref 0–0.1)
BASOPHILS NFR BLD AUTO: 0 % (ref 0–1)
BILIRUB SERPL-MCNC: 0.94 MG/DL (ref 0.2–1)
BILIRUB UR QL STRIP: NEGATIVE
BUN SERPL-MCNC: 26 MG/DL (ref 5–25)
CALCIUM SERPL-MCNC: 8.9 MG/DL (ref 8.4–10.2)
CHLORIDE SERPL-SCNC: 105 MMOL/L (ref 96–108)
CLARITY UR: ABNORMAL
CO2 SERPL-SCNC: 23 MMOL/L (ref 21–32)
COLOR UR: YELLOW
CREAT SERPL-MCNC: 1.09 MG/DL (ref 0.6–1.3)
EOSINOPHIL # BLD AUTO: 0.01 THOUSAND/ÂΜL (ref 0–0.61)
EOSINOPHIL NFR BLD AUTO: 0 % (ref 0–6)
ERYTHROCYTE [DISTWIDTH] IN BLOOD BY AUTOMATED COUNT: 13.3 % (ref 11.6–15.1)
FLUAV RNA RESP QL NAA+PROBE: NEGATIVE
FLUBV RNA RESP QL NAA+PROBE: NEGATIVE
GFR SERPL CREATININE-BSD FRML MDRD: 66 ML/MIN/1.73SQ M
GLUCOSE SERPL-MCNC: 117 MG/DL (ref 65–140)
GLUCOSE UR STRIP-MCNC: NEGATIVE MG/DL
HCT VFR BLD AUTO: 44.3 % (ref 36.5–49.3)
HGB BLD-MCNC: 14.2 G/DL (ref 12–17)
HGB UR QL STRIP.AUTO: ABNORMAL
IMM GRANULOCYTES # BLD AUTO: 0.18 THOUSAND/UL (ref 0–0.2)
IMM GRANULOCYTES NFR BLD AUTO: 1 % (ref 0–2)
KETONES UR STRIP-MCNC: NEGATIVE MG/DL
LACTATE SERPL-SCNC: 1.1 MMOL/L (ref 0.5–2)
LEUKOCYTE ESTERASE UR QL STRIP: ABNORMAL
LYMPHOCYTES # BLD AUTO: 1.03 THOUSANDS/ÂΜL (ref 0.6–4.47)
LYMPHOCYTES NFR BLD AUTO: 5 % (ref 14–44)
MCH RBC QN AUTO: 30 PG (ref 26.8–34.3)
MCHC RBC AUTO-ENTMCNC: 32.1 G/DL (ref 31.4–37.4)
MCV RBC AUTO: 94 FL (ref 82–98)
MONOCYTES # BLD AUTO: 1.98 THOUSAND/ÂΜL (ref 0.17–1.22)
MONOCYTES NFR BLD AUTO: 9 % (ref 4–12)
MUCOUS THREADS UR QL AUTO: ABNORMAL
NEUTROPHILS # BLD AUTO: 19.05 THOUSANDS/ÂΜL (ref 1.85–7.62)
NEUTS SEG NFR BLD AUTO: 85 % (ref 43–75)
NITRITE UR QL STRIP: NEGATIVE
NON-SQ EPI CELLS URNS QL MICRO: ABNORMAL /HPF
NRBC BLD AUTO-RTO: 0 /100 WBCS
PH UR STRIP.AUTO: 7 [PH]
PLATELET # BLD AUTO: 266 THOUSANDS/UL (ref 149–390)
PMV BLD AUTO: 10.2 FL (ref 8.9–12.7)
POTASSIUM SERPL-SCNC: 4.9 MMOL/L (ref 3.5–5.3)
PROCALCITONIN SERPL-MCNC: 2.59 NG/ML
PROT SERPL-MCNC: 7.2 G/DL (ref 6.4–8.4)
PROT UR STRIP-MCNC: ABNORMAL MG/DL
RBC # BLD AUTO: 4.73 MILLION/UL (ref 3.88–5.62)
RBC #/AREA URNS AUTO: ABNORMAL /HPF
RSV RNA RESP QL NAA+PROBE: NEGATIVE
SARS-COV-2 RNA RESP QL NAA+PROBE: NEGATIVE
SODIUM SERPL-SCNC: 136 MMOL/L (ref 135–147)
SP GR UR STRIP.AUTO: 1.02 (ref 1–1.03)
UROBILINOGEN UR STRIP-ACNC: <2 MG/DL
WBC # BLD AUTO: 22.31 THOUSAND/UL (ref 4.31–10.16)
WBC #/AREA URNS AUTO: ABNORMAL /HPF

## 2023-06-02 PROCEDURE — 71045 X-RAY EXAM CHEST 1 VIEW: CPT

## 2023-06-02 PROCEDURE — 87086 URINE CULTURE/COLONY COUNT: CPT | Performed by: STUDENT IN AN ORGANIZED HEALTH CARE EDUCATION/TRAINING PROGRAM

## 2023-06-02 PROCEDURE — 87081 CULTURE SCREEN ONLY: CPT | Performed by: GENERAL PRACTICE

## 2023-06-02 PROCEDURE — 0241U HB NFCT DS VIR RESP RNA 4 TRGT: CPT

## 2023-06-02 PROCEDURE — 87077 CULTURE AEROBIC IDENTIFY: CPT | Performed by: STUDENT IN AN ORGANIZED HEALTH CARE EDUCATION/TRAINING PROGRAM

## 2023-06-02 PROCEDURE — 87040 BLOOD CULTURE FOR BACTERIA: CPT | Performed by: STUDENT IN AN ORGANIZED HEALTH CARE EDUCATION/TRAINING PROGRAM

## 2023-06-02 PROCEDURE — 80053 COMPREHEN METABOLIC PANEL: CPT | Performed by: STUDENT IN AN ORGANIZED HEALTH CARE EDUCATION/TRAINING PROGRAM

## 2023-06-02 PROCEDURE — 83605 ASSAY OF LACTIC ACID: CPT | Performed by: STUDENT IN AN ORGANIZED HEALTH CARE EDUCATION/TRAINING PROGRAM

## 2023-06-02 PROCEDURE — 99285 EMERGENCY DEPT VISIT HI MDM: CPT

## 2023-06-02 PROCEDURE — 99223 1ST HOSP IP/OBS HIGH 75: CPT | Performed by: HOSPITALIST

## 2023-06-02 PROCEDURE — 97167 OT EVAL HIGH COMPLEX 60 MIN: CPT

## 2023-06-02 PROCEDURE — 85025 COMPLETE CBC W/AUTO DIFF WBC: CPT | Performed by: STUDENT IN AN ORGANIZED HEALTH CARE EDUCATION/TRAINING PROGRAM

## 2023-06-02 PROCEDURE — 72125 CT NECK SPINE W/O DYE: CPT

## 2023-06-02 PROCEDURE — 97163 PT EVAL HIGH COMPLEX 45 MIN: CPT

## 2023-06-02 PROCEDURE — 36415 COLL VENOUS BLD VENIPUNCTURE: CPT | Performed by: STUDENT IN AN ORGANIZED HEALTH CARE EDUCATION/TRAINING PROGRAM

## 2023-06-02 PROCEDURE — 84145 PROCALCITONIN (PCT): CPT

## 2023-06-02 PROCEDURE — 87186 SC STD MICRODIL/AGAR DIL: CPT | Performed by: STUDENT IN AN ORGANIZED HEALTH CARE EDUCATION/TRAINING PROGRAM

## 2023-06-02 PROCEDURE — 81001 URINALYSIS AUTO W/SCOPE: CPT | Performed by: STUDENT IN AN ORGANIZED HEALTH CARE EDUCATION/TRAINING PROGRAM

## 2023-06-02 PROCEDURE — 99285 EMERGENCY DEPT VISIT HI MDM: CPT | Performed by: STUDENT IN AN ORGANIZED HEALTH CARE EDUCATION/TRAINING PROGRAM

## 2023-06-02 PROCEDURE — 70450 CT HEAD/BRAIN W/O DYE: CPT

## 2023-06-02 RX ORDER — ACETAMINOPHEN 325 MG/1
650 TABLET ORAL EVERY 6 HOURS PRN
COMMUNITY

## 2023-06-02 RX ORDER — ACETAMINOPHEN 325 MG/1
650 TABLET ORAL EVERY 6 HOURS PRN
Status: DISCONTINUED | OUTPATIENT
Start: 2023-06-02 | End: 2023-06-05 | Stop reason: HOSPADM

## 2023-06-02 RX ORDER — FLUTICASONE FUROATE AND VILANTEROL 100; 25 UG/1; UG/1
1 POWDER RESPIRATORY (INHALATION) DAILY
Status: DISCONTINUED | OUTPATIENT
Start: 2023-06-02 | End: 2023-06-05 | Stop reason: HOSPADM

## 2023-06-02 RX ORDER — AMOXICILLIN 250 MG
1 CAPSULE ORAL
Status: DISCONTINUED | OUTPATIENT
Start: 2023-06-02 | End: 2023-06-05 | Stop reason: HOSPADM

## 2023-06-02 RX ORDER — METHENAMINE HIPPURATE 1000 MG/1
1 TABLET ORAL 2 TIMES DAILY WITH MEALS
Status: DISCONTINUED | OUTPATIENT
Start: 2023-06-02 | End: 2023-06-02

## 2023-06-02 RX ORDER — BISACODYL 10 MG
10 SUPPOSITORY, RECTAL RECTAL DAILY PRN
COMMUNITY

## 2023-06-02 RX ORDER — NYSTATIN 100000 [USP'U]/G
1 POWDER TOPICAL EVERY MORNING
COMMUNITY

## 2023-06-02 RX ORDER — FERROUS SULFATE 325(65) MG
325 TABLET ORAL
Status: DISCONTINUED | OUTPATIENT
Start: 2023-06-02 | End: 2023-06-05 | Stop reason: HOSPADM

## 2023-06-02 RX ORDER — SODIUM CHLORIDE, SODIUM GLUCONATE, SODIUM ACETATE, POTASSIUM CHLORIDE, MAGNESIUM CHLORIDE, SODIUM PHOSPHATE, DIBASIC, AND POTASSIUM PHOSPHATE .53; .5; .37; .037; .03; .012; .00082 G/100ML; G/100ML; G/100ML; G/100ML; G/100ML; G/100ML; G/100ML
75 INJECTION, SOLUTION INTRAVENOUS CONTINUOUS
Status: DISCONTINUED | OUTPATIENT
Start: 2023-06-02 | End: 2023-06-02

## 2023-06-02 RX ORDER — MIDODRINE HYDROCHLORIDE 5 MG/1
5 TABLET ORAL 3 TIMES DAILY PRN
Status: DISCONTINUED | OUTPATIENT
Start: 2023-06-02 | End: 2023-06-02

## 2023-06-02 RX ORDER — AZELASTINE 1 MG/ML
1 SPRAY, METERED NASAL 2 TIMES DAILY PRN
COMMUNITY

## 2023-06-02 RX ORDER — PANTOPRAZOLE SODIUM 40 MG/1
40 TABLET, DELAYED RELEASE ORAL
Status: DISCONTINUED | OUTPATIENT
Start: 2023-06-02 | End: 2023-06-05 | Stop reason: HOSPADM

## 2023-06-02 RX ORDER — IPRATROPIUM BROMIDE AND ALBUTEROL SULFATE 2.5; .5 MG/3ML; MG/3ML
3 SOLUTION RESPIRATORY (INHALATION) 4 TIMES DAILY
COMMUNITY

## 2023-06-02 RX ORDER — MIDODRINE HYDROCHLORIDE 5 MG/1
5 TABLET ORAL 3 TIMES DAILY PRN
COMMUNITY
End: 2023-06-05

## 2023-06-02 RX ORDER — IPRATROPIUM BROMIDE AND ALBUTEROL SULFATE 2.5; .5 MG/3ML; MG/3ML
3 SOLUTION RESPIRATORY (INHALATION) EVERY 4 HOURS PRN
Status: DISCONTINUED | OUTPATIENT
Start: 2023-06-02 | End: 2023-06-02

## 2023-06-02 RX ORDER — LANOLIN ALCOHOL/MO/W.PET/CERES
400 CREAM (GRAM) TOPICAL DAILY
Status: DISCONTINUED | OUTPATIENT
Start: 2023-06-02 | End: 2023-06-05 | Stop reason: HOSPADM

## 2023-06-02 RX ORDER — SODIUM CHLORIDE, SODIUM GLUCONATE, SODIUM ACETATE, POTASSIUM CHLORIDE, MAGNESIUM CHLORIDE, SODIUM PHOSPHATE, DIBASIC, AND POTASSIUM PHOSPHATE .53; .5; .37; .037; .03; .012; .00082 G/100ML; G/100ML; G/100ML; G/100ML; G/100ML; G/100ML; G/100ML
75 INJECTION, SOLUTION INTRAVENOUS CONTINUOUS
Status: DISPENSED | OUTPATIENT
Start: 2023-06-02 | End: 2023-06-02

## 2023-06-02 RX ORDER — DOCUSATE SODIUM 100 MG/1
100 CAPSULE, LIQUID FILLED ORAL 2 TIMES DAILY
COMMUNITY

## 2023-06-02 RX ORDER — MIDODRINE HYDROCHLORIDE 5 MG/1
5 TABLET ORAL
Status: DISCONTINUED | OUTPATIENT
Start: 2023-06-02 | End: 2023-06-05 | Stop reason: HOSPADM

## 2023-06-02 RX ORDER — ALBUTEROL SULFATE 90 UG/1
2 AEROSOL, METERED RESPIRATORY (INHALATION) EVERY 4 HOURS PRN
Status: DISCONTINUED | OUTPATIENT
Start: 2023-06-02 | End: 2023-06-05 | Stop reason: HOSPADM

## 2023-06-02 RX ORDER — METOPROLOL SUCCINATE 25 MG/1
25 TABLET, EXTENDED RELEASE ORAL DAILY
Status: DISCONTINUED | OUTPATIENT
Start: 2023-06-02 | End: 2023-06-05 | Stop reason: HOSPADM

## 2023-06-02 RX ORDER — METHENAMINE HIPPURATE 1000 MG/1
1 TABLET ORAL 2 TIMES DAILY WITH MEALS
COMMUNITY

## 2023-06-02 RX ORDER — LANOLIN ALCOHOL/MO/W.PET/CERES
400 CREAM (GRAM) TOPICAL DAILY
COMMUNITY

## 2023-06-02 RX ORDER — FLUTICASONE PROPIONATE AND SALMETEROL 100; 50 UG/1; UG/1
1 POWDER RESPIRATORY (INHALATION) 2 TIMES DAILY
COMMUNITY

## 2023-06-02 RX ORDER — BISACODYL 10 MG
10 SUPPOSITORY, RECTAL RECTAL DAILY PRN
Status: DISCONTINUED | OUTPATIENT
Start: 2023-06-02 | End: 2023-06-05 | Stop reason: HOSPADM

## 2023-06-02 RX ORDER — METOPROLOL SUCCINATE 25 MG/1
25 TABLET, EXTENDED RELEASE ORAL DAILY
COMMUNITY

## 2023-06-02 RX ORDER — MECLIZINE HCL 12.5 MG/1
12.5 TABLET ORAL EVERY 8 HOURS PRN
COMMUNITY

## 2023-06-02 RX ORDER — FERROUS SULFATE 325(65) MG
325 TABLET ORAL
COMMUNITY

## 2023-06-02 RX ORDER — ENEMA 19; 7 G/133ML; G/133ML
1 ENEMA RECTAL DAILY PRN
COMMUNITY

## 2023-06-02 RX ORDER — MECLIZINE HCL 12.5 MG/1
12.5 TABLET ORAL EVERY 8 HOURS PRN
Status: DISCONTINUED | OUTPATIENT
Start: 2023-06-02 | End: 2023-06-05 | Stop reason: HOSPADM

## 2023-06-02 RX ORDER — OMEPRAZOLE 20 MG/1
20 CAPSULE, DELAYED RELEASE ORAL DAILY
COMMUNITY

## 2023-06-02 RX ADMIN — FERROUS SULFATE TAB 325 MG (65 MG ELEMENTAL FE) 325 MG: 325 (65 FE) TAB at 10:43

## 2023-06-02 RX ADMIN — MIDODRINE HYDROCHLORIDE 5 MG: 5 TABLET ORAL at 10:46

## 2023-06-02 RX ADMIN — APIXABAN 2.5 MG: 5 TABLET, FILM COATED ORAL at 18:43

## 2023-06-02 RX ADMIN — Medication 400 MCG: at 10:44

## 2023-06-02 RX ADMIN — APIXABAN 2.5 MG: 5 TABLET, FILM COATED ORAL at 10:44

## 2023-06-02 RX ADMIN — PIPERACILLIN AND TAZOBACTAM 3.38 G: 36; 4.5 INJECTION, POWDER, FOR SOLUTION INTRAVENOUS at 21:06

## 2023-06-02 RX ADMIN — PIPERACILLIN AND TAZOBACTAM 3.38 G: 36; 4.5 INJECTION, POWDER, FOR SOLUTION INTRAVENOUS at 03:31

## 2023-06-02 RX ADMIN — MIDODRINE HYDROCHLORIDE 5 MG: 5 TABLET ORAL at 18:43

## 2023-06-02 RX ADMIN — MIDODRINE HYDROCHLORIDE 7.5 MG: 5 TABLET ORAL at 05:36

## 2023-06-02 RX ADMIN — PIPERACILLIN AND TAZOBACTAM 3.38 G: 36; 4.5 INJECTION, POWDER, FOR SOLUTION INTRAVENOUS at 11:37

## 2023-06-02 RX ADMIN — PANTOPRAZOLE SODIUM 40 MG: 40 TABLET, DELAYED RELEASE ORAL at 05:19

## 2023-06-02 RX ADMIN — SODIUM CHLORIDE, SODIUM GLUCONATE, SODIUM ACETATE, POTASSIUM CHLORIDE, MAGNESIUM CHLORIDE, SODIUM PHOSPHATE, DIBASIC, AND POTASSIUM PHOSPHATE 75 ML/HR: .53; .5; .37; .037; .03; .012; .00082 INJECTION, SOLUTION INTRAVENOUS at 03:52

## 2023-06-02 RX ADMIN — FLUTICASONE FUROATE AND VILANTEROL TRIFENATATE 1 PUFF: 100; 25 POWDER RESPIRATORY (INHALATION) at 10:43

## 2023-06-02 RX ADMIN — SENNOSIDES AND DOCUSATE SODIUM 1 TABLET: 50; 8.6 TABLET ORAL at 21:21

## 2023-06-02 NOTE — H&P
[facility]  H&P  Name: Tara Godfrey 78 y o  male I MRN: [id number]  Unit/Bed#: ED-29 I Date of Admission: 6/2/2023   Date of Service: 6/2/2023 I Hospital Day: 0      Assessment/Plan   Fall  Assessment & Plan  - Fall with head strike, without LOC  - Below noted injuries    Fever  Assessment & Plan  - Fever with associated tachycardia  - Suspect urine as source with recent UTI and history of ESBL and MDRO   - Admit to medical service with tertiary trauma exam in am     COPD (chronic obstructive pulmonary disease) ([facility] [address] )  Assessment & Plan  - without evidence of acute exacerbation  - Continue home medications    A-fib ([facility] [address] )  Assessment & Plan  - currently anticoagulated     Monroe catheter in place  Assessment & Plan  - Chronic indwelling monroe catheter in place   - Recently admitted with MDRO UTI   - Febrile in trauma bay  -  pending       Trauma Alert: Level B   Model of Arrival: Ambulance    Trauma Team: Attending Nafisa Agrawal and DALE Boggs  Consultants:     None     History of Present Illness     Chief Complaint: No complaints  Mechanism:Fall     HPI:    Tara Godfrey is a 78 y o  male with PMH urinary retention with chronic indwelling monroe catheter, recent MDRO UTI, COPD, who presents after falling and striking his head  He denies LOC  He denies head or neck pain, back pain, chest pain, sob, abdominal pain, n/v, extremity pain  Review of Systems   Constitutional: Positive for fever  Negative for activity change, appetite change, diaphoresis and fatigue  HENT: Negative for congestion, ear discharge, ear pain, facial swelling, hearing loss, mouth sores, nosebleeds, postnasal drip, rhinorrhea, sinus pressure, sinus pain, sneezing and sore throat  Eyes: Negative for photophobia, pain and redness  Respiratory: Negative for cough, chest tightness, shortness of breath and wheezing  Cardiovascular: Negative for chest pain and palpitations     Gastrointestinal: Negative for abdominal distention, abdominal pain, blood in stool, constipation, diarrhea, nausea and vomiting  Genitourinary: Negative for dysuria, frequency, hematuria and urgency  Musculoskeletal: Negative for back pain and neck pain  Skin: Negative for wound  Neurological: Negative for dizziness, seizures, syncope, weakness, numbness and headaches  12-point, complete review of systems was reviewed and negative except as stated above  Historical Information     Past Medical History:   Diagnosis Date   • A-fib [facility])    • Chronic indwelling Reich catheter    • COPD (chronic obstructive pulmonary disease) ([facility] [address] )    • Urinary retention      History reviewed  No pertinent surgical history  There is no immunization history on file for this patient  Last Tetanus: n/a  Family History: Non-contributory    1  Before the illness or injury that brought you to the Emergency, did you need someone to help you on a regular basis? 0=No   2  Since the illness or injury that brought you to the Emergency, have you needed more help than usual to take care of yourself? 1=Yes   3  Have you been hospitalized for one or more nights during the past 6 months (excluding a stay in the Emergency Department)? 1=Yes   4  In general, do you see well? 0=Yes   5  In general, do you have serious problems with your memory? 0=No   6  Do you take more than three different medications everyday?  1=Yes   TOTAL   2     Did you order a geriatric consult if the score was 2 or greater?: Recommend will defer to admitting team     Meds/Allergies   all current active meds have been reviewed   No Known Allergies    Objective   Initial Vitals:   Temperature: 100 3 °F (37 9 °C) (06/02/23 0103)  Pulse: 71 (06/02/23 0103)  Respirations: 20 (06/02/23 0103)  Blood Pressure: 135/66 (06/02/23 0103)    Primary Survey:   Airway:        Status: patent;        Pre-hospital Interventions: none        Hospital Interventions: none  Breathing:        Pre-hospital Interventions: none       Effort: normal       Right breath sounds: normal       Left breath sounds: normal  Circulation:        Rhythm: regular       Rate: regular   Right Pulses Left Pulses    R radial: 2+  R femoral: 2+  R pedal: 2+     L radial: 2+  L femoral: 2+  L pedal: 2+       Disability:        GCS: Eye: 4; Verbal: 5 Motor: 6 Total: 15       Right Pupil: round;  reactive         Left Pupil:  round;  reactive      R Motor Strength L Motor Strength    R : 5/5  R dorsiflex: 5/5  R plantarflex: 5/5 L : 5/5  L dorsiflex: 5/5  L plantarflex: 5/5        Sensory:  No sensory deficit  Exposure:       Completed: Yes      Secondary Survey:  Physical Exam  Vitals and nursing note reviewed  Constitutional:       General: He is not in acute distress  Appearance: Normal appearance  He is not ill-appearing or toxic-appearing  HENT:      Head: Normocephalic and atraumatic  Right Ear: Tympanic membrane normal       Left Ear: Tympanic membrane normal       Nose: Nose normal  No congestion or rhinorrhea  Mouth/Throat:      Mouth: Mucous membranes are moist       Pharynx: Oropharynx is clear  No oropharyngeal exudate  Eyes:      Extraocular Movements: Extraocular movements intact  Conjunctiva/sclera: Conjunctivae normal       Pupils: Pupils are equal, round, and reactive to light  Cardiovascular:      Rate and Rhythm: Normal rate and regular rhythm  Heart sounds: No murmur heard  No friction rub  No gallop  Pulmonary:      Effort: Pulmonary effort is normal       Breath sounds: No wheezing, rhonchi or rales  Abdominal:      General: Abdomen is flat  There is no distension  Palpations: Abdomen is soft  Tenderness: There is no abdominal tenderness  There is no guarding or rebound  Genitourinary:     Comments:  Indwelling monroe catheter  Musculoskeletal:      Right shoulder: Normal       Left shoulder: Normal       Right upper arm: Normal       Left upper arm: Normal  " Right elbow: Normal       Left elbow: Normal       Right forearm: Normal       Left forearm: Normal       Right wrist: Normal       Left wrist: Normal       Right hand: Normal       Left hand: Normal       Cervical back: Normal range of motion  No deformity or tenderness  Thoracic back: No deformity or tenderness  Lumbar back: Normal  No swelling, deformity or tenderness  Right hip: Normal       Left hip: Normal       Right upper leg: Normal       Left upper leg: Normal       Right knee: Normal       Left knee: Normal       Right lower leg: Normal       Left lower leg: Normal       Right ankle: Normal       Left ankle: Normal       Right foot: Normal       Left foot: Normal    Skin:     General: Skin is warm and dry  Capillary Refill: Capillary refill takes less than 2 seconds  Neurological:      General: No focal deficit present  Mental Status: He is alert  Sensory: No sensory deficit  Motor: No weakness           Invasive Devices     None               Lab Results: BMP/CMP: No results found for: \"ALKPHOS\", \"ALT\", \"ANIONGAP\", \"AST\", \"BILITOT\", \"BUN\", \"CALCIUM\", \"CL\", \"CO2\", \"CREATININE\", \"EGFR\", \"GLUCOSE\", \"K\", \"PROT\", \"SODIUM\" and CBC:   Lab Results   Component Value Date    HCT 44 3 06/02/2023    HGB 14 2 06/02/2023    MCH 30 0 06/02/2023    MCHC 32 1 06/02/2023    MCV 94 06/02/2023    MPV 10 2 06/02/2023    NRBC 0 06/02/2023     06/02/2023    RBC 4 73 06/02/2023    RDW 13 3 06/02/2023    WBC 22 31 (H) 06/02/2023       Imaging Results: I have personally reviewed pertinent films in PACS  Chest Xray(s): negative for acute findings   FAST exam(s): negative for acute findings   CT Scan(s): negative for acute findings   Additional Xray(s): negative for acute findings     Other Studies: none    Code Status: No Order    "

## 2023-06-02 NOTE — ASSESSMENT & PLAN NOTE
- Chronic indwelling monroe catheter in place   - Recently admitted with MDRO UTI   - Febrile in trauma bay  - UA pending

## 2023-06-02 NOTE — ASSESSMENT & PLAN NOTE
· POA met SIRS criteria for fever 103 1, leukocytosis 22 31 and heart rate 112 with presumed source of infection UTI  · Patient with indwelling Reich catheter history of ESBL/MDRO  At this time denies any urinary symptoms, denies any chills, fever prior to admission however endorsed chronic cough without change any sputum production  · Previously admitted on 05/13 urine cultures grew Proteus mirabilis and Alcalienes faecalis discharge on Augmentin for 5 more doses on 05/19    · Lactic acid 1 1, unremarkable, procalcitonin mild elevation 2 59  · UA: Large leukocytes, moderate blood, RBC 20-30 innumerable WBCs negative for nitrates and bacteria    · Plan  · Follow blood cultures  · Follow-up urine cultures  · Start Zosyn for now  · Follow-up procalcitonin  · Monitor fever curve  · COVID flu RSV

## 2023-06-02 NOTE — PLAN OF CARE
Problem: PHYSICAL THERAPY ADULT  Goal: Performs mobility at highest level of function for planned discharge setting  See evaluation for individualized goals  Description: Treatment/Interventions: Functional transfer training, LE strengthening/ROM, Therapeutic exercise, Endurance training, Patient/family training, Equipment eval/education, Bed mobility, Spoke to nursing, Spoke to case management, OT (gait training when appropriate)  Equipment Recommended: Ronaldo Infante       See flowsheet documentation for full assessment, interventions and recommendations  Outcome: Progressing  Note: Prognosis: Fair  Problem List: Decreased strength, Decreased endurance, Decreased mobility, Impaired balance, Decreased cognition, Impaired judgement, Decreased safety awareness  Assessment: Pt is a 78 y o  male seen for PT evaluation s/p admit to [address] [place] [address] on 6/2/2023  Pt was admitted with a primary dx of: Fall  PT now consulted for assessment of mobility and d/c needs  Pt with Up and OOB as tolerated orders  Pts current comorbidities and personal factors effecting treatment include: significant history of falls, A-fib, COPD,CHF,   Pts current clinical presentation is Unstable/Unpredictable (high complexity) due to Ongoing medical management for primary dx, Decreased activity tolerance compared to baseline, Fall risk, Increased assistance needed from caregiver at current time, Ongoing telemetry monitoring, ICD/pacemaker interrogation needed  Prior to admission, pt was required assistance with transfers  Upon evaluation, pt currently is requiring Consuelo for bed mobility;  ModA for transfers  Pt presents at PT eval functioning below baseline and currently w/ overall mobility deficits 2* to: BLE weakness, impaired balance, decreased endurance, impaired coordination, pain, decreased activity tolerance compared to baseline, decreased functional mobility tolerance compared to baseline, decreased safety awareness, impaired judgement, fall risk, decreased cognition  Pt currently at a fall risk 2* to impairments listed above  Pt will continue to benefit from skilled acute PT interventions to address stated impairments; to maximize functional mobility; for ongoing pt/ family training; and DME needs  At conclusion of PT session pt returned BTB, bed alarm engaged, all needs in reach and RN notified of session findings/recommendations with phone and call bell within reach  Pt denies any further questions at this time  Recommend return to facility with rehab services upon hospital D/C  PT Discharge Recommendation: Return to facility with rehabilitation services    See flowsheet documentation for full assessment

## 2023-06-02 NOTE — MALNUTRITION/BMI
This medical record reflects one or more clinical indicators suggestive of malnutrition and/or morbid obesity  Malnutrition Findings:   Adult Malnutrition type: Acute illness (in the setting of chronic illness)  Adult Degree of Malnutrition: Malnutrition of moderate degree  Malnutrition Characteristics: Fat loss, Muscle loss, Weight loss                  360 Statement: Moderate malnutrition related to inadequate oral intake as evidenced by loss of  subcutaneous fat and muscle, temples, clavicle, extremities, 12% ongoing weight loss x 12 months  Currently treated with oral supplementation  BMI Findings: Body mass index is 19 61 kg/m²  See Nutrition note dated 6/2/2023 for additional details  Completed nutrition assessment is viewable in the nutrition documentation

## 2023-06-02 NOTE — ASSESSMENT & PLAN NOTE
· Chronic indwelling catheter due to urinary retention with history of ESBL/MDRO  · Prior admitted on 05/13 treated for UTI urine culture grew Proteus mirabilis and alcalienes faecalis subsequently discharged on Augmentin for 5 more doses until 05/19 per chart review  · Patient denies any urinary symptoms at this time Reich catheter exchange    · UA: Large leukocytes, moderate blood, RBC 20-30 WBCs negative for nitrates    · Plan  · Start Zosyn although patient asymptomatic with indwelling Reich catheter may represent colonization however patient febrile with leukocytosis   · Follow-up urine cultures

## 2023-06-02 NOTE — ASSESSMENT & PLAN NOTE
· History of A-fib with loop recorder  · Per chart review loop recorder normal device function last pause recorded in March 2023   93% on A-fib    · [id number] 2   · Continue PTA Toprol 25 mg once a day, Eliquis 2 5 twice daily

## 2023-06-02 NOTE — ASSESSMENT & PLAN NOTE
"· POA presented from [facility] as a trauma alert due to fall with head strike on Eliquis  Upon my evaluation patient was denying that he fell, that he was sleeping in bed and got sent to the ED  · CT head \"no acute intracranial hemorrhage  \"  · CT spine \" no apparent cervical spine fracture or traumatic malalignment  \"  · History of multiple falls and syncopal episodes as well as orthostasis hypotension    · Plan  · Fall precautions  · Continue PTA midodrine as needed  · Continue telemetry  · PT OT  "

## 2023-06-02 NOTE — ASSESSMENT & PLAN NOTE
"· POA presented from [facility] as a trauma alert due to fall with head strike on Eliquis  Upon my evaluation patient was denying that he fell, that he was sleeping in bed and got sent to the ED  · CT head \"no acute intracranial hemorrhage  \"  · CT spine \" no apparent cervical spine fracture or traumatic malalignment  \"  · History of multiple falls and syncopal episodes as well as orthostasis hypotension with a loop recorder  · Syncope possible due to orthostasis versus gait instability versus cardiogenic?     · Plan  · Fall precautions  · Continue PTA midodrine Per chart review recently increased to midodrine7  5 in the morning and 5 mg in the afternoon and evening  · Continue telemetry  · PT OT  "

## 2023-06-02 NOTE — ASSESSMENT & PLAN NOTE
- Fever with associated tachycardia  - Suspect urine as source with recent UTI and history of ESBL and MDRO   - Admit to medical service with tertiary trauma exam in am

## 2023-06-02 NOTE — OCCUPATIONAL THERAPY NOTE
Occupational Therapy Evaluation     Patient Name: Solitario Zimmerman  DAZIW'Y Date: 6/2/2023  Problem List  Principal Problem:    Fall  Active Problems:    Monroe catheter in place    A-fib [facility])    COPD (chronic obstructive pulmonary disease) ([facility])    Fever    Sepsis ([place] [address] )    Urinary tract infection associated with indwelling urethral catheter ([facility])    CHF (congestive heart failure) ([place] [address] )    Past Medical History  Past Medical History:   Diagnosis Date    A-fib ([place] [address] )     Chronic indwelling Monroe catheter     COPD (chronic obstructive pulmonary disease) ([place] [address] )     Urinary retention      Past Surgical History  History reviewed  No pertinent surgical history  06/02/23 1550   OT Last Visit   OT Visit Date 06/02/23  (Friday)   Note Type   Note type Evaluation   Pain Assessment   Pain Assessment Tool 0-10   Pain Score No Pain   Restrictions/Precautions   Weight Bearing Precautions Per Order No   Other Precautions Cognitive; Chair Alarm; Bed Alarm;Multiple lines; Fall Risk;Visual impairment  (monroe catheter; wears glasses (not present))   Home Living   Type of Home SNF; Other (Comment)  (admit from rehab)   Home Layout One level   Bathroom Shower/Tub   (sponge bathes  Pt reports not comfortable using shared bathroom [place])   Bathroom Toilet Standard   Bathroom Equipment Grab bars in shower; Shower chair;Grab bars around toilet   [address] Walker;Cane   Additional Comments Pt admit from [facility]   Prior Function   Level of [place] Independent with functional mobility; Needs assistance with [address] staff   Receives Help From Personal care attendant   IADLs Family/Friend/Other provides transportation; Family/Friend/Other provides meals; Family/Friend/Other provides medication management   Falls in the last 6 months 5 to 10   Vocational Retired   Comments Pt reports use of RW PTA at [facility] [employer]   Sponge bathes due to shared shower / bathroom and staff assist "w/ IADLs  Lifestyle   Autonomy Pt reports I w/ dressing, basic ADLs using RW   Reciprocal Relationships Supportive staff assist w/ IADLs  Pt reports supportive wife / significant other   Service to Others Pt reports retired and worked for [employer] reports enjoying reading [employer]  Pt requested that his books and glasses be brought over   General   Additional Pertinent History Significant PMH impacting his occupational performance includes UTI, chronic monroe, SDH, a-fib, hx syncope, pacemaker  Personal and environmental factors impacting performance includes recent admission (s), fall history, difficulty completing ADL/ IADL, limited insight into deficits; supports / strengths include supportive staff at SNF  Family/Caregiver Present No   Subjective   Subjective \"I am not doing anything until I get my glasses\"   ADL   Where Assessed Edge of bed  (declined OOB to chair due to dizziness)   Eating Assistance 6  Modified independent   Eating Deficit Setup   Grooming Assistance 5  Supervision/Setup   Grooming Deficit Setup;Supervision/safety; Increased time to complete   UB Bathing Assistance Unable to assess  (due to decreased activity tolerance)   UB Bathing Deficit   (anticipate S after set- up seated based on fxal obs skills, clinilca judgement)   LB Bathing Assistance Unable to assess   UB Dressing Assistance 5  Supervision/Setup   UB Dressing Deficit Setup;Supervision/safety; Increased time to complete  (seated after set- up at EOB, + time due to dizziness)   LB Dressing Assistance 3  Moderate Assistance   LB Dressing Deficit Don/doff L sock; Don/doff R sock; Requires assistive device for steadying;Steadying;Setup;Supervision/safety; Increased time to complete   Toileting Assistance  Unable to assess  (monroe catheter  in place)   Bed Mobility   Supine to Sit 4  Minimal assistance   Additional items Assist x 1;Bedrails; Increased time required;Verbal cues  (to pt's R) " Sit to Supine 4  Minimal assistance   Additional items Assist x 1; Increased time required;Verbal cues;LE management; Bedrails   Additional Comments Pt declined OOB to chair and supine HOB elevated post eval w/ needs met, call bell in reach and bed alarm activated   Transfers   Sit to Stand 4  Minimal assistance   Additional items Assist x 1; Increased time required;Verbal cues; Bedrails;Armrests   Stand to Sit 4  Minimal assistance   Additional items Assist x 1; Increased time required;Verbal cues; Bedrails;Armrests   Stand pivot Unable to assess   Additional Comments Pt reported dizziness upon sitting at EOB and during transition  Supine to sit orthostatics negative  Pt able to take few lateral side steps using RW to R w/ min A   Functional Mobility   Additional Comments min A to take few lateral side steps to R   Declined additional mobility due to fatigue and dizziness   Additional items Rolling walker   Balance   Static Sitting Fair   Static Standing Fair -   Ambulatory Poor +   Activity Tolerance   Activity Tolerance Patient limited by fatigue   Medical Staff Made Aware spoke w/ PT, Wilder Peters and Aaliyah LAZCANO   Nurse Made Aware spoke w/ RNMalini Assessment   RUE Assessment [facility] Strength   RUE Overall Strength Within Functional Limits - able to perform ADL tasks with strength   LUE Assessment   LUE Assessment WFL   LUE Strength   LUE Overall Strength Within Functional Limits - able to perform ADL tasks with strength   Hand Function   Gross Motor Coordination Functional   Fine Motor Coordination Functional   Vision-Basic Assessment   Current Vision Wears glasses all the time  (not present)   Cognition   Overall Cognitive Status Impaired   Arousal/Participation Arousable   Attention Attends with cues to redirect   Orientation Level Oriented to person;Oriented to place  (generally to time, aware that he fell)   Memory Decreased recall of recent events   Following Commands Follows one step commands with increased time or repetition   Comments Identified pt by full name and birthdate  Responsive and benefits from redirection, cues to sustain attention  Coopertaive w/ encouragement / education  Assessment   Limitation Decreased ADL status; Decreased Safe judgement during ADL;Decreased cognition;Decreased endurance;Decreased self-care trans;Decreased high-level ADLs  (impaired pain, balance, stand micky, sitting micky, insight into deficits, emotional responses, attention to task)   Assessment Pt is a 77yo male [facility]23 as a trauma following a fall  No traumatic injuries identified and SLIM consulted for mgmt due to UTI  Diagnosed w/ fall  Pt admit from [facility] and reports use of RW for functional mobility and assist w/ IADLs  Upon eval, pt alert and oriented to person, place, and month (generally to time)  Pt required S to complete grooming S UBD, mod A LBD, min A bed mobility, min A sit <> stand  Limited activity tolerance due to dizziness  Pt completing ADLs below baseline level of I and would benefit from OT to max functional independence  Will continue to follow 2-3 X / week   Goals   Patient Goals Pt stated that he would like to get his glasses and books   Plan   Treatment Interventions ADL retraining;Functional transfer training; Endurance training;Patient/family training;Equipment evaluation/education; Compensatory technique education;Continued evaluation; Energy conservation; Activityengagement   Goal Expiration Date 06/09/23   OT Frequency 2-3x/wk   Recommendation   OT Discharge Recommendation Return to facility with rehabilitation services   AM-PAC Daily Activity Inpatient   Lower Body Dressing 2   Bathing 2   Toileting 3   Upper Body Dressing 4   Grooming 4   Eating 4   Daily Activity Raw Score 19   Daily Activity Standardized Score (Calc for Raw Score >=11) 40 22   AM-PAC Applied Cognition Inpatient   Following a Speech/Presentation 2   Understanding Ordinary Conversation 3   Taking Medications 2   Remembering Where Things Are Placed or Put Away 3   Remembering List of 4-5 Errands 3   Taking Care of Complicated Tasks 2   Applied Cognition Raw Score 15   Applied Cognition Standardized Score 33 54   Barthel Index   Feeding 10   Bathing 0   Grooming Score 5   Dressing Score 5   Bladder Score 0   Bowels Score 5   Toilet Use Score 5   Transfers (Bed/Chair) Score 10   Mobility (Level Surface) Score 0   Stairs Score 0   Barthel Index Score 40   Modified Abilio Scale   Modified Millard Scale 4   End of Consult   Patient Position at End of Consult All needs within reach;Bed/Chair alarm activated   Nurse Communication Nurse aware of consult       The patient's raw score on the AM-PAC Daily Activity Inpatient Short Form is 19  A raw score of greater than or equal to 19 suggests the patient may benefit from discharge to home  Please refer to the recommendation of the Occupational Therapist for safe discharge planning      Vitals:    06/02/23 0736 06/02/23 0737 06/02/23 1454 06/02/23 1540   BP: 101/60 101/60 106/68 104/59   Pulse: 93 89 67 82   Patient Position - Orthostatic VS:   Lying - Orthostatic VS Sitting - Orthostatic VS     Pt goals to be met by 6/9/23 to max I w/ ADLs and improve engagement to return to PLOF and read includes:    -Pt will complete functional transfers to bed, chair, and toilet using LRAD w/ S to max I w/ ADLs    -Pt will consistently engage in functional mobility using LRAD to/ from bathroom w/ S to max I w/ ADLs    -Pt will consistently follow 2 step directions during ADLs w/ good recall    -Pt will complete UBD w/ mod I    -Pt will complete LBD w/ S to max I w/ ADLs and improve engagement    -Pt will demonstrate improved activity and sitting tolerance OOB In chair for all meals    -Pt will complete bed mobility w/ mod I in preparation for ADL tasks    Enrike Robin OTR/L  [id number]  [id number]

## 2023-06-02 NOTE — CASE MANAGEMENT
Case Management Assessment & Discharge Planning Note    Patient name Sofya Manual  Location S /S -01 MRN [id number]  : 1945 Date 2023       Current Admission Date: 2023  Current Admission Diagnosis:Fall   Patient Active Problem List    Diagnosis Date Noted   • Fall 2023   • Reich catheter in place 2023   • A-fib ([facility] ) 2023   • COPD (chronic obstructive pulmonary disease) ([facility] ) 2023   • Fever 2023   • Sepsis ([facility] ) 2023   • Urinary tract infection associated with indwelling urethral catheter ([facility] ) 2023   • CHF (congestive heart failure) ([facility] ) 2023      LOS (days): 0  Geometric Mean LOS (GMLOS) (days): 4 80  Days to GMLOS:4 2     OBJECTIVE:    Risk of Unplanned Readmission Score: 10 34         Current admission status: Inpatient       Preferred Pharmacy:   PATIENT/FAMILY REPORTS NO PREFERRED PHARMACY  No address on file      Primary Care Provider: Joel Calderon MD    Primary Insurance: MEDICARE  Secondary Insurance:  FOR LIFE    ASSESSMENT:  Radha 26 Proxies    There are no active Health Care Proxies on file  Readmission Root Cause  30 Day Readmission: No    Patient Information  Admitted from[de-identified] Facility [facility]  Mental Status: Alert  Assessment information provided by[de-identified] Patient  Primary Caregiver: Other (Comment) (SNF staff)  Support Systems: Family members  [place] of Residence: [address] [place] [address] do you live in?: [place] entry access options   Select all that apply : No steps to enter home  Type of Current Residence: Other (Comment) (LTC facility)  In the last 12 months, was there a time when you were not able to pay the mortgage or rent on time?: No  In the last 12 months, how many places have you lived?: 1  In the last 12 months, was there a time when you did not have a steady place to sleep or slept in a shelter (including now)?: No  Homeless/housing insecurity resource given?: N/A  Living Arrangements: Other (Comment) (Lives at [address] [place] [address])    Activities of Daily Living Prior to Admission  Functional Status: Assistance  Completes ADLs independently?: No  Level of ADL dependence: Assistance  Ambulates independently?: No  Level of ambulatory dependence: Assistance  Does patient use assisted devices?: Yes  Assisted Devices (DME) used: [employer]  Does patient currently own DME?: Yes  What DME does the patient currently own?: [employer]  Does patient have a history of Outpatient Therapy (PT/OT)?: No  Does the patient have a history of Short-Term Rehab?: Yes  Does patient have a history of HHC?: No  Does patient currently have [facility]?: No         Patient Information Continued  Income Source: Pension/snf  Does patient have prescription coverage?: Yes  Within the past 12 months, you worried that your food would run out before you got the money to buy more : Never true  Within the past 12 months, the food you bought just didn't last and you didn't have money to get more : Never true  Food insecurity resource given?: N/A  Does patient receive dialysis treatments?: No  Does patient have a history of substance abuse?: No  Does patient have a history of Mental Health Diagnosis?: No         Means of Transportation  Means of Transport to Appts[de-identified] Other (Comment) (facility transports)  In the past 12 months, has lack of transportation kept you from medical appointments or from getting medications?: No  In the past 12 months, has lack of transportation kept you from meetings, work, or from getting things needed for daily living?: No  Was application for public transport provided?: N/A        DISCHARGE DETAILS:    Discharge planning discussed with[de-identified] patient  Freedom of Choice: Yes  Comments - Freedom of Choice: Pt is a bedbold at [facility] and would like to return back when medically cleared       Were Treatment Team discharge recommendations reviewed with patient/caregiver?: Yes  Did patient/caregiver verbalize understanding of patient care needs?: Yes  Were patient/caregiver advised of the risks associated with not following Treatment Team discharge recommendations?: Yes    Contacts  Patient Contacts: patient  Contact Method: In [address]         Is the patient interested in [facility] at discharge?: No    DME Referral Provided  Referral made for DME?: No    Other Referral/Resources/Interventions Provided:  Referral Comments: Met with pt who provided information for initial assessment  Pt is a bedhold at [facility] and will be returning back there when medically cleared  Prior to admission, pt ambulated with a RW and requires assistance with ADLs  Referral placed to [facility] and [facility] bed in [address]  Will continue to follow for discharge planning needs           Treatment Team Recommendation: Facility Return  Discharge Destination Plan[de-identified] Facility Return

## 2023-06-02 NOTE — TELEPHONE ENCOUNTER
Grayson Alvarez called, requesting a call back from on call provider, regarding pt's fall and fever   Provider paged via [facility]

## 2023-06-02 NOTE — ASSESSMENT & PLAN NOTE
· History of A-fib  · [id number] 2   · Continue PTA Toprol 25 mg once a day, Eliquis 2 5 twice daily

## 2023-06-02 NOTE — PHYSICAL THERAPY NOTE
Physical Therapy Evaluation    Patient's Name: Lucho Montez    Admitting Diagnosis  Head injury [S09 90XA]    Problem List  Patient Active Problem List   Diagnosis    Fall    Monroe catheter in place    A-fib [facility])    COPD (chronic obstructive pulmonary disease) ([facility] )    Fever    Sepsis ([facility] )    Urinary tract infection associated with indwelling urethral catheter ([facility] )    CHF (congestive heart failure) ([place] [id number] )       Past Medical History  Past Medical History:   Diagnosis Date    A-fib ([facility] )     Chronic indwelling Monroe catheter     COPD (chronic obstructive pulmonary disease) ([facility] )     Urinary retention        Past Surgical History  History reviewed  No pertinent surgical history  23 0930   PT Last Visit   PT Visit Date 23   Note Type   Note type Evaluation   Pain Assessment   Pain Assessment Tool 0-10   Pain Score No Pain   Restrictions/Precautions   Weight Bearing Precautions Per Order No   Other Precautions Cognitive; Chair Alarm; Bed Alarm; Fall Risk;Multiple lines;Telemetry  (monroe)   Home Living   Type of Home SNF   Home Equipment Cane;Walker   Prior Function   Level of [place] Needs assistance with functional mobility   Lives With Facility staff   Receives Help From Personal care attendant   Falls in the last 6 months 5 to 10  (pt noted 4-5 falls last admission, additional 1 this admission, however pt denies falling)   Vocational Retired   Comments pt states he has been waiting to use his RW, however per last admission pt ambulates with RW and assist x 1 at [facility]   Family/Caregiver Present No   Cognition   Overall Cognitive Status Unable to assess   Orientation Level Oriented to person;Oriented to place; Disoriented to time;Disoriented to situation   Following Commands Follows one step commands inconsistently   Comments pt ID by wristband, name and    Subjective   Subjective pt supine in bed, stating desire to go back to sleep   RLE Assessment   RLE Assessment WFL   LLE Assessment   LLE Assessment WFL   Bed Mobility   Supine to Sit 4  Minimal assistance   Additional items Assist x 1; Increased time required;HOB elevated; Bedrails   Sit to Supine 4  Minimal assistance   Additional items LE management; Increased time required;Assist x 1   Transfers   Sit to Stand 3  Moderate assistance   Additional items Assist x 1; Increased time required;HOB elevated   Stand to Sit 3  Moderate assistance   Additional items Assist x 1; Increased time required   Additional Comments pt noted significant dizziness/light headedness with sit to stand transfer and significant sway, pt also noted with increased respiratory rate, sp O2 of 91%, pt stated he wanted to return to bed to sleep, RN Lyn Ruff made aware of pt reported symptoms   Ambulation/Elevation   Gait pattern Not tested   Ambulation/Elevation Additional Comments held secondary to pt reported symptoms   Balance   Static Sitting Fair +   Dynamic Sitting Fair   Static Standing Poor   Activity Tolerance   Activity Tolerance Patient limited by fatigue;Treatment limited secondary to medical complications (Comment)  (SOB, dizziness)   Medical Staff Made Aware CM   Nurse Made Aware FILOMENA witt pre/post   Assessment   Prognosis Fair   Problem List Decreased strength;Decreased endurance;Decreased mobility; Impaired balance;Decreased cognition; Impaired judgement;Decreased safety awareness   Assessment Pt is a 78 y o  male seen for PT evaluation s/p admit to [address] [place] [address] on 6/2/2023  Pt was admitted with a primary dx of: Fall  PT now consulted for assessment of mobility and d/c needs  Pt with Up and OOB as tolerated orders  Pts current comorbidities and personal factors effecting treatment include: significant history of falls, A-fib, COPD,CHF,    Pts current clinical presentation is Unstable/Unpredictable (high complexity) due to Ongoing medical management for primary dx, Decreased activity tolerance compared to baseline, Fall risk, Increased assistance needed from caregiver at current time, Ongoing telemetry monitoring, ICD/pacemaker interrogation needed  Prior to admission, pt was required assistance with transfers  Upon evaluation, pt currently is requiring Consuelo for bed mobility; ModA for transfers  Pt presents at PT eval functioning below baseline and currently w/ overall mobility deficits 2* to: BLE weakness, impaired balance, decreased endurance, impaired coordination, pain, decreased activity tolerance compared to baseline, decreased functional mobility tolerance compared to baseline, decreased safety awareness, impaired judgement, fall risk, decreased cognition  Pt currently at a fall risk 2* to impairments listed above  Pt will continue to benefit from skilled acute PT interventions to address stated impairments; to maximize functional mobility; for ongoing pt/ family training; and DME needs  At conclusion of PT session pt returned BTB, bed alarm engaged, all needs in reach and RN notified of session findings/recommendations with phone and call bell within reach  Pt denies any further questions at this time  Recommend return to facility with rehab services upon hospital D/C  Goals   Patient Goals to go back to sleep   STG Expiration Date 06/12/23   Short Term Goal #1 In 10 days pt will be able to: 1  Demonstrate ability to perform all aspects of bed mobility independently to improve functional safety  2  Perform functional transfers independently to facilitate safe return to previous living environment  3   Will be appropriate for gait evaluation  4  Improve LE strength grades by 1 to increase ease of functional mobility with transfers and gait  5  Pt will demonstrate improved balance by one grade in order to decrease risk of falls  6  Tolerate 3 hours OOB in order to promote postural endurance and improved activity tolerance   PT Treatment Day 0   Plan   Treatment/Interventions Functional transfer training;LE strengthening/ROM; Therapeutic exercise; Endurance training;Patient/family training;Equipment eval/education; Bed mobility;Spoke to nursing;Spoke to case management;OT  (gait training when appropriate)   PT Frequency 3-5x/wk   Recommendation   PT Discharge Recommendation Return to facility with rehabilitation services   Equipment Recommended [place] walker   AM-PAC Basic Mobility Inpatient   Turning in Flat Bed Without Bedrails 3   Lying on Back to Sitting on Edge of Flat Bed Without Bedrails 3   Moving Bed to Chair 2   Standing Up From Chair Using Arms 2   Walk in Room 2   Climb 3-5 Stairs With Railing 1   Basic Mobility Inpatient Raw Score 13   Basic Mobility Standardized Score 33 99   Highest Level Of Mobility   -[facility] Goal 4: Move to chair/commode   -[facility] Achieved 3: Sit at edge of bed   End of Consult   Patient Position at End of Consult Supine;Bed/Chair alarm activated; All needs within reach   The patient's AM-PAC Basic Mobility Inpatient Short Form Raw Score is 13  A Raw score of less than or equal to 16 suggests the patient may benefit from discharge to post-acute rehabilitation services  Please also refer to the recommendation of the Physical Therapist for safe discharge planning      Jordy Ayala, PT

## 2023-06-02 NOTE — ED NOTES
Pt reports not wanting to wear gown  Pt prefers only wearing a brief and socks       Kimmie Iraheta RN  06/02/23 1716

## 2023-06-02 NOTE — ASSESSMENT & PLAN NOTE
Wt Readings from Last 3 Encounters:   06/02/23 65 6 kg (144 lb 10 oz)     · LVEF 50% low normal  Concentric hypertrophy  · Not in exacerbation at this time   Seems dry to euvolemic on exam

## 2023-06-02 NOTE — ASSESSMENT & PLAN NOTE
· Chronic indwelling catheter due to urinary retention with history of ESBL/MDRO  · Prior admitted on 05/13 treated for UTI urine culture grew Proteus mirabilis and alcalienes faecalis subsequently discharged on Augmentin for 5 more doses until 05/19 per chart review  · Patient denies any urinary symptoms at this time Reich catheter exchange    · UA: Large leukocytes, moderate blood, RBC 20-30 WBCs negative for nitrates    · Plan  · Cont zosyn   · Follow-up urine cultures

## 2023-06-02 NOTE — PLAN OF CARE
Problem: OCCUPATIONAL THERAPY ADULT  Goal: Performs self-care activities at highest level of function for planned discharge setting  See evaluation for individualized goals  Description: Treatment Interventions: ADL retraining, Functional transfer training, Endurance training, Patient/family training, Equipment evaluation/education, Compensatory technique education, Continued evaluation, Energy conservation, Activityengagement          See flowsheet documentation for full assessment, interventions and recommendations  Note: Limitation: Decreased ADL status, Decreased Safe judgement during ADL, Decreased cognition, Decreased endurance, Decreased self-care trans, Decreased high-level ADLs (impaired pain, balance, stand micky, sitting micky, insight into deficits, emotional responses, attention to task)     Assessment: Pt is a 77yo male [facility] as a trauma following a fall  No traumatic injuries identified and SLIM consulted for mgmt due to UTI  Diagnosed w/ fall  Pt admit from [facility] and reports use of RW for functional mobility and assist w/ IADLs  Upon eval, pt alert and oriented to person, place, and month (generally to time)  Pt required S to complete grooming S UBD, mod A LBD, min A bed mobility, min A sit <> stand  Limited activity tolerance due to dizziness  Pt completing ADLs below baseline level of I and would benefit from OT to max functional independence   Will continue to follow 2-3 X / week     OT Discharge Recommendation: Return to facility with rehabilitation services

## 2023-06-02 NOTE — ASSESSMENT & PLAN NOTE
· POA met SIRS criteria for fever 103 1, leukocytosis 22 31 and heart rate 112 with presumed source of infection UTI  · Patient with indwelling Reich catheter history of ESBL/MDRO  At this time denies any urinary symptoms, denies any chills, fever prior to admission however endorsed chronic cough without change any sputum production  · Previously admitted on 05/13 urine cultures grew Proteus mirabilis and Alcalienes faecalis discharge on Augmentin for 5 more doses on 05/19    · Lactic acid 1 1, unremarkable, procalcitonin mild elevation 2 59  · UA: Large leukocytes, moderate blood, RBC 20-30 innumerable WBCs negative for nitrates and bacteria    · Plan  ·  blood cultures: NGTD  · U Cx: proteus (sensitivities pending)  · cont Zosyn  · Follow-up procalcitonin  · Monitor fever curve

## 2023-06-02 NOTE — H&P
"[facility]  H&P  Name: Eleanor Tineo 78 y o  male I MRN: [id number]  Unit/Bed#: S -01 I Date of Admission: 6/2/2023   Date of Service: 6/2/2023 I Hospital Day: 0      Assessment/Plan   * Fall  Assessment & Plan  · POA presented from [facility] as a trauma alert due to fall with head strike on Eliquis  Upon my evaluation patient was denying that he fell, that he was sleeping in bed and got sent to the ED  · CT head \"no acute intracranial hemorrhage  \"  · CT spine \" no apparent cervical spine fracture or traumatic malalignment  \"  · History of multiple falls and syncopal episodes as well as orthostasis hypotension with a loop recorder  · Syncope possible due to orthostasis versus gait instability versus cardiogenic? · Plan  · Fall precautions  · Continue PTA midodrine Per chart review recently increased to midodrine7  5 in the morning and 5 mg in the afternoon and evening  · Continue telemetry  · PT OT    Sepsis ([facility] )  Assessment & Plan  · POA met SIRS criteria for fever 103 1, leukocytosis 22 31 and heart rate 112 with presumed source of infection UTI  · Patient with indwelling Reich catheter history of ESBL/MDRO  At this time denies any urinary symptoms, denies any chills, fever prior to admission however endorsed chronic cough without change any sputum production  · Previously admitted on 05/13 urine cultures grew Proteus mirabilis and Alcalienes faecalis discharge on Augmentin for 5 more doses on 05/19    · Lactic acid 1 1, unremarkable, procalcitonin mild elevation 2 59  · UA: Large leukocytes, moderate blood, RBC 20-30 innumerable WBCs negative for nitrates and bacteria    · Plan  · Follow blood cultures  · Follow-up urine cultures  · Start Zosyn for now  · Follow-up procalcitonin  · Monitor fever curve  · COVID flu RSV    Urinary tract infection associated with indwelling urethral catheter ([facility] )  Assessment & Plan  · Chronic indwelling catheter due to urinary " retention with history of ESBL/MDRO  · Prior admitted on 05/13 treated for UTI urine culture grew Proteus mirabilis and alcalienes faecalis subsequently discharged on Augmentin for 5 more doses until 05/19 per chart review  · Patient denies any urinary symptoms at this time Reich catheter exchange  · UA: Large leukocytes, moderate blood, RBC 20-30 WBCs negative for nitrates    · Plan  · Start Zosyn although patient asymptomatic with indwelling Reich catheter may represent colonization however patient febrile with leukocytosis   · Follow-up urine cultures    A-fib [facility])  Assessment & Plan  · History of A-fib with loop recorder  · Per chart review loop recorder normal device function last pause recorded in March 2023   93% on A-fib  · [id number] 2   · Continue PTA Toprol 25 mg once a day, Eliquis 2 5 twice daily    CHF (congestive heart failure) ([facility])  Assessment & Plan  Wt Readings from Last 3 Encounters:   06/02/23 65 6 kg (144 lb 10 oz)     · LVEF 50% low normal  Concentric hypertrophy  · Not in exacerbation at this time  Seems dry to euvolemic on exam          COPD (chronic obstructive pulmonary disease) ([facility])  Assessment & Plan  · History of COPD  · Continue Breo Ellipta as per formulary for Advair         VTE Pharmacologic Prophylaxis: VTE Score: 5 High Risk (Score >/= 5) - Pharmacological DVT Prophylaxis Ordered: apixaban (Eliquis)  Sequential Compression Devices Ordered  Code Status: Level 3 - DNAR and DNI Prior/Patient  Discussion with family: Attempted to update  (wife) via phone  Unable to contact  Anticipated Length of Stay: Patient will be admitted on an inpatient basis with an anticipated length of stay of greater than 2 midnights secondary to Syncope      Chief Complaint: Syncope/fall    History of Present Illness:  Tara Godfrey is a 78 y o  male with a PMH of A-fib on Eliquis with loop recorder, COPD, chronic indwelling Reich catheter, multiple UTIs, orthostatic hypotension who presents from [facility] as a trauma alert after syncope/fall with head strike on Eliquis however no loss of consciousness  Patient endorsed dizziness however no nausea or vomiting, palpitations or chest pain  Patient has history of multiple falls with last admission 2 weeks ago  Denies any fevers, chills, cough, shortness of breath, abdominal pain as well as no urinary symptoms  At the ED patient patient evaluated by trauma cleared for admission  Was found febrile with temperature 103 1, leukocytosis 22 31, heart rate 113 for which met SIRS criteria with presumed source of infection UTI  CT head and spine unremarkable for acute pathology    Patient admitted to [facility] for further management evaluation    Review of Systems:  Review of Systems   Constitutional: Negative for activity change, appetite change, chills and fever  HENT: Negative for ear pain, rhinorrhea, sore throat and tinnitus  Eyes: Negative for pain and visual disturbance  Respiratory: Positive for cough  Negative for shortness of breath and wheezing  Chronic cough   Cardiovascular: Negative for chest pain, palpitations and leg swelling  Gastrointestinal: Negative for abdominal pain, constipation, diarrhea, nausea and vomiting  Genitourinary: Negative for dysuria, flank pain, hematuria and urgency  Chronic indwelling Reich cath in place   Musculoskeletal: Negative for arthralgias and back pain  Skin: Negative for color change and rash  Neurological: Positive for dizziness  Negative for seizures, syncope, weakness and light-headedness  Psychiatric/Behavioral: Positive for confusion  All other systems reviewed and are negative  Past Medical and Surgical History:   Past Medical History:   Diagnosis Date   • A-fib [facility])    • Chronic indwelling Reich catheter    • COPD (chronic obstructive pulmonary disease) ([facility] )    • Urinary retention        History reviewed   No pertinent surgical history  Meds/Allergies:  Prior to Admission medications    Medication Sig Start Date End Date Taking? Authorizing Provider   acetaminophen (TYLENOL) 325 mg tablet Take 650 mg by mouth every 6 (six) hours as needed for mild pain   Yes Historical Provider, MD   apixaban (Eliquis) 2 5 mg Take 2 5 mg by mouth 2 (two) times a day   Yes Historical Provider, MD   azelastine (ASTELIN) 0 1 % nasal spray 1 spray into each nostril 2 (two) times a day as needed for rhinitis Use in each nostril as directed   Yes Historical Provider, MD   bisacodyl (Dulcolax) 10 mg suppository Insert 10 mg into the rectum daily as needed for constipation   Yes Historical Provider, MD   docusate sodium (COLACE) 100 mg capsule Take 100 mg by mouth 2 (two) times a day   Yes Historical Provider, MD   ferrous sulfate 325 (65 Fe) mg tablet Take 325 mg by mouth daily with breakfast   Yes Historical Provider, MD   Fluticasone-Salmeterol (Advair) 100-50 mcg/dose inhaler Inhale 1 puff 2 (two) times a day Rinse mouth after use  Yes Historical Provider, MD   folic acid (FOLVITE) 897 mcg tablet Take 400 mcg by mouth daily   Yes Historical Provider, MD   ipratropium-albuterol (DUO-NEB) 0 5-2 5 mg/3 mL nebulizer solution Take 3 mL by nebulization 4 (four) times a day   Yes Historical Provider, MD   meclizine (ANTIVERT) 12 5 MG tablet Take 12 5 mg by mouth every 8 (eight) hours as needed for dizziness   Yes Historical Provider, MD   methenamine hippurate (HIPREX) 1 g tablet Take 1 g by mouth 2 (two) times a day with meals   Yes Historical Provider, MD   metoprolol succinate (TOPROL-XL) 25 mg 24 hr tablet Take 25 mg by mouth daily   Yes Historical Provider, MD   midodrine (PROAMATINE) 5 mg tablet Take 5 mg by mouth 3 (three) times a day as needed (SBP < 100 or severe dizziness with standing)   Yes Historical Provider, MD   nystatin (MYCOSTATIN) powder Apply 1 application   topically every morning   Yes Historical Provider, MD   omeprazole (PriLOSEC) 20 mg delayed release capsule Take 20 mg by mouth daily   Yes Historical Provider, MD   sodium phosphate-biphosphate (FLEET) 7-19 g 118 mL enema Insert 1 enema into the rectum daily as needed   Yes Historical Provider, MD     I have reviewed home medications using recent Epic encounter  Allergies: No Known Allergies    Social History:  Marital Status: /Civil Union   Occupation: Retired  Patient Pre-hospital Living Situation: [facility]: [address] [place] [address]  Patient Pre-hospital Level of Mobility: Unable to assess upon my evaluation endorse works with the help of a cane  Patient Pre-hospital Diet Restrictions: None  Substance Use History:   Social History     Substance and Sexual Activity   Alcohol Use None     Social History     Tobacco Use   Smoking Status Not on file   Smokeless Tobacco Not on file     Social History     Substance and Sexual Activity   Drug Use Not on file       Family History:  No family history on file  Physical Exam:     Vitals:   Blood Pressure: 96/68 (06/02/23 0415)  Pulse: (!) 109 (06/02/23 0415)  Temperature: 99 8 °F (37 7 °C) (06/02/23 0415)  Temp Source: Oral (06/02/23 0355)  Respirations: 20 (06/02/23 0355)  Weight - Scale: 65 6 kg (144 lb 10 oz) (06/02/23 0108)  SpO2: 93 % (06/02/23 0415)    Physical Exam  Vitals and nursing note reviewed  Constitutional:       General: He is not in acute distress  Appearance: He is well-developed  He is not ill-appearing  HENT:      Head: Normocephalic and atraumatic  Right Ear: External ear normal       Left Ear: External ear normal       Nose: Nose normal       Mouth/Throat:      Mouth: Mucous membranes are dry  Eyes:      General: No scleral icterus  Extraocular Movements: Extraocular movements intact  Conjunctiva/sclera: Conjunctivae normal       Pupils: Pupils are equal, round, and reactive to light  Cardiovascular:      Rate and Rhythm: Normal rate  Rhythm irregular  Pulses: Normal pulses  Heart sounds: Normal heart sounds  No murmur heard  Pulmonary:      Effort: Pulmonary effort is normal  No respiratory distress  Breath sounds: No rhonchi  Comments: Bilateral distant breath sounds no rhonchi or wheeze  Abdominal:      General: Bowel sounds are normal  There is no distension  Palpations: Abdomen is soft  Tenderness: There is no abdominal tenderness  Musculoskeletal:         General: No swelling  Cervical back: Neck supple  Right lower leg: No edema  Left lower leg: No edema  Skin:     General: Skin is warm and dry  Capillary Refill: Capillary refill takes less than 2 seconds  Neurological:      Mental Status: He is alert  Comments: Alert oriented to self, time (able to state month/year current president) place and situation  Psychiatric:         Attention and Perception: Attention normal          Mood and Affect: Mood normal          Behavior: Behavior is cooperative           Additional Data:     Lab Results:  Results from last 7 days   Lab Units 06/02/23  0219   EOS PCT % 0   HEMATOCRIT % 44 3   HEMOGLOBIN g/dL 14 2   LYMPHS PCT % 5*   MONOS PCT % 9   NEUTROS PCT % 85*   PLATELETS Thousands/uL 266   WBC Thousand/uL 22 31*     Results from last 7 days   Lab Units 06/02/23  0219   ANION GAP mmol/L 8   ALBUMIN g/dL 3 9   ALK PHOS U/L 72   ALT U/L 15   AST U/L 24   BUN mg/dL 26*   CALCIUM mg/dL 8 9   CHLORIDE mmol/L 105   CO2 mmol/L 23   CREATININE mg/dL 1 09   GLUCOSE RANDOM mg/dL 117   POTASSIUM mmol/L 4 9   SODIUM mmol/L 136   TOTAL BILIRUBIN mg/dL 0 94                 Results from last 7 days   Lab Units 06/02/23  0253 06/02/23  0219   LACTIC ACID mmol/L  --  1 1   PROCALCITONIN ng/ml 2 59*  --        Lines/Drains:  Invasive Devices     Peripheral Intravenous Line  Duration           Peripheral IV 06/02/23 Left Antecubital <1 day                    Imaging: Reviewed radiology reports from this admission including: CT head, xray(s) and CT spine  TRAUMA - CT head wo contrast   Final Result by Lorie Reyez MD (06/02 0140)      No acute intracranial abnormality  Workstation performed: [id number]         TRAUMA - CT spine cervical wo contrast   Final Result by Lorie Reyez MD (06/02 0143)      Limited evaluation due to motion artifact  Otherwise, no apparent cervical spine fracture or traumatic malalignment  The study was marked in [facility] for immediate notification  Workstation performed: [id number]         XR Trauma multiple (SLB/SLRA trauma bay ONLY)   Final Result by Claritza Serrano MD (06/02 6492)      No radiographic evidence of acute chest trauma  Workstation performed: [id number]         XR chest 1 view    (Results Pending)       EKG and Other Studies Reviewed on Admission:   · EKG: No EKG obtained  ** Please Note: This note has been constructed using a voice recognition system   **

## 2023-06-03 LAB
ANION GAP SERPL CALCULATED.3IONS-SCNC: 6 MMOL/L (ref 4–13)
BASOPHILS # BLD AUTO: 0.05 THOUSANDS/ÂΜL (ref 0–0.1)
BASOPHILS NFR BLD AUTO: 0 % (ref 0–1)
BUN SERPL-MCNC: 26 MG/DL (ref 5–25)
CALCIUM SERPL-MCNC: 8.4 MG/DL (ref 8.4–10.2)
CHLORIDE SERPL-SCNC: 105 MMOL/L (ref 96–108)
CO2 SERPL-SCNC: 27 MMOL/L (ref 21–32)
CREAT SERPL-MCNC: 1.14 MG/DL (ref 0.6–1.3)
EOSINOPHIL # BLD AUTO: 0.16 THOUSAND/ÂΜL (ref 0–0.61)
EOSINOPHIL NFR BLD AUTO: 1 % (ref 0–6)
ERYTHROCYTE [DISTWIDTH] IN BLOOD BY AUTOMATED COUNT: 13.4 % (ref 11.6–15.1)
GFR SERPL CREATININE-BSD FRML MDRD: 62 ML/MIN/1.73SQ M
GLUCOSE SERPL-MCNC: 125 MG/DL (ref 65–140)
HCT VFR BLD AUTO: 40.1 % (ref 36.5–49.3)
HGB BLD-MCNC: 12.7 G/DL (ref 12–17)
IMM GRANULOCYTES # BLD AUTO: 0.14 THOUSAND/UL (ref 0–0.2)
IMM GRANULOCYTES NFR BLD AUTO: 1 % (ref 0–2)
LYMPHOCYTES # BLD AUTO: 1.59 THOUSANDS/ÂΜL (ref 0.6–4.47)
LYMPHOCYTES NFR BLD AUTO: 9 % (ref 14–44)
MAGNESIUM SERPL-MCNC: 2.2 MG/DL (ref 1.9–2.7)
MCH RBC QN AUTO: 29.9 PG (ref 26.8–34.3)
MCHC RBC AUTO-ENTMCNC: 31.7 G/DL (ref 31.4–37.4)
MCV RBC AUTO: 94 FL (ref 82–98)
MONOCYTES # BLD AUTO: 1.07 THOUSAND/ÂΜL (ref 0.17–1.22)
MONOCYTES NFR BLD AUTO: 6 % (ref 4–12)
MRSA NOSE QL CULT: NORMAL
NEUTROPHILS # BLD AUTO: 14.66 THOUSANDS/ÂΜL (ref 1.85–7.62)
NEUTS SEG NFR BLD AUTO: 83 % (ref 43–75)
NRBC BLD AUTO-RTO: 0 /100 WBCS
PLATELET # BLD AUTO: 238 THOUSANDS/UL (ref 149–390)
PMV BLD AUTO: 10.3 FL (ref 8.9–12.7)
POTASSIUM SERPL-SCNC: 4.1 MMOL/L (ref 3.5–5.3)
PROCALCITONIN SERPL-MCNC: 6.98 NG/ML
RBC # BLD AUTO: 4.25 MILLION/UL (ref 3.88–5.62)
SODIUM SERPL-SCNC: 138 MMOL/L (ref 135–147)
WBC # BLD AUTO: 17.67 THOUSAND/UL (ref 4.31–10.16)

## 2023-06-03 PROCEDURE — 83735 ASSAY OF MAGNESIUM: CPT

## 2023-06-03 PROCEDURE — 80048 BASIC METABOLIC PNL TOTAL CA: CPT

## 2023-06-03 PROCEDURE — 99232 SBSQ HOSP IP/OBS MODERATE 35: CPT | Performed by: SURGERY

## 2023-06-03 PROCEDURE — 84145 PROCALCITONIN (PCT): CPT

## 2023-06-03 PROCEDURE — 85025 COMPLETE CBC W/AUTO DIFF WBC: CPT

## 2023-06-03 PROCEDURE — 99232 SBSQ HOSP IP/OBS MODERATE 35: CPT | Performed by: HOSPITALIST

## 2023-06-03 RX ORDER — GUAIFENESIN 600 MG/1
600 TABLET, EXTENDED RELEASE ORAL EVERY 12 HOURS SCHEDULED
Status: DISCONTINUED | OUTPATIENT
Start: 2023-06-03 | End: 2023-06-05 | Stop reason: HOSPADM

## 2023-06-03 RX ADMIN — METOPROLOL SUCCINATE 25 MG: 25 TABLET, EXTENDED RELEASE ORAL at 09:37

## 2023-06-03 RX ADMIN — MIDODRINE HYDROCHLORIDE 5 MG: 5 TABLET ORAL at 12:15

## 2023-06-03 RX ADMIN — FERROUS SULFATE TAB 325 MG (65 MG ELEMENTAL FE) 325 MG: 325 (65 FE) TAB at 09:37

## 2023-06-03 RX ADMIN — Medication 400 MCG: at 09:37

## 2023-06-03 RX ADMIN — PIPERACILLIN AND TAZOBACTAM 3.38 G: 36; 4.5 INJECTION, POWDER, FOR SOLUTION INTRAVENOUS at 16:08

## 2023-06-03 RX ADMIN — APIXABAN 2.5 MG: 5 TABLET, FILM COATED ORAL at 18:20

## 2023-06-03 RX ADMIN — PIPERACILLIN AND TAZOBACTAM 3.38 G: 36; 4.5 INJECTION, POWDER, FOR SOLUTION INTRAVENOUS at 02:19

## 2023-06-03 RX ADMIN — APIXABAN 2.5 MG: 5 TABLET, FILM COATED ORAL at 09:37

## 2023-06-03 RX ADMIN — PIPERACILLIN AND TAZOBACTAM 3.38 G: 36; 4.5 INJECTION, POWDER, FOR SOLUTION INTRAVENOUS at 09:37

## 2023-06-03 RX ADMIN — PANTOPRAZOLE SODIUM 40 MG: 40 TABLET, DELAYED RELEASE ORAL at 05:23

## 2023-06-03 RX ADMIN — MIDODRINE HYDROCHLORIDE 7.5 MG: 5 TABLET ORAL at 05:31

## 2023-06-03 RX ADMIN — MIDODRINE HYDROCHLORIDE 5 MG: 5 TABLET ORAL at 16:08

## 2023-06-03 RX ADMIN — FLUTICASONE FUROATE AND VILANTEROL TRIFENATATE 1 PUFF: 100; 25 POWDER RESPIRATORY (INHALATION) at 09:37

## 2023-06-03 RX ADMIN — PIPERACILLIN AND TAZOBACTAM 3.38 G: 36; 4.5 INJECTION, POWDER, FOR SOLUTION INTRAVENOUS at 22:22

## 2023-06-03 RX ADMIN — GUAIFENESIN 600 MG: 600 TABLET ORAL at 12:15

## 2023-06-03 RX ADMIN — GUAIFENESIN 600 MG: 600 TABLET ORAL at 22:16

## 2023-06-03 NOTE — PROGRESS NOTES
[facility]  Progress Note  Name: Collin Harada  MRN: [id number]  Unit/Bed#: S -01 I Date of Admission: 6/2/2023   Date of Service: 6/2/2023 I Hospital Day: 0    Assessment/Plan   * Fall  Assessment & Plan  - Fall with head strike, without LOC  -No acute traumatic injuries noted on primary secondary exams  - No additional injuries noted on tertiary trauma exam  - Trauma team will sign off    Urinary tract infection associated with indwelling urethral catheter ([address] )  Assessment & Plan  - UTI with sepsis criteria on admission  - Continue per primary team             TRAUMA TERTIARY SURVEY NOTE    VTE Prophylaxis: Eliquis    Disposition: Continue MedSurg status per primary team, team will sign off    Code status:  Level 3 - DNAR and DNI    Consultants: None    Subjective   Transfer from: Transfer    Mechanism of Injury:Fall     Chief Complaint: No complaints    HPI/Last 24 hour events: Patient reports he is feeling a little better today  He has no pain  He is tolerating his diet        Objective   Vitals:   Temp:  [97 8 °F (36 6 °C)-103 1 °F (39 5 °C)] 97 8 °F (36 6 °C)  HR:  [] 82  Resp:  [16-20] 16  BP: ()/(56-70) 104/59    I/O       06/01 0701  06/02 0700 06/02 0701  06/03 0700    Urine (mL/kg/hr)  250 (0 3)    Stool  0    Total Output  250    Net  -250          Unmeasured Stool Occurrence  1 x           Physical Exam:   GENERAL APPEARANCE: No acute distress resting supine in bed  NEURO: AAOx3, GCS 15, no focal neurodeficit  HEENT: PERRLA, EOMI, mucous membranes moist  CV: RRR, S1, S2 without murmur rub or gallop  LUNGS: Clear to auscultation bilaterally without wheezes rales or rhonchi  GI: Soft, nontender, nondistended  : Chronic indwelling Reich catheter in place with drainage of clear yellow urine  MSK: Extremities nontender without deformity  SKIN: Warm, dry, intact    Invasive Devices     Peripheral Intravenous Line  Duration           Peripheral IV 06/02/23 Left Antecubital <1 day          Drain  Duration           Urethral Catheter <1 day                   1  Before the illness or injury that brought you to the Emergency, did you need someone to help you on a regular basis? 1=Yes   2  Since the illness or injury that brought you to the Emergency, have you needed more help than usual to take care of yourself? 0=No   3  Have you been hospitalized for one or more nights during the past 6 months (excluding a stay in the Emergency Department)? 1=Yes   4  In general, do you see well? 0=Yes   5  In general, do you have serious problems with your memory? 0=No   6  Do you take more than three different medications everyday? 1=Yes   TOTAL   4     Did you order a geriatric consult if the score was 2 or greater?:  Recommend but defer to primary team         Lab Results:   BMP/CMP:   Lab Results   Component Value Date    ALKPHOS 72 06/02/2023    ALT 15 06/02/2023    AST 24 06/02/2023    BUN 26 (H) 06/02/2023    CALCIUM 8 9 06/02/2023     06/02/2023    CO2 23 06/02/2023    CREATININE 1 09 06/02/2023    EGFR 66 06/02/2023    K 4 9 06/02/2023    SODIUM 136 06/02/2023    and CBC:   Lab Results   Component Value Date    HCT 44 3 06/02/2023    HGB 14 2 06/02/2023    MCH 30 0 06/02/2023    MCHC 32 1 06/02/2023    MCV 94 06/02/2023    MPV 10 2 06/02/2023    NRBC 0 06/02/2023     06/02/2023    RBC 4 73 06/02/2023    RDW 13 3 06/02/2023    WBC 22 31 (H) 06/02/2023       Imaging Results: I have personally reviewed pertinent reports      Chest Xray(s): negative for acute findings   FAST exam(s): negative for acute findings   CT Scan(s): negative for acute findings   Additional Xray(s): N/A     Other Studies: None

## 2023-06-03 NOTE — ASSESSMENT & PLAN NOTE
- Fall with head strike, without LOC  -No acute traumatic injuries noted on primary secondary exams  - No additional injuries noted on tertiary trauma exam  - Trauma team will sign off

## 2023-06-04 PROBLEM — E44.0 MODERATE PROTEIN-CALORIE MALNUTRITION (HCC): Status: ACTIVE | Noted: 2023-06-04

## 2023-06-04 LAB
ERYTHROCYTE [DISTWIDTH] IN BLOOD BY AUTOMATED COUNT: 13.2 % (ref 11.6–15.1)
HCT VFR BLD AUTO: 40.3 % (ref 36.5–49.3)
HGB BLD-MCNC: 12.8 G/DL (ref 12–17)
MCH RBC QN AUTO: 29.5 PG (ref 26.8–34.3)
MCHC RBC AUTO-ENTMCNC: 31.8 G/DL (ref 31.4–37.4)
MCV RBC AUTO: 93 FL (ref 82–98)
PLATELET # BLD AUTO: 252 THOUSANDS/UL (ref 149–390)
PMV BLD AUTO: 9.8 FL (ref 8.9–12.7)
PROCALCITONIN SERPL-MCNC: 3.03 NG/ML
RBC # BLD AUTO: 4.34 MILLION/UL (ref 3.88–5.62)
WBC # BLD AUTO: 11.05 THOUSAND/UL (ref 4.31–10.16)

## 2023-06-04 PROCEDURE — 84145 PROCALCITONIN (PCT): CPT

## 2023-06-04 PROCEDURE — 99232 SBSQ HOSP IP/OBS MODERATE 35: CPT | Performed by: HOSPITALIST

## 2023-06-04 PROCEDURE — 85027 COMPLETE CBC AUTOMATED: CPT

## 2023-06-04 RX ORDER — PANTOPRAZOLE SODIUM 40 MG/1
40 TABLET, DELAYED RELEASE ORAL
Qty: 30 TABLET | Refills: 0 | Status: CANCELLED | OUTPATIENT
Start: 2023-06-04

## 2023-06-04 RX ADMIN — APIXABAN 2.5 MG: 5 TABLET, FILM COATED ORAL at 10:09

## 2023-06-04 RX ADMIN — GUAIFENESIN 600 MG: 600 TABLET ORAL at 22:08

## 2023-06-04 RX ADMIN — PIPERACILLIN AND TAZOBACTAM 3.38 G: 36; 4.5 INJECTION, POWDER, FOR SOLUTION INTRAVENOUS at 23:46

## 2023-06-04 RX ADMIN — GUAIFENESIN 600 MG: 600 TABLET ORAL at 10:09

## 2023-06-04 RX ADMIN — PIPERACILLIN AND TAZOBACTAM 3.38 G: 36; 4.5 INJECTION, POWDER, FOR SOLUTION INTRAVENOUS at 15:58

## 2023-06-04 RX ADMIN — METOPROLOL SUCCINATE 25 MG: 25 TABLET, EXTENDED RELEASE ORAL at 10:09

## 2023-06-04 RX ADMIN — Medication 400 MCG: at 10:10

## 2023-06-04 RX ADMIN — MIDODRINE HYDROCHLORIDE 5 MG: 5 TABLET ORAL at 15:59

## 2023-06-04 RX ADMIN — MIDODRINE HYDROCHLORIDE 5 MG: 5 TABLET ORAL at 11:37

## 2023-06-04 RX ADMIN — PANTOPRAZOLE SODIUM 40 MG: 40 TABLET, DELAYED RELEASE ORAL at 04:53

## 2023-06-04 RX ADMIN — PIPERACILLIN AND TAZOBACTAM 3.38 G: 36; 4.5 INJECTION, POWDER, FOR SOLUTION INTRAVENOUS at 10:11

## 2023-06-04 RX ADMIN — FLUTICASONE FUROATE AND VILANTEROL TRIFENATATE 1 PUFF: 100; 25 POWDER RESPIRATORY (INHALATION) at 10:10

## 2023-06-04 RX ADMIN — MIDODRINE HYDROCHLORIDE 7.5 MG: 5 TABLET ORAL at 04:53

## 2023-06-04 RX ADMIN — PIPERACILLIN AND TAZOBACTAM 3.38 G: 36; 4.5 INJECTION, POWDER, FOR SOLUTION INTRAVENOUS at 04:01

## 2023-06-04 RX ADMIN — APIXABAN 2.5 MG: 5 TABLET, FILM COATED ORAL at 17:26

## 2023-06-04 RX ADMIN — FERROUS SULFATE TAB 325 MG (65 MG ELEMENTAL FE) 325 MG: 325 (65 FE) TAB at 10:09

## 2023-06-04 NOTE — PROGRESS NOTES
[facility]  Progress Note  Name: David Garcia  MRN: [id number]  Unit/Bed#: S -01 I Date of Admission: 6/2/2023   Date of Service: 6/4/2023 I Hospital Day: 2    Assessment/Plan   CHF (congestive heart failure) ([facility])  Assessment & Plan  Wt Readings from Last 3 Encounters:   06/02/23 65 6 kg (144 lb 10 oz)     · LVEF 50% low normal  Concentric hypertrophy  · Not in exacerbation at this time  Seems dry to euvolemic on exam          Urinary tract infection associated with indwelling urethral catheter ([facility])  Assessment & Plan  · Chronic indwelling catheter due to urinary retention with history of ESBL/MDRO  · Prior admitted on 05/13 treated for UTI urine culture grew Proteus mirabilis and alcalienes faecalis subsequently discharged on Augmentin for 5 more doses until 05/19 per chart review  · Patient denies any urinary symptoms at this time Reich catheter exchange  · UA: Large leukocytes, moderate blood, RBC 20-30 WBCs negative for nitrates    · Plan  · Cont zosyn   · Follow-up urine cultures    Sepsis ([facility] )  Assessment & Plan  · POA met SIRS criteria for fever 103 1, leukocytosis 22 31 and heart rate 112 with presumed source of infection UTI  · Patient with indwelling Reich catheter history of ESBL/MDRO  At this time denies any urinary symptoms, denies any chills, fever prior to admission however endorsed chronic cough without change any sputum production  · Previously admitted on 05/13 urine cultures grew Proteus mirabilis and Alcalienes faecalis discharge on Augmentin for 5 more doses on 05/19    · Lactic acid 1 1, unremarkable, procalcitonin mild elevation 2 59  · UA: Large leukocytes, moderate blood, RBC 20-30 innumerable WBCs negative for nitrates and bacteria    · Plan  ·  blood cultures: NGTD  · U Cx: proteus (sensitivities pending)  · cont Zosyn  · Follow-up procalcitonin  · Monitor fever curve    COPD (chronic obstructive pulmonary disease) ([facility] )  Assessment & "Plan  · History of COPD  · Continue Breo Ellipta as per formulary for Advair    A-fib [facility])  Assessment & Plan  · History of A-fib with loop recorder  · Per chart review loop recorder normal device function last pause recorded in March 2023   93% on A-fib  · [id number] 2   · Continue PTA Toprol 25 mg once a day, Eliquis 2 5 twice daily    * Fall  Assessment & Plan  · POA presented from [facility] as a trauma alert due to fall with head strike on Eliquis  Upon my evaluation patient was denying that he fell, that he was sleeping in bed and got sent to the ED  · CT head \"no acute intracranial hemorrhage  \"  · CT spine \" no apparent cervical spine fracture or traumatic malalignment  \"  · History of multiple falls and syncopal episodes as well as orthostasis hypotension with a loop recorder  · Syncope possible due to orthostasis versus gait instability versus cardiogenic? · Plan  · Fall precautions  · Continue PTA midodrine Per chart review recently increased to midodrine7  5 in the morning and 5 mg in the afternoon and evening  · Continue telemetry  · PT OT           VTE Pharmacologic Prophylaxis:   Pharmacologic: Apixaban (Eliquis)  Mechanical VTE Prophylaxis in Place: Yes    Patient Centered Rounds: I have performed bedside rounds with nursing staff today  Discussions with Specialists or Other Care Team Provider:     Education and Discussions with Family / Patient: patient     Time Spent for Care: 30 minutes  More than 50% of total time spent on counseling and coordination of care as described above  Current Length of Stay: 2 day(s)    Current Patient Status: Inpatient   Certification Statement: The patient will continue to require additional inpatient hospital stay due to ESBL UTI     Discharge Plan / Estimated Discharge Date: TBD based on clinical course      Code Status: Level 3 - DNAR and DNI      Subjective:   No new complaint  Eating and drinking well   No LH or dizziness     Objective: " Vitals:   Temp (24hrs), Av °F (37 2 °C), Min:98 6 °F (37 °C), Max:99 3 °F (37 4 °C)    Temp:  [98 6 °F (37 °C)-99 3 °F (37 4 °C)] 99 °F (37 2 °C)  HR:  [61-77] 75  Resp:  [18-19] 19  BP: (115-135)/(83-93) 122/83  SpO2:  [94 %-97 %] 94 %  Body mass index is 19 61 kg/m²  Input and Output Summary (last 24 hours):       Physical Exam:     Physical Exam  Constitutional:       General: He is not in acute distress  Appearance: Normal appearance  He is not ill-appearing, toxic-appearing or diaphoretic  Cardiovascular:      Rate and Rhythm: Normal rate and regular rhythm  Heart sounds: No murmur heard  Pulmonary:      Effort: Pulmonary effort is normal  No respiratory distress  Breath sounds: Normal breath sounds  No wheezing  Abdominal:      General: Abdomen is flat  Palpations: Abdomen is soft  Tenderness: There is no abdominal tenderness  There is no guarding or rebound  Neurological:      Mental Status: He is alert  Additional Data:     Labs:    Results from last 7 days   Lab Units 23  0517   EOS PCT % 1   HEMATOCRIT % 40 1   HEMOGLOBIN g/dL 12 7   LYMPHS PCT % 9*   MONOS PCT % 6   NEUTROS PCT % 83*   PLATELETS Thousands/uL 238   WBC Thousand/uL 17 67*     Results from last 7 days   Lab Units 23  0517 23  0219   ALK PHOS U/L  --  72   ALT U/L  --  15   AST U/L  --  24   BUN mg/dL 26* 26*   CALCIUM mg/dL 8 4 8 9   CHLORIDE mmol/L 105 105   CO2 mmol/L 27 23   CREATININE mg/dL 1 14 1 09   POTASSIUM mmol/L 4 1 4 9           * I Have Reviewed All Lab Data Listed Above  * Additional Pertinent Lab Tests Reviewed: All Labs Within Last 24 Hours Reviewed    Imaging:    Imaging Reports Reviewed Today Include:   Imaging Personally Reviewed by Myself Includes:      Recent Cultures (last 7 days):     Results from last 7 days   Lab Units 23  0224 23  0219   BLOOD CULTURE   --  No Growth at 48 hrs  No Growth at 48 hrs     URINE CULTURE  >100,000 cfu/ml Proteus mirabilis*  --        Last 24 Hours Medication List:   Current Facility-Administered Medications   Medication Dose Route Frequency Provider Last Rate   • acetaminophen  650 mg Oral Q6H PRN Cristine Higuera MD     • albuterol  2 puff Inhalation Q4H PRN Jordy Isabel MD     • apixaban  2 5 mg Oral BID Cristine Higuera MD     • bisacodyl  10 mg Rectal Daily PRN Cristine Higuera MD     • ferrous sulfate  325 mg Oral Daily With MD Angelique     • Fluticasone Furoate-Vilanterol  1 puff Inhalation Daily Cristine Higuera MD     • folic acid  822 mcg Oral Daily Cristine Higuera MD     • guaiFENesin  600 mg Oral Q12H [facility] Dar Tolbert MD     • meclizine  12 5 mg Oral Q8H PRN Cristine Higuera MD     • metoprolol succinate  25 mg Oral Daily Cristine Higuera MD     • midodrine  5 mg Oral BID AC Cristine Higuera MD     • midodrine  7 5 mg Oral Daily Cristine Higuera MD     • pantoprazole  40 mg Oral Early Morning Cristine Higuera MD     • piperacillin-tazobactam  3 375 g Intravenous Q6H Laureen Belle MD 3 375 g (06/04/23 0401)   • senna-docusate sodium  1 tablet Oral HS Cristine Higuera MD          Today, Patient Was Seen By: Jordy Isabel MD    ** Please Note: This note has been constructed using a voice recognition system   **

## 2023-06-04 NOTE — ASSESSMENT & PLAN NOTE
"· POA presented from [facility] as a trauma alert due to fall with head strike on Eliquis  Upon my evaluation patient was denying that he fell, that he was sleeping in bed and got sent to the ED  · CT head \"no acute intracranial hemorrhage  \"  · CT spine \" no apparent cervical spine fracture or traumatic malalignment  \"  · History of multiple falls and syncopal episodes as well as orthostasis hypotension with a loop recorder  · Syncope possible due to orthostasis versus mechanical fall    · Plan  · Continue PTA midodrine Per chart review recently increased to midodrine7  5 in the morning and 5 mg in the afternoon and evening  · PT OT -complete postacute rehab at prior nursing facility  "

## 2023-06-04 NOTE — ASSESSMENT & PLAN NOTE
"· POA presented from [facility] as a trauma alert due to fall with head strike on Eliquis  Upon my evaluation patient was denying that he fell, that he was sleeping in bed and got sent to the ED  · CT head \"no acute intracranial hemorrhage  \"  · CT spine \" no apparent cervical spine fracture or traumatic malalignment  \"  · History of multiple falls and syncopal episodes as well as orthostasis hypotension with a loop recorder  · Syncope possible due to orthostasis versus mechanical fall    · Plan  · Continue PTA midodrine Per chart review recently increased to midodrine7  5 in the morning and 5 mg in the afternoon and evening  · PT OT -complete postacute rehab at prior nursing facility  "

## 2023-06-04 NOTE — DISCHARGE SUMMARY
"[facility]  Discharge- [facility] 1945, 78 y o  male MRN: [id number]  Unit/Bed#: S -01 Encounter: [id number]  Primary Care Provider: Edda Martinez MD   Date and time admitted to hospital: 6/2/2023  1:01 AM    * [address]  · [facility] due to fall with head strike on Eliquis  Upon my evaluation patient was denying that he fell, that he was sleeping in bed and got sent to the ED  · CT head \"no acute intracranial hemorrhage  \"  · CT spine \" no apparent cervical spine fracture or traumatic malalignment  \"  · History of multiple falls and syncopal episodes as well as orthostasis hypotension with a loop recorder  · Syncope possible due to orthostasis versus mechanical fall    Plan  · Continue PTA midodrine - Per chart review recently increased to midodrine 7  5 in the morning and 5 mg in the afternoon and evening  · PT OT - complete postacute rehab at prior nursing facility    Sepsis [facility])  Assessment & Plan  · POA met SIRS criteria for fever 103 1, leukocytosis 22 31 and heart rate 112 with presumed source of infection UTI  · Patient with indwelling Reich catheter history of ESBL/MDRO  At this time denies any urinary symptoms, denies any chills, fever prior to admission however endorsed chronic cough without change any sputum production  · Previously admitted on 05/13 urine cultures grew Proteus mirabilis and Alcalienes faecalis discharge on Augmentin for 5 more doses on 05/19    · Lactic acid 1 1, unremarkable, procalcitonin mild elevation 2 59  · UA: Large leukocytes, moderate blood, RBC 20-30 innumerable WBCs negative for nitrates and bacteria    Plan  · Blood cultures: NGTD  · U Cx: proteus ESBL  · Discharge on IV zosyn for total 10 days of antibiotics    Urinary tract infection associated with indwelling urethral catheter ([facility] )  Assessment & Plan  · Chronic indwelling catheter due to urinary retention with " history of ESBL/MDRO  · Prior admitted on 05/13 treated for UTI urine culture grew Proteus mirabilis and alcalienes faecalis subsequently discharged on Augmentin for 5 more doses until 05/19 per chart review  · Patient denies any urinary symptoms at this time Reich catheter exchange  · UA: Large leukocytes, moderate blood, RBC 20-30 WBCs negative for nitrates    Plan  · See above    Reich catheter in place  Assessment & Plan  Maintain    A-fib [facility])  Assessment & Plan  · History of A-fib with loop recorder  · Per chart review loop recorder normal device function last pause recorded in March 2023   93% on A-fib  · [id number] 2   · Continue PTA Toprol 25 mg once a day, Eliquis 2 5 twice daily    COPD (chronic obstructive pulmonary disease) ([facility] )  Assessment & Plan  · History of COPD  · Continue Breo Ellipta as per formulary for Advair    CHF (congestive heart failure) ([facility])  Assessment & Plan  Wt Readings from Last 3 Encounters:   06/02/23 65 6 kg (144 lb 10 oz)     · LVEF 50% low normal  Concentric hypertrophy  · Not in exacerbation at this time  Seems dry to euvolemic on exam          Moderate protein-calorie malnutrition (HCC)  Assessment & Plan  Malnutrition Findings:   Adult Malnutrition type: Acute illness (in the setting of chronic illness)  Adult Degree of Malnutrition: Malnutrition of moderate degree  Malnutrition Characteristics: Fat loss, Muscle loss, Weight loss                  360 Statement: Moderate malnutrition related to inadequate oral intake as evidenced by loss of  subcutaneous fat and muscle, temples, clavicle, extremities, 12% ongoing weight loss x 12 months  Currently treated with oral supplementation  BMI Findings: Body mass index is 19 61 kg/m²           Discharging Resident: Aicha Corona DO  Discharging Attending: Maninder Tomlinson MD  PCP: Swetha Collado MD  Admission Date:   Admission Orders (From admission, onward)     Ordered        06/02/23 0241  Inpatient Admission  Once Discharge Date: 06/05/23    Consultations During Hospital Stay:  · none    Procedures Performed:   · none    Significant Findings / Test Results:   XR Trauma multiple (SLB/SLRA trauma bay ONLY)  Result Date: 6/2/2023  Impression: No radiographic evidence of acute chest trauma  Workstation performed: [id number]     TRAUMA - CT spine cervical wo contrast  Result Date: 6/2/2023  Impression: Limited evaluation due to motion artifact  Otherwise, no apparent cervical spine fracture or traumatic malalignment  The study was marked in [facility] for immediate notification  Workstation performed: [id number]     TRAUMA - CT head wo contrast  Result Date: 6/2/2023  Impression: No acute intracranial abnormality  Workstation performed: [id number]     Incidental Findings:   · None      Test Results Pending at Discharge (will require follow up): · None     Outpatient Tests Requested:  · None    Complications:  None    Reason for Admission: [facility] Course:   Shirin Mojica is a 78 y o  male patient who originally presented to the hospital on 6/2/2023 due to fall with positive head strike on Eliquis at nursing facility  Patient poor historian and cannot recall the events leading up to his fall  All he did remember was that he did not lose consciousness  Patient brought to the emergency department by EMS  In the emergency department trauma work-up was negative  Patient did however meet sepsis criteria with fever, leukocytosis, tachycardia and urinalysis positive for UTI  Despite his presentation, patient did not complain of any symptoms on arrival to the emergency department  Given his history of ESBL urinary tract infections, patient was started on IV Zosyn and admitted for further management of sepsis secondary to urinary tract infection  Fevers dissipated and patient improved clinically throughout his hospital stay  Urine culture significant for ESBL Proteus mirabilis   Evaluated by physical therapy and it was recommended that he participate in postacute rehab which could be completed at his original facility  Patient was cleared by primary team for discharge back to nursing facility for completion of antibiotic course  Please see above list of diagnoses and related plan for additional information  Condition at Discharge: stable    Discharge Day Visit / Exam:   Subjective: No overnight events reported  Productive coughing x2 days, denied sob  Denies fever or chills, pain with urination, abdominal pain or nausea or vomiting  Vitals: Blood Pressure: 122/83 (06/04/23 0753)  Pulse: 75 (06/04/23 0753)  Temperature: 99 °F (37 2 °C) (06/04/23 0753)  Temp Source: Oral (06/03/23 2157)  Respirations: 19 (06/04/23 0753)  Height: 6' (182 9 cm) (06/02/23 1321)  Weight - Scale: 65 6 kg (144 lb 10 oz) (06/02/23 0108)  SpO2: 94 % (06/04/23 0753)  Exam:   Physical Exam  Constitutional:       General: He is not in acute distress  Appearance: He is normal weight  He is not ill-appearing or toxic-appearing  HENT:      Head: Normocephalic and atraumatic  Eyes:      Extraocular Movements: Extraocular movements intact  Conjunctiva/sclera: Conjunctivae normal       Pupils: Pupils are equal, round, and reactive to light  Cardiovascular:      Rate and Rhythm: Normal rate and regular rhythm  Pulses: Normal pulses  Heart sounds: Normal heart sounds  No murmur heard  No gallop  Pulmonary:      Effort: Pulmonary effort is normal  No respiratory distress  Breath sounds: Normal breath sounds  No stridor  No wheezing, rhonchi or rales  Chest:      Chest wall: No tenderness  Abdominal:      General: Abdomen is flat  Bowel sounds are normal  There is no distension  Palpations: Abdomen is soft  Tenderness: There is no abdominal tenderness  Musculoskeletal:         General: Normal range of motion  Skin:     General: Skin is warm  Coloration: Skin is not jaundiced or pale  Findings: No rash  Neurological:      General: No focal deficit present  Mental Status: He is alert and oriented to person, place, and time  Mental status is at baseline  Cranial Nerves: No cranial nerve deficit  Sensory: No sensory deficit  Motor: No weakness  Psychiatric:         Mood and Affect: Mood normal          Behavior: Behavior normal           Discussion with Family: Updated  (wife) via phone  Discharge instructions/Information to patient and family:   See after visit summary for information provided to patient and family  Provisions for Follow-Up Care:  See after visit summary for information related to follow-up care and any pertinent home health orders  Disposition:   Assisted Living Facility at Rehab    Planned Readmission:     Discharge Medications:  See after visit summary for reconciled discharge medications provided to patient and/or family        **Please Note: This note may have been constructed using a voice recognition system**

## 2023-06-04 NOTE — ASSESSMENT & PLAN NOTE
· POA met SIRS criteria for fever 103 1, leukocytosis 22 31 and heart rate 112 with presumed source of infection UTI  · Patient with indwelling Reich catheter history of ESBL/MDRO  At this time denies any urinary symptoms, denies any chills, fever prior to admission however endorsed chronic cough without change any sputum production  · Previously admitted on 05/13 urine cultures grew Proteus mirabilis and Alcalienes faecalis discharge on Augmentin for 5 more doses on 05/19    · Lactic acid 1 1, unremarkable, procalcitonin mild elevation 2 59  · UA: Large leukocytes, moderate blood, RBC 20-30 innumerable WBCs negative for nitrates and bacteria    · Plan  ·  blood cultures: NGTD  · U Cx: proteus (sensitivities pending)  · cont Zosyn

## 2023-06-04 NOTE — ASSESSMENT & PLAN NOTE
· History of A-fib with loop recorder  · Per chart review loop recorder normal device function last pause recorded in March 2023   93% on A-fib    · [id number] 2   · Continue PTA Toprol 25 mg once a day, Eliquis 2 5 twice daily

## 2023-06-04 NOTE — ASSESSMENT & PLAN NOTE
· History of A-fib with loop recorder  · Per chart review loop recorder normal device function last pause recorded in March 2023   93% on A-fib    · [id number] 2   · Continue PTA Toprol 25 mg once a day, Eliquis 2 5 twice daily

## 2023-06-04 NOTE — DISCHARGE INSTR - AVS FIRST PAGE
Dear Alysia Slaughter,     It was our pleasure to care for you here at [place], [facility]  It is our hope that we were always able to exceed the expected standards for your care during your stay  You were hospitalized due to trauma and urinary tract infection  You were cared for on the [facility] 2nd floor by Sophie Up DO under the service of Simin Vernon MD with the [facility] who covers for your primary care physician (PCP), Fang Hernandez MD, while you were hospitalized  If you have any questions or concerns related to this hospitalization, you may contact us at [phone]  For follow up as well as any medication refills, we recommend that you follow up with your primary care physician  A registered nurse will reach out to you by phone within a few days after your discharge to answer any additional questions that you may have after going home  However, at this time we provide for you here, the most important instructions / recommendations at discharge:     Notable Medication Adjustments -   Continue IV Zosyn for another 6 days, last dosage will be on Sunday 6/11/23  Total 10 days of antibiotics  Take all home medications as prescribed prior to this hospitalization, including Midodrine 7 5mg in the mornings and 5mg in the afternoons and evenings  Testing Required after Discharge -   None  Important follow up information -   Please follow-up with your PCP within 2 weeks of discharge  Other Instructions -   If you develop fever or chills, or if you lose consciousness, please return to the emergency department  Please review this entire after visit summary as additional general instructions including medication list, appointments, activity, diet, any pertinent wound care, and other additional recommendations from your care team that may be provided for you        Sincerely,     Sophie Up DO

## 2023-06-04 NOTE — PROGRESS NOTES
[facility]  Progress Note  Name: Sulaiman Pump  MRN: [id number]  Unit/Bed#: S -01 I Date of Admission: 6/2/2023   Date of Service: 6/4/2023 I Hospital Day: 2    Assessment/Plan   CHF (congestive heart failure) ([facility])  Assessment & Plan  Wt Readings from Last 3 Encounters:   06/02/23 65 6 kg (144 lb 10 oz)     · LVEF 50% low normal  Concentric hypertrophy  · Not in exacerbation at this time  Seems dry to euvolemic on exam          Urinary tract infection associated with indwelling urethral catheter ([facility])  Assessment & Plan  · Chronic indwelling catheter due to urinary retention with history of ESBL/MDRO  · Prior admitted on 05/13 treated for UTI urine culture grew Proteus mirabilis and alcalienes faecalis subsequently discharged on Augmentin for 5 more doses until 05/19 per chart review  · Patient denies any urinary symptoms at this time Reich catheter exchange  · UA: Large leukocytes, moderate blood, RBC 20-30 WBCs negative for nitrates    · Plan  · Cont zosyn   · Follow-up urine cultures and sensitivities    Sepsis ([facility])  Assessment & Plan  · POA met SIRS criteria for fever 103 1, leukocytosis 22 31 and heart rate 112 with presumed source of infection UTI  · Patient with indwelling Reich catheter history of ESBL/MDRO  At this time denies any urinary symptoms, denies any chills, fever prior to admission however endorsed chronic cough without change any sputum production  · Previously admitted on 05/13 urine cultures grew Proteus mirabilis and Alcalienes faecalis discharge on Augmentin for 5 more doses on 05/19    · Lactic acid 1 1, unremarkable, procalcitonin mild elevation 2 59  · UA: Large leukocytes, moderate blood, RBC 20-30 innumerable WBCs negative for nitrates and bacteria    · Plan  ·  blood cultures: NGTD  · U Cx: proteus (sensitivities pending)  · cont Zosyn    COPD (chronic obstructive pulmonary disease) ([facility])  Assessment & Plan  · History of COPD  · Continue Breo "Ellipta as per formulary for Advair    A-fib [facility])  Assessment & Plan  · History of A-fib with loop recorder  · Per chart review loop recorder normal device function last pause recorded in March 2023   93% on A-fib  · [id number] 2   · Continue PTA Toprol 25 mg once a day, Eliquis 2 5 twice daily    * Fall  Assessment & Plan  · POA presented from [facility] as a trauma alert due to fall with head strike on Eliquis  Upon my evaluation patient was denying that he fell, that he was sleeping in bed and got sent to the ED  · CT head \"no acute intracranial hemorrhage  \"  · CT spine \" no apparent cervical spine fracture or traumatic malalignment  \"  · History of multiple falls and syncopal episodes as well as orthostasis hypotension with a loop recorder  · Syncope possible due to orthostasis versus mechanical fall    · Plan  · Continue PTA midodrine Per chart review recently increased to midodrine7  5 in the morning and 5 mg in the afternoon and evening  · PT OT -complete postacute rehab at prior nursing facility               VTE Pharmacologic Prophylaxis: VTE Score: 5 High Risk (Score >/= 5) - Pharmacological DVT Prophylaxis Ordered: apixaban (Eliquis)  Sequential Compression Devices Ordered  Patient Centered Rounds: I performed bedside rounds with nursing staff today  Discussions with Specialists or Other Care Team Provider:    Education and Discussions with Family / Patient: Attempted to update  (wife) via phone  Unable to contact  Current Length of Stay: 2 day(s)  Current Patient Status: Inpatient   Discharge Plan: Anticipate discharge tomorrow to prior assisted or independent living facility  Code Status: Level 3 - DNAR and DNI    Subjective:   No overnight events reported  Patient denies any further fever or chills  He denies any abdominal pain, nausea or vomiting  Patient denies any pain with urination  He is eating and drinking normally      Objective:     Vitals:   Temp " (24hrs), Av °F (37 2 °C), Min:98 6 °F (37 °C), Max:99 3 °F (37 4 °C)    Temp:  [98 6 °F (37 °C)-99 3 °F (37 4 °C)] 99 °F (37 2 °C)  HR:  [61-77] 75  Resp:  [18-19] 19  BP: (115-135)/(83-93) 122/83  SpO2:  [94 %-97 %] 94 %  Body mass index is 19 61 kg/m²  Input and Output Summary (last 24 hours): Intake/Output Summary (Last 24 hours) at 2023 1428  Last data filed at 2023 0601  Gross per 24 hour   Intake 220 ml   Output 970 ml   Net -750 ml       Physical Exam:   Physical Exam  Constitutional:       General: He is not in acute distress  Appearance: He is normal weight  He is not ill-appearing or toxic-appearing  HENT:      Head: Normocephalic and atraumatic  Eyes:      Extraocular Movements: Extraocular movements intact  Conjunctiva/sclera: Conjunctivae normal       Pupils: Pupils are equal, round, and reactive to light  Cardiovascular:      Rate and Rhythm: Normal rate and regular rhythm  Pulses: Normal pulses  Heart sounds: Normal heart sounds  No murmur heard  No gallop  Pulmonary:      Effort: Pulmonary effort is normal  No respiratory distress  Breath sounds: Normal breath sounds  No stridor  No wheezing, rhonchi or rales  Chest:      Chest wall: No tenderness  Abdominal:      General: Abdomen is flat  Bowel sounds are normal  There is no distension  Palpations: Abdomen is soft  Tenderness: There is no abdominal tenderness  Musculoskeletal:         General: Normal range of motion  Skin:     General: Skin is warm  Coloration: Skin is not jaundiced or pale  Findings: No rash  Neurological:      General: No focal deficit present  Mental Status: He is alert and oriented to person, place, and time  Mental status is at baseline  Cranial Nerves: No cranial nerve deficit  Sensory: No sensory deficit  Motor: No weakness     Psychiatric:         Mood and Affect: Mood normal          Behavior: Behavior normal  Additional Data:     Labs:  Results from last 7 days   Lab Units 06/04/23  0927 06/03/23  0517   EOS PCT %  --  1   HEMATOCRIT % 40 3 40 1   HEMOGLOBIN g/dL 12 8 12 7   LYMPHS PCT %  --  9*   MONOS PCT %  --  6   NEUTROS PCT %  --  83*   PLATELETS Thousands/uL 252 238   WBC Thousand/uL 11 05* 17 67*     Results from last 7 days   Lab Units 06/03/23  0517 06/02/23  0219   ANION GAP mmol/L 6 8   ALBUMIN g/dL  --  3 9   ALK PHOS U/L  --  72   ALT U/L  --  15   AST U/L  --  24   BUN mg/dL 26* 26*   CALCIUM mg/dL 8 4 8 9   CHLORIDE mmol/L 105 105   CO2 mmol/L 27 23   CREATININE mg/dL 1 14 1 09   GLUCOSE RANDOM mg/dL 125 117   POTASSIUM mmol/L 4 1 4 9   SODIUM mmol/L 138 136   TOTAL BILIRUBIN mg/dL  --  0 94                 Results from last 7 days   Lab Units 06/04/23  0927 06/03/23  0517 06/02/23  0253 06/02/23  0219   LACTIC ACID mmol/L  --   --   --  1 1   PROCALCITONIN ng/ml 3 03* 6 98* 2 59*  --        Lines/Drains:  Invasive Devices     Peripheral Intravenous Line  Duration           Peripheral IV 06/02/23 Left Antecubital 2 days          Drain  Duration           Urethral Catheter 2 days              Urinary Catheter:  Goal for removal: N/A - Chronic Reich               Imaging: No pertinent imaging reviewed  Recent Cultures (last 7 days):   Results from last 7 days   Lab Units 06/02/23  0224 06/02/23  0219   BLOOD CULTURE   --  No Growth at 48 hrs  No Growth at 48 hrs     URINE CULTURE  >100,000 cfu/ml Proteus mirabilis*  --        Last 24 Hours Medication List:   Current Facility-Administered Medications   Medication Dose Route Frequency Provider Last Rate   • acetaminophen  650 mg Oral Q6H PRN Lindsey Del Valle MD     • albuterol  2 puff Inhalation Q4H PRN Kiko Gilmore MD     • apixaban  2 5 mg Oral BID Lindsey Del Valle MD     • bisacodyl  10 mg Rectal Daily PRN Lindsey Del Valle MD     • ferrous sulfate  325 mg Oral Daily With Breakfast Lindsey Del Valle MD     • Fluticasone Furoate-Vilanterol  1 puff Inhalation Daily Jocy Winn MD     • folic acid  290 mcg Oral Daily Jocy Winn MD     • guaiFENesin  600 mg Oral Q12H [address] Khushboo Ulloa MD     • meclizine  12 5 mg Oral Q8H PRN Jocy Winn MD     • metoprolol succinate  25 mg Oral Daily Jocy Winn MD     • midodrine  5 mg Oral BID AC Jocy Winn MD     • midodrine  7 5 mg Oral Daily Jocy Winn MD     • pantoprazole  40 mg Oral Early Morning Jocy Winn MD     • piperacillin-tazobactam  3 375 g Intravenous Q6H Luciano Goldman MD 3 375 g (06/04/23 1011)   • senna-docusate sodium  1 tablet Oral HS Jocy Winn MD          Today, Patient Was Seen By: Benja Carmona MD    **Please Note: This note may have been constructed using a voice recognition system  **

## 2023-06-04 NOTE — ASSESSMENT & PLAN NOTE
· Chronic indwelling catheter due to urinary retention with history of ESBL/MDRO  · Prior admitted on 05/13 treated for UTI urine culture grew Proteus mirabilis and alcalienes faecalis subsequently discharged on Augmentin for 5 more doses until 05/19 per chart review  · Patient denies any urinary symptoms at this time Reich catheter exchange    · UA: Large leukocytes, moderate blood, RBC 20-30 WBCs negative for nitrates    · Plan  · Cont zosyn   · Follow-up urine cultures and sensitivities

## 2023-06-04 NOTE — DISCHARGE SUMMARY
[facility]  Discharge- [facility] 1945, 78 y o  male MRN: [id number]  Unit/Bed#: S -01 Encounter: [id number]  Primary Care Provider: Rod Pan MD   Date and time admitted to hospital: 6/2/2023  1:01 AM    CHF (congestive heart failure) ([place] [address] )  Assessment & Plan  Wt Readings from Last 3 Encounters:   06/02/23 65 6 kg (144 lb 10 oz)     · LVEF 50% low normal  Concentric hypertrophy  · Not in exacerbation at this time  Seems dry to euvolemic on exam          Urinary tract infection associated with indwelling urethral catheter ([facility])  Assessment & Plan  · Chronic indwelling catheter due to urinary retention with history of ESBL/MDRO  · Prior admitted on 05/13 treated for UTI urine culture grew Proteus mirabilis and alcalienes faecalis subsequently discharged on Augmentin for 5 more doses until 05/19 per chart review  · Patient denies any urinary symptoms at this time Reich catheter exchange  · UA: Large leukocytes, moderate blood, RBC 20-30 WBCs negative for nitrates    · Plan  · Cont zosyn   · Follow-up urine cultures    Sepsis ([place] [address] )  Assessment & Plan  · POA met SIRS criteria for fever 103 1, leukocytosis 22 31 and heart rate 112 with presumed source of infection UTI  · Patient with indwelling Reich catheter history of ESBL/MDRO  At this time denies any urinary symptoms, denies any chills, fever prior to admission however endorsed chronic cough without change any sputum production  · Previously admitted on 05/13 urine cultures grew Proteus mirabilis and Alcalienes faecalis discharge on Augmentin for 5 more doses on 05/19    · Lactic acid 1 1, unremarkable, procalcitonin mild elevation 2 59  · UA: Large leukocytes, moderate blood, RBC 20-30 innumerable WBCs negative for nitrates and bacteria    · Plan  ·  blood cultures: NGTD  · U Cx: proteus (sensitivities pending)  · cont Zosyn    COPD (chronic obstructive pulmonary disease) ([place] [address] )  Assessment & Plan  · History of "COPD  · Continue Breo Ellipta as per formulary for Advair    A-fib [facility])  Assessment & Plan  · History of A-fib with loop recorder  · Per chart review loop recorder normal device function last pause recorded in March 2023   93% on A-fib  · [id number] 2   · Continue PTA Toprol 25 mg once a day, Eliquis 2 5 twice daily    * Fall  Assessment & Plan  · [facility] with head strike on Eliquis  Upon my evaluation patient was denying that he fell, that he was sleeping in bed and got sent to the ED  · CT head \"no acute intracranial hemorrhage  \"  · CT spine \" no apparent cervical spine fracture or traumatic malalignment  \"  · History of multiple falls and syncopal episodes as well as orthostasis hypotension with a loop recorder  · Syncope possible due to orthostasis versus mechanical fall    · Plan  · Continue PTA midodrine Per chart review recently increased to midodrine7  5 in the morning and 5 mg in the afternoon and evening  · PT OT -complete postacute rehab at prior nursing facility        Medical Problems     Resolved Problems  Date Reviewed: 6/4/2023   None       Discharging Resident: Claudeen Houseman, MD  Discharging Attending: Rui Guzman MD  PCP: Rochelle White MD  Admission Date:   Admission Orders (From admission, onward)     Ordered        06/02/23 0241  Inpatient Admission  Once                      Discharge Date: 06/04/23    Consultations During Hospital Stay:  · none    Procedures Performed:   · none    Significant Findings / Test Results:   XR Trauma multiple (SLB/SLRA trauma bay ONLY)  Result Date: 6/2/2023  Impression: No radiographic evidence of acute chest trauma  Workstation performed: [id number]     TRAUMA - CT spine cervical wo contrast  Result Date: 6/2/2023  Impression: Limited evaluation due to motion artifact  Otherwise, no apparent cervical spine fracture or traumatic malalignment   The study was marked in EPIC for immediate " notification  Workstation performed: [id number]     TRAUMA - CT head wo contrast  Result Date: 6/2/2023  Impression: No acute intracranial abnormality  Workstation performed: [id number]     Incidental Findings:   · None      Test Results Pending at Discharge (will require follow up): · None     Outpatient Tests Requested:  · None    Complications:  None    Reason for Admission: [facility] Course:   Willie Garcia is a 78 y o  male patient who originally presented to the hospital on 6/2/2023 due to fall with positive head strike on Eliquis at nursing facility  Patient poor historian and cannot recall the events leading up to his fall  All he did remember was that he did not lose consciousness  Patient brought to the emergency department by EMS  In the emergency department trauma work-up was negative  Patient did however meet sepsis criteria with fever, leukocytosis, tachycardia and urinalysis positive for UTI  Despite his presentation, patient did not complain of any symptoms on arrival to the emergency department  Given his history of ESBL urinary tract infections, patient was started on IV Zosyn and admitted for further management of sepsis secondary to urinary tract infection  Fevers dissipated and patient improved clinically throughout his hospital stay  Evaluated by physical therapy and it was recommended that he participate in postacute rehab which could be completed at his original facility  Patient was cleared by primary team for discharge back to nursing facility for completion of antibiotic course  Please see above list of diagnoses and related plan for additional information  Condition at Discharge: stable    Discharge Day Visit / Exam:   Subjective: No overnight events reported  Patient has no new complaints this morning  Denies fever or chills, pain with urination, abdominal pain or nausea or vomiting    Vitals: Blood Pressure: 122/83 (06/04/23 0753)  Pulse: 75 (06/04/23 0753)  Temperature: 99 °F (37 2 °C) (06/04/23 0753)  Temp Source: Oral (06/03/23 2157)  Respirations: 19 (06/04/23 0753)  Height: 6' (182 9 cm) (06/02/23 1321)  Weight - Scale: 65 6 kg (144 lb 10 oz) (06/02/23 0108)  SpO2: 94 % (06/04/23 0753)  Exam:   Physical Exam  Constitutional:       General: He is not in acute distress  Appearance: He is normal weight  He is not ill-appearing or toxic-appearing  HENT:      Head: Normocephalic and atraumatic  Eyes:      Extraocular Movements: Extraocular movements intact  Conjunctiva/sclera: Conjunctivae normal       Pupils: Pupils are equal, round, and reactive to light  Cardiovascular:      Rate and Rhythm: Normal rate and regular rhythm  Pulses: Normal pulses  Heart sounds: Normal heart sounds  No murmur heard  No gallop  Pulmonary:      Effort: Pulmonary effort is normal  No respiratory distress  Breath sounds: Normal breath sounds  No stridor  No wheezing, rhonchi or rales  Chest:      Chest wall: No tenderness  Abdominal:      General: Abdomen is flat  Bowel sounds are normal  There is no distension  Palpations: Abdomen is soft  Tenderness: There is no abdominal tenderness  Musculoskeletal:         General: Normal range of motion  Skin:     General: Skin is warm  Coloration: Skin is not jaundiced or pale  Findings: No rash  Neurological:      General: No focal deficit present  Mental Status: He is alert and oriented to person, place, and time  Mental status is at baseline  Cranial Nerves: No cranial nerve deficit  Sensory: No sensory deficit  Motor: No weakness  Psychiatric:         Mood and Affect: Mood normal          Behavior: Behavior normal           Discussion with Family: Updated  (wife) via phone  Discharge instructions/Information to patient and family:   See after visit summary for information provided to patient and family        Provisions for Follow-Up Care:  See after visit summary for information related to follow-up care and any pertinent home health orders  Disposition:   Assisted Living Facility at Rehab    Planned Readmission:     Discharge Medications:  See after visit summary for reconciled discharge medications provided to patient and/or family        **Please Note: This note may have been constructed using a voice recognition system**

## 2023-06-05 VITALS
BODY MASS INDEX: 19.59 KG/M2 | HEIGHT: 72 IN | OXYGEN SATURATION: 94 % | RESPIRATION RATE: 18 BRPM | DIASTOLIC BLOOD PRESSURE: 77 MMHG | WEIGHT: 144.62 LBS | HEART RATE: 68 BPM | SYSTOLIC BLOOD PRESSURE: 132 MMHG | TEMPERATURE: 98.6 F

## 2023-06-05 PROBLEM — R50.9 FEVER: Status: RESOLVED | Noted: 2023-06-02 | Resolved: 2023-06-05

## 2023-06-05 LAB
BACTERIA UR CULT: ABNORMAL
BACTERIA UR CULT: ABNORMAL

## 2023-06-05 PROCEDURE — 99239 HOSP IP/OBS DSCHRG MGMT >30: CPT | Performed by: HOSPITALIST

## 2023-06-05 RX ORDER — MIDODRINE HYDROCHLORIDE 5 MG/1
5 TABLET ORAL
Refills: 0
Start: 2023-06-05

## 2023-06-05 RX ORDER — MIDODRINE HYDROCHLORIDE 2.5 MG/1
7.5 TABLET ORAL DAILY
Qty: 90 TABLET | Refills: 0
Start: 2023-06-05

## 2023-06-05 RX ADMIN — GUAIFENESIN 600 MG: 600 TABLET ORAL at 08:06

## 2023-06-05 RX ADMIN — APIXABAN 2.5 MG: 5 TABLET, FILM COATED ORAL at 08:06

## 2023-06-05 RX ADMIN — PIPERACILLIN AND TAZOBACTAM 3.38 G: 36; 4.5 INJECTION, POWDER, FOR SOLUTION INTRAVENOUS at 06:02

## 2023-06-05 RX ADMIN — FERROUS SULFATE TAB 325 MG (65 MG ELEMENTAL FE) 325 MG: 325 (65 FE) TAB at 08:06

## 2023-06-05 RX ADMIN — PANTOPRAZOLE SODIUM 40 MG: 40 TABLET, DELAYED RELEASE ORAL at 06:02

## 2023-06-05 RX ADMIN — PIPERACILLIN AND TAZOBACTAM 3.38 G: 36; 4.5 INJECTION, POWDER, FOR SOLUTION INTRAVENOUS at 12:19

## 2023-06-05 RX ADMIN — METOPROLOL SUCCINATE 25 MG: 25 TABLET, EXTENDED RELEASE ORAL at 08:06

## 2023-06-05 RX ADMIN — MIDODRINE HYDROCHLORIDE 5 MG: 5 TABLET ORAL at 12:22

## 2023-06-05 RX ADMIN — Medication 400 MCG: at 08:06

## 2023-06-05 RX ADMIN — MIDODRINE HYDROCHLORIDE 7.5 MG: 5 TABLET ORAL at 06:02

## 2023-06-05 RX ADMIN — FLUTICASONE FUROATE AND VILANTEROL TRIFENATATE 1 PUFF: 100; 25 POWDER RESPIRATORY (INHALATION) at 08:07

## 2023-06-05 NOTE — ASSESSMENT & PLAN NOTE
"· POA presented from [facility] as a trauma alert due to fall with head strike on Eliquis  Upon my evaluation patient was denying that he fell, that he was sleeping in bed and got sent to the ED  · CT head \"no acute intracranial hemorrhage  \"  · CT spine \" no apparent cervical spine fracture or traumatic malalignment  \"  · History of multiple falls and syncopal episodes as well as orthostasis hypotension with a loop recorder  · Syncope possible due to orthostasis versus mechanical fall    Plan  · Continue PTA midodrine - Per chart review recently increased to midodrine 7  5 in the morning and 5 mg in the afternoon and evening  · PT OT - complete postacute rehab at prior nursing facility  "

## 2023-06-05 NOTE — NURSING NOTE
Pt cleared for discharge to [facility]  D/LAUREANO instructions reviewed with and faxed to facility by previous RN  Report also called to facility by previous RN  Paperwork sent with transport  Belongings gathered  Pt transported via stretcher

## 2023-06-05 NOTE — ASSESSMENT & PLAN NOTE
· POA met SIRS criteria for fever 103 1, leukocytosis 22 31 and heart rate 112 with presumed source of infection UTI  · Patient with indwelling Reich catheter history of ESBL/MDRO  At this time denies any urinary symptoms, denies any chills, fever prior to admission however endorsed chronic cough without change any sputum production  · Previously admitted on 05/13 urine cultures grew Proteus mirabilis and Alcalienes faecalis discharge on Augmentin for 5 more doses on 05/19    · Lactic acid 1 1, unremarkable, procalcitonin mild elevation 2 59  · UA: Large leukocytes, moderate blood, RBC 20-30 innumerable WBCs negative for nitrates and bacteria    Plan  · Blood cultures: NGTD  · U Cx: proteus ESBL  · Discharge on IV zosyn for total 10 days of antibiotics

## 2023-06-05 NOTE — ASSESSMENT & PLAN NOTE
· Chronic indwelling catheter due to urinary retention with history of ESBL/MDRO  · Prior admitted on 05/13 treated for UTI urine culture grew Proteus mirabilis and alcalienes faecalis subsequently discharged on Augmentin for 5 more doses until 05/19 per chart review  · Patient denies any urinary symptoms at this time Reich catheter exchange    · UA: Large leukocytes, moderate blood, RBC 20-30 WBCs negative for nitrates    Plan  · See above

## 2023-06-05 NOTE — CASE MANAGEMENT
Case Management Discharge Planning Note    Patient name Patel Beckman  [facility] S /S -01 MRN [id number]  : 1945 Date 2023       Current Admission Date: 2023  Current Admission Diagnosis:Fall   Patient Active Problem List    Diagnosis Date Noted   • Moderate protein-calorie malnutrition ([facility] ) 2023   • Fall 2023   • Reich catheter in place 2023   • A-fib ([facility] [address] ) 2023   • COPD (chronic obstructive pulmonary disease) ([facility] [address] ) 2023   • Fever 2023   • Sepsis ([facility] [address] ) 2023   • Urinary tract infection associated with indwelling urethral catheter ([facility] [address] ) 2023   • CHF (congestive heart failure) ([facility] [address] ) 2023      LOS (days): 3  Geometric Mean LOS (GMLOS) (days): 4 80  Days to GMLOS:1 3     OBJECTIVE:  Risk of Unplanned Readmission Score: 10 8         Current admission status: Inpatient   Preferred Pharmacy:   PATIENT/FAMILY REPORTS NO PREFERRED PHARMACY  No address on file      Primary Care Provider: Swetha Collado MD    Primary Insurance: MEDICARE  Secondary Insurance:  FOR LIFE    DISCHARGE DETAILS:                 Treatment Team Recommendation: Facility Return  Discharge Destination Plan[de-identified] Facility Return  Transport at Discharge : [facility]  Dispatcher Contacted: Yes  Number/Name of Dispatcher: [employer]  Transported by [employer] and Unit #): [employer]  ETA of Transport (Date): 23  ETA of Transport (Time): [id number]     Transfer Mode: Stretcher  Accompanied by: EMS personnel     IMM Given (Date):: 23  IMM Given to[de-identified] Family  Family notified[de-identified] Wife       Accepting Facility Name, [facility]  Receiving Facility/Agency Phone Number: [phone]  Facility/Agency Fax Number: [phone]

## 2023-06-05 NOTE — ASSESSMENT & PLAN NOTE
Malnutrition Findings:   Adult Malnutrition type: Acute illness (in the setting of chronic illness)  Adult Degree of Malnutrition: Malnutrition of moderate degree  Malnutrition Characteristics: Fat loss, Muscle loss, Weight loss                  360 Statement: Moderate malnutrition related to inadequate oral intake as evidenced by loss of  subcutaneous fat and muscle, temples, clavicle, extremities, 12% ongoing weight loss x 12 months  Currently treated with oral supplementation  BMI Findings: Body mass index is 19 61 kg/m²

## 2023-06-05 NOTE — CASE MANAGEMENT
Case Management Discharge Planning Note    Patient name Tammie ELLIOTT /S -01 MRN [id number]  : 1945 Date 2023       Current Admission Date: 2023  Current Admission Diagnosis:Fall   Patient Active Problem List    Diagnosis Date Noted   • Moderate protein-calorie malnutrition ([facility] ) 2023   • Fall 2023   • Reich catheter in place 2023   • A-fib ([facility] [address] ) 2023   • COPD (chronic obstructive pulmonary disease) ([facility] [address] ) 2023   • Fever 2023   • Sepsis ([facility] [address] ) 2023   • Urinary tract infection associated with indwelling urethral catheter ([facility] ) 2023   • CHF (congestive heart failure) ([facility] ) 2023      LOS (days): 3  Geometric Mean LOS (GMLOS) (days): 4 80  Days to GMLOS:1 3     OBJECTIVE:  Risk of Unplanned Readmission Score: 10 8         Current admission status: Inpatient   Preferred Pharmacy:   PATIENT/FAMILY REPORTS NO PREFERRED PHARMACY  No address on file      Primary Care Provider: Roberto Carlos Oropeza MD    Primary Insurance: MEDICARE  Secondary Insurance:  FOR LIFE    DISCHARGE DETAILS:            Treatment Team Recommendation: Facility Return  Discharge Destination Plan[de-identified] Facility Return  Transport at Discharge : hospitals Ambulance  Dispatcher Contacted: Yes  Number/Name of Dispatcher: [employer]  Transported by [employer] and Unit #): [employer]  ETA of Transport (Date): 23  ETA of Transport (Time): 1730     Transfer Mode: Stretcher  Accompanied by: EMS personnel     IMM Given (Date):: 23  IMM Given to[de-identified] Family  Family notified[de-identified] Wife     Kia Del Angel IMM reviewed with patient's caregiver, patient's caregiver agrees with discharge determination        Accepting Facility Name, [facility]  Receiving Facility/Agency Phone Number: [phone]  Facility/Agency Fax Number: [phone]

## 2023-06-05 NOTE — PLAN OF CARE
Problem: MOBILITY - ADULT  Goal: Maintain or return to baseline ADL function  Description: INTERVENTIONS:  -  Assess patient's ability to carry out ADLs; assess patient's baseline for ADL function and identify physical deficits which impact ability to perform ADLs (bathing, care of mouth/teeth, toileting, grooming, dressing, etc )  - Assess/evaluate cause of self-care deficits   - Assess range of motion  - Assess patient's mobility; develop plan if impaired  - Assess patient's need for assistive devices and provide as appropriate  - Encourage maximum independence but intervene and supervise when necessary  - Involve family in performance of ADLs  - Assess for home care needs following discharge   - Consider OT consult to assist with ADL evaluation and planning for discharge  - Provide patient education as appropriate  Outcome: Progressing  Goal: Maintains/Returns to pre admission functional level  Description: INTERVENTIONS:  - Perform BMAT or MOVE assessment daily    - Set and communicate daily mobility goal to care team and patient/family/caregiver  - Collaborate with rehabilitation services on mobility goals if consulted  - Perform Range of Motion 3 times a day  - Reposition patient every 2 hours    - Dangle patient 3 times a day  - Stand patient 3 times a day  - Ambulate patient 3 times a day  - Out of bed to chair 3 times a day   - Out of bed for meals 3 times a day  - Out of bed for toileting  - Record patient progress and toleration of activity level   Outcome: Progressing     Problem: Prexisting or High Potential for Compromised Skin Integrity  Goal: Skin integrity is maintained or improved  Description: INTERVENTIONS:  - Identify patients at risk for skin breakdown  - Assess and monitor skin integrity  - Assess and monitor nutrition and hydration status  - Monitor labs   - Assess for incontinence   - Turn and reposition patient  - Assist with mobility/ambulation  - Relieve pressure over bony prominences  - Avoid friction and shearing  - Provide appropriate hygiene as needed including keeping skin clean and dry  - Evaluate need for skin moisturizer/barrier cream  - Collaborate with interdisciplinary team   - Patient/family teaching  - Consider wound care consult   Outcome: Progressing     Problem: Nutrition/Hydration-ADULT  Goal: Nutrient/Hydration intake appropriate for improving, restoring or maintaining nutritional needs  Description: Monitor and assess patient's nutrition/hydration status for malnutrition  Collaborate with interdisciplinary team and initiate plan and interventions as ordered  Monitor patient's weight and dietary intake as ordered or per policy  Utilize nutrition screening tool and intervene as necessary  Determine patient's food preferences and provide high-protein, high-caloric foods as appropriate       INTERVENTIONS:  - Monitor oral intake, urinary output, labs, and treatment plans  - Assess nutrition and hydration status and recommend course of action  - Evaluate amount of meals eaten  - Assist patient with eating if necessary   - Allow adequate time for meals  - Recommend/ encourage appropriate diets, oral nutritional supplements, and vitamin/mineral supplements  - Order, calculate, and assess calorie counts as needed  - Assess need for intravenous fluids  - Provide specific nutrition/hydration education as appropriate  - Include patient/family/caregiver in decisions related to nutrition  Outcome: Progressing

## 2023-06-05 NOTE — ASSESSMENT & PLAN NOTE
· History of A-fib with loop recorder  · Per chart review loop recorder normal device function last pause recorded in March 2023   93% on A-fib    · [id number] 2   · Continue PTA Toprol 25 mg once a day, Eliquis 2 5 twice daily

## 2023-06-06 ENCOUNTER — NURSING HOME VISIT (OUTPATIENT)
Dept: GERIATRICS | Facility: OTHER | Age: 78
End: 2023-06-06
Payer: MEDICARE

## 2023-06-06 DIAGNOSIS — N30.00 ACUTE CYSTITIS WITHOUT HEMATURIA: Primary | ICD-10-CM

## 2023-06-06 DIAGNOSIS — I48.91 ATRIAL FIBRILLATION, UNSPECIFIED TYPE (HCC): ICD-10-CM

## 2023-06-06 DIAGNOSIS — E44.0 MODERATE PROTEIN-CALORIE MALNUTRITION (HCC): ICD-10-CM

## 2023-06-06 DIAGNOSIS — R55 SYNCOPE AND COLLAPSE: ICD-10-CM

## 2023-06-06 DIAGNOSIS — Z97.8 CHRONIC INDWELLING FOLEY CATHETER: Chronic | ICD-10-CM

## 2023-06-06 PROCEDURE — 99305 1ST NF CARE MODERATE MDM 35: CPT | Performed by: FAMILY MEDICINE

## 2023-06-06 NOTE — PROGRESS NOTES
[facility]  [address] [place] [address]  Facility:  [place] [id number]    HISTORY AND PHYSICAL    NAME: Fredy Fisher  AGE: 78 y o  SEX: male    DATE OF ENCOUNTER: 6/6/2023    Code status:  No CPR    Assessment and Plan     1  Acute cystitis without hematuria  Assessment & Plan:  During his hospitalization found to have UTI  Blood cultures: NGTD  U Cx: proteus ESBL  Discharge on IV zosyn for total 10 days of antibiotics         2  Chronic indwelling Cloud catheter  Assessment & Plan:  Chronic indwelling Cloud in the setting of his urinary retention  Complains of leaking catheter  Has outpatient follow-up with urology to discuss possibility of suprapubic catheter  3  Atrial fibrillation, unspecified type ([facility] [address] )  Assessment & Plan:  Rate controlled currently on metoprolol  Continue DOAC      4  Moderate protein-calorie malnutrition ([facility] [address] )  Assessment & Plan:  Malnutrition Findings:     BMI Findings: Body mass index is 17 17 kg/m²  Encourage p o  intake  Dietary supplements can be initiated      5  Syncope and collapse  Assessment & Plan:  Multiple recent hospitalization secondary to fall  This is likely in the setting of his postural hypotension  Fall precautions discussed  Maintain fall/safety precautions  Assist with ADLs as needed  Continue PT OT  Continue midodrine  All medications and routine orders were reviewed and updated as needed  Plan discussed with: Nurse      Chief Complaint     Seen for admission at [address] [place] [address]    History of Present Illness   Patient is a 46-year-old male with comorbidities significant for chronic indwelling Cloud catheter, multiple falls atrial fibrillation, COPD was sent to the emergency room status post fall versus syncopal episode  Patient was found to be tachycardic and febrile in the emergency room    He was started on IV Zosyn urine cultures were positive for ESBL he was discharged with a plan to continue his IV antibiotics for 10 days  Denies any complaints today, other than complaints of leaking Cloud which is not visible on examination  Patient is at his baseline  Mentation  Wants to go back home  HISTORY:  Past Medical History:   Diagnosis Date   • Ambulatory dysfunction    • Anxiety    • Anxiety disorder    • Atrial fibrillation (HCC)    • Coronary artery disease    • Depression    • Cloud catheter in place    • Hepatitis C     screening negative in 1/2017   • Hepatitis C    • History of MI (myocardial infarction)    • Hypertension    • MI (myocardial infarction) ([address] )    • Urinary catheter in place    • Use of cane as ambulatory aid      Past Surgical History:   Procedure Laterality Date   • CARDIAC CATHETERIZATION  07/09/2018   • CARDIAC ELECTROPHYSIOLOGY PROCEDURE N/A 9/30/2022    Procedure: Cardiac loop recorder implant;  Surgeon: Kayla Begum MD;  Location: [facility];   Service: Cardiology   • CARDIAC ELECTROPHYSIOLOGY PROCEDURE  09/30/2022    Cardiac loop recorder implant; Dr Kayla Begum   • [address]  09/2022   • COLONOSCOPY     • COLONOSCOPY     • INGUINAL HERNIA REPAIR Right 08/11/2022    Dr Petrona Ford   • OK RPR 1ST INGUN HRNA AGE 5 YRS/> REDUCIBLE Right 8/11/2022    Procedure: REPAIR HERNIA INGUINAL;  Surgeon: Yan Encarnacion DO;  Location: [facility];  Service: General   • TONSILLECTOMY       Family History   Problem Relation Age of Onset   • Hypertension Mother    • Coronary artery disease Mother         premature   • Diabetes Father    • Coronary artery disease Father         premature   • Hypertension Father      Social History     Socioeconomic History   • Marital status: /Civil Union     Spouse name: None   • Number of children: 3   • Years of education: 12   • Highest education level: 12th grade   Occupational History   • Occupation: retired   Tobacco Use   • Smoking status: Former     Packs/day: 1 50     Years: 57 00     Total pack years: 85 50     Types: Cigarettes     Quit date: 3/12/2023     Years since quittin 2   • Smokeless tobacco: Never   • Tobacco comments:     since age 17; Has history of smoking for over 54 years  He has been smoking more than packet daily in the past however he has been smokes about half a pack a day lately and stopped smoking on 2018 when he was admitted to the hospital     Vaping Use   • Vaping Use: Never used   Substance and Sexual Activity   • Alcohol use: Not Currently   • Drug use: Never   • Sexual activity: Not Currently     Partners: Female     Birth control/protection: Condom Male   Other Topics Concern   • None   Social History Narrative    ** Merged History Encounter **         ** Merged History Encounter **         History of Ultra Sound: 2016  History of Stress Test: 2018  History of ECHO: 2018  · Most recent tobacco use screenin2019    · Live alone or with others:   with others      · Diet:   Regular    · Caffeine intake: Moderate    · Guns     present in home:   No    · Asbestos exposure:   No    · TB exposure:   No    · Environmental exposure:   No    · Animal exposure:   No    · Smoke alarm in home:   Yes       Social Determinants of Health     Financial Resource Strain: Not on file   Food Insecurity: No Food Insecurity (2022)    Hunger Vital Sign    • Worried About Running Out of Food in the Last Year: Never true    • Ran Out of Food in the Last Year: Never true   Transportation Needs: No Transportation Needs (2023)    PRAPARE - Transportation    • Lack of Transportation (Medical): No    • Lack of Transportation (Non-Medical): No   Physical Activity: Insufficiently Active (2020)    Exercise Vital Sign    • Days of Exercise per Week: 3 days    • Minutes of Exercise per Session: 30 min   Stress: Stress Concern Present (2020)    [address]    • Feeling of Stress :  To some extent   Social Connections: Not on file   Intimate Partner Violence: Not on file   Housing Stability: Low Risk  (5/12/2023)    Housing Stability Vital Sign    • Unable to Pay for Housing in the Last Year: No    • Number of Places Lived in the Last Year: 1    • Unstable Housing in the Last Year: No       Allergies:  No Known Allergies    Review of Systems     Review of Systems   Constitutional: Negative for activity change and appetite change  HENT: Negative for congestion and dental problem  Eyes: Negative for discharge and itching  Respiratory: Negative for apnea and chest tightness  Cardiovascular: Negative for chest pain  Gastrointestinal: Negative for abdominal distention and abdominal pain  Endocrine: Negative for cold intolerance and heat intolerance  Genitourinary:        Has indwelling Cloud   Musculoskeletal: Negative for arthralgias and back pain  Neurological: Negative for dizziness  Medications and orders     All medications reviewed and updated in Nursing Home EMR  Objective       Vitals:    06/06/23 1908   BP: 121/70   Pulse: 82   Resp: 18   Temp: 97 5 °F (36 4 °C)   SpO2: 97%     Physical Exam  Constitutional:       General: He is not in acute distress  Appearance: He is not toxic-appearing  HENT:      Head: Normocephalic  Mouth/Throat:      Mouth: Mucous membranes are moist    Cardiovascular:      Rate and Rhythm: Normal rate and regular rhythm  Pulmonary:      Effort: Pulmonary effort is normal  No respiratory distress  Breath sounds: Normal breath sounds  Abdominal:      Palpations: Abdomen is soft  Musculoskeletal:      Cervical back: No rigidity  Skin:     Coloration: Skin is not jaundiced  Neurological:      Mental Status: Mental status is at baseline  Psychiatric:      Comments: Judgment and Insight Blunted         Pertinent Laboratory/Diagnostic Studies: The following labs/studies were reviewed please see chart or hospital paperwork for details           CT Spine done "6/2/23    IMPRESSION:     CT Head 6/2/2023     IMPRESSION:     No acute intracranial abnormality  Limited evaluation due to motion artifact  Otherwise, no apparent cervical spine fracture or traumatic malalignment        - Counseling Documentation: patient was counseled regarding: risk factor reductions    Portions of the record may have been created with voice recognition software  Occasional wrong word or \"sound a like\" substitutions may have occurred due to the inherent limitations of voice recognition software  Read the chart carefully and recognize, using context, where substitutions have occurred      RIGOBERTO Lopez   6/7/2023 5:40 AM      "

## 2023-06-07 VITALS
TEMPERATURE: 97.5 F | BODY MASS INDEX: 17.17 KG/M2 | WEIGHT: 126.6 LBS | RESPIRATION RATE: 18 BRPM | HEART RATE: 82 BPM | SYSTOLIC BLOOD PRESSURE: 121 MMHG | OXYGEN SATURATION: 97 % | DIASTOLIC BLOOD PRESSURE: 70 MMHG

## 2023-06-07 PROBLEM — E46 MALNUTRITION (HCC): Status: ACTIVE | Noted: 2023-06-07

## 2023-06-07 LAB
BACTERIA BLD CULT: NORMAL
BACTERIA BLD CULT: NORMAL

## 2023-06-07 NOTE — ASSESSMENT & PLAN NOTE
Chronic indwelling Cloud in the setting of his urinary retention  Complains of leaking catheter  Has outpatient follow-up with urology to discuss possibility of suprapubic catheter

## 2023-06-07 NOTE — ASSESSMENT & PLAN NOTE
Multiple recent hospitalization secondary to fall  This is likely in the setting of his postural hypotension  Fall precautions discussed  Maintain fall/safety precautions  Assist with ADLs as needed  Continue PT OT  Continue midodrine

## 2023-06-07 NOTE — ASSESSMENT & PLAN NOTE
During his hospitalization found to have UTI  Blood cultures: NGTD  U Cx: proteus ESBL  Discharge on IV zosyn for total 10 days of antibiotics

## 2023-06-07 NOTE — ASSESSMENT & PLAN NOTE
Malnutrition Findings:     BMI Findings: Body mass index is 17 17 kg/m²     Encourage p o  intake  Dietary supplements can be initiated

## 2023-06-09 ENCOUNTER — NURSING HOME VISIT (OUTPATIENT)
Dept: GERIATRICS | Facility: OTHER | Age: 78
End: 2023-06-09
Payer: MEDICARE

## 2023-06-09 VITALS
OXYGEN SATURATION: 97 % | RESPIRATION RATE: 18 BRPM | HEART RATE: 55 BPM | SYSTOLIC BLOOD PRESSURE: 117 MMHG | TEMPERATURE: 97.5 F | DIASTOLIC BLOOD PRESSURE: 78 MMHG

## 2023-06-09 DIAGNOSIS — R55 SYNCOPE AND COLLAPSE: ICD-10-CM

## 2023-06-09 DIAGNOSIS — N30.00 ACUTE CYSTITIS WITHOUT HEMATURIA: Primary | ICD-10-CM

## 2023-06-09 DIAGNOSIS — Z97.8 CHRONIC INDWELLING FOLEY CATHETER: Chronic | ICD-10-CM

## 2023-06-09 DIAGNOSIS — R26.2 AMBULATORY DYSFUNCTION: ICD-10-CM

## 2023-06-09 DIAGNOSIS — K21.9 GASTROESOPHAGEAL REFLUX DISEASE WITHOUT ESOPHAGITIS: ICD-10-CM

## 2023-06-09 DIAGNOSIS — I48.91 ATRIAL FIBRILLATION, UNSPECIFIED TYPE (HCC): ICD-10-CM

## 2023-06-09 PROCEDURE — 99309 SBSQ NF CARE MODERATE MDM 30: CPT

## 2023-06-09 NOTE — ASSESSMENT & PLAN NOTE
· Chronic Cloud catheter secondary to urinary retention  · Patient reports leaking around catheter that has been chronic  · Patient has history of frequent UTIs  · Currently on Zosyn for ESBL through 6/12/2023  · Outpatient follow-up with urology for possible suprapubic catheter

## 2023-06-09 NOTE — PROGRESS NOTES
[facility]  [address]  [phone]  Yeny Georgis  Code 31 (STR)        NAME: Fredy Fisher  AGE: 78 y o  SEX: male CODE STATUS: No CPR    DATE OF ENCOUNTER: 6/9/2023    Assessment and Plan     1  Acute cystitis without hematuria  Assessment & Plan:  · During hospitalization for fall patient was found to have UTI  · Urine cultures positive for ESBL Proteus Mirabilis  · Patient started on IV Zosyn, will continue through 6/12/2023  · Patient remains afebrile 97 5         2  Chronic indwelling Cloud catheter  Assessment & Plan:  · Chronic Cloud catheter secondary to urinary retention  · Patient reports leaking around catheter that has been chronic  · Patient has history of frequent UTIs  · Currently on Zosyn for ESBL through 6/12/2023  · Outpatient follow-up with urology for possible suprapubic catheter        3  Ambulatory dysfunction  Assessment & Plan:  Multifactorial secondary to recent fall and chronic medical conditions  Continue PT/OT  Fall/safety precautions  Ensure adequate nutrition/hydration   Monitor CBC/BMP    following for d/c planning       4  Atrial fibrillation, unspecified type [facility])  Assessment & Plan:  · Heart rate controlled, 60  · Patient denies chest pain/palpitations  · Continue metoprolol 25 mg daily  · Continue apixaban 2 5 mg twice daily      5  Gastroesophageal reflux disease without esophagitis  Assessment & Plan:  · Denies reflux  · Continue omeprazole 20 mg daily      6   Syncope and collapse  Assessment & Plan:  · Patient had recent hospitalization following fall at facility with head strike, patient denied LOC  · Patient on eliquis  · CT of head/ cervical spine negative for acute findings   · patient was found to have UTI and was started on IV Zosyn  · History of orthostatic hypotension, recurrent falls, continue midodrine  · Continue PT/OT   · maintain fall/safety precautions  · Assist patient with ADLs/IADLs as needed         All medications and routine orders were reviewed and updated as needed  Chief Complaint     STR follow up visit  Patient's care was coordinated with nursing facility staff  Recent vitals, labs, and updated medications were review on [facility] in facility  Past Medical and Surgical History      Past Medical History:   Diagnosis Date   • Ambulatory dysfunction    • Anxiety    • Anxiety disorder    • Atrial fibrillation (HCC)    • Coronary artery disease    • Depression    • Cloud catheter in place    • Hepatitis C     screening negative in 1/2017   • Hepatitis C    • History of MI (myocardial infarction)    • Hypertension    • MI (myocardial infarction) ([facility] [address] )    • Urinary catheter in place    • Use of cane as ambulatory aid      Past Surgical History:   Procedure Laterality Date   • CARDIAC CATHETERIZATION  07/09/2018   • CARDIAC ELECTROPHYSIOLOGY PROCEDURE N/A 9/30/2022    Procedure: Cardiac loop recorder implant;  Surgeon: Sriram Castrejon MD;  [facility]: [facility]; Service: Cardiology   • CARDIAC ELECTROPHYSIOLOGY PROCEDURE  09/30/2022    Cardiac loop recorder implant; Dr Yu Paz  09/2022   • COLONOSCOPY     • COLONOSCOPY     • INGUINAL HERNIA REPAIR Right 08/11/2022    Dr Thalia Boss   • [place] RPR 1ST INGUN HRNA AGE 5 YRS/> REDUCIBLE Right 8/11/2022    Procedure: REPAIR HERNIA INGUINAL;  Surgeon: Saul Encarnacion DO;  Location: [facility] Main OR;  Service: General   • TONSILLECTOMY       No Known Allergies       History of Present Illness     TREVOR Luna is a 78year old male, he is a STR patient of [facility] since 5/16/23  Past Medical Hx including but not limited to Hepatitis C, GERD, COPD, MI, HTN, A-fib, CAD, SDH, urinary retention with chronic Cloud, anxiety, depression seen in collaboration with nursing for medical mgmt and STR follow up     Hospital course  Patient was admitted to the hospital 6/2/23-6/5/23 following fall at facility with head strike, patient denied LOC, patient on eliquis  Trauma workup was negative, CT head/cervical spine negative for acute findings  Patient met sepsis criteria with leukocytosis, fever, tachycardia, positive for UTI  Patient has history of ESBL in urine and was started on IV Zosyn  Urine cultures positive for ESBL proteus mirabilis  PT/OT recommended patient for STR, patient was discharged back to facility  Rehab course  Nikolai Soniya was seen and examined at bedside today and says he is doing ok  Patient is a reliable historian and is alert and oriented x3  On exam patient is resting in bed and does not appear to be in distress  Patient denies pain, sleep disturbance, and has a good appetite  He has been participating in therapy  Continue Zosyn through 6/12/23  Outpatient follow up with urology for possible suprapubic catheter  He denies CP/SOB/N/V/D  Denies lightheadedness, dizziness, headaches, vision changes  Patient states they are eating well and staying hydrated  Denies any bowel or bladder issues  Per review of SNF records, Patient is eating 3 meals per day, consuming %  Last documented BM 6/7/2023  No concerns from nursing at this time  The patient's allergies, past medical, surgical, social and family history were reviewed and unchanged  Review of Systems     Review of Systems   Constitutional: Positive for activity change  Negative for appetite change and fever  HENT: Negative  Negative for congestion  Eyes: Negative  Respiratory: Negative  Negative for shortness of breath and wheezing  Cardiovascular: Negative  Negative for chest pain and palpitations  Gastrointestinal: Negative for abdominal distention, abdominal pain, constipation, diarrhea, nausea and vomiting  Endocrine: Negative  Genitourinary: Positive for difficulty urinating  Negative for dysuria, flank pain and hematuria  Chronic Cloud catheter   Musculoskeletal: Positive for gait problem  Skin: Negative  Allergic/Immunologic: Negative  Neurological: Negative for dizziness and headaches  Hematological: Negative  Psychiatric/Behavioral: Negative  Negative for sleep disturbance  Objective     Vitals:   Vitals:    06/09/23 0938   BP: 117/78   Pulse: 55   Resp: 18   Temp: 97 5 °F (36 4 °C)   SpO2: 97%         Physical Exam  Vitals and nursing note reviewed  Constitutional:       General: He is not in acute distress  Appearance: Normal appearance  He is normal weight  He is not ill-appearing  HENT:      Head: Normocephalic and atraumatic  Nose: Nose normal  No congestion  Mouth/Throat:      Mouth: Mucous membranes are moist    Eyes:      Conjunctiva/sclera: Conjunctivae normal       Pupils: Pupils are equal, round, and reactive to light  Cardiovascular:      Rate and Rhythm: Normal rate and regular rhythm  Pulses: Normal pulses  Heart sounds: Normal heart sounds  Pulmonary:      Effort: Pulmonary effort is normal  No respiratory distress  Breath sounds: No wheezing  Abdominal:      General: Bowel sounds are normal  There is no distension  Palpations: Abdomen is soft  Tenderness: There is no abdominal tenderness  Genitourinary:     Comments: Chronic miller  Musculoskeletal:      Right lower leg: No edema  Left lower leg: No edema  Skin:     General: Skin is warm  Neurological:      Mental Status: He is alert and oriented to person, place, and time  Motor: Weakness present  Psychiatric:         Mood and Affect: Mood normal          Behavior: Behavior normal          Pertinent Laboratory/Diagnostic Studies:   Reviewed in facility chart-stable      Current Medications   Medications reviewed and updated see facility [facility] for details        Current Outpatient Medications:   •  acetaminophen (TYLENOL) 325 mg tablet, Take 650 mg by mouth every 6 (six) hours as needed, Disp: , Rfl:   •  apixaban (ELIQUIS) 2 5 mg, Take 2 5 mg by mouth 2 (two) times a "day, Disp: , Rfl:   •  azelastine (ASTELIN) 0 1 % nasal spray, 1 spray into each nostril 2 (two) times a day as needed for rhinitis Use in each nostril as directed, Disp: , Rfl:   •  bisacodyl (DULCOLAX) 10 mg suppository, Insert 10 mg into the rectum once as needed, Disp: , Rfl:   •  docusate sodium (COLACE) 100 mg capsule, Take 100 mg by mouth 2 (two) times a day, Disp: , Rfl:   •  ferrous sulfate 325 (65 Fe) mg tablet, Take 325 mg by mouth daily with breakfast, Disp: , Rfl:   •  Fluticasone-Salmeterol (Advair Diskus) 100-50 mcg/dose inhaler, Inhale 1 puff 2 (two) times a day Rinse mouth after use , Disp: , Rfl:   •  folic acid (FOLVITE) 645 mcg tablet, Take 400 mcg by mouth daily, Disp: , Rfl:   •  ipratropium-albuterol (DUO-NEB) 0 5-2 5 mg/3 mL nebulizer solution, INHALE CONTENTS OF 1 VIAL VIA NEBULIZER FOUR TIMES A DAY, Disp: 60 mL, Rfl: 11  •  meclizine (ANTIVERT) 12 5 MG tablet, Take 12 5 mg by mouth every 8 (eight) hours as needed for dizziness, Disp: , Rfl:   •  methenamine hippurate (HIPREX) 1 g tablet, Take 1 g by mouth 2 (two) times a day with meals, Disp: , Rfl:   •  metoprolol succinate (TOPROL-XL) 25 mg 24 hr tablet, Take 25 mg by mouth daily, Disp: , Rfl:   •  midodrine (PROAMATINE) 5 mg tablet, Take 1 tablet (5 mg total) by mouth 3 (three) times a day as needed (for SBP < 100 or severe dizziness with standing up  Hold for SBP > 130), Disp: 90 tablet, Rfl: 3  •  nystatin (MYCOSTATIN) powder, Apply 1 application  topically in the morning, Disp: , Rfl:   •  omeprazole (PriLOSEC) 20 mg delayed release capsule, Take 1 capsule (20 mg total) by mouth daily, Disp: 90 capsule, Rfl: 3  •  sodium phosphate-biphosphate (FLEET) 7-19 g 118 mL enema, Insert 1 enema into the rectum once as needed, Disp: , Rfl:        Please note:  Voice-recognition software may have been used in the preparation of this document    Occasional wrong word or \"sound-alike\" substitutions may have occurred due to the inherent limitations " of voice recognition software  Interpretation should be guided by context           [address] [facility], [address] [place] [address]  6/9/2023  5:48 PM

## 2023-06-09 NOTE — ASSESSMENT & PLAN NOTE
· Heart rate controlled, 60  · Patient denies chest pain/palpitations  · Continue metoprolol 25 mg daily  · Continue apixaban 2 5 mg twice daily

## 2023-06-09 NOTE — ASSESSMENT & PLAN NOTE
· Patient had recent hospitalization following fall at facility with head strike, patient denied LOC  · Patient on eliquis  · CT of head/ cervical spine negative for acute findings   · patient was found to have UTI and was started on IV Zosyn  · History of orthostatic hypotension, recurrent falls, continue midodrine  · Continue PT/OT   · maintain fall/safety precautions  · Assist patient with ADLs/IADLs as needed

## 2023-06-09 NOTE — ASSESSMENT & PLAN NOTE
· Chronic Cloud catheter  · Patient has history of frequent UTIs  · Currently on Zosyn for ESBL through 6/12/2023  · Outpatient follow-up with urology for possible suprapubic catheter

## 2023-06-09 NOTE — ASSESSMENT & PLAN NOTE
· During hospitalization for fall patient was found to have UTI  · Urine cultures positive for ESBL Proteus Mirabilis  · Patient started on IV Zosyn, will continue through 6/12/2023  · Patient remains afebrile 97 5

## 2023-06-12 ENCOUNTER — NURSING HOME VISIT (OUTPATIENT)
Dept: GERIATRICS | Facility: OTHER | Age: 78
End: 2023-06-12
Payer: MEDICARE

## 2023-06-12 VITALS
TEMPERATURE: 97.4 F | SYSTOLIC BLOOD PRESSURE: 123 MMHG | HEART RATE: 74 BPM | DIASTOLIC BLOOD PRESSURE: 73 MMHG | OXYGEN SATURATION: 96 % | RESPIRATION RATE: 18 BRPM

## 2023-06-12 DIAGNOSIS — Z97.8 CHRONIC INDWELLING FOLEY CATHETER: Chronic | ICD-10-CM

## 2023-06-12 DIAGNOSIS — I48.91 ATRIAL FIBRILLATION, UNSPECIFIED TYPE (HCC): ICD-10-CM

## 2023-06-12 DIAGNOSIS — R55 SYNCOPE AND COLLAPSE: ICD-10-CM

## 2023-06-12 DIAGNOSIS — R26.2 AMBULATORY DYSFUNCTION: ICD-10-CM

## 2023-06-12 DIAGNOSIS — N30.00 ACUTE CYSTITIS WITHOUT HEMATURIA: Primary | ICD-10-CM

## 2023-06-12 PROCEDURE — 99309 SBSQ NF CARE MODERATE MDM 30: CPT

## 2023-06-12 NOTE — ASSESSMENT & PLAN NOTE
· During hospitalization for fall patient was found to have UTI  · Urine cultures positive for ESBL Proteus Mirabilis  · Patient started on IV Zosyn, will continue through 6/12/2023  · Patient remains afebrile 97 4

## 2023-06-12 NOTE — ASSESSMENT & PLAN NOTE
· Chronic Cloud catheter secondary to urinary retention  · Patient reports leaking around catheter that has been chronic  He reports some improvement since catheter was exchanged    · Patient has history of frequent UTIs  · Currently on Zosyn for ESBL through 6/12/2023  · Outpatient follow-up with urology for possible suprapubic catheter

## 2023-06-12 NOTE — PROGRESS NOTES
[facility]  [address]  [phone]  [facility]  [facility] ([facility]        NAME: Serina Pandey  AGE: 78 y o  SEX: male CODE STATUS: No CPR    DATE OF ENCOUNTER: 6/12/2023    Assessment and Plan     1  Acute cystitis without hematuria  Assessment & Plan:  · During hospitalization for fall patient was found to have UTI  · Urine cultures positive for ESBL Proteus Mirabilis  · Patient started on IV Zosyn, will continue through 6/12/2023  · Patient remains afebrile 97 5         2  Chronic indwelling Cloud catheter  Assessment & Plan:  · Chronic Cloud catheter secondary to urinary retention  · Patient reports leaking around catheter that has been chronic  · Patient has history of frequent UTIs  · Currently on Zosyn for ESBL through 6/12/2023  · Outpatient follow-up with urology for possible suprapubic catheter        3  Syncope and collapse  Assessment & Plan:  · Patient had recent hospitalization following fall at facility with head strike, patient denied LOC  · Patient on eliquis  · CT of head/ cervical spine negative for acute findings   · patient was found to have UTI and was started on IV Zosyn  · History of orthostatic hypotension, recurrent falls, continue midodrine  · Continue PT/OT   · maintain fall/safety precautions  · Assist patient with ADLs/IADLs as needed      4  Ambulatory dysfunction  Assessment & Plan:  Multifactorial secondary to recent fall and chronic medical conditions  Continue PT/OT  Fall/safety precautions  Ensure adequate nutrition/hydration   Monitor CBC/BMP    following for d/c planning       5  Atrial fibrillation, unspecified type [facility])  Assessment & Plan:  · Heart rate controlled, 74  · Patient denies chest pain/palpitations  · Continue metoprolol 25 mg daily  · Continue apixaban 2 5 mg twice daily         All medications and routine orders were reviewed and updated as needed      Chief Complaint     STR follow up visit  Patient's care was coordinated with nursing facility staff  Recent vitals, labs, and updated medications were review on [facility] system in facility  Past Medical and Surgical History      Past Medical History:   Diagnosis Date   • Ambulatory dysfunction    • Anxiety    • Anxiety disorder    • Atrial fibrillation (HCC)    • Coronary artery disease    • Depression    • Cloud catheter in place    • Hepatitis C     screening negative in 1/2017   • Hepatitis C    • History of MI (myocardial infarction)    • Hypertension    • MI (myocardial infarction) ([facility] [address] )    • Urinary catheter in place    • Use of cane as ambulatory aid      Past Surgical History:   Procedure Laterality Date   • CARDIAC CATHETERIZATION  07/09/2018   • CARDIAC ELECTROPHYSIOLOGY PROCEDURE N/A 9/30/2022    Procedure: Cardiac loop recorder implant;  Surgeon: Samantha Tuttle MD;  [facility]: [facility]; Service: Cardiology   • CARDIAC ELECTROPHYSIOLOGY PROCEDURE  09/30/2022    Cardiac loop recorder implant; Dr Ena Boggs  09/2022   • COLONOSCOPY     • COLONOSCOPY     • INGUINAL HERNIA REPAIR Right 08/11/2022    Dr Remedios Calderon   • [place] RPR 1ST INGUN HRNA AGE 5 YRS/> REDUCIBLE Right 8/11/2022    Procedure: REPAIR HERNIA INGUINAL;  Surgeon: Samy Encarnacion DO;  Location: [facility] OR;  Service: General   • TONSILLECTOMY       No Known Allergies       History of Present Illness     TREVOR Ellsworth is a 78year old male, he is a STR patient of [facility] since 5/16/23  Past Medical Hx including but not limited to Hepatitis C, GERD, COPD, MI, HTN, A-fib, CAD, SDH, urinary retention with chronic Cloud, anxiety, depression seen in collaboration with nursing for medical mgmt and STR follow up  Hospital course  Patient was admitted to the hospital 6/2/23-6/5/23 following fall at facility with head strike, patient denied LOC, patient on eliquis  Trauma workup was negative, CT head/cervical spine negative for acute findings   Patient met sepsis criteria with leukocytosis, fever, tachycardia, positive for UTI  Patient has history of ESBL in urine and was started on IV Zosyn  Urine cultures positive for ESBL proteus mirabilis  PT/OT recommended patient for STR, patient was discharged back to facility  Rehab course  Adalid Flores was seen and examined at bedside today  Patient is a reliable historian and is alert and oriented x3  On exam patient is resting in bed and does not appear to be in distress  He says he is feeling ok today and denies pain, sleep disturbance, and has a good appetite  He has been participating in therapy  Continue Zosyn through 6/12/23  He states there has been less leakage around catheter since it was exchanged last week  Outpatient follow up with urology for possible suprapubic catheter  He denies CP/SOB/N/V/D  Denies lightheadedness, dizziness, headaches, vision changes  Patient states they are eating well and staying hydrated  Denies any bowel or bladder issues  Per review of SNF records, Patient is eating 3 meals per day, consuming 50-75%  Last documented BM 6/11/2023  No concerns from nursing at this time  The patient's allergies, past medical, surgical, social and family history were reviewed and unchanged  Review of Systems     Review of Systems   Constitutional: Positive for activity change  Negative for appetite change and fever  HENT: Negative  Negative for congestion  Eyes: Negative  Respiratory: Negative  Negative for shortness of breath and wheezing  Cardiovascular: Negative  Negative for chest pain and palpitations  Gastrointestinal: Negative for abdominal distention, abdominal pain, constipation, diarrhea, nausea and vomiting  Endocrine: Negative  Genitourinary: Positive for difficulty urinating  Negative for dysuria, flank pain and hematuria  Chronic Cloud catheter   Musculoskeletal: Positive for gait problem  Skin: Negative  Allergic/Immunologic: Negative  Neurological: Negative for dizziness and headaches  Hematological: Negative  Psychiatric/Behavioral: Negative  Negative for sleep disturbance  Objective     Vitals:   Vitals:    06/12/23 1003   BP: 123/73   Pulse: 74   Resp: 18   Temp: (!) 97 4 °F (36 3 °C)   SpO2: 96%         Physical Exam  Vitals and nursing note reviewed  Constitutional:       General: He is not in acute distress  Appearance: Normal appearance  He is normal weight  He is not ill-appearing  HENT:      Head: Normocephalic and atraumatic  Nose: Nose normal  No congestion  Mouth/Throat:      Mouth: Mucous membranes are moist    Eyes:      Conjunctiva/sclera: Conjunctivae normal       Pupils: Pupils are equal, round, and reactive to light  Cardiovascular:      Rate and Rhythm: Normal rate and regular rhythm  Pulses: Normal pulses  Heart sounds: Normal heart sounds  Pulmonary:      Effort: Pulmonary effort is normal  No respiratory distress  Breath sounds: No wheezing  Abdominal:      General: Bowel sounds are normal  There is no distension  Palpations: Abdomen is soft  Tenderness: There is no abdominal tenderness  Genitourinary:     Comments: Chronic miller  Musculoskeletal:      Right lower leg: No edema  Left lower leg: No edema  Skin:     General: Skin is warm  Neurological:      Mental Status: He is alert and oriented to person, place, and time  Motor: Weakness present  Psychiatric:         Mood and Affect: Mood normal          Behavior: Behavior normal          Pertinent Laboratory/Diagnostic Studies:   Reviewed in facility chart-stable      Current Medications   Medications reviewed and updated see facility [facility] for details        Current Outpatient Medications:   •  acetaminophen (TYLENOL) 325 mg tablet, Take 650 mg by mouth every 6 (six) hours as needed, Disp: , Rfl:   •  apixaban (ELIQUIS) 2 5 mg, Take 2 5 mg by mouth 2 (two) times a day, Disp: , Rfl:   • "azelastine (ASTELIN) 0 1 % nasal spray, 1 spray into each nostril 2 (two) times a day as needed for rhinitis Use in each nostril as directed, Disp: , Rfl:   •  bisacodyl (DULCOLAX) 10 mg suppository, Insert 10 mg into the rectum once as needed, Disp: , Rfl:   •  docusate sodium (COLACE) 100 mg capsule, Take 100 mg by mouth 2 (two) times a day, Disp: , Rfl:   •  ferrous sulfate 325 (65 Fe) mg tablet, Take 325 mg by mouth daily with breakfast, Disp: , Rfl:   •  Fluticasone-Salmeterol (Advair Diskus) 100-50 mcg/dose inhaler, Inhale 1 puff 2 (two) times a day Rinse mouth after use , Disp: , Rfl:   •  folic acid (FOLVITE) 144 mcg tablet, Take 400 mcg by mouth daily, Disp: , Rfl:   •  ipratropium-albuterol (DUO-NEB) 0 5-2 5 mg/3 mL nebulizer solution, INHALE CONTENTS OF 1 VIAL VIA NEBULIZER FOUR TIMES A DAY, Disp: 60 mL, Rfl: 11  •  meclizine (ANTIVERT) 12 5 MG tablet, Take 12 5 mg by mouth every 8 (eight) hours as needed for dizziness, Disp: , Rfl:   •  methenamine hippurate (HIPREX) 1 g tablet, Take 1 g by mouth 2 (two) times a day with meals, Disp: , Rfl:   •  metoprolol succinate (TOPROL-XL) 25 mg 24 hr tablet, Take 25 mg by mouth daily, Disp: , Rfl:   •  midodrine (PROAMATINE) 5 mg tablet, Take 1 tablet (5 mg total) by mouth 3 (three) times a day as needed (for SBP < 100 or severe dizziness with standing up  Hold for SBP > 130), Disp: 90 tablet, Rfl: 3  •  nystatin (MYCOSTATIN) powder, Apply 1 application  topically in the morning, Disp: , Rfl:   •  omeprazole (PriLOSEC) 20 mg delayed release capsule, Take 1 capsule (20 mg total) by mouth daily, Disp: 90 capsule, Rfl: 3  •  sodium phosphate-biphosphate (FLEET) 7-19 g 118 mL enema, Insert 1 enema into the rectum once as needed, Disp: , Rfl:        Please note:  Voice-recognition software may have been used in the preparation of this document    Occasional wrong word or \"sound-alike\" substitutions may have occurred due to the inherent limitations of voice recognition " software  Interpretation should be guided by context           [address] [facility], [facility]  6/12/2023  10:08 AM

## 2023-06-14 ENCOUNTER — NURSING HOME VISIT (OUTPATIENT)
Dept: GERIATRICS | Facility: OTHER | Age: 78
End: 2023-06-14
Payer: MEDICARE

## 2023-06-14 VITALS
OXYGEN SATURATION: 96 % | DIASTOLIC BLOOD PRESSURE: 80 MMHG | SYSTOLIC BLOOD PRESSURE: 124 MMHG | TEMPERATURE: 97.8 F | RESPIRATION RATE: 18 BRPM | HEART RATE: 73 BPM

## 2023-06-14 DIAGNOSIS — R26.2 AMBULATORY DYSFUNCTION: ICD-10-CM

## 2023-06-14 DIAGNOSIS — I48.91 ATRIAL FIBRILLATION, UNSPECIFIED TYPE (HCC): Primary | ICD-10-CM

## 2023-06-14 DIAGNOSIS — R55 SYNCOPE AND COLLAPSE: ICD-10-CM

## 2023-06-14 DIAGNOSIS — Z97.8 CHRONIC INDWELLING FOLEY CATHETER: Chronic | ICD-10-CM

## 2023-06-14 DIAGNOSIS — K21.9 GASTROESOPHAGEAL REFLUX DISEASE WITHOUT ESOPHAGITIS: ICD-10-CM

## 2023-06-14 PROCEDURE — 99309 SBSQ NF CARE MODERATE MDM 30: CPT

## 2023-06-14 NOTE — ASSESSMENT & PLAN NOTE
· Heart rate controlled, 73  · Patient denies chest pain/palpitations  · Continue metoprolol 25 mg daily  · Continue apixaban 2 5 mg twice daily

## 2023-06-14 NOTE — ASSESSMENT & PLAN NOTE
· Chronic Cloud catheter secondary to urinary retention  · Leaking has improved since catheter was exchanged  · Patient has history of frequent UTIs  · Completed Zosyn for ESBL   · Outpatient follow-up with urology for possible suprapubic catheter

## 2023-06-14 NOTE — PROGRESS NOTES
[facility]  Leanna Lombardi [address]  [phone]  [facility]  Code 31 (STR)        NAME: Irwin Plummer  AGE: 78 y o  SEX: male CODE STATUS: No CPR    DATE OF ENCOUNTER: 6/14/2023    Assessment and Plan     1  Atrial fibrillation, unspecified type [facility])  Assessment & Plan:  · Heart rate controlled, 73  · Patient denies chest pain/palpitations  · Continue metoprolol 25 mg daily  · Continue apixaban 2 5 mg twice daily      2  Chronic indwelling Cloud catheter  Assessment & Plan:  · Chronic Cloud catheter secondary to urinary retention  · Leaking has improved since catheter was exchanged  · Patient has history of frequent UTIs  · Completed Zosyn for ESBL   · Outpatient follow-up with urology for possible suprapubic catheter        3  Syncope and collapse  Assessment & Plan:  · Patient had recent hospitalization following fall at facility with head strike, patient denied LOC  · Patient on eliquis  · CT of head/ cervical spine negative for acute findings   · patient was found to have UTI and was started on IV Zosyn  · History of orthostatic hypotension, recurrent falls, continue midodrine  · Continue PT/OT   · maintain fall/safety precautions  · Assist patient with ADLs/IADLs as needed      4  Ambulatory dysfunction  Assessment & Plan:  Multifactorial secondary to recent fall and chronic medical conditions  Continue PT/OT  Fall/safety precautions  Ensure adequate nutrition/hydration   Monitor CBC/BMP    following for d/c planning       5  Gastroesophageal reflux disease without esophagitis  Assessment & Plan:  · Denies reflux  · Continue omeprazole 20 mg daily         All medications and routine orders were reviewed and updated as needed  Chief Complaint     STR follow up visit  Patient's care was coordinated with nursing facility staff  Recent vitals, labs, and updated medications were review on [facility] in facility        Past Medical and Surgical History Past Medical History:   Diagnosis Date   • Ambulatory dysfunction    • Anxiety    • Anxiety disorder    • Atrial fibrillation [facility])    • Coronary artery disease    • Depression    • Cloud catheter in place    • Hepatitis C     screening negative in 1/2017   • Hepatitis C    • History of MI (myocardial infarction)    • Hypertension    • MI (myocardial infarction) ([facility] [address] )    • Urinary catheter in place    • Use of cane as ambulatory aid      Past Surgical History:   Procedure Laterality Date   • CARDIAC CATHETERIZATION  07/09/2018   • CARDIAC ELECTROPHYSIOLOGY PROCEDURE N/A 9/30/2022    Procedure: Cardiac loop recorder implant;  Surgeon: Zulma Mckee MD;  [facility]: [facility] CARDIAC CATH LAB; Service: Cardiology   • CARDIAC ELECTROPHYSIOLOGY PROCEDURE  09/30/2022    Cardiac loop recorder implant; Dr Xi Alvarez  09/2022   • COLONOSCOPY     • COLONOSCOPY     • INGUINAL HERNIA REPAIR Right 08/11/2022    Dr Margarette Slaughter   • [place] RPR 1ST INGUN HRNA AGE 5 YRS/> REDUCIBLE Right 8/11/2022    Procedure: REPAIR HERNIA INGUINAL;  Surgeon: Glenn Encarnacion DO;  Location: [facility] Main OR;  Service: General   • TONSILLECTOMY       No Known Allergies       History of Present Illness     TREVOR Quiñones is a 78year old male, he is a STR patient of [facility] since 5/16/23  Past Medical Hx including but not limited to Hepatitis C, GERD, COPD, MI, HTN, A-fib, CAD, SDH, urinary retention with chronic Cloud, anxiety, depression seen in collaboration with nursing for medical mgmt and STR follow up  Hospital course  Patient was admitted to the hospital 6/2/23-6/5/23 following fall at facility with head strike, patient denied LOC, patient on eliquis  Trauma workup was negative, CT head/cervical spine negative for acute findings  Patient met sepsis criteria with leukocytosis, fever, tachycardia, positive for UTI  Patient has history of ESBL in urine and was started on IV Zosyn   Urine cultures positive for ESBL proteus mirabilis  PT/OT recommended patient for STR, patient was discharged back to facility  Rehab course  Pravin Hall was seen and examined at bedside today  Patient is a reliable historian and is alert and oriented x3  On exam patient is resting in bed and does not appear to be in distress  He  is feeling ok today and denies pain, sleep disturbance, and has a good appetite  He has been participating in therapy  Completed course of zosyn  He states there has been less leakage around catheter since it was exchanged last week  Outpatient follow up with urology for possible suprapubic catheter  He denies CP/SOB/N/V/D  Denies lightheadedness, dizziness, headaches, vision changes  Patient states they are eating well and staying hydrated  Denies any bowel or bladder issues  Per review of SNF records, Patient is eating 3 meals per day, consuming 50-75%  Last documented BM 6/14/2023  No concerns from nursing at this time  The patient's allergies, past medical, surgical, social and family history were reviewed and unchanged  Review of Systems     Review of Systems   Constitutional: Positive for activity change  Negative for appetite change and fever  HENT: Negative  Negative for congestion  Eyes: Negative  Respiratory: Negative  Negative for shortness of breath and wheezing  Cardiovascular: Negative  Negative for chest pain and palpitations  Gastrointestinal: Negative for abdominal distention, abdominal pain, constipation, diarrhea, nausea and vomiting  Endocrine: Negative  Genitourinary: Positive for difficulty urinating  Negative for dysuria, flank pain and hematuria  Chronic Cloud catheter   Musculoskeletal: Positive for gait problem  Skin: Negative  Allergic/Immunologic: Negative  Neurological: Negative for dizziness and headaches  Hematological: Negative  Psychiatric/Behavioral: Negative  Negative for sleep disturbance           Objective     Vitals:   Vitals: 06/14/23 1118   BP: 124/80   Pulse: 73   Resp: 18   Temp: 97 8 °F (36 6 °C)   SpO2: 96%         Physical Exam  Vitals and nursing note reviewed  Constitutional:       General: He is not in acute distress  Appearance: Normal appearance  He is normal weight  He is not ill-appearing  HENT:      Head: Normocephalic and atraumatic  Nose: Nose normal  No congestion  Mouth/Throat:      Mouth: Mucous membranes are moist    Eyes:      Conjunctiva/sclera: Conjunctivae normal       Pupils: Pupils are equal, round, and reactive to light  Cardiovascular:      Rate and Rhythm: Normal rate and regular rhythm  Pulses: Normal pulses  Heart sounds: Normal heart sounds  Pulmonary:      Effort: Pulmonary effort is normal  No respiratory distress  Breath sounds: No wheezing  Abdominal:      General: Bowel sounds are normal  There is no distension  Palpations: Abdomen is soft  Tenderness: There is no abdominal tenderness  Genitourinary:     Comments: Chronic miller  Musculoskeletal:      Right lower leg: No edema  Left lower leg: No edema  Skin:     General: Skin is warm  Neurological:      Mental Status: He is alert and oriented to person, place, and time  Motor: Weakness present  Psychiatric:         Mood and Affect: Mood normal          Behavior: Behavior normal          Pertinent Laboratory/Diagnostic Studies:   Reviewed in facility chart-stable      Current Medications   Medications reviewed and updated see facility [facility] for details        Current Outpatient Medications:   •  acetaminophen (TYLENOL) 325 mg tablet, Take 650 mg by mouth every 6 (six) hours as needed, Disp: , Rfl:   •  apixaban (ELIQUIS) 2 5 mg, Take 2 5 mg by mouth 2 (two) times a day, Disp: , Rfl:   •  azelastine (ASTELIN) 0 1 % nasal spray, 1 spray into each nostril 2 (two) times a day as needed for rhinitis Use in each nostril as directed, Disp: , Rfl:   •  bisacodyl (DULCOLAX) 10 mg suppository, "Insert 10 mg into the rectum once as needed, Disp: , Rfl:   •  docusate sodium (COLACE) 100 mg capsule, Take 100 mg by mouth 2 (two) times a day, Disp: , Rfl:   •  ferrous sulfate 325 (65 Fe) mg tablet, Take 325 mg by mouth daily with breakfast, Disp: , Rfl:   •  Fluticasone-Salmeterol (Advair Diskus) 100-50 mcg/dose inhaler, Inhale 1 puff 2 (two) times a day Rinse mouth after use , Disp: , Rfl:   •  folic acid (FOLVITE) 568 mcg tablet, Take 400 mcg by mouth daily, Disp: , Rfl:   •  ipratropium-albuterol (DUO-NEB) 0 5-2 5 mg/3 mL nebulizer solution, INHALE CONTENTS OF 1 VIAL VIA NEBULIZER FOUR TIMES A DAY, Disp: 60 mL, Rfl: 11  •  meclizine (ANTIVERT) 12 5 MG tablet, Take 12 5 mg by mouth every 8 (eight) hours as needed for dizziness, Disp: , Rfl:   •  methenamine hippurate (HIPREX) 1 g tablet, Take 1 g by mouth 2 (two) times a day with meals, Disp: , Rfl:   •  metoprolol succinate (TOPROL-XL) 25 mg 24 hr tablet, Take 25 mg by mouth daily, Disp: , Rfl:   •  midodrine (PROAMATINE) 5 mg tablet, Take 1 tablet (5 mg total) by mouth 3 (three) times a day as needed (for SBP < 100 or severe dizziness with standing up  Hold for SBP > 130), Disp: 90 tablet, Rfl: 3  •  nystatin (MYCOSTATIN) powder, Apply 1 application  topically in the morning, Disp: , Rfl:   •  omeprazole (PriLOSEC) 20 mg delayed release capsule, Take 1 capsule (20 mg total) by mouth daily, Disp: 90 capsule, Rfl: 3  •  sodium phosphate-biphosphate (FLEET) 7-19 g 118 mL enema, Insert 1 enema into the rectum once as needed, Disp: , Rfl:        Please note:  Voice-recognition software may have been used in the preparation of this document  Occasional wrong word or \"sound-alike\" substitutions may have occurred due to the inherent limitations of voice recognition software  Interpretation should be guided by context           [address] [place] [address] Avenue, CRNP  6/14/2023  5:16 PM    "

## 2023-06-20 ENCOUNTER — NURSING HOME VISIT (OUTPATIENT)
Dept: GERIATRICS | Facility: OTHER | Age: 78
End: 2023-06-20
Payer: MEDICARE

## 2023-06-20 VITALS
BODY MASS INDEX: 22.92 KG/M2 | RESPIRATION RATE: 18 BRPM | WEIGHT: 169 LBS | DIASTOLIC BLOOD PRESSURE: 74 MMHG | SYSTOLIC BLOOD PRESSURE: 127 MMHG | HEART RATE: 80 BPM | TEMPERATURE: 97.6 F | OXYGEN SATURATION: 95 %

## 2023-06-20 DIAGNOSIS — Z97.8 CHRONIC INDWELLING FOLEY CATHETER: Chronic | ICD-10-CM

## 2023-06-20 DIAGNOSIS — I48.91 ATRIAL FIBRILLATION, UNSPECIFIED TYPE (HCC): Primary | ICD-10-CM

## 2023-06-20 DIAGNOSIS — K21.9 GASTROESOPHAGEAL REFLUX DISEASE WITHOUT ESOPHAGITIS: ICD-10-CM

## 2023-06-20 DIAGNOSIS — R26.2 AMBULATORY DYSFUNCTION: ICD-10-CM

## 2023-06-20 DIAGNOSIS — N30.00 ACUTE CYSTITIS WITHOUT HEMATURIA: ICD-10-CM

## 2023-06-20 DIAGNOSIS — R55 SYNCOPE AND COLLAPSE: ICD-10-CM

## 2023-06-20 PROCEDURE — 99309 SBSQ NF CARE MODERATE MDM 30: CPT

## 2023-06-20 NOTE — ASSESSMENT & PLAN NOTE
· Heart rate controlled, 80  · Patient denies chest pain/palpitations  · Continue metoprolol 25 mg daily  · Continue apixaban 2 5 mg twice daily

## 2023-06-20 NOTE — ASSESSMENT & PLAN NOTE
· Chronic Cloud catheter secondary to urinary retention  · No leaking reported  · Patient has history of frequent UTIs  · Completed Zosyn for ESBL   · Outpatient follow-up with urology for possible suprapubic catheter

## 2023-06-20 NOTE — ASSESSMENT & PLAN NOTE
· Patient had recent hospitalization following fall at facility with head strike, patient denied LOC  · Patient on eliquis  · CT of head/ cervical spine negative for acute findings   · patient was found to have UTI and completed course of IV Zosyn  · History of orthostatic hypotension, recurrent falls, continue midodrine  · Reports improvement of dizziness, continue meclizine   · Continue PT/OT   · maintain fall/safety precautions  · Assist patient with ADLs/IADLs as needed

## 2023-06-20 NOTE — PROGRESS NOTES
[facility]  Leanna Lombardi [address]  [phone]  [address]  Code 31 (STR)        NAME: Mack Jang  AGE: 78 y o  SEX: male CODE STATUS: No CPR    DATE OF ENCOUNTER: 6/20/2023    Assessment and Plan     1  Atrial fibrillation, unspecified type [facility])  Assessment & Plan:  · Heart rate controlled, 80  · Patient denies chest pain/palpitations  · Continue metoprolol 25 mg daily  · Continue apixaban 2 5 mg twice daily      2  Gastroesophageal reflux disease without esophagitis  Assessment & Plan:  · Denies reflux  · Continue omeprazole 20 mg daily      3  Acute cystitis without hematuria  Assessment & Plan:  · During hospitalization for fall patient was found to have UTI  · Urine cultures positive for ESBL Proteus Mirabilis  · Patient remains afebrile 97 6  · Completed course of Zosyn  · Will remove PICC line         4  Chronic indwelling Cloud catheter  Assessment & Plan:  · Chronic Cloud catheter secondary to urinary retention  · No leaking reported  · Patient has history of frequent UTIs  · Completed Zosyn for ESBL   · Outpatient follow-up with urology for possible suprapubic catheter        5  Syncope and collapse  Assessment & Plan:  · Patient had recent hospitalization following fall at facility with head strike, patient denied LOC  · Patient on eliquis  · CT of head/ cervical spine negative for acute findings   · patient was found to have UTI and completed course of IV Zosyn  · History of orthostatic hypotension, recurrent falls, continue midodrine  · Reports improvement of dizziness, continue meclizine   · Continue PT/OT   · maintain fall/safety precautions  · Assist patient with ADLs/IADLs as needed      6   Ambulatory dysfunction  Assessment & Plan:  Multifactorial secondary to recent fall and chronic medical conditions  Continue PT/OT  Fall/safety precautions  Ensure adequate nutrition/hydration   Monitor CBC/BMP          All medications and routine orders were reviewed and updated as needed  Chief Complaint     STR follow up visit  Patient's care was coordinated with nursing facility staff  Recent vitals, labs, and updated medications were review on [facility] system in facility  Past Medical and Surgical History      Past Medical History:   Diagnosis Date   • Ambulatory dysfunction    • Anxiety    • Anxiety disorder    • Atrial fibrillation (HCC)    • Coronary artery disease    • Depression    • Cloud catheter in place    • Hepatitis C     screening negative in 1/2017   • Hepatitis C    • History of MI (myocardial infarction)    • Hypertension    • MI (myocardial infarction) ([address] )    • Urinary catheter in place    • Use of cane as ambulatory aid      Past Surgical History:   Procedure Laterality Date   • CARDIAC CATHETERIZATION  07/09/2018   • CARDIAC ELECTROPHYSIOLOGY PROCEDURE N/A 9/30/2022    Procedure: Cardiac loop recorder implant;  Surgeon: Ruby Lee MD;  Location: [facility] CARDIAC CATH LAB; Service: Cardiology   • CARDIAC ELECTROPHYSIOLOGY PROCEDURE  09/30/2022    Cardiac loop recorder implant; Dr Andrea Almonte  09/2022   • COLONOSCOPY     • COLONOSCOPY     • INGUINAL HERNIA REPAIR Right 08/11/2022    Dr Ramirez Core   • [place] RPR 1ST INGUN HRNA AGE 5 YRS/> REDUCIBLE Right 8/11/2022    Procedure: REPAIR HERNIA INGUINAL;  Surgeon: Oliver Encarnacion DO;  Location: [facility];  Service: General   • TONSILLECTOMY       No Known Allergies       History of Present Illness     TREVOR Mobley is a 78year old male, he is a STR patient of [facility] since 5/16/23  Past Medical Hx including but not limited to Hepatitis C, GERD, COPD, MI, HTN, A-fib, CAD, SDH, urinary retention with chronic Cloud, anxiety, depression seen in collaboration with nursing for medical mgmt and STR follow up  Hospital course  Patient was admitted to the hospital 6/2/23-6/5/23 following fall at facility with head strike, patient denied LOC, patient on eliquis    Trauma workup was negative, CT head/cervical spine negative for acute findings  Patient met sepsis criteria with leukocytosis, fever, tachycardia, positive for UTI  Patient has history of ESBL in urine and was started on IV Zosyn  Urine cultures positive for ESBL proteus mirabilis  PT/OT recommended patient for STR, patient was discharged back to facility  Rehab course  Ofelia Willoughby was seen and examined at bedside today  He states he is feeling ok today  Patient is a reliable historian and is alert and oriented x3  On exam patient is resting in bed and does not appear to be in distress  He denies pain, sleep disturbance, and has a good appetite  He has been participating in therapy  Completed course of zosyn, will d/c PICC line  Outpatient follow up with urology for possible suprapubic catheter  He denies CP/SOB/N/V/D  Denies lightheadedness, dizziness, headaches, vision changes  Patient states they are eating well and staying hydrated  Denies any bowel or bladder issues  Per review of SNF records, Patient is eating 3 meals per day, consuming 50-75%  Last documented BM 6/19/2023  No concerns from nursing at this time  The patient's allergies, past medical, surgical, social and family history were reviewed and unchanged  Review of Systems     Review of Systems   Constitutional: Positive for activity change  Negative for appetite change and fever  HENT: Negative  Negative for congestion  Eyes: Negative  Respiratory: Negative  Negative for shortness of breath and wheezing  Cardiovascular: Negative  Negative for chest pain and palpitations  Gastrointestinal: Negative for abdominal distention, abdominal pain, constipation, diarrhea, nausea and vomiting  Endocrine: Negative  Genitourinary: Positive for difficulty urinating  Negative for dysuria, flank pain and hematuria  Chronic Cloud catheter   Musculoskeletal: Positive for gait problem  Skin: Negative  Allergic/Immunologic: Negative  Neurological: Positive for dizziness  Negative for headaches  Reports dizziness has improved, continue meclizine   Hematological: Negative  Psychiatric/Behavioral: Negative  Negative for sleep disturbance  Objective     Vitals:   Vitals:    06/20/23 0732   BP: 127/74   Pulse: 80   Resp: 18   Temp: 97 6 °F (36 4 °C)   SpO2: 95%         Physical Exam  Vitals and nursing note reviewed  Constitutional:       General: He is not in acute distress  Appearance: Normal appearance  He is normal weight  He is not ill-appearing  HENT:      Head: Normocephalic and atraumatic  Nose: Nose normal  No congestion  Mouth/Throat:      Mouth: Mucous membranes are moist    Eyes:      Conjunctiva/sclera: Conjunctivae normal       Pupils: Pupils are equal, round, and reactive to light  Cardiovascular:      Rate and Rhythm: Normal rate and regular rhythm  Pulses: Normal pulses  Heart sounds: Normal heart sounds  Pulmonary:      Effort: Pulmonary effort is normal  No respiratory distress  Breath sounds: No wheezing  Abdominal:      General: Bowel sounds are normal  There is no distension  Palpations: Abdomen is soft  Tenderness: There is no abdominal tenderness  Genitourinary:     Comments: Chronic miller  Musculoskeletal:      Right lower leg: No edema  Left lower leg: No edema  Skin:     General: Skin is warm  Neurological:      Mental Status: He is alert and oriented to person, place, and time  Motor: Weakness present  Psychiatric:         Mood and Affect: Mood normal          Behavior: Behavior normal          Pertinent Laboratory/Diagnostic Studies:   Reviewed in facility chart-stable      Current Medications   Medications reviewed and updated see facility [facility] for details        Current Outpatient Medications:   •  acetaminophen (TYLENOL) 325 mg tablet, Take 650 mg by mouth every 6 (six) hours as needed, Disp: , Rfl:   •  apixaban (ELIQUIS) 2 5 mg, "Take 2 5 mg by mouth 2 (two) times a day, Disp: , Rfl:   •  azelastine (ASTELIN) 0 1 % nasal spray, 1 spray into each nostril 2 (two) times a day as needed for rhinitis Use in each nostril as directed, Disp: , Rfl:   •  bisacodyl (DULCOLAX) 10 mg suppository, Insert 10 mg into the rectum once as needed, Disp: , Rfl:   •  docusate sodium (COLACE) 100 mg capsule, Take 100 mg by mouth 2 (two) times a day, Disp: , Rfl:   •  ferrous sulfate 325 (65 Fe) mg tablet, Take 325 mg by mouth daily with breakfast, Disp: , Rfl:   •  Fluticasone-Salmeterol (Advair Diskus) 100-50 mcg/dose inhaler, Inhale 1 puff 2 (two) times a day Rinse mouth after use , Disp: , Rfl:   •  folic acid (FOLVITE) 046 mcg tablet, Take 400 mcg by mouth daily, Disp: , Rfl:   •  ipratropium-albuterol (DUO-NEB) 0 5-2 5 mg/3 mL nebulizer solution, INHALE CONTENTS OF 1 VIAL VIA NEBULIZER FOUR TIMES A DAY, Disp: 60 mL, Rfl: 11  •  meclizine (ANTIVERT) 12 5 MG tablet, Take 12 5 mg by mouth every 8 (eight) hours as needed for dizziness, Disp: , Rfl:   •  methenamine hippurate (HIPREX) 1 g tablet, Take 1 g by mouth 2 (two) times a day with meals, Disp: , Rfl:   •  metoprolol succinate (TOPROL-XL) 25 mg 24 hr tablet, Take 25 mg by mouth daily, Disp: , Rfl:   •  midodrine (PROAMATINE) 5 mg tablet, Take 1 tablet (5 mg total) by mouth 3 (three) times a day as needed (for SBP < 100 or severe dizziness with standing up  Hold for SBP > 130), Disp: 90 tablet, Rfl: 3  •  nystatin (MYCOSTATIN) powder, Apply 1 application  topically in the morning, Disp: , Rfl:   •  omeprazole (PriLOSEC) 20 mg delayed release capsule, Take 1 capsule (20 mg total) by mouth daily, Disp: 90 capsule, Rfl: 3  •  sodium phosphate-biphosphate (FLEET) 7-19 g 118 mL enema, Insert 1 enema into the rectum once as needed, Disp: , Rfl:        Please note:  Voice-recognition software may have been used in the preparation of this document    Occasional wrong word or \"sound-alike\" substitutions may have " occurred due to the inherent limitations of voice recognition software  Interpretation should be guided by context           [address] [facility], [address] [place] [address]  6/20/2023  2:17 PM

## 2023-06-20 NOTE — ASSESSMENT & PLAN NOTE
· During hospitalization for fall patient was found to have UTI  · Urine cultures positive for ESBL Proteus Mirabilis  · Patient remains afebrile 97 6  · Completed course of Zosyn  · Will remove PICC line

## 2023-06-20 NOTE — ASSESSMENT & PLAN NOTE
Multifactorial secondary to recent fall and chronic medical conditions  Continue PT/OT  Fall/safety precautions  Ensure adequate nutrition/hydration   Monitor CBC/BMP

## 2023-07-15 PROBLEM — N39.0 UTI (URINARY TRACT INFECTION): Status: RESOLVED | Noted: 2023-05-12 | Resolved: 2023-07-15

## 2023-07-17 ENCOUNTER — NURSING HOME VISIT (OUTPATIENT)
Dept: GERIATRICS | Facility: OTHER | Age: 78
End: 2023-07-17
Payer: MEDICARE

## 2023-07-17 VITALS
SYSTOLIC BLOOD PRESSURE: 128 MMHG | RESPIRATION RATE: 18 BRPM | OXYGEN SATURATION: 95 % | HEART RATE: 83 BPM | TEMPERATURE: 98 F | DIASTOLIC BLOOD PRESSURE: 82 MMHG

## 2023-07-17 DIAGNOSIS — R26.2 AMBULATORY DYSFUNCTION: ICD-10-CM

## 2023-07-17 DIAGNOSIS — Z97.8 CHRONIC INDWELLING FOLEY CATHETER: Chronic | ICD-10-CM

## 2023-07-17 DIAGNOSIS — I48.91 ATRIAL FIBRILLATION, UNSPECIFIED TYPE (HCC): ICD-10-CM

## 2023-07-17 DIAGNOSIS — H61.23 CERUMEN DEBRIS ON TYMPANIC MEMBRANE OF BOTH EARS: Primary | ICD-10-CM

## 2023-07-17 PROCEDURE — 99309 SBSQ NF CARE MODERATE MDM 30: CPT

## 2023-07-17 NOTE — ASSESSMENT & PLAN NOTE
Multifactorial secondary to recent fall and chronic medical conditions  Continue PT/OT as needed  Fall/safety precautions  Ensure adequate nutrition/hydration   Monitor CBC/BMP

## 2023-07-17 NOTE — PROGRESS NOTES
[address]  [phone]  Sonali Mccoy  Code 32 LTC  Acute visit      NAME: Wilber Fernandez  AGE: 78 y.o. SEX: male CODE STATUS: No CPR    DATE OF ENCOUNTER: 7/17/2023    Assessment and Plan     1. Cerumen debris on tympanic membrane of both ears  Assessment & Plan:  · Patient complains of hearing loss left greater than right  · Left ear cerumen impaction noted  · Right ear moderate amount of cerumen  · We will order Debrox 5 drops bilateral ears x4 days  · Plan to irrigate ears at the end of the week      2. Atrial fibrillation, unspecified type [facility])  Assessment & Plan:  · Heart rate controlled, 83  · Patient denies chest pain/palpitations  · Continue metoprolol 25 mg daily  · Continue apixaban 2.5 mg twice daily      3. Chronic indwelling Cloud catheter  Assessment & Plan:  · Chronic Cloud catheter secondary to urinary retention  · No leaking reported  · Patent for CYU  · Patient has history of frequent UTIs   · Outpatient follow-up with urology for possible suprapubic catheter        4. Ambulatory dysfunction  Assessment & Plan:  Multifactorial secondary to recent fall and chronic medical conditions  Continue PT/OT as needed  Fall/safety precautions  Ensure adequate nutrition/hydration   Monitor CBC/BMP          All medications and routine orders were reviewed and updated as needed. Chief Complaint     LTC follow up visit  Patient's care was coordinated with nursing facility staff. Recent vitals, labs, and updated medications were review on [facility] system in facility.       Past Medical and Surgical History      Past Medical History:   Diagnosis Date   • A-fib [facility])    • Ambulatory dysfunction    • Anxiety    • Anxiety disorder    • Atrial fibrillation (HCC)    • Chronic indwelling Cloud catheter    • COPD (chronic obstructive pulmonary disease) (HCC)    • Coronary artery disease    • Depression    • Cloud catheter in place    • Hepatitis C     screening negative in 1/2017   • Hepatitis C    • History of MI (myocardial infarction)    • Hypertension    • MI (myocardial infarction) ([address])    • Urinary catheter in place    • Urinary retention    • Use of cane as ambulatory aid      Past Surgical History:   Procedure Laterality Date   • CARDIAC CATHETERIZATION  07/09/2018   • CARDIAC ELECTROPHYSIOLOGY PROCEDURE N/A 9/30/2022    Procedure: Cardiac loop recorder implant;  Surgeon: Usha Calderon MD;  [facility]; Service: Cardiology   • CARDIAC ELECTROPHYSIOLOGY PROCEDURE  09/30/2022    Cardiac loop recorder implant; Dr. Ivan Campos  09/2022   • COLONOSCOPY     • COLONOSCOPY     • INGUINAL HERNIA REPAIR Right 08/11/2022    Dr. Carie Chavez   • [place] RPR 1ST INGUN HRNA AGE 5 YRS/> REDUCIBLE Right 8/11/2022    Procedure: REPAIR HERNIA INGUINAL;  Surgeon: Shin Encarnacion DO;  Location: [facility];  Service: General   • TONSILLECTOMY       No Known Allergies       History of Present Illness     HPI  Iona Collazo is a 78year old male, he is a STR patient of [facility] since 5/16/23. Past Medical Hx including but not limited to Hepatitis C, GERD, COPD, MI, HTN, A-fib, CAD, SDH, urinary retention with chronic Cloud, anxiety, depression seen in collaboration with nursing for medical mgmt and acute visit for hearing loss. Tamika Quezada was seen and examined at bedside today. Patient is a reliable historian and is alert and oriented x3. On exam patient is resting in bed and does not appear to be in distress. He denies pain, sleep disturbance, and has a good appetite. Patient c/o earwax build up worse in his left ear. B/L ears examined, left ear impacted, moderate amount of cerumen in right ear, will order debrox x 4 days and plan to irrigate ears at end of week. He denies CP/SOB/N/V/D. Denies lightheadedness, dizziness, headaches, vision changes. Patient states they are eating well and staying hydrated. Denies any bowel or bladder issues. Per review of SNF records, Patient is eating 3 meals per day, consuming 50-75%. Last documented BM 7/17/2023. No other concerns from nursing at this time. The patient's allergies, past medical, surgical, social and family history were reviewed and unchanged. Review of Systems     Review of Systems   Constitutional: Negative for activity change, appetite change and fever. HENT: Positive for hearing loss. Negative for congestion. Eyes: Negative. Respiratory: Negative. Negative for shortness of breath and wheezing. Cardiovascular: Negative. Negative for chest pain and palpitations. Gastrointestinal: Negative for abdominal distention, abdominal pain, constipation, diarrhea, nausea and vomiting. Endocrine: Negative. Genitourinary: Positive for difficulty urinating. Negative for dysuria, flank pain and hematuria. Chronic Cloud catheter   Musculoskeletal: Positive for gait problem. Skin: Negative. Allergic/Immunologic: Negative. Neurological: Negative for dizziness and headaches. Hematological: Negative. Psychiatric/Behavioral: Negative. Negative for sleep disturbance. Objective     Vitals:   Vitals:    07/17/23 1735   BP: 128/82   Pulse: 83   Resp: 18   Temp: 98 °F (36.7 °C)   SpO2: 95%         Physical Exam  Vitals and nursing note reviewed. Constitutional:       General: He is not in acute distress. Appearance: Normal appearance. He is normal weight. He is not ill-appearing. HENT:      Head: Normocephalic and atraumatic. Right Ear: There is impacted cerumen. Left Ear: There is impacted cerumen. Nose: Nose normal. No congestion. Mouth/Throat:      Mouth: Mucous membranes are moist.   Eyes:      Conjunctiva/sclera: Conjunctivae normal.      Pupils: Pupils are equal, round, and reactive to light. Cardiovascular:      Rate and Rhythm: Normal rate and regular rhythm. Pulses: Normal pulses. Heart sounds: Normal heart sounds. Pulmonary:      Effort: Pulmonary effort is normal. No respiratory distress. Breath sounds: No wheezing. Abdominal:      General: Bowel sounds are normal. There is no distension. Palpations: Abdomen is soft. Tenderness: There is no abdominal tenderness. Genitourinary:     Comments: Chronic miller  Musculoskeletal:      Right lower leg: No edema. Left lower leg: No edema. Skin:     General: Skin is warm. Neurological:      Mental Status: He is alert and oriented to person, place, and time. Motor: Weakness present. Psychiatric:         Mood and Affect: Mood normal.         Behavior: Behavior normal.         Pertinent Laboratory/Diagnostic Studies:   Reviewed in facility chart-stable      Current Medications   Medications reviewed and updated see facility [facility] for details.       Current Outpatient Medications:   •  acetaminophen (TYLENOL) 325 mg tablet, Take 650 mg by mouth every 6 (six) hours as needed, Disp: , Rfl:   •  acetaminophen (TYLENOL) 325 mg tablet, Take 650 mg by mouth every 6 (six) hours as needed for mild pain, Disp: , Rfl:   •  apixaban (ELIQUIS) 2.5 mg, Take 2.5 mg by mouth 2 (two) times a day, Disp: , Rfl:   •  apixaban (Eliquis) 2.5 mg, Take 2.5 mg by mouth 2 (two) times a day, Disp: , Rfl:   •  azelastine (ASTELIN) 0.1 % nasal spray, 1 spray into each nostril 2 (two) times a day as needed for rhinitis Use in each nostril as directed, Disp: , Rfl:   •  azelastine (ASTELIN) 0.1 % nasal spray, 1 spray into each nostril 2 (two) times a day as needed for rhinitis Use in each nostril as directed, Disp: , Rfl:   •  bisacodyl (DULCOLAX) 10 mg suppository, Insert 10 mg into the rectum once as needed, Disp: , Rfl:   •  bisacodyl (Dulcolax) 10 mg suppository, Insert 10 mg into the rectum daily as needed for constipation, Disp: , Rfl:   •  docusate sodium (COLACE) 100 mg capsule, Take 100 mg by mouth 2 (two) times a day, Disp: , Rfl:   •  docusate sodium (COLACE) 100 mg capsule, Take 100 mg by mouth 2 (two) times a day, Disp: , Rfl:   •  ferrous sulfate 325 (65 Fe) mg tablet, Take 325 mg by mouth daily with breakfast, Disp: , Rfl:   •  ferrous sulfate 325 (65 Fe) mg tablet, Take 325 mg by mouth daily with breakfast, Disp: , Rfl:   •  Fluticasone-Salmeterol (Advair Diskus) 100-50 mcg/dose inhaler, Inhale 1 puff 2 (two) times a day Rinse mouth after use., Disp: , Rfl:   •  Fluticasone-Salmeterol (Advair) 100-50 mcg/dose inhaler, Inhale 1 puff 2 (two) times a day Rinse mouth after use., Disp: , Rfl:   •  folic acid (FOLVITE) 450 mcg tablet, Take 400 mcg by mouth daily, Disp: , Rfl:   •  folic acid (FOLVITE) 378 mcg tablet, Take 400 mcg by mouth daily, Disp: , Rfl:   •  ipratropium-albuterol (DUO-NEB) 0.5-2.5 mg/3 mL nebulizer solution, INHALE CONTENTS OF 1 VIAL VIA NEBULIZER FOUR TIMES A DAY, Disp: 60 mL, Rfl: 11  •  ipratropium-albuterol (DUO-NEB) 0.5-2.5 mg/3 mL nebulizer solution, Take 3 mL by nebulization 4 (four) times a day, Disp: , Rfl:   •  meclizine (ANTIVERT) 12.5 MG tablet, Take 12.5 mg by mouth every 8 (eight) hours as needed for dizziness, Disp: , Rfl:   •  meclizine (ANTIVERT) 12.5 MG tablet, Take 12.5 mg by mouth every 8 (eight) hours as needed for dizziness, Disp: , Rfl:   •  methenamine hippurate (HIPREX) 1 g tablet, Take 1 g by mouth 2 (two) times a day with meals, Disp: , Rfl:   •  methenamine hippurate (HIPREX) 1 g tablet, Take 1 g by mouth 2 (two) times a day with meals, Disp: , Rfl:   •  metoprolol succinate (TOPROL-XL) 25 mg 24 hr tablet, Take 25 mg by mouth daily, Disp: , Rfl:   •  metoprolol succinate (TOPROL-XL) 25 mg 24 hr tablet, Take 25 mg by mouth daily, Disp: , Rfl:   •  midodrine (PROAMATINE) 2.5 mg tablet, Take 3 tablets (7.5 mg total) by mouth in the morning, Disp: 90 tablet, Rfl: 0  •  midodrine (PROAMATINE) 5 mg tablet, Take 1 tablet (5 mg total) by mouth 3 (three) times a day as needed (for SBP < 100 or severe dizziness with standing up.  Hold for SBP > 130), Disp: 90 tablet, Rfl: 3  •  midodrine (PROAMATINE) 5 mg tablet, Take 1 tablet (5 mg total) by mouth 2 (two) times a day before meals Afternoon and evening, Disp: , Rfl: 0  •  nystatin (MYCOSTATIN) powder, Apply 1 application. topically in the morning, Disp: , Rfl:   •  nystatin (MYCOSTATIN) powder, Apply 1 application. topically every morning, Disp: , Rfl:   •  omeprazole (PriLOSEC) 20 mg delayed release capsule, Take 1 capsule (20 mg total) by mouth daily, Disp: 90 capsule, Rfl: 3  •  omeprazole (PriLOSEC) 20 mg delayed release capsule, Take 20 mg by mouth daily, Disp: , Rfl:   •  sodium phosphate-biphosphate (FLEET) 7-19 g 118 mL enema, Insert 1 enema into the rectum once as needed, Disp: , Rfl:   •  sodium phosphate-biphosphate (FLEET) 7-19 g 118 mL enema, Insert 1 enema into the rectum daily as needed, Disp: , Rfl:        Please note:  Voice-recognition software may have been used in the preparation of this document. Occasional wrong word or "sound-alike" substitutions may have occurred due to the inherent limitations of voice recognition software. Interpretation should be guided by context.          [address]  7/17/2023  5:40 PM

## 2023-07-17 NOTE — ASSESSMENT & PLAN NOTE
· Patient complains of hearing loss left greater than right  · Left ear cerumen impaction noted  · Right ear moderate amount of cerumen  · We will order Debrox 5 drops bilateral ears x4 days  · Plan to irrigate ears at the end of the week

## 2023-07-17 NOTE — ASSESSMENT & PLAN NOTE
· Heart rate controlled, 83  · Patient denies chest pain/palpitations  · Continue metoprolol 25 mg daily  · Continue apixaban 2.5 mg twice daily

## 2023-07-17 NOTE — ASSESSMENT & PLAN NOTE
· Chronic Cloud catheter secondary to urinary retention  · No leaking reported  · Patent for CYU  · Patient has history of frequent UTIs   · Outpatient follow-up with urology for possible suprapubic catheter

## 2023-07-21 ENCOUNTER — NURSING HOME VISIT (OUTPATIENT)
Dept: GERIATRICS | Facility: OTHER | Age: 78
End: 2023-07-21
Payer: MEDICARE

## 2023-07-21 VITALS
OXYGEN SATURATION: 95 % | RESPIRATION RATE: 18 BRPM | BODY MASS INDEX: 19.57 KG/M2 | WEIGHT: 144.3 LBS | DIASTOLIC BLOOD PRESSURE: 70 MMHG | TEMPERATURE: 97.8 F | HEART RATE: 73 BPM | SYSTOLIC BLOOD PRESSURE: 131 MMHG

## 2023-07-21 DIAGNOSIS — I48.21 PERMANENT ATRIAL FIBRILLATION (HCC): Chronic | ICD-10-CM

## 2023-07-21 DIAGNOSIS — Z97.8 CHRONIC INDWELLING FOLEY CATHETER: Chronic | ICD-10-CM

## 2023-07-21 DIAGNOSIS — H61.23 CERUMEN DEBRIS ON TYMPANIC MEMBRANE OF BOTH EARS: Primary | ICD-10-CM

## 2023-07-21 PROCEDURE — 99307 SBSQ NF CARE SF MDM 10: CPT

## 2023-07-21 NOTE — ASSESSMENT & PLAN NOTE
· Patient complains of hearing loss left greater than right  · Left ear cerumen impaction noted  · Right ear moderate amount of cerumen  · Patient completed debrox    Plan  B/L ears irrigated  Explained procedure/risk factors to patient  Patient agreed to procedure  Irrigation positive for moderate amount of cerumen  B/L TM visualized following irrigation  Patient reports improvement in hearing bilaterally

## 2023-07-21 NOTE — PROGRESS NOTES
[address]  [phone]  [facility]  Code 32 LTC  Acute visit      NAME: Cecile Goldmann  AGE: 78 y.o. SEX: male CODE STATUS: No CPR    DATE OF ENCOUNTER: 7/21/2023    Assessment and Plan     1. Cerumen debris on tympanic membrane of both ears  Assessment & Plan:  · Patient complains of hearing loss left greater than right  · Left ear cerumen impaction noted  · Right ear moderate amount of cerumen  · Patient completed debrox    Plan  B/L ears irrigated  Explained procedure/risk factors to patient  Patient agreed to procedure  Irrigation positive for moderate amount of cerumen  B/L TM visualized following irrigation  Patient reports improvement in hearing bilaterally      2. Permanent atrial fibrillation (HCC)  Assessment & Plan:  · Heart rate controlled, 73  · Patient denies chest pain/palpitations  · Continue metoprolol 25 mg daily  · Continue apixaban 2.5 mg twice daily      3. Chronic indwelling Cloud catheter  Assessment & Plan:  · Chronic Cloud catheter secondary to urinary retention  · Catheter exchanged this morning due to leaking, unable to flush  · Patent for CYU  · Patient has history of frequent UTIs   · Outpatient follow-up with urology for possible suprapubic catheter           All medications and routine orders were reviewed and updated as needed. Chief Complaint     LTC follow up visit/acute visit  Patient's care was coordinated with nursing facility staff. Recent vitals, labs, and updated medications were review on [facility] system in facility.       Past Medical and Surgical History      Past Medical History:   Diagnosis Date   • A-fib [facility])    • Ambulatory dysfunction    • Anxiety    • Anxiety disorder    • Atrial fibrillation (HCC)    • Chronic indwelling Cloud catheter    • COPD (chronic obstructive pulmonary disease) (HCC)    • Coronary artery disease    • Depression    • Cloud catheter in place    • Hepatitis C     screening negative in 2017   • Hepatitis C    • History of MI (myocardial infarction)    • Hypertension    • MI (myocardial infarction) ([address])    • Urinary catheter in place    • Urinary retention    • Use of cane as ambulatory aid      Past Surgical History:   Procedure Laterality Date   • CARDIAC CATHETERIZATION  2018   • CARDIAC ELECTROPHYSIOLOGY PROCEDURE N/A 2022    Procedure: Cardiac loop recorder implant;  Surgeon: Lynda Chen MD;  Location: [facility]; Service: Cardiology   • CARDIAC ELECTROPHYSIOLOGY PROCEDURE  2022    Cardiac loop recorder implant; Dr. Maribel Castro  2022   • COLONOSCOPY     • COLONOSCOPY     • INGUINAL HERNIA REPAIR Right 2022    Dr. Wilkins Leader   • [place] RPR 1ST INGUN HRNA AGE 5 YRS/> REDUCIBLE Right 2022    Procedure: REPAIR HERNIA INGUINAL;  Surgeon: Miguel Encarnacion DO;  Location: [facility];  Service: General   • TONSILLECTOMY       No Known Allergies       History of Present Illness     TREVOR Paige is a 78year old male, he is a STR patient of [facility] since 23. Past Medical Hx including but not limited to Hepatitis C, GERD, COPD, MI, HTN, A-fib, CAD, SDH, urinary retention with chronic Cloud, anxiety, depression seen in collaboration with nursing for medical mgmt and acute visit for b/l ear irrigation. John Clemens was seen and examined at bedside today. Patient is a reliable historian and is alert and oriented x3. On exam patient is resting in bed and does not appear to be in distress. He denies pain, sleep disturbance, and has a good appetite. Patient c/o earwax build up worse in his left ear. He completed course of Debrox and was seen for irrigation of both ears. Moderate amount of cerumen came from left ear, small amunt from right. B/L TM visualized following irrigation, patient reports improvement in hearing. He denies CP/SOB/N/V/D. Denies lightheadedness, dizziness, headaches, vision changes.  Patient states they are eating well and staying hydrated. Denies any other bowel or bladder issues. Per review of SNF records, Patient is eating 3 meals per day, consuming 50-75%. Last documented BM 7/19/2023. No other concerns from nursing at this time. The patient's allergies, past medical, surgical, social and family history were reviewed and unchanged. Review of Systems     Review of Systems   Constitutional: Negative for activity change, appetite change and fever. HENT: Negative for congestion. B/l ears irrigated, hearing improved   Eyes: Negative. Respiratory: Negative. Negative for shortness of breath and wheezing. Cardiovascular: Negative. Negative for chest pain and palpitations. Gastrointestinal: Negative for abdominal distention, abdominal pain, constipation, diarrhea, nausea and vomiting. Endocrine: Negative. Genitourinary: Positive for difficulty urinating. Negative for dysuria, flank pain and hematuria. Chronic Cloud catheter   Musculoskeletal: Positive for gait problem. Skin: Negative. Allergic/Immunologic: Negative. Neurological: Negative for dizziness and headaches. Hematological: Negative. Psychiatric/Behavioral: Negative. Negative for sleep disturbance. Objective     Vitals:   Vitals:    07/21/23 1658   BP: 131/70   Pulse: 73   Resp: 18   Temp: 97.8 °F (36.6 °C)   SpO2: 95%         Physical Exam  Vitals and nursing note reviewed. Constitutional:       General: He is not in acute distress. Appearance: Normal appearance. He is normal weight. He is not ill-appearing. HENT:      Head: Normocephalic and atraumatic. Ears:      Comments: Ears irrigated     Nose: Nose normal. No congestion. Mouth/Throat:      Mouth: Mucous membranes are moist.   Eyes:      Conjunctiva/sclera: Conjunctivae normal.      Pupils: Pupils are equal, round, and reactive to light. Cardiovascular:      Rate and Rhythm: Normal rate and regular rhythm.       Pulses: Normal pulses. Heart sounds: Normal heart sounds. Pulmonary:      Effort: Pulmonary effort is normal. No respiratory distress. Breath sounds: No wheezing. Abdominal:      General: Bowel sounds are normal. There is no distension. Palpations: Abdomen is soft. Tenderness: There is no abdominal tenderness. Genitourinary:     Comments: Chronic miller  Musculoskeletal:      Right lower leg: No edema. Left lower leg: No edema. Skin:     General: Skin is warm. Neurological:      Mental Status: He is alert and oriented to person, place, and time. Psychiatric:         Mood and Affect: Mood normal.         Behavior: Behavior normal.         Pertinent Laboratory/Diagnostic Studies:   Reviewed in facility chart-stable      Current Medications   Medications reviewed and updated see facility [facility] for details.       Current Outpatient Medications:   •  acetaminophen (TYLENOL) 325 mg tablet, Take 650 mg by mouth every 6 (six) hours as needed, Disp: , Rfl:   •  acetaminophen (TYLENOL) 325 mg tablet, Take 650 mg by mouth every 6 (six) hours as needed for mild pain, Disp: , Rfl:   •  apixaban (ELIQUIS) 2.5 mg, Take 2.5 mg by mouth 2 (two) times a day, Disp: , Rfl:   •  apixaban (Eliquis) 2.5 mg, Take 2.5 mg by mouth 2 (two) times a day, Disp: , Rfl:   •  azelastine (ASTELIN) 0.1 % nasal spray, 1 spray into each nostril 2 (two) times a day as needed for rhinitis Use in each nostril as directed, Disp: , Rfl:   •  azelastine (ASTELIN) 0.1 % nasal spray, 1 spray into each nostril 2 (two) times a day as needed for rhinitis Use in each nostril as directed, Disp: , Rfl:   •  bisacodyl (DULCOLAX) 10 mg suppository, Insert 10 mg into the rectum once as needed, Disp: , Rfl:   •  bisacodyl (Dulcolax) 10 mg suppository, Insert 10 mg into the rectum daily as needed for constipation, Disp: , Rfl:   •  docusate sodium (COLACE) 100 mg capsule, Take 100 mg by mouth 2 (two) times a day, Disp: , Rfl:   •  docusate sodium (COLACE) 100 mg capsule, Take 100 mg by mouth 2 (two) times a day, Disp: , Rfl:   •  ferrous sulfate 325 (65 Fe) mg tablet, Take 325 mg by mouth daily with breakfast, Disp: , Rfl:   •  ferrous sulfate 325 (65 Fe) mg tablet, Take 325 mg by mouth daily with breakfast, Disp: , Rfl:   •  Fluticasone-Salmeterol (Advair Diskus) 100-50 mcg/dose inhaler, Inhale 1 puff 2 (two) times a day Rinse mouth after use., Disp: , Rfl:   •  Fluticasone-Salmeterol (Advair) 100-50 mcg/dose inhaler, Inhale 1 puff 2 (two) times a day Rinse mouth after use., Disp: , Rfl:   •  folic acid (FOLVITE) 359 mcg tablet, Take 400 mcg by mouth daily, Disp: , Rfl:   •  folic acid (FOLVITE) 222 mcg tablet, Take 400 mcg by mouth daily, Disp: , Rfl:   •  ipratropium-albuterol (DUO-NEB) 0.5-2.5 mg/3 mL nebulizer solution, INHALE CONTENTS OF 1 VIAL VIA NEBULIZER FOUR TIMES A DAY, Disp: 60 mL, Rfl: 11  •  ipratropium-albuterol (DUO-NEB) 0.5-2.5 mg/3 mL nebulizer solution, Take 3 mL by nebulization 4 (four) times a day, Disp: , Rfl:   •  meclizine (ANTIVERT) 12.5 MG tablet, Take 12.5 mg by mouth every 8 (eight) hours as needed for dizziness, Disp: , Rfl:   •  meclizine (ANTIVERT) 12.5 MG tablet, Take 12.5 mg by mouth every 8 (eight) hours as needed for dizziness, Disp: , Rfl:   •  methenamine hippurate (HIPREX) 1 g tablet, Take 1 g by mouth 2 (two) times a day with meals, Disp: , Rfl:   •  methenamine hippurate (HIPREX) 1 g tablet, Take 1 g by mouth 2 (two) times a day with meals, Disp: , Rfl:   •  metoprolol succinate (TOPROL-XL) 25 mg 24 hr tablet, Take 25 mg by mouth daily, Disp: , Rfl:   •  metoprolol succinate (TOPROL-XL) 25 mg 24 hr tablet, Take 25 mg by mouth daily, Disp: , Rfl:   •  midodrine (PROAMATINE) 2.5 mg tablet, Take 3 tablets (7.5 mg total) by mouth in the morning, Disp: 90 tablet, Rfl: 0  •  midodrine (PROAMATINE) 5 mg tablet, Take 1 tablet (5 mg total) by mouth 3 (three) times a day as needed (for SBP < 100 or severe dizziness with standing up. Hold for SBP > 130), Disp: 90 tablet, Rfl: 3  •  midodrine (PROAMATINE) 5 mg tablet, Take 1 tablet (5 mg total) by mouth 2 (two) times a day before meals Afternoon and evening, Disp: , Rfl: 0  •  nystatin (MYCOSTATIN) powder, Apply 1 application. topically in the morning, Disp: , Rfl:   •  nystatin (MYCOSTATIN) powder, Apply 1 application. topically every morning, Disp: , Rfl:   •  omeprazole (PriLOSEC) 20 mg delayed release capsule, Take 1 capsule (20 mg total) by mouth daily, Disp: 90 capsule, Rfl: 3  •  omeprazole (PriLOSEC) 20 mg delayed release capsule, Take 20 mg by mouth daily, Disp: , Rfl:   •  sodium phosphate-biphosphate (FLEET) 7-19 g 118 mL enema, Insert 1 enema into the rectum once as needed, Disp: , Rfl:   •  sodium phosphate-biphosphate (FLEET) 7-19 g 118 mL enema, Insert 1 enema into the rectum daily as needed, Disp: , Rfl:        Please note:  Voice-recognition software may have been used in the preparation of this document. Occasional wrong word or "sound-alike" substitutions may have occurred due to the inherent limitations of voice recognition software. Interpretation should be guided by context.          [address] [place], [place]  7/21/2023  5:06 PM

## 2023-07-21 NOTE — ASSESSMENT & PLAN NOTE
· Heart rate controlled, 73  · Patient denies chest pain/palpitations  · Continue metoprolol 25 mg daily  · Continue apixaban 2.5 mg twice daily

## 2023-07-21 NOTE — ASSESSMENT & PLAN NOTE
· Chronic Cloud catheter secondary to urinary retention  · Catheter exchanged this morning due to leaking, unable to flush  · Patent for CYU  · Patient has history of frequent UTIs   · Outpatient follow-up with urology for possible suprapubic catheter

## 2023-07-27 ENCOUNTER — NURSING HOME VISIT (OUTPATIENT)
Dept: GERIATRICS | Facility: OTHER | Age: 78
End: 2023-07-27
Payer: MEDICARE

## 2023-07-27 VITALS
DIASTOLIC BLOOD PRESSURE: 87 MMHG | WEIGHT: 145.3 LBS | SYSTOLIC BLOOD PRESSURE: 128 MMHG | HEART RATE: 69 BPM | BODY MASS INDEX: 19.71 KG/M2 | OXYGEN SATURATION: 95 % | TEMPERATURE: 97.8 F | RESPIRATION RATE: 95 BRPM

## 2023-07-27 DIAGNOSIS — I48.91 ATRIAL FIBRILLATION, UNSPECIFIED TYPE (HCC): Primary | ICD-10-CM

## 2023-07-27 PROCEDURE — 99309 SBSQ NF CARE MODERATE MDM 30: CPT | Performed by: INTERNAL MEDICINE

## 2023-07-27 NOTE — PROGRESS NOTES
[facility]  [address] [facility] [address] [facility], [address]  [id number]    Nursing Home Admission    NAME: Geroldine Primrose  AGE: 78 y.o. SEX: male [phone]      Patient Location     [facility]        Assessment/Plan:    Atrial fibrillation ([address])  HR stable onToprol-XL  Continue Eliquis          Recurrent UTIs:  History of recurrent UTIs. Currently asymptomatic. Miller catheter remains in place. Follow-up with urology service for possible suprapubic catheter placement. Continue methenamine hippurate. COPD (chronic obstructive pulmonary disease) : Stable. No bronchospasm. Continue fluticasone-vilanterol  and nebulizer treatments. Chronic indwelling Miller catheter:  History of chronic miller / urinary retention  F.U with urology    GERD:  Stable on PPI    History of orthostatic hypotension:  Continue midodrine. Blood pressure was 128/87 today    Moderate protein-calorie malnutrition (HCC)  Assessment & Plan:  Malnutrition Findings:     BMI Findings:   Patient is noted to be weak generalized body muscle wasting body mass index is 17.17 kg/m². Encourage p.o. intake  Follow-up with dietitian       Chief Complaint     Recent hospitalization for near syncope, UTI    HPI     Patient is being seen for a follow-up visit today. He is doing okay at present. Awake alert in no distress. No fever chills or dyspnea. Miller catheter remains in place for urinary retention. Catheter was recently exchanged. Patient has a good appetite. No recent falls. Behaviors have been stable without any episodes of irritability or agitation. No recent weight loss.       Past Medical History:   Diagnosis Date   • A-fib [facility])    • Ambulatory dysfunction    • Anxiety    • Anxiety disorder    • Atrial fibrillation [facility])    • Chronic indwelling Miller catheter    • COPD (chronic obstructive pulmonary disease) (HCC)    • Coronary artery disease    • Depression    • Miller catheter in place    • Hepatitis C     screening negative in 2017   • Hepatitis C    • History of MI (myocardial infarction)    • Hypertension    • MI (myocardial infarction) ([address])    • Urinary catheter in place    • Urinary retention    • Use of cane as ambulatory aid        Past Surgical History:   Procedure Laterality Date   • CARDIAC CATHETERIZATION  2018   • CARDIAC ELECTROPHYSIOLOGY PROCEDURE N/A 2022    Procedure: Cardiac loop recorder implant;  Surgeon: Marbella Denis MD;  Location: [facility] CARD[facility]AC [facility]ATH LAB; Service: Cardiology   • CARDIAC ELECTROPHYSIOLOGY PROCEDURE  2022    Cardiac loop recorder implant; Dr. Marbella Denis   • [address]  2022   • COLONOSCOPY     • COLONOSCOPY     • INGUINAL HERNIA REPAIR Right 2022    Dr. Gordo Cruz   • MD RPR 1ST INGUN HRNA AGE 5 YRS/> REDUCIBLE Right 2022    Procedure: REPAIR HERNIA INGUINAL;  Surgeon: Margaretta Goodpasture Conron, DO;  Location: [facility] Main OR;  Service: General   • TONSILLECTOMY         Social History     Tobacco Use   Smoking Status Former   • Packs/day: 1.50   • Years: 57.00   • Total pack years: 85.50   • Types: Cigarettes   • Quit date: 3/12/2023   • Years since quittin.3   Smokeless Tobacco Never   Tobacco Comments    since age 17; Has history of smoking for over 54 years.  He has been smoking more than packet daily in the past however he has been smokes about half a pack a day lately and stopped smoking on 2018 when he was admitted to the hospital.           Family History   Problem Relation Age of Onset   • Hypertension Mother    • Coronary artery disease Mother         premature   • Diabetes Father    • Coronary artery disease Father         premature   • Hypertension Father         No Known Allergies       Current Outpatient Medications:   •  acetaminophen (TYLENOL) 325 mg tablet, Take 650 mg by mouth every 6 (six) hours as needed, Disp: , Rfl:   •  acetaminophen (TYLENOL) 325 mg tablet, Take 650 mg by mouth every 6 (six) hours as needed for mild pain, Disp: , Rfl:   •  apixaban (ELIQUIS) 2.5 mg, Take 2.5 mg by mouth 2 (two) times a day, Disp: , Rfl:   •  apixaban (Eliquis) 2.5 mg, Take 2.5 mg by mouth 2 (two) times a day, Disp: , Rfl:   •  azelastine (ASTELIN) 0.1 % nasal spray, 1 spray into each nostril 2 (two) times a day as needed for rhinitis Use in each nostril as directed, Disp: , Rfl:   •  azelastine (ASTELIN) 0.1 % nasal spray, 1 spray into each nostril 2 (two) times a day as needed for rhinitis Use in each nostril as directed, Disp: , Rfl:   •  bisacodyl (DULCOLAX) 10 mg suppository, Insert 10 mg into the rectum once as needed, Disp: , Rfl:   •  bisacodyl (Dulcolax) 10 mg suppository, Insert 10 mg into the rectum daily as needed for constipation, Disp: , Rfl:   •  docusate sodium (COLACE) 100 mg capsule, Take 100 mg by mouth 2 (two) times a day, Disp: , Rfl:   •  docusate sodium (COLACE) 100 mg capsule, Take 100 mg by mouth 2 (two) times a day, Disp: , Rfl:   •  ferrous sulfate 325 (65 Fe) mg tablet, Take 325 mg by mouth daily with breakfast, Disp: , Rfl:   •  ferrous sulfate 325 (65 Fe) mg tablet, Take 325 mg by mouth daily with breakfast, Disp: , Rfl:   •  Fluticasone-Salmeterol (Advair Diskus) 100-50 mcg/dose inhaler, Inhale 1 puff 2 (two) times a day Rinse mouth after use., Disp: , Rfl:   •  Fluticasone-Salmeterol (Advair) 100-50 mcg/dose inhaler, Inhale 1 puff 2 (two) times a day Rinse mouth after use., Disp: , Rfl:   •  folic acid (FOLVITE) 390 mcg tablet, Take 400 mcg by mouth daily, Disp: , Rfl:   •  folic acid (FOLVITE) 289 mcg tablet, Take 400 mcg by mouth daily, Disp: , Rfl:   •  ipratropium-albuterol (DUO-NEB) 0.5-2.5 mg/3 mL nebulizer solution, INHALE CONTENTS OF 1 VIAL VIA NEBULIZER FOUR TIMES A DAY, Disp: 60 mL, Rfl: 11  •  ipratropium-albuterol (DUO-NEB) 0.5-2.5 mg/3 mL nebulizer solution, Take 3 mL by nebulization 4 (four) times a day, Disp: , Rfl:   •  meclizine (ANTIVERT) 12.5 MG tablet, Take 12.5 mg by mouth every 8 (eight) hours as needed for dizziness, Disp: , Rfl:   •  meclizine (ANTIVERT) 12.5 MG tablet, Take 12.5 mg by mouth every 8 (eight) hours as needed for dizziness, Disp: , Rfl:   •  methenamine hippurate (HIPREX) 1 g tablet, Take 1 g by mouth 2 (two) times a day with meals, Disp: , Rfl:   •  methenamine hippurate (HIPREX) 1 g tablet, Take 1 g by mouth 2 (two) times a day with meals, Disp: , Rfl:   •  metoprolol succinate (TOPROL-XL) 25 mg 24 hr tablet, Take 25 mg by mouth daily, Disp: , Rfl:   •  metoprolol succinate (TOPROL-XL) 25 mg 24 hr tablet, Take 25 mg by mouth daily, Disp: , Rfl:   •  midodrine (PROAMATINE) 2.5 mg tablet, Take 3 tablets (7.5 mg total) by mouth in the morning, Disp: 90 tablet, Rfl: 0  •  midodrine (PROAMATINE) 5 mg tablet, Take 1 tablet (5 mg total) by mouth 3 (three) times a day as needed (for SBP < 100 or severe dizziness with standing up. Hold for SBP > 130), Disp: 90 tablet, Rfl: 3  •  midodrine (PROAMATINE) 5 mg tablet, Take 1 tablet (5 mg total) by mouth 2 (two) times a day before meals Afternoon and evening, Disp: , Rfl: 0  •  nystatin (MYCOSTATIN) powder, Apply 1 application. topically in the morning, Disp: , Rfl:   •  nystatin (MYCOSTATIN) powder, Apply 1 application. topically every morning, Disp: , Rfl:   •  omeprazole (PriLOSEC) 20 mg delayed release capsule, Take 1 capsule (20 mg total) by mouth daily, Disp: 90 capsule, Rfl: 3  •  omeprazole (PriLOSEC) 20 mg delayed release capsule, Take 20 mg by mouth daily, Disp: , Rfl:   •  sodium phosphate-biphosphate (FLEET) 7-19 g 118 mL enema, Insert 1 enema into the rectum once as needed, Disp: , Rfl:   •  sodium phosphate-biphosphate (FLEET) 7-19 g 118 mL enema, Insert 1 enema into the rectum daily as needed, Disp: , Rfl:     Updated list was reviewed in [facility] system of facility.      Vitals:    07/26/23 1053   BP: 128/87   Pulse: 69   Resp: (!) 95   Temp: 97.8 °F (36.6 °C)   SpO2: 95%       Vital signs were reviewed in [facility] care    Review of Systems   Constitutional: Negative for chills and fever. Respiratory: Negative for shortness of breath and wheezing. Cardiovascular: Negative for chest pain and leg swelling. Gastrointestinal: Negative for abdominal distention, abdominal pain, diarrhea and vomiting. Genitourinary: Negative for flank pain and hematuria. Musculoskeletal: Positive for gait problem. Negative for arthralgias and back pain. Skin: Negative for pallor. Neurological: Positive for weakness (Generalized). Negative for seizures. History of recent fall   Psychiatric/Behavioral: Negative for agitation, behavioral problems and confusion. Physical Exam  Constitutional:       General: He is not in acute distress. HENT:      Head: Normocephalic and atraumatic. Eyes:      General: No scleral icterus. Right eye: No discharge. Left eye: No discharge. Cardiovascular:      Rate and Rhythm: Normal rate and regular rhythm. Pulmonary:      Breath sounds: No wheezing, rhonchi or rales. Abdominal:      General: There is no distension. Palpations: Abdomen is soft. Tenderness: There is no abdominal tenderness. There is no guarding. Musculoskeletal:      Cervical back: Neck supple. Right lower leg: No edema. Left lower leg: No edema. Skin:     Coloration: Skin is not jaundiced. Neurological:      General: No focal deficit present. Cranial Nerves: No cranial nerve deficit. Motor: Weakness (generalized) present. Psychiatric:         Mood and Affect: Mood normal.       Diagnostic Data     Labs done on 7/24/2023 revealed creatinine of 1.1  WBC count 7.9, hemoglobin 13., platelet count 062  Recent labs and imaging studies were reviewed.     Lab Results   Component Value Date    WBC 11.05 (H) 06/04/2023    HGB 12.8 06/04/2023    HCT 40.3 06/04/2023    MCV 93 06/04/2023     06/04/2023        Code Status:      DNR           Portions of the record may have been created with voice recognition software. Occasional wrong word or "sound a like" substitutions may have occurred due to the inherent limitations of voice recognition software. Read the chart carefully and recognize, using context, where substitutions have occurred.     This note was electronically signed by Dr. Alex Land

## 2023-08-04 ENCOUNTER — OFFICE VISIT (OUTPATIENT)
Dept: UROLOGY | Facility: CLINIC | Age: 78
End: 2023-08-04
Payer: MEDICARE

## 2023-08-04 ENCOUNTER — PREP FOR PROCEDURE (OUTPATIENT)
Dept: INTERVENTIONAL RADIOLOGY/VASCULAR | Facility: CLINIC | Age: 78
End: 2023-08-04

## 2023-08-04 ENCOUNTER — TELEPHONE (OUTPATIENT)
Dept: UROLOGY | Facility: CLINIC | Age: 78
End: 2023-08-04

## 2023-08-04 VITALS
OXYGEN SATURATION: 97 % | DIASTOLIC BLOOD PRESSURE: 78 MMHG | HEART RATE: 65 BPM | HEIGHT: 72 IN | SYSTOLIC BLOOD PRESSURE: 130 MMHG | RESPIRATION RATE: 18 BRPM | BODY MASS INDEX: 19.71 KG/M2

## 2023-08-04 DIAGNOSIS — R33.9 URINARY RETENTION: Primary | ICD-10-CM

## 2023-08-04 PROBLEM — N39.0 URINARY TRACT INFECTION ASSOCIATED WITH INDWELLING URETHRAL CATHETER (HCC): Status: RESOLVED | Noted: 2023-06-02 | Resolved: 2023-08-04

## 2023-08-04 PROBLEM — T83.511A URINARY TRACT INFECTION ASSOCIATED WITH INDWELLING URETHRAL CATHETER (HCC): Status: RESOLVED | Noted: 2023-06-02 | Resolved: 2023-08-04

## 2023-08-04 PROBLEM — A41.9 SEPSIS (HCC): Status: RESOLVED | Noted: 2023-06-02 | Resolved: 2023-08-04

## 2023-08-04 PROCEDURE — 99213 OFFICE O/P EST LOW 20 MIN: CPT | Performed by: PHYSICIAN ASSISTANT

## 2023-08-04 NOTE — PROGRESS NOTES
1. Urinary retention  Ambulatory Referral to Interventional Radiology          Assessment and plan:       1. Urinary retention  2. Urethral meatal erosion  3. Recurrent urinary infections    Ongoing issues with infections    Will plan for IR SPT placement. Patient counseled on risks versus benefits. He verbalized understanding    RTC 6 weeks after SPT placement for first exchange    Arlet Mcgovern PA-C      Chief Complaint     Urinary retention    History of Present Illness     Santino Santos is a 78 y.o. male presenting for follow up. In ER 4/21/22 with catheter related issues. Noted to have meatal erosion secondary to catheter. Patient poor historian and unable to determine how long he has had a miller for as well as when the last exchange was performed. Patient was not felt to be a good  for suprapubic tube secondary to his large right inguinal hernia. Ultimately patient underwent right inguinal hernia repair 8/11/2022 with Dr. Xochitl Chawla. Cystoscopy 8/1/22 with Dr. Glen Shaw showing Large capacity, somewhat floppy bladder. Significant debris is noted which somewhat limits visualization however on thorough cystoscopy there are no papillary tumors or signs of neoplasm. Patient was admitted in September 2022 with subdural hematoma. Patient was admitted and November 2022 with urosepsis. Patient was admitted in December 2022 after near syncopal event. Currently on a course of antibiotics for urinary infections     Last PSA 3.7 (3/2/22)     Medical comorbidies include MI, CAD, COPD, cardiomyopathy, afib. Ultrasound of the kidney and bladder 11/22/2022 showing collapsed bladder around Miller catheter without any upper tract abnormalities.       Laboratory     Lab Results   Component Value Date    CREATININE 1.14 06/03/2023       Lab Results   Component Value Date    PSA 3.7 03/02/2022       Review of Systems     Review of Systems   Constitutional: Negative for activity change, appetite change, chills, diaphoresis, fatigue, fever and unexpected weight change. Respiratory: Negative for chest tightness and shortness of breath. Cardiovascular: Negative for chest pain, palpitations and leg swelling. Gastrointestinal: Negative for abdominal distention, abdominal pain, constipation, diarrhea, nausea and vomiting. Genitourinary: Cloud catheter dependent   Musculoskeletal: Negative for back pain, gait problem and myalgias. Skin: Negative for color change, pallor, rash and wound. Psychiatric/Behavioral: Negative for behavioral problems. The patient is not nervous/anxious. Allergies     No Known Allergies    Physical Exam     Physical Exam  Constitutional:       General: He is not in acute distress. Appearance: Normal appearance. He is normal weight. He is not ill-appearing, toxic-appearing or diaphoretic. HENT:      Head: Normocephalic and atraumatic. Eyes:      General:         Right eye: No discharge. Left eye: No discharge. Conjunctiva/sclera: Conjunctivae normal.   Pulmonary:      Effort: Pulmonary effort is normal. No respiratory distress. Genitourinary:     Comments: Patient's Cloud catheter in place draining clear yellow urine. Significant meatal erosion   Musculoskeletal:         General: No swelling or tenderness. Normal range of motion. Skin:     General: Skin is warm and dry. Coloration: Skin is not jaundiced or pale. Neurological:      General: No focal deficit present. Mental Status: He is alert and oriented to person, place, and time. Psychiatric:         Mood and Affect: Mood normal.         Behavior: Behavior normal.         Thought Content:  Thought content normal.         Vital Signs     Vitals:    08/04/23 1049   BP: 130/78   BP Location: Left arm   Patient Position: Sitting   Cuff Size: Large   Pulse: 65   Resp: 18   SpO2: 97%   Height: 6' (1.829 m)         Current Medications       Current Outpatient Medications:   • acetaminophen (TYLENOL) 325 mg tablet, Take 650 mg by mouth every 6 (six) hours as needed, Disp: , Rfl:   •  apixaban (ELIQUIS) 2.5 mg, Take 2.5 mg by mouth 2 (two) times a day, Disp: , Rfl:   •  azelastine (ASTELIN) 0.1 % nasal spray, 1 spray into each nostril 2 (two) times a day as needed for rhinitis Use in each nostril as directed, Disp: , Rfl:   •  bisacodyl (DULCOLAX) 10 mg suppository, Insert 10 mg into the rectum once as needed, Disp: , Rfl:   •  docusate sodium (COLACE) 100 mg capsule, Take 100 mg by mouth 2 (two) times a day, Disp: , Rfl:   •  ferrous sulfate 325 (65 Fe) mg tablet, Take 325 mg by mouth daily with breakfast, Disp: , Rfl:   •  Fluticasone-Salmeterol (Advair Diskus) 100-50 mcg/dose inhaler, Inhale 1 puff 2 (two) times a day Rinse mouth after use., Disp: , Rfl:   •  folic acid (FOLVITE) 864 mcg tablet, Take 400 mcg by mouth daily, Disp: , Rfl:   •  ipratropium-albuterol (DUO-NEB) 0.5-2.5 mg/3 mL nebulizer solution, INHALE CONTENTS OF 1 VIAL VIA NEBULIZER FOUR TIMES A DAY, Disp: 60 mL, Rfl: 11  •  meclizine (ANTIVERT) 12.5 MG tablet, Take 12.5 mg by mouth every 8 (eight) hours as needed for dizziness, Disp: , Rfl:   •  methenamine hippurate (HIPREX) 1 g tablet, Take 1 g by mouth 2 (two) times a day with meals, Disp: , Rfl:   •  metoprolol succinate (TOPROL-XL) 25 mg 24 hr tablet, Take 25 mg by mouth daily, Disp: , Rfl:   •  midodrine (PROAMATINE) 2.5 mg tablet, Take 3 tablets (7.5 mg total) by mouth in the morning, Disp: 90 tablet, Rfl: 0  •  nystatin (MYCOSTATIN) powder, Apply 1 application.  topically every morning, Disp: , Rfl:   •  omeprazole (PriLOSEC) 20 mg delayed release capsule, Take 1 capsule (20 mg total) by mouth daily, Disp: 90 capsule, Rfl: 3  •  sodium phosphate-biphosphate (FLEET) 7-19 g 118 mL enema, Insert 1 enema into the rectum once as needed, Disp: , Rfl:   •  acetaminophen (TYLENOL) 325 mg tablet, Take 650 mg by mouth every 6 (six) hours as needed for mild pain (Patient not taking: Reported on 8/4/2023), Disp: , Rfl:   •  apixaban (Eliquis) 2.5 mg, Take 2.5 mg by mouth 2 (two) times a day (Patient not taking: Reported on 8/4/2023), Disp: , Rfl:   •  azelastine (ASTELIN) 0.1 % nasal spray, 1 spray into each nostril 2 (two) times a day as needed for rhinitis Use in each nostril as directed (Patient not taking: Reported on 8/4/2023), Disp: , Rfl:   •  bisacodyl (Dulcolax) 10 mg suppository, Insert 10 mg into the rectum daily as needed for constipation (Patient not taking: Reported on 8/4/2023), Disp: , Rfl:   •  docusate sodium (COLACE) 100 mg capsule, Take 100 mg by mouth 2 (two) times a day (Patient not taking: Reported on 8/4/2023), Disp: , Rfl:   •  ferrous sulfate 325 (65 Fe) mg tablet, Take 325 mg by mouth daily with breakfast (Patient not taking: Reported on 8/4/2023), Disp: , Rfl:   •  Fluticasone-Salmeterol (Advair) 100-50 mcg/dose inhaler, Inhale 1 puff 2 (two) times a day Rinse mouth after use. (Patient not taking: Reported on 8/4/2023), Disp: , Rfl:   •  folic acid (FOLVITE) 300 mcg tablet, Take 400 mcg by mouth daily (Patient not taking: Reported on 8/4/2023), Disp: , Rfl:   •  ipratropium-albuterol (DUO-NEB) 0.5-2.5 mg/3 mL nebulizer solution, Take 3 mL by nebulization 4 (four) times a day (Patient not taking: Reported on 8/4/2023), Disp: , Rfl:   •  meclizine (ANTIVERT) 12.5 MG tablet, Take 12.5 mg by mouth every 8 (eight) hours as needed for dizziness (Patient not taking: Reported on 8/4/2023), Disp: , Rfl:   •  methenamine hippurate (HIPREX) 1 g tablet, Take 1 g by mouth 2 (two) times a day with meals (Patient not taking: Reported on 8/4/2023), Disp: , Rfl:   •  metoprolol succinate (TOPROL-XL) 25 mg 24 hr tablet, Take 25 mg by mouth daily (Patient not taking: Reported on 8/4/2023), Disp: , Rfl:   •  midodrine (PROAMATINE) 5 mg tablet, Take 1 tablet (5 mg total) by mouth 3 (three) times a day as needed (for SBP < 100 or severe dizziness with standing up.  Hold for SBP > 130) (Patient not taking: Reported on 8/4/2023), Disp: 90 tablet, Rfl: 3  •  midodrine (PROAMATINE) 5 mg tablet, Take 1 tablet (5 mg total) by mouth 2 (two) times a day before meals Afternoon and evening (Patient not taking: Reported on 8/4/2023), Disp: , Rfl: 0  •  nystatin (MYCOSTATIN) powder, Apply 1 application.  topically in the morning (Patient not taking: Reported on 8/4/2023), Disp: , Rfl:   •  omeprazole (PriLOSEC) 20 mg delayed release capsule, Take 20 mg by mouth daily (Patient not taking: Reported on 8/4/2023), Disp: , Rfl:   •  sodium phosphate-biphosphate (FLEET) 7-19 g 118 mL enema, Insert 1 enema into the rectum daily as needed (Patient not taking: Reported on 8/4/2023), Disp: , Rfl:       Active Problems     Patient Active Problem List   Diagnosis   • Symptomatic anemia   • Congestive cardiomyopathy ([address])   • Permanent atrial fibrillation ([address])   • Smoker   • Orthostatic hypotension   • Syncope and collapse   • Colonic adenoma   • Ambulatory dysfunction   • COPD (chronic obstructive pulmonary disease) ([facility])   • Bradycardia   • Thrombocytosis   • Head trauma   • Closed nondisplaced comminuted fracture of left patella   • Essential (hemorrhagic) thrombocythemia ([facility])   • Dizziness   • Urinary retention   • Abnormal colonoscopy   • Iron deficiency anemia likely secondary to GI bleeding   • Cholestatic jaundice   • Moderate protein-calorie malnutrition ([facility])   • Nasal congestion   • Recurrent right inguinal hernia   • SDH (subdural hematoma) ([facility])   • Surgical wound present   • Swelling of lower leg   • Gastroesophageal reflux disease without esophagitis   • Fall   • Abrasions of multiple sites   • Atrial fibrillation ([address])   • Nicotine abuse   • COPD (chronic obstructive pulmonary disease) ([facility])   • Chronic indwelling Cloud catheter   • Orthostatic hypotension   • Generalized weakness   • History of recurrent UTIs   • Debility   • Atrial fibrillation ([facility])   • COPD (chronic obstructive pulmonary disease) ([address])   • Chronic indwelling Cloud catheter   • SDH (subdural hematoma) (HCC)   • Syncope and collapse   • Malnutrition ([address])   • Fall   • Cloud catheter in place   • [facility])   • COPD (chronic obstructive pulmonary disease) (HCC)   • Sepsis (HCC)   • Urinary tract infection associated with indwelling urethral catheter (HCC)   • CHF (congestive heart failure) (HCC)   • Moderate protein-calorie malnutrition (HCC)   • Cerumen debris on tympanic membrane of both ears         Past Medical History     Past Medical History:   Diagnosis Date   • [facility])    • Ambulatory dysfunction    • Anxiety    • Anxiety disorder    • Atrial fibrillation (HCC)    • Chronic indwelling Cloud catheter    • COPD (chronic obstructive pulmonary disease) (HCC)    • Coronary artery disease    • Depression    • Cloud catheter in place    • Hepatitis C     screening negative in 1/2017   • Hepatitis C    • History of MI (myocardial infarction)    • Hypertension    • MI (myocardial infarction) ([address])    • Urinary catheter in place    • Urinary retention    • Use of cane as ambulatory aid          Surgical History     Past Surgical History:   Procedure Laterality Date   • CARDIAC CATHETERIZATION  07/09/2018   • CARDIAC ELECTROPHYSIOLOGY PROCEDURE N/A 9/30/2022    Procedure: Cardiac loop recorder implant;  Surgeon: Jerica Bejarano MD;  [facility];   Service: Cardiology   • CARDIAC ELECTROPHYSIOLOGY PROCEDURE  09/30/2022    Cardiac loop recorder implant; Dr. Jerica Bejarano   • [address] [place] [address] Drive  09/2022   • COLONOSCOPY     • COLONOSCOPY     • INGUINAL HERNIA REPAIR Right 08/11/2022    Dr. Jeff Smith   • IN RPR 1ST INGUN HRNA AGE 5 YRS/> REDUCIBLE Right 8/11/2022    Procedure: REPAIR HERNIA INGUINAL;  Surgeon: Pari Encarnacion DO;  Location: [facility];  Service: General   • TONSILLECTOMY           Family History     Family History   Problem Relation Age of Onset   • Hypertension Mother    • Coronary artery disease Mother premature   • Diabetes Father    • Coronary artery disease Father         premature   • Hypertension Father          Social History     Social History       Radiology

## 2023-08-04 NOTE — TELEPHONE ENCOUNTER
Please watch for IR appointment to get scheduled then patient needs 6 week follow up with [facility] for first change.

## 2023-08-08 NOTE — TELEPHONE ENCOUNTER
Patient Is scheduled for IR on 9/8. He needs a 6 week change with [facility] after that. Please schedule when Yareils from [employer] calls back.

## 2023-08-10 NOTE — TELEPHONE ENCOUNTER
I called and spoke to [facility] and scheduled patient for 10/20 at 2 PM for 6 week change after 9/8. Date, time, and location have been reviewed.

## 2023-08-21 ENCOUNTER — REMOTE DEVICE CLINIC VISIT (OUTPATIENT)
Dept: CARDIOLOGY CLINIC | Facility: CLINIC | Age: 78
End: 2023-08-21
Payer: MEDICARE

## 2023-08-21 DIAGNOSIS — Z95.818 PRESENCE OF OTHER CARDIAC IMPLANTS AND GRAFTS: Primary | ICD-10-CM

## 2023-08-21 PROCEDURE — 93298 REM INTERROG DEV EVAL SCRMS: CPT | Performed by: INTERNAL MEDICINE

## 2023-08-21 PROCEDURE — G2066 INTER DEVC REMOTE 30D: HCPCS | Performed by: INTERNAL MEDICINE

## 2023-08-21 NOTE — PROGRESS NOTES
MDT [id number]/ ACTIVE SYSTEM IS MRI CONDITIONAL   CARELINK TRANSMISSION: LOOP RECORDER. PRESENTING RHYTHM AF @ 90 BPM. BATTERY STATUS "OK." 5 DEVICE CLASSIFIED AF EPISODES, LONGEST EPISODE IS 6:46 HOURS, AVAILABLE STRIPS DEMONSTRATE PACs AND ATRIAL FIBRILLATION. AF BURDEN IS 99.0%. AF BURDEN MAYBE OVERESTIMATED DUE TO INAPPROPRIATE CLASSIFICATION OF AF. SOME STRIPS MAY NOT BE INTERPRETABLE OR AVAILABLE, CANNOT DEFINITIVELY RULE OUT ATRIAL FIBRILLATION. PT ON ELIQUIS & METOPROLOL. NORMAL DEVICE FUNCTION.  DL

## 2023-08-29 ENCOUNTER — TELEPHONE (OUTPATIENT)
Dept: RADIOLOGY | Facility: HOSPITAL | Age: 78
End: 2023-08-29

## 2023-08-29 ENCOUNTER — NURSING HOME VISIT (OUTPATIENT)
Dept: GERIATRICS | Facility: OTHER | Age: 78
End: 2023-08-29
Payer: MEDICARE

## 2023-08-29 VITALS
RESPIRATION RATE: 18 BRPM | TEMPERATURE: 97.6 F | HEART RATE: 72 BPM | BODY MASS INDEX: 20.29 KG/M2 | DIASTOLIC BLOOD PRESSURE: 63 MMHG | WEIGHT: 149.6 LBS | OXYGEN SATURATION: 98 % | SYSTOLIC BLOOD PRESSURE: 123 MMHG

## 2023-08-29 DIAGNOSIS — E44.0 MODERATE PROTEIN-CALORIE MALNUTRITION (HCC): ICD-10-CM

## 2023-08-29 DIAGNOSIS — I48.21 PERMANENT ATRIAL FIBRILLATION (HCC): Primary | Chronic | ICD-10-CM

## 2023-08-29 DIAGNOSIS — R26.2 AMBULATORY DYSFUNCTION: ICD-10-CM

## 2023-08-29 DIAGNOSIS — Z97.8 CHRONIC INDWELLING FOLEY CATHETER: Chronic | ICD-10-CM

## 2023-08-29 DIAGNOSIS — K21.9 GASTROESOPHAGEAL REFLUX DISEASE WITHOUT ESOPHAGITIS: ICD-10-CM

## 2023-08-29 PROCEDURE — 99309 SBSQ NF CARE MODERATE MDM 30: CPT

## 2023-08-29 RX ORDER — MIDODRINE HYDROCHLORIDE 2.5 MG/1
5 TABLET ORAL
COMMUNITY

## 2023-08-29 NOTE — PROGRESS NOTES
[address]  [phone]  Machelle Garcia  Code 32 LTC        NAME: Rehan Padilla  AGE: 78 y.o. SEX: male CODE STATUS: No CPR    DATE OF ENCOUNTER: 8/29/2023    Assessment and Plan     1. Permanent atrial fibrillation (HCC)  Assessment & Plan:  · Heart rate controlled, 72  · Patient denies chest pain/palpitations  · Continue metoprolol 25 mg daily  · Continue apixaban 2.5 mg twice daily      2. Chronic indwelling Cloud catheter  Assessment & Plan:  · Chronic Cloud catheter secondary to urinary retention  · Patient has history of frequent UTIs   · Patient had recent follow-up appointment with urology, plan to place suprapubic catheter  · Patient denies urinary symptoms on exam today  · Cloud catheter patent for CYU        3. Ambulatory dysfunction  Assessment & Plan:  Multifactorial secondary to recent fall and chronic medical conditions  Continue PT/OT as needed  Fall/safety precautions  Ensure adequate nutrition/hydration   Monitor CBC/BMP       4. Gastroesophageal reflux disease without esophagitis  Assessment & Plan:  · Denies reflux  · Continue omeprazole 20 mg daily      5. Moderate protein-calorie malnutrition (HCC)  Assessment & Plan:  Malnutrition Findings:      ·  weight gain of approximately 6 pounds  · Continue weekly weights  · Encourage p.o. intake  · Consider supplements      BMI Findings: Body mass index is 20.29 kg/m². All medications and routine orders were reviewed and updated as needed. Chief Complaint     LTC follow up visit  Patient's care was coordinated with nursing facility staff. Recent vitals, labs, and updated medications were review on [facility] system in facility.       Past Medical and Surgical History      Past Medical History:   Diagnosis Date   • A-fib [facility])    • Ambulatory dysfunction    • Anxiety    • Anxiety disorder    • Atrial fibrillation [facility])    • Chronic indwelling Cloud catheter    • COPD (chronic obstructive pulmonary disease) (HCC)    • Coronary artery disease    • Depression    • Cloud catheter in place    • Hepatitis C     screening negative in 1/2017   • Hepatitis C    • History of MI (myocardial infarction)    • Hypertension    • MI (myocardial infarction) ([address])    • Urinary catheter in place    • Urinary retention    • Use of cane as ambulatory aid      Past Surgical History:   Procedure Laterality Date   • CARDIAC CATHETERIZATION  07/09/2018   • CARDIAC ELECTROPHYSIOLOGY PROCEDURE N/A 9/30/2022    Procedure: Cardiac loop recorder implant;  Surgeon: Chaitanya Singer MD;  [facility]; Service: Cardiology   • CARDIAC ELECTROPHYSIOLOGY PROCEDURE  09/30/2022    Cardiac loop recorder implant; Dr. Tamara Louie  09/2022   • COLONOSCOPY     • COLONOSCOPY     • INGUINAL HERNIA REPAIR Right 08/11/2022    Dr. Ragini Costa   • [place] RPR 1ST INGUN HRNA AGE 5 YRS/> REDUCIBLE Right 8/11/2022    Procedure: REPAIR HERNIA INGUINAL;  Surgeon: Marybel Encarnacion DO;  Location: [facility];  Service: General   • TONSILLECTOMY       No Known Allergies       History of Present Illness     TREVOR Hairston is a 78year old male, he is a STR patient of [facility] since 5/16/23. Past Medical Hx including but not limited to Hepatitis C, GERD, COPD, MI, HTN, A-fib, CAD, SDH, urinary retention with chronic Cloud, anxiety, depression seen in collaboration with nursing for medical mgmt and LTC follow up. Reina Galeano was seen and examined at bedside today. He states he is feeling ok today. Patient is a reliable historian and is alert and oriented x3. On exam patient is resting in bed and does not appear to be in distress. He denies pain, sleep disturbance, and has a good appetite. Outpatient follow up with urology, plan for suprapubic catheter placement. He denies CP/SOB/N/V/D. Denies lightheadedness, dizziness, headaches, vision changes.  Patient states they are eating well and staying hydrated. Denies any bowel or bladder issues. Per review of SNF records, Patient is eating 3 meals per day, consuming %. Last documented BM 8/28/2023. No concerns from nursing at this time. The patient's allergies, past medical, surgical, social and family history were reviewed and unchanged. Review of Systems     Review of Systems   Constitutional: Positive for activity change. Negative for appetite change and fever. HENT: Negative. Negative for congestion. Eyes: Negative. Respiratory: Negative. Negative for shortness of breath and wheezing. Cardiovascular: Negative. Negative for chest pain and palpitations. Gastrointestinal: Negative for abdominal distention, abdominal pain, constipation, diarrhea, nausea and vomiting. Endocrine: Negative. Genitourinary: Positive for difficulty urinating. Negative for dysuria, flank pain and hematuria. Chronic Cloud catheter   Musculoskeletal: Positive for gait problem. Skin: Negative. Allergic/Immunologic: Negative. Neurological: Negative for dizziness and headaches. Hematological: Negative. Psychiatric/Behavioral: Negative. Negative for sleep disturbance. Objective     Vitals:   Vitals:    08/29/23 1532   BP: 123/63   Pulse: 72   Resp: 18   Temp: 97.6 °F (36.4 °C)   SpO2: 98%         Physical Exam  Vitals and nursing note reviewed. Constitutional:       General: He is not in acute distress. Appearance: Normal appearance. He is normal weight. He is not ill-appearing. HENT:      Head: Normocephalic and atraumatic. Nose: Nose normal. No congestion. Mouth/Throat:      Mouth: Mucous membranes are moist.   Eyes:      Conjunctiva/sclera: Conjunctivae normal.      Pupils: Pupils are equal, round, and reactive to light. Cardiovascular:      Rate and Rhythm: Normal rate and regular rhythm. Pulses: Normal pulses. Heart sounds: Normal heart sounds.    Pulmonary:      Effort: Pulmonary effort is normal. No respiratory distress. Breath sounds: No wheezing. Abdominal:      General: Bowel sounds are normal. There is no distension. Palpations: Abdomen is soft. Tenderness: There is no abdominal tenderness. Genitourinary:     Comments: Chronic miller  Musculoskeletal:      Right lower leg: No edema. Left lower leg: No edema. Skin:     General: Skin is warm. Neurological:      Mental Status: He is alert and oriented to person, place, and time. Motor: Weakness present. Psychiatric:         Mood and Affect: Mood normal.         Behavior: Behavior normal.         Pertinent Laboratory/Diagnostic Studies:   Reviewed in facility chart-stable      Current Medications   Medications reviewed and updated see facility [facility] for details.       Current Outpatient Medications:   •  midodrine (PROAMATINE) 2.5 mg tablet, Take 5 mg by mouth 2 (two) times a day before meals Do not give if SBP greater than 130, do not give after 5 pm, Disp: , Rfl:   •  acetaminophen (TYLENOL) 325 mg tablet, Take 650 mg by mouth every 6 (six) hours as needed, Disp: , Rfl:   •  apixaban (ELIQUIS) 2.5 mg, Take 2.5 mg by mouth 2 (two) times a day, Disp: , Rfl:   •  azelastine (ASTELIN) 0.1 % nasal spray, 1 spray into each nostril 2 (two) times a day as needed for rhinitis Use in each nostril as directed, Disp: , Rfl:   •  bisacodyl (DULCOLAX) 10 mg suppository, Insert 10 mg into the rectum once as needed, Disp: , Rfl:   •  docusate sodium (COLACE) 100 mg capsule, Take 100 mg by mouth 2 (two) times a day, Disp: , Rfl:   •  ferrous sulfate 325 (65 Fe) mg tablet, Take 325 mg by mouth daily with breakfast, Disp: , Rfl:   •  Fluticasone-Salmeterol (Advair Diskus) 100-50 mcg/dose inhaler, Inhale 1 puff 2 (two) times a day Rinse mouth after use., Disp: , Rfl:   •  folic acid (FOLVITE) 152 mcg tablet, Take 400 mcg by mouth daily, Disp: , Rfl:   •  ipratropium-albuterol (DUO-NEB) 0.5-2.5 mg/3 mL nebulizer solution, INHALE CONTENTS OF 1 VIAL VIA NEBULIZER FOUR TIMES A DAY, Disp: 60 mL, Rfl: 11  •  meclizine (ANTIVERT) 12.5 MG tablet, Take 12.5 mg by mouth every 8 (eight) hours as needed for dizziness, Disp: , Rfl:   •  methenamine hippurate (HIPREX) 1 g tablet, Take 1 g by mouth 2 (two) times a day with meals, Disp: , Rfl:   •  metoprolol succinate (TOPROL-XL) 25 mg 24 hr tablet, Take 25 mg by mouth daily, Disp: , Rfl:   •  midodrine (PROAMATINE) 2.5 mg tablet, Take 3 tablets (7.5 mg total) by mouth in the morning, Disp: 90 tablet, Rfl: 0  •  nystatin (MYCOSTATIN) powder, Apply 1 application. topically every morning, Disp: , Rfl:   •  omeprazole (PriLOSEC) 20 mg delayed release capsule, Take 1 capsule (20 mg total) by mouth daily, Disp: 90 capsule, Rfl: 3  •  sodium phosphate-biphosphate (FLEET) 7-19 g 118 mL enema, Insert 1 enema into the rectum once as needed, Disp: , Rfl:        Please note:  Voice-recognition software may have been used in the preparation of this document. Occasional wrong word or "sound-alike" substitutions may have occurred due to the inherent limitations of voice recognition software. Interpretation should be guided by context.          [address]  8/29/2023  3:49 PM

## 2023-08-29 NOTE — ASSESSMENT & PLAN NOTE
Malnutrition Findings:      ·  weight gain of approximately 6 pounds  · Continue weekly weights  · Encourage p.o. intake  · Consider supplements      BMI Findings: Body mass index is 20.29 kg/m².

## 2023-08-29 NOTE — ASSESSMENT & PLAN NOTE
· Chronic Cloud catheter secondary to urinary retention  · Patient has history of frequent UTIs   · Patient had recent follow-up appointment with urology, plan to place suprapubic catheter  · Patient denies urinary symptoms on exam today  · Cloud catheter patent for CYU

## 2023-08-29 NOTE — ASSESSMENT & PLAN NOTE
· Heart rate controlled, 72  · Patient denies chest pain/palpitations  · Continue metoprolol 25 mg daily  · Continue apixaban 2.5 mg twice daily

## 2023-08-30 ENCOUNTER — TELEPHONE (OUTPATIENT)
Dept: RADIOLOGY | Facility: HOSPITAL | Age: 78
End: 2023-08-30

## 2023-09-08 ENCOUNTER — HOSPITAL ENCOUNTER (OUTPATIENT)
Dept: RADIOLOGY | Facility: HOSPITAL | Age: 78
Discharge: HOME | End: 2023-09-08
Attending: RADIOLOGY

## 2023-09-08 DIAGNOSIS — R33.9 URINARY RETENTION: ICD-10-CM

## 2023-09-15 PROBLEM — H61.23 CERUMEN DEBRIS ON TYMPANIC MEMBRANE OF BOTH EARS: Status: RESOLVED | Noted: 2023-07-17 | Resolved: 2023-09-15

## 2023-09-28 ENCOUNTER — PREP FOR PROCEDURE (OUTPATIENT)
Dept: INTERVENTIONAL RADIOLOGY/VASCULAR | Facility: CLINIC | Age: 78
End: 2023-09-28

## 2023-09-28 ENCOUNTER — NURSING HOME VISIT (OUTPATIENT)
Dept: GERIATRICS | Facility: OTHER | Age: 78
End: 2023-09-28
Payer: MEDICARE

## 2023-09-28 ENCOUNTER — TELEPHONE (OUTPATIENT)
Dept: UROLOGY | Facility: CLINIC | Age: 78
End: 2023-09-28

## 2023-09-28 VITALS
OXYGEN SATURATION: 96 % | SYSTOLIC BLOOD PRESSURE: 124 MMHG | WEIGHT: 154.3 LBS | BODY MASS INDEX: 20.93 KG/M2 | RESPIRATION RATE: 18 BRPM | DIASTOLIC BLOOD PRESSURE: 69 MMHG | HEART RATE: 86 BPM

## 2023-09-28 DIAGNOSIS — I48.21 PERMANENT ATRIAL FIBRILLATION (HCC): Primary | Chronic | ICD-10-CM

## 2023-09-28 DIAGNOSIS — I42.0 CONGESTIVE CARDIOMYOPATHY (HCC): ICD-10-CM

## 2023-09-28 DIAGNOSIS — R33.9 URINARY RETENTION: Primary | ICD-10-CM

## 2023-09-28 DIAGNOSIS — R33.9 URINARY RETENTION: ICD-10-CM

## 2023-09-28 DIAGNOSIS — D50.9 IRON DEFICIENCY ANEMIA, UNSPECIFIED IRON DEFICIENCY ANEMIA TYPE: ICD-10-CM

## 2023-09-28 PROCEDURE — 99309 SBSQ NF CARE MODERATE MDM 30: CPT | Performed by: INTERNAL MEDICINE

## 2023-09-28 NOTE — PROGRESS NOTES
[address] [place] [address] MD FACP-AGSF  Progress Note    NAME: Joann Dugan  AGE: 78 y.o. SEX: male [phone]    DATE OF ENCOUNTER: 9/28/2023    Assessment and Plan     Problem List Items Addressed This Visit        Cardiovascular and Mediastinum    Atrial fibrillation ([address]) - Primary (Chronic)     Patient currently on Eliquis 2.5 mg orally twice a day. Medication will need to be held 24  hours prior to procedure. Congestive cardiomyopathy (HCC)     No evidence of heart failure            Genitourinary    Urinary retention     Patient with chronic indwelling Cloud suprapubic catheter to be placed. Other    Iron deficiency anemia likely secondary to GI bleeding     Anemia  Impression H&H was 12.8 with a hematocrit of 40.33 months ago. All medications and routine orders were reviewed and updated as needed. Plan discussed with: Patient    Chief Complaint     No chief complaint on file. Wants to get his suprapubic catheter     History of Present Illness     Patient is a 78 yr old male who currently resides in long-term care at [facility]. The patient has a Cloud catheter that has been eroding his meatus and will need a suprapubic catheter placed. Patient's other past pertinent medical history includes atrial fibrillation, ambulatory dysfunction, anxiety, COPD, CAD, chronic atrial  fibrillation, history of depression, it is noted that he also has hepatitis C however I see he had a screening negative test in January 2017. He had previous history of myocardial infarction. Patient does use a walker to get around. She denies any active complaints at the time of evaluation he appears to be comfortable.         HISTORY:  Past Surgical History:   Procedure Laterality Date   • CARDIAC CATHETERIZATION  07/09/2018   • CARDIAC ELECTROPHYSIOLOGY PROCEDURE N/A 9/30/2022    Procedure: Cardiac loop recorder implant;  Surgeon: Demetrius Lafleur MD;  [facility];  Service: Cardiology   • CARDIAC ELECTROPHYSIOLOGY PROCEDURE  2022    Cardiac loop recorder implant; Dr. Abrahan Ford   • [address]  2022   • COLONOSCOPY     • COLONOSCOPY     • INGUINAL HERNIA REPAIR Right 2022    Dr. Srinivas Grajeda   • [place] RPR 1ST INGUN HRNA AGE 5 YRS/> REDUCIBLE Right 2022    Procedure: REPAIR HERNIA INGUINAL;  Surgeon: Marylou Armstrong DO;  [facility];  Service: General   • TONSILLECTOMY        Past Medical History:   Diagnosis Date   • A-fib ([address])    • Ambulatory dysfunction    • Anxiety    • Anxiety disorder    • Atrial fibrillation ([address])    • Chronic indwelling Cloud catheter    • COPD (chronic obstructive pulmonary disease) ([address])    • Coronary artery disease    • Depression    • Cloud catheter in place    • Hepatitis C     screening negative in 2017   • Hepatitis C    • History of MI (myocardial infarction)    • Hypertension    • MI (myocardial infarction) ([address])    • Urinary catheter in place    • Urinary retention    • Use of cane as ambulatory aid      Family History   Problem Relation Age of Onset   • Hypertension Mother    • Coronary artery disease Mother         premature   • Diabetes Father    • Coronary artery disease Father         premature   • Hypertension Father      Social History     Socioeconomic History   • Marital status: /Civil Union     Spouse name: None   • Number of children: 3   • Years of education: 12   • Highest education level: 12th grade   Occupational History   • Occupation: retired   Tobacco Use   • Smoking status: Former     Packs/day: 1.50     Years: 57.00     Total pack years: 85.50     Types: Cigarettes     Quit date: 3/12/2023     Years since quittin.5   • Smokeless tobacco: Never   • Tobacco comments:     since age 17; Has history of smoking for over 55 years.  He has been smoking more than packet daily in the past however he has been smokes about half a pack a day lately and stopped smoking on 2018 when he was admitted to the hospital.    Vaping Use   • Vaping Use: Never used   Substance and Sexual Activity   • Alcohol use: Not Currently   • Drug use: Never   • Sexual activity: Not Currently     Partners: Female     Birth control/protection: Condom Male   Other Topics Concern   • None   Social History Narrative    ** Merged History Encounter **         ** Merged History Encounter **         ** Merged History Encounter **         History of Ultra Sound: 2016  History of Stress Test: 2018  History of ECHO: 2018  · Most recent tobacco use screenin2019    · Live alone or with others:   with others      · Diet:   Regular    · Caffeine intake: Moderate    · Guns     present in home:   No    · Asbestos exposure:   No    · TB exposure:   No    · Environmental exposure:   No    · Animal exposure:   No    · Smoke alarm in home:   Yes       Social Determinants of Health     Financial Resource Strain: Not on file   Food Insecurity: No Food Insecurity (2023)    Hunger Vital Sign    • Worried About Running Out of Food in the Last Year: Never true    • Ran Out of Food in the Last Year: Never true   Transportation Needs: No Transportation Needs (2023)    PRAPARE - Transportation    • Lack of Transportation (Medical): No    • Lack of Transportation (Non-Medical): No   Physical Activity: Insufficiently Active (2020)    Exercise Vital Sign    • Days of Exercise per Week: 3 days    • Minutes of Exercise per Session: 30 min   Stress: Stress Concern Present (2020)    [facility]    • Feeling of Stress :  To some extent   Social Connections: Not on file   Intimate Partner Violence: Not on file   Housing Stability: Low Risk  (2023)    Housing Stability Vital Sign    • Unable to Pay for Housing in the Last Year: No    • Number of Places Lived in the Last Year: 1    • Unstable Housing in the Last Year: No       Allergies:  No Known Allergies    Review of Systems     Review of Systems   Constitutional: Negative. HENT: Negative. Eyes: Positive for visual disturbance. Respiratory: Negative. Cardiovascular: Negative. Genitourinary:        Indwelling miller   Musculoskeletal: Negative. Skin: Negative. Neurological: Negative. Psychiatric/Behavioral: Negative.         PHQ-2/9 Depression Screening           Medications and orders       Current Outpatient Medications:   •  acetaminophen (TYLENOL) 325 mg tablet, Take 650 mg by mouth every 6 (six) hours as needed, Disp: , Rfl:   •  apixaban (ELIQUIS) 2.5 mg, Take 2.5 mg by mouth 2 (two) times a day, Disp: , Rfl:   •  azelastine (ASTELIN) 0.1 % nasal spray, 1 spray into each nostril 2 (two) times a day as needed for rhinitis Use in each nostril as directed, Disp: , Rfl:   •  bisacodyl (DULCOLAX) 10 mg suppository, Insert 10 mg into the rectum once as needed, Disp: , Rfl:   •  docusate sodium (COLACE) 100 mg capsule, Take 100 mg by mouth 2 (two) times a day, Disp: , Rfl:   •  ferrous sulfate 325 (65 Fe) mg tablet, Take 325 mg by mouth daily with breakfast, Disp: , Rfl:   •  Fluticasone-Salmeterol (Advair Diskus) 100-50 mcg/dose inhaler, Inhale 1 puff 2 (two) times a day Rinse mouth after use., Disp: , Rfl:   •  folic acid (FOLVITE) 571 mcg tablet, Take 400 mcg by mouth daily, Disp: , Rfl:   •  ipratropium-albuterol (DUO-NEB) 0.5-2.5 mg/3 mL nebulizer solution, INHALE CONTENTS OF 1 VIAL VIA NEBULIZER FOUR TIMES A DAY, Disp: 60 mL, Rfl: 11  •  meclizine (ANTIVERT) 12.5 MG tablet, Take 12.5 mg by mouth every 8 (eight) hours as needed for dizziness, Disp: , Rfl:   •  methenamine hippurate (HIPREX) 1 g tablet, Take 1 g by mouth 2 (two) times a day with meals, Disp: , Rfl:   •  metoprolol succinate (TOPROL-XL) 25 mg 24 hr tablet, Take 25 mg by mouth daily, Disp: , Rfl:   •  midodrine (PROAMATINE) 2.5 mg tablet, Take 3 tablets (7.5 mg total) by mouth in the morning, Disp: 90 tablet, Rfl: 0  •  midodrine (PROAMATINE) 2.5 mg tablet, Take 5 mg by mouth 2 (two) times a day before meals Do not give if SBP greater than 130, do not give after 5 pm, Disp: , Rfl:   •  nystatin (MYCOSTATIN) powder, Apply 1 application. topically every morning, Disp: , Rfl:   •  omeprazole (PriLOSEC) 20 mg delayed release capsule, Take 1 capsule (20 mg total) by mouth daily, Disp: 90 capsule, Rfl: 3  •  sodium phosphate-biphosphate (FLEET) 7-19 g 118 mL enema, Insert 1 enema into the rectum once as needed, Disp: , Rfl:        Objective     Vitals:   Vitals:    09/28/23 1453   BP: 124/69   Pulse: 86   Resp: 18   SpO2: 96%   Weight: 70 kg (154 lb 4.8 oz)       Physical Exam  Constitutional:       Appearance: Normal appearance. HENT:      Right Ear: Ear canal and external ear normal.      Left Ear: Ear canal and external ear normal.      Nose: Nose normal.      Mouth/Throat:      Mouth: Mucous membranes are dry. Eyes:      Conjunctiva/sclera: Conjunctivae normal.      Pupils: Pupils are equal, round, and reactive to light. Cardiovascular:      Rate and Rhythm: Normal rate. Rhythm irregular. Pulses: Normal pulses. Heart sounds: Normal heart sounds. Pulmonary:      Effort: Pulmonary effort is normal.      Breath sounds: Normal breath sounds. Abdominal:      General: Bowel sounds are normal.      Palpations: Abdomen is soft. Genitourinary:     Comments: Eroded meatus indwelling miller. Musculoskeletal:         General: Normal range of motion. Skin:     General: Skin is dry. Neurological:      General: No focal deficit present. Mental Status: He is alert and oriented to person, place, and time. Psychiatric:         Mood and Affect: Mood normal.         Behavior: Behavior normal.         Pertinent Laboratory/Diagnostic Studies: The following labs/studies were reviewed please see facility chart for details.

## 2023-09-28 NOTE — ASSESSMENT & PLAN NOTE
Patient currently on Eliquis 2.5 mg orally twice a day. Medication will need to be held 24  hours prior to procedure.

## 2023-09-28 NOTE — TELEPHONE ENCOUNTER
Please watch to make sure this gets scheduled and if not, we will need to call IR to try to get a hold of patient since they schedule their own appointments.

## 2023-09-28 NOTE — TELEPHONE ENCOUNTER
I received a phone call from the patient's rehab physician Dr. Christine Dueñas    There have been some communication and scheduling issues getting the patient's IR suprapubic tube placed. 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 Could you help get this expedited for the patient.   Thank you

## 2023-10-15 ENCOUNTER — TELEPHONE (OUTPATIENT)
Dept: OTHER | Facility: OTHER | Age: 78
End: 2023-10-15

## 2023-10-31 ENCOUNTER — NURSING HOME VISIT (OUTPATIENT)
Dept: GERIATRICS | Facility: OTHER | Age: 78
End: 2023-10-31
Payer: MEDICARE

## 2023-10-31 DIAGNOSIS — I48.21 PERMANENT ATRIAL FIBRILLATION (HCC): Chronic | ICD-10-CM

## 2023-10-31 DIAGNOSIS — I95.1 ORTHOSTATIC HYPOTENSION: Chronic | ICD-10-CM

## 2023-10-31 DIAGNOSIS — R26.2 AMBULATORY DYSFUNCTION: ICD-10-CM

## 2023-10-31 DIAGNOSIS — I50.9 CONGESTIVE HEART FAILURE, UNSPECIFIED HF CHRONICITY, UNSPECIFIED HEART FAILURE TYPE (HCC): ICD-10-CM

## 2023-10-31 DIAGNOSIS — N31.9 NEUROGENIC BLADDER: Primary | ICD-10-CM

## 2023-10-31 DIAGNOSIS — J44.9 CHRONIC OBSTRUCTIVE PULMONARY DISEASE, UNSPECIFIED COPD TYPE (HCC): Chronic | ICD-10-CM

## 2023-10-31 PROCEDURE — 99309 SBSQ NF CARE MODERATE MDM 30: CPT | Performed by: NURSE PRACTITIONER

## 2023-10-31 NOTE — PROGRESS NOTES
[address], [address] [place] [address] [facility] [address]  [phone]    NAME: Noam Fatima  AGE: 78 y.o. SEX: male    Progress Note    Location: [facility]   POS: 32 (LTC)    Assessment/Plan:    Neurogenic bladder  Indwelling Cloud catheter edema. Staff unable to place back. Patient voids with no difficulties. Remains on as needed bladder scan to rule out urinary retention. Denies pain or discomfort. Continue to monitor  Patient will have a follow-up with urology for suprapubic catheter evaluation    Atrial fibrillation ([address])  HR stable  Has a loop recorder  Denies cp or palpitations  Rate control with metoprolol 25 mg daily   C/w  Eliquis 2.5 mg BID  No overt bleeding, petechiae or bruising   F/u with cardiology OP    CHF (congestive heart failure) ([address])  Wt Readings from Last 3 Encounters:   09/28/23 70 kg (154 lb 4.8 oz)   08/29/23 67.9 kg (149 lb 9.6 oz)   07/26/23 65.9 kg (145 lb 4.8 oz)   Clinically euvolemic. Denies dyspnea or cough  LVEF 50% low normal. Concentric hypertrophy  . Orthostatic hypotension  With known chronic history however no current falls  Denies lightheaded or dizziness at the time of my evaluation  C/w midodrine 7.5 mg TID with meals   Encourage adequate hydration and change positions slowly      COPD (chronic obstructive pulmonary disease) (HCC)  Pt known to have a longstanding history of tobacco abuse and smoking. Respiratory status stable on RA. SaO2 97% RA  Denies SOB or cough   C/w ipratropium albuterol daily and fluticasone salmeterol twice a day       Ambulatory dysfunction  Multifactorial in the setting of acute/chronic medical conditions  Ambulated with assistive walker or cane   Fall and safety precautions  Supportive care        Chief complaint / Reason for visit: LTC Monthly Visit     History of Present Illness:  Patient is a 68-year-old male seen and examined at bedside today for long-term care monthly visit.   Received patient lying in bed, resting. At the time of my evaluation patient reported feeling okay. Appears comfortable and is in no acute distress. Denies pain. Patient had an indwelling catheter which came off. Voiding with no difficulties. PRN bladder scan. Reports tolerating diet with good appetite. Sleeping with no difficulties. Free of anginal symptoms. Denies dyspnea, cough, fever, chills, auscultation, chest pain, or visual difficulties. No other concerns or issues at this time. Review of Systems:  Per history of present illness, all other systems reviewed and negative. Other than those noted in HPI    HISTORY:  Medical Hx: Reviewed, unchanged  Family Hx: Reviewed, unchanged  Soc Hx: Reviewed,  unchanged    ALLERGY: Reviewed, unchanged  No Known Allergies     PHYSICAL EXAM:  Vital Signs: Pressure 121/87, pulse 65, respirations 16, temperature 97.4, O2 sat 97% room air  Weight: 155. 3 pounds    General: NAD, cooperative and supportive  Head: Atraumatic. Normocephalic. Eye Exam: anicteric sclera, no discharge, PERRLA, No injection  Oral Exam: moist mucous membranes, no buccaloropharyngeal erythema, palatine tonsils WNL. Neck Exam: no anterior cervical lymphadenopathy noted, neck supple  Cardiovascular: regular rate, regular rhythm, no murmurs, rubs, or gallops  Pulmonary: no wheeze, no rhonchi, no rales. No chest tenderness  Abdominal: soft, non-tender, nondistended, bowel sounds audible x 4 quadrants. Denies nausea, vomiting, diarrhea,, or constipation  : Non distended bladder. Denies nocturia or hematuria  Extremities and skin: no edema noted, no rashes. Neurological: alert, cooperative and responsive, Oriented to self moving all 4 extremities symmetrically    Laboratory results / Imaging reviewed: Hard copy/ies in medical chart: Reviewed from [facility]    Current Medications:   All medications reviewed and updated in Nursing Home Chart    Please note:  Voice-recognition software may have been used in the preparation of this document. Occasional wrong word or "sound-alike" substitutions may have occurred due to the inherent limitations of voice recognition software. Interpretation should be guided by context.     Steven Harris, [facility]  10/31/2023

## 2023-11-01 PROBLEM — N31.9 NEUROGENIC BLADDER: Status: ACTIVE | Noted: 2023-11-01

## 2023-11-02 NOTE — ASSESSMENT & PLAN NOTE
Multifactorial in the setting of acute/chronic medical conditions  Ambulated with assistive walker or cane   Fall and safety precautions  Supportive care

## 2023-11-02 NOTE — ASSESSMENT & PLAN NOTE
Indwelling Cloud catheter edema. Staff unable to place back. Patient voids with no difficulties. Remains on as needed bladder scan to rule out urinary retention. Denies pain or discomfort.   Continue to monitor  Patient will have a follow-up with urology for suprapubic catheter evaluation

## 2023-11-02 NOTE — ASSESSMENT & PLAN NOTE
Wt Readings from Last 3 Encounters:   09/28/23 70 kg (154 lb 4.8 oz)   08/29/23 67.9 kg (149 lb 9.6 oz)   07/26/23 65.9 kg (145 lb 4.8 oz)   Clinically euvolemic. Denies dyspnea or cough  LVEF 50% low normal. Concentric hypertrophy  .

## 2023-11-02 NOTE — ASSESSMENT & PLAN NOTE
Pt known to have a longstanding history of tobacco abuse and smoking. Respiratory status stable on RA.  SaO2 97% RA  Denies SOB or cough   C/w ipratropium albuterol daily and fluticasone salmeterol twice a day

## 2023-11-02 NOTE — ASSESSMENT & PLAN NOTE
With known chronic history however no current falls  Denies lightheaded or dizziness at the time of my evaluation  C/w midodrine 7.5 mg TID with meals   Encourage adequate hydration and change positions slowly

## 2023-11-02 NOTE — ASSESSMENT & PLAN NOTE
HR stable  Has a loop recorder  Denies cp or palpitations  Rate control with metoprolol 25 mg daily   C/w  Eliquis 2.5 mg BID  No overt bleeding, petechiae or bruising   F/u with cardiology OP

## 2023-11-14 ENCOUNTER — HOSPITAL ENCOUNTER (OUTPATIENT)
Dept: RADIOLOGY | Facility: HOSPITAL | Age: 78
Discharge: HOME | End: 2023-11-14
Attending: RADIOLOGY
Payer: MEDICARE

## 2023-11-14 VITALS
DIASTOLIC BLOOD PRESSURE: 95 MMHG | TEMPERATURE: 97.9 F | HEIGHT: 72 IN | WEIGHT: 152 LBS | OXYGEN SATURATION: 98 % | SYSTOLIC BLOOD PRESSURE: 141 MMHG | RESPIRATION RATE: 14 BRPM | HEART RATE: 104 BPM | BODY MASS INDEX: 20.59 KG/M2

## 2023-11-14 DIAGNOSIS — R33.9 URINARY RETENTION: ICD-10-CM

## 2023-11-14 LAB
ERYTHROCYTE [DISTWIDTH] IN BLOOD BY AUTOMATED COUNT: 13 % (ref 11.6–15.1)
HCT VFR BLD AUTO: 45.4 % (ref 36.5–49.3)
HGB BLD-MCNC: 14.6 G/DL (ref 12–17)
INR PPP: 0.99 (ref 0.84–1.19)
MCH RBC QN AUTO: 30.3 PG (ref 26.8–34.3)
MCHC RBC AUTO-ENTMCNC: 32.2 G/DL (ref 31.4–37.4)
MCV RBC AUTO: 94 FL (ref 82–98)
PLATELET # BLD AUTO: 207 THOUSANDS/UL (ref 149–390)
PMV BLD AUTO: 9.9 FL (ref 8.9–12.7)
PROTHROMBIN TIME: 13.7 SECONDS (ref 11.6–14.5)
RBC # BLD AUTO: 4.82 MILLION/UL (ref 3.88–5.62)
WBC # BLD AUTO: 8.76 THOUSAND/UL (ref 4.31–10.16)

## 2023-11-14 PROCEDURE — 85027 COMPLETE CBC AUTOMATED: CPT | Performed by: RADIOLOGY

## 2023-11-14 PROCEDURE — 85610 PROTHROMBIN TIME: CPT | Performed by: RADIOLOGY

## 2023-11-14 NOTE — QUICK NOTE
Spoke with IR nurse Nehal Espinoza RN informed Pauline Garcia of Patient taking Eliquis on11/13/23 and issues with scheduling and patient need further evaluation for voiding by urology office.

## 2023-11-14 NOTE — QUICK NOTE
Patient arrived to [facility] ambulatory with a walker. Patient stated,"They woke me up out of a sound sleep and did not tell me anything." Nursing home contacted. Spoke with Wilhemena Kayser who stated,"I have been on vacation for the past week. That appointment for [facility] should have been cancelled. Patient is voiding on his own. I checked with my supervisor and said to send Mr. Yosef Kwan back, and the situation will be clarified." Patient confirmed verbally he is voiding on his own. Will contact IR. Patient did have two doses of Eliquis 11/13/22 last being 2000.

## 2023-11-20 ENCOUNTER — REMOTE DEVICE CLINIC VISIT (OUTPATIENT)
Dept: CARDIOLOGY CLINIC | Facility: CLINIC | Age: 78
End: 2023-11-20
Payer: MEDICARE

## 2023-11-20 DIAGNOSIS — Z95.818 PRESENCE OF OTHER CARDIAC IMPLANTS AND GRAFTS: Primary | ICD-10-CM

## 2023-11-20 PROCEDURE — 93298 REM INTERROG DEV EVAL SCRMS: CPT | Performed by: INTERNAL MEDICINE

## 2023-11-20 PROCEDURE — G2066 INTER DEVC REMOTE 30D: HCPCS | Performed by: INTERNAL MEDICINE

## 2023-11-20 NOTE — PROGRESS NOTES
MDT LNQ22/ ACTIVE SYSTEM IS MRI CONDITIONAL   CARELINK TRANSMISSION: LOOP RECORDER. PRESENTING RHYTHM AF @ 60 BPM. BATTERY STATUS "OK." 9 DEVICE CLASSIFIED AF EPISODES, LONGEST EPISODE IS 7:58 HOURS, AVAILABLE STRIPS DEMONSTRATE  ATRIAL FIBRILLATION. AF BURDEN IS 97.1%. AF BURDEN MAYBE OVERESTIMATED DUE TO INAPPROPRIATE CLASSIFICATION OF AF. SOME STRIPS MAY NOT BE INTERPRETABLE OR AVAILABLE, CANNOT DEFINITIVELY RULE OUT ATRIAL FIBRILLATION. PT ON ELIQUIS & METOPROLOL. NO PATIENT ACTIVATED EPISODES. NORMAL DEVICE FUNCTION.  DL

## 2023-11-29 ENCOUNTER — NURSING HOME VISIT (OUTPATIENT)
Dept: GERIATRICS | Facility: OTHER | Age: 78
End: 2023-11-29
Payer: MEDICARE

## 2023-11-29 VITALS
SYSTOLIC BLOOD PRESSURE: 136 MMHG | RESPIRATION RATE: 18 BRPM | TEMPERATURE: 97.4 F | HEART RATE: 65 BPM | DIASTOLIC BLOOD PRESSURE: 88 MMHG | WEIGHT: 154.2 LBS | OXYGEN SATURATION: 99 % | BODY MASS INDEX: 20.91 KG/M2

## 2023-11-29 DIAGNOSIS — I48.91 ATRIAL FIBRILLATION, UNSPECIFIED TYPE (HCC): Primary | ICD-10-CM

## 2023-11-29 PROCEDURE — 99309 SBSQ NF CARE MODERATE MDM 30: CPT | Performed by: INTERNAL MEDICINE

## 2023-11-29 NOTE — PROGRESS NOTES
[address] [place], [place], [facility]    Progress Note  Code [facility] 32    Patient Location     [address] rehab    Reason for visit     Follow-up A-fib, anxiety, CAD, COPD, history of hepatitis C, hypertension, MI    Patient’s care was coordinated with nursing facility staff. Recent vitals, labs and updated medications were reviewed on [facility] system of facility. Problem List Items Addressed This Visit          Cardiovascular and Mediastinum    Atrial fibrillation ([address]) - Primary     HR stable onToprol-XL  Continue Eliquis               Recurrent UTIs:  History of recurrent UTIs. Currently asymptomatic. Continue methenamine hippurate. Cloud is out. Pt has been voiding ok. Previously straight cath revealed PVR of 70 cc         COPD (chronic obstructive pulmonary disease) : Stable. Continue fluticasone-vilanterol  and nebulizer treatments. GERD:  Stable on PPI     History of orthostatic hypotension:  Pt remains on midodrine 7.5 mg q am and 5 mg bid. Blood pressure readings have been stable    Anemia:  HGB was stable at 13.7 in July/23. DC iron supplements. Repeat CBC in jan /24  HPI       Patient is being seen for a follow-up visit today. He is doing okay at present. Denies any dyspnea chest pain GI or  complaints. Patient had dislodgment of Cloud catheter few weeks ago. Cloud catheter could not be reinserted by the nursing staff. Cloud was left out. Bladder scans/ Straight cath were okay. Patient denies any urinary complaints including dysuria or hematuria  Patient has gained around 13 pounds during the last 3 months and 20 pounds during the last 6 months. Appetite is good. No recent falls. Behaviors have been stable      Review of Systems   Constitutional:  Positive for unexpected weight change (Wt gain of 20 lbs during the last 6 months). Negative for chills and fever.    Respiratory:  Negative for shortness of breath, wheezing and stridor. Cardiovascular:  Negative for chest pain and leg swelling. Gastrointestinal:  Negative for abdominal distention, abdominal pain and vomiting. Genitourinary:  Negative for flank pain. Musculoskeletal:  Negative for arthralgias and back pain. Neurological:  Negative for seizures and syncope. Past Medical History:   Diagnosis Date    A-fib [facility])     Ambulatory dysfunction     Anxiety     Anxiety disorder     Atrial fibrillation (HCC)     Chronic indwelling Cloud catheter     COPD (chronic obstructive pulmonary disease) (HCC)     Coronary artery disease     Depression     Cloud catheter in place     Hepatitis C     screening negative in 2017    Hepatitis C     History of MI (myocardial infarction)     Hypertension     MI (myocardial infarction) ([address])     Urinary catheter in place     Urinary retention     Use of cane as ambulatory aid        Past Surgical History:   Procedure Laterality Date    CARDIAC CATHETERIZATION  2018    CARDIAC ELECTROPHYSIOLOGY PROCEDURE N/A 2022    Procedure: Cardiac loop recorder implant;  Surgeon: Cory Daly MD;  Location: [facility]; Service: Cardiology    CARDIAC ELECTROPHYSIOLOGY PROCEDURE  2022    Cardiac loop recorder implant; Dr. Galo De La Cruz  2022    COLONOSCOPY      COLONOSCOPY      Anatoliy Alves Right 2022    Dr. Lauren Myers AGE 7 YRS/> REDUCIBLE Right 2022    Procedure: REPAIR HERNIA INGUINAL;  Surgeon: Shakir Encarnacion DO;  Location: [facility];  Service: General    TONSILLECTOMY         Social History     Tobacco Use   Smoking Status Former    Packs/day: 1.50    Years: 57.00    Total pack years: 85.50    Types: Cigarettes    Quit date: 3/12/2023    Years since quittin.7   Smokeless Tobacco Never   Tobacco Comments    since age 17; Has history of smoking for over 54 years.  He has been smoking more than packet daily in the past however he has been smokes about half a pack a day lately and stopped smoking on 7/1/2018 when he was admitted to the hospital.        Family History   Problem Relation Age of Onset    Hypertension Mother     Coronary artery disease Mother         premature    Diabetes Father     Coronary artery disease Father         premature    Hypertension Father         No Known Allergies      Current Outpatient Medications:     acetaminophen (TYLENOL) 325 mg tablet, Take 650 mg by mouth every 6 (six) hours as needed, Disp: , Rfl:     apixaban (ELIQUIS) 2.5 mg, Take 2.5 mg by mouth 2 (two) times a day, Disp: , Rfl:     azelastine (ASTELIN) 0.1 % nasal spray, 1 spray into each nostril 2 (two) times a day as needed for rhinitis Use in each nostril as directed, Disp: , Rfl:     bisacodyl (DULCOLAX) 10 mg suppository, Insert 10 mg into the rectum once as needed, Disp: , Rfl:     docusate sodium (COLACE) 100 mg capsule, Take 100 mg by mouth 2 (two) times a day, Disp: , Rfl:     ferrous sulfate 325 (65 Fe) mg tablet, Take 325 mg by mouth daily with breakfast, Disp: , Rfl:     Fluticasone-Salmeterol (Advair Diskus) 100-50 mcg/dose inhaler, Inhale 1 puff 2 (two) times a day Rinse mouth after use., Disp: , Rfl:     folic acid (FOLVITE) 576 mcg tablet, Take 400 mcg by mouth daily, Disp: , Rfl:     ipratropium-albuterol (DUO-NEB) 0.5-2.5 mg/3 mL nebulizer solution, INHALE CONTENTS OF 1 VIAL VIA NEBULIZER FOUR TIMES A DAY, Disp: 60 mL, Rfl: 11    meclizine (ANTIVERT) 12.5 MG tablet, Take 12.5 mg by mouth every 8 (eight) hours as needed for dizziness, Disp: , Rfl:     methenamine hippurate (HIPREX) 1 g tablet, Take 1 g by mouth 2 (two) times a day with meals, Disp: , Rfl:     metoprolol succinate (TOPROL-XL) 25 mg 24 hr tablet, Take 25 mg by mouth daily, Disp: , Rfl:     midodrine (PROAMATINE) 2.5 mg tablet, Take 3 tablets (7.5 mg total) by mouth in the morning, Disp: 90 tablet, Rfl: 0    midodrine (PROAMATINE) 2.5 mg tablet, Take 5 mg by mouth 2 (two) times a day before meals Do not give if SBP greater than 130, do not give after 5 pm, Disp: , Rfl:     nystatin (MYCOSTATIN) powder, Apply 1 application. topically every morning, Disp: , Rfl:     omeprazole (PriLOSEC) 20 mg delayed release capsule, Take 1 capsule (20 mg total) by mouth daily, Disp: 90 capsule, Rfl: 3    sodium phosphate-biphosphate (FLEET) 7-19 g 118 mL enema, Insert 1 enema into the rectum once as needed, Disp: , Rfl:     Updated list was reviewed in [facility] of facility. Vitals:    11/29/23 1411   BP: 136/88   Pulse: 65   Resp: 18   Temp: (!) 97.4 °F (36.3 °C)   SpO2: 99%       Physical Exam  Constitutional:       General: He is not in acute distress. HENT:      Head: Normocephalic and atraumatic. Eyes:      General: No scleral icterus. Cardiovascular:      Rate and Rhythm: Normal rate and regular rhythm. Pulmonary:      Breath sounds: No wheezing, rhonchi or rales. Abdominal:      General: There is no distension. Palpations: Abdomen is soft. Tenderness: There is no abdominal tenderness. There is no guarding. Musculoskeletal:      Cervical back: Neck supple. Right lower leg: No edema. Left lower leg: No edema. Skin:     Coloration: Skin is not jaundiced. Neurological:      General: No focal deficit present. Mental Status: He is oriented to person, place, and time. Cranial Nerves: No cranial nerve deficit. Psychiatric:         Mood and Affect: Mood normal.       Diagnostic Data:    Labs from 7/24 reviewed    Portions of the record may have been created with voice recognition software. Occasional wrong word or "sound a like" substitutions may have occurred due to the inherent limitations of voice recognition software. Read the chart carefully and recognize, using context, where substitutions have occurred.     This note was electronically signed by Dr. Tamar Murguia

## 2023-12-27 ENCOUNTER — NURSING HOME VISIT (OUTPATIENT)
Dept: GERIATRICS | Facility: OTHER | Age: 78
End: 2023-12-27
Payer: MEDICARE

## 2023-12-27 DIAGNOSIS — I48.21 PERMANENT ATRIAL FIBRILLATION (HCC): ICD-10-CM

## 2023-12-27 DIAGNOSIS — I95.1 ORTHOSTATIC HYPOTENSION: Chronic | ICD-10-CM

## 2023-12-27 DIAGNOSIS — R26.2 AMBULATORY DYSFUNCTION: ICD-10-CM

## 2023-12-27 DIAGNOSIS — J44.9 CHRONIC OBSTRUCTIVE PULMONARY DISEASE, UNSPECIFIED COPD TYPE (HCC): Chronic | ICD-10-CM

## 2023-12-27 DIAGNOSIS — K21.9 GASTROESOPHAGEAL REFLUX DISEASE WITHOUT ESOPHAGITIS: Primary | ICD-10-CM

## 2023-12-27 PROCEDURE — 99309 SBSQ NF CARE MODERATE MDM 30: CPT | Performed by: NURSE PRACTITIONER

## 2023-12-28 NOTE — PROGRESS NOTES
[address], [address], [place] [address]  [phone]    NAME: Sal Moura  AGE: 78 y.o. SEX: male    Progress Note    Location: [facility]   POS: 32 ([facility])    Assessment/Plan:    Gastroesophageal reflux disease without esophagitis  No acute issues  C/w omeprazole 20 mg daily     COPD (chronic obstructive pulmonary disease) ([facility])  Pt known to have a longstanding history of tobacco abuse and smoking. Respiratory status stable on RA. SaO2 97% RA  Denies SOB or cough   C/w ipratropium albuterol daily and fluticasone salmeterol twice a day       Permanent atrial fibrillation (HCC)  HR acceptable. Has a loop recorder  Denies cp or palpitations  Rate control with metoprolol   C/w  Eliquis 2.5 mg BID  No active bleeding   F/u with cardiology OP    Orthostatic hypotension  With known chronic history however no current falls  Denies lightheaded or dizziness at the time of my evaluation  C/w midodrine 7.5 mg TID with meals   Encourage adequate hydration and change positions slowly      CHF (congestive heart failure) ([facility])  Wt Readings from Last 3 Encounters:   12/30/23 67.9 kg (149 lb 9.6 oz)   11/29/23 69.9 kg (154 lb 3.2 oz)   11/14/23 68.9 kg (152 lb)   Clinically euvolemic. No noted edema. Denies dyspnea or cough  LVEF 50% low normal. Concentric hypertrophy  Monitor weight and fluid status   .         Ambulatory dysfunction  Multifactorial in the setting of acute/chronic medical conditions  Ambulated with assistive walker or cane   Fall and safety precautions  Supportive care    Chief complaint / Reason for visit: [facility] Monthly Visit     History of Present Illness:  Patient is a 77-year-old male seen and examined at bedside today for long-term care monthly visit.  Received patient lying in bed, resting.  At the time of my evaluation patient reported feeling okay.  Appears comfortable and is in no acute distress.  Denies pain. Reports tolerating diet with good appetite.  Sleeping with  "no difficulties.  Free of anginal symptoms.  Denies dyspnea, cough, fever, chills, auscultation, chest pain, or visual difficulties.  No other concerns or issues at this time.     Review of Systems:  Per history of present illness, all other systems reviewed and negative.  Other than those noted in HPI    HISTORY:  Medical Hx: Reviewed, unchanged  Family Hx: Reviewed, unchanged  Soc Hx: Reviewed,  unchanged    ALLERGY: Reviewed, unchanged  No Known Allergies     PHYSICAL EXAM:  Visit Vitals  /86   Pulse 71   Temp 97.6 °F (36.4 °C)   Resp 18   Wt 67.9 kg (149 lb 9.6 oz)   SpO2 97% Comment: ra   BMI 20.29 kg/m²   Smoking Status Former   BSA 1.88 m²      General: NAD, cooperative and supportive  Head: Atraumatic. Normocephalic.  Eye Exam: anicteric sclera, no discharge, PERRLA, No injection  Oral Exam: moist mucous membranes, no buccaloropharyngeal erythema, palatine tonsils WNL.  Neck Exam: no anterior cervical lymphadenopathy noted, neck supple  Cardiovascular: regular rate, regular rhythm, no murmurs, rubs, or gallops  Pulmonary: no wheeze, no rhonchi, no rales. No chest tenderness  Abdominal: soft, non-tender, nondistended, bowel sounds audible x 4 quadrants.  Denies nausea, vomiting, diarrhea,, or constipation  : Non distended bladder.  Denies nocturia or hematuria  Extremities and skin: no edema noted, no rashes.   Neurological: alert, cooperative and responsive, Oriented to self moving all 4 extremities symmetrically    Laboratory results / Imaging reviewed: Hard copy/ies in medical chart: Reviewed from [facility]    Current Medications:  All medications reviewed and updated in Nursing Home Chart    Please note:  Voice-recognition software may have been used in the preparation of this document. Occasional wrong word or \"sound-alike\" substitutions may have occurred due to the inherent limitations of voice recognition software. Interpretation should be guided by context.    GABRIELA Maloney  12/27/2023   "

## 2023-12-30 VITALS
HEART RATE: 71 BPM | DIASTOLIC BLOOD PRESSURE: 86 MMHG | BODY MASS INDEX: 20.29 KG/M2 | OXYGEN SATURATION: 97 % | WEIGHT: 149.6 LBS | TEMPERATURE: 97.6 F | RESPIRATION RATE: 18 BRPM | SYSTOLIC BLOOD PRESSURE: 134 MMHG

## 2023-12-30 RX ORDER — UREA 10 %
1 LOTION (ML) TOPICAL DAILY
COMMUNITY

## 2023-12-30 RX ORDER — MINERAL OIL 100 G/100G
1 OIL RECTAL ONCE AS NEEDED
COMMUNITY

## 2023-12-30 NOTE — ASSESSMENT & PLAN NOTE
Wt Readings from Last 3 Encounters:   12/30/23 67.9 kg (149 lb 9.6 oz)   11/29/23 69.9 kg (154 lb 3.2 oz)   11/14/23 68.9 kg (152 lb)   Clinically euvolemic. No noted edema. Denies dyspnea or cough  LVEF 50% low normal. Concentric hypertrophy  Monitor weight and fluid status   .

## 2023-12-30 NOTE — ASSESSMENT & PLAN NOTE
HR acceptable. Has a loop recorder  Denies cp or palpitations  Rate control with metoprolol   C/w  Eliquis 2.5 mg BID  No active bleeding   F/u with cardiology OP

## 2024-01-26 ENCOUNTER — NURSING HOME VISIT (OUTPATIENT)
Dept: GERIATRICS | Facility: OTHER | Age: 79
End: 2024-01-26
Payer: MEDICARE

## 2024-01-26 VITALS
BODY MASS INDEX: 20.75 KG/M2 | RESPIRATION RATE: 18 BRPM | SYSTOLIC BLOOD PRESSURE: 116 MMHG | TEMPERATURE: 97.7 F | HEART RATE: 76 BPM | WEIGHT: 153 LBS | OXYGEN SATURATION: 97 % | DIASTOLIC BLOOD PRESSURE: 68 MMHG

## 2024-01-26 DIAGNOSIS — I48.21 PERMANENT ATRIAL FIBRILLATION (HCC): Primary | Chronic | ICD-10-CM

## 2024-01-26 PROCEDURE — 99309 SBSQ NF CARE MODERATE MDM 30: CPT | Performed by: INTERNAL MEDICINE

## 2024-01-26 NOTE — PROGRESS NOTES
[facility]  [address]   [address]  [place], [address] [phone]    Progress Note  Code [facility] 32    Patient Location     [facility]    Reason for visit     Follow-up anxiety, A-fib, COPD, hypertension, history of hep C, MI        Patient’s care was coordinated with nursing facility staff. Recent vitals, labs and updated medications were reviewed [facility].     Problem List Items Addressed This Visit          Cardiovascular and Mediastinum    Atrial fibrillation (HCC) - Primary (Chronic)     Heart rate stable on metoprolol XL 25 mg daily.  Continue Eliquis          Recurrent UTIs:  History of recurrent UTIs however patient has been stable during the last few months.  Maintain adequate hydration.  Continue methenamine 1 g twice daily      COPD (chronic obstructive pulmonary disease) :  Stable.  No bronchospasm.  Patient remains on fluticasone-vilanterol  and nebulizer treatments.         GERD:  No acute issues.  Patient remains on omeprazole 20 mg daily.  Will discontinue and follow    History of orthostatic hypotension:  Pt remains on midodrine 7.5 mg q am and 5 mg bid.  Blood pressure readings are stable for the most part except few high diastolic readings are occasionally noted.  Decrease midodrine to 5 mg 3 times daily and follow     Anemia:  Hemoglobin was stable at 13.8 on 1/23/2024.  Patient was previously taken off of iron supplements. DC folic acid      HPI         Patient is being seen for a follow-up visit today.  He is doing okay at present.  Denies any active complaints including chest pain dyspnea GI or  complaints.  Patient is awake alert able to make his needs known.  Cloud catheter was removed few months ago.  Patient has not had any voiding issues.    Review of Systems   Constitutional:  Negative for chills and fever.   Respiratory:  Negative for shortness of breath, wheezing and stridor.    Cardiovascular:  Negative for  chest pain and leg swelling.   Gastrointestinal:  Negative for abdominal distention, diarrhea and rectal pain.   Genitourinary:  Negative for flank pain and hematuria.       Past Medical History:   Diagnosis Date    A-fib (HCC)     Ambulatory dysfunction     Anxiety     Anxiety disorder     Atrial fibrillation (HCC)     Chronic indwelling Cloud catheter     COPD (chronic obstructive pulmonary disease) (HCC)     Coronary artery disease     Depression     Cloud catheter in place     Hepatitis C     screening negative in 2017    Hepatitis C     History of MI (myocardial infarction)     Hypertension     MI (myocardial infarction) (HCC)     Urinary catheter in place     Urinary retention     Use of cane as ambulatory aid        Past Surgical History:   Procedure Laterality Date    CARDIAC CATHETERIZATION  2018    CARDIAC ELECTROPHYSIOLOGY PROCEDURE N/A 2022    Procedure: Cardiac loop recorder implant;  Surgeon: Duke Buckner MD;  Location: [facility];  Service: Cardiology    CARDIAC ELECTROPHYSIOLOGY PROCEDURE  2022    Cardiac loop recorder implant; Dr. Duke Buckner    CARDIAC LOOP RECORDER  2022    COLONOSCOPY      COLONOSCOPY      INGUINAL HERNIA REPAIR Right 2022    Dr. Encarnacion    [place] RPR 1ST INGUN HRNA AGE 5 YRS/> REDUCIBLE Right 2022    Procedure: REPAIR HERNIA INGUINAL;  Surgeon: Ady Encarnacion DO;  Location: [facility];  Service: General    TONSILLECTOMY         Social History     Tobacco Use   Smoking Status Former    Current packs/day: 0.00    Average packs/day: 1.5 packs/day for 57.0 years (85.5 ttl pk-yrs)    Types: Cigarettes    Start date: 3/12/1966    Quit date: 3/12/2023    Years since quittin.8   Smokeless Tobacco Never   Tobacco Comments    since age 14; Has history of smoking for over 55 years. He has been smoking more than packet daily in the past however he has been smokes about half a pack a day lately and stopped smoking on 2018 when he was  admitted to the hospital.        Family History   Problem Relation Age of Onset    Hypertension Mother     Coronary artery disease Mother         premature    Diabetes Father     Coronary artery disease Father         premature    Hypertension Father         No Known Allergies      Current Outpatient Medications:     acetaminophen (TYLENOL) 325 mg tablet, Take 650 mg by mouth every 6 (six) hours as needed, Disp: , Rfl:     apixaban (ELIQUIS) 2.5 mg, Take 2.5 mg by mouth 2 (two) times a day, Disp: , Rfl:     azelastine (ASTELIN) 0.1 % nasal spray, 1 spray into each nostril 2 (two) times a day as needed for rhinitis Use in each nostril as directed, Disp: , Rfl:     bisacodyl (DULCOLAX) 10 mg suppository, Insert 10 mg into the rectum once as needed, Disp: , Rfl:     docusate sodium (COLACE) 100 mg capsule, Take 100 mg by mouth 2 (two) times a day, Disp: , Rfl:     Fluticasone-Salmeterol (Advair Diskus) 100-50 mcg/dose inhaler, Inhale 1 puff 2 (two) times a day Rinse mouth after use., Disp: , Rfl:     ipratropium-albuterol (DUO-NEB) 0.5-2.5 mg/3 mL nebulizer solution, INHALE CONTENTS OF 1 VIAL VIA NEBULIZER FOUR TIMES A DAY, Disp: 60 mL, Rfl: 11    meclizine (ANTIVERT) 12.5 MG tablet, Take 12.5 mg by mouth every 8 (eight) hours as needed for dizziness, Disp: , Rfl:     methenamine hippurate (HIPREX) 1 g tablet, Take 1 g by mouth 2 (two) times a day with meals, Disp: , Rfl:     metoprolol succinate (TOPROL-XL) 25 mg 24 hr tablet, Take 25 mg by mouth daily, Disp: , Rfl:     mineral oil enema, Insert 1 enema into the rectum once as needed for constipation, Disp: , Rfl:     sodium phosphate-biphosphate (FLEET) 7-19 g 118 mL enema, Insert 1 enema into the rectum once as needed, Disp: , Rfl:     Updated list was reviewed in [facility] system of facility.     Vitals:    01/26/24 1135   BP: 116/68   Pulse: 76   Resp: 18   Temp: 97.7 °F (36.5 °C)   SpO2: 97%       Physical Exam  Constitutional:       General: He is not in  "acute distress.  HENT:      Head: Normocephalic and atraumatic.   Eyes:      General: No scleral icterus.  Cardiovascular:      Rate and Rhythm: Normal rate and regular rhythm.   Pulmonary:      Breath sounds: No wheezing, rhonchi or rales.   Abdominal:      General: There is no distension.      Palpations: Abdomen is soft.      Tenderness: There is no abdominal tenderness. There is no guarding.   Musculoskeletal:      Cervical back: Neck supple.      Right lower leg: No edema.      Left lower leg: No edema.   Skin:     Coloration: Skin is not jaundiced.   Neurological:      General: No focal deficit present.      Cranial Nerves: No cranial nerve deficit.      Comments: Oriented in month and year   Psychiatric:         Mood and Affect: Mood normal.         Behavior: Behavior normal.         Diagnostic Data:  Labs done on 1/23/2024 revealed hemoglobin A1c of 5.5, WBC count 6.9, hemoglobin 13.8, platelet count 225  Creatinine 1, BUN 23, sodium 140, potassium 4.5  HDL 42, triglycerides 75, LDL 84, cholesterol 141    Portions of the record may have been created with voice recognition software.  Occasional wrong word or \"sound a like\" substitutions may have occurred due to the inherent limitations of voice recognition software.  Read the chart carefully and recognize, using context, where substitutions have occurred.    This note was electronically signed by Dr. Snow Farah   "

## 2024-02-05 ENCOUNTER — REMOTE DEVICE CLINIC VISIT (OUTPATIENT)
Dept: CARDIOLOGY CLINIC | Facility: CLINIC | Age: 79
End: 2024-02-05
Payer: MEDICARE

## 2024-02-05 DIAGNOSIS — Z95.818 PRESENCE OF OTHER CARDIAC IMPLANTS AND GRAFTS: Primary | ICD-10-CM

## 2024-02-05 PROCEDURE — 93298 REM INTERROG DEV EVAL SCRMS: CPT | Performed by: INTERNAL MEDICINE

## 2024-02-05 NOTE — PROGRESS NOTES
"MDT LNQ22/ ACTIVE SYSTEM IS MRI CONDITIONAL   CARELINK TRANSMISSION: LOOP RECORDER. PRESENTING RHYTHM AF @ 84 BPM. BATTERY STATUS \"OK.\" 5 DEVICE CLASSIFIED AF EPISODES, LONGEST EPISODE IS 17:30 HOURS, AVAILABLE STRIPS DEMONSTRATE  ATRIAL FIBRILLATION. AF BURDEN IS 99.3%. AF BURDEN MAYBE OVERESTIMATED DUE TO INAPPROPRIATE CLASSIFICATION OF AF. SOME STRIPS MAY NOT BE INTERPRETABLE OR AVAILABLE, CANNOT DEFINITIVELY RULE OUT ATRIAL FIBRILLATION. HX OF AF. PT TAKES ELIQUIS AND METOPROLOL SUCC. NO PATIENT ACTIVATED EPISODES. NORMAL DEVICE FUNCTION. DL   " Chief Complaint   Patient presents with   • Cough     Pt arrives with c/o cough since Friday, runny nose, and left eye discharge. Pt's mother denies fevers, or medications given at this time. Pt's vaccinations are UTD, and Covid vaccinated x 4.        History Of Present Illness  Yared is a 33 month old male presenting with eye redness, irritation, and discharge this AM  Denies trauma, foreign body exposure, vision changes, eye pain, foreign body sensation.  Runny nose x 4d  Cough last night  luz PO/UO NL  No fever/sob/cp/  Energy nl    Past Medical History  Past Medical History:   Diagnosis Date   • Gastroesophageal reflux disease in infant         Surgical History  Past Surgical History:   Procedure Laterality Date   • No past surgeries          Social History   reports that he has never smoked. He has never been exposed to tobacco smoke. He has never used smokeless tobacco.    Family History    Family History   Problem Relation Age of Onset   • Anemia Mother    • Cancer, Lung Maternal Grandfather         Allergies  ALLERGIES:  Patient has no known allergies.    Medications  Current Outpatient Medications   Medication Sig Dispense Refill   • cetirizine (ZyrTEC) 5 MG/5ML solution Take 2.5 mg by mouth daily.       No current facility-administered medications for this visit.       Review of Systems      Eye Problem(s):eye irritation  ENT Problem(s):negative  Cardiovascular problem(s):negative  Respiratory problem(s):negative  Gastro-intestinal problem(s):negative   Genito-urinary problem(s): Negative  Musculoskeletal problem(s):negative  Integumentary problem(s):negative  Neurological problem(s):negative  Psychiatric problem(s):negative  Endocrine problem(s):negative  Hematologic and/or Lymphatic problem(s):negative    Last Recorded Vitals  Visit Vitals  Pulse 126   Temp 98.4 °F (36.9 °C) (Temporal)   Resp 30   Wt 16 kg (35 lb 4.4 oz)   SpO2 98%       No results found.        Physical Exam    General:  Alert,  cooperative, conversive.  Active, playing on ipad.   No acute distress.  Skin:  Warm and dry without rash.    Head:  Normocephalic-atraumatic.   Neck:  Trachea is midline.     Eyes:  L Conjunctival injection w green dc.  Otherwise, pupils equal, round, reactive to light.  Extraoccular movements intact.   ENT:  Mucous membranes are moist.  Cardiovascular:  Regular rate and rhythm, no murmurs. Symmetrical pulses.   Respiratory:  Clear to auscultation, good air entry noted throughout.  Normal respiratory effort.   Gastrointestinal:  Non-distended.    Musculoskeletal:  No deformity or edema.   Neurologic:  Oriented times 4.  No focal deficits.      Labs       Medical Decision Making    Multiple differential diagnoses were considered. The patient was apprised of diagnostic and treatment options including alternate modes of care, in addition to risks and benefits, for this medical condition. Based on this discussion the patient agrees with this chosen diagnostic and treatment plan.    Pandemic - [facility] State of Emergency    Pt was evaluated and treated during the officially declared [facility] of Emergency due to the CoVid-19 pandemic.    Pt was screened, triaged, evaluated and treated per current CDC guidelines as they existed on date of service.    Limited evaluation and/or treatment may have been necessary due to supply limitations or shortages, critical needs of Pt or other patients, difficulties imposed by necessary PPE, or out of concern for the greater good of the community as a whole.        Diagnosis    Conjunctivitis of left eye, unspecified conjunctivitis type  (primary encounter diagnosis)        Plan    Follow-up Information     Follow up With Specialties Details Why Contact Info    Judi Lr MD Pediatrics Follow up Within 1-3 days [address] [id number]               Summary of your Discharge Medications          Accurate as of January 10, 2023 11:10 AM. Always use your  most recent med list.            Take these Medications      Details   cetirizine 5 MG/5ML solution  Commonly known as: ZyrTEC   Take 2.5 mg by mouth daily.            Patient Instructions     Antibiotic eyedrops as prescribed.  Close follow-up with PMD should symptoms not improve promptly.  ER if sudden vision change, eye pain, or foreign body sensation.                Primary Care Physician  MD Doroteo Diamond MD

## 2024-02-29 ENCOUNTER — NURSING HOME VISIT (OUTPATIENT)
Dept: GERIATRICS | Facility: OTHER | Age: 79
End: 2024-02-29
Payer: MEDICARE

## 2024-02-29 DIAGNOSIS — I95.1 ORTHOSTATIC HYPOTENSION: Chronic | ICD-10-CM

## 2024-02-29 DIAGNOSIS — I50.9 CONGESTIVE HEART FAILURE, UNSPECIFIED HF CHRONICITY, UNSPECIFIED HEART FAILURE TYPE (HCC): ICD-10-CM

## 2024-02-29 DIAGNOSIS — R53.81 DEBILITY: ICD-10-CM

## 2024-02-29 DIAGNOSIS — J44.9 CHRONIC OBSTRUCTIVE PULMONARY DISEASE, UNSPECIFIED COPD TYPE (HCC): Primary | Chronic | ICD-10-CM

## 2024-02-29 DIAGNOSIS — I48.21 PERMANENT ATRIAL FIBRILLATION (HCC): ICD-10-CM

## 2024-02-29 DIAGNOSIS — E44.0 MODERATE PROTEIN-CALORIE MALNUTRITION (HCC): ICD-10-CM

## 2024-02-29 PROCEDURE — 99309 SBSQ NF CARE MODERATE MDM 30: CPT | Performed by: NURSE PRACTITIONER

## 2024-03-01 NOTE — PROGRESS NOTES
[address], [address], [place] [address]  [phone]    NAME: Sal Moura  AGE: 78 y.o. SEX: male    Progress Note    Location: [facility]   POS: 32 ([facility])    Assessment/Plan:    COPD (chronic obstructive pulmonary disease) (HCC)  Pt known to have a longstanding history of tobacco abuse and smoking. Respiratory status stable on RA. SaO2 97% RA  Denies SOB or cough   C/w ipratropium albuterol daily and fluticasone salmeterol twice a day       Permanent atrial fibrillation (HCC)  HR acceptable. Has a loop recorder  Denies cp or palpitations  Rate control on Metoprolol 25 mg qd   C/w  Eliquis 2.5 mg BID  No active bleeding   F/u with cardiology OP    Orthostatic hypotension  With known chronic history however no current falls  Denies lightheaded or dizziness at the time of my evaluation  C/w midodrine 5 mg TID with meals, hold if SBP >135   Encourage adequate hydration and change positions slowly      CHF (congestive heart failure) ([facility])  Wt Readings from Last 3 Encounters:   01/26/24 69.4 kg (153 lb)   12/30/23 67.9 kg (149 lb 9.6 oz)   11/29/23 69.9 kg (154 lb 3.2 oz)   Clinically euvolemic. No noted edema. Denies dyspnea or cough  LVEF 50% low normal. Concentric hypertrophy  Monitor weight and fluid status   .         Moderate protein-calorie malnutrition (HCC)  Malnutrition Findings:    appears weak and frail with generalized muscle wasting. Recent weight 134.4 from admission weight at 135.0 lb stable   Tolerating regular diet thin consistency. Remains on MVI and supplements.   Dietitian following   Patient reports good appetite. Consuming around 50-75% for all meals  Monitor weights   Body mass index is 20.89 kg/m².       Debility  Multifactorial  PT OT  Supportive care  OOB for meals as matt  Chief complaint / Reason for visit: LT Monthly Visit     History of Present Illness:  Patient is a 78-year-old male seen and examined at bedside today for long-term care monthly visit.   Received patient lying in bed, resting.  At the time of my evaluation patient reported feeling okay.  Appears comfortable and is in no acute distress.  Denies pain. Reports tolerating diet with good appetite.  Sleeping with no difficulties.  Free of anginal symptoms.  Denies dyspnea, cough, fever, chills, auscultation, chest pain, or visual difficulties.  No other concerns or issues at this time.     Review of Systems:  Per history of present illness, all other systems reviewed and negative.  Other than those noted in HPI    HISTORY:  Medical Hx: Reviewed, unchanged  Family Hx: Reviewed, unchanged  Soc Hx: Reviewed,  unchanged    ALLERGY: Reviewed, unchanged  No Known Allergies     PHYSICAL EXAM:  Visit Vitals  /76   Pulse 79   Temp 97.7 °F (36.5 °C)   Resp 18   Wt 69.9 kg (154 lb)   SpO2 98%   BMI 20.89 kg/m²   Smoking Status Former   BSA 1.91 m²       General: NAD, cooperative and supportive  Head: Atraumatic. Normocephalic.  Eye Exam: anicteric sclera, no discharge, PERRLA, No injection  Oral Exam: moist mucous membranes, no buccaloropharyngeal erythema, palatine tonsils WNL.  Neck Exam: no anterior cervical lymphadenopathy noted, neck supple  Cardiovascular: regular rate, regular rhythm, no murmurs, rubs, or gallops  Pulmonary: no wheeze, no rhonchi, no rales. No chest tenderness  Abdominal: soft, non-tender, nondistended, bowel sounds audible x 4 quadrants.  Denies nausea, vomiting, diarrhea,, or constipation  : Non distended bladder.  Denies nocturia or hematuria. Uses urinal or goes to BR   Extremities and skin: no edema noted, no rashes.   Neurological: alert, cooperative and responsive, Oriented to self moving all 4 extremities symmetrically    Laboratory results / Imaging reviewed: Hard copy/ies in medical chart: Reviewed from [facility]    Current Medications:  All medications reviewed and updated in Nursing Home Chart    Please note:  Voice-recognition software may have been used in the preparation of  "this document. Occasional wrong word or \"sound-alike\" substitutions may have occurred due to the inherent limitations of voice recognition software. Interpretation should be guided by context.    GABRIELA Maloney    "

## 2024-03-07 VITALS
HEART RATE: 79 BPM | SYSTOLIC BLOOD PRESSURE: 128 MMHG | WEIGHT: 154 LBS | RESPIRATION RATE: 18 BRPM | BODY MASS INDEX: 20.89 KG/M2 | TEMPERATURE: 97.7 F | OXYGEN SATURATION: 98 % | DIASTOLIC BLOOD PRESSURE: 76 MMHG

## 2024-03-07 RX ORDER — MIDODRINE HYDROCHLORIDE 5 MG/1
5 TABLET ORAL
COMMUNITY

## 2024-03-07 NOTE — ASSESSMENT & PLAN NOTE
Wt Readings from Last 3 Encounters:   01/26/24 69.4 kg (153 lb)   12/30/23 67.9 kg (149 lb 9.6 oz)   11/29/23 69.9 kg (154 lb 3.2 oz)   Clinically euvolemic. No noted edema. Denies dyspnea or cough  LVEF 50% low normal. Concentric hypertrophy  Monitor weight and fluid status   .

## 2024-03-07 NOTE — ASSESSMENT & PLAN NOTE
With known chronic history however no current falls  Denies lightheaded or dizziness at the time of my evaluation  C/w midodrine 5 mg TID with meals, hold if SBP >135   Encourage adequate hydration and change positions slowly

## 2024-03-07 NOTE — ASSESSMENT & PLAN NOTE
Malnutrition Findings:    appears weak and frail with generalized muscle wasting. Recent weight 134.4 from admission weight at 135.0 lb stable   Tolerating regular diet thin consistency. Remains on MVI and supplements.   Dietitian following   Patient reports good appetite. Consuming around 50-75% for all meals  Monitor weights   Body mass index is 20.89 kg/m².

## 2024-03-23 ENCOUNTER — TELEPHONE (OUTPATIENT)
Dept: OTHER | Facility: OTHER | Age: 79
End: 2024-03-23

## 2024-03-24 ENCOUNTER — APPOINTMENT (EMERGENCY)
Dept: RADIOLOGY | Facility: HOSPITAL | Age: 79
DRG: 522 | End: 2024-03-24
Payer: MEDICARE

## 2024-03-24 ENCOUNTER — TELEPHONE (OUTPATIENT)
Dept: OTHER | Facility: OTHER | Age: 79
End: 2024-03-24

## 2024-03-24 ENCOUNTER — HOSPITAL ENCOUNTER (INPATIENT)
Facility: HOSPITAL | Age: 79
LOS: 3 days | DRG: 522 | End: 2024-03-27
Attending: EMERGENCY MEDICINE | Admitting: STUDENT IN AN ORGANIZED HEALTH CARE EDUCATION/TRAINING PROGRAM
Payer: MEDICARE

## 2024-03-24 DIAGNOSIS — Z98.890 HISTORY OF CARDIAC CATH: ICD-10-CM

## 2024-03-24 DIAGNOSIS — I48.91 ATRIAL FIBRILLATION, UNSPECIFIED TYPE (HCC): ICD-10-CM

## 2024-03-24 DIAGNOSIS — I50.32 CHRONIC DIASTOLIC CONGESTIVE HEART FAILURE (HCC): ICD-10-CM

## 2024-03-24 DIAGNOSIS — S72.009A FEMORAL NECK FRACTURE (HCC): Primary | ICD-10-CM

## 2024-03-24 DIAGNOSIS — N31.9 NEUROGENIC BLADDER: ICD-10-CM

## 2024-03-24 DIAGNOSIS — S72.009A HIP FRACTURE (HCC): ICD-10-CM

## 2024-03-24 DIAGNOSIS — G47.33 OBSTRUCTIVE SLEEP APNEA SYNDROME: ICD-10-CM

## 2024-03-24 DIAGNOSIS — R33.9 URINARY RETENTION: ICD-10-CM
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
## 2024-03-24 DIAGNOSIS — S72.002A CLOSED LEFT HIP FRACTURE, INITIAL ENCOUNTER (HCC): ICD-10-CM

## 2024-03-24 PROBLEM — F41.9 ANXIETY: Status: ACTIVE | Noted: 2020-09-29

## 2024-03-24 PROBLEM — Z72.0 TOBACCO ABUSE: Status: ACTIVE | Noted: 2020-08-05

## 2024-03-24 LAB
ALBUMIN SERPL BCP-MCNC: 4.2 G/DL (ref 3.5–5)
ALP SERPL-CCNC: 60 U/L (ref 34–104)
ALT SERPL W P-5'-P-CCNC: 29 U/L (ref 7–52)
ANION GAP SERPL CALCULATED.3IONS-SCNC: 6 MMOL/L (ref 4–13)
APTT PPP: 35 SECONDS (ref 23–37)
AST SERPL W P-5'-P-CCNC: 17 U/L (ref 13–39)
BASOPHILS # BLD AUTO: 0.04 THOUSANDS/ÂΜL (ref 0–0.1)
BASOPHILS NFR BLD AUTO: 0 % (ref 0–1)
BILIRUB SERPL-MCNC: 1.49 MG/DL (ref 0.2–1)
BUN SERPL-MCNC: 26 MG/DL (ref 5–25)
CALCIUM SERPL-MCNC: 9.2 MG/DL (ref 8.4–10.2)
CHLORIDE SERPL-SCNC: 106 MMOL/L (ref 96–108)
CO2 SERPL-SCNC: 26 MMOL/L (ref 21–32)
CREAT SERPL-MCNC: 1.25 MG/DL (ref 0.6–1.3)
EOSINOPHIL # BLD AUTO: 0.27 THOUSAND/ÂΜL (ref 0–0.61)
EOSINOPHIL NFR BLD AUTO: 2 % (ref 0–6)
ERYTHROCYTE [DISTWIDTH] IN BLOOD BY AUTOMATED COUNT: 12.8 % (ref 11.6–15.1)
GFR SERPL CREATININE-BSD FRML MDRD: 55 ML/MIN/1.73SQ M
GLUCOSE SERPL-MCNC: 114 MG/DL (ref 65–140)
HCT VFR BLD AUTO: 46.2 % (ref 36.5–49.3)
HGB BLD-MCNC: 15.1 G/DL (ref 12–17)
IMM GRANULOCYTES # BLD AUTO: 0.04 THOUSAND/UL (ref 0–0.2)
IMM GRANULOCYTES NFR BLD AUTO: 0 % (ref 0–2)
INR PPP: 1.29 (ref 0.84–1.19)
LYMPHOCYTES # BLD AUTO: 1.21 THOUSANDS/ÂΜL (ref 0.6–4.47)
LYMPHOCYTES NFR BLD AUTO: 11 % (ref 14–44)
MCH RBC QN AUTO: 31 PG (ref 26.8–34.3)
MCHC RBC AUTO-ENTMCNC: 32.7 G/DL (ref 31.4–37.4)
MCV RBC AUTO: 95 FL (ref 82–98)
MONOCYTES # BLD AUTO: 0.94 THOUSAND/ÂΜL (ref 0.17–1.22)
MONOCYTES NFR BLD AUTO: 8 % (ref 4–12)
NEUTROPHILS # BLD AUTO: 8.87 THOUSANDS/ÂΜL (ref 1.85–7.62)
NEUTS SEG NFR BLD AUTO: 79 % (ref 43–75)
NRBC BLD AUTO-RTO: 0 /100 WBCS
PLATELET # BLD AUTO: 153 THOUSANDS/UL (ref 149–390)
PMV BLD AUTO: 10.5 FL (ref 8.9–12.7)
POTASSIUM SERPL-SCNC: 4.2 MMOL/L (ref 3.5–5.3)
PROT SERPL-MCNC: 7 G/DL (ref 6.4–8.4)
PROTHROMBIN TIME: 16 SECONDS (ref 11.6–14.5)
RBC # BLD AUTO: 4.87 MILLION/UL (ref 3.88–5.62)
SODIUM SERPL-SCNC: 138 MMOL/L (ref 135–147)
WBC # BLD AUTO: 11.37 THOUSAND/UL (ref 4.31–10.16)

## 2024-03-24 PROCEDURE — 85610 PROTHROMBIN TIME: CPT | Performed by: EMERGENCY MEDICINE

## 2024-03-24 PROCEDURE — 96374 THER/PROPH/DIAG INJ IV PUSH: CPT

## 2024-03-24 PROCEDURE — 99284 EMERGENCY DEPT VISIT MOD MDM: CPT

## 2024-03-24 PROCEDURE — 94760 N-INVAS EAR/PLS OXIMETRY 1: CPT

## 2024-03-24 PROCEDURE — 99223 1ST HOSP IP/OBS HIGH 75: CPT | Performed by: STUDENT IN AN ORGANIZED HEALTH CARE EDUCATION/TRAINING PROGRAM

## 2024-03-24 PROCEDURE — 85025 COMPLETE CBC W/AUTO DIFF WBC: CPT | Performed by: EMERGENCY MEDICINE

## 2024-03-24 PROCEDURE — 93005 ELECTROCARDIOGRAM TRACING: CPT

## 2024-03-24 PROCEDURE — 99285 EMERGENCY DEPT VISIT HI MDM: CPT | Performed by: EMERGENCY MEDICINE

## 2024-03-24 PROCEDURE — 71045 X-RAY EXAM CHEST 1 VIEW: CPT

## 2024-03-24 PROCEDURE — 85730 THROMBOPLASTIN TIME PARTIAL: CPT | Performed by: EMERGENCY MEDICINE

## 2024-03-24 PROCEDURE — 94640 AIRWAY INHALATION TREATMENT: CPT

## 2024-03-24 PROCEDURE — 80053 COMPREHEN METABOLIC PANEL: CPT | Performed by: EMERGENCY MEDICINE

## 2024-03-24 PROCEDURE — 36415 COLL VENOUS BLD VENIPUNCTURE: CPT | Performed by: EMERGENCY MEDICINE

## 2024-03-24 PROCEDURE — 73502 X-RAY EXAM HIP UNI 2-3 VIEWS: CPT

## 2024-03-24 PROCEDURE — 94664 DEMO&/EVAL PT USE INHALER: CPT

## 2024-03-24 RX ORDER — SODIUM CHLORIDE, SODIUM GLUCONATE, SODIUM ACETATE, POTASSIUM CHLORIDE, MAGNESIUM CHLORIDE, SODIUM PHOSPHATE, DIBASIC, AND POTASSIUM PHOSPHATE .53; .5; .37; .037; .03; .012; .00082 G/100ML; G/100ML; G/100ML; G/100ML; G/100ML; G/100ML; G/100ML
75 INJECTION, SOLUTION INTRAVENOUS CONTINUOUS
Status: DISCONTINUED | OUTPATIENT
Start: 2024-03-25 | End: 2024-03-26

## 2024-03-24 RX ORDER — FLUTICASONE FUROATE AND VILANTEROL 100; 25 UG/1; UG/1
1 POWDER RESPIRATORY (INHALATION)
Status: DISCONTINUED | OUTPATIENT
Start: 2024-03-25 | End: 2024-03-27 | Stop reason: HOSPADM

## 2024-03-24 RX ORDER — SENNOSIDES 8.6 MG
2 TABLET ORAL
Status: DISCONTINUED | OUTPATIENT
Start: 2024-03-25 | End: 2024-03-27 | Stop reason: HOSPADM

## 2024-03-24 RX ORDER — METHENAMINE HIPPURATE 1000 MG/1
1 TABLET ORAL 2 TIMES DAILY WITH MEALS
Status: DISCONTINUED | OUTPATIENT
Start: 2024-03-24 | End: 2024-03-24

## 2024-03-24 RX ORDER — ACETAMINOPHEN 325 MG/1
975 TABLET ORAL EVERY 8 HOURS SCHEDULED
Status: DISCONTINUED | OUTPATIENT
Start: 2024-03-24 | End: 2024-03-27 | Stop reason: HOSPADM

## 2024-03-24 RX ORDER — MIDODRINE HYDROCHLORIDE 5 MG/1
5 TABLET ORAL
Status: DISCONTINUED | OUTPATIENT
Start: 2024-03-25 | End: 2024-03-27 | Stop reason: HOSPADM

## 2024-03-24 RX ORDER — DOCUSATE SODIUM 100 MG/1
100 CAPSULE, LIQUID FILLED ORAL 2 TIMES DAILY
Status: DISCONTINUED | OUTPATIENT
Start: 2024-03-24 | End: 2024-03-27 | Stop reason: HOSPADM

## 2024-03-24 RX ORDER — ONDANSETRON 2 MG/ML
4 INJECTION INTRAMUSCULAR; INTRAVENOUS EVERY 8 HOURS PRN
Status: DISCONTINUED | OUTPATIENT
Start: 2024-03-24 | End: 2024-03-27 | Stop reason: HOSPADM

## 2024-03-24 RX ORDER — IPRATROPIUM BROMIDE AND ALBUTEROL SULFATE 2.5; .5 MG/3ML; MG/3ML
3 SOLUTION RESPIRATORY (INHALATION) 4 TIMES DAILY
Status: DISCONTINUED | OUTPATIENT
Start: 2024-03-24 | End: 2024-03-25

## 2024-03-24 RX ORDER — METOPROLOL SUCCINATE 25 MG/1
25 TABLET, EXTENDED RELEASE ORAL DAILY
Status: DISCONTINUED | OUTPATIENT
Start: 2024-03-25 | End: 2024-03-27 | Stop reason: HOSPADM

## 2024-03-24 RX ORDER — METHENAMINE HIPPURATE 1000 MG/1
1 TABLET ORAL 2 TIMES DAILY WITH MEALS
Status: DISCONTINUED | OUTPATIENT
Start: 2024-03-25 | End: 2024-03-27 | Stop reason: HOSPADM

## 2024-03-24 RX ADMIN — IPRATROPIUM BROMIDE AND ALBUTEROL SULFATE 3 ML: 2.5; .5 SOLUTION RESPIRATORY (INHALATION) at 21:07

## 2024-03-24 RX ADMIN — SODIUM CHLORIDE, SODIUM GLUCONATE, SODIUM ACETATE, POTASSIUM CHLORIDE, MAGNESIUM CHLORIDE, SODIUM PHOSPHATE, DIBASIC, AND POTASSIUM PHOSPHATE 75 ML/HR: .53; .5; .37; .037; .03; .012; .00082 INJECTION, SOLUTION INTRAVENOUS at 22:58

## 2024-03-24 RX ADMIN — ACETAMINOPHEN 975 MG: 325 TABLET, FILM COATED ORAL at 21:11

## 2024-03-24 RX ADMIN — DOCUSATE SODIUM 100 MG: 100 CAPSULE, LIQUID FILLED ORAL at 21:11

## 2024-03-24 RX ADMIN — MORPHINE SULFATE 2 MG: 2 INJECTION, SOLUTION INTRAMUSCULAR; INTRAVENOUS at 17:07

## 2024-03-24 NOTE — ED NOTES
Condom cath placed at this time, pt voided on pad, forgot to call the nurse for help as instructed, condom cath draining to urine collection bag      Carmen Gonzalez RN  03/24/24 [id number]

## 2024-03-24 NOTE — ED PROVIDER NOTES
History  Chief Complaint   Patient presents with    Fall     Pt from [facility] to ed via ems, pt fell yesterday, xray done today shows left fx hip. with good perfusion and sensations on left side, pt able to move left foot toes. Reports pain to left hip non radiating,      78-year-old male sent in from nursing home for hip fracture.  Patient had a slip and fall last evening.  States he tripped over a wheelchair.  Fell onto left hip.  Trouble ambulating since.  No head strike no loss of consciousness.  Outpatient x-rays show a fracture.  [facility] the nursing home did not send over report or pictures of the films.  Leg is shortened and externally rotated.  Patient is on Eliquis for A-fib      History provided by:  Patient, EMS personnel, nursing home and medical records   used: No    Fall  Mechanism of injury: fall    Injury location:  Pelvis  Pelvic injury location:  L hip  Incident location:  [facility]  Time since incident:  1 day  Arrived directly from scene: no    Fall:     Fall occurred:  Standing    Impact surface:  Hard floor    Point of impact: Left hip.    Entrapped after fall: no    Protective equipment: none    Suspicion of alcohol use: no    Suspicion of drug use: no    Tetanus status:  Up to date  Prior to arrival data:     Bystander interventions:  None    Patient ambulatory at scene: no      Blood loss:  None    Responsiveness at scene:  Alert    Orientation at scene:  Person, place, situation and time    Loss of consciousness: no      Amnesic to event: no    Associated symptoms: no abdominal pain, no back pain, no chest pain, no seizures and no vomiting        Prior to Admission Medications   Prescriptions Last Dose Informant Patient Reported? Taking?   Fluticasone-Salmeterol (Advair Diskus) 100-50 mcg/dose inhaler 3/24/2024 Outside Facility (Specify) Yes Yes   Sig: Inhale 1 puff 2 (two) times a day Rinse mouth after use.   acetaminophen (TYLENOL) 325 mg tablet   Outside Facility (Specify) Yes No   Sig: Take 650 mg by mouth every 6 (six) hours as needed   apixaban (ELIQUIS) 2.5 mg 3/24/2024 Outside Facility (Specify) Yes Yes   Sig: Take 2.5 mg by mouth 2 (two) times a day   azelastine (ASTELIN) 0.1 % nasal spray  Outside Facility (Specify) Yes No   Si spray into each nostril 2 (two) times a day as needed for rhinitis Use in each nostril as directed   bisacodyl (DULCOLAX) 10 mg suppository  Outside Facility (Specify) Yes No   Sig: Insert 10 mg into the rectum once as needed   docusate sodium (COLACE) 100 mg capsule 3/24/2024 Outside Facility (Specify) Yes Yes   Sig: Take 100 mg by mouth 2 (two) times a day   ipratropium-albuterol (DUO-NEB) 0.5-2.5 mg/3 mL nebulizer solution 3/24/2024 Self, Outside Facility (Specify) No Yes   Sig: INHALE CONTENTS OF 1 VIAL VIA NEBULIZER FOUR TIMES A DAY   meclizine (ANTIVERT) 12.5 MG tablet  Outside Facility (Specify) Yes No   Sig: Take 12.5 mg by mouth every 8 (eight) hours as needed for dizziness   methenamine hippurate (HIPREX) 1 g tablet 3/24/2024 Outside Facility (Specify) Yes Yes   Sig: Take 1 g by mouth 2 (two) times a day with meals   metoprolol succinate (TOPROL-XL) 25 mg 24 hr tablet 3/24/2024 Outside Facility (Specify) Yes Yes   Sig: Take 25 mg by mouth daily   midodrine (PROAMATINE) 5 mg tablet 3/24/2024  Yes Yes   Sig: Take 5 mg by mouth 3 (three) times a day before meals Hold if SBP >135   mineral oil enema   Yes No   Sig: Insert 1 enema into the rectum once as needed for constipation   sodium phosphate-biphosphate (FLEET) 7-19 g 118 mL enema  Outside Facility (Specify) Yes No   Sig: Insert 1 enema into the rectum once as needed      Facility-Administered Medications: None       Past Medical History:   Diagnosis Date    A-fib ([facility])     Ambulatory dysfunction     Anxiety     Anxiety disorder     Atrial fibrillation ([facility])     Chronic indwelling Cloud catheter     COPD (chronic obstructive pulmonary disease) ([facility])     Coronary  artery disease     Depression     Cloud catheter in place     Hepatitis C     screening negative in 2017    Hepatitis C     History of MI (myocardial infarction)     Hypertension     MI (myocardial infarction) (HCC)     Urinary catheter in place     Urinary retention     Use of cane as ambulatory aid        Past Surgical History:   Procedure Laterality Date    CARDIAC CATHETERIZATION  2018    CARDIAC ELECTROPHYSIOLOGY PROCEDURE N/A 2022    Procedure: Cardiac loop recorder implant;  Surgeon: Duke Buckner MD;  Location: [facility];  Service: Cardiology    CARDIAC ELECTROPHYSIOLOGY PROCEDURE  2022    Cardiac loop recorder implant; Dr. Duke Buckner    CARDIAC LOOP RECORDER  2022    COLONOSCOPY      COLONOSCOPY      INGUINAL HERNIA REPAIR Right 2022    Dr. Encarnacion    ID RPR 1ST INGUN HRNA AGE 5 YRS/> REDUCIBLE Right 2022    Procedure: REPAIR HERNIA INGUINAL;  Surgeon: Ady Encarnacion DO;  Location: [facility];  Service: General    TONSILLECTOMY         Family History   Problem Relation Age of Onset    Hypertension Mother     Coronary artery disease Mother         premature    Diabetes Father     Coronary artery disease Father         premature    Hypertension Father      I have reviewed and agree with the history as documented.    E-Cigarette/Vaping    E-Cigarette Use Never User      E-Cigarette/Vaping Substances    Nicotine No     THC No     CBD No     Flavoring No     Other No     Unknown No      Social History     Tobacco Use    Smoking status: Former     Current packs/day: 0.00     Average packs/day: 1.5 packs/day for 57.0 years (85.5 ttl pk-yrs)     Types: Cigarettes     Start date: 3/12/1966     Quit date: 3/12/2023     Years since quittin.0    Smokeless tobacco: Never    Tobacco comments:     since age 14; Has history of smoking for over 55 years. He has been smoking more than packet daily in the past however he has been smokes about half a pack a day lately and  stopped smoking on 7/1/2018 when he was admitted to the hospital.    Vaping Use    Vaping status: Never Used   Substance Use Topics    Alcohol use: Not Currently    Drug use: Never       Review of Systems   Constitutional:  Negative for chills and fever.   HENT:  Negative for ear pain and sore throat.    Eyes:  Negative for pain and visual disturbance.   Respiratory:  Negative for cough and shortness of breath.    Cardiovascular:  Negative for chest pain and palpitations.   Gastrointestinal:  Negative for abdominal pain and vomiting.   Genitourinary:  Negative for dysuria and hematuria.   Musculoskeletal:  Negative for arthralgias and back pain.   Skin:  Negative for color change and rash.   Neurological:  Negative for seizures and syncope.   All other systems reviewed and are negative.      Physical Exam  Physical Exam  Vitals and nursing note reviewed.   Constitutional:       General: He is not in acute distress.     Appearance: He is well-developed.   HENT:      Head: Normocephalic and atraumatic.   Eyes:      Conjunctiva/sclera: Conjunctivae normal.   Cardiovascular:      Rate and Rhythm: Normal rate and regular rhythm.      Heart sounds: No murmur heard.  Pulmonary:      Effort: Pulmonary effort is normal. No respiratory distress.      Breath sounds: Normal breath sounds.   Abdominal:      Palpations: Abdomen is soft.      Tenderness: There is no abdominal tenderness.   Musculoskeletal:         General: No swelling.      Cervical back: Neck supple.      Left hip: Deformity and tenderness present. Decreased range of motion.      Comments: Left leg shortened and externally rotated   Skin:     General: Skin is warm and dry.      Capillary Refill: Capillary refill takes less than 2 seconds.   Neurological:      Mental Status: He is alert.   Psychiatric:         Mood and Affect: Mood normal.         Vital Signs  ED Triage Vitals [03/24/24 1638]   Temperature Pulse Respirations Blood Pressure SpO2   98.3 °F (36.8 °C)  84 18 150/100 94 %      Temp Source Heart Rate Source Patient Position - Orthostatic VS BP Location FiO2 (%)   Oral Monitor Lying Right arm --      Pain Score       10 - Worst Possible Pain           Vitals:    03/24/24 1800 03/24/24 1815 03/24/24 1915 03/24/24 2303   BP:  134/88 148/96 119/79   Pulse: 91 90 98 90   Patient Position - Orthostatic VS:  Lying Lying Lying         Visual Acuity  Visual Acuity      Flowsheet Row Most Recent Value   L Pupil Size (mm) 2   R Pupil Size (mm) 2            ED Medications  Medications   acetaminophen (TYLENOL) tablet 975 mg (975 mg Oral Given 3/24/24 2111)   ondansetron (ZOFRAN) injection 4 mg (has no administration in time range)   morphine injection 2 mg (has no administration in time range)   multi-electrolyte (PLASMALYTE-A/ISOLYTE-S PH 7.4) IV solution (75 mL/hr Intravenous New Bag 3/24/24 2258)   docusate sodium (COLACE) capsule 100 mg (100 mg Oral Given 3/24/24 2111)   Fluticasone Furoate-Vilanterol 100-25 mcg/actuation 1 puff (has no administration in time range)   ipratropium-albuterol (DUO-NEB) 0.5-2.5 mg/3 mL inhalation solution 3 mL (3 mL Nebulization Given 3/24/24 2107)   metoprolol succinate (TOPROL-XL) 24 hr tablet 25 mg (has no administration in time range)   midodrine (PROAMATINE) tablet 5 mg (has no administration in time range)   senna (SENOKOT) tablet 17.2 mg (has no administration in time range)   methenamine hippurate (HIPREX) tablet 1 g (has no administration in time range)   morphine injection 2 mg (2 mg Intravenous Given 3/24/24 1707)       Diagnostic Studies  Results Reviewed       Procedure Component Value Units Date/Time    Comprehensive metabolic panel [[id number]]  (Abnormal) Collected: 03/24/24 1704    Lab Status: Final result Specimen: Blood from Arm, Left Updated: 03/24/24 1729     Sodium 138 mmol/L      Potassium 4.2 mmol/L      Chloride 106 mmol/L      CO2 26 mmol/L      ANION GAP 6 mmol/L      BUN 26 mg/dL      Creatinine 1.25 mg/dL       Glucose 114 mg/dL      Calcium 9.2 mg/dL      AST 17 U/L      ALT 29 U/L      Alkaline Phosphatase 60 U/L      Total Protein 7.0 g/dL      Albumin 4.2 g/dL      Total Bilirubin 1.49 mg/dL      eGFR 55 ml/min/1.73sq m     Narrative:      National Kid[employer] guidelines for Chronic Kidney Disease (CKD):     Stage 1 with normal or high GFR (GFR > 90 mL/min/1.73 square meters)    Stage 2 Mild CKD (GFR = 60-89 mL/min/1.73 square meters)    Stage 3A Moderate CKD (GFR = 45-59 mL/min/1.73 square meters)    Stage 3B Moderate CKD (GFR = 30-44 mL/min/1.73 square meters)    Stage 4 Severe CKD (GFR = 15-29 mL/min/1.73 square meters)    Stage 5 End Stage CKD (GFR <15 mL/min/1.73 square meters)  Note: GFR calculation is accurate only with a steady state creatinine    Protime-INR [[id number]]  (Abnormal) Collected: 03/24/24 1704    Lab Status: Final result Specimen: Blood from Arm, Left Updated: 03/24/24 1721     Protime 16.0 seconds      INR 1.29    APTT [[id number]]  (Normal) Collected: 03/24/24 1704    Lab Status: Final result Specimen: Blood from Arm, Left Updated: 03/24/24 1721     PTT 35 seconds     CBC and differential [[id number]]  (Abnormal) Collected: 03/24/24 1704    Lab Status: Final result Specimen: Blood from Arm, Left Updated: 03/24/24 1712     WBC 11.37 Thousand/uL      RBC 4.87 Million/uL      Hemoglobin 15.1 g/dL      Hematocrit 46.2 %      MCV 95 fL      MCH 31.0 pg      MCHC 32.7 g/dL      RDW 12.8 %      MPV 10.5 fL      Platelets 153 Thousands/uL      nRBC 0 /100 WBCs      Neutrophils Relative 79 %      Immature Grans % 0 %      Lymphocytes Relative 11 %      Monocytes Relative 8 %      Eosinophils Relative 2 %      Basophils Relative 0 %      Neutrophils Absolute 8.87 Thousands/µL      Absolute Immature Grans 0.04 Thousand/uL      Absolute Lymphocytes 1.21 Thousands/µL      Absolute Monocytes 0.94 Thousand/µL      Eosinophils Absolute 0.27 Thousand/µL      Basophils Absolute 0.04 Thousands/µL      UA (URINE) with reflex to Scope [[id number]]     Lab Status: No result Specimen: Urine                    XR chest 1 view   Final Result by Yue Coulter MD (03/24 2205)      No acute cardiopulmonary disease.            Workstation performed: [id number]         XR hip/pelv 2-3 vws left    (Results Pending)              Procedures  ECG 12 Lead Documentation Only    Date/Time: 3/24/2024 4:57 PM    Performed by: Lucy Horton DO  Authorized by: Lucy Horton DO    Patient location:  ED  Rate:     ECG rate:  83  Rhythm:     Rhythm: atrial fibrillation             ED Course                                             Medical Decision Making  Differential diagnosis includes but is not limited to fall, abrasion, hip dislocation sure, hip contusion hip strain    Problems Addressed:  Fall, initial encounter: acute illness or injury  Femoral neck fracture (HCC): acute illness or injury  Hip fracture (HCC): acute illness or injury    Amount and/or Complexity of Data Reviewed  Labs: ordered. Decision-making details documented in ED Course.  Radiology: ordered and independent interpretation performed.     Details: Femoral neck fracture    Risk  Prescription drug management.  Parenteral controlled substances.  Decision regarding hospitalization.  Risk Details: Discussed case with orthopedics they agree with admission admit to internal medicine with consult to orthopedics             Disposition  Final diagnoses:   Femoral neck fracture (HCC)   Fall, initial encounter   Hip fracture (HCC)     Time reflects when diagnosis was documented in both MDM as applicable and the Disposition within this note       Time User Action Codes Description Comment    3/24/2024  5:54 PM Lucy Horton Add [S72.009A] Femoral neck fracture (HCC)     3/24/2024  5:54 PM Lucy Horton Add [W19.XXXA] Fall, initial encounter     3/24/2024  6:43 PM Lucy Horton Add [S72.009A] Hip fracture (HCC)     3/24/2024 11:03 PM Gilberto  Josias Cage [G47.33] Obstructive sleep apnea syndrome     3/24/2024 11:04 PM Josias Macias [Z98.890] History of cardiac cath     3/24/2024 11:04 PM Josias Macias [I50.32] Chronic diastolic congestive heart failure (HCC)     3/24/2024 11:04 PM Josias Macias [I48.91] Atrial fibrillation, unspecified type (HCC)           ED Disposition       ED Disposition   Admit    Condition   Stable    Date/Time   Sun Mar 24, 2024  5:54 PM    Comment   Case was discussed with  and the patient's admission status was agreed to be Admission Status: inpatient status to the service of   .               Follow-up Information    None         Current Discharge Medication List        CONTINUE these medications which have NOT CHANGED    Details   apixaban (ELIQUIS) 2.5 mg Take 2.5 mg by mouth 2 (two) times a day      docusate sodium (COLACE) 100 mg capsule Take 100 mg by mouth 2 (two) times a day      Fluticasone-Salmeterol (Advair Diskus) 100-50 mcg/dose inhaler Inhale 1 puff 2 (two) times a day Rinse mouth after use.      ipratropium-albuterol (DUO-NEB) 0.5-2.5 mg/3 mL nebulizer solution INHALE CONTENTS OF 1 VIAL VIA NEBULIZER FOUR TIMES A DAY  Qty: 60 mL, Refills: 11    Associated Diagnoses: Chronic obstructive pulmonary disease, unspecified COPD type (HCC)      methenamine hippurate (HIPREX) 1 g tablet Take 1 g by mouth 2 (two) times a day with meals      metoprolol succinate (TOPROL-XL) 25 mg 24 hr tablet Take 25 mg by mouth daily      midodrine (PROAMATINE) 5 mg tablet Take 5 mg by mouth 3 (three) times a day before meals Hold if SBP >135      acetaminophen (TYLENOL) 325 mg tablet Take 650 mg by mouth every 6 (six) hours as needed      azelastine (ASTELIN) 0.1 % nasal spray 1 spray into each nostril 2 (two) times a day as needed for rhinitis Use in each nostril as directed      bisacodyl (DULCOLAX) 10 mg suppository Insert 10 mg into the rectum once as needed      meclizine (ANTIVERT) 12.5 MG tablet Take 12.5 mg by  mouth every 8 (eight) hours as needed for dizziness      mineral oil enema Insert 1 enema into the rectum once as needed for constipation      sodium phosphate-biphosphate (FLEET) 7-19 g 118 mL enema Insert 1 enema into the rectum once as needed             No discharge procedures on file.    PDMP Review         Value Time User    PDMP Reviewed  Yes 11/18/2022  8:47 PM GABRIELA Gibbs            ED Provider  Electronically Signed by             Lucy Horton DO  03/24/24 [id number]

## 2024-03-24 NOTE — H&P
[facility]  H&P  Name: Sal Moura 78 y.o. male I MRN: [id number]  Unit/Bed#: -01 I Date of Admission: 3/24/2024   Date of Service: 3/24/2024 I Hospital Day: 0      Assessment/Plan   * Closed left hip fracture, initial encounter ([facility])  Assessment & Plan  Fall at [facility] on 3/23, left hip pain/leg externally rotated and shortened  ED imaging left hip fracture  N.p.o., IV hydration  Eliquis 2.5 mg twice daily for A-fib on hold, LD 3/24 AM  Orthopedic consultation  Pain control  Bowel regimen    CHF (congestive heart failure) ([facility])  Assessment & Plan  Wt Readings from Last 3 Encounters:   03/24/24 72 kg (158 lb 11.7 oz)   03/07/24 69.9 kg (154 lb)   01/26/24 69.4 kg (153 lb)   History of chronic diastolic CHF with preserved ejection fraction  EF 50% in 2022  Loop recorder in place  Euvolemic on exam  Not maintained on outpatient diuretics  Continue PTA metoprolol succinate  Monitor volume status    COPD (chronic obstructive pulmonary disease) ([facility])  Assessment & Plan  No acute exacerbation  Continue PTA ICS daily and DuoNeb 4 times daily    Orthostatic hypotension  Assessment & Plan  Continue PTA midodrine 3 times daily with hold for SBP > 135    Urinary retention  Assessment & Plan  Continue Flomax  Urinary retention protocol  Continue PTA Hipprex for UTI suppression  UA pending    Permanent atrial fibrillation ([facility])  Assessment & Plan  EKG confirming A-fib, rate controlled  Continue PTA metoprolol succinate 25 mg daily  Eliquis on hold, LD 3/24 in the AM         VTE Pharmacologic Prophylaxis:   High Risk (Score >/= 5) - Pharmacological DVT Prophylaxis Ordered: apixaban (Eliquis). Sequential Compression Devices Ordered.  Code Status: Level 3 - DNAR and DNI   Discussion with family: Patient declined call to .   States wife is unable to be reached  Anticipated Length of Stay: Patient will be admitted on an inpatient basis with an anticipated length of stay of greater than 2  midnights secondary to hip fracture.    Total Time Spent on Date of Encounter in care of patient: 45 mins. This time was spent on one or more of the following: performing physical exam; counseling and coordination of care; obtaining or reviewing history; documenting in the medical record; reviewing/ordering tests, medications or procedures; communicating with other healthcare professionals and discussing with patient's family/caregivers.    Chief Complaint: Left hip pain    History of Present Illness:  Sal Moura is a 78 y.o. male with a PMH of COPD, A-fib maintained on Eliquis, orthostatic hypotension, ambulatory dysfunction, CHF, urinary retention, moderate protein calorie malnutrition, resides in a SNF who presents with left hip pain.  Patient reports a fall on 3/23 then x-ray obtained 3/24 in facility revealed left hip fracture.  In the ED repeat x-ray confirmed left hip fracture.  CXR revealed no chest trauma, no vascular congestion.  Presented to the medical service for further evaluation and treatment, orthopedic consultation for left hip repair.    Review of Systems:  Review of Systems   Constitutional:  Negative for chills and fever.   HENT:  Negative for ear pain and sore throat.    Eyes:  Negative for pain and visual disturbance.   Respiratory:  Negative for cough and shortness of breath.    Cardiovascular:  Negative for chest pain and palpitations.   Gastrointestinal:  Negative for abdominal pain and vomiting.   Genitourinary:  Negative for dysuria and hematuria.   Musculoskeletal:  Positive for arthralgias and gait problem. Negative for back pain.   Skin:  Negative for color change and rash.   Neurological:  Positive for weakness. Negative for seizures and syncope.   All other systems reviewed and are negative.      Past Medical and Surgical History:   Past Medical History:   Diagnosis Date    A-fib ([facility])     Ambulatory dysfunction     Anxiety     Anxiety disorder     Atrial fibrillation ([facility])      Chronic indwelling Cloud catheter     COPD (chronic obstructive pulmonary disease) ([facility])     Coronary artery disease     Depression     Cloud catheter in place     Hepatitis C     screening negative in 1/2017    Hepatitis C     History of MI (myocardial infarction)     Hypertension     MI (myocardial infarction) (H[facility])     Urinary catheter in place     Urinary retention     Use of cane as ambulatory aid        Past Surgical History:   Procedure Laterality Date    CARDIAC CATHETERIZATION  07/09/2018    CARDIAC ELECTROPHYSIOLOGY PROCEDURE N/A 9/30/2022    Procedure: Cardiac loop recorder implant;  Surgeon: Duke Buckner MD;  Location: [facility];  Service: Cardiology    CARDIAC ELECTROPHYSIOLOGY PROCEDURE  09/30/2022    Cardiac loop recorder implant; Dr. Duke Buckner    CARDIAC LOOP RECORDER  09/2022    COLONOSCOPY      COLONOSCOPY      INGUINAL HERNIA REPAIR Right 08/11/2022    Dr. Encarnacion    [place] RPR 1ST INGUN HRNA AGE 5 YRS/> REDUCIBLE Right 8/11/2022    Procedure: REPAIR HERNIA INGUINAL;  Surgeon: Ady Encarnacion DO;  Location: [facility];  Service: General    TONSILLECTOMY         Meds/Allergies:  Prior to Admission medications    Medication Sig Start Date End Date Taking? Authorizing Provider   acetaminophen (TYLENOL) 325 mg tablet Take 650 mg by mouth every 6 (six) hours as needed    Historical Provider, MD   apixaban (ELIQUIS) 2.5 mg Take 2.5 mg by mouth 2 (two) times a day    Historical Provider, MD   azelastine (ASTELIN) 0.1 % nasal spray 1 spray into each nostril 2 (two) times a day as needed for rhinitis Use in each nostril as directed    Historical Provider, MD   bisacodyl (DULCOLAX) 10 mg suppository Insert 10 mg into the rectum once as needed    Historical Provider, MD   docusate sodium (COLACE) 100 mg capsule Take 100 mg by mouth 2 (two) times a day    Historical Provider, MD   Fluticasone-Salmeterol (Advair Diskus) 100-50 mcg/dose inhaler Inhale 1 puff 2 (two) times a day Rinse mouth after  use.    Historical Provider, MD   ipratropium-albuterol (DUO-NEB) 0.5-2.5 mg/3 mL nebulizer solution INHALE CONTENTS OF 1 VIAL VIA NEBULIZER FOUR TIMES A DAY 22   Panchito Snow MD   meclizine (ANTIVERT) 12.5 MG tablet Take 12.5 mg by mouth every 8 (eight) hours as needed for dizziness    Historical Provider, MD   methenamine hippurate (HIPREX) 1 g tablet Take 1 g by mouth 2 (two) times a day with meals    Historical Provider, MD   metoprolol succinate (TOPROL-XL) 25 mg 24 hr tablet Take 25 mg by mouth daily    Historical Provider, MD   midodrine (PROAMATINE) 5 mg tablet Take 5 mg by mouth 3 (three) times a day before meals Hold if SBP >135    Historical Provider, MD   mineral oil enema Insert 1 enema into the rectum once as needed for constipation    Historical Provider, MD   sodium phosphate-biphosphate (FLEET) 7-19 g 118 mL enema Insert 1 enema into the rectum once as needed    Historical Provider, MD     I have reveiwed home medications using records provided by [facility].    Allergies: No Known Allergies    Social History:  Marital Status: /Civil Union   Occupation: retired  Patient Pre-hospital Living Situation: Long Term Care Facility  Patient Pre-hospital Level of Mobility: unable to be assessed at time of evaluation  Patient Pre-hospital Diet Restrictions: none  Substance Use History:   Social History     Substance and Sexual Activity   Alcohol Use Not Currently     Social History     Tobacco Use   Smoking Status Former    Current packs/day: 0.00    Average packs/day: 1.5 packs/day for 57.0 years (85.5 ttl pk-yrs)    Types: Cigarettes    Start date: 3/12/1966    Quit date: 3/12/2023    Years since quittin.0   Smokeless Tobacco Never   Tobacco Comments    since age 14; Has history of smoking for over 55 years. He has been smoking more than packet daily in the past however he has been smokes about half a pack a day lately and stopped smoking on 2018 when he was admitted to the hospital.       Social History     Substance and Sexual Activity   Drug Use Never       Family History:  Family History   Problem Relation Age of Onset    Hypertension Mother     Coronary artery disease Mother         premature    Diabetes Father     Coronary artery disease Father         premature    Hypertension Father        Physical Exam:     Vitals:   Blood Pressure: 148/96 (03/24/24 1915)  Pulse: 98 (03/24/24 1915)  Temperature: 98.3 °F (36.8 °C) (03/24/24 1915)  Temp Source: Oral (03/24/24 1915)  Respirations: 22 (03/24/24 1915)  Height: 6' (182.9 cm) (03/24/24 1915)  Weight - Scale: 72 kg (158 lb 11.7 oz) (03/24/24 1915)  SpO2: 96 % (03/24/24 1915)    Physical Exam  Vitals and nursing note reviewed.   Constitutional:       General: He is not in acute distress.  HENT:      Head: Normocephalic and atraumatic.      Mouth/Throat:      Mouth: Mucous membranes are dry.   Eyes:      Conjunctiva/sclera: Conjunctivae normal.   Cardiovascular:      Rate and Rhythm: Normal rate. Rhythm irregular.      Heart sounds: No murmur heard.  Pulmonary:      Effort: Pulmonary effort is normal. No respiratory distress.      Breath sounds: Normal breath sounds.   Abdominal:      Palpations: Abdomen is soft.      Tenderness: There is no abdominal tenderness.      Hernia: A hernia is present.   Musculoskeletal:         General: Swelling, tenderness and deformity present.      Cervical back: Neck supple.      Comments: Left leg externally rotated and shortened   Skin:     General: Skin is warm and dry.      Capillary Refill: Capillary refill takes less than 2 seconds.   Neurological:      General: No focal deficit present.      Mental Status: He is alert and oriented to person, place, and time.   Psychiatric:         Mood and Affect: Mood normal.         Behavior: Behavior normal. Behavior is cooperative.          Additional Data:     Lab Results:  Results from last 7 days   Lab Units 03/24/24  1704   WBC Thousand/uL 11.37*   HEMOGLOBIN g/dL  15.1   HEMATOCRIT % 46.2   PLATELETS Thousands/uL 153   NEUTROS PCT % 79*   LYMPHS PCT % 11*   MONOS PCT % 8   EOS PCT % 2     Results from last 7 days   Lab Units 03/24/24  1704   SODIUM mmol/L 138   POTASSIUM mmol/L 4.2   CHLORIDE mmol/L 106   CO2 mmol/L 26   BUN mg/dL 26*   CREATININE mg/dL 1.25   ANION GAP mmol/L 6   CALCIUM mg/dL 9.2   ALBUMIN g/dL 4.2   TOTAL BILIRUBIN mg/dL 1.49*   ALK PHOS U/L 60   ALT U/L 29   AST U/L 17   GLUCOSE RANDOM mg/dL 114     Results from last 7 days   Lab Units 03/24/24  1704   INR  1.29*                   Lines/Drains:  Invasive Devices       Peripheral Intravenous Line  Duration             Peripheral IV 03/24/24 Left Antecubital <1 day                        Imaging: Reviewed radiology reports from this admission including: chest xray and xray(s)  XR hip/pelv 2-3 vws left    (Results Pending)   XR chest 1 view    (Results Pending)       EKG and Other Studies Reviewed on Admission:   EKG: Atrial fibrillation. HR 83.    ** Please Note: This note has been constructed using a voice recognition system. **

## 2024-03-25 ENCOUNTER — APPOINTMENT (INPATIENT)
Dept: RADIOLOGY | Facility: HOSPITAL | Age: 79
DRG: 522 | End: 2024-03-25
Payer: MEDICARE

## 2024-03-25 ENCOUNTER — ANESTHESIA EVENT (INPATIENT)
Dept: PERIOP | Facility: HOSPITAL | Age: 79
DRG: 522 | End: 2024-03-25
Payer: MEDICARE

## 2024-03-25 LAB
AMORPH PHOS CRY URNS QL MICRO: ABNORMAL /HPF
ANION GAP SERPL CALCULATED.3IONS-SCNC: 7 MMOL/L (ref 4–13)
BACTERIA UR QL AUTO: ABNORMAL /HPF
BILIRUB UR QL STRIP: NEGATIVE
BUN SERPL-MCNC: 24 MG/DL (ref 5–25)
CALCIUM SERPL-MCNC: 8.8 MG/DL (ref 8.4–10.2)
CHLORIDE SERPL-SCNC: 108 MMOL/L (ref 96–108)
CLARITY UR: ABNORMAL
CO2 SERPL-SCNC: 26 MMOL/L (ref 21–32)
COLOR UR: YELLOW
CREAT SERPL-MCNC: 1.1 MG/DL (ref 0.6–1.3)
ERYTHROCYTE [DISTWIDTH] IN BLOOD BY AUTOMATED COUNT: 12.9 % (ref 11.6–15.1)
GFR SERPL CREATININE-BSD FRML MDRD: 64 ML/MIN/1.73SQ M
GLUCOSE SERPL-MCNC: 96 MG/DL (ref 65–140)
GLUCOSE UR STRIP-MCNC: NEGATIVE MG/DL
HCT VFR BLD AUTO: 42.9 % (ref 36.5–49.3)
HGB BLD-MCNC: 14.2 G/DL (ref 12–17)
HGB UR QL STRIP.AUTO: NEGATIVE
KETONES UR STRIP-MCNC: NEGATIVE MG/DL
LEUKOCYTE ESTERASE UR QL STRIP: ABNORMAL
MAGNESIUM SERPL-MCNC: 2.3 MG/DL (ref 1.9–2.7)
MCH RBC QN AUTO: 31.6 PG (ref 26.8–34.3)
MCHC RBC AUTO-ENTMCNC: 33.1 G/DL (ref 31.4–37.4)
MCV RBC AUTO: 95 FL (ref 82–98)
MUCOUS THREADS UR QL AUTO: ABNORMAL
NITRITE UR QL STRIP: POSITIVE
NON-SQ EPI CELLS URNS QL MICRO: ABNORMAL /HPF
PH UR STRIP.AUTO: >=9 [PH]
PHOSPHATE SERPL-MCNC: 3.1 MG/DL (ref 2.3–4.1)
PLATELET # BLD AUTO: 142 THOUSANDS/UL (ref 149–390)
PMV BLD AUTO: 11.2 FL (ref 8.9–12.7)
POTASSIUM SERPL-SCNC: 3.9 MMOL/L (ref 3.5–5.3)
PROT UR STRIP-MCNC: ABNORMAL MG/DL
RBC # BLD AUTO: 4.5 MILLION/UL (ref 3.88–5.62)
RBC #/AREA URNS AUTO: ABNORMAL /HPF
SODIUM SERPL-SCNC: 141 MMOL/L (ref 135–147)
SP GR UR STRIP.AUTO: <=1.005 (ref 1–1.03)
TRI-PHOS CRY URNS QL MICRO: ABNORMAL /HPF
UROBILINOGEN UR QL STRIP.AUTO: 0.2 E.U./DL
WBC # BLD AUTO: 9.26 THOUSAND/UL (ref 4.31–10.16)
WBC #/AREA URNS AUTO: ABNORMAL /HPF

## 2024-03-25 PROCEDURE — 94640 AIRWAY INHALATION TREATMENT: CPT

## 2024-03-25 PROCEDURE — 94760 N-INVAS EAR/PLS OXIMETRY 1: CPT

## 2024-03-25 PROCEDURE — 84100 ASSAY OF PHOSPHORUS: CPT | Performed by: NURSE PRACTITIONER

## 2024-03-25 PROCEDURE — 99223 1ST HOSP IP/OBS HIGH 75: CPT | Performed by: ORTHOPAEDIC SURGERY

## 2024-03-25 PROCEDURE — 81001 URINALYSIS AUTO W/SCOPE: CPT | Performed by: EMERGENCY MEDICINE

## 2024-03-25 PROCEDURE — 73560 X-RAY EXAM OF KNEE 1 OR 2: CPT

## 2024-03-25 PROCEDURE — 83735 ASSAY OF MAGNESIUM: CPT | Performed by: NURSE PRACTITIONER

## 2024-03-25 PROCEDURE — 85027 COMPLETE CBC AUTOMATED: CPT | Performed by: NURSE PRACTITIONER

## 2024-03-25 PROCEDURE — 80048 BASIC METABOLIC PNL TOTAL CA: CPT | Performed by: NURSE PRACTITIONER

## 2024-03-25 PROCEDURE — 99232 SBSQ HOSP IP/OBS MODERATE 35: CPT | Performed by: STUDENT IN AN ORGANIZED HEALTH CARE EDUCATION/TRAINING PROGRAM

## 2024-03-25 PROCEDURE — 87081 CULTURE SCREEN ONLY: CPT | Performed by: STUDENT IN AN ORGANIZED HEALTH CARE EDUCATION/TRAINING PROGRAM

## 2024-03-25 RX ORDER — LEVALBUTEROL INHALATION SOLUTION 1.25 MG/3ML
1.25 SOLUTION RESPIRATORY (INHALATION)
Status: DISCONTINUED | OUTPATIENT
Start: 2024-03-25 | End: 2024-03-27 | Stop reason: HOSPADM

## 2024-03-25 RX ORDER — HEPARIN SODIUM 5000 [USP'U]/ML
5000 INJECTION, SOLUTION INTRAVENOUS; SUBCUTANEOUS EVERY 8 HOURS SCHEDULED
Status: DISCONTINUED | OUTPATIENT
Start: 2024-03-25 | End: 2024-03-26

## 2024-03-25 RX ADMIN — IPRATROPIUM BROMIDE 0.5 MG: 0.5 SOLUTION RESPIRATORY (INHALATION) at 13:49

## 2024-03-25 RX ADMIN — METHENAMINE HIPPURATE 1 G: 1000 TABLET ORAL at 08:47

## 2024-03-25 RX ADMIN — METHENAMINE HIPPURATE 1 G: 1000 TABLET ORAL at 16:27

## 2024-03-25 RX ADMIN — ACETAMINOPHEN 975 MG: 325 TABLET, FILM COATED ORAL at 05:13

## 2024-03-25 RX ADMIN — METOPROLOL SUCCINATE 25 MG: 25 TABLET, EXTENDED RELEASE ORAL at 08:47

## 2024-03-25 RX ADMIN — SODIUM CHLORIDE, SODIUM GLUCONATE, SODIUM ACETATE, POTASSIUM CHLORIDE, MAGNESIUM CHLORIDE, SODIUM PHOSPHATE, DIBASIC, AND POTASSIUM PHOSPHATE 75 ML/HR: .53; .5; .37; .037; .03; .012; .00082 INJECTION, SOLUTION INTRAVENOUS at 14:28

## 2024-03-25 RX ADMIN — MIDODRINE HYDROCHLORIDE 5 MG: 5 TABLET ORAL at 16:27

## 2024-03-25 RX ADMIN — ACETAMINOPHEN 975 MG: 325 TABLET, FILM COATED ORAL at 22:57

## 2024-03-25 RX ADMIN — IPRATROPIUM BROMIDE 0.5 MG: 0.5 SOLUTION RESPIRATORY (INHALATION) at 19:46

## 2024-03-25 RX ADMIN — SENNOSIDES 17.2 MG: 8.6 TABLET, FILM COATED ORAL at 22:57

## 2024-03-25 RX ADMIN — LEVALBUTEROL HYDROCHLORIDE 1.25 MG: 1.25 SOLUTION RESPIRATORY (INHALATION) at 13:49

## 2024-03-25 RX ADMIN — ACETAMINOPHEN 975 MG: 325 TABLET, FILM COATED ORAL at 14:29

## 2024-03-25 RX ADMIN — HEPARIN SODIUM 5000 UNITS: 5000 INJECTION, SOLUTION INTRAVENOUS; SUBCUTANEOUS at 14:30

## 2024-03-25 RX ADMIN — HEPARIN SODIUM 5000 UNITS: 5000 INJECTION, SOLUTION INTRAVENOUS; SUBCUTANEOUS at 22:57

## 2024-03-25 RX ADMIN — MIDODRINE HYDROCHLORIDE 5 MG: 5 TABLET ORAL at 08:46

## 2024-03-25 RX ADMIN — LEVALBUTEROL HYDROCHLORIDE 1.25 MG: 1.25 SOLUTION RESPIRATORY (INHALATION) at 19:46

## 2024-03-25 RX ADMIN — MIDODRINE HYDROCHLORIDE 5 MG: 5 TABLET ORAL at 11:38

## 2024-03-25 RX ADMIN — IPRATROPIUM BROMIDE AND ALBUTEROL SULFATE 3 ML: 2.5; .5 SOLUTION RESPIRATORY (INHALATION) at 08:33

## 2024-03-25 NOTE — NURSING NOTE
Patient arrived to the unit via liter from the ED along with belongings. Patient A/Ox4, VS stable, neuro intact. Patient offering no complaints at this time. Patient oriented to room, bed at low level, bed alarm on, call within reach. Refer to doc flow sheet. Will continue to monitor.

## 2024-03-25 NOTE — ASSESSMENT & PLAN NOTE
EKG confirming A-fib, rate controlled  Continue PTA metoprolol succinate 25 mg daily  Eliquis on hold, LD 3/24 in the AM

## 2024-03-25 NOTE — PLAN OF CARE
Problem: PAIN - ADULT  Goal: Verbalizes/displays adequate comfort level or baseline comfort level  Description: Interventions:  - Encourage patient to monitor pain and request assistance  - Assess pain using appropriate pain scale  - Administer analgesics based on type and severity of pain and evaluate response  - Implement non-pharmacological measures as appropriate and evaluate response  - Consider cultural and social influences on pain and pain management  - Notify physician/advanced practitioner if interventions unsuccessful or patient reports new pain  Outcome: Progressing     Problem: SAFETY ADULT  Goal: Patient will remain free of falls  Description: INTERVENTIONS:  - Educate patient/family on patient safety including physical limitations  - Instruct patient to call for assistance with activity   - Consult OT/PT to assist with strengthening/mobility   - Keep Call bell within reach  - Keep bed low and locked with side rails adjusted as appropriate  - Keep care items and personal belongings within reach  - Initiate and maintain comfort rounds  - Offer Toileting every 2 Hours, in advance of need  - Initiate/Maintain bed alarm  - Obtain necessary fall risk management equipment:   - Apply yellow socks and bracelet for high fall risk patients  - Consider moving patient to room near nurses station  Outcome: Progressing

## 2024-03-25 NOTE — ASSESSMENT & PLAN NOTE
Fall at SNF on 3/23, left hip pain/leg externally rotated and shortened  ED imaging left hip fracture  N.p.o., IV hydration  Eliquis 2.5 mg twice daily for A-fib on hold, LD 3/24 AM  Orthopedic consultation  Pain control  Bowel regimen

## 2024-03-25 NOTE — ASSESSMENT & PLAN NOTE
Wt Readings from Last 3 Encounters:   03/24/24 72 kg (158 lb 11.7 oz)   03/07/24 69.9 kg (154 lb)   01/26/24 69.4 kg (153 lb)   History of chronic diastolic CHF with preserved ejection fraction  EF 50% in 2022  Loop recorder in place  Euvolemic on exam  Not maintained on outpatient diuretics  Continue PTA metoprolol succinate  Monitor volume status

## 2024-03-25 NOTE — CONSULTS
Assessment and Plan       78 year old male with left hip femoral neck fx. Xrays discussed with the patient today. Patient would benefit from surgical fixation of this fracture, likely hemiarthroplasty. I discussed this with the pt today and that reasoning for this would be to get him back to an ambulatory status as soon as possible. Pt states understanding and is in agreement with proceeding with surgery. I spoke with Dr. Beal who has agreed to take the pt on tomorrow. Therefore, pt can eat today. NPO at midnight. Continue NWB status. Continue hold on anticoagulation.Therapy will begin postop.       Chief Complaint     Left Hip Pain      History of the Present Illness     Sal Moura is a 78 y.o. male who presented to the ED last night with c/o left hip pain after a slip and fall on Saturday evening. Pt states that a fellow resident of [facility] blocked his path and he ended up tripping over a wheelchair and hitting his left hip on the foot rest. Pt continued to have difficulty with ambulation. He reportedly had xrays at the facility which showed a fx and he presented to the ED yesterday where new xrays confirmed this. He states the pain medication does help. He states he would use a cane when he was walking out and about, but otherwise was self-ambulatory. He is on Eliquis, last dose yesterday AM.           Past Medical History:   Diagnosis Date    A-fib (HCC)     Ambulatory dysfunction     Anxiety     Anxiety disorder     Atrial fibrillation (HCC)     Chronic indwelling Cloud catheter     COPD (chronic obstructive pulmonary disease) (HCC)     Coronary artery disease     Depression     Cloud catheter in place     Hepatitis C     screening negative in 1/2017    Hepatitis C     History of MI (myocardial infarction)     Hypertension     MI (myocardial infarction) (HCC)     Urinary catheter in place     Urinary retention     Use of cane as ambulatory aid        Past Surgical History:   Procedure Laterality Date     CARDIAC CATHETERIZATION  07/09/2018    CARDIAC ELECTROPHYSIOLOGY PROCEDURE N/A 9/30/2022    Procedure: Cardiac loop recorder implant;  Surgeon: Duke Buckner MD;  Location: [facility];  Service: Cardiology    CARDIAC ELECTROPHYSIOLOGY PROCEDURE  09/30/2022    Cardiac loop recorder implant; Dr. Duke Buckner    CARDIAC LOOP RECORDER  09/2022    COLONOSCOPY      COLONOSCOPY      INGUINAL HERNIA REPAIR Right 08/11/2022    Dr. Encarnacion    AK RPR 1ST INGUN HRNA AGE 5 YRS/> REDUCIBLE Right 8/11/2022    Procedure: REPAIR HERNIA INGUINAL;  Surgeon: Ady Encarnacion DO;  Location: [facility];  Service: General    TONSILLECTOMY         No Known Allergies    No current facility-administered medications on file prior to encounter.     Current Outpatient Medications on File Prior to Encounter   Medication Sig Dispense Refill    apixaban (ELIQUIS) 2.5 mg Take 2.5 mg by mouth 2 (two) times a day      docusate sodium (COLACE) 100 mg capsule Take 100 mg by mouth 2 (two) times a day      Fluticasone-Salmeterol (Advair Diskus) 100-50 mcg/dose inhaler Inhale 1 puff 2 (two) times a day Rinse mouth after use.      ipratropium-albuterol (DUO-NEB) 0.5-2.5 mg/3 mL nebulizer solution INHALE CONTENTS OF 1 VIAL VIA NEBULIZER FOUR TIMES A DAY 60 mL 11    methenamine hippurate (HIPREX) 1 g tablet Take 1 g by mouth 2 (two) times a day with meals      metoprolol succinate (TOPROL-XL) 25 mg 24 hr tablet Take 25 mg by mouth daily      midodrine (PROAMATINE) 5 mg tablet Take 5 mg by mouth 3 (three) times a day before meals Hold if SBP >135      acetaminophen (TYLENOL) 325 mg tablet Take 650 mg by mouth every 6 (six) hours as needed      azelastine (ASTELIN) 0.1 % nasal spray 1 spray into each nostril 2 (two) times a day as needed for rhinitis Use in each nostril as directed      bisacodyl (DULCOLAX) 10 mg suppository Insert 10 mg into the rectum once as needed      meclizine (ANTIVERT) 12.5 MG tablet Take 12.5 mg by mouth every 8 (eight)  hours as needed for dizziness      mineral oil enema Insert 1 enema into the rectum once as needed for constipation      sodium phosphate-biphosphate (FLEET) 7-19 g 118 mL enema Insert 1 enema into the rectum once as needed         Social History     Tobacco Use    Smoking status: Former     Current packs/day: 0.00     Average packs/day: 1.5 packs/day for 57.0 years (85.5 ttl pk-yrs)     Types: Cigarettes     Start date: 3/12/1966     Quit date: 3/12/2023     Years since quittin.0    Smokeless tobacco: Never    Tobacco comments:     since age 14; Has history of smoking for over 55 years. He has been smoking more than packet daily in the past however he has been smokes about half a pack a day lately and stopped smoking on 2018 when he was admitted to the hospital.    Vaping Use    Vaping status: Never Used   Substance Use Topics    Alcohol use: Not Currently    Drug use: Never       Family History   Problem Relation Age of Onset    Hypertension Mother     Coronary artery disease Mother         premature    Diabetes Father     Coronary artery disease Father         premature    Hypertension Father        Review of Systems     As stated in the HPI. All other systems were reviewed and are negative.      Physical Exam     /77 (BP Location: Left arm)   Pulse 85   Temp 98.4 °F (36.9 °C) (Oral)   Resp 20   Ht 6' (1.829 m)   Wt 72 kg (158 lb 11.7 oz)   SpO2 93%   BMI 21.53 kg/m²     GENERAL: This is a well-developed, well-nourished, age-appropriate patient in no acute distress. The patient is alert and oriented x3. Pleasant and cooperative.  Eyes: Anicteric sclerae. Extraocular movements appear intact.  HENT: Nares are patent with no drainage.  Lungs: There is equal chest rise on inspection. Breathing is non-labored with no audible wheezing.  Cardiovascular: No cyanosis. No upper extremity lymphadema.  Skin: Skin is warm to touch. No obvious skin lesions or rashes other than described below.  Neurologic:  No ataxia  Psychiatric: Mood and affect are appropriate.      LLE:  Pt with small superficial scratch along lateral hip.  Leg is shortened and externally rotated.  Pt with ttp along the lateral hip.   + logroll.  Nontender to the knee, calf, ankle and foot.   Calf is soft and compressible.  SILT L2-S1.  +ankle dorsi/plantar flexion, + EHL/FHL.  + DP pulse.    Data Review     Results Reviewed       Procedure Component Value Units Date/Time    Basic metabolic panel [[id number]] Collected: 03/25/24 0510    Lab Status: Final result Specimen: Blood from Arm, Right Updated: 03/25/24 0634     Sodium 141 mmol/L      Potassium 3.9 mmol/L      Chloride 108 mmol/L      CO2 26 mmol/L      ANION GAP 7 mmol/L      BUN 24 mg/dL      Creatinine 1.10 mg/dL      Glucose 96 mg/dL      Calcium 8.8 mg/dL      eGFR 64 ml/min/1.73sq m     Narrative:      National Kidney Disease Foundation guidelines for Chronic Kidney Disease (CKD):     Stage 1 with normal or high GFR (GFR > 90 mL/min/1.73 square meters)    Stage 2 Mild CKD (GFR = 60-89 mL/min/1.73 square meters)    Stage 3A Moderate CKD (GFR = 45-59 mL/min/1.73 square meters)    Stage 3B Moderate CKD (GFR = 30-44 mL/min/1.73 square meters)    Stage 4 Severe CKD (GFR = 15-29 mL/min/1.73 square meters)    Stage 5 End Stage CKD (GFR <15 mL/min/1.73 square meters)  Note: GFR calculation is accurate only with a steady state creatinine    Magnesium [[id number]]  (Normal) Collected: 03/25/24 0510    Lab Status: Final result Specimen: Blood from Arm, Right Updated: 03/25/24 0634     Magnesium 2.3 mg/dL     Phosphorus [[id number]]  (Normal) Collected: 03/25/24 0510    Lab Status: Final result Specimen: Blood from Arm, Right Updated: 03/25/24 0634     Phosphorus 3.1 mg/dL     UA (URINE) with reflex to Scope [[id number]]  (Abnormal) Collected: 03/25/24 0510    Lab Status: Final result Specimen: Urine, Clean Catch Updated: 03/25/24 0615     Color, UA Yellow     Clarity, UA Cloudy     Specific Gravity,  UA <=1.005     pH, UA >=9.0     Leukocytes, UA 2+     Nitrite, UA Positive     Protein, UA 2+ mg/dl      Glucose, UA Negative mg/dl      Ketones, UA Negative mg/dl      Urobilinogen, UA 0.2 E.U./dl      Bilirubin, UA Negative     Occult Blood, UA Negative    CBC (With Platelets) [[id number]]  (Abnormal) Collected: 03/25/24 0510    Lab Status: Final result Specimen: Blood from Arm, Right Updated: 03/25/24 0611     WBC 9.26 Thousand/uL      RBC 4.50 Million/uL      Hemoglobin 14.2 g/dL      Hematocrit 42.9 %      MCV 95 fL      MCH 31.6 pg      MCHC 33.1 g/dL      RDW 12.9 %      Platelets 142 Thousands/uL      MPV 11.2 fL     Comprehensive metabolic panel [[id number]]  (Abnormal) Collected: 03/24/24 1704    Lab Status: Final result Specimen: Blood from Arm, Left Updated: 03/24/24 1729     Sodium 138 mmol/L      Potassium 4.2 mmol/L      Chloride 106 mmol/L      CO2 26 mmol/L      ANION GAP 6 mmol/L      BUN 26 mg/dL      Creatinine 1.25 mg/dL      Glucose 114 mg/dL      Calcium 9.2 mg/dL      AST 17 U/L      ALT 29 U/L      Alkaline Phosphatase 60 U/L      Total Protein 7.0 g/dL      Albumin 4.2 g/dL      Total Bilirubin 1.49 mg/dL      eGFR 55 ml/min/1.73sq m     Narrative:      National Kidney Disease Foundation guidelines for Chronic Kidney Disease (CKD):     Stage 1 with normal or high GFR (GFR > 90 mL/min/1.73 square meters)    Stage 2 Mild CKD (GFR = 60-89 mL/min/1.73 square meters)    Stage 3A Moderate CKD (GFR = 45-59 mL/min/1.73 square meters)    Stage 3B Moderate CKD (GFR = 30-44 mL/min/1.73 square meters)    Stage 4 Severe CKD (GFR = 15-29 mL/min/1.73 square meters)    Stage 5 End Stage CKD (GFR <15 mL/min/1.73 square meters)  Note: GFR calculation is accurate only with a steady state creatinine    Protime-INR [[id number]]  (Abnormal) Collected: 03/24/24 1704    Lab Status: Final result Specimen: Blood from Arm, Left Updated: 03/24/24 1721     Protime 16.0 seconds      INR 1.29    APTT [[id number]]  (Normal)  Collected: 03/24/24 1704    Lab Status: Final result Specimen: Blood from Arm, Left Updated: 03/24/24 1721     PTT 35 seconds     CBC and differential [[id number]]  (Abnormal) Collected: 03/24/24 1704    Lab Status: Final result Specimen: Blood from Arm, Left Updated: 03/24/24 1712     WBC 11.37 Thousand/uL      RBC 4.87 Million/uL      Hemoglobin 15.1 g/dL      Hematocrit 46.2 %      MCV 95 fL      MCH 31.0 pg      MCHC 32.7 g/dL      RDW 12.8 %      MPV 10.5 fL      Platelets 153 Thousands/uL      nRBC 0 /100 WBCs      Neutrophils Relative 79 %      Immature Grans % 0 %      Lymphocytes Relative 11 %      Monocytes Relative 8 %      Eosinophils Relative 2 %      Basophils Relative 0 %      Neutrophils Absolute 8.87 Thousands/µL      Absolute Immature Grans 0.04 Thousand/uL      Absolute Lymphocytes 1.21 Thousands/µL      Absolute Monocytes 0.94 Thousand/µL      Eosinophils Absolute 0.27 Thousand/µL      Basophils Absolute 0.04 Thousands/µL                Imaging:  Xrays of the left hip/pelvis taken yesterday were reviewed and demonstrate an acute left femoral neck fracture in varus deformity.

## 2024-03-25 NOTE — RESPIRATORY THERAPY NOTE
RT Protocol Note  Sal Moura 78 y.o. male MRN: [id number]  Unit/Bed#: -01 Encounter: [id number]    Assessment    Principal Problem:    Closed left hip fracture, initial encounter (HCC)  Active Problems:    Permanent atrial fibrillation (HCC)    Urinary retention    Orthostatic hypotension    COPD (chronic obstructive pulmonary disease) (HCC)    CHF (congestive heart failure) (HCC)      Home Pulmonary Medications:  Duoneb  Advair       Past Medical History:   Diagnosis Date    A-fib (HCC)     Ambulatory dysfunction     Anxiety     Anxiety disorder     Atrial fibrillation (HCC)     Chronic indwelling Cloud catheter     COPD (chronic obstructive pulmonary disease) (HCC)     Coronary artery disease     Depression     Cloud catheter in place     Hepatitis C     screening negative in 2017    Hepatitis C     History of MI (myocardial infarction)     Hypertension     MI (myocardial infarction) (HCC)     Urinary catheter in place     Urinary retention     Use of cane as ambulatory aid      Social History     Socioeconomic History    Marital status: /Civil Union     Spouse name: None    Number of children: 3    Years of education: 12    Highest education level: 12th grade   Occupational History    Occupation: retired   Tobacco Use    Smoking status: Former     Current packs/day: 0.00     Average packs/day: 1.5 packs/day for 57.0 years (85.5 ttl pk-yrs)     Types: Cigarettes     Start date: 3/12/1966     Quit date: 3/12/2023     Years since quittin.0    Smokeless tobacco: Never    Tobacco comments:     since age 14; Has history of smoking for over 55 years. He has been smoking more than packet daily in the past however he has been smokes about half a pack a day lately and stopped smoking on 2018 when he was admitted to the hospital.    Vaping Use    Vaping status: Never Used   Substance and Sexual Activity    Alcohol use: Not Currently    Drug use: Never    Sexual activity: Not Currently      Partners: Female     Birth control/protection: Condom Male   Other Topics Concern    None   Social History Narrative    ** Merged History Encounter **         ** Merged History Encounter **         ** Merged History Encounter **         History of Ultra Sound: 2016  History of Stress Test: 2018  History of ECHO: 2018  · Most recent tobacco use screenin2019    · Live alone or with others:   with others      · Diet:   Regular    · Caffeine intake:   Moderate    · Guns     present in home:   No    · Asbestos exposure:   No    · TB exposure:   No    · Environmental exposure:   No    · Animal exposure:   No    · Smoke alarm in home:   Yes       Social Determinants of Health     Financial Resource Strain: Not on file   Food Insecurity: Food Insecurity Present (3/24/2024)    Hunger Vital Sign     Worried About Running Out of Food in the Last Year: Sometimes true     Ran Out of Food in the Last Year: Sometimes true   Transportation Needs: Unmet Transportation Needs (3/24/2024)    PRAPARE - Transportation     Lack of Transportation (Medical): Yes     Lack of Transportation (Non-Medical): Yes   Physical Activity: Insufficiently Active (2020)    Exercise Vital Sign     Days of Exercise per Week: 3 days     Minutes of Exercise per Session: 30 min   Stress: Stress Concern Present (2020)    [employer] - Occupational Stress Questionnaire     Feeling of Stress : To some extent   Social Connections: Not on file   Intimate Partner Violence: Not on file   Housing Stability: Low Risk  (3/24/2024)    Housing Stability Vital Sign     Unable to Pay for Housing in the Last Year: No     Number of Places Lived in the Last Year: 2     Unstable Housing in the Last Year: No       Subjective         Objective    Physical Exam:   Assessment Type: (P) During-treatment  General Appearance: (P) Alert, Awake  Respiratory Pattern: (P) Normal  Chest Assessment: (P) Chest expansion  symmetrical  Bilateral Breath Sounds: (P) Clear, Diminished    Vitals:  Blood pressure 134/93, pulse 98, temperature 98.7 °F (37.1 °C), temperature source Oral, resp. rate 20, height 6' (1.829 m), weight 72 kg (158 lb 11.7 oz), SpO2 93%.          Imaging and other studies: I have personally reviewed pertinent reports.            Plan    Respiratory Plan: (P) Home Bronchodilator Patient pathway        Resp Comments: (P) Assessed pt per protocol. Pt has a history of COPD. Pt takes Duoneb. Pt is not here for his COPD. Currently on RA. No respiratory distress noted. Duoneb was ordered. Will switched out to Xop/Atr per protocol. Respiratory to follow.

## 2024-03-25 NOTE — ASSESSMENT & PLAN NOTE
Fall at [facility] on 3/[facility], left hip pain/leg externally rotated and shortened  ED imaging left hip fracture  N.p.o., IV hydration  Eliquis 2.5 mg twice daily for A-fib on hold, LD 3/24 AM  Orthopedic consultation  Pain control  Bowel regimen  PT/OT postop

## 2024-03-25 NOTE — QUICK NOTE
Orthopedic surgery service requested perioperative risk assessment.  Sal is admitted for mechanical fall resulting in L femoral neck fracture.  He has had history of nonischemic cardiomyopathy which was believed to be due to tachycardia, most recent echo in our system appears to show recovery of LVEF.  Cath nonobstructive.  He has no CKD, no history of cerebrovascular disease, no history of diabetes. He endorses good functional capacity. He is at RCRI class I risk and may proceed to surgery without additional testing    Discussed with ortho team, likely OR on 3/26

## 2024-03-25 NOTE — ANESTHESIA PREPROCEDURE EVALUATION
Procedure:  HEMIARTHROPLASTY HIP (BIPOLAR) (Left: Hip)    #Atrial fibrillation - on apixaban BID, toprol-XL daily  -EKG (03/25): A-fibrillation, incomplete RBBB    #GERD  #COPD - Advair BID, albuterol neb  #JOELLE    Lab Results   Component Value Date    WBC 9.26 03/25/2024    HGB 14.2 03/25/2024    HCT 42.9 03/25/2024    MCV 95 03/25/2024     (L) 03/25/2024           Relevant Problems   CARDIO   (+) A-fib (HCC)   (+) Atrial fibrillation (HCC)   (+) Chronic diastolic congestive heart failure (HCC)   (+) Permanent atrial fibrillation (HCC)      GI/HEPATIC   (+) Gastroesophageal reflux disease without esophagitis      HEMATOLOGY   (+) Iron deficiency anemia likely secondary to GI bleeding   (+) Symptomatic anemia      NEURO/PSYCH   (+) Anxiety   (+) SDH (subdural hematoma) (HCC)      PULMONARY   (+) COPD (chronic obstructive pulmonary disease) (HCC)   (+) Obstructive sleep apnea syndrome     TTE (09/2022):  Left Ventricle Left ventricular cavity size is normal. Wall thickness is mildly increased. There is mild concentric hypertrophy. The left ventricular ejection fraction is 50%. Systolic function is low normal.  There is mild global hypokinesis. Unable to assess diastolic function due to atrial fibrillation.   Right Ventricle Right ventricular cavity size is normal. Systolic function is normal. Wall thickness is normal.   Left Atrium The atrium is moderately dilated.   Right Atrium The atrium is mildly dilated.   Aortic Valve The aortic valve is trileaflet. The leaflets are not thickened. The leaflets are not calcified. The leaflets exhibit normal mobility. There is no evidence of regurgitation. There is aortic sclerosis.   Mitral Valve There is mild thickening of the anterior leaflet and posterior leaflet involving the leaflet from base to margin. There is mild annular calcification. There is systolic bowing of the anterior and posterior leaflets. There is mild to moderate regurgitation with a centrally directed  jet. There is no evidence of stenosis. The valve has normal function.   Tricuspid Valve Tricuspid valve structure is normal. There is moderate regurgitation. The tricuspid valve regurgitation jet is central. There is no evidence of stenosis. The right ventricular systolic pressure is normal. The estimated right ventricular systolic pressure is 32.00 mmHg.   Pulmonic Valve Pulmonic valve structure is normal. There is mild regurgitation. There is no evidence of stenosis.   Ascending Aorta The aortic root is normal in size.   IVC/SVC The right atrial pressure is estimated at 10.0 mmHg. The inferior vena cava is dilated. Respirophasic changes were normal.   Pericardium There is no pericardial effusion. The pericardium is normal in appearance.        Physical Exam    Airway    Mallampati score: I  TM Distance: >3 FB  Neck ROM: full     Dental       Cardiovascular  Rhythm: regular, Rate: normal    Pulmonary   Breath sounds clear to auscultation    Other Findings  edentulous          Anesthesia Plan  ASA Score- 3     Anesthesia Type- IV sedation with anesthesia with ASA Monitors.         Additional Monitors:     Airway Plan:     Comment: Discussed benefits/risks of general anesthesia including possibility of mouth/throat pain, injury to lips/teeth, nausea/vomiting, and surgical pain along with more rare complications such as stroke, MI, pneumonia, aspiration, and injury to blood vessels. All questions answered.    Discussed nerve block to assist with post-operative analgesia. Discussed possibility of uncommon complications including permanent nerve injury, damage to blood vessels, infection local anesthetic toxicity, and nerve block failure. Patient understands and wishes to proceed.       .       Plan Factors-Exercise tolerance (METS): >4 METS.    Chart reviewed. EKG reviewed.  Existing labs reviewed. Patient summary reviewed.    Patient is not a current smoker.      There is medical exclusion for perioperative  obstructive sleep apnea risk education.        Induction- intravenous.    Postoperative Plan- Plan for postoperative opioid use. Planned trial extubation    Informed Consent- Anesthetic plan and risks discussed with patient.  I personally reviewed this patient with the CRNA. Discussed and agreed on the Anesthesia Plan with the CRNA..

## 2024-03-25 NOTE — PROGRESS NOTES
[facility]  Progress Note  Name: Sal Moura I  MRN: [id number]  Unit/Bed#: -01 I Date of Admission: 3/24/2024   Date of Service: 3/25/2024 I Hospital Day: 1    Assessment/Plan   CHF (congestive heart failure) ([facility])  Assessment & Plan  Wt Readings from Last 3 Encounters:   03/24/24 72 kg (158 lb 11.7 oz)   03/07/24 69.9 kg (154 lb)   01/26/24 69.4 kg (153 lb)   History of chronic diastolic CHF with preserved ejection fraction  EF 50% in 2022  Loop recorder in place  Euvolemic on exam  Not maintained on outpatient diuretics  Continue PTA metoprolol succinate  Monitor volume status    COPD (chronic obstructive pulmonary disease) ([facility])  Assessment & Plan  No acute exacerbation  Continue PTA ICS daily and DuoNeb 4 times daily    Orthostatic hypotension  Assessment & Plan  Continue PTA midodrine 3 times daily with hold for SBP > 135    Urinary retention  Assessment & Plan  Continue Flomax  Urinary retention protocol  Continue PTA Hipprex for UTI suppression  UA pending    Permanent atrial fibrillation ([facility])  Assessment & Plan  EKG confirming A-fib, rate controlled  Continue PTA metoprolol succinate 25 mg daily  Eliquis on hold, LD 3/24 in the AM    * Closed left hip fracture, initial encounter ([facility])  Assessment & Plan  Fall at SNF on [facility], left hip pain/leg externally rotated and shortened  ED imaging left hip fracture  N.p.o., IV hydration  Eliquis 2.5 mg twice daily for A-fib on hold, LD 3/24 AM  Orthopedic consultation  Pain control  Bowel regimen  PT/OT postop           VTE Pharmacologic Prophylaxis:   Moderate Risk (Score 3-4) - Pharmacological DVT Prophylaxis Contraindicated. Sequential Compression Devices Ordered.    Mobility:   Basic Mobility Inpatient Raw Score: 12  JH-HLM Goal: 4: Move to chair/commode  JH-HLM Achieved: 1: Laying in bed  JH-HLM Goal NOT achieved. Continue with multidisciplinary rounding and encourage appropriate mobility to improve upon JH-HLM  goals.    Patient Centered Rounds: I performed bedside rounds with nursing staff today.   Discussions with Specialists or Other Care Team Provider: N/A    Education and Discussions with Family / Patient: Patient declined call to .     Total Time Spent on Date of Encounter in care of patient: 45 mins. This time was spent on one or more of the following: performing physical exam; counseling and coordination of care; obtaining or reviewing history; documenting in the medical record; reviewing/ordering tests, medications or procedures; communicating with other healthcare professionals and discussing with patient's family/caregivers.    Current Length of Stay: 1 day(s)  Current Patient Status: Inpatient   Certification Statement: The patient will continue to require additional inpatient hospital stay due to left hip fracture  Discharge Plan: Anticipate discharge in 24-48 hrs to rehab facility.    Code Status: Level 3 - DNAR and DNI    Subjective:   Seen and examined at bedside.  Sal is resting comfortably.  He tells me as long as he does not move the left lower extremity, he has no pain there.  He has no additional concerns at present    Objective:     Vitals:   Temp (24hrs), Av.2 °F (36.8 °C), Min:97.8 °F (36.6 °C), Max:98.4 °F (36.9 °C)    Temp:  [97.8 °F (36.6 °C)-98.4 °F (36.9 °C)] 98.4 °F (36.9 °C)  HR:  [84-98] 85  Resp:  [18-23] 20  BP: (119-150)/() 141/77  SpO2:  [93 %-96 %] 93 %  Body mass index is 21.53 kg/m².     Input and Output Summary (last 24 hours):     Intake/Output Summary (Last 24 hours) at 3/25/2024 1024  Last data filed at 3/25/2024 0600  Gross per 24 hour   Intake 850 ml   Output 100 ml   Net 750 ml       Physical Exam:   Physical Exam  Vitals and nursing note reviewed.   Constitutional:       General: He is not in acute distress.     Appearance: He is well-developed. He is not toxic-appearing or diaphoretic.   HENT:      Head: Normocephalic and atraumatic.       Mouth/Throat:      Mouth: Mucous membranes are moist.   Eyes:      General: No scleral icterus.     Extraocular Movements: Extraocular movements intact.      Conjunctiva/sclera: Conjunctivae normal.   Cardiovascular:      Rate and Rhythm: Normal rate and regular rhythm.      Pulses: Normal pulses.      Heart sounds: No murmur heard.     No friction rub. No gallop.   Pulmonary:      Effort: Pulmonary effort is normal. No respiratory distress.      Breath sounds: Normal breath sounds. No wheezing, rhonchi or rales.   Abdominal:      General: Abdomen is flat. Bowel sounds are normal. There is no distension.      Palpations: Abdomen is soft. There is no mass.      Tenderness: There is no abdominal tenderness. There is no guarding.   Musculoskeletal:         General: Signs of injury present. No swelling.      Cervical back: Neck supple.      Right lower leg: No edema.      Left lower leg: No edema.   Lymphadenopathy:      Cervical: No cervical adenopathy.   Skin:     General: Skin is warm and dry.      Capillary Refill: Capillary refill takes less than 2 seconds.      Coloration: Skin is not jaundiced.      Findings: No rash.   Neurological:      General: No focal deficit present.      Mental Status: He is alert and oriented to person, place, and time.      Sensory: No sensory deficit.      Motor: No weakness.   Psychiatric:         Mood and Affect: Mood normal.          Additional Data:     Labs:  Results from last 7 days   Lab Units 03/25/24  0510 03/24/24  1704   WBC Thousand/uL 9.26 11.37*   HEMOGLOBIN g/dL 14.2 15.1   HEMATOCRIT % 42.9 46.2   PLATELETS Thousands/uL 142* 153   NEUTROS PCT %  --  79*   LYMPHS PCT %  --  11*   MONOS PCT %  --  8   EOS PCT %  --  2     Results from last 7 days   Lab Units 03/25/24  0510 03/24/24  1704   SODIUM mmol/L 141 138   POTASSIUM mmol/L 3.9 4.2   CHLORIDE mmol/L 108 106   CO2 mmol/L 26 26   BUN mg/dL 24 26*   CREATININE mg/dL 1.10 1.25   ANION GAP mmol/L 7 6   CALCIUM mg/dL 8.8  9.2   ALBUMIN g/dL  --  4.2   TOTAL BILIRUBIN mg/dL  --  1.49*   ALK PHOS U/L  --  60   ALT U/L  --  29   AST U/L  --  17   GLUCOSE RANDOM mg/dL 96 114     Results from last 7 days   Lab Units 03/24/24  1704   INR  1.29*                   Lines/Drains:  Invasive Devices       Peripheral Intravenous Line  Duration             Peripheral IV 03/25/24 Left;Ventral (anterior) Wrist <1 day                          Imaging: Personally reviewed the following imaging: xray(s)    Recent Cultures (last 7 days):         Last 24 Hours Medication List:   Current Facility-Administered Medications   Medication Dose Route Frequency Provider Last Rate    acetaminophen  975 mg Oral Q8H DONIS Ibis S Gayatri, CRNP      docusate sodium  100 mg Oral BID Ibis S Gayatri, CRNP      Fluticasone Furoate-Vilanterol  1 puff Inhalation Daily Ibis S Gayatri, CRNP      ipratropium-albuterol  3 mL Nebulization 4x Daily Ibis S Gayatri, CRNP      methenamine hippurate  1 g Oral BID With Meals Robles Barry      metoprolol succinate  25 mg Oral Daily Ibis S Gayatri, CRNP      midodrine  5 mg Oral TID AC Ibis S Gayatri, CRNP      morphine injection  2 mg Intravenous Q4H PRN Ibis S Gayatri, CRNP      multi-electrolyte  75 mL/hr Intravenous Continuous Ibis S Gayatri, CRNP 75 mL/hr (03/24/24 7647)    ondansetron  4 mg Intravenous Q8H PRN Ibis S Gayatri, CRNP      senna  2 tablet Oral HS Ibis S Gayatri, CRNP          Today, Patient Was Seen By: Robles Barry    **Please Note: This note may have been constructed using a voice recognition system.**

## 2024-03-26 ENCOUNTER — APPOINTMENT (INPATIENT)
Dept: RADIOLOGY | Facility: HOSPITAL | Age: 79
DRG: 522 | End: 2024-03-26
Payer: MEDICARE

## 2024-03-26 ENCOUNTER — ANESTHESIA (INPATIENT)
Dept: PERIOP | Facility: HOSPITAL | Age: 79
DRG: 522 | End: 2024-03-26
Payer: MEDICARE

## 2024-03-26 LAB
ABO GROUP BLD: NORMAL
ANION GAP SERPL CALCULATED.3IONS-SCNC: 7 MMOL/L (ref 4–13)
BLD GP AB SCN SERPL QL: NEGATIVE
BUN SERPL-MCNC: 18 MG/DL (ref 5–25)
CALCIUM SERPL-MCNC: 8.7 MG/DL (ref 8.4–10.2)
CHLORIDE SERPL-SCNC: 105 MMOL/L (ref 96–108)
CO2 SERPL-SCNC: 25 MMOL/L (ref 21–32)
CREAT SERPL-MCNC: 0.9 MG/DL (ref 0.6–1.3)
ERYTHROCYTE [DISTWIDTH] IN BLOOD BY AUTOMATED COUNT: 12.8 % (ref 11.6–15.1)
GFR SERPL CREATININE-BSD FRML MDRD: 82 ML/MIN/1.73SQ M
GLUCOSE SERPL-MCNC: 105 MG/DL (ref 65–140)
HCT VFR BLD AUTO: 40.7 % (ref 36.5–49.3)
HGB BLD-MCNC: 13.3 G/DL (ref 12–17)
MAGNESIUM SERPL-MCNC: 2.1 MG/DL (ref 1.9–2.7)
MCH RBC QN AUTO: 30.9 PG (ref 26.8–34.3)
MCHC RBC AUTO-ENTMCNC: 32.7 G/DL (ref 31.4–37.4)
MCV RBC AUTO: 95 FL (ref 82–98)
MRSA NOSE QL CULT: ABNORMAL
MRSA NOSE QL CULT: ABNORMAL
PLATELET # BLD AUTO: 152 THOUSANDS/UL (ref 149–390)
PMV BLD AUTO: 10.9 FL (ref 8.9–12.7)
POTASSIUM SERPL-SCNC: 3.7 MMOL/L (ref 3.5–5.3)
RBC # BLD AUTO: 4.3 MILLION/UL (ref 3.88–5.62)
RH BLD: POSITIVE
SODIUM SERPL-SCNC: 137 MMOL/L (ref 135–147)
SPECIMEN EXPIRATION DATE: NORMAL
WBC # BLD AUTO: 8.58 THOUSAND/UL (ref 4.31–10.16)

## 2024-03-26 PROCEDURE — C1776 JOINT DEVICE (IMPLANTABLE): HCPCS | Performed by: ORTHOPAEDIC SURGERY

## 2024-03-26 PROCEDURE — 86850 RBC ANTIBODY SCREEN: CPT | Performed by: PHYSICIAN ASSISTANT

## 2024-03-26 PROCEDURE — 85027 COMPLETE CBC AUTOMATED: CPT | Performed by: STUDENT IN AN ORGANIZED HEALTH CARE EDUCATION/TRAINING PROGRAM

## 2024-03-26 PROCEDURE — 73501 X-RAY EXAM HIP UNI 1 VIEW: CPT

## 2024-03-26 PROCEDURE — 86900 BLOOD TYPING SEROLOGIC ABO: CPT | Performed by: PHYSICIAN ASSISTANT

## 2024-03-26 PROCEDURE — 80048 BASIC METABOLIC PNL TOTAL CA: CPT | Performed by: STUDENT IN AN ORGANIZED HEALTH CARE EDUCATION/TRAINING PROGRAM

## 2024-03-26 PROCEDURE — C1713 ANCHOR/SCREW BN/BN,TIS/BN: HCPCS | Performed by: ORTHOPAEDIC SURGERY

## 2024-03-26 PROCEDURE — NC001 PR NO CHARGE: Performed by: PHYSICIAN ASSISTANT

## 2024-03-26 PROCEDURE — 27236 TREAT THIGH FRACTURE: CPT | Performed by: ORTHOPAEDIC SURGERY

## 2024-03-26 PROCEDURE — 86901 BLOOD TYPING SEROLOGIC RH(D): CPT | Performed by: PHYSICIAN ASSISTANT

## 2024-03-26 PROCEDURE — 99232 SBSQ HOSP IP/OBS MODERATE 35: CPT | Performed by: INTERNAL MEDICINE

## 2024-03-26 PROCEDURE — 94760 N-INVAS EAR/PLS OXIMETRY 1: CPT

## 2024-03-26 PROCEDURE — 83735 ASSAY OF MAGNESIUM: CPT | Performed by: STUDENT IN AN ORGANIZED HEALTH CARE EDUCATION/TRAINING PROGRAM

## 2024-03-26 PROCEDURE — 0SRS0J9 REPLACEMENT OF LEFT HIP JOINT, FEMORAL SURFACE WITH SYNTHETIC SUBSTITUTE, CEMENTED, OPEN APPROACH: ICD-10-PCS | Performed by: ORTHOPAEDIC SURGERY

## 2024-03-26 PROCEDURE — 94640 AIRWAY INHALATION TREATMENT: CPT

## 2024-03-26 DEVICE — BONE PREPARATION KIT
Type: IMPLANTABLE DEVICE | Site: HIP | Status: FUNCTIONAL
Brand: BIOPREP

## 2024-03-26 DEVICE — SELF CENTERING BI-POLAR HEAD 28MM ID 52MM OD
Type: IMPLANTABLE DEVICE | Site: HIP | Status: FUNCTIONAL
Brand: SELF CENTERING

## 2024-03-26 DEVICE — SMARTSET HIGH PERFORMANCE MV MEDIUM VISCOSITY BONE CEMENT 40G
Type: IMPLANTABLE DEVICE | Site: HIP | Status: FUNCTIONAL
Brand: SMARTSET

## 2024-03-26 DEVICE — ARTICUL/EZE FEMORAL HEAD DIAMETER 28MM +1.5 12/14 TAPER
Type: IMPLANTABLE DEVICE | Site: HIP | Status: FUNCTIONAL
Brand: ARTICUL/EZE

## 2024-03-26 DEVICE — CEMENTRALIZER STEM CENTRALIZER 9.25MM CEMENTED
Type: IMPLANTABLE DEVICE | Site: HIP | Status: FUNCTIONAL
Brand: CEMENTRALIZER

## 2024-03-26 DEVICE — SUMMIT FEMORAL STEM 12/14 TAPER CEMENTED SIZE 3 STD 108MM
Type: IMPLANTABLE DEVICE | Site: HIP | Status: FUNCTIONAL
Brand: SUMMIT

## 2024-03-26 RX ORDER — HEPARIN SODIUM 5000 [USP'U]/ML
5000 INJECTION, SOLUTION INTRAVENOUS; SUBCUTANEOUS EVERY 8 HOURS SCHEDULED
Status: DISCONTINUED | OUTPATIENT
Start: 2024-03-27 | End: 2024-03-27 | Stop reason: HOSPADM

## 2024-03-26 RX ORDER — HYDROMORPHONE HCL/PF 1 MG/ML
SYRINGE (ML) INJECTION AS NEEDED
Status: DISCONTINUED | OUTPATIENT
Start: 2024-03-26 | End: 2024-03-26

## 2024-03-26 RX ORDER — ONDANSETRON 2 MG/ML
INJECTION INTRAMUSCULAR; INTRAVENOUS AS NEEDED
Status: DISCONTINUED | OUTPATIENT
Start: 2024-03-26 | End: 2024-03-26

## 2024-03-26 RX ORDER — CHLORHEXIDINE GLUCONATE ORAL RINSE 1.2 MG/ML
15 SOLUTION DENTAL ONCE
Status: COMPLETED | OUTPATIENT
Start: 2024-03-26 | End: 2024-03-26

## 2024-03-26 RX ORDER — LIDOCAINE HYDROCHLORIDE 20 MG/ML
1 JELLY TOPICAL ONCE
Status: DISCONTINUED | OUTPATIENT
Start: 2024-03-27 | End: 2024-03-27 | Stop reason: HOSPADM

## 2024-03-26 RX ORDER — PROPOFOL 10 MG/ML
INJECTION, EMULSION INTRAVENOUS AS NEEDED
Status: DISCONTINUED | OUTPATIENT
Start: 2024-03-26 | End: 2024-03-26

## 2024-03-26 RX ORDER — TRANEXAMIC ACID 10 MG/ML
1000 INJECTION, SOLUTION INTRAVENOUS ONCE
Status: DISCONTINUED | OUTPATIENT
Start: 2024-03-26 | End: 2024-03-26

## 2024-03-26 RX ORDER — DEXAMETHASONE SODIUM PHOSPHATE 10 MG/ML
INJECTION, SOLUTION INTRAMUSCULAR; INTRAVENOUS AS NEEDED
Status: DISCONTINUED | OUTPATIENT
Start: 2024-03-26 | End: 2024-03-26

## 2024-03-26 RX ORDER — HALOPERIDOL 5 MG/ML
5 INJECTION INTRAMUSCULAR ONCE AS NEEDED
Status: COMPLETED | OUTPATIENT
Start: 2024-03-26 | End: 2024-03-26

## 2024-03-26 RX ORDER — ROCURONIUM BROMIDE 10 MG/ML
INJECTION, SOLUTION INTRAVENOUS AS NEEDED
Status: DISCONTINUED | OUTPATIENT
Start: 2024-03-26 | End: 2024-03-26

## 2024-03-26 RX ORDER — LIDOCAINE HYDROCHLORIDE 20 MG/ML
JELLY TOPICAL
Status: COMPLETED
Start: 2024-03-26 | End: 2024-03-26

## 2024-03-26 RX ORDER — TRANEXAMIC ACID 10 MG/ML
INJECTION, SOLUTION INTRAVENOUS AS NEEDED
Status: DISCONTINUED | OUTPATIENT
Start: 2024-03-26 | End: 2024-03-26

## 2024-03-26 RX ORDER — SODIUM CHLORIDE, SODIUM LACTATE, POTASSIUM CHLORIDE, CALCIUM CHLORIDE 600; 310; 30; 20 MG/100ML; MG/100ML; MG/100ML; MG/100ML
INJECTION, SOLUTION INTRAVENOUS CONTINUOUS PRN
Status: DISCONTINUED | OUTPATIENT
Start: 2024-03-26 | End: 2024-03-26

## 2024-03-26 RX ORDER — LIDOCAINE HCL/PF 100 MG/5ML
SYRINGE (ML) INJECTION AS NEEDED
Status: DISCONTINUED | OUTPATIENT
Start: 2024-03-26 | End: 2024-03-26

## 2024-03-26 RX ORDER — CEFAZOLIN SODIUM 1 G/50ML
1000 SOLUTION INTRAVENOUS ONCE
Status: DISCONTINUED | OUTPATIENT
Start: 2024-03-26 | End: 2024-03-26

## 2024-03-26 RX ORDER — MAGNESIUM HYDROXIDE 1200 MG/15ML
LIQUID ORAL AS NEEDED
Status: DISCONTINUED | OUTPATIENT
Start: 2024-03-26 | End: 2024-03-26 | Stop reason: HOSPADM

## 2024-03-26 RX ORDER — PHENYLEPHRINE HCL IN 0.9% NACL 1 MG/10 ML
SYRINGE (ML) INTRAVENOUS AS NEEDED
Status: DISCONTINUED | OUTPATIENT
Start: 2024-03-26 | End: 2024-03-26

## 2024-03-26 RX ORDER — SODIUM CHLORIDE, SODIUM GLUCONATE, SODIUM ACETATE, POTASSIUM CHLORIDE, MAGNESIUM CHLORIDE, SODIUM PHOSPHATE, DIBASIC, AND POTASSIUM PHOSPHATE .53; .5; .37; .037; .03; .012; .00082 G/100ML; G/100ML; G/100ML; G/100ML; G/100ML; G/100ML; G/100ML
75 INJECTION, SOLUTION INTRAVENOUS CONTINUOUS
Status: DISCONTINUED | OUTPATIENT
Start: 2024-03-26 | End: 2024-03-27 | Stop reason: HOSPADM

## 2024-03-26 RX ORDER — LIDOCAINE HYDROCHLORIDE 20 MG/ML
1 JELLY TOPICAL ONCE
Status: COMPLETED | OUTPATIENT
Start: 2024-03-26 | End: 2024-03-26

## 2024-03-26 RX ORDER — NEOSTIGMINE METHYLSULFATE 1 MG/ML
INJECTION INTRAVENOUS AS NEEDED
Status: DISCONTINUED | OUTPATIENT
Start: 2024-03-26 | End: 2024-03-26

## 2024-03-26 RX ORDER — CEFAZOLIN SODIUM 2 G/50ML
2000 SOLUTION INTRAVENOUS EVERY 8 HOURS
Status: COMPLETED | OUTPATIENT
Start: 2024-03-26 | End: 2024-03-27

## 2024-03-26 RX ORDER — FENTANYL CITRATE 50 UG/ML
INJECTION, SOLUTION INTRAMUSCULAR; INTRAVENOUS AS NEEDED
Status: DISCONTINUED | OUTPATIENT
Start: 2024-03-26 | End: 2024-03-26

## 2024-03-26 RX ORDER — FENTANYL CITRATE/PF 50 MCG/ML
50 SYRINGE (ML) INJECTION
Status: DISCONTINUED | OUTPATIENT
Start: 2024-03-26 | End: 2024-03-26 | Stop reason: HOSPADM

## 2024-03-26 RX ORDER — METOCLOPRAMIDE HYDROCHLORIDE 5 MG/ML
10 INJECTION INTRAMUSCULAR; INTRAVENOUS ONCE AS NEEDED
Status: DISCONTINUED | OUTPATIENT
Start: 2024-03-26 | End: 2024-03-26 | Stop reason: HOSPADM

## 2024-03-26 RX ORDER — HYDROMORPHONE HCL/PF 1 MG/ML
0.5 SYRINGE (ML) INJECTION
Status: DISCONTINUED | OUTPATIENT
Start: 2024-03-26 | End: 2024-03-26 | Stop reason: HOSPADM

## 2024-03-26 RX ORDER — GLYCOPYRROLATE 0.2 MG/ML
INJECTION INTRAMUSCULAR; INTRAVENOUS AS NEEDED
Status: DISCONTINUED | OUTPATIENT
Start: 2024-03-26 | End: 2024-03-26

## 2024-03-26 RX ORDER — SODIUM CHLORIDE 9 MG/ML
INJECTION, SOLUTION INTRAVENOUS CONTINUOUS PRN
Status: DISCONTINUED | OUTPATIENT
Start: 2024-03-26 | End: 2024-03-26

## 2024-03-26 RX ADMIN — CEFAZOLIN SODIUM 1000 MG: 1 SOLUTION INTRAVENOUS at 13:50

## 2024-03-26 RX ADMIN — IPRATROPIUM BROMIDE 0.5 MG: 0.5 SOLUTION RESPIRATORY (INHALATION) at 07:41

## 2024-03-26 RX ADMIN — SODIUM CHLORIDE, SODIUM GLUCONATE, SODIUM ACETATE, POTASSIUM CHLORIDE, MAGNESIUM CHLORIDE, SODIUM PHOSPHATE, DIBASIC, AND POTASSIUM PHOSPHATE 75 ML/HR: .53; .5; .37; .037; .03; .012; .00082 INJECTION, SOLUTION INTRAVENOUS at 01:49

## 2024-03-26 RX ADMIN — ACETAMINOPHEN 975 MG: 325 TABLET, FILM COATED ORAL at 22:51

## 2024-03-26 RX ADMIN — MIDODRINE HYDROCHLORIDE 5 MG: 5 TABLET ORAL at 06:14

## 2024-03-26 RX ADMIN — HALOPERIDOL LACTATE 5 MG: 5 INJECTION, SOLUTION INTRAMUSCULAR at 17:01

## 2024-03-26 RX ADMIN — LIDOCAINE HYDROCHLORIDE 80 MG: 20 INJECTION INTRAVENOUS at 13:57

## 2024-03-26 RX ADMIN — MORPHINE SULFATE 2 MG: 2 INJECTION, SOLUTION INTRAMUSCULAR; INTRAVENOUS at 20:22

## 2024-03-26 RX ADMIN — FENTANYL CITRATE 50 MCG: 50 INJECTION INTRAMUSCULAR; INTRAVENOUS at 14:28

## 2024-03-26 RX ADMIN — METHENAMINE HIPPURATE 1 G: 1000 TABLET ORAL at 08:13

## 2024-03-26 RX ADMIN — PROPOFOL 150 MG: 10 INJECTION, EMULSION INTRAVENOUS at 13:57

## 2024-03-26 RX ADMIN — SODIUM CHLORIDE, SODIUM LACTATE, POTASSIUM CHLORIDE, AND CALCIUM CHLORIDE: .6; .31; .03; .02 INJECTION, SOLUTION INTRAVENOUS at 13:45

## 2024-03-26 RX ADMIN — LEVALBUTEROL HYDROCHLORIDE 1.25 MG: 1.25 SOLUTION RESPIRATORY (INHALATION) at 07:41

## 2024-03-26 RX ADMIN — METOPROLOL SUCCINATE 25 MG: 25 TABLET, EXTENDED RELEASE ORAL at 08:17

## 2024-03-26 RX ADMIN — SODIUM CHLORIDE: 0.9 INJECTION, SOLUTION INTRAVENOUS at 14:10

## 2024-03-26 RX ADMIN — DEXAMETHASONE SODIUM PHOSPHATE 10 MG: 10 INJECTION, SOLUTION INTRAMUSCULAR; INTRAVENOUS at 13:57

## 2024-03-26 RX ADMIN — HYDROMORPHONE HYDROCHLORIDE 0.5 MG: 1 INJECTION, SOLUTION INTRAMUSCULAR; INTRAVENOUS; SUBCUTANEOUS at 14:55

## 2024-03-26 RX ADMIN — ROCURONIUM BROMIDE 50 MG: 10 INJECTION, SOLUTION INTRAVENOUS at 13:57

## 2024-03-26 RX ADMIN — CHLORHEXIDINE GLUCONATE 0.12% ORAL RINSE 15 ML: 1.2 LIQUID ORAL at 13:51

## 2024-03-26 RX ADMIN — GLYCOPYRROLATE 0.6 MG: 0.2 INJECTION, SOLUTION INTRAMUSCULAR; INTRAVENOUS at 15:56

## 2024-03-26 RX ADMIN — Medication 100 MCG: at 15:11

## 2024-03-26 RX ADMIN — LIDOCAINE HYDROCHLORIDE 1 APPLICATION: 20 JELLY TOPICAL at 22:30

## 2024-03-26 RX ADMIN — ACETAMINOPHEN 975 MG: 325 TABLET, FILM COATED ORAL at 06:14

## 2024-03-26 RX ADMIN — LEVALBUTEROL HYDROCHLORIDE 1.25 MG: 1.25 SOLUTION RESPIRATORY (INHALATION) at 19:21

## 2024-03-26 RX ADMIN — SODIUM CHLORIDE, SODIUM GLUCONATE, SODIUM ACETATE, POTASSIUM CHLORIDE, MAGNESIUM CHLORIDE, SODIUM PHOSPHATE, DIBASIC, AND POTASSIUM PHOSPHATE 75 ML/HR: .53; .5; .37; .037; .03; .012; .00082 INJECTION, SOLUTION INTRAVENOUS at 20:22

## 2024-03-26 RX ADMIN — Medication 100 MCG: at 15:18

## 2024-03-26 RX ADMIN — SODIUM CHLORIDE, SODIUM GLUCONATE, SODIUM ACETATE, POTASSIUM CHLORIDE, MAGNESIUM CHLORIDE, SODIUM PHOSPHATE, DIBASIC, AND POTASSIUM PHOSPHATE 75 ML/HR: .53; .5; .37; .037; .03; .012; .00082 INJECTION, SOLUTION INTRAVENOUS at 18:47

## 2024-03-26 RX ADMIN — CEFAZOLIN SODIUM 2000 MG: 2 SOLUTION INTRAVENOUS at 22:51

## 2024-03-26 RX ADMIN — FENTANYL CITRATE 50 MCG: 50 INJECTION INTRAMUSCULAR; INTRAVENOUS at 13:57

## 2024-03-26 RX ADMIN — IPRATROPIUM BROMIDE 0.5 MG: 0.5 SOLUTION RESPIRATORY (INHALATION) at 19:21

## 2024-03-26 RX ADMIN — Medication 100 MCG: at 14:30

## 2024-03-26 RX ADMIN — TRANEXAMIC ACID 1000 MG: 10 INJECTION, SOLUTION INTRAVENOUS at 14:15

## 2024-03-26 RX ADMIN — NEOSTIGMINE METHYLSULFATE 3 MG: 1 INJECTION INTRAVENOUS at 15:56

## 2024-03-26 RX ADMIN — PHENYLEPHRINE HYDROCHLORIDE 30 MCG/MIN: 10 INJECTION INTRAVENOUS at 14:02

## 2024-03-26 RX ADMIN — Medication 100 MCG: at 14:32

## 2024-03-26 RX ADMIN — Medication 100 MCG: at 15:15

## 2024-03-26 RX ADMIN — ROCURONIUM BROMIDE 10 MG: 10 INJECTION, SOLUTION INTRAVENOUS at 14:47

## 2024-03-26 RX ADMIN — ONDANSETRON 4 MG: 2 INJECTION INTRAMUSCULAR; INTRAVENOUS at 15:56

## 2024-03-26 NOTE — ASSESSMENT & PLAN NOTE
Wt Readings from Last 3 Encounters:   03/26/24 71.7 kg (158 lb)   03/07/24 69.9 kg (154 lb)   01/26/24 69.4 kg (153 lb)   History of chronic diastolic CHF with preserved ejection fraction  EF 50% in 2022  Loop recorder in place  Euvolemic on exam  Not maintained on outpatient diuretics  Continue PTA metoprolol succinate  Monitor volume status

## 2024-03-26 NOTE — ASSESSMENT & PLAN NOTE
Fall at SNF on 3/23, left hip pain/leg externally rotated and shortened  ED imaging left hip fracture  N.p.o., IV hydration  Eliquis 2.5 mg twice daily for A-fib on hold, LD 3/24 AM  Orthopedic consultation  Pain control  Bowel regimen  PT/OT postop  Awaiting for repair in OR today by orthopedic team

## 2024-03-26 NOTE — PROGRESS NOTES
[facility]  Progress Note  Name: Sal Moura I  MRN: [id number]  Unit/Bed#: OR POOL I Date of Admission: 3/24/2024   Date of Service: 3/26/2024 I Hospital Day: 2    Assessment/Plan   CHF (congestive heart failure) ([facility])  Assessment & Plan  Wt Readings from Last 3 Encounters:   03/26/24 71.7 kg (158 lb)   03/07/24 69.9 kg (154 lb)   01/26/24 69.4 kg (153 lb)   History of chronic diastolic CHF with preserved ejection fraction  EF 50% in 2022  Loop recorder in place  Euvolemic on exam  Not maintained on outpatient diuretics  Continue PTA metoprolol succinate  Monitor volume status    COPD (chronic obstructive pulmonary disease) ([facility])  Assessment & Plan  No acute exacerbation  Continue PTA ICS daily and DuoNeb 4 times daily    Orthostatic hypotension  Assessment & Plan  Continue PTA midodrine 3 times daily with hold for SBP > 135    Urinary retention  Assessment & Plan  Continue Flomax  Urinary retention protocol  Continue PTA Hipprex for UTI suppression  UA pending    Permanent atrial fibrillation ([facility])  Assessment & Plan  EKG confirming A-fib, rate controlled  Continue PTA metoprolol succinate 25 mg daily  Eliquis on hold, LD 3/24 in the AM    * Closed left hip fracture, initial encounter ([facility])  Assessment & Plan  Fall at SNF on 3/23, left hip pain/leg externally rotated and shortened  ED imaging left hip fracture  N.p.o., IV hydration  Eliquis 2.5 mg twice daily for A-fib on hold, LD 3/24 AM  Orthopedic consultation  Pain control  Bowel regimen  PT/OT postop  Awaiting for repair in OR today by orthopedic team               VTE Pharmacologic Prophylaxis:   Moderate Risk (Score 3-4) - Pharmacological DVT Prophylaxis Ordered: apixaban (Eliquis).    Mobility:   Basic Mobility Inpatient Raw Score: 9  JH-HLM Goal: 3: Sit at edge of bed  JH-HLM Achieved: 1: Laying in bed  JH-HLM Goal achieved. Continue to encourage appropriate mobility.    Patient Centered Rounds: I performed bedside rounds with  nursing staff today.   Discussions with Specialists or Other Care Team Provider: orthopedics    Education and Discussions with Family / Patient: Attempted to update  (wife) via phone. Unable to contact.    Total Time Spent on Date of Encounter in care of patient: 45   mins. This time was spent on one or more of the following: performing physical exam; counseling and coordination of care; obtaining or reviewing history; documenting in the medical record; reviewing/ordering tests, medications or procedures; communicating with other healthcare professionals and discussing with patient's family/caregivers.    Current Length of Stay: 2 day(s)  Current Patient Status: Inpatient   Certification Statement: The patient will continue to require additional inpatient hospital stay due to s/p left hip ORIF  Discharge Plan: Anticipate discharge in 24-48 hrs to rehab facility.    Code Status: Level 3 - DNAR and DNI    Subjective:   Patient complains of pain in the left hip.  Denies of any chest pain or shortness of breath.  Was seen in the morning and awaiting patient to go to the OR for ORIF.    Objective:     Vitals:   Temp (24hrs), Av °F (36.7 °C), Min:97.2 °F (36.2 °C), Max:99.5 °F (37.5 °C)    Temp:  [97.2 °F (36.2 °C)-99.5 °F (37.5 °C)] 98.4 °F (36.9 °C)  HR:  [] 87  Resp:  [15-25] 22  BP: (106-153)/() 106/57  SpO2:  [93 %-98 %] 96 %  Body mass index is 21.43 kg/m².     Input and Output Summary (last 24 hours):     Intake/Output Summary (Last 24 hours) at 3/26/2024 1728  Last data filed at 3/26/2024 1605  Gross per 24 hour   Intake 755.83 ml   Output 775 ml   Net -19.17 ml       Physical Exam:   Physical Exam   HEENT-PERRLA, moist oral mucosa  Neck-supple, no JVD elevation   Respiratory-equal air entry bilaterally, no rales or rhonchi  Cardiovascular system-S1, S2 heard, no murmur or gallops or rubs  Abdomen-soft, nontender, no guarding or rigidity, bowel sounds heard  Extremities-no pedal  edema  Peripheral pulses palpable  Musculoskeletal-no contractures, left leg shortened  Central nervous system-no acute focal neurological deficit ,no sensory or motor deficit noted.  Skin-no rash noted       Additional Data:     Labs:  Results from last 7 days   Lab Units 03/26/24 0446 03/25/24  0510 03/24/24  1704   WBC Thousand/uL 8.58   < > 11.37*   HEMOGLOBIN g/dL 13.3   < > 15.1   HEMATOCRIT % 40.7   < > 46.2   PLATELETS Thousands/uL 152   < > 153   NEUTROS PCT %  --   --  79*   LYMPHS PCT %  --   --  11*   MONOS PCT %  --   --  8   EOS PCT %  --   --  2    < > = values in this interval not displayed.     Results from last 7 days   Lab Units 03/26/24 0446 03/25/24  0510 03/24/24  1704   SODIUM mmol/L 137   < > 138   POTASSIUM mmol/L 3.7   < > 4.2   CHLORIDE mmol/L 105   < > 106   CO2 mmol/L 25   < > 26   BUN mg/dL 18   < > 26*   CREATININE mg/dL 0.90   < > 1.25   ANION GAP mmol/L 7   < > 6   CALCIUM mg/dL 8.7   < > 9.2   ALBUMIN g/dL  --   --  4.2   TOTAL BILIRUBIN mg/dL  --   --  1.49*   ALK PHOS U/L  --   --  60   ALT U/L  --   --  29   AST U/L  --   --  17   GLUCOSE RANDOM mg/dL 105   < > 114    < > = values in this interval not displayed.     Results from last 7 days   Lab Units 03/24/24  1704   INR  1.29*                   Lines/Drains:  Invasive Devices       Peripheral Intravenous Line  Duration             Peripheral IV 03/25/24 Left;Ventral (anterior) Wrist 1 day    Peripheral IV 03/26/24 Right Hand <1 day                          Imaging: Personally reviewed the following imaging: xray(s)    Recent Cultures (last 7 days):         Last 24 Hours Medication List:   Current Facility-Administered Medications   Medication Dose Route Frequency Provider Last Rate    [Transfer Hold] acetaminophen  975 mg Oral Q8H DONIS Ibis S Gayatri, CRNP      [Transfer Hold] docusate sodium  100 mg Oral BID Ibis S Gayatri, CRNP      fentaNYL  50 mcg Intravenous Q3 min PRN Ernie Nair MD      [Transfer Hold] Fluticasone  Furoate-Vilanterol  1 puff Inhalation Daily Ibis S Gayatri, CRNP      [Transfer Hold] heparin (porcine)  5,000 Units Subcutaneous Q8H [facility] Robles Barry      HYDROmorphone  0.5 mg Intravenous Q5 Min PRN Ernie Nair MD      [Transfer Hold] ipratropium  0.5 mg Nebulization TID Robles Barry      [Transfer Hold] levalbuterol  1.25 mg Nebulization TID Robles Barry      [Transfer Hold] methenamine hippurate  1 g Oral BID With Meals Robles Barry      metoclopramide  10 mg Intravenous Once PRN Ernie Nair MD      [Transfer Hold] metoprolol succinate  25 mg Oral Daily Ibis S Gayatri, CRNP      [Transfer Hold] midodrine  5 mg Oral TID AC Ibis S Gayatri, CRNP      [Transfer Hold] morphine injection  2 mg Intravenous Q4H PRN Ibis S Gayatri, CRNP      multi-electrolyte  75 mL/hr Intravenous Continuous Ibis S Gayatri, CRNP 75 mL/hr (03/26/24 0149)    [Transfer Hold] ondansetron  4 mg Intravenous Q8H PRN Ibis S Gayatri, CRNP      [Transfer Hold] senna  2 tablet Oral HS Ibis S Gayatri, CRNP      [Transfer Hold] tranexamic acid  1,000 mg Intravenous Once Arpita Harris PA-C          Today, Patient Was Seen By: Radha Disla MD    **Please Note: This note may have been constructed using a voice recognition system.**

## 2024-03-26 NOTE — DISCHARGE INSTR - AVS FIRST PAGE
Discharge Instructions - Orthopedics  Sal Moura 78 y.o. male MRN: [id number]  Unit/Bed#: EA OR MAIN    Weight Bearing Status:                                           Weight bearing as tolerated left lower extremity   Abduction pillow when lying down or sitting at all times   Posterior hip precautions     DVT prophylaxis:  Continue as directed for 28 days following surgery     Pain:  Continue analgesics as directed    Dressing Instructions:   Please keep clean, dry and intact until follow up     Appt Instructions:   If you do not have your appointment, please call the clinic at [phone] to schedule follow up in 2 weeks with Dr. Beal   Otherwise follow up as scheduled.    Contact the office sooner if you experience any increased numbness/tingling in the extremities.      Miscellaneous:  None

## 2024-03-26 NOTE — PROGRESS NOTES
Progress Note - Orthopedics   Sal Moura 78 y.o. male MRN: [id number]  Unit/Bed#: -01      Subjective:    78 y.o.male with left hip femoral neck fracture no acute events, no new complaints. Pain well controlled. Denies fevers, chills, CP, SOB, N/V, numbness or tingling. Patient reports no issues with urination or bowel movements. Patient states pain is relieved with rest.  Patient denies any pain in his knee or foot.  Pain is worse with motion localized to his left hip region.  Denies any numbness and tingling.    Labs:  0   Lab Value Date/Time    HCT 40.7 03/26/2024 0446    HCT 42.9 03/25/2024 0510    HCT 46.2 03/24/2024 1704    HGB 13.3 03/26/2024 0446    HGB 14.2 03/25/2024 0510    HGB 15.1 03/24/2024 1704    INR 1.29 (H) 03/24/2024 1704    WBC 8.58 03/26/2024 0446    WBC 9.26 03/25/2024 0510    WBC 11.37 (H) 03/24/2024 1704       Meds:    Current Facility-Administered Medications:     acetaminophen (TYLENOL) tablet 975 mg, 975 mg, Oral, Q8H [facility], Ibis S Gayatri, CRNP, 975 mg at 03/26/24 0614    docusate sodium (COLACE) capsule 100 mg, 100 mg, Oral, BID, Ibis S Gayatri, CRNP, 100 mg at 03/24/24 2111    Fluticasone Furoate-Vilanterol 100-25 mcg/actuation 1 puff, 1 puff, Inhalation, Daily, Ibis LEW Gayatri, CRNP    heparin (porcine) subcutaneous injection 5,000 Units, 5,000 Units, Subcutaneous, Q8H [facility], Robles Barry, 5,000 Units at 03/25/24 2257    ipratropium (ATROVENT) 0.02 % inhalation solution 0.5 mg, 0.5 mg, Nebulization, TID, Robles Barry, 0.5 mg at 03/26/24 0741    levalbuterol (XOPENEX) inhalation solution 1.25 mg, 1.25 mg, Nebulization, TID, Robles Barry, 1.25 mg at 03/26/24 0741    methenamine hippurate (HIPREX) tablet 1 g, 1 g, Oral, BID With Meals, Robles Barry, 1 g at 03/25/24 1627    metoprolol succinate (TOPROL-XL) 24 hr tablet 25 mg, 25 mg, Oral, Daily, Ibis Vallesx, CRNP, 25 mg at 03/25/24 0847    midodrine (PROAMATINE) tablet 5 mg, 5 mg, Oral, TID AC, Ibis S Gayatri, CRNP,  "5 mg at 03/26/24 0614    morphine injection 2 mg, 2 mg, Intravenous, Q4H PRN, Ibis S Gayatri, CRNP    multi-electrolyte (PLASMALYTE-A/ISOLYTE-S PH 7.4) IV solution, 75 mL/hr, Intravenous, Continuous, Ibis S Gayatri, CRNP, Last Rate: 75 mL/hr at 03/26/24 0149, 75 mL/hr at 03/26/24 0149    ondansetron (ZOFRAN) injection 4 mg, 4 mg, Intravenous, Q8H PRN, Ibis S Gayatri, CRNP    senna (SENOKOT) tablet 17.2 mg, 2 tablet, Oral, HS, Ibis S Gayatri, CRNP, 17.2 mg at 03/25/24 2257    Blood Culture:   Lab Results   Component Value Date    BLOODCX No Growth After 5 Days. 06/02/2023    BLOODCX No Growth After 5 Days. 06/02/2023       Wound Culture:   No results found for: \"WOUNDCULT\"    Ins and Outs:  I/O last 24 hours:  In: 160 [P.O.:160]  Out: 525 [Urine:525]          Physical:  Vitals:    03/26/24 0741   BP:    Pulse:    Resp:    Temp:    SpO2: 94%     Musculoskeletal: left Lower Extremity    Skin intact . No erythema or ecchymosis.  Pain with motion of the left hip pain with logroll  Sensation intact to saphenous, sural, tibial, superficial peroneal nerve, and deep peroneal  Motor intact to +FHL/EHL, +ankle dorsi/plantar flexion  2+ DP pulse  Digits warm and well perfused  Capillary refill < 2 seconds    Assessment:    78 y.o.male with left femoral neck fracture.     Plan:  NWB left lower extremity  PT/OT  Pain control  NPO  To OR today for left hip arthroplasty and all associated procedures  DVT ppx hold until postop  Medical co-morbidities include nonischemic cardiomyopathy, cerebrovascular disease, CKD, which are being managed per primary team  Dispo: Ortho will follow    Waldemar Donnelly PA-C     "

## 2024-03-26 NOTE — PLAN OF CARE
Problem: PAIN - ADULT  Goal: Verbalizes/displays adequate comfort level or baseline comfort level  Description: Interventions:  - Encourage patient to monitor pain and request assistance  - Assess pain using appropriate pain scale  - Administer analgesics based on type and severity of pain and evaluate response  - Implement non-pharmacological measures as appropriate and evaluate response  - Consider cultural and social influences on pain and pain management  - Notify physician/advanced practitioner if interventions unsuccessful or patient reports new pain  Outcome: Not Progressing     Problem: SAFETY ADULT  Goal: Patient will remain free of falls  Description: INTERVENTIONS:  - Educate patient/family on patient safety including physical limitations  - Instruct patient to call for assistance with activity   - Consult OT/PT to assist with strengthening/mobility   - Keep Call bell within reach  - Keep bed low and locked with side rails adjusted as appropriate  - Keep care items and personal belongings within reach  - Initiate and maintain comfort rounds  - Make Fall Risk Sign visible to staff  - Offer Toileting every prn Hours, in advance of need  - Initiate/Maintain bed alarm  - Obtain necessary fall risk management equipment: floor mat  - Apply yellow socks and bracelet for high fall risk patients  - Consider moving patient to room near nurses station  Outcome: Not Progressing  Goal: Maintain or return to baseline ADL function  Description: INTERVENTIONS:  -  Assess patient's ability to carry out ADLs; assess patient's baseline for ADL function and identify physical deficits which impact ability to perform ADLs (bathing, care of mouth/teeth, toileting, grooming, dressing, etc.)  - Assess/evaluate cause of self-care deficits   - Assess range of motion  - Assess patient's mobility; develop plan if impaired  - Assess patient's need for assistive devices and provide as appropriate  - Encourage maximum independence but  intervene and supervise when necessary  - Involve family in performance of ADLs  - Assess for home care needs following discharge   - Consider OT consult to assist with ADL evaluation and planning for discharge  - Provide patient education as appropriate  Outcome: Not Progressing  Goal: Maintains/Returns to pre admission functional level  Description: INTERVENTIONS:  - Perform AM-PAC 6 Click Basic Mobility/ Daily Activity assessment daily.  - Set and communicate daily mobility goal to care team and patient/family/caregiver.   - Collaborate with rehabilitation services on mobility goals if consulted  - Perform Range of Motion prn times a day.  - Reposition patient every self hours.  - Dangle patient prn times a day  - Stand patient prn times a day  - Ambulate patient prn times a day  - Out of bed to chair prn times a day   - Out of bed for meals prn times a day  - Out of bed for toileting  - Record patient progress and toleration of activity level   Outcome: Not Progressing     Problem: DISCHARGE PLANNING  Goal: Discharge to home or other facility with appropriate resources  Description: INTERVENTIONS:  - Identify barriers to discharge w/patient and caregiver  - Arrange for needed discharge resources and transportation as appropriate  - Identify discharge learning needs (meds, wound care, etc.)  - Arrange for interpretive services to assist at discharge as needed  - Refer to Case Management Department for coordinating discharge planning if the patient needs post-hospital services based on physician/advanced practitioner order or complex needs related to functional status, cognitive ability, or social support system  Outcome: Not Progressing     Problem: Knowledge Deficit  Goal: Patient/family/caregiver demonstrates understanding of disease process, treatment plan, medications, and discharge instructions  Description: Complete learning assessment and assess knowledge base.  Interventions:  - Provide teaching at level  of understanding  - Provide teaching via preferred learning methods  Outcome: Not Progressing

## 2024-03-26 NOTE — PLAN OF CARE
Problem: INFECTION - ADULT  Goal: Absence or prevention of progression during hospitalization  Description: INTERVENTIONS:  - Assess and monitor for signs and symptoms of infection  - Monitor lab/diagnostic results  - Monitor all insertion sites, i.e. indwelling lines, tubes, and drains  - Monitor endotracheal if appropriate and nasal secretions for changes in amount and color  - [place] appropriate cooling/warming therapies per order  - Administer medications as ordered  - Instruct and encourage patient and family to use good hand hygiene technique  - Identify and instruct in appropriate isolation precautions for identified infection/condition  Outcome: Progressing     Problem: SAFETY ADULT  Goal: Patient will remain free of falls  Description: INTERVENTIONS:  - Educate patient/family on patient safety including physical limitations  - Instruct patient to call for assistance with activity   - Consult OT/PT to assist with strengthening/mobility   - Keep Call bell within reach  - Keep bed low and locked with side rails adjusted as appropriate  - Keep care items and personal belongings within reach  - Initiate and maintain comfort rounds  - Offer Toileting every 2 Hours, in advance of need  - Initiate/Maintain bed alarm  - Obtain necessary fall risk management equipment: fall mat  - Apply yellow socks and bracelet for high fall risk patients  - Consider moving patient to room near nurses station  Outcome: Progressing

## 2024-03-27 ENCOUNTER — ANESTHESIA EVENT (OUTPATIENT)
Dept: RADIOLOGY | Facility: HOSPITAL | Age: 79
DRG: 696 | End: 2024-03-27
Payer: MEDICARE

## 2024-03-27 ENCOUNTER — APPOINTMENT (OUTPATIENT)
Dept: RADIOLOGY | Facility: HOSPITAL | Age: 79
DRG: 696 | End: 2024-03-27
Attending: RADIOLOGY
Payer: MEDICARE

## 2024-03-27 ENCOUNTER — ANESTHESIA (OUTPATIENT)
Dept: RADIOLOGY | Facility: HOSPITAL | Age: 79
DRG: 696 | End: 2024-03-27
Payer: MEDICARE

## 2024-03-27 ENCOUNTER — HOSPITAL ENCOUNTER (INPATIENT)
Facility: HOSPITAL | Age: 79
LOS: 3 days | Discharge: NURSING, REHAB OR LONG-TERM CARE | DRG: 696 | End: 2024-03-30
Attending: INTERNAL MEDICINE | Admitting: INTERNAL MEDICINE
Payer: MEDICARE

## 2024-03-27 VITALS
BODY MASS INDEX: 22.22 KG/M2 | DIASTOLIC BLOOD PRESSURE: 77 MMHG | WEIGHT: 164.02 LBS | SYSTOLIC BLOOD PRESSURE: 130 MMHG | RESPIRATION RATE: 16 BRPM | TEMPERATURE: 98.6 F | HEART RATE: 99 BPM | OXYGEN SATURATION: 93 % | HEIGHT: 72 IN

## 2024-03-27 DIAGNOSIS — S72.002A CLOSED LEFT HIP FRACTURE, INITIAL ENCOUNTER (HCC): Primary | ICD-10-CM

## 2024-03-27 DIAGNOSIS — N31.9 NEUROGENIC BLADDER: ICD-10-CM

## 2024-03-27 DIAGNOSIS — I48.21 PERMANENT ATRIAL FIBRILLATION (HCC): ICD-10-CM

## 2024-03-27 DIAGNOSIS — S82.045D CLOSED NONDISPLACED COMMINUTED FRACTURE OF LEFT PATELLA WITH ROUTINE HEALING, SUBSEQUENT ENCOUNTER: ICD-10-CM

## 2024-03-27 PROBLEM — I50.32 CHRONIC DIASTOLIC CONGESTIVE HEART FAILURE (HCC): Chronic | Status: ACTIVE | Noted: 2023-06-02

## 2024-03-27 PROBLEM — R82.90 ABNORMAL URINALYSIS: Status: ACTIVE | Noted: 2024-03-27

## 2024-03-27 PROBLEM — R19.7 DIARRHEA: Status: ACTIVE | Noted: 2024-03-27

## 2024-03-27 LAB
ANION GAP SERPL CALCULATED.3IONS-SCNC: 12 MMOL/L (ref 4–13)
BUN SERPL-MCNC: 19 MG/DL (ref 5–25)
CALCIUM SERPL-MCNC: 8.5 MG/DL (ref 8.4–10.2)
CHLORIDE SERPL-SCNC: 102 MMOL/L (ref 96–108)
CO2 SERPL-SCNC: 22 MMOL/L (ref 21–32)
CREAT SERPL-MCNC: 1.12 MG/DL (ref 0.6–1.3)
ERYTHROCYTE [DISTWIDTH] IN BLOOD BY AUTOMATED COUNT: 12.8 % (ref 11.6–15.1)
GFR SERPL CREATININE-BSD FRML MDRD: 63 ML/MIN/1.73SQ M
GLUCOSE SERPL-MCNC: 134 MG/DL (ref 65–140)
HCT VFR BLD AUTO: 40.5 % (ref 36.5–49.3)
HGB BLD-MCNC: 13.3 G/DL (ref 12–17)
MCH RBC QN AUTO: 31.1 PG (ref 26.8–34.3)
MCHC RBC AUTO-ENTMCNC: 32.8 G/DL (ref 31.4–37.4)
MCV RBC AUTO: 95 FL (ref 82–98)
PLATELET # BLD AUTO: 157 THOUSANDS/UL (ref 149–390)
PMV BLD AUTO: 11 FL (ref 8.9–12.7)
POTASSIUM SERPL-SCNC: 4.2 MMOL/L (ref 3.5–5.3)
RBC # BLD AUTO: 4.28 MILLION/UL (ref 3.88–5.62)
SODIUM SERPL-SCNC: 136 MMOL/L (ref 135–147)
WBC # BLD AUTO: 8.32 THOUSAND/UL (ref 4.31–10.16)

## 2024-03-27 PROCEDURE — 99223 1ST HOSP IP/OBS HIGH 75: CPT | Performed by: INTERNAL MEDICINE

## 2024-03-27 PROCEDURE — 80048 BASIC METABOLIC PNL TOTAL CA: CPT | Performed by: ORTHOPAEDIC SURGERY

## 2024-03-27 PROCEDURE — NC001 PR NO CHARGE: Performed by: UROLOGY

## 2024-03-27 PROCEDURE — 99222 1ST HOSP IP/OBS MODERATE 55: CPT | Performed by: UROLOGY

## 2024-03-27 PROCEDURE — C1769 GUIDE WIRE: HCPCS

## 2024-03-27 PROCEDURE — 51102 DRAIN BL W/CATH INSERTION: CPT | Performed by: RADIOLOGY

## 2024-03-27 PROCEDURE — 99024 POSTOP FOLLOW-UP VISIT: CPT

## 2024-03-27 PROCEDURE — 85027 COMPLETE CBC AUTOMATED: CPT | Performed by: ORTHOPAEDIC SURGERY

## 2024-03-27 PROCEDURE — 99238 HOSP IP/OBS DSCHRG MGMT 30/<: CPT

## 2024-03-27 PROCEDURE — 76942 ECHO GUIDE FOR BIOPSY: CPT

## 2024-03-27 PROCEDURE — 76942 ECHO GUIDE FOR BIOPSY: CPT | Performed by: RADIOLOGY

## 2024-03-27 PROCEDURE — C1725 CATH, TRANSLUMIN NON-LASER: HCPCS

## 2024-03-27 PROCEDURE — 87081 CULTURE SCREEN ONLY: CPT | Performed by: INTERNAL MEDICINE

## 2024-03-27 PROCEDURE — 94760 N-INVAS EAR/PLS OXIMETRY 1: CPT

## 2024-03-27 PROCEDURE — NC001 PR NO CHARGE: Performed by: RADIOLOGY

## 2024-03-27 PROCEDURE — 94640 AIRWAY INHALATION TREATMENT: CPT

## 2024-03-27 PROCEDURE — 51102 DRAIN BL W/CATH INSERTION: CPT

## 2024-03-27 PROCEDURE — 0T9B30Z DRAINAGE OF BLADDER WITH DRAINAGE DEVICE, PERCUTANEOUS APPROACH: ICD-10-PCS | Performed by: RADIOLOGY

## 2024-03-27 RX ORDER — FLUTICASONE FUROATE AND VILANTEROL 100; 25 UG/1; UG/1
1 POWDER RESPIRATORY (INHALATION)
Status: DISCONTINUED | OUTPATIENT
Start: 2024-03-28 | End: 2024-03-30 | Stop reason: HOSPADM

## 2024-03-27 RX ORDER — HEPARIN SODIUM 5000 [USP'U]/ML
5000 INJECTION, SOLUTION INTRAVENOUS; SUBCUTANEOUS EVERY 8 HOURS SCHEDULED
Status: CANCELLED | OUTPATIENT
Start: 2024-03-27

## 2024-03-27 RX ORDER — ONDANSETRON 2 MG/ML
INJECTION INTRAMUSCULAR; INTRAVENOUS AS NEEDED
Status: DISCONTINUED | OUTPATIENT
Start: 2024-03-27 | End: 2024-03-27

## 2024-03-27 RX ORDER — IPRATROPIUM BROMIDE AND ALBUTEROL SULFATE 2.5; .5 MG/3ML; MG/3ML
3 SOLUTION RESPIRATORY (INHALATION) ONCE
Status: DISCONTINUED | OUTPATIENT
Start: 2024-03-27 | End: 2024-03-27 | Stop reason: HOSPADM

## 2024-03-27 RX ORDER — LEVALBUTEROL INHALATION SOLUTION 1.25 MG/3ML
1.25 SOLUTION RESPIRATORY (INHALATION)
Status: CANCELLED | OUTPATIENT
Start: 2024-03-27

## 2024-03-27 RX ORDER — MIDODRINE HYDROCHLORIDE 5 MG/1
5 TABLET ORAL
Status: CANCELLED | OUTPATIENT
Start: 2024-03-27

## 2024-03-27 RX ORDER — METOPROLOL SUCCINATE 25 MG/1
25 TABLET, EXTENDED RELEASE ORAL DAILY
Status: DISCONTINUED | OUTPATIENT
Start: 2024-03-27 | End: 2024-03-30 | Stop reason: HOSPADM

## 2024-03-27 RX ORDER — DOCUSATE SODIUM 100 MG/1
100 CAPSULE, LIQUID FILLED ORAL 2 TIMES DAILY
Status: CANCELLED | OUTPATIENT
Start: 2024-03-27

## 2024-03-27 RX ORDER — SODIUM CHLORIDE 9 MG/ML
INJECTION, SOLUTION INTRAVENOUS CONTINUOUS PRN
Status: DISCONTINUED | OUTPATIENT
Start: 2024-03-27 | End: 2024-03-27

## 2024-03-27 RX ORDER — SENNOSIDES 8.6 MG
2 TABLET ORAL
Status: DISCONTINUED | OUTPATIENT
Start: 2024-03-27 | End: 2024-03-30 | Stop reason: HOSPADM

## 2024-03-27 RX ORDER — HEPARIN SODIUM 5000 [USP'U]/ML
5000 INJECTION, SOLUTION INTRAVENOUS; SUBCUTANEOUS EVERY 8 HOURS SCHEDULED
Status: DISCONTINUED | OUTPATIENT
Start: 2024-03-27 | End: 2024-03-28

## 2024-03-27 RX ORDER — CEFAZOLIN SODIUM 1 G/3ML
INJECTION, POWDER, FOR SOLUTION INTRAMUSCULAR; INTRAVENOUS AS NEEDED
Status: DISCONTINUED | OUTPATIENT
Start: 2024-03-27 | End: 2024-03-27

## 2024-03-27 RX ORDER — OXYCODONE HYDROCHLORIDE 5 MG/1
5 TABLET ORAL EVERY 6 HOURS PRN
Status: DISCONTINUED | OUTPATIENT
Start: 2024-03-27 | End: 2024-03-30 | Stop reason: HOSPADM

## 2024-03-27 RX ORDER — SODIUM CHLORIDE 9 MG/ML
100 INJECTION, SOLUTION INTRAVENOUS CONTINUOUS
Status: DISCONTINUED | OUTPATIENT
Start: 2024-03-27 | End: 2024-03-28

## 2024-03-27 RX ORDER — METHENAMINE HIPPURATE 1000 MG/1
1 TABLET ORAL 2 TIMES DAILY WITH MEALS
Status: CANCELLED | OUTPATIENT
Start: 2024-03-27

## 2024-03-27 RX ORDER — LIDOCAINE HYDROCHLORIDE 10 MG/ML
INJECTION, SOLUTION EPIDURAL; INFILTRATION; INTRACAUDAL; PERINEURAL AS NEEDED
Status: DISCONTINUED | OUTPATIENT
Start: 2024-03-27 | End: 2024-03-27

## 2024-03-27 RX ORDER — LEVALBUTEROL INHALATION SOLUTION 1.25 MG/3ML
1.25 SOLUTION RESPIRATORY (INHALATION)
Status: DISCONTINUED | OUTPATIENT
Start: 2024-03-27 | End: 2024-03-30

## 2024-03-27 RX ORDER — FLUTICASONE FUROATE AND VILANTEROL 100; 25 UG/1; UG/1
1 POWDER RESPIRATORY (INHALATION)
Status: CANCELLED | OUTPATIENT
Start: 2024-03-27

## 2024-03-27 RX ORDER — FENTANYL CITRATE/PF 50 MCG/ML
50 SYRINGE (ML) INJECTION
Status: DISCONTINUED | OUTPATIENT
Start: 2024-03-27 | End: 2024-03-30 | Stop reason: HOSPADM

## 2024-03-27 RX ORDER — ONDANSETRON 2 MG/ML
4 INJECTION INTRAMUSCULAR; INTRAVENOUS EVERY 8 HOURS PRN
Status: DISCONTINUED | OUTPATIENT
Start: 2024-03-27 | End: 2024-03-30 | Stop reason: HOSPADM

## 2024-03-27 RX ORDER — ACETAMINOPHEN 325 MG/1
975 TABLET ORAL EVERY 8 HOURS SCHEDULED
Status: DISCONTINUED | OUTPATIENT
Start: 2024-03-27 | End: 2024-03-30 | Stop reason: HOSPADM

## 2024-03-27 RX ORDER — SODIUM CHLORIDE, SODIUM GLUCONATE, SODIUM ACETATE, POTASSIUM CHLORIDE, MAGNESIUM CHLORIDE, SODIUM PHOSPHATE, DIBASIC, AND POTASSIUM PHOSPHATE .53; .5; .37; .037; .03; .012; .00082 G/100ML; G/100ML; G/100ML; G/100ML; G/100ML; G/100ML; G/100ML
75 INJECTION, SOLUTION INTRAVENOUS CONTINUOUS
Status: DISCONTINUED | OUTPATIENT
Start: 2024-03-27 | End: 2024-03-27

## 2024-03-27 RX ORDER — FENTANYL CITRATE 50 UG/ML
INJECTION, SOLUTION INTRAMUSCULAR; INTRAVENOUS AS NEEDED
Status: DISCONTINUED | OUTPATIENT
Start: 2024-03-27 | End: 2024-03-27

## 2024-03-27 RX ORDER — ONDANSETRON 2 MG/ML
4 INJECTION INTRAMUSCULAR; INTRAVENOUS ONCE AS NEEDED
Status: DISCONTINUED | OUTPATIENT
Start: 2024-03-27 | End: 2024-03-30 | Stop reason: HOSPADM

## 2024-03-27 RX ORDER — MIDODRINE HYDROCHLORIDE 5 MG/1
5 TABLET ORAL
Status: DISCONTINUED | OUTPATIENT
Start: 2024-03-27 | End: 2024-03-30 | Stop reason: HOSPADM

## 2024-03-27 RX ORDER — SENNOSIDES 8.6 MG
2 TABLET ORAL
Status: DISCONTINUED | OUTPATIENT
Start: 2024-03-27 | End: 2024-03-27

## 2024-03-27 RX ORDER — DEXAMETHASONE SODIUM PHOSPHATE 10 MG/ML
INJECTION, SOLUTION INTRAMUSCULAR; INTRAVENOUS AS NEEDED
Status: DISCONTINUED | OUTPATIENT
Start: 2024-03-27 | End: 2024-03-27

## 2024-03-27 RX ORDER — ALBUTEROL SULFATE 2.5 MG/3ML
2.5 SOLUTION RESPIRATORY (INHALATION) EVERY 6 HOURS PRN
Status: CANCELLED | OUTPATIENT
Start: 2024-03-27

## 2024-03-27 RX ORDER — PROPOFOL 10 MG/ML
INJECTION, EMULSION INTRAVENOUS CONTINUOUS PRN
Status: DISCONTINUED | OUTPATIENT
Start: 2024-03-27 | End: 2024-03-27

## 2024-03-27 RX ORDER — SODIUM CHLORIDE, SODIUM GLUCONATE, SODIUM ACETATE, POTASSIUM CHLORIDE, MAGNESIUM CHLORIDE, SODIUM PHOSPHATE, DIBASIC, AND POTASSIUM PHOSPHATE .53; .5; .37; .037; .03; .012; .00082 G/100ML; G/100ML; G/100ML; G/100ML; G/100ML; G/100ML; G/100ML
75 INJECTION, SOLUTION INTRAVENOUS CONTINUOUS
Status: CANCELLED | OUTPATIENT
Start: 2024-03-27 | End: 2024-03-27

## 2024-03-27 RX ORDER — METHENAMINE HIPPURATE 1000 MG/1
1 TABLET ORAL 2 TIMES DAILY WITH MEALS
Status: DISCONTINUED | OUTPATIENT
Start: 2024-03-27 | End: 2024-03-30 | Stop reason: HOSPADM

## 2024-03-27 RX ORDER — ONDANSETRON 2 MG/ML
4 INJECTION INTRAMUSCULAR; INTRAVENOUS EVERY 8 HOURS PRN
Status: CANCELLED | OUTPATIENT
Start: 2024-03-27

## 2024-03-27 RX ORDER — ALBUTEROL SULFATE 2.5 MG/3ML
2.5 SOLUTION RESPIRATORY (INHALATION) EVERY 6 HOURS PRN
Status: DISCONTINUED | OUTPATIENT
Start: 2024-03-27 | End: 2024-03-27 | Stop reason: HOSPADM

## 2024-03-27 RX ORDER — HYDROMORPHONE HCL IN WATER/PF 6 MG/30 ML
0.2 PATIENT CONTROLLED ANALGESIA SYRINGE INTRAVENOUS
Status: DISCONTINUED | OUTPATIENT
Start: 2024-03-27 | End: 2024-03-30 | Stop reason: HOSPADM

## 2024-03-27 RX ORDER — PHENYLEPHRINE HCL IN 0.9% NACL 1 MG/10 ML
SYRINGE (ML) INTRAVENOUS AS NEEDED
Status: DISCONTINUED | OUTPATIENT
Start: 2024-03-27 | End: 2024-03-27

## 2024-03-27 RX ORDER — SENNOSIDES 8.6 MG
2 TABLET ORAL
Status: CANCELLED | OUTPATIENT
Start: 2024-03-27

## 2024-03-27 RX ORDER — ACETAMINOPHEN 325 MG/1
975 TABLET ORAL EVERY 8 HOURS SCHEDULED
Status: CANCELLED | OUTPATIENT
Start: 2024-03-27

## 2024-03-27 RX ORDER — METOPROLOL SUCCINATE 25 MG/1
25 TABLET, EXTENDED RELEASE ORAL DAILY
Status: CANCELLED | OUTPATIENT
Start: 2024-03-27

## 2024-03-27 RX ORDER — LIDOCAINE HYDROCHLORIDE 10 MG/ML
1 INJECTION, SOLUTION EPIDURAL; INFILTRATION; INTRACAUDAL; PERINEURAL ONCE
Status: DISCONTINUED | OUTPATIENT
Start: 2024-03-27 | End: 2024-03-27 | Stop reason: HOSPADM

## 2024-03-27 RX ORDER — ALBUTEROL SULFATE 2.5 MG/3ML
2.5 SOLUTION RESPIRATORY (INHALATION) EVERY 6 HOURS PRN
Status: DISCONTINUED | OUTPATIENT
Start: 2024-03-27 | End: 2024-03-30 | Stop reason: HOSPADM

## 2024-03-27 RX ORDER — DOCUSATE SODIUM 100 MG/1
100 CAPSULE, LIQUID FILLED ORAL 2 TIMES DAILY PRN
Status: DISCONTINUED | OUTPATIENT
Start: 2024-03-27 | End: 2024-03-30 | Stop reason: HOSPADM

## 2024-03-27 RX ORDER — LIDOCAINE WITH 8.4% SOD BICARB 0.9%(10ML)
SYRINGE (ML) INJECTION AS NEEDED
Status: COMPLETED | OUTPATIENT
Start: 2024-03-27 | End: 2024-03-27

## 2024-03-27 RX ORDER — DOCUSATE SODIUM 100 MG/1
100 CAPSULE, LIQUID FILLED ORAL 2 TIMES DAILY
Status: DISCONTINUED | OUTPATIENT
Start: 2024-03-27 | End: 2024-03-27

## 2024-03-27 RX ADMIN — METHENAMINE HIPPURATE 1 G: 1 TABLET ORAL at 16:33

## 2024-03-27 RX ADMIN — HEPARIN SODIUM 5000 UNITS: 5000 INJECTION INTRAVENOUS; SUBCUTANEOUS at 21:19

## 2024-03-27 RX ADMIN — PROPOFOL 80 MCG/KG/MIN: 10 INJECTION, EMULSION INTRAVENOUS at 13:00

## 2024-03-27 RX ADMIN — ONDANSETRON 4 MG: 2 INJECTION INTRAMUSCULAR; INTRAVENOUS at 03:42

## 2024-03-27 RX ADMIN — ACETAMINOPHEN 975 MG: 325 TABLET, FILM COATED ORAL at 21:19

## 2024-03-27 RX ADMIN — MORPHINE SULFATE 2 MG: 2 INJECTION, SOLUTION INTRAMUSCULAR; INTRAVENOUS at 02:58

## 2024-03-27 RX ADMIN — METOPROLOL SUCCINATE 25 MG: 25 TABLET, EXTENDED RELEASE ORAL at 10:08

## 2024-03-27 RX ADMIN — Medication 200 MCG: at 13:31

## 2024-03-27 RX ADMIN — ONDANSETRON 4 MG: 2 INJECTION INTRAMUSCULAR; INTRAVENOUS at 13:08

## 2024-03-27 RX ADMIN — SODIUM CHLORIDE: 0.9 INJECTION, SOLUTION INTRAVENOUS at 12:52

## 2024-03-27 RX ADMIN — ALBUTEROL SULFATE 2.5 MG: 2.5 SOLUTION RESPIRATORY (INHALATION) at 03:41

## 2024-03-27 RX ADMIN — ACETAMINOPHEN 975 MG: 325 TABLET, FILM COATED ORAL at 05:29

## 2024-03-27 RX ADMIN — DEXAMETHASONE SODIUM PHOSPHATE 10 MG: 10 INJECTION, SOLUTION INTRAMUSCULAR; INTRAVENOUS at 13:08

## 2024-03-27 RX ADMIN — OXYCODONE HYDROCHLORIDE 5 MG: 5 TABLET ORAL at 11:31

## 2024-03-27 RX ADMIN — Medication 10 ML: at 13:19

## 2024-03-27 RX ADMIN — ACETAMINOPHEN 975 MG: 325 TABLET, FILM COATED ORAL at 14:48

## 2024-03-27 RX ADMIN — DEXMEDETOMIDINE HYDROCHLORIDE 12 MCG: 100 INJECTION, SOLUTION INTRAVENOUS at 13:08

## 2024-03-27 RX ADMIN — CEFAZOLIN SODIUM 2000 MG: 2 SOLUTION INTRAVENOUS at 06:20

## 2024-03-27 RX ADMIN — IPRATROPIUM BROMIDE 0.5 MG: 0.5 SOLUTION RESPIRATORY (INHALATION) at 20:15

## 2024-03-27 RX ADMIN — FENTANYL CITRATE 25 MCG: 50 INJECTION INTRAMUSCULAR; INTRAVENOUS at 12:57

## 2024-03-27 RX ADMIN — MIDODRINE HYDROCHLORIDE 5 MG: 5 TABLET ORAL at 16:33

## 2024-03-27 RX ADMIN — FENTANYL CITRATE 25 MCG: 50 INJECTION INTRAMUSCULAR; INTRAVENOUS at 12:53

## 2024-03-27 RX ADMIN — LEVALBUTEROL HYDROCHLORIDE 1.25 MG: 1.25 SOLUTION RESPIRATORY (INHALATION) at 20:15

## 2024-03-27 RX ADMIN — SODIUM CHLORIDE 100 ML/HR: 0.9 INJECTION, SOLUTION INTRAVENOUS at 14:45

## 2024-03-27 RX ADMIN — PHENYLEPHRINE HYDROCHLORIDE 40 MCG/MIN: 50 INJECTION INTRAVENOUS at 13:15

## 2024-03-27 RX ADMIN — CEFAZOLIN 1000 MG: 1 INJECTION, POWDER, FOR SOLUTION INTRAMUSCULAR; INTRAVENOUS at 13:15

## 2024-03-27 RX ADMIN — LIDOCAINE HYDROCHLORIDE 80 MG: 10 INJECTION, SOLUTION EPIDURAL; INFILTRATION; INTRACAUDAL; PERINEURAL at 13:00

## 2024-03-27 RX ADMIN — HEPARIN SODIUM 5000 UNITS: 5000 INJECTION INTRAVENOUS; SUBCUTANEOUS at 14:50

## 2024-03-27 NOTE — CONSULTS
Formerly [facility]    UROLOGY CONSULTATION NOTE   Admission Date: 3/24/2024    Patient Identifiers: Sal Moura (MRN: [id number])  Service Requesting Consultation: Robles Barry    Service Providing Consultation:  Urology, Miguel Hidalgo PA-C  Consults  Date of Service: 3/27/2024    Reason for Consultation: Urinary retention    History of Present Illness:     Sal Moura is a 78 y.o. male with previous history of chronic Cloud catheter from a nursing home.  He is admitted with a hip fracture and urinary retention.  He had a previous urethral catheter and was scheduled for a suprapubic catheter by interventional radiology.  Due to urethral erosion his catheter was removed and on admission to the hospital a new catheter was unable to be replaced.  Apparently he had been voiding volitionally.  His bladder was distended PVR over 600 mL.    Past Medical, Past Surgical History:     Past Medical History:   Diagnosis Date    A-fib (HCC)     Ambulatory dysfunction     Anxiety     Anxiety disorder     Atrial fibrillation (HCC)     Chronic indwelling Cloud catheter     COPD (chronic obstructive pulmonary disease) (HCC)     Coronary artery disease     Depression     Cloud catheter in place     Hepatitis C     screening negative in 1/2017    Hepatitis C     History of MI (myocardial infarction)     Hypertension     MI (myocardial infarction) (HCC)     Urinary catheter in place     Urinary retention     Use of cane as ambulatory aid    :    Past Surgical History:   Procedure Laterality Date    CARDIAC CATHETERIZATION  07/09/2018    CARDIAC ELECTROPHYSIOLOGY PROCEDURE N/A 9/30/2022    Procedure: Cardiac loop recorder implant;  Surgeon: Duke Buckner MD;  [facility];  Service: Cardiology    CARDIAC ELECTROPHYSIOLOGY PROCEDURE  09/30/2022    Cardiac loop recorder implant; Dr. Duke Buckner    CARDIAC LOOP RECORDER  09/2022    COLONOSCOPY      COLONOSCOPY      INGUINAL HERNIA REPAIR  Right 08/11/2022    Dr. Encarnacion    PA RPR 1ST INGUN HRNA AGE 5 YRS/> REDUCIBLE Right 8/11/2022    Procedure: REPAIR HERNIA INGUINAL;  Surgeon: Ady Encarnacion DO;  Location: [facility];  Service: General    TONSILLECTOMY     :    Medications, Allergies:     Current Facility-Administered Medications:     acetaminophen (TYLENOL) tablet 975 mg, 975 mg, Oral, Q8H DONIS, Abhilash Rodriguez MD, 975 mg at 03/26/24 2251    albuterol inhalation solution 2.5 mg, 2.5 mg, Nebulization, Q6H PRN, Robles Barry, 2.5 mg at 03/27/24 0341    ceFAZolin (ANCEF) IVPB (premix in dextrose) 2,000 mg 50 mL, 2,000 mg, Intravenous, Q8H, Abhilash Rodriguez MD, Last Rate: 100 mL/hr at 03/26/24 2251, 2,000 mg at 03/26/24 2251    docusate sodium (COLACE) capsule 100 mg, 100 mg, Oral, BID, Abhilash Rodriguez MD, 100 mg at 03/24/24 2111    Fluticasone Furoate-Vilanterol 100-25 mcg/actuation 1 puff, 1 puff, Inhalation, Daily, Abhilash Rodriguez MD    heparin (porcine) subcutaneous injection 5,000 Units, 5,000 Units, Subcutaneous, Q8H [facility], Abhilash Rodriguez MD    ipratropium (ATROVENT) 0.02 % inhalation solution 0.5 mg, 0.5 mg, Nebulization, TID, Abhilash Rodriguez MD, 0.5 mg at 03/26/24 1921    ipratropium-albuterol (DUO-NEB) 0.5-2.5 mg/3 mL inhalation solution 3 mL, 3 mL, Nebulization, Once, GABRIELA Marin    lactated ringers bolus 1,000 mL, 1,000 mL, Intravenous, Once PRN **AND** lactated ringers bolus 1,000 mL, 1,000 mL, Intravenous, Once PRN, Abhilash Rodriguez MD    levalbuterol (XOPENEX) inhalation solution 1.25 mg, 1.25 mg, Nebulization, TID, Abhilash Rodriguez MD, 1.25 mg at 03/26/24 1921    lidocaine (PF) (XYLOCAINE-MPF) 1 % injection 1 mL, 1 mL, Infiltration, Once, GABRIELA Marin    lidocaine (URO-JET) 2 % urethral/mucosal gel 1 Application, 1 Application, Urethral, Once, GABRIELA Marin    methenamine hippurate (HIPREX) tablet 1 g, 1 g, Oral, BID With Meals, Abhilash Rodriguez MD, 1 g at 03/26/24 0813    metoprolol succinate (TOPROL-XL)  24 hr tablet 25 mg, 25 mg, Oral, Daily, Abhilash Rodriguez MD, 25 mg at 24 0817    midodrine (PROAMATINE) tablet 5 mg, 5 mg, Oral, TID AC, Abhilash Rodriguez MD, 5 mg at 24 0614    morphine injection 2 mg, 2 mg, Intravenous, Q4H PRN, Abhilash Rodriguez MD, 2 mg at 24 0258    multi-electrolyte (PLASMALYTE-A/ISOLYTE-S PH 7.4) IV solution, 75 mL/hr, Intravenous, Continuous, Abhilash Rodriguez MD, Last Rate: 75 mL/hr at 24, 75 mL/hr at 24    ondansetron (ZOFRAN) injection 4 mg, 4 mg, Intravenous, Q8H PRN, Abhilash Rodriguez MD, 4 mg at 24 0342    senna (SENOKOT) tablet 17.2 mg, 2 tablet, Oral, HS, Abhilash Rodriguez MD, 17.2 mg at 24 2257    sodium chloride 0.9 % bolus 1,000 mL, 1,000 mL, Intravenous, Once PRN **AND** sodium chloride 0.9 % bolus 1,000 mL, 1,000 mL, Intravenous, Once PRN, Abhilash Rodriguez MD    Allergies:  No Known Allergies:    Social and Family History:   Social History:   Social History     Tobacco Use    Smoking status: Former     Current packs/day: 0.00     Average packs/day: 1.5 packs/day for 57.0 years (85.5 ttl pk-yrs)     Types: Cigarettes     Start date: 3/12/1966     Quit date: 3/12/2023     Years since quittin.0    Smokeless tobacco: Never    Tobacco comments:     since age 14; Has history of smoking for over 55 years. He has been smoking more than packet daily in the past however he has been smokes about half a pack a day lately and stopped smoking on 2018 when he was admitted to the hospital.    Vaping Use    Vaping status: Never Used   Substance Use Topics    Alcohol use: Not Currently    Drug use: Never   .    Social History     Tobacco Use   Smoking Status Former    Current packs/day: 0.00    Average packs/day: 1.5 packs/day for 57.0 years (85.5 ttl pk-yrs)    Types: Cigarettes    Start date: 3/12/1966    Quit date: 3/12/2023    Years since quittin.0   Smokeless Tobacco Never   Tobacco Comments    since age 14; Has history of smoking for over 55  years. He has been smoking more than packet daily in the past however he has been smokes about half a pack a day lately and stopped smoking on 7/1/2018 when he was admitted to the hospital.        Family History:  Family History   Problem Relation Age of Onset    Hypertension Mother     Coronary artery disease Mother         premature    Diabetes Father     Coronary artery disease Father         premature    Hypertension Father    :     Review of Systems:     General: Fever, chills, or night sweats: negative  Cardiac: Negative for chest pain.    Pulmonary: Negative for shortness of breath.  Gastrointestinal: Abdominal pain negative.  Nausea, vomiting, or diarrhea negative,  Genitourinary: See HPI above.  Patient does not have hematuria.  All other systems queried were negative.    Physical Exam:   General: Patient is pleasant and in NAD. Awake and alert  /79 (BP Location: Left arm)   Pulse 104   Temp 98.9 °F (37.2 °C) (Tympanic)   Resp 15   Ht 6' (1.829 m)   Wt 74.4 kg (164 lb 0.4 oz)   SpO2 91%   BMI 22.25 kg/m²   HEENT:  Conjunctiva are clear  Constitutional:  pleasant and cooperative     no apparent distress  Cardiac: Peripheral edema: negative  Pulmonary: Non-labored breathing  Abdomen: Soft, non-tender, non-distended.  No surgical scars.  No masses, tenderness, hernias noted.    Genitourinary: Negative CVA tenderness, positive suprapubic tenderness.  Extremities:  Moves all extremities  Neurological:CNII-XII intact. No numbness or tingling. Essentially non focal neurologic exam  Psychiatric:mood affect and behavior normal    (Male): Penis uncircumcised, significant urethral erosion about 2-1/2 cm.  Phallus normal, meatus patent.  Testicles descended into scrotum bilaterally without masses nor tenderness.  No inguinal hernias bilaterally.      Labs:     Lab Results   Component Value Date    HGB 13.3 03/26/2024    HCT 40.7 03/26/2024    WBC 8.58 03/26/2024     03/26/2024   ]    Lab Results    Component Value Date    K 3.7 03/26/2024     03/26/2024    CO2 25 03/26/2024    BUN 18 03/26/2024    CREATININE 0.90 03/26/2024    CALCIUM 8.7 03/26/2024    GLUCOSE 118 05/12/2023   ]    Imaging:   I personally reviewed the images and report of the following studies, and reviewed them with the patient:  None      ASSESSMENT:     #1.  Urinary retention  #2.  Meatal/urethral erosion  #3.  Urethral stricture  #4.  Hip fracture    PLAN:   -I was able to do a suprapubic aspiration for about 350 mL of cloudy urine  -Patient was much more comfortable  -He will be transferred in the morning for interventional radiology consult and suprapubic catheter placement  -His Eliquis has been on hold since 324      Thank you for allowing me to participate in this patients’ care.  Please do not hesitate to call with any additional questions.  Miguel Hidalgo PA-C

## 2024-03-27 NOTE — ASSESSMENT & PLAN NOTE
Patient with a history of indwelling catheter, follows up with [facility] urology as an outpatient.   Scheduled for an outpatient IR suprapubic catheter placement 11/23 -rescheduled due to patient taking Eliquis and issues with scheduling  Nursing home visit on 11/23: Cloud catheter was dislodged and unable to to be reinserted by nursing staff.  Left out and patient voiding at this time.  Post op from left hip arthroplasty notified by nursing patient having urinary retention issues.  Complaining of abdominal pain, distention, urgency to urinate.  Nursing bladder scan for greater than 680.  Several attempts by nursing, NP , and ED unable to obtain straight cath or Cloud  Reach out to on-call [facility] urology: Steven TARIQ: performed bedside needle decompression of bladder.see procedure note by urology & recommendations  Transfer out to [facility] for IR placement placement of suprapubic catheter and urology   Continue Flomax  Urinary retention protocol  Continue PTA Hipprex for UTI suppression  UA pending

## 2024-03-27 NOTE — ASSESSMENT & PLAN NOTE
No acute exacerbation.  Continue patient's ICS inhaler and DuoNeb nebulizations.  Oxygen as needed for oxygen saturation less than 89%.

## 2024-03-27 NOTE — CONSULTS
Orthopedics   Sal Moura 78 y.o. male MRN: [id number]  Unit/Bed#: W -01      Chief Complaint:   left hip pain; s/p left hip hemiarthroplasty    HPI:   78 y.o. male POD 1 left hip hemiarthroplasty.  He has been transferred from [place] to [facility] for IR consult and suprapubic catheter placement due to urinary retention.  The patient is agitated upon exam today due to lower pelvic pain.  He repeatedly asks for water although he is presently NPO.  He also reports feeling nauseous.  When asked specifically about his left hip, he denies any pain.  He denies subjective headaches, chest pain, or shortness of breath.      Review Of Systems:   Skin: Normal  Neuro: See HPI  Musculoskeletal: See HPI  14 point review of systems negative except as stated above     Past Medical History:   Past Medical History:   Diagnosis Date    A-fib (HCC)     Ambulatory dysfunction     Anxiety     Anxiety disorder     Atrial fibrillation (HCC)     Chronic indwelling Cloud catheter     COPD (chronic obstructive pulmonary disease) (HCC)     Coronary artery disease     Depression     Cloud catheter in place     Hepatitis C     screening negative in 1/2017    Hepatitis C     History of MI (myocardial infarction)     Hypertension     MI (myocardial infarction) (HCC)     Urinary catheter in place     Urinary retention     Use of cane as ambulatory aid        Past Surgical History:   Past Surgical History:   Procedure Laterality Date    CARDIAC CATHETERIZATION  07/09/2018    CARDIAC ELECTROPHYSIOLOGY PROCEDURE N/A 9/30/2022    Procedure: Cardiac loop recorder implant;  Surgeon: Duke Buckner MD;  [facility];  Service: Cardiology    CARDIAC ELECTROPHYSIOLOGY PROCEDURE  09/30/2022    Cardiac loop recorder implant; Dr. Duke Buckner    CARDIAC LOOP RECORDER  09/2022    COLONOSCOPY      COLONOSCOPY      INGUINAL HERNIA REPAIR Right 08/11/2022    Dr. Encarnacion    VT HEMIARTHROPLASTY HIP PARTIAL Left 3/26/2024    Procedure:  HEMIARTHROPLASTY HIP (BIPOLAR);  Surgeon: Ibeth Beal DO;  Location: [facility];  Service: Orthopedics    NM RPR 1ST INGUN HRNA AGE 5 YRS/> REDUCIBLE Right 2022    Procedure: REPAIR HERNIA INGUINAL;  Surgeon: Ady Encarnacion DO;  Location: [facility];  Service: General    TONSILLECTOMY         Family History:  Family history reviewed and non-contributory  Family History   Problem Relation Age of Onset    Hypertension Mother     Coronary artery disease Mother         premature    Diabetes Father     Coronary artery disease Father         premature    Hypertension Father        Social History:  Social History     Socioeconomic History    Marital status: /Civil Union     Spouse name: Not on file    Number of children: 3    Years of education: 12    Highest education level: 12th grade   Occupational History    Occupation: retired   Tobacco Use    Smoking status: Former     Current packs/day: 0.00     Average packs/day: 1.5 packs/day for 57.0 years (85.5 ttl pk-yrs)     Types: Cigarettes     Start date: 3/12/1966     Quit date: 3/12/2023     Years since quittin.0    Smokeless tobacco: Never    Tobacco comments:     since age 14; Has history of smoking for over 55 years. He has been smoking more than packet daily in the past however he has been smokes about half a pack a day lately and stopped smoking on 2018 when he was admitted to the hospital.    Vaping Use    Vaping status: Never Used   Substance and Sexual Activity    Alcohol use: Not Currently    Drug use: Never    Sexual activity: Not Currently     Partners: Female     Birth control/protection: Condom Male   Other Topics Concern    Not on file   Social History Narrative    ** Merged History Encounter **         ** Merged History Encounter **         ** Merged History Encounter **         History of Ultra Sound: 2016  History of Stress Test: 2018  History of ECHO: 2018  · Most recent tobacco use screenin2019    · Live  alone or with others:   with others      · Diet:   Regular    · Caffeine intake:   Moderate    · Guns     present in home:   No    · Asbestos exposure:   No    · TB exposure:   No    · Environmental exposure:   No    · Animal exposure:   No    · Smoke alarm in home:   Yes       Social Determinants of Health     Financial Resource Strain: Not on file   Food Insecurity: Food Insecurity Present (3/24/2024)    Hunger Vital Sign     Worried About Running Out of Food in the Last Year: Sometimes true     Ran Out of Food in the Last Year: Sometimes true   Transportation Needs: Unmet Transportation Needs (3/24/2024)    PRAPARE - Transportation     Lack of Transportation (Medical): Yes     Lack of Transportation (Non-Medical): Yes   Physical Activity: Insufficiently Active (5/29/2020)    Exercise Vital Sign     Days of Exercise per Week: 3 days     Minutes of Exercise per Session: 30 min   Stress: Stress Concern Present (5/29/2020)    [place] [employer] - Occupational Stress Questionnaire     Feeling of Stress : To some extent   Social Connections: Not on file   Intimate Partner Violence: Not on file   Housing Stability: Low Risk  (3/24/2024)    Housing Stability Vital Sign     Unable to Pay for Housing in the Last Year: No     Number of Places Lived in the Last Year: 2     Unstable Housing in the Last Year: No       Allergies:   No Known Allergies        Labs:  0   Lab Value Date/Time    HCT 40.5 03/27/2024 0455    HCT 40.7 03/26/2024 0446    HCT 42.9 03/25/2024 0510    HGB 13.3 03/27/2024 0455    HGB 13.3 03/26/2024 0446    HGB 14.2 03/25/2024 0510    INR 1.29 (H) 03/24/2024 1704    WBC 8.32 03/27/2024 0455    WBC 8.58 03/26/2024 0446    WBC 9.26 03/25/2024 0510       Meds:    Current Facility-Administered Medications:     acetaminophen (TYLENOL) tablet 975 mg, 975 mg, Oral, Q8H DONIS, Clarice Vaz MD    albuterol inhalation solution 2.5 mg, 2.5 mg, Nebulization, Q6H PRN, Clarice Vaz  "MD    docusate sodium (COLACE) capsule 100 mg, 100 mg, Oral, BID, Clarice Vaz MD    [START ON 3/28/2024] Fluticasone Furoate-Vilanterol 100-25 mcg/actuation 1 puff, 1 puff, Inhalation, Daily, Clarice Vaz MD    heparin (porcine) subcutaneous injection 5,000 Units, 5,000 Units, Subcutaneous, Q8H DONIS, Clarice Vaz MD    ipratropium (ATROVENT) 0.02 % inhalation solution 0.5 mg, 0.5 mg, Nebulization, TID, Clarice Vaz MD    levalbuterol (XOPENEX) inhalation solution 1.25 mg, 1.25 mg, Nebulization, TID, Clarice Vaz MD    methenamine hippurate (HIPREX) tablet 1 g, 1 g, Oral, BID With Meals, Clarice Vaz MD    metoprolol succinate (TOPROL-XL) 24 hr tablet 25 mg, 25 mg, Oral, Daily, Clarice Vaz MD    midodrine (PROAMATINE) tablet 5 mg, 5 mg, Oral, TID AC, Clarice Vaz MD    multi-electrolyte (PLASMALYTE-A/ISOLYTE-S PH 7.4) IV solution, 75 mL/hr, Intravenous, Continuous, Clarice Vaz MD    ondansetron (ZOFRAN) injection 4 mg, 4 mg, Intravenous, Q8H PRN, Clarice Vaz MD    senna (SENOKOT) tablet 17.2 mg, 2 tablet, Oral, HS, Clarice Vaz MD    Blood Culture:   Lab Results   Component Value Date    BLOODCX No Growth After 5 Days. 06/02/2023    BLOODCX No Growth After 5 Days. 06/02/2023       Wound Culture:   No results found for: \"WOUNDCULT\"    Ins and Outs:  No intake/output data recorded.          Physical Exam:   /79   Pulse 96   Temp 97.9 °F (36.6 °C)   Resp 16   SpO2 93%   Gen: The patient appears uncomfortable and often cries out due to the pelvic pain.  HEENT: Eyes clear, moist mucus membranes, hearing intact  Respiratory: No audible wheezing or stridor  Cardiovascular: Well Perfused peripherally, 2+ distal pulse  Abdomen: nondistended, no peritoneal signs  Musculoskeletal: left lower extremity  Visible skin over the hip is without erythema or ecchymosis.  Dressing C/D/I  Abduction pillow is appropriately placed.  No " significant TTP over the hip.  Sensation intact over the toes.  Motor intact to +FHL/EHL, +ankle dorsi/plantar flexion  Musculature is soft and compressible  Leg lengths equal   Digits warm and well perfused  Capillary refill < 2 seconds    Radiology:   I personally reviewed the films.  X-rays AP/Lateral views of left hip shows implants held in stable alignment without evidence of hardware loosening or failure.      Assessment:  78 y.o. male POD 1 left hip hemiarthroplasty with Dr. Beal on 3/26; transferred from [facility] for IR consult and suprapubic catheter placement due to urinary retention.      Plan:   WBAT left lower extremity with posterior hip precautions  Will monitor for ABLA and recommend IVF/prbc as indicated for greater than 2 gram Hgb drop or Hgb < 7.  Hgb this morning is 13.3.  No signs of active bleeding in the operative extremity.  PT/OT  Pain control  DVT ppx per primary team  Medical co-morbidities are being managed per primary team  Dispo: Ortho will follow      Lissa Paige PA-C

## 2024-03-27 NOTE — PLAN OF CARE
Problem: INFECTION - ADULT  Goal: Absence or prevention of progression during hospitalization  Description: INTERVENTIONS:  - Assess and monitor for signs and symptoms of infection  - Monitor lab/diagnostic results  - Monitor all insertion sites, i.e. indwelling lines, tubes, and drains  - Monitor endotracheal if appropriate and nasal secretions for changes in amount and color  - [place] appropriate cooling/warming therapies per order  - Administer medications as ordered  - Instruct and encourage patient and family to use good hand hygiene technique  - Identify and instruct in appropriate isolation precautions for identified infection/condition  Outcome: Progressing  Goal: Absence of fever/infection during neutropenic period  Description: INTERVENTIONS:  - Monitor WBC    Outcome: Progressing

## 2024-03-27 NOTE — CASE MANAGEMENT
Case Management Assessment & Discharge Planning Note    Patient name Sal Moura  Location W /W -01 MRN [id number]  : 1945 Date 3/27/2024       Current Admission Date: 3/27/2024  Current Admission Diagnosis:Urinary retention   Patient Active Problem List    Diagnosis Date Noted    Abnormal urinalysis 2024    Diarrhea 2024    Closed left hip fracture, initial encounter ([facility]) 2024    Neurogenic bladder 2023    Malnutrition ([facility]) 2023    Moderate protein-calorie malnutrition ([facility]) 2023    Fall 2023    Cloud catheter in place 2023    A-fib ([facility]) 2023    COPD (chronic obstructive pulmonary disease) ([facility]) 2023    Chronic diastolic congestive heart failure ([facility]) 2023    Atrial fibrillation ([facility]) 2023    COPD (chronic obstructive pulmonary disease) ([facility]) 2023    Chronic indwelling Cloud catheter 2023    SDH (subdural hematoma) ([facility]) 2023    Syncope and collapse 2023    History of recurrent UTIs 2023    Debility 2023    Generalized weakness 2023    Fall 2023    Abrasions of multiple sites 2023    Atrial fibrillation ([facility]) 2023    Nicotine abuse 2023    COPD (chronic obstructive pulmonary disease) ([facility]) 2023    Chronic indwelling Cloud catheter 2023    Orthostatic hypotension 2023    Gastroesophageal reflux disease without esophagitis 2022    Swelling of lower leg 2022    Surgical wound present 10/05/2022    SDH (subdural hematoma) ([facility]) 2022    Recurrent right inguinal hernia 2022    Nasal congestion 2022    Moderate protein-calorie malnutrition ([facility]) 2022    Cholestatic jaundice 2022    Iron deficiency anemia likely secondary to GI bleeding 2022    Abnormal colonoscopy 2022    Dizziness 2022    Urinary retention 2022    Essential (hemorrhagic) thrombocythemia ([facility]) 2022     Closed nondisplaced comminuted fracture of left patella 12/09/2021    Head trauma 09/08/2021    Thrombocytosis 09/07/2021    Bradycardia 06/29/2021    Syncope and collapse 06/28/2021    Colonic adenoma 06/28/2021    Ambulatory dysfunction 06/28/2021    COPD (chronic obstructive pulmonary disease) (HCC) 06/28/2021    Anxiety 09/29/2020    Obstructive sleep apnea syndrome 09/29/2020    Orthostatic hypotension 08/06/2020    Tobacco abuse 08/05/2020    Symptomatic anemia 08/03/2020    Cardiomyopathy, dilated (HCC) 08/03/2020    Permanent atrial fibrillation (HCC) 08/03/2020    History of cardiac cath 07/09/2018      LOS (days): 1  Geometric Mean LOS (GMLOS) (days):   Days to GMLOS:     OBJECTIVE:              Current admission status: Observation       Preferred Pharmacy:   [id number] - [facility] [address] [address]  Phone: [phone] Fax: [phone]    [facility] [address] [address]  Phone: [phone] Fax: [phone]    UNKNOWN - FOLLOW UP PRIOR TO DISCHARGE TO E-PRESCRIBE  No address on file      PATIENT/FAMILY REPORTS NO PREFERRED PHARMACY  No address on file      Primary Care Provider: Snow Farah MD    Primary Insurance: MEDICARE  Secondary Insurance:  FOR LIFE    ASSESSMENT:  Active Health Care Proxies       JJ SANTAMARIA [facility] Representative - Spouse   Primary Phone: [phone] (Mobile)                           Readmission Root Cause  30 Day Readmission: No    Patient Information  Admitted from:: Facility ([facility])  Mental Status: Alert  During Assessment patient was accompanied by: Not accompanied during assessment  Assessment information provided by:: Patient  Primary Caregiver: Other (Comment)  Caregiver's Name:: [facility]  Caregiver's Relationship to Patient:: Other (Specify)  Support Systems: Spouse/significant other, Other  (Comment) (facility staff)  Home entry access options. Select all that apply.: No steps to enter home  Type of Current Residence: Facility  Upon entering residence, is there a bedroom on the main floor (no further steps)?: Yes  Upon entering residence, is there a bathroom on the main floor (no further steps)?: Yes  Living Arrangements: Other (Comment) ([facility])    Activities of Daily Living Prior to Admission  Functional Status: Assistance  Completes ADLs independently?: No  Level of ADL dependence: Assistance  Ambulates independently?: No  Level of ambulatory dependence: Assistance  Does patient use assisted devices?: Yes  Assisted Devices (DME) used: Walker  Does the patient have a history of Short-Term Rehab?: Yes         Patient Information Continued  Income Source: SSI/SSD  Does patient have prescription coverage?: Yes  Does patient receive dialysis treatments?: No  Does patient have a history of substance abuse?: No  Does patient have a history of Mental Health Diagnosis?: No         Means of Transportation  Means of Transport to Appts:: Other (Comment) (family vs facility transport)      Social Determinants of Health (SDOH)      Flowsheet Row Most Recent Value   Housing Stability    In the last 12 months, was there a time when you were not able to pay the mortgage or rent on time? N   In the last 12 months, how many places have you lived? 2   In the last 12 months, was there a time when you did not have a steady place to sleep or slept in a shelter (including now)? N   Transportation Needs    In the past 12 months, has lack of transportation kept you from medical appointments or from getting medications? no   In the past 12 months, has lack of transportation kept you from meetings, work, or from getting things needed for daily living? No   Food Insecurity    Within the past 12 months, you worried that your food would run out before you got the money to buy more. Never true   Within the past 12 months,  the food you bought just didn't last and you didn't have money to get more. Never true   Utilities    In the past 12 months has the electric, gas, oil, or water company threatened to shut off services in your home? No            DISCHARGE DETAILS:    Discharge planning discussed with:: pt  Freedom of Choice: Yes  Comments - Freedom of Choice: Pt is LTC at [facility] and would like to return at [place].      Requested Home Health Care         Is the patient interested in HHC at discharge?: No    DME Referral Provided  Referral made for DME?: No    Other Referral/Resources/Interventions Provided:  Interventions: SNF, Short Term Rehab  Referral Comments: Referral has been made to [facility] as pt is a LTC resident there.  He will need rehab upon return d/t fracture.    Would you like to participate in our [facility] service program?  : No - Declined    Treatment Team Recommendation: Short Term Rehab, SNF  Discharge Destination Plan:: Short Term Rehab, SNF

## 2024-03-27 NOTE — ASSESSMENT & PLAN NOTE
Patient with a history of indwelling catheter, follows up with [facility] urology as an outpatient.   Scheduled for an outpatient IR suprapubic catheter placement 11/23 -rescheduled due to patient taking Eliquis and issues with scheduling  Nursing home visit on 11/23: Cloud catheter was dislodged and unable to to be reinserted by nursing staff.  Left out and patient voiding at this time.  At [facility]: Post op from left hip arthroplasty notified by nursing patient having urinary retention issues.  Complaining of abdominal pain, distention, urgency to urinate.  Nursing bladder scan for greater than 680.  Several attempts by nursing, NP , and ED unable to obtain straight cath or Cloud  Reach out to on-call [facility] urology: Steven TARIQ: performed bedside needle decompression of bladder.see procedure note by urology & recommendations  Transfer out to [facility] for IR placement placement of suprapubic catheter and urology.  Appreciate urology consult.  For IR SPC placement today.  Urinary retention protocol  Continue PTA Hipprex for UTI suppression  Due to urinary retention, I will temporarily hold off IV fluids being given right now that was ordered at [facility] to be given postoperatively, as we do not have exact time yet on when the suprapubic catheter placement will be, and that patient's bladder may be filled up fast with urine and patient may develop significant bladder distention and pain.  Thus may restart IV fluids postoperatively.

## 2024-03-27 NOTE — ANESTHESIA PREPROCEDURE EVALUATION
Procedure:  IR SUPRAPUBIC CATHETER PLACEMENT    Relevant Problems   CARDIO   (+) A-fib (HCC)   (+) Atrial fibrillation (HCC)   (+) Chronic diastolic congestive heart failure (HCC)   (+) Permanent atrial fibrillation (HCC)      GI/HEPATIC   (+) Gastroesophageal reflux disease without esophagitis      HEMATOLOGY   (+) Iron deficiency anemia likely secondary to GI bleeding   (+) Symptomatic anemia      NEURO/PSYCH   (+) Anxiety   (+) SDH (subdural hematoma) (HCC)      PULMONARY   (+) COPD (chronic obstructive pulmonary disease) (HCC)   (+) Obstructive sleep apnea syndrome        Physical Exam    Airway    Mallampati score: II  TM Distance: >3 FB  Neck ROM: full     Dental       Cardiovascular      Pulmonary      Other Findings        Anesthesia Plan  ASA Score- 3     Anesthesia Type- IV sedation with anesthesia with ASA Monitors.         Additional Monitors:     Airway Plan:            Plan Factors-Exercise tolerance (METS): >4 METS.    Chart reviewed. EKG reviewed.  Existing labs reviewed. Patient summary reviewed.    Patient is not a current smoker.      There is medical exclusion for perioperative obstructive sleep apnea risk education.        Induction- intravenous.    Postoperative Plan-     Informed Consent- Anesthetic plan and risks discussed with patient.  I personally reviewed this patient with the CRNA. Discussed and agreed on the Anesthesia Plan with the CRNA..

## 2024-03-27 NOTE — ASSESSMENT & PLAN NOTE
Wt Readings from Last 3 Encounters:   03/27/24 74.4 kg (164 lb 0.4 oz)   03/07/24 69.9 kg (154 lb)   01/26/24 69.4 kg (153 lb)     History of chronic diastolic CHF with preserved ejection fraction  Presently stable and compensated.  EF 50% in 2022  Loop recorder in place  Euvolemic on exam  Not maintained on outpatient diuretics  Continue PTA metoprolol succinate  Monitor volume status  Monitor input and output and daily weights.

## 2024-03-27 NOTE — H&P
[facility]  H&P  Name: Sal Moura 78 y.o. male I MRN: [id number]  Unit/Bed#: W -01 I Date of Admission: 3/27/2024   Date of Service: 3/27/2024 I Hospital Day: 1      Assessment/Plan   * Urinary retention  Assessment & Plan  Patient with a history of indwelling catheter, follows up with [facility] urology as an outpatient.   Scheduled for an outpatient IR suprapubic catheter placement 11/23 -rescheduled due to patient taking Eliquis and issues with scheduling  Nursing home visit on 11/23: Cloud catheter was dislodged and unable to to be reinserted by nursing staff.  Left out and patient voiding at this time.  At [facility]: Post op from left hip arthroplasty notified by nursing patient having urinary retention issues.  Complaining of abdominal pain, distention, urgency to urinate.  Nursing bladder scan for greater than 680.  Several attempts by nursing, NP , and ED unable to obtain straight cath or Cloud  Reach out to on-call [facility] urology: Steven TARIQ: performed bedside needle decompression of bladder.see procedure note by urology & recommendations  Transfer out to [facility] for IR placement placement of suprapubic catheter and urology.  Appreciate urology consult.  For IR SPC placement today.  Urinary retention protocol  Continue PTA Hipprex for UTI suppression  Due to urinary retention, I will temporarily hold off IV fluids being given right now that was ordered at [facility] to be given postoperatively, as we do not have exact time yet on when the suprapubic catheter placement will be, and that patient's bladder may be filled up fast with urine and patient may develop significant bladder distention and pain.  Thus may restart IV fluids postoperatively.      Closed left hip fracture, initial encounter (HCC)  Assessment & Plan  Fall at [facility] on 3/23, left hip pain/leg externally rotated and shortened  [facility] ED  imaging left hip fracture  Orthopedic surgeon on board.  Pain control  Bowel regimen as needed (patient claimed of having diarrhea).  PT/OT   OR 3/26 s/p left hemiarthroplasty  As per orthopedic surgeon, weightbearing as tolerated, left lower extremity with posterior hip precautions.  Monitor hemoglobin/CBC.  Orthopedic surgery recommending IV fluids/packed RBCs as indicated for greater than 2 g hemoglobin drop or hemoglobin less than 7.  No signs of active bleeding in the operative extremity.  DVT prophylaxis.    Diarrhea  Assessment & Plan  Patient told me, that he had been having diarrhea for about a week now.  He told me he has been averaging 3 episodes of diarrhea per day.  He told me, that his last bowel movement was yesterday when he was still at [facility], and none yet today.  Monitor patient's bowel movements.  I spoke to the nurse about this.  Patient was on bowel regimen with stool softener/laxatives at [facility] on and this morning.  Thus I made them to be taken on as-needed basis now.  For further evaluation and management if patient having persistent diarrhea.    Abnormal urinalysis  Assessment & Plan  Urinalysis revealed innumerable bacteria, RBCs 4-10.  Per my discussion with the patient, he did not have any urologic symptoms, like dysuria, burning sensation in urination, painful urination, fever or chills, suprapubic pains, flank pains, until he had urinary retention again at [facility] and staff there were unable to reinsert a miller catheter, and thus the transfer here for SPC placement by IR.  Presently, patient only urologic symptom is the suprapubic pubic pain due to urinary retention.  Patient does not have any fever or chills or any flank pains.  No leukocytosis on patient's CBC.  Thus, at this point, likely asymptomatic bacteriuria, possible colonization with patient being on chronic Miller catheter, for observation off antibiotics at this point.  However, if patient  develops christine signs and symptoms of UTI, despite decompression with IR placement of suprapubic catheter, consider sending patient's urine for urine culture and starting antibiotics.      Chronic diastolic congestive heart failure (HCC)  Assessment & Plan  Wt Readings from Last 3 Encounters:   03/27/24 74.4 kg (164 lb 0.4 oz)   03/07/24 69.9 kg (154 lb)   01/26/24 69.4 kg (153 lb)     History of chronic diastolic CHF with preserved ejection fraction  Presently stable and compensated.  EF 50% in 2022  Loop recorder in place  Euvolemic on exam  Not maintained on outpatient diuretics  Continue PTA metoprolol succinate  Monitor volume status  Monitor input and output and daily weights.            COPD (chronic obstructive pulmonary disease) ([facility])  Assessment & Plan  No acute exacerbation.  Continue patient's ICS inhaler and DuoNeb nebulizations.  Oxygen as needed for oxygen saturation less than 89%.    Orthostatic hypotension  Assessment & Plan  Continue PTA midodrine 3 times daily with hold for SBP > 135     Permanent atrial fibrillation ([facility])  Assessment & Plan  EKG confirming A-fib, rate controlled  Continue PTA metoprolol succinate 25 mg daily  Eliquis on hold for the suprapubic placement today.  May resume after IR procedure, once sure with good hemostasis and no contraindication.         VTE Pharmacologic Prophylaxis: VTE Score: 14 High Risk (Score >/= 5) - Pharmacological DVT Prophylaxis Ordered: heparin. Sequential Compression Devices Ordered.  Code Status: Level 3 - DNAR and DNI   Discussion with family: Patient declined call to .     Anticipated Length of Stay: Patient will be admitted on an observation basis with an anticipated length of stay of less than 2 midnights secondary to above findings and plans.    Total Time Spent on Date of Encounter in care of patient: This time was spent on one or more of the following: performing physical exam; counseling and coordination of care; obtaining or  reviewing history; documenting in the medical record; reviewing/ordering tests, medications or procedures; communicating with other healthcare professionals and discussing with patient's family/caregivers.    Chief Complaint: Suprapubic pain and urinary retention.    History of Present Illness:  Sal Moura is a 78 y.o. male with a PMH significant for urinary retention with chronic indwelling Cloud catheter in the past, but was dislodged on 11/23 and Cloud catheter was not able to be reinserted since that time who presents to [facility] as a direct transfer from [facility] for interventional radiology suprapubic catheter placement.  Patient was initially admitted at [facility] as patient was found to have left hip fracture and underwent left hip arthroplasty.  Patient developed urinary retention there and several times by the nursing staff, nurse practitioner, emergency room staff, they were unable to place a straight catheter or a Cloud catheter.  Urologist was then consulted and urology advance practitioner did a bedside needle decompression of the bladder.  Patient was recommended to be transferred here to [facility] for IR placement of a suprapubic catheter and urology consultation.  Presently, when I saw the patient, he was uncomfortable and complained of suprapubic pains.  Otherwise no other pains.  Patient denied any flank pains.  Patient admitted to an episode of vomiting at [facility], but none here so far.  He told me, that he continues to have episodes of nausea here.  Otherwise no other complaints.  Patient denied any fever or chills or any other abdominal pains aside from the suprapubic pains.    Review of Systems:  Review of Systems    10 point review systems done and they were negative except for the ones I mentioned in my history of present illness and patient admitted to having diarrhea for 1 week now, averaging 3 episodes per day with  watery nonbloody stools, but none yet today here; he told me his last bowel movement was yesterday at [facility].  Patient denied any dizziness, headaches or loss of consciousness.  Patient denied any chest pains or shortness of breath.  Patient denied any fever or chills or any cough or colds.  Patient denied any hip pains from his surgery.  Prior to him developing urinary retention at [facility], when asked, patient denied any urinary symptoms like dysuria, burning sensation, pain on urination, suprapubic or any flank pains or any fever or chills.      Past Medical and Surgical History:   Past Medical History:   Diagnosis Date    A-fib (HCC)     Ambulatory dysfunction     Anxiety     Anxiety disorder     Atrial fibrillation (HCC)     Chronic indwelling Cloud catheter     COPD (chronic obstructive pulmonary disease) (HCC)     Coronary artery disease     Depression     Cloud catheter in place     Hepatitis C     screening negative in 1/2017    Hepatitis C     History of MI (myocardial infarction)     Hypertension     MI (myocardial infarction) (HCC)     Urinary catheter in place     Urinary retention     Use of cane as ambulatory aid        Past Surgical History:   Procedure Laterality Date    CARDIAC CATHETERIZATION  07/09/2018    CARDIAC ELECTROPHYSIOLOGY PROCEDURE N/A 9/30/2022    Procedure: Cardiac loop recorder implant;  Surgeon: Duke Buckner MD;  Location:  CARDIAC CATH LAB;  Service: Cardiology    CARDIAC ELECTROPHYSIOLOGY PROCEDURE  09/30/2022    Cardiac loop recorder implant; Dr. Duke Buckner    CARDIAC LOOP RECORDER  09/2022    COLONOSCOPY      COLONOSCOPY      INGUINAL HERNIA REPAIR Right 08/11/2022    Dr. Encarnacion    VA HEMIARTHROPLASTY HIP PARTIAL Left 3/26/2024    Procedure: HEMIARTHROPLASTY HIP (BIPOLAR);  Surgeon: Ibeth Beal DO;  Location:  MAIN OR;  Service: Orthopedics    VA RPR 1ST INGUN HRNA AGE 5 YRS/> REDUCIBLE Right 8/11/2022    Procedure: REPAIR HERNIA INGUINAL;  Surgeon: Ady Encarnacion DO;   Location: [facility] Main OR;  Service: General    TONSILLECTOMY         Meds/Allergies:  Prior to Admission medications    Medication Sig Start Date End Date Taking? Authorizing Provider   acetaminophen (TYLENOL) 325 mg tablet Take 650 mg by mouth every 6 (six) hours as needed    Historical Provider, MD   apixaban (ELIQUIS) 2.5 mg Take 2.5 mg by mouth 2 (two) times a day    Historical Provider, MD   azelastine (ASTELIN) 0.1 % nasal spray 1 spray into each nostril 2 (two) times a day as needed for rhinitis Use in each nostril as directed    Historical Provider, MD   bisacodyl (DULCOLAX) 10 mg suppository Insert 10 mg into the rectum once as needed    Historical Provider, MD   docusate sodium (COLACE) 100 mg capsule Take 100 mg by mouth 2 (two) times a day    Historical Provider, MD   Fluticasone-Salmeterol (Advair Diskus) 100-50 mcg/dose inhaler Inhale 1 puff 2 (two) times a day Rinse mouth after use.    Historical Provider, MD   ipratropium-albuterol (DUO-NEB) 0.5-2.5 mg/3 mL nebulizer solution INHALE CONTENTS OF 1 VIAL VIA NEBULIZER FOUR TIMES A DAY 6/7/22   Panchito Snow MD   meclizine (ANTIVERT) 12.5 MG tablet Take 12.5 mg by mouth every 8 (eight) hours as needed for dizziness    Historical Provider, MD   methenamine hippurate (HIPREX) 1 g tablet Take 1 g by mouth 2 (two) times a day with meals    Historical Provider, MD   metoprolol succinate (TOPROL-XL) 25 mg 24 hr tablet Take 25 mg by mouth daily    Historical Provider, MD   midodrine (PROAMATINE) 5 mg tablet Take 5 mg by mouth 3 (three) times a day before meals Hold if SBP >135    Historical Provider, MD   mineral oil enema Insert 1 enema into the rectum once as needed for constipation    Historical Provider, MD   sodium phosphate-biphosphate (FLEET) 7-19 g 118 mL enema Insert 1 enema into the rectum once as needed    Historical Provider, MD BALDERAS have reviewed home medications using recent Epic encounter.    Allergies: No Known Allergies    Social  History:  Marital Status: /Civil Union   Substance Use History:   Social History     Substance and Sexual Activity   Alcohol Use Not Currently     Social History     Tobacco Use   Smoking Status Former    Current packs/day: 0.00    Average packs/day: 1.5 packs/day for 57.0 years (85.5 ttl pk-yrs)    Types: Cigarettes    Start date: 3/12/1966    Quit date: 3/12/2023    Years since quittin.0   Smokeless Tobacco Never   Tobacco Comments    since age 14; Has history of smoking for over 55 years. He has been smoking more than packet daily in the past however he has been smokes about half a pack a day lately and stopped smoking on 2018 when he was admitted to the hospital.      Social History     Substance and Sexual Activity   Drug Use Never       Family History:  Family History   Problem Relation Age of Onset    Hypertension Mother     Coronary artery disease Mother         premature    Diabetes Father     Coronary artery disease Father         premature    Hypertension Father        Physical Exam:     Vitals:   Blood Pressure: 135/79 (24 0912)  Pulse: 96 (24)  Temperature: 97.9 °F (36.6 °C) (24)  Temp Source: Oral (24)  Respirations: 16 (24)  SpO2: 93 % (24)    Physical Exam  Vitals and nursing note reviewed.   Constitutional:       General: He is not in acute distress.     Appearance: He is ill-appearing. He is not toxic-appearing or diaphoretic.   HENT:      Mouth/Throat:      Mouth: Mucous membranes are dry.      Pharynx: Oropharynx is clear. No oropharyngeal exudate or posterior oropharyngeal erythema.   Eyes:      General: No scleral icterus.        Right eye: No discharge.         Left eye: No discharge.   Cardiovascular:      Rate and Rhythm: Normal rate. Rhythm irregular.      Heart sounds: Normal heart sounds.   Pulmonary:      Effort: Pulmonary effort is normal. No respiratory distress.      Breath sounds: Normal breath sounds. No  stridor. No wheezing, rhonchi or rales.   Abdominal:      General: Bowel sounds are normal. There is distension.      Palpations: Abdomen is soft.      Tenderness: There is abdominal tenderness. There is no right CVA tenderness, left CVA tenderness, guarding or rebound.      Comments: Positive for suprapubic tenderness and distention.   Musculoskeletal:      Right lower leg: No edema.      Left lower leg: No edema.   Skin:     General: Skin is warm.      Coloration: Skin is not pale.      Findings: No erythema or rash.   Neurological:      General: No focal deficit present.      Mental Status: He is alert and oriented to person, place, and time. Mental status is at baseline.   Psychiatric:         Mood and Affect: Mood normal.         Behavior: Behavior normal.         Thought Content: Thought content normal.              Additional Data:     Lab Results:  Results from last 7 days   Lab Units 03/27/24 0455 03/25/24  0510 03/24/24  1704   WBC Thousand/uL 8.32   < > 11.37*   HEMOGLOBIN g/dL 13.3   < > 15.1   HEMATOCRIT % 40.5   < > 46.2   PLATELETS Thousands/uL 157   < > 153   NEUTROS PCT %  --   --  79*   LYMPHS PCT %  --   --  11*   MONOS PCT %  --   --  8   EOS PCT %  --   --  2    < > = values in this interval not displayed.     Results from last 7 days   Lab Units 03/27/24 0455 03/25/24  0510 03/24/24  1704   SODIUM mmol/L 136   < > 138   POTASSIUM mmol/L 4.2   < > 4.2   CHLORIDE mmol/L 102   < > 106   CO2 mmol/L 22   < > 26   BUN mg/dL 19   < > 26*   CREATININE mg/dL 1.12   < > 1.25   ANION GAP mmol/L 12   < > 6   CALCIUM mg/dL 8.5   < > 9.2   ALBUMIN g/dL  --   --  4.2   TOTAL BILIRUBIN mg/dL  --   --  1.49*   ALK PHOS U/L  --   --  60   ALT U/L  --   --  29   AST U/L  --   --  17   GLUCOSE RANDOM mg/dL 134   < > 114    < > = values in this interval not displayed.     Results from last 7 days   Lab Units 03/24/24  1704   INR  1.29*                   Lines/Drains:  Invasive Devices       Peripheral Intravenous  Line  Duration             Peripheral IV 03/25/24 Left;Ventral (anterior) Wrist 2 days    Peripheral IV 03/26/24 Right Hand <1 day                        Imaging: Reviewed radiology reports from this admission including: chest xray and xray(s)  IR suprapubic catheter placement    (Results Pending)       EKG and Other Studies Reviewed on Admission:   EKG: Atrial fibrillation. HR 83/min, incomplete right bundle branch block, ST and T wave abnormality, possible inferior lateral wall ischemia; nonspecific T wave abnormalities not present on my review of patient's previous EKGs.  This EKG was done at [facility].    ** Please Note: This note has been constructed using a voice recognition system. **

## 2024-03-27 NOTE — ASSESSMENT & PLAN NOTE
Patient told me, that he had been having diarrhea for about a week now.  He told me he has been averaging 3 episodes of diarrhea per day.  He told me, that his last bowel movement was yesterday when he was still at [facility], and none yet today.  Monitor patient's bowel movements.  I spoke to the nurse about this.  Patient was on bowel regimen with stool softener/laxatives at [facility] on and this morning.  Thus I made them to be taken on as-needed basis now.  For further evaluation and management if patient having persistent diarrhea.

## 2024-03-27 NOTE — QUICK NOTE
POA post fall with left hip fracture History of afib on Eliquis, HF, COPD, urinary retention.Patient postop left hip hemiarthroplasty. Eliquis on hold since 3/24.    Called by nursing, patient unable to urinate, bladder scan >600.  Failed several attempts for straight cath, coudé, and Miller placement.  Patient complained complaining of pelvic pain and discomfort.  Bladder is distended and palpable.    Chart review: Patient has a history of urinary retention with placement of indwelling Miller catheter.  Follows [facility] urology as an outpatient recommendations were to keep the Miller in and perform suprapubic catheter as an outpatient.  Suprapubic catheter outpatient procedure noted problems with patient taking Eliquis and scheduling.In the nursing home notes on 11/23 catheter fell out and was not replaced due to patient able to void and unable to reinsert miller.    Reach out to [facility] on-call urologist CHANDNI Hidalgo, for recommendations for current urinary retention.  Urology PA came bedside to evaluate patient.  Performed needle decompression of the bladder bedside.  300 cc of urine removed.  See urology procedure note.  Urology recommendation is to transfer patient for IR placement of suprapubic catheter.

## 2024-03-27 NOTE — ASSESSMENT & PLAN NOTE
Wt Readings from Last 3 Encounters:   03/27/24 74.4 kg (164 lb 0.4 oz)   03/07/24 69.9 kg (154 lb)   01/26/24 69.4 kg (153 lb)   History of chronic diastolic CHF with preserved ejection fraction  EF 50% in 2022  Loop recorder in place  Euvolemic on exam  Not maintained on outpatient diuretics  Continue PTA metoprolol succinate  Monitor volume status

## 2024-03-27 NOTE — PLAN OF CARE
Problem: Prexisting or High Potential for Compromised Skin Integrity  Goal: Skin integrity is maintained or improved  Description: INTERVENTIONS:  - Identify patients at risk for skin breakdown  - Assess and monitor skin integrity  - Assess and monitor nutrition and hydration status  - Monitor labs   - Assess for incontinence   - Turn and reposition patient  - Assist with mobility/ambulation  - Relieve pressure over bony prominences  - Avoid friction and shearing  - Provide appropriate hygiene as needed including keeping skin clean and dry  - Evaluate need for skin moisturizer/barrier cream  - Collaborate with interdisciplinary team   - Patient/family teaching  - Consider wound care consult   Outcome: Progressing     Problem: PAIN - ADULT  Goal: Verbalizes/displays adequate comfort level or baseline comfort level  Description: Interventions:  - Encourage patient to monitor pain and request assistance  - Assess pain using appropriate pain scale  - Administer analgesics based on type and severity of pain and evaluate response  - Implement non-pharmacological measures as appropriate and evaluate response  - Consider cultural and social influences on pain and pain management  - Notify physician/advanced practitioner if interventions unsuccessful or patient reports new pain  Outcome: Not Progressing     Problem: INFECTION - ADULT  Goal: Absence or prevention of progression during hospitalization  Description: INTERVENTIONS:  - Assess and monitor for signs and symptoms of infection  - Monitor lab/diagnostic results  - Monitor all insertion sites, i.e. indwelling lines, tubes, and drains  - Monitor endotracheal if appropriate and nasal secretions for changes in amount and color  - [place]is appropriate cooling/warming therapies per order  - Administer medications as ordered  - Instruct and encourage patient and family to use good hand hygiene technique  - Identify and instruct in appropriate isolation precautions for  identified infection/condition  Outcome: Progressing  Goal: Absence of fever/infection during neutropenic period  Description: INTERVENTIONS:  - Monitor WBC    Outcome: Progressing     Problem: SAFETY ADULT  Goal: Patient will remain free of falls  Description: INTERVENTIONS:  - Educate patient/family on patient safety including physical limitations  - Instruct patient to call for assistance with activity   - Consult OT/PT to assist with strengthening/mobility   - Keep Call bell within reach  - Keep bed low and locked with side rails adjusted as appropriate  - Keep care items and personal belongings within reach  - Initiate and maintain comfort rounds  - Make Fall Risk Sign visible to staff  - Offer Toileting every  Hours, in advance of need  - Initiate/Maintain alarm  - Obtain necessary fall risk management equipment:   - Apply yellow socks and bracelet for high fall risk patients  - Consider moving patient to room near nurses station  Outcome: Progressing  Goal: Maintain or return to baseline ADL function  Description: INTERVENTIONS:  -  Assess patient's ability to carry out ADLs; assess patient's baseline for ADL function and identify physical deficits which impact ability to perform ADLs (bathing, care of mouth/teeth, toileting, grooming, dressing, etc.)  - Assess/evaluate cause of self-care deficits   - Assess range of motion  - Assess patient's mobility; develop plan if impaired  - Assess patient's need for assistive devices and provide as appropriate  - Encourage maximum independence but intervene and supervise when necessary  - Involve family in performance of ADLs  - Assess for home care needs following discharge   - Consider OT consult to assist with ADL evaluation and planning for discharge  - Provide patient education as appropriate  Outcome: Not Progressing  Goal: Maintains/Returns to pre admission functional level  Description: INTERVENTIONS:  - Perform AM-PAC 6 Click Basic Mobility/ Daily Activity  assessment daily.  - Set and communicate daily mobility goal to care team and patient/family/caregiver.   - Collaborate with rehabilitation services on mobility goals if consulted  - Perform Range of Motion  times a day.  - Reposition patient every  hours.  - Dangle patient  times a day  - Stand patient  times a day  - Ambulate patient  times a day  - Out of bed to chair  times a day   - Out of bed for meals times a day  - Out of bed for toileting  - Record patient progress and toleration of activity level   Outcome: Not Progressing     Problem: DISCHARGE PLANNING  Goal: Discharge to home or other facility with appropriate resources  Description: INTERVENTIONS:  - Identify barriers to discharge w/patient and caregiver  - Arrange for needed discharge resources and transportation as appropriate  - Identify discharge learning needs (meds, wound care, etc.)  - Arrange for interpretive services to assist at discharge as needed  - Refer to Case Management Department for coordinating discharge planning if the patient needs post-hospital services based on physician/advanced practitioner order or complex needs related to functional status, cognitive ability, or social support system  Outcome: Progressing     Problem: Knowledge Deficit  Goal: Patient/family/caregiver demonstrates understanding of disease process, treatment plan, medications, and discharge instructions  Description: Complete learning assessment and assess knowledge base.  Interventions:  - Provide teaching at level of understanding  - Provide teaching via preferred learning methods  Outcome: Not Progressing

## 2024-03-27 NOTE — PROGRESS NOTES
Suprapubic Aspiration    Date/Time: 3/24/2024 4:33 PM    Performed by: Miguel Hidalgo PA-C  Authorized by: Miguel Hidalgo PA-C    Patient location:  Bedside  Indications / Diagnosis:  Urinary retention  Consent:     Consent obtained:  Verbal    Consent given by:  Patient  Universal protocol:     Patient identity confirmed:  Hospital-assigned identification number  Anesthesia (see MAR for exact dosages):     Anesthesia method:  Local infiltration    Local anesthetic:  Lidocaine 1% w/o epi  Procedure details:     Suprapubic aspiration by:  Needle    Needle gauge:  16 G    Number of attempts:  1    Aspirate characteristics:  Mildly cloudy  Post-procedure details:     Patient tolerance of procedure:  Tolerated well, no immediate complications  Comments:     Procedure comments:  After attempts at urethral catheterization had failed.  The bladder was palpated and was found to be tender and distended.  Suprapubic aspiration was recommended.  2 finger breaths above the pubic bone the area was prepped and draped in usual fashion.  1% lidocaine used for local anesthetic.  A 16-gauge Angiocath was inserted into the bladder with immediate urine flow.  350 mL of mildly cloudy urine was aspirated without difficulty.  Patient tolerated the procedure well.  Puncture site was covered with a dry bandage.  He will be transferred for interventional radiology consultation and suprapubic catheter placement in the morning.

## 2024-03-27 NOTE — NURSING NOTE
Assistance requested by charge RN for miller insertion.  Charge RN unsuccessful in placement.  This RN attempt at placement x 2 with 10 fr and 12 fr without success.  Concern for urethral stricture as unable to pass upon insertion 1-1.5cm into urethral meatus.  Urinary meatus noted to be split.  Patient states he did not come in with a catheter, but that he has had them in the past. Assistance requested from [facility] provider LIZA Sandoval who was unsuccessful.  Dr. Goldman from the ED also at bedside to attempt with different sizes.  No success.  [facility] provider aware.

## 2024-03-27 NOTE — SEDATION DOCUMENTATION
Pt tolerated suprapubic catheter insertion well.  Dressing clean, dry, intact.  Pt taken to PACU, report given.

## 2024-03-27 NOTE — ANESTHESIA POSTPROCEDURE EVALUATION
Post-Op Assessment Note            No anethesia notable event occurred.                BP      Temp      Pulse     Resp      SpO2        
Post-Op Assessment Note    CV Status:  Stable    Pain management: adequate       Mental Status:  Alert and awake   Hydration Status:  Euvolemic   PONV Controlled:  Controlled   Airway Patency:  Patent     Post Op Vitals Reviewed: Yes      Staff: CRNA             BP   142/76   Temp   97.2   Pulse  66   Resp   12   SpO2   100   
Post-Op Assessment Note    CV Status:  Stable    Pain management: adequate       Mental Status:  Sleepy   Hydration Status:  Euvolemic   PONV Controlled:  Controlled   Airway Patency:  Patent     Post Op Vitals Reviewed: Yes      Staff: CRNA               /80 (03/26/24 1633)    Temp 97.7 °F (36.5 °C) (03/26/24 1633)    Pulse 69 (03/26/24 1633)   Resp 15 (03/26/24 1633)    SpO2 97 % (03/26/24 1633)      
57

## 2024-03-27 NOTE — BRIEF OP NOTE (RAD/CATH)
IR SUPRAPUBIC CATHETER PLACEMENT Procedure Note    PATIENT NAME: Sal Moura  : 1945  MRN: [id number]    Pre-op Diagnosis:   1. Closed left hip fracture, initial encounter ([facility])    2. Neurogenic bladder      Post-op Diagnosis:   1. Closed left hip fracture, initial encounter ([facility])    2. Neurogenic bladder        Surgeon:   Kayden Kwan MD    Assistants:     Giovanny Castaneda MD    Estimated Blood Loss: Minimal    Findings: Successful placement of a an 18F suprapubic catheter.    Specimens: None    Complications:  No immediate    Anesthesia: general    Giovanny Castaneda MD     Date: 3/27/2024  Time: 1:28 PM

## 2024-03-27 NOTE — ASSESSMENT & PLAN NOTE
Urinalysis revealed innumerable bacteria, RBCs 4-10.  Per my discussion with the patient, he did not have any urologic symptoms, like dysuria, burning sensation in urination, painful urination, fever or chills, suprapubic pains, flank pains, until he had urinary retention again at [facility] and staff there were unable to reinsert a miller catheter, and thus the transfer here for SPC placement by IR.  Presently, patient only urologic symptom is the suprapubic pubic pain due to urinary retention.  Patient does not have any fever or chills or any flank pains.  No leukocytosis on patient's CBC.  Thus, at this point, likely asymptomatic bacteriuria, possible colonization with patient being on chronic Miller catheter, for observation off antibiotics at this point.  However, if patient develops christine signs and symptoms of UTI, despite decompression with IR placement of suprapubic catheter, consider sending patient's urine for urine culture and starting antibiotics.

## 2024-03-27 NOTE — SEDATION DOCUMENTATION
Pt under care and monitoring of anesthesia team.  Please see anesthesia record for vital signs, cardiac, and respiratory status.  Pt consented, positioned and secured to angio table, fracture pillow in place.

## 2024-03-27 NOTE — ANESTHESIA POSTPROCEDURE EVALUATION
Post-Op Assessment Note    CV Status:  Stable    Pain management: adequate       Mental Status:  Sleepy   Hydration Status:  Euvolemic   PONV Controlled:  Controlled   Airway Patency:  Patent  Two or more mitigation strategies used for obstructive sleep apnea   Post Op Vitals Reviewed: Yes    No anethesia notable event occurred.    Staff: Anesthesiologist, CRNA               BP (!) 88/50 (03/27/24 1335)    Temp 99 °F (37.2 °C) (03/27/24 1335)    Pulse 92 (03/27/24 1335)   Resp 18 (03/27/24 1335)    SpO2   93

## 2024-03-27 NOTE — NURSING NOTE
Pt is to be transferred to [facility] for IR consult and suprapubic catheter placement.Report is given to JOHANNY Baumann from [facility]

## 2024-03-27 NOTE — DISCHARGE SUMMARY
[facility]  Discharge- Sal Moura 1945, 78 y.o. male MRN: [id number]  Unit/Bed#: -01 Encounter: [id number]  Primary Care Provider: Snow Farah MD   Date and time admitted to hospital: 3/24/2024  4:33 PM    * Closed left hip fracture, initial encounter ([facility])  Assessment & Plan  Fall at [facility] on 3/23, left hip pain/leg externally rotated and shortened  ED imaging left hip fracture  N.p.o., IV hydration  Eliquis 2.5 mg twice daily for A-fib on hold, LD 3/24 AM  Orthopedic consultation  Pain control  Bowel regimen  PT/OT postop  OR 3/26 s/p left hemiarthroplasty    Permanent atrial fibrillation ([facility])  Assessment & Plan  EKG confirming A-fib, rate controlled  Continue PTA metoprolol succinate 25 mg daily  Eliquis on hold, LD 3/24 in the AM    CHF (congestive heart failure) ([facility])  Assessment & Plan  Wt Readings from Last 3 Encounters:   03/27/24 74.4 kg (164 lb 0.4 oz)   03/07/24 69.9 kg (154 lb)   01/26/24 69.4 kg (153 lb)   History of chronic diastolic CHF with preserved ejection fraction  EF 50% in 2022  Loop recorder in place  Euvolemic on exam  Not maintained on outpatient diuretics  Continue PTA metoprolol succinate  Monitor volume status    COPD (chronic obstructive pulmonary disease) ([facility])  Assessment & Plan  No acute exacerbation  Continue PTA ICS daily and DuoNeb 4 times daily    Orthostatic hypotension  Assessment & Plan  Continue PTA midodrine 3 times daily with hold for SBP > 135    Urinary retention  Assessment & Plan  Patient with a history of indwelling catheter, follows up with [facility] urology as an outpatient.   Scheduled for an outpatient IR suprapubic catheter placement 11/23 -rescheduled due to patient taking Eliquis and issues with scheduling  Nursing home visit on 11/23: Cloud catheter was dislodged and unable to to be reinserted by nursing staff.  Left out and patient voiding at this time.  Post op from left hip arthroplasty notified by nursing patient  having urinary retention issues.  Complaining of abdominal pain, distention, urgency to urinate.  Nursing bladder scan for greater than 680.  Several attempts by nursing, NP , and ED unable to obtain straight cath or Lcoud  Reach out to on-call [facility] urology: Steven TARIQ: performed bedside needle decompression of bladder.see procedure note by urology & recommendations  Transfer out to [facility] for IR placement placement of suprapubic catheter and urology   Continue Flomax  Urinary retention protocol  Continue PTA Hipprex for UTI suppression  UA pending        Medical Problems       Resolved Problems  Date Reviewed: 3/26/2024   None       Discharging Physician / Practitioner: GABRIELA Marin  PCP: Snow Farah MD  Admission Date:   Admission Orders (From admission, onward)       Ordered        03/24/24 1844  INPATIENT ADMISSION  Once                          Discharge Date: 03/27/24    Consultations During Hospital Stay:  Orthopedics  Urology    Procedures Performed:   3/26 left hemiarthroplasty of hip    Significant Findings / Test Results:   none    Incidental Findings:   none      Test Results Pending at Discharge (will require follow up):   none     Outpatient Tests Requested:  none    Complications:  none    Reason for Admission: Fall with left hip fracture    Hospital Course:   Sal Moura is a 78 y.o. male patient PHMX COPD A-fib on Eliquis orthostatic hypotension amatory dysfunction CHF urinary retention who originally presented to the hospital on 3/24/2024 due to fall at [facility]F resulting in a left hip fracture.   Orthopedics reperformed a left hemiarthroplasty of the hip on 3/26.  Postop left hip repair called by nursing patient unable to urinate bladder scan greater than 600.  Failed several attempts for straight cath coudé and Cloud and placement.  Patient complaining of pelvic pain and discomfort and bladder is distended and palpable.  Reached out to [facility]  [facility] on-call urologist CHANDNI Hidalgo for recommendations for recurrent urinary retention.  Urology PA came bedside to evaluate patient.  Performed needle decompression of the bladder bedside.  300 cc of urine removed.  See urology procedure note.  Urology recommendation is to transfer patient for IR plac placement of suprapubic catheter and urology.         Please see above list of diagnoses and related plan for additional information.     Condition at Discharge: stable    Discharge Day Visit / Exam:   Subjective:  Vitals: Blood Pressure: 130/77 (03/27/24 0706)  Pulse: 99 (03/27/24 0706)  Temperature: 98.6 °F (37 °C) (03/27/24 0706)  Temp Source: Tympanic (03/27/24 0706)  Respirations: 16 (03/27/24 0706)  Height: 6' (182.9 cm) (03/26/24 1342)  Weight - Scale: 74.4 kg (164 lb 0.4 oz) (03/27/24 0546)  SpO2: 93 % (03/27/24 0706)  Exam:   Physical Exam  Vitals and nursing note reviewed.   Constitutional:       General: He is not in acute distress.     Appearance: He is well-developed.   HENT:      Head: Normocephalic and atraumatic.   Eyes:      Conjunctiva/sclera: Conjunctivae normal.   Cardiovascular:      Rate and Rhythm: Normal rate and regular rhythm.      Heart sounds: No murmur heard.  Pulmonary:      Effort: Pulmonary effort is normal. No respiratory distress.      Breath sounds: Normal breath sounds.   Abdominal:      Palpations: Abdomen is soft.      Tenderness: There is abdominal tenderness in the suprapubic area.   Genitourinary:     Penis: Hypospadias present.    Musculoskeletal:         General: No swelling.      Cervical back: Neck supple.   Skin:     General: Skin is warm and dry.      Capillary Refill: Capillary refill takes less than 2 seconds.   Neurological:      Mental Status: He is alert.   Psychiatric:         Mood and Affect: Mood normal.          Discussion with Family: Attempted to update  (wife) via phone. Unable to contact.     Discharge instructions/Information to  patient and family:   See after visit summary for information provided to patient and family.      Provisions for Follow-Up Care:  See after visit summary for information related to follow-up care and any pertinent home health orders.      Mobility at time of Discharge:   Basic Mobility Inpatient Raw Score: 9  JH-HLM Goal: 3: Sit at edge of bed  JH-HLM Achieved: 1: Laying in bed  HLM Goal NOT achieved. Continue to encourage mobility in post discharge setting.     Disposition:   Acute [facility] Transfer to [facility]    Planned Readmission: None     Discharge Statement:  I spent 35 minutes discharging the patient. This time was spent on the day of discharge. I had direct contact with the patient on the day of discharge. Greater than 50% of the total time was spent examining patient, answering all patient questions, arranging and discussing plan of care with patient as well as directly providing post-discharge instructions.  Additional time then spent on discharge activities.    Discharge Medications:  See after visit summary for reconciled discharge medications provided to patient and/or family.      **Please Note: This note may have been constructed using a voice recognition system**

## 2024-03-27 NOTE — OCCUPATIONAL THERAPY NOTE
Occupational Therapy Cancel Note       03/27/24 7251   Note Type   Note type Cancelled Session   Additional Comments OT orders received. Chart reviewed. Patient off floor at IR for catheter placement. Will follow up as schedule permits     Tova Alonso OTR/L

## 2024-03-27 NOTE — TELEMEDICINE
"e-Consult (IPC)  - Interventional Radiology  Sal Moura 78 y.o. male MRN: [id number]  Unit/Bed#: W -01 Encounter: [id number]          Interventional Radiology has been consulted to evaluate Sal Moura      Inpatient Consult to IR  Consult performed by: Kayden Kwan MD  Consult ordered by: Clarice Vaz MD        03/27/24    Assessment/Recommendation:   Patient presented on 3/24/2024 with chief complaint of \"fall\".    Hospital course complicated by urinary retention, urethral erosion and difficult transurethral Cloud catheter placement.  Patient has been transferred from the [facility] for suprapubic catheter placement with anesthesia support.    I have placed a procedure order.  Keep patient NPO.  Timing TBD.    31 + minutes, >50% of the total time devoted to medical consultative verbal/EMR discussion between providers. Written report will be generated in the EMR.     Thank you for allowing Interventional Radiology to participate in the care of Sal Moura. Please don't hesitate to call or TigerText us with any questions.     Kayden Kwan MD            "

## 2024-03-27 NOTE — ASSESSMENT & PLAN NOTE
Fall at SNF on 3/[facility], left hip pain/leg externally rotated and shortened  [facility] ED imaging left hip fracture  Orthopedic surgeon on board.  Pain control  Bowel regimen as needed (patient claimed of having diarrhea).  PT/OT   OR 3/26 s/p left hemiarthroplasty  As per orthopedic surgeon, weightbearing as tolerated, left lower extremity with posterior hip precautions.  Monitor hemoglobin/CBC.  Orthopedic surgery recommending IV fluids/packed RBCs as indicated for greater than 2 g hemoglobin drop or hemoglobin less than 7.  No signs of active bleeding in the operative extremity.  DVT prophylaxis.

## 2024-03-27 NOTE — ASSESSMENT & PLAN NOTE
EKG confirming A-fib, rate controlled  Continue PTA metoprolol succinate 25 mg daily  Eliquis on hold for the suprapubic placement today.  May resume after IR procedure, once sure with good hemostasis and no contraindication.

## 2024-03-27 NOTE — ASSESSMENT & PLAN NOTE
Fall at SNF on 3/23, left hip pain/leg externally rotated and shortened  ED imaging left hip fracture  N.p.o., IV hydration  Eliquis 2.5 mg twice daily for A-fib on hold, LD 3/24 AM  Orthopedic consultation  Pain control  Bowel regimen  PT/OT postop  OR 3/26 s/p left hemiarthroplasty

## 2024-03-28 ENCOUNTER — TELEPHONE (OUTPATIENT)
Dept: OTHER | Facility: HOSPITAL | Age: 79
End: 2024-03-28

## 2024-03-28 LAB
ANION GAP SERPL CALCULATED.3IONS-SCNC: 10 MMOL/L (ref 4–13)
ATRIAL RATE: 258 BPM
BUN SERPL-MCNC: 23 MG/DL (ref 5–25)
CALCIUM SERPL-MCNC: 7.9 MG/DL (ref 8.4–10.2)
CHLORIDE SERPL-SCNC: 107 MMOL/L (ref 96–108)
CO2 SERPL-SCNC: 20 MMOL/L (ref 21–32)
CREAT SERPL-MCNC: 1.08 MG/DL (ref 0.6–1.3)
ERYTHROCYTE [DISTWIDTH] IN BLOOD BY AUTOMATED COUNT: 13.3 % (ref 11.6–15.1)
GFR SERPL CREATININE-BSD FRML MDRD: 66 ML/MIN/1.73SQ M
GLUCOSE SERPL-MCNC: 110 MG/DL (ref 65–140)
HCT VFR BLD AUTO: 35 % (ref 36.5–49.3)
HGB BLD-MCNC: 11.4 G/DL (ref 12–17)
MCH RBC QN AUTO: 30.8 PG (ref 26.8–34.3)
MCHC RBC AUTO-ENTMCNC: 32.6 G/DL (ref 31.4–37.4)
MCV RBC AUTO: 95 FL (ref 82–98)
MRSA NOSE QL CULT: NORMAL
PLATELET # BLD AUTO: 145 THOUSANDS/UL (ref 149–390)
PMV BLD AUTO: 10.6 FL (ref 8.9–12.7)
POTASSIUM SERPL-SCNC: 4.2 MMOL/L (ref 3.5–5.3)
QRS AXIS: 35 DEGREES
QRSD INTERVAL: 94 MS
QT INTERVAL: 390 MS
QTC INTERVAL: 458 MS
RBC # BLD AUTO: 3.7 MILLION/UL (ref 3.88–5.62)
SODIUM SERPL-SCNC: 137 MMOL/L (ref 135–147)
T WAVE AXIS: -23 DEGREES
VENTRICULAR RATE: 83 BPM
WBC # BLD AUTO: 11.54 THOUSAND/UL (ref 4.31–10.16)

## 2024-03-28 PROCEDURE — 97163 PT EVAL HIGH COMPLEX 45 MIN: CPT

## 2024-03-28 PROCEDURE — 99232 SBSQ HOSP IP/OBS MODERATE 35: CPT | Performed by: INTERNAL MEDICINE

## 2024-03-28 PROCEDURE — 97167 OT EVAL HIGH COMPLEX 60 MIN: CPT

## 2024-03-28 PROCEDURE — 85027 COMPLETE CBC AUTOMATED: CPT | Performed by: INTERNAL MEDICINE

## 2024-03-28 PROCEDURE — 99024 POSTOP FOLLOW-UP VISIT: CPT | Performed by: PHYSICIAN ASSISTANT

## 2024-03-28 PROCEDURE — 99232 SBSQ HOSP IP/OBS MODERATE 35: CPT | Performed by: PHYSICIAN ASSISTANT

## 2024-03-28 PROCEDURE — 94760 N-INVAS EAR/PLS OXIMETRY 1: CPT

## 2024-03-28 PROCEDURE — 93010 ELECTROCARDIOGRAM REPORT: CPT | Performed by: INTERNAL MEDICINE

## 2024-03-28 PROCEDURE — 94640 AIRWAY INHALATION TREATMENT: CPT

## 2024-03-28 PROCEDURE — 80048 BASIC METABOLIC PNL TOTAL CA: CPT | Performed by: INTERNAL MEDICINE

## 2024-03-28 PROCEDURE — 97760 ORTHOTIC MGMT&TRAING 1ST ENC: CPT

## 2024-03-28 PROCEDURE — 97763 ORTHC/PROSTC MGMT SBSQ ENC: CPT

## 2024-03-28 RX ADMIN — METHENAMINE HIPPURATE 1 G: 1 TABLET ORAL at 09:36

## 2024-03-28 RX ADMIN — Medication 2.5 MG: at 00:40

## 2024-03-28 RX ADMIN — APIXABAN 5 MG: 5 TABLET, FILM COATED ORAL at 12:21

## 2024-03-28 RX ADMIN — ACETAMINOPHEN 975 MG: 325 TABLET, FILM COATED ORAL at 21:42

## 2024-03-28 RX ADMIN — MIDODRINE HYDROCHLORIDE 5 MG: 5 TABLET ORAL at 09:37

## 2024-03-28 RX ADMIN — SODIUM CHLORIDE 100 ML/HR: 0.9 INJECTION, SOLUTION INTRAVENOUS at 01:12

## 2024-03-28 RX ADMIN — METOPROLOL SUCCINATE 25 MG: 25 TABLET, EXTENDED RELEASE ORAL at 09:38

## 2024-03-28 RX ADMIN — HEPARIN SODIUM 5000 UNITS: 5000 INJECTION INTRAVENOUS; SUBCUTANEOUS at 05:19

## 2024-03-28 RX ADMIN — MIDODRINE HYDROCHLORIDE 5 MG: 5 TABLET ORAL at 15:14

## 2024-03-28 RX ADMIN — APIXABAN 5 MG: 5 TABLET, FILM COATED ORAL at 21:42

## 2024-03-28 RX ADMIN — ACETAMINOPHEN 975 MG: 325 TABLET, FILM COATED ORAL at 05:19

## 2024-03-28 RX ADMIN — ACETAMINOPHEN 975 MG: 325 TABLET, FILM COATED ORAL at 15:14

## 2024-03-28 RX ADMIN — IPRATROPIUM BROMIDE 0.5 MG: 0.5 SOLUTION RESPIRATORY (INHALATION) at 08:37

## 2024-03-28 RX ADMIN — METHENAMINE HIPPURATE 1 G: 1 TABLET ORAL at 16:26

## 2024-03-28 RX ADMIN — MIDODRINE HYDROCHLORIDE 5 MG: 5 TABLET ORAL at 12:22

## 2024-03-28 RX ADMIN — LEVALBUTEROL HYDROCHLORIDE 1.25 MG: 1.25 SOLUTION RESPIRATORY (INHALATION) at 08:37

## 2024-03-28 NOTE — PLAN OF CARE
Problem: PHYSICAL THERAPY ADULT  Goal: Performs mobility at highest level of function for planned discharge setting.  See evaluation for individualized goals.  Description: Treatment/Interventions: Functional transfer training, LE strengthening/ROM, Elevations, Therapeutic exercise, Endurance training, Cognitive reorientation, Patient/family training, Equipment eval/education, Bed mobility, Gait training, Compensatory technique education, Continued evaluation, Spoke to nursing, Spoke to advanced practitioner, OT  Equipment Recommended: Walker       See flowsheet documentation for full assessment, interventions and recommendations.  3/28/2024 1444 by Zora Wong PT  Outcome: Progressing  Note: Prognosis: Fair  Problem List: Decreased strength, Decreased range of motion, Decreased endurance, Impaired balance, Decreased mobility, Decreased coordination, Decreased cognition, Impaired judgement, Decreased safety awareness, Decreased skin integrity, Orthopedic restrictions, Pain (DECREASED RECALL OF THP)  Assessment: Pt w/ noncompliance of hip abduction pillow and w/ hip abduction brace partially doffed upon entering. RN reporting pt refusal of hip abd pillow use and that he had independently partially disassembled his recently applied hip abd brace. As noted in PT eval earlier in the day, pt is noncompliant of posterior hip precautions despite max verbal, tactile, and visual cueing to reiterate importance of protecting the surgical site to prevent dislocation. Pt able to verbally recall precautions but does not understand importance of maintainance. Max pt education regarding hip precautions given at this time, will continue to reinforce at next visit. Ortho CHANDNI Smith and RN [place] notified of pt noncompliance.        Rehab Resource Intensity Level, PT: II (Moderate Resource Intensity)    See flowsheet documentation for full assessment.     3/28/2024 1333 by Zora Wong PT  Outcome: Progressing  Note: Prognosis:  Fair  Problem List: Decreased strength, Decreased range of motion, Decreased endurance, Impaired balance, Decreased mobility, Decreased coordination, Decreased cognition, Impaired judgement, Decreased safety awareness, Decreased skin integrity, Orthopedic restrictions, Pain (DECREASED RECALL OF THP)  Assessment: Pt is a 78 y.o. male presenting POD2 L hemiarthroplasty, admitted to [facility] from [facility] on 3/27/2024 with primary dx of Urinary retention. PT consulted for assessment of mobility and d/c needs, as well as to acknowledge activity as matt orders. Pt's current comorbidities and personal factors affecting tx include limited recall of posterior hip precautions and Orthostatic hypotension . Based on findings indicated in flowsheet, pt's current clinical presentation is  unstable/unpredictable, (high complexity), d/t changing level of pain, varying levels of cognitive performance, increased fall risk, new onset of impairment of functional mobility, decreased endurance, and new onset of weakness. Prior to admission, pt was independent with occasional use of RW. Upon PT eval, pt is currently requiring mod Ax1 for bed mobility; min Ax1 for transfers; min Ax1 for amb 25' with RW. Pt presents during PT IE as functioning below baseline d/t above listed functional impairments and is currently a fall risk d/t impulsivity, h/o falls, impaired balance, impaired cognition, impaired insight/safety awareness, use of ambulatory aid, varying levels of pain , advanced age, acuity of medical illness, and WB restriction. Pt will  benefit from continued PT services in order to address impairments, dec fall risk, maximize independence with functional mobility, and prepare for safe transition to next level of care. Based on above noted presentation and impairments, pt would benefit from Level II (moderate PT intensity) resources upon d/c.        Rehab Resource Intensity Level, PT: II (Moderate Resource Intensity)    See flowsheet  documentation for full assessment.

## 2024-03-28 NOTE — PROGRESS NOTES
Orthopedics  Sal Moura 78 y.o. male MRN: [id number]  Unit/Bed#: W -01        Subjective:    78 y.o.male seen and examined at the bedside.  He was transferred yesterday from [place] to [place] for IR consult and suprapubic catheter placement due to urinary retention.   Today he states no significant pain in the left hip, denies any associated numbness/tingling in the LLE.  No acute events reported overnight.          Objective:    Labs:  0   Lab Value Date/Time    HCT 35.0 (L) 03/28/2024 0546    HCT 40.5 03/27/2024 0455    HCT 40.7 03/26/2024 0446    HGB 11.4 (L) 03/28/2024 0546    HGB 13.3 03/27/2024 0455    HGB 13.3 03/26/2024 0446    INR 1.29 (H) 03/24/2024 1704    WBC 11.54 (H) 03/28/2024 0546    WBC 8.32 03/27/2024 0455    WBC 8.58 03/26/2024 0446       Meds:    Current Facility-Administered Medications:     acetaminophen (TYLENOL) tablet 975 mg, 975 mg, Oral, Q8H DONIS, Clarice Vaz MD, 975 mg at 03/28/24 0519    albuterol inhalation solution 2.5 mg, 2.5 mg, Nebulization, Q6H PRN, Clarice Vaz MD    docusate sodium (COLACE) capsule 100 mg, 100 mg, Oral, BID PRN, Tiki Chavez MD    fentaNYL (SUBLIMAZE) injection 50 mcg, 50 mcg, Intravenous, Q3 min PRN, Aydin Silva CRNA    Fluticasone Furoate-Vilanterol 100-25 mcg/actuation 1 puff, 1 puff, Inhalation, Daily, Clarice Vaz MD    heparin (porcine) subcutaneous injection 5,000 Units, 5,000 Units, Subcutaneous, Q8H DONIS, Clarice Vaz MD, 5,000 Units at 03/28/24 0519    HYDROmorphone HCl (DILAUDID) injection 0.2 mg, 0.2 mg, Intravenous, Q3H PRN, Tiki Chavez MD    ipratropium (ATROVENT) 0.02 % inhalation solution 0.5 mg, 0.5 mg, Nebulization, TID, Clarice Vaz MD, 0.5 mg at 03/28/24 0837    levalbuterol (XOPENEX) inhalation solution 1.25 mg, 1.25 mg, Nebulization, TID, Clarice Vaz MD, 1.25 mg at 03/28/24 0837    methenamine hippurate (HIPREX) tablet 1 g, 1 g, Oral, BID With  "Meals, Clarice Vaz MD, 1 g at 03/27/24 1633    metoprolol succinate (TOPROL-XL) 24 hr tablet 25 mg, 25 mg, Oral, Daily, Clarice Vaz MD, 25 mg at 03/27/24 1008    midodrine (PROAMATINE) tablet 5 mg, 5 mg, Oral, TID AC, Clarice Vaz MD, 5 mg at 03/27/24 1633    ondansetron (ZOFRAN) injection 4 mg, 4 mg, Intravenous, Q8H PRN, Clarice Vaz MD    ondansetron (ZOFRAN) injection 4 mg, 4 mg, Intravenous, Once PRN, Aydin Silva CRNA    oxyCODONE (ROXICODONE) IR tablet 5 mg, 5 mg, Oral, Q6H PRN, Tiki Chavez MD, 5 mg at 03/27/24 1131    oxyCODONE (ROXICODONE) split tablet 2.5 mg, 2.5 mg, Oral, Q6H PRN, Tiki Chavez MD, 2.5 mg at 03/28/24 0040    senna (SENOKOT) tablet 17.2 mg, 2 tablet, Oral, HS PRN, Tiki Chavez MD    sodium chloride 0.9 % infusion, 100 mL/hr, Intravenous, Continuous, Aydin Silva CRNA, Last Rate: 100 mL/hr at 03/28/24 0112, 100 mL/hr at 03/28/24 0112    Blood Culture:   Lab Results   Component Value Date    BLOODCX No Growth After 5 Days. 06/02/2023    BLOODCX No Growth After 5 Days. 06/02/2023       Wound Culture:   No results found for: \"WOUNDCULT\"    Ins and Outs:  I/O last 24 hours:  In: 150 [I.V.:150]  Out: 1850 [Urine:1850]      Orthopedic Physical Exam:    Vitals:    03/28/24 0837   BP:    Pulse:    Resp:    Temp:    SpO2: 93%   Sitting upright in bed, NAD, breathing unlabored.  Comfortable at rest.  left Lower Extremity extremity:  Mepilex Dressing x 1 C/D/I, no saturation  Thigh soft with normal ost-op swelling, no palpable hematoma  No surrounding erythema or ecchymosis.  + ankle DF/PF, EHL/FHL  SILT LLE  No calf tenderness or pitting edema    _*_*_*_*_*_*_*_*_*_*_*_*_*_*_*_*_*_*_*_*_*_*_*_*_*_*_*_*_*_*_*_*_*_*_*_*_*_*_*_*_*    Assessment:     78 y.o.male s/p left hip hemiarthroplasty by Dr. Beal on 3/26/2024.      Plan:    Weight bearing as tolerated left lower extremity  Posterior hip precautions  Up and out of " bed with assistance as needed   DVT prophylaxis - currently on SQH per primary service  Medicine for all medical management  Analgesics  PT/OT  Dispo: Ortho will follow  Patient noted to have acute blood loss anemia due to a drop in Hbg of > 2.0g from preop levels, will monitor vital signs and resuscitate with IV fluids as needed.  Hemoglobin 11.4, monitor.  VSS.        Luis Sales PA-C

## 2024-03-28 NOTE — ASSESSMENT & PLAN NOTE
Wt Readings from Last 3 Encounters:   03/28/24 77.1 kg (170 lb)   03/27/24 74.4 kg (164 lb 0.4 oz)   03/07/24 69.9 kg (154 lb)     History of chronic diastolic CHF with preserved ejection fraction  Presently stable and compensated.  EF 50% in 2022  Loop recorder in place  Euvolemic on exam  Not maintained on outpatient diuretics  Continue PTA metoprolol succinate  Monitor input and output and daily weights.

## 2024-03-28 NOTE — TELEPHONE ENCOUNTER
Patient s/p SPT placement 3/27/24. Will need appointment in 6 weeks with AP for first change. Please call once discharged. Thanks

## 2024-03-28 NOTE — PROGRESS NOTES
Patient:  MISAEL SANTAMARIA    MRN:  [id number]    Aidin Request ID:  [id number]    Level of care reserved:  Skilled Nursing Facility    Partner Reserved:  [facility], [place] [address] [phone]    Clinical needs requested:    Geography searched:  10 miles around [id number]    Start of Service:    Request sent:  10:46am EDT on 3/27/2024 by Sophia Mehta    Partner reserved:  11:04am EDT on 3/27/2024 by Sophia Mehta    Choice list shared:

## 2024-03-28 NOTE — ASSESSMENT & PLAN NOTE
Urinalysis revealed innumerable bacteria, RBCs 4-10.  Per my discussion with the patient, he did not have any urologic symptoms, like dysuria, burning sensation in urination, painful urination, fever or chills, suprapubic pains, flank pains, until he had urinary retention again at [facility] and staff there were unable to reinsert a miller catheter, and thus the transfer here for SPC placement by IR.  Presently, patient only urologic symptom is the suprapubic pubic pain due to urinary retention.  Patient does not have any fever or chills or any flank pains.  No leukocytosis on patient's CBC.  Thus, at this point, likely asymptomatic bacteriuria, possible colonization with patient being on chronic Miller catheter, for observation off antibiotics at this point.  However, if patient develops christine signs and symptoms of UTI, despite decompression with IR placement of suprapubic catheter, consider sending patient's urine for urine culture and starting antibiotics.  No antibiotics at this time

## 2024-03-28 NOTE — OCCUPATIONAL THERAPY NOTE
Occupational Therapy Evaluation + Orthotic Fitting      Patient Name: Sal Moura  Today's Date: 3/28/2024  Problem List  Principal Problem:    Urinary retention  Active Problems:    Permanent atrial fibrillation (HCC)    Orthostatic hypotension    COPD (chronic obstructive pulmonary disease) (HCC)    Chronic diastolic congestive heart failure (HCC)    Closed left hip fracture, initial encounter (HCC)    Abnormal urinalysis    Diarrhea    Past Medical History  Past Medical History:   Diagnosis Date    A-fib (HCC)     Ambulatory dysfunction     Anxiety     Anxiety disorder     Atrial fibrillation (HCC)     Chronic indwelling Cloud catheter     COPD (chronic obstructive pulmonary disease) (HCC)     Coronary artery disease     Depression     Cloud catheter in place     Hepatitis C     screening negative in 1/2017    Hepatitis C     History of MI (myocardial infarction)     Hypertension     MI (myocardial infarction) (HCC)     Urinary catheter in place     Urinary retention     Use of cane as ambulatory aid      Past Surgical History  Past Surgical History:   Procedure Laterality Date    CARDIAC CATHETERIZATION  07/09/2018    CARDIAC ELECTROPHYSIOLOGY PROCEDURE N/A 9/30/2022    Procedure: Cardiac loop recorder implant;  Surgeon: Duke Buckner MD;  [facility]: [facility];  Service: Cardiology    CARDIAC ELECTROPHYSIOLOGY PROCEDURE  09/30/2022    Cardiac loop recorder implant; Dr. Duke Buckner    CARDIAC LOOP RECORDER  09/2022    COLONOSCOPY      COLONOSCOPY      INGUINAL HERNIA REPAIR Right 08/11/2022    Dr. Encarnacion    IR SUPRAPUBIC CATHETER PLACEMENT  3/27/2024    MO HEMIARTHROPLASTY HIP PARTIAL Left 3/26/2024    Procedure: HEMIARTHROPLASTY HIP (BIPOLAR);  Surgeon: Ibeth Beal DO;  Location: [facility];  Service: Orthopedics    MO RPR 1ST INGUN HRNA AGE 5 YRS/> REDUCIBLE Right 8/11/2022    Procedure: REPAIR HERNIA INGUINAL;  Surgeon: Ady Encarnacion DO;  Location: [facility];  Service: General     TONSILLECTOMY               03/28/24 0929   OT Last Visit   OT Visit Date 03/28/24   Note Type   Note type Evaluation;Orthotic Management/Training   Pain Assessment   Pain Assessment Tool 0-10   Pain Score No Pain   Restrictions/Precautions   Weight Bearing Precautions Per Order Yes   LLE Weight Bearing Per Order (S)  WBAT  (s/p L hip hemiarthroplasty on 3/26. POSTERIOR HIP PRECAUTIONS)   Braces or Orthoses   (hip abduction brace fit in consecutive session: see below)   Other Precautions 1:1;Impulsive;Cognitive;Chair Alarm;Bed Alarm;Multiple lines;THR;Fall Risk;Pain  (THPs, suprapubic catheter)   Type of Brace   Brace Applied Hip Abduction Brace   Patient Position When Brace Applied Supine  (+ long sitting in bed)   Bracing Recommendations Recommendation (comment)  (spoke to ortho AP in regards to pt not following THPs during session. Hip abduction brace ordered. Returned at 10:55-11:05 to fit with A of [employer]. Brace fit and pt verbalizing understanding. RN + PCA edu on proper don/doffing and wearing)   Home Living   Type of Home SNF  ([facility], [facility])   Home Layout One level   Home Equipment Walker;Cane;Wheelchair-manual   Additional Comments Pt reports not consistently using RW at baseline   Prior Function   Level of [place] Independent with ADLs   Lives With Facility staff   Receives Help From Personal care attendant   IADLs Family/Friend/Other provides transportation;Family/Friend/Other provides meals;Family/Friend/Other provides medication management   Falls in the last 6 months (S)  >10   Vocational Retired   Lifestyle   Autonomy PTA pt living at [facility] and is a LTC resident   Reciprocal Relationships supportive staff   Service to Others retired : worked for [employer]   Intrinsic Gratification enjoys baseball, especially watching the Mets   General   Additional Pertinent History Ad[facility] to [facility] due to fall resulting in L hip fracture on 3/24. Brought to OR on 3/26 for L hip  "hemiarthroplasty - with posterior hip precautions. Pt t/f to [facility] on 3/27 for IR consult + suprapubic catheter placement. PMH of COPD, A-fib maintained on Eliquis, orthostatic hypotension, ambulatory dysfunction, CHF, urinary retention, moderate protein calorie malnutrition   Family/Caregiver Present No   Subjective   Subjective \"I know don't tell me I know I know I'm a mets fan\" \"You mean I can't do this?\" Pt states while turning toes in   ADL   Eating Assistance 7  Independent   Grooming Assistance 7  Independent   UB Bathing Assistance 4  Minimal Assistance   LB Bathing Assistance 2  Maximal Assistance   UB Dressing Assistance 4  Minimal Assistance   LB Dressing Assistance 2  Maximal Assistance   LB Dressing Deficit Increased time to complete;Verbal cueing;Supervision/safety;Thread RLE into pants;Thread LLE into pants;Pull up over hips  (donning pants, unable to safely maintain precautions during donning)   Toileting Assistance  3  Moderate Assistance   Additional Comments Did not observe eating, UB bathing, LB bathing, UB dressing, and toileting at time of evaluation, with use of clinical reasoning, pt's performance throughout evaluation indicates that pt may be able to perform these tasks at the levels listed above   Bed Mobility   Supine to Sit 3  Moderate assistance   Additional items Assist x 1;Increased time required;Verbal cues;LE management   Transfers   Sit to Stand 4  Minimal assistance   Additional items Assist x 1;Increased time required;Verbal cues   Stand to Sit 4  Minimal assistance   Additional items Assist x 1;Increased time required;Verbal cues   Toilet transfer 4  Minimal assistance   Additional items Assist x 1;Increased time required;Verbal cues;Raised toilet seat  (BSC over toilet)   Additional Comments use of RW   Functional Mobility   Functional Mobility 4  Minimal assistance   Additional Comments Ax1, functional household distance   Additional items Rolling walker   Balance   Static " Sitting Good   Dynamic Sitting Fair +   Static Standing Poor +   Dynamic Standing Poor +   Ambulatory Poor +   Activity Tolerance   Activity Tolerance Other (Comment)  (limited by cognition)   Medical Staff Made Aware PT Sania RN Tea   RUE Assessment   RUE Assessment WFL   LUE Assessment   LUE Assessment WFL   Hand Function   Gross Motor Coordination Functional   Fine Motor Coordination Functional   Cognition   Overall Cognitive Status Impaired   Arousal/Participation Alert;Cooperative   Attention Attends with cues to redirect   Orientation Level Oriented X4   Memory Decreased short term memory;Decreased recall of recent events;Decreased recall of precautions  (Mulitple trials of re-education on THPs with recall only of 1/3)   Following Commands Follows one step commands with increased time or repetition   Comments Pt HIGHLY tangential  and verbose and poor insight into deficits, poor recall of precautions. Limited problem solving. Pt actively breaking THPs multiple times during session despite edu on risk of dislocation   Assessment   Limitation Decreased ADL status;Decreased UE strength;Decreased Safe judgement during ADL;Decreased cognition;Decreased endurance;Decreased self-care trans;Decreased high-level ADLs  (impaired pain, fxnl mobility, act matt, FM coord, fxnl reach, strength, attention to task, direction following, safety awareness, insight, pacing, problem solving, learning new tasks,  termination of conversation)   Prognosis Good   Assessment Pt is a 78 y.o. male seen for OT evaluation s/p admission to [facility] on 3/27/[facility] due to fall. Diagnosed with Urinary retention. Personal and env factors supporting pt at time of IE include  supportive facility staff, FFSU, A as needed . Personal and env factors inhibiting engagement in occupations include current habits and behavioral patterns, difficulty completing ADLs, and difficulty completing IADLs. Performance deficits that affect the pt’s occupational  performance can be seen above. Due to pt's current functional limitations and medical complications pt is functioning below baseline. Pt would benefit from continued skilled OT treatment in order to maximize safety, independence and overall performance with ADLs, functional mobility, functional transfers, and cognition in order to achieve highest level of function.   Goals   Patient Goals to get out of bed   LTG Time Frame 10-14   Long Term Goal see goals listed below   Plan   Treatment Interventions ADL retraining;Functional transfer training;Endurance training;Cognitive reorientation;Patient/family training;Equipment evaluation/education;Compensatory technique education;Energy conservation;Activityengagement   Goal Expiration Date 04/07/24   OT Treatment Day 0   OT Frequency 3-5x/wk   Discharge Recommendation   Rehab Resource Intensity Level, OT II (Moderate Resource Intensity)  (pending level of (A) facility able to provide)   AM-PAC Daily Activity Inpatient   Lower Body Dressing 1   Bathing 2   Toileting 2   Upper Body Dressing 3   Grooming 3   Eating 4   Daily Activity Raw Score 15   Daily Activity Standardized Score (Calc for Raw Score >=11) 34.69   AM-PAC Applied Cognition Inpatient   Following a Speech/Presentation 2   Understanding Ordinary Conversation 3   Taking Medications 1   Remembering Where Things Are Placed or Put Away 2   Remembering List of 4-5 Errands 1   Taking Care of Complicated Tasks 1   Applied Cognition Raw Score 10   Applied Cognition Standardized Score 24.98   End of Consult   Patient Position at End of Consult Bedside chair;Bed/Chair alarm activated;All needs within reach        GOALS:      -Patient will perform grooming tasks standing at sink with overall Mod I in order to increase overall independence    -Patient will be Mod I with UB dressing using AE and AD as needed in order to increase (I) with ADLs    -Patient will be Mod I with UB bathing using AE and AD as needed in order to  increase (I) with ADLs    -Patient will be Mod A  with LB dressing with use of AE and AD as needed in order to increase (I) with ADLs    -Patient will be Mod A  with LB bathing with use of AE and AD as needed in order to increase (I) with ADLs    -Patient will complete toileting w/ Min A  w/ G hygiene/thoroughness in order to reduce caregiver burden    -Patient will demonstrate Min A  with bed mobility for ability to manage own comfort and initiate OOB tasks.     -Patient will perform functional transfers with Supervision to/from all surfaces using DME as needed in order to increase (I) with functional tasks    -Patient will be Supervision with functional mobility to/from bathroom for increased independence with toileting tasks    -Patient will tolerate therapeutic activities for greater than 30 min, in order to increase tolerance for functional activities.     -Patient will engage in ongoing cognitive assessment in order to assist with safe discharge planning/recommendations.    -Patient will independently integrate post-op precautions into dressing activity 100% of the time without VC.     -Patient will demonstrate proper use of LH AE during 100% of tx sessions in order to increase ADL performance and safety following discharge      The patient's raw score on the AM-PAC Daily Activity Inpatient Short Form is 15. A raw score of less than 19 suggests the patient may benefit from discharge to post-acute rehabilitation services. Please refer to the recommendation of the Occupational Therapist for safe discharge planning.    This session, pt required and most appropriately benefited from skilled OT/PT co-eval due to cognitive-communication impairments, cognitive-behavioral deficits, significant regression from functional baseline, and decreased activity tolerance. OT and PT goals were addressed separately as seen in documentation.     Sofiya Farfna MS, OTR/L

## 2024-03-28 NOTE — DISCHARGE SUMMARY
[facility]  Discharge- Sal Moura 1945, 78 y.o. male MRN: [id number]  Unit/Bed#: W -01 Encounter: [id number]  Primary Care Provider: Snow Farah MD   Date and time admitted to hospital: 3/27/2024  8:48 AM    * Urinary retention  Assessment & Plan  Patient with a history of indwelling catheter, follows up with [facility] urology as an outpatient.   Scheduled for an outpatient IR suprapubic catheter placement 11/23 -rescheduled due to patient taking Eliquis and issues with scheduling  Nursing home visit on 11/23: Cloud catheter was dislodged and unable to to be reinserted by nursing staff.  Left out and patient voiding at this time.  At [facility]: Post op from left hip arthroplasty notified by nursing patient having urinary retention issues.  Complaining of abdominal pain, distention, urgency to urinate.  Nursing bladder scan for greater than 680.  Several attempts by nursing, NP , and ED unable to obtain straight cath or Cloud  Reach out to on-call [facility] urology: Steven TARIQ: performed bedside needle decompression of bladder.see procedure note by urology & recommendations  Urinary retention protocol  Continue PTA Hipprex for UTI suppression  Status post IR guided catheter suprapubic placement 3/27      Closed left hip fracture, initial encounter (HCC)  Assessment & Plan  Fall at SNF on 3/23, left hip pain/leg externally rotated and shortened. [facility] ED imaging left hip fracture  Bowel regimen as needed (patient claimed of having diarrhea).  OR 3/26 s/p left hemiarthroplasty  As per orthopedic surgeon, weightbearing as tolerated, left lower extremity with posterior hip precautions.  As needed oxycodone for moderate/severe pain  Follow-up outpatient with orthopedic surgery 2 weeks postop  Medically stable for discharge back to facility with PT/OT    Abnormal urinalysis  Assessment & Plan  Urinalysis revealed innumerable bacteria, RBCs  4-10.  Per my discussion with the patient, he did not have any urologic symptoms, like dysuria, burning sensation in urination, painful urination, fever or chills, suprapubic pains, flank pains, until he had urinary retention again at [facility] and staff there were unable to reinsert a miller catheter, and thus the transfer here for SPC placement by IR.  Presently, patient only urologic symptom is the suprapubic pubic pain due to urinary retention.  Patient does not have any fever or chills or any flank pains.  No leukocytosis on patient's CBC.  Thus, at this point, likely asymptomatic bacteriuria, possible colonization with patient being on chronic Miller catheter, for observation off antibiotics at this point.  However, if patient develops christine signs and symptoms of UTI, despite decompression with IR placement of suprapubic catheter, consider sending patient's urine for urine culture and starting antibiotics.  No antibiotics at this time    Chronic diastolic congestive heart failure ([facility])  Assessment & Plan  Wt Readings from Last 3 Encounters:   03/29/24 78 kg (172 lb)   03/27/24 74.4 kg (164 lb 0.4 oz)   03/07/24 69.9 kg (154 lb)     History of chronic diastolic CHF with preserved ejection fraction  Presently stable and compensated.  EF 50% in 2022  Loop recorder in place  Euvolemic on exam  Not maintained on outpatient diuretics  Continue PTA metoprolol succinate  Monitor input and output and daily weights.    COPD (chronic obstructive pulmonary disease) ([facility])  Assessment & Plan  No acute exacerbation.  Continue patient's ICS inhaler and DuoNeb nebulizations.  Oxygen as needed for oxygen saturation less than 89%.    Orthostatic hypotension  Assessment & Plan  Continue PTA midodrine 3 times daily with hold for SBP > 135     Permanent atrial fibrillation ([facility])  Assessment & Plan  EKG confirming A-fib, rate controlled  Continue PTA metoprolol succinate 25 mg daily  resumed Eliquis 5 mg twice a  day    Diarrhea-resolved as of 3/30/2024  Assessment & Plan  Patient told me, that he had been having diarrhea for about a week now.  He told me he has been averaging 3 episodes of diarrhea per day.  He told me, that his last bowel movement was yesterday when he was still at [facility], and none yet today.  Monitor patient's bowel movements.  I spoke to the nurse about this.  Patient was on bowel regimen with stool softener/laxatives at [facility] on and this morning.  Thus I made them to be taken on as-needed basis now.        Medical Problems       Resolved Problems  Date Reviewed: 3/27/2024            Resolved    Diarrhea 3/30/2024     Resolved by  Joan Nichole MD        Discharging Resident: Joan Nichole MD  Discharging Attending: Giovanna Mayorga MD  PCP: Snow Farah MD  Admission Date:   Admission Orders (From admission, onward)       Ordered        03/27/24 1651  INPATIENT ADMISSION  Once            03/27/24 1019  Place in Observation  Once                          Discharge Date: 03/30/24    Consultations During Hospital Stay:  Urology  Orthopedic surgery  Interventional radiology    Procedures Performed:   3/26 left hip hemiarthroplasty  3/27 suprapubic catheter placement    Significant Findings / Test Results:   3/24 X-ray of left hip: Acute left femoral neck fracture with varus angulation. No hip dislocation.     Incidental Findings:   None    Test Results Pending at Discharge (will require follow up):  None     Outpatient Tests Requested:  None    Complications: Urinary retention     Reason for Admission: Fall with left hip fracture     Hospital Course:   Sal Moura is a 78 y.o. male patient PMH significant for urinary retention with chronic indwelling Cloud catheter who originally presented to the hospital as a transfer on 3/27/2024 due to urinary retention requiring suprapubic catheter placement by IR.  Patient tolerated the procedure successfully and was draining urine  appropriately.  Able to resume his Eliquis postprocedure.  Patient did express reluctance with orthopedic surgery recommendations with his pillow and brace which necessitated a one-to-one virtual observation order overnight on 3/27.  Patient did not have any further episodes since. Patient continued to have functional improvement with inpatient PT/OT treatments.     Given that the patient has medically stable with urinary retention resolved status post suprapubic catheter placement, he will be discharged back to his facility today with continued PT/OT treatments and outpatient and outpatient follow-up with orthopedic surgery. The patient was made aware of the discharge and agrees with the plan.    Please see above list of diagnoses and related plan for additional information.     Condition at Discharge: good    Discharge Day Visit / Exam:   Subjective: Patient was seen and examined at bedside this a.m. in no acute distress. He reports feeling well although still having some pain in the hip but improved and controlled with pain medications as needed. He remains medically stable with no significant events overnight and no new concerns at this time.    Vitals: Blood Pressure: 148/89 (03/30/24 0758)  Pulse: 98 (03/30/24 0758)  Temperature: 97.8 °F (36.6 °C) (03/30/24 0758)  Temp Source: Oral (03/29/24 1535)  Respirations: 18 (03/30/24 0758)  Weight - Scale: 77.4 kg (170 lb 10.3 oz) (03/30/24 0600)  SpO2: 95 % (03/30/24 0758)    Physical Exam  Vitals and nursing note reviewed.   Constitutional:       General: He is not in acute distress.     Appearance: He is not toxic-appearing or diaphoretic.   HENT:      Mouth/Throat:      Mouth: Mucous membranes are moist.      Pharynx: No oropharyngeal exudate or posterior oropharyngeal erythema.   Eyes:      General: No scleral icterus.        Right eye: No discharge.         Left eye: No discharge.   Cardiovascular:      Rate and Rhythm: Normal rate. Rhythm irregular.      Pulses:  Normal pulses.      Heart sounds: Normal heart sounds.   Pulmonary:      Effort: Pulmonary effort is normal. No respiratory distress.      Breath sounds: Normal breath sounds. No stridor. No wheezing, rhonchi or rales.   Abdominal:      General: Bowel sounds are normal. There is no distension.      Palpations: Abdomen is soft.      Tenderness: There is no abdominal tenderness. There is no right CVA tenderness, left CVA tenderness, guarding or rebound.      Comments: Positive for suprapubic tenderness and distention.   Genitourinary:     Comments: Suprapubic catheter in place and draining appropriately.  Insertion site clean, dry, and intact  Musculoskeletal:      Right lower leg: No edema.      Left lower leg: No edema.   Skin:     General: Skin is warm.      Coloration: Skin is not jaundiced or pale.      Findings: No erythema or rash.   Neurological:      Mental Status: He is alert and oriented to person, place, and time.   Psychiatric:         Mood and Affect: Affect is inappropriate.         Judgment: Judgment is impulsive.          Discussion with Family: Patient declined call to .     Discharge instructions/Information to patient and family:   See after visit summary for information provided to patient and family.      Provisions for Follow-Up Care:  See after visit summary for information related to follow-up care and any pertinent home health orders.      Mobility at time of Discharge:   Basic Mobility Inpatient Raw Score: 16  JH-HLM Goal: 5: Stand one or more mins  JH-HLM Achieved: 7: Walk 25 feet or more  HLM Goal achieved. Continue to encourage appropriate mobility.     Disposition:   Other Skilled Nursing Facility at [facility]    Planned Readmission: No    Discharge Medications:  See after visit summary for reconciled discharge medications provided to patient and/or family.      **Please Note: This note may have been constructed using a voice recognition system**

## 2024-03-28 NOTE — PLAN OF CARE
Problem: Prexisting or High Potential for Compromised Skin Integrity  Goal: Skin integrity is maintained or improved  Description: INTERVENTIONS:  - Identify patients at risk for skin breakdown  - Assess and monitor skin integrity  - Assess and monitor nutrition and hydration status  - Monitor labs   - Assess for incontinence   - Turn and reposition patient  - Assist with mobility/ambulation  - Relieve pressure over bony prominences  - Avoid friction and shearing  - Provide appropriate hygiene as needed including keeping skin clean and dry  - Evaluate need for skin moisturizer/barrier cream  - Collaborate with interdisciplinary team   - Patient/family teaching  - Consider wound care consult   Outcome: Progressing     Problem: PAIN - ADULT  Goal: Verbalizes/displays adequate comfort level or baseline comfort level  Description: Interventions:  - Encourage patient to monitor pain and request assistance  - Assess pain using appropriate pain scale  - Administer analgesics based on type and severity of pain and evaluate response  - Implement non-pharmacological measures as appropriate and evaluate response  - Consider cultural and social influences on pain and pain management  - Notify physician/advanced practitioner if interventions unsuccessful or patient reports new pain  Outcome: Progressing     Problem: SAFETY ADULT  Goal: Patient will remain free of falls  Description: INTERVENTIONS:  - Educate patient/family on patient safety including physical limitations  - Instruct patient to call for assistance with activity   - Consult OT/PT to assist with strengthening/mobility   - Keep Call bell within reach  - Keep bed low and locked with side rails adjusted as appropriate  - Keep care items and personal belongings within reach  - Initiate and maintain comfort rounds  - Make Fall Risk Sign visible to staff  - Offer Toileting every 2 Hours, in advance of need  - Initiate/Maintain bed/chair alarm    Problem: Knowledge  Deficit  Goal: Patient/family/caregiver demonstrates understanding of disease process, treatment plan, medications, and discharge instructions  Description: Complete learning assessment and assess knowledge base.  Interventions:  - Provide teaching at level of understanding  - Provide teaching via preferred learning methods  Outcome: Progressing   - Apply yellow socks and bracelet for high fall risk patients  - Consider moving patient to room near nurses station  Outcome: Progressing

## 2024-03-28 NOTE — ASSESSMENT & PLAN NOTE
Fall at SNF on 3/23, left hip pain/leg externally rotated and shortened  [facility] ED imaging left hip fracture  Orthopedic surgeon on board.  Pain control  Bowel regimen as needed (patient claimed of having diarrhea).  PT/OT   OR 3/26 s/p left hemiarthroplasty  As per orthopedic surgeon, weightbearing as tolerated, left lower extremity with posterior hip precautions.  Monitor hemoglobin/CBC.  Orthopedic surgery recommending IV fluids/packed RBCs as indicated for greater than 2 g hemoglobin drop or hemoglobin less than 7.  No signs of active bleeding in the operative extremity.

## 2024-03-28 NOTE — PROGRESS NOTES
Critical access hospital  Progress Note  Name: Sal Moura I  MRN: [id number]  Unit/Bed#: W -01 I Date of Admission: 3/27/2024   Date of Service: 3/28/2024 I Hospital Day: 1    Assessment/Plan   * Urinary retention  Assessment & Plan  Patient with a history of indwelling catheter, follows up with [facility] urology as an outpatient.   Scheduled for an outpatient IR suprapubic catheter placement 11/23 -rescheduled due to patient taking Eliquis and issues with scheduling  Nursing home visit on 11/23: Cloud catheter was dislodged and unable to to be reinserted by nursing staff.  Left out and patient voiding at this time.  At [facility]: Post op from left hip arthroplasty notified by nursing patient having urinary retention issues.  Complaining of abdominal pain, distention, urgency to urinate.  Nursing bladder scan for greater than 680.  Several attempts by nursing, NP , and ED unable to obtain straight cath or Cloud  Reach out to on-call [facility] urology: Steven TARIQ: performed bedside needle decompression of bladder.see procedure note by urology & recommendations  Urinary retention protocol  Continue PTA Hipprex for UTI suppression  Status post IR guided catheter suprapubic placement 3/27      Diarrhea  Assessment & Plan  Patient told me, that he had been having diarrhea for about a week now.  He told me he has been averaging 3 episodes of diarrhea per day.  He told me, that his last bowel movement was yesterday when he was still at [facility], and none yet today.  Monitor patient's bowel movements.  I spoke to the nurse about this.  Patient was on bowel regimen with stool softener/laxatives at [facility] on and this morning.  Thus I made them to be taken on as-needed basis now.  For further evaluation and management if patient having persistent diarrhea.    Abnormal urinalysis  Assessment & Plan  Urinalysis revealed innumerable bacteria, RBCs  4-10.  Per my discussion with the patient, he did not have any urologic symptoms, like dysuria, burning sensation in urination, painful urination, fever or chills, suprapubic pains, flank pains, until he had urinary retention again at [facility] and staff there were unable to reinsert a miller catheter, and thus the transfer here for SPC placement by IR.  Presently, patient only urologic symptom is the suprapubic pubic pain due to urinary retention.  Patient does not have any fever or chills or any flank pains.  No leukocytosis on patient's CBC.  Thus, at this point, likely asymptomatic bacteriuria, possible colonization with patient being on chronic Miller catheter, for observation off antibiotics at this point.  However, if patient develops christine signs and symptoms of UTI, despite decompression with IR placement of suprapubic catheter, consider sending patient's urine for urine culture and starting antibiotics.  No antibiotics at this time    Closed left hip fracture, initial encounter (HCC)  Assessment & Plan  Fall at [facility] on 3/23, left hip pain/leg externally rotated and shortened  [facility] ED imaging left hip fracture  Orthopedic surgeon on board.  Pain control  Bowel regimen as needed (patient claimed of having diarrhea).  PT/OT   OR 3/26 s/p left hemiarthroplasty  As per orthopedic surgeon, weightbearing as tolerated, left lower extremity with posterior hip precautions.  Monitor hemoglobin/CBC.  Orthopedic surgery recommending IV fluids/packed RBCs as indicated for greater than 2 g hemoglobin drop or hemoglobin less than 7.  No signs of active bleeding in the operative extremity.    Chronic diastolic congestive heart failure (HCC)  Assessment & Plan  Wt Readings from Last 3 Encounters:   03/28/24 77.1 kg (170 lb)   03/27/24 74.4 kg (164 lb 0.4 oz)   03/07/24 69.9 kg (154 lb)     History of chronic diastolic CHF with preserved ejection fraction  Presently stable and compensated.  EF 50% in  2022  Loop recorder in place  Euvolemic on exam  Not maintained on outpatient diuretics  Continue PTA metoprolol succinate  Monitor input and output and daily weights.    COPD (chronic obstructive pulmonary disease) (HCC)  Assessment & Plan  No acute exacerbation.  Continue patient's ICS inhaler and DuoNeb nebulizations.  Oxygen as needed for oxygen saturation less than 89%.    Orthostatic hypotension  Assessment & Plan  Continue PTA midodrine 3 times daily with hold for SBP > 135     Permanent atrial fibrillation (HCC)  Assessment & Plan  EKG confirming A-fib, rate controlled  Continue PTA metoprolol succinate 25 mg daily  Per IR, may resume Eliquis 5 mg twice a day if good hemostasis and no concern for acute bleeding           VTE Pharmacologic Prophylaxis: VTE Score: 14 High Risk (Score >/= 5) - Pharmacological DVT Prophylaxis Contraindicated. Sequential Compression Devices Ordered.    Mobility:   Basic Mobility Inpatient Raw Score: 15  JH-HLM Goal: 4: Move to chair/commode  JH-HLM Achieved: 7: Walk 25 feet or more  JH-HLM Goal achieved. Continue to encourage appropriate mobility.    Patient Centered Rounds: I performed bedside rounds with nursing staff today.  Discussions with Specialists or Other Care Team Provider: Interventional radiology, urology    Education and Discussions with Family / Patient:  Will update family.     Current Length of Stay: 1 day(s)  Current Patient Status: Inpatient   Discharge Plan: Anticipate discharge in 24-48 hrs to discharge location to be determined pending rehab evaluations.    Code Status: Level 3 - DNAR and DNI    Subjective:   Patient was seen resting at bedside and reported significant discomfort of his abdomen.  States that he has been draining a lot from his suprapubic catheter.  Patient required the use of a one-to-one overnight as he was unable to keep his hip stabilized with pillow.   No new headache, fever, chills, or flank pain.    Plan for discharge tomorrow as  patient must be off one-to-one for 24 hours prior to placement.    Objective:     Vitals:   Temp (24hrs), Av.5 °F (36.9 °C), Min:98 °F (36.7 °C), Max:99.3 °F (37.4 °C)    Temp:  [98 °F (36.7 °C)-99.3 °F (37.4 °C)] 98 °F (36.7 °C)  HR:  [] 110  Resp:  [16-22] 16  BP: ()/(50-83) 102/78  SpO2:  [91 %-97 %] 93 %  Body mass index is 23.06 kg/m².     Input and Output Summary (last 24 hours):     Intake/Output Summary (Last 24 hours) at 3/28/2024 1321  Last data filed at 3/28/2024 1258  Gross per 24 hour   Intake 570 ml   Output 2525 ml   Net -1955 ml       Physical Exam:   Physical Exam  Vitals and nursing note reviewed.   Constitutional:       General: He is not in acute distress.     Appearance: He is ill-appearing. He is not toxic-appearing or diaphoretic.   HENT:      Mouth/Throat:      Mouth: Mucous membranes are dry.      Pharynx: Oropharynx is clear. No oropharyngeal exudate or posterior oropharyngeal erythema.   Eyes:      General: No scleral icterus.        Right eye: No discharge.         Left eye: No discharge.   Cardiovascular:      Rate and Rhythm: Normal rate. Rhythm irregular.      Pulses: Normal pulses.      Heart sounds: Normal heart sounds.   Pulmonary:      Effort: Pulmonary effort is normal. No respiratory distress.      Breath sounds: Normal breath sounds. No stridor. No wheezing, rhonchi or rales.   Abdominal:      General: Bowel sounds are normal. There is distension.      Palpations: Abdomen is soft.      Tenderness: There is abdominal tenderness in the suprapubic area. There is no right CVA tenderness, left CVA tenderness, guarding or rebound.      Comments: Positive for suprapubic tenderness and distention.   Musculoskeletal:      Right lower leg: No edema.      Left lower leg: No edema.   Skin:     General: Skin is warm.      Coloration: Skin is not jaundiced or pale.      Findings: No erythema or rash.   Neurological:      Mental Status: He is alert and oriented to person,  place, and time.   Psychiatric:         Mood and Affect: Affect is inappropriate.         Judgment: Judgment is inappropriate.          Additional Data:     Labs:  Results from last 7 days   Lab Units 03/28/24  0546 03/25/24  0510 03/24/24  1704   WBC Thousand/uL 11.54*   < > 11.37*   HEMOGLOBIN g/dL 11.4*   < > 15.1   HEMATOCRIT % 35.0*   < > 46.2   PLATELETS Thousands/uL 145*   < > 153   NEUTROS PCT %  --   --  79*   LYMPHS PCT %  --   --  11*   MONOS PCT %  --   --  8   EOS PCT %  --   --  2    < > = values in this interval not displayed.     Results from last 7 days   Lab Units 03/28/24  0546 03/25/24  0510 03/24/24  1704   SODIUM mmol/L 137   < > 138   POTASSIUM mmol/L 4.2   < > 4.2   CHLORIDE mmol/L 107   < > 106   CO2 mmol/L 20*   < > 26   BUN mg/dL 23   < > 26*   CREATININE mg/dL 1.08   < > 1.25   ANION GAP mmol/L 10   < > 6   CALCIUM mg/dL 7.9*   < > 9.2   ALBUMIN g/dL  --   --  4.2   TOTAL BILIRUBIN mg/dL  --   --  1.49*   ALK PHOS U/L  --   --  60   ALT U/L  --   --  29   AST U/L  --   --  17   GLUCOSE RANDOM mg/dL 110   < > 114    < > = values in this interval not displayed.     Results from last 7 days   Lab Units 03/24/24  1704   INR  1.29*                   Lines/Drains:  Invasive Devices       Peripheral Intravenous Line  Duration             Peripheral IV 03/25/24 Left;Ventral (anterior) Wrist 3 days    Peripheral IV 03/26/24 Right Hand 1 day    Peripheral IV 03/27/24 Right Hand 1 day              Drain  Duration             Suprapubic Catheter 18 Fr. <1 day                          Imaging: No pertinent imaging reviewed.    Recent Cultures (last 7 days):         Last 24 Hours Medication List:   Current Facility-Administered Medications   Medication Dose Route Frequency Provider Last Rate    acetaminophen  975 mg Oral Q8H DONIS Clarice Vaz MD      albuterol  2.5 mg Nebulization Q6H PRN Clarice Vaz MD      apixaban  5 mg Oral BID Joan Nichole MD      docusate sodium  100 mg Oral BID  PRN Tiki Chavez MD      fentaNYL  50 mcg Intravenous Q3 min PRN Aydin Silva CRNA      Fluticasone Furoate-Vilanterol  1 puff Inhalation Daily Clarice Vaz MD      HYDROmorphone  0.2 mg Intravenous Q3H PRN Tiki Chavez MD      ipratropium  0.5 mg Nebulization TID Clarice Vaz MD      levalbuterol  1.25 mg Nebulization TID Clarice Vaz MD      methenamine hippurate  1 g Oral BID With Meals Clarice Vaz MD      metoprolol succinate  25 mg Oral Daily Clarice Vaz MD      midodrine  5 mg Oral TID AC Clarice Vaz MD      ondansetron  4 mg Intravenous Q8H PRN Clarice Vaz MD      ondansetron  4 mg Intravenous Once PRN Aydin Silva CRNA      oxyCODONE  5 mg Oral Q6H PRN Tiki Chavez MD      oxyCODONE  2.5 mg Oral Q6H PRN Tiki Chavez MD      senna  2 tablet Oral HS PRN Tiki Chavez MD          Today, Patient Was Seen By: Kyle Brunner, MD    **Please Note: This note may have been constructed using a voice recognition system.**

## 2024-03-28 NOTE — ASSESSMENT & PLAN NOTE
Patient with a history of indwelling catheter, follows up with [facility] urology as an outpatient.   Scheduled for an outpatient IR suprapubic catheter placement 11/23 -rescheduled due to patient taking Eliquis and issues with scheduling  Nursing home visit on 11/23: Cloud catheter was dislodged and unable to to be reinserted by nursing staff.  Left out and patient voiding at this time.  At [facility] on campus: Post op from left hip arthroplasty notified by nursing patient having urinary retention issues.  Complaining of abdominal pain, distention, urgency to urinate.  Nursing bladder scan for greater than 680.  Several attempts by nursing, NP , and ED unable to obtain straight cath or Cloud  Reach out to on-call [facility] urology: Steven TARIQ: performed bedside needle decompression of bladder.see procedure note by urology & recommendations  Urinary retention protocol  Continue PTA Hipprex for UTI suppression  Status post IR guided catheter suprapubic placement 3/27

## 2024-03-28 NOTE — PHYSICAL THERAPY NOTE
PT EVALUATION      03/28/24  Time: 0901-0928    Name: Sal Moura  Age: 78 y.o.  MRN: [id number]  Admit date: 3/27/2024; LOS: 1    Primary Dx: Urinary retention    Functionally relevant past medical hx:   Past Medical History:   Diagnosis Date    A-fib (HCC)     Ambulatory dysfunction     Anxiety     Anxiety disorder     Atrial fibrillation (HCC)     Chronic indwelling Cloud catheter     COPD (chronic obstructive pulmonary disease) (HCC)     Coronary artery disease     Depression     Cloud catheter in place     Hepatitis C     screening negative in 1/2017    Hepatitis C     History of MI (myocardial infarction)     Hypertension     MI (myocardial infarction) (HCC)     Urinary catheter in place     Urinary retention     Use of cane as ambulatory aid      Functionally relevant past surgical hx:   Past Surgical History:   Procedure Laterality Date    CARDIAC CATHETERIZATION  07/09/2018    CARDIAC ELECTROPHYSIOLOGY PROCEDURE N/A 9/30/2022    Procedure: Cardiac loop recorder implant;  Surgeon: Duke Buckner MD;  [facility]: [facility];  Service: Cardiology    CARDIAC ELECTROPHYSIOLOGY PROCEDURE  09/30/2022    Cardiac loop recorder implant; Dr. Duke Buckner    CARDIAC LOOP RECORDER  09/2022    COLONOSCOPY      COLONOSCOPY      INGUINAL HERNIA REPAIR Right 08/11/2022    Dr. Encarnacion    IR SUPRAPUBIC CATHETER PLACEMENT  3/27/2024    MA HEMIARTHROPLASTY HIP PARTIAL Left 3/26/2024    Procedure: HEMIARTHROPLASTY HIP (BIPOLAR);  Surgeon: Ibeth Beal DO;  Location: [facility];  Service: Orthopedics    MA RPR 1ST INGUN HRNA AGE 5 YRS/> REDUCIBLE Right 8/11/2022    Procedure: REPAIR HERNIA INGUINAL;  Surgeon: Ady Encarnacion DO;  Location: [facility];  Service: General    TONSILLECTOMY         Performed at least 2 patient identifiers during session: Name and Epic photo     03/28/24 0901   PT Last Visit   PT Visit Date 03/28/24   Note Type   Note type Evaluation   Pain Assessment   Pain Score No Pain  "  Restrictions/Precautions   Weight Bearing Precautions Per Order Yes   LLE Weight Bearing Per Order (S)  WBAT   Braces or Orthoses   (s/p L hip hemiarthroplasty on 3/26. POSTERIOR HIP PRECAUTIONS)   Other Precautions Impulsive;1:1;Cognitive;Chair Alarm;Bed Alarm;WBS;THR;Multiple lines;Fall Risk;Pain  (suprapubic catheter)   Home Living   Type of Home SNF  ([facility])   Home Layout One level   Home Equipment Walker;Cane;Wheelchair-manual   Additional Comments Pt reports occasional RW use at baseline   Prior Function   Level of [place] Independent with ADLs   Lives With Facility staff   Receives Help From Personal care attendant   IADLs Family/Friend/Other provides transportation;Family/Friend/Other provides meals;Family/Friend/Other provides medication management   Falls in the last 6 months (S)  >10   Vocational Retired   General   Additional Pertinent History Pt transferred from [facility] d/t need for suprapubic catheter placement, POD2 L hemiarthroplasty (3/26).   Family/Caregiver Present No   Cognition   Overall Cognitive Status Impaired   Arousal/Participation Responsive   Attention Attends with cues to redirect   Orientation Level Oriented X4   Memory Decreased short term memory;Decreased recall of recent events;Decreased recall of precautions   Following Commands Follows one step commands with increased time or repetition   Comments Pt not understanding purpose of hip abduction pillow and THP, very poor recall of THP IMPORTANCE. Pt able to verbally recall but not carryover/implement THP despite education on dislocation.   Subjective   Subjective \"I'm moving around pretty good for just having this surgery\"   RUE Assessment   RUE Assessment WFL   LUE Assessment   LUE Assessment WFL   RLE Assessment   RLE Assessment WFL   LLE Assessment   LLE Assessment X  (THP, WBAT)   Coordination   Movements are Fluid and Coordinated 0   Coordination and Movement Description antalgic gait   Bed Mobility "   Supine to Sit 3  Moderate assistance   Additional items Assist x 1;Increased time required;Verbal cues;LE management   Transfers   Sit to Stand 4  Minimal assistance   Additional items Assist x 1;Increased time required;Verbal cues  (w/ RW from EOB)   Stand to Sit 4  Minimal assistance   Additional items Assist x 1;Increased time required;Verbal cues  (w/ RW to recliner)   Stand pivot 4  Minimal assistance   Additional items Assist x 1;Increased time required;Verbal cues  (w/ RW and cues to maintain THP)   Toilet transfer 4  Minimal assistance   Additional items Assist x 1;Increased time required;Verbal cues;Raised toilet seat  (BSC over toilet)   Ambulation/Elevation   Gait pattern Antalgic;Forward Flexion;Decreased foot clearance;Short stride;Decreased heel strike   Gait Assistance 4  Minimal assist   Additional items Assist x 1   Assistive Device Rolling walker   Distance 12'x2, to bathroom for toilet transfer   Stair Management Assistance Not tested   Balance   Static Sitting Good   Dynamic Sitting Fair +   Static Standing Fair -  (w/ RW)   Dynamic Standing Poor +  (w/ RW)   Ambulatory Poor +  (w/ RW)   Activity Tolerance   Activity Tolerance Other (Comment)  (limited by cognition)   Medical Staff Made Aware Yes, OT Sofiya, ortho CHANDNI Smith, PCAs   Nurse Made Aware Yes, RN Chandler   Assessment   Prognosis Fair   Problem List Decreased strength;Decreased range of motion;Decreased endurance;Impaired balance;Decreased mobility;Decreased coordination;Decreased cognition;Impaired judgement;Decreased safety awareness;Decreased skin integrity;Orthopedic restrictions;Pain  (DECREASED RECALL OF THP)   Assessment Pt is a 78 y.o. male presenting POD2 L hemiarthroplasty, admitted to [facility] from [facility] on 3/27/2024 with primary dx of Urinary retention. PT consulted for assessment of mobility and d/c needs, as well as to acknowledge activity as matt orders. Pt's current comorbidities and personal factors affecting tx include  limited recall of posterior hip precautions and Orthostatic hypotension . Based on findings indicated in flowsheet, pt's current clinical presentation is  unstable/unpredictable, (high complexity), d/t changing level of pain, varying levels of cognitive performance, increased fall risk, new onset of impairment of functional mobility, decreased endurance, and new onset of weakness. Prior to admission, pt was independent with occasional use of RW. Upon PT eval, pt is currently requiring mod Ax1 for bed mobility; min Ax1 for transfers; min Ax1 for amb 25' with RW. Pt presents during PT IE as functioning below baseline d/t above listed functional impairments and is currently a fall risk d/t impulsivity, h/o falls, impaired balance, impaired cognition, impaired insight/safety awareness, use of ambulatory aid, varying levels of pain , advanced age, acuity of medical illness, and WB restriction. Pt will  benefit from continued PT services in order to address impairments, dec fall risk, maximize independence with functional mobility, and prepare for safe transition to next level of care. Based on above noted presentation and impairments, pt would benefit from Level II (moderate PT intensity) resources upon d/c.   Goals   Patient Goals to go to bedside chair   STG Expiration Date 04/11/24   Short Term Goal #1 Pt PT goals established in order to maximize functional independence.  In 14 days pt will be able to:  Perform bed mobility independently in order to inc independence with pressure relief and dec caregiver burden;  Perform functional transfers with independently in order to promote safety with position changes and facilitate safe return to prior living environment;  Amb 100' with least restrictive AD with supervision and stable vitals in order to improve safety with household amb and dec fall risk;  Increase balance grade by at least 1 point in order to inc pt safety with static positions and dynamic mobility to dec  fall risk;  [place]iba at least 3hrs OOB in upright position in order to improve muscular endurance and promote respiratory status;  Inc gait speed to safe speed required for their d/c destination in order to dec fall risk and promote functional mobility;  Negotiate 3 stairs with handrail assist and supervision in order to safely navigate stairs at his facility;  Increase AM-PAC score to 19/24 in order to inc functional independence and dec burden of care;  RECALL AND IMPLEMENT POSTERIOR HIP PRECAUTIONS INDEPENDENTLY  in order to protect hemiarthroplasty and prevent further injury.   PT Treatment Day 0   Plan   Treatment/Interventions Functional transfer training;LE strengthening/ROM;Elevations;Therapeutic exercise;Endurance training;Cognitive reorientation;Patient/family training;Equipment eval/education;Bed mobility;Gait training;Compensatory technique education;Continued evaluation;Spoke to nursing;Spoke to advanced practitioner;OT   PT Frequency 3-5x/wk   Discharge Recommendation   Rehab Resource Intensity Level, PT II (Moderate Resource Intensity)   Equipment Recommended Walker   AM-PAC Basic Mobility Inpatient   Turning in Flat Bed Without Bedrails 3   Lying on Back to Sitting on Edge of Flat Bed Without Bedrails 3   Moving Bed to Chair 3   Standing Up From Chair Using Arms 3   Walk in Room 2   Climb 3-5 Stairs With Railing 1   Basic Mobility Inpatient Raw Score 15   Basic Mobility Standardized Score 36.97   [facility] Highest Level Of Mobility   [facility] Goal 4: Move to chair/commode   -[facility] Achieved 7: Walk 25 feet or more   Modified Mariella Scale   Modified Mariella Scale 2   Barthel Index   Feeding 10   Bathing 0   Grooming Score 5   Dressing Score 5   Bladder Score 0   Bowels Score 10   Toilet Use Score 5   Transfers (Bed/Chair) Score 10   Mobility (Level Surface) Score 0   Stairs Score 0   Barthel Index Score 45   End of Consult   Patient Position at End of Consult Bedside chair;Bed/Chair alarm activated;All  needs within reach     The patient's AM-PAC Basic Mobility Inpatient Short Form Raw Score is 15. A Raw score of less than or equal to 16 suggests the patient may benefit from discharge to post-acute rehabilitation services. Please also refer to the recommendation of the Physical Therapist for safe discharge planning.    This session, pt required and most appropriately benefited from partial or full skilled PT/OT co-eval due to extensive physical assistance of SKILLED therapists, cognitive-behavioral deficits, significant regression from baseline level of mobility, decreased activity tolerance, and unpredictable medical and/or functional status. PT and OT goals were addressed separately as seen in documentation.      Zora Wong, PT, DPT  [place] License [id number]

## 2024-03-28 NOTE — PLAN OF CARE
Problem: OCCUPATIONAL THERAPY ADULT  Goal: Performs self-care activities at highest level of function for planned discharge setting.  See evaluation for individualized goals.  Description: Treatment Interventions: ADL retraining, Functional transfer training, Endurance training, Cognitive reorientation, Patient/family training, Equipment evaluation/education, Compensatory technique education, Energy conservation, Activityengagement          See flowsheet documentation for full assessment, interventions and recommendations.   Note: Limitation: Decreased ADL status, Decreased UE strength, Decreased Safe judgement during ADL, Decreased cognition, Decreased endurance, Decreased self-care trans, Decreased high-level ADLs (impaired pain, fxnl mobility, act matt, FM coord, fxnl reach, strength, attention to task, direction following, safety awareness, insight, pacing, problem solving, learning new tasks,  termination of conversation)  Prognosis: Good  Assessment: Pt is a 78 y.o. male seen for OT evaluation s/p admission to [facility] on 3/27/[facility]24 due to fall. Diagnosed with Urinary retention. Personal and env factors supporting pt at time of IE include  supportive facility staff, FFSU, A as needed . Personal and env factors inhibiting engagement in occupations include current habits and behavioral patterns, difficulty completing ADLs, and difficulty completing IADLs. Performance deficits that affect the pt’s occupational performance can be seen above. Due to pt's current functional limitations and medical complications pt is functioning below baseline. Pt would benefit from continued skilled OT treatment in order to maximize safety, independence and overall performance with ADLs, functional mobility, functional transfers, and cognition in order to achieve highest level of function.     Rehab Resource Intensity Level, OT: II (Moderate Resource Intensity) (pending level of (A) facility able to provide)           5

## 2024-03-28 NOTE — ASSESSMENT & PLAN NOTE
Patient told me, that he had been having diarrhea for about a week now.  He told me he has been averaging 3 episodes of diarrhea per day.  He told me, that his last bowel movement was yesterday when he was still at [facility], and none yet today.  Monitor patient's bowel movements.  I spoke to the nurse about this.  Patient was on bowel regimen with stool softener/laxatives at [facility] on and this morning.  Thus I made them to be taken on as-needed basis now.  For further evaluation and management if patient having persistent diarrhea.

## 2024-03-28 NOTE — PHYSICAL THERAPY NOTE
Orthotic Note            Date: 3/28/2024      Patient Name: Sal Moura         03/28/24 1230   PT Last Visit   PT Visit Date 03/28/24   Note Type   Note Type Orthotic Management/Training   Type of Brace   Brace Applied Other (Comment)  (knee immobilizer)   Patient Position When Brace Applied Supine   Bracing Recommendations Recommendation (comment)  (Per Dr. Beal, discontinue hip abduction brace in favor of knee immobilizer while in bed.)   Education   Education Provided Yes   End of Consult   Patient Position at End of Consult Supine;All needs within reach;Bed/Chair alarm activated   Nurse Communication Nurse aware of consult, application of brace   General   Chart Reviewed Yes   Additional Pertinent History Pt fit for hip abd brace earlier in the day d/t noncompliance w/ hip precautions; new orders for KI reviewed and acknowledged. Per ortho, pt to be fit w/ KI to be worn when supine in bed.   Assessment   Assessment Pt w/ noncompliance of hip abduction pillow and w/ hip abduction brace partially doffed upon entering. RN reporting pt refusal of hip abd pillow use and that he had independently partially disassembled his recently applied hip abd brace. As noted in PT eval earlier in the day, pt is noncompliant of posterior hip precautions despite max verbal, tactile, and visual cueing to reiterate importance of protecting the surgical site to prevent dislocation. Pt able to verbally recall precautions but does not understand importance of maintainance. Max pt education regarding hip precautions given at this time, will continue to reinforce at next visit. Ortho CHANDNI Smith and RN Tea notified of pt noncompliance.   Education   Education Provided Precautions for total hip arthroplasty (PETTY)   Patient Reinforcement needed   End of Consult   Patient Position at End of Consult Supine;Bed/Chair alarm activated;All needs within reach   End of Consult Comments Pt eventually agreeable to repositioning  of hip abduction pillow, pt w/ KI donned and hip abd pillow placed by end of session.     Zora Wong, PT, DPT  [place] License [id number]

## 2024-03-28 NOTE — PROGRESS NOTES
Progress Note - Urology  Sal Moura 1945, 78 y.o. male MRN: [id number]    Unit/Bed#: W -01 Encounter: [id number]    Assessment and Plan:  Chronic urinary retention  Difficult catheterization  - s/p IR placement of SPT (3/27/24)  - Draining well, maintain  - Will have this changed with outpatient RN at 6 weeks  - Urology will sign off. Please do not hesitate to reach out with any questions or concerns    Subjective:   HPI: Doing well today. SPT in place draining well.     Review of Systems   Constitutional:  Negative for activity change, appetite change, chills and fever.   HENT:  Negative for congestion and trouble swallowing.    Respiratory:  Negative for cough and shortness of breath.    Cardiovascular:  Negative for chest pain, palpitations and leg swelling.   Gastrointestinal:  Negative for abdominal pain, constipation, diarrhea, nausea and vomiting.   Genitourinary:  Negative for difficulty urinating, dysuria, flank pain, frequency, hematuria and urgency.   Musculoskeletal:  Negative for back pain and gait problem.   Skin:  Negative for wound.   Allergic/Immunologic: Negative for immunocompromised state.   Neurological:  Negative for dizziness and syncope.   Hematological:  Does not bruise/bleed easily.   Psychiatric/Behavioral:  Negative for confusion.    All other systems reviewed and are negative.      Objective:  Nursing Rounds:   Vitals: Blood pressure 102/78, pulse (!) 110, temperature 98 °F (36.7 °C), resp. rate 16, weight 77.1 kg (170 lb), SpO2 93%.,Body mass index is 23.06 kg/m².    Physical Exam  Constitutional:       General: He is not in acute distress.     Appearance: Normal appearance. He is not ill-appearing, toxic-appearing or diaphoretic.   HENT:      Head: Normocephalic.      Nose: No congestion.   Eyes:      General: No scleral icterus.        Right eye: No discharge.         Left eye: No discharge.      Conjunctiva/sclera: Conjunctivae normal.      Pupils: Pupils are equal,  round, and reactive to light.   Pulmonary:      Effort: Pulmonary effort is normal.   Genitourinary:     Comments: SPT in place  Musculoskeletal:      Cervical back: Normal range of motion.   Skin:     General: Skin is warm and dry.      Coloration: Skin is not jaundiced or pale.      Findings: No bruising, erythema, lesion or rash.   Neurological:      General: No focal deficit present.      Mental Status: He is alert and oriented to person, place, and time. Mental status is at baseline.      Gait: Gait normal.   Psychiatric:         Mood and Affect: Mood normal.         Behavior: Behavior normal.         Thought Content: Thought content normal.         Judgment: Judgment normal.         Imaging:  Imaging reviewed - both report and images personally reviewed.     Labs:  Recent Labs     03/26/24  0446 03/27/24  0455 03/28/24  0546   WBC 8.58 8.32 11.54*       Recent Labs     03/26/24  0446 03/27/24  0455 03/28/24  0546   HGB 13.3 13.3 11.4*     Recent Labs     03/26/24  0446 03/27/24  0455 03/28/24  0546   HCT 40.7 40.5 35.0*     Recent Labs     03/26/24  0446 03/27/24  0455 03/28/24  0546   CREATININE 0.90 1.12 1.08       History:    Past Medical History:   Diagnosis Date    A-fib (HCC)     Ambulatory dysfunction     Anxiety     Anxiety disorder     Atrial fibrillation (HCC)     Chronic indwelling Cloud catheter     COPD (chronic obstructive pulmonary disease) (HCC)     Coronary artery disease     Depression     Cloud catheter in place     Hepatitis C     screening negative in 1/2017    Hepatitis C     History of MI (myocardial infarction)     Hypertension     MI (myocardial infarction) (HCC)     Urinary catheter in place     Urinary retention     Use of cane as ambulatory aid      Social History     Socioeconomic History    Marital status: /Civil Union     Spouse name: None    Number of children: 3    Years of education: 12    Highest education level: 12th grade   Occupational History    Occupation: retired    Tobacco Use    Smoking status: Former     Current packs/day: 0.00     Average packs/day: 1.5 packs/day for 57.0 years (85.5 ttl pk-yrs)     Types: Cigarettes     Start date: 3/12/1966     Quit date: 3/12/2023     Years since quittin.0    Smokeless tobacco: Never    Tobacco comments:     since age 14; Has history of smoking for over 55 years. He has been smoking more than packet daily in the past however he has been smokes about half a pack a day lately and stopped smoking on 2018 when he was admitted to the hospital.    Vaping Use    Vaping status: Never Used   Substance and Sexual Activity    Alcohol use: Not Currently    Drug use: Never    Sexual activity: Not Currently     Partners: Female     Birth control/protection: Condom Male   Other Topics Concern    None   Social History Narrative    ** Merged History Encounter **         ** Merged History Encounter **         ** Merged History Encounter **         History of Ultra Sound: 2016  History of Stress Test: 2018  History of ECHO: 2018  · Most recent tobacco use screenin2019    · Live alone or with others:   with others      · Diet:   Regular    · Caffeine intake:   Moderate    · Guns     present in home:   No    · Asbestos exposure:   No    · TB exposure:   No    · Environmental exposure:   No    · Animal exposure:   No    · Smoke alarm in home:   Yes       Social Determinants of Health     Financial Resource Strain: Not on file   Food Insecurity: No Food Insecurity (3/27/2024)    Hunger Vital Sign     Worried About Running Out of Food in the Last Year: Never true     Ran Out of Food in the Last Year: Never true   Recent Concern: Food Insecurity - Food Insecurity Present (3/24/2024)    Hunger Vital Sign     Worried About Running Out of Food in the Last Year: Sometimes true     Ran Out of Food in the Last Year: Sometimes true   Transportation Needs: No Transportation Needs (3/27/2024)    PRAPARE - Transportation     Lack of  Transportation (Medical): No     Lack of Transportation (Non-Medical): No   Recent Concern: Transportation Needs - Unmet Transportation Needs (3/24/2024)    PRAPARE - Transportation     Lack of Transportation (Medical): Yes     Lack of Transportation (Non-Medical): Yes   Physical Activity: Insufficiently Active (5/29/2020)    Exercise Vital Sign     Days of Exercise per Week: 3 days     Minutes of Exercise per Session: 30 min   Stress: Stress Concern Present (5/29/2020)    [employer] - Occupational Stress Questionnaire     Feeling of Stress : To some extent   Social Connections: Not on file   Intimate Partner Violence: Not on file   Housing Stability: Low Risk  (3/27/2024)    Housing Stability Vital Sign     Unable to Pay for Housing in the Last Year: No     Number of Places Lived in the Last Year: 2     Unstable Housing in the Last Year: No     Past Surgical History:   Procedure Laterality Date    CARDIAC CATHETERIZATION  07/09/2018    CARDIAC ELECTROPHYSIOLOGY PROCEDURE N/A 9/30/2022    Procedure: Cardiac loop recorder implant;  Surgeon: Duke Buckner MD;  Location: [facility];  Service: Cardiology    CARDIAC ELECTROPHYSIOLOGY PROCEDURE  09/30/2022    Cardiac loop recorder implant; Dr. Duke Buckner    CARDIAC LOOP RECORDER  09/2022    COLONOSCOPY      COLONOSCOPY      INGUINAL HERNIA REPAIR Right 08/11/2022    Dr. Encarnacion    IR SUPRAPUBIC CATHETER PLACEMENT  3/27/2024    NC HEMIARTHROPLASTY HIP PARTIAL Left 3/26/2024    Procedure: HEMIARTHROPLASTY HIP (BIPOLAR);  Surgeon: Ibeth Beal DO;  Location: [facility];  Service: Orthopedics    NC RPR 1ST INGUN HRNA AGE 5 YRS/> REDUCIBLE Right 8/11/2022    Procedure: REPAIR HERNIA INGUINAL;  Surgeon: Ady Encarnacion DO;  Location: [facility];  Service: General    TONSILLECTOMY       Family History   Problem Relation Age of Onset    Hypertension Mother     Coronary artery disease Mother         premature    Diabetes Father      Coronary artery disease Father         premature    Hypertension Father        Aleena Antonio PA-C  Date: 3/28/2024 Time: 1:28 PM

## 2024-03-28 NOTE — PLAN OF CARE
Problem: PHYSICAL THERAPY ADULT  Goal: Performs mobility at highest level of function for planned discharge setting.  See evaluation for individualized goals.  Description: Treatment/Interventions: Functional transfer training, LE strengthening/ROM, Elevations, Therapeutic exercise, Endurance training, Cognitive reorientation, Patient/family training, Equipment eval/education, Bed mobility, Gait training, Compensatory technique education, Continued evaluation, Spoke to nursing, Spoke to advanced practitioner, OT  Equipment Recommended: Walker       See flowsheet documentation for full assessment, interventions and recommendations.  Outcome: Progressing  Note: Prognosis: Fair  Problem List: Decreased strength, Decreased range of motion, Decreased endurance, Impaired balance, Decreased mobility, Decreased coordination, Decreased cognition, Impaired judgement, Decreased safety awareness, Decreased skin integrity, Orthopedic restrictions, Pain (DECREASED RECALL OF THP)  Assessment: Pt is a 78 y.o. male presenting POD2 L hemiarthroplasty, admitted to [facility] from [facility] on 3/27/2024 with primary dx of Urinary retention. PT consulted for assessment of mobility and d/c needs, as well as to acknowledge activity as matt orders. Pt's current comorbidities and personal factors affecting tx include limited recall of posterior hip precautions and Orthostatic hypotension . Based on findings indicated in flowsheet, pt's current clinical presentation is  unstable/unpredictable, (high complexity), d/t changing level of pain, varying levels of cognitive performance, increased fall risk, new onset of impairment of functional mobility, decreased endurance, and new onset of weakness. Prior to admission, pt was independent with occasional use of RW. Upon PT eval, pt is currently requiring mod Ax1 for bed mobility; min Ax1 for transfers; min Ax1 for amb 25' with RW. Pt presents during PT IE as functioning below baseline d/t above listed  functional impairments and is currently a fall risk d/t impulsivity, h/o falls, impaired balance, impaired cognition, impaired insight/safety awareness, use of ambulatory aid, varying levels of pain , advanced age, acuity of medical illness, and WB restriction. Pt will  benefit from continued PT services in order to address impairments, dec fall risk, maximize independence with functional mobility, and prepare for safe transition to next level of care. Based on above noted presentation and impairments, pt would benefit from Level II (moderate PT intensity) resources upon d/c.        Rehab Resource Intensity Level, PT: II (Moderate Resource Intensity)    See flowsheet documentation for full assessment.

## 2024-03-28 NOTE — ASSESSMENT & PLAN NOTE
EKG confirming A-fib, rate controlled  Continue PTA metoprolol succinate 25 mg daily  Per IR, may resume Eliquis 5 mg twice a day if good hemostasis and no concern for acute bleeding

## 2024-03-29 LAB
ANION GAP SERPL CALCULATED.3IONS-SCNC: 8 MMOL/L (ref 4–13)
BUN SERPL-MCNC: 22 MG/DL (ref 5–25)
CALCIUM SERPL-MCNC: 8.1 MG/DL (ref 8.4–10.2)
CHLORIDE SERPL-SCNC: 108 MMOL/L (ref 96–108)
CO2 SERPL-SCNC: 22 MMOL/L (ref 21–32)
CREAT SERPL-MCNC: 0.98 MG/DL (ref 0.6–1.3)
ERYTHROCYTE [DISTWIDTH] IN BLOOD BY AUTOMATED COUNT: 13.3 % (ref 11.6–15.1)
GFR SERPL CREATININE-BSD FRML MDRD: 74 ML/MIN/1.73SQ M
GLUCOSE SERPL-MCNC: 104 MG/DL (ref 65–140)
HCT VFR BLD AUTO: 35.8 % (ref 36.5–49.3)
HGB BLD-MCNC: 11.5 G/DL (ref 12–17)
MCH RBC QN AUTO: 30.7 PG (ref 26.8–34.3)
MCHC RBC AUTO-ENTMCNC: 32.1 G/DL (ref 31.4–37.4)
MCV RBC AUTO: 96 FL (ref 82–98)
PLATELET # BLD AUTO: 169 THOUSANDS/UL (ref 149–390)
PMV BLD AUTO: 10.7 FL (ref 8.9–12.7)
POTASSIUM SERPL-SCNC: 4.1 MMOL/L (ref 3.5–5.3)
RBC # BLD AUTO: 3.74 MILLION/UL (ref 3.88–5.62)
SODIUM SERPL-SCNC: 138 MMOL/L (ref 135–147)
WBC # BLD AUTO: 12.03 THOUSAND/UL (ref 4.31–10.16)

## 2024-03-29 PROCEDURE — 97530 THERAPEUTIC ACTIVITIES: CPT

## 2024-03-29 PROCEDURE — 85027 COMPLETE CBC AUTOMATED: CPT | Performed by: INTERNAL MEDICINE

## 2024-03-29 PROCEDURE — 97116 GAIT TRAINING THERAPY: CPT

## 2024-03-29 PROCEDURE — 80048 BASIC METABOLIC PNL TOTAL CA: CPT | Performed by: INTERNAL MEDICINE

## 2024-03-29 PROCEDURE — 94760 N-INVAS EAR/PLS OXIMETRY 1: CPT

## 2024-03-29 PROCEDURE — 94640 AIRWAY INHALATION TREATMENT: CPT

## 2024-03-29 PROCEDURE — 99024 POSTOP FOLLOW-UP VISIT: CPT

## 2024-03-29 PROCEDURE — 99232 SBSQ HOSP IP/OBS MODERATE 35: CPT | Performed by: INTERNAL MEDICINE

## 2024-03-29 RX ORDER — OXYCODONE HYDROCHLORIDE 5 MG/1
2.5 TABLET ORAL EVERY 6 HOURS PRN
Qty: 10 TABLET | Refills: 0 | Status: SHIPPED | OUTPATIENT
Start: 2024-03-29 | End: 2024-04-08

## 2024-03-29 RX ADMIN — ACETAMINOPHEN 975 MG: 325 TABLET, FILM COATED ORAL at 14:57

## 2024-03-29 RX ADMIN — LEVALBUTEROL HYDROCHLORIDE 1.25 MG: 1.25 SOLUTION RESPIRATORY (INHALATION) at 07:51

## 2024-03-29 RX ADMIN — APIXABAN 5 MG: 5 TABLET, FILM COATED ORAL at 21:14

## 2024-03-29 RX ADMIN — FLUTICASONE FUROATE AND VILANTEROL TRIFENATATE 1 PUFF: 100; 25 POWDER RESPIRATORY (INHALATION) at 09:33

## 2024-03-29 RX ADMIN — ACETAMINOPHEN 975 MG: 325 TABLET, FILM COATED ORAL at 06:34

## 2024-03-29 RX ADMIN — IPRATROPIUM BROMIDE 0.5 MG: 0.5 SOLUTION RESPIRATORY (INHALATION) at 20:16

## 2024-03-29 RX ADMIN — IPRATROPIUM BROMIDE 0.5 MG: 0.5 SOLUTION RESPIRATORY (INHALATION) at 07:50

## 2024-03-29 RX ADMIN — MIDODRINE HYDROCHLORIDE 5 MG: 5 TABLET ORAL at 17:10

## 2024-03-29 RX ADMIN — ACETAMINOPHEN 975 MG: 325 TABLET, FILM COATED ORAL at 21:14

## 2024-03-29 RX ADMIN — APIXABAN 5 MG: 5 TABLET, FILM COATED ORAL at 09:31

## 2024-03-29 RX ADMIN — METHENAMINE HIPPURATE 1 G: 1 TABLET ORAL at 09:33

## 2024-03-29 RX ADMIN — METHENAMINE HIPPURATE 1 G: 1 TABLET ORAL at 17:09

## 2024-03-29 RX ADMIN — LEVALBUTEROL HYDROCHLORIDE 1.25 MG: 1.25 SOLUTION RESPIRATORY (INHALATION) at 20:16

## 2024-03-29 RX ADMIN — METOPROLOL SUCCINATE 25 MG: 25 TABLET, EXTENDED RELEASE ORAL at 09:32

## 2024-03-29 NOTE — ASSESSMENT & PLAN NOTE
Patient told me, that he had been having diarrhea for about a week now.  He told me he has been averaging 3 episodes of diarrhea per day.  He told me, that his last bowel movement [facility] and none yet today.  Monitor patient's bowel movements.  I spoke to the nurse about this.  Patient was on bowel regimen with stool softener/laxatives at [facility] on and this morning.  Thus I made them to be taken on as-needed basis now.

## 2024-03-29 NOTE — PROGRESS NOTES
Orthopedics  Sal Moura 78 y.o. male MRN: [id number]  Unit/Bed#: W -01        Subjective:    78 y.o.male POD#3 s/p left hip hemiarthroplasty. Patient is sitting comfortably in hospital chair this afternoon in no acute distress. Patient reports having some pain around the left hip, but states overall pain is lessening. Patient denies any numbness or tingling in the left lower extremity. Patient reports he did not like having hip abduction pillow placed between his legs last night and does not want to sleep with pillow between his legs. Patient denies any fevers or chills. Patient offers no additional complaints.       Objective:    Labs:  0   Lab Value Date/Time    HCT 35.8 (L) 03/29/2024 0455    HCT 35.0 (L) 03/28/2024 0546    HCT 40.5 03/27/2024 0455    HGB 11.5 (L) 03/29/2024 0455    HGB 11.4 (L) 03/28/2024 0546    HGB 13.3 03/27/2024 0455    INR 1.29 (H) 03/24/2024 1704    WBC 12.03 (H) 03/29/2024 0455    WBC 11.54 (H) 03/28/2024 0546    WBC 8.32 03/27/2024 0455       Meds:    Current Facility-Administered Medications:     acetaminophen (TYLENOL) tablet 975 mg, 975 mg, Oral, Q8H DONIS, Clarice Vaz MD, 975 mg at 03/29/24 0634    albuterol inhalation solution 2.5 mg, 2.5 mg, Nebulization, Q6H PRN, Clarice Vaz MD    apixaban (ELIQUIS) tablet 5 mg, 5 mg, Oral, BID, Joan Nichole MD, 5 mg at 03/29/24 0931    docusate sodium (COLACE) capsule 100 mg, 100 mg, Oral, BID PRN, Tiki Chavez MD    fentaNYL (SUBLIMAZE) injection 50 mcg, 50 mcg, Intravenous, Q3 min PRN, Aydin Silva CRNA    Fluticasone Furoate-Vilanterol 100-25 mcg/actuation 1 puff, 1 puff, Inhalation, Daily, Clarice Vaz MD, 1 puff at 03/29/24 0938    HYDROmorphone HCl (DILAUDID) injection 0.2 mg, 0.2 mg, Intravenous, Q3H PRN, Tiki Chavez MD    ipratropium (ATROVENT) 0.02 % inhalation solution 0.5 mg, 0.5 mg, Nebulization, TID, Clarice Vaz MD, 0.5 mg at 03/29/24 0750    levalbuterol  "(XOPENEX) inhalation solution 1.25 mg, 1.25 mg, Nebulization, TID, Clarice Vaz MD, 1.25 mg at 03/29/24 0751    methenamine hippurate (HIPREX) tablet 1 g, 1 g, Oral, BID With Meals, Clarice Vaz MD, 1 g at 03/29/24 0933    metoprolol succinate (TOPROL-XL) 24 hr tablet 25 mg, 25 mg, Oral, Daily, Clarice Vaz MD, 25 mg at 03/29/24 0932    midodrine (PROAMATINE) tablet 5 mg, 5 mg, Oral, TID AC, Clarice Vaz MD, 5 mg at 03/28/24 1514    ondansetron (ZOFRAN) injection 4 mg, 4 mg, Intravenous, Q8H PRN, Clarice Vaz MD    ondansetron (ZOFRAN) injection 4 mg, 4 mg, Intravenous, Once PRN, Aydin Silva CRNA    oxyCODONE (ROXICODONE) IR tablet 5 mg, 5 mg, Oral, Q6H PRN, Tiki Chavez MD, 5 mg at 03/27/24 1131    oxyCODONE (ROXICODONE) split tablet 2.5 mg, 2.5 mg, Oral, Q6H PRN, Tiki Chavez MD, 2.5 mg at 03/28/24 0040    senna (SENOKOT) tablet 17.2 mg, 2 tablet, Oral, HS PRN, Tiki Chavez MD    Blood Culture:   Lab Results   Component Value Date    BLOODCX No Growth After 5 Days. 06/02/2023    BLOODCX No Growth After 5 Days. 06/02/2023       Wound Culture:   No results found for: \"WOUNDCULT\"    Ins and Outs:  I/O last 24 hours:  In: 660 [P.O.:660]  Out: 1525 [Urine:1525]      Orthopedic Physical Exam:    Vitals:    03/29/24 1030   BP: 146/95   Pulse: 102   Resp:    Temp: 97.7 °F (36.5 °C)   SpO2: 95%     left Lower Extremity:  Skin: extremity appears well perfused overall.   Mepilex Dressing clean dry intact without soilage or strike through present   Thigh soft with normal post-op swelling, no palpable hematoma, no erythema appreciated  TTP over surgical incision. No other bony or soft tissue tenderness to palpation appreciated.   + ankle DF/PF, EHL/FHL  SILT LLE  No calf tenderness or pitting edema  2+ DP pulse    _*_*_*_*_*_*_*_*_*_*_*_*_*_*_*_*_*_*_*_*_*_*_*_*_*_*_*_*_*_*_*_*_*_*_*_*_*_*_*_*_*    Assessment:     78 y.o.male s/p left hip " hemiarthroplasty by Dr. Beal on 3/26/2024.      Plan:    Weight bearing as tolerated left lower extremity with knee immobilizer intact  Posterior hip precautions  Use of hip abduction pillow between legs while laying in bed and sleeping   Up and out of bed with assistance as needed   DVT prophylaxis - currently on Eliquis per primary service  Medicine team for all medical management  Analgesics per primary team  PT/OT for ambulation and gait training   Patient noted to have acute blood loss anemia due to a drop in Hbg of > 2.0g from preop levels, will monitor vital signs and resuscitate with IV fluids as needed.    Hemoglobin noted to be 11.5 today, monitor.  VSS.  Stressed importance of maintaining posterior hip precautions and use of hip abduction pillow with patient today.  Dispo: Ortho will follow. Patient should follow-up outpatient with  two weeks postop. Please see above for additional details.         Kat Guerin PA-C

## 2024-03-29 NOTE — ASSESSMENT & PLAN NOTE
Wt Readings from Last 3 Encounters:   03/29/24 78 kg (172 lb)   03/27/24 74.4 kg (164 lb 0.4 oz)   03/07/24 69.9 kg (154 lb)     History of chronic diastolic CHF with preserved ejection fraction  Presently stable and compensated.  EF 50% in 2022  Loop recorder in place  Euvolemic on exam  Not maintained on outpatient diuretics  Continue PTA metoprolol succinate  Monitor input and output and daily weights.

## 2024-03-29 NOTE — ASSESSMENT & PLAN NOTE
Urinalysis revealed innumerable bacteria, RBCs 4-10.  Per my discussion with the patient, he did not have any urologic symptoms, like dysuria, burning sensation in urination, painful urination, fever or chills, suprapubic pains, flank pains, until he had urinary retention again at [facility] and staff there were unable to reinsert a miller catheter, and thus the transfer here for SPC placement by IR.  Presently, patient only urologic symptom is the suprapubic pubic pain due to urinary retention.  Patient does not have any fever or chills or any flank pains.  No leukocytosis on patient's CBC.  Thus, at this point, likely asymptomatic bacteriuria, possible colonization with patient being on chronic Miller catheter, for observation off antibiotics at this point.  However, if patient develops christine signs and symptoms of UTI, despite decompression with IR placement of suprapubic catheter, consider sending patient's urine for urine culture and starting antibiotics.  No antibiotics at this time

## 2024-03-29 NOTE — PROGRESS NOTES
[facility]  Progress Note  Name: Sal Moura I  MRN: [id number]  Unit/Bed#: W -01 I Date of Admission: 3/27/2024   Date of Service: 3/29/2024 I Hospital Day: 2    Assessment/Plan   * Urinary retention  Assessment & Plan  Patient with a history of indwelling catheter, follows up with [facility] urology as an outpatient.   Scheduled for an outpatient IR suprapubic catheter placement 11/23 -rescheduled due to patient taking Eliquis and issues with scheduling  Nursing home visit on 11/23: Cloud catheter was dislodged and unable to to be reinserted by nursing staff.  Left out and patient voiding at this time.  At [facility]: Post op from left hip arthroplasty notified by nursing patient having urinary retention issues.  Complaining of abdominal pain, distention, urgency to urinate.  Nursing bladder scan for greater than 680.  Several attempts by nursing, NP , and ED unable to obtain straight cath or Cloud  Reach out to on-call [facility] urology: Steven TARIQ: performed bedside needle decompression of bladder.see procedure note by urology & recommendations  Urinary retention protocol  Continue PTA Hipprex for UTI suppression  Status post IR guided catheter suprapubic placement 3/27      Diarrhea  Assessment & Plan  Patient told me, that he had been having diarrhea for about a week now.  He told me he has been averaging 3 episodes of diarrhea per day.  He told me, that his last bowel movement was yesterday when he was still at [facility], and none yet today.  Monitor patient's bowel movements.  I spoke to the nurse about this.  Patient was on bowel regimen with stool softener/laxatives at [facility] on and this morning.  Thus I made them to be taken on as-needed basis now.  For further evaluation and management if patient having persistent diarrhea.    Abnormal urinalysis  Assessment & Plan  Urinalysis revealed innumerable bacteria, RBCs  4-10.  Per my discussion with the patient, he did not have any urologic symptoms, like dysuria, burning sensation in urination, painful urination, fever or chills, suprapubic pains, flank pains, until he had urinary retention again at [facility] and staff there were unable to reinsert a miller catheter, and thus the transfer here for SPC placement by IR.  Presently, patient only urologic symptom is the suprapubic pubic pain due to urinary retention.  Patient does not have any fever or chills or any flank pains.  No leukocytosis on patient's CBC.  Thus, at this point, likely asymptomatic bacteriuria, possible colonization with patient being on chronic Miller catheter, for observation off antibiotics at this point.  However, if patient develops christine signs and symptoms of UTI, despite decompression with IR placement of suprapubic catheter, consider sending patient's urine for urine culture and starting antibiotics.  No antibiotics at this time    Closed left hip fracture, initial encounter (HCC)  Assessment & Plan  Fall at [facility] on 3/23, left hip pain/leg externally rotated and shortened. [facility] ED imaging left hip fracture  Bowel regimen as needed (patient claimed of having diarrhea).  OR 3/26 s/p left hemiarthroplasty  As per orthopedic surgeon, weightbearing as tolerated, left lower extremity with posterior hip precautions.  Follow-up outpatient with orthopedic surgery and PT OT    Chronic diastolic congestive heart failure (HCC)  Assessment & Plan  Wt Readings from Last 3 Encounters:   03/29/24 78 kg (172 lb)   03/27/24 74.4 kg (164 lb 0.4 oz)   03/07/24 69.9 kg (154 lb)     History of chronic diastolic CHF with preserved ejection fraction  Presently stable and compensated.  EF 50% in 2022  Loop recorder in place  Euvolemic on exam  Not maintained on outpatient diuretics  Continue PTA metoprolol succinate  Monitor input and output and daily weights.    COPD (chronic obstructive pulmonary disease)  (HCC)  Assessment & Plan  No acute exacerbation.  Continue patient's ICS inhaler and DuoNeb nebulizations.  Oxygen as needed for oxygen saturation less than 89%.    Orthostatic hypotension  Assessment & Plan  Continue PTA midodrine 3 times daily with hold for SBP > 135     Permanent atrial fibrillation (HCC)  Assessment & Plan  EKG confirming A-fib, rate controlled  Continue PTA metoprolol succinate 25 mg daily  resumed Eliquis 5 mg twice a day           VTE Pharmacologic Prophylaxis: VTE Score: 14 High Risk (Score >/= 5) - Pharmacological DVT Prophylaxis Ordered: apixaban (Eliquis). Sequential Compression Devices Ordered.    Mobility:   Basic Mobility Inpatient Raw Score: 16  JH-HLM Goal: 5: Stand one or more mins  JH-HLM Achieved: 7: Walk 25 feet or more  JH-HLM Goal achieved. Continue to encourage appropriate mobility.    Patient Centered Rounds: I performed bedside rounds with nursing staff today.  Discussions with Specialists or Other Care Team Provider: Interventional radiology, urology    Education and Discussions with Family / Patient:  Will update family.     Current Length of Stay: 2 day(s)  Current Patient Status: Inpatient   Discharge Plan: Anticipate discharge tomorrow to rehab facility.    Code Status: Level 3 - DNAR and DNI    Subjective:   No new headache, fever, chills, or flank pain.  Cloud catheter draining appropriately.    Patient is medically stable for discharge and pending placement to rehab      Objective:     Vitals:   Temp (24hrs), Av.2 °F (36.8 °C), Min:97.7 °F (36.5 °C), Max:98.6 °F (37 °C)    Temp:  [97.7 °F (36.5 °C)-98.6 °F (37 °C)] 97.7 °F (36.5 °C)  HR:  [] 102  Resp:  [18-19] 19  BP: (114-148)/() 146/95  SpO2:  [92 %-96 %] 95 %  Body mass index is 23.33 kg/m².     Input and Output Summary (last 24 hours):     Intake/Output Summary (Last 24 hours) at 3/29/2024 1341  Last data filed at 3/29/2024 0857  Gross per 24 hour   Intake 240 ml   Output 850 ml   Net -610 ml        Physical Exam:   Physical Exam  Vitals and nursing note reviewed.   Constitutional:       General: He is not in acute distress.     Appearance: He is not ill-appearing, toxic-appearing or diaphoretic.   HENT:      Mouth/Throat:      Pharynx: Oropharynx is clear. No oropharyngeal exudate or posterior oropharyngeal erythema.   Eyes:      General: No scleral icterus.        Right eye: No discharge.         Left eye: No discharge.   Cardiovascular:      Rate and Rhythm: Normal rate. Rhythm irregular.      Pulses: Normal pulses.      Heart sounds: Normal heart sounds.   Pulmonary:      Effort: Pulmonary effort is normal. No respiratory distress.      Breath sounds: Normal breath sounds. No stridor. No wheezing, rhonchi or rales.   Abdominal:      General: Bowel sounds are normal. There is distension.      Palpations: Abdomen is soft.      Tenderness: There is abdominal tenderness in the suprapubic area. There is no right CVA tenderness, left CVA tenderness, guarding or rebound.      Comments: Positive for suprapubic tenderness and distention.   Musculoskeletal:      Cervical back: Neck supple.      Right lower leg: No edema.      Left lower leg: No edema.   Skin:     General: Skin is warm.      Coloration: Skin is not jaundiced or pale.      Findings: No erythema or rash.   Neurological:      Mental Status: He is alert and oriented to person, place, and time.   Psychiatric:         Mood and Affect: Affect is inappropriate.         Judgment: Judgment is inappropriate.          Additional Data:     Labs:  Results from last 7 days   Lab Units 03/29/24  0455 03/25/24  0510 03/24/24  1704   WBC Thousand/uL 12.03*   < > 11.37*   HEMOGLOBIN g/dL 11.5*   < > 15.1   HEMATOCRIT % 35.8*   < > 46.2   PLATELETS Thousands/uL 169   < > 153   NEUTROS PCT %  --   --  79*   LYMPHS PCT %  --   --  11*   MONOS PCT %  --   --  8   EOS PCT %  --   --  2    < > = values in this interval not displayed.     Results from last 7 days   Lab  Units 03/29/24  0455 03/25/24  0510 03/24/24  1704   SODIUM mmol/L 138   < > 138   POTASSIUM mmol/L 4.1   < > 4.2   CHLORIDE mmol/L 108   < > 106   CO2 mmol/L 22   < > 26   BUN mg/dL 22   < > 26*   CREATININE mg/dL 0.98   < > 1.25   ANION GAP mmol/L 8   < > 6   CALCIUM mg/dL 8.1*   < > 9.2   ALBUMIN g/dL  --   --  4.2   TOTAL BILIRUBIN mg/dL  --   --  1.49*   ALK PHOS U/L  --   --  60   ALT U/L  --   --  29   AST U/L  --   --  17   GLUCOSE RANDOM mg/dL 104   < > 114    < > = values in this interval not displayed.     Results from last 7 days   Lab Units 03/24/24  1704   INR  1.29*                   Lines/Drains:  Invasive Devices       Peripheral Intravenous Line  Duration             Peripheral IV 03/25/24 Left;Ventral (anterior) Wrist 4 days    Peripheral IV 03/27/24 Right Hand 2 days              Drain  Duration             Suprapubic Catheter 18 Fr. 2 days                          Imaging: No pertinent imaging reviewed.    Recent Cultures (last 7 days):         Last 24 Hours Medication List:   Current Facility-Administered Medications   Medication Dose Route Frequency Provider Last Rate    acetaminophen  975 mg Oral Q8H DONIS Clarice Vaz MD      albuterol  2.5 mg Nebulization Q6H PRN Clarice Vaz MD      apixaban  5 mg Oral BID Joan Nichole MD      docusate sodium  100 mg Oral BID PRN Tiki Chavez MD      fentaNYL  50 mcg Intravenous Q3 min PRN Aydin Silva CRNA      Fluticasone Furoate-Vilanterol  1 puff Inhalation Daily Clarice Vaz MD      HYDROmorphone  0.2 mg Intravenous Q3H PRN Tiki Chavez MD      ipratropium  0.5 mg Nebulization TID Clarice Vaz MD      levalbuterol  1.25 mg Nebulization TID Clarice Vaz MD      methenamine hippurate  1 g Oral BID With Meals Clarice Vaz MD      metoprolol succinate  25 mg Oral Daily Clarice Vaz MD      midodrine  5 mg Oral TID AC Clarice Vaz MD      ondansetron  4 mg  Intravenous Q8H PRN Clarice Vaz MD      ondansetron  4 mg Intravenous Once PRN Aydin Silva CRNA      oxyCODONE  5 mg Oral Q6H PRN Tiki Chavez MD      oxyCODONE  2.5 mg Oral Q6H PRN Tiki Chavez MD      senna  2 tablet Oral HS PRN Tiki Chavez MD          Today, Patient Was Seen By: Kyle Brunner, MD    **Please Note: This note may have been constructed using a voice recognition system.**

## 2024-03-29 NOTE — ASSESSMENT & PLAN NOTE
EKG confirming A-fib, rate controlled  Continue PTA metoprolol succinate 25 mg daily  resumed Eliquis 5 mg twice a day

## 2024-03-29 NOTE — PHYSICAL THERAPY NOTE
PT TREATMENT    03/29/24  Time: 0924-1011    Name: Sal Moura  Age: 78 y.o.  MRN: [id number]  Admit date: 3/27/2024; LOS: 2    Primary Dx: Urinary retention    Functionally relevant past medical hx:   Past Medical History:   Diagnosis Date    A-fib (HCC)     Ambulatory dysfunction     Anxiety     Anxiety disorder     Atrial fibrillation (HCC)     Chronic indwelling Cloud catheter     COPD (chronic obstructive pulmonary disease) (HCC)     Coronary artery disease     Depression     Cloud catheter in place     Hepatitis C     screening negative in 1/2017    Hepatitis C     History of MI (myocardial infarction)     Hypertension     MI (myocardial infarction) (HCC)     Urinary catheter in place     Urinary retention     Use of cane as ambulatory aid      Functionally relevant past surgical hx:   Past Surgical History:   Procedure Laterality Date    CARDIAC CATHETERIZATION  07/09/2018    CARDIAC ELECTROPHYSIOLOGY PROCEDURE N/A 9/30/2022    Procedure: Cardiac loop recorder implant;  Surgeon: Duke Buckner MD;  Location: [facility];  Service: Cardiology    CARDIAC ELECTROPHYSIOLOGY PROCEDURE  09/30/2022    Cardiac loop recorder implant; Dr. Duke Buckner    CARDIAC LOOP RECORDER  09/2022    COLONOSCOPY      COLONOSCOPY      INGUINAL HERNIA REPAIR Right 08/11/2022    Dr. Encarnacion    IR SUPRAPUBIC CATHETER PLACEMENT  3/27/2024    [place] HEMIARTHROPLASTY HIP PARTIAL Left 3/26/2024    Procedure: HEMIARTHROPLASTY HIP (BIPOLAR);  Surgeon: Ibeth Beal DO;  Location: [facility];  Service: Orthopedics    NY RPR 1ST INGUN HRNA AGE 5 YRS/> REDUCIBLE Right 8/11/2022    Procedure: REPAIR HERNIA INGUINAL;  Surgeon: Ady Encarnacion DO;  Location: [facility];  Service: General    TONSILLECTOMY         Performed at least 2 patient identifiers during session: Name and Epic photo     03/29/24 0924   PT Last Visit   PT Visit Date 03/29/24   Note Type   Note Type Treatment   Pain Assessment   Pain Score No Pain   Pain  "Location/Orientation Orientation: Left;Location: Leg   Pain Onset/Description Other (Comment)  (\"I feel something but I wouldn't call it pain\")   Precautions   Total Hip Replacement (S)  ADduction;Internal rotation;Flexion  (posterior hip precautions, NO IR, add, flex >90)   Restrictions/Precautions   Weight Bearing Precautions Per Order Yes   LLE Weight Bearing Per Order (S)  WBAT   Braces or Orthoses Knee immobilizer  (when supine or sitting w/ LLE extended; hip abd pillow)   Other Precautions Impulsive;Cognitive;Chair Alarm;Bed Alarm;WBS;THR;Fall Risk;Pain  (suprapubic catheter)   General   Chart Reviewed Yes   Additional Pertinent History Pt w/ notable noncompliance w/ posterior hip precautions yesterday.   Response to Previous Treatment Patient with no complaints from previous session.   Family/Caregiver Present No   Cognition   Overall Cognitive Status Impaired   Arousal/Participation Cooperative   Attention Attends with cues to redirect   Orientation Level Oriented X4   Memory Decreased short term memory;Decreased recall of recent events;Decreased recall of precautions   Following Commands Follows multistep commands with increased time or repetition   Comments Pt w/ improved carryover and maintainance of THP, continue to reinforce for safety.   Subjective   Subjective \"I want to walk around in the halls\"   Bed Mobility   Additional Comments unable to assess, pt seated OOB in bedside recliner pre/post   Transfers   Sit to Stand 4  Minimal assistance   Additional items Assist x 1;Armrests;Increased time required;Verbal cues  (w/ RW from recliner)   Stand to Sit 4  Minimal assistance   Additional items Assist x 1;Increased time required;Verbal cues  (w/ RW to recliner)   Stand pivot 4  Minimal assistance   Additional items Assist x 1;Increased time required;Verbal cues  (w/ RW and cues to maintain THP)   Additional Comments (S)  continue to reinforce THP   Ambulation/Elevation   Gait pattern Antalgic;Forward " Flexion;Decreased foot clearance;Short stride;Decreased heel strike   Gait Assistance 4  Minimal assist   Additional items Assist x 1;Verbal cues;Tactile cues  (edu on safe turning to maintain THP)   Assistive Device Rolling walker   Distance 20'x1; 25'x1   Stair Management Assistance Not tested   Ambulation/Elevation Additional Comments Introduced safe turning, pt verbalizing understanding that he should turn to the R when possible, but to place LLE in ER prior to turning upper body/moving RW when turning to the L to maintain THP. Continued reinforcement needed, RN and PCAs notified.   Balance   Static Sitting Good   Dynamic Sitting Fair +   Static Standing Fair  (able to stand w/o LOB ~5sec w/o UE support)   Dynamic Standing Fair -  (w/ RW)   Ambulatory Poor +  (w/ RW)   Endurance Deficit   Endurance Deficit Yes   Endurance Deficit Description Pt w/ SOB after 2 amb trial   Activity Tolerance   Activity Tolerance Patient tolerated treatment well   Medical Staff Made Aware Yes, RN Loly, residents, PCAs   Assessment   Prognosis Fair   Problem List Decreased strength;Decreased range of motion;Decreased endurance;Impaired balance;Decreased mobility;Decreased coordination;Decreased cognition;Impaired judgement;Decreased safety awareness;Decreased skin integrity;Orthopedic restrictions;Pain   Assessment Pt able to verbally recall all precautions and list steps to safely transfer and amb w/ use of RW, grossly demonstrating improvements w/ implementing the precautions during functional mobility as compared to yesterday's sessions. Pt accepting edu on importance to maintain precautions, after edu able to initiate safe turning and transfer methods w/ minimal VC. Continue to follow and reinforce as pt has a history of precaution noncompliance and is occasionally impulsive w/ mobility. Pt greatly benefits from step by step movements as it inc precaution compliance. Noted improvements w/ balance when standing and w/ amb.  Continue to rec level 2 rehab upon d/c as pt would benefit from additional PT to strengthen LLE and maintain hip precautions.   Goals   Patient Goals to walk in room   STG Expiration Date 04/11/24   Short Term Goal #1 Pt PT goals established in order to maximize functional independence.  In 14 days pt will be able to:  Perform bed mobility independently in order to inc independence with pressure relief and dec caregiver burden;  Perform functional transfers with independently in order to promote safety with position changes and facilitate safe return to prior living environment;  Amb 100' with least restrictive AD with supervision and stable vitals in order to improve safety with household amb and dec fall risk;  Increase balance grade by at least 1 point in order to inc pt safety with static positions and dynamic mobility to dec fall risk;  Fariba at least 3hrs OOB in upright position in order to improve muscular endurance and promote respiratory status;  Inc gait speed to safe speed required for their d/c destination in order to dec fall risk and promote functional mobility;  Negotiate 3 stairs with handrail assist and supervision in order to safely navigate stairs at his facility;  Increase AM-PAC score to 19/24 in order to inc functional independence and dec burden of care;  RECALL AND IMPLEMENT POSTERIOR HIP PRECAUTIONS INDEPENDENTLY  in order to protect hemiarthroplasty and prevent further injury.  (progressing, pt demonstrating improved precaution recall and safety)   PT Treatment Day 2   Plan   Treatment/Interventions Functional transfer training;LE strengthening/ROM;Elevations;Therapeutic exercise;Endurance training;Cognitive reorientation;Patient/family training;Equipment eval/education;Bed mobility;Gait training;Compensatory technique education;Continued evaluation;Spoke to nursing;OT   Progress Slow progress, cognitive deficits   PT Frequency 3-5x/wk   Discharge Recommendation   Rehab Resource Intensity  Level, PT II (Moderate Resource Intensity)   AM-PAC Basic Mobility Inpatient   Turning in Flat Bed Without Bedrails 3   Lying on Back to Sitting on Edge of Flat Bed Without Bedrails 3   Moving Bed to Chair 3   Standing Up From Chair Using Arms 3   Walk in Room 3   Climb 3-5 Stairs With Railing 1   Basic Mobility Inpatient Raw Score 16   Basic Mobility Standardized Score 38.32   [facility] Highest Level Of Mobility   -HL Goal 5: Stand one or more mins   -HLM Achieved 7: Walk 25 feet or more   Education   Education Provided Precautions for total hip arthroplasty (PETTY);Mobility training;Assistive device   Patient Explanation/teachback used;Demonstrates verbal understanding;Reinforcement needed   End of Consult   Patient Position at End of Consult Bedside chair;Bed/Chair alarm activated;All needs within reach   End of Consult Comments Pt w/ KI donned and hip abd pillow placed. PCAs notified     The patient's AM-PAC Basic Mobility Inpatient Short Form Raw Score is 16. A Raw score of less than or equal to 16 suggests the patient may benefit from discharge to post-acute rehabilitation services. Please also refer to the recommendation of the Physical Therapist for safe discharge planning.        Zora Wong, PT, DPT  [place] License [id number]

## 2024-03-29 NOTE — ASSESSMENT & PLAN NOTE
Patient with a history of indwelling catheter, follows up with [facility] urology as an outpatient.   Scheduled for an outpatient IR suprapubic catheter placement 11/23 -rescheduled due to patient taking Eliquis and issues with scheduling  Nursing home visit on 11/23: Cloud catheter was dislodged and unable to to be reinserted by nursing staff.  Left out and patient voiding at this time.  At [facility] on campus: Post op from left hip arthroplasty notified by nursing patient having urinary retention issues.  Complaining of abdominal pain, distention, urgency to urinate.  Nursing bladder scan for greater than 680.  Several attempts by nursing, NP , and ED unable to obtain straight cath or Cloud  Reach out to on-call [facility] urology: Steven TARIQ: performed bedside needle decompression of bladder.see procedure note by urology & recommendations  Urinary retention protocol  Continue PTA Hipprex for UTI suppression  Status post IR guided catheter suprapubic placement 3/27

## 2024-03-29 NOTE — PLAN OF CARE
Problem: PHYSICAL THERAPY ADULT  Goal: Performs mobility at highest level of function for planned discharge setting.  See evaluation for individualized goals.  Description: Treatment/Interventions: Functional transfer training, LE strengthening/ROM, Elevations, Therapeutic exercise, Endurance training, Cognitive reorientation, Patient/family training, Equipment eval/education, Bed mobility, Gait training, Compensatory technique education, Continued evaluation, Spoke to nursing, OT  Equipment Recommended: Walker       See flowsheet documentation for full assessment, interventions and recommendations.  Outcome: Progressing  Note: Prognosis: Fair  Problem List: Decreased strength, Decreased range of motion, Decreased endurance, Impaired balance, Decreased mobility, Decreased coordination, Decreased cognition, Impaired judgement, Decreased safety awareness, Decreased skin integrity, Orthopedic restrictions, Pain  Assessment: Pt able to verbally recall all precautions and list steps to safely transfer and amb w/ use of RW, grossly demonstrating improvements w/ implementing the precautions during functional mobility as compared to yesterday's sessions. Pt accepting edu on importance to maintain precautions, after edu able to initiate safe turning and transfer methods w/ minimal VC. Continue to follow and reinforce as pt has a history of precaution noncompliance and is occasionally impulsive w/ mobility. Pt greatly benefits from step by step movements as it inc precaution compliance. Noted improvements w/ balance when standing and w/ amb. Continue to rec level 2 rehab upon d/c as pt would benefit from additional PT to strengthen LLE and maintain hip precautions.        Rehab Resource Intensity Level, PT: II (Moderate Resource Intensity)    See flowsheet documentation for full assessment.

## 2024-03-29 NOTE — ASSESSMENT & PLAN NOTE
Fall at SNF on 3/23, left hip pain/leg externally rotated and shortened. [facility] ED imaging left hip fracture  Bowel regimen as needed (patient claimed of having diarrhea).  OR 3/26 s/p left hemiarthroplasty  As per orthopedic surgeon, weightbearing as tolerated, left lower extremity with posterior hip precautions.  As needed oxycodone for moderate/severe pain  Follow-up outpatient with orthopedic surgery 2 weeks postop  Medically stable for discharge back to facility with PT/OT

## 2024-03-29 NOTE — PLAN OF CARE
Problem: Prexisting or High Potential for Compromised Skin Integrity  Goal: Skin integrity is maintained or improved  Description: INTERVENTIONS:  - Identify patients at risk for skin breakdown  - Assess and monitor skin integrity  - Assess and monitor nutrition and hydration status  - Monitor labs   - Assess for incontinence   - Turn and reposition patient  - Assist with mobility/ambulation  - Relieve pressure over bony prominences  - Avoid friction and shearing  - Provide appropriate hygiene as needed including keeping skin clean and dry  - Evaluate need for skin moisturizer/barrier cream  - Collaborate with interdisciplinary team   - Patient/family teaching  - Consider wound care consult   Outcome: Progressing     Problem: PAIN - ADULT  Goal: Verbalizes/displays adequate comfort level or baseline comfort level  Description: Interventions:  - Encourage patient to monitor pain and request assistance  - Assess pain using appropriate pain scale  - Administer analgesics based on type and severity of pain and evaluate response  - Implement non-pharmacological measures as appropriate and evaluate response  - Consider cultural and social influences on pain and pain management  - Notify physician/advanced practitioner if interventions unsuccessful or patient reports new pain  Outcome: Progressing     Problem: INFECTION - ADULT  Goal: Absence or prevention of progression during hospitalization  Description: INTERVENTIONS:  - Assess and monitor for signs and symptoms of infection  - Monitor lab/diagnostic results  - Monitor all insertion sites, i.e. indwelling lines, tubes, and drains  - Monitor endotracheal if appropriate and nasal secretions for changes in amount and color  - [place] appropriate cooling/warming therapies per order  - Administer medications as ordered  - Instruct and encourage patient and family to use good hand hygiene technique  - Identify and instruct in appropriate isolation precautions for  identified infection/condition  Outcome: Progressing  Goal: Absence of fever/infection during neutropenic period  Description: INTERVENTIONS:  - Monitor WBC    Outcome: Progressing     Problem: SAFETY ADULT  Goal: Patient will remain free of falls  Description: INTERVENTIONS:  - Educate patient/family on patient safety including physical limitations  - Instruct patient to call for assistance with activity   - Consult OT/PT to assist with strengthening/mobility   - Keep Call bell within reach  - Keep bed low and locked with side rails adjusted as appropriate  - Keep care items and personal belongings within reach  - Initiate and maintain comfort rounds  - Make Fall Risk Sign visible to staff  - Offer Toileting every 2 Hours, in advance of need  - Initiate/Maintain bed alarm  - Obtain necessary fall risk management equipment: yellow socks  - Apply yellow socks and bracelet for high fall risk patients  - Consider moving patient to room near nurses station  Outcome: Progressing  Goal: Maintain or return to baseline ADL function  Description: INTERVENTIONS:  -  Assess patient's ability to carry out ADLs; assess patient's baseline for ADL function and identify physical deficits which impact ability to perform ADLs (bathing, care of mouth/teeth, toileting, grooming, dressing, etc.)  - Assess/evaluate cause of self-care deficits   - Assess range of motion  - Assess patient's mobility; develop plan if impaired  - Assess patient's need for assistive devices and provide as appropriate  - Encourage maximum independence but intervene and supervise when necessary  - Involve family in performance of ADLs  - Assess for home care needs following discharge   - Consider OT consult to assist with ADL evaluation and planning for discharge  - Provide patient education as appropriate  Outcome: Progressing  Goal: Maintains/Returns to pre admission functional level  Description: INTERVENTIONS:  - Perform AM-PAC 6 Click Basic Mobility/ Daily  Activity assessment daily.  - Set and communicate daily mobility goal to care team and patient/family/caregiver.   - Collaborate with rehabilitation services on mobility goals if consulted  - Perform Range of Motion 2 times a day.  - Reposition patient every 2 hours.  - Dangle patient 2 times a day  - Stand patient 2 times a day  - Ambulate patient 2 times a day  - Out of bed to chair 2 times a day   - Out of bed for meals 2 times a day  - Out of bed for toileting  - Record patient progress and toleration of activity level   Outcome: Progressing     Problem: DISCHARGE PLANNING  Goal: Discharge to home or other facility with appropriate resources  Description: INTERVENTIONS:  - Identify barriers to discharge w/patient and caregiver  - Arrange for needed discharge resources and transportation as appropriate  - Identify discharge learning needs (meds, wound care, etc.)  - Arrange for interpretive services to assist at discharge as needed  - Refer to Case Management Department for coordinating discharge planning if the patient needs post-hospital services based on physician/advanced practitioner order or complex needs related to functional status, cognitive ability, or social support system  Outcome: Progressing     Problem: Knowledge Deficit  Goal: Patient/family/caregiver demonstrates understanding of disease process, treatment plan, medications, and discharge instructions  Description: Complete learning assessment and assess knowledge base.  Interventions:  - Provide teaching at level of understanding  - Provide teaching via preferred learning methods  Outcome: Progressing

## 2024-03-29 NOTE — DISCHARGE INSTR - AVS FIRST PAGE
Dear Sal Moura,     It was our pleasure to care for you here at [facility].  It is our hope that we were always able to exceed your expectations for your care during your stay.  You were hospitalized due to urinary retention and cared for on the [facility] fourth floor by Kyle Brunner, MD under the service of Giovanna Mayorga MD with the [facility] who covers for your primary care physician (PCP), Snow Farah MD. If you have any questions or concerns related to this hospitalization, you may contact us at [phone]. A nurse will call you within a few days to answer any additional questions that may arise after your discharge. We recommend that you follow up with your PCP for medication refills. Please note the following instructions / recommendations:       STOP taking -   No medication adjustments    START taking -  Apixaban (Eliquis) 5 mg twice a day    Testing Required after Discharge -   CBC and BMP in 1 week  Please follow up with your primary care provider to order these tests    Important follow up information -   Please schedule an appointment with your primary care provider as soon as possible    Other Instructions -   Weight bearing of left lower extremity as tolerated    Please review your entire after visit summary including medication list, appointments, activity, diet, pertinent wound care, and any additional recommendations from your care team.    We wish you well.    Sincerely,     Kyle Brunner, MD

## 2024-03-30 ENCOUNTER — TELEPHONE (OUTPATIENT)
Dept: OTHER | Facility: OTHER | Age: 79
End: 2024-03-30

## 2024-03-30 VITALS
RESPIRATION RATE: 18 BRPM | WEIGHT: 170.64 LBS | TEMPERATURE: 97.8 F | OXYGEN SATURATION: 95 % | DIASTOLIC BLOOD PRESSURE: 89 MMHG | HEART RATE: 98 BPM | SYSTOLIC BLOOD PRESSURE: 148 MMHG | BODY MASS INDEX: 23.14 KG/M2

## 2024-03-30 PROBLEM — R19.7 DIARRHEA: Status: RESOLVED | Noted: 2024-03-27 | Resolved: 2024-03-30

## 2024-03-30 LAB
ANION GAP SERPL CALCULATED.3IONS-SCNC: 7 MMOL/L (ref 4–13)
BUN SERPL-MCNC: 18 MG/DL (ref 5–25)
CALCIUM SERPL-MCNC: 7.8 MG/DL (ref 8.4–10.2)
CHLORIDE SERPL-SCNC: 109 MMOL/L (ref 96–108)
CO2 SERPL-SCNC: 21 MMOL/L (ref 21–32)
CREAT SERPL-MCNC: 0.85 MG/DL (ref 0.6–1.3)
ERYTHROCYTE [DISTWIDTH] IN BLOOD BY AUTOMATED COUNT: 13.2 % (ref 11.6–15.1)
GFR SERPL CREATININE-BSD FRML MDRD: 84 ML/MIN/1.73SQ M
GLUCOSE SERPL-MCNC: 107 MG/DL (ref 65–140)
HCT VFR BLD AUTO: 36.9 % (ref 36.5–49.3)
HGB BLD-MCNC: 11.5 G/DL (ref 12–17)
MCH RBC QN AUTO: 30.6 PG (ref 26.8–34.3)
MCHC RBC AUTO-ENTMCNC: 31.2 G/DL (ref 31.4–37.4)
MCV RBC AUTO: 98 FL (ref 82–98)
PLATELET # BLD AUTO: 182 THOUSANDS/UL (ref 149–390)
PMV BLD AUTO: 10.8 FL (ref 8.9–12.7)
POTASSIUM SERPL-SCNC: 4.3 MMOL/L (ref 3.5–5.3)
RBC # BLD AUTO: 3.76 MILLION/UL (ref 3.88–5.62)
SODIUM SERPL-SCNC: 137 MMOL/L (ref 135–147)
WBC # BLD AUTO: 10.16 THOUSAND/UL (ref 4.31–10.16)

## 2024-03-30 PROCEDURE — 94760 N-INVAS EAR/PLS OXIMETRY 1: CPT

## 2024-03-30 PROCEDURE — 99239 HOSP IP/OBS DSCHRG MGMT >30: CPT | Performed by: INTERNAL MEDICINE

## 2024-03-30 PROCEDURE — 80048 BASIC METABOLIC PNL TOTAL CA: CPT | Performed by: INTERNAL MEDICINE

## 2024-03-30 PROCEDURE — 94640 AIRWAY INHALATION TREATMENT: CPT

## 2024-03-30 PROCEDURE — 85027 COMPLETE CBC AUTOMATED: CPT | Performed by: INTERNAL MEDICINE

## 2024-03-30 RX ORDER — FLUCONAZOLE 2 MG/ML
200 INJECTION, SOLUTION INTRAVENOUS EVERY 24 HOURS
Status: DISCONTINUED | OUTPATIENT
Start: 2024-03-30 | End: 2024-03-30

## 2024-03-30 RX ADMIN — IPRATROPIUM BROMIDE 0.5 MG: 0.5 SOLUTION RESPIRATORY (INHALATION) at 07:11

## 2024-03-30 RX ADMIN — APIXABAN 5 MG: 5 TABLET, FILM COATED ORAL at 08:32

## 2024-03-30 RX ADMIN — ACETAMINOPHEN 975 MG: 325 TABLET, FILM COATED ORAL at 05:28

## 2024-03-30 RX ADMIN — MIDODRINE HYDROCHLORIDE 5 MG: 5 TABLET ORAL at 05:28

## 2024-03-30 RX ADMIN — METOPROLOL SUCCINATE 25 MG: 25 TABLET, EXTENDED RELEASE ORAL at 08:32

## 2024-03-30 RX ADMIN — LEVALBUTEROL HYDROCHLORIDE 1.25 MG: 1.25 SOLUTION RESPIRATORY (INHALATION) at 07:11

## 2024-03-30 RX ADMIN — METHENAMINE HIPPURATE 1 G: 1 TABLET ORAL at 08:34

## 2024-03-30 RX ADMIN — MIDODRINE HYDROCHLORIDE 5 MG: 5 TABLET ORAL at 12:04

## 2024-03-30 RX ADMIN — FLUTICASONE FUROATE AND VILANTEROL TRIFENATATE 1 PUFF: 100; 25 POWDER RESPIRATORY (INHALATION) at 08:32

## 2024-03-30 NOTE — CASE MANAGEMENT
Case Management Progress Note    Patient name Sal Moura  Location W /W -01 MRN [id number]  : 1945 Date 3/30/2024       LOS (days): 3  Geometric Mean LOS (GMLOS) (days): 3.7  Days to GMLOS:0.8        OBJECTIVE:        Current admission status: Inpatient  Preferred Pharmacy:   [id number] - [place], [place] - [address]  [address] [address]  Phone: [phone] Fax: [phone]    [facility] - [place], [place] - [address] [place] [address]  [address] [place] [address]  Phone: [phone] Fax: [phone]    UNKNOWN - FOLLOW UP PRIOR TO DISCHARGE TO E-PRESCRIBE  No address on file      PATIENT/FAMILY REPORTS NO PREFERRED PHARMACY  No address on file      Primary Care Provider: Snow Farah MD    Primary Insurance: MEDICARE  Secondary Insurance:  FOR LIFE    PROGRESS NOTE:    Cm advised patient cleared for discharge back to facility. CM contacted facility and confirmed ability to take patient back. Facility to evaluate patient for PT needs upon arrival. Cm requested transport for 2pm via BLS. Cm advised patient, medical team, and facility.

## 2024-03-30 NOTE — CASE MANAGEMENT
Case Management Discharge Planning Note    Patient name Sal Moura  Location W /W -01 MRN [id number]  : 1945 Date 3/30/2024       Current Admission Date: 3/27/2024  Current Admission Diagnosis:Urinary retention   Patient Active Problem List    Diagnosis Date Noted    Abnormal urinalysis 2024    Diarrhea 2024    Closed left hip fracture, initial encounter ([facility]) 2024    Neurogenic bladder 2023    Malnutrition ([facility]) 2023    Moderate protein-calorie malnutrition ([facility]) 2023    Fall 2023    Cloud catheter in place 2023    A-fib ([facility]) 2023    COPD (chronic obstructive pulmonary disease) ([facility]) 2023    Chronic diastolic congestive heart failure ([facility]) 2023    Atrial fibrillation ([facility]) 2023    COPD (chronic obstructive pulmonary disease) ([facility]) 2023    Chronic indwelling Cloud catheter 2023    SDH (subdural hematoma) ([facility]) 2023    Syncope and collapse 2023    History of recurrent UTIs 2023    Debility 2023    Generalized weakness 2023    Fall 2023    Abrasions of multiple sites 2023    Atrial fibrillation ([facility]) 2023    Nicotine abuse 2023    COPD (chronic obstructive pulmonary disease) ([facility]) 2023    Chronic indwelling Cloud catheter 2023    Orthostatic hypotension 2023    Gastroesophageal reflux disease without esophagitis 2022    Swelling of lower leg 2022    Surgical wound present 10/05/2022    SDH (subdural hematoma) ([facility]) 2022    Recurrent right inguinal hernia 2022    Nasal congestion 2022    Moderate protein-calorie malnutrition ([facility]) 2022    Cholestatic jaundice 2022    Iron deficiency anemia likely secondary to GI bleeding 2022    Abnormal colonoscopy 2022    Dizziness 2022    Urinary retention 2022    Essential (hemorrhagic) thrombocythemia ([facility]) 2022    Closed  nondisplaced comminuted fracture of left patella 12/09/2021    Head trauma 09/08/2021    Thrombocytosis 09/07/2021    Bradycardia 06/29/2021    Syncope and collapse 06/28/2021    Colonic adenoma 06/28/2021    Ambulatory dysfunction 06/28/2021    COPD (chronic obstructive pulmonary disease) ([facility]) 06/28/2021    Anxiety 09/29/2020    Obstructive sleep apnea syndrome 09/29/2020    Orthostatic hypotension 08/06/2020    Tobacco abuse 08/05/2020    Symptomatic anemia 08/03/2020    Cardiomyopathy, dilated ([facility]) 08/03/2020    Permanent atrial fibrillation ([facility]) 08/03/2020    History of cardiac cath 07/09/2018      LOS (days): 3  Geometric Mean LOS (GMLOS) (days): 3.7  Days to GMLOS:0.8     OBJECTIVE:  Risk of Unplanned Readmission Score: 19.2         Current admission status: Inpatient   Preferred Pharmacy:   [id number] - [facility] [address] [place] [address]  [address] [place] [address]  Phone: [phone] Fax: [phone]    [facility] - [address] [address]  Phone: [phone] Fax: [phone]    UNKNOWN - FOLLOW UP PRIOR TO DISCHARGE TO E-PRESCRIBE  No address on file      PATIENT/FAMILY REPORTS NO PREFERRED PHARMACY  No address on file      Primary Care Provider: Snow Farah MD    Primary Insurance: MEDICARE  Secondary Insurance:  FOR LIFE    DISCHARGE DETAILS:         Transport at Discharge : [facility]  Dispatcher Contacted: Yes  Number/Name of Dispatcher: Round Trip     ETA of Transport (Date): 03/30/24        Transfer Mode: Stretcher  Accompanied by: Alone     IMM Given (Date):: 03/30/24  IMM Given to:: Patient          Accepting Facility Name, City & State : [facility]  Receiving Facility/Agency Phone Number: [phone]  Facility/Agency Fax Number: [phone]       IMM reviewed with Patient/Family who express understanding and agreement with discharge plan. CM reviewed verbally due to contact  precautions.

## 2024-03-30 NOTE — RESPIRATORY THERAPY NOTE
03/30/24 1329   Inhalation Therapy Tx   Resp Comments Pt refused tx and TT sent to Dr Nichole making her aware . Pt being DC today so Dr Dionisio ZAMORA tx

## 2024-03-30 NOTE — PLAN OF CARE
Problem: Prexisting or High Potential for Compromised Skin Integrity  Goal: Skin integrity is maintained or improved  Description: INTERVENTIONS:  - Identify patients at risk for skin breakdown  - Assess and monitor skin integrity  - Assess and monitor nutrition and hydration status  - Monitor labs   - Assess for incontinence   - Turn and reposition patient  - Assist with mobility/ambulation  - Relieve pressure over bony prominences  - Avoid friction and shearing  - Provide appropriate hygiene as needed including keeping skin clean and dry  - Evaluate need for skin moisturizer/barrier cream  - Collaborate with interdisciplinary team   - Patient/family teaching  - Consider wound care consult   Outcome: Progressing     Problem: PAIN - ADULT  Goal: Verbalizes/displays adequate comfort level or baseline comfort level  Description: Interventions:  - Encourage patient to monitor pain and request assistance  - Assess pain using appropriate pain scale  - Administer analgesics based on type and severity of pain and evaluate response  - Implement non-pharmacological measures as appropriate and evaluate response  - Consider cultural and social influences on pain and pain management  - Notify physician/advanced practitioner if interventions unsuccessful or patient reports new pain  Outcome: Progressing     Problem: INFECTION - ADULT  Goal: Absence or prevention of progression during hospitalization  Description: INTERVENTIONS:  - Assess and monitor for signs and symptoms of infection  - Monitor lab/diagnostic results  - Monitor all insertion sites, i.e. indwelling lines, tubes, and drains  - Monitor endotracheal if appropriate and nasal secretions for changes in amount and color  - [place] appropriate cooling/warming therapies per order  - Administer medications as ordered  - Instruct and encourage patient and family to use good hand hygiene technique  - Identify and instruct in appropriate isolation precautions for  identified infection/condition  Outcome: Progressing  Goal: Absence of fever/infection during neutropenic period  Description: INTERVENTIONS:  - Monitor WBC    Outcome: Progressing     Problem: SAFETY ADULT  Goal: Patient will remain free of falls  Description: INTERVENTIONS:  - Educate patient/family on patient safety including physical limitations  - Instruct patient to call for assistance with activity   - Consult OT/PT to assist with strengthening/mobility   - Keep Call bell within reach  - Keep bed low and locked with side rails adjusted as appropriate  - Keep care items and personal belongings within reach  - Initiate and maintain comfort rounds  - Make Fall Risk Sign visible to staff  - Offer Toileting every  Hours, in advance of need  - Initiate/Maintain alarm  - Obtain necessary fall risk management equipment:   - Apply yellow socks and bracelet for high fall risk patients  - Consider moving patient to room near nurses station  Outcome: Progressing  Goal: Maintain or return to baseline ADL function  Description: INTERVENTIONS:  -  Assess patient's ability to carry out ADLs; assess patient's baseline for ADL function and identify physical deficits which impact ability to perform ADLs (bathing, care of mouth/teeth, toileting, grooming, dressing, etc.)  - Assess/evaluate cause of self-care deficits   - Assess range of motion  - Assess patient's mobility; develop plan if impaired  - Assess patient's need for assistive devices and provide as appropriate  - Encourage maximum independence but intervene and supervise when necessary  - Involve family in performance of ADLs  - Assess for home care needs following discharge   - Consider OT consult to assist with ADL evaluation and planning for discharge  - Provide patient education as appropriate  Outcome: Progressing  Goal: Maintains/Returns to pre admission functional level  Description: INTERVENTIONS:  - Perform AM-PAC 6 Click Basic Mobility/ Daily Activity  assessment daily.  - Set and communicate daily mobility goal to care team and patient/family/caregiver.   - Collaborate with rehabilitation services on mobility goals if consulted  - Perform Range of Motion  times a day.  - Reposition patient every  hours.  - Dangle patient  times a day  - Stand patient  times a day  - Ambulate patient  times a day  - Out of bed to chair  times a day   - Out of bed for meals  times a day  - Out of bed for toileting  - Record patient progress and toleration of activity level   Outcome: Progressing     Problem: DISCHARGE PLANNING  Goal: Discharge to home or other facility with appropriate resources  Description: INTERVENTIONS:  - Identify barriers to discharge w/patient and caregiver  - Arrange for needed discharge resources and transportation as appropriate  - Identify discharge learning needs (meds, wound care, etc.)  - Arrange for interpretive services to assist at discharge as needed  - Refer to Case Management Department for coordinating discharge planning if the patient needs post-hospital services based on physician/advanced practitioner order or complex needs related to functional status, cognitive ability, or social support system  Outcome: Progressing     Problem: Knowledge Deficit  Goal: Patient/family/caregiver demonstrates understanding of disease process, treatment plan, medications, and discharge instructions  Description: Complete learning assessment and assess knowledge base.  Interventions:  - Provide teaching at level of understanding  - Provide teaching via preferred learning methods  Outcome: Progressing

## 2024-03-30 NOTE — PLAN OF CARE
Problem: Prexisting or High Potential for Compromised Skin Integrity  Goal: Skin integrity is maintained or improved  Description: INTERVENTIONS:  - Identify patients at risk for skin breakdown  - Assess and monitor skin integrity  - Assess and monitor nutrition and hydration status  - Monitor labs   - Assess for incontinence   - Turn and reposition patient  - Assist with mobility/ambulation  - Relieve pressure over bony prominences  - Avoid friction and shearing  - Provide appropriate hygiene as needed including keeping skin clean and dry  - Evaluate need for skin moisturizer/barrier cream  - Collaborate with interdisciplinary team   - Patient/family teaching  - Consider wound care consult   Outcome: Adequate for Discharge     Problem: PAIN - ADULT  Goal: Verbalizes/displays adequate comfort level or baseline comfort level  Description: Interventions:  - Encourage patient to monitor pain and request assistance  - Assess pain using appropriate pain scale  - Administer analgesics based on type and severity of pain and evaluate response  - Implement non-pharmacological measures as appropriate and evaluate response  - Consider cultural and social influences on pain and pain management  - Notify physician/advanced practitioner if interventions unsuccessful or patient reports new pain  Outcome: Adequate for Discharge     Problem: INFECTION - ADULT  Goal: Absence or prevention of progression during hospitalization  Description: INTERVENTIONS:  - Assess and monitor for signs and symptoms of infection  - Monitor lab/diagnostic results  - Monitor all insertion sites, i.e. indwelling lines, tubes, and drains  - Monitor endotracheal if appropriate and nasal secretions for changes in amount and color  - [place] appropriate cooling/warming therapies per order  - Administer medications as ordered  - Instruct and encourage patient and family to use good hand hygiene technique  - Identify and instruct in appropriate isolation  precautions for identified infection/condition  Outcome: Adequate for Discharge  Goal: Absence of fever/infection during neutropenic period  Description: INTERVENTIONS:  - Monitor WBC    Outcome: Adequate for Discharge     Problem: SAFETY ADULT  Goal: Patient will remain free of falls  Description: INTERVENTIONS:  - Educate patient/family on patient safety including physical limitations  - Instruct patient to call for assistance with activity   - Consult OT/PT to assist with strengthening/mobility   - Keep Call bell within reach  - Keep bed low and locked with side rails adjusted as appropriate  - Keep care items and personal belongings within reach  - Initiate and maintain comfort rounds  - Make Fall Risk Sign visible to staff  - Offer Toileting every 2 Hours, in advance of need  - Initiate/Maintain bed alarm  - Obtain necessary fall risk management equipment: bed alarm, yellow socks  - Apply yellow socks and bracelet for high fall risk patients  - Consider moving patient to room near nurses station  Outcome: Adequate for Discharge  Goal: Maintain or return to baseline ADL function  Description: INTERVENTIONS:  -  Assess patient's ability to carry out ADLs; assess patient's baseline for ADL function and identify physical deficits which impact ability to perform ADLs (bathing, care of mouth/teeth, toileting, grooming, dressing, etc.)  - Assess/evaluate cause of self-care deficits   - Assess range of motion  - Assess patient's mobility; develop plan if impaired  - Assess patient's need for assistive devices and provide as appropriate  - Encourage maximum independence but intervene and supervise when necessary  - Involve family in performance of ADLs  - Assess for home care needs following discharge   - Consider OT consult to assist with ADL evaluation and planning for discharge  - Provide patient education as appropriate  Outcome: Adequate for Discharge  Goal: Maintains/Returns to pre admission functional  level  Description: INTERVENTIONS:  - Perform AM-PAC 6 Click Basic Mobility/ Daily Activity assessment daily.  - Set and communicate daily mobility goal to care team and patient/family/caregiver.   - Collaborate with rehabilitation services on mobility goals if consulted  - Perform Range of Motion 2 times a day.  - Reposition patient every 2 hours.  - Dangle patient 3 times a day  - Stand patient 2 times a day  - Ambulate patient 3 times a day  - Out of bed to chair 3 times a day   - Out of bed for meals 3 times a day  - Out of bed for toileting  - Record patient progress and toleration of activity level   Outcome: Adequate for Discharge

## 2024-04-01 ENCOUNTER — NURSING HOME VISIT (OUTPATIENT)
Dept: GERIATRICS | Facility: OTHER | Age: 79
End: 2024-04-01
Payer: MEDICARE

## 2024-04-01 DIAGNOSIS — E83.51 HYPOCALCEMIA: ICD-10-CM

## 2024-04-01 DIAGNOSIS — R82.90 ABNORMAL URINALYSIS: ICD-10-CM

## 2024-04-01 DIAGNOSIS — I48.21 PERMANENT ATRIAL FIBRILLATION (HCC): Chronic | ICD-10-CM

## 2024-04-01 DIAGNOSIS — J44.9 CHRONIC OBSTRUCTIVE PULMONARY DISEASE, UNSPECIFIED COPD TYPE (HCC): ICD-10-CM

## 2024-04-01 DIAGNOSIS — Z96.642 S/P TOTAL LEFT HIP ARTHROPLASTY: Primary | ICD-10-CM

## 2024-04-01 DIAGNOSIS — R19.7 DIARRHEA, UNSPECIFIED TYPE: ICD-10-CM

## 2024-04-01 DIAGNOSIS — R33.9 URINARY RETENTION: ICD-10-CM

## 2024-04-01 DIAGNOSIS — R26.2 AMBULATORY DYSFUNCTION: ICD-10-CM

## 2024-04-01 DIAGNOSIS — I95.1 ORTHOSTATIC HYPOTENSION: ICD-10-CM

## 2024-04-01 DIAGNOSIS — I50.32 CHRONIC DIASTOLIC CONGESTIVE HEART FAILURE (HCC): Chronic | ICD-10-CM

## 2024-04-01 PROCEDURE — 99306 1ST NF CARE HIGH MDM 50: CPT | Performed by: NURSE PRACTITIONER

## 2024-04-01 RX ORDER — DOCUSATE SODIUM 100 MG/1
100 CAPSULE, LIQUID FILLED ORAL 2 TIMES DAILY PRN
COMMUNITY

## 2024-04-01 NOTE — ASSESSMENT & PLAN NOTE
No hypotension noted on PCC log  Bp acceptable, 141/65  Continue midodrine 3 times daily with hold for SBP > 135  Continue blood pressure monitoring

## 2024-04-01 NOTE — ASSESSMENT & PLAN NOTE
During recent hospitalization, EKG confirming A-fib, rate controlled  HR controlled on metoprolol succinate 25 mg daily, hold if SBP <110 or HR <55  Remains on Apixaban 5 mg q12 hr  Denies active bleeding none reported by staff  Continue HR monitoring

## 2024-04-01 NOTE — ASSESSMENT & PLAN NOTE
Wt Readings from Last 3 Encounters:   03/30/24 77.4 kg (170 lb 10.3 oz)   03/27/24 74.4 kg (164 lb 0.4 oz)   03/07/24 69.9 kg (154 lb)   Pt has a history of chronic diastolic CHF with preserved EF  In 2022, EF of 50%  Loop recorder in place  Currently appears euvolemic, no edema noted on exam  Recent weight on 3/29/24 at 172 lb. Weights in facility trended 149-155 lb  On metoprolol succinate   Monitor weights and fluid status   Encourage low Na diet   Follow cardiology in facility

## 2024-04-01 NOTE — PROGRESS NOTES
[facility]  [address]   [address]  [place], [place], [address] [phone]    Progress Note  Code [facility] 32    Patient Location     [facility]    Reason for visit     Readmission acute visit status post left hemiarthroplasty, A fib, urinary retention, ambulatory dysfunction     Patient’s care was coordinated with nursing facility staff. Recent vitals, labs and updated medications [facility].     Problem List Items Addressed This Visit       Orthostatic hypotension     No hypotension noted on PCC log  Bp acceptable, 141/65  Continue midodrine 3 times daily with hold for SBP > 135  Continue blood pressure monitoring          Ambulatory dysfunction     With recent mechanical fall sustaining left femur fx  S/p left hip arthroplasty on 3/26/24  L hip WBAT  Hip precautions  Fall precautions  PT OT  Supportive care          Urinary retention     Pt is residing in nursing facility. On 11/23/23 miller became dislodge. Nursing staff unable to re insert miller. Miller was left out and pt started voiding with no difficulties. Bladder scan negative for urinary retention. Pt follows urology OP. Pt was scheduled for a suprapubic cath back in November. Unable to do as pt was on Eliquis and issues with scheduling. Post op from left hip arthroplasty pt had urinary retention issues. Pt complained of abdominal pain, distention, urgency to urinate. Bladder scan revealed greater than 680 ml of urine. Several attempts by nursing, NP , and ED unable to obtain straight cath or Miller. On call urology PA performed a bedside needle decompression of bladder. Able to remove 300 cc of urine. Recommended transferring pt to [facility] for IR suprapubic catheter placement on 3/26/24. Eliquis was held since 3/24/24. Continue urinary retention monitoring. Bladder scan PRN. Continue Flomax and Hipprex for UTI suppression . On exam catheter draining yellow urine. Pt denies  any acute issues. Site CDI no drainage or erythema. Afebrile and HD stable   Pt will need to follow up with urology            Atrial fibrillation (HCC) (Chronic)     During recent hospitalization, EKG confirming A-fib, rate controlled  HR controlled on metoprolol succinate 25 mg daily, hold if SBP <110 or HR <55  Remains on Apixaban 5 mg q12 hr  Denies active bleeding none reported by staff  Continue HR monitoring          COPD (chronic obstructive pulmonary disease) (HCC)     Currently in no acute exacerbation  Respiratory status stable on RA  Denies cough or sob. No bronchospasm  Continue fluticasone- salmeterol 100/50 mcg/dose, 1 inh q12 hrs and ipratropium albuterol 0.5-2.5/3ml, 1 vial, QID  Monitor respiratory status          Chronic diastolic congestive heart failure (HCC) (Chronic)     Wt Readings from Last 3 Encounters:   03/30/24 77.4 kg (170 lb 10.3 oz)   03/27/24 74.4 kg (164 lb 0.4 oz)   03/07/24 69.9 kg (154 lb)   Pt has a history of chronic diastolic CHF with preserved EF  In 2022, EF of 50%  Loop recorder in place  Currently appears euvolemic, no edema noted on exam  Recent weight on 3/29/24 at 172 lb. Weights in facility trended 149-155 lb  On metoprolol succinate   Monitor weights and fluid status   Encourage low Na diet   Follow cardiology in facility                  Abnormal urinalysis     During recent hospitalization urinalysis revealed innumerable bacteria, RBCs 4-10. Pt denied any urologic symptoms, like dysuria, burning sensation in urination, painful urination, fever or chills, suprapubic pains, flank pains.  Patient was afebrile. Denied chills or any flank pain. No leukocytosis on recent blood work. Likely asymptomatic bacteriuria, possible colonization with patient being on chronic Cloud catheter. Recommendation was to monitor pt off antibiotics. During hospitalization pt did not develop any symptoms of UTI no further work out or antimicrobial therapy needed. Currently denies any urinary  discomfort. Remains afebrile and HD stable. S/p suprapubic catheter placement. Follow up urology. Will follow CBC on 4/2/24         Diarrhea     During hospitalization complained of having diarrhea for about a week now. Reported has been averaging 3 episodes of diarrhea per day. Laxatives were changed from scheduled to as needed. Resolved before discharging from hospital         S/P total left hip arthroplasty - Primary     Pt had a fall at facility on 3/23/24  Xray on 3/24/24 revealed fracture of left femur. Pt was sent to ER by on call provider. In the ER, patient was found to have left hip pain/leg externally rotated and shortened. ED imaging left hip fracture. Pt was evaluated by orthopedic service and recommend left hemiarthroplasty.   3/26/24 pt underwent left hemiarthroplasty Orthopedic consultation  On examination pt denies pain. RLE 2+ pulses. Noted with brace on. Continue hip precautions. Incision with pressure dressing on. Area soft with no erythema or drainage around the site. Continue pain control. Remains on oxycodone however pt does not want to take, pt will take tylenol instead. Continue PT OT. Fall and safety precautions. WBAT. Pt is on Apixaban 5 mg q12 hr for A fib and DVT ppx. Monitor for bleeding. Optimize pain control. Assist patient with ADLs/IADLs. Follow orthopedic service in 2 weeks per discharge recommendations.            Hypocalcemia     Noted from hosp blood work, suspect could be reactive to recent fx  Monitor levels and trend  BMP on 4/2/24            HPI     Patient is a 78-yr old male with past history of COPD, Atrial fibrillation on AC, orthostatic hypotension, congestive heart failure, urinary retention, and ambulatory dysfunction. Patient is alert and oriented to month, day, year and place. He is seen and examined for readmission acute visit. Patient sustained a mechanical fall on 3/23/24. Left leg xray revealed left femur fracture. Pt was sent to ER by on call provider for  further treatment. In the ED, pt was noted to have pain with leg externally rotated and shortened. ED imaging confirmed left hip fracture. Eliquis was last given on 3/24/24 and placed on hold. On 3/26/24 patient underwent left hip arthroplasty. Post op patient was unable to urinate. A bladder scan revealed greater than 680 ml of urine. Failed several attempts for straight cath coude and miller placement. Pt complained of pelvic pain and discomfort. Noted with bladder distended and palpable. Pt required a needle decompression of the bladder at bedside performed by the on call urologist PA. Able to remove 300 cc of urine. Per urologist recommendations, pt was transferred [facility] IR department for suprapubic catheter placement on 3/27/24. Patient resides in a nursing facility. Pt was also noted to have abnormal urinalysis. However pt denied fever, chills or flank pain. Due to the status of chronic miller catheter this was attributed to colonization. Pt was monitor off antibiotics and did not require any antimicrobial therapy or urine culture. Pt has been following up with [facility] urology. On 11/23/23 miller became dislodge in nursing facility. Multiple unsuccessful attempts were made by nursing staff. Miller was left out as pt was voiding with no difficulties. Bladder scan were done. No urinary retention noted. Pt was scheduled for OP IR suprapubic catheter placement on 11/23/23 however was rescheduled due to pt taking Eliquis and issues with scheduling. During his admission pt reported having diarrhea for about a week. Laxatives were changed from scheduled to as needed and resolved.   At the time of my evaluation pt reports doing okay. Appears comfortable and is in no acute distress. Denies pain. Miller noted wit yellow urine. Area CDI. Left hip incision noted with pressure dressing placed. Area soft with no erythema or drainage around the site. LLE 2+ pulses. Of note LLE WBAT. Brace on. Continue hip  precautions. Pt using w/c. Pt is alert and oriented x3. Denies cough, sob, fever, chills or chest pain. Afebrile. HD stable. Reports eating more than 50% for all 3-meals. Sleeping with no difficulties. Per pt no other concerns or issues at this time.      Review of Systems   Constitutional:  Negative for chills and fever.   HENT:  Negative for nosebleeds.    Eyes:  Negative for visual disturbance (uses glasses).   Respiratory:  Negative for cough and shortness of breath.    Gastrointestinal: Negative.    Genitourinary:  Negative for hematuria.   Musculoskeletal:  Positive for gait problem.   Skin:  Negative for color change.   Neurological:  Positive for weakness. Negative for dizziness, tremors, light-headedness and headaches.   Psychiatric/Behavioral:  Negative for sleep disturbance.        Past Medical History:   Diagnosis Date    A-fib (HCC)     Ambulatory dysfunction     Anxiety     Anxiety disorder     Atrial fibrillation (HCC)     Chronic indwelling Cloud catheter     COPD (chronic obstructive pulmonary disease) (HCC)     Coronary artery disease     Depression     Cloud catheter in place     Hepatitis C     screening negative in 1/2017    Hepatitis C     History of MI (myocardial infarction)     Hypertension     MI (myocardial infarction) (HCC)     Urinary catheter in place     Urinary retention     Use of cane as ambulatory aid        Past Surgical History:   Procedure Laterality Date    CARDIAC CATHETERIZATION  07/09/2018    CARDIAC ELECTROPHYSIOLOGY PROCEDURE N/A 9/30/2022    Procedure: Cardiac loop recorder implant;  Surgeon: Duke Buckner MD;  Location: [facility];  Service: Cardiology    CARDIAC ELECTROPHYSIOLOGY PROCEDURE  09/30/2022    Cardiac loop recorder implant; Dr. Duke Buckner    CARDIAC LOOP RECORDER  09/2022    COLONOSCOPY      COLONOSCOPY      INGUINAL HERNIA REPAIR Right 08/11/2022    Dr. Encarnacion    IR SUPRAPUBIC CATHETER PLACEMENT  3/27/2024    [place] HEMIARTHROPLASTY HIP PARTIAL Left  3/26/2024    Procedure: HEMIARTHROPLASTY HIP (BIPOLAR);  Surgeon: Ibeth Beal DO;  Location: [facility] MAIN OR;  Service: Orthopedics    MA RPR 1ST INGUN HRNA AGE 5 YRS/> REDUCIBLE Right 2022    Procedure: REPAIR HERNIA INGUINAL;  Surgeon: Ady Encarnacion DO;  Location: [facility] Main OR;  Service: General    TONSILLECTOMY         Social History     Tobacco Use   Smoking Status Former    Current packs/day: 0.00    Average packs/day: 1.5 packs/day for 57.0 years (85.5 ttl pk-yrs)    Types: Cigarettes    Start date: 3/12/1966    Quit date: 3/12/2023    Years since quittin.0   Smokeless Tobacco Never   Tobacco Comments    since age 14; Has history of smoking for over 55 years. He has been smoking more than packet daily in the past however he has been smokes about half a pack a day lately and stopped smoking on 2018 when he was admitted to the hospital.        Family History   Problem Relation Age of Onset    Hypertension Mother     Coronary artery disease Mother         premature    Diabetes Father     Coronary artery disease Father         premature    Hypertension Father         No Known Allergies    Updated list was reviewed in [facility] of facility.     VS   /65, P 73, R 20, T 97.8, O2 96%   W 170.0 lb     Physical Exam  Vitals and nursing note reviewed.   Constitutional:       General: He is not in acute distress.     Appearance: He is not ill-appearing.   HENT:      Head: Normocephalic and atraumatic.      Nose: Nose normal.      Mouth/Throat:      Mouth: Mucous membranes are moist.   Eyes:      General:         Right eye: No discharge.         Left eye: No discharge.   Cardiovascular:      Rate and Rhythm: Normal rate and regular rhythm.      Pulses: Normal pulses.      Heart sounds: Normal heart sounds. No murmur heard.  Pulmonary:      Effort: Pulmonary effort is normal. No respiratory distress.      Breath sounds: Normal breath sounds. No wheezing or rales.   Abdominal:      General:  Bowel sounds are normal. There is no distension.      Palpations: Abdomen is soft.      Tenderness: There is no abdominal tenderness. There is no guarding.   Genitourinary:     Comments: Suprapubic catheter draining yellow urine   Musculoskeletal:      Cervical back: Normal range of motion and neck supple. No rigidity or tenderness.      Right lower leg: No edema.      Left lower leg: No edema.   Skin:     General: Skin is warm.      Coloration: Skin is not jaundiced.      Findings: No bruising, erythema, lesion or rash.      Comments: Left hip incision dressing on CDI   Neurological:      General: No focal deficit present.      Mental Status: He is alert and oriented to person, place, and time. Mental status is at baseline.      Motor: Weakness present.      Gait: Gait abnormal.   Psychiatric:         Mood and Affect: Mood normal.     Medications reviewed and updated from [facility]    Recent labs were reviewed.  3/30/24  WBC 10.6, hgb 11.5, hct 36.9, platelets 182  Sodium 137, potassium 4.3, BUN 18, cr 0.85, calcium 7.8, GFR 84    Additional Notes:     I have spent >40 minutes with patient today in which greater than 50% of this time was spent in counseling/coordination of care regarding Diagnostic results, Prognosis, Risks and benefits of tx options, Instructions for management, Patient and family education, Importance of tx compliance, Risk factor reductions, Impressions, Counseling / Coordination of care, Documenting in the medical record, Reviewing / ordering tests, medicine, procedures  , Obtaining or reviewing history  , Communicating with other healthcare professionals , and reviewed hospital records and updated patient on recent interventions. Updated medications from [facility] and new problems  .     This note was electronically signed by GABRIELA Lynn

## 2024-04-01 NOTE — ASSESSMENT & PLAN NOTE
Pt had a fall at facility on 3/23/24  Xray on 3/24/24 revealed fracture of left femur. Pt was sent to ER by on call provider. In the ER, patient was found to have left hip pain/leg externally rotated and shortened. ED imaging left hip fracture. Pt was evaluated by orthopedic service and recommend left hemiarthroplasty.   3/26/24 pt underwent left hemiarthroplasty Orthopedic consultation  On examination pt denies pain. RLE 2+ pulses. Noted with brace on. Continue hip precautions. Incision with pressure dressing on. Area soft with no erythema or drainage around the site. Continue pain control. Remains on oxycodone however pt does not want to take, pt will take tylenol instead. Continue PT OT. Fall and safety precautions. WBAT. Pt is on Apixaban 5 mg q12 hr for A fib and DVT ppx. Monitor for bleeding. Optimize pain control. Assist patient with ADLs/IADLs. Follow orthopedic service in 2 weeks per discharge recommendations.

## 2024-04-01 NOTE — ASSESSMENT & PLAN NOTE
Currently in no acute exacerbation  Respiratory status stable on RA  Denies cough or sob. No bronchospasm  Continue fluticasone- salmeterol 100/50 mcg/dose, 1 inh q12 hrs and ipratropium albuterol 0.5-2.5/3ml, 1 vial, QID  Monitor respiratory status

## 2024-04-01 NOTE — TELEPHONE ENCOUNTER
Called Ankush Leblanc to make 6 week appt for SPT change    Spoke to Marina and made appt for 5/6 at 10am [facility] address given

## 2024-04-02 NOTE — ASSESSMENT & PLAN NOTE
Noted from hosp blood work, suspect could be reactive to recent fx  Monitor levels and trend  BMP on 4/2/24

## 2024-04-02 NOTE — ASSESSMENT & PLAN NOTE
During recent hospitalization urinalysis revealed innumerable bacteria, RBCs 4-10. Pt denied any urologic symptoms, like dysuria, burning sensation in urination, painful urination, fever or chills, suprapubic pains, flank pains.  Patient was afebrile. Denied chills or any flank pain. No leukocytosis on recent blood work. Likely asymptomatic bacteriuria, possible colonization with patient being on chronic Cloud catheter. Recommendation was to monitor pt off antibiotics. During hospitalization pt did not develop any symptoms of UTI no further work out or antimicrobial therapy needed. Currently denies any urinary discomfort. Remains afebrile and HD stable. S/p suprapubic catheter placement. Follow up urology. Will follow CBC on 4/2/24

## 2024-04-02 NOTE — ASSESSMENT & PLAN NOTE
With recent mechanical fall sustaining left femur fx  S/p left hip arthroplasty on 3/26/24  L hip WBAT  Hip precautions  Fall precautions  PT OT  Supportive care

## 2024-04-02 NOTE — ASSESSMENT & PLAN NOTE
Pt is residing in nursing facility. On 11/23/23 miller became dislodge. Nursing staff unable to re insert miller. Miller was left out and pt started voiding with no difficulties. Bladder scan negative for urinary retention. Pt follows urology OP. Pt was scheduled for a suprapubic cath back in November. Unable to do as pt was on Eliquis and issues with scheduling. Post op from left hip arthroplasty pt had urinary retention issues. Pt complained of abdominal pain, distention, urgency to urinate. Bladder scan revealed greater than 680 ml of urine. Several attempts by nursing, NP , and ED unable to obtain straight cath or Miller. On call urology PA performed a bedside needle decompression of bladder. Able to remove 300 cc of urine. Recommended transferring pt to [facility] for IR suprapubic catheter placement on 3/26/24. Eliquis was held since 3/24/24. Continue urinary retention monitoring. Bladder scan PRN. Continue Flomax and Hipprex for UTI suppression . On exam catheter draining yellow urine. Pt denies any acute issues. Site CDI no drainage or erythema. Afebrile and HD stable   Pt will need to follow up with urology

## 2024-04-02 NOTE — ASSESSMENT & PLAN NOTE
During hospitalization complained of having diarrhea for about a week now. Reported has been averaging 3 episodes of diarrhea per day. Laxatives were changed from scheduled to as needed. Resolved before discharging from hospital

## 2024-04-03 NOTE — TELEPHONE ENCOUNTER
Routine catheter care  May irrigate with 60 mL Normal strength saline for clog, sediment, hematuria  Call the office with any urological concerns.   Patient will come to office as scheduled for first SPT change on 5/6 and then it can be changed at facility thereafter

## 2024-04-03 NOTE — TELEPHONE ENCOUNTER
Marina- [facility] unit calling in stating she will need orders for care for patients SPT.     Please fax orders over to: [phone]  Phone: [phone] ask for Marina if there are any questions

## 2024-04-10 ENCOUNTER — OFFICE VISIT (OUTPATIENT)
Dept: OBGYN CLINIC | Facility: CLINIC | Age: 79
End: 2024-04-10

## 2024-04-10 ENCOUNTER — HOSPITAL ENCOUNTER (OUTPATIENT)
Dept: RADIOLOGY | Facility: HOSPITAL | Age: 79
Discharge: HOME | End: 2024-04-10
Attending: ORTHOPAEDIC SURGERY
Payer: MEDICARE

## 2024-04-10 DIAGNOSIS — S82.045D CLOSED NONDISPLACED COMMINUTED FRACTURE OF LEFT PATELLA WITH ROUTINE HEALING, SUBSEQUENT ENCOUNTER: ICD-10-CM

## 2024-04-10 DIAGNOSIS — Z98.890 STATUS POST SURGERY: Primary | ICD-10-CM

## 2024-04-10 DIAGNOSIS — Z98.890 STATUS POST SURGERY: ICD-10-CM

## 2024-04-10 PROCEDURE — 73502 X-RAY EXAM HIP UNI 2-3 VIEWS: CPT

## 2024-04-10 PROCEDURE — 99024 POSTOP FOLLOW-UP VISIT: CPT | Performed by: ORTHOPAEDIC SURGERY

## 2024-04-10 NOTE — PROGRESS NOTES
[facility]TH[facility] [address] [id number] [phone]       Sal Moura  [id number]  1945    ORTHOPAEDIC SURGERY OUTPATIENT NOTE  4/10/2024      HISTORY:  78 y.o. male presents for first postop visit S/P left hip hemiarthroplasty.  DOS 3/26/2024. Patient reports pain is tolerable. Has been adherence to hip precaution position.  Patient reports able to ambulate independently at physical therapy without issues.  Patient denies any foot weakness, numbness, or tingling.    Past Medical History:   Diagnosis Date    A-fib (HCC)     Ambulatory dysfunction     Anxiety     Anxiety disorder     Atrial fibrillation (HCC)     Chronic indwelling Cloud catheter     COPD (chronic obstructive pulmonary disease) (HCC)     Coronary artery disease     Depression     Cloud catheter in place     Hepatitis C     screening negative in 1/2017    Hepatitis C     History of MI (myocardial infarction)     Hypertension     MI (myocardial infarction) (HCC)     Urinary catheter in place     Urinary retention     Use of cane as ambulatory aid        Past Surgical History:   Procedure Laterality Date    CARDIAC CATHETERIZATION  07/09/2018    CARDIAC ELECTROPHYSIOLOGY PROCEDURE N/A 9/30/2022    Procedure: Cardiac loop recorder implant;  Surgeon: Duke Buckner MD;  [facility];  Service: Cardiology    CARDIAC ELECTROPHYSIOLOGY PROCEDURE  09/30/2022    Cardiac loop recorder implant; Dr. Duke Buckner    CARDIAC LOOP RECORDER  09/2022    COLONOSCOPY      COLONOSCOPY      INGUINAL HERNIA REPAIR Right 08/11/2022    Dr. Encarnacion    IR SUPRAPUBIC CATHETER PLACEMENT  3/27/2024    AR HEMIARTHROPLASTY HIP PARTIAL Left 3/26/2024    Procedure: HEMIARTHROPLASTY HIP (BIPOLAR);  Surgeon: Ibeth Beal DO;  Location:  MAIN OR;  Service: Orthopedics    AR RPR 1ST INGUN HRNA AGE 5 YRS/> REDUCIBLE Right 8/11/2022    Procedure: REPAIR HERNIA INGUINAL;   Surgeon: Ady Encarnacion DO;  Location: [facility];  Service: General    TONSILLECTOMY         Social History     Socioeconomic History    Marital status: /Civil Union     Spouse name: Not on file    Number of children: 3    Years of education: 12    Highest education level: 12th grade   Occupational History    Occupation: retired   Tobacco Use    Smoking status: Former     Current packs/day: 0.00     Average packs/day: 1.5 packs/day for 57.0 years (85.5 ttl pk-yrs)     Types: Cigarettes     Start date: 3/12/1966     Quit date: 3/12/2023     Years since quittin.0    Smokeless tobacco: Never    Tobacco comments:     since age 14; Has history of smoking for over 55 years. He has been smoking more than packet daily in the past however he has been smokes about half a pack a day lately and stopped smoking on 2018 when he was admitted to the hospital.    Vaping Use    Vaping status: Never Used   Substance and Sexual Activity    Alcohol use: Not Currently    Drug use: Never    Sexual activity: Not Currently     Partners: Female     Birth control/protection: Condom Male   Other Topics Concern    Not on file   Social History Narrative    ** Merged History Encounter **         ** Merged History Encounter **         ** Merged History Encounter **         History of Ultra Sound: 2016  History of Stress Test: 2018  History of ECHO: 2018  · Most recent tobacco use screenin2019    · Live alone or with others:   with others      · Diet:   Regular    · Caffeine intake:   Moderate    · Guns     present in home:   No    · Asbestos exposure:   No    · TB exposure:   No    · Environmental exposure:   No    · Animal exposure:   No    · Smoke alarm in home:   Yes       Social Determinants of Health     Financial Resource Strain: Not on file   Food Insecurity: No Food Insecurity (3/27/2024)    Hunger Vital Sign     Worried About Running Out of Food in the Last Year: Never true     Ran Out of Food  in the Last Year: Never true   Recent Concern: Food Insecurity - Food Insecurity Present (3/24/2024)    Hunger Vital Sign     Worried About Running Out of Food in the Last Year: Sometimes true     Ran Out of Food in the Last Year: Sometimes true   Transportation Needs: No Transportation Needs (3/27/2024)    PRAPARE - Transportation     Lack of Transportation (Medical): No     Lack of Transportation (Non-Medical): No   Recent Concern: Transportation Needs - Unmet Transportation Needs (3/24/2024)    PRAPARE - Transportation     Lack of Transportation (Medical): Yes     Lack of Transportation (Non-Medical): Yes   Physical Activity: Insufficiently Active (5/29/2020)    Exercise Vital Sign     Days of Exercise per Week: 3 days     Minutes of Exercise per Session: 30 min   Stress: Stress Concern Present (5/29/2020)    [employer] [place] [employer] - Occupational Stress Questionnaire     Feeling of Stress : To some extent   Social Connections: Not on file   Intimate Partner Violence: Not on file   Housing Stability: Low Risk  (3/27/2024)    Housing Stability Vital Sign     Unable to Pay for Housing in the Last Year: No     Number of Places Lived in the Last Year: 2     Unstable Housing in the Last Year: No       Family History   Problem Relation Age of Onset    Hypertension Mother     Coronary artery disease Mother         premature    Diabetes Father     Coronary artery disease Father         premature    Hypertension Father         Patient's Medications   New Prescriptions    No medications on file   Previous Medications    ACETAMINOPHEN (TYLENOL) 325 MG TABLET    Take 650 mg by mouth every 6 (six) hours as needed    APIXABAN (ELIQUIS) 5 MG    Take 1 tablet (5 mg total) by mouth 2 (two) times a day    AZELASTINE (ASTELIN) 0.1 % NASAL SPRAY    1 spray into each nostril 2 (two) times a day as needed for rhinitis Use in each nostril as directed    BISACODYL (DULCOLAX) 10 MG SUPPOSITORY    Insert 10 mg into the  rectum once as needed    DOCUSATE SODIUM (COLACE) 100 MG CAPSULE    Take 100 mg by mouth 2 (two) times a day as needed for constipation    FLUTICASONE-SALMETEROL (ADVAIR DISKUS) 100-50 MCG/DOSE INHALER    Inhale 1 puff 2 (two) times a day Rinse mouth after use.    IPRATROPIUM-ALBUTEROL (DUO-NEB) 0.5-2.5 MG/3 ML NEBULIZER SOLUTION    INHALE CONTENTS OF 1 VIAL VIA NEBULIZER FOUR TIMES A DAY    MECLIZINE (ANTIVERT) 12.5 MG TABLET    Take 12.5 mg by mouth every 8 (eight) hours as needed for dizziness    METHENAMINE HIPPURATE (HIPREX) 1 G TABLET    Take 1 g by mouth 2 (two) times a day with meals    METOPROLOL SUCCINATE (TOPROL-XL) 25 MG 24 HR TABLET    Take 25 mg by mouth daily    MIDODRINE (PROAMATINE) 5 MG TABLET    Take 5 mg by mouth 3 (three) times a day before meals Hold if SBP >135    MINERAL OIL ENEMA    Insert 1 enema into the rectum once as needed for constipation    SODIUM PHOSPHATE-BIPHOSPHATE (FLEET) 7-19 G 118 ML ENEMA    Insert 1 enema into the rectum once as needed   Modified Medications    No medications on file   Discontinued Medications    No medications on file       No Known Allergies     There were no vitals taken for this visit.     REVIEW OF SYSTEMS:  Constitutional: Negative.    HEENT: Negative.    Respiratory: Negative.    Skin: Negative.    Neurological: Negative.    Psychiatric/Behavioral: Negative.  Musculoskeletal: Negative except for that mentioned in the HPI.    [unfilled]     PHYSICAL EXAM:    Inspection: incision dry, clean, intact.  No erythema or discharge noted.  Minimally TTP    IMAGING:    XR left hip 4/10/2024  Hardware in place  No signs of perimplant complications    ASSESSMENT AND PLAN:  78 y.o. male 2-weeks postop S/P left hip hemiarthroplasty.  DOS 3/26/2024.  Pain well-controlled.  Incision D/C/I.  At this time recommend patient to continue rehabilitation.  Patient to follow-up as needed.

## 2024-04-11 NOTE — ASSESSMENT & PLAN NOTE
Patient expressed desire to stop smoking  He was given nicotine patches on discharge from inpatient rehab and wishes to continue using these  details…

## 2024-04-13 ENCOUNTER — NURSE TRIAGE (OUTPATIENT)
Dept: OTHER | Facility: OTHER | Age: 79
End: 2024-04-13

## 2024-04-13 ENCOUNTER — TELEPHONE (OUTPATIENT)
Dept: OTHER | Facility: OTHER | Age: 79
End: 2024-04-13

## 2024-04-13 NOTE — TELEPHONE ENCOUNTER
"\"Sal' suprapubic catheter is not flushing and is not giving any urine output. I am just following up with on call.\"  Reason for Disposition  • [1] No urine in bag > 4 hours AND [2] catheter is not kinked    Answer Assessment - Initial Assessment Questions  1. SYMPTOMS: \"What symptoms are you concerned about?\"      Nurse calling from [facility] calling in to make provider aware that patient's suprapubic catheter is not flushing and is not giving any urine output. RN paged on call provider.    Protocols used: Urinary Catheter Symptoms and Questions-ADULT-    "

## 2024-04-14 NOTE — TELEPHONE ENCOUNTER
Pt has a miller that is empty. Pt had a bladder scan at 4:00 pm and urine was 42 ml, and another bladder scan at 8:30 pm urine was 87 mL. Miller is currently leaking.    On call provider paged

## 2024-04-14 NOTE — TELEPHONE ENCOUNTER
Nurse called saying that he has a order stating do not change suprapubic until follow up appointment.

## 2024-04-15 ENCOUNTER — TELEPHONE (OUTPATIENT)
Age: 79
End: 2024-04-15

## 2024-04-15 NOTE — TELEPHONE ENCOUNTER
Caller: [email or web address]    Doctor: Emigdio    Reason for call: PT Would like to know if the knee immobilizer may be removed. Patient is wearing it 24 hours a day    Call back#: [phone]

## 2024-04-17 DIAGNOSIS — N32.89 BLADDER SPASM: Primary | ICD-10-CM

## 2024-04-17 RX ORDER — OXYBUTYNIN CHLORIDE 5 MG/1
5 TABLET ORAL 3 TIMES DAILY PRN
Qty: 90 TABLET | Refills: 3 | Status: SHIPPED | OUTPATIENT
Start: 2024-04-17

## 2024-04-17 NOTE — TELEPHONE ENCOUNTER
Luz at [facility]     Please fax order to discontinue brace to [phone]      Callback [phone] ext [phone]

## 2024-04-18 ENCOUNTER — NURSING HOME VISIT (OUTPATIENT)
Dept: GERIATRICS | Facility: OTHER | Age: 79
End: 2024-04-18
Payer: MEDICARE

## 2024-04-18 VITALS
SYSTOLIC BLOOD PRESSURE: 136 MMHG | BODY MASS INDEX: 21.16 KG/M2 | HEART RATE: 69 BPM | DIASTOLIC BLOOD PRESSURE: 78 MMHG | OXYGEN SATURATION: 95 % | WEIGHT: 156 LBS | RESPIRATION RATE: 18 BRPM | TEMPERATURE: 97.8 F

## 2024-04-18 DIAGNOSIS — Z96.642 S/P TOTAL LEFT HIP ARTHROPLASTY: Primary | ICD-10-CM

## 2024-04-18 DIAGNOSIS — R33.9 URINARY RETENTION: ICD-10-CM

## 2024-04-18 DIAGNOSIS — R26.2 AMBULATORY DYSFUNCTION: ICD-10-CM

## 2024-04-18 DIAGNOSIS — J44.9 CHRONIC OBSTRUCTIVE PULMONARY DISEASE, UNSPECIFIED COPD TYPE (HCC): Chronic | ICD-10-CM

## 2024-04-18 DIAGNOSIS — I48.21 PERMANENT ATRIAL FIBRILLATION (HCC): ICD-10-CM

## 2024-04-18 DIAGNOSIS — I95.1 ORTHOSTATIC HYPOTENSION: ICD-10-CM

## 2024-04-18 DIAGNOSIS — I50.32 CHRONIC DIASTOLIC CONGESTIVE HEART FAILURE (HCC): Chronic | ICD-10-CM

## 2024-04-18 PROCEDURE — 99309 SBSQ NF CARE MODERATE MDM 30: CPT | Performed by: INTERNAL MEDICINE

## 2024-04-18 NOTE — TELEPHONE ENCOUNTER
Called [phone] and spoke with Chantal. Retrieved the correct fax number for Britt. Faxed order for pt. To remove knee immobilizer to [phone].

## 2024-04-19 NOTE — ASSESSMENT & PLAN NOTE
Blood pressure stable on midodrine 5 mg 3 times a day  
Continue PT OT  
Heart rate stable on metoprolol succinate 25 mg daily.  Continue apixaban  
Stable no bronchospasm.  Continue Advair discus and as needed albuterol inhalers  
Status post history of fall on 3/23/2024 resulting in left femur fracture status post left hip arthroplasty on 3/26/2024.  Patient remains on Eliquis for history of A-fib and DVT prophylaxis.  Continue with Tylenol as needed for pain.  He was recently evaluated by orthopedic service for a follow-up visit.  Continue PT OT  
Status post suprapubic catheter placement.  Patient had issues with catheter leakage few days ago.  Staff reported patient was additionally urinating.  Care was coordinated with urology service.  Advised to start oxybutynin for suspected bladder spasm causing above symptoms.  Monitor response on above.  Suprapubic catheter appears to be functioning well today  
Wt Readings from Last 3 Encounters:   04/18/24 70.8 kg (156 lb)   03/30/24 77.4 kg (170 lb 10.3 oz)   03/27/24 74.4 kg (164 lb 0.4 oz)       Clinically compensated.  Patient is currently not on any maintenance diuretic    
no

## 2024-04-19 NOTE — PROGRESS NOTES
[facility]  [address]   [address]  [place], [place], [address]  [phone]    Progress Note  Code [facility] 32    Patient Location     [facility]    Reason for visit     Follow-up left hip fracture, A-fib, urinary retention, issues with suprapubic catheter leakage, recent hip surgery    Patient’s care was coordinated with nursing facility staff. Recent vitals, labs and updated medications were reviewed on [facility] system of facility.     Problem List Items Addressed This Visit          Cardiovascular and Mediastinum    Chronic diastolic congestive heart failure (HCC) (Chronic)     Wt Readings from Last 3 Encounters:   04/18/24 70.8 kg (156 lb)   03/30/24 77.4 kg (170 lb 10.3 oz)   03/27/24 74.4 kg (164 lb 0.4 oz)       Clinically compensated.  Patient is currently not on any maintenance diuretic           Permanent atrial fibrillation (HCC)     Heart rate stable on metoprolol succinate 25 mg daily.  Continue apixaban         Orthostatic hypotension     Blood pressure stable on midodrine 5 mg 3 times a day            Respiratory    COPD (chronic obstructive pulmonary disease) (HCC) (Chronic)     Stable no bronchospasm.  Continue Advair discus and as needed albuterol inhalers            Genitourinary    Urinary retention     Status post suprapubic catheter placement.  Patient had issues with catheter leakage few days ago.  Staff reported patient was additionally urinating.  Care was coordinated with urology service.  Advised to start oxybutynin for suspected bladder spasm causing above symptoms.  Monitor response on above.  Suprapubic catheter appears to be functioning well today            Care Coordination    Ambulatory dysfunction     Continue PT OT            Surgery/Wound/Pain    S/P total left hip arthroplasty - Primary     Status post history of fall on 3/23/2024 resulting in left femur fracture status post left hip arthroplasty on 3/26/2024.  Patient remains on  Eliquis for history of A-fib and DVT prophylaxis.  Continue with Tylenol as needed for pain.  He was recently evaluated by orthopedic service for a follow-up visit.  Continue PT OT                HPI       Patient is being seen for a follow-up visit today.  He is doing okay at present.  Reports having increased pain in his lower extremities and hip at times.  Patient was noted to have issues with suprapubic catheter leakage few days ago.  Per staff patient was additionally urinating through penis despite having suprapubic catheter in place.  Care was coordinated with urology service.  Patient was suspected to have bladder spasm causing above symptoms.  He was started on oxybutynin.  Suprapubic catheter appears to be functioning well today.  No reports of any fever chills dyspnea or chest congestion          Review of Systems   Respiratory:  Negative for cough, shortness of breath, wheezing and stridor.    Cardiovascular:  Negative for chest pain and leg swelling.   Gastrointestinal:  Negative for abdominal distention and vomiting.   Genitourinary:  Negative for dysuria, flank pain and hematuria.   Musculoskeletal:  Positive for arthralgias. Negative for back pain.   Neurological:  Negative for tremors and seizures.   Psychiatric/Behavioral:  Negative for agitation.        Past Medical History:   Diagnosis Date    A-fib (HCC)     Ambulatory dysfunction     Anxiety     Anxiety disorder     Atrial fibrillation (HCC)     Chronic indwelling Cloud catheter     COPD (chronic obstructive pulmonary disease) (HCC)     Coronary artery disease     Depression     Cloud catheter in place     Hepatitis C     screening negative in 1/2017    Hepatitis C     History of MI (myocardial infarction)     Hypertension     MI (myocardial infarction) (HCC)     Urinary catheter in place     Urinary retention     Use of cane as ambulatory aid        Past Surgical History:   Procedure Laterality Date    CARDIAC CATHETERIZATION  07/09/2018     CARDIAC ELECTROPHYSIOLOGY PROCEDURE N/A 2022    Procedure: Cardiac loop recorder implant;  Surgeon: Duke Buckner MD;  Location: [facility];  Service: Cardiology    CARDIAC ELECTROPHYSIOLOGY PROCEDURE  2022    Cardiac loop recorder implant; Dr. Duke Buckner    CARDIAC LOOP RECORDER  2022    COLONOSCOPY      COLONOSCOPY      INGUINAL HERNIA REPAIR Right 2022    Dr. Encarnacion    IR SUPRAPUBIC CATHETER PLACEMENT  3/27/2024    ID HEMIARTHROPLASTY HIP PARTIAL Left 3/26/2024    Procedure: HEMIARTHROPLASTY HIP (BIPOLAR);  Surgeon: Ibeth Beal DO;  Location: [facility];  Service: Orthopedics    ID RPR 1ST INGUN HRNA AGE 5 YRS/> REDUCIBLE Right 2022    Procedure: REPAIR HERNIA INGUINAL;  Surgeon: Ady Encarnacion DO;  Location: [facility];  Service: General    TONSILLECTOMY         Social History     Tobacco Use   Smoking Status Former    Current packs/day: 0.00    Average packs/day: 1.5 packs/day for 57.0 years (85.5 ttl pk-yrs)    Types: Cigarettes    Start date: 3/12/1966    Quit date: 3/12/2023    Years since quittin.1   Smokeless Tobacco Never   Tobacco Comments    since age 14; Has history of smoking for over 55 years. He has been smoking more than packet daily in the past however he has been smokes about half a pack a day lately and stopped smoking on 2018 when he was admitted to the hospital.        Family History   Problem Relation Age of Onset    Hypertension Mother     Coronary artery disease Mother         premature    Diabetes Father     Coronary artery disease Father         premature    Hypertension Father         No Known Allergies      Current Outpatient Medications:     acetaminophen (TYLENOL) 325 mg tablet, Take 650 mg by mouth every 6 (six) hours as needed, Disp: , Rfl:     apixaban (ELIQUIS) 5 mg, Take 1 tablet (5 mg total) by mouth 2 (two) times a day, Disp: 60 tablet, Rfl: 0    azelastine (ASTELIN) 0.1 % nasal spray, 1 spray into each nostril 2 (two) times a  day as needed for rhinitis Use in each nostril as directed, Disp: , Rfl:     bisacodyl (DULCOLAX) 10 mg suppository, Insert 10 mg into the rectum once as needed, Disp: , Rfl:     docusate sodium (COLACE) 100 mg capsule, Take 100 mg by mouth 2 (two) times a day as needed for constipation, Disp: , Rfl:     Fluticasone-Salmeterol (Advair Diskus) 100-50 mcg/dose inhaler, Inhale 1 puff 2 (two) times a day Rinse mouth after use., Disp: , Rfl:     ipratropium-albuterol (DUO-NEB) 0.5-2.5 mg/3 mL nebulizer solution, INHALE CONTENTS OF 1 VIAL VIA NEBULIZER FOUR TIMES A DAY, Disp: 60 mL, Rfl: 11    meclizine (ANTIVERT) 12.5 MG tablet, Take 12.5 mg by mouth every 8 (eight) hours as needed for dizziness, Disp: , Rfl:     methenamine hippurate (HIPREX) 1 g tablet, Take 1 g by mouth 2 (two) times a day with meals, Disp: , Rfl:     metoprolol succinate (TOPROL-XL) 25 mg 24 hr tablet, Take 25 mg by mouth daily, Disp: , Rfl:     midodrine (PROAMATINE) 5 mg tablet, Take 5 mg by mouth 3 (three) times a day before meals Hold if SBP >135, Disp: , Rfl:     mineral oil enema, Insert 1 enema into the rectum once as needed for constipation, Disp: , Rfl:     oxybutynin (DITROPAN) 5 mg tablet, Take 1 tablet (5 mg total) by mouth 3 (three) times a day as needed (spasm), Disp: 90 tablet, Rfl: 3    sodium phosphate-biphosphate (FLEET) 7-19 g 118 mL enema, Insert 1 enema into the rectum once as needed, Disp: , Rfl:     Updated list was reviewed in [facility] system of facility.     Vitals:    04/18/24 1349   BP: 136/78   Pulse: 69   Resp: 18   Temp: 97.8 °F (36.6 °C)   SpO2: 95%       Physical Exam  Constitutional:       General: He is not in acute distress.  HENT:      Head: Normocephalic and atraumatic.   Eyes:      General: No scleral icterus.  Cardiovascular:      Rate and Rhythm: Normal rate and regular rhythm.   Pulmonary:      Breath sounds: No wheezing, rhonchi or rales.   Abdominal:      General: There is no distension.       "Palpations: Abdomen is soft.      Tenderness: There is no abdominal tenderness. There is no guarding.   Genitourinary:     Comments: Suprapubic catheter in place  Musculoskeletal:      Cervical back: Neck supple.      Right lower leg: No edema.      Left lower leg: No edema.   Skin:     Coloration: Skin is not jaundiced.   Neurological:      General: No focal deficit present.      Cranial Nerves: No cranial nerve deficit.   Psychiatric:         Mood and Affect: Mood normal.         Behavior: Behavior normal.         Diagnostic Data:    Lab Results   Component Value Date    WBC 10.16 03/30/2024    HGB 11.5 (L) 03/30/2024    HCT 36.9 03/30/2024    MCV 98 03/30/2024     03/30/2024      Lab Results   Component Value Date    SODIUM 137 03/30/2024    K 4.3 03/30/2024     (H) 03/30/2024    CO2 21 03/30/2024    BUN 18 03/30/2024    CREATININE 0.85 03/30/2024    GLUC 107 03/30/2024    CALCIUM 7.8 (L) 03/30/2024        Portions of the record may have been created with voice recognition software.  Occasional wrong word or \"sound a like\" substitutions may have occurred due to the inherent limitations of voice recognition software.  Read the chart carefully and recognize, using context, where substitutions have occurred.    This note was electronically signed by Dr. Snow Farah   " none

## 2024-04-25 NOTE — TELEPHONE ENCOUNTER
Caller: Marina (Unit Manager)    Doctor: Emigdio    Reason for call: Has not received the DC for knee immobilizer.  Needs it faxed to:    FAX: [phone]     Call back#: [phone] (Personal Cell only available from 7 AM- 3PM)

## 2024-05-06 ENCOUNTER — PROCEDURE VISIT (OUTPATIENT)
Dept: UROLOGY | Facility: CLINIC | Age: 79
End: 2024-05-06
Payer: MEDICARE

## 2024-05-06 ENCOUNTER — REMOTE DEVICE CLINIC VISIT (OUTPATIENT)
Dept: CARDIOLOGY CLINIC | Facility: CLINIC | Age: 79
End: 2024-05-06
Payer: MEDICARE

## 2024-05-06 VITALS
HEART RATE: 101 BPM | HEIGHT: 72 IN | BODY MASS INDEX: 21.16 KG/M2 | OXYGEN SATURATION: 98 % | SYSTOLIC BLOOD PRESSURE: 120 MMHG | DIASTOLIC BLOOD PRESSURE: 72 MMHG

## 2024-05-06 DIAGNOSIS — Z95.818 PRESENCE OF OTHER CARDIAC IMPLANTS AND GRAFTS: Primary | ICD-10-CM

## 2024-05-06 DIAGNOSIS — N31.9 NEUROGENIC BLADDER: Primary | ICD-10-CM

## 2024-05-06 PROCEDURE — 51702 INSERT TEMP BLADDER CATH: CPT | Performed by: PHYSICIAN ASSISTANT

## 2024-05-06 PROCEDURE — 93298 REM INTERROG DEV EVAL SCRMS: CPT | Performed by: INTERNAL MEDICINE

## 2024-05-06 PROCEDURE — 99213 OFFICE O/P EST LOW 20 MIN: CPT | Performed by: PHYSICIAN ASSISTANT

## 2024-05-06 NOTE — PROGRESS NOTES
Universal Protocol:  Consent: Verbal consent obtained.  Patient identity confirmed: verbally with patient    Bladder catheterization    Date/Time: 5/6/2024 10:00 AM    Performed by: Miguel Hidalgo PA-C  Authorized by: Miguel Hidalgo PA-C    Universal protocol:     Patient identity confirmed:  Verbally with patient  Procedure details:     Catheter insertion:  Indwelling    Approach comment:  Suprapubic    Catheter type:  Cloud    Catheter size:  18 Fr    Number of attempts:  1    Successful placement: yes      Urine characteristics:  Yellow  Post-procedure details:     Patient tolerance of procedure:  Tolerated well, no immediate complications  Comments:      78-year-old man with history of chronic urinary retention.  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  Here for his first change.  His catheter was not draining.  A new 18 French catheter was placed without difficulty urine was clear yellow.  Catheter can be changed every 4 to 6 weeks by his nursing staff and irrigated with 30 cc of sterile water daily.  Follow-up with urology as needed.

## 2024-05-06 NOTE — PROGRESS NOTES
"MDT LNQ22/ ACTIVE SYSTEM IS MRI CONDITIONAL   CARELINK TRANSMISSION: LOOP RECORDER.P RESENTING RHYTHM AF @ 60 BPM. BATTERY STATUS \"OK.\" 82 DEVICE CLASSIFIED AF EPISODES, LONGEST EPISODE IS 19:22 HOURS, AVAILABLE STRIPS DEMONSTRATE ATRIAL FIBRILLATION / ALFUTTER AND OVERSENSING. AF BURDEN IS 43.2%. AF BURDEN MAYBE OVERESTIMATED DUE TO INAPPROPRIATE CLASSIFICATION OF AF. SOME STRIPS MAY NOT BE INTERPRETABLE OR AVAILABLE, CANNOT DEFINITIVELY RULE OUT ATRIAL FIBRILLATION. HX OF PAF. PT TAKES ELIQUIS AND METOPROLOL SUCC. NO PATIENT ACTIVATED EPISODES.NORMAL DEVICE FUNCTION. DL   "

## 2024-06-11 ENCOUNTER — NURSING HOME VISIT (OUTPATIENT)
Dept: GERIATRICS | Facility: OTHER | Age: 79
End: 2024-06-11
Payer: MEDICARE

## 2024-06-11 VITALS
OXYGEN SATURATION: 97 % | HEART RATE: 70 BPM | WEIGHT: 149 LBS | SYSTOLIC BLOOD PRESSURE: 128 MMHG | DIASTOLIC BLOOD PRESSURE: 72 MMHG | BODY MASS INDEX: 20.21 KG/M2 | TEMPERATURE: 98 F | RESPIRATION RATE: 18 BRPM

## 2024-06-11 DIAGNOSIS — J44.9 CHRONIC OBSTRUCTIVE PULMONARY DISEASE, UNSPECIFIED COPD TYPE (HCC): Chronic | ICD-10-CM

## 2024-06-11 DIAGNOSIS — D50.9 MICROCYTIC ANEMIA: ICD-10-CM

## 2024-06-11 DIAGNOSIS — I48.21 PERMANENT ATRIAL FIBRILLATION (HCC): Primary | Chronic | ICD-10-CM

## 2024-06-11 DIAGNOSIS — N31.9 NEUROGENIC BLADDER: ICD-10-CM

## 2024-06-11 DIAGNOSIS — I50.32 CHRONIC DIASTOLIC CONGESTIVE HEART FAILURE (HCC): Chronic | ICD-10-CM

## 2024-06-11 DIAGNOSIS — I95.1 ORTHOSTATIC HYPOTENSION: Chronic | ICD-10-CM

## 2024-06-11 DIAGNOSIS — E44.0 MODERATE PROTEIN-CALORIE MALNUTRITION (HCC): ICD-10-CM

## 2024-06-11 PROCEDURE — 99309 SBSQ NF CARE MODERATE MDM 30: CPT | Performed by: INTERNAL MEDICINE

## 2024-06-11 NOTE — PROGRESS NOTES
[facility]  [address]   [address]  [place], [place], [address] [phone]    Progress Note  Code [facility] 32    Patient Location     [facility]    Reason for visit     F.U AF, CHF, COPD, Orthostatic hypotension, anemia, neurogenic baldder s/p suprapubic catheter    Patient’s care was coordinated with nursing facility staff. Recent vitals, labs and updated medications were reviewed on [facility] system of facility.     Problem List Items Addressed This Visit          Cardiovascular and Mediastinum    Atrial fibrillation (HCC) - Primary (Chronic)     Heart rate stable on metoprolol succinate 25 mg daily.  Continue apixaban         Orthostatic hypotension (Chronic)     Blood pressure stable on midodrine 5 mg 3 times daily         Chronic diastolic congestive heart failure (HCC) (Chronic)     Wt Readings from Last 3 Encounters:   06/11/24 67.6 kg (149 lb)   04/18/24 70.8 kg (156 lb)   03/30/24 77.4 kg (170 lb 10.3 oz)     Clinically compensated.  Patient is not on any maintenance diuretic                  Respiratory    COPD (chronic obstructive pulmonary disease) (HCC) (Chronic)     Stable no bronchospasm.  Continue Advair discus and as needed albuterol inhalers            Urinary    Neurogenic bladder     S/P suprapubic catheter placement  Pt reports having issues with leakage earlier. Catheter was recently changed. Will continue to monitor            Blood    Anemia     HGB was stable at 11.9 on 4/1.  Continue to monitor periodically            Other    Moderate protein-calorie malnutrition (HCC)     Malnutrition Findings:             Pt is noted to be weak and frail with generalized muscle wasting.  Encourage po intake                    BMI Findings:           Body mass index is 20.21 kg/m².                   HPI       Patient is being seen for a follow-up visit today.  He is doing okay at present.  Reports having problems with suprapubic catheter earlier with leakage.   Catheter was recently changed.  No fever chills dyspnea or chest congestion.  Pt has lost around 4 lbs since Jan/24.      Review of Systems   Constitutional:  Negative for chills and fever.   Respiratory:  Negative for shortness of breath and wheezing.    Cardiovascular:  Negative for chest pain.   Gastrointestinal:  Negative for abdominal distention, abdominal pain and vomiting.   Genitourinary:  Negative for flank pain and hematuria.        History of urinary retention, suprapubic catheter in place   Musculoskeletal:  Positive for gait problem.   Neurological:  Positive for weakness. Negative for seizures and syncope.   Psychiatric/Behavioral:  Negative for agitation and behavioral problems.        Past Medical History:   Diagnosis Date    A-fib (HCC)     Ambulatory dysfunction     Anxiety     Anxiety disorder     Atrial fibrillation (HCC)     Chronic indwelling Cloud catheter     COPD (chronic obstructive pulmonary disease) (HCC)     Coronary artery disease     Depression     Cloud catheter in place     Hepatitis C     screening negative in 1/2017    Hepatitis C     History of MI (myocardial infarction)     Hypertension     MI (myocardial infarction) (HCC)     Urinary catheter in place     Urinary retention     Use of cane as ambulatory aid        Past Surgical History:   Procedure Laterality Date    CARDIAC CATHETERIZATION  07/09/2018    CARDIAC ELECTROPHYSIOLOGY PROCEDURE N/A 9/30/2022    Procedure: Cardiac loop recorder implant;  Surgeon: Duke Buckner MD;  Location:  CARDIAC CATH LAB;  Service: Cardiology    CARDIAC ELECTROPHYSIOLOGY PROCEDURE  09/30/2022    Cardiac loop recorder implant; Dr. Duke Buckner    CARDIAC LOOP RECORDER  09/2022    COLONOSCOPY      COLONOSCOPY      INGUINAL HERNIA REPAIR Right 08/11/2022    Dr. Encarnacion    IR SUPRAPUBIC CATHETER PLACEMENT  3/27/2024    IN HEMIARTHROPLASTY HIP PARTIAL Left 3/26/2024    Procedure: HEMIARTHROPLASTY HIP (BIPOLAR);  Surgeon: Ibeth Beal DO;  Location:   MAIN OR;  Service: Orthopedics    MD RPR 1ST INGUN HRNA AGE 7 YRS/> REDUCIBLE Right 2022    Procedure: REPAIR HERNIA INGUINAL;  Surgeon: Ady Encarnacion DO;  Location: [facility];  Service: General    TONSILLECTOMY         Social History     Tobacco Use   Smoking Status Former    Current packs/day: 0.00    Average packs/day: 1.5 packs/day for 57.0 years (85.5 ttl pk-yrs)    Types: Cigarettes    Start date: 3/12/1966    Quit date: 3/12/2023    Years since quittin.2   Smokeless Tobacco Never   Tobacco Comments    since age 14; Has history of smoking for over 55 years. He has been smoking more than packet daily in the past however he has been smokes about half a pack a day lately and stopped smoking on 2018 when he was admitted to the hospital.        Family History   Problem Relation Age of Onset    Hypertension Mother     Coronary artery disease Mother         premature    Diabetes Father     Coronary artery disease Father         premature    Hypertension Father         No Known Allergies      Current Outpatient Medications:     acetaminophen (TYLENOL) 325 mg tablet, Take 650 mg by mouth every 6 (six) hours as needed, Disp: , Rfl:     apixaban (ELIQUIS) 5 mg, Take 1 tablet (5 mg total) by mouth 2 (two) times a day, Disp: 60 tablet, Rfl: 0    azelastine (ASTELIN) 0.1 % nasal spray, 1 spray into each nostril 2 (two) times a day as needed for rhinitis Use in each nostril as directed, Disp: , Rfl:     bisacodyl (DULCOLAX) 10 mg suppository, Insert 10 mg into the rectum once as needed, Disp: , Rfl:     docusate sodium (COLACE) 100 mg capsule, Take 100 mg by mouth 2 (two) times a day as needed for constipation, Disp: , Rfl:     Fluticasone-Salmeterol (Advair Diskus) 100-50 mcg/dose inhaler, Inhale 1 puff 2 (two) times a day Rinse mouth after use., Disp: , Rfl:     ipratropium-albuterol (DUO-NEB) 0.5-2.5 mg/3 mL nebulizer solution, INHALE CONTENTS OF 1 VIAL VIA NEBULIZER FOUR TIMES A DAY, Disp: 60 mL,  Rfl: 11    meclizine (ANTIVERT) 12.5 MG tablet, Take 12.5 mg by mouth every 8 (eight) hours as needed for dizziness, Disp: , Rfl:     methenamine hippurate (HIPREX) 1 g tablet, Take 1 g by mouth 2 (two) times a day with meals, Disp: , Rfl:     metoprolol succinate (TOPROL-XL) 25 mg 24 hr tablet, Take 25 mg by mouth daily, Disp: , Rfl:     midodrine (PROAMATINE) 5 mg tablet, Take 5 mg by mouth 3 (three) times a day before meals Hold if SBP >135, Disp: , Rfl:     mineral oil enema, Insert 1 enema into the rectum once as needed for constipation, Disp: , Rfl:     oxybutynin (DITROPAN) 5 mg tablet, Take 1 tablet (5 mg total) by mouth 3 (three) times a day as needed (spasm), Disp: 90 tablet, Rfl: 3    sodium phosphate-biphosphate (FLEET) 7-19 g 118 mL enema, Insert 1 enema into the rectum once as needed, Disp: , Rfl:     Updated list was reviewed in [facility] system of facility.     Vitals:    06/11/24 1215   BP: 128/72   Pulse: 70   Resp: 18   Temp: 98 °F (36.7 °C)   SpO2: 97%       Physical Exam  Constitutional:       General: He is not in acute distress.  HENT:      Head: Normocephalic and atraumatic.   Eyes:      General: No scleral icterus.  Cardiovascular:      Rate and Rhythm: Normal rate and regular rhythm.   Pulmonary:      Breath sounds: No wheezing, rhonchi or rales.   Abdominal:      General: There is no distension.      Palpations: Abdomen is soft.      Tenderness: There is no abdominal tenderness. There is no guarding.   Genitourinary:     Comments: Suprapubic miller catheter in place  Musculoskeletal:      Cervical back: Neck supple.      Right lower leg: No edema.      Left lower leg: No edema.   Skin:     Coloration: Skin is not jaundiced.   Neurological:      General: No focal deficit present.      Cranial Nerves: No cranial nerve deficit.   Psychiatric:         Mood and Affect: Mood normal.         Behavior: Behavior normal.         Diagnostic Data:    Labs from 4/1 reviewed  WBC 11.7, HGB 11.9,  "Plt 311  Na 138, K 4.2, Creatinine 0.9    Portions of the record may have been created with voice recognition software.  Occasional wrong word or \"sound a like\" substitutions may have occurred due to the inherent limitations of voice recognition software.  Read the chart carefully and recognize, using context, where substitutions have occurred.    This note was electronically signed by Dr. Snow Farah   "

## 2024-06-11 NOTE — ASSESSMENT & PLAN NOTE
S/P suprapubic catheter placement  Pt reports having issues with leakage earlier. Catheter was recently changed. Will continue to monitor

## 2024-06-11 NOTE — ASSESSMENT & PLAN NOTE
Wt Readings from Last 3 Encounters:   06/11/24 67.6 kg (149 lb)   04/18/24 70.8 kg (156 lb)   03/30/24 77.4 kg (170 lb 10.3 oz)     Clinically compensated.  Patient is not on any maintenance diuretic

## 2024-06-11 NOTE — ASSESSMENT & PLAN NOTE
Malnutrition Findings:             Pt is noted to be weak and frail with generalized muscle wasting.  Encourage po intake                    BMI Findings:           Body mass index is 20.21 kg/m².

## 2024-06-25 ENCOUNTER — APPOINTMENT (EMERGENCY)
Dept: CT IMAGING | Facility: HOSPITAL | Age: 79
End: 2024-06-25
Payer: MEDICARE

## 2024-06-25 ENCOUNTER — HOSPITAL ENCOUNTER (EMERGENCY)
Facility: HOSPITAL | Age: 79
Discharge: HOME | End: 2024-06-25
Attending: EMERGENCY MEDICINE
Payer: MEDICARE

## 2024-06-25 VITALS
DIASTOLIC BLOOD PRESSURE: 91 MMHG | SYSTOLIC BLOOD PRESSURE: 151 MMHG | OXYGEN SATURATION: 99 % | TEMPERATURE: 97.8 F | RESPIRATION RATE: 18 BRPM | HEART RATE: 66 BPM

## 2024-06-25 DIAGNOSIS — N39.0 URINARY TRACT INFECTION: Primary | ICD-10-CM

## 2024-06-25 DIAGNOSIS — R31.9 HEMATURIA: ICD-10-CM

## 2024-06-25 LAB
ALBUMIN SERPL BCG-MCNC: 3.9 G/DL (ref 3.5–5)
ALP SERPL-CCNC: 70 U/L (ref 34–104)
ALT SERPL W P-5'-P-CCNC: 19 U/L (ref 7–52)
AMORPH URATE CRY URNS QL MICRO: ABNORMAL /HPF
ANION GAP SERPL CALCULATED.3IONS-SCNC: 7 MMOL/L (ref 4–13)
APTT PPP: 23 SECONDS (ref 23–37)
AST SERPL W P-5'-P-CCNC: 21 U/L (ref 13–39)
BACTERIA UR QL AUTO: ABNORMAL /HPF
BASOPHILS # BLD AUTO: 0.07 THOUSANDS/ÂΜL (ref 0–0.1)
BASOPHILS NFR BLD AUTO: 1 % (ref 0–1)
BILIRUB SERPL-MCNC: 0.55 MG/DL (ref 0.2–1)
BILIRUB UR QL STRIP: NEGATIVE
BUN SERPL-MCNC: 20 MG/DL (ref 5–25)
CALCIUM SERPL-MCNC: 9.4 MG/DL (ref 8.4–10.2)
CHLORIDE SERPL-SCNC: 107 MMOL/L (ref 96–108)
CLARITY UR: ABNORMAL
CO2 SERPL-SCNC: 25 MMOL/L (ref 21–32)
COLOR UR: YELLOW
CREAT SERPL-MCNC: 1.09 MG/DL (ref 0.6–1.3)
EOSINOPHIL # BLD AUTO: 0.12 THOUSAND/ÂΜL (ref 0–0.61)
EOSINOPHIL NFR BLD AUTO: 1 % (ref 0–6)
ERYTHROCYTE [DISTWIDTH] IN BLOOD BY AUTOMATED COUNT: 12.8 % (ref 11.6–15.1)
GFR SERPL CREATININE-BSD FRML MDRD: 65 ML/MIN/1.73SQ M
GLUCOSE SERPL-MCNC: 106 MG/DL (ref 65–140)
GLUCOSE UR STRIP-MCNC: NEGATIVE MG/DL
HCT VFR BLD AUTO: 41.6 % (ref 36.5–49.3)
HGB BLD-MCNC: 13.1 G/DL (ref 12–17)
HGB UR QL STRIP.AUTO: ABNORMAL
IMM GRANULOCYTES # BLD AUTO: 0.12 THOUSAND/UL (ref 0–0.2)
IMM GRANULOCYTES NFR BLD AUTO: 1 % (ref 0–2)
INR PPP: 1.38 (ref 0.84–1.19)
KETONES UR STRIP-MCNC: NEGATIVE MG/DL
LEUKOCYTE ESTERASE UR QL STRIP: ABNORMAL
LYMPHOCYTES # BLD AUTO: 1.87 THOUSANDS/ÂΜL (ref 0.6–4.47)
LYMPHOCYTES NFR BLD AUTO: 17 % (ref 14–44)
MCH RBC QN AUTO: 29.4 PG (ref 26.8–34.3)
MCHC RBC AUTO-ENTMCNC: 31.5 G/DL (ref 31.4–37.4)
MCV RBC AUTO: 94 FL (ref 82–98)
MONOCYTES # BLD AUTO: 0.71 THOUSAND/ÂΜL (ref 0.17–1.22)
MONOCYTES NFR BLD AUTO: 7 % (ref 4–12)
MUCOUS THREADS UR QL AUTO: ABNORMAL
NEUTROPHILS # BLD AUTO: 8.1 THOUSANDS/ÂΜL (ref 1.85–7.62)
NEUTS SEG NFR BLD AUTO: 73 % (ref 43–75)
NITRITE UR QL STRIP: POSITIVE
NON-SQ EPI CELLS URNS QL MICRO: ABNORMAL /HPF
NRBC BLD AUTO-RTO: 0 /100 WBCS
PH UR STRIP.AUTO: 6.5 [PH]
PLATELET # BLD AUTO: 274 THOUSANDS/UL (ref 149–390)
PMV BLD AUTO: 10.3 FL (ref 8.9–12.7)
POTASSIUM SERPL-SCNC: 4.8 MMOL/L (ref 3.5–5.3)
PROT SERPL-MCNC: 7 G/DL (ref 6.4–8.4)
PROT UR STRIP-MCNC: ABNORMAL MG/DL
PROTHROMBIN TIME: 16.8 SECONDS (ref 11.6–14.5)
RBC # BLD AUTO: 4.45 MILLION/UL (ref 3.88–5.62)
RBC #/AREA URNS AUTO: ABNORMAL /HPF
SODIUM SERPL-SCNC: 139 MMOL/L (ref 135–147)
SP GR UR STRIP.AUTO: >=1.03 (ref 1–1.03)
UROBILINOGEN UR QL STRIP.AUTO: 1 E.U./DL
WBC # BLD AUTO: 10.99 THOUSAND/UL (ref 4.31–10.16)
WBC #/AREA URNS AUTO: ABNORMAL /HPF

## 2024-06-25 PROCEDURE — 81001 URINALYSIS AUTO W/SCOPE: CPT | Performed by: EMERGENCY MEDICINE

## 2024-06-25 PROCEDURE — 85025 COMPLETE CBC W/AUTO DIFF WBC: CPT | Performed by: EMERGENCY MEDICINE

## 2024-06-25 PROCEDURE — 99285 EMERGENCY DEPT VISIT HI MDM: CPT | Performed by: EMERGENCY MEDICINE

## 2024-06-25 PROCEDURE — 36415 COLL VENOUS BLD VENIPUNCTURE: CPT | Performed by: EMERGENCY MEDICINE

## 2024-06-25 PROCEDURE — 85730 THROMBOPLASTIN TIME PARTIAL: CPT | Performed by: EMERGENCY MEDICINE

## 2024-06-25 PROCEDURE — 74177 CT ABD & PELVIS W/CONTRAST: CPT

## 2024-06-25 PROCEDURE — 85610 PROTHROMBIN TIME: CPT | Performed by: EMERGENCY MEDICINE

## 2024-06-25 PROCEDURE — 80053 COMPREHEN METABOLIC PANEL: CPT | Performed by: EMERGENCY MEDICINE

## 2024-06-25 RX ORDER — CEPHALEXIN 500 MG/1
500 CAPSULE ORAL EVERY 6 HOURS SCHEDULED
Qty: 28 CAPSULE | Refills: 0 | Status: SHIPPED | OUTPATIENT
Start: 2024-06-25 | End: 2024-07-02

## 2024-06-25 RX ORDER — ONDANSETRON 2 MG/ML
4 INJECTION INTRAMUSCULAR; INTRAVENOUS ONCE
Status: COMPLETED | OUTPATIENT
Start: 2024-06-25 | End: 2024-06-25

## 2024-06-25 RX ORDER — CEPHALEXIN 250 MG/1
500 CAPSULE ORAL ONCE
Status: COMPLETED | OUTPATIENT
Start: 2024-06-25 | End: 2024-06-25

## 2024-06-25 RX ADMIN — IOHEXOL 85 ML: 350 INJECTION, SOLUTION INTRAVENOUS at 14:23

## 2024-06-25 RX ADMIN — CEPHALEXIN 500 MG: 250 CAPSULE ORAL at 16:14

## 2024-06-25 RX ADMIN — ONDANSETRON 4 MG: 2 INJECTION INTRAMUSCULAR; INTRAVENOUS at 18:45

## 2024-06-25 NOTE — ED NOTES
Roundtrip requested for pt.     Kriss Bernardo RN  06/25/24 1959       Kriss Bernardo RN  06/25/24 4825

## 2024-06-25 NOTE — DISCHARGE INSTRUCTIONS
Follow-up with urology for continued symptoms.  Return to the emergency department for new or worsening symptoms.

## 2024-06-25 NOTE — ED PROVIDER NOTES
History  Chief Complaint   Patient presents with    Blood in Urine     Arrives via EMS from [facility], reports hematuria from suprapubic catheter last night. Takes Eliquis daily. States he has had this problem before. Pt states he believes the nurse at the SNF told him there was blood coming from his penis as well.      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  Patient reports noticing blood in his leg bag earlier this morning.  States that nurses at his SNF tried to flush the area but he continued to have bleeding so was sent here to the emergency department for further evaluation.  He reports having history of similar.  He denies having any associated pain.  No fever, chills, nausea, vomiting, diarrhea, abdominal pain.  He did not take any medications to treat his symptoms prior to arrival.        Prior to Admission Medications   Prescriptions Last Dose Informant Patient Reported? Taking?   Fluticasone-Salmeterol (Advair Diskus) 100-50 mcg/dose inhaler  Outside Facility (Specify) Yes No   Sig: Inhale 1 puff 2 (two) times a day Rinse mouth after use.   acetaminophen (TYLENOL) 325 mg tablet  Outside Facility (Specify) Yes No   Sig: Take 650 mg by mouth every 6 (six) hours as needed   apixaban (ELIQUIS) 5 mg   No No   Sig: Take 1 tablet (5 mg total) by mouth 2 (two) times a day   azelastine (ASTELIN) 0.1 % nasal spray  Outside Facility (Specify) Yes No   Si spray into each nostril 2 (two) times a day as needed for rhinitis Use in each nostril as directed   bisacodyl (DULCOLAX) 10 mg suppository  Outside Facility (Specify) Yes No   Sig: Insert 10 mg into the rectum once as needed   docusate sodium (COLACE) 100 mg capsule   Yes No   Sig: Take 100 mg by mouth 2 (two) times a day as needed for constipation   ipratropium-albuterol (DUO-NEB) 0.5-2.5 mg/3 mL  nebulizer solution  Self, Outside Facility (Specify) No No   Sig: INHALE CONTENTS OF 1 VIAL VIA NEBULIZER FOUR TIMES A DAY   meclizine (ANTIVERT) 12.5 MG tablet  Outside Facility (Specify) Yes No   Sig: Take 12.5 mg by mouth every 8 (eight) hours as needed for dizziness   methenamine hippurate (HIPREX) 1 g tablet  Outside Facility (Specify) Yes No   Sig: Take 1 g by mouth 2 (two) times a day with meals   metoprolol succinate (TOPROL-XL) 25 mg 24 hr tablet  Outside Facility (Specify) Yes No   Sig: Take 25 mg by mouth daily   midodrine (PROAMATINE) 5 mg tablet   Yes No   Sig: Take 5 mg by mouth 3 (three) times a day before meals Hold if SBP >135   mineral oil enema   Yes No   Sig: Insert 1 enema into the rectum once as needed for constipation   oxybutynin (DITROPAN) 5 mg tablet   No No   Sig: Take 1 tablet (5 mg total) by mouth 3 (three) times a day as needed (spasm)   sodium phosphate-biphosphate (FLEET) 7-19 g 118 mL enema  Outside Facility (Specify) Yes No   Sig: Insert 1 enema into the rectum once as needed      Facility-Administered Medications: None       Past Medical History:   Diagnosis Date    A-fib (HCC)     Ambulatory dysfunction     Anxiety     Anxiety disorder     Atrial fibrillation (HCC)     Chronic indwelling Cloud catheter     COPD (chronic obstructive pulmonary disease) (HCC)     Coronary artery disease     Depression     Cloud catheter in place     Hepatitis C     screening negative in 1/2017    Hepatitis C     History of MI (myocardial infarction)     Hypertension     MI (myocardial infarction) (HCC)     Urinary catheter in place     Urinary retention     Use of cane as ambulatory aid        Past Surgical History:   Procedure Laterality Date    CARDIAC CATHETERIZATION  07/09/2018    CARDIAC ELECTROPHYSIOLOGY PROCEDURE N/A 9/30/2022    Procedure: Cardiac loop recorder implant;  Surgeon: Duke Buckner MD;  Location: [facility];  Service: Cardiology    CARDIAC ELECTROPHYSIOLOGY PROCEDURE   2022    Cardiac loop recorder implant; Dr. Duke Buckner    CARDIAC LOOP RECORDER  2022    COLONOSCOPY      COLONOSCOPY      INGUINAL HERNIA REPAIR Right 2022    Dr. Encarnacion    IR SUPRAPUBIC CATHETER PLACEMENT  3/27/2024    ND HEMIARTHROPLASTY HIP PARTIAL Left 3/26/2024    Procedure: HEMIARTHROPLASTY HIP (BIPOLAR);  Surgeon: Ibeth Beal DO;  Location: [facility];  Service: Orthopedics    ND RPR 1ST INGUN HRNA AGE 5 YRS/> REDUCIBLE Right 2022    Procedure: REPAIR HERNIA INGUINAL;  Surgeon: Ady Encarnacion DO;  Location: [facility];  Service: General    TONSILLECTOMY         Family History   Problem Relation Age of Onset    Hypertension Mother     Coronary artery disease Mother         premature    Diabetes Father     Coronary artery disease Father         premature    Hypertension Father      I have reviewed and agree with the history as documented.    E-Cigarette/Vaping    E-Cigarette Use Never User      E-Cigarette/Vaping Substances    Nicotine No     THC No     CBD No     Flavoring No     Other No     Unknown No      Social History     Tobacco Use    Smoking status: Former     Current packs/day: 0.00     Average packs/day: 1.5 packs/day for 57.0 years (85.5 ttl pk-yrs)     Types: Cigarettes     Start date: 3/12/1966     Quit date: 3/12/2023     Years since quittin.2    Smokeless tobacco: Never    Tobacco comments:     since age 14; Has history of smoking for over 55 years. He has been smoking more than packet daily in the past however he has been smokes about half a pack a day lately and stopped smoking on 2018 when he was admitted to the hospital.    Vaping Use    Vaping status: Never Used   Substance Use Topics    Alcohol use: Not Currently    Drug use: Never       Review of Systems   Constitutional:  Negative for chills and fever.   HENT:  Negative for ear pain and sore throat.    Eyes:  Negative for pain and visual disturbance.   Respiratory:  Negative for cough and shortness  of breath.    Cardiovascular:  Negative for chest pain and palpitations.   Gastrointestinal:  Negative for abdominal pain and vomiting.   Genitourinary:  Positive for hematuria. Negative for dysuria.   Musculoskeletal:  Negative for arthralgias and back pain.   Skin:  Negative for color change and rash.   Neurological:  Negative for seizures and syncope.   All other systems reviewed and are negative.      Physical Exam  Physical Exam  Vitals and nursing note reviewed.   Constitutional:       General: He is not in acute distress.     Appearance: He is well-developed.   HENT:      Head: Normocephalic and atraumatic.   Eyes:      Conjunctiva/sclera: Conjunctivae normal.   Cardiovascular:      Rate and Rhythm: Normal rate and regular rhythm.      Heart sounds: No murmur heard.  Pulmonary:      Effort: Pulmonary effort is normal. No respiratory distress.      Breath sounds: Normal breath sounds.   Abdominal:      Palpations: Abdomen is soft.      Tenderness: There is no abdominal tenderness.       Musculoskeletal:         General: No swelling.      Cervical back: Neck supple.   Skin:     General: Skin is warm and dry.      Capillary Refill: Capillary refill takes less than 2 seconds.   Neurological:      Mental Status: He is alert.   Psychiatric:         Mood and Affect: Mood normal.         Vital Signs  ED Triage Vitals   Temperature Pulse Respirations Blood Pressure SpO2   06/25/24 1227 06/25/24 1222 06/25/24 1222 06/25/24 1222 06/25/24 1222   97.8 °F (36.6 °C) 72 16 137/92 97 %      Temp Source Heart Rate Source Patient Position - Orthostatic VS BP Location FiO2 (%)   06/25/24 1227 06/25/24 1222 06/25/24 1222 06/25/24 1222 --   Oral Monitor Sitting Right arm       Pain Score       06/25/24 1222       No Pain           Vitals:    06/25/24 1222 06/25/24 1528   BP: 137/92 151/91   Pulse: 72 66   Patient Position - Orthostatic VS: Sitting Lying         Visual Acuity      ED Medications  Medications   iohexol (OMNIPAQUE)  350 MG/ML injection (SINGLE-DOSE) 85 mL (85 mL Intravenous Given 6/25/24 1423)   cephalexin (KEFLEX) capsule 500 mg (500 mg Oral Given 6/25/24 1614)       Diagnostic Studies  Results Reviewed       Procedure Component Value Units Date/Time    Urine Microscopic [[id number]]  (Abnormal) Collected: 06/25/24 1300    Lab Status: Final result Specimen: Urine, Indwelling Cloud Catheter Updated: 06/25/24 1352     RBC, UA Innumerable /hpf      WBC, UA Innumerable /hpf      Epithelial Cells Occasional /hpf      Bacteria, UA Innumerable /hpf      AMORPH URATES Moderate /hpf      MUCUS THREADS Moderate    UA (URINE) with reflex to Scope [[id number]]  (Abnormal) Collected: 06/25/24 1300    Lab Status: Final result Specimen: Urine, Indwelling Cloud Catheter Updated: 06/25/24 1339     Color, UA Yellow     Clarity, UA Cloudy     Specific Gravity, UA >=1.030     pH, UA 6.5     Leukocytes, UA 2+     Nitrite, UA Positive     Protein, UA 3+ mg/dl      Glucose, UA Negative mg/dl      Ketones, UA Negative mg/dl      Urobilinogen, UA 1.0 E.U./dl      Bilirubin, UA Negative     Occult Blood, UA 3+    Comprehensive metabolic panel [[id number]] Collected: 06/25/24 1248    Lab Status: Final result Specimen: Blood from Arm, Right Updated: 06/25/24 1327     Sodium 139 mmol/L      Potassium 4.8 mmol/L      Chloride 107 mmol/L      CO2 25 mmol/L      ANION GAP 7 mmol/L      BUN 20 mg/dL      Creatinine 1.09 mg/dL      Glucose 106 mg/dL      Calcium 9.4 mg/dL      AST 21 U/L      ALT 19 U/L      Alkaline Phosphatase 70 U/L      Total Protein 7.0 g/dL      Albumin 3.9 g/dL      Total Bilirubin 0.55 mg/dL      eGFR 65 ml/min/1.73sq m     Narrative:      National Kidney Disease Foundation guidelines for Chronic Kidney Disease (CKD):     Stage 1 with normal or high GFR (GFR > 90 mL/min/1.73 square meters)    Stage 2 Mild CKD (GFR = 60-89 mL/min/1.73 square meters)    Stage 3A Moderate CKD (GFR = 45-59 mL/min/1.73 square meters)    Stage 3B Moderate CKD  (GFR = 30-44 mL/min/1.73 square meters)    Stage 4 Severe CKD (GFR = 15-29 mL/min/1.73 square meters)    Stage 5 End Stage CKD (GFR <15 mL/min/1.73 square meters)  Note: GFR calculation is accurate only with a steady state creatinine    Protime-INR [[id number]]  (Abnormal) Collected: 06/25/24 1248    Lab Status: Final result Specimen: Blood from Arm, Right Updated: 06/25/24 1310     Protime 16.8 seconds      INR 1.38    APTT [[id number]]  (Normal) Collected: 06/25/24 1248    Lab Status: Final result Specimen: Blood from Arm, Right Updated: 06/25/24 1310     PTT 23 seconds     CBC and differential [[id number]]  (Abnormal) Collected: 06/25/24 1248    Lab Status: Final result Specimen: Blood from Arm, Right Updated: 06/25/24 1253     WBC 10.99 Thousand/uL      RBC 4.45 Million/uL      Hemoglobin 13.1 g/dL      Hematocrit 41.6 %      MCV 94 fL      MCH 29.4 pg      MCHC 31.5 g/dL      RDW 12.8 %      MPV 10.3 fL      Platelets 274 Thousands/uL      nRBC 0 /100 WBCs      Segmented % 73 %      Immature Grans % 1 %      Lymphocytes % 17 %      Monocytes % 7 %      Eosinophils Relative 1 %      Basophils Relative 1 %      Absolute Neutrophils 8.10 Thousands/µL      Absolute Immature Grans 0.12 Thousand/uL      Absolute Lymphocytes 1.87 Thousands/µL      Absolute Monocytes 0.71 Thousand/µL      Eosinophils Absolute 0.12 Thousand/µL      Basophils Absolute 0.07 Thousands/µL                    CT abdomen pelvis with contrast   Final Result by Esteban Steiner MD (06/25 4955)      Suprapubic catheter in place. Findings suggestive of cystitis in the appropriate clinical context. Correlation with urinalysis is recommended.         Workstation performed: [id number]                    Procedures  Procedures         ED Course                 SBIRT 20yo+      Flowsheet Row Most Recent Value   Initial Alcohol Screen: US AUDIT-C     1. How often do you have a drink containing alcohol? 0 Filed at: 06/25/2024 1224   Audit-C Score 0 Filed at:  06/25/2024 1224   JOHN: How many times in the past year have you...    Used an illegal drug or used a prescription medication for non-medical reasons? Never Filed at: 06/25/2024 1224                      Medical Decision Making  78-year-old male presented to the emergency department for evaluation of hematuria.  On arrival patient awake, alert, oriented and in no acute distress.  On arrival blood noted in the patient's leg bag.  Leg bag was changed and urine draining clear at this point.  Case was discussed with on-call urology with recommendation for a CT scan.  CT scan was ordered to evaluate for acute pathology including but not limited to obstruction, perforation, infection, abscess.  Blood work overall grossly unremarkable.  Hemoglobin within normal limits.  CT scan and urinalysis consistent with a urinary tract infection.  Patient updated on results of all diagnostic studies.  He was treated with a dose of Keflex.  And was provided with a prescription.  Recommendation was made for the patient to follow-up with urology for continued symptoms.  Return precautions were discussed.    Patient agrees with the plan for discharge and feels comfortable to go home with proper f/u. Advised to return for worsening or additional problems. Diagnostic tests were reviewed and questions answered. Diagnosis, care plan and treatment options were discussed. The patient understands instructions and will follow up as directed.        Amount and/or Complexity of Data Reviewed  Labs: ordered.  Radiology: ordered.  Discussion of management or test interpretation with external provider(s): Case discussed with on-call urology    Risk  Prescription drug management.             Disposition  Final diagnoses:   Urinary tract infection   Hematuria     Time reflects when diagnosis was documented in both MDM as applicable and the Disposition within this note       Time User Action Codes Description Comment    6/25/2024  4:06 PM Renea  Luis Add [N39.0] Urinary tract infection     6/25/2024  4:06 PM Renea Luis Add [R31.9] Hematuria           ED Disposition       ED Disposition   Discharge    Condition   Stable    Date/Time   Tue Jun 25, 2024 1606    Comment   Sal Moura discharge to home/self care.                   Follow-up Information       Follow up With Specialties Details Why Contact Info Additional Information    Snow Farah MD Internal Medicine Schedule an appointment as soon as possible for a visit   [address]  [address]  [facility] [address] [phone]       [facility] Schedule an appointment as soon as possible for a visit   [address]  [facility] [address] [phone] [facility], [address] [place] [address] [address], [place], [place], [address] [phone]    [facility] Emergency Department Emergency Medicine Go to  If symptoms worsen [address] [address]  [phone] [facility] Emergency Department, [address] [address]            Current Discharge Medication List        START taking these medications    Details   cephalexin (KEFLEX) 500 mg capsule Take 1 capsule (500 mg total) by mouth every 6 (six) hours for 7 days  Qty: 28 capsule, Refills: 0    Associated Diagnoses: Urinary tract infection           CONTINUE these medications which have NOT CHANGED    Details   acetaminophen (TYLENOL) 325 mg tablet Take 650 mg by mouth every 6 (six) hours as needed      apixaban (ELIQUIS) 5 mg Take 1 tablet (5 mg total) by mouth 2 (two) times a day  Qty: 60 tablet, Refills: 0    Associated Diagnoses: Permanent atrial fibrillation (HCC)      azelastine (ASTELIN) 0.1 % nasal spray 1 spray into each nostril 2 (two) times a day as needed for rhinitis Use in each nostril as directed      bisacodyl (DULCOLAX) 10 mg suppository Insert 10 mg into the rectum once as needed       docusate sodium (COLACE) 100 mg capsule Take 100 mg by mouth 2 (two) times a day as needed for constipation      Fluticasone-Salmeterol (Advair Diskus) 100-50 mcg/dose inhaler Inhale 1 puff 2 (two) times a day Rinse mouth after use.      ipratropium-albuterol (DUO-NEB) 0.5-2.5 mg/3 mL nebulizer solution INHALE CONTENTS OF 1 VIAL VIA NEBULIZER FOUR TIMES A DAY  Qty: 60 mL, Refills: 11    Associated Diagnoses: Chronic obstructive pulmonary disease, unspecified COPD type (HCC)      meclizine (ANTIVERT) 12.5 MG tablet Take 12.5 mg by mouth every 8 (eight) hours as needed for dizziness      methenamine hippurate (HIPREX) 1 g tablet Take 1 g by mouth 2 (two) times a day with meals      metoprolol succinate (TOPROL-XL) 25 mg 24 hr tablet Take 25 mg by mouth daily      midodrine (PROAMATINE) 5 mg tablet Take 5 mg by mouth 3 (three) times a day before meals Hold if SBP >135      mineral oil enema Insert 1 enema into the rectum once as needed for constipation      oxybutynin (DITROPAN) 5 mg tablet Take 1 tablet (5 mg total) by mouth 3 (three) times a day as needed (spasm)  Qty: 90 tablet, Refills: 3    Associated Diagnoses: Bladder spasm      sodium phosphate-biphosphate (FLEET) 7-19 g 118 mL enema Insert 1 enema into the rectum once as needed             No discharge procedures on file.    PDMP Review         Value Time User    PDMP Reviewed  Yes 11/18/2022  8:47 PM GABRIELA Gibbs            ED Provider  Electronically Signed by             Luis Meier MD  06/25/24 [id number]

## 2024-06-26 ENCOUNTER — TELEPHONE (OUTPATIENT)
Age: 79
End: 2024-06-26

## 2024-06-26 ENCOUNTER — NURSING HOME VISIT (OUTPATIENT)
Dept: GERIATRICS | Facility: OTHER | Age: 79
End: 2024-06-26
Payer: MEDICARE

## 2024-06-26 DIAGNOSIS — I48.21 PERMANENT ATRIAL FIBRILLATION (HCC): Chronic | ICD-10-CM

## 2024-06-26 DIAGNOSIS — N30.01 HEMATURIA DUE TO ACUTE CYSTITIS: Primary | ICD-10-CM

## 2024-06-26 DIAGNOSIS — I95.1 ORTHOSTATIC HYPOTENSION: Chronic | ICD-10-CM

## 2024-06-26 DIAGNOSIS — N31.9 NEUROGENIC BLADDER: ICD-10-CM

## 2024-06-26 PROCEDURE — 99310 SBSQ NF CARE HIGH MDM 45: CPT | Performed by: NURSE PRACTITIONER

## 2024-06-26 NOTE — TELEPHONE ENCOUNTER
Left a voicemail with Anabelle to speak to her regarding an ER follow up for a urinary tract infection. Patient has an SP tube.

## 2024-06-26 NOTE — TELEPHONE ENCOUNTER
Patient was seen in the ER and discharge paperwork said to f/u with urology for UTI and hematuria asap but I don't see any openings until September. Could someone assist and triage to find out if patient even needs to come in?    CB: [phone] Anabelle

## 2024-06-26 NOTE — PROGRESS NOTES
[address] [address]  [phone]  [facility]  POS [address]  PROGRESS NOTE        NAME: Sal Moura  AGE: 78 y.o. SEX: male  :  1945  DATE OF ENCOUNTER: 2024    Chief Complaint   Patient seen and examined for follow up on chronic conditions.     History of Present Illness     Sal Moura is a patient of [facility] with acute and chronic medical conditions of atrial fibrillation, neurogenic bladder, anemia, orthostatic hypotension, hematuria, CHF, COPD, and suprapubic catheter.    The patient is being seen and examined today for follow-up on acute and chronic medical conditions. Upon examination, the patient is sitting in his bed, alert, cooperative, and in no acute distress.      The patient was sent to the ED on 24 r/t hematuria.  Patient leg bag was exchanged and labs revealed cystitis.  Patient was started on Cephalexin 500 mg Q 6 hrs x 7 days.    The patient denies any further hematuria.  He reports that he is feeling well today and has not had any s/e since starting antibiotic therapy.  I did encourage patient to increase po fluid intake and keep his eye on urine color and educated him that it should look straw and not kelly, as it does today.  Patient denies any other acute concerns today.    The following portions of the patient's history were reviewed and updated as appropriate: allergies, current medications, past family history, past medical history, past social history, past surgical history and problem list.    Review of Systems     Review of Systems   Constitutional:  Positive for activity change. Negative for chills and fever.   HENT:  Negative for ear pain and sore throat.    Eyes:  Negative for pain and visual disturbance.   Respiratory:  Negative for cough and shortness of breath.    Cardiovascular:  Negative for chest pain and palpitations.   Gastrointestinal:  Negative for abdominal pain and vomiting.   Genitourinary:  Negative for  dysuria, hematuria and penile discharge.        S/p catheter intact   Musculoskeletal:  Negative for arthralgias and back pain.   Skin:  Negative for color change and rash.   Neurological:  Positive for weakness. Negative for seizures and syncope.   All other systems reviewed and are negative.       History     Past Medical History:   Diagnosis Date    A-fib (HCC)     Ambulatory dysfunction     Anxiety     Anxiety disorder     Atrial fibrillation (HCC)     Chronic indwelling Cloud catheter     COPD (chronic obstructive pulmonary disease) (HCC)     Coronary artery disease     Depression     Cloud catheter in place     Hepatitis C     screening negative in 1/2017    Hepatitis C     History of MI (myocardial infarction)     Hypertension     MI (myocardial infarction) (HCC)     Urinary catheter in place     Urinary retention     Use of cane as ambulatory aid      Past Surgical History:   Procedure Laterality Date    CARDIAC CATHETERIZATION  07/09/2018    CARDIAC ELECTROPHYSIOLOGY PROCEDURE N/A 9/30/2022    Procedure: Cardiac loop recorder implant;  Surgeon: Duke Buckner MD;  Location: [facility];  Service: Cardiology    CARDIAC ELECTROPHYSIOLOGY PROCEDURE  09/30/2022    Cardiac loop recorder implant; Dr. Duke Buckner    CARDIAC LOOP RECORDER  09/2022    COLONOSCOPY      COLONOSCOPY      INGUINAL HERNIA REPAIR Right 08/11/2022    Dr. Encarnacion    IR SUPRAPUBIC CATHETER PLACEMENT  3/27/2024    WA HEMIARTHROPLASTY HIP PARTIAL Left 3/26/2024    Procedure: HEMIARTHROPLASTY HIP (BIPOLAR);  Surgeon: Ibeth Beal DO;  Location: [facility];  Service: Orthopedics    WA RPR 1ST INGUN HRNA AGE 5 YRS/> REDUCIBLE Right 8/11/2022    Procedure: REPAIR HERNIA INGUINAL;  Surgeon: Ady Encarnacion DO;  Location: [facility];  Service: General    TONSILLECTOMY       Family History   Problem Relation Age of Onset    Hypertension Mother     Coronary artery disease Mother         premature    Diabetes Father     Coronary artery  disease Father         premature    Hypertension Father      Social History     Socioeconomic History    Marital status: /Civil Union     Spouse name: Not on file    Number of children: 3    Years of education: 12    Highest education level: 12th grade   Occupational History    Occupation: retired   Tobacco Use    Smoking status: Former     Current packs/day: 0.00     Average packs/day: 1.5 packs/day for 57.0 years (85.5 ttl pk-yrs)     Types: Cigarettes     Start date: 3/12/1966     Quit date: 3/12/2023     Years since quittin.3    Smokeless tobacco: Never    Tobacco comments:     since age 14; Has history of smoking for over 55 years. He has been smoking more than packet daily in the past however he has been smokes about half a pack a day lately and stopped smoking on 2018 when he was admitted to the hospital.    Vaping Use    Vaping status: Never Used   Substance and Sexual Activity    Alcohol use: Not Currently    Drug use: Never    Sexual activity: Not Currently     Partners: Female     Birth control/protection: Condom Male   Other Topics Concern    Not on file   Social History Narrative    ** Merged History Encounter **         ** Merged History Encounter **         ** Merged History Encounter **         History of Ultra Sound: 2016  History of Stress Test: 2018  History of ECHO: 2018  · Most recent tobacco use screenin2019    · Live alone or with others:   with others      · Diet:   Regular    · Caffeine intake:   Moderate    · Guns     present in home:   No    · Asbestos exposure:   No    · TB exposure:   No    · Environmental exposure:   No    · Animal exposure:   No    · Smoke alarm in home:   Yes       Social Determinants of Health     Financial Resource Strain: Not on file   Food Insecurity: No Food Insecurity (3/27/2024)    Hunger Vital Sign     Worried About Running Out of Food in the Last Year: Never true     Ran Out of Food in the Last Year: Never true   Recent  Concern: Food Insecurity - Food Insecurity Present (3/24/2024)    Hunger Vital Sign     Worried About Running Out of Food in the Last Year: Sometimes true     Ran Out of Food in the Last Year: Sometimes true   Transportation Needs: No Transportation Needs (3/27/2024)    PRAPARE - Transportation     Lack of Transportation (Medical): No     Lack of Transportation (Non-Medical): No   Recent Concern: Transportation Needs - Unmet Transportation Needs (3/24/2024)    PRAPARE - Transportation     Lack of Transportation (Medical): Yes     Lack of Transportation (Non-Medical): Yes   Physical Activity: Insufficiently Active (5/29/2020)    Exercise Vital Sign     Days of Exercise per Week: 3 days     Minutes of Exercise per Session: 30 min   Stress: Stress Concern Present (5/29/2020)    [employer] - Occupational Stress Questionnaire     Feeling of Stress : To some extent   Social Connections: Not on file   Intimate Partner Violence: Not on file   Housing Stability: High Risk (3/27/2024)    Housing Stability Vital Sign     Unable to Pay for Housing in the Last Year: No     Number of Times Moved in the Last Year: 2     Homeless in the Last Year: No     No Known Allergies    Objective     Vital Signs  BP: 128/86       HR:83 T:97.6    RR:18 O2Sat:95% W:149  Physical Exam  Vitals reviewed.   Constitutional:       Appearance: He is ill-appearing.   Eyes:      Comments: Wears glasses   Cardiovascular:      Rate and Rhythm: Normal rate and regular rhythm.      Pulses: Normal pulses.      Heart sounds: Normal heart sounds.   Pulmonary:      Breath sounds: Normal breath sounds. No wheezing, rhonchi or rales.   Abdominal:      General: Bowel sounds are normal. There is no distension.      Palpations: Abdomen is soft.      Tenderness: There is no abdominal tenderness.   Genitourinary:     Penis: Normal.       Comments: S/p catheter in place draining kelly urine  Musculoskeletal:      Cervical back: Normal  "range of motion.      Right lower leg: No edema.      Left lower leg: No edema.   Skin:     General: Skin is warm and dry.   Neurological:      Mental Status: He is alert. Mental status is at baseline.      Motor: Weakness present.      Gait: Gait abnormal.   Psychiatric:         Mood and Affect: Mood normal.         Behavior: Behavior normal.        Pertinent Laboratory/Diagnostic Studies have been independently reviewed.      Current Medications     Current Medications Reviewed and updated in Nursing Home EMR.    Assessment and Plan     Hematuria due to acute cystitis  Patient with noted hematuria in leg bag that has since resolved 6/25  Patient sent to ED, labs revealed acute cystitis  Continue Keflex 500 mg po Q 6 hrs through 7/2/24  Encourage po fluid intake  Continue to monitor for acute changes in condition    Neurogenic bladder  History of neurogenic bladder  Recent episode of hematuria that resolved  Continue daily S/p catheter care  Change catheter Q 4-6 weeks or prn for complications  Continue to monitor output and for acute changes in condition    Atrial fibrillation (HCC)  Continue Metoprolol Succ 25 mg for rate control  Continue to monitor HR  Continue Eliquis for anticoag  Monitor for s/s of bleeding     Orthostatic hypotension  BP remains stable  Continue Midodrine 5 mg po TID  Avoid hypotension  Will continue to monitor BP and for acute changes in condition       Portions of this note may have been written with use of the previous note summary. Assessment and Plan of care have been reviewed and updated as appropriate.  In addition, voice-recognition software may have been used in the preparation of this document. Occasional wrong word or \"sound-alike\" substitutions may have occurred due to the inherent limitations of voice recognition software. Interpretation should be guided by context.      Sabi HAYWARD  Geriatric Medicine  6/26/2024           "

## 2024-07-01 NOTE — TELEPHONE ENCOUNTER
Anabelle from [facility] called to schedule follow up ER  appointment.  No appointment available within the appropriate time frame.      [phone] (Anabelle)

## 2024-07-01 NOTE — TELEPHONE ENCOUNTER
Left a voicemail with Anabelle at [facility] to scheduled for an ER follow up. Patient has an SP tube.

## 2024-07-21 ENCOUNTER — TELEPHONE (OUTPATIENT)
Dept: OTHER | Facility: OTHER | Age: 79
End: 2024-07-21

## 2024-07-23 PROBLEM — N30.01 HEMATURIA DUE TO ACUTE CYSTITIS: Status: ACTIVE | Noted: 2024-06-25

## 2024-07-23 NOTE — ASSESSMENT & PLAN NOTE
Patient with noted hematuria in leg bag that has since resolved 6/25  Patient sent to ED, labs revealed acute cystitis  Continue Keflex 500 mg po Q 6 hrs through 7/2/24  Encourage po fluid intake  Continue to monitor for acute changes in condition

## 2024-07-23 NOTE — ASSESSMENT & PLAN NOTE
Continue Metoprolol Succ 25 mg for rate control  Continue to monitor HR  Continue Eliquis for anticoag  Monitor for s/s of bleeding

## 2024-07-23 NOTE — ASSESSMENT & PLAN NOTE
BP remains stable  Continue Midodrine 5 mg po TID  Avoid hypotension  Will continue to monitor BP and for acute changes in condition

## 2024-07-23 NOTE — ASSESSMENT & PLAN NOTE
History of neurogenic bladder  Recent episode of hematuria that resolved  Continue daily S/p catheter care  Change catheter Q 4-6 weeks or prn for complications  Continue to monitor output and for acute changes in condition

## 2024-07-29 ENCOUNTER — NURSING HOME VISIT (OUTPATIENT)
Dept: GERIATRICS | Facility: OTHER | Age: 79
End: 2024-07-29

## 2024-07-29 DIAGNOSIS — R11.2 NAUSEA AND VOMITING IN ADULT: ICD-10-CM

## 2024-07-29 DIAGNOSIS — N31.9 NEUROGENIC BLADDER: ICD-10-CM

## 2024-07-29 DIAGNOSIS — I50.32 CHRONIC DIASTOLIC CONGESTIVE HEART FAILURE (HCC): Chronic | ICD-10-CM

## 2024-07-29 DIAGNOSIS — J44.9 CHRONIC OBSTRUCTIVE PULMONARY DISEASE, UNSPECIFIED COPD TYPE (HCC): Primary | Chronic | ICD-10-CM

## 2024-07-29 DIAGNOSIS — I95.1 ORTHOSTATIC HYPOTENSION: Chronic | ICD-10-CM

## 2024-07-29 DIAGNOSIS — I48.21 PERMANENT ATRIAL FIBRILLATION (HCC): Chronic | ICD-10-CM

## 2024-07-29 DIAGNOSIS — Z87.440 HISTORY OF RECURRENT UTIS: ICD-10-CM

## 2024-07-29 NOTE — PROGRESS NOTES
[facility] at [facility]  [phone]    Long Term Care: POS 32    ASSESSMENT AND PLAN:    1. Chronic obstructive pulmonary disease, unspecified COPD type (HCC)  Assessment & Plan:  Continue Advair discus and as needed albuterol inhalers  Patient with sore throat and trouble swallowing  Remains on room air with diminished lung sounds, no wheezing  This week, patient with new onset of productive cough with thick yellow, white sputum  Rhinorrhea and watery eyes with headache, dizziness, chills, and fatigue  New orders:  -Robitussin every 4 hours for 3 days and continue as needed  -Chloraseptic spray-5 sprays every 2 hours as needed for 5 days  -CBC with differential tomorrow  -Albuterol nebulizers 3 times daily for 3 days  Patient already has Tylenol for headaches ordered and meclizine for dizziness ordered, both as needed  Vital signs remained stable, no fevers  Monitor patient closely and notify if any decline in patient condition  2. Nausea and vomiting in adult  Assessment & Plan:  Patient vomited twice yesterday and once this morning  Stated that he just became nauseous and had to throw up  So far today patient was able to keep lunch down although started to feel nauseous at the end of our visit  Patient experiencing chills and fatigue, no fevers  New order for Zofran 4 mg every 6 hours as needed for 5 days  Ensure adequate hydration  CBC with differential ordered for tomorrow, 7/30  Monitor patient closely for changes in status  3. Permanent atrial fibrillation (HCC)  Assessment & Plan:  Continue Metoprolol Succ 25 mg for rate control  Continue to monitor HR  Continue Eliquis for anticoagulation  Monitor for s/s of bleeding   4. Chronic diastolic congestive heart failure (HCC)  Assessment & Plan:  Wt Readings from Last 3 Encounters:   06/11/24 67.6 kg (149 lb)   04/18/24 70.8 kg (156 lb)   03/30/24 77.4 kg (170 lb 10.3 oz)     No maintenance diuretic  Patient appears euvolemic on  exam  Continue to monitor for signs of fluid overload  Vital signs stable  5. Orthostatic hypotension  Assessment & Plan:  BP remains stable  Continue Midodrine 5 mg po TID  Avoid hypotension  Will continue to monitor BP and for acute changes in condition   6. History of recurrent UTIs  Assessment & Plan:  Continue methenamine hippurate to prevent frequent UTIs  Continue oxybutynin  Suprapubic catheter draining clear yellow urine  Follow-up with urology  7. Neurogenic bladder  Assessment & Plan:  History of neurogenic bladder  Continue daily S/p catheter care  Change catheter Q 4-6 weeks or prn for complications  Continue to monitor output and for acute changes in condition      Name: Sal Moura                 : 1945               Sex: male    HPI:  Patient evaluated today in Long Term Care for an acute visit and to follow-up on chronic medical conditions. Upon examination, patient is awake, fatigued, and in no acute distress.  Patient with new productive cough, sore throat, and trouble swallowing, along with nausea and vomiting.  Refer to assessment and plan for further HPI. The following portions of the patient's history were reviewed and updated as appropriate: allergies, current medications, past family history, past medical history, past social history, past surgical history and problem list.    ROS: Review of Systems   Constitutional:  Positive for activity change, chills and fatigue. Negative for fever.   HENT:  Positive for rhinorrhea, sore throat and trouble swallowing. Negative for ear pain, sinus pressure and sinus pain.    Eyes:  Negative for pain and visual disturbance.        Watery eyes   Respiratory:  Positive for cough. Negative for chest tightness, shortness of breath and wheezing.    Cardiovascular:  Negative for chest pain, palpitations and leg swelling.   Gastrointestinal:  Positive for nausea and vomiting. Negative for abdominal distention, abdominal pain and constipation.         Vomited x 2 yesterday, 1 this morning   Genitourinary:  Negative for difficulty urinating, dysuria and hematuria.        Cloud catheter intact, clear yellow urine   Musculoskeletal:  Negative for arthralgias and back pain.   Skin:  Negative for color change and rash.   Neurological:  Positive for dizziness, weakness and headaches. Negative for seizures and syncope.   Psychiatric/Behavioral:  Negative for agitation, behavioral problems and confusion.    All other systems reviewed and are negative.      No Known Allergies    Medications:    Current Outpatient Medications on File Prior to Visit   Medication Sig Dispense Refill   • acetaminophen (TYLENOL) 325 mg tablet Take 650 mg by mouth every 6 (six) hours as needed     • apixaban (ELIQUIS) 5 mg Take 1 tablet (5 mg total) by mouth 2 (two) times a day 60 tablet 0   • azelastine (ASTELIN) 0.1 % nasal spray 1 spray into each nostril 2 (two) times a day as needed for rhinitis Use in each nostril as directed     • bisacodyl (DULCOLAX) 10 mg suppository Insert 10 mg into the rectum once as needed     • docusate sodium (COLACE) 100 mg capsule Take 100 mg by mouth 2 (two) times a day as needed for constipation     • Fluticasone-Salmeterol (Advair Diskus) 100-50 mcg/dose inhaler Inhale 1 puff 2 (two) times a day Rinse mouth after use.     • ipratropium-albuterol (DUO-NEB) 0.5-2.5 mg/3 mL nebulizer solution INHALE CONTENTS OF 1 VIAL VIA NEBULIZER FOUR TIMES A DAY 60 mL 11   • meclizine (ANTIVERT) 12.5 MG tablet Take 12.5 mg by mouth every 8 (eight) hours as needed for dizziness     • methenamine hippurate (HIPREX) 1 g tablet Take 1 g by mouth 2 (two) times a day with meals     • metoprolol succinate (TOPROL-XL) 25 mg 24 hr tablet Take 25 mg by mouth daily     • midodrine (PROAMATINE) 5 mg tablet Take 5 mg by mouth 3 (three) times a day before meals Hold if SBP >135     • mineral oil enema Insert 1 enema into the rectum once as needed for constipation     • oxybutynin  (DITROPAN) 5 mg tablet Take 1 tablet (5 mg total) by mouth 3 (three) times a day as needed (spasm) 90 tablet 3   • sodium phosphate-biphosphate (FLEET) 7-19 g 118 mL enema Insert 1 enema into the rectum once as needed       No current facility-administered medications on file prior to visit.       History:  Past Medical History:   Diagnosis Date   • A-fib (HCC)    • Ambulatory dysfunction    • Anxiety    • Anxiety disorder    • Atrial fibrillation (HCC)    • Chronic indwelling Cloud catheter    • COPD (chronic obstructive pulmonary disease) (HCC)    • Coronary artery disease    • Depression    • Cloud catheter in place    • Hepatitis C     screening negative in 1/2017   • Hepatitis C    • History of MI (myocardial infarction)    • Hypertension    • MI (myocardial infarction) (HCC)    • Urinary catheter in place    • Urinary retention    • Use of cane as ambulatory aid      Past Surgical History:   Procedure Laterality Date   • CARDIAC CATHETERIZATION  07/09/2018   • CARDIAC ELECTROPHYSIOLOGY PROCEDURE N/A 9/30/2022    Procedure: Cardiac loop recorder implant;  Surgeon: Duke Buckner MD;  [facility]: [facility];  Service: Cardiology   • CARDIAC ELECTROPHYSIOLOGY PROCEDURE  09/30/2022    Cardiac loop recorder implant; Dr. Duke Buckner   • CARDIAC LOOP RECORDER  09/2022   • COLONOSCOPY     • COLONOSCOPY     • INGUINAL HERNIA REPAIR Right 08/11/2022    Dr. Encarnacion   • IR SUPRAPUBIC CATHETER PLACEMENT  3/27/2024   • [place] HEMIARTHROPLASTY HIP PARTIAL Left 3/26/2024    Procedure: HEMIARTHROPLASTY HIP (BIPOLAR);  Surgeon: Ibeth Beal DO;  Location: [facility] MAIN OR;  Service: Orthopedics   • FL RPR 1ST INGUN HRNA AGE 5 YRS/> REDUCIBLE Right 8/11/2022    Procedure: REPAIR HERNIA INGUINAL;  Surgeon: Ady Encarnacion DO;  Location: [facility] Main [facility];  Service: General   • TONSILLECTOMY       Family History   Problem Relation Age of Onset   • Hypertension Mother    • Coronary artery disease Mother         premature   • Diabetes  Father    • Coronary artery disease Father         premature   • Hypertension Father      Social History     Socioeconomic History   • Marital status: /Civil Union     Spouse name: Not on file   • Number of children: 3   • Years of education: 12   • Highest education level: 12th grade   Occupational History   • Occupation: retired   Tobacco Use   • Smoking status: Former     Current packs/day: 0.00     Average packs/day: 1.5 packs/day for 57.0 years (85.5 ttl pk-yrs)     Types: Cigarettes     Start date: 3/12/1966     Quit date: 3/12/2023     Years since quittin.3   • Smokeless tobacco: Never   • Tobacco comments:     since age 14; Has history of smoking for over 55 years. He has been smoking more than packet daily in the past however he has been smokes about half a pack a day lately and stopped smoking on 2018 when he was admitted to the hospital.    Vaping Use   • Vaping status: Never Used   Substance and Sexual Activity   • Alcohol use: Not Currently   • Drug use: Never   • Sexual activity: Not Currently     Partners: Female     Birth control/protection: Condom Male   Other Topics Concern   • Not on file   Social History Narrative    ** Merged History Encounter **         ** Merged History Encounter **         ** Merged History Encounter **         History of Ultra Sound: 2016  History of Stress Test: 2018  History of ECHO: 2018  · Most recent tobacco use screenin2019    · Live alone or with others:   with others      · Diet:   Regular    · Caffeine intake:   Moderate    · Guns     present in home:   No    · Asbestos exposure:   No    · TB exposure:   No    · Environmental exposure:   No    · Animal exposure:   No    · Smoke alarm in home:   Yes       Social Determinants of Health     Financial Resource Strain: Not on file   Food Insecurity: No Food Insecurity (3/27/2024)    Hunger Vital Sign    • Worried About Running Out of Food in the Last Year: Never true    • Ran Out of  Food in the Last Year: Never true   Recent Concern: Food Insecurity - Food Insecurity Present (3/24/2024)    Hunger Vital Sign    • Worried About Running Out of Food in the Last Year: Sometimes true    • Ran Out of Food in the Last Year: Sometimes true   Transportation Needs: No Transportation Needs (3/27/2024)    PRAPARE - Transportation    • Lack of Transportation (Medical): No    • Lack of Transportation (Non-Medical): No   Recent Concern: Transportation Needs - Unmet Transportation Needs (3/24/2024)    PRAPARE - Transportation    • Lack of Transportation (Medical): Yes    • Lack of Transportation (Non-Medical): Yes   Physical Activity: Insufficiently Active (5/29/2020)    Exercise Vital Sign    • Days of Exercise per Week: 3 days    • Minutes of Exercise per Session: 30 min   Stress: Stress Concern Present (5/29/2020)    [employer] - Occupational Stress Questionnaire    • Feeling of Stress : To some extent   Social Connections: Not on file   Intimate Partner Violence: Not on file   Housing Stability: High Risk (3/27/2024)    Housing Stability Vital Sign    • Unable to Pay for Housing in the Last Year: No    • Number of Times Moved in the Last Year: 2    • Homeless in the Last Year: No     Past Surgical History:   Procedure Laterality Date   • CARDIAC CATHETERIZATION  07/09/2018   • CARDIAC ELECTROPHYSIOLOGY PROCEDURE N/A 9/30/2022    Procedure: Cardiac loop recorder implant;  Surgeon: Duke Buckner MD;  Location:  CARDIAC CATH LAB;  Service: Cardiology   • CARDIAC ELECTROPHYSIOLOGY PROCEDURE  09/30/2022    Cardiac loop recorder implant; Dr. Duke Buckner   • CARDIAC LOOP RECORDER  09/2022   • COLONOSCOPY     • COLONOSCOPY     • INGUINAL HERNIA REPAIR Right 08/11/2022    Dr. Encarnacion   • IR SUPRAPUBIC CATHETER PLACEMENT  3/27/2024   • [place] HEMIARTHROPLASTY HIP PARTIAL Left 3/26/2024    Procedure: HEMIARTHROPLASTY HIP (BIPOLAR);  Surgeon: Ibeth Beal DO;  Location:  MAIN OR;  Service:  Orthopedics   • [place] RPR 1ST INGUN HRNA AGE 5 YRS/> REDUCIBLE Right 8/11/2022    Procedure: REPAIR HERNIA INGUINAL;  Surgeon: Ady Encarnacion DO;  Location: [facility];  Service: General   • TONSILLECTOMY         OBJECTIVE:  Wt. 148 lbs, /76, temp 97.6 °F, HR 75, RR 18, O2 95%  Physical Exam  Vitals and nursing note reviewed.   Constitutional:       General: He is awake.      Appearance: He is underweight. He is ill-appearing.   HENT:      Head: Normocephalic and atraumatic.      Nose: Rhinorrhea present. Rhinorrhea is clear.      Mouth/Throat:      Mouth: Mucous membranes are dry.      Pharynx: Oropharynx is clear. Uvula midline. Posterior oropharyngeal erythema present.      Tonsils: No tonsillar exudate or tonsillar abscesses.   Eyes:      General: Lids are normal.      Comments: Wears glasses, watery eyes   Cardiovascular:      Rate and Rhythm: Normal rate and regular rhythm.      Heart sounds: Normal heart sounds, S1 normal and S2 normal. No murmur heard.  Pulmonary:      Effort: No respiratory distress.      Breath sounds: Decreased breath sounds present. No wheezing, rhonchi or rales.      Comments: Moderate thick yellow sputum  Diminished lungs throughout all lobes  Abdominal:      General: Bowel sounds are normal. There is no distension.      Palpations: Abdomen is soft.      Tenderness: There is no abdominal tenderness.   Musculoskeletal:      Right lower leg: No edema.      Left lower leg: No edema.   Skin:     General: Skin is warm and dry.   Neurological:      Mental Status: He is alert. Mental status is at baseline.      Motor: Weakness present.      Gait: Gait abnormal.   Psychiatric:         Attention and Perception: Attention and perception normal.         Mood and Affect: Mood and affect normal.         Speech: Speech normal.         Behavior: Behavior normal. Behavior is cooperative.         Thought Content: Thought content normal.       Labs & Imaging: All labs and imaging reviewed in  "[facility] and [facility].  7/22/24:  Hbg 12.5  Hct 40  Wbc 8.9  Plt 171  Bun 15  Crt 0.93  Na 145  K 4.3  Gfr 85    Lab Results   Component Value Date    WBC 10.99 (H) 06/25/2024    HGB 13.1 06/25/2024    HCT 41.6 06/25/2024    MCV 94 06/25/2024     06/25/2024     Lab Results   Component Value Date    SODIUM 139 06/25/2024    K 4.8 06/25/2024     06/25/2024    CO2 25 06/25/2024    BUN 20 06/25/2024    CREATININE 1.09 06/25/2024    GLUC 106 06/25/2024    CALCIUM 9.4 06/25/2024     Lab Results   Component Value Date    ZJPXOFQC70 250 01/31/2022     Lab Results   Component Value Date    HFQ5STGGJWMU 1.178 02/28/2022     No results found for: \"AFZI18MWBEKN\", \"FMHR18RPFXHW\"     Plan:  -Robitussin every 4 hours for 3 days and continue as needed  -Chloraseptic spray-5 sprays every 2 hours as needed for 5 days  -CBC with differential tomorrow  -Albuterol nebulizers 3 times daily for 3 days  -Zofran 4 mg every 6 hours as needed for 5 days    GABRIELA Jennings  Geriatric Medicine  7/29/24    Portions of this note may have been written with use of the previous note summary. Assessment and Plan of care have been reviewed and updated as appropriate.  In addition, voice-recognition software may have been used in the preparation of this document. Occasional wrong word or \"sound-alike\" substitutions may have occurred due to the inherent limitations of voice recognition software. Interpretation should be guided by context.     "

## 2024-07-31 PROBLEM — R11.2 NAUSEA AND VOMITING IN ADULT: Status: ACTIVE | Noted: 2024-07-31

## 2024-07-31 NOTE — ASSESSMENT & PLAN NOTE
Wt Readings from Last 3 Encounters:   06/11/24 67.6 kg (149 lb)   04/18/24 70.8 kg (156 lb)   03/30/24 77.4 kg (170 lb 10.3 oz)     No maintenance diuretic  Patient appears euvolemic on exam  Continue to monitor for signs of fluid overload  Vital signs stable

## 2024-07-31 NOTE — ASSESSMENT & PLAN NOTE
Continue methenamine hippurate to prevent frequent UTIs  Continue oxybutynin  Suprapubic catheter draining clear yellow urine  Follow-up with urology

## 2024-07-31 NOTE — ASSESSMENT & PLAN NOTE
Patient vomited twice yesterday and once this morning  Stated that he just became nauseous and had to throw up  So far today patient was able to keep lunch down although started to feel nauseous at the end of our visit  Patient experiencing chills and fatigue, no fevers  New order for Zofran 4 mg every 6 hours as needed for 5 days  Ensure adequate hydration  CBC with differential ordered for tomorrow, 7/30  Monitor patient closely for changes in status

## 2024-07-31 NOTE — ASSESSMENT & PLAN NOTE
History of neurogenic bladder  Continue daily S/p catheter care  Change catheter Q 4-6 weeks or prn for complications  Continue to monitor output and for acute changes in condition

## 2024-07-31 NOTE — ASSESSMENT & PLAN NOTE
Continue Metoprolol Succ 25 mg for rate control  Continue to monitor HR  Continue Eliquis for anticoagulation  Monitor for s/s of bleeding

## 2024-07-31 NOTE — ASSESSMENT & PLAN NOTE
Continue Advair discus and as needed albuterol inhalers  Patient with sore throat and trouble swallowing  Remains on room air with diminished lung sounds, no wheezing  This week, patient with new onset of productive cough with thick yellow, white sputum  Rhinorrhea and watery eyes with headache, dizziness, chills, and fatigue  New orders:  -Robitussin every 4 hours for 3 days and continue as needed  -Chloraseptic spray-5 sprays every 2 hours as needed for 5 days  -CBC with differential tomorrow  -Albuterol nebulizers 3 times daily for 3 days  Patient already has Tylenol for headaches ordered and meclizine for dizziness ordered, both as needed  Vital signs remained stable, no fevers  Monitor patient closely and notify if any decline in patient condition

## 2024-08-05 ENCOUNTER — REMOTE DEVICE CLINIC VISIT (OUTPATIENT)
Dept: CARDIOLOGY CLINIC | Facility: CLINIC | Age: 79
End: 2024-08-05
Payer: MEDICARE

## 2024-08-05 DIAGNOSIS — R55 SYNCOPE, UNSPECIFIED SYNCOPE TYPE: ICD-10-CM

## 2024-08-05 DIAGNOSIS — I48.21 PERMANENT ATRIAL FIBRILLATION (HCC): Primary | ICD-10-CM

## 2024-08-05 PROCEDURE — 93298 REM INTERROG DEV EVAL SCRMS: CPT | Performed by: INTERNAL MEDICINE

## 2024-08-05 NOTE — PROGRESS NOTES
"MDT LNQ22/ ACTIVE SYSTEM IS MRI CONDITIONAL   CARELINK TRANSMISSION: LOOP RECORDER. PRESENTING RHYTHM AF @ 60 BPM. BATTERY STATUS \"OK.\" 9 DEVICE CLASSIFIED AF EPISODES, LONGEST EPISODE IS 43:32 HOURS, AVAILABLE STRIPS DEMONSTRATE  ATRIAL FIBRILLATION. AF BURDEN IS 99.7%.  HX OF AF. PT TAKES ELIQUIS AND METOPROLOL SUCC. NO PATIENT ACTIVATED EPISODES. NORMAL DEVICE FUNCTION. DL   "

## 2024-08-22 PROBLEM — N30.01 HEMATURIA DUE TO ACUTE CYSTITIS: Status: RESOLVED | Noted: 2024-06-25 | Resolved: 2024-08-22

## 2024-11-04 ENCOUNTER — REMOTE DEVICE CLINIC VISIT (OUTPATIENT)
Dept: CARDIOLOGY CLINIC | Facility: CLINIC | Age: 79
End: 2024-11-04
Payer: MEDICARE

## 2024-11-04 DIAGNOSIS — I48.0 PAROXYSMAL ATRIAL FIBRILLATION (HCC): Primary | Chronic | ICD-10-CM

## 2024-11-04 PROCEDURE — 93298 REM INTERROG DEV EVAL SCRMS: CPT | Performed by: INTERNAL MEDICINE

## 2024-11-04 NOTE — PROGRESS NOTES
"MDT LNQ22/ ACTIVE SYSTEM IS MRI CONDITIONAL   CARELINK TRANSMISSION: LOOP RECORDER. PRESENTING RHYTHM AF @ 87 BPM. BATTERY STATUS \"OK.\" 1 PAUSE EPISODE W/ EGRAM SHOWING 3 SEC PAUSE DURING AF. 43 DEVICE CLASSIFIED AF EPISODES, LONGEST EPISODE IS 2:44 HOURS, AVAILABLE STRIPS DEMONSTRATE ATRIAL FIBRILLATION. AF BURDEN IS 85.5%. HX OF AF. PT TAKES ELIQUIS AND METOPROLOL SUCC. NO PATIENT ACTIVATED EPISODES. NORMAL DEVICE FUNCTION. DL   "

## 2024-11-25 ENCOUNTER — RESULTS FOLLOW-UP (OUTPATIENT)
Dept: CARDIOLOGY CLINIC | Facility: CLINIC | Age: 79
End: 2024-11-25

## 2024-12-24 ENCOUNTER — RESULTS FOLLOW-UP (OUTPATIENT)
Dept: CARDIOLOGY CLINIC | Facility: CLINIC | Age: 79
End: 2024-12-24

## 2024-12-26 ENCOUNTER — TELEPHONE (OUTPATIENT)
Age: 79
End: 2024-12-26

## 2024-12-26 NOTE — TELEPHONE ENCOUNTER
Jenae MILLAN    12/26/24 11:22 AM  Note      Summary: SPT leaking   Marina from facility calling in regards to pt SP tube.      Facility sts that SPT was leaking and they tried to change it but were unable to. Facility is requesting a call back to see when pt can come in for appt to have SPT changed.      CB- [phone] Sharon          Called and spoke Marina, Tuesday evening patients SPT was leaking, possible bladder spasm, but not confirmed. SPT is draining and able to be flushed. Due to leaking NP at facility wanted SPT to be changed . When Marina went to change SPT, she was not able to to dislodge catheter. At this time SPT is draining, no sediment, no hematuria.     Marina is in meetings for morning and will follow up with patient this afternoon, if SPT is not able to be changed, patient will be transported to IR.

## 2024-12-31 ENCOUNTER — PROCEDURE VISIT (OUTPATIENT)
Dept: UROLOGY | Facility: CLINIC | Age: 79
End: 2024-12-31
Payer: MEDICARE

## 2024-12-31 VITALS — OXYGEN SATURATION: 97 % | DIASTOLIC BLOOD PRESSURE: 90 MMHG | HEART RATE: 90 BPM | SYSTOLIC BLOOD PRESSURE: 160 MMHG

## 2024-12-31 DIAGNOSIS — R33.9 URINARY RETENTION: Primary | ICD-10-CM

## 2024-12-31 PROCEDURE — 51705 CHANGE OF BLADDER TUBE: CPT

## 2024-12-31 NOTE — PROGRESS NOTES
"12/31/2024    Sal Moura  1945  [id number]    Diagnosis      Patient presents for SPT change managed by our office    Plan  Patient will return as scheduled for next change    Procedure: Suprapubic Tube Change       Cystostomy tube change     Date/Time  12/31/2024 9:00 AM     Performed by  Ani Alberto RN   Authorized by  Romeo Villarreal MD     [place] Protocol   procedure performed by consultantConsent: Verbal consent obtained.  Risks and benefits: risks, benefits and alternatives were discussed  Consent given by: patient  Time out: Immediately prior to procedure a \"time out\" was called to verify the correct patient, procedure, equipment, support staff and site/side marked as required.  Patient understanding: patient states understanding of the procedure being performed  Patient consent: the patient's understanding of the procedure matches consent given               Current catheter removed with mild difficulty after deflation of an intact balloon. Site prepped with Betadine, new 18F  suprapubic spt change via aseptic technique without incident, 10 ml balloon inflated with sterile water. irrigated easily for pink-tinged return, no spasm noted. Patient tolerated well. Attached to drainage bag.     Of note- Patient's silicone catheter was heavily encrusted. Bag was purple and small amount of urine in bag was brown. Patient unsure of when it was last changed. Cloud removed with the assistance of another RN-Maya. Small amounts of bleeding around catheter site and urine drained. Irrigated with 50 mL of sterile water for light pink tinged urine return. Patient tolerated well. Encouraged increase in hydration. Patient verbalized understanding.   Vitals:    12/31/24 0842   BP: 160/90   BP Location: Left arm   Patient Position: Sitting   Cuff Size: Adult   Pulse: 90   SpO2: 97%         Ani Alberto RN   "

## 2025-01-27 ENCOUNTER — RESULTS FOLLOW-UP (OUTPATIENT)
Dept: NON INVASIVE DIAGNOSTICS | Facility: HOSPITAL | Age: 80
End: 2025-01-27

## 2025-02-03 ENCOUNTER — RESULTS FOLLOW-UP (OUTPATIENT)
Dept: NON INVASIVE DIAGNOSTICS | Facility: HOSPITAL | Age: 80
End: 2025-02-03

## 2025-02-03 ENCOUNTER — REMOTE DEVICE CLINIC VISIT (OUTPATIENT)
Dept: CARDIOLOGY CLINIC | Facility: CLINIC | Age: 80
End: 2025-02-03
Payer: MEDICARE

## 2025-02-03 DIAGNOSIS — I48.19 PERSISTENT ATRIAL FIBRILLATION (HCC): Primary | Chronic | ICD-10-CM

## 2025-02-03 PROCEDURE — 93298 REM INTERROG DEV EVAL SCRMS: CPT | Performed by: STUDENT IN AN ORGANIZED HEALTH CARE EDUCATION/TRAINING PROGRAM

## 2025-02-03 NOTE — PROGRESS NOTES
"MDT LNQ22/ ACTIVE SYSTEM IS MRI CONDITIONAL   CARELINK TRANSMISSION: LOOP RECORDER. PRESENTING RHYTHM VS W/ OVERSENSING. BATTERY STATUS \"OK.\" 92 DEVICE CLASSIFIED AF EPISODES, LONGEST EPISODE IS 11:20 HOURS, AVAILABLE STRIPS DEMONSTRATE ATRIAL FIBRILLATION. AF BURDEN IS 95.3%. HX OF AF. PT TAKES ELIQUIS AND METOPROLOL SUCC. 1 PAUSE EPISODE W/ EGRAM SHOWING 3 SEC PAUSE DURING AF. NO PATIENT ACTIVATED EPISODES. NORMAL DEVICE FUNCTION. DL   "

## 2025-02-05 ENCOUNTER — RESULTS FOLLOW-UP (OUTPATIENT)
Dept: CARDIOLOGY CLINIC | Facility: CLINIC | Age: 80
End: 2025-02-05

## 2025-02-11 ENCOUNTER — RESULTS FOLLOW-UP (OUTPATIENT)
Dept: CARDIOLOGY CLINIC | Facility: CLINIC | Age: 80
End: 2025-02-11

## 2025-02-11 ENCOUNTER — PROCEDURE VISIT (OUTPATIENT)
Dept: UROLOGY | Facility: CLINIC | Age: 80
End: 2025-02-11
Payer: MEDICARE

## 2025-02-11 VITALS — SYSTOLIC BLOOD PRESSURE: 150 MMHG | HEART RATE: 91 BPM | DIASTOLIC BLOOD PRESSURE: 90 MMHG | OXYGEN SATURATION: 97 %

## 2025-02-11 DIAGNOSIS — R33.9 URINARY RETENTION: Primary | ICD-10-CM

## 2025-02-11 PROCEDURE — 51705 CHANGE OF BLADDER TUBE: CPT

## 2025-02-11 NOTE — PROGRESS NOTES
"2/11/2025    Sal Moura  1945  [id number]    Diagnosis      Patient presents for SPT change managed by our office    Plan  Patient will return as scheduled for next change. He will contact office with any questions or concerns.     Procedure: Suprapubic Tube Change       Cystostomy tube change     Date/Time  2/11/2025 11:00 AM     Performed by  Ani Alberto RN   Authorized by  Ze Sandoval MD     [place] Protocol   procedure performed by consultantConsent: Verbal consent obtained.  Risks and benefits: risks, benefits and alternatives were discussed  Consent given by: patient  Time out: Immediately prior to procedure a \"time out\" was called to verify the correct patient, procedure, equipment, support staff and site/side marked as required.  Patient understanding: patient states understanding of the procedure being performed  Patient consent: the patient's understanding of the procedure matches consent given  Patient identity confirmed: verbally with patient      Local anesthesia used: no     Anesthesia   Local anesthesia used: no     Procedure Details   Patient tolerance: patient tolerated the procedure well with no immediate complications               Current catheter removed without difficulty after deflation of an intact balloon. Site prepped with Betadine, new 18F  suprapubic spt change via aseptic technique without incident, 10 ml balloon inflated with sterile water. irrigated easily for clear return, no spasm noted. Patient tolerated well. Attached to drainage bag. Extra supplies provided per pt request. Patient was educated on miller catheter care. He was advised to change bag weekly as he had no changed bag since he was last here. He verbalized understanding.     Vitals:    02/11/25 1104   BP: 150/90   BP Location: Left arm   Patient Position: Sitting   Cuff Size: Adult   Pulse: 91   SpO2: 97%         Ani Alberto RN   "

## 2025-02-17 ENCOUNTER — TELEPHONE (OUTPATIENT)
Dept: OBGYN CLINIC | Facility: MEDICAL CENTER | Age: 80
End: 2025-02-17

## 2025-02-18 ENCOUNTER — RESULTS FOLLOW-UP (OUTPATIENT)
Dept: CARDIOLOGY CLINIC | Facility: CLINIC | Age: 80
End: 2025-02-18

## 2025-02-21 ENCOUNTER — RESULTS FOLLOW-UP (OUTPATIENT)
Dept: CARDIOLOGY CLINIC | Facility: CLINIC | Age: 80
End: 2025-02-21

## 2025-03-03 ENCOUNTER — RESULTS FOLLOW-UP (OUTPATIENT)
Dept: NON INVASIVE DIAGNOSTICS | Facility: HOSPITAL | Age: 80
End: 2025-03-03

## 2025-03-11 ENCOUNTER — RESULTS FOLLOW-UP (OUTPATIENT)
Dept: CARDIOLOGY CLINIC | Facility: CLINIC | Age: 80
End: 2025-03-11

## 2025-03-19 ENCOUNTER — OFFICE VISIT (OUTPATIENT)
Dept: CARDIOLOGY CLINIC | Facility: SKILLED NURSING FACILITY | Age: 80
End: 2025-03-19
Payer: MEDICARE

## 2025-03-19 DIAGNOSIS — Z95.818 IMPLANTABLE LOOP RECORDER PRESENT: ICD-10-CM

## 2025-03-19 DIAGNOSIS — I48.19 PERSISTENT ATRIAL FIBRILLATION (HCC): Primary | ICD-10-CM

## 2025-03-19 DIAGNOSIS — R26.2 AMBULATORY DYSFUNCTION: ICD-10-CM

## 2025-03-19 DIAGNOSIS — R42 ORTHOSTATIC DIZZINESS: ICD-10-CM

## 2025-03-19 PROCEDURE — 99308 SBSQ NF CARE LOW MDM 20: CPT | Performed by: INTERNAL MEDICINE

## 2025-03-19 NOTE — PROGRESS NOTES
[facility] [address] [id number] [phone]                                                Cardiology Nursing Home Visit - Follow up  Sal Moura, 79 y.o. male  YOB: 1945  MRN: [id number] Encounter: [id number]      PCP - Snow Farah MD  Outpatient cardiologist - [facility]    No chief complaint on file.      Assessment  Loop recorder in-situ  Dizziness/Orthostatic hypotension  Chronic atrial fibrillation  H/o BERNARDA-cardioversion in 2018, but then went back into afib soon after  Cardiomyopathy  EF 10% in 2018  LHC - 7/9/2018 - no significant CAD  Echo - 8/7/2020 - LVEF 55%, mild-moderate MR, moderate TR, PASP 39  H/o Subdural hematoma  Underweight, BMI 19  COPD/emphysema  Ambulatory dysfunction  Uses rolling walker to ambulate in facility and wheelchair for outside       Plan  orthostatic hypotension, h/o recurrent falls, syncope, s/p loop recorder implantation, h/o SDH  Overview  There has been concerns about orthostatic hypotension causing several of his falls previously, and as a result he has been maintained on midodrine for this and doing reasonably well  But he has still had a couple of other episodes including a fall on the day of recent visit by Dr. Degroot  This was in setting of UTI with chronic indwelling catheter  He reports that at times he is walking normally for a while and then suddenly his legs get weak leading him to fall  He does report that his dizziness is worse in the morning  3/2025: dizziness has improved, and he has been using walker and wheelchair to ambulate preventing any further falls  Impression  Unclear to me if all of his falls are orthostatic / related to syncope  ?leg weakness/gait dysfunction related v/s orthostatic  Although recent episode on 5/12/23 may have been related to UTI  Plan  Continue midodrine 5 mg 3 times daily   Fall precautions  Let me know if any new issues with worsening  dizziness    Chronic atrial fibrillation, Pause, Loop recorder in-situ  Overview  He appears to have been in atrial fibrillation chronically over the past couple of years, but loop recorder seems to indicate 80 to 92% A-fib burden   He has had pauses of 3 seconds on the loop, mostly during overnight hours, but has not had any recent dizziness correlating with same  Plan  Continue metoprolol succinate 25 mg daily  Continue eliquis  Monitor for dizziness, especially forgetting without changes in position    No results found for this visit on 03/19/25.    No orders of the defined types were placed in this encounter.    No follow-ups on file.      History of Present Illness   79 y.o. male is currently admitted to the [facility] after recent discharge from [facility] on 5/12/23.  We are currently consulted to evaluate and assist with management of her cardiac problems.     10/5/22: He has a known history of chronic atrial fibrillation, cardiomyopathy, and had recently presented to the hospital after a syncopal episode.  When he woke up in the morning, he initially had vertiginous symptoms.  He subsequently developed more dizziness after sitting down at the kitchen table and when he attempted to stand up he got severely dizzy and then passed out.  He was admitted at [facility], and was evaluated by EP.  There was concerns for bradycardia, but he was felt to not meet criteria for pacemaker implantation, and as a result a loop recorder was implanted into instead.     Of note, he has had on and off symptoms of dizziness over the past year, and even had a near syncopal episode last year in June 2021. He had presented to [facility] after this, and I saw him during this admission.  At that time, he had bradycardia and hypotension at presentation.  He received hydration and reduction in his rate control therapy.    Interval history - 5/17/2023  He is being seen for short one 1 week  follow-up due to recent hospitalization at [facility].  He was recently seen by Dr. Degroot, and was noted to be doing well on midodrine and anticoagulation, but subsequently that same night he apparently passed out and again presented to the hospital.  He has a chronic indwelling urinary catheter and was again diagnosed with a UTI and treated with IV antibiotics.  He has not had any further issues with falls since returning back.  He does note dizziness, which is worse in the mornings.    Interval history - 3/19/2025  He is being seen today for 2-year follow-up at [facility] rehab.  He has unfortunately been unable to find affordable housing outside and as a result has ended up staying at [facility].  He was frustrated and did not want to seek medical care last year and as a result was not seen.  Over the last few months, he has not had any major issues with chest pain, shortness of breath, palpitations or dizziness.  No major issues with near-syncope or syncope.  He does report mild orthostatic dizziness in the morning, but this is mostly brief and self resolves.          Historical Information   Past Medical History:   Diagnosis Date   • A-fib (HCC)    • Ambulatory dysfunction    • Anxiety    • Anxiety disorder    • Atrial fibrillation (HCC)    • Chronic indwelling Cloud catheter    • COPD (chronic obstructive pulmonary disease) (HCC)    • Coronary artery disease    • Depression    • Cloud catheter in place    • Hepatitis C     screening negative in 1/2017   • Hepatitis C    • History of MI (myocardial infarction)    • Hypertension    • MI (myocardial infarction) (HCC)    • Urinary catheter in place    • Urinary retention    • Use of cane as ambulatory aid      Past Surgical History:   Procedure Laterality Date   • CARDIAC CATHETERIZATION  07/09/2018   • CARDIAC ELECTROPHYSIOLOGY PROCEDURE N/A 9/30/2022    Procedure: Cardiac loop recorder implant;  Surgeon: Duke Buckner MD;  Location: [facility];   Service: Cardiology   • CARDIAC ELECTROPHYSIOLOGY PROCEDURE  09/30/2022    Cardiac loop recorder implant; Dr. Duke Buckner   • CARDIAC LOOP RECORDER  09/2022   • COLONOSCOPY     • COLONOSCOPY     • INGUINAL HERNIA REPAIR Right 08/11/2022    Dr. Encarnacion   • IR SUPRAPUBIC CATHETER PLACEMENT  3/27/2024   • MT HEMIARTHROPLASTY HIP PARTIAL Left 3/26/2024    Procedure: HEMIARTHROPLASTY HIP (BIPOLAR);  Surgeon: Ibeth Beal DO;  Location: [facility] MAIN OR;  Service: Orthopedics   • MT RPR 1ST INGUN HRNA AGE 5 YRS/> REDUCIBLE Right 8/11/2022    Procedure: REPAIR HERNIA INGUINAL;  Surgeon: Ady Encarnacion DO;  Location: [facility] Main [facility];  Service: General   • TONSILLECTOMY       Family History   Problem Relation Age of Onset   • Hypertension Mother    • Coronary artery disease Mother         premature   • Diabetes Father    • Coronary artery disease Father         premature   • Hypertension Father      Current Outpatient Medications on File Prior to Visit   Medication Sig Dispense Refill   • acetaminophen (TYLENOL) 325 mg tablet Take 650 mg by mouth every 6 (six) hours as needed     • apixaban (ELIQUIS) 5 mg Take 1 tablet (5 mg total) by mouth 2 (two) times a day 60 tablet 0   • azelastine (ASTELIN) 0.1 % nasal spray 1 spray into each nostril 2 (two) times a day as needed for rhinitis Use in each nostril as directed     • bisacodyl (DULCOLAX) 10 mg suppository Insert 10 mg into the rectum once as needed     • docusate sodium (COLACE) 100 mg capsule Take 100 mg by mouth 2 (two) times a day as needed for constipation     • Fluticasone-Salmeterol (Advair Diskus) 100-50 mcg/dose inhaler Inhale 1 puff 2 (two) times a day Rinse mouth after use.     • ipratropium-albuterol (DUO-NEB) 0.5-2.5 mg/3 mL nebulizer solution INHALE CONTENTS OF 1 VIAL VIA NEBULIZER FOUR TIMES A DAY 60 mL 11   • meclizine (ANTIVERT) 12.5 MG tablet Take 12.5 mg by mouth every 8 (eight) hours as needed for dizziness     • methenamine hippurate (HIPREX) 1 g tablet  Take 1 g by mouth 2 (two) times a day with meals     • metoprolol succinate (TOPROL-XL) 25 mg 24 hr tablet Take 25 mg by mouth daily     • midodrine (PROAMATINE) 5 mg tablet Take 5 mg by mouth 3 (three) times a day before meals Hold if SBP >135     • mineral oil enema Insert 1 enema into the rectum once as needed for constipation     • oxybutynin (DITROPAN) 5 mg tablet Take 1 tablet (5 mg total) by mouth 3 (three) times a day as needed (spasm) 90 tablet 3   • sodium phosphate-biphosphate (FLEET) 7-19 g 118 mL enema Insert 1 enema into the rectum once as needed       No current facility-administered medications on file prior to visit.     No Known Allergies  Social History     Socioeconomic History   • Marital status: /Civil Union     Spouse name: Not on file   • Number of children: 3   • Years of education: 12   • Highest education level: 12th grade   Occupational History   • Occupation: retired   Tobacco Use   • Smoking status: Former     Current packs/day: 0.00     Average packs/day: 1.5 packs/day for 57.0 years (85.5 ttl pk-yrs)     Types: Cigarettes     Start date: 3/12/1966     Quit date: 3/12/2023     Years since quittin.0   • Smokeless tobacco: Never   • Tobacco comments:     since age 14; Has history of smoking for over 55 years. He has been smoking more than packet daily in the past however he has been smokes about half a pack a day lately and stopped smoking on 2018 when he was admitted to the hospital.    Vaping Use   • Vaping status: Never Used   Substance and Sexual Activity   • Alcohol use: Not Currently   • Drug use: Never   • Sexual activity: Not Currently     Partners: Female     Birth control/protection: Condom Male   Other Topics Concern   • Not on file   Social History Narrative    ** Merged History Encounter **         ** Merged History Encounter **         ** Merged History Encounter **         History of Ultra Sound: 2016  History of Stress Test: 2018  History of ECHO:  2018  · Most recent tobacco use screenin2019    · Live alone or with others:   with others      · Diet:   Regular    · Caffeine intake:   Moderate    · Guns     present in home:   No    · Asbestos exposure:   No    · TB exposure:   No    · Environmental exposure:   No    · Animal exposure:   No    · Smoke alarm in home:   Yes       Social Drivers of Health     Financial Resource Strain: Not on file   Food Insecurity: No Food Insecurity (3/27/2024)    Nursing - Inadequate Food Risk Classification    • Worried About Running Out of Food in the Last Year: Never true    • Ran Out of Food in the Last Year: Never true    • Ran Out of Food in the Last Year: Not on file   Recent Concern: Food Insecurity - Food Insecurity Present (3/24/2024)    Hunger Vital Sign    • Worried About Running Out of Food in the Last Year: Sometimes true    • Ran Out of Food in the Last Year: Sometimes true   Transportation Needs: No Transportation Needs (3/27/2024)    PRAPARE - Transportation    • Lack of Transportation (Medical): No    • Lack of Transportation (Non-Medical): No   Recent Concern: Transportation Needs - Unmet Transportation Needs (3/24/2024)    PRAPARE - Transportation    • Lack of Transportation (Medical): Yes    • Lack of Transportation (Non-Medical): Yes   Physical Activity: Insufficiently Active (2020)    Exercise Vital Sign    • Days of Exercise per Week: 3 days    • Minutes of Exercise per Session: 30 min   Stress: Stress Concern Present (2020)    [employer] [place] [employer] - Occupational Stress Questionnaire    • Feeling of Stress : To some extent   Social Connections: Not on file   Intimate Partner Violence: Not on file   Housing Stability: High Risk (3/27/2024)    Housing Stability Vital Sign    • Unable to Pay for Housing in the Last Year: No    • Number of Times Moved in the Last Year: 2    • Homeless in the Last Year: No        Review of Systems   All other systems reviewed and  are negative.        Vitals:      There were no vitals filed for this visit.  BMI - There is no height or weight on file to calculate BMI.  Wt Readings from Last 7 Encounters:   06/11/24 67.6 kg (149 lb)   04/18/24 70.8 kg (156 lb)   03/30/24 77.4 kg (170 lb 10.3 oz)   03/27/24 74.4 kg (164 lb 0.4 oz)   03/07/24 69.9 kg (154 lb)   01/26/24 69.4 kg (153 lb)   12/30/23 67.9 kg (149 lb 9.6 oz)       Physical Exam  Vitals and nursing note reviewed.   Constitutional:       General: He is not in acute distress.     Appearance: He is well-developed and underweight. He is not ill-appearing.   HENT:      Nose: No congestion.   Eyes:      General: No scleral icterus.     Conjunctiva/sclera: Conjunctivae normal.   Neck:      Vascular: No carotid bruit or JVD.   Cardiovascular:      Rate and Rhythm: Normal rate. Rhythm irregular.      Heart sounds: Normal heart sounds. No murmur heard.     No friction rub. No gallop.   Pulmonary:      Effort: Pulmonary effort is normal. No respiratory distress.      Breath sounds: Normal breath sounds. No wheezing or rales.   Chest:      Chest wall: No tenderness.   Abdominal:      General: There is no distension.      Palpations: Abdomen is soft.      Tenderness: There is no abdominal tenderness.   Musculoskeletal:         General: No swelling, tenderness, deformity or signs of injury.      Cervical back: Neck supple. No muscular tenderness.      Right lower leg: No edema.      Left lower leg: No edema.   Skin:     General: Skin is warm.   Neurological:      General: No focal deficit present.      Mental Status: He is alert and oriented to person, place, and time. Mental status is at baseline.   Psychiatric:         Mood and Affect: Mood normal. Affect is labile.         Behavior: Behavior is agitated. Behavior is cooperative.         Thought Content: Thought content normal.         Labs:        CBC:   Lab Results   Component Value Date    WBC 10.99 (H) 06/25/2024    RBC 4.45 06/25/2024    HGB  "13.1 06/25/2024    HCT 41.6 06/25/2024    MCV 94 06/25/2024     06/25/2024    RDW 12.8 06/25/2024       CMP:   Lab Results   Component Value Date    K 4.8 06/25/2024     06/25/2024    CO2 25 06/25/2024    BUN 20 06/25/2024    CREATININE 1.09 06/25/2024    EGFR 65 06/25/2024    GLUCOSE 118 05/12/2023    CALCIUM 9.4 06/25/2024    AST 21 06/25/2024    ALT 19 06/25/2024    ALKPHOS 70 06/25/2024       Magnesium:  Lab Results   Component Value Date    MG 2.1 03/26/2024       Lipid Profile:   No results found for: \"CHOL\", \"HDL\", \"TRIG\", \"LDLCALC\"    Thyroid Studies:   Lab Results   Component Value Date    XYY9WAOTLBBA 1.178 02/28/2022       A1c:  No components found for: \"HGA1C\"    INR:  Lab Results   Component Value Date    INR 1.38 (H) 06/25/2024    INR 1.29 (H) 03/24/2024    INR 0.99 11/14/2023   5    Imaging: XR chest 1 view portable    Result Date: 5/4/2023  Narrative: CHEST INDICATION:   dyspnea. COMPARISON: 4/1/2023 EXAM PERFORMED/VIEWS:  XR CHEST PORTABLE FINDINGS: The cardiac silhouette is without change from the prior study. There is a loop recorder present The lungs are clear.  No pneumothorax or pleural effusion. Osseous structures appear within normal limits for patient age.     Impression: No acute cardiopulmonary disease. Workstation performed: [id number]     TRAUMA - CT head wo contrast    Result Date: 5/12/2023  Narrative: CT BRAIN - WITHOUT CONTRAST INDICATION:   TRAUMA. COMPARISON:  None. TECHNIQUE:  CT examination of the brain was performed.  Multiplanar 2D reformatted images were created from the source data. Radiation dose length product (DLP) for this visit:  1041 mGy-cm .  This examination, like all CT scans performed in the [facility], was performed utilizing techniques to minimize radiation dose exposure, including the use of iterative reconstruction and automated exposure control. IMAGE QUALITY:  Diagnostic. FINDINGS: PARENCHYMA: Decreased attenuation is noted in " periventricular and subcortical white matter demonstrating an appearance that is statistically most likely to represent mild microangiopathic change. No CT signs of acute infarction.  No intracranial mass, mass effect or midline shift.  No acute parenchymal hemorrhage. VENTRICLES AND EXTRA-AXIAL SPACES:  Normal for the patient's age. VISUALIZED ORBITS: Normal visualized orbits. PARANASAL SINUSES: Small mucous retention cyst in the left maxillary sinus. CALVARIUM AND EXTRACRANIAL SOFT TISSUES: Focal soft tissue swelling and subcutaneous emphysema along the midline posterior scalp compatible with laceration (series 2, image 31).     Impression: 1.  Focal soft tissue laceration along the midline posterior scalp. 2.  No intracranial hemorrhage or calvarial fracture. Workstation performed: [id number]     TRAUMA - CT spine cervical wo contrast    Result Date: 5/12/2023  Narrative: CT CERVICAL SPINE - WITHOUT CONTRAST INDICATION:   TRAUMA. COMPARISON:  None. TECHNIQUE:  CT examination of the cervical spine was performed without intravenous contrast.  Contiguous axial images were obtained. Multiplanar 2D reformatted images were created from the source data. Radiation dose length product (DLP) for this visit:  295 mGy-cm .  This examination, like all CT scans performed in the [facility], was performed utilizing techniques to minimize radiation dose exposure, including the use of iterative reconstruction and automated exposure control. IMAGE QUALITY:  Diagnostic. FINDINGS: ALIGNMENT: Grade 1 retrolisthesis of C3 on C4, degenerative. VERTEBRAE: No acute fracture. Chronic appearing fragment along the anterior aspect of the inferior endplate of the C6 vertebral body. Additional mildly fragmented osteophytes are seen, chronic. DEGENERATIVE CHANGES: Moderate spinal canal stenosis at C3-C4, C4-C5, and C5-C6. Severe bilateral neuroforaminal narrowing at C3-C4 and C4-C5. Severe neuroforaminal narrowing at C5-C6 on the  right PREVERTEBRAL AND PARASPINAL SOFT TISSUES: Unremarkable THORACIC INLET: Emphysematous changes of the lungs.     Impression: No cervical spine fracture or traumatic malalignment. Moderate spinal canal stenosis at C3-C4, C4-C5, and C5-C6 with associated bilateral neuroforaminal narrowing. Workstation performed: [id number]     XR chest 1 view    Result Date: 5/12/2023  Narrative: TRAUMA SERIES INDICATION:  TRAUMA. COMPARISON:  None VIEWS:  XR TRAUMA MULTIPLE FINDINGS: CHEST: Supine frontal view of the chest is obtained. The cardiac silhouette is enlarged. There is a loop recorder device overlying the left chest wall. No focal consolidation. No layering pleural effusions detected. No pneumothorax is seen on this supine film.  Upright images are more sensitive to detect anterior pneumothoraces if relevant. No displaced fractures.     Impression: No pneumothorax or pleural effusion. Workstation performed: [id number]     XR Trauma multiple (B/RA trauma bay ONLY)    Result Date: 5/12/2023  Narrative: TRAUMA SERIES INDICATION:  TRAUMA. COMPARISON:  None VIEWS:  XR TRAUMA MULTIPLE FINDINGS: CHEST: Supine frontal view of the chest is obtained. The cardiac silhouette is enlarged. There is a loop recorder device overlying the left chest wall. No focal consolidation. No layering pleural effusions detected. No pneumothorax is seen on this supine film.  Upright images are more sensitive to detect anterior pneumothoraces if relevant. No displaced fractures.     Impression: No pneumothorax or pleural effusion. Workstation performed: [id number]     US bedside procedure    Result Date: 5/12/2023  Narrative: [id number]      Cardiac testing:   Results for orders placed during the hospital encounter of 08/03/20    Echo complete with contrast if indicated    Narrative  [address] [place] [address]    Transthoracic Echocardiogram  2D, M-mode,  Doppler, and Color Doppler    Study date:  07-Aug-2020    Patient: MISAEL SANTAMARIA  MR number: [id number]  Account number: [id number]  : 1945  Age: 74 years  Gender: Male  Status: Inpatient  Location: [facility]  Height: 70 in  Weight: 148 lb  BP: 123/ 70 mmHg    Indications: Heart Failure    Diagnoses: I50.9 - Heart failure, unspecified    Sonographer:  NAN Chapman  Referring Physician:  Rolando Hutson MD  Group:  [facility]  Interpreting Physician:  Dimitris Degroot MD    SUMMARY    LEFT VENTRICLE:  Systolic function was normal. Ejection fraction was estimated to be 55 %.  There were no regional wall motion abnormalities.  Wall thickness was at the upper limits of normal.    LEFT ATRIUM:  The atrium was mildly dilated.    RIGHT ATRIUM:  The atrium was mildly to moderately dilated.    MITRAL VALVE:  There was mild annular calcification.  There was mild to moderate regurgitation.    TRICUSPID VALVE:  There was moderate regurgitation.  Estimated peak PA pressure was 39 mmHg.    PULMONIC VALVE:  There was mild regurgitation.    HISTORY: PRIOR HISTORY: CAD, Anemia Congestive heart failure. Nonischemic cardiomyopathy. Atrial fibrillation.    PROCEDURE: The study was performed in the [facility]. This was a routine study. The transthoracic approach was used. The study included complete 2D imaging, M-mode, complete spectral Doppler, and color Doppler. The heart rate was 80  bpm, at the start of the study. Images were obtained from the parasternal, apical, subcostal, and suprasternal notch acoustic windows. Image quality was adequate.    LEFT VENTRICLE: Size was normal. Systolic function was normal. Ejection fraction was estimated to be 55 %. There were no regional wall motion abnormalities. Wall thickness was at the upper limits of normal. DOPPLER: Transmitral flow  pattern: atrial fibrillation.    RIGHT VENTRICLE: The size was normal. Systolic function was normal. Wall  thickness was normal.    LEFT ATRIUM: The atrium was mildly dilated.    RIGHT ATRIUM: The atrium was mildly to moderately dilated.    MITRAL VALVE: There was mild annular calcification. Valve structure was normal. There was mild-moderate diffuse thickening. There was normal leaflet separation. DOPPLER: The transmitral velocity was within the normal range. There was no  evidence for stenosis. There was mild to moderate regurgitation.    AORTIC VALVE: The valve was trileaflet. Leaflets exhibited mildly increased thickness, normal cuspal separation, and sclerosis. DOPPLER: Transaortic velocity was within the normal range. There was no evidence for stenosis. There was no  significant regurgitation.    TRICUSPID VALVE: The valve structure was normal. There was normal leaflet separation. DOPPLER: The transtricuspid velocity was within the normal range. There was no evidence for stenosis. There was moderate regurgitation. Pulmonary artery  systolic pressure was mildly increased. Estimated peak PA pressure was 39 mmHg.    PULMONIC VALVE: Leaflets exhibited normal thickness, no calcification, and normal cuspal separation. DOPPLER: The transpulmonic velocity was within the normal range. There was mild regurgitation.    PERICARDIUM: There was no pericardial effusion.    AORTA: The root exhibited normal size.    SYSTEMIC VEINS: IVC: The inferior vena cava was normal in size. Respirophasic changes were normal.    SYSTEM MEASUREMENT TABLES    2D  %FS: 34.29 %  Ao Diam: 3.52 cm  EDV(Teich): 151.99 ml  EF(Teich): 62.68 %  ESV(Teich): 56.72 ml  HR_4Ch_Q: 90.87 BPM  IVSd: 1.23 cm  LA Diam: 4.97 cm  LAAs A4C: 30.77 cm2  LAESV A-L A4C: 106.62 ml  LAESV MOD A4C: 99.72 ml  LALs A4C: 7.54 cm  LVCO_4Ch_Q: 4.61 L/min  LVEF_4Ch_Q: 56.72 %  LVIDd: 5.57 cm  LVIDs: 3.66 cm  LVLd_4Ch_Q: 7.96 cm  LVLs_4Ch_Q: 6.38 cm  LVPWd: 1.08 cm  LVSV_4Ch_Q: 50.7 ml  LVVED_4Ch_Q: 89.39 ml  LVVES_4Ch_Q: 38.69 ml  RAAs: 31.12 cm2  RAESV A-L: 112.8 ml  RAESV MOD:  108.5 ml  RALs: 7.29 cm  RVIDd: 3.86 cm  SV(Teich): 95.27 ml    CW  TR Vmax: 2.78 m/s  TR maxP.97 mmHg    MM  TAPSE: 1.68 cm    [facility] Accredited [facility]    Prepared and electronically signed by    Dimitris Degroot MD  Signed 07-Aug-2020 09:50:46    No results found for this or any previous visit.    No results found for this or any previous visit.    No results found for this or any previous visit.      Cardiac EP device report  MDT LNQ22/ ACTIVE SYSTEM IS MRI CONDITIONAL  NON-BILLABLE. CARELINK TRANSMISSION: ALERT  DEVICE CLASSIFIED AF EPISODES, LONGEST EPISODE IS 6:30 HOURS, AVAILABLE STRIPS DEMONSTRATE ATRIAL FIBRILLATION. AF BURDEN IS 80.5%.  HX OF PAF. PT TAKES ELIQUIS AND METPROLOL SUCC. 1 PAUSE EPISODE W/ EGRAM SHOWING 3 SEC PAUSE. DL

## 2025-03-25 ENCOUNTER — PROCEDURE VISIT (OUTPATIENT)
Dept: UROLOGY | Facility: CLINIC | Age: 80
End: 2025-03-25
Payer: MEDICARE

## 2025-03-25 VITALS — DIASTOLIC BLOOD PRESSURE: 90 MMHG | OXYGEN SATURATION: 97 % | SYSTOLIC BLOOD PRESSURE: 122 MMHG | HEART RATE: 94 BPM

## 2025-03-25 DIAGNOSIS — R33.9 URINARY RETENTION: Primary | ICD-10-CM

## 2025-03-25 PROCEDURE — 51705 CHANGE OF BLADDER TUBE: CPT

## 2025-03-25 NOTE — PROGRESS NOTES
"3/25/2025    Sal Moura  1945  [id number]    Diagnosis      Patient presents for SPT change managed by our office    Plan  Patient will return as scheduled for next change    Procedure: Suprapubic Tube Change       Cystostomy tube change     Date/Time  3/25/2025 11:00 AM     Performed by  Ani Alberto RN   Authorized by  Romeo Villarreal MD     [place] Protocol   procedure performed by consultantConsent: Verbal consent obtained.  Risks and benefits: risks, benefits and alternatives were discussed  Consent given by: patient  Time out: Immediately prior to procedure a \"time out\" was called to verify the correct patient, procedure, equipment, support staff and site/side marked as required.  Patient understanding: patient states understanding of the procedure being performed  Patient consent: the patient's understanding of the procedure matches consent given  Patient identity confirmed: verbally with patient      Local anesthesia used: no     Anesthesia   Local anesthesia used: no     Sedation   Patient sedated: no       Procedure Details   Patient tolerance: patient tolerated the procedure well with no immediate complications               Current catheter removed without difficulty after deflation of an intact balloon. Site prepped with Betadine, new 18F  suprapubic spt change via aseptic technique without incident, 10 ml balloon inflated with sterile water. irrigated easily for clear return, mild spasm noted. Patient tolerated well. Attached to drainage bag and stat lock. Extra supplies provided per pt request.     Vitals:    03/25/25 1118   BP: 122/90   BP Location: Left arm   Patient Position: Sitting   Cuff Size: Adult   Pulse: 94   SpO2: 97%         Ani Alberto RN   "

## 2025-03-27 ENCOUNTER — RESULTS FOLLOW-UP (OUTPATIENT)
Dept: CARDIOLOGY CLINIC | Facility: CLINIC | Age: 80
End: 2025-03-27

## 2025-05-05 ENCOUNTER — RESULTS FOLLOW-UP (OUTPATIENT)
Dept: CARDIOLOGY CLINIC | Facility: CLINIC | Age: 80
End: 2025-05-05

## 2025-05-05 ENCOUNTER — REMOTE DEVICE CLINIC VISIT (OUTPATIENT)
Dept: CARDIOLOGY CLINIC | Facility: CLINIC | Age: 80
End: 2025-05-05
Payer: MEDICARE

## 2025-05-05 DIAGNOSIS — I48.20 CHRONIC ATRIAL FIBRILLATION (HCC): Primary | Chronic | ICD-10-CM

## 2025-05-05 PROCEDURE — 93298 REM INTERROG DEV EVAL SCRMS: CPT | Performed by: INTERNAL MEDICINE

## 2025-05-05 NOTE — PROGRESS NOTES
"MDT LNQ22/ ACTIVE SYSTEM IS MRI CONDITIONAL   [facility]EL[facility] TRANSMISSION: LOOP RECORDER. PRESENTING RHYTHM AF @ 63 BPM. BATTERY STATUS \"OK.\" 2 PAUSE EPISODES W/ EGRAM SHOWING 3 SEC PAUSES DURING AF. 424 DEVICE CLASSIFIED AF EPISODES, LONGEST EPISODE IS 14:52 HOURS, AVAILABLE STRIPS DEMONSTRATE ATRIAL FIBRILLATION @  BPM. AF BURDEN IS 94.2%, HX OF CHRONIC AF. PT TAKES ELIQUIS AND METPROLOL SUCC. NO PATIENT ACTIVATED EPISODES. NORMAL DEVICE FUNCTION. DL   "

## 2025-08-06 ENCOUNTER — REMOTE DEVICE CLINIC VISIT (OUTPATIENT)
Dept: CARDIOLOGY CLINIC | Facility: CLINIC | Age: 80
End: 2025-08-06
Payer: MEDICARE

## 2025-08-06 DIAGNOSIS — R55 SYNCOPE AND COLLAPSE: Primary | ICD-10-CM

## 2025-08-06 PROCEDURE — 93298 REM INTERROG DEV EVAL SCRMS: CPT | Performed by: INTERNAL MEDICINE

## (undated) DEVICE — ELECTRODE BLADE MOD  E-Z CLEAN 6.5IN -0014M

## (undated) DEVICE — GLOVE SRG BIOGEL ORTHOPEDIC 8

## (undated) DEVICE — NEEDLE 22 G X 1 1/2 SAFETY

## (undated) DEVICE — ADHESIVE SKIN HIGH VISCOSITY EXOFIN 1ML

## (undated) DEVICE — SUT MONOCRYL 2-0 CT-1 27 IN Y339H

## (undated) DEVICE — STIRRUP STRAP ADULT DISP

## (undated) DEVICE — COBAN 4 IN STERILE

## (undated) DEVICE — CAPIT HIP STEM CEMENTED

## (undated) DEVICE — NEPTUNE E-SEP SMOKE EVACUATION PENCIL, COATED, 70MM BLADE, PUSH BUTTON SWITCH: Brand: NEPTUNE E-SEP

## (undated) DEVICE — 3M™ IOBAN™ 2 ANTIMICROBIAL INCISE DRAPE 6651EZ: Brand: IOBAN™ 2

## (undated) DEVICE — 3M™ STERI-STRIP™ REINFORCED ADHESIVE SKIN CLOSURES, R1546, 1/4 IN X 4 IN (6 MM X 100 MM), 10 STRIPS/ENVELOPE: Brand: 3M™ STERI-STRIP™

## (undated) DEVICE — TOWEL SURG XR DETECT GREEN STRL RFD

## (undated) DEVICE — SUT MONOCRYL 3-0 PS-2 27 IN Y427H

## (undated) DEVICE — VIAL DECANTER

## (undated) DEVICE — 2108 SERIES SAGITTAL BLADE (18.6 X 0.64 X 61.1MM)

## (undated) DEVICE — DRAPE EQUIPMENT RF WAND

## (undated) DEVICE — PLUMEPEN PRO 10FT

## (undated) DEVICE — SUT ETHIBOND 5 V-37 30 IN MB66G

## (undated) DEVICE — CHLORAPREP HI-LITE 26ML ORANGE

## (undated) DEVICE — SUT VICRYL 2-0 SH 27 IN UNDYED J417H

## (undated) DEVICE — GROUNDING PAD UNIVERSAL SLW

## (undated) DEVICE — DRESSING MEPILEX AG BORDER POST-OP 4 X 8 IN

## (undated) DEVICE — HOOD WITH PEEL AWAY FACE SHIELD: Brand: T7PLUS

## (undated) DEVICE — INTENDED FOR TISSUE SEPARATION, AND OTHER PROCEDURES THAT REQUIRE A SHARP SURGICAL BLADE TO PUNCTURE OR CUT.: Brand: BARD-PARKER SAFETY BLADES SIZE 15, STERILE

## (undated) DEVICE — PAD GROUNDING ADULT

## (undated) DEVICE — BULB SYRINGE,IRRIGATION WITH PROTECTIVE CAP: Brand: DOVER

## (undated) DEVICE — ABDUCTION PILLOW FOAM POSITIONER: Brand: CARDINAL HEALTH

## (undated) DEVICE — SUT MONOCRYL 4-0 PS-2 27 IN Y426H

## (undated) DEVICE — SUT STRATAFIX SPIRAL MONOCRYL PLUS 4-0 PS-2 30CM SXMP1B117

## (undated) DEVICE — BETHLEHEM UNIVERSAL MINOR GEN: Brand: CARDINAL HEALTH

## (undated) DEVICE — IMPERVIOUS STOCKINETTE: Brand: DEROYAL

## (undated) DEVICE — GLOVE INDICATOR PI UNDERGLOVE SZ 8 BLUE

## (undated) DEVICE — SUT VICRYL 0 CT-1 27 IN J260H

## (undated) DEVICE — DRESSING MEPILEX AG BORDER POST-OP 4 X 10 IN

## (undated) DEVICE — HEWSON SUTURE RETRIEVER: Brand: HEWSON SUTURE RETRIEVER

## (undated) DEVICE — CAPIT HIP BIPOLAR HEAD POR PRIMARY

## (undated) DEVICE — PACK MAJOR ORTHO W/SPLITS PBDS